# Patient Record
Sex: FEMALE | Race: WHITE | NOT HISPANIC OR LATINO | Employment: OTHER | URBAN - METROPOLITAN AREA
[De-identification: names, ages, dates, MRNs, and addresses within clinical notes are randomized per-mention and may not be internally consistent; named-entity substitution may affect disease eponyms.]

---

## 2017-01-06 ENCOUNTER — OFFICE VISIT (OUTPATIENT)
Dept: INTERNAL MEDICINE | Facility: CLINIC | Age: 62
End: 2017-01-06

## 2017-01-06 VITALS — DIASTOLIC BLOOD PRESSURE: 68 MMHG | SYSTOLIC BLOOD PRESSURE: 124 MMHG | HEIGHT: 66 IN | HEART RATE: 68 BPM

## 2017-01-06 DIAGNOSIS — M54.40 CHRONIC LOW BACK PAIN WITH SCIATICA, SCIATICA LATERALITY UNSPECIFIED, UNSPECIFIED BACK PAIN LATERALITY: ICD-10-CM

## 2017-01-06 DIAGNOSIS — E78.5 DYSLIPIDEMIA: ICD-10-CM

## 2017-01-06 DIAGNOSIS — R74.8 ABNORMAL LIVER ENZYMES: ICD-10-CM

## 2017-01-06 DIAGNOSIS — G47.33 OBSTRUCTIVE SLEEP APNEA SYNDROME: ICD-10-CM

## 2017-01-06 DIAGNOSIS — G25.0 ESSENTIAL TREMOR: ICD-10-CM

## 2017-01-06 DIAGNOSIS — G47.00 INSOMNIA, UNSPECIFIED TYPE: ICD-10-CM

## 2017-01-06 DIAGNOSIS — E08.10: ICD-10-CM

## 2017-01-06 DIAGNOSIS — M54.40 CHRONIC MIDLINE LOW BACK PAIN WITH SCIATICA, SCIATICA LATERALITY UNSPECIFIED: ICD-10-CM

## 2017-01-06 DIAGNOSIS — E55.9 VITAMIN D DEFICIENCY: ICD-10-CM

## 2017-01-06 DIAGNOSIS — I73.9 PERIPHERAL VASCULAR DISEASE (HCC): ICD-10-CM

## 2017-01-06 DIAGNOSIS — J41.8 MIXED SIMPLE AND MUCOPURULENT CHRONIC BRONCHITIS (HCC): ICD-10-CM

## 2017-01-06 DIAGNOSIS — G89.29 CHRONIC LOW BACK PAIN WITH SCIATICA, SCIATICA LATERALITY UNSPECIFIED, UNSPECIFIED BACK PAIN LATERALITY: ICD-10-CM

## 2017-01-06 DIAGNOSIS — E53.8 COBALAMIN DEFICIENCY: ICD-10-CM

## 2017-01-06 DIAGNOSIS — K21.9 GASTROESOPHAGEAL REFLUX DISEASE WITHOUT ESOPHAGITIS: ICD-10-CM

## 2017-01-06 DIAGNOSIS — L40.9 PSORIASIS: ICD-10-CM

## 2017-01-06 DIAGNOSIS — K76.0 FATTY LIVER DISEASE, NONALCOHOLIC: Chronic | ICD-10-CM

## 2017-01-06 DIAGNOSIS — M15.9 PRIMARY OSTEOARTHRITIS INVOLVING MULTIPLE JOINTS: ICD-10-CM

## 2017-01-06 DIAGNOSIS — I10 ESSENTIAL HYPERTENSION: ICD-10-CM

## 2017-01-06 DIAGNOSIS — E78.49 OTHER HYPERLIPIDEMIA: ICD-10-CM

## 2017-01-06 DIAGNOSIS — M81.0 OSTEOPOROSIS: ICD-10-CM

## 2017-01-06 DIAGNOSIS — G89.29 CHRONIC MIDLINE LOW BACK PAIN WITH SCIATICA, SCIATICA LATERALITY UNSPECIFIED: ICD-10-CM

## 2017-01-06 DIAGNOSIS — E03.8 OTHER SPECIFIED HYPOTHYROIDISM: ICD-10-CM

## 2017-01-06 DIAGNOSIS — D45 POLYCYTHEMIA VERA (HCC): ICD-10-CM

## 2017-01-06 DIAGNOSIS — J30.9 ATOPIC RHINITIS: ICD-10-CM

## 2017-01-06 DIAGNOSIS — L97.524 FOOT ULCER, LEFT, WITH NECROSIS OF BONE (HCC): ICD-10-CM

## 2017-01-06 DIAGNOSIS — H34.8392 BRANCH RETINAL VEIN OCCLUSION: Primary | ICD-10-CM

## 2017-01-06 DIAGNOSIS — J43.8 OTHER EMPHYSEMA (HCC): ICD-10-CM

## 2017-01-06 DIAGNOSIS — M79.7 FIBROMYALGIA: ICD-10-CM

## 2017-01-06 DIAGNOSIS — F41.8 MIXED ANXIETY DEPRESSIVE DISORDER: ICD-10-CM

## 2017-01-06 PROCEDURE — 99214 OFFICE O/P EST MOD 30 MIN: CPT | Performed by: INTERNAL MEDICINE

## 2017-01-06 RX ORDER — LISINOPRIL 20 MG/1
20 TABLET ORAL
Qty: 90 TABLET | Refills: 3 | Status: SHIPPED | OUTPATIENT
Start: 2017-01-06 | End: 2017-10-03 | Stop reason: SDUPTHER

## 2017-01-06 RX ORDER — EZETIMIBE 10 MG/1
10 TABLET ORAL DAILY
Qty: 90 TABLET | Refills: 3 | Status: SHIPPED | OUTPATIENT
Start: 2017-01-06 | End: 2018-06-08 | Stop reason: SDUPTHER

## 2017-01-06 RX ORDER — ATORVASTATIN CALCIUM 40 MG/1
40 TABLET, FILM COATED ORAL NIGHTLY
Qty: 90 TABLET | Refills: 3 | Status: SHIPPED | OUTPATIENT
Start: 2017-01-06 | End: 2018-02-27 | Stop reason: HOSPADM

## 2017-01-06 RX ORDER — CLOPIDOGREL BISULFATE 75 MG/1
75 TABLET ORAL DAILY
Qty: 90 TABLET | Refills: 3 | Status: SHIPPED | OUTPATIENT
Start: 2017-01-06 | End: 2018-05-17 | Stop reason: SDUPTHER

## 2017-01-06 RX ORDER — FENTANYL 100 UG/H
1 PATCH TRANSDERMAL EVERY OTHER DAY
Qty: 15 PATCH | Refills: 0 | Status: SHIPPED | OUTPATIENT
Start: 2017-01-06 | End: 2017-02-02 | Stop reason: SDUPTHER

## 2017-01-06 RX ORDER — CEPHALEXIN 500 MG/1
CAPSULE ORAL
COMMUNITY
Start: 2017-01-05 | End: 2017-02-16 | Stop reason: ALTCHOICE

## 2017-01-06 RX ORDER — OXYCODONE AND ACETAMINOPHEN 10; 325 MG/1; MG/1
1 TABLET ORAL 3 TIMES DAILY
Qty: 90 TABLET | Refills: 0 | Status: SHIPPED | OUTPATIENT
Start: 2017-01-06 | End: 2017-02-02 | Stop reason: SDUPTHER

## 2017-01-06 RX ORDER — AMLODIPINE BESYLATE 5 MG/1
5 TABLET ORAL DAILY
Qty: 90 TABLET | Refills: 3 | Status: SHIPPED | OUTPATIENT
Start: 2017-01-06 | End: 2018-02-13

## 2017-01-06 NOTE — PROGRESS NOTES
Patient is a 61 y.o. female who is here for a follow up of chronic conditions.  Chief Complaint   Patient presents with   • Hypertension         HPI:  Here for f/u.  Continues to be followed by Dr Feliciano.  Continues on antibiotics.  Bowels are becoming more solid.  BP is running ok.  Still smoking but less in amount.  Sleeping a lot.  No nausea or emesis.  Still in a lot of pain.     History:    Patient Active Problem List   Diagnosis   • Atopic rhinitis   • Restrictive ventilatory defect   • Pulmonary emphysema   • Osteoporosis   • Obstructive sleep apnea syndrome   • Abnormal liver enzymes   • Cholelithiasis   • Chronic back pain   • Mixed anxiety depressive disorder   • Uncontrolled diabetes mellitus   • Dyslipidemia   • Essential tremor   • Fibromyalgia   • Gastroesophageal reflux disease without esophagitis   • Hypertension   • Hypothyroidism   • Insomnia   • Osteoarthritis   • Polycythemia vera   • Psoriasis   • Branch retinal vein occlusion   • Cobalamin deficiency   • Chronic bronchitis   • Obesity   • Pneumonia   • Tobacco use   • Vitamin D deficiency   • Foot ulcer, left   • Leukocytosis   • Hypokalemia   • Lactic acidosis   • Fatty liver disease, nonalcoholic   • Diabetes education, encounter for   • Cellulitis of left foot   • Sinus bradycardia   • NSTEMI (non-ST elevated myocardial infarction)   • Tobacco abuse   • Hypoxia   • Peripheral vascular disease   • Gangrene       Past Medical History   Diagnosis Date   • Bronchitis    • Cervical cancer    • Cholelithiasis 5/11/2016   • Chronic bronchitis    • Degenerative arthritis    • Diabetes mellitus    • Dyslipidemia 5/11/2016   • Dyspnea    • Fatty liver disease, nonalcoholic 11/21/2016   • Fibromyalgia    • H/O echocardiogram    • History of pneumonia    • Hypertension 5/11/2016     16. H/O echocardiogram (V15.89) (Z92.89)  · A.  Echocardiogram of 02/03/2015 reports an ejection fraction of 60-65%, mild concentric     LVH, trace mitral regurgitation, mild  tricuspid and pulmonic regurgitation and calculated     RVSP of 35 mmHg, the main pulmonary artery is also mildly dilated.   • Hypothyroidism 5/11/2016     Description: A.  On replacement therapy.   • Nausea    • Obesity    • DINA (obstructive sleep apnea)      intolerant of CPAP therapy   • Osteoporosis    • Osteoporosis    • Polycythemia vera 5/11/2016   • Pulmonary emphysema    • Restrictive ventilatory defect    • Rhinitis    • Uncontrolled diabetes mellitus 5/11/2016   • Uterine cancer    • Vitamin D deficiency 8/1/2016       Past Surgical History   Procedure Laterality Date   • Lumbar spine surgery       arthrodesis by anterior approach addit interspace   • Back surgery       lumbar fusions x5--multiple times; 1995, 1997, 1998, 1999 and 2008   • Hand surgery Bilateral      x3   • Hysterectomy       status post uterine and cervical cancer   • Knee surgery Right    • Tonsillectomy     • Amputation digit Left 11/23/2016     Procedure: AMPUTATION TRANS METATARSAL - ray amutation of the left great toe;  Surgeon: Arsalan Feliciano MD;  Location:  [facility];  Service:    • Cardiac catheterization N/A 11/26/2016     Procedure: Left Heart Cath;  Surgeon: Ash Nuñez MD;  Location:  [facility];  Service:        Current Outpatient Prescriptions on File Prior to Visit   Medication Sig   • acetaminophen (TYLENOL) 325 MG tablet Take 2 tablets by mouth Every 4 (Four) Hours As Needed for mild pain (1-3) or fever (temperature greater than 101F).   • albuterol (PROVENTIL) (2.5 MG/3ML) 0.083% nebulizer solution 2.5 mg Every 6 (Six) Hours As Needed.   • aspirin  MG EC tablet Take 1 tablet by mouth Daily.   • baclofen (LIORESAL) 20 MG tablet Take 20 mg by mouth Daily.   • bisoprolol-hydrochlorothiazide (ZIAC) 5-6.25 MG per tablet TAKE ONE TABLET BY MOUTH DAILY   • busPIRone (BUSPAR) 10 MG tablet TAKE TWO TABLETS BY MOUTH TWICE A DAY   • cetirizine (ZyrTEC) 10 MG tablet Take 10 mg by mouth Every Night.   •  clonazePAM (KlonoPIN) 0.5 MG tablet TAKE ONE-HALF TABLET BY MOUTH TWICE A DAY   • cyanocobalamin 1000 MCG/ML injection 1,000 mcg. Cyanocobalamin 1000 MCG/ML Injection Solution; Patient Sig: Cyanocobalamin 1000 MCG/ML Injection Solution INJECT ONE MILLILITER INJECTION EVERY MONTH; 10; 4; 18-Jun-2015; Active   • diphenhydrAMINE (BENADRYL) 25 mg capsule Take 50 mg by mouth Daily.   • fluticasone (FLONASE) 50 MCG/ACT nasal spray 1 spray into each nostril 2 (Two) Times a Day.   • furosemide (LASIX) 40 MG tablet Take 1 tablet by mouth Daily.   • levothyroxine (SYNTHROID, LEVOTHROID) 112 MCG tablet TAKE ONE TABLET BY MOUTH DAILY   • loperamide (IMODIUM) 2 MG capsule Take 2 mg by mouth Daily As Needed for diarrhea (TAKES ABOUT  EVERY 3 DAYS.).   • melatonin 5 MG sublingual tablet sublingual tablet Place 1 tablet under the tongue At Night As Needed (insomnia).   • metoclopramide (REGLAN) 10 MG tablet TAKE ONE TABLET BY MOUTH BEFORE MEALS   • nystatin (MYCOSTATIN) 638490 UNIT/GM cream Apply  topically 2 (Two) Times a Day.   • omeprazole (PriLOSEC) 20 MG capsule TAKE ONE CAPSULE BY MOUTH EVERY DAY   • pregabalin (LYRICA) 100 MG capsule Take 100 mg by mouth 2 (Two) Times a Day.   • probiotic (CULTURELLE) capsule capsule Take 1 capsule by mouth Daily.   • promethazine (PHENERGAN) 25 MG tablet Take 25 mg by mouth Every 8 (Eight) Hours As Needed for nausea or vomiting.   • SITagliptin (JANUVIA) 100 MG tablet Take 1 tablet by mouth Daily.   • SPIRIVA RESPIMAT 2.5 MCG/ACT aerosol solution INHALE TWO PUFF(S) BY MOUTH DAILY   • triamcinolone (KENALOG) 0.1 % cream Apply 1 application topically 2 (Two) Times a Day.   • [DISCONTINUED] amLODIPine (NORVASC) 5 MG tablet Take 1 tablet by mouth Daily.   • [DISCONTINUED] atorvastatin (LIPITOR) 40 MG tablet Take 1 tablet by mouth Every Night.   • [DISCONTINUED] ezetimibe (ZETIA) 10 MG tablet Take 1 tablet by mouth daily.   • [DISCONTINUED] fentaNYL (DURAGESIC) 100 MCG/HR patch Place 1 patch on  the skin Every Other Day.   • [DISCONTINUED] lisinopril (PRINIVIL,ZESTRIL) 20 MG tablet Take 1 tablet by mouth Daily.   • [DISCONTINUED] oxyCODONE-acetaminophen (PERCOCET)  MG per tablet Take 1 tablet by mouth 3 (Three) Times a Day.   • [DISCONTINUED] fluconazole (DIFLUCAN) 100 MG tablet Take 1 tablet by mouth Daily.     No current facility-administered medications on file prior to visit.        Family History   Problem Relation Age of Onset   • Arthritis Mother    • Diabetes Mother    • Colon polyps Mother    • Diverticulitis Mother    • Arthritis Father    • Bleeding Disorder Father    • Diabetes Father    • Kidney disease Father    • COPD Sister      currently smokes   • Arthritis Brother    • Diabetes Brother    • Alcohol abuse Brother    • Heart murmur Daughter    • Arthritis Other    • Diabetes Other        Social History     Social History   • Marital status:      Spouse name: N/A   • Number of children: N/A   • Years of education: N/A     Occupational History   • Not on file.     Social History Main Topics   • Smoking status: Current Every Day Smoker     Packs/day: 0.50   • Smokeless tobacco: Never Used      Comment: currently trying to quit by using Chantix and has cut smoking habit in half, Pt now smoking half a pack a day    • Alcohol use No   • Drug use: No   • Sexual activity: Not on file     Other Topics Concern   • Not on file     Social History Narrative    Lives with her  in [place]         ROS:    Review of Systems   Constitutional: Positive for fatigue. Negative for chills, diaphoresis, fever and unexpected weight change.   HENT: Negative for congestion, ear pain, hearing loss, nosebleeds, postnasal drip, sinus pressure and sore throat.    Eyes: Negative for pain, discharge and itching.   Respiratory: Positive for cough and shortness of breath. Negative for chest tightness and wheezing.    Cardiovascular: Negative for chest pain, palpitations and leg swelling.  "  Gastrointestinal: Positive for diarrhea and nausea. Negative for abdominal distention, abdominal pain, blood in stool, constipation and vomiting.   Endocrine: Positive for heat intolerance. Negative for polydipsia and polyuria.   Genitourinary: Negative for difficulty urinating, dysuria, frequency and hematuria.   Musculoskeletal: Positive for arthralgias and back pain. Negative for gait problem, joint swelling and myalgias.   Skin: Positive for rash and wound.   Neurological: Positive for light-headedness. Negative for dizziness, syncope, weakness and headaches.   Psychiatric/Behavioral: Positive for dysphoric mood and sleep disturbance. The patient is nervous/anxious.        Visit Vitals   • /68 (BP Location: Left arm, Patient Position: Sitting)   • Pulse 68   • Ht 66\" (167.6 cm)   • LMP  (LMP Unknown)       Physical Exam:    Physical Exam   Constitutional: She appears well-developed and well-nourished.   HENT:   Head: Normocephalic and atraumatic.   Right Ear: External ear normal.   Left Ear: External ear normal.   Mouth/Throat: Oropharynx is clear and moist.   Eyes: Conjunctivae and EOM are normal.   Neck: Normal range of motion. Neck supple.   Cardiovascular: Normal rate and regular rhythm.    2/6 СЕРГЕЙ   Pulmonary/Chest: Effort normal.   Mildly diminished   Abdominal: Soft. Bowel sounds are normal.   Musculoskeletal: She exhibits edema (trace) and tenderness (pain on back flexion past 30).   Using cane   Lymphadenopathy:     She has no cervical adenopathy.   Neurological: She is alert.   Skin: Skin is warm and dry. Rash (eczema in hands/forearms and LE brown patches with scaling) noted.   Left foot wound not examined   Psychiatric: She has a normal mood and affect. Her behavior is normal. Thought content normal.       Procedure:      Discussion/Summary:  chronic back pain- refill patch and percocet as needed  htn-stable no triple tx  fibromylagia- cont current tx  hyperlipidemia-cont lipitor and " zetia  hypothroid-cont replacement  depression with anxiety-cont buspar and klonopine ,advised her of risk of addiction  polycythemia-cbc noted  retinal vein occlusion- cont rf modification  b12 def-admin today  nausea-phenergan prn, cont reglan, advised of EPS effects and she has agreed  DM-labs noted, counseled on low carb, change to januvia to see if it helps with diarrhea  diaphoresis-cont clonidine  Elevated lft/?autoimmune-f/u gastro recs , labs noted  Eczema-cont prn steroids  Left foot ulcer and osteo-f/u ID, cont current tx  high risk meds-labs noted      labs noted and dw patient      Current Outpatient Prescriptions:   •  acetaminophen (TYLENOL) 325 MG tablet, Take 2 tablets by mouth Every 4 (Four) Hours As Needed for mild pain (1-3) or fever (temperature greater than 101F)., Disp: 100 tablet, Rfl: 0  •  albuterol (PROVENTIL) (2.5 MG/3ML) 0.083% nebulizer solution, 2.5 mg Every 6 (Six) Hours As Needed., Disp: , Rfl:   •  amLODIPine (NORVASC) 5 MG tablet, Take 1 tablet by mouth Daily., Disp: 90 tablet, Rfl: 3  •  aspirin  MG EC tablet, Take 1 tablet by mouth Daily., Disp: 100 tablet, Rfl: 0  •  atorvastatin (LIPITOR) 40 MG tablet, Take 1 tablet by mouth Every Night., Disp: 90 tablet, Rfl: 3  •  baclofen (LIORESAL) 20 MG tablet, Take 20 mg by mouth Daily., Disp: , Rfl:   •  bisoprolol-hydrochlorothiazide (ZIAC) 5-6.25 MG per tablet, TAKE ONE TABLET BY MOUTH DAILY, Disp: 90 tablet, Rfl: 2  •  busPIRone (BUSPAR) 10 MG tablet, TAKE TWO TABLETS BY MOUTH TWICE A DAY, Disp: 360 tablet, Rfl: 2  •  cephalexin (KEFLEX) 500 MG capsule, , Disp: , Rfl:   •  cetirizine (ZyrTEC) 10 MG tablet, Take 10 mg by mouth Every Night., Disp: , Rfl:   •  clonazePAM (KlonoPIN) 0.5 MG tablet, TAKE ONE-HALF TABLET BY MOUTH TWICE A DAY, Disp: 90 tablet, Rfl: 0  •  cyanocobalamin 1000 MCG/ML injection, 1,000 mcg. Cyanocobalamin 1000 MCG/ML Injection Solution; Patient Sig: Cyanocobalamin 1000 MCG/ML Injection Solution INJECT ONE  MILLILITER INJECTION EVERY MONTH; 10; 4; 18-Jun-2015; Active, Disp: , Rfl:   •  diphenhydrAMINE (BENADRYL) 25 mg capsule, Take 50 mg by mouth Daily., Disp: , Rfl:   •  ezetimibe (ZETIA) 10 MG tablet, Take 1 tablet by mouth Daily., Disp: 90 tablet, Rfl: 3  •  fentaNYL (DURAGESIC) 100 MCG/HR patch, Place 1 patch on the skin Every Other Day., Disp: 15 patch, Rfl: 0  •  fluticasone (FLONASE) 50 MCG/ACT nasal spray, 1 spray into each nostril 2 (Two) Times a Day., Disp: , Rfl:   •  furosemide (LASIX) 40 MG tablet, Take 1 tablet by mouth Daily., Disp: 60 tablet, Rfl: 5  •  levothyroxine (SYNTHROID, LEVOTHROID) 112 MCG tablet, TAKE ONE TABLET BY MOUTH DAILY, Disp: 90 tablet, Rfl: 0  •  lisinopril (PRINIVIL,ZESTRIL) 20 MG tablet, Take 1 tablet by mouth Daily., Disp: 90 tablet, Rfl: 3  •  loperamide (IMODIUM) 2 MG capsule, Take 2 mg by mouth Daily As Needed for diarrhea (TAKES ABOUT  EVERY 3 DAYS.)., Disp: , Rfl:   •  melatonin 5 MG sublingual tablet sublingual tablet, Place 1 tablet under the tongue At Night As Needed (insomnia)., Disp: 30 tablet, Rfl: 0  •  metoclopramide (REGLAN) 10 MG tablet, TAKE ONE TABLET BY MOUTH BEFORE MEALS, Disp: 90 tablet, Rfl: 4  •  nystatin (MYCOSTATIN) 214060 UNIT/GM cream, Apply  topically 2 (Two) Times a Day., Disp: 60 g, Rfl: 1  •  omeprazole (PriLOSEC) 20 MG capsule, TAKE ONE CAPSULE BY MOUTH EVERY DAY, Disp: 90 capsule, Rfl: 2  •  oxyCODONE-acetaminophen (PERCOCET)  MG per tablet, Take 1 tablet by mouth 3 (Three) Times a Day., Disp: 90 tablet, Rfl: 0  •  pregabalin (LYRICA) 100 MG capsule, Take 100 mg by mouth 2 (Two) Times a Day., Disp: , Rfl:   •  probiotic (CULTURELLE) capsule capsule, Take 1 capsule by mouth Daily., Disp: 60 capsule, Rfl: 0  •  promethazine (PHENERGAN) 25 MG tablet, Take 25 mg by mouth Every 8 (Eight) Hours As Needed for nausea or vomiting., Disp: , Rfl:   •  SITagliptin (JANUVIA) 100 MG tablet, Take 1 tablet by mouth Daily., Disp: 30 tablet, Rfl: 0  •  SPIRIVA  RESPIMAT 2.5 MCG/ACT aerosol solution, INHALE TWO PUFF(S) BY MOUTH DAILY, Disp: 1 inhaler, Rfl: 4  •  triamcinolone (KENALOG) 0.1 % cream, Apply 1 application topically 2 (Two) Times a Day., Disp: , Rfl:   •  clopidogrel (PLAVIX) 75 MG tablet, Take 1 tablet by mouth Daily., Disp: 90 tablet, Rfl: 3        Tamara was seen today for hypertension.    Diagnoses and all orders for this visit:    Branch retinal vein occlusion    Essential hypertension  -     lisinopril (PRINIVIL,ZESTRIL) 20 MG tablet; Take 1 tablet by mouth Daily.  -     amLODIPine (NORVASC) 5 MG tablet; Take 1 tablet by mouth Daily.    Peripheral vascular disease  -     clopidogrel (PLAVIX) 75 MG tablet; Take 1 tablet by mouth Daily.    Atopic rhinitis    Mixed simple and mucopurulent chronic bronchitis    Obstructive sleep apnea syndrome    Other emphysema    Cobalamin deficiency    Fatty liver disease, nonalcoholic    Gastroesophageal reflux disease without esophagitis    Vitamin D deficiency    Other specified hypothyroidism    Chronic low back pain with sciatica, sciatica laterality unspecified, unspecified back pain laterality    Essential tremor    Fibromyalgia  -     oxyCODONE-acetaminophen (PERCOCET)  MG per tablet; Take 1 tablet by mouth 3 (Three) Times a Day.  -     fentaNYL (DURAGESIC) 100 MCG/HR patch; Place 1 patch on the skin Every Other Day.    Primary osteoarthritis involving multiple joints  -     oxyCODONE-acetaminophen (PERCOCET)  MG per tablet; Take 1 tablet by mouth 3 (Three) Times a Day.  -     fentaNYL (DURAGESIC) 100 MCG/HR patch; Place 1 patch on the skin Every Other Day.    Psoriasis    Osteoporosis    Polycythemia vera    Abnormal liver enzymes    Dyslipidemia  -     atorvastatin (LIPITOR) 40 MG tablet; Take 1 tablet by mouth Every Night.    Foot ulcer, left, with necrosis of bone    Insomnia, unspecified type    Mixed anxiety depressive disorder    Diabetes mellitus due to underlying condition, uncontrolled, with  ketoacidosis without coma, without long-term current use of insulin    Chronic midline low back pain with sciatica, sciatica laterality unspecified  -     oxyCODONE-acetaminophen (PERCOCET)  MG per tablet; Take 1 tablet by mouth 3 (Three) Times a Day.    Other hyperlipidemia  -     ezetimibe (ZETIA) 10 MG tablet; Take 1 tablet by mouth Daily.

## 2017-01-06 NOTE — MR AVS SNAPSHOT
Tamara Langston   1/6/2017 11:30 AM   Office Visit    Dept Phone:  [phone]   Encounter #:  [id number]    Provider:  Harinder Aranda MD   Department:  [facility] PRIMARY CARE                Your Full Care Plan              Today's Medication Changes          These changes are accurate as of: 1/6/17 12:31 PM.  If you have any questions, ask your nurse or doctor.               New Medication(s)Ordered:     clopidogrel 75 MG tablet   Commonly known as:  PLAVIX   Take 1 tablet by mouth Daily.   Started by:  Harinder Aranda MD         Stop taking medication(s)listed here:     fluconazole 100 MG tablet   Commonly known as:  DIFLUCAN   Stopped by:  Harinder Aranda MD                Where to Get Your Medications      These medications were sent to [address] - [address] [place] [address] [address] [place] - [phone]  - [phone] [place] [address]     Phone:  [phone]     amLODIPine 5 MG tablet    atorvastatin 40 MG tablet    clopidogrel 75 MG tablet    ezetimibe 10 MG tablet    lisinopril 20 MG tablet         You can get these medications from any pharmacy     Bring a paper prescription for each of these medications     fentaNYL 100 MCG/HR patch    oxyCODONE-acetaminophen  MG per tablet                  Your Updated Medication List          This list is accurate as of: 1/6/17 12:31 PM.  Always use your most recent med list.                acetaminophen 325 MG tablet   Commonly known as:  TYLENOL   Take 2 tablets by mouth Every 4 (Four) Hours As Needed for mild pain (1-3) or fever (temperature greater than 101F).       albuterol (2.5 MG/3ML) 0.083% nebulizer solution   Commonly known as:  PROVENTIL       amLODIPine 5 MG tablet   Commonly known as:  NORVASC   Take 1 tablet by mouth Daily.       aspirin 325 MG EC tablet   Take 1 tablet by mouth Daily.       atorvastatin 40 MG tablet   Commonly known as:   LIPITOR   Take 1 tablet by mouth Every Night.       baclofen 20 MG tablet   Commonly known as:  LIORESAL       bisoprolol-hydrochlorothiazide 5-6.25 MG per tablet   Commonly known as:  ZIAC   TAKE ONE TABLET BY MOUTH DAILY       busPIRone 10 MG tablet   Commonly known as:  BUSPAR   TAKE TWO TABLETS BY MOUTH TWICE A DAY       cephalexin 500 MG capsule   Commonly known as:  KEFLEX       cetirizine 10 MG tablet   Commonly known as:  zyrTEC       clonazePAM 0.5 MG tablet   Commonly known as:  KlonoPIN   TAKE ONE-HALF TABLET BY MOUTH TWICE A DAY       clopidogrel 75 MG tablet   Commonly known as:  PLAVIX   Take 1 tablet by mouth Daily.       cyanocobalamin 1000 MCG/ML injection       diphenhydrAMINE 25 mg capsule   Commonly known as:  BENADRYL       ezetimibe 10 MG tablet   Commonly known as:  ZETIA   Take 1 tablet by mouth Daily.       fentaNYL 100 MCG/HR patch   Commonly known as:  DURAGESIC   Place 1 patch on the skin Every Other Day.       fluticasone 50 MCG/ACT nasal spray   Commonly known as:  FLONASE       furosemide 40 MG tablet   Commonly known as:  LASIX   Take 1 tablet by mouth Daily.       levothyroxine 112 MCG tablet   Commonly known as:  SYNTHROID, LEVOTHROID   TAKE ONE TABLET BY MOUTH DAILY       lisinopril 20 MG tablet   Commonly known as:  PRINIVIL,ZESTRIL   Take 1 tablet by mouth Daily.       loperamide 2 MG capsule   Commonly known as:  IMODIUM       melatonin 5 MG sublingual tablet sublingual tablet   Place 1 tablet under the tongue At Night As Needed (insomnia).       metoclopramide 10 MG tablet   Commonly known as:  REGLAN   TAKE ONE TABLET BY MOUTH BEFORE MEALS       nystatin 751013 UNIT/GM cream   Commonly known as:  MYCOSTATIN   Apply  topically 2 (Two) Times a Day.       omeprazole 20 MG capsule   Commonly known as:  priLOSEC   TAKE ONE CAPSULE BY MOUTH EVERY DAY       oxyCODONE-acetaminophen  MG per tablet   Commonly known as:  PERCOCET   Take 1 tablet by mouth 3 (Three) Times a Day.        pregabalin 100 MG capsule   Commonly known as:  LYRICA       probiotic capsule capsule   Take 1 capsule by mouth Daily.       promethazine 25 MG tablet   Commonly known as:  PHENERGAN       SITagliptin 100 MG tablet   Commonly known as:  JANUVIA   Take 1 tablet by mouth Daily.       SPIRIVA RESPIMAT 2.5 MCG/ACT aerosol solution   Generic drug:  Tiotropium Bromide Monohydrate   INHALE TWO PUFF(S) BY MOUTH DAILY       triamcinolone 0.1 % cream   Commonly known as:  KENALOG               You Were Diagnosed With        Codes Comments    Branch retinal vein occlusion    -  Primary ICD-10-CM: H34.8392  ICD-9-CM: 362.36     Essential hypertension     ICD-10-CM: I10  ICD-9-CM: 401.9     Peripheral vascular disease     ICD-10-CM: I73.9  ICD-9-CM: 443.9     Atopic rhinitis     ICD-10-CM: J30.9  ICD-9-CM: 477.9     Mixed simple and mucopurulent chronic bronchitis     ICD-10-CM: J41.8  ICD-9-CM: 491.1     Obstructive sleep apnea syndrome     ICD-10-CM: G47.33  ICD-9-CM: 327.23     Other emphysema     ICD-10-CM: J43.8  ICD-9-CM: 492.8     Cobalamin deficiency     ICD-10-CM: E53.8  ICD-9-CM: 266.2     Fatty liver disease, nonalcoholic     ICD-10-CM: K76.0  ICD-9-CM: 571.8     Gastroesophageal reflux disease without esophagitis     ICD-10-CM: K21.9  ICD-9-CM: 530.81     Vitamin D deficiency     ICD-10-CM: E55.9  ICD-9-CM: 268.9     Other specified hypothyroidism     ICD-10-CM: E03.8  ICD-9-CM: 244.8     Chronic low back pain with sciatica, sciatica laterality unspecified, unspecified back pain laterality     ICD-10-CM: M54.40, G89.29  ICD-9-CM: 724.2, 724.3, 338.29     Essential tremor     ICD-10-CM: G25.0  ICD-9-CM: 333.1     Fibromyalgia     ICD-10-CM: M79.7  ICD-9-CM: 729.1     Primary osteoarthritis involving multiple joints     ICD-10-CM: M15.0  ICD-9-CM: 715.09     Psoriasis     ICD-10-CM: L40.9  ICD-9-CM: 696.1     Osteoporosis     ICD-10-CM: M81.0  ICD-9-CM: 733.00     Polycythemia vera     ICD-10-CM: D45  ICD-9-CM:  238.4     Abnormal liver enzymes     ICD-10-CM: R74.8  ICD-9-CM: 790.5     Dyslipidemia     ICD-10-CM: E78.5  ICD-9-CM: 272.4     Foot ulcer, left, with necrosis of bone     ICD-10-CM: L97.524  ICD-9-CM: 707.15     Insomnia, unspecified type     ICD-10-CM: G47.00  ICD-9-CM: 780.52     Mixed anxiety depressive disorder     ICD-10-CM: F41.8  ICD-9-CM: 300.4     Diabetes mellitus due to underlying condition, uncontrolled, with ketoacidosis without coma, without long-term current use of insulin     ICD-10-CM: E08.10  ICD-9-CM: 249.11     Chronic midline low back pain with sciatica, sciatica laterality unspecified     ICD-10-CM: M54.40, G89.29  ICD-9-CM: 724.2, 724.3, 338.29     Other hyperlipidemia     ICD-10-CM: E78.4  ICD-9-CM: 272.4       Instructions     None    Patient Instructions History      Upcoming Appointments     Visit Type Date Time Department    FOLLOW UP 1/6/2017 11:30 AM [facility]    OFFICE VISIT 1/12/2017 10:00 AM [facility] SURGERY [place]    OFFICE VISIT 2/16/2017  9:00 AM [facility] [place]    FOLLOW UP 3/6/2017  9:45 AM [facility] [place] [facility]      MyChart Signup     Our records indicate that you have declined [facility] MyChart signup. If you would like to sign up for MyChart, please email [email or web address] or call [phone] to obtain an activation code.             Other Info from Your Visit           Your Appointments     Jan 12, 2017 10:00 AM EST   Office Visit with Arsalan Feliciano MD   [facility] CARDIOTHORACIC SURGERY (--)    [address] [address]  [facility] [address] [phone]           Arrive 15 minutes prior to appointment.            Feb 16, 2017  9:00 AM EST   Office Visit with Prabhjot Luong MD   [facility] GASTROENTEROLOGY (--)    [address]  [facility] [address] [phone]           Arrive 15 minutes prior to appointment.            Mar 06, 2017  9:45 AM EST   Follow Up with Ash Nuñez,  "MD   [facility] CARDIOLOGY (--)    [address]  [facility] [address] [phone]           Arrive 15 minutes prior to appointment.              Allergies     Cortisone  Other (See Comments)    Hotness all over body and hyper    Oxycontin [Oxycodone]  Other (See Comments)    psoriasis    Tolmetin  Rash    Tolectin-rash and itching      Reason for Visit     Hypertension           Vital Signs     Blood Pressure Pulse Height Last Menstrual Period Smoking Status       124/68 (BP Location: Left arm, Patient Position: Sitting) 68 66\" (167.6 cm) (LMP Unknown) Current Every Day Smoker       Problems and Diagnoses Noted     Abnormal liver enzymes    Nasal inflammation due to allergen    Venous tributary (branch) occlusion of retina    Chronic back pain    Chronic bronchitis    Vitamin B12 deficiency    Dyslipidemia    Essential tremor    Fatty liver disease, nonalcoholic    Fibromyalgia    Ulcer of left foot    Acid reflux disease    High blood pressure    Underactive thyroid    Difficulty falling or staying asleep    Mixed anxiety depressive disorder    Sleep apnea    Osteoarthritis (arthritis due to wear and tear of joints)    Osteoporosis    Peripheral vascular disease    Polycythemia vera    Psoriasis    Emphysema    Diabetes mellitus out of control    Vitamin D deficiency    Other hyperlipidemia            "

## 2017-01-09 RX ORDER — BACLOFEN 20 MG/1
TABLET ORAL
Qty: 270 TABLET | Refills: 0 | OUTPATIENT
Start: 2017-01-09 | End: 2017-06-01 | Stop reason: SDUPTHER

## 2017-01-12 ENCOUNTER — OFFICE VISIT (OUTPATIENT)
Dept: CARDIAC SURGERY | Facility: CLINIC | Age: 62
End: 2017-01-12

## 2017-01-12 VITALS
OXYGEN SATURATION: 89 % | HEART RATE: 65 BPM | HEIGHT: 66 IN | BODY MASS INDEX: 35.36 KG/M2 | DIASTOLIC BLOOD PRESSURE: 64 MMHG | WEIGHT: 220 LBS | SYSTOLIC BLOOD PRESSURE: 140 MMHG | TEMPERATURE: 98.8 F

## 2017-01-12 DIAGNOSIS — L97.524 FOOT ULCER, LEFT, WITH NECROSIS OF BONE (HCC): Primary | ICD-10-CM

## 2017-01-12 DIAGNOSIS — I96 GANGRENE (HCC): ICD-10-CM

## 2017-01-12 PROCEDURE — 99024 POSTOP FOLLOW-UP VISIT: CPT | Performed by: THORACIC SURGERY (CARDIOTHORACIC VASCULAR SURGERY)

## 2017-01-12 NOTE — PROGRESS NOTES
"Patient Information  Tamara Langston                                                                                          [address] [facility] [address]      1955  [phone]      Chief Complaint   Patient presents with   • Peripheral Vascular Disease     1 month follow-up. S/P left great toe transmetatarsal amputation-for osteomyelitis 11/23/16.       History of Present Illness: Patient seen today for follow-up of a left great toe transmetatarsal amputation, done on 11/23/2016 for osteomyelitis.  The patient was last seen on 12/15/2016 which time she was doing well.  Wound vacs being changed 3 times a week.  Her [facility] health.  She is on oral antibiotics per infectious disease, Dr. Cruz. She has no complications at this time and no complaints    Vitals:    01/12/17 1000   BP: 140/64   BP Location: Left arm   Patient Position: Sitting   Pulse: 65   Temp: 98.8 °F (37.1 °C)   SpO2: (!) 89%   Weight: 220 lb (99.8 kg)   Height: 66\" (167.6 cm)        Physical Exam on examination, the wound VAC is intact over the left great toe amputation site.  The surrounding skin margins around the wound VAC are healing well.  I did not remove the wound VAC today    Lab/other results:    Assessment: #1.  Diabetes mellitus was also mobilized left great toe.  Post operative amputation, left great toe with a transmetatarsal type of amputation, done on 11/23/2016.  She now has wound VAC in place and the wound VAC being changed approximately 3 times a week per home health.  [facility]    Plan: We will plan to see Mrs. Langston back in one month for a follow-up visit.  Hopefully at that time the wound VAC will be ready to be discontinued.  She is also going to be continued to be followed by Dr. Cruz, of infectious disease    Arsalan Feliciano M.D.   "

## 2017-01-12 NOTE — MR AVS SNAPSHOT
Tamara Langston   1/12/2017 10:00 AM   Office Visit    Dept Phone:  [phone]   Encounter #:  [id number]    Provider:  Arsalan Feliciano MD   Department:  [facility] CARDIOTHORACIC SURGERY                Your Full Care Plan              Your Updated Medication List          This list is accurate as of: 1/12/17  2:14 PM.  Always use your most recent med list.                acetaminophen 325 MG tablet   Commonly known as:  TYLENOL   Take 2 tablets by mouth Every 4 (Four) Hours As Needed for mild pain (1-3) or fever (temperature greater than 101F).       albuterol (2.5 MG/3ML) 0.083% nebulizer solution   Commonly known as:  PROVENTIL       amLODIPine 5 MG tablet   Commonly known as:  NORVASC   Take 1 tablet by mouth Daily.       aspirin 325 MG EC tablet   Take 1 tablet by mouth Daily.       atorvastatin 40 MG tablet   Commonly known as:  LIPITOR   Take 1 tablet by mouth Every Night.       * baclofen 20 MG tablet   Commonly known as:  LIORESAL   TAKE ONE TABLET BY MOUTH THREE TIMES A DAY       * baclofen 20 MG tablet   Commonly known as:  LIORESAL       bisoprolol-hydrochlorothiazide 5-6.25 MG per tablet   Commonly known as:  ZIAC   TAKE ONE TABLET BY MOUTH DAILY       busPIRone 10 MG tablet   Commonly known as:  BUSPAR   TAKE TWO TABLETS BY MOUTH TWICE A DAY       cephalexin 500 MG capsule   Commonly known as:  KEFLEX       cetirizine 10 MG tablet   Commonly known as:  zyrTEC       clonazePAM 0.5 MG tablet   Commonly known as:  KlonoPIN   TAKE ONE-HALF TABLET BY MOUTH TWICE A DAY       clopidogrel 75 MG tablet   Commonly known as:  PLAVIX   Take 1 tablet by mouth Daily.       cyanocobalamin 1000 MCG/ML injection       diphenhydrAMINE 25 mg capsule   Commonly known as:  BENADRYL       ezetimibe 10 MG tablet   Commonly known as:  ZETIA   Take 1 tablet by mouth Daily.       fentaNYL 100 MCG/HR patch   Commonly known as:  DURAGESIC   Place 1 patch on the skin Every Other Day.          fluticasone 50 MCG/ACT nasal spray   Commonly known as:  FLONASE       furosemide 40 MG tablet   Commonly known as:  LASIX   Take 1 tablet by mouth Daily.       levothyroxine 112 MCG tablet   Commonly known as:  SYNTHROID, LEVOTHROID   TAKE ONE TABLET BY MOUTH DAILY       lisinopril 20 MG tablet   Commonly known as:  PRINIVIL,ZESTRIL   Take 1 tablet by mouth Daily.       loperamide 2 MG capsule   Commonly known as:  IMODIUM       LYRICA 100 MG capsule   Generic drug:  pregabalin   TAKE ONE CAPSULE BY MOUTH THREE TIMES A DAY       melatonin 5 MG sublingual tablet sublingual tablet   Place 1 tablet under the tongue At Night As Needed (insomnia).       metoclopramide 10 MG tablet   Commonly known as:  REGLAN   TAKE ONE TABLET BY MOUTH BEFORE MEALS       nystatin [id number] UNIT/GM cream   Commonly known as:  MYCOSTATIN   Apply  topically 2 (Two) Times a Day.       omeprazole 20 MG capsule   Commonly known as:  priLOSEC   TAKE ONE CAPSULE BY MOUTH EVERY DAY       oxyCODONE-acetaminophen  MG per tablet   Commonly known as:  PERCOCET   Take 1 tablet by mouth 3 (Three) Times a Day.       probiotic capsule capsule   Take 1 capsule by mouth Daily.       promethazine 25 MG tablet   Commonly known as:  PHENERGAN       SITagliptin 100 MG tablet   Commonly known as:  JANUVIA   Take 1 tablet by mouth Daily.       SPIRIVA RESPIMAT 2.5 MCG/ACT aerosol solution   Generic drug:  Tiotropium Bromide Monohydrate   INHALE TWO PUFF(S) BY MOUTH DAILY       triamcinolone 0.1 % cream   Commonly known as:  KENALOG       * Notice:  This list has 2 medication(s) that are the same as other medications prescribed for you. Read the directions carefully, and ask your doctor or other care provider to review them with you.            You Were Diagnosed With        Codes Comments    Foot ulcer, left, with necrosis of bone    -  Primary ICD-10-CM: L97.524  ICD-9-CM: 707.15     Gangrene     ICD-10-CM: I96  ICD-9-CM: 785.4       Instructions      None    Patient Instructions History      Upcoming Appointments     Visit Type Date Time Department    OFFICE VISIT 1/12/2017 10:00 AM [facility] CT SURGERY [place]    FOLLOW UP 2/2/2017 10:45 AM [facility] [place]    OFFICE VISIT 2/16/2017  9:00 AM [facility] [place]    FOLLOW UP 3/2/2017 10:45 AM [facility] [place]    FOLLOW UP 3/6/2017  9:45 AM [facility] [place] [facility]    FOLLOW UP 3/28/2017 11:30 AM [facility] [place]      MyChart Signup     Our records indicate that you have declined [facility] MyChart signup. If you would like to sign up for MyChart, please email [email or web address] or call [phone] to obtain an activation code.             Other Info from Your Visit           Your Appointments     Feb 02, 2017 10:45 AM EST   Follow Up with Harinder Aranda MD   [facility] PRIMARY CARE (--)    [address]  [place] [address]   [phone]           Arrive 15 minutes prior to appointment.            Feb 16, 2017  9:00 AM EST   Office Visit with Prabhjot Luong MD   [facility] GASTROENTEROLOGY (--)    [address]  [facility] [address]   [phone]           Arrive 15 minutes prior to appointment.            Mar 02, 2017 10:45 AM EST   Follow Up with Harinder Aranda MD   [facility] PRIMARY CARE (--)    [address]  [place] [address]   [phone]           Arrive 15 minutes prior to appointment.            Mar 06, 2017  9:45 AM EST   Follow Up with Ash Nuñez MD   [facility] CARDIOLOGY (--)    [address]  [facility] [address]   [phone]           Arrive 15 minutes prior to appointment.            Mar 28, 2017 11:30 AM EDT   Follow Up with Harinder Aranda MD   [facility] PRIMARY CARE (--)    [address]  [place] [address]   [phone]           Arrive 15 minutes prior to appointment.              Allergies   "   Cortisone  Other (See Comments)    Hotness all over body and hyper    Oxycontin [Oxycodone]  Other (See Comments)    psoriasis    Tolmetin  Rash    Tolectin-rash and itching      Reason for Visit     Peripheral Vascular Disease 1 month follow-up. S/P left great toe transmetatarsal amputation-for osteomyelitis 11/23/16.      Vital Signs     Blood Pressure Pulse Temperature Height Weight Last Menstrual Period    140/64 (BP Location: Left arm, Patient Position: Sitting) 65 98.8 °F (37.1 °C) 66\" (167.6 cm) 220 lb (99.8 kg) (LMP Unknown)    Oxygen Saturation Body Mass Index Smoking Status             89% 35.51 kg/m2 Current Every Day Smoker         Problems and Diagnoses Noted     Ulcer of left foot    Gangrene        "

## 2017-01-18 RX ORDER — METOCLOPRAMIDE 10 MG/1
10 TABLET ORAL 3 TIMES DAILY
Qty: 270 TABLET | Refills: 2 | Status: SHIPPED | OUTPATIENT
Start: 2017-01-18 | End: 2017-03-10 | Stop reason: HOSPADM

## 2017-01-19 RX ORDER — CLONAZEPAM 0.5 MG/1
TABLET ORAL
Qty: 90 TABLET | Refills: 0 | OUTPATIENT
Start: 2017-01-19 | End: 2017-03-10 | Stop reason: HOSPADM

## 2017-01-26 ENCOUNTER — OFFICE VISIT (OUTPATIENT)
Dept: CARDIAC SURGERY | Facility: CLINIC | Age: 62
End: 2017-01-26

## 2017-01-26 VITALS
OXYGEN SATURATION: 93 % | BODY MASS INDEX: 35.36 KG/M2 | HEIGHT: 66 IN | WEIGHT: 220 LBS | DIASTOLIC BLOOD PRESSURE: 69 MMHG | SYSTOLIC BLOOD PRESSURE: 130 MMHG | HEART RATE: 70 BPM | TEMPERATURE: 98.2 F

## 2017-01-26 DIAGNOSIS — I73.9 PERIPHERAL VASCULAR DISEASE (HCC): ICD-10-CM

## 2017-01-26 DIAGNOSIS — I96 GANGRENE OF FOOT (HCC): Primary | ICD-10-CM

## 2017-01-26 PROCEDURE — 99024 POSTOP FOLLOW-UP VISIT: CPT | Performed by: THORACIC SURGERY (CARDIOTHORACIC VASCULAR SURGERY)

## 2017-01-26 NOTE — MR AVS SNAPSHOT
Tamara Langston   1/26/2017 1:15 PM   Office Visit    Dept Phone:  [phone]   Encounter #:  [id number]    Provider:  Arsalan Feliciano MD   Department:  [facility] CARDIOTHORACIC SURGERY                Your Full Care Plan              Your Updated Medication List          This list is accurate as of: 1/26/17  1:58 PM.  Always use your most recent med list.                acetaminophen 325 MG tablet   Commonly known as:  TYLENOL   Take 2 tablets by mouth Every 4 (Four) Hours As Needed for mild pain (1-3) or fever (temperature greater than 101F).       albuterol (2.5 MG/3ML) 0.083% nebulizer solution   Commonly known as:  PROVENTIL       amLODIPine 5 MG tablet   Commonly known as:  NORVASC   Take 1 tablet by mouth Daily.       aspirin 325 MG EC tablet   Take 1 tablet by mouth Daily.       atorvastatin 40 MG tablet   Commonly known as:  LIPITOR   Take 1 tablet by mouth Every Night.       * baclofen 20 MG tablet   Commonly known as:  LIORESAL   TAKE ONE TABLET BY MOUTH THREE TIMES A DAY       * baclofen 20 MG tablet   Commonly known as:  LIORESAL       bisoprolol-hydrochlorothiazide 5-6.25 MG per tablet   Commonly known as:  ZIAC   TAKE ONE TABLET BY MOUTH DAILY       busPIRone 10 MG tablet   Commonly known as:  BUSPAR   TAKE TWO TABLETS BY MOUTH TWICE A DAY       cephalexin 500 MG capsule   Commonly known as:  KEFLEX       cetirizine 10 MG tablet   Commonly known as:  zyrTEC       clonazePAM 0.5 MG tablet   Commonly known as:  KlonoPIN   TAKE ONE-HALF TABLET BY MOUTH TWICE A DAY       clopidogrel 75 MG tablet   Commonly known as:  PLAVIX   Take 1 tablet by mouth Daily.       cyanocobalamin 1000 MCG/ML injection       diphenhydrAMINE 25 mg capsule   Commonly known as:  BENADRYL       ezetimibe 10 MG tablet   Commonly known as:  ZETIA   Take 1 tablet by mouth Daily.       fentaNYL 100 MCG/HR patch   Commonly known as:  DURAGESIC   Place 1 patch on the skin Every Other Day.       fluticasone 50 MCG/ACT nasal spray   Commonly known as:  FLONASE       furosemide 40 MG tablet   Commonly known as:  LASIX   Take 1 tablet by mouth Daily.       levothyroxine 112 MCG tablet   Commonly known as:  SYNTHROID, LEVOTHROID   TAKE ONE TABLET BY MOUTH DAILY       lisinopril 20 MG tablet   Commonly known as:  PRINIVIL,ZESTRIL   Take 1 tablet by mouth Daily.       loperamide 2 MG capsule   Commonly known as:  IMODIUM       LYRICA 100 MG capsule   Generic drug:  pregabalin   TAKE ONE CAPSULE BY MOUTH THREE TIMES A DAY       melatonin 5 MG sublingual tablet sublingual tablet   Place 1 tablet under the tongue At Night As Needed (insomnia).       metoclopramide 10 MG tablet   Commonly known as:  REGLAN   Take 1 tablet by mouth 3 (Three) Times a Day.       nystatin 851981 UNIT/GM cream   Commonly known as:  MYCOSTATIN   Apply  topically 2 (Two) Times a Day.       omeprazole 20 MG capsule   Commonly known as:  priLOSEC   TAKE ONE CAPSULE BY MOUTH EVERY DAY       oxyCODONE-acetaminophen  MG per tablet   Commonly known as:  PERCOCET   Take 1 tablet by mouth 3 (Three) Times a Day.       probiotic capsule capsule   Take 1 capsule by mouth Daily.       promethazine 25 MG tablet   Commonly known as:  PHENERGAN       SITagliptin 100 MG tablet   Commonly known as:  JANUVIA   Take 1 tablet by mouth Daily.       SPIRIVA RESPIMAT 2.5 MCG/ACT aerosol solution   Generic drug:  Tiotropium Bromide Monohydrate   INHALE TWO PUFF(S) BY MOUTH DAILY       triamcinolone 0.1 % cream   Commonly known as:  KENALOG       * Notice:  This list has 2 medication(s) that are the same as other medications prescribed for you. Read the directions carefully, and ask your doctor or other care provider to review them with you.            Instructions     None    Patient Instructions History      Upcoming Appointments     Visit Type Date Time Department    OFFICE VISIT 1/26/2017  1:15 PM [facility]    FOLLOW UP 2/2/2017 10:45 AM [facility]  [facility]    OFFICE VISIT 2/16/2017  9:00 AM [facility] [place]    FOLLOW UP 3/2/2017 10:45 AM [facility]    FOLLOW UP 3/6/2017  9:45 AM [facility]    FOLLOW UP 3/28/2017 11:30 AM [facility]      MyChart Signup     Our records indicate that your [facility] [employer] account has been deactivated. If you would like to reactivate your account, please email [email or web address] or call [phone] to talk to our [employer] staff.             Other Info from Your Visit           Your Appointments     Feb 02, 2017 10:45 AM EST   Follow Up with Harinder Aranda MD   [facility] PRIMARY CARE (--)    [address]  [facility] [address]   [phone]           Arrive 15 minutes prior to appointment.            Feb 16, 2017  9:00 AM EST   Office Visit with Prabhjot Luong MD   [facility] GASTROENTEROLOGY (--)    [address]  [facility] [address]   [phone]           Arrive 15 minutes prior to appointment.            Mar 02, 2017 10:45 AM EST   Follow Up with Harinder Aranda MD   [facility] PRIMARY [facility]ARE (--)    [address]  [facility] [address]   [phone]           Arrive 15 minutes prior to appointment.            Mar 06, 2017  9:45 AM EST   Follow Up with Ash Nuñez MD   [facility] CARDIOLOGY (--)    [address]  [facility] [address]   [phone]           Arrive 15 minutes prior to appointment.            Mar 28, 2017 11:30 AM EDT   Follow Up with Harinder Aranda MD   [facility] PRIMARY [facility]ARE (--)    [address]  [facility] [address]   [phone]           Arrive 15 minutes prior to appointment.              Allergies     Cortisone  Other (See Comments)    Hotness all over body and hyper    Oxycontin [Oxycodone]  Other (See Comments)    psoriasis    Tolmetin  Rash    Tolectin-rash and itching  "     Reason for Visit     Follow-up 1 month follow up s/p left great toe amputation 11/23/16.    Peripheral Vascular Disease           Vital Signs     Blood Pressure Pulse Temperature Height Weight Last Menstrual Period    130/69 (BP Location: Left arm) 70 98.2 °F (36.8 °C) 66\" (167.6 cm) 220 lb (99.8 kg) (LMP Unknown)    Oxygen Saturation Body Mass Index Smoking Status             93% 35.51 kg/m2 Current Every Day Smoker           "

## 2017-01-26 NOTE — PROGRESS NOTES
"Patient Information  Tamara Langston                                                                                          [address] [place] [address]      1955  [phone]      Chief Complaint   Patient presents with   • Follow-up     1 month follow up s/p left great toe amputation 11/23/16.   • Peripheral Vascular Disease       History of Present Illness: Seen in follow-up of a left great toe transmetatarsal amputation, done on 11/23/2016 for osteomyelitis.  I last saw the patient on 01/12/2017.  She is doing quite well at that time she was getting wound VAC changed 3 times a week with a stop the wound VAC since there was some irritation of the skin edges from the wound VAC is back today for follow-up    Vitals:    01/26/17 1251   BP: 130/69   BP Location: Left arm   Pulse: 70   Temp: 98.2 °F (36.8 °C)   SpO2: 93%   Weight: 220 lb (99.8 kg)   Height: 66\" (167.6 cm)        Physical Exam exam.  The granulation tissue looks healthy.  The skin margins are started to coalesce along this area of the open wound area and there is no drainage noted    Lab/other results:    Assessment: #1.  Diabetes mellitus.  2.  Transmetatarsal amputation of the left great toe, done on 11/23/2016, now granulating well with the wound starting to close by secondary intent    Plan: I think the wound VAC may be discontinued and she needs to continue wet to damp saline dressings twice a day to the area of granulation plan to see the patient back in one month for another follow-up visit with the patient a prescription for the home health nurse to be instructed to twice a day saline soaked dressing changes to this area.  I will also call the home health service regarding this    Arsalan Feliciano M.D.   "

## 2017-02-02 ENCOUNTER — OFFICE VISIT (OUTPATIENT)
Dept: INTERNAL MEDICINE | Facility: CLINIC | Age: 62
End: 2017-02-02

## 2017-02-02 VITALS
WEIGHT: 201 LBS | TEMPERATURE: 96.2 F | HEIGHT: 66 IN | BODY MASS INDEX: 32.3 KG/M2 | SYSTOLIC BLOOD PRESSURE: 110 MMHG | HEART RATE: 64 BPM | DIASTOLIC BLOOD PRESSURE: 50 MMHG

## 2017-02-02 DIAGNOSIS — I10 ESSENTIAL HYPERTENSION: ICD-10-CM

## 2017-02-02 DIAGNOSIS — M79.7 FIBROMYALGIA: ICD-10-CM

## 2017-02-02 DIAGNOSIS — E53.8 COBALAMIN DEFICIENCY: ICD-10-CM

## 2017-02-02 DIAGNOSIS — I73.9 PERIPHERAL VASCULAR DISEASE (HCC): ICD-10-CM

## 2017-02-02 DIAGNOSIS — E78.5 DYSLIPIDEMIA: ICD-10-CM

## 2017-02-02 DIAGNOSIS — E11.9 ENCOUNTER FOR DIABETIC FOOT EXAM (HCC): ICD-10-CM

## 2017-02-02 DIAGNOSIS — M81.0 OSTEOPOROSIS: ICD-10-CM

## 2017-02-02 DIAGNOSIS — M79.671 RIGHT FOOT PAIN: ICD-10-CM

## 2017-02-02 DIAGNOSIS — G25.0 ESSENTIAL TREMOR: ICD-10-CM

## 2017-02-02 DIAGNOSIS — L03.116 CELLULITIS OF LEFT FOOT: ICD-10-CM

## 2017-02-02 DIAGNOSIS — M15.9 PRIMARY OSTEOARTHRITIS INVOLVING MULTIPLE JOINTS: ICD-10-CM

## 2017-02-02 DIAGNOSIS — K21.9 GASTROESOPHAGEAL REFLUX DISEASE WITHOUT ESOPHAGITIS: ICD-10-CM

## 2017-02-02 DIAGNOSIS — G89.29 CHRONIC MIDLINE LOW BACK PAIN WITH SCIATICA, SCIATICA LATERALITY UNSPECIFIED: ICD-10-CM

## 2017-02-02 DIAGNOSIS — G89.29 CHRONIC LOW BACK PAIN WITH SCIATICA, SCIATICA LATERALITY UNSPECIFIED, UNSPECIFIED BACK PAIN LATERALITY: ICD-10-CM

## 2017-02-02 DIAGNOSIS — R74.8 ABNORMAL LIVER ENZYMES: ICD-10-CM

## 2017-02-02 DIAGNOSIS — J30.9 ATOPIC RHINITIS: ICD-10-CM

## 2017-02-02 DIAGNOSIS — F41.8 MIXED ANXIETY DEPRESSIVE DISORDER: ICD-10-CM

## 2017-02-02 DIAGNOSIS — E55.9 VITAMIN D DEFICIENCY: ICD-10-CM

## 2017-02-02 DIAGNOSIS — I21.4 NSTEMI (NON-ST ELEVATED MYOCARDIAL INFARCTION) (HCC): ICD-10-CM

## 2017-02-02 DIAGNOSIS — R09.02 HYPOXIA: ICD-10-CM

## 2017-02-02 DIAGNOSIS — G47.00 INSOMNIA, UNSPECIFIED TYPE: ICD-10-CM

## 2017-02-02 DIAGNOSIS — M54.40 CHRONIC LOW BACK PAIN WITH SCIATICA, SCIATICA LATERALITY UNSPECIFIED, UNSPECIFIED BACK PAIN LATERALITY: ICD-10-CM

## 2017-02-02 DIAGNOSIS — M54.40 CHRONIC MIDLINE LOW BACK PAIN WITH SCIATICA, SCIATICA LATERALITY UNSPECIFIED: ICD-10-CM

## 2017-02-02 DIAGNOSIS — K76.0 FATTY LIVER DISEASE, NONALCOHOLIC: Chronic | ICD-10-CM

## 2017-02-02 DIAGNOSIS — E08.10: ICD-10-CM

## 2017-02-02 DIAGNOSIS — H34.8392 BRANCH RETINAL VEIN OCCLUSION: Primary | ICD-10-CM

## 2017-02-02 DIAGNOSIS — G47.33 OBSTRUCTIVE SLEEP APNEA SYNDROME: ICD-10-CM

## 2017-02-02 DIAGNOSIS — D45 POLYCYTHEMIA VERA (HCC): ICD-10-CM

## 2017-02-02 DIAGNOSIS — E03.8 OTHER SPECIFIED HYPOTHYROIDISM: ICD-10-CM

## 2017-02-02 PROCEDURE — 99214 OFFICE O/P EST MOD 30 MIN: CPT | Performed by: INTERNAL MEDICINE

## 2017-02-02 RX ORDER — FENTANYL 100 UG/H
1 PATCH TRANSDERMAL EVERY OTHER DAY
Qty: 15 PATCH | Refills: 0 | Status: SHIPPED | OUTPATIENT
Start: 2017-02-02 | End: 2017-04-06 | Stop reason: SDUPTHER

## 2017-02-02 RX ORDER — OXYCODONE AND ACETAMINOPHEN 10; 325 MG/1; MG/1
1 TABLET ORAL 3 TIMES DAILY
Qty: 90 TABLET | Refills: 0 | Status: SHIPPED | OUTPATIENT
Start: 2017-02-02 | End: 2017-04-06 | Stop reason: SDUPTHER

## 2017-02-02 NOTE — PROGRESS NOTES
Patient is a 61 y.o. female who is here for a follow up of chronic conditions.  Chief Complaint   Patient presents with   • Hypertension   • Pain         HPI:  Here for f/u.  About 3 weeks ago dropped wallet on right foot.  Since then developed swelling and bruising.  Having am abdominal cramping.  No assoc diarrhea.  Continued antibiotic. Also using probiotics.  Back is still painful.  No dizziness or lightheadedness.      History:    Patient Active Problem List   Diagnosis   • Atopic rhinitis   • Restrictive ventilatory defect   • Pulmonary emphysema   • Osteoporosis   • Obstructive sleep apnea syndrome   • Abnormal liver enzymes   • Cholelithiasis   • Chronic back pain   • Mixed anxiety depressive disorder   • Uncontrolled diabetes mellitus   • Dyslipidemia   • Essential tremor   • Fibromyalgia   • Gastroesophageal reflux disease without esophagitis   • Hypertension   • Hypothyroidism   • Insomnia   • Osteoarthritis   • Polycythemia vera   • Psoriasis   • Branch retinal vein occlusion   • Cobalamin deficiency   • Chronic bronchitis   • Obesity   • Pneumonia   • Tobacco use   • Vitamin D deficiency   • Foot ulcer, left   • Leukocytosis   • Hypokalemia   • Lactic acidosis   • Fatty liver disease, nonalcoholic   • Diabetes education, encounter for   • Cellulitis of left foot   • Sinus bradycardia   • NSTEMI (non-ST elevated myocardial infarction)   • Tobacco abuse   • Hypoxia   • Peripheral vascular disease   • Gangrene       Past Medical History   Diagnosis Date   • Bronchitis    • Cervical cancer    • Cholelithiasis 5/11/2016   • Chronic bronchitis    • Degenerative arthritis    • Diabetes mellitus    • Dyslipidemia 5/11/2016   • Dyspnea    • Fatty liver disease, nonalcoholic 11/21/2016   • Fibromyalgia    • H/O echocardiogram    • History of pneumonia    • Hypertension 5/11/2016     16. H/O echocardiogram (V15.89) (Z92.89)  · A.  Echocardiogram of 02/03/2015 reports an ejection fraction of 60-65%, mild concentric      LVH, trace mitral regurgitation, mild tricuspid and pulmonic regurgitation and calculated     RVSP of 35 mmHg, the main pulmonary artery is also mildly dilated.   • Hypothyroidism 5/11/2016     Description: A.  On replacement therapy.   • Nausea    • Obesity    • DINA (obstructive sleep apnea)      intolerant of CPAP therapy   • Osteoporosis    • Osteoporosis    • Polycythemia vera 5/11/2016   • Pulmonary emphysema    • Restrictive ventilatory defect    • Rhinitis    • Uncontrolled diabetes mellitus 5/11/2016   • Uterine cancer    • Vitamin D deficiency 8/1/2016       Past Surgical History   Procedure Laterality Date   • Lumbar spine surgery       arthrodesis by anterior approach addit interspace   • Back surgery       lumbar fusions x5--multiple times; 1995, 1997, 1998, 1999 and 2008   • Hand surgery Bilateral      x3   • Hysterectomy       status post uterine and cervical cancer   • Knee surgery Right    • Tonsillectomy     • Amputation digit Left 11/23/2016     Procedure: AMPUTATION TRANS METATARSAL - ray amutation of the left great toe;  Surgeon: Arsalan Feliciano MD;  Location: [facility] OR;  Service:    • Cardiac catheterization N/A 11/26/2016     Procedure: Left Heart Cath;  Surgeon: Ash Nuñez MD;  Location:  [facility];  Service:        Current Outpatient Prescriptions on File Prior to Visit   Medication Sig   • acetaminophen (TYLENOL) 325 MG tablet Take 2 tablets by mouth Every 4 (Four) Hours As Needed for mild pain (1-3) or fever (temperature greater than 101F).   • albuterol (PROVENTIL) (2.5 MG/3ML) 0.083% nebulizer solution 2.5 mg Every 6 (Six) Hours As Needed.   • amLODIPine (NORVASC) 5 MG tablet Take 1 tablet by mouth Daily.   • aspirin  MG EC tablet Take 1 tablet by mouth Daily.   • atorvastatin (LIPITOR) 40 MG tablet Take 1 tablet by mouth Every Night.   • baclofen (LIORESAL) 20 MG tablet TAKE ONE TABLET BY MOUTH THREE TIMES A DAY   • bisoprolol-hydrochlorothiazide (ZIAC)  5-6.25 MG per tablet TAKE ONE TABLET BY MOUTH DAILY   • busPIRone (BUSPAR) 10 MG tablet TAKE TWO TABLETS BY MOUTH TWICE A DAY   • cephalexin (KEFLEX) 500 MG capsule    • cetirizine (ZyrTEC) 10 MG tablet Take 10 mg by mouth Every Night.   • clonazePAM (KlonoPIN) 0.5 MG tablet TAKE ONE-HALF TABLET BY MOUTH TWICE A DAY   • clopidogrel (PLAVIX) 75 MG tablet Take 1 tablet by mouth Daily.   • cyanocobalamin 1000 MCG/ML injection 1,000 mcg. Cyanocobalamin 1000 MCG/ML Injection Solution; Patient Sig: Cyanocobalamin 1000 MCG/ML Injection Solution INJECT ONE MILLILITER INJECTION EVERY MONTH; 10; 4; 18-Jun-2015; Active   • diphenhydrAMINE (BENADRYL) 25 mg capsule Take 50 mg by mouth Daily.   • ezetimibe (ZETIA) 10 MG tablet Take 1 tablet by mouth Daily.   • fluticasone (FLONASE) 50 MCG/ACT nasal spray 1 spray into each nostril 2 (Two) Times a Day.   • furosemide (LASIX) 40 MG tablet Take 1 tablet by mouth Daily.   • levothyroxine (SYNTHROID, LEVOTHROID) 112 MCG tablet TAKE ONE TABLET BY MOUTH DAILY   • lisinopril (PRINIVIL,ZESTRIL) 20 MG tablet Take 1 tablet by mouth Daily.   • loperamide (IMODIUM) 2 MG capsule Take 2 mg by mouth Daily As Needed for diarrhea (TAKES ABOUT  EVERY 3 DAYS.).   • LYRICA 100 MG capsule TAKE ONE CAPSULE BY MOUTH THREE TIMES A DAY   • melatonin 5 MG sublingual tablet sublingual tablet Place 1 tablet under the tongue At Night As Needed (insomnia).   • metoclopramide (REGLAN) 10 MG tablet Take 1 tablet by mouth 3 (Three) Times a Day.   • nystatin (MYCOSTATIN) 605319 UNIT/GM cream Apply  topically 2 (Two) Times a Day.   • omeprazole (PriLOSEC) 20 MG capsule TAKE ONE CAPSULE BY MOUTH EVERY DAY   • probiotic (CULTURELLE) capsule capsule Take 1 capsule by mouth Daily.   • promethazine (PHENERGAN) 25 MG tablet Take 25 mg by mouth Every 8 (Eight) Hours As Needed for nausea or vomiting.   • SITagliptin (JANUVIA) 100 MG tablet Take 1 tablet by mouth Daily.   • SPIRIVA RESPIMAT 2.5 MCG/ACT aerosol solution  INHALE TWO PUFF(S) BY MOUTH DAILY   • triamcinolone (KENALOG) 0.1 % cream Apply 1 application topically 2 (Two) Times a Day.   • [DISCONTINUED] baclofen (LIORESAL) 20 MG tablet Take 20 mg by mouth Daily.   • [DISCONTINUED] fentaNYL (DURAGESIC) 100 MCG/HR patch Place 1 patch on the skin Every Other Day.   • [DISCONTINUED] oxyCODONE-acetaminophen (PERCOCET)  MG per tablet Take 1 tablet by mouth 3 (Three) Times a Day.     No current facility-administered medications on file prior to visit.        Family History   Problem Relation Age of Onset   • Arthritis Mother    • Diabetes Mother    • Colon polyps Mother    • Diverticulitis Mother    • Arthritis Father    • Bleeding Disorder Father    • Diabetes Father    • Kidney disease Father    • COPD Sister      currently smokes   • Arthritis Brother    • Diabetes Brother    • Alcohol abuse Brother    • Heart murmur Daughter    • Arthritis Other    • Diabetes Other        Social History     Social History   • Marital status:      Spouse name: N/A   • Number of children: 1   • Years of education: N/A     Occupational History   •  Disabled     Social History Main Topics   • Smoking status: Current Every Day Smoker     Packs/day: 0.50     Years: 48.00     Types: Cigarettes   • Smokeless tobacco: Never Used      Comment: currently trying to quit by using Chantix and has cut smoking habit in half, Pt now smoking half a pack a day    • Alcohol use No   • Drug use: No   • Sexual activity: Not on file     Other Topics Concern   • Not on file     Social History Narrative    Lives with her  in [place]         ROS:    Review of Systems   Constitutional: Positive for fatigue. Negative for chills, diaphoresis, fever and unexpected weight change.   HENT: Negative for congestion, ear pain, hearing loss, nosebleeds, postnasal drip, sinus pressure and sore throat.    Eyes: Negative for pain, discharge and itching.   Respiratory: Positive for cough and shortness of breath.  "Negative for chest tightness and wheezing.    Cardiovascular: Negative for chest pain, palpitations and leg swelling.   Gastrointestinal: Positive for abdominal pain, diarrhea and nausea. Negative for abdominal distention, blood in stool, constipation and vomiting.   Endocrine: Positive for heat intolerance. Negative for polydipsia and polyuria.   Genitourinary: Negative for difficulty urinating, dysuria, frequency and hematuria.   Musculoskeletal: Positive for arthralgias and back pain. Negative for gait problem, joint swelling and myalgias.   Skin: Positive for rash and wound.   Neurological: Positive for light-headedness. Negative for dizziness, syncope, weakness and headaches.   Psychiatric/Behavioral: Positive for dysphoric mood and sleep disturbance. The patient is nervous/anxious.        Visit Vitals   • /50   • Pulse 64   • Temp 96.2 °F (35.7 °C) (Temporal Artery )   • Ht 66\" (167.6 cm)   • Wt 201 lb (91.2 kg)   • LMP  (LMP Unknown)   • BMI 32.44 kg/m2       Physical Exam:    Physical Exam   Constitutional: She appears well-developed and well-nourished.   HENT:   Head: Normocephalic and atraumatic.   Right Ear: External ear normal.   Left Ear: External ear normal.   Mouth/Throat: Oropharynx is clear and moist.   Eyes: Conjunctivae and EOM are normal.   Neck: Normal range of motion. Neck supple.   Cardiovascular: Normal rate and regular rhythm.    2/6 СЕРГЕЙ   Pulmonary/Chest: Effort normal.   Mildly diminished   Abdominal: Soft. Bowel sounds are normal.   Musculoskeletal: She exhibits edema (right foot edema). Tenderness: pain on back flexion past 30    Using cane   Lymphadenopathy:     She has no cervical adenopathy.   Neurological: She is alert.   Skin: Skin is warm and dry. Rash (eczema in hands/forearms and LE brown patches with scaling) noted.   Left foot wound not examined   Psychiatric: She has a normal mood and affect. Her behavior is normal. Thought content normal. "       Procedure:      Discussion/Summary:  chronic back pain- refill patch and percocet as needed  htn-stable no triple tx  fibromylagia- cont current tx  hyperlipidemia-cont lipitor and zetia  hypothroid-cont replacement  depression with anxiety-cont buspar and klonopine ,advised her of risk of addiction  polycythemia-cbc noted  retinal vein occlusion- cont rf modification  b12 def-admin on rtc  nausea-phenergan prn, cont reglan, advised of EPS effects and she has agreed  DM-labs noted, counseled on low carb, recheck labs on rtc, cont januvia  diaphoresis-cont clonidine  Elevated lft/?autoimmune-f/u gastro recs , labs noted  Eczema-cont prn steroids  Left foot ulcer and osteo-f/u ID, cont current tx  Foot pain-podiatry and xray  high risk meds-labs noted      labs noted and dw patient      Current Outpatient Prescriptions:   •  acetaminophen (TYLENOL) 325 MG tablet, Take 2 tablets by mouth Every 4 (Four) Hours As Needed for mild pain (1-3) or fever (temperature greater than 101F)., Disp: 100 tablet, Rfl: 0  •  albuterol (PROVENTIL) (2.5 MG/3ML) 0.083% nebulizer solution, 2.5 mg Every 6 (Six) Hours As Needed., Disp: , Rfl:   •  amLODIPine (NORVASC) 5 MG tablet, Take 1 tablet by mouth Daily., Disp: 90 tablet, Rfl: 3  •  aspirin  MG EC tablet, Take 1 tablet by mouth Daily., Disp: 100 tablet, Rfl: 0  •  atorvastatin (LIPITOR) 40 MG tablet, Take 1 tablet by mouth Every Night., Disp: 90 tablet, Rfl: 3  •  baclofen (LIORESAL) 20 MG tablet, TAKE ONE TABLET BY MOUTH THREE TIMES A DAY, Disp: 270 tablet, Rfl: 0  •  bisoprolol-hydrochlorothiazide (ZIAC) 5-6.25 MG per tablet, TAKE ONE TABLET BY MOUTH DAILY, Disp: 90 tablet, Rfl: 2  •  busPIRone (BUSPAR) 10 MG tablet, TAKE TWO TABLETS BY MOUTH TWICE A DAY, Disp: 360 tablet, Rfl: 2  •  cephalexin (KEFLEX) 500 MG capsule, , Disp: , Rfl:   •  cetirizine (ZyrTEC) 10 MG tablet, Take 10 mg by mouth Every Night., Disp: , Rfl:   •  clonazePAM (KlonoPIN) 0.5 MG tablet, TAKE  ONE-HALF TABLET BY MOUTH TWICE A DAY, Disp: 90 tablet, Rfl: 0  •  clopidogrel (PLAVIX) 75 MG tablet, Take 1 tablet by mouth Daily., Disp: 90 tablet, Rfl: 3  •  cyanocobalamin 1000 MCG/ML injection, 1,000 mcg. Cyanocobalamin 1000 MCG/ML Injection Solution; Patient Sig: Cyanocobalamin 1000 MCG/ML Injection Solution INJECT ONE MILLILITER INJECTION EVERY MONTH; 10; 4; 18-Jun-2015; Active, Disp: , Rfl:   •  diphenhydrAMINE (BENADRYL) 25 mg capsule, Take 50 mg by mouth Daily., Disp: , Rfl:   •  ezetimibe (ZETIA) 10 MG tablet, Take 1 tablet by mouth Daily., Disp: 90 tablet, Rfl: 3  •  fentaNYL (DURAGESIC) 100 MCG/HR patch, Place 1 patch on the skin Every Other Day., Disp: 15 patch, Rfl: 0  •  fluticasone (FLONASE) 50 MCG/ACT nasal spray, 1 spray into each nostril 2 (Two) Times a Day., Disp: , Rfl:   •  furosemide (LASIX) 40 MG tablet, Take 1 tablet by mouth Daily., Disp: 60 tablet, Rfl: 5  •  levothyroxine (SYNTHROID, LEVOTHROID) 112 MCG tablet, TAKE ONE TABLET BY MOUTH DAILY, Disp: 90 tablet, Rfl: 0  •  lisinopril (PRINIVIL,ZESTRIL) 20 MG tablet, Take 1 tablet by mouth Daily., Disp: 90 tablet, Rfl: 3  •  loperamide (IMODIUM) 2 MG capsule, Take 2 mg by mouth Daily As Needed for diarrhea (TAKES ABOUT  EVERY 3 DAYS.)., Disp: , Rfl:   •  LYRICA 100 MG capsule, TAKE ONE CAPSULE BY MOUTH THREE TIMES A DAY, Disp: 270 capsule, Rfl: 0  •  melatonin 5 MG sublingual tablet sublingual tablet, Place 1 tablet under the tongue At Night As Needed (insomnia)., Disp: 30 tablet, Rfl: 0  •  metoclopramide (REGLAN) 10 MG tablet, Take 1 tablet by mouth 3 (Three) Times a Day., Disp: 270 tablet, Rfl: 2  •  nystatin (MYCOSTATIN) 576407 UNIT/GM cream, Apply  topically 2 (Two) Times a Day., Disp: 60 g, Rfl: 1  •  omeprazole (PriLOSEC) 20 MG capsule, TAKE ONE CAPSULE BY MOUTH EVERY DAY, Disp: 90 capsule, Rfl: 2  •  probiotic (CULTURELLE) capsule capsule, Take 1 capsule by mouth Daily., Disp: 60 capsule, Rfl: 0  •  promethazine (PHENERGAN) 25 MG  tablet, Take 25 mg by mouth Every 8 (Eight) Hours As Needed for nausea or vomiting., Disp: , Rfl:   •  SITagliptin (JANUVIA) 100 MG tablet, Take 1 tablet by mouth Daily., Disp: 30 tablet, Rfl: 0  •  SPIRIVA RESPIMAT 2.5 MCG/ACT aerosol solution, INHALE TWO PUFF(S) BY MOUTH DAILY, Disp: 1 inhaler, Rfl: 4  •  triamcinolone (KENALOG) 0.1 % cream, Apply 1 application topically 2 (Two) Times a Day., Disp: , Rfl:   •  oxyCODONE-acetaminophen (PERCOCET)  MG per tablet, Take 1 tablet by mouth 3 (Three) Times a Day., Disp: 90 tablet, Rfl: 0        Tamara was seen today for hypertension and pain.    Diagnoses and all orders for this visit:    Branch retinal vein occlusion    Essential hypertension    NSTEMI (non-ST elevated myocardial infarction)    Peripheral vascular disease    Atopic rhinitis    Hypoxia    Obstructive sleep apnea syndrome    Cobalamin deficiency    Fatty liver disease, nonalcoholic    Gastroesophageal reflux disease without esophagitis    Vitamin D deficiency    Other specified hypothyroidism    Chronic low back pain with sciatica, sciatica laterality unspecified, unspecified back pain laterality    Essential tremor    Fibromyalgia  -     oxyCODONE-acetaminophen (PERCOCET)  MG per tablet; Take 1 tablet by mouth 3 (Three) Times a Day.  -     fentaNYL (DURAGESIC) 100 MCG/HR patch; Place 1 patch on the skin Every Other Day.    Primary osteoarthritis involving multiple joints  -     oxyCODONE-acetaminophen (PERCOCET)  MG per tablet; Take 1 tablet by mouth 3 (Three) Times a Day.  -     fentaNYL (DURAGESIC) 100 MCG/HR patch; Place 1 patch on the skin Every Other Day.    Osteoporosis    Polycythemia vera    Abnormal liver enzymes    Dyslipidemia    Cellulitis of left foot    Insomnia, unspecified type    Mixed anxiety depressive disorder    Diabetes mellitus due to underlying condition, uncontrolled, with ketoacidosis without coma, without long-term current use of insulin    Chronic midline low  back pain with sciatica, sciatica laterality unspecified  -     oxyCODONE-acetaminophen (PERCOCET)  MG per tablet; Take 1 tablet by mouth 3 (Three) Times a Day.    Right foot pain  -     XR Foot 3+ View Right    Encounter for diabetic foot exam  -     Ambulatory Referral to Podiatry

## 2017-02-10 ENCOUNTER — HOSPITAL ENCOUNTER (OUTPATIENT)
Dept: GENERAL RADIOLOGY | Facility: HOSPITAL | Age: 62
Discharge: HOME | End: 2017-02-10
Attending: INTERNAL MEDICINE | Admitting: INTERNAL MEDICINE

## 2017-02-10 PROCEDURE — 73630 X-RAY EXAM OF FOOT: CPT

## 2017-02-13 RX ORDER — LEVOTHYROXINE SODIUM 112 UG/1
TABLET ORAL
Qty: 90 TABLET | Refills: 0 | Status: SHIPPED | OUTPATIENT
Start: 2017-02-13 | End: 2017-06-20 | Stop reason: SDUPTHER

## 2017-02-16 ENCOUNTER — OFFICE VISIT (OUTPATIENT)
Dept: GASTROENTEROLOGY | Facility: CLINIC | Age: 62
End: 2017-02-16

## 2017-02-16 VITALS
TEMPERATURE: 98.5 F | DIASTOLIC BLOOD PRESSURE: 74 MMHG | OXYGEN SATURATION: 87 % | SYSTOLIC BLOOD PRESSURE: 130 MMHG | WEIGHT: 211.8 LBS | BODY MASS INDEX: 34.04 KG/M2 | HEIGHT: 66 IN | HEART RATE: 71 BPM

## 2017-02-16 DIAGNOSIS — K80.20 CALCULUS OF GALLBLADDER WITHOUT CHOLECYSTITIS WITHOUT OBSTRUCTION: ICD-10-CM

## 2017-02-16 DIAGNOSIS — K75.81 NASH (NONALCOHOLIC STEATOHEPATITIS): Primary | ICD-10-CM

## 2017-02-16 PROCEDURE — 99213 OFFICE O/P EST LOW 20 MIN: CPT | Performed by: INTERNAL MEDICINE

## 2017-02-16 NOTE — PROGRESS NOTES
Chief Complaint   Patient presents with   • Follow-up     NAFLD       History of Present Illness:   HPI  Mrs. Langston returns to the office for a follow-up visit.  The patient had an infection in her foot and required surgery on her left great toe.  The patient states that she is doing better from that standpoint.  Ms. Langston admits to some gas intermittently with meals.  She denies any vomiting but occasionally may have some nausea.  The patient denies any progressive postprandial abdominal pain.  She has not noticed any change in the color of her eyes or skin.  There is no history of gastrointestinal bleeding.  There is no history of unexplained weight loss.  She states that her tobacco use has decreased significantly.  Past Medical History   Diagnosis Date   • Bronchitis    • Cervical cancer    • Cholelithiasis 5/11/2016   • Chronic bronchitis    • Degenerative arthritis    • Diabetes mellitus    • Dyslipidemia 5/11/2016   • Dyspnea    • Fatty liver disease, nonalcoholic 11/21/2016   • Fibromyalgia    • H/O echocardiogram    • History of pneumonia    • Hypertension 5/11/2016     16. H/O echocardiogram (V15.89) (Z92.89)  · A.  Echocardiogram of 02/03/2015 reports an ejection fraction of 60-65%, mild concentric     LVH, trace mitral regurgitation, mild tricuspid and pulmonic regurgitation and calculated     RVSP of 35 mmHg, the main pulmonary artery is also mildly dilated.   • Hypothyroidism 5/11/2016     Description: A.  On replacement therapy.   • Nausea    • Obesity    • DINA (obstructive sleep apnea)      intolerant of CPAP therapy   • Osteoporosis    • Osteoporosis    • Polycythemia vera 5/11/2016   • Pulmonary emphysema    • Restrictive ventilatory defect    • Rhinitis    • Uncontrolled diabetes mellitus 5/11/2016   • Uterine cancer    • Vitamin D deficiency 8/1/2016       Past Surgical History   Procedure Laterality Date   • Lumbar spine surgery       arthrodesis by anterior approach addit interspace   •  Back surgery       lumbar fusions x5--multiple times; 1995, 1997, 1998, 1999 and 2008   • Hand surgery Bilateral      x3   • Hysterectomy       status post uterine and cervical cancer   • Knee surgery Right    • Tonsillectomy     • Amputation digit Left 11/23/2016     Procedure: AMPUTATION TRANS METATARSAL - ray amutation of the left great toe;  Surgeon: Arsalan Feliciano MD;  Location: [facility] OR;  Service:    • Cardiac catheterization N/A 11/26/2016     Procedure: Left Heart Cath;  Surgeon: Ash Nuñez MD;  Location:  [facility];  Service:          Current Outpatient Prescriptions:   •  acetaminophen (TYLENOL) 325 MG tablet, Take 2 tablets by mouth Every 4 (Four) Hours As Needed for mild pain (1-3) or fever (temperature greater than 101F)., Disp: 100 tablet, Rfl: 0  •  albuterol (PROVENTIL) (2.5 MG/3ML) 0.083% nebulizer solution, 2.5 mg Every 6 (Six) Hours As Needed., Disp: , Rfl:   •  amLODIPine (NORVASC) 5 MG tablet, Take 1 tablet by mouth Daily., Disp: 90 tablet, Rfl: 3  •  aspirin  MG EC tablet, Take 1 tablet by mouth Daily., Disp: 100 tablet, Rfl: 0  •  atorvastatin (LIPITOR) 40 MG tablet, Take 1 tablet by mouth Every Night., Disp: 90 tablet, Rfl: 3  •  baclofen (LIORESAL) 20 MG tablet, TAKE ONE TABLET BY MOUTH THREE TIMES A DAY, Disp: 270 tablet, Rfl: 0  •  bisoprolol-hydrochlorothiazide (ZIAC) 5-6.25 MG per tablet, TAKE ONE TABLET BY MOUTH DAILY, Disp: 90 tablet, Rfl: 2  •  busPIRone (BUSPAR) 10 MG tablet, TAKE TWO TABLETS BY MOUTH TWICE A DAY, Disp: 360 tablet, Rfl: 2  •  cetirizine (ZyrTEC) 10 MG tablet, Take 10 mg by mouth Every Night., Disp: , Rfl:   •  clonazePAM (KlonoPIN) 0.5 MG tablet, TAKE ONE-HALF TABLET BY MOUTH TWICE A DAY, Disp: 90 tablet, Rfl: 0  •  clopidogrel (PLAVIX) 75 MG tablet, Take 1 tablet by mouth Daily., Disp: 90 tablet, Rfl: 3  •  cyanocobalamin 1000 MCG/ML injection, 1,000 mcg. Cyanocobalamin 1000 MCG/ML Injection Solution; Patient Sig: Cyanocobalamin 1000  MCG/ML Injection Solution INJECT ONE MILLILITER INJECTION EVERY MONTH; 10; 4; 18-Jun-2015; Active, Disp: , Rfl:   •  ezetimibe (ZETIA) 10 MG tablet, Take 1 tablet by mouth Daily., Disp: 90 tablet, Rfl: 3  •  fentaNYL (DURAGESIC) 100 MCG/HR patch, Place 1 patch on the skin Every Other Day., Disp: 15 patch, Rfl: 0  •  fluticasone (FLONASE) 50 MCG/ACT nasal spray, 1 spray into each nostril 2 (Two) Times a Day., Disp: , Rfl:   •  furosemide (LASIX) 40 MG tablet, Take 1 tablet by mouth Daily., Disp: 60 tablet, Rfl: 5  •  levothyroxine (SYNTHROID, LEVOTHROID) 112 MCG tablet, TAKE ONE TABLET BY MOUTH DAILY, Disp: 90 tablet, Rfl: 0  •  lisinopril (PRINIVIL,ZESTRIL) 20 MG tablet, Take 1 tablet by mouth Daily., Disp: 90 tablet, Rfl: 3  •  LYRICA 100 MG capsule, TAKE ONE CAPSULE BY MOUTH THREE TIMES A DAY, Disp: 270 capsule, Rfl: 0  •  melatonin 5 MG sublingual tablet sublingual tablet, Place 1 tablet under the tongue At Night As Needed (insomnia)., Disp: 30 tablet, Rfl: 0  •  metoclopramide (REGLAN) 10 MG tablet, Take 1 tablet by mouth 3 (Three) Times a Day., Disp: 270 tablet, Rfl: 2  •  omeprazole (PriLOSEC) 20 MG capsule, TAKE ONE CAPSULE BY MOUTH EVERY DAY, Disp: 90 capsule, Rfl: 2  •  oxyCODONE-acetaminophen (PERCOCET)  MG per tablet, Take 1 tablet by mouth 3 (Three) Times a Day., Disp: 90 tablet, Rfl: 0  •  probiotic (CULTURELLE) capsule capsule, Take 1 capsule by mouth Daily., Disp: 60 capsule, Rfl: 0  •  promethazine (PHENERGAN) 25 MG tablet, Take 25 mg by mouth Every 8 (Eight) Hours As Needed for nausea or vomiting., Disp: , Rfl:   •  SITagliptin (JANUVIA) 100 MG tablet, Take 1 tablet by mouth Daily., Disp: 30 tablet, Rfl: 0  •  SPIRIVA RESPIMAT 2.5 MCG/ACT aerosol solution, INHALE TWO PUFF(S) BY MOUTH DAILY, Disp: 1 inhaler, Rfl: 4    Allergies   Allergen Reactions   • Cortisone Other (See Comments)     Hotness all over body and hyper   • Oxycontin [Oxycodone] Other (See Comments)     psoriasis   • Tolmetin  Rash     Tolectin-rash and itching       Family History   Problem Relation Age of Onset   • Arthritis Mother    • Diabetes Mother    • Colon polyps Mother    • Diverticulitis Mother    • Arthritis Father    • Bleeding Disorder Father    • Diabetes Father    • Kidney disease Father    • COPD Sister      currently smokes   • Arthritis Brother    • Diabetes Brother    • Alcohol abuse Brother    • Heart murmur Daughter    • Arthritis Other    • Diabetes Other        Social History     Social History   • Marital status:      Spouse name: N/A   • Number of children: 1   • Years of education: N/A     Occupational History   •  Disabled     Social History Main Topics   • Smoking status: Current Every Day Smoker     Packs/day: 0.50     Years: 48.00     Types: Cigarettes   • Smokeless tobacco: Never Used      Comment: currently trying to quit by using Chantix and has cut smoking habit in half, Pt now smoking half a pack a day    • Alcohol use No   • Drug use: No   • Sexual activity: Not on file     Other Topics Concern   • Not on file     Social History Narrative    Lives with her  in [place]       Review of Systems   Constitutional: Positive for appetite change and fatigue. Negative for activity change, fever and unexpected weight change.   HENT: Negative for dental problem, hearing loss, mouth sores, postnasal drip, sneezing, trouble swallowing and voice change.    Eyes: Negative for pain, redness, itching and visual disturbance.   Respiratory: Positive for cough, chest tightness and shortness of breath. Negative for choking and wheezing.    Cardiovascular: Negative for chest pain, palpitations and leg swelling.   Gastrointestinal: Positive for nausea. Negative for abdominal distention (Bloating), abdominal pain, anal bleeding, blood in stool, constipation, diarrhea, rectal pain and vomiting.        GAS   Endocrine: Negative for cold intolerance, heat intolerance, polydipsia, polyphagia and polyuria.    Genitourinary: Negative.  Negative for dysuria, enuresis, flank pain, hematuria and urgency.   Musculoskeletal: Negative for arthralgias, back pain, gait problem, joint swelling and myalgias.   Skin: Negative for color change, pallor and rash.   Allergic/Immunologic: Negative for environmental allergies, food allergies and immunocompromised state.   Neurological: Positive for weakness. Negative for dizziness, tremors, seizures, facial asymmetry, speech difficulty, numbness and headaches.   Hematological: Negative for adenopathy.   Psychiatric/Behavioral: Positive for sleep disturbance. Negative for behavioral problems, confusion, dysphoric mood, hallucinations and self-injury.       Vitals:    02/16/17 0852   BP: 130/74   Pulse: 71   Temp: 98.5 °F (36.9 °C)       Physical Exam   Constitutional: She is oriented to person, place, and time. She appears well-developed and well-nourished. No distress.   HENT:   Head: Normocephalic and atraumatic.   Mouth/Throat: Oropharynx is clear and moist. No oropharyngeal exudate.   Eyes: Conjunctivae and EOM are normal. Pupils are equal, round, and reactive to light. No scleral icterus.   Neck: Normal range of motion. Neck supple. No thyromegaly present.   Cardiovascular: Normal rate and regular rhythm.  Exam reveals no gallop.    Murmur heard.  Pulmonary/Chest: Effort normal. No respiratory distress. She has wheezes. She has no rales.   Abdominal: Soft. Bowel sounds are normal. She exhibits no distension. There is no tenderness. There is no rebound and no guarding.   hepatomegaly   Musculoskeletal: Normal range of motion. She exhibits no edema or tenderness.   Lymphadenopathy:     She has no cervical adenopathy.   Neurological: She is alert and oriented to person, place, and time. She exhibits normal muscle tone.   Skin: Skin is dry. No rash noted. No erythema.   Psychiatric: She has a normal mood and affect. Her behavior is normal. Thought content normal.   Vitals  reviewed.      Tamara was seen today for follow-up.    Diagnoses and all orders for this visit:    VELEZ (nonalcoholic steatohepatitis)  -     US Liver; Future    Calculus of gallbladder without cholecystitis without obstruction  -     US Liver; Future  The patient has no signs of hepatic decompensation.        Plan: Will check ultrasound of the liver for HCC screening.           Encourage tobacco cessation.           Follow up in 6 months.

## 2017-02-23 ENCOUNTER — OFFICE VISIT (OUTPATIENT)
Dept: CARDIAC SURGERY | Facility: CLINIC | Age: 62
End: 2017-02-23

## 2017-02-23 VITALS
BODY MASS INDEX: 32.14 KG/M2 | WEIGHT: 200 LBS | OXYGEN SATURATION: 93 % | SYSTOLIC BLOOD PRESSURE: 120 MMHG | HEART RATE: 67 BPM | DIASTOLIC BLOOD PRESSURE: 70 MMHG | TEMPERATURE: 98.2 F | HEIGHT: 66 IN

## 2017-02-23 DIAGNOSIS — M86.9 OSTEOMYELITIS OF ANKLE OR FOOT: Primary | ICD-10-CM

## 2017-02-23 DIAGNOSIS — I96 GANGRENE OF FOOT (HCC): Primary | ICD-10-CM

## 2017-02-23 PROCEDURE — 99024 POSTOP FOLLOW-UP VISIT: CPT | Performed by: THORACIC SURGERY (CARDIOTHORACIC VASCULAR SURGERY)

## 2017-02-24 ENCOUNTER — HOSPITAL ENCOUNTER (INPATIENT)
Facility: HOSPITAL | Age: 62
LOS: 14 days | Discharge: NURSING, REHAB OR LONG-TERM CARE | End: 2017-03-10
Attending: INTERNAL MEDICINE | Admitting: FAMILY MEDICINE

## 2017-02-24 ENCOUNTER — APPOINTMENT (OUTPATIENT)
Dept: MRI IMAGING | Facility: HOSPITAL | Age: 62
End: 2017-02-24

## 2017-02-24 ENCOUNTER — HOSPITAL ENCOUNTER (OUTPATIENT)
Facility: HOSPITAL | Age: 62
Setting detail: SURGERY ADMIT
End: 2017-02-24
Attending: THORACIC SURGERY (CARDIOTHORACIC VASCULAR SURGERY) | Admitting: THORACIC SURGERY (CARDIOTHORACIC VASCULAR SURGERY)

## 2017-02-24 DIAGNOSIS — Z74.09 IMPAIRED FUNCTIONAL MOBILITY, BALANCE, GAIT, AND ENDURANCE: ICD-10-CM

## 2017-02-24 DIAGNOSIS — L97.529 FOOT ULCER, LEFT, WITH UNSPECIFIED SEVERITY (HCC): ICD-10-CM

## 2017-02-24 DIAGNOSIS — Z78.9 IMPAIRED MOBILITY AND ADLS: Primary | ICD-10-CM

## 2017-02-24 DIAGNOSIS — Z74.09 IMPAIRED MOBILITY AND ADLS: Primary | ICD-10-CM

## 2017-02-24 DIAGNOSIS — E10.621 TYPE 1 DIABETES MELLITUS WITH LEFT DIABETIC FOOT ULCER (HCC): Primary | ICD-10-CM

## 2017-02-24 DIAGNOSIS — M86.9 TOE OSTEOMYELITIS, LEFT (HCC): ICD-10-CM

## 2017-02-24 DIAGNOSIS — L97.529 TYPE 1 DIABETES MELLITUS WITH LEFT DIABETIC FOOT ULCER (HCC): Primary | ICD-10-CM

## 2017-02-24 DIAGNOSIS — M86.9 OSTEOMYELITIS OF LEFT FOOT, UNSPECIFIED CHRONICITY: ICD-10-CM

## 2017-02-24 PROBLEM — E11.42 DIABETIC POLYNEUROPATHY ASSOCIATED WITH TYPE 2 DIABETES MELLITUS (HCC): Status: ACTIVE | Noted: 2017-02-24

## 2017-02-24 LAB
ALBUMIN SERPL-MCNC: 3.8 G/DL (ref 3.2–4.8)
ALBUMIN/GLOB SERPL: 1 G/DL (ref 1.5–2.5)
ALP SERPL-CCNC: 95 U/L (ref 25–100)
ALT SERPL W P-5'-P-CCNC: 18 U/L (ref 7–40)
ANION GAP SERPL CALCULATED.3IONS-SCNC: 1 MMOL/L (ref 3–11)
ANION GAP SERPL CALCULATED.3IONS-SCNC: 3 MMOL/L (ref 3–11)
APTT PPP: 32.1 SECONDS (ref 24–31)
AST SERPL-CCNC: 44 U/L (ref 0–33)
BASOPHILS # BLD AUTO: 0.06 10*3/MM3 (ref 0–0.2)
BASOPHILS NFR BLD AUTO: 0.6 % (ref 0–1)
BILIRUB SERPL-MCNC: 0.2 MG/DL (ref 0.3–1.2)
BUN BLD-MCNC: 16 MG/DL (ref 9–23)
BUN BLD-MCNC: 17 MG/DL (ref 9–23)
BUN/CREAT SERPL: 12.3 (ref 7–25)
BUN/CREAT SERPL: 13.1 (ref 7–25)
CALCIUM SPEC-SCNC: 9.7 MG/DL (ref 8.7–10.4)
CALCIUM SPEC-SCNC: 9.8 MG/DL (ref 8.7–10.4)
CHLORIDE SERPL-SCNC: 104 MMOL/L (ref 99–109)
CHLORIDE SERPL-SCNC: 104 MMOL/L (ref 99–109)
CO2 SERPL-SCNC: 34 MMOL/L (ref 20–31)
CO2 SERPL-SCNC: 35 MMOL/L (ref 20–31)
CREAT BLD-MCNC: 1.3 MG/DL (ref 0.6–1.3)
CREAT BLD-MCNC: 1.3 MG/DL (ref 0.6–1.3)
DEPRECATED RDW RBC AUTO: 57 FL (ref 37–54)
EOSINOPHIL # BLD AUTO: 0.46 10*3/MM3 (ref 0.1–0.3)
EOSINOPHIL NFR BLD AUTO: 4.5 % (ref 0–3)
ERYTHROCYTE [DISTWIDTH] IN BLOOD BY AUTOMATED COUNT: 17.7 % (ref 11.3–14.5)
GFR SERPL CREATININE-BSD FRML MDRD: 41 ML/MIN/1.73
GFR SERPL CREATININE-BSD FRML MDRD: 41 ML/MIN/1.73
GLOBULIN UR ELPH-MCNC: 3.7 GM/DL
GLUCOSE BLD-MCNC: 117 MG/DL (ref 70–100)
GLUCOSE BLD-MCNC: 118 MG/DL (ref 70–100)
GLUCOSE BLDC GLUCOMTR-MCNC: 140 MG/DL (ref 70–130)
GLUCOSE BLDC GLUCOMTR-MCNC: 148 MG/DL (ref 70–130)
HCT VFR BLD AUTO: 36.4 % (ref 34.5–44)
HGB BLD-MCNC: 11.5 G/DL (ref 11.5–15.5)
IMM GRANULOCYTES # BLD: 0.02 10*3/MM3 (ref 0–0.03)
IMM GRANULOCYTES NFR BLD: 0.2 % (ref 0–0.6)
LYMPHOCYTES # BLD AUTO: 2.39 10*3/MM3 (ref 0.6–4.8)
LYMPHOCYTES NFR BLD AUTO: 23.3 % (ref 24–44)
MCH RBC QN AUTO: 27.8 PG (ref 27–31)
MCHC RBC AUTO-ENTMCNC: 31.6 G/DL (ref 32–36)
MCV RBC AUTO: 87.9 FL (ref 80–99)
MONOCYTES # BLD AUTO: 0.72 10*3/MM3 (ref 0–1)
MONOCYTES NFR BLD AUTO: 7 % (ref 0–12)
NEUTROPHILS # BLD AUTO: 6.59 10*3/MM3 (ref 1.5–8.3)
NEUTROPHILS NFR BLD AUTO: 64.4 % (ref 41–71)
PLATELET # BLD AUTO: 266 10*3/MM3 (ref 150–450)
PMV BLD AUTO: 10.7 FL (ref 6–12)
POTASSIUM BLD-SCNC: 3.8 MMOL/L (ref 3.5–5.5)
POTASSIUM BLD-SCNC: 3.9 MMOL/L (ref 3.5–5.5)
PROT SERPL-MCNC: 7.5 G/DL (ref 5.7–8.2)
RBC # BLD AUTO: 4.14 10*6/MM3 (ref 3.89–5.14)
SODIUM BLD-SCNC: 140 MMOL/L (ref 132–146)
SODIUM BLD-SCNC: 141 MMOL/L (ref 132–146)
WBC NRBC COR # BLD: 10.24 10*3/MM3 (ref 3.5–10.8)

## 2017-02-24 PROCEDURE — 25010000003 CEFTRIAXONE PER 250 MG: Performed by: NURSE PRACTITIONER

## 2017-02-24 PROCEDURE — 73718 MRI LOWER EXTREMITY W/O DYE: CPT

## 2017-02-24 PROCEDURE — 94640 AIRWAY INHALATION TREATMENT: CPT

## 2017-02-24 PROCEDURE — 94660 CPAP INITIATION&MGMT: CPT

## 2017-02-24 PROCEDURE — 85025 COMPLETE CBC W/AUTO DIFF WBC: CPT | Performed by: THORACIC SURGERY (CARDIOTHORACIC VASCULAR SURGERY)

## 2017-02-24 PROCEDURE — 80053 COMPREHEN METABOLIC PANEL: CPT | Performed by: THORACIC SURGERY (CARDIOTHORACIC VASCULAR SURGERY)

## 2017-02-24 PROCEDURE — 85730 THROMBOPLASTIN TIME PARTIAL: CPT | Performed by: NURSE PRACTITIONER

## 2017-02-24 PROCEDURE — 99223 1ST HOSP IP/OBS HIGH 75: CPT | Performed by: FAMILY MEDICINE

## 2017-02-24 PROCEDURE — 87040 BLOOD CULTURE FOR BACTERIA: CPT | Performed by: NURSE PRACTITIONER

## 2017-02-24 PROCEDURE — 25010000002 VANCOMYCIN

## 2017-02-24 PROCEDURE — 94799 UNLISTED PULMONARY SVC/PX: CPT

## 2017-02-24 PROCEDURE — 11044 DBRDMT BONE 1ST 20 SQ CM/<: CPT | Performed by: THORACIC SURGERY (CARDIOTHORACIC VASCULAR SURGERY)

## 2017-02-24 PROCEDURE — 25010000002 HEPARIN (PORCINE) PER 1000 UNITS: Performed by: NURSE PRACTITIONER

## 2017-02-24 PROCEDURE — 94760 N-INVAS EAR/PLS OXIMETRY 1: CPT

## 2017-02-24 PROCEDURE — 82962 GLUCOSE BLOOD TEST: CPT

## 2017-02-24 PROCEDURE — S0260 H&P FOR SURGERY: HCPCS | Performed by: THORACIC SURGERY (CARDIOTHORACIC VASCULAR SURGERY)

## 2017-02-24 RX ORDER — ATORVASTATIN CALCIUM 40 MG/1
40 TABLET, FILM COATED ORAL NIGHTLY
Status: DISCONTINUED | OUTPATIENT
Start: 2017-02-24 | End: 2017-03-10 | Stop reason: HOSPADM

## 2017-02-24 RX ORDER — NICOTINE POLACRILEX 4 MG
15 LOZENGE BUCCAL
Status: DISCONTINUED | OUTPATIENT
Start: 2017-02-24 | End: 2017-03-10 | Stop reason: HOSPADM

## 2017-02-24 RX ORDER — FENTANYL 100 UG/H
1 PATCH TRANSDERMAL
Status: DISCONTINUED | OUTPATIENT
Start: 2017-02-24 | End: 2017-02-26

## 2017-02-24 RX ORDER — FLUTICASONE PROPIONATE 50 MCG
1 SPRAY, SUSPENSION (ML) NASAL 2 TIMES DAILY
Status: DISCONTINUED | OUTPATIENT
Start: 2017-02-24 | End: 2017-03-10 | Stop reason: HOSPADM

## 2017-02-24 RX ORDER — LISINOPRIL 20 MG/1
20 TABLET ORAL
Status: DISCONTINUED | OUTPATIENT
Start: 2017-02-24 | End: 2017-03-10 | Stop reason: HOSPADM

## 2017-02-24 RX ORDER — CEFTRIAXONE SODIUM 1 G/50ML
1 INJECTION, SOLUTION INTRAVENOUS EVERY 24 HOURS
Status: DISCONTINUED | OUTPATIENT
Start: 2017-02-24 | End: 2017-02-26

## 2017-02-24 RX ORDER — NICOTINE 21 MG/24HR
1 PATCH, TRANSDERMAL 24 HOURS TRANSDERMAL
Status: DISCONTINUED | OUTPATIENT
Start: 2017-02-24 | End: 2017-03-10 | Stop reason: HOSPADM

## 2017-02-24 RX ORDER — BACLOFEN 10 MG/1
20 TABLET ORAL EVERY 8 HOURS SCHEDULED
Status: DISCONTINUED | OUTPATIENT
Start: 2017-02-24 | End: 2017-03-10 | Stop reason: HOSPADM

## 2017-02-24 RX ORDER — CLONAZEPAM 0.5 MG/1
0.25 TABLET ORAL EVERY 12 HOURS SCHEDULED
Status: DISPENSED | OUTPATIENT
Start: 2017-02-24 | End: 2017-03-06

## 2017-02-24 RX ORDER — VANCOMYCIN/0.9 % SOD CHLORIDE 1.5G/250ML
1500 PLASTIC BAG, INJECTION (ML) INTRAVENOUS EVERY 24 HOURS
Status: DISCONTINUED | OUTPATIENT
Start: 2017-02-25 | End: 2017-02-25 | Stop reason: HOSPADM

## 2017-02-24 RX ORDER — ASPIRIN 325 MG
325 TABLET, DELAYED RELEASE (ENTERIC COATED) ORAL DAILY
Status: DISCONTINUED | OUTPATIENT
Start: 2017-02-24 | End: 2017-03-10 | Stop reason: HOSPADM

## 2017-02-24 RX ORDER — EZETIMIBE 10 MG/1
10 TABLET ORAL DAILY
Status: DISCONTINUED | OUTPATIENT
Start: 2017-02-24 | End: 2017-03-10 | Stop reason: HOSPADM

## 2017-02-24 RX ORDER — SODIUM CHLORIDE 0.9 % (FLUSH) 0.9 %
1-10 SYRINGE (ML) INJECTION AS NEEDED
Status: DISCONTINUED | OUTPATIENT
Start: 2017-02-24 | End: 2017-02-25 | Stop reason: HOSPADM

## 2017-02-24 RX ORDER — PROMETHAZINE HYDROCHLORIDE 25 MG/1
25 TABLET ORAL EVERY 8 HOURS PRN
Status: DISCONTINUED | OUTPATIENT
Start: 2017-02-24 | End: 2017-03-10 | Stop reason: HOSPADM

## 2017-02-24 RX ORDER — BUSPIRONE HYDROCHLORIDE 10 MG/1
20 TABLET ORAL EVERY 12 HOURS SCHEDULED
Status: DISCONTINUED | OUTPATIENT
Start: 2017-02-24 | End: 2017-03-10 | Stop reason: HOSPADM

## 2017-02-24 RX ORDER — OXYCODONE AND ACETAMINOPHEN 10; 325 MG/1; MG/1
1 TABLET ORAL EVERY 8 HOURS PRN
Status: DISCONTINUED | OUTPATIENT
Start: 2017-02-24 | End: 2017-02-26

## 2017-02-24 RX ORDER — HEPARIN SODIUM 5000 [USP'U]/ML
5000 INJECTION, SOLUTION INTRAVENOUS; SUBCUTANEOUS EVERY 12 HOURS SCHEDULED
Status: DISCONTINUED | OUTPATIENT
Start: 2017-02-24 | End: 2017-02-25

## 2017-02-24 RX ORDER — ACETAMINOPHEN 325 MG/1
650 TABLET ORAL EVERY 4 HOURS PRN
Status: DISCONTINUED | OUTPATIENT
Start: 2017-02-24 | End: 2017-03-10 | Stop reason: HOSPADM

## 2017-02-24 RX ORDER — DEXTROSE MONOHYDRATE 25 G/50ML
25 INJECTION, SOLUTION INTRAVENOUS
Status: DISCONTINUED | OUTPATIENT
Start: 2017-02-24 | End: 2017-02-25 | Stop reason: HOSPADM

## 2017-02-24 RX ORDER — CETIRIZINE HYDROCHLORIDE 10 MG/1
10 TABLET ORAL NIGHTLY
Status: DISCONTINUED | OUTPATIENT
Start: 2017-02-24 | End: 2017-03-10 | Stop reason: HOSPADM

## 2017-02-24 RX ORDER — METOCLOPRAMIDE 10 MG/1
10 TABLET ORAL 3 TIMES DAILY
Status: DISCONTINUED | OUTPATIENT
Start: 2017-02-24 | End: 2017-02-25 | Stop reason: HOSPADM

## 2017-02-24 RX ORDER — NICOTINE 21 MG/24HR
1 PATCH, TRANSDERMAL 24 HOURS TRANSDERMAL
Status: DISCONTINUED | OUTPATIENT
Start: 2017-02-24 | End: 2017-02-24

## 2017-02-24 RX ORDER — PREGABALIN 100 MG/1
100 CAPSULE ORAL EVERY 8 HOURS SCHEDULED
Status: DISCONTINUED | OUTPATIENT
Start: 2017-02-24 | End: 2017-03-10 | Stop reason: HOSPADM

## 2017-02-24 RX ORDER — AMLODIPINE BESYLATE 5 MG/1
5 TABLET ORAL DAILY
Status: DISCONTINUED | OUTPATIENT
Start: 2017-02-24 | End: 2017-03-10 | Stop reason: HOSPADM

## 2017-02-24 RX ORDER — PANTOPRAZOLE SODIUM 40 MG/1
40 TABLET, DELAYED RELEASE ORAL
Status: DISCONTINUED | OUTPATIENT
Start: 2017-02-25 | End: 2017-03-10 | Stop reason: HOSPADM

## 2017-02-24 RX ORDER — FUROSEMIDE 40 MG/1
40 TABLET ORAL DAILY
Status: DISCONTINUED | OUTPATIENT
Start: 2017-02-24 | End: 2017-03-10 | Stop reason: HOSPADM

## 2017-02-24 RX ORDER — LACTOBACILLUS RHAMNOSUS GG 10B CELL
1 CAPSULE ORAL DAILY
Status: DISCONTINUED | OUTPATIENT
Start: 2017-02-24 | End: 2017-03-10 | Stop reason: HOSPADM

## 2017-02-24 RX ORDER — LEVOTHYROXINE SODIUM 112 UG/1
112 TABLET ORAL
Status: DISCONTINUED | OUTPATIENT
Start: 2017-02-25 | End: 2017-03-10 | Stop reason: HOSPADM

## 2017-02-24 RX ORDER — SIMETHICONE 80 MG
80 TABLET,CHEWABLE ORAL 4 TIMES DAILY PRN
Status: DISCONTINUED | OUTPATIENT
Start: 2017-02-24 | End: 2017-03-10 | Stop reason: HOSPADM

## 2017-02-24 RX ORDER — BISOPROLOL FUMARATE AND HYDROCHLOROTHIAZIDE 5; 6.25 MG/1; MG/1
1 TABLET ORAL
Status: DISCONTINUED | OUTPATIENT
Start: 2017-02-24 | End: 2017-03-01

## 2017-02-24 RX ADMIN — NICOTINE 1 PATCH: 21 PATCH, EXTENDED RELEASE TRANSDERMAL at 23:33

## 2017-02-24 RX ADMIN — CLONAZEPAM 0.25 MG: 0.5 TABLET ORAL at 21:33

## 2017-02-24 RX ADMIN — METOCLOPRAMIDE 10 MG: 10 TABLET ORAL at 21:32

## 2017-02-24 RX ADMIN — FLUTICASONE PROPIONATE 1 SPRAY: 50 SPRAY, METERED NASAL at 17:23

## 2017-02-24 RX ADMIN — BUSPIRONE HYDROCHLORIDE 20 MG: 10 TABLET ORAL at 21:33

## 2017-02-24 RX ADMIN — CEFTRIAXONE SODIUM 1 G: 1 INJECTION, SOLUTION INTRAVENOUS at 17:23

## 2017-02-24 RX ADMIN — IPRATROPIUM BROMIDE 0.5 MG: 0.5 SOLUTION RESPIRATORY (INHALATION) at 19:19

## 2017-02-24 RX ADMIN — VANCOMYCIN HYDROCHLORIDE 2000 MG: 1 INJECTION, POWDER, LYOPHILIZED, FOR SOLUTION INTRAVENOUS at 17:32

## 2017-02-24 RX ADMIN — HEPARIN SODIUM 5000 UNITS: 5000 INJECTION, SOLUTION INTRAVENOUS; SUBCUTANEOUS at 21:41

## 2017-02-24 RX ADMIN — PREGABALIN 100 MG: 100 CAPSULE ORAL at 21:33

## 2017-02-24 RX ADMIN — CETIRIZINE HYDROCHLORIDE 10 MG: 10 TABLET, FILM COATED ORAL at 21:33

## 2017-02-24 RX ADMIN — FENTANYL 1 PATCH: 100 PATCH, EXTENDED RELEASE TRANSDERMAL at 23:32

## 2017-02-24 RX ADMIN — ATORVASTATIN CALCIUM 40 MG: 40 TABLET, FILM COATED ORAL at 21:33

## 2017-02-24 RX ADMIN — METOCLOPRAMIDE 10 MG: 10 TABLET ORAL at 17:23

## 2017-02-24 NOTE — PROGRESS NOTES
"Patient Information  Tamara Langston                                                                                          [address] [facility] [address]      1955  [phone]      Chief Complaint   Patient presents with   • Follow-up     There is greenish drainage coming from left foot wound and the wound has been bleeding.   • Peripheral Vascular Disease       History of Present Illness: Patient seen in follow-up for left great toe trans-metatarsal amputation.  That was done on 11/23/2016 for osteomyelitis .  The patient was last seen by me in the office on 01/26/2017.  At that time.  The wound was healing nicely with good granulation tissue and the patient was told to continue wet to damp dressings twice daily to the open wound with home health visits to also follow the patient.  Home health nurse called the office on 02/22/2017 and stated that the wound had some yellow drainage and some of the granulation tissue was brown in color and therefore, I told my office staff to have the patient to come today to the office to be seen.  She has not been experiencing any fever or chills    Vitals:    02/23/17 1318   BP: 120/70   BP Location: Left arm   Pulse: 67   Temp: 98.2 °F (36.8 °C)   SpO2: 93%   Weight: 200 lb (90.7 kg)   Height: 66\" (167.6 cm)        Physical Exam on examination of the wound.  The granulation tissue was somewhat hypertrophic and there was some areas of discoloration and overall the granulating area had a pale appearance.  There was no drainage that could be expressed from the surrounding area, but the patient brought her previous wound dressing changes and they had an obvious green to brown discoloration stain on the bandages.    Lab/other results:    Assessment:#1 Postop transmetatarsal amputation of the left great toe for osteomyelitis, done on  11/23/2016.  The granulation tissue of the previously open wound is unhealthy-appearing with some discoloration.    #2.  Diabetes " mellitus  Plan: I called Dr. Cruz, of infectious disease today on the telephone and she will plan to see the patient on 02/24/2016.  I did obtain a  tissue swab wound culture for culture and Gram stain.  I have also ordered an MRI to be done of the left foot and I plan to see the patient back in follow-up in one week. I told the patient and her sister, who was present with her, that I am concerned that there may be infection in the area of the previous transmetatarsal amputation.  They understand and agree to see Dr. Cruz and see me back in one week.    Arsalan Feliciano M.D.

## 2017-02-25 ENCOUNTER — ANESTHESIA EVENT (OUTPATIENT)
Dept: PERIOP | Facility: HOSPITAL | Age: 62
End: 2017-02-25

## 2017-02-25 ENCOUNTER — ANESTHESIA (OUTPATIENT)
Dept: PERIOP | Facility: HOSPITAL | Age: 62
End: 2017-02-25

## 2017-02-25 LAB
ABO GROUP BLD: NORMAL
ANION GAP SERPL CALCULATED.3IONS-SCNC: -1 MMOL/L (ref 3–11)
ARTICHOKE IGE QN: 63 MG/DL (ref 0–130)
BASOPHILS # BLD AUTO: 0.01 10*3/MM3 (ref 0–0.2)
BASOPHILS NFR BLD AUTO: 0.2 % (ref 0–1)
BLD GP AB SCN SERPL QL: NEGATIVE
BUN BLD-MCNC: 13 MG/DL (ref 9–23)
BUN/CREAT SERPL: 11.8 (ref 7–25)
CALCIUM SPEC-SCNC: 9.4 MG/DL (ref 8.7–10.4)
CHLORIDE SERPL-SCNC: 108 MMOL/L (ref 99–109)
CHOLEST SERPL-MCNC: 123 MG/DL (ref 0–200)
CO2 SERPL-SCNC: 31 MMOL/L (ref 20–31)
CREAT BLD-MCNC: 1.1 MG/DL (ref 0.6–1.3)
DEPRECATED RDW RBC AUTO: 56.1 FL (ref 37–54)
EOSINOPHIL # BLD AUTO: 0 10*3/MM3 (ref 0.1–0.3)
EOSINOPHIL NFR BLD AUTO: 0 % (ref 0–3)
ERYTHROCYTE [DISTWIDTH] IN BLOOD BY AUTOMATED COUNT: 17.1 % (ref 11.3–14.5)
GFR SERPL CREATININE-BSD FRML MDRD: 50 ML/MIN/1.73
GLUCOSE BLD-MCNC: 213 MG/DL (ref 70–100)
GLUCOSE BLDC GLUCOMTR-MCNC: 109 MG/DL (ref 70–130)
GLUCOSE BLDC GLUCOMTR-MCNC: 157 MG/DL (ref 70–130)
GLUCOSE BLDC GLUCOMTR-MCNC: 186 MG/DL (ref 70–130)
GLUCOSE BLDC GLUCOMTR-MCNC: 231 MG/DL (ref 70–130)
HBA1C MFR BLD: 6.1 % (ref 4.8–5.6)
HCT VFR BLD AUTO: 32.5 % (ref 34.5–44)
HDLC SERPL-MCNC: 23 MG/DL (ref 40–60)
HGB BLD-MCNC: 9.9 G/DL (ref 11.5–15.5)
IMM GRANULOCYTES # BLD: 0.01 10*3/MM3 (ref 0–0.03)
IMM GRANULOCYTES NFR BLD: 0.2 % (ref 0–0.6)
INR PPP: 0.97
LYMPHOCYTES # BLD AUTO: 0.73 10*3/MM3 (ref 0.6–4.8)
LYMPHOCYTES NFR BLD AUTO: 11 % (ref 24–44)
MCH RBC QN AUTO: 27.1 PG (ref 27–31)
MCHC RBC AUTO-ENTMCNC: 30.5 G/DL (ref 32–36)
MCV RBC AUTO: 89 FL (ref 80–99)
MONOCYTES # BLD AUTO: 0.03 10*3/MM3 (ref 0–1)
MONOCYTES NFR BLD AUTO: 0.5 % (ref 0–12)
NEUTROPHILS # BLD AUTO: 5.86 10*3/MM3 (ref 1.5–8.3)
NEUTROPHILS NFR BLD AUTO: 88.1 % (ref 41–71)
PA ADP PRP-ACNC: 304 PRU
PLATELET # BLD AUTO: 223 10*3/MM3 (ref 150–450)
PMV BLD AUTO: 10.5 FL (ref 6–12)
POTASSIUM BLD-SCNC: 4.2 MMOL/L (ref 3.5–5.5)
PROTHROMBIN TIME: 10.6 SECONDS (ref 9.6–11.5)
RBC # BLD AUTO: 3.65 10*6/MM3 (ref 3.89–5.14)
RH BLD: NEGATIVE
SODIUM BLD-SCNC: 138 MMOL/L (ref 132–146)
TRIGL SERPL-MCNC: 336 MG/DL (ref 0–150)
TSH SERPL DL<=0.05 MIU/L-ACNC: 1.26 MIU/ML (ref 0.35–5.35)
WBC NRBC COR # BLD: 6.64 10*3/MM3 (ref 3.5–10.8)

## 2017-02-25 PROCEDURE — 25010000003 CEFTRIAXONE PER 250 MG: Performed by: NURSE PRACTITIONER

## 2017-02-25 PROCEDURE — 25010000002 MIDAZOLAM PER 1 MG: Performed by: ANESTHESIOLOGY

## 2017-02-25 PROCEDURE — 25010000002 HEPARIN (PORCINE) PER 1000 UNITS: Performed by: NURSE PRACTITIONER

## 2017-02-25 PROCEDURE — 80048 BASIC METABOLIC PNL TOTAL CA: CPT | Performed by: THORACIC SURGERY (CARDIOTHORACIC VASCULAR SURGERY)

## 2017-02-25 PROCEDURE — 88311 DECALCIFY TISSUE: CPT | Performed by: THORACIC SURGERY (CARDIOTHORACIC VASCULAR SURGERY)

## 2017-02-25 PROCEDURE — 82962 GLUCOSE BLOOD TEST: CPT

## 2017-02-25 PROCEDURE — 84443 ASSAY THYROID STIM HORMONE: CPT | Performed by: NURSE PRACTITIONER

## 2017-02-25 PROCEDURE — 85576 BLOOD PLATELET AGGREGATION: CPT | Performed by: THORACIC SURGERY (CARDIOTHORACIC VASCULAR SURGERY)

## 2017-02-25 PROCEDURE — 87147 CULTURE TYPE IMMUNOLOGIC: CPT | Performed by: THORACIC SURGERY (CARDIOTHORACIC VASCULAR SURGERY)

## 2017-02-25 PROCEDURE — 94799 UNLISTED PULMONARY SVC/PX: CPT

## 2017-02-25 PROCEDURE — 94760 N-INVAS EAR/PLS OXIMETRY 1: CPT

## 2017-02-25 PROCEDURE — 87116 MYCOBACTERIA CULTURE: CPT | Performed by: THORACIC SURGERY (CARDIOTHORACIC VASCULAR SURGERY)

## 2017-02-25 PROCEDURE — 87176 TISSUE HOMOGENIZATION CULTR: CPT | Performed by: THORACIC SURGERY (CARDIOTHORACIC VASCULAR SURGERY)

## 2017-02-25 PROCEDURE — 25010000002 FENTANYL CITRATE (PF) 100 MCG/2ML SOLUTION: Performed by: ANESTHESIOLOGY

## 2017-02-25 PROCEDURE — 87185 SC STD ENZYME DETCJ PER NZM: CPT | Performed by: THORACIC SURGERY (CARDIOTHORACIC VASCULAR SURGERY)

## 2017-02-25 PROCEDURE — 25010000002 PROPOFOL 10 MG/ML EMULSION: Performed by: ANESTHESIOLOGY

## 2017-02-25 PROCEDURE — 87205 SMEAR GRAM STAIN: CPT | Performed by: THORACIC SURGERY (CARDIOTHORACIC VASCULAR SURGERY)

## 2017-02-25 PROCEDURE — 99233 SBSQ HOSP IP/OBS HIGH 50: CPT | Performed by: HOSPITALIST

## 2017-02-25 PROCEDURE — 63710000001 INSULIN LISPRO (HUMAN) PER 5 UNITS: Performed by: NURSE PRACTITIONER

## 2017-02-25 PROCEDURE — 94640 AIRWAY INHALATION TREATMENT: CPT

## 2017-02-25 PROCEDURE — 87077 CULTURE AEROBIC IDENTIFY: CPT | Performed by: THORACIC SURGERY (CARDIOTHORACIC VASCULAR SURGERY)

## 2017-02-25 PROCEDURE — 25010000002 ALBUMIN HUMAN 5% PER 50 ML: Performed by: ANESTHESIOLOGY

## 2017-02-25 PROCEDURE — 83036 HEMOGLOBIN GLYCOSYLATED A1C: CPT | Performed by: NURSE PRACTITIONER

## 2017-02-25 PROCEDURE — 25010000002 DEXAMETHASONE PER 1 MG: Performed by: ANESTHESIOLOGY

## 2017-02-25 PROCEDURE — 0Y6Q0Z0 DETACHMENT AT LEFT 1ST TOE, COMPLETE, OPEN APPROACH: ICD-10-PCS | Performed by: THORACIC SURGERY (CARDIOTHORACIC VASCULAR SURGERY)

## 2017-02-25 PROCEDURE — 80061 LIPID PANEL: CPT | Performed by: NURSE PRACTITIONER

## 2017-02-25 PROCEDURE — 25010000002 CEFUROXIME PER 750 MG: Performed by: ANESTHESIOLOGY

## 2017-02-25 PROCEDURE — 86920 COMPATIBILITY TEST SPIN: CPT

## 2017-02-25 PROCEDURE — 87206 SMEAR FLUORESCENT/ACID STAI: CPT | Performed by: THORACIC SURGERY (CARDIOTHORACIC VASCULAR SURGERY)

## 2017-02-25 PROCEDURE — P9041 ALBUMIN (HUMAN),5%, 50ML: HCPCS | Performed by: ANESTHESIOLOGY

## 2017-02-25 PROCEDURE — 87070 CULTURE OTHR SPECIMN AEROBIC: CPT | Performed by: THORACIC SURGERY (CARDIOTHORACIC VASCULAR SURGERY)

## 2017-02-25 PROCEDURE — 86901 BLOOD TYPING SEROLOGIC RH(D): CPT

## 2017-02-25 PROCEDURE — 87075 CULTR BACTERIA EXCEPT BLOOD: CPT | Performed by: THORACIC SURGERY (CARDIOTHORACIC VASCULAR SURGERY)

## 2017-02-25 PROCEDURE — 87102 FUNGUS ISOLATION CULTURE: CPT | Performed by: THORACIC SURGERY (CARDIOTHORACIC VASCULAR SURGERY)

## 2017-02-25 PROCEDURE — 85610 PROTHROMBIN TIME: CPT | Performed by: NURSE PRACTITIONER

## 2017-02-25 PROCEDURE — 87186 SC STD MICRODIL/AGAR DIL: CPT | Performed by: THORACIC SURGERY (CARDIOTHORACIC VASCULAR SURGERY)

## 2017-02-25 PROCEDURE — 25010000002 ONDANSETRON PER 1 MG: Performed by: ANESTHESIOLOGY

## 2017-02-25 PROCEDURE — 25010000002 ROPIVACAINE PER 1 MG: Performed by: ANESTHESIOLOGY

## 2017-02-25 PROCEDURE — 99024 POSTOP FOLLOW-UP VISIT: CPT | Performed by: THORACIC SURGERY (CARDIOTHORACIC VASCULAR SURGERY)

## 2017-02-25 PROCEDURE — 86850 RBC ANTIBODY SCREEN: CPT

## 2017-02-25 PROCEDURE — 86900 BLOOD TYPING SEROLOGIC ABO: CPT

## 2017-02-25 PROCEDURE — 85025 COMPLETE CBC W/AUTO DIFF WBC: CPT | Performed by: THORACIC SURGERY (CARDIOTHORACIC VASCULAR SURGERY)

## 2017-02-25 RX ORDER — LIDOCAINE HYDROCHLORIDE 10 MG/ML
INJECTION, SOLUTION INFILTRATION; PERINEURAL AS NEEDED
Status: DISCONTINUED | OUTPATIENT
Start: 2017-02-25 | End: 2017-02-25 | Stop reason: SURG

## 2017-02-25 RX ORDER — FAMOTIDINE 10 MG/ML
20 INJECTION, SOLUTION INTRAVENOUS
Status: DISCONTINUED | OUTPATIENT
Start: 2017-02-25 | End: 2017-02-25

## 2017-02-25 RX ORDER — ROPIVACAINE HYDROCHLORIDE 5 MG/ML
INJECTION, SOLUTION EPIDURAL; INFILTRATION; PERINEURAL AS NEEDED
Status: DISCONTINUED | OUTPATIENT
Start: 2017-02-25 | End: 2017-02-25 | Stop reason: SURG

## 2017-02-25 RX ORDER — MIDAZOLAM HYDROCHLORIDE 1 MG/ML
INJECTION INTRAMUSCULAR; INTRAVENOUS AS NEEDED
Status: DISCONTINUED | OUTPATIENT
Start: 2017-02-25 | End: 2017-02-25 | Stop reason: SURG

## 2017-02-25 RX ORDER — LIDOCAINE HYDROCHLORIDE 10 MG/ML
0.5 INJECTION, SOLUTION EPIDURAL; INFILTRATION; INTRACAUDAL; PERINEURAL ONCE AS NEEDED
Status: DISCONTINUED | OUTPATIENT
Start: 2017-02-25 | End: 2017-02-25 | Stop reason: HOSPADM

## 2017-02-25 RX ORDER — FAMOTIDINE 20 MG/1
20 TABLET, FILM COATED ORAL
Status: DISCONTINUED | OUTPATIENT
Start: 2017-02-25 | End: 2017-02-25

## 2017-02-25 RX ORDER — ALBUMIN, HUMAN INJ 5% 5 %
SOLUTION INTRAVENOUS CONTINUOUS PRN
Status: DISCONTINUED | OUTPATIENT
Start: 2017-02-25 | End: 2017-02-25 | Stop reason: SURG

## 2017-02-25 RX ORDER — FENTANYL CITRATE 50 UG/ML
50 INJECTION, SOLUTION INTRAMUSCULAR; INTRAVENOUS
Status: DISCONTINUED | OUTPATIENT
Start: 2017-02-25 | End: 2017-02-25 | Stop reason: HOSPADM

## 2017-02-25 RX ORDER — SODIUM CHLORIDE 0.9 % (FLUSH) 0.9 %
1-10 SYRINGE (ML) INJECTION AS NEEDED
Status: DISCONTINUED | OUTPATIENT
Start: 2017-02-25 | End: 2017-02-25 | Stop reason: HOSPADM

## 2017-02-25 RX ORDER — DEXAMETHASONE SODIUM PHOSPHATE 4 MG/ML
INJECTION, SOLUTION INTRA-ARTICULAR; INTRALESIONAL; INTRAMUSCULAR; INTRAVENOUS; SOFT TISSUE AS NEEDED
Status: DISCONTINUED | OUTPATIENT
Start: 2017-02-25 | End: 2017-02-25 | Stop reason: SURG

## 2017-02-25 RX ORDER — FENTANYL CITRATE 50 UG/ML
INJECTION, SOLUTION INTRAMUSCULAR; INTRAVENOUS AS NEEDED
Status: DISCONTINUED | OUTPATIENT
Start: 2017-02-25 | End: 2017-02-25 | Stop reason: SURG

## 2017-02-25 RX ORDER — SODIUM CHLORIDE, SODIUM LACTATE, POTASSIUM CHLORIDE, CALCIUM CHLORIDE 600; 310; 30; 20 MG/100ML; MG/100ML; MG/100ML; MG/100ML
50 INJECTION, SOLUTION INTRAVENOUS CONTINUOUS PRN
Status: DISCONTINUED | OUTPATIENT
Start: 2017-02-25 | End: 2017-02-26

## 2017-02-25 RX ORDER — PROPOFOL 10 MG/ML
VIAL (ML) INTRAVENOUS AS NEEDED
Status: DISCONTINUED | OUTPATIENT
Start: 2017-02-25 | End: 2017-02-25 | Stop reason: SURG

## 2017-02-25 RX ORDER — ONDANSETRON 2 MG/ML
INJECTION INTRAMUSCULAR; INTRAVENOUS AS NEEDED
Status: DISCONTINUED | OUTPATIENT
Start: 2017-02-25 | End: 2017-02-25 | Stop reason: SURG

## 2017-02-25 RX ADMIN — ROPIVACAINE HYDROCHLORIDE 30 ML: 5 INJECTION, SOLUTION EPIDURAL; INFILTRATION; PERINEURAL at 12:05

## 2017-02-25 RX ADMIN — OXYCODONE HYDROCHLORIDE AND ACETAMINOPHEN 1 TABLET: 10; 325 TABLET ORAL at 20:09

## 2017-02-25 RX ADMIN — FENTANYL CITRATE 50 MCG: 50 INJECTION, SOLUTION INTRAMUSCULAR; INTRAVENOUS at 12:28

## 2017-02-25 RX ADMIN — FLUTICASONE PROPIONATE 1 SPRAY: 50 SPRAY, METERED NASAL at 08:47

## 2017-02-25 RX ADMIN — PROPOFOL 120 MG: 10 INJECTION, EMULSION INTRAVENOUS at 12:28

## 2017-02-25 RX ADMIN — IPRATROPIUM BROMIDE 0.5 MG: 0.5 SOLUTION RESPIRATORY (INHALATION) at 19:53

## 2017-02-25 RX ADMIN — PREGABALIN 100 MG: 100 CAPSULE ORAL at 05:54

## 2017-02-25 RX ADMIN — ONDANSETRON 4 MG: 2 INJECTION INTRAMUSCULAR; INTRAVENOUS at 13:49

## 2017-02-25 RX ADMIN — INSULIN LISPRO 2 UNITS: 100 INJECTION, SOLUTION INTRAVENOUS; SUBCUTANEOUS at 16:53

## 2017-02-25 RX ADMIN — SIMETHICONE CHEW TAB 80 MG 80 MG: 80 TABLET ORAL at 20:10

## 2017-02-25 RX ADMIN — EZETIMIBE 10 MG: 10 TABLET ORAL at 08:48

## 2017-02-25 RX ADMIN — PREGABALIN 100 MG: 100 CAPSULE ORAL at 20:10

## 2017-02-25 RX ADMIN — FENTANYL CITRATE 50 MCG: 50 INJECTION, SOLUTION INTRAMUSCULAR; INTRAVENOUS at 13:58

## 2017-02-25 RX ADMIN — ATORVASTATIN CALCIUM 40 MG: 40 TABLET, FILM COATED ORAL at 20:09

## 2017-02-25 RX ADMIN — IPRATROPIUM BROMIDE 0.5 MG: 0.5 SOLUTION RESPIRATORY (INHALATION) at 07:14

## 2017-02-25 RX ADMIN — PANTOPRAZOLE SODIUM 40 MG: 40 TABLET, DELAYED RELEASE ORAL at 05:54

## 2017-02-25 RX ADMIN — FAMOTIDINE 20 MG: 10 INJECTION, SOLUTION INTRAVENOUS at 12:10

## 2017-02-25 RX ADMIN — Medication 1 CAPSULE: at 08:45

## 2017-02-25 RX ADMIN — CETIRIZINE HYDROCHLORIDE 10 MG: 10 TABLET, FILM COATED ORAL at 20:10

## 2017-02-25 RX ADMIN — LISINOPRIL 20 MG: 20 TABLET ORAL at 08:45

## 2017-02-25 RX ADMIN — INSULIN LISPRO 2 UNITS: 100 INJECTION, SOLUTION INTRAVENOUS; SUBCUTANEOUS at 08:46

## 2017-02-25 RX ADMIN — FLUTICASONE PROPIONATE 1 SPRAY: 50 SPRAY, METERED NASAL at 16:53

## 2017-02-25 RX ADMIN — CLONAZEPAM 0.25 MG: 0.5 TABLET ORAL at 08:45

## 2017-02-25 RX ADMIN — CEFUROXIME 1.5 G: 1.5 INJECTION, POWDER, FOR SOLUTION INTRAVENOUS at 12:40

## 2017-02-25 RX ADMIN — HEPARIN SODIUM 5000 UNITS: 5000 INJECTION, SOLUTION INTRAVENOUS; SUBCUTANEOUS at 08:46

## 2017-02-25 RX ADMIN — OXYCODONE HYDROCHLORIDE AND ACETAMINOPHEN 1 TABLET: 10; 325 TABLET ORAL at 05:58

## 2017-02-25 RX ADMIN — MIDAZOLAM HYDROCHLORIDE 2 MG: 1 INJECTION, SOLUTION INTRAMUSCULAR; INTRAVENOUS at 12:03

## 2017-02-25 RX ADMIN — METOCLOPRAMIDE 10 MG: 10 TABLET ORAL at 08:45

## 2017-02-25 RX ADMIN — LIDOCAINE HYDROCHLORIDE 30 MG: 10 INJECTION, SOLUTION INFILTRATION; PERINEURAL at 12:28

## 2017-02-25 RX ADMIN — AMLODIPINE BESYLATE 5 MG: 5 TABLET ORAL at 08:45

## 2017-02-25 RX ADMIN — CEFTRIAXONE SODIUM 1 G: 1 INJECTION, SOLUTION INTRAVENOUS at 16:53

## 2017-02-25 RX ADMIN — FUROSEMIDE 40 MG: 40 TABLET ORAL at 08:45

## 2017-02-25 RX ADMIN — SODIUM CHLORIDE, POTASSIUM CHLORIDE, SODIUM LACTATE AND CALCIUM CHLORIDE: 600; 310; 30; 20 INJECTION, SOLUTION INTRAVENOUS at 12:20

## 2017-02-25 RX ADMIN — BUSPIRONE HYDROCHLORIDE 20 MG: 10 TABLET ORAL at 20:10

## 2017-02-25 RX ADMIN — BISOPROLOL FUMARATE AND HYDROCHLOROTHIAZIDE 1 TABLET: 6.25; 5 TABLET ORAL at 08:45

## 2017-02-25 RX ADMIN — DEXAMETHASONE SODIUM PHOSPHATE 8 MG: 4 INJECTION, SOLUTION INTRAMUSCULAR; INTRAVENOUS at 12:48

## 2017-02-25 RX ADMIN — ALBUMIN HUMAN: 0.05 INJECTION, SOLUTION INTRAVENOUS at 13:40

## 2017-02-25 RX ADMIN — ASPIRIN 325 MG: 325 TABLET, DELAYED RELEASE ORAL at 08:45

## 2017-02-25 RX ADMIN — CLONAZEPAM 0.25 MG: 0.5 TABLET ORAL at 20:10

## 2017-02-25 RX ADMIN — PREGABALIN 100 MG: 100 CAPSULE ORAL at 16:52

## 2017-02-25 RX ADMIN — LEVOTHYROXINE SODIUM 112 MCG: 112 TABLET ORAL at 05:54

## 2017-02-25 RX ADMIN — INSULIN LISPRO 3 UNITS: 100 INJECTION, SOLUTION INTRAVENOUS; SUBCUTANEOUS at 21:37

## 2017-02-25 RX ADMIN — BUSPIRONE HYDROCHLORIDE 20 MG: 10 TABLET ORAL at 08:45

## 2017-02-25 RX ADMIN — SIMETHICONE CHEW TAB 80 MG 80 MG: 80 TABLET ORAL at 08:45

## 2017-02-25 NOTE — ANESTHESIA PREPROCEDURE EVALUATION
Anesthesia Evaluation     Patient summary reviewed and Nursing notes reviewed   no history of anesthetic complications:  NPO Status: > 6 hours   Airway   Mallampati: III  TM distance: >3 FB  Neck ROM: full  possible difficult intubation  Dental - normal exam     Pulmonary     breath sounds clear to auscultation  (+) pneumonia resolved , COPD moderate, shortness of breath, sleep apnea,   Cardiovascular     ECG reviewed  Rhythm: regular  Rate: normal    (+) hypertension well controlled, CAD, murmur, PVD,       Neuro/Psych  (+) psychiatric history Depression,    GI/Hepatic/Renal/Endo    (+) morbid obesity, GERD, liver disease, diabetes mellitus type 2 poorly controlled using insulin, hypothyroidism,   (-)  obesity    Musculoskeletal     (+) myalgias,   Abdominal   (+) obese,    Substance History      OB/GYN          Other   (+) blood dyscrasia, arthritis                             Anesthesia Plan    ASA 3 - emergent     general and regional     intravenous induction   Anesthetic plan and risks discussed with patient.

## 2017-02-25 NOTE — ANESTHESIA PROCEDURE NOTES
Peripheral Block    Patient location during procedure: post-op  Start time: 2/25/2017 12:02 PM  Stop time: 2/25/2017 12:12 PM  Reason for block: at surgeon's request and post-op pain management  Performed by  Anesthesiologist: EDU RODRIGUEZ  Preanesthetic Checklist  Completed: patient identified, site marked, surgical consent, pre-op evaluation, timeout performed, risks and benefits discussed and monitors and equipment checked  Peripheral Block Prep:  Sterile barriers:cap, gloves, mask and sterile barriers  Prep: ChloraPrep  Patient monitoring: blood pressure monitoring, continuous pulse oximetry and EKG  Peripheral Procedure  Guidance:ultrasound guided  Images:still images obtained    Laterality:left  Block Type:adductor canal block  Injection Technique:catheter  Needle Type:Tuohy  Needle Gauge:18 G  Catheter Size:20 G (20g)  Medications  Local Injected:ropivacaine 0.5% Local Amount Injected:10 (ml)mL  Post Assessment  Injection Assessment: negative aspiration for heme, incremental injection and no paresthesia on injection  Patient Tolerance:comfortable throughout block  Complications:no  Additional Notes  Procedure:                                         The pt was placed in the Supine position.  The Insertion site was  prepped and Draped in sterile fashion.  A BBraun 4 inch 18g echogenic needle was then  inserted approximately midline, mid-thigh and advanced In-plane with Ultrasound guidance.  Normal Saline PSF was utilized for hydrodissection of tissue.  The Vastus medialis and Sartorius muscle where visualized and the needle tip was placed in the adductor canal,  lateral to the femoral artery.  LA injection spread was visualized, injection was incremental 1-5ml, injection pressure was normal or little, no intraneural injection, no vascular injection.  LA dose was injected thru the needle(see dose above).     Thank you.

## 2017-02-25 NOTE — ANESTHESIA PROCEDURE NOTES
Airway  Urgency: elective    Date/Time: 2/25/2017 12:32 PM  End Time:2/25/2017 12:32 PM  Airway not difficult    General Information and Staff    Patient location during procedure: OR  Anesthesiologist: EDU RODRIGUEZ    Indications and Patient Condition  Indications for airway management: airway protection    Preoxygenated: yes  Mask difficulty assessment: 1 - vent by mask    Final Airway Details  Final airway type: supraglottic airway      Successful airway: Supreme  Size 4    Number of attempts at approach: 1    Additional Comments  LMA placed without difficulty, ventilation with assist, equal breath sounds and symmetric chest rise and fall

## 2017-02-25 NOTE — ANESTHESIA POSTPROCEDURE EVALUATION
Patient: Tamara Langston    Procedure Summary     Date Anesthesia Start Anesthesia Stop Room / Location    02/25/17 1223 1412  [facility]       Procedure Diagnosis Surgeon Provider    EXTENDED LEFT TOE AMPUTATION (Left Leg Lower) No diagnosis on file. MD Geovanny Faulkner MD          Anesthesia Type: general, regional  Last vitals  BP      Temp      Pulse     Resp      SpO2        Post Anesthesia Care and Evaluation    Patient location during evaluation: PACU  Patient participation: complete - patient participated  Level of consciousness: awake and alert  Pain score: 0  Pain management: adequate  Airway patency: patent  Anesthetic complications: No anesthetic complications  PONV Status: none  Cardiovascular status: hemodynamically stable and acceptable  Respiratory status: nonlabored ventilation, acceptable and nasal cannula  Hydration status: acceptable

## 2017-02-25 NOTE — ANESTHESIA PROCEDURE NOTES
Peripheral Block    Patient location during procedure: pre-op  Start time: 2/25/2017 12:02 PM  Stop time: 2/25/2017 12:12 PM  Reason for block: at surgeon's request and post-op pain management  Performed by  Anesthesiologist: EDU RODRIGUEZ  Preanesthetic Checklist  Completed: patient identified, site marked, surgical consent, pre-op evaluation, timeout performed, IV checked, risks and benefits discussed and monitors and equipment checked  Peripheral Block Prep:  Sterile barriers:cap, gloves, mask and sterile barriers  Prep: ChloraPrep  Patient monitoring: blood pressure monitoring, continuous pulse oximetry and EKG  Peripheral Procedure  Guidance:ultrasound guided  Images:still images obtained    Laterality:left  Block Type:popliteal  Injection Technique:catheter  Needle Type:echogenic  Needle Gauge:18 G  Catheter Size:20 G  Medications  Local Injected:ropivacaine 0.5% Local Amount Injected:20 (mls)mL  Post Assessment  Injection Assessment: negative aspiration for heme, no paresthesia on injection and incremental injection  Patient Tolerance:comfortable throughout block  Complications:no  Additional Notes  Procedure:                                                               Analgesia was achieved with sedation      The pt was placed in  lateral position.  The Insertion site was  prepped and Draped in sterile fashion.  The pt was anesthetized with  IV Sedation( see meds).  Skin and cutaneous tissue was infiltrated and anesthetized with 1% Lidocaine via a 25g needle.  A BBraun 4 inch 18g echogenic needle was then  inserted approximately 3 cm proximal to the popliteal stanley a at the lateral mid biceps femoris and advanced In-plane with Ultrasound guidance.  Normal Saline PSF was utilized for hydrodissection of tissue.  The popliteal artery was visualized and the common peroneal and tibial bifurcation was located.  LA injection spread was visualized, injection was incremental 1-5ml, injection pressure was  normal or little, no intraneural injection, no vascular injection.  .  Thank you

## 2017-02-26 LAB
ANION GAP SERPL CALCULATED.3IONS-SCNC: 4 MMOL/L (ref 3–11)
BASOPHILS # BLD AUTO: 0.01 10*3/MM3 (ref 0–0.2)
BASOPHILS NFR BLD AUTO: 0.1 % (ref 0–1)
BUN BLD-MCNC: 12 MG/DL (ref 9–23)
BUN/CREAT SERPL: 12 (ref 7–25)
CALCIUM SPEC-SCNC: 9.5 MG/DL (ref 8.7–10.4)
CHLORIDE SERPL-SCNC: 104 MMOL/L (ref 99–109)
CO2 SERPL-SCNC: 29 MMOL/L (ref 20–31)
CREAT BLD-MCNC: 1 MG/DL (ref 0.6–1.3)
DEPRECATED RDW RBC AUTO: 54.1 FL (ref 37–54)
EOSINOPHIL # BLD AUTO: 0 10*3/MM3 (ref 0.1–0.3)
EOSINOPHIL NFR BLD AUTO: 0 % (ref 0–3)
ERYTHROCYTE [DISTWIDTH] IN BLOOD BY AUTOMATED COUNT: 17 % (ref 11.3–14.5)
GFR SERPL CREATININE-BSD FRML MDRD: 56 ML/MIN/1.73
GLUCOSE BLD-MCNC: 151 MG/DL (ref 70–100)
GLUCOSE BLDC GLUCOMTR-MCNC: 154 MG/DL (ref 70–130)
GLUCOSE BLDC GLUCOMTR-MCNC: 158 MG/DL (ref 70–130)
GLUCOSE BLDC GLUCOMTR-MCNC: 182 MG/DL (ref 70–130)
GLUCOSE BLDC GLUCOMTR-MCNC: 224 MG/DL (ref 70–130)
HCT VFR BLD AUTO: 31.1 % (ref 34.5–44)
HGB BLD-MCNC: 9.9 G/DL (ref 11.5–15.5)
IMM GRANULOCYTES # BLD: 0.02 10*3/MM3 (ref 0–0.03)
IMM GRANULOCYTES NFR BLD: 0.2 % (ref 0–0.6)
LYMPHOCYTES # BLD AUTO: 1.08 10*3/MM3 (ref 0.6–4.8)
LYMPHOCYTES NFR BLD AUTO: 13.1 % (ref 24–44)
MCH RBC QN AUTO: 27.6 PG (ref 27–31)
MCHC RBC AUTO-ENTMCNC: 31.8 G/DL (ref 32–36)
MCV RBC AUTO: 86.6 FL (ref 80–99)
MONOCYTES # BLD AUTO: 0.5 10*3/MM3 (ref 0–1)
MONOCYTES NFR BLD AUTO: 6 % (ref 0–12)
NEUTROPHILS # BLD AUTO: 6.66 10*3/MM3 (ref 1.5–8.3)
NEUTROPHILS NFR BLD AUTO: 80.6 % (ref 41–71)
PLATELET # BLD AUTO: 229 10*3/MM3 (ref 150–450)
PMV BLD AUTO: 10.7 FL (ref 6–12)
POTASSIUM BLD-SCNC: 4.3 MMOL/L (ref 3.5–5.5)
RBC # BLD AUTO: 3.59 10*6/MM3 (ref 3.89–5.14)
SODIUM BLD-SCNC: 137 MMOL/L (ref 132–146)
WBC NRBC COR # BLD: 8.27 10*3/MM3 (ref 3.5–10.8)

## 2017-02-26 PROCEDURE — 63710000001 INSULIN LISPRO (HUMAN) PER 5 UNITS: Performed by: NURSE PRACTITIONER

## 2017-02-26 PROCEDURE — 94760 N-INVAS EAR/PLS OXIMETRY 1: CPT

## 2017-02-26 PROCEDURE — 94799 UNLISTED PULMONARY SVC/PX: CPT

## 2017-02-26 PROCEDURE — 25010000002 VANCOMYCIN HCL IN NACL 1.5-0.9 GM/500ML-% SOLUTION: Performed by: THORACIC SURGERY (CARDIOTHORACIC VASCULAR SURGERY)

## 2017-02-26 PROCEDURE — 63710000001 INSULIN DETEMIR PER 5 UNITS: Performed by: HOSPITALIST

## 2017-02-26 PROCEDURE — 82962 GLUCOSE BLOOD TEST: CPT

## 2017-02-26 PROCEDURE — 94640 AIRWAY INHALATION TREATMENT: CPT

## 2017-02-26 PROCEDURE — 25010000003 CEFTRIAXONE PER 250 MG: Performed by: INTERNAL MEDICINE

## 2017-02-26 PROCEDURE — 97162 PT EVAL MOD COMPLEX 30 MIN: CPT | Performed by: PHYSICAL THERAPIST

## 2017-02-26 PROCEDURE — 94660 CPAP INITIATION&MGMT: CPT

## 2017-02-26 PROCEDURE — 99232 SBSQ HOSP IP/OBS MODERATE 35: CPT | Performed by: HOSPITALIST

## 2017-02-26 PROCEDURE — 25010000002 HEPARIN (PORCINE) PER 1000 UNITS: Performed by: THORACIC SURGERY (CARDIOTHORACIC VASCULAR SURGERY)

## 2017-02-26 PROCEDURE — 85025 COMPLETE CBC W/AUTO DIFF WBC: CPT | Performed by: HOSPITALIST

## 2017-02-26 PROCEDURE — 99024 POSTOP FOLLOW-UP VISIT: CPT | Performed by: THORACIC SURGERY (CARDIOTHORACIC VASCULAR SURGERY)

## 2017-02-26 PROCEDURE — 80048 BASIC METABOLIC PNL TOTAL CA: CPT | Performed by: HOSPITALIST

## 2017-02-26 PROCEDURE — 97605 NEG PRS WND THER DME<=50SQCM: CPT | Performed by: PHYSICAL THERAPIST

## 2017-02-26 PROCEDURE — 97165 OT EVAL LOW COMPLEX 30 MIN: CPT

## 2017-02-26 RX ORDER — OXYCODONE AND ACETAMINOPHEN 10; 325 MG/1; MG/1
1 TABLET ORAL EVERY 4 HOURS PRN
Status: DISCONTINUED | OUTPATIENT
Start: 2017-02-26 | End: 2017-03-10 | Stop reason: HOSPADM

## 2017-02-26 RX ORDER — CEFTRIAXONE SODIUM 2 G/50ML
2 INJECTION, SOLUTION INTRAVENOUS EVERY 24 HOURS
Status: DISCONTINUED | OUTPATIENT
Start: 2017-02-26 | End: 2017-03-01

## 2017-02-26 RX ORDER — HYDROCODONE BITARTRATE AND ACETAMINOPHEN 5; 325 MG/1; MG/1
1 TABLET ORAL EVERY 4 HOURS PRN
Status: DISCONTINUED | OUTPATIENT
Start: 2017-02-26 | End: 2017-02-26

## 2017-02-26 RX ORDER — VANCOMYCIN/0.9 % SOD CHLORIDE 1.5G/250ML
1500 PLASTIC BAG, INJECTION (ML) INTRAVENOUS EVERY 24 HOURS
Status: DISCONTINUED | OUTPATIENT
Start: 2017-02-26 | End: 2017-03-03

## 2017-02-26 RX ORDER — FENTANYL 100 UG/H
1 PATCH TRANSDERMAL EVERY OTHER DAY
Status: DISCONTINUED | OUTPATIENT
Start: 2017-02-27 | End: 2017-02-26 | Stop reason: SDUPTHER

## 2017-02-26 RX ORDER — SENNA AND DOCUSATE SODIUM 50; 8.6 MG/1; MG/1
2 TABLET, FILM COATED ORAL NIGHTLY
Status: DISCONTINUED | OUTPATIENT
Start: 2017-02-26 | End: 2017-03-10 | Stop reason: HOSPADM

## 2017-02-26 RX ORDER — HEPARIN SODIUM 5000 [USP'U]/ML
5000 INJECTION, SOLUTION INTRAVENOUS; SUBCUTANEOUS EVERY 12 HOURS SCHEDULED
Status: DISCONTINUED | OUTPATIENT
Start: 2017-02-26 | End: 2017-03-02

## 2017-02-26 RX ORDER — FENTANYL 100 UG/H
1 PATCH TRANSDERMAL EVERY OTHER DAY
Status: COMPLETED | OUTPATIENT
Start: 2017-02-26 | End: 2017-03-08

## 2017-02-26 RX ORDER — OXYCODONE HYDROCHLORIDE 15 MG/1
15 TABLET, FILM COATED, EXTENDED RELEASE ORAL EVERY 12 HOURS SCHEDULED
Status: DISCONTINUED | OUTPATIENT
Start: 2017-02-26 | End: 2017-02-26

## 2017-02-26 RX ADMIN — CEFTRIAXONE SODIUM 2 G: 2 INJECTION, SOLUTION INTRAVENOUS at 17:20

## 2017-02-26 RX ADMIN — PANTOPRAZOLE SODIUM 40 MG: 40 TABLET, DELAYED RELEASE ORAL at 05:59

## 2017-02-26 RX ADMIN — OXYCODONE HYDROCHLORIDE AND ACETAMINOPHEN 1 TABLET: 10; 325 TABLET ORAL at 05:59

## 2017-02-26 RX ADMIN — LISINOPRIL 20 MG: 20 TABLET ORAL at 08:10

## 2017-02-26 RX ADMIN — OXYCODONE HYDROCHLORIDE AND ACETAMINOPHEN 1 TABLET: 10; 325 TABLET ORAL at 12:42

## 2017-02-26 RX ADMIN — PREGABALIN 100 MG: 100 CAPSULE ORAL at 21:24

## 2017-02-26 RX ADMIN — FLUTICASONE PROPIONATE 1 SPRAY: 50 SPRAY, METERED NASAL at 08:13

## 2017-02-26 RX ADMIN — HEPARIN SODIUM 5000 UNITS: 5000 INJECTION, SOLUTION INTRAVENOUS; SUBCUTANEOUS at 21:25

## 2017-02-26 RX ADMIN — INSULIN LISPRO 2 UNITS: 100 INJECTION, SOLUTION INTRAVENOUS; SUBCUTANEOUS at 12:42

## 2017-02-26 RX ADMIN — BUSPIRONE HYDROCHLORIDE 20 MG: 10 TABLET ORAL at 21:24

## 2017-02-26 RX ADMIN — INSULIN LISPRO 3 UNITS: 100 INJECTION, SOLUTION INTRAVENOUS; SUBCUTANEOUS at 21:25

## 2017-02-26 RX ADMIN — BACLOFEN 20 MG: 10 TABLET ORAL at 13:39

## 2017-02-26 RX ADMIN — PREGABALIN 100 MG: 100 CAPSULE ORAL at 13:39

## 2017-02-26 RX ADMIN — CLONAZEPAM 0.25 MG: 0.5 TABLET ORAL at 08:12

## 2017-02-26 RX ADMIN — INSULIN LISPRO 2 UNITS: 100 INJECTION, SOLUTION INTRAVENOUS; SUBCUTANEOUS at 17:21

## 2017-02-26 RX ADMIN — BACLOFEN 20 MG: 10 TABLET ORAL at 21:24

## 2017-02-26 RX ADMIN — INSULIN DETEMIR 10 UNITS: 100 INJECTION, SOLUTION SUBCUTANEOUS at 21:25

## 2017-02-26 RX ADMIN — ATORVASTATIN CALCIUM 40 MG: 40 TABLET, FILM COATED ORAL at 21:25

## 2017-02-26 RX ADMIN — IPRATROPIUM BROMIDE 0.5 MG: 0.5 SOLUTION RESPIRATORY (INHALATION) at 19:19

## 2017-02-26 RX ADMIN — PREGABALIN 100 MG: 100 CAPSULE ORAL at 05:59

## 2017-02-26 RX ADMIN — AMLODIPINE BESYLATE 5 MG: 5 TABLET ORAL at 08:12

## 2017-02-26 RX ADMIN — IPRATROPIUM BROMIDE 0.5 MG: 0.5 SOLUTION RESPIRATORY (INHALATION) at 00:09

## 2017-02-26 RX ADMIN — LEVOTHYROXINE SODIUM 112 MCG: 112 TABLET ORAL at 05:59

## 2017-02-26 RX ADMIN — CLONAZEPAM 0.25 MG: 0.5 TABLET ORAL at 21:24

## 2017-02-26 RX ADMIN — HEPARIN SODIUM 5000 UNITS: 5000 INJECTION, SOLUTION INTRAVENOUS; SUBCUTANEOUS at 13:43

## 2017-02-26 RX ADMIN — CETIRIZINE HYDROCHLORIDE 10 MG: 10 TABLET, FILM COATED ORAL at 21:24

## 2017-02-26 RX ADMIN — IPRATROPIUM BROMIDE 0.5 MG: 0.5 SOLUTION RESPIRATORY (INHALATION) at 07:43

## 2017-02-26 RX ADMIN — INSULIN LISPRO 2 UNITS: 100 INJECTION, SOLUTION INTRAVENOUS; SUBCUTANEOUS at 08:13

## 2017-02-26 RX ADMIN — HYDROCODONE BITARTRATE AND ACETAMINOPHEN 1 TABLET: 5; 325 TABLET ORAL at 03:17

## 2017-02-26 RX ADMIN — BUSPIRONE HYDROCHLORIDE 20 MG: 10 TABLET ORAL at 08:11

## 2017-02-26 RX ADMIN — FLUTICASONE PROPIONATE 1 SPRAY: 50 SPRAY, METERED NASAL at 17:21

## 2017-02-26 RX ADMIN — Medication 5 MG: at 21:24

## 2017-02-26 RX ADMIN — BISOPROLOL FUMARATE AND HYDROCHLOROTHIAZIDE 1 TABLET: 6.25; 5 TABLET ORAL at 08:11

## 2017-02-26 RX ADMIN — FUROSEMIDE 40 MG: 40 TABLET ORAL at 08:12

## 2017-02-26 RX ADMIN — Medication 2 TABLET: at 21:24

## 2017-02-26 RX ADMIN — VANCOMYCIN HYDROCHLORIDE 1500 MG: 1 INJECTION, POWDER, LYOPHILIZED, FOR SOLUTION INTRAVENOUS at 20:10

## 2017-02-26 RX ADMIN — FENTANYL 1 PATCH: 100 PATCH, EXTENDED RELEASE TRANSDERMAL at 18:39

## 2017-02-26 RX ADMIN — ASPIRIN 325 MG: 325 TABLET, DELAYED RELEASE ORAL at 08:12

## 2017-02-26 RX ADMIN — Medication 1 CAPSULE: at 08:11

## 2017-02-26 RX ADMIN — OXYCODONE HYDROCHLORIDE AND ACETAMINOPHEN 1 TABLET: 10; 325 TABLET ORAL at 17:55

## 2017-02-27 PROBLEM — F41.9 CHRONIC ANXIETY: Status: ACTIVE | Noted: 2017-02-27

## 2017-02-27 PROBLEM — Z79.899 CHRONICALLY ON BENZODIAZEPINE THERAPY: Status: ACTIVE | Noted: 2017-02-27

## 2017-02-27 PROBLEM — D50.0 ANEMIA, BLOOD LOSS: Status: ACTIVE | Noted: 2017-02-27

## 2017-02-27 PROBLEM — F11.90 CHRONIC, CONTINUOUS USE OF OPIOIDS: Status: ACTIVE | Noted: 2017-02-27

## 2017-02-27 PROBLEM — G89.29 CHRONIC PAIN: Status: ACTIVE | Noted: 2017-02-27

## 2017-02-27 LAB
ANION GAP SERPL CALCULATED.3IONS-SCNC: 8 MMOL/L (ref 3–11)
BUN BLD-MCNC: 14 MG/DL (ref 9–23)
BUN/CREAT SERPL: 15.6 (ref 7–25)
CALCIUM SPEC-SCNC: 10 MG/DL (ref 8.7–10.4)
CHLORIDE SERPL-SCNC: 101 MMOL/L (ref 99–109)
CO2 SERPL-SCNC: 32 MMOL/L (ref 20–31)
CREAT BLD-MCNC: 0.9 MG/DL (ref 0.6–1.3)
GFR SERPL CREATININE-BSD FRML MDRD: 63 ML/MIN/1.73
GLUCOSE BLD-MCNC: 100 MG/DL (ref 70–100)
GLUCOSE BLDC GLUCOMTR-MCNC: 123 MG/DL (ref 70–130)
GLUCOSE BLDC GLUCOMTR-MCNC: 135 MG/DL (ref 70–130)
GLUCOSE BLDC GLUCOMTR-MCNC: 180 MG/DL (ref 70–130)
GLUCOSE BLDC GLUCOMTR-MCNC: 184 MG/DL (ref 70–130)
POTASSIUM BLD-SCNC: 4 MMOL/L (ref 3.5–5.5)
SODIUM BLD-SCNC: 141 MMOL/L (ref 132–146)

## 2017-02-27 PROCEDURE — 97162 PT EVAL MOD COMPLEX 30 MIN: CPT

## 2017-02-27 PROCEDURE — 25010000002 VANCOMYCIN HCL IN NACL 1.5-0.9 GM/500ML-% SOLUTION: Performed by: THORACIC SURGERY (CARDIOTHORACIC VASCULAR SURGERY)

## 2017-02-27 PROCEDURE — 82962 GLUCOSE BLOOD TEST: CPT

## 2017-02-27 PROCEDURE — 94640 AIRWAY INHALATION TREATMENT: CPT

## 2017-02-27 PROCEDURE — 99232 SBSQ HOSP IP/OBS MODERATE 35: CPT | Performed by: NURSE PRACTITIONER

## 2017-02-27 PROCEDURE — 63710000001 INSULIN DETEMIR PER 5 UNITS: Performed by: HOSPITALIST

## 2017-02-27 PROCEDURE — 94799 UNLISTED PULMONARY SVC/PX: CPT

## 2017-02-27 PROCEDURE — 94660 CPAP INITIATION&MGMT: CPT

## 2017-02-27 PROCEDURE — 25010000003 CEFTRIAXONE PER 250 MG: Performed by: INTERNAL MEDICINE

## 2017-02-27 PROCEDURE — 94760 N-INVAS EAR/PLS OXIMETRY 1: CPT

## 2017-02-27 PROCEDURE — 97530 THERAPEUTIC ACTIVITIES: CPT

## 2017-02-27 PROCEDURE — 80048 BASIC METABOLIC PNL TOTAL CA: CPT | Performed by: HOSPITALIST

## 2017-02-27 PROCEDURE — 25010000002 HEPARIN (PORCINE) PER 1000 UNITS: Performed by: THORACIC SURGERY (CARDIOTHORACIC VASCULAR SURGERY)

## 2017-02-27 PROCEDURE — 97605 NEG PRS WND THER DME<=50SQCM: CPT

## 2017-02-27 PROCEDURE — 99024 POSTOP FOLLOW-UP VISIT: CPT | Performed by: THORACIC SURGERY (CARDIOTHORACIC VASCULAR SURGERY)

## 2017-02-27 RX ORDER — ECHINACEA PURPUREA EXTRACT 125 MG
2 TABLET ORAL EVERY 6 HOURS
Status: DISCONTINUED | OUTPATIENT
Start: 2017-02-27 | End: 2017-03-10 | Stop reason: HOSPADM

## 2017-02-27 RX ORDER — ECHINACEA PURPUREA EXTRACT 125 MG
2 TABLET ORAL AS NEEDED
Status: DISCONTINUED | OUTPATIENT
Start: 2017-02-27 | End: 2017-03-10 | Stop reason: HOSPADM

## 2017-02-27 RX ORDER — IPRATROPIUM BROMIDE AND ALBUTEROL SULFATE 2.5; .5 MG/3ML; MG/3ML
3 SOLUTION RESPIRATORY (INHALATION)
Status: DISCONTINUED | OUTPATIENT
Start: 2017-02-27 | End: 2017-03-09

## 2017-02-27 RX ADMIN — VANCOMYCIN HYDROCHLORIDE 1500 MG: 1 INJECTION, POWDER, LYOPHILIZED, FOR SOLUTION INTRAVENOUS at 22:05

## 2017-02-27 RX ADMIN — ASPIRIN 325 MG: 325 TABLET, DELAYED RELEASE ORAL at 08:42

## 2017-02-27 RX ADMIN — PREGABALIN 100 MG: 100 CAPSULE ORAL at 22:00

## 2017-02-27 RX ADMIN — Medication 2 TABLET: at 22:00

## 2017-02-27 RX ADMIN — BISOPROLOL FUMARATE AND HYDROCHLOROTHIAZIDE 1 TABLET: 6.25; 5 TABLET ORAL at 08:40

## 2017-02-27 RX ADMIN — IPRATROPIUM BROMIDE 0.5 MG: 0.5 SOLUTION RESPIRATORY (INHALATION) at 08:12

## 2017-02-27 RX ADMIN — INSULIN LISPRO 2 UNITS: 100 INJECTION, SOLUTION INTRAVENOUS; SUBCUTANEOUS at 18:06

## 2017-02-27 RX ADMIN — INSULIN DETEMIR 10 UNITS: 100 INJECTION, SOLUTION SUBCUTANEOUS at 08:40

## 2017-02-27 RX ADMIN — IPRATROPIUM BROMIDE AND ALBUTEROL SULFATE 3 ML: .5; 3 SOLUTION RESPIRATORY (INHALATION) at 19:07

## 2017-02-27 RX ADMIN — SALINE NASAL SPRAY 2 SPRAY: 1.5 SOLUTION NASAL at 23:08

## 2017-02-27 RX ADMIN — IPRATROPIUM BROMIDE 0.5 MG: 0.5 SOLUTION RESPIRATORY (INHALATION) at 01:31

## 2017-02-27 RX ADMIN — PANTOPRAZOLE SODIUM 40 MG: 40 TABLET, DELAYED RELEASE ORAL at 05:19

## 2017-02-27 RX ADMIN — NICOTINE 1 PATCH: 21 PATCH, EXTENDED RELEASE TRANSDERMAL at 08:41

## 2017-02-27 RX ADMIN — BUSPIRONE HYDROCHLORIDE 20 MG: 10 TABLET ORAL at 22:00

## 2017-02-27 RX ADMIN — CETIRIZINE HYDROCHLORIDE 10 MG: 10 TABLET, FILM COATED ORAL at 22:00

## 2017-02-27 RX ADMIN — OXYCODONE HYDROCHLORIDE AND ACETAMINOPHEN 1 TABLET: 10; 325 TABLET ORAL at 14:17

## 2017-02-27 RX ADMIN — LISINOPRIL 20 MG: 20 TABLET ORAL at 08:42

## 2017-02-27 RX ADMIN — Medication 5 MG: at 21:59

## 2017-02-27 RX ADMIN — PHENYLEPHRINE HYDROCHLORIDE 2 SPRAY: 0.5 SPRAY NASAL at 18:05

## 2017-02-27 RX ADMIN — OXYCODONE HYDROCHLORIDE AND ACETAMINOPHEN 1 TABLET: 10; 325 TABLET ORAL at 23:08

## 2017-02-27 RX ADMIN — FLUTICASONE PROPIONATE 1 SPRAY: 50 SPRAY, METERED NASAL at 08:43

## 2017-02-27 RX ADMIN — CLONAZEPAM 0.25 MG: 0.5 TABLET ORAL at 08:42

## 2017-02-27 RX ADMIN — HEPARIN SODIUM 5000 UNITS: 5000 INJECTION, SOLUTION INTRAVENOUS; SUBCUTANEOUS at 22:01

## 2017-02-27 RX ADMIN — BUSPIRONE HYDROCHLORIDE 20 MG: 10 TABLET ORAL at 08:42

## 2017-02-27 RX ADMIN — CLONAZEPAM 0.25 MG: 0.5 TABLET ORAL at 22:00

## 2017-02-27 RX ADMIN — BACLOFEN 20 MG: 10 TABLET ORAL at 14:17

## 2017-02-27 RX ADMIN — CEFTRIAXONE SODIUM 2 G: 2 INJECTION, SOLUTION INTRAVENOUS at 18:06

## 2017-02-27 RX ADMIN — OXYCODONE HYDROCHLORIDE AND ACETAMINOPHEN 1 TABLET: 10; 325 TABLET ORAL at 19:13

## 2017-02-27 RX ADMIN — ATORVASTATIN CALCIUM 40 MG: 40 TABLET, FILM COATED ORAL at 22:00

## 2017-02-27 RX ADMIN — BISACODYL 10 MG: 5 TABLET, COATED ORAL at 18:05

## 2017-02-27 RX ADMIN — EZETIMIBE 10 MG: 10 TABLET ORAL at 09:00

## 2017-02-27 RX ADMIN — SALINE NASAL SPRAY 2 SPRAY: 1.5 SOLUTION NASAL at 18:04

## 2017-02-27 RX ADMIN — OXYCODONE HYDROCHLORIDE AND ACETAMINOPHEN 1 TABLET: 10; 325 TABLET ORAL at 05:19

## 2017-02-27 RX ADMIN — PREGABALIN 100 MG: 100 CAPSULE ORAL at 05:19

## 2017-02-27 RX ADMIN — PREGABALIN 100 MG: 100 CAPSULE ORAL at 16:59

## 2017-02-27 RX ADMIN — HEPARIN SODIUM 5000 UNITS: 5000 INJECTION, SOLUTION INTRAVENOUS; SUBCUTANEOUS at 08:42

## 2017-02-27 RX ADMIN — LEVOTHYROXINE SODIUM 112 MCG: 112 TABLET ORAL at 05:19

## 2017-02-27 RX ADMIN — FLUTICASONE PROPIONATE 1 SPRAY: 50 SPRAY, METERED NASAL at 18:09

## 2017-02-27 RX ADMIN — IPRATROPIUM BROMIDE 0.5 MG: 0.5 SOLUTION RESPIRATORY (INHALATION) at 13:28

## 2017-02-27 RX ADMIN — INSULIN DETEMIR 10 UNITS: 100 INJECTION, SOLUTION SUBCUTANEOUS at 22:05

## 2017-02-27 RX ADMIN — PHENYLEPHRINE HYDROCHLORIDE 2 SPRAY: 0.5 SPRAY NASAL at 23:08

## 2017-02-27 RX ADMIN — Medication 1 CAPSULE: at 08:41

## 2017-02-27 RX ADMIN — AMLODIPINE BESYLATE 5 MG: 5 TABLET ORAL at 08:42

## 2017-02-27 RX ADMIN — OXYCODONE HYDROCHLORIDE AND ACETAMINOPHEN 1 TABLET: 10; 325 TABLET ORAL at 08:40

## 2017-02-27 RX ADMIN — INSULIN LISPRO 2 UNITS: 100 INJECTION, SOLUTION INTRAVENOUS; SUBCUTANEOUS at 21:58

## 2017-02-28 ENCOUNTER — APPOINTMENT (OUTPATIENT)
Dept: MRI IMAGING | Facility: HOSPITAL | Age: 62
End: 2017-02-28

## 2017-02-28 LAB
ALBUMIN SERPL-MCNC: 3.4 G/DL (ref 3.2–4.8)
ALBUMIN/GLOB SERPL: 1.1 G/DL (ref 1.5–2.5)
ALP SERPL-CCNC: 80 U/L (ref 25–100)
ALT SERPL W P-5'-P-CCNC: 22 U/L (ref 7–40)
ANION GAP SERPL CALCULATED.3IONS-SCNC: 0 MMOL/L (ref 3–11)
AST SERPL-CCNC: 53 U/L (ref 0–33)
BACTERIA SPEC AEROBE CULT: ABNORMAL
BASOPHILS # BLD AUTO: 0.05 10*3/MM3 (ref 0–0.2)
BASOPHILS NFR BLD AUTO: 0.5 % (ref 0–1)
BILIRUB SERPL-MCNC: 0.2 MG/DL (ref 0.3–1.2)
BUN BLD-MCNC: 17 MG/DL (ref 9–23)
BUN/CREAT SERPL: 21.3 (ref 7–25)
CALCIUM SPEC-SCNC: 9.3 MG/DL (ref 8.7–10.4)
CHLORIDE SERPL-SCNC: 107 MMOL/L (ref 99–109)
CO2 SERPL-SCNC: 35 MMOL/L (ref 20–31)
CREAT BLD-MCNC: 0.8 MG/DL (ref 0.6–1.3)
DEPRECATED RDW RBC AUTO: 56.7 FL (ref 37–54)
EOSINOPHIL # BLD AUTO: 0.35 10*3/MM3 (ref 0.1–0.3)
EOSINOPHIL NFR BLD AUTO: 3.5 % (ref 0–3)
ERYTHROCYTE [DISTWIDTH] IN BLOOD BY AUTOMATED COUNT: 17.2 % (ref 11.3–14.5)
FERRITIN SERPL-MCNC: 19 NG/ML (ref 10–291)
GFR SERPL CREATININE-BSD FRML MDRD: 72 ML/MIN/1.73
GLOBULIN UR ELPH-MCNC: 3 GM/DL
GLUCOSE BLD-MCNC: 107 MG/DL (ref 70–100)
GLUCOSE BLDC GLUCOMTR-MCNC: 114 MG/DL (ref 70–130)
GLUCOSE BLDC GLUCOMTR-MCNC: 135 MG/DL (ref 70–130)
GLUCOSE BLDC GLUCOMTR-MCNC: 135 MG/DL (ref 70–130)
GLUCOSE BLDC GLUCOMTR-MCNC: 179 MG/DL (ref 70–130)
GRAM STN SPEC: ABNORMAL
HCT VFR BLD AUTO: 31.7 % (ref 34.5–44)
HGB BLD-MCNC: 9.7 G/DL (ref 11.5–15.5)
IMM GRANULOCYTES # BLD: 0.02 10*3/MM3 (ref 0–0.03)
IMM GRANULOCYTES NFR BLD: 0.2 % (ref 0–0.6)
IRON 24H UR-MRATE: 18 MCG/DL (ref 50–175)
IRON SATN MFR SERPL: 6 % (ref 15–50)
LYMPHOCYTES # BLD AUTO: 1.94 10*3/MM3 (ref 0.6–4.8)
LYMPHOCYTES NFR BLD AUTO: 19.2 % (ref 24–44)
MCH RBC QN AUTO: 27.2 PG (ref 27–31)
MCHC RBC AUTO-ENTMCNC: 30.6 G/DL (ref 32–36)
MCV RBC AUTO: 89 FL (ref 80–99)
MONOCYTES # BLD AUTO: 0.88 10*3/MM3 (ref 0–1)
MONOCYTES NFR BLD AUTO: 8.7 % (ref 0–12)
NEUTROPHILS # BLD AUTO: 6.87 10*3/MM3 (ref 1.5–8.3)
NEUTROPHILS NFR BLD AUTO: 67.9 % (ref 41–71)
PLATELET # BLD AUTO: 236 10*3/MM3 (ref 150–450)
PMV BLD AUTO: 10.4 FL (ref 6–12)
POTASSIUM BLD-SCNC: 4 MMOL/L (ref 3.5–5.5)
PROT SERPL-MCNC: 6.4 G/DL (ref 5.7–8.2)
RBC # BLD AUTO: 3.56 10*6/MM3 (ref 3.89–5.14)
SODIUM BLD-SCNC: 142 MMOL/L (ref 132–146)
TIBC SERPL-MCNC: 317 MCG/DL (ref 250–450)
VANCOMYCIN TROUGH SERPL-MCNC: 13.4 MCG/ML (ref 10–20)
WBC NRBC COR # BLD: 10.11 10*3/MM3 (ref 3.5–10.8)

## 2017-02-28 PROCEDURE — 83550 IRON BINDING TEST: CPT | Performed by: NURSE PRACTITIONER

## 2017-02-28 PROCEDURE — 97605 NEG PRS WND THER DME<=50SQCM: CPT

## 2017-02-28 PROCEDURE — 94799 UNLISTED PULMONARY SVC/PX: CPT

## 2017-02-28 PROCEDURE — 80202 ASSAY OF VANCOMYCIN: CPT | Performed by: THORACIC SURGERY (CARDIOTHORACIC VASCULAR SURGERY)

## 2017-02-28 PROCEDURE — 82962 GLUCOSE BLOOD TEST: CPT

## 2017-02-28 PROCEDURE — 25010000003 CEFTRIAXONE PER 250 MG: Performed by: INTERNAL MEDICINE

## 2017-02-28 PROCEDURE — 99024 POSTOP FOLLOW-UP VISIT: CPT | Performed by: THORACIC SURGERY (CARDIOTHORACIC VASCULAR SURGERY)

## 2017-02-28 PROCEDURE — 99232 SBSQ HOSP IP/OBS MODERATE 35: CPT | Performed by: INTERNAL MEDICINE

## 2017-02-28 PROCEDURE — 80053 COMPREHEN METABOLIC PANEL: CPT | Performed by: NURSE PRACTITIONER

## 2017-02-28 PROCEDURE — 94640 AIRWAY INHALATION TREATMENT: CPT

## 2017-02-28 PROCEDURE — 97530 THERAPEUTIC ACTIVITIES: CPT

## 2017-02-28 PROCEDURE — 83540 ASSAY OF IRON: CPT | Performed by: NURSE PRACTITIONER

## 2017-02-28 PROCEDURE — 63710000001 INSULIN DETEMIR PER 5 UNITS: Performed by: HOSPITALIST

## 2017-02-28 PROCEDURE — 82728 ASSAY OF FERRITIN: CPT | Performed by: NURSE PRACTITIONER

## 2017-02-28 PROCEDURE — 25010000002 HEPARIN (PORCINE) PER 1000 UNITS: Performed by: THORACIC SURGERY (CARDIOTHORACIC VASCULAR SURGERY)

## 2017-02-28 PROCEDURE — 94660 CPAP INITIATION&MGMT: CPT

## 2017-02-28 PROCEDURE — 85025 COMPLETE CBC W/AUTO DIFF WBC: CPT | Performed by: NURSE PRACTITIONER

## 2017-02-28 PROCEDURE — 25010000002 VANCOMYCIN HCL IN NACL 1.5-0.9 GM/500ML-% SOLUTION: Performed by: THORACIC SURGERY (CARDIOTHORACIC VASCULAR SURGERY)

## 2017-02-28 RX ADMIN — CETIRIZINE HYDROCHLORIDE 10 MG: 10 TABLET, FILM COATED ORAL at 21:50

## 2017-02-28 RX ADMIN — HEPARIN SODIUM 5000 UNITS: 5000 INJECTION, SOLUTION INTRAVENOUS; SUBCUTANEOUS at 08:43

## 2017-02-28 RX ADMIN — AMLODIPINE BESYLATE 5 MG: 5 TABLET ORAL at 08:38

## 2017-02-28 RX ADMIN — NICOTINE 1 PATCH: 21 PATCH, EXTENDED RELEASE TRANSDERMAL at 08:39

## 2017-02-28 RX ADMIN — INSULIN DETEMIR 10 UNITS: 100 INJECTION, SOLUTION SUBCUTANEOUS at 21:51

## 2017-02-28 RX ADMIN — ATORVASTATIN CALCIUM 40 MG: 40 TABLET, FILM COATED ORAL at 21:49

## 2017-02-28 RX ADMIN — IPRATROPIUM BROMIDE AND ALBUTEROL SULFATE 3 ML: .5; 3 SOLUTION RESPIRATORY (INHALATION) at 16:18

## 2017-02-28 RX ADMIN — SALINE NASAL SPRAY 2 SPRAY: 1.5 SOLUTION NASAL at 11:11

## 2017-02-28 RX ADMIN — FLUTICASONE PROPIONATE 1 SPRAY: 50 SPRAY, METERED NASAL at 08:39

## 2017-02-28 RX ADMIN — Medication 5 MG: at 21:53

## 2017-02-28 RX ADMIN — PHENYLEPHRINE HYDROCHLORIDE 2 SPRAY: 0.5 SPRAY NASAL at 11:11

## 2017-02-28 RX ADMIN — INSULIN LISPRO 2 UNITS: 100 INJECTION, SOLUTION INTRAVENOUS; SUBCUTANEOUS at 21:50

## 2017-02-28 RX ADMIN — Medication 2 TABLET: at 21:49

## 2017-02-28 RX ADMIN — OXYCODONE HYDROCHLORIDE AND ACETAMINOPHEN 1 TABLET: 10; 325 TABLET ORAL at 15:05

## 2017-02-28 RX ADMIN — ASPIRIN 325 MG: 325 TABLET, DELAYED RELEASE ORAL at 08:38

## 2017-02-28 RX ADMIN — CLONAZEPAM 0.25 MG: 0.5 TABLET ORAL at 21:49

## 2017-02-28 RX ADMIN — VANCOMYCIN HYDROCHLORIDE 1500 MG: 1 INJECTION, POWDER, LYOPHILIZED, FOR SOLUTION INTRAVENOUS at 21:49

## 2017-02-28 RX ADMIN — PREGABALIN 100 MG: 100 CAPSULE ORAL at 08:38

## 2017-02-28 RX ADMIN — FLUTICASONE PROPIONATE 1 SPRAY: 50 SPRAY, METERED NASAL at 17:01

## 2017-02-28 RX ADMIN — BUSPIRONE HYDROCHLORIDE 20 MG: 10 TABLET ORAL at 08:38

## 2017-02-28 RX ADMIN — SALINE NASAL SPRAY 2 SPRAY: 1.5 SOLUTION NASAL at 17:01

## 2017-02-28 RX ADMIN — PREGABALIN 100 MG: 100 CAPSULE ORAL at 21:50

## 2017-02-28 RX ADMIN — CEFTRIAXONE SODIUM 2 G: 2 INJECTION, SOLUTION INTRAVENOUS at 17:01

## 2017-02-28 RX ADMIN — IPRATROPIUM BROMIDE AND ALBUTEROL SULFATE 3 ML: .5; 3 SOLUTION RESPIRATORY (INHALATION) at 20:18

## 2017-02-28 RX ADMIN — IPRATROPIUM BROMIDE AND ALBUTEROL SULFATE 3 ML: .5; 3 SOLUTION RESPIRATORY (INHALATION) at 13:13

## 2017-02-28 RX ADMIN — OXYCODONE HYDROCHLORIDE AND ACETAMINOPHEN 1 TABLET: 10; 325 TABLET ORAL at 19:30

## 2017-02-28 RX ADMIN — BUSPIRONE HYDROCHLORIDE 20 MG: 10 TABLET ORAL at 21:50

## 2017-02-28 RX ADMIN — PANTOPRAZOLE SODIUM 40 MG: 40 TABLET, DELAYED RELEASE ORAL at 05:10

## 2017-02-28 RX ADMIN — OXYCODONE HYDROCHLORIDE AND ACETAMINOPHEN 1 TABLET: 10; 325 TABLET ORAL at 05:10

## 2017-02-28 RX ADMIN — LEVOTHYROXINE SODIUM 112 MCG: 112 TABLET ORAL at 05:10

## 2017-02-28 RX ADMIN — OXYCODONE HYDROCHLORIDE AND ACETAMINOPHEN 1 TABLET: 10; 325 TABLET ORAL at 11:11

## 2017-02-28 RX ADMIN — IPRATROPIUM BROMIDE AND ALBUTEROL SULFATE 3 ML: .5; 3 SOLUTION RESPIRATORY (INHALATION) at 09:18

## 2017-02-28 RX ADMIN — HEPARIN SODIUM 5000 UNITS: 5000 INJECTION, SOLUTION INTRAVENOUS; SUBCUTANEOUS at 21:50

## 2017-02-28 RX ADMIN — FENTANYL 1 PATCH: 100 PATCH, EXTENDED RELEASE TRANSDERMAL at 08:43

## 2017-02-28 RX ADMIN — Medication 1 CAPSULE: at 08:38

## 2017-02-28 RX ADMIN — INSULIN DETEMIR 10 UNITS: 100 INJECTION, SOLUTION SUBCUTANEOUS at 08:43

## 2017-02-28 RX ADMIN — BISOPROLOL FUMARATE AND HYDROCHLOROTHIAZIDE 1 TABLET: 6.25; 5 TABLET ORAL at 08:39

## 2017-02-28 RX ADMIN — BACLOFEN 20 MG: 10 TABLET ORAL at 15:00

## 2017-02-28 RX ADMIN — EZETIMIBE 10 MG: 10 TABLET ORAL at 09:01

## 2017-02-28 RX ADMIN — FUROSEMIDE 40 MG: 40 TABLET ORAL at 08:38

## 2017-02-28 RX ADMIN — LISINOPRIL 20 MG: 20 TABLET ORAL at 08:38

## 2017-02-28 RX ADMIN — Medication: at 21:49

## 2017-02-28 RX ADMIN — PREGABALIN 100 MG: 100 CAPSULE ORAL at 15:05

## 2017-02-28 RX ADMIN — CLONAZEPAM 0.25 MG: 0.5 TABLET ORAL at 08:38

## 2017-03-01 ENCOUNTER — HOSPITAL ENCOUNTER (OUTPATIENT)
Facility: HOSPITAL | Age: 62
Setting detail: SURGERY ADMIT
End: 2017-03-01
Attending: THORACIC SURGERY (CARDIOTHORACIC VASCULAR SURGERY) | Admitting: THORACIC SURGERY (CARDIOTHORACIC VASCULAR SURGERY)

## 2017-03-01 DIAGNOSIS — M86.9 OSTEOMYELITIS OF LEFT FOOT, UNSPECIFIED CHRONICITY: Primary | ICD-10-CM

## 2017-03-01 LAB
ABO + RH BLD: NORMAL
ABO + RH BLD: NORMAL
ABO GROUP BLD: NORMAL
ALBUMIN SERPL-MCNC: 4 G/DL (ref 3.2–4.8)
ALBUMIN/GLOB SERPL: 1.1 G/DL (ref 1.5–2.5)
ALP SERPL-CCNC: 83 U/L (ref 25–100)
ALT SERPL W P-5'-P-CCNC: 24 U/L (ref 7–40)
ANION GAP SERPL CALCULATED.3IONS-SCNC: 1 MMOL/L (ref 3–11)
APTT PPP: 30.8 SECONDS (ref 24–31)
AST SERPL-CCNC: 48 U/L (ref 0–33)
BACTERIA SPEC AEROBE CULT: ABNORMAL
BACTERIA SPEC AEROBE CULT: NORMAL
BACTERIA SPEC AEROBE CULT: NORMAL
BH BB BLOOD EXPIRATION DATE: NORMAL
BH BB BLOOD EXPIRATION DATE: NORMAL
BH BB BLOOD TYPE BARCODE: 600
BH BB BLOOD TYPE BARCODE: 600
BH BB DISPENSE STATUS: NORMAL
BH BB DISPENSE STATUS: NORMAL
BH BB PRODUCT CODE: NORMAL
BH BB PRODUCT CODE: NORMAL
BH BB UNIT NUMBER: NORMAL
BH BB UNIT NUMBER: NORMAL
BILIRUB SERPL-MCNC: 0.3 MG/DL (ref 0.3–1.2)
BLD GP AB SCN SERPL QL: NEGATIVE
BUN BLD-MCNC: 15 MG/DL (ref 9–23)
BUN/CREAT SERPL: 16.7 (ref 7–25)
CALCIUM SPEC-SCNC: 10.4 MG/DL (ref 8.7–10.4)
CHLORIDE SERPL-SCNC: 102 MMOL/L (ref 99–109)
CO2 SERPL-SCNC: 36 MMOL/L (ref 20–31)
CREAT BLD-MCNC: 0.9 MG/DL (ref 0.6–1.3)
CROSSMATCH INTERPRETATION: NORMAL
CROSSMATCH INTERPRETATION: NORMAL
CYTO UR: NORMAL
DEPRECATED RDW RBC AUTO: 54.3 FL (ref 37–54)
ERYTHROCYTE [DISTWIDTH] IN BLOOD BY AUTOMATED COUNT: 16.8 % (ref 11.3–14.5)
GFR SERPL CREATININE-BSD FRML MDRD: 63 ML/MIN/1.73
GLOBULIN UR ELPH-MCNC: 3.7 GM/DL
GLUCOSE BLD-MCNC: 153 MG/DL (ref 70–100)
GLUCOSE BLDC GLUCOMTR-MCNC: 125 MG/DL (ref 70–130)
GLUCOSE BLDC GLUCOMTR-MCNC: 136 MG/DL (ref 70–130)
GLUCOSE BLDC GLUCOMTR-MCNC: 143 MG/DL (ref 70–130)
GLUCOSE BLDC GLUCOMTR-MCNC: 190 MG/DL (ref 70–130)
GRAM STN SPEC: ABNORMAL
GRAM STN SPEC: ABNORMAL
HCT VFR BLD AUTO: 33.7 % (ref 34.5–44)
HGB BLD-MCNC: 10.4 G/DL (ref 11.5–15.5)
INR PPP: 0.96
LAB AP CASE REPORT: NORMAL
LAB AP CLINICAL INFORMATION: NORMAL
LAB AP DIAGNOSIS COMMENT: NORMAL
Lab: NORMAL
MCH RBC QN AUTO: 27.2 PG (ref 27–31)
MCHC RBC AUTO-ENTMCNC: 30.9 G/DL (ref 32–36)
MCV RBC AUTO: 88 FL (ref 80–99)
PATH REPORT.FINAL DX SPEC: NORMAL
PATH REPORT.GROSS SPEC: NORMAL
PLATELET # BLD AUTO: 234 10*3/MM3 (ref 150–450)
PMV BLD AUTO: 10.6 FL (ref 6–12)
POTASSIUM BLD-SCNC: 4.1 MMOL/L (ref 3.5–5.5)
PROT SERPL-MCNC: 7.7 G/DL (ref 5.7–8.2)
PROTHROMBIN TIME: 10.5 SECONDS (ref 9.6–11.5)
RBC # BLD AUTO: 3.83 10*6/MM3 (ref 3.89–5.14)
RH BLD: NEGATIVE
SODIUM BLD-SCNC: 139 MMOL/L (ref 132–146)
STREP GROUPING: ABNORMAL
STREP GROUPING: ABNORMAL
UNIT  ABO: NORMAL
UNIT  ABO: NORMAL
UNIT  RH: NORMAL
UNIT  RH: NORMAL
WBC NRBC COR # BLD: 12.1 10*3/MM3 (ref 3.5–10.8)

## 2017-03-01 PROCEDURE — 80053 COMPREHEN METABOLIC PANEL: CPT | Performed by: PHYSICIAN ASSISTANT

## 2017-03-01 PROCEDURE — 86901 BLOOD TYPING SEROLOGIC RH(D): CPT

## 2017-03-01 PROCEDURE — 94799 UNLISTED PULMONARY SVC/PX: CPT

## 2017-03-01 PROCEDURE — 25010000002 HEPARIN (PORCINE) PER 1000 UNITS: Performed by: THORACIC SURGERY (CARDIOTHORACIC VASCULAR SURGERY)

## 2017-03-01 PROCEDURE — 25010000002 CEFEPIME: Performed by: INTERNAL MEDICINE

## 2017-03-01 PROCEDURE — 25010000002 VANCOMYCIN HCL IN NACL 1.5-0.9 GM/500ML-% SOLUTION: Performed by: THORACIC SURGERY (CARDIOTHORACIC VASCULAR SURGERY)

## 2017-03-01 PROCEDURE — 63710000001 INSULIN DETEMIR PER 5 UNITS: Performed by: HOSPITALIST

## 2017-03-01 PROCEDURE — 85730 THROMBOPLASTIN TIME PARTIAL: CPT | Performed by: PHYSICIAN ASSISTANT

## 2017-03-01 PROCEDURE — 94760 N-INVAS EAR/PLS OXIMETRY 1: CPT

## 2017-03-01 PROCEDURE — 85027 COMPLETE CBC AUTOMATED: CPT | Performed by: PHYSICIAN ASSISTANT

## 2017-03-01 PROCEDURE — 99232 SBSQ HOSP IP/OBS MODERATE 35: CPT | Performed by: INTERNAL MEDICINE

## 2017-03-01 PROCEDURE — 97605 NEG PRS WND THER DME<=50SQCM: CPT

## 2017-03-01 PROCEDURE — 94640 AIRWAY INHALATION TREATMENT: CPT

## 2017-03-01 PROCEDURE — 02HV33Z INSERTION OF INFUSION DEVICE INTO SUPERIOR VENA CAVA, PERCUTANEOUS APPROACH: ICD-10-PCS | Performed by: INTERNAL MEDICINE

## 2017-03-01 PROCEDURE — 93005 ELECTROCARDIOGRAM TRACING: CPT | Performed by: PHYSICIAN ASSISTANT

## 2017-03-01 PROCEDURE — 99024 POSTOP FOLLOW-UP VISIT: CPT | Performed by: THORACIC SURGERY (CARDIOTHORACIC VASCULAR SURGERY)

## 2017-03-01 PROCEDURE — 86850 RBC ANTIBODY SCREEN: CPT

## 2017-03-01 PROCEDURE — 97530 THERAPEUTIC ACTIVITIES: CPT

## 2017-03-01 PROCEDURE — 82962 GLUCOSE BLOOD TEST: CPT

## 2017-03-01 PROCEDURE — 86920 COMPATIBILITY TEST SPIN: CPT

## 2017-03-01 PROCEDURE — 85610 PROTHROMBIN TIME: CPT | Performed by: PHYSICIAN ASSISTANT

## 2017-03-01 PROCEDURE — 93010 ELECTROCARDIOGRAM REPORT: CPT | Performed by: INTERNAL MEDICINE

## 2017-03-01 PROCEDURE — 86900 BLOOD TYPING SEROLOGIC ABO: CPT

## 2017-03-01 PROCEDURE — 94660 CPAP INITIATION&MGMT: CPT

## 2017-03-01 RX ORDER — BISOPROLOL FUMARATE AND HYDROCHLOROTHIAZIDE 5; 6.25 MG/1; MG/1
1 TABLET ORAL
Status: DISCONTINUED | OUTPATIENT
Start: 2017-03-01 | End: 2017-03-10 | Stop reason: HOSPADM

## 2017-03-01 RX ORDER — SODIUM CHLORIDE 0.9 % (FLUSH) 0.9 %
1-10 SYRINGE (ML) INJECTION AS NEEDED
Status: DISCONTINUED | OUTPATIENT
Start: 2017-03-01 | End: 2017-03-10 | Stop reason: HOSPADM

## 2017-03-01 RX ORDER — CEFAZOLIN SODIUM 2 G/100ML
2 INJECTION, SOLUTION INTRAVENOUS ONCE
Status: CANCELLED | OUTPATIENT
Start: 2017-03-01 | End: 2017-03-01

## 2017-03-01 RX ADMIN — OXYCODONE HYDROCHLORIDE AND ACETAMINOPHEN 1 TABLET: 10; 325 TABLET ORAL at 22:26

## 2017-03-01 RX ADMIN — IPRATROPIUM BROMIDE AND ALBUTEROL SULFATE 3 ML: .5; 3 SOLUTION RESPIRATORY (INHALATION) at 12:22

## 2017-03-01 RX ADMIN — INSULIN DETEMIR 10 UNITS: 100 INJECTION, SOLUTION SUBCUTANEOUS at 09:14

## 2017-03-01 RX ADMIN — HEPARIN SODIUM 5000 UNITS: 5000 INJECTION, SOLUTION INTRAVENOUS; SUBCUTANEOUS at 09:07

## 2017-03-01 RX ADMIN — BACLOFEN 20 MG: 10 TABLET ORAL at 14:02

## 2017-03-01 RX ADMIN — INSULIN DETEMIR 10 UNITS: 100 INJECTION, SOLUTION SUBCUTANEOUS at 21:47

## 2017-03-01 RX ADMIN — BUSPIRONE HYDROCHLORIDE 20 MG: 10 TABLET ORAL at 21:51

## 2017-03-01 RX ADMIN — CETIRIZINE HYDROCHLORIDE 10 MG: 10 TABLET, FILM COATED ORAL at 21:51

## 2017-03-01 RX ADMIN — IPRATROPIUM BROMIDE AND ALBUTEROL SULFATE 3 ML: .5; 3 SOLUTION RESPIRATORY (INHALATION) at 07:02

## 2017-03-01 RX ADMIN — Medication 1 CAPSULE: at 09:09

## 2017-03-01 RX ADMIN — IPRATROPIUM BROMIDE AND ALBUTEROL SULFATE 3 ML: .5; 3 SOLUTION RESPIRATORY (INHALATION) at 16:18

## 2017-03-01 RX ADMIN — CEFEPIME 2 G: 2 INJECTION, POWDER, FOR SOLUTION INTRAVENOUS at 15:14

## 2017-03-01 RX ADMIN — OXYCODONE HYDROCHLORIDE AND ACETAMINOPHEN 1 TABLET: 10; 325 TABLET ORAL at 05:04

## 2017-03-01 RX ADMIN — CLONAZEPAM 0.25 MG: 0.5 TABLET ORAL at 09:08

## 2017-03-01 RX ADMIN — PREGABALIN 100 MG: 100 CAPSULE ORAL at 15:14

## 2017-03-01 RX ADMIN — PANTOPRAZOLE SODIUM 40 MG: 40 TABLET, DELAYED RELEASE ORAL at 05:04

## 2017-03-01 RX ADMIN — LEVOTHYROXINE SODIUM 112 MCG: 112 TABLET ORAL at 05:04

## 2017-03-01 RX ADMIN — SALINE NASAL SPRAY 2 SPRAY: 1.5 SOLUTION NASAL at 05:05

## 2017-03-01 RX ADMIN — BUSPIRONE HYDROCHLORIDE 20 MG: 10 TABLET ORAL at 09:09

## 2017-03-01 RX ADMIN — AMLODIPINE BESYLATE 5 MG: 5 TABLET ORAL at 09:08

## 2017-03-01 RX ADMIN — ATORVASTATIN CALCIUM 40 MG: 40 TABLET, FILM COATED ORAL at 21:51

## 2017-03-01 RX ADMIN — ASPIRIN 325 MG: 325 TABLET, DELAYED RELEASE ORAL at 09:09

## 2017-03-01 RX ADMIN — IPRATROPIUM BROMIDE AND ALBUTEROL SULFATE 3 ML: .5; 3 SOLUTION RESPIRATORY (INHALATION) at 21:12

## 2017-03-01 RX ADMIN — FUROSEMIDE 40 MG: 40 TABLET ORAL at 09:08

## 2017-03-01 RX ADMIN — INSULIN LISPRO 2 UNITS: 100 INJECTION, SOLUTION INTRAVENOUS; SUBCUTANEOUS at 12:01

## 2017-03-01 RX ADMIN — OXYCODONE HYDROCHLORIDE AND ACETAMINOPHEN 1 TABLET: 10; 325 TABLET ORAL at 18:19

## 2017-03-01 RX ADMIN — SALINE NASAL SPRAY 2 SPRAY: 1.5 SOLUTION NASAL at 12:01

## 2017-03-01 RX ADMIN — OXYCODONE HYDROCHLORIDE AND ACETAMINOPHEN 1 TABLET: 10; 325 TABLET ORAL at 10:02

## 2017-03-01 RX ADMIN — Medication 2 TABLET: at 21:51

## 2017-03-01 RX ADMIN — PREGABALIN 100 MG: 100 CAPSULE ORAL at 21:51

## 2017-03-01 RX ADMIN — LISINOPRIL 20 MG: 20 TABLET ORAL at 09:08

## 2017-03-01 RX ADMIN — OXYCODONE HYDROCHLORIDE AND ACETAMINOPHEN 1 TABLET: 10; 325 TABLET ORAL at 14:02

## 2017-03-01 RX ADMIN — NICOTINE 1 PATCH: 21 PATCH, EXTENDED RELEASE TRANSDERMAL at 09:13

## 2017-03-01 RX ADMIN — VANCOMYCIN HYDROCHLORIDE 1500 MG: 1 INJECTION, POWDER, LYOPHILIZED, FOR SOLUTION INTRAVENOUS at 21:56

## 2017-03-01 RX ADMIN — BISOPROLOL FUMARATE AND HYDROCHLOROTHIAZIDE 1 TABLET: 6.25; 5 TABLET ORAL at 21:50

## 2017-03-01 RX ADMIN — PREGABALIN 100 MG: 100 CAPSULE ORAL at 09:07

## 2017-03-01 RX ADMIN — CLONAZEPAM 0.25 MG: 0.5 TABLET ORAL at 21:51

## 2017-03-01 NOTE — PROGRESS NOTES
[facility] Medicine Services  INPATIENT PROGRESS NOTE    Date of Admission: 2/24/2017  Length of Stay: 5  Primary Care Physician: Harinder Aranda MD    Subjective     Chief Complaint: All over pain  HPI:  No issues overnight.  Pain is about the same.  She does not voice any complaint, anxious to proceed with surgery.    Review Of Systems:   Review of Systems   Constitutional: Negative for fever.   Respiratory: Positive for wheezing.    Gastrointestinal: Negative for abdominal pain.   Musculoskeletal: Positive for arthralgias and myalgias.   All other systems reviewed and are negative.      Objective      Temp:  [97.8 °F (36.6 °C)-98.4 °F (36.9 °C)] 98.2 °F (36.8 °C)  Heart Rate:  [62-86] 86  Resp:  [16-18] 16  BP: (120-154)/(56-72) 120/56  Physical Exam  Gen-no acute distress, resting in bed on speciality mattress  CV-RRR, S1 S2 normal, 2+ systolic murmur  Resp- slight bilateral wheeze   Abd-soft, NT, ND, +BS  Ext-no edema, LLE with surgical drsg/wound vac in place.  RLE warm, dry, + pulse  Neuro-A&Ox3, no focal deficits  Psych-appropriate mood    Results Review:    I have reviewed the labs, radiology results and diagnostic studies.      Results from last 7 days  Lab Units 02/28/17  0447   WBC 10*3/mm3 10.11   HEMOGLOBIN g/dL 9.7*   PLATELETS 10*3/mm3 236       Results from last 7 days  Lab Units 02/28/17  0447   SODIUM mmol/L 142   POTASSIUM mmol/L 4.0   TOTAL CO2 mmol/L 35.0*   CREATININE mg/dL 0.80   GLUCOSE mg/dL 107*       Culture Data:   BLOOD CULTURE   Date Value Ref Range Status   02/24/2017 No growth at 2 days  Preliminary   02/24/2017 No growth at 2 days  Preliminary     WOUND CULTURE   Date Value Ref Range Status   02/23/2017 Heavy growth (4+) Staphylococcus aureus (A)  Final       I have reviewed the medications.    Current Facility-Administered Medications:   •  [START ON 3/4/2017] !Vancomycin trough on 3/4 at 2000. Please hold scheduled dose due at 2100 until pharmacy has  reviewed., , Does not apply, Once, Joey Alvarado, [facility]  •  acetaminophen (TYLENOL) tablet 650 mg, 650 mg, Oral, Q4H PRN, JEOVANY Ramos  •  albuterol (PROVENTIL) nebulizer solution 0.5% 2.5 mg/0.5mL, 2.5 mg, Nebulization, Q6H PRN, Valarie Wahl APRN  •  amLODIPine (NORVASC) tablet 5 mg, 5 mg, Oral, Daily, Valarie Wahl APRN, 5 mg at 03/01/17 0908  •  aspirin EC tablet 325 mg, 325 mg, Oral, Daily, Valarie Wahl APRN, 325 mg at 03/01/17 0909  •  atorvastatin (LIPITOR) tablet 40 mg, 40 mg, Oral, Nightly, Valarie Wahl APRN, 40 mg at 02/28/17 2149  •  baclofen (LIORESAL) tablet 20 mg, 20 mg, Oral, Q8H, Valarie Wahl APRN, 20 mg at 02/28/17 1500  •  bisoprolol-hydrochlorothiazide (ZIAC) 5-6.25 MG per tablet 1 tablet, 1 tablet, Oral, Q24H, Valarie Wahl APRN, 1 tablet at 02/28/17 0839  •  busPIRone (BUSPAR) tablet 20 mg, 20 mg, Oral, Q12H, Valarie Wahl APRN, 20 mg at 03/01/17 0909  •  cefTRIAXone (ROCEPHIN) IVPB 2 g, 2 g, Intravenous, Q24H, Joey Delaney MD, 2 g at 02/28/17 1701  •  cetirizine (zyrTEC) tablet 10 mg, 10 mg, Oral, Nightly, Valarie Wahl APRN, 10 mg at 02/28/17 2150  •  clonazePAM (KlonoPIN) tablet 0.25 mg, 0.25 mg, Oral, Q12H, Cierra Ayala MD, 0.25 mg at 03/01/17 0908  •  dextrose (GLUTOSE) oral gel 15 g, 15 g, Oral, Q15 Min PRN, Valarie Wahl APRN  •  ezetimibe (ZETIA) tablet 10 mg, 10 mg, Oral, Daily, Valarie Wahl APRN, 10 mg at 02/28/17 0901  •  fentaNYL (DURAGESIC) 100 MCG/HR patch 1 patch, 1 patch, Transdermal, Every Other Day, Arsalan Feliciano MD, 1 patch at 02/28/17 0843  •  fluticasone (FLONASE) 50 MCG/ACT nasal spray 1 spray, 1 spray, Nasal, BID, JEOVANY Ramos, 1 spray at 02/28/17 1701  •  furosemide (LASIX) tablet 40 mg, 40 mg, Oral, Daily, Valarie Wahl APRN, 40 mg at 03/01/17 0908  •  glucagon (GLUCAGEN) injection 1 mg, 1 mg, Subcutaneous, Q15 Min PRN, Valarie Wahl, APRN  •  heparin (porcine) 5000 UNIT/ML  injection 5,000 Units, 5,000 Units, Subcutaneous, Q12H, Arsalan Feliciano MD, 5,000 Units at 03/01/17 0907  •  insulin detemir (LEVEMIR) injection 10 Units, 10 Units, Subcutaneous, Q12H, Christian Drew MD, 10 Units at 03/01/17 0914  •  insulin lispro (humaLOG) injection 2-7 Units, 2-7 Units, Subcutaneous, 4x Daily AC & at Bedtime, JEOVANY Ramos, 2 Units at 02/28/17 2150  •  ipratropium-albuterol (DUO-NEB) nebulizer solution 3 mL, 3 mL, Nebulization, 4x Daily - RT, JEOVANY Aquino, 3 mL at 03/01/17 0702  •  levothyroxine (SYNTHROID, LEVOTHROID) tablet 112 mcg, 112 mcg, Oral, Q AM, JEOVANY Ramos, 112 mcg at 03/01/17 0504  •  lisinopril (PRINIVIL,ZESTRIL) tablet 20 mg, 20 mg, Oral, Q24H, Valarie Wahl APRN, 20 mg at 03/01/17 0908  •  melatonin sublingual tablet 5 mg, 5 mg, Sublingual, Nightly PRN, JEOVANY Ramos, 5 mg at 02/28/17 2153  •  nicotine (NICODERM CQ) 21 MG/24HR patch 1 patch, 1 patch, Transdermal, Q24H, Arsalan Feliciano MD, 1 patch at 03/01/17 0913  •  oxyCODONE-acetaminophen (PERCOCET)  MG per tablet 1 tablet, 1 tablet, Oral, Q4H PRN, Christian Drew MD, 1 tablet at 03/01/17 0504  •  pantoprazole (PROTONIX) EC tablet 40 mg, 40 mg, Oral, Q AM, Valarie Wahl APRN, 40 mg at 03/01/17 0504  •  Pharmacy to dose vancomycin, , Does not apply, Continuous PRN, Arsalan Feliciano MD  •  phenylephrine (JOSE MARIA-SYNEPHRINE) 0.5 % nasal spray 2 spray, 2 spray, Each Nare, Q6H, JEOVANY Aquino, 2 spray at 02/28/17 1111  •  phenylephrine (JOSE MARIA-SYNEPHRINE) 0.5 % nasal spray 2 spray, 2 spray, Each Nare, Q6H PRN, Derek Jones MD  •  pregabalin (LYRICA) capsule 100 mg, 100 mg, Oral, Q8H, Cierra Ayala MD, 100 mg at 03/01/17 0907  •  probiotic (CULTURELLE) capsule 1 capsule, 1 capsule, Oral, Daily, JEOVANY Ramos, 1 capsule at 03/01/17 0909  •  promethazine (PHENERGAN) tablet 25 mg, 25 mg, Oral, Q8H PRN, JEOVNAY Ramos  •  sennosides-docusate sodium  (SENOKOT-S) 8.6-50 MG tablet 2 tablet, 2 tablet, Oral, Nightly, Christian Drew MD, 2 tablet at 02/28/17 2149  •  simethicone (MYLICON) chewable tablet 80 mg, 80 mg, Oral, 4x Daily PRN, Cierra Ayala MD, 80 mg at 02/25/17 2010  •  sodium chloride (OCEAN) nasal spray 2 spray, 2 spray, Each Nare, Q6H, Sonia Kaiserate, APRN, 2 spray at 03/01/17 0505  •  sodium chloride (OCEAN) nasal spray 2 spray, 2 spray, Each Nare, PRN, Sonia Teresa, APRN  •  vancomycin IVPB 1500 mg in 0.9% NaCl (Premix) 500 mL, 1,500 mg, Intravenous, Q24H, Arsalan Feliciano MD, 1,500 mg at 02/28/17 2149    Assessment/Plan     Assessment/Problem List  Principal Problem:    Osteomyelitis of left foot  Active Problems:    COPD (chronic obstructive pulmonary disease)    Obstructive sleep apnea syndrome    Chronic back pain    Type 2 diabetes mellitus    Dyslipidemia    Fibromyalgia    Gastroesophageal reflux disease without esophagitis    Hypertension    Hypothyroidism    Peripheral vascular disease    Diabetic polyneuropathy associated with type 2 diabetes mellitus    Anemia, blood loss    Chronic pain    Chronic, continuous use of opioids    Chronic anxiety    Chronically on benzodiazepine therapy       Assessment/Problem List     This is a 61-year-old  female with past medical history significant for tobacco abuse, hypertension, hyperlipidemia, and diabetes type 2, who is status post transmetatarsal amputation of the left great toe due to osteomyelitis in November 2016, now presents with poor wound healing with growth of staph aureus from tissue cultures done on 2/24.      MSSA Osteomyelitis (presumed) of left 1st metatarsal  -- Infectious disease and cardiothoracic surgeon on consult  -- On Vanco and ceftriaxone per Dr. Cruz  -- MRI showed evidence of osteomyelitis in the first metatarsal and the rest of the digits on the left foot, s/p removal of the rest of 1st metatarsal bone on 2/25 by Dr. Feliciano  -- reviewed Dr. Feliciano's note,  after he discussed MRI and reviewed with Dr. Granados - plan is to proceed with further surgery  Tomorrow or Friday    DM2, Controlled  -- Hgb A1c is 6.1   -- continue currenet insulin regimen, acceptable control     Anemia of chronic disease  -- hemoglobin stable, monitor    Chronic back pain/Fibromyalgia on chronic opioids  -- continue home pain medication  -- continue home muscle relaxers     Anxiety on chronic benzos  - buspar and klonopin continued (klonopin with 50% decrease in home dose)    COPD  -- stable    HTN  -- continue home medications  -- monitor, currently stable    Constipation  -- continue bowel regimen      Hypothyroidism  -- continue home dose of synthroid  -- TSH within normal limits      HLP  -- continue home dose of Lipitor and zetia    DINA  -- CPAP w/ 2L oxygen while sleeping    Tobacco abuse  -- Patient counseled at length and cessation recommended      DVT prophylaxis: shell hose and scd to RLE    For surgery in next 1-2 days    Derek Jonse MD   03/01/17   9:34 AM    Please note that portions of this note may have been completed with a voice recognition program. Efforts were made to edit the dictations, but occasionally words are mistranscribed.

## 2017-03-01 NOTE — PROGRESS NOTES
Tamara Langston  1955  [id number]  3/1/2017    CC: No chief complaint on file.  foot infection  Reason for Consultation: Left foot infection     History of present illness:      This is a 61 y.o. female with a history of DM, DINA, COPD, PVD, who was treated in 11/23/16 by Dr. Cruz for left great toe osteomyelitis growing MSSA and GBS and ultimately required left great toe transmetatarsal amputation by Dr. Feliciano. She was discharged home on Vanc/Rocephin IV and oral Flagyl. She had been doing well postoperatively until approximately 1 week ago she started having a low grade fever of 99.0 and SOA. HH nurse noticed a new brownish drainage from her surgical wound on 2/22/17. Dr. Feliciano was contacted and she was seen in the office on 2/23/17. He was concerned with an osteo infection and pt followed up with Dr. Cruz/Dr. Feliciano in the office yesterday. It was decided that she should be admitted and be started on IV antibiotics as well as MRI of the left foot. Left foot culture taken yesterday is growing MSSA. She has been afebrile and labs revealed a WBC of 10.24 and Cr of 1.30. She was started on Vancomycin and Rocephin IV therapy and has received a dose of Zinacef.     MRI of the left foot was concerning for osteomyelitis of the 2nd-3rd-4th metatarsal bones and appearance of an abscess formation around previous transmetatarsal amputation of great toe. Surgical plans for today were for transmetatarsal resection of 2nd,3rd, 4th and 5th toe as well as completion of great toe amputation back to cuneiform bone. Pt ultimately underwent removal of the remainder of the 1st metatarsal bone of the left foot and a portion of the cuneiform bone. We have been asked to see for further evaluation and antibiotic recommendations. Pt was seen in the immediate post operative recovery room and is very drowsy. Seen and agree     2/26 - co foot infection  Hx limited  Co pain  No rash  ROS discussed with staff    2/27  Very complicated  situation as noted by Dr Feliciano  She underwent excision of the remaining portion of the proximal 1st MT by Dr Feliciano  The MRI suggests the possibility of extensive OM of the foot but there is concern that her foot would be destabilized if all the infected bone were to be resected She c/o discomfort at the site of the amputation  2/28 no new c/o   3/1/17  Plans for transmetatarsal amputation noted  In my presence Dr Feliciano quoted a 50/50 chance of limb salvage      Past medical history:  Past Medical History   Diagnosis Date   • Bronchitis    • Cervical cancer    • Cholelithiasis 5/11/2016   • Chronic anxiety 2/27/2017   • Chronic bronchitis    • Chronically on benzodiazepine therapy 2/27/2017   • Degenerative arthritis    • Diabetes mellitus    • Dyslipidemia 5/11/2016   • Dyspnea    • Fatty liver disease, nonalcoholic 11/21/2016   • Fibromyalgia    • GERD (gastroesophageal reflux disease)    • H/O echocardiogram    • History of pneumonia    • Hypertension 5/11/2016     16. H/O echocardiogram (V15.89) (Z92.89)  · A.  Echocardiogram of 02/03/2015 reports an ejection fraction of 60-65%, mild concentric     LVH, trace mitral regurgitation, mild tricuspid and pulmonic regurgitation and calculated     RVSP of 35 mmHg, the main pulmonary artery is also mildly dilated.   • Hypothyroidism 5/11/2016     Description: A.  On replacement therapy.   • Nausea    • Obesity    • DINA (obstructive sleep apnea)      intolerant of CPAP therapy   • Osteoporosis    • Osteoporosis    • Polycythemia vera 5/11/2016   • Pulmonary emphysema    • Restrictive ventilatory defect    • Rhinitis    • Uncontrolled diabetes mellitus 5/11/2016   • Uterine cancer    • Vitamin D deficiency 8/1/2016       Medications:   Antibiotics:  IV Anti-Infectives     Ordered     Dose/Rate Route Frequency Start Stop    03/01/17 3439  cefepime (MAXIPIME) 2 g/100 mL 0.9% NS (mbp)     Ordering Provider:  Oksana Cruz MD    2 g Intravenous Every 12 Hours 03/01/17  "1430      02/26/17 1946  vancomycin IVPB 1500 mg in 0.9% NaCl (Premix) 500 mL     Ordering Provider:  Arsalan Feliciano MD    1,500 mg  over 90 Minutes Intravenous Every 24 Hours 02/26/17 2100      02/26/17 1946  Pharmacy to dose vancomycin     Ordering Provider:  Arsalan Feliciano MD     Does not apply Continuous PRN 02/26/17 1945 02/24/17 1718  vancomycin 2000 mg/500 mL 0.9% NS IVPB ([facility])     Ordering Provider:  Luis Manuel Rodriguez IV, RPH    2,000 mg  over 120 Minutes Intravenous Once 02/24/17 1800 02/24/17 1932          Allergies:  is allergic to cortisone; oxycontin [oxycodone]; and tolmetin.    Family History: family history includes Alcohol abuse in her brother; Arthritis in her brother, father, mother, and other; Bleeding Disorder in her father; COPD in her sister; Colon polyps in her mother; Diabetes in her brother, father, mother, and other; Diverticulitis in her mother; Heart murmur in her daughter; Kidney disease in her father.    Social History:  reports that she has been smoking Cigarettes.  She has a 24.00 pack-year smoking history. She has never used smokeless tobacco. She reports that she does not drink alcohol or use illicit drugs.    Review of Systems: All other reviewed and negative except as per HPI - pt groggy this am    Blood pressure 120/56, pulse 86, temperature 98.2 °F (36.8 °C), temperature source Oral, resp. rate 16, height 66\" (167.6 cm), weight 211 lb (95.7 kg), SpO2 92 %, not currently breastfeeding.  GENERAL: groggy early this am, in no acute distress.   HEENT: Oropharynx without thrush. . No cervical adenopathy. No neck masses  EYES: . No conjunctival injection. No icterus.   LYMPHATICS: No lymphadenopathy of the neck or axillary or inguinal regions.   HEART: No murmur, gallop, or pericardial friction rub.   LUNGS: Clear to auscultation anteriorly. No respiratory distress  ABDOMEN: Soft, nontender, nondistended. No appreciable HSM.    SKIN: Warm and dry without cutaneous eruptions. . "   PSYCHIATRIC: Mental status lucid. Cranial nerve function intact.   EXT: post-op bandage intact      DIAGNOSTICS:  Lab Results   Component Value Date    WBC 10.11 02/28/2017    HGB 9.7 (L) 02/28/2017    HCT 31.7 (L) 02/28/2017     02/28/2017     Lab Results   Component Value Date    CRP 57.50 (H) 11/27/2016     Lab Results   Component Value Date    SEDRATE 59 (H) 11/21/2016     Lab Results   Component Value Date    GLUCOSE 107 (H) 02/28/2017    BUN 17 02/28/2017    CREATININE 0.80 02/28/2017    EGFRIFNONA 72 02/28/2017    EGFRIFAFRI 77 12/13/2016    BCR 21.3 02/28/2017    CO2 35.0 (H) 02/28/2017    CALCIUM 9.3 02/28/2017    PROTENTOTREF 7.9 12/13/2016    ALBUMIN 3.40 02/28/2017    LABIL2 1.1 (L) 02/28/2017    AST 53 (H) 02/28/2017    ALT 22 02/28/2017       Microbiology  MSSA so far      RADIOLOGY:  Imaging Results (last 72 hours)     Procedure Component Value Units Date/Time    MRI Foot Left Without Contrast [[id number]] Collected:  02/25/17 0935     Updated:  02/25/17 1226    Narrative:       EXAMINATION: MRI LEFT FOOT WO CONTRAST - 02/24/2017     INDICATION: Left foot osteomyelitis, left great toe and transmetatarsal  amputation 11/23/2016 for osteomyelitis. Patient now has fever and  shortness of breath with green bloody drainage from amputation wound.  Evaluate for bone infection.     TECHNIQUE: MR datasets of the left foot were performed without  intravenous contrast.     COMPARISON: Comparison is made to previous MR datasets of the left foot  of 11/21/2016.     FINDINGS:   1. The patient has had a previous transmetatarsal and left great toe  amputation. There is substantial cellulitis, soft tissue prominence and  pathologic soft tissue signal surrounding the area of amputation.     Diffuse cellulitis extends from the site of amputation through the  forefoot structures to the midfoot/forefoot junction and extensively  into the plantar surface of the forefoot sparing only the fifth  metatarsal.     2. On  STIR datasets, there is abnormal marrow signal extensively and  severely in the middle third of the partially amputated first  metatarsal, throughout the entirety of the second and third metatarsals,  in the distal half of the fourth metatarsal and in the head of the fifth  metatarsal.  Correspondingly, on T1 datasets, there is abnormal T1 decreased signal  throughout the second, third, fourth and distal fifth metatarsals. This  combination of signal alteration indicates extensive and diffuse  osteomyelitis involving the second, third, fourth and distal fifth  metatarsals.      There is marrow edema in the partially amputated first metatarsal and  high suspicion of osteomyelitis involving only the distal portion of the  amputated site of the first metatarsal. The surrounding soft tissue  cellulitis and edema are inhomogeneous and clearly consistent with  extensive infection.     3. Lastly, the midfoot and hindfoot structures appear to be spared.     There is a suspicious early fluid collection along the medial aspect of  the forefoot extending from the site of amputation to the  forefoot/midfoot junction medially and the plantar. This area should be  carefully evaluated. Abscess in evolution is suspect.       Impression:       Marked soft tissue infection, cellulitis and edema are seen  in the left forefoot. There is marked cellulitis and soft tissue  prominence around the site of previous mid metatarsal amputation of the  first metatarsal and left great toe. There is the suggestion of a  possible abscess in evolution along the base of the residual first  metatarsal medially and plantar in orientation and there is marked edema  in the tarsal tunnel diffusely.     There is marked marrow edema and abnormal STIR increased signal in  the  partially resected first metatarsal and extensively throughout the  entire second, third and fourth metatarsals and in the distal fifth  metatarsal head. There is corresponding low  signal on the T1 datasets  throughout these areas involving the second, third, fourth and distal  fifth metatarsal and minimally along the resected site of the first  metatarsal. This combination of signal alteration is highly likely for  extensive and diffuse active osteomyelitis involving multiple osseous  segments of the forefoot and extending back to the forefoot/midfoot  junction. Currently, the midfoot and hindfoot are spared but these are  extensive progressive infectious findings throughout the left forefoot.     DICTATED:     02/25/2017  EDITED:         02/25/2017         This report was finalized on 2/25/2017 12:24 PM by Dr. Gilberto Gomez MD.             Assessment and Plan:        Impression:      -Osteomyelitis of left 1st MT s/p removal of the remainder of the 1st metatarsal bone of the left foot and a portion of the cuneiform bone 2/25/17. Culture with MSSA  ;   Psa from 1 of 3 cultures sent 2/25  - Marrow edema of 2nd to 5th MT's with ? of osteomyelitis    -Recent MSSA/GBS left great toe osteomyelitis s/p left great toe transmetatarsal amputation 11/23/17  -PVD  --Ongoing tobacco abuse  -DM2  -DINA  -COPD      PLAN  - change rocephin to cefepime  -Continue  vancomycin ; consider stopping the vanc after all cultures are final  Anticipate  minimum 6 weeks rx and probably longer    - Reviewed the guarded prognosis for limb salvage with the pt and her   Even under the best circumstances ie tobacco cessation and compliance with NWB it is not guaranteed that we will be able to avoid BKA    - picc    Discussed with Dr Braden Cruz MD  3/1/2017

## 2017-03-01 NOTE — PLAN OF CARE
Problem: Patient Care Overview (Adult)  Goal: Plan of Care Review  Outcome: Ongoing (interventions implemented as appropriate)    03/01/17 1130   Coping/Psychosocial Response Interventions   Plan Of Care Reviewed With patient   Outcome Evaluation   Outcome Summary/Follow up Plan wound vac intact with no issues noted. pt to have surgery tomorrow or friday. PT will hold dressing change until after surgery         Problem: Inpatient Physical Therapy  Goal: Wound Care Goal 1 LTG- PT  Outcome: Ongoing (interventions implemented as appropriate)    02/26/17 1332 02/27/17 0845   Wound Care PT LTG   Wound Care PT LTG 1, Date Established 02/26/17 --    Wound Care PT LTG 1, Time to Achieve 2 wks --    Wound Care PT LTG 1, Location LT open ray amputation site  --    Wound Care PT LTG 1, No S&S of Infection yes --    Wound Care PT LTG 1, Decrease Wound Size 25% --    Wound Care PT LTG 1, Decrease Exudate minimum --    Wound Care PT LTG 1, No New Skin Break Down yes --    Wound Care PT LTG 1, Education S&S of infection;dressing changes;wound care;weight bearing restriction;progression of POC --    Wound Care PT LTG 1, Education Understanding verbalize understanding --    Wound Care PT LTG 1, Outcome --  goal ongoing

## 2017-03-01 NOTE — PLAN OF CARE
Problem: Patient Care Overview (Adult)  Goal: Plan of Care Review  Outcome: Ongoing (interventions implemented as appropriate)    02/28/17 1030 03/01/17 0423   Patient Care Overview   Progress improving --    Outcome Evaluation   Outcome Summary/Follow up Plan --  No overnight events, VSS. Wound vac in tact. Possiblity for surgery during this admission. Non weight bearing status, working with PT. Pain controlled with prn meds.       Goal: Adult Individualization and Mutuality  Outcome: Ongoing (interventions implemented as appropriate)  Goal: Discharge Needs Assessment  Outcome: Ongoing (interventions implemented as appropriate)    Problem: Infection, Risk/Actual (Adult)  Goal: Infection Prevention/Resolution  Outcome: Ongoing (interventions implemented as appropriate)    Problem: Skin Integrity Impairment, Risk/Actual (Adult)  Goal: Skin Integrity/Wound Healing  Outcome: Ongoing (interventions implemented as appropriate)    Problem: Pressure Ulcer (Adult)  Goal: Signs and Symptoms of Listed Potential Problems Will be Absent or Manageable (Pressure Ulcer)  Outcome: Ongoing (interventions implemented as appropriate)    Problem: Fall Risk (Adult)  Goal: Identify Related Risk Factors and Signs and Symptoms  Outcome: Ongoing (interventions implemented as appropriate)  Goal: Absence of Falls  Outcome: Ongoing (interventions implemented as appropriate)

## 2017-03-01 NOTE — PROGRESS NOTES
Continued Stay Note  [facility]     Patient Name: Tamara Langston  MRN: [id number]  Today's Date: 3/1/2017    Admit Date: 2/24/2017          Discharge Plan     Ms. Langston consented to participate in the [facility]. Nita Murphy RN                Discharge Codes     None        Expected Discharge Date and Time     Expected Discharge Date Expected Discharge Time    Mar 7, 2017             Nita Murphy RN

## 2017-03-01 NOTE — PROGRESS NOTES
Cardiothoracic Surgery Progress Note      POD #:     LOS: 5 days      Subjective:        Objective:  Vital Signs  Temp:  [97.8 °F (36.6 °C)-98.2 °F (36.8 °C)] 98.2 °F (36.8 °C)  Heart Rate:  [70-86] 86  Resp:  [16-18] 16  BP: (120-154)/(56-72) 120/56    Physical Exam:   General Appearance:    Lungs:   Heart:   Skin:   Incision:     Results:    Results from last 7 days  Lab Units 02/28/17  0447   WBC 10*3/mm3 10.11   HEMOGLOBIN g/dL 9.7*   HEMATOCRIT % 31.7*   PLATELETS 10*3/mm3 236       Results from last 7 days  Lab Units 02/28/17  0447   SODIUM mmol/L 142   POTASSIUM mmol/L 4.0   CHLORIDE mmol/L 107   TOTAL CO2 mmol/L 35.0*   BUN mg/dL 17   CREATININE mg/dL 0.80   GLUCOSE mg/dL 107*   CALCIUM mg/dL 9.3         Assessment:      Plan: At the present time, we are planning on the left foot transmetatarsal amputation tomorrow afternoon.  The patient is aware of our plans to proceed ahead tomorrow and is agreeable and understands the risk of this surgery and the benefits.  She is been preop for the surgery tomorrow.  Dr. Cruz plans on having a PICC line placed on this patient.    Arsalan Feliciano MD - 03/01/17 - 1:53 PM

## 2017-03-01 NOTE — PLAN OF CARE
Problem: Patient Care Overview (Adult)  Goal: Plan of Care Review  Outcome: Ongoing (interventions implemented as appropriate)    03/01/17 9318   Coping/Psychosocial Response Interventions   Plan Of Care Reviewed With patient   Patient Care Overview   Progress no change   Outcome Evaluation   Outcome Summary/Follow up Plan Dressing changed on coccyx with therahoney and a mepilex. On 2L NC with humidification. New IV abx started. Non weight bearing on left foot. PT working with patient. Pain controlled with prn medication. Voiding adequately. Surgery tomorrow for amputation.       Goal: Adult Individualization and Mutuality  Outcome: Ongoing (interventions implemented as appropriate)  Goal: Discharge Needs Assessment  Outcome: Ongoing (interventions implemented as appropriate)    Problem: Infection, Risk/Actual (Adult)  Goal: Infection Prevention/Resolution  Outcome: Ongoing (interventions implemented as appropriate)    Problem: Skin Integrity Impairment, Risk/Actual (Adult)  Goal: Skin Integrity/Wound Healing  Outcome: Ongoing (interventions implemented as appropriate)    Problem: Pressure Ulcer (Adult)  Goal: Signs and Symptoms of Listed Potential Problems Will be Absent or Manageable (Pressure Ulcer)  Outcome: Ongoing (interventions implemented as appropriate)    Problem: Fall Risk (Adult)  Goal: Identify Related Risk Factors and Signs and Symptoms  Outcome: Ongoing (interventions implemented as appropriate)  Goal: Absence of Falls  Outcome: Ongoing (interventions implemented as appropriate)

## 2017-03-01 NOTE — PLAN OF CARE
Problem: Patient Care Overview (Adult)  Goal: Plan of Care Review  Outcome: Ongoing (interventions implemented as appropriate)    03/01/17 0910   Coping/Psychosocial Response Interventions   Plan Of Care Reviewed With patient   Patient Care Overview   Progress improving   Outcome Evaluation   Outcome Summary/Follow up Plan Pt. increasing strength UE's, able only stand 19 sec today vs 20 yesterday. Advanced to yellow t-band exer today.         Problem: Inpatient Occupational Therapy  Goal: Transfer Training Goal 1 LTG- OT  Outcome: Ongoing (interventions implemented as appropriate)    02/26/17 1351 03/01/17 0910   Transfer Training OT LTG   Transfer Training OT LTG, Date Established 02/26/17 --    Transfer Training OT LTG, Time to Achieve 1 wk --    Transfer Training OT LTG, Activity Type bed to chair /chair to bed --    Transfer Training OT LTG, Todd Level contact guard assist --    Transfer Training OT LTG, Assist Device walker, rolling --    Transfer Training OT LTG, Outcome --  goal ongoing       Goal: Strength Goal LTG- OT  Outcome: Outcome(s) achieved Date Met:  03/01/17 02/26/17 1351 03/01/17 0910   Strength OT LTG   Strength Goal OT LTG, Date Established 02/26/17 --    Strength Goal OT LTG, Time to Achieve 1 wk --    Strength Goal OT LTG, Functional Goal Pt to increase B UE strength by 1/2 muscle grade to support transfers with NWB status. --    Strength Goal OT LTG, Outcome --  goal met  (pt. progressed to yellow t-band)       Goal: LB Dressing Goal LTG- OT  Outcome: Outcome(s) achieved Date Met:  03/01/17 02/26/17 1351 03/01/17 0910   LB Dressing OT LTG   LB Dressing Goal OT LTG, Date Established 02/26/17 --    LB Dressing Goal OT LTG, Time to Achieve 1 wk --    LB Dressing Goal OT LTG, Activity Type pants and socks on R LE. --    LB Dressing Goal OT LTG, Todd Level minimum assist (75% patient effort) --    LB Dressing Goal OT LTG, Adaptive Equipment other (see comments)  (with  appropriate AD) --    LB Dressing Goal OT LTG, Outcome --  goal met  (met for R side. Unable to advance until wound vac off.)

## 2017-03-01 NOTE — CONSULTS
Consult received but pt was already educated on 2/27/17. Order cancelled but please re consult as needed with detail instructions.

## 2017-03-01 NOTE — PROGRESS NOTES
Acute Care - Occupational Therapy Treatment Note  [facility]     Patient Name: Tamara Langston  : 1955  MRN: [id number]  Today's Date: 3/1/2017  Onset of Illness/Injury or Date of Surgery Date: 17  Date of Referral to OT: 17  Referring Physician: MD Viki      Admit Date: 2017    Visit Dx:     ICD-10-CM ICD-9-CM   1. Impaired mobility and ADLs Z74.09 799.89   2. Toe osteomyelitis, left M86.9 730.27   3. Impaired functional mobility, balance, gait, and endurance Z74.09 V49.89     Patient Active Problem List   Diagnosis   • Atopic rhinitis   • Restrictive ventilatory defect   • COPD (chronic obstructive pulmonary disease)   • Osteoporosis   • Obstructive sleep apnea syndrome   • Abnormal liver enzymes   • Cholelithiasis   • Chronic back pain   • Mixed anxiety depressive disorder   • Type 2 diabetes mellitus   • Dyslipidemia   • Essential tremor   • Fibromyalgia   • Gastroesophageal reflux disease without esophagitis   • Hypertension   • Hypothyroidism   • Insomnia   • Osteoarthritis   • Polycythemia vera   • Psoriasis   • Branch retinal vein occlusion   • Cobalamin deficiency   • Chronic bronchitis   • Obesity   • Pneumonia   • Tobacco use   • Vitamin D deficiency   • Foot ulcer, left   • Leukocytosis   • Hypokalemia   • Lactic acidosis   • Fatty liver disease, nonalcoholic   • Diabetes education, encounter for   • Cellulitis of left foot   • Sinus bradycardia   • NSTEMI (non-ST elevated myocardial infarction)   • Tobacco abuse   • Hypoxia   • Peripheral vascular disease   • Gangrene   • Osteomyelitis of left foot   • Diabetic polyneuropathy associated with type 2 diabetes mellitus   • Anemia, blood loss   • Chronic pain   • Chronic, continuous use of opioids   • Chronic anxiety   • Chronically on benzodiazepine therapy             Adult Rehabilitation Note       17 0838 17 1130 17 0824    Rehab Assessment/Intervention    Discipline occupational therapist  -MANDO  physical therapist  -MF occupational therapist  -MANDO    Document Type therapy note (daily note)  -MANDO therapy note (daily note)  -MF therapy note (daily note)  -MANDO    Subjective Information agree to therapy;no complaints  -MANDO agree to therapy;complains of;pain  -MF agree to therapy;complains of;pain  -MANDO    Patient Effort, Rehab Treatment good  -MANDO  good  -MANDO    Symptoms Noted During/After Treatment --   cont. chronic pain  -MANDO      Precautions/Limitations fall precautions   wound vac LLE, NWB LLE  -MANDO  fall precautions;oxygen therapy device and L/min   wound vac LLE, NWB LLE  -MANDO    Recorded by [MANDO] Kacy Blevins, OT [MF] Lane Yates, PT [MANDO] Kacy Blevins, OT    Pain Assessment    Pain Assessment 0-10  -MANDO Blackmon-Cabrera FACES  - 0-10  -MANDO    Blackmon-Cabrera FACES Pain Rating  4  -MF     Pain Score 8   per pt. chronic pain never under 7/10  -MANDO  8  -MANDO    Post Pain Score 8  -MANDO  8  -MANDO    Pain Type Chronic pain  -MANDO Chronic pain  - Chronic pain   per pt. the lowest her pain ever goes is 7/10 even with meds  -MANDO    Pain Location Generalized  -MANDO Generalized  -MF Generalized  -MANDO    Pain Intervention(s) Medication (See MAR);Ambulation/increased activity;Repositioned  -MANDO Repositioned  -MF Ambulation/increased activity;Repositioned  -MANDO    Response to Interventions tolerated  -MANDO  tolerated  -MANDO    Recorded by [MANDO] Kacy Blevins, OT [MF] Lane Yates, PT [MANDO] Kacy Blevins, OT    Cognitive Assessment/Intervention    Current Cognitive/Communication Assessment functional  -MANDO  functional  -MANDO    Orientation Status oriented x 4  -MANDO      Follows Commands/Answers Questions 100% of the time;able to follow multi-step instructions;able to follow single-step instructions  -MANDO  100% of the time;able to follow single-step instructions  -MANDO    Personal Safety mild impairment   needs intermittent reminders to keep LLE off floor.  -MANDO  mild impairment  -MANDO    Personal Safety Interventions fall prevention program  maintained;gait belt;elopement precautions initiated;muscle strengthening facilitated;nonskid shoes/slippers when out of bed  -MANDO  fall prevention program maintained;gait belt;nonskid shoes/slippers when out of bed  -MANDO    Recorded by [MANDO] Kacy Blevins OT  [MANDO] Kacy Blevins OT    Bed Mobility, Assessment/Treatment    Bed Mobility, Assistive Device head of bed elevated  -MANDO      Bed Mobility, Scoot/Bridge, [place] conditional independence  -MANDO  independent  -MANDO    Bed Mob, Supine to Sit, [place] conditional independence  -MANDO  independent  -MANDO    Bed Mob, Sit to Supine, [place] conditional independence  -MANDO  independent  -MANDO    Recorded by [MANDO] Kacy Blevins OT  [MANDO] Kacy Blevins OT    Transfer Assessment/Treatment    Transfers, Bed-Chair [place]   --   pt. refused to chair  -MANDO    Transfers, Sit-Stand [place] contact guard assist  -MANDO  contact guard assist  -MANDO    Transfers, Stand-Sit [place] contact guard assist  -MANDO  contact guard assist  -MANDO    Transfers, Sit-Stand-Sit, Assist Device rolling walker  -MANDO  rolling walker  -MANDO    Transfer, Maintain Weight Bearing Status   cues to maintain weight bearing status;able to maintain weight bearing status   able keep several inches off floor today  -MANDO    Transfer, Impairments strength decreased;impaired balance  -MANDO  impaired balance;strength decreased   activity tolerance  -MANDO    Transfer, Comment Cues to keep LLE up off floor.    -MANDO  cues to push up and reach back, wanting to hold walker.  -MANDO    Recorded by [MANDO] Kacy Blevins OT  [MANDO] Kacy Blevins OT    Lower Body Dressing Assessment/Training    LB Dressing Assess/Train, Clothing Type doffing:;donning:;pants   on and off RLE only.  -MANDO      LB Dressing Assess/Train, Position sitting  -MANDO      LB Dressing Assess/Train, [place] verbal cues required;set up required  -AMNDO      LB Dressing Assess/Train, Comment pt. needed reminders to keep LLE off floor when performing.  Not  ready try stand and pull up yet.  -MANDO      Recorded by [MANDO] Kacy Blevins OT      Grooming Assessment/Training    Grooming Assess/Train, Position   sitting  -MANDO    Grooming Assess/Train, Indepen Level   set up required  -MANDO    Grooming Assess/Train, Comment   washed face and hands  -MANDO    Recorded by   [MANDO] aKcy Blevins OT    Balance Skills Training    Sitting-Level of Assistance Independent;Close supervision   for cues for LLE NWB  -MANDO  Independent  -MANDO    Standing-Level of Assistance Contact guard  -MANDO  Contact guard  -MANDO    Static Standing Balance Support assistive device  -MANDO  assistive device  -MANDO    Standing-Balance Activities --   working on balance and endurance  -MANDO  --   standing endurance  -MANDO    Standing Balance # of Minutes --   19 sec up only  -MANDO  --   20 sec up  -MANDO    Recorded by [MANDO] Kacy Blevins OT  [MANDO] Kacy Blevins OT    Therapy Exercises    Bilateral Lower Extremities AROM:;10 reps;sitting;ankle pumps/circles;glut sets;hip flexion;knee flexion  -MANDO  AROM:;10 reps;sitting;ankle pumps/circles;hip flexion;knee flexion;glut sets  -MANDO    Bilateral Upper Extremity   AROM:;10 reps;sitting;elbow flexion/extension;shoulder abduction/adduction;shoulder extension/flexion;shoulder horizontal abd/add;shoulder protraction/retraction;shoulder rolls/shrugs  -MANDO    BUE Resistance theraband   10 reps x 6 exer yellow band with cueing.  -MANDO  manual resistance- moderate   biceps and triceps  -MANDO    Recorded by [MANDO] Kacy Blevins OT  [MANDO] Kacy Blevins OT    Sensory Assessment/Intervention    Light Touch   --   per pt. no feeling L foot  -MANDO    Recorded by   [MANDO] Kacy Blevins OT    Positioning and Restraints    Pre-Treatment Position in bed  -MANDO in bed  - in bed  -MANDO    Post Treatment Position bed  -MANDO bed  - bed  -MANDO    In Bed supine;call light within reach;LLE elevated;encouraged to call for assist  -MANDO supine;call light within reach;with family/caregiver  - supine;call light within  reach;encouraged to call for assist;with nsg  -MANDO    Recorded by [MANDO] Kacy Blevins OT [MF] Lane Yates, PT [MANDO] Kacy Blevins OT      02/27/17 1418 02/27/17 0845       Rehab Assessment/Intervention    Discipline occupational therapist  -MANDO physical therapist  -     Document Type therapy note (daily note)  -MANDO therapy note (daily note)  -     Subjective Information agree to therapy;complains of;pain  -MANDO no complaints;agree to therapy  -     Patient Effort, Rehab Treatment good  -MANDO      Precautions/Limitations fall precautions;oxygen therapy device and L/min   wound vac, NWB LLE  -MANDO      Recorded by [MANDO] Kacy Blevins OT [MC] Chetna Chao, PT     Pain Assessment    Pain Assessment 0-10  -MANDO No/denies pain  -     Pain Score 9   WBFS more of a 5/10  -MANDO      Post Pain Score 9  -MANDO      Pain Type Chronic pain  -MANDO      Pain Location Generalized  -MANDO      Pain Intervention(s) Ambulation/increased activity;Repositioned  -MANDO      Response to Interventions tolerated  -MANDO      Recorded by [MANDO] Kacy Blevins OT [MC] Chetna Chao, PT     Vision Assessment/Intervention    Visual Impairment WFL with corrective lenses  -MANDO      Recorded by [MANDO] Kacy Blevins OT      Cognitive Assessment/Intervention    Current Cognitive/Communication Assessment functional  -MANDO functional  -     Orientation Status oriented x 4  -MANDO oriented x 4  -     Follows Commands/Answers Questions 100% of the time;able to follow single-step instructions  -MANDO      Personal Safety mild impairment  -MANDO      Personal Safety Interventions fall prevention program maintained;gait belt;nonskid shoes/slippers when out of bed;elopement precautions initiated  -MANDO      Recorded by [MANDO] Kacy Blevins OT [MC] Chetna Chao, PT     Bed Mobility, Assessment/Treatment    Bed Mobility, Scoot/Bridge, [place] independent  -MANDO      Bed Mob, Supine to Sit, [place] independent  -MANDO      Recorded by [MANDO] Kacy Blevins, OT       Transfer Assessment/Treatment    Transfers, Bed-Chair [place] contact guard assist;2 person assist required;verbal cues required   cues to pivot on RLE.  Needs to work on reaching back.  -MANDO      Transfers, Bed-Chair-Bed, Assist Device rolling walker  -MANDO      Transfers, Sit-Stand [place] minimum assist (75% patient effort);2 person assist required;verbal cues required  -MANDO      Transfers, Stand-Sit [place] minimum assist (75% patient effort);verbal cues required;2 person assist required   Pt. did intermittent TTWB, but able to keep NWB status   -MANDO      Transfers, Sit-Stand-Sit, Assist Device rolling walker  -MANDO      Transfer, Impairments strength decreased;impaired balance  -MANDO      Transfer, Comment Pt. needed cues for technique and reminders for no WB.  -MANDO      Recorded by [MANDO] Kacy Blevins OT      Functional Mobility    Functional Mobility- Comment Pt. unable to use knee wx due to knee pain per pt.  Pt. not strong enough to go long distance with wx only turn to chair.  -MANDO      Recorded by [MANDO] Kacy Blevins OT      Lower Body Dressing Assessment/Training    LB Dressing Assess/Train, Clothing Type doffing:;donning:;slipper socks  -MANDO      LB Dressing Assess/Train, Position sitting  -MANDO      LB Dressing Assess/Train, [place] independent  -MANDO      LB Dressing Assess/Train, Comment R sock only  -MANDO      Recorded by [MANDO] Kacy Blevins OT      Motor Skills/Interventions    Additional Documentation Balance Skills Training (Group)  -MANDO      Recorded by [MANDO] Kacy Blevins OT      Balance Skills Training    Sitting-Level of Assistance Distant supervision  -MANDO      Sitting-Balance Support Feet supported  -MANDO      Standing-Level of Assistance Contact guard;x2   static stand  -MANDO      Static Standing Balance Support assistive device  -MANDO      Recorded by [MANDO] Kacy Blevins OT      Therapy Exercises    Bilateral Upper Extremity AROM:;10 reps;sitting;elbow flexion/extension;shoulder  abduction/adduction;shoulder extension/flexion;shoulder horizontal abd/add;shoulder protraction/retraction;shoulder rolls/shrugs  -MANDO      BUE Resistance manual resistance- minimal   biceps and triceps  -MANDO      Recorded by [MANDO] Kacy Blevins, OT      Sensory Assessment/Intervention    Light Touch --   per pt. numbness up legs for some time  -MANDO      Recorded by [MANDO] Kacy Blevins, OT      Positioning and Restraints    Pre-Treatment Position in bed  -MANDO in bed  -     Post Treatment Position chair  -MANDO bed  -     In Bed  supine;call light within reach;encouraged to call for assist;with other staff   with RT  -MC     In Chair reclined;call light within reach;encouraged to call for assist;exit alarm on;legs elevated   pain pump intact  -MANDO      Recorded by [MANDO] Kacy Blevins, OT [MC] Chetna Chao, PT       User Key  (r) = Recorded By, (t) = Taken By, (c) = Cosigned By    Initials Name Effective Dates    MANDO Kacy Blevins, OT 06/22/15 -      Lane Yates, PT 06/19/15 -      Chetna Chao, PT 03/14/16 -                 OT Goals       03/01/17 0910 02/28/17 0909 02/27/17 1449    Transfer Training OT LTG    Transfer Training OT LTG, Outcome goal ongoing  -MANDO goal ongoing   pt. refused to chair today  -MANDO goal ongoing   pt. to min of 2 with NWB LLE today  -MANDO    Strength OT LTG    Strength Goal OT LTG, Outcome goal met   pt. progressed to yellow t-band  -MANDO goal ongoing   tolerated more resistance distally today  -MANDO goal ongoing   pt. began UE ROM exer today  -MANDO    LB Dressing OT LTG    LB Dressing Goal OT LTG, Outcome goal met   met for R side.  Unable to advance until wound vac off.  -MANDO goal ongoing   met socks only  -MANDO goal partially met   met R sock  -MANDO      02/26/17 1351          Transfer Training OT LTG    Transfer Training OT LTG, Date Established 02/26/17  -JR      Transfer Training OT LTG, Time to Achieve 1 wk  -JR      Transfer Training OT LTG, Activity Type bed to chair /chair to bed   -JR      Transfer Training OT LTG, [place] Level contact guard assist  -JR      Transfer Training OT LTG, Assist Device walker, rolling  -JR      Strength OT LTG    Strength Goal OT LTG, Date Established 02/26/17  -JR      Strength Goal OT LTG, Time to Achieve 1 wk  -JR      Strength Goal OT LTG, Functional Goal Pt to increase B UE strength by 1/2 muscle grade to support transfers with NWB status.  -JR      LB Dressing OT LTG    LB Dressing Goal OT LTG, Date Established 02/26/17  -JR      LB Dressing Goal OT LTG, Time to Achieve 1 wk  -JR      LB Dressing Goal OT LTG, Activity Type pants and socks on R LE.  -JR      LB Dressing Goal OT LTG, [place] Level minimum assist (75% patient effort)  -JR      LB Dressing Goal OT LTG, Adaptive Equipment other (see comments)   with appropriate AD  -JR        User Key  (r) = Recorded By, (t) = Taken By, (c) = Cosigned By    Initials Name Provider Type    MANDO Kacy Blevins, OT Occupational Therapist    JR Amy Garza, OT Occupational Therapist          Occupational Therapy Education     Title: PT OT SLP Therapies (Active)     Topic: Occupational Therapy (Active)     Point: ADL training (Done)    Description: Instruct learner(s) on proper safety adaptation and remediation techniques during self care or transfers.   Instruct in proper use of assistive devices.    Learning Progress Summary    Learner Readiness Method Response Comment Documented by Status   Patient Acceptance E,D VU,NR cues for NWB LLE with dressing, activity to  do on own during day, theraband exer  03/01/17 0909 Done    Acceptance E,D VU,NR cont. review UE ROM, benefits activity, standing endurance and safety for LBD and toileting.  02/28/17 0908 Done    Acceptance E VU,NR Reasons to keep LLE NWB when pt. said she stands on it anyway.  Safe transfer, UE ROM exer.  02/27/17 1448 Done    Acceptance E NR Pt educated on NWB status and proper transfer techniques  02/26/17 1350 Active                Point: Home exercise program (Done)    Description: Instruct learner(s) on appropriate technique for monitoring, assisting and/or progressing therapeutic exercises/activities.    Learning Progress Summary    Learner Readiness Method Response Comment Documented by Status   Patient Acceptance E,D VU,NR cues for NWB LLE with dressing, activity to  do on own during day, theraband exer  03/01/17 0909 Done    Acceptance E,D VU,NR cont. review UE ROM, benefits activity, standing endurance and safety for LBD and toileting.  02/28/17 0908 Done    Acceptance E VU,NR Reasons to keep LLE NWB when pt. said she stands on it anyway.  Safe transfer, UE ROM exer.  02/27/17 1448 Done               Point: Precautions (Done)    Description: Instruct learner(s) on prescribed precautions during self-care and functional transfers.    Learning Progress Summary    Learner Readiness Method Response Comment Documented by Status   Patient Acceptance E,D VU,NR cues for NWB LLE with dressing, activity to  do on own during day, theraband exer  03/01/17 0909 Done    Acceptance E,D VU,NR cont. review UE ROM, benefits activity, standing endurance and safety for LBD and toileting.  02/28/17 0908 Done    Acceptance E VU,NR Reasons to keep LLE NWB when pt. said she stands on it anyway.  Safe transfer, UE ROM exer.  02/27/17 1448 Done                      User Key     Initials Effective Dates Name Provider Type Discipline     06/22/15 -  Kacy Blevins, OT Occupational Therapist OT     06/22/15 -  Amy Garza, OT Occupational Therapist OT                  OT Recommendation and Plan  Anticipated Equipment Needs At Discharge:  (tba further)  Anticipated Discharge Disposition: inpatient rehabilitation facility  Planned Therapy Interventions: ADL retraining, bed mobility training, strengthening, transfer training  Therapy Frequency: daily  Plan of Care Review  Plan Of Care Reviewed With: patient  Progress: improving  Outcome  Summary/Follow up Plan: Pt. increasing strength UE's, able only stand 19 sec today vs 20 yesterday.  Advanced to yellow t-band exer today.        Outcome Measures       03/01/17 0838 02/28/17 0824 02/27/17 1423    How much help from another person do you currently need...    Turning from your back to your side while in flat bed without using bedrails?   3  -MANDO    Moving from lying on back to sitting on the side of a flat bed without bedrails?   4  -MANDO    Moving to and from a bed to a chair (including a wheelchair)?   3  -MANDO    Standing up from a chair using your arms (e.g., wheelchair, bedside chair)?   3  -MANDO    Climbing 3-5 steps with a railing?   1  -MANDO    To walk in hospital room?   1  -MANDO    AM-PAC 6 Clicks Score   15  -MANDO    How much help from another is currently needed...    Putting on and taking off regular lower body clothing? 3  -JBA 2  -JBA     Bathing (including washing, rinsing, and drying) 3  -JBA 2  -JBA     Toileting (which includes using toilet bed pan or urinal) 3  -JBA 3  -JBA     Putting on and taking off regular upper body clothing 3  -JBA 3  -JBA     Taking care of personal grooming (such as brushing teeth) 4  -JBA 4  -JBA     Eating meals 4  -JBA 4  -JBA     Score 20  -JBA 18  -JBA     Functional Assessment    Outcome Measure Options AM-PAC 6 Clicks Daily Activity (OT)  -JBA AM-PAC 6 Clicks Daily Activity (OT)  -JBA AM-PAC 6 Clicks Basic Mobility (PT)  -MANDO      02/27/17 1418 02/26/17 1314       How much help from another is currently needed...    Putting on and taking off regular lower body clothing? 2  -JBA 2  -JR     Bathing (including washing, rinsing, and drying) 2  -JBA 2  -JR     Toileting (which includes using toilet bed pan or urinal) 2  -JBA 2  -JR     Putting on and taking off regular upper body clothing 3  -JBA 3  -JR     Taking care of personal grooming (such as brushing teeth) 4  -JBA 4  -JR     Eating meals 4  -JBA 4  -JR     Score 17  -JBA 17  -JR     Functional Assessment     Outcome Measure Options AM-PAC 6 Clicks Daily Activity (OT)  -JBA AM-PAC 6 Clicks Daily Activity (OT)  -       User Key  (r) = Recorded By, (t) = Taken By, (c) = Cosigned By    Initials Name Provider Type    MANDO Esteban, PT Physical Therapist    TESSY Blevins, OT Occupational Therapist    JR Amy Garza, OT Occupational Therapist           Time Calculation:         Time Calculation- OT       03/01/17 0912          Time Calculation- OT    OT Start Time 0838  -MANDO      Total Timed Code Minutes- OT 19 minute(s)  -MANDO      OT Received On 03/01/17  -MANDO      OT Goal Re-Cert Due Date 03/08/17  -MANDO        User Key  (r) = Recorded By, (t) = Taken By, (c) = Cosigned By    Initials Name Provider Type    MANDO Belvins, OT Occupational Therapist           Therapy Charges for Today     Code Description Service Date Service Provider Modifiers Qty    20990293772 HC OT THERAPEUTIC ACT EA 15 MIN 2/28/2017 Kacy Blevins OT GO 1    85036337696 HC OT THERAPEUTIC ACT EA 15 MIN 3/1/2017 Kacy Blevins OT GO 1               Kacy Blevins OT  3/1/2017

## 2017-03-01 NOTE — PROGRESS NOTES
Acute Care - Wound/Debridement Treatment Note  [facility]     Patient Name: Tamara Langston  : 1955  MRN: [id number]  Today's Date: 3/1/2017  Onset of Illness/Injury or Date of Surgery Date: 17   Date of Referral to PT: 17   Referring Physician: MD Viki       Admit Date: 2017    Visit Dx:    ICD-10-CM ICD-9-CM   1. Impaired mobility and ADLs Z74.09 799.89   2. Toe osteomyelitis, left M86.9 730.27   3. Impaired functional mobility, balance, gait, and endurance Z74.09 V49.89       Patient Active Problem List   Diagnosis   • Atopic rhinitis   • Restrictive ventilatory defect   • COPD (chronic obstructive pulmonary disease)   • Osteoporosis   • Obstructive sleep apnea syndrome   • Abnormal liver enzymes   • Cholelithiasis   • Chronic back pain   • Mixed anxiety depressive disorder   • Type 2 diabetes mellitus   • Dyslipidemia   • Essential tremor   • Fibromyalgia   • Gastroesophageal reflux disease without esophagitis   • Hypertension   • Hypothyroidism   • Insomnia   • Osteoarthritis   • Polycythemia vera   • Psoriasis   • Branch retinal vein occlusion   • Cobalamin deficiency   • Chronic bronchitis   • Obesity   • Pneumonia   • Tobacco use   • Vitamin D deficiency   • Foot ulcer, left   • Leukocytosis   • Hypokalemia   • Lactic acidosis   • Fatty liver disease, nonalcoholic   • Diabetes education, encounter for   • Cellulitis of left foot   • Sinus bradycardia   • NSTEMI (non-ST elevated myocardial infarction)   • Tobacco abuse   • Hypoxia   • Peripheral vascular disease   • Gangrene   • Osteomyelitis of left foot   • Diabetic polyneuropathy associated with type 2 diabetes mellitus   • Anemia, blood loss   • Chronic pain   • Chronic, continuous use of opioids   • Chronic anxiety   • Chronically on benzodiazepine therapy               LDA Wound       17 1130          Incision 16 1226 Left foot    Incision - Properties Group Placement Date: 16  -KB Placement Time:  1226  -KB Side: Left  -KB Location: foot  -KB    Incision 02/25/17 1427 Left foot    Incision - Properties Group Placement Date: 02/25/17  -TB Placement Time: 1427  -TB Side: Left  -TB Location: foot  -TB Additional Comments: 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  -MW    Incision WDL ex  -MF      Dressing Appearance intact  -MF      Drainage Characteristics/Odor serosanguineous  -MF      Drainage Amount small  -MF      Therapy Setting (Negative Pressure Wound Therapy) continuous therapy  -MF      Pressure Setting (Negative Pressure Wound Therapy) 150 mmHg  -MF      Output (mL) 200  -MF      Pressure Ulcer 02/24/17 1432 Right coccyx Stage II    Pressure Ulcer - Properties Group Date first assessed: 02/24/17  -MS Time first assessed: 1432  -MS Present On Admission (Pressure Ulcer): yes  -MS Side: Right  -MS Location: coccyx  -MS Stage: Stage II  -MS    Pressure Ulcer 02/24/17 1432 other (see comments) Stage II    Pressure Ulcer - Properties Group Date first assessed: 02/24/17  -MS Time first assessed: 1432  -MS Present On Admission (Pressure Ulcer): yes  -MS Location: other (see comments)  -MS, nose  Stage: Stage II  -MS Additional Comments: from home c-pap  -MS      User Key  (r) = Recorded By, (t) = Taken By, (c) = Cosigned By    Initials Name Provider Type    MF Lane Yates, PT Physical Therapist    ROMIE Wheeler, PT Physical Therapist    TB Karen Atkins, RN Registered Nurse    ADALBERTO Clark RN Registered Nurse    MS David Raymond RN Registered Nurse            WOUND DEBRIDEMENT                     Adult Rehabilitation Note       03/01/17 1130 03/01/17 0838 02/28/17 1130    Rehab Assessment/Intervention    Discipline physical therapist  -MF occupational therapist  -MANDO physical therapist  -MF    Document Type therapy note (daily note)  -MF therapy note (daily note)  -MANDO therapy note (daily note)  -MF    Subjective Information agree to therapy;complains of;pain  -MF agree to  therapy;no complaints  -MANDO agree to therapy;complains of;pain  -MF    Patient Effort, Rehab Treatment  good  -MANDO     Symptoms Noted During/After Treatment  --   cont. chronic pain  -MANDO     Precautions/Limitations  fall precautions   wound vac LLE, NWB LLE  -MANDO     Recorded by [MF] Lane Yates, PT [MANDO] Kacy Blevins, OT [MF] Lane Yates, PT    Pain Assessment    Pain Assessment Blackmon-Cabrera FACES  -MF 0-10  -MANDO Blackmon-Cabrera FACES  -MF    Blackmon-Baker FACES Pain Rating 4  -MF  4  -MF    Pain Score  8   per pt. chronic pain never under 7/10  -MANDO     Post Pain Score  8  -MANDO     Pain Type Chronic pain  -MF Chronic pain  -MANDO Chronic pain  -MF    Pain Location Generalized  -MF Generalized  -MANDO Generalized  -MF    Pain Intervention(s) Repositioned  -MF Medication (See MAR);Ambulation/increased activity;Repositioned  -MANDO Repositioned  -MF    Response to Interventions  tolerated  -AMNDO     Recorded by [MF] Lane Yates, PT [MANDO] Kacy Blevins, OT [MF] Lane Yates, PT    Cognitive Assessment/Intervention    Current Cognitive/Communication Assessment  functional  -MANDO     Orientation Status  oriented x 4  -MANDO     Follows Commands/Answers Questions  100% of the time;able to follow multi-step instructions;able to follow single-step instructions  -MANDO     Personal Safety  mild impairment   needs intermittent reminders to keep LLE off floor.  -MANDO     Personal Safety Interventions  fall prevention program maintained;gait belt;elopement precautions initiated;muscle strengthening facilitated;nonskid shoes/slippers when out of bed  -MANDO     Recorded by  [MANDO] Kacy Blevins, OT     Bed Mobility, Assessment/Treatment    Bed Mobility, Assistive Device  head of bed elevated  -MANDO     Bed Mobility, Scoot/Bridge, [place]  conditional independence  -MANDO     Bed Mob, Supine to Sit, [place]  conditional independence  -MANDO     Bed Mob, Sit to Supine, [place]  conditional independence  -MANDO     Recorded by  [MANDO] Kacy Todd  CARRI Blevins     Transfer Assessment/Treatment    Transfers, Sit-Stand [place]  contact guard assist  -MANDO     Transfers, Stand-Sit [place]  contact guard assist  -MANDO     Transfers, Sit-Stand-Sit, Assist Device  rolling walker  -MANDO     Transfer, Impairments  strength decreased;impaired balance  -MANDO     Transfer, Comment  Cues to keep LLE up off floor.    -MANDO     Recorded by  [MANDO] Kacy Blevins OT     Lower Body Dressing Assessment/Training    LB Dressing Assess/Train, Clothing Type  doffing:;donning:;pants   on and off RLE only.  -MANDO     LB Dressing Assess/Train, Position  sitting  -MANDO     LB Dressing Assess/Train, [place]  verbal cues required;set up required  -MANDO     LB Dressing Assess/Train, Comment  pt. needed reminders to keep LLE off floor when performing.  Not ready try stand and pull up yet.  -MANDO     Recorded by  [MANDO] Kacy Blevins OT     Balance Skills Training    Sitting-Level of Assistance  Independent;Close supervision   for cues for LLE NWB  -MANDO     Standing-Level of Assistance  Contact guard  -MANDO     Static Standing Balance Support  assistive device  -MANDO     Standing-Balance Activities  --   working on balance and endurance  -MANDO     Standing Balance # of Minutes  --   19 sec up only  -MANDO     Recorded by  [MANDO] Kacy Blevins OT     Therapy Exercises    Bilateral Lower Extremities  AROM:;10 reps;sitting;ankle pumps/circles;glut sets;hip flexion;knee flexion  -MANDO     BUE Resistance  theraband   10 reps x 6 exer yellow band with cueing.  -MANDO     Recorded by  [MANDO] Kacy Blevins OT     Positioning and Restraints    Pre-Treatment Position in bed  - in bed  -MANDO in bed  -    Post Treatment Position bed  - bed  -MANDO bed  -    In Bed supine;call light within reach  - supine;call light within reach;LLE elevated;encouraged to call for assist  -MANDO supine;call light within reach;with family/caregiver  -    Recorded by [MF] Lane Yates, PT [MANDO] Kacy Blevins, OT [] Lane MATOS  [facility], PT      02/28/17 0824 02/27/17 1418 02/27/17 0845    Rehab Assessment/Intervention    Discipline occupational therapist  -MANDO occupational therapist  -MANDO physical therapist  -    Document Type therapy note (daily note)  -MANDO therapy note (daily note)  -MANDO therapy note (daily note)  -    Subjective Information agree to therapy;complains of;pain  -MANDO agree to therapy;complains of;pain  -MANDO no complaints;agree to therapy  -    Patient Effort, Rehab Treatment good  -MANDO good  -MANDO     Precautions/Limitations fall precautions;oxygen therapy device and L/min   wound vac LLE, NWB LLE  -MANDO fall precautions;oxygen therapy device and L/min   wound vac, NWB LLE  -MANDO     Recorded by [MANDO] Kacy Blevins OT [MANDO] Kacy Blevins OT [MC] Chetna Chao, PT    Pain Assessment    Pain Assessment 0-10  -MANDO 0-10  -MANDO No/denies pain  -    Pain Score 8  -MANDO 9   WBFS more of a 5/10  -MANDO     Post Pain Score 8  -MANDO 9  -MANDO     Pain Type Chronic pain   per pt. the lowest her pain ever goes is 7/10 even with meds  -MANDO Chronic pain  -MANDO     Pain Location Generalized  -MANDO Generalized  -MANDO     Pain Intervention(s) Ambulation/increased activity;Repositioned  -MANDO Ambulation/increased activity;Repositioned  -MANDO     Response to Interventions tolerated  -MANDO tolerated  -MANDO     Recorded by [MANDO] Kacy Blevins OT [MANDO] Kacy Blevins, OT [MC] Chetna Chao, PT    Vision Assessment/Intervention    Visual Impairment  WFL with corrective lenses  -MANDO     Recorded by  [MANDO] Kacy Blevins OT     Cognitive Assessment/Intervention    Current Cognitive/Communication Assessment functional  -MANDO functional  -MANDO functional  -    Orientation Status  oriented x 4  -MANDO oriented x 4  -MC    Follows Commands/Answers Questions 100% of the time;able to follow single-step instructions  -MANDO 100% of the time;able to follow single-step instructions  -MANDO     Personal Safety mild impairment  -MANDO mild impairment  -MANDO     Personal Safety Interventions fall  prevention program maintained;gait belt;nonskid shoes/slippers when out of bed  -MANDO fall prevention program maintained;gait belt;nonskid shoes/slippers when out of bed;elopement precautions initiated  -MANDO     Recorded by [MANDO] Kacy Blevins OT [MANDO] Kacy Blevins OT [MC] Chetna Chao, PT    Bed Mobility, Assessment/Treatment    Bed Mobility, Scoot/Bridge, [place] independent  -MANDO independent  -MANDO     Bed Mob, Supine to Sit, [place] independent  -MANDO independent  -MANDO     Bed Mob, Sit to Supine, [place] independent  -MANDO      Recorded by [MANDO] Kacy Blevins OT [MANDO] Kacy Blevins OT     Transfer Assessment/Treatment    Transfers, Bed-Chair [place] --   pt. refused to chair  -MANDO contact guard assist;2 person assist required;verbal cues required   cues to pivot on RLE.  Needs to work on reaching back.  -MANDO     Transfers, Bed-Chair-Bed, Assist Device  rolling walker  -MANDO     Transfers, Sit-Stand [place] contact guard assist  -MANDO minimum assist (75% patient effort);2 person assist required;verbal cues required  -MANDO     Transfers, Stand-Sit [place] contact guard assist  -MANDO minimum assist (75% patient effort);verbal cues required;2 person assist required   Pt. did intermittent TTWB, but able to keep NWB status   -MANDO     Transfers, Sit-Stand-Sit, Assist Device rolling walker  -MANDO rolling walker  -MANDO     Transfer, Maintain Weight Bearing Status cues to maintain weight bearing status;able to maintain weight bearing status   able keep several inches off floor today  -MANDO      Transfer, Impairments impaired balance;strength decreased   activity tolerance  -MANDO strength decreased;impaired balance  -MANDO     Transfer, Comment cues to push up and reach back, wanting to hold walker.  -MANDO Pt. needed cues for technique and reminders for no WB.  -MANDO     Recorded by [MANDO] Kacy Blevins OT [MANDO] Kacy Blevins OT     Functional Mobility    Functional Mobility- Comment  Pt. unable to use knee wx due to  knee pain per pt.  Pt. not strong enough to go long distance with wx only turn to chair.  -MANDO     Recorded by  [MANDO] Kacy Blevins OT     Lower Body Dressing Assessment/Training    LB Dressing Assess/Train, Clothing Type  doffing:;donning:;slipper socks  -MANDO     LB Dressing Assess/Train, Position  sitting  -MANDO     LB Dressing Assess/Train, [place]  independent  -MANDO     LB Dressing Assess/Train, Comment  R sock only  -MANDO     Recorded by  [MANDO] Kacy Blevins OT     Grooming Assessment/Training    Grooming Assess/Train, Position sitting  -MANDO      Grooming Assess/Train, Indepen Level set up required  -MANDO      Grooming Assess/Train, Comment washed face and hands  -MANDO      Recorded by [MANDO] Kacy Blevins OT      Motor Skills/Interventions    Additional Documentation  Balance Skills Training (Group)  -MANDO     Recorded by  [MANDO] Kacy Blevins OT     Balance Skills Training    Sitting-Level of Assistance Independent  -MANDO Distant supervision  -MANDO     Sitting-Balance Support  Feet supported  -MANDO     Standing-Level of Assistance Contact guard  -MANDO Contact guard;x2   static stand  -MANDO     Static Standing Balance Support assistive device  -MANDO assistive device  -MANDO     Standing-Balance Activities --   standing endurance  -MANDO      Standing Balance # of Minutes --   20 sec up  -MANDO      Recorded by [MANDO] Kacy Blevins OT [MANDO] Kacy Blevins, OT     Therapy Exercises    Bilateral Lower Extremities AROM:;10 reps;sitting;ankle pumps/circles;hip flexion;knee flexion;glut sets  -MANDO      Bilateral Upper Extremity AROM:;10 reps;sitting;elbow flexion/extension;shoulder abduction/adduction;shoulder extension/flexion;shoulder horizontal abd/add;shoulder protraction/retraction;shoulder rolls/shrugs  -MANDO AROM:;10 reps;sitting;elbow flexion/extension;shoulder abduction/adduction;shoulder extension/flexion;shoulder horizontal abd/add;shoulder protraction/retraction;shoulder rolls/shrugs  -MANDO     BUE Resistance manual resistance- moderate    biceps and triceps  -MANDO manual resistance- minimal   biceps and triceps  -MANDO     Recorded by [MANDO] Kacy Blevins, OT [MANDO] Kacy Blevins, OT     Sensory Assessment/Intervention    Light Touch --   per pt. no feeling L foot  -MANDO --   per pt. numbness up legs for some time  -MANDO     Recorded by [MANDO] Kacy Blevins, OT [MANDO] Kacy Blevins, OT     Positioning and Restraints    Pre-Treatment Position in bed  -MANDO in bed  -MANDO in bed  -MC    Post Treatment Position bed  -MANDO chair  -MANDO bed  -MC    In Bed supine;call light within reach;encouraged to call for assist;with nsg  -MANDO  supine;call light within reach;encouraged to call for assist;with other staff   with RT  -MC    In Chair  reclined;call light within reach;encouraged to call for assist;exit alarm on;legs elevated   pain pump intact  -MANDO     Recorded by [MANDO] Kacy Blevins, OT [MANDO] Kacy Blevins, OT [MC] Chetna Chao, PT      User Key  (r) = Recorded By, (t) = Taken By, (c) = Cosigned By    Initials Name Effective Dates    MANDO Kacy Blevins, OT 06/22/15 -      Lane Yates, PT 06/19/15 -     IVAN Chao, PT 03/14/16 -                 IP PT Goals       02/27/17 1423 02/27/17 0845 02/26/17 1332    Transfer Training PT LTG    Transfer Training PT LTG, Date Established 02/27/17  -MANDO      Transfer Training PT LTG, Time to Achieve 1 wk  -MANDO      Transfer Training PT LTG, Activity Type all transfers  -MANDO      Transfer Training PT LTG, [place] Level conditional independence  -MANDO      Transfer Training PT LTG, Assist Device walker, rolling  -MANDO      Gait Training PT LTG    Gait Training Goal PT LTG, Date Established 02/27/17  -MANDO      Gait Training Goal PT LTG, Time to Achieve 1 wk  -MANDO      Gait Training Goal PT LTG, [place] Level conditional independence  -MANDO      Gait Training Goal PT LTG, Assist Device walker, rolling  -MANDO      Gait Training Goal PT LTG, Distance to Achieve 25  -MANDO      Patient Education PT LTG    Patient Education PT LTG,  Date Established 02/27/17  -MANDO      Patient Education PT LTG, Time to Achieve 1 wk  -MANDO      Patient Education PT LTG, Education Type HEP;positioning;posture/body mechanics;gait;transfers;bed mobility;pain management;progression of POC;benefits of activity;home safety;equipment management;skin care/inspection   weightbearing status  -MANDO      Patient Education PT LTG, Education Understanding demonstrate adequately;verbalize understanding  -MANDO      Wound Care PT LTG    Wound Care PT LTG 1, Date Established   02/26/17  -MW    Wound Care PT LTG 1, Time to Achieve   2 wks  -MW    Wound Care PT LTG 1, Location   LT open ray amputation site   -MW    Wound Care PT LTG 1, No S&S of Infection   yes  -MW    Wound Care PT LTG 1, Decrease Wound Size   25%  -MW    Wound Care PT LTG 1, Decrease Exudate   minimum  -MW    Wound Care PT LTG 1, No New Skin Break Down   yes  -MW    Wound Care PT LTG 1, Education   S&S of infection;dressing changes;wound care;weight bearing restriction;progression of POC  -MW    Wound Care PT LTG 1, Education Understanding   verbalize understanding  -    Wound Care PT LTG 1, Outcome  goal ongoing  -       User Key  (r) = Recorded By, (t) = Taken By, (c) = Cosigned By    Initials Name Provider Type    MANDO Esteban, PT Physical Therapist     Christie Wheeler, PT Physical Therapist     Chetna Chao, PT Physical Therapist          Physical Therapy Education     Title: PT OT SLP Therapies (Active)     Topic: Physical Therapy (Done)     Point: Mobility training (Done)    Learning Progress Summary    Learner Readiness Method Response Comment Documented by Status   Patient Acceptance SERVANDO RENAE,NR  MANDO 02/27/17 3110 Done               Point: Home exercise program (Done)    Learning Progress Summary    Learner Readiness Method Response Comment Documented by Status   Patient Acceptance SERVANDO RENAE,NR  MANDO 02/27/17 9524 Done               Point: Body mechanics (Done)    Learning Progress Summary     Learner Readiness Method Response Comment Documented by Status   Patient Acceptance SERVANDO RENAE DOROTHY RAYO  MANDO 02/27/17 1558 Done               Point: Precautions (Done)    Learning Progress Summary    Learner Readiness Method Response Comment Documented by Status   Patient Acceptance SERVANDO RENAEDOROTHY  MANDO 02/27/17 1558 Done                      User Key     Initials Effective Dates Name Provider Type Discipline     06/19/15 -  Philomena Esteban, PT Physical Therapist PT                   PT ASSESSMENT (last 72 hours)      PT Evaluation       03/01/17 1130 03/01/17 0838    Rehab Evaluation    Document Type therapy note (daily note)  - therapy note (daily note)  -    Subjective Information agree to therapy;complains of;pain  - agree to therapy;no complaints  -    Patient Effort, Rehab Treatment  good  -MANDO    Symptoms Noted During/After Treatment  --   cont. chronic pain  -    General Information    Precautions/Limitations  fall precautions   wound vac LLE, NWB LLE  -MANDO    Pain Assessment    Pain Assessment Blackmon-Cabrera FACES  - 0-10  -MANDO    Blackmon-Cabrera FACES Pain Rating 4  -     Pain Score  8   per pt. chronic pain never under 7/10  -    Post Pain Score  8  -MANDO    Pain Type Chronic pain  - Chronic pain  -MANDO    Pain Location Generalized  - Generalized  -MANDO    Pain Intervention(s) Repositioned  - Medication (See MAR);Ambulation/increased activity;Repositioned  -MANDO    Response to Interventions  tolerated  -MANDO    Cognitive Assessment/Intervention    Current Cognitive/Communication Assessment  functional  -    Orientation Status  oriented x 4  -MANDO    Follows Commands/Answers Questions  100% of the time;able to follow multi-step instructions;able to follow single-step instructions  -MANDO    Personal Safety  mild impairment   needs intermittent reminders to keep LLE off floor.  -MANDO    Personal Safety Interventions  fall prevention program maintained;gait belt;elopement precautions initiated;muscle strengthening  facilitated;nonskid shoes/slippers when out of bed  -MANDO    Bed Mobility, Assessment/Treatment    Bed Mobility, Assistive Device  head of bed elevated  -MANDO    Bed Mobility, Scoot/Bridge, [place]  conditional independence  -MANDO    Bed Mob, Supine to Sit, [place]  conditional independence  -MANDO    Bed Mob, Sit to Supine, [place]  conditional independence  -MANDO    Transfer Assessment/Treatment    Transfers, Sit-Stand [place]  contact guard assist  -MANDO    Transfers, Stand-Sit [place]  contact guard assist  -MANDO    Transfers, Sit-Stand-Sit, Assist Device  rolling walker  -MANDO    Transfer, Impairments  strength decreased;impaired balance  -MANDO    Transfer, Comment  Cues to keep LLE up off floor.    -MANDO    Balance Skills Training    Sitting-Level of Assistance  Independent;Close supervision   for cues for LLE NWB  -MANDO    Standing-Level of Assistance  Contact guard  -MANDO    Static Standing Balance Support  assistive device  -MANDO    Standing-Balance Activities  --   working on balance and endurance  -    Standing Balance # of Minutes  --   19 sec up only  -MANDO    Therapy Exercises    Bilateral Lower Extremities  AROM:;10 reps;sitting;ankle pumps/circles;glut sets;hip flexion;knee flexion  -    BUE Resistance  theraband   10 reps x 6 exer yellow band with cueing.  -MANDO    Positioning and Restraints    Pre-Treatment Position in bed  - in bed  -MANDO    Post Treatment Position bed  - bed  -MANDO    In Bed supine;call light within reach  - supine;call light within reach;LLE elevated;encouraged to call for assist  -      02/28/17 1130 02/28/17 0829    Rehab Evaluation    Document Type therapy note (daily note)  -     Subjective Information agree to therapy;complains of;pain  -     General Information    Equipment Currently Used at Home  oxygen;respiratory supplies   CPAP and flowed in oxygen for CPAP through [facility]    Pain Assessment    Pain Assessment Bharati FACES  -     Bharati  FACES Pain Rating 4  -     Pain Type Chronic pain  -     Pain Location Generalized  -     Pain Intervention(s) Repositioned  -     Positioning and Restraints    Pre-Treatment Position in bed  -     Post Treatment Position bed  -MF     In Bed supine;call light within reach;with family/caregiver  -       02/28/17 0824 02/28/17 0800    Rehab Evaluation    Document Type therapy note (daily note)  -MANDO     Subjective Information agree to therapy;complains of;pain  -MANDO     Patient Effort, Rehab Treatment good  -MANDO     General Information    Precautions/Limitations fall precautions;oxygen therapy device and L/min   wound vac LLE, NWB LLE  -MANDO     Pain Assessment    Pain Assessment 0-10  -MANDO     Pain Score 8  -MANDO     Post Pain Score 8  -MANDO     Pain Type Chronic pain   per pt. the lowest her pain ever goes is 7/10 even with meds  -MANDO     Pain Location Generalized  -MANDO     Pain Intervention(s) Ambulation/increased activity;Repositioned  -MANDO     Response to Interventions tolerated  -MANDO     Cognitive Assessment/Intervention    Current Cognitive/Communication Assessment functional  -MANDO     Follows Commands/Answers Questions 100% of the time;able to follow single-step instructions  -MANDO     Personal Safety mild impairment  -MANDO     Personal Safety Interventions fall prevention program maintained;gait belt;nonskid shoes/slippers when out of bed  -MANDO     Bed Mobility, Assessment/Treatment    Bed Mobility, Scoot/Bridge, [place] independent  -MANDO     Bed Mob, Supine to Sit, [place] independent  -MANDO     Bed Mob, Sit to Supine, [place] independent  -MANDO     Transfer Assessment/Treatment    Transfers, Bed-Chair [place] --   pt. refused to chair  -MANDO     Transfers, Sit-Stand [place] contact guard assist  -MANDO     Transfers, Stand-Sit [place] contact guard assist  -MANDO     Transfers, Sit-Stand-Sit, Assist Device rolling walker  -MANDO     Transfer, Maintain Weight Bearing Status cues to maintain weight  bearing status;able to maintain weight bearing status   able keep several inches off floor today  -MANDO     Transfer, Impairments impaired balance;strength decreased   activity tolerance  -MANDO     Transfer, Comment cues to push up and reach back, wanting to hold walker.  -MANDO     Balance Skills Training    Sitting-Level of Assistance Independent  -MANDO     Standing-Level of Assistance Contact guard  -MANDO     Static Standing Balance Support assistive device  -MANDO     Standing-Balance Activities --   standing endurance  -MANDO     Standing Balance # of Minutes --   20 sec up  -MANDO     Therapy Exercises    Bilateral Lower Extremities AROM:;10 reps;sitting;ankle pumps/circles;hip flexion;knee flexion;glut sets  -MANDO     Bilateral Upper Extremity AROM:;10 reps;sitting;elbow flexion/extension;shoulder abduction/adduction;shoulder extension/flexion;shoulder horizontal abd/add;shoulder protraction/retraction;shoulder rolls/shrugs  -MANDO     BUE Resistance manual resistance- moderate   biceps and triceps  -MANDO     Sensory Assessment/Intervention    Light Touch --   per pt. no feeling L foot  -MANDO     LLE Light Touch  --   baseline numbness  -MS    RLE Light Touch  --   baseline numbness  -MS    Positioning and Restraints    Pre-Treatment Position in bed  -MANDO     Post Treatment Position bed  -MANDO     In Bed supine;call light within reach;encouraged to call for assist;with nsg  -MANDO       02/27/17 1423 02/27/17 1418    Rehab Evaluation    Document Type evaluation  -JBA therapy note (daily note)  -MANDO    Subjective Information agree to therapy;complains of;pain  -JBA agree to therapy;complains of;pain  -MANDO    Patient Effort, Rehab Treatment good  -JBA good  -MANDO    General Information    Patient Profile Review yes  -JBA     Onset of Illness/Injury or Date of Surgery Date 02/24/17  -JBA     Referring Physician MD Viki  -JBA     General Observations Pt supine in bed upon arrival with OT present. Wound vac intact, on supplemental O2. RN  consent for therapy.  -JBA     Pertinent History Of Current Problem Pt is s/p proximal ray amputation (mid cuboid) of left great toe due to osteomyelitis of proximal portion of first metatarsal bone of Left foot/ s/p prior transmetatarsal amputation of left great toe in November 2016. MRI findings suggest osteomyelitis of the left second metatarsal, left third metatarsal, left fourth metatarsal, and fifth metatarsal bone of the left foot.  -JBA     Precautions/Limitations fall precautions;oxygen therapy device and L/min;other (see comments);non-weight bearing status   wound vac, NWB LLE  -JBA fall precautions;oxygen therapy device and L/min   wound vac, NWB LLE  -MANDO    Prior Level of Function independent:;all household mobility;ADL's  -JBA     Equipment Currently Used at Home cane, straight;walker, rolling;shower chair;grab bar  -JBA     Plans/Goals Discussed With patient;agreed upon  -JBA     Risks Reviewed patient:;increased discomfort;lines disloged;increased drainage  -JBA     Benefits Reviewed patient:;increase strength;increase knowledge;improve skin integrity;decrease pain;increase balance;improve function;increase independence  -JBA     Barriers to Rehab medically complex  -JBA     Living Environment    Lives With spouse  -JBA     Living Arrangements house  -JBA     Home Accessibility stairs to enter home;bed and bath on same level  -JBA     Number of Stairs to Enter Home 3  -JBA     Stair Railings at Home none  -JBA     Clinical Impression    Date of Referral to PT 02/25/17  -JBA     PT Diagnosis weakness  -JBA     Patient/Family Goals Statement improve strength  -JBA     Criteria for Skilled Therapeutic Interventions Met yes;treatment indicated  -JBA     Rehab Potential good, to achieve stated therapy goals  -JBA     Pain Assessment    Pain Assessment 0-10  -JBA 0-10  -MANDO    Pain Score 9   (WBFS 5/10)  -JBA 9   WBFS more of a 5/10  -MANDO    Post Pain Score 9  -JBA 9  -MANDO    Pain Type Chronic pain  -JBA  Chronic pain  -MANDO    Pain Location Generalized  -JBA Generalized  -MANDO    Pain Intervention(s) Repositioned;Ambulation/increased activity  -JBA Ambulation/increased activity;Repositioned  -MANDO    Response to Interventions tolerated  -JBA tolerated  -MANDO    Vision Assessment/Intervention    Visual Impairment  WFL with corrective lenses  -MANDO    Cognitive Assessment/Intervention    Current Cognitive/Communication Assessment functional  -JBA functional  -MANDO    Orientation Status oriented x 4  -JBA oriented x 4  -MANDO    Follows Commands/Answers Questions 100% of the time;able to follow single-step instructions;needs cueing  -% of the time;able to follow single-step instructions  -MANDO    Personal Safety mild impairment  -JBA mild impairment  -MANDO    Personal Safety Interventions fall prevention program maintained;gait belt;muscle strengthening facilitated;elopement precautions initiated;nonskid shoes/slippers when out of bed  -JBA fall prevention program maintained;gait belt;nonskid shoes/slippers when out of bed;elopement precautions initiated  -MANDO    ROM (Range of Motion)    General ROM other (see comments)   No LE ROM deficits identified  -JBA     General ROM Detail Left ankle ROM WFL  -JBA     MMT (Manual Muscle Testing)    General MMT Assessment lower extremity strength deficits identified  -JBA     General MMT Assessment Detail R LE grossly 4/5, L LE grossly 3+/5  -JBA     Bed Mobility, Assessment/Treatment    Bed Mobility, Scoot/Bridge, [place] independent  -JBA independent  -MANDO    Bed Mob, Supine to Sit, [place] independent  -JBA independent  -MANDO    Transfer Assessment/Treatment    Transfers, Bed-Chair [place] contact guard assist;2 person assist required;verbal cues required  -JBA contact guard assist;2 person assist required;verbal cues required   cues to pivot on RLE.  Needs to work on reaching back.  -MANDO    Transfers, Bed-Chair-Bed, Assist Device rolling walker  -JBA rolling walker  -MANDO     Transfers, Sit-Stand [place]ane minimum assist (75% patient effort);verbal cues required;2 person assist required  -JBA minimum assist (75% patient effort);2 person assist required;verbal cues required  -MANDO    Transfers, Stand-Sit [place] verbal cues required;minimum assist (75% patient effort);2 person assist required;other (see comments)  -JBA minimum assist (75% patient effort);verbal cues required;2 person assist required   Pt. did intermittent TTWB, but able to keep NWB status   -MANDO    Transfers, Sit-Stand-Sit, Assist Device rolling walker  -JBA rolling walker  -MANDO    Transfer, Maintain Weight Bearing Status unable to maintain weight bearing status   pt only had brief moment of TTWB on LLE, then able to NWB  -JBA     Transfer, Safety Issues balance decreased during turns  -JBA     Transfer, Impairments strength decreased;impaired balance  -JBA strength decreased;impaired balance  -MANDO    Transfer, Comment Required cues for technique and reminders for NWB on LLE  -JBA Pt. needed cues for technique and reminders for no WB.  -MANDO    Gait Assessment/Treatment    Gait, Dane Level unable to perform;other (see comments)   only able to pivot to chair from EOB  -JBA     Motor Skills/Interventions    Additional Documentation  Balance Skills Training (Group)  -MANDO    Balance Skills Training    Sitting-Level of Assistance  Distant supervision  -MANDO    Sitting-Balance Support  Feet supported  -MANDO    Standing-Level of Assistance  Contact guard;x2   static stand  -MANDO    Static Standing Balance Support  assistive device  -MANDO    Therapy Exercises    Left Lower Extremity AROM:;10 reps;sitting;ankle pumps/circles;hip flexion;LAQ  -JBA     Bilateral Upper Extremity  AROM:;10 reps;sitting;elbow flexion/extension;shoulder abduction/adduction;shoulder extension/flexion;shoulder horizontal abd/add;shoulder protraction/retraction;shoulder rolls/shrugs  -MANDO    BUE Resistance  manual resistance- minimal   biceps and triceps   -MANDO    Sensory Assessment/Intervention    Light Touch LLE;RLE  -JBA --   per pt. numbness up legs for some time  -MANDO    LLE Light Touch mild impairment   lower legs at baseline  -JBA     RLE Light Touch mild impairment   lower legs at baseline  -JBA     Positioning and Restraints    Pre-Treatment Position in bed  -JBA in bed  -MANDO    Post Treatment Position chair  -JBA chair  -MANDO    In Chair notified nsg;reclined;call light within reach;encouraged to call for assist;exit alarm on;legs elevated   pain pump intact  -JBA reclined;call light within reach;encouraged to call for assist;exit alarm on;legs elevated   pain pump intact  -MANDO      02/27/17 1416 02/27/17 0845    Rehab Evaluation    Document Type  therapy note (daily note)  -    Subjective Information  no complaints;agree to therapy  -    General Information    Equipment Currently Used at Home cane, straight;walker, rolling;shower chair;grab bar  -SE     Living Environment    Lives With spouse   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  -SE     Living Arrangements house   One level home  -     Pain Assessment    Pain Assessment  No/denies pain  -    Cognitive Assessment/Intervention    Current Cognitive/Communication Assessment  functional  -    Orientation Status  oriented x 4  -MC    Positioning and Restraints    Pre-Treatment Position  in bed  -MC    Post Treatment Position  bed  -MC    In Bed  supine;call light within reach;encouraged to call for assist;with other staff   with RT  -MC      User Key  (r) = Recorded By, (t) = Taken By, (c) = Cosigned By    Initials Name Provider Type    JBA Philomena Esteban, PT Physical Therapist    MANDO Blevins, OT Occupational Therapist    ANN-MARIE Yates, PT Physical Therapist     Chetna Chao, PT Physical Therapist    SE Ese Whitlock, RN Case Manager    MS David Raymond, RN Registered Nurse            PT Recommendation and Plan  Anticipated Equipment Needs At Discharge: other (see  comments) (wound VAC )  Anticipated Discharge Disposition: home with assist, home with home health (if plan further level of amputation, will recommend rehab)  Planned Therapy Interventions: wound care, patient/family education  PT Frequency: daily, per priority policy    Plan Of Care Reviewed With: patient       Outcome Summary/Follow up Plan: wound vac intact with no issues noted. pt to have surgery tomorrow or friday. PT will hold dressing change until after surgery          Outcome Measures       03/01/17 0838 02/28/17 0824 02/27/17 1423    How much help from another person do you currently need...    Turning from your back to your side while in flat bed without using bedrails?   3  -MANDO    Moving from lying on back to sitting on the side of a flat bed without bedrails?   4  -MANDO    Moving to and from a bed to a chair (including a wheelchair)?   3  -MANDO    Standing up from a chair using your arms (e.g., wheelchair, bedside chair)?   3  -MANDO    Climbing 3-5 steps with a railing?   1  -MANDO    To walk in hospital room?   1  -MANDO    AM-PAC 6 Clicks Score   15  -MANDO    How much help from another is currently needed...    Putting on and taking off regular lower body clothing? 3  -JBA 2  -JBA     Bathing (including washing, rinsing, and drying) 3  -JBA 2  -JBA     Toileting (which includes using toilet bed pan or urinal) 3  -JBA 3  -JBA     Putting on and taking off regular upper body clothing 3  -JBA 3  -JBA     Taking care of personal grooming (such as brushing teeth) 4  -JBA 4  -JBA     Eating meals 4  -JBA 4  -JBA     Score 20  -JBA 18  -JBA     Functional Assessment    Outcome Measure Options AM-PAC 6 Clicks Daily Activity (OT)  -JBA AM-PAC 6 Clicks Daily Activity (OT)  -JBA AM-PAC 6 Clicks Basic Mobility (PT)  -MANDO      02/27/17 1418          How much help from another is currently needed...    Putting on and taking off regular lower body clothing? 2  -JBA      Bathing (including washing, rinsing, and drying) 2  -JBA       Toileting (which includes using toilet bed pan or urinal) 2  -JBA      Putting on and taking off regular upper body clothing 3  -JBA      Taking care of personal grooming (such as brushing teeth) 4  -JBA      Eating meals 4  -JBA      Score 17  -JBA      Functional Assessment    Outcome Measure Options AM-PAC 6 Clicks Daily Activity (OT)  -JBA        User Key  (r) = Recorded By, (t) = Taken By, (c) = Cosigned By    Initials Name Provider Type    MANDO Esteban, PT Physical Therapist    TESSY Blevins, OT Occupational Therapist              Time Calculation        PT Charges       03/01/17 1130          Time Calculation    Start Time 1130  -MF      PT Goal Re-Cert Due Date 03/08/17  -      Time Calculation- PT    Total Timed Code Minutes- PT 10 minute(s)  -        User Key  (r) = Recorded By, (t) = Taken By, (c) = Cosigned By    Initials Name Provider Type     Lane Yates, PT Physical Therapist             Therapy Charges for Today     Code Description Service Date Service Provider Modifiers Qty    [id number]  PT NEG PRESS WOUND TO 50SQCM DME1 2/28/2017 Lane Yates, PT  1    71384686365 HC PT THER SUPP EA 15 MIN 2/28/2017 Lane Yates, PT GP 1    [id number]  PT NEG PRESS WOUND TO 50SQCM DME1 3/1/2017 Lane Yates, PT  1    [id number] HC PT THER SUPP EA 15 MIN 3/1/2017 Lane Yates, PT GP 1            PT G-Codes  Outcome Measure Options: AM-PAC 6 Clicks Daily Activity (OT)        Lane Yates, PT  3/1/2017

## 2017-03-01 NOTE — PROGRESS NOTES
Cardiothoracic Surgery Progress Note      POD #: 4-complete removal of left first metatarsal bone and a portion cuneiform bone of the left foot wide excision of soft tissues of the previous transmetatarsal amputation area     LOS: 5 days      Subjective: Patient's awake, alert and voicing no major complaints        Objective:  Vital Signs signs below are noted  Temp:  [97.8 °F (36.6 °C)-98.4 °F (36.9 °C)] 98.2 °F (36.8 °C)  Heart Rate:  [56-86] 86  Resp:  [16-18] 16  BP: (125-154)/(61-72) 154/67    Physical Exam:   General Appearance: Oriented ×3   Lungs: Clear except for slight wheeze on the right   Heart: Systolic murmur noted as before   Skin:   Incision:   Wound VAC intact on the left ray toe transmetatarsal amputation site.    The left trang tis warm, well-perfused  Results: Laboratory results below are noted    Results from last 7 days  Lab Units 02/28/17  0447   WBC 10*3/mm3 10.11   HEMOGLOBIN g/dL 9.7*   HEMATOCRIT % 31.7*   PLATELETS 10*3/mm3 236       Results from last 7 days  Lab Units 02/28/17  0447   SODIUM mmol/L 142   POTASSIUM mmol/L 4.0   CHLORIDE mmol/L 107   TOTAL CO2 mmol/L 35.0*   BUN mg/dL 17   CREATININE mg/dL 0.80   GLUCOSE mg/dL 107*   CALCIUM mg/dL 9.3         Assessment: #1.  Postop day 4 for exploration.  Infected transmetatarsal amputation site of  The leg great toe with removal of the remainder the first metatarsal bone and ports the cuneiform bone and surrounding tissues  2.  Diabetes mellitus with severe peripheral neuropathy  #3.  Multiple comorbidities.  #4.  MRI findings left foot of possible osteomyelitis of the second, third and fourth metatarsal bones and a portion of the fifth metatarsal bone.  Dr. Elaina Akers, of orthopedics reviewed the MRI findings with the last evening and feels as though the MRI findings does represent osteomyelitis and that a transmetatarsal amputation of the remainder of the toes should be done  Plan: We will plan to proceed ahead with transmetatarsal  amputation of the remainder the toes left foot, either tomorrow or Friday.  Patient understands this and is agreeable to proceed ahead and understands the risks that have been explained for this procedure previously.. They include bleeding, infection, poor wound healing and possible amputation at a higher level, that is a below-knee amputation, heart attack, stroke, anesthesia risk and possible death.      Arsalan Feliciano MD - 03/01/17 - 7:33 AM

## 2017-03-02 ENCOUNTER — ANESTHESIA EVENT (OUTPATIENT)
Dept: PERIOP | Facility: HOSPITAL | Age: 62
End: 2017-03-02

## 2017-03-02 ENCOUNTER — ANESTHESIA (OUTPATIENT)
Dept: PERIOP | Facility: HOSPITAL | Age: 62
End: 2017-03-02

## 2017-03-02 LAB
GLUCOSE BLDC GLUCOMTR-MCNC: 107 MG/DL (ref 70–130)
GLUCOSE BLDC GLUCOMTR-MCNC: 126 MG/DL (ref 70–130)
GLUCOSE BLDC GLUCOMTR-MCNC: 152 MG/DL (ref 70–130)
GLUCOSE BLDC GLUCOMTR-MCNC: 159 MG/DL (ref 70–130)

## 2017-03-02 PROCEDURE — 0Y6W0Z0 DETACHMENT AT LEFT 4TH TOE, COMPLETE, OPEN APPROACH: ICD-10-PCS | Performed by: THORACIC SURGERY (CARDIOTHORACIC VASCULAR SURGERY)

## 2017-03-02 PROCEDURE — 0Y6S0Z0 DETACHMENT AT LEFT 2ND TOE, COMPLETE, OPEN APPROACH: ICD-10-PCS | Performed by: THORACIC SURGERY (CARDIOTHORACIC VASCULAR SURGERY)

## 2017-03-02 PROCEDURE — C1751 CATH, INF, PER/CENT/MIDLINE: HCPCS

## 2017-03-02 PROCEDURE — 82962 GLUCOSE BLOOD TEST: CPT

## 2017-03-02 PROCEDURE — 25010000002 ONDANSETRON PER 1 MG: Performed by: ANESTHESIOLOGY

## 2017-03-02 PROCEDURE — 94660 CPAP INITIATION&MGMT: CPT

## 2017-03-02 PROCEDURE — C1894 INTRO/SHEATH, NON-LASER: HCPCS

## 2017-03-02 PROCEDURE — 25010000002 NEOSTIGMINE PER 0.5 MG: Performed by: ANESTHESIOLOGY

## 2017-03-02 PROCEDURE — 94799 UNLISTED PULMONARY SVC/PX: CPT

## 2017-03-02 PROCEDURE — 97605 NEG PRS WND THER DME<=50SQCM: CPT

## 2017-03-02 PROCEDURE — 87116 MYCOBACTERIA CULTURE: CPT | Performed by: THORACIC SURGERY (CARDIOTHORACIC VASCULAR SURGERY)

## 2017-03-02 PROCEDURE — 87070 CULTURE OTHR SPECIMN AEROBIC: CPT | Performed by: THORACIC SURGERY (CARDIOTHORACIC VASCULAR SURGERY)

## 2017-03-02 PROCEDURE — 25010000002 PROPOFOL 10 MG/ML EMULSION: Performed by: ANESTHESIOLOGY

## 2017-03-02 PROCEDURE — 87176 TISSUE HOMOGENIZATION CULTR: CPT | Performed by: THORACIC SURGERY (CARDIOTHORACIC VASCULAR SURGERY)

## 2017-03-02 PROCEDURE — 88311 DECALCIFY TISSUE: CPT | Performed by: THORACIC SURGERY (CARDIOTHORACIC VASCULAR SURGERY)

## 2017-03-02 PROCEDURE — 94640 AIRWAY INHALATION TREATMENT: CPT

## 2017-03-02 PROCEDURE — 97110 THERAPEUTIC EXERCISES: CPT

## 2017-03-02 PROCEDURE — 87077 CULTURE AEROBIC IDENTIFY: CPT | Performed by: THORACIC SURGERY (CARDIOTHORACIC VASCULAR SURGERY)

## 2017-03-02 PROCEDURE — 25010000002 FENTANYL CITRATE (PF) 100 MCG/2ML SOLUTION: Performed by: ANESTHESIOLOGY

## 2017-03-02 PROCEDURE — 25010000002 MIDAZOLAM PER 1 MG: Performed by: ANESTHESIOLOGY

## 2017-03-02 PROCEDURE — 87075 CULTR BACTERIA EXCEPT BLOOD: CPT | Performed by: THORACIC SURGERY (CARDIOTHORACIC VASCULAR SURGERY)

## 2017-03-02 PROCEDURE — 88304 TISSUE EXAM BY PATHOLOGIST: CPT | Performed by: THORACIC SURGERY (CARDIOTHORACIC VASCULAR SURGERY)

## 2017-03-02 PROCEDURE — 87206 SMEAR FLUORESCENT/ACID STAI: CPT | Performed by: THORACIC SURGERY (CARDIOTHORACIC VASCULAR SURGERY)

## 2017-03-02 PROCEDURE — 87147 CULTURE TYPE IMMUNOLOGIC: CPT | Performed by: THORACIC SURGERY (CARDIOTHORACIC VASCULAR SURGERY)

## 2017-03-02 PROCEDURE — 99232 SBSQ HOSP IP/OBS MODERATE 35: CPT | Performed by: NURSE PRACTITIONER

## 2017-03-02 PROCEDURE — 63710000001 INSULIN DETEMIR PER 5 UNITS: Performed by: HOSPITALIST

## 2017-03-02 PROCEDURE — 99024 POSTOP FOLLOW-UP VISIT: CPT | Performed by: THORACIC SURGERY (CARDIOTHORACIC VASCULAR SURGERY)

## 2017-03-02 PROCEDURE — 88305 TISSUE EXAM BY PATHOLOGIST: CPT | Performed by: THORACIC SURGERY (CARDIOTHORACIC VASCULAR SURGERY)

## 2017-03-02 PROCEDURE — 0Y6Y0Z0 DETACHMENT AT LEFT 5TH TOE, COMPLETE, OPEN APPROACH: ICD-10-PCS | Performed by: THORACIC SURGERY (CARDIOTHORACIC VASCULAR SURGERY)

## 2017-03-02 PROCEDURE — 25010000002 CEFEPIME: Performed by: INTERNAL MEDICINE

## 2017-03-02 PROCEDURE — 87205 SMEAR GRAM STAIN: CPT | Performed by: THORACIC SURGERY (CARDIOTHORACIC VASCULAR SURGERY)

## 2017-03-02 PROCEDURE — 28810 AMPUTATION TOE & METATARSAL: CPT | Performed by: THORACIC SURGERY (CARDIOTHORACIC VASCULAR SURGERY)

## 2017-03-02 PROCEDURE — 87186 SC STD MICRODIL/AGAR DIL: CPT | Performed by: THORACIC SURGERY (CARDIOTHORACIC VASCULAR SURGERY)

## 2017-03-02 PROCEDURE — 0Y6U0Z0 DETACHMENT AT LEFT 3RD TOE, COMPLETE, OPEN APPROACH: ICD-10-PCS | Performed by: THORACIC SURGERY (CARDIOTHORACIC VASCULAR SURGERY)

## 2017-03-02 RX ORDER — SODIUM CHLORIDE 0.9 % (FLUSH) 0.9 %
1-10 SYRINGE (ML) INJECTION AS NEEDED
Status: DISCONTINUED | OUTPATIENT
Start: 2017-03-02 | End: 2017-03-03 | Stop reason: HOSPADM

## 2017-03-02 RX ORDER — GLYCOPYRROLATE 0.2 MG/ML
INJECTION INTRAMUSCULAR; INTRAVENOUS AS NEEDED
Status: DISCONTINUED | OUTPATIENT
Start: 2017-03-02 | End: 2017-03-02 | Stop reason: SURG

## 2017-03-02 RX ORDER — PROPOFOL 10 MG/ML
VIAL (ML) INTRAVENOUS AS NEEDED
Status: DISCONTINUED | OUTPATIENT
Start: 2017-03-02 | End: 2017-03-02 | Stop reason: SURG

## 2017-03-02 RX ORDER — MORPHINE SULFATE 10 MG/ML
2 INJECTION INTRAMUSCULAR; INTRAVENOUS; SUBCUTANEOUS
Status: DISCONTINUED | OUTPATIENT
Start: 2017-03-02 | End: 2017-03-10 | Stop reason: HOSPADM

## 2017-03-02 RX ORDER — MORPHINE SULFATE 10 MG/ML
4 INJECTION INTRAMUSCULAR; INTRAVENOUS; SUBCUTANEOUS
Status: DISCONTINUED | OUTPATIENT
Start: 2017-03-02 | End: 2017-03-02

## 2017-03-02 RX ORDER — LIDOCAINE HYDROCHLORIDE 10 MG/ML
1 INJECTION, SOLUTION EPIDURAL; INFILTRATION; INTRACAUDAL; PERINEURAL ONCE
Status: DISCONTINUED | OUTPATIENT
Start: 2017-03-02 | End: 2017-03-03 | Stop reason: HOSPADM

## 2017-03-02 RX ORDER — ATRACURIUM BESYLATE 10 MG/ML
INJECTION, SOLUTION INTRAVENOUS AS NEEDED
Status: DISCONTINUED | OUTPATIENT
Start: 2017-03-02 | End: 2017-03-02 | Stop reason: SURG

## 2017-03-02 RX ORDER — MAGNESIUM HYDROXIDE 1200 MG/15ML
LIQUID ORAL AS NEEDED
Status: DISCONTINUED | OUTPATIENT
Start: 2017-03-02 | End: 2017-03-02 | Stop reason: HOSPADM

## 2017-03-02 RX ORDER — SODIUM CHLORIDE, SODIUM LACTATE, POTASSIUM CHLORIDE, CALCIUM CHLORIDE 600; 310; 30; 20 MG/100ML; MG/100ML; MG/100ML; MG/100ML
9 INJECTION, SOLUTION INTRAVENOUS CONTINUOUS
Status: DISCONTINUED | OUTPATIENT
Start: 2017-03-02 | End: 2017-03-07

## 2017-03-02 RX ORDER — FENTANYL CITRATE 50 UG/ML
INJECTION, SOLUTION INTRAMUSCULAR; INTRAVENOUS AS NEEDED
Status: DISCONTINUED | OUTPATIENT
Start: 2017-03-02 | End: 2017-03-02 | Stop reason: SURG

## 2017-03-02 RX ORDER — ONDANSETRON 2 MG/ML
INJECTION INTRAMUSCULAR; INTRAVENOUS AS NEEDED
Status: DISCONTINUED | OUTPATIENT
Start: 2017-03-02 | End: 2017-03-02 | Stop reason: SURG

## 2017-03-02 RX ORDER — LIDOCAINE HYDROCHLORIDE 10 MG/ML
INJECTION, SOLUTION INFILTRATION; PERINEURAL AS NEEDED
Status: DISCONTINUED | OUTPATIENT
Start: 2017-03-02 | End: 2017-03-02 | Stop reason: SURG

## 2017-03-02 RX ORDER — SODIUM CHLORIDE 0.9 % (FLUSH) 0.9 %
10 SYRINGE (ML) INJECTION AS NEEDED
Status: DISCONTINUED | OUTPATIENT
Start: 2017-03-02 | End: 2017-03-10 | Stop reason: HOSPADM

## 2017-03-02 RX ORDER — SODIUM CHLORIDE 450 MG/100ML
100 INJECTION, SOLUTION INTRAVENOUS CONTINUOUS
Status: DISCONTINUED | OUTPATIENT
Start: 2017-03-02 | End: 2017-03-07

## 2017-03-02 RX ORDER — MIDAZOLAM HYDROCHLORIDE 1 MG/ML
INJECTION INTRAMUSCULAR; INTRAVENOUS AS NEEDED
Status: DISCONTINUED | OUTPATIENT
Start: 2017-03-02 | End: 2017-03-02 | Stop reason: SURG

## 2017-03-02 RX ORDER — IPRATROPIUM BROMIDE AND ALBUTEROL SULFATE 2.5; .5 MG/3ML; MG/3ML
3 SOLUTION RESPIRATORY (INHALATION)
Status: CANCELLED | OUTPATIENT
Start: 2017-03-02

## 2017-03-02 RX ORDER — MORPHINE SULFATE 10 MG/ML
4 INJECTION INTRAMUSCULAR; INTRAVENOUS; SUBCUTANEOUS
Status: DISCONTINUED | OUTPATIENT
Start: 2017-03-02 | End: 2017-03-10 | Stop reason: HOSPADM

## 2017-03-02 RX ADMIN — FENTANYL 1 PATCH: 100 PATCH, EXTENDED RELEASE TRANSDERMAL at 09:28

## 2017-03-02 RX ADMIN — FENTANYL CITRATE 100 MCG: 50 INJECTION, SOLUTION INTRAMUSCULAR; INTRAVENOUS at 20:42

## 2017-03-02 RX ADMIN — OXYCODONE HYDROCHLORIDE AND ACETAMINOPHEN 1 TABLET: 10; 325 TABLET ORAL at 09:29

## 2017-03-02 RX ADMIN — MIDAZOLAM HYDROCHLORIDE 2 MG: 1 INJECTION, SOLUTION INTRAMUSCULAR; INTRAVENOUS at 20:42

## 2017-03-02 RX ADMIN — SODIUM CHLORIDE, POTASSIUM CHLORIDE, SODIUM LACTATE AND CALCIUM CHLORIDE: 600; 310; 30; 20 INJECTION, SOLUTION INTRAVENOUS at 20:28

## 2017-03-02 RX ADMIN — CEFEPIME 2 G: 2 INJECTION, POWDER, FOR SOLUTION INTRAVENOUS at 03:21

## 2017-03-02 RX ADMIN — NICOTINE 1 PATCH: 21 PATCH, EXTENDED RELEASE TRANSDERMAL at 09:27

## 2017-03-02 RX ADMIN — INSULIN DETEMIR 10 UNITS: 100 INJECTION, SOLUTION SUBCUTANEOUS at 09:33

## 2017-03-02 RX ADMIN — CEFEPIME 2 G: 2 INJECTION, POWDER, FOR SOLUTION INTRAVENOUS at 15:06

## 2017-03-02 RX ADMIN — SALINE NASAL SPRAY 2 SPRAY: 1.5 SOLUTION NASAL at 05:04

## 2017-03-02 RX ADMIN — CLONAZEPAM 0.25 MG: 0.5 TABLET ORAL at 09:31

## 2017-03-02 RX ADMIN — LISINOPRIL 20 MG: 20 TABLET ORAL at 09:30

## 2017-03-02 RX ADMIN — IPRATROPIUM BROMIDE AND ALBUTEROL SULFATE 3 ML: .5; 3 SOLUTION RESPIRATORY (INHALATION) at 22:44

## 2017-03-02 RX ADMIN — BACLOFEN 20 MG: 10 TABLET ORAL at 13:00

## 2017-03-02 RX ADMIN — OXYCODONE HYDROCHLORIDE AND ACETAMINOPHEN 1 TABLET: 10; 325 TABLET ORAL at 13:50

## 2017-03-02 RX ADMIN — GLYCOPYRROLATE 0.2 MG: 0.2 INJECTION, SOLUTION INTRAMUSCULAR; INTRAVENOUS at 22:07

## 2017-03-02 RX ADMIN — PREGABALIN 100 MG: 100 CAPSULE ORAL at 09:30

## 2017-03-02 RX ADMIN — BUSPIRONE HYDROCHLORIDE 20 MG: 10 TABLET ORAL at 09:29

## 2017-03-02 RX ADMIN — PANTOPRAZOLE SODIUM 40 MG: 40 TABLET, DELAYED RELEASE ORAL at 05:03

## 2017-03-02 RX ADMIN — IPRATROPIUM BROMIDE AND ALBUTEROL SULFATE 3 ML: .5; 3 SOLUTION RESPIRATORY (INHALATION) at 13:45

## 2017-03-02 RX ADMIN — ONDANSETRON 4 MG: 2 INJECTION INTRAMUSCULAR; INTRAVENOUS at 21:59

## 2017-03-02 RX ADMIN — LEVOTHYROXINE SODIUM 112 MCG: 112 TABLET ORAL at 05:03

## 2017-03-02 RX ADMIN — IPRATROPIUM BROMIDE AND ALBUTEROL SULFATE 3 ML: .5; 3 SOLUTION RESPIRATORY (INHALATION) at 16:34

## 2017-03-02 RX ADMIN — PROPOFOL 150 MG: 10 INJECTION, EMULSION INTRAVENOUS at 20:42

## 2017-03-02 RX ADMIN — AMLODIPINE BESYLATE 5 MG: 5 TABLET ORAL at 09:31

## 2017-03-02 RX ADMIN — LIDOCAINE HYDROCHLORIDE 50 MG: 10 INJECTION, SOLUTION INFILTRATION; PERINEURAL at 20:42

## 2017-03-02 RX ADMIN — Medication 2 MG: at 22:07

## 2017-03-02 RX ADMIN — OXYCODONE HYDROCHLORIDE AND ACETAMINOPHEN 1 TABLET: 10; 325 TABLET ORAL at 05:13

## 2017-03-02 RX ADMIN — Medication 1 CAPSULE: at 09:30

## 2017-03-02 RX ADMIN — IPRATROPIUM BROMIDE AND ALBUTEROL SULFATE 3 ML: .5; 3 SOLUTION RESPIRATORY (INHALATION) at 09:12

## 2017-03-02 RX ADMIN — SODIUM CHLORIDE 50 ML/HR: 4.5 INJECTION, SOLUTION INTRAVENOUS at 06:52

## 2017-03-02 RX ADMIN — PREGABALIN 100 MG: 100 CAPSULE ORAL at 15:06

## 2017-03-02 RX ADMIN — ATRACURIUM BESYLATE 50 MG: 10 INJECTION, SOLUTION INTRAVENOUS at 20:42

## 2017-03-02 NOTE — PLAN OF CARE
Problem: Patient Care Overview (Adult)  Goal: Plan of Care Review  Outcome: Ongoing (interventions implemented as appropriate)    03/01/17 1649 03/02/17 0433   Coping/Psychosocial Response Interventions   Plan Of Care Reviewed With patient --    Outcome Evaluation   Outcome Summary/Follow up Plan --  pt having surgery for amputatuin left toes, pain cotrolled, vss, UOP--WNL.        Goal: Adult Individualization and Mutuality  Outcome: Ongoing (interventions implemented as appropriate)  Goal: Discharge Needs Assessment  Outcome: Ongoing (interventions implemented as appropriate)    Problem: Infection, Risk/Actual (Adult)  Goal: Infection Prevention/Resolution  Outcome: Ongoing (interventions implemented as appropriate)    Problem: Skin Integrity Impairment, Risk/Actual (Adult)  Goal: Skin Integrity/Wound Healing  Outcome: Ongoing (interventions implemented as appropriate)    Problem: Pressure Ulcer (Adult)  Goal: Signs and Symptoms of Listed Potential Problems Will be Absent or Manageable (Pressure Ulcer)  Outcome: Ongoing (interventions implemented as appropriate)    Problem: Fall Risk (Adult)  Goal: Identify Related Risk Factors and Signs and Symptoms  Outcome: Ongoing (interventions implemented as appropriate)  Goal: Absence of Falls  Outcome: Ongoing (interventions implemented as appropriate)

## 2017-03-02 NOTE — PROGRESS NOTES
[facility] Medicine Services  INPATIENT PROGRESS NOTE    Date of Admission: 2/24/2017  Length of Stay: 6  Primary Care Physician: Harinder Aranda MD  Subjective   Chief Complaint: All over pain    HPI:  No issues overnight, but states she did not sleep at all.  Chronic body pain is about the same, but states she doesn't have any feeling in her feet, so she is not experiencing any pain there.  She does not voice any complaint, anxious to proceed with surgery.    Review Of Systems:   Review of Systems   Constitutional: Negative for fever.   Respiratory: Positive for wheezing.    Gastrointestinal: Negative for abdominal pain.   Musculoskeletal: Positive for arthralgias and myalgias.   All other systems reviewed and are negative.    Objective    Temp:  [98.2 °F (36.8 °C)-98.7 °F (37.1 °C)] 98.2 °F (36.8 °C)  Heart Rate:  [62-86] 82  Resp:  [16-18] 18  BP: (122-142)/(57-68) 122/57  Physical Exam  Gen-no acute distress, resting in bed on speciality mattress  Eyes-EOMI, pupils pinpoint but reactive  Neck-trachea midline, no JVD or nuchal rigidity  CV-RRR, S1 S2 normal, 2/6 systolic murmur  Resp- slight bilateral wheeze   Abd-obese, soft, NT, ND, +BS  Ext-no edema, LLE with surgical drsg/wound vac in place without drainage.  RLE warm, dry, +2 DP pulse  Neuro-A&O to person, place, time and situation.  Speech is clear, follows all commands, no focal deficits  Psych- Pleasant and cooperative; normal affect    Results Review:    I have reviewed the labs, radiology results and diagnostic studies.      Results from last 7 days  Lab Units 03/01/17  1453   WBC 10*3/mm3 12.10*   HEMOGLOBIN g/dL 10.4*   PLATELETS 10*3/mm3 234       Results from last 7 days  Lab Units 03/01/17  1453   SODIUM mmol/L 139   POTASSIUM mmol/L 4.1   TOTAL CO2 mmol/L 36.0*   CREATININE mg/dL 0.90   GLUCOSE mg/dL 153*     Culture Data:   BLOOD CULTURE   Date Value Ref Range Status   02/24/2017 No growth at 2 days  Preliminary    02/24/2017 No growth at 2 days  Preliminary     WOUND CULTURE   Date Value Ref Range Status   02/23/2017 Heavy growth (4+) Staphylococcus aureus (A)  Final     I have reviewed the medications.    Current Facility-Administered Medications:   •  [START ON 3/4/2017] !Vancomycin trough on 3/4 at 2000. Please hold scheduled dose due at 2100 until pharmacy has reviewed., , Does not apply, Once, Joey Alvarado, [facility]  •  acetaminophen (TYLENOL) tablet 650 mg, 650 mg, Oral, Q4H PRN, Valarie Wahl APRN  •  albuterol (PROVENTIL) nebulizer solution 0.5% 2.5 mg/0.5mL, 2.5 mg, Nebulization, Q6H PRN, Valarie Wahl APRN  •  amLODIPine (NORVASC) tablet 5 mg, 5 mg, Oral, Daily, Valarie Wahl APRN, 5 mg at 03/02/17 0931  •  aspirin EC tablet 325 mg, 325 mg, Oral, Daily, Valarie Wahl APRN, 325 mg at 03/01/17 0909  •  atorvastatin (LIPITOR) tablet 40 mg, 40 mg, Oral, Nightly, Valarie Wahl, APRN, 40 mg at 03/01/17 2151  •  baclofen (LIORESAL) tablet 20 mg, 20 mg, Oral, Q8H, Valarie Wahl APRN, 20 mg at 03/02/17 1300  •  bisoprolol-hydrochlorothiazide (ZIAC) 5-6.25 MG per tablet 1 tablet, 1 tablet, Oral, Q24H, Derek Jones MD, 1 tablet at 03/01/17 2150  •  busPIRone (BUSPAR) tablet 20 mg, 20 mg, Oral, Q12H, Valarie Wahl, APRN, 20 mg at 03/02/17 0929  •  cefepime (MAXIPIME) 2 g/100 mL 0.9% NS (mbp), 2 g, Intravenous, Q12H, Oksana Cruz MD, 2 g at 03/02/17 1506  •  cetirizine (zyrTEC) tablet 10 mg, 10 mg, Oral, Nightly, Valarie Wahl APRN, 10 mg at 03/01/17 2151  •  clonazePAM (KlonoPIN) tablet 0.25 mg, 0.25 mg, Oral, Q12H, Cierra Ayala MD, 0.25 mg at 03/02/17 0931  •  dextrose (GLUTOSE) oral gel 15 g, 15 g, Oral, Q15 Min PRN, JEOVANY Ramos  •  ezetimibe (ZETIA) tablet 10 mg, 10 mg, Oral, Daily, JEOVANY Ramos, 10 mg at 02/28/17 0901  •  fentaNYL (DURAGESIC) 100 MCG/HR patch 1 patch, 1 patch, Transdermal, Every Other Day, Arsalan Feliciano MD, 1 patch at 03/02/17  0928  •  fluticasone (FLONASE) 50 MCG/ACT nasal spray 1 spray, 1 spray, Nasal, BID, JEOVANY Ramos, 1 spray at 02/28/17 1701  •  furosemide (LASIX) tablet 40 mg, 40 mg, Oral, Daily, Valarie Wahl APRN, 40 mg at 03/01/17 0908  •  glucagon (GLUCAGEN) injection 1 mg, 1 mg, Subcutaneous, Q15 Min PRN, Valarie Wahl APRN  •  insulin detemir (LEVEMIR) injection 10 Units, 10 Units, Subcutaneous, Q12H, Christian Drew MD, 10 Units at 03/02/17 0933  •  insulin lispro (humaLOG) injection 2-7 Units, 2-7 Units, Subcutaneous, 4x Daily AC & at Bedtime, JEOVANY Ramos, 2 Units at 03/01/17 1201  •  ipratropium-albuterol (DUO-NEB) nebulizer solution 3 mL, 3 mL, Nebulization, 4x Daily - RT, Snoia Moore, APRN, 3 mL at 03/02/17 1345  •  levothyroxine (SYNTHROID, LEVOTHROID) tablet 112 mcg, 112 mcg, Oral, Q AM, Valarie Wahl APRN, 112 mcg at 03/02/17 0503  •  lisinopril (PRINIVIL,ZESTRIL) tablet 20 mg, 20 mg, Oral, Q24H, Valarie Wahl APRN, 20 mg at 03/02/17 0930  •  melatonin sublingual tablet 5 mg, 5 mg, Sublingual, Nightly PRN, Valarie Wahl APRN, 5 mg at 02/28/17 2153  •  mupirocin (BACTROBAN) 2 % nasal ointment 1 application, 1 application, Each Nare, Q12H PRN, CINDI Rosado  •  nicotine (NICODERM CQ) 21 MG/24HR patch 1 patch, 1 patch, Transdermal, Q24H, Arsalan Feliciano MD, 1 patch at 03/02/17 0927  •  oxyCODONE-acetaminophen (PERCOCET)  MG per tablet 1 tablet, 1 tablet, Oral, Q4H PRN, Christian Drew MD, 1 tablet at 03/02/17 1350  •  pantoprazole (PROTONIX) EC tablet 40 mg, 40 mg, Oral, Q AM, Valarie Wahl, APRN, 40 mg at 03/02/17 0503  •  Pharmacy to dose vancomycin, , Does not apply, Continuous PRN, Arsalan Feliciano MD  •  phenylephrine (JOSE MARIA-SYNEPHRINE) 0.5 % nasal spray 2 spray, 2 spray, Each Nare, Q6H, Sonia Moore, APRN, 2 spray at 02/28/17 1111  •  pregabalin (LYRICA) capsule 100 mg, 100 mg, Oral, Q8H, Cierra Ayala MD, 100 mg at 03/02/17 1506  •   probiotic (CULTURELLE) capsule 1 capsule, 1 capsule, Oral, Daily, Valarie Wahl, APRN, 1 capsule at 03/02/17 0930  •  promethazine (PHENERGAN) tablet 25 mg, 25 mg, Oral, Q8H PRN, Valarie Wahl, JEOVANY  •  sennosides-docusate sodium (SENOKOT-S) 8.6-50 MG tablet 2 tablet, 2 tablet, Oral, Nightly, Christian Drew MD, 2 tablet at 03/01/17 2151  •  simethicone (MYLICON) chewable tablet 80 mg, 80 mg, Oral, 4x Daily PRN, Cierra Ayala MD, 80 mg at 02/25/17 2010  •  sodium chloride (OCEAN) nasal spray 2 spray, 2 spray, Each Nare, Q6H, JEOVANY Aquino, 2 spray at 03/02/17 0504  •  sodium chloride (OCEAN) nasal spray 2 spray, 2 spray, Each Nare, PRN, Sonia Moore APRN  •  sodium chloride 0.45 % infusion, 100 mL/hr, Intravenous, Continuous, Arsalan Feliciano MD, Last Rate: 100 mL/hr at 03/02/17 1308, 100 mL/hr at 03/02/17 1308  •  sodium chloride 0.9 % flush 1-10 mL, 1-10 mL, Intravenous, PRN, CINDI Rosado  •  sodium chloride 0.9 % flush 10 mL, 10 mL, Intracatheter, PRN, Oksana Cruz MD  •  vancomycin IVPB 1500 mg in 0.9% NaCl (Premix) 500 mL, 1,500 mg, Intravenous, Q24H, Arsalan Feliciano MD, 1,500 mg at 03/01/17 2156    Assessment/Plan   Assessment/Problem List  Principal Problem:    Osteomyelitis of left foot  Active Problems:    COPD (chronic obstructive pulmonary disease)    Obstructive sleep apnea syndrome    Chronic back pain    Type 2 diabetes mellitus    Dyslipidemia    Fibromyalgia    Gastroesophageal reflux disease without esophagitis    Hypertension    Hypothyroidism    Peripheral vascular disease    Diabetic polyneuropathy associated with type 2 diabetes mellitus    Anemia, blood loss    Chronic pain    Chronic, continuous use of opioids    Chronic anxiety    Chronically on benzodiazepine therapy     Assessment/Problem List     This is a 61-year-old  female with past medical history significant for tobacco abuse, hypertension, hyperlipidemia, and diabetes type 2, who is status  post transmetatarsal amputation of the left great toe due to osteomyelitis in November 2016, now presents with poor wound healing with growth of staph aureus from tissue cultures done on 2/24.      MSSA Osteomyelitis (presumed) of left 1st metatarsal  -- Infectious disease and cardiothoracic surgeon on consult  -- On Vanco and ceftriaxone per Dr. Cruz  -- MRI showed evidence of osteomyelitis in the first metatarsal and the rest of the digits on the left foot, s/p removal of the rest of 1st metatarsal bone on 2/25 by Dr. Feliciano  -- reviewed Dr. Feliciano's note, after he discussed MRI and reviewed with Dr. Granados - plan is to proceed with further surgery this evening    DM2, Controlled  -- Hgb A1c is 6.1   -- continue currenet insulin regimen, acceptable control     Anemia of chronic disease  -- hemoglobin stable, monitor    Chronic back pain/Fibromyalgia on chronic opioids  -- continue home pain medication  -- continue home muscle relaxers     Anxiety on chronic benzos  - buspar and klonopin continued (klonopin with 50% decrease in home dose)    COPD  -- stable    HTN  -- continue home medications  -- monitor, currently stable    Constipation  -- continue bowel regimen      Hypothyroidism  -- continue home dose of synthroid  -- TSH within normal limits      HLP  -- continue home dose of Lipitor and zetia    DINA  -- CPAP w/ 2L oxygen while sleeping    Tobacco abuse  -- Patient counseled at length and cessation recommended      DVT prophylaxis: shell hose and scd to RLE  Dispo:  Patient planning on going to rehabilitation when cleared by surgery    Dianne Hernadez, JEOVANY   03/02/17   4:03 PM    Please note that portions of this note may have been completed with a voice recognition program. Efforts were made to edit the dictations, but occasionally words are mistranscribed.

## 2017-03-02 NOTE — PROGRESS NOTES
Cardiothoracic Surgery Progress Note      POD #: 5-complete removal of left first metatarsal bone and the cuneiform bone of the left foot with wide excision of soft tissues of the previous transmetatarsal amputation     LOS: 6 days      Subjective: Awake and alert.  Awaiting the surgery planned for later today        Objective:  Vital Signs vital signs below are noted  Temp:  [98 °F (36.7 °C)-98.7 °F (37.1 °C)] 98.6 °F (37 °C)  Heart Rate:  [69-86] 72  Resp:  [16-18] 16  BP: (120-142)/(56-68) 142/64    Physical Exam:   General Appearance: Patient is oriented ×3   Lungs: Few scattered wheezes   Heart: Systolic murmur as noted before.  At.  Right intercostal space   Skin:   Incision:   Wound VAC is intact over the left first metatarsal excision wound  Results: Laboratory results below are noted    Results from last 7 days  Lab Units 03/01/17  1453   WBC 10*3/mm3 12.10*   HEMOGLOBIN g/dL 10.4*   HEMATOCRIT % 33.7*   PLATELETS 10*3/mm3 234       Results from last 7 days  Lab Units 03/01/17  1453   SODIUM mmol/L 139   POTASSIUM mmol/L 4.1   CHLORIDE mmol/L 102   TOTAL CO2 mmol/L 36.0*   BUN mg/dL 15   CREATININE mg/dL 0.90   GLUCOSE mg/dL 153*   CALCIUM mg/dL 10.4         Assessment: Postop day 5.  Exploration of infected transmetatarsal amputation site of the left great toe and removal of the remainder of the first metatarsal bone and the cuneiform bone and the surrounding tissues of the amputation site  2.  Diabetes mellitus.  #3.  Suspected  Osteomyelitis of the left second, third, fourth metatarsal bones  #4.  Multiple comorbidities  Plan: To do a transmetatarsal amputation of the left foot.  The patient understands the risk that we have explained before.  Plan is also for PICC line placement today      Arsalan Feliciano MD - 03/02/17 - 6:31 AM

## 2017-03-02 NOTE — PROGRESS NOTES
Acute Care - Wound/Debridement Treatment Note  [facility]     Patient Name: Tamara Langston  : 1955  MRN: [id number]  Today's Date: 3/2/2017  Onset of Illness/Injury or Date of Surgery Date: 17   Date of Referral to PT: 17   Referring Physician: MD Viki       Admit Date: 2017    Visit Dx:    ICD-10-CM ICD-9-CM   1. Impaired mobility and ADLs Z74.09 799.89   2. Toe osteomyelitis, left M86.9 730.27   3. Impaired functional mobility, balance, gait, and endurance Z74.09 V49.89       Patient Active Problem List   Diagnosis   • Atopic rhinitis   • Restrictive ventilatory defect   • COPD (chronic obstructive pulmonary disease)   • Osteoporosis   • Obstructive sleep apnea syndrome   • Abnormal liver enzymes   • Cholelithiasis   • Chronic back pain   • Mixed anxiety depressive disorder   • Type 2 diabetes mellitus   • Dyslipidemia   • Essential tremor   • Fibromyalgia   • Gastroesophageal reflux disease without esophagitis   • Hypertension   • Hypothyroidism   • Insomnia   • Osteoarthritis   • Polycythemia vera   • Psoriasis   • Branch retinal vein occlusion   • Cobalamin deficiency   • Chronic bronchitis   • Obesity   • Pneumonia   • Tobacco use   • Vitamin D deficiency   • Foot ulcer, left   • Leukocytosis   • Hypokalemia   • Lactic acidosis   • Fatty liver disease, nonalcoholic   • Diabetes education, encounter for   • Cellulitis of left foot   • Sinus bradycardia   • NSTEMI (non-ST elevated myocardial infarction)   • Tobacco abuse   • Hypoxia   • Peripheral vascular disease   • Gangrene   • Osteomyelitis of left foot   • Diabetic polyneuropathy associated with type 2 diabetes mellitus   • Anemia, blood loss   • Chronic pain   • Chronic, continuous use of opioids   • Chronic anxiety   • Chronically on benzodiazepine therapy               LDA Wound       17 0800 17 1130       Incision 16 1226 Left foot    Incision - Properties Group Placement Date: 16  -  Placement Time: 1226  -KB Side: Left  -KB Location: foot  -KB    Incision 02/25/17 1427 Left foot    Incision - Properties Group Placement Date: 02/25/17  -TB Placement Time: 1427  -TB Side: Left  -TB Location: foot  -TB Additional Comments: 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  -MW    Incision WDL ex  -MF ex  -MF     Dressing Appearance intact  -MF intact  -MF     Drainage Characteristics/Odor serosanguineous  -MF serosanguineous  -MF     Drainage Amount small  -MF small  -MF     Therapy Setting (Negative Pressure Wound Therapy) continuous therapy  -MF continuous therapy  -MF     Pressure Setting (Negative Pressure Wound Therapy) 150 mmHg  - mmHg  -MF     Output (mL) 200  -  -MF     Pressure Ulcer 02/24/17 1432 Right coccyx Stage II    Pressure Ulcer - Properties Group Date first assessed: 02/24/17  -MS Time first assessed: 1432  -MS Present On Admission (Pressure Ulcer): yes  -MS Side: Right  -MS Location: coccyx  -MS Stage: Stage II  -MS    Pressure Ulcer 02/24/17 1432 other (see comments) Stage II    Pressure Ulcer - Properties Group Date first assessed: 02/24/17  -MS Time first assessed: 1432  -MS Present On Admission (Pressure Ulcer): yes  -MS Location: other (see comments)  -MS, nose  Stage: Stage II  -MS Additional Comments: from home c-pap  -MS      User Key  (r) = Recorded By, (t) = Taken By, (c) = Cosigned By    Initials Name Provider Type    ANN-MARIE Yates, PT Physical Therapist    ROMIE Wheeler, PT Physical Therapist    TB Karen Atkins RN Registered Nurse    ADALBERTO Clark RN Registered Nurse    MS David Raymond RN Registered Nurse            WOUND DEBRIDEMENT                     Adult Rehabilitation Note       03/02/17 0800 03/01/17 1130 03/01/17 0838    Rehab Assessment/Intervention    Discipline physical therapist  -MF physical therapist  -MF occupational therapist  -MANDO    Document Type therapy note (daily note)  -MF therapy note (daily note)   - therapy note (daily note)  -MANDO    Subjective Information agree to therapy;complains of;weakness;fatigue;pain  - agree to therapy;complains of;pain  - agree to therapy;no complaints  -MANDO    Patient Effort, Rehab Treatment   good  -MANDO    Symptoms Noted During/After Treatment   --   cont. chronic pain  -MANDO    Precautions/Limitations   fall precautions   wound vac LLE, NWB LLE  -MANDO    Recorded by [] Lane Yates, PT [] Lane Yates, PT [MANDO] Kacy Blevins, OT    Pain Assessment    Pain Assessment Blackmon-Baker FACES  - Blackmon-Baker FACES  - 0-10  -MANDO    Blackmon-Cabrera FACES Pain Rating 4  - 4  -MF     Pain Score   8   per pt. chronic pain never under 7/10  -MANDO    Post Pain Score   8  -MANDO    Pain Type Chronic pain  - Chronic pain  - Chronic pain  -MANDO    Pain Location Generalized  - Generalized  - Generalized  -MANDO    Pain Intervention(s) Repositioned  - Repositioned  - Medication (See MAR);Ambulation/increased activity;Repositioned  -MANDO    Response to Interventions   tolerated  -MANDO    Recorded by [] Lane Yates, PT [] Lane Yates, PT [MANDO] Kacy Blevins, OT    Cognitive Assessment/Intervention    Current Cognitive/Communication Assessment   functional  -MANDO    Orientation Status   oriented x 4  -MANDO    Follows Commands/Answers Questions   100% of the time;able to follow multi-step instructions;able to follow single-step instructions  -MANDO    Personal Safety   mild impairment   needs intermittent reminders to keep LLE off floor.  -MANDO    Personal Safety Interventions   fall prevention program maintained;gait belt;elopement precautions initiated;muscle strengthening facilitated;nonskid shoes/slippers when out of bed  -MANDO    Recorded by   [MANDO] Kacy Blevins, OT    Bed Mobility, Assessment/Treatment    Bed Mobility, Assistive Device   head of bed elevated  -MANDO    Bed Mobility, Scoot/Bridge, [place]   conditional independence  -MANDO    Bed Mob, Supine to Sit, [place]    conditional independence  -MANDO    Bed Mob, Sit to Supine, [place]   conditional independence  -MANDO    Recorded by   [MANDO] Kacy Blevins OT    Transfer Assessment/Treatment    Transfers, Sit-Stand [place]   contact guard assist  -MANDO    Transfers, Stand-Sit [place]   contact guard assist  -MANDO    Transfers, Sit-Stand-Sit, Assist Device   rolling walker  -MANDO    Transfer, Impairments   strength decreased;impaired balance  -MANDO    Transfer, Comment   Cues to keep LLE up off floor.    -MANDO    Recorded by   [MANDO] Kacy Blevins OT    Lower Body Dressing Assessment/Training    LB Dressing Assess/Train, Clothing Type   doffing:;donning:;pants   on and off RLE only.  -MANDO    LB Dressing Assess/Train, Position   sitting  -MANDO    LB Dressing Assess/Train, [place]   verbal cues required;set up required  -MANDO    LB Dressing Assess/Train, Comment   pt. needed reminders to keep LLE off floor when performing.  Not ready try stand and pull up yet.  -MANDO    Recorded by   [MANDO] Kacy Blevins OT    Balance Skills Training    Sitting-Level of Assistance   Independent;Close supervision   for cues for LLE NWB  -MANDO    Standing-Level of Assistance   Contact guard  -MANDO    Static Standing Balance Support   assistive device  -MANDO    Standing-Balance Activities   --   working on balance and endurance  -MANDO    Standing Balance # of Minutes   --   19 sec up only  -MANDO    Recorded by   [MANDO] Kacy Blevins OT    Therapy Exercises    Bilateral Lower Extremities   AROM:;10 reps;sitting;ankle pumps/circles;glut sets;hip flexion;knee flexion  -MANDO    BUE Resistance   theraband   10 reps x 6 exer yellow band with cueing.  -MANDO    Recorded by   [MANDO] Kacy Blevins OT    Positioning and Restraints    Pre-Treatment Position in bed  - in bed  - in bed  -MANDO    Post Treatment Position bed  - bed  - bed  -MANDO    In Bed supine;call light within reach  - supine;call light within reach  -MF supine;call light within reach;LLE elevated;encouraged  to call for assist  -MANDO    Recorded by [MF] Lane Yates, PT [MF] Lane Yates, PT [MANDO] Kacy Blevins, OT      02/28/17 1130 02/28/17 0824 02/27/17 1418    Rehab Assessment/Intervention    Discipline physical therapist  -MF occupational therapist  -MANDO occupational therapist  -MANDO    Document Type therapy note (daily note)  -MF therapy note (daily note)  -MANDO therapy note (daily note)  -MANDO    Subjective Information agree to therapy;complains of;pain  -MF agree to therapy;complains of;pain  -MANDO agree to therapy;complains of;pain  -MANDO    Patient Effort, Rehab Treatment  good  -MANDO good  -MANDO    Precautions/Limitations  fall precautions;oxygen therapy device and L/min   wound vac LLE, NWB LLE  -MANDO fall precautions;oxygen therapy device and L/min   wound vac, NWB LLE  -MANDO    Recorded by [MF] Lane Yates, PT [MANDO] Kacy Blevins, OT [MANDO] Kacy Blevins OT    Pain Assessment    Pain Assessment Blackmon-Cabrera FACES  -MF 0-10  -MANDO 0-10  -MANDO    Blackmon-Cabrera FACES Pain Rating 4  -MF      Pain Score  8  -MANDO 9   WBFS more of a 5/10  -MANDO    Post Pain Score  8  -MANDO 9  -MANDO    Pain Type Chronic pain  -MF Chronic pain   per pt. the lowest her pain ever goes is 7/10 even with meds  -MANDO Chronic pain  -MANDO    Pain Location Generalized  -MF Generalized  -MANDO Generalized  -MANDO    Pain Intervention(s) Repositioned  -MF Ambulation/increased activity;Repositioned  -MANDO Ambulation/increased activity;Repositioned  -MANDO    Response to Interventions  tolerated  -MANDO tolerated  -MANDO    Recorded by [MF] Lane Yates, PT [MANDO] Kacy Blevins, OT [MANDO] Kacy Blevins, OT    Vision Assessment/Intervention    Visual Impairment   WFL with corrective lenses  -MANDO    Recorded by   [MANDO] Kacy Blevins OT    Cognitive Assessment/Intervention    Current Cognitive/Communication Assessment  functional  -MANDO functional  -MANDO    Orientation Status   oriented x 4  -MANDO    Follows Commands/Answers Questions  100% of the time;able to follow single-step  instructions  -MANDO 100% of the time;able to follow single-step instructions  -MANDO    Personal Safety  mild impairment  -MANDO mild impairment  -MANDO    Personal Safety Interventions  fall prevention program maintained;gait belt;nonskid shoes/slippers when out of bed  -MANDO fall prevention program maintained;gait belt;nonskid shoes/slippers when out of bed;elopement precautions initiated  -MANDO    Recorded by  [MANDO] Kacy Blevins, OT [MANDO] Kacy Blevins, OT    Bed Mobility, Assessment/Treatment    Bed Mobility, Scoot/Bridge, [place]  independent  -MANDO independent  -MANDO    Bed Mob, Supine to Sit, [place]  independent  -MANDO independent  -MANDO    Bed Mob, Sit to Supine, [place]  independent  -MANDO     Recorded by  [MANDO] Kacy Blevins OT [MANDO] Kacy Blevins, OT    Transfer Assessment/Treatment    Transfers, Bed-Chair [place]  --   pt. refused to chair  -MANDO contact guard assist;2 person assist required;verbal cues required   cues to pivot on RLE.  Needs to work on reaching back.  -MANDO    Transfers, Bed-Chair-Bed, Assist Device   rolling walker  -MANDO    Transfers, Sit-Stand [place]  contact guard assist  -MANDO minimum assist (75% patient effort);2 person assist required;verbal cues required  -MANDO    Transfers, Stand-Sit [place]  contact guard assist  -MANDO minimum assist (75% patient effort);verbal cues required;2 person assist required   Pt. did intermittent TTWB, but able to keep NWB status   -MANDO    Transfers, Sit-Stand-Sit, Assist Device  rolling walker  -MANDO rolling walker  -MANDO    Transfer, Maintain Weight Bearing Status  cues to maintain weight bearing status;able to maintain weight bearing status   able keep several inches off floor today  -MANDO     Transfer, Impairments  impaired balance;strength decreased   activity tolerance  -MANDO strength decreased;impaired balance  -MANDO    Transfer, Comment  cues to push up and reach back, wanting to hold walker.  -MANDO Pt. needed cues for technique and reminders for no WB.  -MANDO     Recorded by  [MANDO] Kacy Blevins OT [MANDO] Kacy Blevins OT    Functional Mobility    Functional Mobility- Comment   Pt. unable to use knee wx due to knee pain per pt.  Pt. not strong enough to go long distance with wx only turn to chair.  -MANDO    Recorded by   [MANDO] Kacy Blevins OT    Lower Body Dressing Assessment/Training    LB Dressing Assess/Train, Clothing Type   doffing:;donning:;slipper socks  -MANDO    LB Dressing Assess/Train, Position   sitting  -MANDO    LB Dressing Assess/Train, [place]   independent  -MANDO    LB Dressing Assess/Train, Comment   R sock only  -MANDO    Recorded by   [MANDO] Kacy Blevins OT    Grooming Assessment/Training    Grooming Assess/Train, Position  sitting  -MANDO     Grooming Assess/Train, Indepen Level  set up required  -MANDO     Grooming Assess/Train, Comment  washed face and hands  -MANDO     Recorded by  [MANDO] Kacy Blevins OT     Motor Skills/Interventions    Additional Documentation   Balance Skills Training (Group)  -MANDO    Recorded by   [MANDO] Kacy Blevins OT    Balance Skills Training    Sitting-Level of Assistance  Independent  -MANDO Distant supervision  -MANDO    Sitting-Balance Support   Feet supported  -MANDO    Standing-Level of Assistance  Contact guard  -MANDO Contact guard;x2   static stand  -MANDO    Static Standing Balance Support  assistive device  -MANDO assistive device  -MANDO    Standing-Balance Activities  --   standing endurance  -MANDO     Standing Balance # of Minutes  --   20 sec up  -MANDO     Recorded by  [MANDO] Kacy Blevins OT [MANDO] Kacy Blevins OT    Therapy Exercises    Bilateral Lower Extremities  AROM:;10 reps;sitting;ankle pumps/circles;hip flexion;knee flexion;glut sets  -MANDO     Bilateral Upper Extremity  AROM:;10 reps;sitting;elbow flexion/extension;shoulder abduction/adduction;shoulder extension/flexion;shoulder horizontal abd/add;shoulder protraction/retraction;shoulder rolls/shrugs  -MANDO AROM:;10 reps;sitting;elbow flexion/extension;shoulder  abduction/adduction;shoulder extension/flexion;shoulder horizontal abd/add;shoulder protraction/retraction;shoulder rolls/shrugs  -MANDO    BUE Resistance  manual resistance- moderate   biceps and triceps  -MADNO manual resistance- minimal   biceps and triceps  -MANDO    Recorded by  [MANDO] Kacy Blevins OT [MANDO] Kacy Blevins OT    Sensory Assessment/Intervention    Light Touch  --   per pt. no feeling L foot  -MANDO --   per pt. numbness up legs for some time  -MANDO    Recorded by  [MANDO] Kacy Blevins OT [MANDO] Kacy Blevins OT    Positioning and Restraints    Pre-Treatment Position in bed  - in bed  -MANDO in bed  -MANDO    Post Treatment Position bed  - bed  -MANDO chair  -MANDO    In Bed supine;call light within reach;with family/caregiver  - supine;call light within reach;encouraged to call for assist;with nsg  -MANDO     In Chair   reclined;call light within reach;encouraged to call for assist;exit alarm on;legs elevated   pain pump intact  -MANDO    Recorded by [MF] Lane Yates, PT [MANDO] Kacy Blevins OT [MANDO] Kacy Blevins OT      02/27/17 0845          Rehab Assessment/Intervention    Discipline physical therapist  -      Document Type therapy note (daily note)  -      Subjective Information no complaints;agree to therapy  -      Recorded by [MC] Chetna Chao PT      Pain Assessment    Pain Assessment No/denies pain  -MC      Recorded by [MC] Chetna Chao PT      Cognitive Assessment/Intervention    Current Cognitive/Communication Assessment functional  -      Orientation Status oriented x 4  -      Recorded by [MC] Chetna Chao PT      Positioning and Restraints    Pre-Treatment Position in bed  -      Post Treatment Position bed  -      In Bed supine;call light within reach;encouraged to call for assist;with other staff   with RT  -MC      Recorded by [MC] Chetna Chao PT        User Key  (r) = Recorded By, (t) = Taken By, (c) = Cosigned By    Initials Name Effective Dates    MANDO  Kacy Blevins, OT 06/22/15 -     MF Lane Yates, PT 06/19/15 -      Chetna Chao, PT 03/14/16 -                 IP PT Goals       02/27/17 1423 02/27/17 0845 02/26/17 1332    Transfer Training PT LTG    Transfer Training PT LTG, Date Established 02/27/17  -MANDO      Transfer Training PT LTG, Time to Achieve 1 wk  -MANDO      Transfer Training PT LTG, Activity Type all transfers  -MANDO      Transfer Training PT LTG, [place] conditional independence  -MANDO      Transfer Training PT LTG, Assist Device walker, rolling  -MANDO      Gait Training PT LTG    Gait Training Goal PT LTG, Date Established 02/27/17  -MANDO      Gait Training Goal PT LTG, Time to Achieve 1 wk  -MANDO      Gait Training Goal PT LTG, [place] conditional independence  -MANDO      Gait Training Goal PT LTG, Assist Device walker, rolling  -MANDO      Gait Training Goal PT LTG, Distance to Achieve 25  -MANDO      Patient Education PT LTG    Patient Education PT LTG, Date Established 02/27/17  -MANDO      Patient Education PT LTG, Time to Achieve 1 wk  -MANDO      Patient Education PT LTG, Education Type HEP;positioning;posture/body mechanics;gait;transfers;bed mobility;pain management;progression of POC;benefits of activity;home safety;equipment management;skin care/inspection   weightbearing status  -MANDO      Patient Education PT LTG, Education Understanding demonstrate adequately;verbalize understanding  -MANDO      Wound Care PT LTG    Wound Care PT LTG 1, Date Established   02/26/17  -MW    Wound Care PT LTG 1, Time to Achieve   2 wks  -MW    Wound Care PT LTG 1, Location   LT open ray amputation site   -MW    Wound Care PT LTG 1, No S&S of Infection   yes  -MW    Wound Care PT LTG 1, Decrease Wound Size   25%  -MW    Wound Care PT LTG 1, Decrease Exudate   minimum  -MW    Wound Care PT LTG 1, No New Skin Break Down   yes  -MW    Wound Care PT LTG 1, Education   S&S of infection;dressing changes;wound care;weight bearing restriction;progression of  POC  -MW    Wound Care PT LTG 1, Education Understanding   verbalize understanding  -MW    Wound Care PT LTG 1, Outcome  goal ongoing  -       User Key  (r) = Recorded By, (t) = Taken By, (c) = Cosigned By    Initials Name Provider Type    MANDO Philomena Esteban, PT Physical Therapist    MW Christie Wheeler, PT Physical Therapist    MC Chetna Chao, PT Physical Therapist          Physical Therapy Education     Title: PT OT SLP Therapies (Active)     Topic: Physical Therapy (Done)     Point: Mobility training (Done)    Learning Progress Summary    Learner Readiness Method Response Comment Documented by Status   Patient Acceptance E,SERVANDO RAYO,NR  MANDO 02/27/17 1558 Done               Point: Home exercise program (Done)    Learning Progress Summary    Learner Readiness Method Response Comment Documented by Status   Patient Acceptance SERVANDO RENAE,NR  MANDO 02/27/17 1558 Done               Point: Body mechanics (Done)    Learning Progress Summary    Learner Readiness Method Response Comment Documented by Status   Patient Acceptance SERVANDO RENAE,NR  MANDO 02/27/17 1558 Done               Point: Precautions (Done)    Learning Progress Summary    Learner Readiness Method Response Comment Documented by Status   Patient Acceptance SERVANDO RENAE,NR  MANDO 02/27/17 1558 Done                      User Key     Initials Effective Dates Name Provider Type Discipline     06/19/15 -  Philomena Esteban, PT Physical Therapist PT                   PT ASSESSMENT (last 72 hours)      PT Evaluation       03/02/17 0800 03/01/17 1130    Rehab Evaluation    Document Type therapy note (daily note)  - therapy note (daily note)  -    Subjective Information agree to therapy;complains of;weakness;fatigue;pain  - agree to therapy;complains of;pain  -MF    Pain Assessment    Pain Assessment Blackmon-Baker FACES  - Blackmon-Baker FACES  -    Blackmon-Baker FACES Pain Rating 4  -MF 4  -MF    Pain Type Chronic pain  -MF Chronic pain  -    Pain Location  Generalized  -MF Generalized  -MF    Pain Intervention(s) Repositioned  -MF Repositioned  -MF    Positioning and Restraints    Pre-Treatment Position in bed  - in bed  -    Post Treatment Position bed  - bed  -MF    In Bed supine;call light within reach  - supine;call light within reach  -MF      03/01/17 0838 02/28/17 1130    Rehab Evaluation    Document Type therapy note (daily note)  - therapy note (daily note)  -    Subjective Information agree to therapy;no complaints  -MANDO agree to therapy;complains of;pain  -    Patient Effort, Rehab Treatment good  -MANDO     Symptoms Noted During/After Treatment --   cont. chronic pain  -     General Information    Precautions/Limitations fall precautions   wound vac LLE, NWB LLE  -MANDO     Pain Assessment    Pain Assessment 0-10  -MANDO Blackmon-Cabrera FACES  -    Blackmon-Baker FACES Pain Rating  4  -    Pain Score 8   per pt. chronic pain never under 7/10  -     Post Pain Score 8  -MANDO     Pain Type Chronic pain  - Chronic pain  -    Pain Location Generalized  -MANDO Generalized  -MF    Pain Intervention(s) Medication (See MAR);Ambulation/increased activity;Repositioned  -MANDO Repositioned  -    Response to Interventions tolerated  -MANDO     Cognitive Assessment/Intervention    Current Cognitive/Communication Assessment functional  -     Orientation Status oriented x 4  -MANDO     Follows Commands/Answers Questions 100% of the time;able to follow multi-step instructions;able to follow single-step instructions  -MANDO     Personal Safety mild impairment   needs intermittent reminders to keep LLE off floor.  -AMNDO     Personal Safety Interventions fall prevention program maintained;gait belt;elopement precautions initiated;muscle strengthening facilitated;nonskid shoes/slippers when out of bed  -MANDO     Bed Mobility, Assessment/Treatment    Bed Mobility, Assistive Device head of bed elevated  -MANDO     Bed Mobility, Scoot/Bridge, [place] conditional independence  -     Bed  Mob, Supine to Sit, [place] conditional independence  -MANDO     Bed Mob, Sit to Supine, [place] conditional independence  -MANDO     Transfer Assessment/Treatment    Transfers, Sit-Stand [place] contact guard assist  -MANDO     Transfers, Stand-Sit [place] contact guard assist  -MANDO     Transfers, Sit-Stand-Sit, Assist Device rolling walker  -MANDO     Transfer, Impairments strength decreased;impaired balance  -MANDO     Transfer, Comment Cues to keep LLE up off floor.    -MANDO     Balance Skills Training    Sitting-Level of Assistance Independent;Close supervision   for cues for LLE NWB  -MANDO     Standing-Level of Assistance Contact guard  -MANDO     Static Standing Balance Support assistive device  -     Standing-Balance Activities --   working on balance and endurance  -     Standing Balance # of Minutes --   19 sec up only  -     Therapy Exercises    Bilateral Lower Extremities AROM:;10 reps;sitting;ankle pumps/circles;glut sets;hip flexion;knee flexion  -     BUE Resistance theraband   10 reps x 6 exer yellow band with cueing.  -     Positioning and Restraints    Pre-Treatment Position in bed  - in bed  -    Post Treatment Position bed  - bed  -    In Bed supine;call light within reach;LLE elevated;encouraged to call for assist  -MANDO supine;call light within reach;with family/caregiver  -      02/28/17 0829 02/28/17 0824    Rehab Evaluation    Document Type  therapy note (daily note)  -    Subjective Information  agree to therapy;complains of;pain  -    Patient Effort, Rehab Treatment  good  -    General Information    Precautions/Limitations  fall precautions;oxygen therapy device and L/min   wound vac LLE, NWB LLE  -MANDO    Equipment Currently Used at Home oxygen;respiratory supplies   CPAP and flowed in oxygen for CPAP through [facility]  -     Pain Assessment    Pain Assessment  0-10  -MANDO    Pain Score  8  -MANDO    Post Pain Score  8  -MANDO    Pain Type  Chronic pain   per pt. the  lowest her pain ever goes is 7/10 even with meds  -MANDO    Pain Location  Generalized  -MANDO    Pain Intervention(s)  Ambulation/increased activity;Repositioned  -MANDO    Response to Interventions  tolerated  -MANDO    Cognitive Assessment/Intervention    Current Cognitive/Communication Assessment  functional  -MANDO    Follows Commands/Answers Questions  100% of the time;able to follow single-step instructions  -MANDO    Personal Safety  mild impairment  -MANDO    Personal Safety Interventions  fall prevention program maintained;gait belt;nonskid shoes/slippers when out of bed  -MANDO    Bed Mobility, Assessment/Treatment    Bed Mobility, Scoot/Bridge, [place]  independent  -MANDO    Bed Mob, Supine to Sit, [place]  independent  -MANDO    Bed Mob, Sit to Supine, [place]  independent  -MANDO    Transfer Assessment/Treatment    Transfers, Bed-Chair [place]  --   pt. refused to chair  -MANDO    Transfers, Sit-Stand [place]  contact guard assist  -MANDO    Transfers, Stand-Sit [place]  contact guard assist  -MANDO    Transfers, Sit-Stand-Sit, Assist Device  rolling walker  -MANDO    Transfer, Maintain Weight Bearing Status  cues to maintain weight bearing status;able to maintain weight bearing status   able keep several inches off floor today  -MANDO    Transfer, Impairments  impaired balance;strength decreased   activity tolerance  -MANDO    Transfer, Comment  cues to push up and reach back, wanting to hold walker.  -MANDO    Balance Skills Training    Sitting-Level of Assistance  Independent  -MANDO    Standing-Level of Assistance  Contact guard  -MANDO    Static Standing Balance Support  assistive device  -MANDO    Standing-Balance Activities  --   standing endurance  -MANDO    Standing Balance # of Minutes  --   20 sec up  -MANDO    Therapy Exercises    Bilateral Lower Extremities  AROM:;10 reps;sitting;ankle pumps/circles;hip flexion;knee flexion;glut sets  -MANDO    Bilateral Upper Extremity  AROM:;10 reps;sitting;elbow flexion/extension;shoulder  abduction/adduction;shoulder extension/flexion;shoulder horizontal abd/add;shoulder protraction/retraction;shoulder rolls/shrugs  -MANDO    BUE Resistance  manual resistance- moderate   biceps and triceps  -MANDO    Sensory Assessment/Intervention    Light Touch  --   per pt. no feeling L foot  -MANDO    Positioning and Restraints    Pre-Treatment Position  in bed  -MANDO    Post Treatment Position  bed  -MANDO    In Bed  supine;call light within reach;encouraged to call for assist;with nsg  -MANDO      02/28/17 0800 02/27/17 1423    Rehab Evaluation    Document Type  evaluation  -JBA    Subjective Information  agree to therapy;complains of;pain  -JBA    Patient Effort, Rehab Treatment  good  -JBA    General Information    Patient Profile Review  yes  -JBA    Onset of Illness/Injury or Date of Surgery Date  02/24/17  -JBA    Referring Physician  MD Viki  -JBA    General Observations  Pt supine in bed upon arrival with OT present. Wound vac intact, on supplemental O2. RN consent for therapy.  -JBA    Pertinent History Of Current Problem  Pt is s/p proximal ray amputation (mid cuboid) of left great toe due to osteomyelitis of proximal portion of first metatarsal bone of Left foot/ s/p prior transmetatarsal amputation of left great toe in November 2016. MRI findings suggest osteomyelitis of the left second metatarsal, left third metatarsal, left fourth metatarsal, and fifth metatarsal bone of the left foot.  -JBA    Precautions/Limitations  fall precautions;oxygen therapy device and L/min;other (see comments);non-weight bearing status   wound vac, NWB LLE  -JBA    Prior Level of Function  independent:;all household mobility;ADL's  -JBA    Equipment Currently Used at Home  cane, straight;walker, rolling;shower chair;grab bar  -JBA    Plans/Goals Discussed With  patient;agreed upon  -JBA    Risks Reviewed  patient:;increased discomfort;lines disloged;increased drainage  -JBA    Benefits Reviewed  patient:;increase  strength;increase knowledge;improve skin integrity;decrease pain;increase balance;improve function;increase independence  -JBA    Barriers to Rehab  medically complex  -JBA    Living Environment    Lives With  spouse  -JBA    Living Arrangements  house  -JBA    Home Accessibility  stairs to enter home;bed and bath on same level  -JBA    Number of Stairs to Enter Home  3  -JBA    Stair Railings at Home  none  -JBA    Clinical Impression    Date of Referral to PT  02/25/17  -JBA    PT Diagnosis  weakness  -JBA    Patient/Family Goals Statement  improve strength  -JBA    Criteria for Skilled Therapeutic Interventions Met  yes;treatment indicated  -JBA    Rehab Potential  good, to achieve stated therapy goals  -JBA    Pain Assessment    Pain Assessment  0-10  -JBA    Pain Score  9   (WBFS 5/10)  -JBA    Post Pain Score  9  -JBA    Pain Type  Chronic pain  -JBA    Pain Location  Generalized  -JBA    Pain Intervention(s)  Repositioned;Ambulation/increased activity  -JBA    Response to Interventions  tolerated  -JBA    Cognitive Assessment/Intervention    Current Cognitive/Communication Assessment  functional  -JBA    Orientation Status  oriented x 4  -JBA    Follows Commands/Answers Questions  100% of the time;able to follow single-step instructions;needs cueing  -JBA    Personal Safety  mild impairment  -JBA    Personal Safety Interventions  fall prevention program maintained;gait belt;muscle strengthening facilitated;elopement precautions initiated;nonskid shoes/slippers when out of bed  -JBA    ROM (Range of Motion)    General ROM  other (see comments)   No LE ROM deficits identified  -JBA    General ROM Detail  Left ankle ROM WFL  -JBA    MMT (Manual Muscle Testing)    General MMT Assessment  lower extremity strength deficits identified  -JBA    General MMT Assessment Detail  R LE grossly 4/5, L LE grossly 3+/5  -JBA    Bed Mobility, Assessment/Treatment    Bed Mobility, Scoot/Bridge, [place]  independent  -JBA     Bed Mob, Supine to Sit, [place]  independent  -JBA    Transfer Assessment/Treatment    Transfers, Bed-Chair [place]  contact guard assist;2 person assist required;verbal cues required  -JBA    Transfers, Bed-Chair-Bed, Assist Device  rolling walker  -JBA    Transfers, Sit-Stand [place]  minimum assist (75% patient effort);verbal cues required;2 person assist required  -JBA    Transfers, Stand-Sit [place]  verbal cues required;minimum assist (75% patient effort);2 person assist required;other (see comments)  -JBA    Transfers, Sit-Stand-Sit, Assist Device  rolling walker  -JBA    Transfer, Maintain Weight Bearing Status  unable to maintain weight bearing status   pt only had brief moment of TTWB on LLE, then able to NWB  -JBA    Transfer, Safety Issues  balance decreased during turns  -JBA    Transfer, Impairments  strength decreased;impaired balance  -JBA    Transfer, Comment  Required cues for technique and reminders for NWB on LLE  -JBA    Gait Assessment/Treatment    Gait, [place] Level  unable to perform;other (see comments)   only able to pivot to chair from EOB  -JBA    Therapy Exercises    Left Lower Extremity  AROM:;10 reps;sitting;ankle pumps/circles;hip flexion;LAQ  -JBA    Sensory Assessment/Intervention    Light Touch  LLE;RLE  -JBA    LLE Light Touch --   baseline numbness  -MS mild impairment   lower legs at baseline  -JBA    RLE Light Touch --   baseline numbness  -MS mild impairment   lower legs at baseline  -JBA    Positioning and Restraints    Pre-Treatment Position  in bed  -JBA    Post Treatment Position  chair  -JBA    In Chair  notified nsg;reclined;call light within reach;encouraged to call for assist;exit alarm on;legs elevated   pain pump intact  -JBA      02/27/17 1418 02/27/17 1416    Rehab Evaluation    Document Type therapy note (daily note)  -MANDO     Subjective Information agree to therapy;complains of;pain  -MANDO     Patient Effort, Rehab Treatment good  -MANDO      General Information    Precautions/Limitations fall precautions;oxygen therapy device and L/min   wound vac, NWB LLE  -MANDO     Equipment Currently Used at Home  cane, straight;walker, rolling;shower chair;grab bar  -SE    Living Environment    Lives With  spouse   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  -SE    Living Arrangements  house   One level home  -SE    Pain Assessment    Pain Assessment 0-10  -MANDO     Pain Score 9   WBFS more of a 5/10  -MANDO     Post Pain Score 9  -MANDO     Pain Type Chronic pain  -MANDO     Pain Location Generalized  -MANDO     Pain Intervention(s) Ambulation/increased activity;Repositioned  -MANDO     Response to Interventions tolerated  -MANDO     Vision Assessment/Intervention    Visual Impairment WFL with corrective lenses  -MANDO     Cognitive Assessment/Intervention    Current Cognitive/Communication Assessment functional  -MANDO     Orientation Status oriented x 4  -MANDO     Follows Commands/Answers Questions 100% of the time;able to follow single-step instructions  -MANDO     Personal Safety mild impairment  -MANDO     Personal Safety Interventions fall prevention program maintained;gait belt;nonskid shoes/slippers when out of bed;elopement precautions initiated  -MANDO     Bed Mobility, Assessment/Treatment    Bed Mobility, Scoot/Bridge, [place] independent  -MANDO     Bed Mob, Supine to Sit, [place] independent  -MANDO     Transfer Assessment/Treatment    Transfers, Bed-Chair [place] contact guard assist;2 person assist required;verbal cues required   cues to pivot on RLE.  Needs to work on reaching back.  -MANDO     Transfers, Bed-Chair-Bed, Assist Device rolling walker  -MANDO     Transfers, Sit-Stand [place] minimum assist (75% patient effort);2 person assist required;verbal cues required  -MANDO     Transfers, Stand-Sit [place] minimum assist (75% patient effort);verbal cues required;2 person assist required   Pt. did intermittent TTWB, but able to keep NWB status   -MANDO     Transfers, Sit-Stand-Sit,  Assist Device rolling walker  -MANDO     Transfer, Impairments strength decreased;impaired balance  -MANDO     Transfer, Comment Pt. needed cues for technique and reminders for no WB.  -     Motor Skills/Interventions    Additional Documentation Balance Skills Training (Group)  -     Balance Skills Training    Sitting-Level of Assistance Distant supervision  -MANDO     Sitting-Balance Support Feet supported  -MANDO     Standing-Level of Assistance Contact guard;x2   static stand  -MANDO     Static Standing Balance Support assistive device  -     Therapy Exercises    Bilateral Upper Extremity AROM:;10 reps;sitting;elbow flexion/extension;shoulder abduction/adduction;shoulder extension/flexion;shoulder horizontal abd/add;shoulder protraction/retraction;shoulder rolls/shrugs  -     BUE Resistance manual resistance- minimal   biceps and triceps  -     Sensory Assessment/Intervention    Light Touch --   per pt. numbness up legs for some time  -     Positioning and Restraints    Pre-Treatment Position in bed  -MANDO     Post Treatment Position chair  -MANDO     In Chair reclined;call light within reach;encouraged to call for assist;exit alarm on;legs elevated   pain pump intact  -       02/27/17 0845       Rehab Evaluation    Document Type therapy note (daily note)  -     Subjective Information no complaints;agree to therapy  -     Pain Assessment    Pain Assessment No/denies pain  -     Cognitive Assessment/Intervention    Current Cognitive/Communication Assessment functional  -     Orientation Status oriented x 4  -     Positioning and Restraints    Pre-Treatment Position in bed  -     Post Treatment Position bed  -     In Bed supine;call light within reach;encouraged to call for assist;with other staff   with RT  -       User Key  (r) = Recorded By, (t) = Taken By, (c) = Cosigned By    Initials Name Provider Type    JBA Philomena Esteban, PT Physical Therapist    MANDO Blevins, OT Occupational  Therapist    ANN-MARIE Yates, PT Physical Therapist    IVAN Chao, PT Physical Therapist    SE Ese Whitlock, RN Case Manager    MS David Raymond, RN Registered Nurse            PT Recommendation and Plan  Anticipated Equipment Needs At Discharge: other (see comments) (wound VAC )  Anticipated Discharge Disposition: home with assist, home with home health (if plan further level of amputation, will recommend rehab)  Planned Therapy Interventions: wound care, patient/family education  PT Frequency: daily, per priority policy    Plan Of Care Reviewed With: patient       Outcome Summary/Follow up Plan: wound vac intact at this point, no issues noted. Pt to go for surgery today and PT to reassess pt tomorrow for further need for wound vac.          Outcome Measures       03/01/17 0838 02/28/17 0824 02/27/17 1423    How much help from another person do you currently need...    Turning from your back to your side while in flat bed without using bedrails?   3  -MANDO    Moving from lying on back to sitting on the side of a flat bed without bedrails?   4  -MANDO    Moving to and from a bed to a chair (including a wheelchair)?   3  -MANDO    Standing up from a chair using your arms (e.g., wheelchair, bedside chair)?   3  -MANDO    Climbing 3-5 steps with a railing?   1  -MANDO    To walk in hospital room?   1  -MANDO    AM-PAC 6 Clicks Score   15  -MANDO    How much help from another is currently needed...    Putting on and taking off regular lower body clothing? 3  -JBA 2  -JBA     Bathing (including washing, rinsing, and drying) 3  -JBA 2  -JBA     Toileting (which includes using toilet bed pan or urinal) 3  -JBA 3  -JBA     Putting on and taking off regular upper body clothing 3  -JBA 3  -JBA     Taking care of personal grooming (such as brushing teeth) 4  -JBA 4  -JBA     Eating meals 4  -JBA 4  -JBA     Score 20  -JBA 18  -JBA     Functional Assessment    Outcome Measure Options AM-PAC 6 Clicks Daily Activity (OT)  -JBA  AM-PAC 6 Clicks Daily Activity (OT)  -JBA AM-PAC 6 Clicks Basic Mobility (PT)  -MANDO      02/27/17 1418          How much help from another is currently needed...    Putting on and taking off regular lower body clothing? 2  -JBA      Bathing (including washing, rinsing, and drying) 2  -JBA      Toileting (which includes using toilet bed pan or urinal) 2  -JBA      Putting on and taking off regular upper body clothing 3  -JBA      Taking care of personal grooming (such as brushing teeth) 4  -JBA      Eating meals 4  -JBA      Score 17  -JBA      Functional Assessment    Outcome Measure Options AM-PAC 6 Clicks Daily Activity (OT)  -JBA        User Key  (r) = Recorded By, (t) = Taken By, (c) = Cosigned By    Initials Name Provider Type    MANDO Philomena Esteban, PT Physical Therapist    TESSY Blevins, OT Occupational Therapist              Time Calculation        PT Charges       03/02/17 0800          Time Calculation    Start Time 0800  -      PT Goal Re-Cert Due Date 03/08/17  -      Time Calculation- PT    Total Timed Code Minutes- PT 10 minute(s)  -        User Key  (r) = Recorded By, (t) = Taken By, (c) = Cosigned By    Initials Name Provider Type     Lane Yates, PT Physical Therapist             Therapy Charges for Today     Code Description Service Date Service Provider Modifiers Qty    53133090373 HC PT NEG PRESS WOUND TO 50SQCM DME1 3/1/2017 Lane Yaets, PT  1    24742576427 HC PT THER SUPP EA 15 MIN 3/1/2017 Lane Yates, PT GP 1    63764504598 HC PT NEG PRESS WOUND TO 50SQCM DME1 3/2/2017 Lane Yates, PT  1    94335770753 HC PT THER SUPP EA 15 MIN 3/2/2017 Lane Yates, PT GP 1            PT G-Codes  Outcome Measure Options: AM-PAC 6 Clicks Daily Activity (OT)        Lane Yates, PT  3/2/2017

## 2017-03-02 NOTE — PROGRESS NOTES
Continued Stay Note  [facility]     Patient Name: Tamara Langston  MRN: [id number]  Today's Date: 3/2/2017    Admit Date: 2/24/2017          Discharge Plan       03/02/17 1438    Case Management/Social Work Plan    Plan Post hospital care/rehab    Patient/Family In Agreement With Plan yes    Additional Comments Discussed with patient and her spouse, possibility under her managed medicare  of going to [facility] (rehab) for 2 weeks or less, if more time needed would then need to go to skilled rehab. She would like [facility] as another family member had been there. Discussed with Dr. Feliciano and will await outcome of surgery today.               Discharge Codes     None        Expected Discharge Date and Time     Expected Discharge Date Expected Discharge Time    Mar 7, 2017             Ese Whitlock RN

## 2017-03-02 NOTE — PLAN OF CARE
Problem: Patient Care Overview (Adult)  Goal: Plan of Care Review  Outcome: Ongoing (interventions implemented as appropriate)    03/02/17 0800   Coping/Psychosocial Response Interventions   Plan Of Care Reviewed With patient   Outcome Evaluation   Outcome Summary/Follow up Plan wound vac intact at this point, no issues noted. Pt to go for surgery today and PT to reassess pt tomorrow for further need for wound vac.         Problem: Inpatient Physical Therapy  Goal: Wound Care Goal 1 LTG- PT  Outcome: Ongoing (interventions implemented as appropriate)    02/26/17 1332 02/27/17 0845   Wound Care PT LTG   Wound Care PT LTG 1, Date Established 02/26/17 --    Wound Care PT LTG 1, Time to Achieve 2 wks --    Wound Care PT LTG 1, Location LT open ray amputation site  --    Wound Care PT LTG 1, No S&S of Infection yes --    Wound Care PT LTG 1, Decrease Wound Size 25% --    Wound Care PT LTG 1, Decrease Exudate minimum --    Wound Care PT LTG 1, No New Skin Break Down yes --    Wound Care PT LTG 1, Education S&S of infection;dressing changes;wound care;weight bearing restriction;progression of POC --    Wound Care PT LTG 1, Education Understanding verbalize understanding --    Wound Care PT LTG 1, Outcome --  goal ongoing

## 2017-03-02 NOTE — PROGRESS NOTES
Acute Care - Physical Therapy Treatment Note  [facility]     Patient Name: Tamara Langston  : 1955  MRN: [id number]  Today's Date: 3/2/2017  Onset of Illness/Injury or Date of Surgery Date: 17  Date of Referral to PT: 17  Referring Physician: MD Viki    Admit Date: 2017    Visit Dx:    ICD-10-CM ICD-9-CM   1. Impaired mobility and ADLs Z74.09 799.89   2. Toe osteomyelitis, left M86.9 730.27   3. Impaired functional mobility, balance, gait, and endurance Z74.09 V49.89     Patient Active Problem List   Diagnosis   • Atopic rhinitis   • Restrictive ventilatory defect   • COPD (chronic obstructive pulmonary disease)   • Osteoporosis   • Obstructive sleep apnea syndrome   • Abnormal liver enzymes   • Cholelithiasis   • Chronic back pain   • Mixed anxiety depressive disorder   • Type 2 diabetes mellitus   • Dyslipidemia   • Essential tremor   • Fibromyalgia   • Gastroesophageal reflux disease without esophagitis   • Hypertension   • Hypothyroidism   • Insomnia   • Osteoarthritis   • Polycythemia vera   • Psoriasis   • Branch retinal vein occlusion   • Cobalamin deficiency   • Chronic bronchitis   • Obesity   • Pneumonia   • Tobacco use   • Vitamin D deficiency   • Foot ulcer, left   • Leukocytosis   • Hypokalemia   • Lactic acidosis   • Fatty liver disease, nonalcoholic   • Diabetes education, encounter for   • Cellulitis of left foot   • Sinus bradycardia   • NSTEMI (non-ST elevated myocardial infarction)   • Tobacco abuse   • Hypoxia   • Peripheral vascular disease   • Gangrene   • Osteomyelitis of left foot   • Diabetic polyneuropathy associated with type 2 diabetes mellitus   • Anemia, blood loss   • Chronic pain   • Chronic, continuous use of opioids   • Chronic anxiety   • Chronically on benzodiazepine therapy               Adult Rehabilitation Note       17 0820 17 0800 17 1130    Rehab Assessment/Intervention    Discipline physical therapist  -SR physical  therapist  -MF physical therapist  -MF    Document Type therapy note (daily note)  -SR therapy note (daily note)  -MF therapy note (daily note)  -MF    Subjective Information agree to therapy;complains of;pain  -SR agree to therapy;complains of;weakness;fatigue;pain  -MF agree to therapy;complains of;pain  -MF    Patient Effort, Rehab Treatment good  -SR      Precautions/Limitations non-weight bearing status;fall precautions;other (see comments)   wound vac L foot  -SR      Recorded by [SR] Thu Herrera, PT [MF] Lane Yates, PT [MF] Lane Yates, PT    Vital Signs    Pre Systolic BP Rehab --   vitals are WNL on RA  -SR      Recorded by [SR] Thu Herrera, PT      Pain Assessment    Pain Assessment Blackmon-Cabrera FACES  -SR Blackmon-Cabrera FACES  -MF Blackmon-Baker FACES  -MF    Blackmon-Baker FACES Pain Rating 4  -SR 4  -MF 4  -MF    Pain Type Chronic pain  -SR Chronic pain  -MF Chronic pain  -MF    Pain Location Generalized  -SR Generalized  -MF Generalized  -MF    Pain Intervention(s) Repositioned  -SR Repositioned  -MF Repositioned  -MF    Recorded by [SR] Thu Herrera, PT [MF] Lane Yates, PT [MF] Lane Yates, PT    Cognitive Assessment/Intervention    Current Cognitive/Communication Assessment functional  -SR      Orientation Status oriented x 4  -SR      Follows Commands/Answers Questions 100% of the time  -SR      Personal Safety WNL/WFL  -SR      Personal Safety Interventions fall prevention program maintained;gait belt;nonskid shoes/slippers when out of bed  -SR      Recorded by [SR] Thu Herrera, PT      Bed Mobility, Assessment/Treatment    Bed Mobility, Assistive Device head of bed elevated;bed rails;draw sheet  -SR      Bed Mob, Supine to Sit, [place] contact guard assist;verbal cues required  -SR      Bed Mob, Sit to Supine, [place] contact guard assist;verbal cues required  -SR      Recorded by [SR] Thu Herrera, PT      Transfer Assessment/Treatment    Transfers, Bed-Chair  [place] --   pt declines, states that chair is too uncomfortable  -SR      Transfers, Sit-Stand [place] contact guard assist;2 person assist required;verbal cues required  -SR      Transfers, Stand-Sit [place] contact guard assist  -SR      Transfers, Sit-Stand-Sit, Assist Device rolling walker  -SR      Transfer, Comment pt does not require physical assist to maintain NWB LLE, pt was able to stand for 2 minutes with CGA and rw   -SR      Recorded by [SR] Thu Herrera PT      Gait Assessment/Treatment    Gait, [place] Level unable to perform  -SR      Recorded by [SR] Thu Herrera PT      Balance Skills Training    Standing-Level of Assistance Contact guard  -SR      Static Standing Balance Support assistive device  -SR      Standing-Balance Activities --   static standing for endurance, able to tolerate 2 minutes  -SR      Recorded by [SR] Thu Herrera PT      Therapy Exercises    Bilateral Lower Extremities AROM:;10 reps;supine;sitting;ankle pumps/circles;heel slides;LAQ;hip flexion;SLR  -SR      Recorded by [SR] Thu Herrera PT      Positioning and Restraints    Pre-Treatment Position in bed  -SR in bed  - in bed  -    Post Treatment Position bed  -SR bed  - bed  -    In Bed notified nsg;supine;call light within reach;encouraged to call for assist;LLE elevated  -SR supine;call light within reach  - supine;call light within reach  -    Recorded by [SR] Thu Herrera, PT [MF] Lane Yates, PT [MF] Lane Yates, PT      03/01/17 0838 02/28/17 1130 02/28/17 0824    Rehab Assessment/Intervention    Discipline occupational therapist  -MANDO physical therapist  -MF occupational therapist  -MANDO    Document Type therapy note (daily note)  -MANDO therapy note (daily note)  - therapy note (daily note)  -MANDO    Subjective Information agree to therapy;no complaints  -MANDO agree to therapy;complains of;pain  - agree to therapy;complains of;pain  -MANDO    Patient Effort, Rehab  Treatment good  -MANDO  good  -MANDO    Symptoms Noted During/After Treatment --   cont. chronic pain  -MANDO      Precautions/Limitations fall precautions   wound vac LLE, NWB LLE  -MANDO  fall precautions;oxygen therapy device and L/min   wound vac LLE, NWB LLE  -MANDO    Recorded by [MANDO] Kacy Blevins OT [MF] Lane Yaets, PT [MANDO] Kacy Blevins OT    Pain Assessment    Pain Assessment 0-10  -MANDO Blackmon-Cabrera FACES  -MF 0-10  -MANDO    Blackmon-Cabrera FACES Pain Rating  4  -MF     Pain Score 8   per pt. chronic pain never under 7/10  -MANDO  8  -MANDO    Post Pain Score 8  -MANDO  8  -MANDO    Pain Type Chronic pain  -MANDO Chronic pain  -MF Chronic pain   per pt. the lowest her pain ever goes is 7/10 even with meds  -MANDO    Pain Location Generalized  -MANDO Generalized  -MF Generalized  -MANDO    Pain Intervention(s) Medication (See MAR);Ambulation/increased activity;Repositioned  -MANDO Repositioned  -MF Ambulation/increased activity;Repositioned  -MANDO    Response to Interventions tolerated  -MANDO  tolerated  -MANDO    Recorded by [MANDO] Kacy Blevins, OT [MF] Lane Yates, PT [MANDO] Kacy Blevins, OT    Cognitive Assessment/Intervention    Current Cognitive/Communication Assessment functional  -MANDO  functional  -MANDO    Orientation Status oriented x 4  -MANDO      Follows Commands/Answers Questions 100% of the time;able to follow multi-step instructions;able to follow single-step instructions  -MANDO  100% of the time;able to follow single-step instructions  -MANDO    Personal Safety mild impairment   needs intermittent reminders to keep LLE off floor.  -MANDO  mild impairment  -MANDO    Personal Safety Interventions fall prevention program maintained;gait belt;elopement precautions initiated;muscle strengthening facilitated;nonskid shoes/slippers when out of bed  -MANDO  fall prevention program maintained;gait belt;nonskid shoes/slippers when out of bed  -MANDO    Recorded by [MANDO] Kacy Blevins OT  [MANDO] Kacy Blevins OT    Bed Mobility, Assessment/Treatment    Bed Mobility,  Assistive Device head of bed elevated  -MANDO      Bed Mobility, Scoot/Bridge, [place] conditional independence  -MANDO  independent  -MANDO    Bed Mob, Supine to Sit, [place] conditional independence  -MANDO  independent  -MANDO    Bed Mob, Sit to Supine, [place] conditional independence  -MANDO  independent  -MANDO    Recorded by [MANDO] Kacy Blevins OT  [MANDO] Kacy Blevins, OT    Transfer Assessment/Treatment    Transfers, Bed-Chair [place]   --   pt. refused to chair  -MANDO    Transfers, Sit-Stand [place] contact guard assist  -MANDO  contact guard assist  -MANDO    Transfers, Stand-Sit [place] contact guard assist  -MANDO  contact guard assist  -MANDO    Transfers, Sit-Stand-Sit, Assist Device rolling walker  -MANDO  rolling walker  -MANDO    Transfer, Maintain Weight Bearing Status   cues to maintain weight bearing status;able to maintain weight bearing status   able keep several inches off floor today  -MANDO    Transfer, Impairments strength decreased;impaired balance  -MANDO  impaired balance;strength decreased   activity tolerance  -MANDO    Transfer, Comment Cues to keep LLE up off floor.    -MANDO  cues to push up and reach back, wanting to hold walker.  -MANDO    Recorded by [MANDO] Kacy Blevins OT  [MANDO] Kacy Blevins OT    Lower Body Dressing Assessment/Training    LB Dressing Assess/Train, Clothing Type doffing:;donning:;pants   on and off RLE only.  -MANDO      LB Dressing Assess/Train, Position sitting  -MANDO      LB Dressing Assess/Train, [place] verbal cues required;set up required  -MANDO      LB Dressing Assess/Train, Comment pt. needed reminders to keep LLE off floor when performing.  Not ready try stand and pull up yet.  -MANDO      Recorded by [MANDO] Kacy Blevins OT      Grooming Assessment/Training    Grooming Assess/Train, Position   sitting  -MANDO    Grooming Assess/Train, Indepen Level   set up required  -MANDO    Grooming Assess/Train, Comment   washed face and hands  -MANDO    Recorded by   [MANDO] Kacy Blevins, OT     Balance Skills Training    Sitting-Level of Assistance Independent;Close supervision   for cues for LLE NWB  -MANDO  Independent  -MANDO    Standing-Level of Assistance Contact guard  -MANDO  Contact guard  -MANDO    Static Standing Balance Support assistive device  -MANDO  assistive device  -MANDO    Standing-Balance Activities --   working on balance and endurance  -MANDO  --   standing endurance  -MANDO    Standing Balance # of Minutes --   19 sec up only  -MANDO  --   20 sec up  -MANDO    Recorded by [MANDO] Kacy Blevins OT  [MANDO] Kacy Blevins OT    Therapy Exercises    Bilateral Lower Extremities AROM:;10 reps;sitting;ankle pumps/circles;glut sets;hip flexion;knee flexion  -MANDO  AROM:;10 reps;sitting;ankle pumps/circles;hip flexion;knee flexion;glut sets  -MANDO    Bilateral Upper Extremity   AROM:;10 reps;sitting;elbow flexion/extension;shoulder abduction/adduction;shoulder extension/flexion;shoulder horizontal abd/add;shoulder protraction/retraction;shoulder rolls/shrugs  -MANDO    BUE Resistance theraband   10 reps x 6 exer yellow band with cueing.  -MANDO  manual resistance- moderate   biceps and triceps  -MANDO    Recorded by [MANDO] Kacy Blevins OT  [MANDO] Kacy Blevins OT    Sensory Assessment/Intervention    Light Touch   --   per pt. no feeling L foot  -MANDO    Recorded by   [MANDO] Kacy Blevins OT    Positioning and Restraints    Pre-Treatment Position in bed  -MANDO in bed  - in bed  -MANDO    Post Treatment Position bed  -MANDO bed  - bed  -MANDO    In Bed supine;call light within reach;LLE elevated;encouraged to call for assist  -MANDO supine;call light within reach;with family/caregiver  - supine;call light within reach;encouraged to call for assist;with nsg  -MANDO    Recorded by [MANDO] Kacy Blevins OT [MF] Lane Yates, PT [MANDO] Kacy Blevins OT      02/27/17 1418          Rehab Assessment/Intervention    Discipline occupational therapist  -MANDO      Document Type therapy note (daily note)  -MANDO      Subjective Information agree to  therapy;complains of;pain  -MANDO      Patient Effort, Rehab Treatment good  -MANDO      Precautions/Limitations fall precautions;oxygen therapy device and L/min   wound vac, NWB LLE  -MANDO      Recorded by [MANDO] Kacy Blevins OT      Pain Assessment    Pain Assessment 0-10  -MANDO      Pain Score 9   WBFS more of a 5/10  -MANDO      Post Pain Score 9  -MANDO      Pain Type Chronic pain  -MANDO      Pain Location Generalized  -MANDO      Pain Intervention(s) Ambulation/increased activity;Repositioned  -MANDO      Response to Interventions tolerated  -MANDO      Recorded by [MANDO] Kacy Blevins OT      Vision Assessment/Intervention    Visual Impairment WFL with corrective lenses  -MANDO      Recorded by [MANDO] Kacy Blevins OT      Cognitive Assessment/Intervention    Current Cognitive/Communication Assessment functional  -MANDO      Orientation Status oriented x 4  -MANDO      Follows Commands/Answers Questions 100% of the time;able to follow single-step instructions  -MANDO      Personal Safety mild impairment  -MANDO      Personal Safety Interventions fall prevention program maintained;gait belt;nonskid shoes/slippers when out of bed;elopement precautions initiated  -MANDO      Recorded by [MANDO] Kacy Blevins OT      Bed Mobility, Assessment/Treatment    Bed Mobility, [place]/Bridge, [place] independent  -MANDO      Bed Mob, Supine to Sit, [place] independent  -MANDO      Recorded by [MANDO] Kacy Blevins OT      Transfer Assessment/Treatment    Transfers, Bed-Chair [place] contact guard assist;2 person assist required;verbal cues required   cues to pivot on RLE.  Needs to work on reaching back.  -MANDO      Transfers, Bed-Chair-Bed, Assist Device rolling walker  -MANDO      Transfers, Sit-Stand [place] minimum assist (75% patient effort);2 person assist required;verbal cues required  -MANDO      Transfers, Stand-Sit [place] minimum assist (75% patient effort);verbal cues required;2 person assist required   Pt. did intermittent TTWB, but able to keep  NWB status   -MANDO      Transfers, Sit-Stand-Sit, Assist Device rolling walker  -MANDO      Transfer, Impairments strength decreased;impaired balance  -MANDO      Transfer, Comment Pt. needed cues for technique and reminders for no WB.  -MANDO      Recorded by [MANDO] Kacy Blevins OT      Functional Mobility    Functional Mobility- Comment Pt. unable to use knee wx due to knee pain per pt.  Pt. not strong enough to go long distance with wx only turn to chair.  -MANDO      Recorded by [MANDO] Kacy Blevins OT      Lower Body Dressing Assessment/Training    LB Dressing Assess/Train, Clothing Type doffing:;donning:;slipper socks  -MANDO      LB Dressing Assess/Train, Position sitting  -MANDO      LB Dressing Assess/Train, [place] independent  -MANDO      LB Dressing Assess/Train, Comment R sock only  -MANDO      Recorded by [MANDO] Kacy Blevins OT      Motor Skills/Interventions    Additional Documentation Balance Skills Training (Group)  -MANDO      Recorded by [MANDO] Kacy Blevins OT      Balance Skills Training    Sitting-Level of Assistance Distant supervision  -MANDO      Sitting-Balance Support Feet supported  -MANDO      Standing-Level of Assistance Contact guard;x2   static stand  -MANDO      Static Standing Balance Support assistive device  -MANDO      Recorded by [MANDO] Kacy Blevisn OT      Therapy Exercises    Bilateral Upper Extremity AROM:;10 reps;sitting;elbow flexion/extension;shoulder abduction/adduction;shoulder extension/flexion;shoulder horizontal abd/add;shoulder protraction/retraction;shoulder rolls/shrugs  -MANDO      BUE Resistance manual resistance- minimal   biceps and triceps  -MANDO      Recorded by [MANDO] Kacy Blevins OT      Sensory Assessment/Intervention    Light Touch --   per pt. numbness up legs for some time  -MANDO      Recorded by [MANDO] Kacy Blevins OT      Positioning and Restraints    Pre-Treatment Position in bed  -MANDO      Post Treatment Position chair  -MANDO      In Chair reclined;call light within reach;encouraged to  call for assist;exit alarm on;legs elevated   pain pump intact  -MANDO      Recorded by [MANDO] Kacy Blevins, OT        User Key  (r) = Recorded By, (t) = Taken By, (c) = Cosigned By    Initials Name Effective Dates    MANDO Kacy Blevins, OT 06/22/15 -     MF Lane Yates, PT 06/19/15 -     SR Thu Herrera, PT 06/19/15 -                 IP PT Goals       03/02/17 1055 02/27/17 1423 02/27/17 0845    Transfer Training PT LTG    Transfer Training PT LTG, Date Established  02/27/17  -MANDO     Transfer Training PT LTG, Time to Achieve  1 wk  -MANDO     Transfer Training PT LTG, Activity Type  all transfers  -MANDO     Transfer Training PT LTG, [place] Level  conditional independence  -MANDO     Transfer Training PT LTG, Assist Device  walker, rolling  -MANDO     Transfer Training PT  LTG, Date Goal Reviewed 03/02/17  -SR      Gait Training PT LTG    Gait Training Goal PT LTG, Date Established  02/27/17  -MANDO     Gait Training Goal PT LTG, Time to Achieve  1 wk  -MANDO     Gait Training Goal PT LTG, [place] Level  conditional independence  -MANDO     Gait Training Goal PT LTG, Assist Device  walker, rolling  -MANDO     Gait Training Goal PT LTG, Distance to Achieve  25  -MANDO     Gait Training Goal PT LTG, Date Goal Reviewed 03/02/17  -SR      Patient Education PT LTG    Patient Education PT LTG, Date Established  02/27/17  -MANDO     Patient Education PT LTG, Time to Achieve  1 wk  -MANDO     Patient Education PT LTG, Education Type  HEP;positioning;posture/body mechanics;gait;transfers;bed mobility;pain management;progression of POC;benefits of activity;home safety;equipment management;skin care/inspection   weightbearing status  -MANDO     Patient Education PT LTG, Education Understanding  demonstrate adequately;verbalize understanding  -MANDO     Patient Education PT LTG, Date Goal Reviewed 03/02/17  -SR      Wound Care PT LTG    Wound Care PT LTG 1, Outcome   goal ongoing  -      02/26/17 1332          Wound Care PT LTG    Wound Care PT LTG  1, Date Established 02/26/17  -MW      Wound Care PT LTG 1, Time to Achieve 2 wks  -MW      Wound Care PT LTG 1, Location LT open ray amputation site   -MW      Wound Care PT LTG 1, No S&S of Infection yes  -MW      Wound Care PT LTG 1, Decrease Wound Size 25%  -MW      Wound Care PT LTG 1, Decrease Exudate minimum  -MW      Wound Care PT LTG 1, No New Skin Break Down yes  -MW      Wound Care PT LTG 1, Education S&S of infection;dressing changes;wound care;weight bearing restriction;progression of POC  -MW      Wound Care PT LTG 1, Education Understanding verbalize understanding  -MW        User Key  (r) = Recorded By, (t) = Taken By, (c) = Cosigned By    Initials Name Provider Type    MANDO Philomena Esteban, PT Physical Therapist    SR Thu Herrera, PT Physical Therapist    MW Christie Wheeler, PT Physical Therapist     Chetna Chao, PT Physical Therapist          Physical Therapy Education     Title: PT OT SLP Therapies (Active)     Topic: Physical Therapy (Active)     Point: Mobility training (Active)    Learning Progress Summary    Learner Readiness Method Response Comment Documented by Status   Patient Acceptance E,D NR  SR 03/02/17 1055 Active    Acceptance E,D VU,NR  MANDO 02/27/17 1558 Done               Point: Home exercise program (Active)    Learning Progress Summary    Learner Readiness Method Response Comment Documented by Status   Patient Acceptance E,D NR  SR 03/02/17 1055 Active    Acceptance E,D VU,NR  MANDO 02/27/17 1558 Done               Point: Body mechanics (Active)    Learning Progress Summary    Learner Readiness Method Response Comment Documented by Status   Patient Acceptance E,D NR  SR 03/02/17 1055 Active    Acceptance E,D VU,NR  MANDO 02/27/17 1558 Done               Point: Precautions (Active)    Learning Progress Summary    Learner Readiness Method Response Comment Documented by Status   Patient Acceptance E,D NR  SR 03/02/17 1055 Active    Acceptance E,D VU,NR  MANDO 02/27/17 1558  Done                      User Key     Initials Effective Dates Name Provider Type Discipline    MANDO 06/19/15 -  Philomena Esteban, PT Physical Therapist PT    SR 06/19/15 -  Thu Herrera, PT Physical Therapist PT                    PT Recommendation and Plan  Anticipated Equipment Needs At Discharge: other (see comments) (wound VAC )  Anticipated Discharge Disposition: home with assist, home with home health (if plan further level of amputation, will recommend rehab)  Planned Therapy Interventions: wound care, patient/family education  PT Frequency: daily, per priority policy  Plan of Care Review  Plan Of Care Reviewed With: patient  Progress: improving  Outcome Summary/Follow up Plan: Pt henry improved endurance with static standing and was able to sustain NWB LLE without physical assist. Pt also tolerates ther ex well. Cont with IPPT to improve strength and safety with mobility.          Outcome Measures       03/02/17 0820 03/01/17 0838 02/28/17 0824    How much help from another person do you currently need...    Turning from your back to your side while in flat bed without using bedrails? 4  -SR      Moving from lying on back to sitting on the side of a flat bed without bedrails? 4  -SR      Moving to and from a bed to a chair (including a wheelchair)? 3  -SR      Standing up from a chair using your arms (e.g., wheelchair, bedside chair)? 3  -SR      Climbing 3-5 steps with a railing? 1  -SR      To walk in hospital room? 1  -SR      AM-PAC 6 Clicks Score 16  -SR      How much help from another is currently needed...    Putting on and taking off regular lower body clothing?  3  -MANDO 2  -MANDO    Bathing (including washing, rinsing, and drying)  3  -MANDO 2  -MANDO    Toileting (which includes using toilet bed pan or urinal)  3  -MANDO 3  -MANDO    Putting on and taking off regular upper body clothing  3  -MANDO 3  -MANDO    Taking care of personal grooming (such as brushing teeth)  4  -MANDO 4  -MANDO    Eating meals  4  -MANDO 4   -MANDO    Score  20  -MANDO 18  -MANDO    Functional Assessment    Outcome Measure Options AM-PAC 6 Clicks Basic Mobility (PT)  -SR AM-PAC 6 Clicks Daily Activity (OT)  -MANDO AM-PAC 6 Clicks Daily Activity (OT)  -MANDO      02/27/17 1423 02/27/17 1418       How much help from another person do you currently need...    Turning from your back to your side while in flat bed without using bedrails? 3  -JBA      Moving from lying on back to sitting on the side of a flat bed without bedrails? 4  -JBA      Moving to and from a bed to a chair (including a wheelchair)? 3  -JBA      Standing up from a chair using your arms (e.g., wheelchair, bedside chair)? 3  -JBA      Climbing 3-5 steps with a railing? 1  -JBA      To walk in hospital room? 1  -JBA      AM-PAC 6 Clicks Score 15  -JBA      How much help from another is currently needed...    Putting on and taking off regular lower body clothing?  2  -MANDO     Bathing (including washing, rinsing, and drying)  2  -MANDO     Toileting (which includes using toilet bed pan or urinal)  2  -MANDO     Putting on and taking off regular upper body clothing  3  -MANDO     Taking care of personal grooming (such as brushing teeth)  4  -MANDO     Eating meals  4  -MANDO     Score  17  -MANDO     Functional Assessment    Outcome Measure Options AM-PAC 6 Clicks Basic Mobility (PT)  -JBA AM-PAC 6 Clicks Daily Activity (OT)  -MANDO       User Key  (r) = Recorded By, (t) = Taken By, (c) = Cosigned By    Initials Name Provider Type    JBA Philomena Esteban, PT Physical Therapist    MANDO Kacy Blevins, OT Occupational Therapist    SR Thu Herrera, PT Physical Therapist           Time Calculation:         PT Charges       03/02/17 1058 03/02/17 0800       Time Calculation    Start Time 0820  -SR 0800  -     PT Received On 03/02/17  -      PT Goal Re-Cert Due Date  03/08/17  -     Time Calculation- PT    Total Timed Code Minutes- PT 24 minute(s)  -SR 10 minute(s)  -       User Key  (r) = Recorded By, (t) = Taken By,  (c) = Cosigned By    Initials Name Provider Type     Lane Yates, PT Physical Therapist    SR Thu Herrera, PT Physical Therapist          Therapy Charges for Today     Code Description Service Date Service Provider Modifiers Qty    68850374620 HC PT THER PROC EA 15 MIN 3/2/2017 Thu Herrera, PT GP 2    08077183527 HC PT THER SUPP EA 15 MIN 3/2/2017 Thu Herrera, PT GP 2          PT G-Codes  Outcome Measure Options: AM-PAC 6 Clicks Basic Mobility (PT)    Thu Herrera, PT  3/2/2017

## 2017-03-02 NOTE — PLAN OF CARE
Problem: Patient Care Overview (Adult)  Goal: Plan of Care Review  Outcome: Ongoing (interventions implemented as appropriate)    03/02/17 2738   Coping/Psychosocial Response Interventions   Plan Of Care Reviewed With patient   Patient Care Overview   Progress no change   Outcome Evaluation   Outcome Summary/Follow up Plan Dressing on coccyx changed in AM. NPO for surgery. Pain controlled with prn medication. Non weight bearing on left foot.       Goal: Adult Individualization and Mutuality  Outcome: Ongoing (interventions implemented as appropriate)  Goal: Discharge Needs Assessment  Outcome: Ongoing (interventions implemented as appropriate)    Problem: Infection, Risk/Actual (Adult)  Goal: Infection Prevention/Resolution  Outcome: Ongoing (interventions implemented as appropriate)    Problem: Skin Integrity Impairment, Risk/Actual (Adult)  Goal: Skin Integrity/Wound Healing  Outcome: Ongoing (interventions implemented as appropriate)    Problem: Pressure Ulcer (Adult)  Goal: Signs and Symptoms of Listed Potential Problems Will be Absent or Manageable (Pressure Ulcer)  Outcome: Ongoing (interventions implemented as appropriate)    Problem: Fall Risk (Adult)  Goal: Identify Related Risk Factors and Signs and Symptoms  Outcome: Ongoing (interventions implemented as appropriate)  Goal: Absence of Falls  Outcome: Ongoing (interventions implemented as appropriate)

## 2017-03-02 NOTE — PLAN OF CARE
Problem: Patient Care Overview (Adult)  Goal: Plan of Care Review  Outcome: Ongoing (interventions implemented as appropriate)    03/02/17 1055   Coping/Psychosocial Response Interventions   Plan Of Care Reviewed With patient   Patient Care Overview   Progress improving   Outcome Evaluation   Outcome Summary/Follow up Plan Pt demo improved endurance with static standing and was able to sustain NWB LLE without physical assist. Pt also tolerates ther ex well. Cont with IPPT to improve strength and safety with mobility.         Problem: Inpatient Physical Therapy  Goal: Transfer Training Goal 1 LTG- PT  Outcome: Ongoing (interventions implemented as appropriate)    03/02/17 1055   Transfer Training PT LTG   Transfer Training PT LTG, Date Goal Reviewed 03/02/17       Goal: Gait Training Goal LTG- PT  Outcome: Ongoing (interventions implemented as appropriate)    03/02/17 1055   Gait Training PT LTG   Gait Training Goal PT LTG, Date Goal Reviewed 03/02/17       Goal: Patient Education Goal LTG- PT  Outcome: Ongoing (interventions implemented as appropriate)    03/02/17 1055   Patient Education PT LTG   Patient Education PT LTG, Date Goal Reviewed 03/02/17

## 2017-03-03 LAB
ANION GAP SERPL CALCULATED.3IONS-SCNC: 5 MMOL/L (ref 3–11)
APTT PPP: 31.3 SECONDS (ref 24–31)
BUN BLD-MCNC: 16 MG/DL (ref 9–23)
BUN/CREAT SERPL: 20 (ref 7–25)
CALCIUM SPEC-SCNC: 10.3 MG/DL (ref 8.7–10.4)
CHLORIDE SERPL-SCNC: 102 MMOL/L (ref 99–109)
CO2 SERPL-SCNC: 30 MMOL/L (ref 20–31)
CREAT BLD-MCNC: 0.8 MG/DL (ref 0.6–1.3)
DEPRECATED RDW RBC AUTO: 53.9 FL (ref 37–54)
ERYTHROCYTE [DISTWIDTH] IN BLOOD BY AUTOMATED COUNT: 16.6 % (ref 11.3–14.5)
GFR SERPL CREATININE-BSD FRML MDRD: 72 ML/MIN/1.73
GLUCOSE BLD-MCNC: 126 MG/DL (ref 70–100)
GLUCOSE BLDC GLUCOMTR-MCNC: 122 MG/DL (ref 70–130)
GLUCOSE BLDC GLUCOMTR-MCNC: 128 MG/DL (ref 70–130)
GLUCOSE BLDC GLUCOMTR-MCNC: 161 MG/DL (ref 70–130)
GLUCOSE BLDC GLUCOMTR-MCNC: 161 MG/DL (ref 70–130)
HCT VFR BLD AUTO: 31.8 % (ref 34.5–44)
HGB BLD-MCNC: 9.7 G/DL (ref 11.5–15.5)
INR PPP: 1
MCH RBC QN AUTO: 26.9 PG (ref 27–31)
MCHC RBC AUTO-ENTMCNC: 30.5 G/DL (ref 32–36)
MCV RBC AUTO: 88.1 FL (ref 80–99)
PLATELET # BLD AUTO: 247 10*3/MM3 (ref 150–450)
PMV BLD AUTO: 10.7 FL (ref 6–12)
POTASSIUM BLD-SCNC: 4 MMOL/L (ref 3.5–5.5)
POTASSIUM BLD-SCNC: 4 MMOL/L (ref 3.5–5.5)
PROTHROMBIN TIME: 10.9 SECONDS (ref 9.6–11.5)
RBC # BLD AUTO: 3.61 10*6/MM3 (ref 3.89–5.14)
SODIUM BLD-SCNC: 137 MMOL/L (ref 132–146)
WBC NRBC COR # BLD: 13.13 10*3/MM3 (ref 3.5–10.8)

## 2017-03-03 PROCEDURE — 63710000001 INSULIN DETEMIR PER 5 UNITS: Performed by: HOSPITALIST

## 2017-03-03 PROCEDURE — 97605 NEG PRS WND THER DME<=50SQCM: CPT

## 2017-03-03 PROCEDURE — 94660 CPAP INITIATION&MGMT: CPT

## 2017-03-03 PROCEDURE — 94760 N-INVAS EAR/PLS OXIMETRY 1: CPT

## 2017-03-03 PROCEDURE — 94799 UNLISTED PULMONARY SVC/PX: CPT

## 2017-03-03 PROCEDURE — 25010000002 VANCOMYCIN

## 2017-03-03 PROCEDURE — 25010000002 CEFEPIME: Performed by: INTERNAL MEDICINE

## 2017-03-03 PROCEDURE — 85610 PROTHROMBIN TIME: CPT | Performed by: ANESTHESIOLOGY

## 2017-03-03 PROCEDURE — 80048 BASIC METABOLIC PNL TOTAL CA: CPT | Performed by: INTERNAL MEDICINE

## 2017-03-03 PROCEDURE — 99024 POSTOP FOLLOW-UP VISIT: CPT | Performed by: THORACIC SURGERY (CARDIOTHORACIC VASCULAR SURGERY)

## 2017-03-03 PROCEDURE — 99232 SBSQ HOSP IP/OBS MODERATE 35: CPT | Performed by: INTERNAL MEDICINE

## 2017-03-03 PROCEDURE — 85730 THROMBOPLASTIN TIME PARTIAL: CPT | Performed by: ANESTHESIOLOGY

## 2017-03-03 PROCEDURE — 82962 GLUCOSE BLOOD TEST: CPT

## 2017-03-03 PROCEDURE — 85027 COMPLETE CBC AUTOMATED: CPT | Performed by: ANESTHESIOLOGY

## 2017-03-03 PROCEDURE — 94640 AIRWAY INHALATION TREATMENT: CPT

## 2017-03-03 PROCEDURE — 84132 ASSAY OF SERUM POTASSIUM: CPT | Performed by: ANESTHESIOLOGY

## 2017-03-03 RX ORDER — SODIUM CHLORIDE 450 MG/100ML
50 INJECTION, SOLUTION INTRAVENOUS CONTINUOUS
Status: DISCONTINUED | OUTPATIENT
Start: 2017-03-03 | End: 2017-03-07

## 2017-03-03 RX ORDER — HEPARIN SODIUM 5000 [USP'U]/ML
5000 INJECTION, SOLUTION INTRAVENOUS; SUBCUTANEOUS EVERY 12 HOURS SCHEDULED
Status: DISCONTINUED | OUTPATIENT
Start: 2017-03-04 | End: 2017-03-03 | Stop reason: SDUPTHER

## 2017-03-03 RX ORDER — FENTANYL CITRATE 50 UG/ML
50 INJECTION, SOLUTION INTRAMUSCULAR; INTRAVENOUS
Status: CANCELLED | OUTPATIENT
Start: 2017-03-02

## 2017-03-03 RX ORDER — HYDROMORPHONE HYDROCHLORIDE 1 MG/ML
0.5 INJECTION, SOLUTION INTRAMUSCULAR; INTRAVENOUS; SUBCUTANEOUS
Status: CANCELLED | OUTPATIENT
Start: 2017-03-02

## 2017-03-03 RX ORDER — HEPARIN SODIUM 5000 [USP'U]/ML
5000 INJECTION, SOLUTION INTRAVENOUS; SUBCUTANEOUS EVERY 12 HOURS SCHEDULED
Status: DISCONTINUED | OUTPATIENT
Start: 2017-03-04 | End: 2017-03-10 | Stop reason: HOSPADM

## 2017-03-03 RX ORDER — IPRATROPIUM BROMIDE AND ALBUTEROL SULFATE 2.5; .5 MG/3ML; MG/3ML
3 SOLUTION RESPIRATORY (INHALATION) ONCE
Status: COMPLETED | OUTPATIENT
Start: 2017-03-03 | End: 2017-03-02

## 2017-03-03 RX ADMIN — LEVOTHYROXINE SODIUM 112 MCG: 112 TABLET ORAL at 06:32

## 2017-03-03 RX ADMIN — CETIRIZINE HYDROCHLORIDE 10 MG: 10 TABLET, FILM COATED ORAL at 20:45

## 2017-03-03 RX ADMIN — PANTOPRAZOLE SODIUM 40 MG: 40 TABLET, DELAYED RELEASE ORAL at 06:32

## 2017-03-03 RX ADMIN — INSULIN DETEMIR 10 UNITS: 100 INJECTION, SOLUTION SUBCUTANEOUS at 09:59

## 2017-03-03 RX ADMIN — FLUTICASONE PROPIONATE 1 SPRAY: 50 SPRAY, METERED NASAL at 09:59

## 2017-03-03 RX ADMIN — OXYCODONE HYDROCHLORIDE AND ACETAMINOPHEN 1 TABLET: 10; 325 TABLET ORAL at 06:32

## 2017-03-03 RX ADMIN — ASPIRIN 325 MG: 325 TABLET, DELAYED RELEASE ORAL at 09:56

## 2017-03-03 RX ADMIN — OXYCODONE HYDROCHLORIDE AND ACETAMINOPHEN 1 TABLET: 10; 325 TABLET ORAL at 13:44

## 2017-03-03 RX ADMIN — BUSPIRONE HYDROCHLORIDE 20 MG: 10 TABLET ORAL at 20:45

## 2017-03-03 RX ADMIN — IPRATROPIUM BROMIDE AND ALBUTEROL SULFATE 3 ML: .5; 3 SOLUTION RESPIRATORY (INHALATION) at 08:32

## 2017-03-03 RX ADMIN — AMLODIPINE BESYLATE 5 MG: 5 TABLET ORAL at 09:57

## 2017-03-03 RX ADMIN — INSULIN DETEMIR 10 UNITS: 100 INJECTION, SOLUTION SUBCUTANEOUS at 21:30

## 2017-03-03 RX ADMIN — PREGABALIN 100 MG: 100 CAPSULE ORAL at 09:56

## 2017-03-03 RX ADMIN — CLONAZEPAM 0.25 MG: 0.5 TABLET ORAL at 09:57

## 2017-03-03 RX ADMIN — IPRATROPIUM BROMIDE AND ALBUTEROL SULFATE 3 ML: .5; 3 SOLUTION RESPIRATORY (INHALATION) at 20:52

## 2017-03-03 RX ADMIN — Medication 1 CAPSULE: at 09:56

## 2017-03-03 RX ADMIN — PREGABALIN 100 MG: 100 CAPSULE ORAL at 16:51

## 2017-03-03 RX ADMIN — CEFEPIME 2 G: 2 INJECTION, POWDER, FOR SOLUTION INTRAVENOUS at 04:13

## 2017-03-03 RX ADMIN — IPRATROPIUM BROMIDE AND ALBUTEROL SULFATE 3 ML: .5; 3 SOLUTION RESPIRATORY (INHALATION) at 17:05

## 2017-03-03 RX ADMIN — SODIUM CHLORIDE 100 ML/HR: 4.5 INJECTION, SOLUTION INTRAVENOUS at 00:37

## 2017-03-03 RX ADMIN — VANCOMYCIN HYDROCHLORIDE 1500 MG: 1 INJECTION, POWDER, LYOPHILIZED, FOR SOLUTION INTRAVENOUS at 09:55

## 2017-03-03 RX ADMIN — FLUTICASONE PROPIONATE 1 SPRAY: 50 SPRAY, METERED NASAL at 18:02

## 2017-03-03 RX ADMIN — PREGABALIN 100 MG: 100 CAPSULE ORAL at 21:53

## 2017-03-03 RX ADMIN — CLONAZEPAM 0.25 MG: 0.5 TABLET ORAL at 20:45

## 2017-03-03 RX ADMIN — INSULIN LISPRO 2 UNITS: 100 INJECTION, SOLUTION INTRAVENOUS; SUBCUTANEOUS at 21:54

## 2017-03-03 RX ADMIN — NICOTINE 1 PATCH: 21 PATCH, EXTENDED RELEASE TRANSDERMAL at 09:58

## 2017-03-03 RX ADMIN — Medication 2 TABLET: at 20:45

## 2017-03-03 RX ADMIN — BISOPROLOL FUMARATE AND HYDROCHLOROTHIAZIDE 1 TABLET: 6.25; 5 TABLET ORAL at 20:45

## 2017-03-03 RX ADMIN — IPRATROPIUM BROMIDE AND ALBUTEROL SULFATE 3 ML: .5; 3 SOLUTION RESPIRATORY (INHALATION) at 12:20

## 2017-03-03 RX ADMIN — LISINOPRIL 20 MG: 20 TABLET ORAL at 09:57

## 2017-03-03 RX ADMIN — BACLOFEN 20 MG: 10 TABLET ORAL at 13:46

## 2017-03-03 RX ADMIN — ATORVASTATIN CALCIUM 40 MG: 40 TABLET, FILM COATED ORAL at 20:45

## 2017-03-03 RX ADMIN — CEFEPIME 2 G: 2 INJECTION, POWDER, FOR SOLUTION INTRAVENOUS at 13:48

## 2017-03-03 RX ADMIN — BUSPIRONE HYDROCHLORIDE 20 MG: 10 TABLET ORAL at 09:57

## 2017-03-03 RX ADMIN — OXYCODONE HYDROCHLORIDE AND ACETAMINOPHEN 1 TABLET: 10; 325 TABLET ORAL at 21:53

## 2017-03-03 RX ADMIN — EZETIMIBE 10 MG: 10 TABLET ORAL at 09:59

## 2017-03-03 RX ADMIN — SALINE NASAL SPRAY 2 SPRAY: 1.5 SOLUTION NASAL at 18:02

## 2017-03-03 NOTE — ANESTHESIA PREPROCEDURE EVALUATION
Anesthesia Evaluation     Patient summary reviewed and Nursing notes reviewed      Airway   Mallampati: II  TM distance: >3 FB  Neck ROM: full  no difficulty expected  Dental      Pulmonary     breath sounds clear to auscultation  (+) COPD, shortness of breath, sleep apnea,   Cardiovascular     ECG reviewed  Rhythm: regular  Rate: normal    (+) hypertension, murmur,       Neuro/Psych  GI/Hepatic/Renal/Endo    (+) obesity,  GERD, diabetes mellitus type 1, hypothyroidism,     Musculoskeletal     Abdominal    Substance History      OB/GYN          Other        ROS/Med Hx Other: TTE EF 60% 2015 without sig valvular dz  caTH 2016: EF 65%, no MR                            Anesthesia Plan    ASA 3     general and regional     intravenous induction   Anesthetic plan and risks discussed with patient and spouse/significant other.    Plan discussed with CRNA.

## 2017-03-03 NOTE — BRIEF OP NOTE
Procedure Note    Patient Name:  Tamara Langston    Date of Surgery: 03/02/2017      Pre-op Diagnosis:  Osteomyelitis of metatarsal bones 2 through 5 of the left foot    Post-op Diagnosis: Same, final path and cultures  pending       Surgeon(s):  Arsalan Feliciano MD    Assistant(s): LIZZIE Woods PA-C   Anesthesia: General endotracheal per     Indications: Patient is a long-standing diabetic who developed a infected left great toe metatarsophalangeal joint space infection in November 2016, she underwent a standard transmetatarsal  amputation of the left great toe with wound VAC placed for closure of the open wound by secondary intent.  She developed infection of the granulation tissue of this open wound and required completion first metatarsal resection approximately a week ago.  MRI suggested osteomyelitis in the second, third and fourth metatarsal bones.  The patient is brought back to the operating room at this time for transmetatarsal resections of the second, third, fourth and fifth toes .    Procedure(s)  Trans  metatarsal resection of the second, third, fourth and fifth toes and primary closure of the incision    Estimated Blood Loss less then 25 mL    Findings: On gross inspection of the metatarsal bones there appeared to be no obvious soft tissue infection surrounding the metatarsal bones and on transection of the metatarsal bones the cortex and marrow appeared to be normal    Complications: No immediate complications occurred  Cultures were sent of the soft tissues of the plantar surface of the open incision and of the bone marrow of the metatarsal bones    Arsalan Feliciano MD     Date: 3/2/2017 Time: 10:41 PM

## 2017-03-03 NOTE — PROGRESS NOTES
"Adult Nutrition  Assessment/PES    Patient Name:  Tamara Langston  YOB: 1955  MRN: [id number]  Admit Date:  2/24/2017    Assessment Date:  3/3/2017        Reason for Assessment       03/03/17 1132    Reason for Assessment    Reason For Assessment/Visit length of stay    Time Spent (min) 5                Anthropometrics       03/03/17 1133    Anthropometrics    Height 167.6 cm (66\")    Weight 95.4 kg (210 lb 4 oz)    Ideal Body Weight (IBW)    Ideal Body Weight (IBW), Female 59.98    % Ideal Body Weight 159.34    Body Mass Index (BMI)    BMI (kg/m2) 34.01                  Nutrition Prescription Ordered       03/03/17 1133    Nutrition Prescription PO    Current PO Diet --   No diet order, RD observed            Evaluation of Received Nutrient/Fluid Intake       03/03/17 1133    PO Evaluation    Number of Meals 5    % PO Intake 100   Advised by pt. she received waffles this am and ordered sand. and soup for lunch.              Problem/Interventions:        Problem 1       03/03/17 1134    Nutrition Diagnoses Problem 1    Problem 1 Nutrition Appropriate for Condition at this Time    Etiology (related to) Factors Affecting Nutrition    Appetite Good    Signs/Symptoms (evidenced by) PO Intake    Percent (%) intake recorded 100 %    Over number of meals 5                    Intervention Goal       03/03/17 1134    Intervention Goal    General Nutrition support treatment            Nutrition Intervention       03/03/17 1135    Nutrition Intervention    RD/Tech Action Follow Tx progress   Asked nsg. for assistance in obtaining a diet order for pt. She stated she would check into it.              Education/Evaluation       03/03/17 1135    Education    Education --   MNT discussed with pt.    Monitor/Evaluation    Monitor Per protocol          Electronically signed by:  Shelli Brady RD  03/03/17 11:38 AM  "

## 2017-03-03 NOTE — PLAN OF CARE
Problem: Patient Care Overview (Adult)  Goal: Plan of Care Review  Outcome: Ongoing (interventions implemented as appropriate)    03/03/17 0800   Coping/Psychosocial Response Interventions   Plan Of Care Reviewed With patient   Outcome Evaluation   Outcome Summary/Follow up Plan wound vac intact from placement in surgery. some bleeding from lateral incision, but none noted from under wound vac dressing. PT to change dressing in 1-2 days.          Problem: Inpatient Physical Therapy  Goal: Wound Care Goal 1 LTG- PT  Outcome: Ongoing (interventions implemented as appropriate)    02/26/17 1332 02/27/17 0845   Wound Care PT LTG   Wound Care PT LTG 1, Date Established 02/26/17 --    Wound Care PT LTG 1, Time to Achieve 2 wks --    Wound Care PT LTG 1, Location LT open ray amputation site  --    Wound Care PT LTG 1, No S&S of Infection yes --    Wound Care PT LTG 1, Decrease Wound Size 25% --    Wound Care PT LTG 1, Decrease Exudate minimum --    Wound Care PT LTG 1, No New Skin Break Down yes --    Wound Care PT LTG 1, Education S&S of infection;dressing changes;wound care;weight bearing restriction;progression of POC --    Wound Care PT LTG 1, Education Understanding verbalize understanding --    Wound Care PT LTG 1, Outcome --  goal ongoing

## 2017-03-03 NOTE — ANESTHESIA PROCEDURE NOTES
Airway  Urgency: elective    Airway not difficult    General Information and Staff    Patient location during procedure: OR  Anesthesiologist: RADHA MEJÍA    Indications and Patient Condition  Indications for airway management: airway protection    Preoxygenated: yes  Mask difficulty assessment: 1 - vent by mask    Final Airway Details  Final airway type: endotracheal airway      Successful airway: ETT  Cuffed: yes   Successful intubation technique: direct laryngoscopy  Facilitating devices/methods: Bougie  Endotracheal tube insertion site: oral  Blade: Anish  Blade size: #3  ETT size: 7.0 mm  Cormack-Lehane Classification: grade IIb - view of arytenoids or posterior of glottis only  Placement verified by: chest auscultation and capnometry   Measured from: gums  Number of attempts at approach: 1    Additional Comments  Able to mask, DL, intubated over eschmann.  BBS=  Eyes taped pressure pts padded

## 2017-03-03 NOTE — PROGRESS NOTES
Tamara Langston  1955  [id number]  3/3/2017    CC: No chief complaint on file.  foot infection  Reason for Consultation: Left foot infection     History of present illness:      This is a 61 y.o. female with a history of DM, DINA, COPD, PVD, who was treated in 11/23/16 by Dr. Cruz for left great toe osteomyelitis growing MSSA and GBS and ultimately required left great toe transmetatarsal amputation by Dr. Feliciano. She was discharged home on Vanc/Rocephin IV and oral Flagyl. She had been doing well postoperatively until approximately 1 week ago she started having a low grade fever of 99.0 and SOA. HH nurse noticed a new brownish drainage from her surgical wound on 2/22/17. Dr. Feliciano was contacted and she was seen in the office on 2/23/17. He was concerned with an osteo infection and pt followed up with Dr. Cruz/Dr. Feliciano in the office yesterday. It was decided that she should be admitted and be started on IV antibiotics as well as MRI of the left foot. Left foot culture taken yesterday is growing MSSA. She has been afebrile and labs revealed a WBC of 10.24 and Cr of 1.30. She was started on Vancomycin and Rocephin IV therapy and has received a dose of Zinacef.     MRI of the left foot was concerning for osteomyelitis of the 2nd-3rd-4th metatarsal bones and appearance of an abscess formation around previous transmetatarsal amputation of great toe. Surgical plans for today were for transmetatarsal resection of 2nd,3rd, 4th and 5th toe as well as completion of great toe amputation back to cuneiform bone. Pt ultimately underwent removal of the remainder of the 1st metatarsal bone of the left foot and a portion of the cuneiform bone. We have been asked to see for further evaluation and antibiotic recommendations. Pt was seen in the immediate post operative recovery room and is very drowsy. Seen and agree     2/26 - co foot infection  Hx limited  Co pain  No rash  ROS discussed with staff    2/27  Very complicated  situation as noted by Dr Feliciano  She underwent excision of the remaining portion of the proximal 1st MT by Dr Feliciano  The MRI suggests the possibility of extensive OM of the foot but there is concern that her foot would be destabilized if all the infected bone were to be resected She c/o discomfort at the site of the amputation  2/28 no new c/o   3/1/17  Plans for transmetatarsal amputation noted  In my presence Dr Feliciano quoted a 50/50 chance of limb salvage  3/3/17  Alert; operative findings discussed with Dr Feliciano  Pt w/o complaints      Past medical history:  Past Medical History   Diagnosis Date   • Bronchitis    • Cervical cancer    • Cholelithiasis 5/11/2016   • Chronic anxiety 2/27/2017   • Chronic bronchitis    • Chronically on benzodiazepine therapy 2/27/2017   • Degenerative arthritis    • Diabetes mellitus    • Dyslipidemia 5/11/2016   • Dyspnea    • Fatty liver disease, nonalcoholic 11/21/2016   • Fibromyalgia    • GERD (gastroesophageal reflux disease)    • H/O echocardiogram    • History of pneumonia    • Hypertension 5/11/2016     16. H/O echocardiogram (V15.89) (Z92.89)  · A.  Echocardiogram of 02/03/2015 reports an ejection fraction of 60-65%, mild concentric     LVH, trace mitral regurgitation, mild tricuspid and pulmonic regurgitation and calculated     RVSP of 35 mmHg, the main pulmonary artery is also mildly dilated.   • Hypothyroidism 5/11/2016     Description: A.  On replacement therapy.   • Nausea    • Obesity    • DINA (obstructive sleep apnea)      intolerant of CPAP therapy   • Osteoporosis    • Osteoporosis    • Polycythemia vera 5/11/2016   • Pulmonary emphysema    • Restrictive ventilatory defect    • Rhinitis    • Uncontrolled diabetes mellitus 5/11/2016   • Uterine cancer    • Vitamin D deficiency 8/1/2016       Medications:   Antibiotics:  IV Anti-Infectives     Ordered     Dose/Rate Route Frequency Start Stop    03/03/17 0819  vancomycin 1500 mg/500 mL 0.9% NS IVPB ([facility])     Ordering  "Provider:  Joey Alvarado, RPH    1,500 mg  over 90 Minutes Intravenous Every 24 Hours 03/03/17 0900      03/01/17 1359  cefepime (MAXIPIME) 2 g/100 mL 0.9% NS ([facility])     Ordering Provider:  Oksana Cruz MD    2 g Intravenous Every 12 Hours 03/01/17 1500      02/26/17 1946  Pharmacy to dose vancomycin     Ordering Provider:  Arsalan Feliciano MD     Does not apply Continuous PRN 02/26/17 1945 02/24/17 1718  vancomycin 2000 mg/500 mL 0.9% NS IVPB ([facility])     Ordering Provider:  Luis Manuel Rodriguez IV, RPH    2,000 mg  over 120 Minutes Intravenous Once 02/24/17 1800 02/24/17 1932          Allergies:  is allergic to cortisone; oxycontin [oxycodone]; and tolmetin.    Family History: family history includes Alcohol abuse in her brother; Arthritis in her brother, father, mother, and other; Bleeding Disorder in her father; COPD in her sister; Colon polyps in her mother; Diabetes in her brother, father, mother, and other; Diverticulitis in her mother; Heart murmur in her daughter; Kidney disease in her father.    Social History:  reports that she has been smoking Cigarettes.  She has a 24.00 pack-year smoking history. She has never used smokeless tobacco. She reports that she does not drink alcohol or use illicit drugs.    Review of Systems: All other reviewed and negative except as per HPI - pt groggy this am    Blood pressure 132/61, pulse 82, temperature 98.3 °F (36.8 °C), temperature source Oral, resp. rate 16, height 66\" (167.6 cm), weight 210 lb 4 oz (95.4 kg), SpO2 100 %, not currently breastfeeding.  GENERAL: groggy early this am, in no acute distress.   HEENT: Oropharynx without thrush. . No cervical adenopathy. No neck masses  EYES: . No conjunctival injection. No icterus.   LYMPHATICS: No lymphadenopathy of the neck or axillary or inguinal regions.   HEART: No murmur, gallop, or pericardial friction rub.   LUNGS: Clear to auscultation anteriorly. No respiratory distress  ABDOMEN: Soft, nontender, nondistended. " No appreciable HSM.    SKIN: Warm and dry without cutaneous eruptions. .   PSYCHIATRIC: Mental status lucid. Cranial nerve function intact.   EXT: post-op bandage intact      DIAGNOSTICS:  Lab Results   Component Value Date    WBC 13.13 (H) 03/03/2017    HGB 9.7 (L) 03/03/2017    HCT 31.8 (L) 03/03/2017     03/03/2017     Lab Results   Component Value Date    CRP 57.50 (H) 11/27/2016     Lab Results   Component Value Date    SEDRATE 59 (H) 11/21/2016     Lab Results   Component Value Date    GLUCOSE 126 (H) 03/03/2017    BUN 16 03/03/2017    CREATININE 0.80 03/03/2017    EGFRIFNONA 72 03/03/2017    EGFRIFAFRI 77 12/13/2016    BCR 20.0 03/03/2017    CO2 30.0 03/03/2017    CALCIUM 10.3 03/03/2017    PROTENTOTREF 7.9 12/13/2016    ALBUMIN 4.00 03/01/2017    LABIL2 1.1 (L) 03/01/2017    AST 48 (H) 03/01/2017    ALT 24 03/01/2017       Microbiology  MSSA so far      RADIOLOGY:  Imaging Results (last 72 hours)     Procedure Component Value Units Date/Time    MRI Foot Left Without Contrast [[id number]] Collected:  02/25/17 0935     Updated:  02/25/17 1226    Narrative:       EXAMINATION: MRI LEFT FOOT WO CONTRAST - 02/24/2017     INDICATION: Left foot osteomyelitis, left great toe and transmetatarsal  amputation 11/23/2016 for osteomyelitis. Patient now has fever and  shortness of breath with green bloody drainage from amputation wound.  Evaluate for bone infection.     TECHNIQUE: MR datasets of the left foot were performed without  intravenous contrast.     COMPARISON: Comparison is made to previous MR datasets of the left foot  of 11/21/2016.     FINDINGS:   1. The patient has had a previous transmetatarsal and left great toe  amputation. There is substantial cellulitis, soft tissue prominence and  pathologic soft tissue signal surrounding the area of amputation.     Diffuse cellulitis extends from the site of amputation through the  forefoot structures to the midfoot/forefoot junction and extensively  into the  plantar surface of the forefoot sparing only the fifth  metatarsal.     2. On STIR datasets, there is abnormal marrow signal extensively and  severely in the middle third of the partially amputated first  metatarsal, throughout the entirety of the second and third metatarsals,  in the distal half of the fourth metatarsal and in the head of the fifth  metatarsal.  Correspondingly, on T1 datasets, there is abnormal T1 decreased signal  throughout the second, third, fourth and distal fifth metatarsals. This  combination of signal alteration indicates extensive and diffuse  osteomyelitis involving the second, third, fourth and distal fifth  metatarsals.      There is marrow edema in the partially amputated first metatarsal and  high suspicion of osteomyelitis involving only the distal portion of the  amputated site of the first metatarsal. The surrounding soft tissue  cellulitis and edema are inhomogeneous and clearly consistent with  extensive infection.     3. Lastly, the midfoot and hindfoot structures appear to be spared.     There is a suspicious early fluid collection along the medial aspect of  the forefoot extending from the site of amputation to the  forefoot/midfoot junction medially and the plantar. This area should be  carefully evaluated. Abscess in evolution is suspect.       Impression:       Marked soft tissue infection, cellulitis and edema are seen  in the left forefoot. There is marked cellulitis and soft tissue  prominence around the site of previous mid metatarsal amputation of the  first metatarsal and left great toe. There is the suggestion of a  possible abscess in evolution along the base of the residual first  metatarsal medially and plantar in orientation and there is marked edema  in the tarsal tunnel diffusely.     There is marked marrow edema and abnormal STIR increased signal in  the  partially resected first metatarsal and extensively throughout the  entire second, third and fourth  metatarsals and in the distal fifth  metatarsal head. There is corresponding low signal on the T1 datasets  throughout these areas involving the second, third, fourth and distal  fifth metatarsal and minimally along the resected site of the first  metatarsal. This combination of signal alteration is highly likely for  extensive and diffuse active osteomyelitis involving multiple osseous  segments of the forefoot and extending back to the forefoot/midfoot  junction. Currently, the midfoot and hindfoot are spared but these are  extensive progressive infectious findings throughout the left forefoot.     DICTATED:     02/25/2017  EDITED:         02/25/2017         This report was finalized on 2/25/2017 12:24 PM by Dr. Gilberto Gomez MD.             Assessment and Plan:        Impression:      -Osteomyelitis of left 1st MT s/p removal of the remainder of the 1st metatarsal bone of the left foot and a portion of the cuneiform bone 2/25/17. Culture with MSSA  ;   Psa from 1 of 3 cultures sent 2/25  - Marrow edema of 2nd to 5th MT's with ? of osteomyelitis    -Recent MSSA/GBS left great toe osteomyelitis s/p left great toe transmetatarsal amputation 11/23/17  -PVD  --Ongoing tobacco abuse  -DM2  -DINA  -COPD      PLAN  - change rocephin to cefepime  -Continue  vancomycin ; consider stopping the vanc after all cultures are final  Anticipate  minimum 6 weeks rx and probably longer    - Reviewed the guarded prognosis for limb salvage with the pt and her   Even under the best circumstances ie tobacco cessation and compliance with NWB it is not guaranteed that we will be able to avoid BKA    - picc    Discussed with Dr Braden Cruz MD  3/3/2017

## 2017-03-03 NOTE — PROGRESS NOTES
[facility] Medicine Services  INPATIENT PROGRESS NOTE    Date of Admission: 2/24/2017  Length of Stay: 7  Primary Care Physician: Harinder Aranda MD  Subjective   Chief Complaint: All over pain    HPI:  No new problems.  Pain is about the same and unchanged.      Review Of Systems:   Review of Systems   Constitutional: Negative for fever.   Respiratory: Positive for wheezing.    Gastrointestinal: Negative for abdominal pain.   Musculoskeletal: Positive for arthralgias and myalgias.   All other systems reviewed and are negative.    Objective    Temp:  [98 °F (36.7 °C)-99.8 °F (37.7 °C)] 98.4 °F (36.9 °C)  Heart Rate:  [] 70  Resp:  [16-20] 16  BP: (118-163)/() 122/60  Physical Exam  Gen-no acute distress, asleep but arouseable, resting in bed on speciality mattress  Eyes-EOMI, pupils pinpoint but reactive  Neck-trachea midline, no JVD or nuchal rigidity  CV-RRR, S1 S2 normal, 2/6 systolic murmur  Resp- slight bilateral wheeze   Abd-obese, soft, NT, ND, +BS  Ext-no edema, LLE with surgical drsg/wound vac in place without drainage.  RLE warm, dry,  Neuro-A&O to person, place, time and situation.  Speech is clear, follows all commands, no focal deficits  Psych- Pleasant and cooperative; normal affect    Results Review:    I have reviewed the labs, radiology results and diagnostic studies.      Results from last 7 days  Lab Units 03/03/17  0416   WBC 10*3/mm3 13.13*   HEMOGLOBIN g/dL 9.7*   PLATELETS 10*3/mm3 247       Results from last 7 days  Lab Units 03/03/17  0416   SODIUM mmol/L 137   POTASSIUM mmol/L 4.0  4.0   TOTAL CO2 mmol/L 30.0   CREATININE mg/dL 0.80   GLUCOSE mg/dL 126*     Culture Data:   BLOOD CULTURE   Date Value Ref Range Status   02/24/2017 No growth at 2 days  Preliminary   02/24/2017 No growth at 2 days  Preliminary     WOUND CULTURE   Date Value Ref Range Status   02/23/2017 Heavy growth (4+) Staphylococcus aureus (A)  Final     I have reviewed the  medications.    Assessment/Plan   Assessment/Problem List  Principal Problem:    Osteomyelitis of left foot  Active Problems:    COPD (chronic obstructive pulmonary disease)    Obstructive sleep apnea syndrome    Chronic back pain    Type 2 diabetes mellitus    Dyslipidemia    Fibromyalgia    Gastroesophageal reflux disease without esophagitis    Hypertension    Hypothyroidism    Peripheral vascular disease    Diabetic polyneuropathy associated with type 2 diabetes mellitus    Anemia, blood loss    Chronic pain    Chronic, continuous use of opioids    Chronic anxiety    Chronically on benzodiazepine therapy     Assessment/Problem List     This is a 61-year-old  female with past medical history significant for tobacco abuse, hypertension, hyperlipidemia, and diabetes type 2, who is status post transmetatarsal amputation of the left great toe due to osteomyelitis in November 2016, now presents with poor wound healing with growth of staph aureus from tissue cultures done on 2/24.      MSSA Osteomyelitis (presumed) of left 1st metatarsal  -- Infectious disease and cardiothoracic surgeon on consult  -- On Vanco, and ceftriaxone changed to cefepime per Dr. Cruz  -- MRI showed evidence of osteomyelitis in the first metatarsal and the rest of the digits on the left foot, s/p removal of the rest of 1st metatarsal bone on 2/25 by Dr. Feliciano  -- now s/p  2nd - 5th transmetatarsal amputation    DM2, Controlled  -- Hgb A1c is 6.1   -- continue currenet insulin regimen, acceptable control     Anemia of chronic disease  -- hemoglobin stable, monitor    Chronic back pain/Fibromyalgia on chronic opioids  -- continue home pain medication  -- continue home muscle relaxers     Anxiety on chronic benzos  - buspar and klonopin continued (klonopin with 50% decrease in home dose)    COPD  -- stable    HTN  -- continue home medications  -- monitor, currently stable    Constipation  -- continue bowel regimen      Hypothyroidism  --  continue home dose of synthroid  -- TSH within normal limits      HLP  -- continue home dose of Lipitor and zetia    DINA  -- CPAP w/ 2L oxygen while sleeping    Tobacco abuse  -- Patient counseled at length and cessation recommended      DVT prophylaxis: shell hose and scd to RLE  Dispo:  Reviewed consultant notes and d/w CM.  Will be here through the weekend.  Plan will be to LTACH next week.    Derek Jones MD   03/03/17   4:27 PM    Please note that portions of this note may have been completed with a voice recognition program. Efforts were made to edit the dictations, but occasionally words are mistranscribed.

## 2017-03-03 NOTE — PROGRESS NOTES
Acute Care - Wound/Debridement Treatment Note  [facility]     Patient Name: Tamara Langston  : 1955  MRN: [id number]  Today's Date: 3/3/2017  Onset of Illness/Injury or Date of Surgery Date: 17   Date of Referral to PT: 17   Referring Physician: MD Viki       Admit Date: 2017    Visit Dx:    ICD-10-CM ICD-9-CM   1. Impaired mobility and ADLs Z74.09 799.89   2. Toe osteomyelitis, left M86.9 730.27   3. Impaired functional mobility, balance, gait, and endurance Z74.09 V49.89   4. Osteomyelitis of left foot, unspecified chronicity M86.9 730.27       Patient Active Problem List   Diagnosis   • Atopic rhinitis   • Restrictive ventilatory defect   • COPD (chronic obstructive pulmonary disease)   • Osteoporosis   • Obstructive sleep apnea syndrome   • Abnormal liver enzymes   • Cholelithiasis   • Chronic back pain   • Mixed anxiety depressive disorder   • Type 2 diabetes mellitus   • Dyslipidemia   • Essential tremor   • Fibromyalgia   • Gastroesophageal reflux disease without esophagitis   • Hypertension   • Hypothyroidism   • Insomnia   • Osteoarthritis   • Polycythemia vera   • Psoriasis   • Branch retinal vein occlusion   • Cobalamin deficiency   • Chronic bronchitis   • Obesity   • Pneumonia   • Tobacco use   • Vitamin D deficiency   • Foot ulcer, left   • Leukocytosis   • Hypokalemia   • Lactic acidosis   • Fatty liver disease, nonalcoholic   • Diabetes education, encounter for   • Cellulitis of left foot   • Sinus bradycardia   • NSTEMI (non-ST elevated myocardial infarction)   • Tobacco abuse   • Hypoxia   • Peripheral vascular disease   • Gangrene   • Osteomyelitis of left foot   • Diabetic polyneuropathy associated with type 2 diabetes mellitus   • Anemia, blood loss   • Chronic pain   • Chronic, continuous use of opioids   • Chronic anxiety   • Chronically on benzodiazepine therapy               LDA Wound       17 0800          Incision 16 1226 Left foot     Incision - Properties Group Placement Date: 11/23/16  -KB Placement Time: 1226  -KB Side: Left  -KB Location: foot  -KB    Incision 02/25/17 1427 Left foot    Incision - Properties Group Placement Date: 02/25/17  -TB Placement Time: 1427  -TB Side: Left  -TB Location: foot  -TB Additional Comments: 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  -MW    Incision WDL ex  -MF      Dressing Appearance moist drainage;intact  -MF      Appearance other (see comments)   wound vac intact from surgery  -MF      Drainage Characteristics/Odor sanguineous  -MF      Drainage Amount moderate  -MF      Dressing gauze   adaptic and gauze with kerlix to cover lateral incision  -MF      Therapy Setting (Negative Pressure Wound Therapy) continuous therapy  -MF      Pressure Setting (Negative Pressure Wound Therapy) 125 mmHg  -MF      Sponges Inserted (Negative Pressure Wound Therapy) --   dressing per MD in OR  -MF      Output (mL) 50  -MF      Incision 03/02/17 2225 Left foot    Incision - Properties Group Placement Date: 03/02/17  -KJ Placement Time: 2225  -KJ Side: Left  -KJ Location: foot  -KJ Additional Comments: gauze and kerlix left foot, wound vac intact and patient  -JR    Pressure Ulcer 02/24/17 1432 Right coccyx Stage II    Pressure Ulcer - Properties Group Date first assessed: 02/24/17  -MS Time first assessed: 1432  -MS Present On Admission (Pressure Ulcer): yes  -MS Side: Right  -MS Location: coccyx  -MS Stage: Stage II  -MS    Pressure Ulcer 02/24/17 1432 other (see comments) Stage II    Pressure Ulcer - Properties Group Date first assessed: 02/24/17  -MS Time first assessed: 1432  -MS Present On Admission (Pressure Ulcer): yes  -MS Location: other (see comments)  -MS, nose  Stage: Stage II  -MS Additional Comments: from home c-pap  -MS      User Key  (r) = Recorded By, (t) = Taken By, (c) = Cosigned By    Initials Name Provider Type    ANN-MARIE Yates, PT Physical Therapist    MW Christie Wheeler, PT Physical  Therapist    TB Karen Atkins, RN Registered Nurse    ADALBERTO Clark, RN Registered Nurse    JR Ilana Bajwa, RN Registered Nurse    BEKAH Conner, RN Registered Nurse    MS David Raymond, RN Registered Nurse            WOUND DEBRIDEMENT                     Adult Rehabilitation Note       03/03/17 0800 03/02/17 0820 03/02/17 0800    Rehab Assessment/Intervention    Discipline physical therapist  -MF physical therapist  -SR physical therapist  -MF    Document Type therapy note (daily note)  -MF therapy note (daily note)  -SR therapy note (daily note)  -    Subjective Information agree to therapy;complains of;weakness;fatigue;pain  -MF agree to therapy;complains of;pain  -SR agree to therapy;complains of;weakness;fatigue;pain  -MF    Patient Effort, Rehab Treatment  good  -SR     Precautions/Limitations  non-weight bearing status;fall precautions;other (see comments)   wound vac L foot  -SR     Recorded by [MF] Lane Yates, PT [SR] Thu Herrera, PT [MF] Lane Yates, PT    Vital Signs    Pre Systolic BP Rehab  --   vitals are WNL on RA  -SR     Recorded by  [SR] Thu Herrera, PT     Pain Assessment    Pain Assessment Blackmon-Baker FACES  -MF Blackmon-Cabrera FACES  -SR Blackmon-Cabrera FACES  -MF    Blackmon-Baker FACES Pain Rating 6  -MF 4  -SR 4  -MF    Pain Type Chronic pain  -MF Chronic pain  -SR Chronic pain  -MF    Pain Location Generalized  -MF Generalized  -SR Generalized  -MF    Pain Intervention(s) Repositioned  -MF Repositioned  -SR Repositioned  -MF    Recorded by [MF] Lane Yates, PT [SR] Thu Herrera, PT [MF] Lane Yates, PT    Cognitive Assessment/Intervention    Current Cognitive/Communication Assessment  functional  -SR     Orientation Status  oriented x 4  -SR     Follows Commands/Answers Questions  100% of the time  -SR     Personal Safety  WNL/WFL  -SR     Personal Safety Interventions  fall prevention program maintained;gait belt;nonskid shoes/slippers when out of  bed  -SR     Recorded by  [SR] Thu Herrera PT     Bed Mobility, Assessment/Treatment    Bed Mobility, Assistive Device  head of bed elevated;bed rails;draw sheet  -SR     Bed Mob, Supine to Sit, [place]  contact guard assist;verbal cues required  -SR     Bed Mob, Sit to Supine, [place]  contact guard assist;verbal cues required  -SR     Recorded by  [SR] Thu Herrera PT     Transfer Assessment/Treatment    Transfers, Bed-Chair [place]  --   pt declines, states that chair is too uncomfortable  -SR     Transfers, Sit-Stand [place]  contact guard assist;2 person assist required;verbal cues required  -SR     Transfers, Stand-Sit [place]  contact guard assist  -SR     Transfers, Sit-Stand-Sit, Assist Device  rolling walker  -SR     Transfer, Comment  pt does not require physical assist to maintain NWB LLE, pt was able to stand for 2 minutes with CGA and rw   -SR     Recorded by  [SR] Thu Herrera PT     Gait Assessment/Treatment    Gait, [place] Level  unable to perform  -SR     Recorded by  [SR] Thu Herrera PT     Balance Skills Training    Standing-Level of Assistance  Contact guard  -SR     Static Standing Balance Support  assistive device  -SR     Standing-Balance Activities  --   static standing for endurance, able to tolerate 2 minutes  -SR     Recorded by  [SR] Thu Herrera PT     Therapy Exercises    Bilateral Lower Extremities  AROM:;10 reps;supine;sitting;ankle pumps/circles;heel slides;LAQ;hip flexion;SLR  -SR     Recorded by  [SR] Thu Herrera PT     Positioning and Restraints    Pre-Treatment Position in bed  -MF in bed  -SR in bed  -    Post Treatment Position bed  -MF bed  -SR bed  -MF    In Bed supine;call light within reach;heels elevated  - notified nsg;supine;call light within reach;encouraged to call for assist;LLE elevated  -SR supine;call light within reach  -MF    Recorded by [MF] Lane Yates, PT [SR] Thu Herrera PT [MF] Lane MATOS  [place], PT      03/01/17 1130 03/01/17 0838 02/28/17 1130    Rehab Assessment/Intervention    Discipline physical therapist  -MF occupational therapist  -MANDO physical therapist  -    Document Type therapy note (daily note)  - therapy note (daily note)  -MANDO therapy note (daily note)  -    Subjective Information agree to therapy;complains of;pain  - agree to therapy;no complaints  -MANDO agree to therapy;complains of;pain  -    Patient Effort, Rehab Treatment  good  -MANDO     Symptoms Noted During/After Treatment  --   cont. chronic pain  -MANDO     Precautions/Limitations  fall precautions   wound vac LLE, NWB LLE  -MANDO     Recorded by [] Lane Yates, PT [MANDO] Kacy Blevins, OT [] Lane Yates, PT    Pain Assessment    Pain Assessment Blackmon-Cabrera FACES  - 0-10  -MANDO Blackmon-Cabrera FACES  -    Blackmon-Baker FACES Pain Rating 4  -  4  -MF    Pain Score  8   per pt. chronic pain never under 7/10  -MANDO     Post Pain Score  8  -MANDO     Pain Type Chronic pain  -MF Chronic pain  -MANDO Chronic pain  -    Pain Location Generalized  -MF Generalized  -MANDO Generalized  -    Pain Intervention(s) Repositioned  - Medication (See MAR);Ambulation/increased activity;Repositioned  -MANDO Repositioned  -MF    Response to Interventions  tolerated  -MANDO     Recorded by [] Lane Yates, PT [MANDO] Kacy Blevins, OT [] Lane Yates, PT    Cognitive Assessment/Intervention    Current Cognitive/Communication Assessment  functional  -MANDO     Orientation Status  oriented x 4  -MANDO     Follows Commands/Answers Questions  100% of the time;able to follow multi-step instructions;able to follow single-step instructions  -MANDO     Personal Safety  mild impairment   needs intermittent reminders to keep LLE off floor.  -MANDO     Personal Safety Interventions  fall prevention program maintained;gait belt;elopement precautions initiated;muscle strengthening facilitated;nonskid shoes/slippers when out of bed  -MANDO     Recorded by  [MANDO]  Kacy Blevins OT     Bed Mobility, Assessment/Treatment    Bed Mobility, Assistive Device  head of bed elevated  -MANDO     Bed Mobility, Scoot/Bridge, [place]  conditional independence  -MANDO     Bed Mob, Supine to Sit, [place]  conditional independence  -MANDO     Bed Mob, Sit to Supine, [place]  conditional independence  -MANDO     Recorded by  [MANDO] Kacy Blevins OT     Transfer Assessment/Treatment    Transfers, Sit-Stand [place]  contact guard assist  -MANDO     Transfers, Stand-Sit [place]  contact guard assist  -MANDO     Transfers, Sit-Stand-Sit, Assist Device  rolling walker  -MANDO     Transfer, Impairments  strength decreased;impaired balance  -MANDO     Transfer, Comment  Cues to keep LLE up off floor.    -MANDO     Recorded by  [MANDO] Kacy Blevins OT     Lower Body Dressing Assessment/Training    LB Dressing Assess/Train, Clothing Type  doffing:;donning:;pants   on and off RLE only.  -MANDO     LB Dressing Assess/Train, Position  sitting  -MANDO     LB Dressing Assess/Train, [place]  verbal cues required;set up required  -MANDO     LB Dressing Assess/Train, Comment  pt. needed reminders to keep LLE off floor when performing.  Not ready try stand and pull up yet.  -MANDO     Recorded by  [MANDO] Kacy Blevins OT     Balance Skills Training    Sitting-Level of Assistance  Independent;Close supervision   for cues for LLE NWB  -MANDO     Standing-Level of Assistance  Contact guard  -MANDO     Static Standing Balance Support  assistive device  -MANDO     Standing-Balance Activities  --   working on balance and endurance  -MANDO     Standing Balance # of Minutes  --   19 sec up only  -MANDO     Recorded by  [MANDO] Kacy Blevins OT     Therapy Exercises    Bilateral Lower Extremities  AROM:;10 reps;sitting;ankle pumps/circles;glut sets;hip flexion;knee flexion  -MANDO     BUE Resistance  theraband   10 reps x 6 exer yellow band with cueing.  -MANDO     Recorded by  [MANDO] Kacy Blevins OT     Positioning and Restraints     Pre-Treatment Position in bed  -MF in bed  -MANDO in bed  -MF    Post Treatment Position bed  -MF bed  -MANDO bed  -MF    In Bed supine;call light within reach  -MF supine;call light within reach;LLE elevated;encouraged to call for assist  -MANDO supine;call light within reach;with family/caregiver  -    Recorded by [MF] Lane Yates, PT [MANDO] Kacy Blevins, OT [MF] Lane Yates, PT      User Key  (r) = Recorded By, (t) = Taken By, (c) = Cosigned By    Initials Name Effective Dates    MANDO Kacy Blevins, OT 06/22/15 -     MF Lane Yates, PT 06/19/15 -     SR Thu Herrera, PT 06/19/15 -                 IP PT Goals       03/02/17 1055 02/27/17 1423 02/27/17 0845    Transfer Training PT LTG    Transfer Training PT LTG, Date Established  02/27/17  -MANDO     Transfer Training PT LTG, Time to Achieve  1 wk  -MANDO     Transfer Training PT LTG, Activity Type  all transfers  -MANDO     Transfer Training PT LTG, [place] Level  conditional independence  -MANDO     Transfer Training PT LTG, Assist Device  walker, rolling  -MANDO     Transfer Training PT  LTG, Date Goal Reviewed 03/02/17  -SR      Gait Training PT LTG    Gait Training Goal PT LTG, Date Established  02/27/17  -MANDO     Gait Training Goal PT LTG, Time to Achieve  1 wk  -MANDO     Gait Training Goal PT LTG, [place] Level  conditional independence  -MANDO     Gait Training Goal PT LTG, Assist Device  walker, rolling  -MANDO     Gait Training Goal PT LTG, Distance to Achieve  25  -MANDO     Gait Training Goal PT LTG, Date Goal Reviewed 03/02/17  -SR      Patient Education PT LTG    Patient Education PT LTG, Date Established  02/27/17  -MANDO     Patient Education PT LTG, Time to Achieve  1 wk  -MANDO     Patient Education PT LTG, Education Type  HEP;positioning;posture/body mechanics;gait;transfers;bed mobility;pain management;progression of POC;benefits of activity;home safety;equipment management;skin care/inspection   weightbearing status  -MANDO     Patient Education PT LTG,  Education Understanding  demonstrate adequately;verbalize understanding  -MANDO     Patient Education PT LTG, Date Goal Reviewed 03/02/17  -SR      Wound Care PT LTG    Wound Care PT LTG 1, Outcome   goal ongoing  -      02/26/17 1332          Wound Care PT LTG    Wound Care PT LTG 1, Date Established 02/26/17  -MW      Wound Care PT LTG 1, Time to Achieve 2 wks  -MW      Wound Care PT LTG 1, Location LT open ray amputation site   -MW      Wound Care PT LTG 1, No S&S of Infection yes  -MW      Wound Care PT LTG 1, Decrease Wound Size 25%  -MW      Wound Care PT LTG 1, Decrease Exudate minimum  -MW      Wound Care PT LTG 1, No New Skin Break Down yes  -MW      Wound Care PT LTG 1, Education S&S of infection;dressing changes;wound care;weight bearing restriction;progression of POC  -MW      Wound Care PT LTG 1, Education Understanding verbalize understanding  -MW        User Key  (r) = Recorded By, (t) = Taken By, (c) = Cosigned By    Initials Name Provider Type    MANDO Philomena Esteban, PT Physical Therapist    SR Thu Herrera, PT Physical Therapist     Christie Wheeler, PT Physical Therapist     Chetna Chao, PT Physical Therapist          Physical Therapy Education     Title: PT OT SLP Therapies (Active)     Topic: Physical Therapy (Active)     Point: Mobility training (Active)    Learning Progress Summary    Learner Readiness Method Response Comment Documented by Status   Patient Acceptance E,D NR  SR 03/02/17 1055 Active    Acceptance E,D VU,NR  MANDO 02/27/17 1558 Done               Point: Home exercise program (Active)    Learning Progress Summary    Learner Readiness Method Response Comment Documented by Status   Patient Acceptance E,D NR  SR 03/02/17 1055 Active    Acceptance E,D VU,NR  MANDO 02/27/17 1558 Done               Point: Body mechanics (Active)    Learning Progress Summary    Learner Readiness Method Response Comment Documented by Status   Patient Acceptance E,D NR   03/02/17 1055  Active    Acceptance SERVANDO RENAENR  MANDO 02/27/17 1558 Done               Point: Precautions (Active)    Learning Progress Summary    Learner Readiness Method Response Comment Documented by Status   Patient Acceptance SERVANDO RENAE NR   03/02/17 1055 Active    Acceptance SERVANDO RENAENR  MANDO 02/27/17 1558 Done                      User Key     Initials Effective Dates Name Provider Type Discipline     06/19/15 -  Philomena Esteban, PT Physical Therapist PT    SR 06/19/15 -  Thu Herrera, PT Physical Therapist PT                   PT ASSESSMENT (last 72 hours)      PT Evaluation       03/03/17 0800 03/03/17 0030    Rehab Evaluation    Document Type therapy note (daily note)  -     Subjective Information agree to therapy;complains of;weakness;fatigue;pain  -MF     Pain Assessment    Pain Assessment Blackmon-Baker FACES  -MF     Blackmon-Baker FACES Pain Rating 6  -     Pain Type Chronic pain  -     Pain Location Generalized  -     Pain Intervention(s) Repositioned  -     Sensory Assessment/Intervention    LUE Light Touch  WNL  -MP    RUE Light Touch  WNL  -MP    LLE Light Touch  absent sensation   baseline numbness  -MP    RLE Light Touch  absent sensation   baseline numbness  -MP    Positioning and Restraints    Pre-Treatment Position in bed  -     Post Treatment Position bed  -     In Bed supine;call light within reach;heels elevated  -       03/02/17 0820 03/02/17 0800    Rehab Evaluation    Document Type therapy note (daily note)  -SR therapy note (daily note)  -    Subjective Information agree to therapy;complains of;pain  -SR agree to therapy;complains of;weakness;fatigue;pain  -MF    Patient Effort, Rehab Treatment good  -SR     General Information    Precautions/Limitations non-weight bearing status;fall precautions;other (see comments)   wound vac L foot  -SR     Vital Signs    Pre Systolic BP Rehab --   vitals are WNL on RA  -SR     Pain Assessment    Pain Assessment Blackmon-Cabrera FACES  -SR Blackmon-Cabrera FACES   -MF    Blackmon-Baker FACES Pain Rating 4  -SR 4  -MF    Pain Type Chronic pain  -SR Chronic pain  -MF    Pain Location Generalized  -SR Generalized  -MF    Pain Intervention(s) Repositioned  -SR Repositioned  -MF    Cognitive Assessment/Intervention    Current Cognitive/Communication Assessment functional  -SR     Orientation Status oriented x 4  -SR     Follows Commands/Answers Questions 100% of the time  -SR     Personal Safety WNL/WFL  -SR     Personal Safety Interventions fall prevention program maintained;gait belt;nonskid shoes/slippers when out of bed  -SR     Bed Mobility, Assessment/Treatment    Bed Mobility, Assistive Device head of bed elevated;bed rails;draw sheet  -SR     Bed Mob, Supine to Sit, [place] contact guard assist;verbal cues required  -SR     Bed Mob, Sit to Supine, [place] contact guard assist;verbal cues required  -SR     Transfer Assessment/Treatment    Transfers, Bed-Chair [place] --   pt declines, states that chair is too uncomfortable  -SR     Transfers, Sit-Stand [place] contact guard assist;2 person assist required;verbal cues required  -SR     Transfers, Stand-Sit [place] contact guard assist  -SR     Transfers, Sit-Stand-Sit, Assist Device rolling walker  -SR     Transfer, Comment pt does not require physical assist to maintain NWB LLE, pt was able to stand for 2 minutes with CGA and rw   -SR     Gait Assessment/Treatment    Gait, [place] Level unable to perform  -SR     Balance Skills Training    Standing-Level of Assistance Contact guard  -SR     Static Standing Balance Support assistive device  -SR     Standing-Balance Activities --   static standing for endurance, able to tolerate 2 minutes  -SR     Therapy Exercises    Bilateral Lower Extremities AROM:;10 reps;supine;sitting;ankle pumps/circles;heel slides;LAQ;hip flexion;SLR  -SR     Sensory Assessment/Intervention    LUE Light Touch  WNL  -CS    RUE Light Touch  WNL  -CS    LLE Light Touch  absent  sensation   baseline numbness  -CS    RLE Light Touch  absent sensation   baseline numbness  -CS    Positioning and Restraints    Pre-Treatment Position in bed  -SR in bed  -    Post Treatment Position bed  - bed  -    In Bed notified nsg;supine;call light within reach;encouraged to call for assist;LLE elevated  - supine;call light within reach  -      03/01/17 1130 03/01/17 0838    Rehab Evaluation    Document Type therapy note (daily note)  - therapy note (daily note)  -    Subjective Information agree to therapy;complains of;pain  - agree to therapy;no complaints  -    Patient Effort, Rehab Treatment  good  -MANDO    Symptoms Noted During/After Treatment  --   cont. chronic pain  -    General Information    Precautions/Limitations  fall precautions   wound vac LLE, NWB LLE  -MANDO    Pain Assessment    Pain Assessment Blackmon-Cabrera FACES  - 0-10  -MANDO    Blackmon-Cabrera FACES Pain Rating 4  -     Pain Score  8   per pt. chronic pain never under 7/10  -    Post Pain Score  8  -MANDO    Pain Type Chronic pain  - Chronic pain  -    Pain Location Generalized  - Generalized  -    Pain Intervention(s) Repositioned  - Medication (See MAR);Ambulation/increased activity;Repositioned  -MANDO    Response to Interventions  tolerated  -MANDO    Cognitive Assessment/Intervention    Current Cognitive/Communication Assessment  functional  -    Orientation Status  oriented x 4  -MANDO    Follows Commands/Answers Questions  100% of the time;able to follow multi-step instructions;able to follow single-step instructions  -MANDO    Personal Safety  mild impairment   needs intermittent reminders to keep LLE off floor.  -MANDO    Personal Safety Interventions  fall prevention program maintained;gait belt;elopement precautions initiated;muscle strengthening facilitated;nonskid shoes/slippers when out of bed  -MANDO    Bed Mobility, Assessment/Treatment    Bed Mobility, Assistive Device  head of bed elevated  -    Bed Mobility,  Scoot/Bridge, [place]  conditional independence  -MANDO    Bed Mob, Supine to Sit, [place]  conditional independence  -MANDO    Bed Mob, Sit to Supine, [place]  conditional independence  -MANDO    Transfer Assessment/Treatment    Transfers, Sit-Stand [place]  contact guard assist  -MANDO    Transfers, Stand-Sit [place]  contact guard assist  -MANDO    Transfers, Sit-Stand-Sit, Assist Device  rolling walker  -MANDO    Transfer, Impairments  strength decreased;impaired balance  -MANDO    Transfer, Comment  Cues to keep LLE up off floor.    -MANDO    Balance Skills Training    Sitting-Level of Assistance  Independent;Close supervision   for cues for LLE NWB  -MANDO    Standing-Level of Assistance  Contact guard  -MANDO    Static Standing Balance Support  assistive device  -MANDO    Standing-Balance Activities  --   working on balance and endurance  -    Standing Balance # of Minutes  --   19 sec up only  -MANDO    Therapy Exercises    Bilateral Lower Extremities  AROM:;10 reps;sitting;ankle pumps/circles;glut sets;hip flexion;knee flexion  -    BUE Resistance  theraband   10 reps x 6 exer yellow band with cueing.  -MANDO    Positioning and Restraints    Pre-Treatment Position in bed  - in bed  -MANDO    Post Treatment Position bed  - bed  -MANDO    In Bed supine;call light within reach  - supine;call light within reach;LLE elevated;encouraged to call for assist  -MANDO      02/28/17 1130       Rehab Evaluation    Document Type therapy note (daily note)  -     Subjective Information agree to therapy;complains of;pain  -     Pain Assessment    Pain Assessment Blackmon-Baker FACES  -     Blackmon-Baker FACES Pain Rating 4  -     Pain Type Chronic pain  -     Pain Location Generalized  -     Pain Intervention(s) Repositioned  -     Positioning and Restraints    Pre-Treatment Position in bed  -     Post Treatment Position bed  -     In Bed supine;call light within reach;with family/caregiver  -       User Key  (r) = Recorded  By, (t) = Taken By, (c) = Cosigned By    Initials Name Provider Type    MANDO Kacy Blevins, OT Occupational Therapist    MF Lane Yates, PT Physical Therapist    SR Thu Herrera, PT Physical Therapist    STANLEY Wade, RN Registered Nurse    TRELL Love, RN Registered Nurse            PT Recommendation and Plan  Anticipated Equipment Needs At Discharge: other (see comments) (wound VAC )  Anticipated Discharge Disposition: home with assist, home with home health (if plan further level of amputation, will recommend rehab)  Planned Therapy Interventions: wound care, patient/family education  PT Frequency: daily, per priority policy    Plan Of Care Reviewed With: patient       Outcome Summary/Follow up Plan: wound vac intact from placement in surgery. some bleeding from lateral incision, but none noted from under wound vac dressing. PT to change dressing in 1-2 days.           Outcome Measures       03/02/17 0820 03/01/17 0838       How much help from another person do you currently need...    Turning from your back to your side while in flat bed without using bedrails? 4  -SR      Moving from lying on back to sitting on the side of a flat bed without bedrails? 4  -SR      Moving to and from a bed to a chair (including a wheelchair)? 3  -SR      Standing up from a chair using your arms (e.g., wheelchair, bedside chair)? 3  -SR      Climbing 3-5 steps with a railing? 1  -SR      To walk in hospital room? 1  -SR      AM-PAC 6 Clicks Score 16  -SR      How much help from another is currently needed...    Putting on and taking off regular lower body clothing?  3  -MANDO     Bathing (including washing, rinsing, and drying)  3  -MANDO     Toileting (which includes using toilet bed pan or urinal)  3  -MANDO     Putting on and taking off regular upper body clothing  3  -MANDO     Taking care of personal grooming (such as brushing teeth)  4  -MANDO     Eating meals  4  -MANDO     Score  20  -MANDO     Functional Assessment     Outcome Measure Options AM-PAC 6 Clicks Basic Mobility (PT)  -SR AM-PAC 6 Clicks Daily Activity (OT)  -MANDO       User Key  (r) = Recorded By, (t) = Taken By, (c) = Cosigned By    Initials Name Provider Type    MANDO Kacy Blevins, OT Occupational Therapist    SR Thu Herrera, PT Physical Therapist              Time Calculation        PT Charges       03/03/17 0800          Time Calculation    Start Time 0800  -      PT Goal Re-Cert Due Date 03/08/17  -      Time Calculation- PT    Total Timed Code Minutes- PT 10 minute(s)  -        User Key  (r) = Recorded By, (t) = Taken By, (c) = Cosigned By    Initials Name Provider Type     Lane Yates, PT Physical Therapist             Therapy Charges for Today     Code Description Service Date Service Provider Modifiers Qty    72142488684 HC PT NEG PRESS WOUND TO 50SQCM DME1 3/2/2017 Lane Yates, PT  1    58013131391 HC PT THER SUPP EA 15 MIN 3/2/2017 Lane Yates, PT GP 1    87192510593 HC PT NEG PRESS WOUND TO 50SQCM DME1 3/3/2017 Lane Yates, PT  1    66025046975 HC PT THER SUPP EA 15 MIN 3/3/2017 Lane Yates, PT GP 1            PT G-Codes  Outcome Measure Options: AM-PAC 6 Clicks Basic Mobility (PT)        Lane Yates, PT  3/3/2017

## 2017-03-03 NOTE — PROGRESS NOTES
Cardiothoracic Surgery Progress Note      POD # 1 s/p Transmetatarsal resection of the left 2nd, 3rd, 4th and 5th toes with primary closure        LOS: 7 days      Subjective:  Patient resting comfortably in bed - denies any left foot pain secondary to peripheral neuropathy. No acute overnight events. States her pain is 7/10 due to osteoarthritis.        Objective:  Vital Signs  Temp:  [98 °F (36.7 °C)-99.8 °F (37.7 °C)] 98 °F (36.7 °C)  Heart Rate:  [] 77  Resp:  [16-20] 18  BP: (118-163)/() 126/58    Physical Exam:   General Appearance: alert, appears stated age and cooperative   Lungs: clear to auscultation, respirations regular and respirations even   Heart: regular rhythm & normal rate, normal S1, S2 and no murmur, no franklyn, no rub   Extremities: left foot wrapped in clean white kerlix bandage with 1cm area of bloody drainage near left 5th metatarsal head. Extremities are warm without edema. Wound vac intact.   I changed the dressing of the surgical incision of the transmetatarsal amputations from last night and the skin edges were viable with very small amount of bleeding through the incision.  The open great toe transmetatarsal amputation wound VAC was intact   Results: Laboratory results below are noted     Results from last 7 days  Lab Units 03/03/17  0416   WBC 10*3/mm3 13.13*   HEMOGLOBIN g/dL 9.7*   HEMATOCRIT % 31.8*   PLATELETS 10*3/mm3 247       Results from last 7 days  Lab Units 03/03/17  0416 03/01/17  1453   SODIUM mmol/L  --  139   POTASSIUM mmol/L 4.0 4.1   CHLORIDE mmol/L  --  102   TOTAL CO2 mmol/L  --  36.0*   BUN mg/dL  --  15   CREATININE mg/dL  --  0.90   GLUCOSE mg/dL  --  153*   CALCIUM mg/dL  --  10.4         Assessment:  61 year old  female with osteomyelitis of left foot metatarsal bones 2nd-5th s/p transmetatarsal amputation of left 2nd-5th toes    Plan:  Wound vac intact, continue   Dressing changes to the transmetatarsal amputation site incision daily  Monitor  pain management     CINDI Martins  03/03/17  8:58 AM

## 2017-03-03 NOTE — PLAN OF CARE
Problem: Patient Care Overview (Adult)  Goal: Plan of Care Review  Outcome: Ongoing (interventions implemented as appropriate)    03/03/17 0331   Coping/Psychosocial Response Interventions   Plan Of Care Reviewed With patient   Patient Care Overview   Progress progress toward functional goals as expected   Outcome Evaluation   Outcome Summary/Follow up Plan patient arrived to floor after surgery/recovery. pain well controlled. wound vac to foot. VSS.       Goal: Adult Individualization and Mutuality  Outcome: Ongoing (interventions implemented as appropriate)    Problem: Infection, Risk/Actual (Adult)  Goal: Infection Prevention/Resolution  Outcome: Ongoing (interventions implemented as appropriate)    Problem: Skin Integrity Impairment, Risk/Actual (Adult)  Goal: Skin Integrity/Wound Healing  Outcome: Ongoing (interventions implemented as appropriate)    Problem: Pressure Ulcer (Adult)  Goal: Signs and Symptoms of Listed Potential Problems Will be Absent or Manageable (Pressure Ulcer)  Outcome: Ongoing (interventions implemented as appropriate)    Problem: Fall Risk (Adult)  Goal: Identify Related Risk Factors and Signs and Symptoms  Outcome: Ongoing (interventions implemented as appropriate)

## 2017-03-03 NOTE — ANESTHESIA POSTPROCEDURE EVALUATION
Patient: Tamara Langston    Procedure Summary     Date Anesthesia Start Anesthesia Stop Room / Location    03/02/17 2036   [facility]       Procedure Diagnosis Surgeon Provider    LEFT FOOT TRANSMETATARSAL AMPUTATION TOES 2,3,4,5 (Left Toes) Osteomyelitis of left foot, unspecified chronicity  (Osteomyelitis of left foot, unspecified chronicity [M86.9]) MD Donis Williamson MD          Anesthesia Type: general, regional  Last vitals  BP      Temp      Pulse     Resp      SpO2        Post Anesthesia Care and Evaluation    Patient location during evaluation: PACU  Patient participation: complete - patient participated  Level of consciousness: awake and alert  Pain score: 1  Pain management: adequate  Anesthetic complications: No anesthetic complications  PONV Status: none  Cardiovascular status: acceptable  Respiratory status: acceptable  Hydration status: acceptable    Comments: Extubated awake in the OR, to RR, report to RN, stable

## 2017-03-03 NOTE — OP NOTE
DATE OF PROCEDURE:  03/02/2017    PREOPERATIVE DIAGNOSIS: Osteomyelitis of metatarsal bones 2 through 5 of the left foot.    POSTOPERATIVE DIAGNOSIS: Osteomyelitis of metatarsal bones 2 through 5 of the left foot. Final path cultures are pending.     SURGEON: Arsalan Feliciano MD    ASSISTANTS:   1. Dr. Guaman  2. Guzman Li PA-C     ANESTHESIA: General endotracheal per _____ .     INDICATIONS: The patient is a long-standing diabetic who developed an infected left great toe metatarsophalangeal joint space infection in 11/2016. She underwent a standard transmetatarsal amputation of the left great toe and wound VAC was placed on the open wound for closure by secondary intent. She initially had good wound healing and granulation tissue, but subsequent to removal of the wound VAC and continued closure of the wound, the granulation tissue developed drainage that appeared to be purulent and this cultured as positive for Staphylococcus aureus. The patient, who had been followed by Dr. Cruz of Infectious Disease, was admitted to the hospital and started on vancomycin and Rocephin. The following day after admission she was taken to the operating room and underwent completion removal of the retained portion of the 1st metatarsal bone from the initial transmetatarsal resection. After much discussion regarding the MRI findings suggestive of osteomyelitis of at least the 2nd, 3rd and 4th metatarsal bones, it was finally decided to perform a transmetatarsal resection of the 2nd through the 5th toes basically due to the MRI findings. This was discussed at great length with the patient and her , and we offered the option of IV antibiotic treatment for several weeks and then repeat the MRI scan, but the patient and her  elected to for us to proceed ahead with resection of the metatarsal bones at this time. The patient understood the risks of infection, bleeding, poor wound healing and the possibility of needing  an amputation at a higher level as well as the risks of heart attack, stroke, and death from the anesthesia or surgical complications.     DESCRIPTION OF PROCEDURE: The patient was brought to the operating room and placed on the operating table in the supine position and underwent successful general endotracheal anesthesia per   . A timeout was called to be sure the proper extremity and procedure that was planned and after this had been verified, the patient had the left lower extremity prepped from the toes to the upper calf area with Betadine and sterilely draped. It should be mentioned that the wound VAC that was covering the wound from the previous surgery of the foot a week ago was removed prior to prepping the entire foot and lower leg. The incision was made in the foot over the dorsum of the foot just proximal to the metatarsal head and it was carried around to the posterior plantar surface in a relatively fishmouth fashion and carried on down through the subcutaneous tissues. Bleeding was readily controlled with Bovie cautery in this area and the incision was carried on down to the metatarsal heads both on the dorsum of the foot and the plantar surface and the toes 2 through 5 were then disarticulated from their metatarsophalangeal joint and removed from the operative field. The metatarsal bones 2 through 5 then were dissected free of the soft tissue with care being taken to preserve the blood supply to the dorsal and plantar flap. After this had been done both on the dorsum of the foot and the plantar surface, the tendons on the dorsum of the foot were brought to lie on extension and then sharply cut with the knife blade and allowed to retract into the wound and the tendons on the plantar surface were then plantar flexed in order to bring the tendons on extension and they were cut likewise sharply with a knife blade and allowed to retract into the wound. The metatarsal bones were then transected  with the bone power saw in a stairstep fashion from the 2nd through the 5th metatarsal bones, and they were then removed from the operative field. A curette was then used to obtain a bone marrow sample from the metatarsal bones to send for Gram stain and culture. It should be noted that no obvious infection was seen either in the soft tissues of the incision or the metatarsal bones themselves and the bone marrow bled readily and appeared actually to be normal healthy bone marrow. Hemostasis to the wound was then obtained again with Bovie cautery. The wound was then irrigated with 3L of bacitracin antibiotic solution using the Pulsavac. A portion of the Pulsavac irrigation was also carried out to the previous surgical wound that had been left open 1 week ago. Further irrigation was carried out to this fresh incision with further bacitracin solution by Asepto. After assurance of hemostasis of the soft tissues, it was decided to close this wound primarily. The deep soft tissues were closed with interrupted 2-0 Vicryl interrupted sutures. The skin was closed with interrupted 3-0 nylon interrupted. The great toe open wound metatarsal resection of a week ago was then redressed with a new Pulsavac dressing and the primary closed incision tonight was then covered with a Telfa 4 x 4 fluffs, and the entire foot was wrapped with a Kerlix wrap and a Spandage tube net dressing was used to hold the Kerlix in place. Blood loss from this procedure was approximately 25 mL and sponge and needle counts were correct x2. There were no immediate complications from the procedure. Cultures were obtained from the bone marrow from the metatarsal bones and sent for Gram stain and cultures and also soft tissue from the plantar surface was sent for Gram stain and culture and sensitivity. The toes and metatarsal bones were sent for pathological evaluation. The patient was awakened, extubated and taken to the recovery area with stable vital  signs.           MD ALVARO Irving/david  DD: 03/02/2017 23:16:34  DT: 03/02/2017 23:35:02  Voice Rec. ID #[id number]  Voice Original ID #[id number]  Doc ID #[id number]  Rev. #0  cc:    Oksana Cruz MD

## 2017-03-03 NOTE — PROGRESS NOTES
Continued Stay Note  [facility]     Patient Name: Tamara Langston  MRN: [id number]  Today's Date: 3/3/2017    Admit Date: 2/24/2017          Discharge Plan       03/03/17 1425    Case Management/Social Work Plan    Plan Post hospital care/rehab/infusion/wound care    Patient/Family In Agreement With Plan yes    Additional [facility]. I will follow up on Monday and if close to medical readiness, have them begin precert process, if bed available.               Discharge Codes     None        Expected Discharge Date and Time     Expected Discharge Date Expected Discharge Time    Mar 7, 2017             Ese Whitlock RN

## 2017-03-04 LAB
BACTERIA SPEC ANAEROBE CULT: NORMAL
BACTERIA SPEC ANAEROBE CULT: NORMAL
GLUCOSE BLDC GLUCOMTR-MCNC: 131 MG/DL (ref 70–130)
GLUCOSE BLDC GLUCOMTR-MCNC: 141 MG/DL (ref 70–130)
GLUCOSE BLDC GLUCOMTR-MCNC: 146 MG/DL (ref 70–130)
GLUCOSE BLDC GLUCOMTR-MCNC: 164 MG/DL (ref 70–130)

## 2017-03-04 PROCEDURE — 99232 SBSQ HOSP IP/OBS MODERATE 35: CPT | Performed by: INTERNAL MEDICINE

## 2017-03-04 PROCEDURE — 94760 N-INVAS EAR/PLS OXIMETRY 1: CPT

## 2017-03-04 PROCEDURE — 82962 GLUCOSE BLOOD TEST: CPT

## 2017-03-04 PROCEDURE — 94640 AIRWAY INHALATION TREATMENT: CPT

## 2017-03-04 PROCEDURE — 25010000002 VANCOMYCIN

## 2017-03-04 PROCEDURE — 94799 UNLISTED PULMONARY SVC/PX: CPT

## 2017-03-04 PROCEDURE — 25010000002 CEFEPIME: Performed by: INTERNAL MEDICINE

## 2017-03-04 PROCEDURE — 99024 POSTOP FOLLOW-UP VISIT: CPT | Performed by: THORACIC SURGERY (CARDIOTHORACIC VASCULAR SURGERY)

## 2017-03-04 PROCEDURE — 97605 NEG PRS WND THER DME<=50SQCM: CPT

## 2017-03-04 PROCEDURE — 25010000002 HEPARIN (PORCINE) PER 1000 UNITS: Performed by: THORACIC SURGERY (CARDIOTHORACIC VASCULAR SURGERY)

## 2017-03-04 PROCEDURE — 94660 CPAP INITIATION&MGMT: CPT

## 2017-03-04 PROCEDURE — 63710000001 INSULIN DETEMIR PER 5 UNITS: Performed by: HOSPITALIST

## 2017-03-04 RX ADMIN — INSULIN LISPRO 2 UNITS: 100 INJECTION, SOLUTION INTRAVENOUS; SUBCUTANEOUS at 22:26

## 2017-03-04 RX ADMIN — OXYCODONE HYDROCHLORIDE AND ACETAMINOPHEN 1 TABLET: 10; 325 TABLET ORAL at 14:43

## 2017-03-04 RX ADMIN — PREGABALIN 100 MG: 100 CAPSULE ORAL at 10:05

## 2017-03-04 RX ADMIN — ATORVASTATIN CALCIUM 40 MG: 40 TABLET, FILM COATED ORAL at 22:25

## 2017-03-04 RX ADMIN — ASPIRIN 325 MG: 325 TABLET, DELAYED RELEASE ORAL at 10:06

## 2017-03-04 RX ADMIN — FUROSEMIDE 40 MG: 40 TABLET ORAL at 10:08

## 2017-03-04 RX ADMIN — IPRATROPIUM BROMIDE AND ALBUTEROL SULFATE 3 ML: .5; 3 SOLUTION RESPIRATORY (INHALATION) at 21:28

## 2017-03-04 RX ADMIN — AMLODIPINE BESYLATE 5 MG: 5 TABLET ORAL at 10:08

## 2017-03-04 RX ADMIN — LEVOTHYROXINE SODIUM 112 MCG: 112 TABLET ORAL at 05:40

## 2017-03-04 RX ADMIN — BUSPIRONE HYDROCHLORIDE 20 MG: 10 TABLET ORAL at 22:24

## 2017-03-04 RX ADMIN — HEPARIN SODIUM 5000 UNITS: 5000 INJECTION, SOLUTION INTRAVENOUS; SUBCUTANEOUS at 22:24

## 2017-03-04 RX ADMIN — INSULIN DETEMIR 10 UNITS: 100 INJECTION, SOLUTION SUBCUTANEOUS at 10:09

## 2017-03-04 RX ADMIN — OXYCODONE HYDROCHLORIDE AND ACETAMINOPHEN 1 TABLET: 10; 325 TABLET ORAL at 18:16

## 2017-03-04 RX ADMIN — PREGABALIN 100 MG: 100 CAPSULE ORAL at 22:25

## 2017-03-04 RX ADMIN — PREGABALIN 100 MG: 100 CAPSULE ORAL at 16:41

## 2017-03-04 RX ADMIN — BISOPROLOL FUMARATE AND HYDROCHLOROTHIAZIDE 1 TABLET: 6.25; 5 TABLET ORAL at 22:26

## 2017-03-04 RX ADMIN — SALINE NASAL SPRAY 2 SPRAY: 1.5 SOLUTION NASAL at 18:16

## 2017-03-04 RX ADMIN — IPRATROPIUM BROMIDE AND ALBUTEROL SULFATE 3 ML: .5; 3 SOLUTION RESPIRATORY (INHALATION) at 16:12

## 2017-03-04 RX ADMIN — OXYCODONE HYDROCHLORIDE AND ACETAMINOPHEN 1 TABLET: 10; 325 TABLET ORAL at 22:26

## 2017-03-04 RX ADMIN — HEPARIN SODIUM 5000 UNITS: 5000 INJECTION, SOLUTION INTRAVENOUS; SUBCUTANEOUS at 10:09

## 2017-03-04 RX ADMIN — CLONAZEPAM 0.25 MG: 0.5 TABLET ORAL at 10:34

## 2017-03-04 RX ADMIN — Medication 1 CAPSULE: at 10:08

## 2017-03-04 RX ADMIN — IPRATROPIUM BROMIDE AND ALBUTEROL SULFATE 3 ML: .5; 3 SOLUTION RESPIRATORY (INHALATION) at 06:45

## 2017-03-04 RX ADMIN — INSULIN DETEMIR 10 UNITS: 100 INJECTION, SOLUTION SUBCUTANEOUS at 22:23

## 2017-03-04 RX ADMIN — FLUTICASONE PROPIONATE 1 SPRAY: 50 SPRAY, METERED NASAL at 09:59

## 2017-03-04 RX ADMIN — CEFEPIME 2 G: 2 INJECTION, POWDER, FOR SOLUTION INTRAVENOUS at 16:41

## 2017-03-04 RX ADMIN — CLONAZEPAM 0.25 MG: 0.5 TABLET ORAL at 22:25

## 2017-03-04 RX ADMIN — IPRATROPIUM BROMIDE AND ALBUTEROL SULFATE 3 ML: .5; 3 SOLUTION RESPIRATORY (INHALATION) at 13:10

## 2017-03-04 RX ADMIN — FENTANYL 1 PATCH: 100 PATCH, EXTENDED RELEASE TRANSDERMAL at 10:33

## 2017-03-04 RX ADMIN — Medication 2 TABLET: at 22:26

## 2017-03-04 RX ADMIN — LISINOPRIL 20 MG: 20 TABLET ORAL at 10:07

## 2017-03-04 RX ADMIN — OXYCODONE HYDROCHLORIDE AND ACETAMINOPHEN 1 TABLET: 10; 325 TABLET ORAL at 05:40

## 2017-03-04 RX ADMIN — CEFEPIME 2 G: 2 INJECTION, POWDER, FOR SOLUTION INTRAVENOUS at 04:21

## 2017-03-04 RX ADMIN — BACLOFEN 20 MG: 10 TABLET ORAL at 14:43

## 2017-03-04 RX ADMIN — VANCOMYCIN HYDROCHLORIDE 1500 MG: 1 INJECTION, POWDER, LYOPHILIZED, FOR SOLUTION INTRAVENOUS at 09:59

## 2017-03-04 RX ADMIN — EZETIMIBE 10 MG: 10 TABLET ORAL at 10:35

## 2017-03-04 RX ADMIN — OXYCODONE HYDROCHLORIDE AND ACETAMINOPHEN 1 TABLET: 10; 325 TABLET ORAL at 10:32

## 2017-03-04 RX ADMIN — PANTOPRAZOLE SODIUM 40 MG: 40 TABLET, DELAYED RELEASE ORAL at 05:40

## 2017-03-04 RX ADMIN — CETIRIZINE HYDROCHLORIDE 10 MG: 10 TABLET, FILM COATED ORAL at 22:26

## 2017-03-04 RX ADMIN — FLUTICASONE PROPIONATE 1 SPRAY: 50 SPRAY, METERED NASAL at 18:15

## 2017-03-04 RX ADMIN — BUSPIRONE HYDROCHLORIDE 20 MG: 10 TABLET ORAL at 10:06

## 2017-03-04 NOTE — PLAN OF CARE
Problem: Patient Care Overview (Adult)  Goal: Plan of Care Review  Outcome: Ongoing (interventions implemented as appropriate)    03/04/17 0401   Coping/Psychosocial Response Interventions   Plan Of Care Reviewed With patient   Patient Care Overview   Progress progress toward functional goals as expected   Outcome Evaluation   Outcome Summary/Follow up Plan VSS. Patient slept well. Pain medication controlling pain. CPAP at night, 3L oxygen while awake. [place] [facility] [place] one- UO adequate. Dressing intact with no bleeding noted. Wound vac present. Neuro checks.        Goal: Adult Individualization and Mutuality  Outcome: Ongoing (interventions implemented as appropriate)    Problem: Infection, Risk/Actual (Adult)  Goal: Infection Prevention/Resolution  Outcome: Ongoing (interventions implemented as appropriate)    Problem: Skin Integrity Impairment, Risk/Actual (Adult)  Goal: Skin Integrity/Wound Healing  Outcome: Ongoing (interventions implemented as appropriate)    Problem: Pressure Ulcer (Adult)  Goal: Signs and Symptoms of Listed Potential Problems Will be Absent or Manageable (Pressure Ulcer)  Outcome: Ongoing (interventions implemented as appropriate)    Problem: Fall Risk (Adult)  Goal: Identify Related Risk Factors and Signs and Symptoms  Outcome: Ongoing (interventions implemented as appropriate)  Goal: Absence of Falls  Outcome: Ongoing (interventions implemented as appropriate)

## 2017-03-04 NOTE — PLAN OF CARE
Problem: Patient Care Overview (Adult)  Goal: Plan of Care Review  Outcome: Ongoing (interventions implemented as appropriate)    03/04/17 1045   Coping/Psychosocial Response Interventions   Plan Of Care Reviewed With patient   Patient Care Overview   Progress progress toward functional goals as expected   Outcome Evaluation   Outcome Summary/Follow up Plan Wound vac intact with no apparent issues or complaints from the patient. Anticipate change in 1-2 days to continue progress.         Problem: Inpatient Physical Therapy  Goal: Wound Care Goal 1 LTG- PT  Outcome: Ongoing (interventions implemented as appropriate)    03/04/17 1045   Wound Care PT LTG   Wound Care PT LTG 1, Outcome goal ongoing

## 2017-03-04 NOTE — PROGRESS NOTES
Cardiothoracic Surgery Progress Note      POD #: 2-left transmetatarsal amputation of second, third, fourth and fifth toe amputation     LOS: 8 days      Subjective: Patient eating breakfast and having no complaints        Objective:  Vital Signs  Temp:  [97.8 °F (36.6 °C)-98.8 °F (37.1 °C)] 97.8 °F (36.6 °C)  Heart Rate:  [56-82] 59  Resp:  [16-20] 18  BP: (108-175)/(55-73) 115/55    Physical Exam:   General Appearance:    Lungs:   Heart:   Skin:   Incision:   I changed the dressing this morning.  The transmetatarsal incision looks good.  Sutures are intact and skin margins are viable  Results: Cultures of the transmetatarsal tarsal amputation, soft tissue site.  No growth so far    Results from last 7 days  Lab Units 03/03/17  0416   WBC 10*3/mm3 13.13*   HEMOGLOBIN g/dL 9.7*   HEMATOCRIT % 31.8*   PLATELETS 10*3/mm3 247       Results from last 7 days  Lab Units 03/03/17  0416   SODIUM mmol/L 137   POTASSIUM mmol/L 4.0  4.0   CHLORIDE mmol/L 102   TOTAL CO2 mmol/L 30.0   BUN mg/dL 16   CREATININE mg/dL 0.80   GLUCOSE mg/dL 126*   CALCIUM mg/dL 10.3         Assessment: #1.  Postop day 2, left foot transmetatarsal amputation of the second, third, fourth and fifth toes  2.  Postop day 7 for remote left great toe transmetatarsal amputation completion of prior transmetatarsal amputation     Plan: Continue to follow wound cultures and daily dressing changes of the transmetatarsal amputation site of the left foot.  Wound VAC change to the open wound of the left great toe.  Prior transmetatarsal amputation for tomorrow   antibiotic therapy per infectious disease    Arsalan Feliciano MD - 03/04/17 - 8:47 AM

## 2017-03-04 NOTE — PROGRESS NOTES
[facility] Medicine Services  INPATIENT PROGRESS NOTE    Date of Admission: 2/24/2017  Length of Stay: 8  Primary Care Physician: Harinder Aranda MD  Subjective   Chief Complaint: All over pain    HPI:  No issues overnight.  Continues to complain off the same level of pain.     Review Of Systems:   Review of Systems   Constitutional: Negative for fever.   Respiratory: Negative for wheezing.    Gastrointestinal: Negative for abdominal pain.   Musculoskeletal: Positive for arthralgias and myalgias.   All other systems reviewed and are negative.    Objective    Temp:  [97.8 °F (36.6 °C)-99 °F (37.2 °C)] 99 °F (37.2 °C)  Heart Rate:  [56-80] 60  Resp:  [16-20] 18  BP: (108-175)/(55-73) 136/65  Physical Exam  Gen-no acute distress, asleep but easily arouseable, resting in bed on speciality mattress, chronically ill appearing  Neck-trachea midline, no JVD or  CV-RRR, S1 S2 normal, 2/6 systolic murmur  Resp- mostly clear  Abd-obese, soft, NT, ND, +BS  Ext-no edema, LLE with surgical drsg/wound vac in place without drainage.  RLE warm, dry,  Neuro-A&O to person, place, time and situation.  Speech is clear, follows all commands, no focal deficits  Psych- Pleasant and cooperative; normal affect    Results Review:    I have reviewed the labs, radiology results and diagnostic studies.      Results from last 7 days  Lab Units 03/03/17  0416   WBC 10*3/mm3 13.13*   HEMOGLOBIN g/dL 9.7*   PLATELETS 10*3/mm3 247       Results from last 7 days  Lab Units 03/03/17  0416   SODIUM mmol/L 137   POTASSIUM mmol/L 4.0  4.0   TOTAL CO2 mmol/L 30.0   CREATININE mg/dL 0.80   GLUCOSE mg/dL 126*     Culture Data:   BLOOD CULTURE   Date Value Ref Range Status   02/24/2017 No growth at 2 days  Preliminary   02/24/2017 No growth at 2 days  Preliminary     WOUND CULTURE   Date Value Ref Range Status   02/23/2017 Heavy growth (4+) Staphylococcus aureus (A)  Final     I have reviewed the medications.    Assessment/Plan    Assessment/Problem List  Principal Problem:    Osteomyelitis of left foot  Active Problems:    COPD (chronic obstructive pulmonary disease)    Obstructive sleep apnea syndrome    Chronic back pain    Type 2 diabetes mellitus    Dyslipidemia    Fibromyalgia    Gastroesophageal reflux disease without esophagitis    Hypertension    Hypothyroidism    Peripheral vascular disease    Diabetic polyneuropathy associated with type 2 diabetes mellitus    Anemia, blood loss    Chronic pain    Chronic, continuous use of opioids    Chronic anxiety    Chronically on benzodiazepine therapy     Assessment/Problem List     This is a 61-year-old  female with past medical history significant for tobacco abuse, hypertension, hyperlipidemia, and diabetes type 2, who is status post transmetatarsal amputation of the left great toe due to osteomyelitis in November 2016, now presents with poor wound healing with growth of staph aureus from tissue cultures done on 2/24.      MSSA Osteomyelitis (presumed) of left 1st metatarsal  -- Infectious disease and cardiothoracic surgeon on consult  -- On Vanc and cefepime per Dr. Curz  -- MRI showed evidence of osteomyelitis in the first metatarsal and the rest of the digits on the left foot, s/p removal of the rest of 1st metatarsal bone on 2/25 by Dr. Feliciano  -- now s/p  2nd - 5th transmetatarsal amputation on 3/2    DM2, Controlled  -- Hgb A1c is 6.1   -- continue currenet insulin regimen, acceptable control     Anemia of chronic disease  -- hemoglobin stable, monitor    Chronic back pain/Fibromyalgia on chronic opioids  -- continue home pain medication  -- continue home muscle relaxers     Anxiety on chronic benzos  - buspar and klonopin continued (klonopin with 50% decrease in home dose)    COPD  -- stable    HTN  -- monitor, currently stable on meds    Constipation  -- continue bowel regimen      Hypothyroidism  -- continue home dose of synthroid, TSH ok     HLP  -- continue home dose  of Lipitor and zetia    DINA  -- CPAP w/ 2L oxygen while sleeping    Tobacco abuse  -- Patient counseled at length and cessation recommended      DVT prophylaxis: shell hose and scd to RLE  Dispo:  Reviewed consultant notes and d/w CM yesterday.  Will be here through the weekend.  Plan will be to LTACH next week.    Derek Jones MD   03/04/17   3:53 PM    Please note that portions of this note may have been completed with a voice recognition program. Efforts were made to edit the dictations, but occasionally words are mistranscribed.

## 2017-03-04 NOTE — PROGRESS NOTES
Acute Care - Wound/Debridement Treatment Note  [facility]     Patient Name: Tamara Langston  : 1955  MRN: [id number]  Today's Date: 3/4/2017  Onset of Illness/Injury or Date of Surgery Date: 17   Date of Referral to PT: 17   Referring Physician: MD Viki       Admit Date: 2017    Visit Dx:    ICD-10-CM ICD-9-CM   1. Impaired mobility and ADLs Z74.09 799.89   2. Toe osteomyelitis, left M86.9 730.27   3. Impaired functional mobility, balance, gait, and endurance Z74.09 V49.89   4. Osteomyelitis of left foot, unspecified chronicity M86.9 730.27       Patient Active Problem List   Diagnosis   • Atopic rhinitis   • Restrictive ventilatory defect   • COPD (chronic obstructive pulmonary disease)   • Osteoporosis   • Obstructive sleep apnea syndrome   • Abnormal liver enzymes   • Cholelithiasis   • Chronic back pain   • Mixed anxiety depressive disorder   • Type 2 diabetes mellitus   • Dyslipidemia   • Essential tremor   • Fibromyalgia   • Gastroesophageal reflux disease without esophagitis   • Hypertension   • Hypothyroidism   • Insomnia   • Osteoarthritis   • Polycythemia vera   • Psoriasis   • Branch retinal vein occlusion   • Cobalamin deficiency   • Chronic bronchitis   • Obesity   • Pneumonia   • Tobacco use   • Vitamin D deficiency   • Foot ulcer, left   • Leukocytosis   • Hypokalemia   • Lactic acidosis   • Fatty liver disease, nonalcoholic   • Diabetes education, encounter for   • Cellulitis of left foot   • Sinus bradycardia   • NSTEMI (non-ST elevated myocardial infarction)   • Tobacco abuse   • Hypoxia   • Peripheral vascular disease   • Gangrene   • Osteomyelitis of left foot   • Diabetic polyneuropathy associated with type 2 diabetes mellitus   • Anemia, blood loss   • Chronic pain   • Chronic, continuous use of opioids   • Chronic anxiety   • Chronically on benzodiazepine therapy               LDA Wound       17 1045          [REMOVED] Incision 16 1226 Left foot     Incision - Properties Group Placement Date: 11/23/16  -KB Placement Time: 1226  -KB Side: Left  -KB Location: foot  -KB Removal Date: 03/04/17  -MC, Duplicate LDA.  Removal Time: 1045  -MC    Incision 02/25/17 1427 Left foot    Incision - Properties Group Placement Date: 02/25/17  -TB Placement Time: 1427  -TB Side: Left  -TB Location: foot  -TB Additional Comments: 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  -MW    Dressing Appearance no drainage;intact  -MC      Appearance --   unable to visualize, dressing in place  -MC      Therapy Setting (Negative Pressure Wound Therapy) continuous therapy  -      Pressure Setting (Negative Pressure Wound Therapy) 125 mmHg  -MC      Output (mL) 75  -MC      [REMOVED] Incision 03/02/17 2225 Left foot    Incision - Properties Group Placement Date: 03/02/17  -KJ Placement Time: 2225  -KJ Side: Left  -KJ Location: foot  -KJ Additional Comments: gauze and kerlix left foot, wound vac intact and patient  -JR Removal Date: 03/04/17  -MC, Duplicate LDA.  Removal Time: 1045  -MC    Pressure Ulcer 02/24/17 1432 Right coccyx Stage II    Pressure Ulcer - Properties Group Date first assessed: 02/24/17  -MS Time first assessed: 1432  -MS Present On Admission (Pressure Ulcer): yes  -MS Side: Right  -MS Location: coccyx  -MS Stage: Stage II  -MS    Pressure Ulcer 02/24/17 1432 other (see comments) Stage II    Pressure Ulcer - Properties Group Date first assessed: 02/24/17  -MS Time first assessed: 1432  -MS Present On Admission (Pressure Ulcer): yes  -MS Location: other (see comments)  -MS, nose  Stage: Stage II  -MS Additional Comments: from home c-pap  -MS      User Key  (r) = Recorded By, (t) = Taken By, (c) = Cosigned By    Initials Name Provider Type    ROMIE Wheeler, PT Physical Therapist    TB Karen Atkins, RN Registered Nurse    ADALBERTO Clark, RN Registered Nurse    JR Ilana Bajwa, RN Registered Nurse    IVAN Chao, PT Physical Therapist    KJ  Alexa Conner, RN Registered Nurse    MS David Raymond, RN Registered Nurse            WOUND DEBRIDEMENT                     Adult Rehabilitation Note       03/04/17 1045 03/03/17 0800 03/02/17 0820    Rehab Assessment/Intervention    Discipline physical therapist  - physical therapist  - physical therapist  -    Document Type therapy note (daily note)  - therapy note (daily note)  - therapy note (daily note)  -SR    Subjective Information no complaints;agree to therapy  - agree to therapy;complains of;weakness;fatigue;pain  - agree to therapy;complains of;pain  -SR    Patient Effort, Rehab Treatment   good  -SR    Precautions/Limitations   non-weight bearing status;fall precautions;other (see comments)   wound vac L foot  -SR    Recorded by [] Chetna Chao, PT [] Lane Yates, PT [SR] Thu Herrera, PT    Vital Signs    Pre Systolic BP Rehab   --   vitals are WNL on RA  -SR    Recorded by   [SR] Thu Herrera, PT    Pain Assessment    Pain Assessment Blackmon-Cabrera FACES  - Blackmon-Baker FACES  - Blackmon-Cabrera FACES  -SR    Blackmon-Cabrera FACES Pain Rating 2  - 6  -MF 4  -SR    Pain Type  Chronic pain  - Chronic pain  -SR    Pain Location  Generalized  - Generalized  -SR    Pain Intervention(s)  Repositioned  - Repositioned  -SR    Recorded by [] Chetna Chao, PT [] Lane Yates, PT [SR] Thu Herrera, PT    Cognitive Assessment/Intervention    Current Cognitive/Communication Assessment   functional  -SR    Orientation Status   oriented x 4  -SR    Follows Commands/Answers Questions   100% of the time  -SR    Personal Safety   WNL/WFL  -SR    Personal Safety Interventions   fall prevention program maintained;gait belt;nonskid shoes/slippers when out of bed  -SR    Recorded by   [SR] Thu Herrera, PT    Bed Mobility, Assessment/Treatment    Bed Mobility, Assistive Device   head of bed elevated;bed rails;draw sheet  -SR    Bed Mob, Supine to Sit, [place]    contact guard assist;verbal cues required  -SR    Bed Mob, Sit to Supine, [place]   contact guard assist;verbal cues required  -SR    Recorded by   [SR] Thu Herrera PT    Transfer Assessment/Treatment    Transfers, Bed-Chair [place]   --   pt declines, states that chair is too uncomfortable  -SR    Transfers, Sit-Stand [place]   contact guard assist;2 person assist required;verbal cues required  -SR    Transfers, Stand-Sit [place]   contact guard assist  -SR    Transfers, Sit-Stand-Sit, Assist Device   rolling walker  -SR    Transfer, Comment   pt does not require physical assist to maintain NWB LLE, pt was able to stand for 2 minutes with CGA and rw   -SR    Recorded by   [SR] Thu Herrera PT    Gait Assessment/Treatment    Gait, [place] Level   unable to perform  -SR    Recorded by   [SR] Thu Herrera PT    Balance Skills Training    Standing-Level of Assistance   Contact guard  -SR    Static Standing Balance Support   assistive device  -SR    Standing-Balance Activities   --   static standing for endurance, able to tolerate 2 minutes  -SR    Recorded by   [SR] Thu Herrera PT    Therapy Exercises    Bilateral Lower Extremities   AROM:;10 reps;supine;sitting;ankle pumps/circles;heel slides;LAQ;hip flexion;SLR  -SR    Recorded by   [SR] Thu Herrera PT    Positioning and Restraints    Pre-Treatment Position in bed  - in bed  - in bed  -    Post Treatment Position bed  - bed  - bed  -    In Bed supine;call light within reach;encouraged to call for assist;with nsg  - supine;call light within reach;heels elevated  - notified nsg;supine;call light within reach;encouraged to call for assist;LLE elevated  -SR    Recorded by [] Chetna Chao, PT [] Lane Yates, PT [SR] Thu Herrera, PT      03/02/17 0800          Rehab Assessment/Intervention    Discipline physical therapist  -      Document Type therapy note (daily note)  -      Subjective  Information agree to therapy;complains of;weakness;fatigue;pain  -MF      Recorded by [MF] Lane Yates, PT      Pain Assessment    Pain Assessment Blackmon-Baker FACES  -      Blackmon-Baker FACES Pain Rating 4  -      Pain Type Chronic pain  -      Pain Location Generalized  -      Pain Intervention(s) Repositioned  -MF      Recorded by [MF] Lane Yates, PT      Positioning and Restraints    Pre-Treatment Position in bed  -      Post Treatment Position bed  -MF      In Bed supine;call light within reach  -      Recorded by [MF] Lane Yates, PT        User Key  (r) = Recorded By, (t) = Taken By, (c) = Cosigned By    Initials Name Effective Dates     Lane Yates, PT 06/19/15 -     SR Thu Herrera, PT 06/19/15 -      Chetna Chao, PT 03/14/16 -                 IP PT Goals       03/04/17 1045 03/02/17 1055 02/27/17 1423    Transfer Training PT LTG    Transfer Training PT LTG, Date Established   02/27/17  -MANDO    Transfer Training PT LTG, Time to Achieve   1 wk  -MANDO    Transfer Training PT LTG, Activity Type   all transfers  -MANDO    Transfer Training PT LTG, [place] Level   conditional independence  -MANDO    Transfer Training PT LTG, Assist Device   walker, rolling  -MANDO    Transfer Training PT  LTG, Date Goal Reviewed  03/02/17  -SR     Gait Training PT LTG    Gait Training Goal PT LTG, Date Established   02/27/17  -MANDO    Gait Training Goal PT LTG, Time to Achieve   1 wk  -MANDO    Gait Training Goal PT LTG, [place] Level   conditional independence  -MANDO    Gait Training Goal PT LTG, Assist Device   walker, rolling  -MANDO    Gait Training Goal PT LTG, Distance to Achieve   25  -MANDO    Gait Training Goal PT LTG, Date Goal Reviewed  03/02/17  -SR     Patient Education PT LTG    Patient Education PT LTG, Date Established   02/27/17  -MANDO    Patient Education PT LTG, Time to Achieve   1 wk  -MANDO    Patient Education PT LTG, Education Type   HEP;positioning;posture/body  mechanics;gait;transfers;bed mobility;pain management;progression of POC;benefits of activity;home safety;equipment management;skin care/inspection   weightbearing status  -MANDO    Patient Education PT LTG, Education Understanding   demonstrate adequately;verbalize understanding  -MANDO    Patient Education PT LTG, Date Goal Reviewed  03/02/17  -SR     Wound Care PT LTG    Wound Care PT LTG 1, Outcome goal ongoing  -        02/27/17 0845 02/26/17 1332       Wound Care PT LTG    Wound Care PT LTG 1, Date Established  02/26/17  -MW     Wound Care PT LTG 1, Time to Achieve  2 wks  -MW     Wound Care PT LTG 1, Location  LT open ray amputation site   -MW     Wound Care PT LTG 1, No S&S of Infection  yes  -MW     Wound Care PT LTG 1, Decrease Wound Size  25%  -MW     Wound Care PT LTG 1, Decrease Exudate  minimum  -MW     Wound Care PT LTG 1, No New Skin Break Down  yes  -MW     Wound Care PT LTG 1, Education  S&S of infection;dressing changes;wound care;weight bearing restriction;progression of POC  -MW     Wound Care PT LTG 1, Education Understanding  verbalize understanding  -MW     Wound Care PT LTG 1, Outcome goal ongoing  -        User Key  (r) = Recorded By, (t) = Taken By, (c) = Cosigned By    Initials Name Provider Type    MANDO Esteban, PT Physical Therapist    SR Thu Herrera, PT Physical Therapist     Christie Wheeler, PT Physical Therapist     Chetna Chao, PT Physical Therapist          Physical Therapy Education     Title: PT OT SLP Therapies (Active)     Topic: Physical Therapy (Active)     Point: Mobility training (Active)    Learning Progress Summary    Learner Readiness Method Response Comment Documented by Status   Patient Acceptance E,D NR  SR 03/02/17 1055 Active    Acceptance E,D VU,NR  MANDO 02/27/17 1558 Done               Point: Home exercise program (Active)    Learning Progress Summary    Learner Readiness Method Response Comment Documented by Status   Patient Acceptance  E,D NR  SR 03/02/17 1055 Active    Acceptance E,D VU,NR   02/27/17 1558 Done               Point: Body mechanics (Active)    Learning Progress Summary    Learner Readiness Method Response Comment Documented by Status   Patient Acceptance E,D NR  SR 03/02/17 1055 Active    Acceptance E,D VU,NR  MANDO 02/27/17 1558 Done               Point: Precautions (Active)    Learning Progress Summary    Learner Readiness Method Response Comment Documented by Status   Patient Acceptance E,D NR   03/02/17 1055 Active    Acceptance E,D VU,NR   02/27/17 1558 Done                      User Key     Initials Effective Dates Name Provider Type Discipline     06/19/15 -  Philomena Esteban, PT Physical Therapist PT     06/19/15 -  Thu Herrera, PT Physical Therapist PT                   PT ASSESSMENT (last 72 hours)      PT Evaluation       03/04/17 1045 03/03/17 0800    Rehab Evaluation    Document Type therapy note (daily note)  - therapy note (daily note)  -    Subjective Information no complaints;agree to therapy  - agree to therapy;complains of;weakness;fatigue;pain  -    Pain Assessment    Pain Assessment Blackmon-Cabrera FACES  - Blackmon-Baker FACES  -    Blackmon-Baker FACES Pain Rating 2  - 6  -    Pain Type  Chronic pain  -    Pain Location  Generalized  -    Pain Intervention(s)  Repositioned  -    Sensory Assessment/Intervention    LUE Light Touch  WNL  -AT    RUE Light Touch  WNL  -AT    LLE Light Touch  absent sensation   baseline numbness  -AT    RLE Light Touch  absent sensation   baseline numbness  -AT    Positioning and Restraints    Pre-Treatment Position in bed  -MC in bed  -    Post Treatment Position bed  -MC bed  -    In Bed supine;call light within reach;encouraged to call for assist;with ns  - supine;call light within reach;heels elevated  -      03/03/17 0030 03/02/17 0820    Rehab Evaluation    Document Type  therapy note (daily note)  -    Subjective Information  agree to  therapy;complains of;pain  -SR    Patient Effort, Rehab Treatment  good  -SR    General Information    Precautions/Limitations  non-weight bearing status;fall precautions;other (see comments)   wound vac L foot  -SR    Vital Signs    Pre Systolic BP Rehab  --   vitals are WNL on RA  -SR    Pain Assessment    Pain Assessment  Blackmon-Cabrera FACES  -SR    Blackmon-Cabrera FACES Pain Rating  4  -SR    Pain Type  Chronic pain  -SR    Pain Location  Generalized  -SR    Pain Intervention(s)  Repositioned  -SR    Cognitive Assessment/Intervention    Current Cognitive/Communication Assessment  functional  -SR    Orientation Status  oriented x 4  -SR    Follows Commands/Answers Questions  100% of the time  -SR    Personal Safety  WNL/WFL  -SR    Personal Safety Interventions  fall prevention program maintained;gait belt;nonskid shoes/slippers when out of bed  -SR    Bed Mobility, Assessment/Treatment    Bed Mobility, Assistive Device  head of bed elevated;bed rails;draw sheet  -SR    Bed Mob, Supine to Sit, [place]  contact guard assist;verbal cues required  -SR    Bed Mob, Sit to Supine, [place]  contact guard assist;verbal cues required  -SR    Transfer Assessment/Treatment    Transfers, Bed-Chair [place]  --   pt declines, states that chair is too uncomfortable  -SR    Transfers, Sit-Stand [place]  contact guard assist;2 person assist required;verbal cues required  -SR    Transfers, Stand-Sit [place]  contact guard assist  -SR    Transfers, Sit-Stand-Sit, Assist Device  rolling walker  -SR    Transfer, Comment  pt does not require physical assist to maintain NWB LLE, pt was able to stand for 2 minutes with CGA and rw   -SR    Gait Assessment/Treatment    Gait, [place] Level  unable to perform  -SR    Balance Skills Training    Standing-Level of Assistance  Contact guard  -SR    Static Standing Balance Support  assistive device  -SR    Standing-Balance Activities  --   static standing for endurance,  able to tolerate 2 minutes  -SR    Therapy Exercises    Bilateral Lower Extremities  AROM:;10 reps;supine;sitting;ankle pumps/circles;heel slides;LAQ;hip flexion;SLR  -SR    Sensory Assessment/Intervention    LUE Light Touch WNL  -MP     RUE Light Touch WNL  -MP     LLE Light Touch absent sensation   baseline numbness  -MP     RLE Light Touch absent sensation   baseline numbness  -MP     Positioning and Restraints    Pre-Treatment Position  in bed  -SR    Post Treatment Position  bed  -SR    In Bed  notified nsg;supine;call light within reach;encouraged to call for assist;LLE elevated  -SR      03/02/17 0800       Rehab Evaluation    Document Type therapy note (daily note)  -     Subjective Information agree to therapy;complains of;weakness;fatigue;pain  -     Pain Assessment    Pain Assessment Blackmon-Baker FACES  -     Blackmon-Baker FACES Pain Rating 4  -     Pain Type Chronic pain  -     Pain Location Generalized  -     Pain Intervention(s) Repositioned  -     Sensory Assessment/Intervention    LUE Light Touch WNL  -CS     RUE Light Touch WNL  -CS     LLE Light Touch absent sensation   baseline numbness  -CS     RLE Light Touch absent sensation   baseline numbness  -CS     Positioning and Restraints    Pre-Treatment Position in bed  -     Post Treatment Position bed  -MF     In Bed supine;call light within reach  -       User Key  (r) = Recorded By, (t) = Taken By, (c) = Cosigned By    Initials Name Provider Type     Lane Yates, PT Physical Therapist    SR Thu Herrera, PT Physical Therapist    MP Bette Wade, RN Registered Nurse     Chetna Chao, PT Physical Therapist     Perfecto Love, RN Registered Nurse    [facility] CAMMY Hawk RN Registered Nurse            PT Recommendation and Plan  Anticipated Equipment Needs At Discharge: other (see comments) (wound VAC )  Anticipated Discharge Disposition: home with assist, home with home health (if plan further level of  amputation, will recommend rehab)  Planned Therapy Interventions: wound care, patient/family education  PT Frequency: daily, per priority policy    Plan Of Care Reviewed With: patient   Progress: progress toward functional goals as expected   Outcome Summary/Follow up Plan: Wound vac intact with no apparent issues or complaints from the patient. Anticipate change in 1-2 days to continue progress.          Outcome Measures       03/02/17 0820          How much help from another person do you currently need...    Turning from your back to your side while in flat bed without using bedrails? 4  -SR      Moving from lying on back to sitting on the side of a flat bed without bedrails? 4  -SR      Moving to and from a bed to a chair (including a wheelchair)? 3  -SR      Standing up from a chair using your arms (e.g., wheelchair, bedside chair)? 3  -SR      Climbing 3-5 steps with a railing? 1  -SR      To walk in hospital room? 1  -SR      AM-PAC 6 Clicks Score 16  -SR      Functional Assessment    Outcome Measure Options AM-PAC 6 Clicks Basic Mobility (PT)  -SR        User Key  (r) = Recorded By, (t) = Taken By, (c) = Cosigned By    Initials Name Provider Type     Thu Herrera, PT Physical Therapist              Time Calculation        PT Charges       03/04/17 1045          Time Calculation    Start Time 1045  -      PT Goal Re-Cert Due Date 03/08/17  -        User Key  (r) = Recorded By, (t) = Taken By, (c) = Cosigned By    Initials Name Provider Type     Chetna Chao, PT Physical Therapist             Therapy Charges for Today     Code Description Service Date Service Provider Modifiers Qty    [id number]  PT NEG PRESS WOUND TO 50SQCM DME1 3/4/2017 Chetna Chao, PT  1            PT G-Codes  Outcome Measure Options: AM-PAC 6 Clicks Basic Mobility (PT)        Chetna Chao, PT  3/4/2017

## 2017-03-05 LAB
ABO + RH BLD: NORMAL
ABO + RH BLD: NORMAL
ANION GAP SERPL CALCULATED.3IONS-SCNC: 4 MMOL/L (ref 3–11)
BH BB BLOOD EXPIRATION DATE: NORMAL
BH BB BLOOD EXPIRATION DATE: NORMAL
BH BB BLOOD TYPE BARCODE: 600
BH BB BLOOD TYPE BARCODE: 600
BH BB DISPENSE STATUS: NORMAL
BH BB DISPENSE STATUS: NORMAL
BH BB PRODUCT CODE: NORMAL
BH BB PRODUCT CODE: NORMAL
BH BB UNIT NUMBER: NORMAL
BH BB UNIT NUMBER: NORMAL
BUN BLD-MCNC: 17 MG/DL (ref 9–23)
BUN/CREAT SERPL: 18.9 (ref 7–25)
CALCIUM SPEC-SCNC: 10.2 MG/DL (ref 8.7–10.4)
CHLORIDE SERPL-SCNC: 107 MMOL/L (ref 99–109)
CO2 SERPL-SCNC: 28 MMOL/L (ref 20–31)
CREAT BLD-MCNC: 0.9 MG/DL (ref 0.6–1.3)
CROSSMATCH INTERPRETATION: NORMAL
CROSSMATCH INTERPRETATION: NORMAL
GFR SERPL CREATININE-BSD FRML MDRD: 63 ML/MIN/1.73
GLUCOSE BLD-MCNC: 98 MG/DL (ref 70–100)
GLUCOSE BLDC GLUCOMTR-MCNC: 115 MG/DL (ref 70–130)
GLUCOSE BLDC GLUCOMTR-MCNC: 138 MG/DL (ref 70–130)
GLUCOSE BLDC GLUCOMTR-MCNC: 163 MG/DL (ref 70–130)
GLUCOSE BLDC GLUCOMTR-MCNC: 178 MG/DL (ref 70–130)
POTASSIUM BLD-SCNC: 3.9 MMOL/L (ref 3.5–5.5)
SODIUM BLD-SCNC: 139 MMOL/L (ref 132–146)
UNIT  ABO: NORMAL
UNIT  ABO: NORMAL
UNIT  RH: NORMAL
UNIT  RH: NORMAL
VANCOMYCIN TROUGH SERPL-MCNC: 5.6 MCG/ML (ref 10–20)

## 2017-03-05 PROCEDURE — 94799 UNLISTED PULMONARY SVC/PX: CPT

## 2017-03-05 PROCEDURE — 82962 GLUCOSE BLOOD TEST: CPT

## 2017-03-05 PROCEDURE — 97605 NEG PRS WND THER DME<=50SQCM: CPT

## 2017-03-05 PROCEDURE — 63710000001 INSULIN DETEMIR PER 5 UNITS: Performed by: HOSPITALIST

## 2017-03-05 PROCEDURE — 25010000002 CEFEPIME: Performed by: INTERNAL MEDICINE

## 2017-03-05 PROCEDURE — 94640 AIRWAY INHALATION TREATMENT: CPT

## 2017-03-05 PROCEDURE — 99232 SBSQ HOSP IP/OBS MODERATE 35: CPT | Performed by: INTERNAL MEDICINE

## 2017-03-05 PROCEDURE — 94660 CPAP INITIATION&MGMT: CPT

## 2017-03-05 PROCEDURE — 80048 BASIC METABOLIC PNL TOTAL CA: CPT

## 2017-03-05 PROCEDURE — 80202 ASSAY OF VANCOMYCIN: CPT

## 2017-03-05 PROCEDURE — 25010000002 HEPARIN (PORCINE) PER 1000 UNITS: Performed by: THORACIC SURGERY (CARDIOTHORACIC VASCULAR SURGERY)

## 2017-03-05 PROCEDURE — 25010000002 VANCOMYCIN

## 2017-03-05 PROCEDURE — 99024 POSTOP FOLLOW-UP VISIT: CPT | Performed by: THORACIC SURGERY (CARDIOTHORACIC VASCULAR SURGERY)

## 2017-03-05 PROCEDURE — 94760 N-INVAS EAR/PLS OXIMETRY 1: CPT

## 2017-03-05 RX ORDER — VANCOMYCIN HYDROCHLORIDE 1 G/200ML
1000 INJECTION, SOLUTION INTRAVENOUS EVERY 12 HOURS
Status: DISCONTINUED | OUTPATIENT
Start: 2017-03-06 | End: 2017-03-07

## 2017-03-05 RX ORDER — METOCLOPRAMIDE 5 MG/1
5 TABLET ORAL
Status: DISCONTINUED | OUTPATIENT
Start: 2017-03-05 | End: 2017-03-10 | Stop reason: HOSPADM

## 2017-03-05 RX ADMIN — BUSPIRONE HYDROCHLORIDE 20 MG: 10 TABLET ORAL at 21:42

## 2017-03-05 RX ADMIN — OXYCODONE HYDROCHLORIDE AND ACETAMINOPHEN 1 TABLET: 10; 325 TABLET ORAL at 16:57

## 2017-03-05 RX ADMIN — PREGABALIN 100 MG: 100 CAPSULE ORAL at 08:28

## 2017-03-05 RX ADMIN — LEVOTHYROXINE SODIUM 112 MCG: 112 TABLET ORAL at 06:02

## 2017-03-05 RX ADMIN — BISOPROLOL FUMARATE AND HYDROCHLOROTHIAZIDE 1 TABLET: 6.25; 5 TABLET ORAL at 21:42

## 2017-03-05 RX ADMIN — OXYCODONE HYDROCHLORIDE AND ACETAMINOPHEN 1 TABLET: 10; 325 TABLET ORAL at 03:22

## 2017-03-05 RX ADMIN — LISINOPRIL 20 MG: 20 TABLET ORAL at 08:28

## 2017-03-05 RX ADMIN — Medication 1 CAPSULE: at 08:25

## 2017-03-05 RX ADMIN — BACLOFEN 20 MG: 10 TABLET ORAL at 15:19

## 2017-03-05 RX ADMIN — OXYCODONE HYDROCHLORIDE AND ACETAMINOPHEN 1 TABLET: 10; 325 TABLET ORAL at 21:41

## 2017-03-05 RX ADMIN — CEFEPIME 2 G: 2 INJECTION, POWDER, FOR SOLUTION INTRAVENOUS at 03:15

## 2017-03-05 RX ADMIN — BACLOFEN 20 MG: 10 TABLET ORAL at 06:02

## 2017-03-05 RX ADMIN — METOCLOPRAMIDE 5 MG: 5 TABLET ORAL at 12:52

## 2017-03-05 RX ADMIN — VANCOMYCIN HYDROCHLORIDE 2000 MG: 1 INJECTION, POWDER, LYOPHILIZED, FOR SOLUTION INTRAVENOUS at 10:42

## 2017-03-05 RX ADMIN — INSULIN DETEMIR 10 UNITS: 100 INJECTION, SOLUTION SUBCUTANEOUS at 08:30

## 2017-03-05 RX ADMIN — PREGABALIN 100 MG: 100 CAPSULE ORAL at 16:54

## 2017-03-05 RX ADMIN — CLONAZEPAM 0.25 MG: 0.5 TABLET ORAL at 08:27

## 2017-03-05 RX ADMIN — INSULIN LISPRO 2 UNITS: 100 INJECTION, SOLUTION INTRAVENOUS; SUBCUTANEOUS at 12:52

## 2017-03-05 RX ADMIN — NICOTINE 1 PATCH: 21 PATCH, EXTENDED RELEASE TRANSDERMAL at 08:26

## 2017-03-05 RX ADMIN — CEFEPIME 2 G: 2 INJECTION, POWDER, FOR SOLUTION INTRAVENOUS at 16:54

## 2017-03-05 RX ADMIN — SALINE NASAL SPRAY 2 SPRAY: 1.5 SOLUTION NASAL at 06:02

## 2017-03-05 RX ADMIN — IPRATROPIUM BROMIDE AND ALBUTEROL SULFATE 3 ML: .5; 3 SOLUTION RESPIRATORY (INHALATION) at 15:38

## 2017-03-05 RX ADMIN — EZETIMIBE 10 MG: 10 TABLET ORAL at 08:30

## 2017-03-05 RX ADMIN — INSULIN DETEMIR 10 UNITS: 100 INJECTION, SOLUTION SUBCUTANEOUS at 21:44

## 2017-03-05 RX ADMIN — IPRATROPIUM BROMIDE AND ALBUTEROL SULFATE 3 ML: .5; 3 SOLUTION RESPIRATORY (INHALATION) at 19:22

## 2017-03-05 RX ADMIN — BUSPIRONE HYDROCHLORIDE 20 MG: 10 TABLET ORAL at 08:29

## 2017-03-05 RX ADMIN — CETIRIZINE HYDROCHLORIDE 10 MG: 10 TABLET, FILM COATED ORAL at 21:41

## 2017-03-05 RX ADMIN — OXYCODONE HYDROCHLORIDE AND ACETAMINOPHEN 1 TABLET: 10; 325 TABLET ORAL at 10:41

## 2017-03-05 RX ADMIN — IPRATROPIUM BROMIDE AND ALBUTEROL SULFATE 3 ML: .5; 3 SOLUTION RESPIRATORY (INHALATION) at 09:12

## 2017-03-05 RX ADMIN — PREGABALIN 100 MG: 100 CAPSULE ORAL at 21:41

## 2017-03-05 RX ADMIN — AMLODIPINE BESYLATE 5 MG: 5 TABLET ORAL at 08:28

## 2017-03-05 RX ADMIN — Medication 2 TABLET: at 21:41

## 2017-03-05 RX ADMIN — CLONAZEPAM 0.25 MG: 0.5 TABLET ORAL at 21:41

## 2017-03-05 RX ADMIN — ASPIRIN 325 MG: 325 TABLET, DELAYED RELEASE ORAL at 08:27

## 2017-03-05 RX ADMIN — HEPARIN SODIUM 5000 UNITS: 5000 INJECTION, SOLUTION INTRAVENOUS; SUBCUTANEOUS at 21:40

## 2017-03-05 RX ADMIN — INSULIN LISPRO 2 UNITS: 100 INJECTION, SOLUTION INTRAVENOUS; SUBCUTANEOUS at 21:40

## 2017-03-05 RX ADMIN — METOCLOPRAMIDE 5 MG: 5 TABLET ORAL at 16:54

## 2017-03-05 RX ADMIN — ATORVASTATIN CALCIUM 40 MG: 40 TABLET, FILM COATED ORAL at 21:41

## 2017-03-05 RX ADMIN — HEPARIN SODIUM 5000 UNITS: 5000 INJECTION, SOLUTION INTRAVENOUS; SUBCUTANEOUS at 08:29

## 2017-03-05 RX ADMIN — PANTOPRAZOLE SODIUM 40 MG: 40 TABLET, DELAYED RELEASE ORAL at 06:02

## 2017-03-05 NOTE — PLAN OF CARE
Problem: Patient Care Overview (Adult)  Goal: Plan of Care Review  Outcome: Ongoing (interventions implemented as appropriate)    03/05/17 0950   Coping/Psychosocial Response Interventions   Plan Of Care Reviewed With patient;spouse   Patient Care Overview   Progress progress toward functional goals as expected   Outcome Evaluation   Outcome Summary/Follow up Plan Open area to L foot remains moist with pink/red tissue and exposed bone in base of the wound. The new amputation incision is well-approximated at this time. Pt will continue to benefit from NPWT to manage exudate, promote granulation, and promote wound closure.         Problem: Inpatient Physical Therapy  Goal: Wound Care Goal 1 LTG- PT  Outcome: Ongoing (interventions implemented as appropriate)    02/26/17 1332 03/05/17 0905   Wound Care PT LTG   Wound Care PT LTG 1, Date Established 02/26/17 --    Wound Care PT LTG 1, Time to Achieve 2 wks --    Wound Care PT LTG 1, Location LT open ray amputation site  --    Wound Care PT LTG 1, No S&S of Infection yes --    Wound Care PT LTG 1, Decrease Wound Size 25% --    Wound Care PT LTG 1, Decrease Exudate minimum --    Wound Care PT LTG 1, No New Skin Break Down yes --    Wound Care PT LTG 1, Education S&S of infection;dressing changes;wound care;weight bearing restriction;progression of POC --    Wound Care PT LTG 1, Education Understanding verbalize understanding --    Wound Care PT LTG 1, Outcome --  goal ongoing

## 2017-03-05 NOTE — PLAN OF CARE
Problem: Patient Care Overview (Adult)  Goal: Plan of Care Review  Outcome: Ongoing (interventions implemented as appropriate)    03/05/17 0511   Coping/Psychosocial Response Interventions   Plan Of Care Reviewed With patient   Patient Care Overview   Progress no change   Outcome Evaluation   Outcome Summary/Follow up Plan pain controlled, vss, wound vac intact to left foot.        Goal: Adult Individualization and Mutuality  Outcome: Ongoing (interventions implemented as appropriate)

## 2017-03-05 NOTE — PLAN OF CARE
Problem: Patient Care Overview (Adult)  Goal: Discharge Needs Assessment  Outcome: Ongoing (interventions implemented as appropriate)    Problem: Infection, Risk/Actual (Adult)  Goal: Infection Prevention/Resolution  Outcome: Ongoing (interventions implemented as appropriate)    Problem: Skin Integrity Impairment, Risk/Actual (Adult)  Goal: Skin Integrity/Wound Healing  Outcome: Ongoing (interventions implemented as appropriate)    Problem: Pressure Ulcer (Adult)  Goal: Signs and Symptoms of Listed Potential Problems Will be Absent or Manageable (Pressure Ulcer)  Outcome: Ongoing (interventions implemented as appropriate)    Problem: Fall Risk (Adult)  Goal: Identify Related Risk Factors and Signs and Symptoms  Outcome: Ongoing (interventions implemented as appropriate)  Goal: Absence of Falls  Outcome: Ongoing (interventions implemented as appropriate)

## 2017-03-05 NOTE — PROGRESS NOTES
Cardiothoracic Surgery Progress Note      POD #: 3-left transmetatarsal amputation of the second, third, fourth and fifth toes  #7-completion of amputation of first metatarsal bone and excision of surrounding infected granulation tissue and deep soft tissues   LOS: 9 days      Subjective: Voicing no complaints today        Objective:  Vital Signs  Temp:  [98.2 °F (36.8 °C)-99 °F (37.2 °C)] 98.2 °F (36.8 °C)  Heart Rate:  [60-66] 62  Resp:  [18-20] 20  BP: (113-148)/(55-67) 113/58    Physical Exam:   General Appearance: Oriented ×3   Lungs: Clear bilaterally   Heart : Systolic Murmur, can intercostal space as noted previously   Skin:   Incision: Left transmetatarsal incision skin margins margins are viable with slight edema in the surrounding tissues   Left wound VAC over the previously excised first transmetatarsal bone and surrounding tissues is intact  Results:    Results from last 7 days  Lab Units 03/03/17  0416   WBC 10*3/mm3 13.13*   HEMOGLOBIN g/dL 9.7*   HEMATOCRIT % 31.8*   PLATELETS 10*3/mm3 247       Results from last 7 days  Lab Units 03/03/17  0416   SODIUM mmol/L 137   POTASSIUM mmol/L 4.0  4.0   CHLORIDE mmol/L 102   TOTAL CO2 mmol/L 30.0   BUN mg/dL 16   CREATININE mg/dL 0.80   GLUCOSE mg/dL 126*   CALCIUM mg/dL 10.3         Assessment: Postop day 3.  Left foot transmetatarsal amputation of second, third, fourth and fifth toe.  Incision appears to be healing well at this time  #2.  Postop day 7 from remote left great toe  transmetatarsal amputation    Plan: Wound VAC to the left.  Left first transmetatarsal amputation open wound  Daily dressing changes to the completion transmetatarsal amputation incision    Arsalan Feliciano MD - 03/05/17 - 8:15 AM

## 2017-03-05 NOTE — PROGRESS NOTES
CTS Progress Note       LOS: 9 days   Patient Care Team:  Harinder Aranda MD as PCP - General  Harinder Aranda MD as PCP - Family Medicine  Arsalan Feliciano MD as Surgeon (Cardiothoracic Surgery)        Vital Signs  Temp:  [98.2 °F (36.8 °C)-99 °F (37.2 °C)] 98.2 °F (36.8 °C)  Heart Rate:  [60-66] 62  Resp:  [18-20] 20  BP: (113-148)/(55-67) 113/58    Physical Exam: Patient in good spirits.  In bed with foot elevated.       Results     Results from last 7 days  Lab Units 03/03/17  0416   WBC 10*3/mm3 13.13*   HEMOGLOBIN g/dL 9.7*   HEMATOCRIT % 31.8*   PLATELETS 10*3/mm3 247       Results from last 7 days  Lab Units 03/03/17  0416   SODIUM mmol/L 137   POTASSIUM mmol/L 4.0  4.0   CHLORIDE mmol/L 102   TOTAL CO2 mmol/L 30.0   BUN mg/dL 16   CREATININE mg/dL 0.80   GLUCOSE mg/dL 126*   CALCIUM mg/dL 10.3           Imaging Results (last 24 hours)     ** No results found for the last 24 hours. **          Assessment    Principal Problem:    Osteomyelitis of left foot  Active Problems:    COPD (chronic obstructive pulmonary disease)    Obstructive sleep apnea syndrome    Chronic back pain    Type 2 diabetes mellitus    Dyslipidemia    Fibromyalgia    Gastroesophageal reflux disease without esophagitis    Hypertension    Hypothyroidism    Peripheral vascular disease    Diabetic polyneuropathy associated with type 2 diabetes mellitus    Anemia, blood loss    Chronic pain    Chronic, continuous use of opioids    Chronic anxiety    Chronically on benzodiazepine therapy    Satisfactory progress thus far.  Continuing antibiotics  Per infectious disease    Plan   As stated above    Yogesh Saba MD  03/05/17  7:40 AM

## 2017-03-05 NOTE — PROGRESS NOTES
Acute Care - Wound/Debridement Treatment Note  [facility]     Patient Name: Tamara Langston  : 1955  MRN: [id number]  Today's Date: 3/5/2017  Onset of Illness/Injury or Date of Surgery Date: 17   Date of Referral to PT: 17   Referring Physician: MD Viki         Admit Date: 2017    Visit Dx:    ICD-10-CM ICD-9-CM   1. Impaired mobility and ADLs Z74.09 799.89   2. Toe osteomyelitis, left M86.9 730.27   3. Impaired functional mobility, balance, gait, and endurance Z74.09 V49.89   4. Osteomyelitis of left foot, unspecified chronicity M86.9 730.27       Patient Active Problem List   Diagnosis   • Atopic rhinitis   • Restrictive ventilatory defect   • COPD (chronic obstructive pulmonary disease)   • Osteoporosis   • Obstructive sleep apnea syndrome   • Abnormal liver enzymes   • Cholelithiasis   • Chronic back pain   • Mixed anxiety depressive disorder   • Type 2 diabetes mellitus   • Dyslipidemia   • Essential tremor   • Fibromyalgia   • Gastroesophageal reflux disease without esophagitis   • Hypertension   • Hypothyroidism   • Insomnia   • Osteoarthritis   • Polycythemia vera   • Psoriasis   • Branch retinal vein occlusion   • Cobalamin deficiency   • Chronic bronchitis   • Obesity   • Pneumonia   • Tobacco use   • Vitamin D deficiency   • Foot ulcer, left   • Leukocytosis   • Hypokalemia   • Lactic acidosis   • Fatty liver disease, nonalcoholic   • Diabetes education, encounter for   • Cellulitis of left foot   • Sinus bradycardia   • NSTEMI (non-ST elevated myocardial infarction)   • Tobacco abuse   • Hypoxia   • Peripheral vascular disease   • Gangrene   • Osteomyelitis of left foot   • Diabetic polyneuropathy associated with type 2 diabetes mellitus   • Anemia, blood loss   • Chronic pain   • Chronic, continuous use of opioids   • Chronic anxiety   • Chronically on benzodiazepine therapy               LDA Wound       17 0950          Incision 17 1427 Left foot     Incision - Properties Group Placement Date: 02/25/17  -TB Placement Time: 1427  -TB Side: Left  -TB Location: foot  -TB Additional Comments: 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  -MW    Incision WDL ex  -MC      Dressing Appearance dry;intact  -MC      Appearance open areas;pink;redness;staples intact   red/pink open area with exposed bone in base  -MC      Incision Length (cm) 9.8  -MC      Incision Width (cm) 3.2  -MC      Incision Depth (cm) 1.8   taken perpendicular to wound edge  -MC      Drainage Characteristics/Odor sanguineous;no odor  -MC      Drainage Amount small  -MC      Wound Cleaning cleansed with;other (see comments)   phase one  -MC      Wound Interventions other (see comments)   wound vac  -MC      Dressing other (see comments);gauze   wound vac, kerlix, spandage  -MC      Therapy Setting (Negative Pressure Wound Therapy) continuous therapy  -MC      Pressure Setting (Negative Pressure Wound Therapy) 125 mmHg  -MC      Dressing (Negative Pressure Wound Therapy) foam, black  -MC      Sponges Inserted (Negative Pressure Wound Therapy) 1   1 black  -MC      Sponges Removed (Negative Pressure Wound Therapy) 1   1 black, placed by surgeon in OR  -MC      Output (mL) 25  -MC      Incision 03/02/17 0800 Left distal foot horizontal    Incision - Properties Group Placement Date: 03/02/17  -MC Placement Time: 0800  -MC Side: Left  -MC Orientation: distal  -MC Location: foot  -MC Incision Type: horizontal  -MC Additional Comments: s/p transmetatarsal amputation toes 2-5  -MC    Incision WDL WDL  -MC      Dressing Appearance dry;intact  -MC      Appearance sutures intact;well approximated;scabbed  -MC      Incision Length (cm) 0   approximated  -MC      Incision Width (cm) 9.5  -MC      Drainage Characteristics/Odor serosanguineous;no odor  -MC      Drainage Amount scant  -MC      Wound Cleaning cleansed with;other (see comments)   phase one  -MC      Dressing gauze   covered with kerlix/spandage   -      Pressure Ulcer 02/24/17 1432 Right coccyx Stage II    Pressure Ulcer - Properties Group Date first assessed: 02/24/17  -MS Time first assessed: 1432  -MS Present On Admission (Pressure Ulcer): yes  -MS Side: Right  -MS Location: coccyx  -MS Stage: Stage II  -MS    Pressure Ulcer 02/24/17 1432 other (see comments) Stage II    Pressure Ulcer - Properties Group Date first assessed: 02/24/17  -MS Time first assessed: 1432  -MS Present On Admission (Pressure Ulcer): yes  -MS Location: other (see comments)  -MS, nose  Stage: Stage II  -MS Additional Comments: from home c-pap  -MS      User Key  (r) = Recorded By, (t) = Taken By, (c) = Cosigned By    Initials Name Provider Type     Christie Wheeler, PT Physical Therapist    TB Karen Atkins, RN Registered Nurse     Chetna Chao, PT Physical Therapist    MS David Raymond RN Registered Nurse         Finger sweep and visual inspection performed. Empty wound bed confirmed.  External label applied and verified.      WOUND DEBRIDEMENT                     Adult Rehabilitation Note       03/05/17 0950 03/04/17 1045 03/03/17 0800    Rehab Assessment/Intervention    Discipline physical therapist  - physical therapist  - physical therapist  -    Document Type therapy note (daily note)  - therapy note (daily note)  - therapy note (daily note)  -    Subjective Information no complaints;agree to therapy   reports vac has had blockage alarm all morning  - no complaints;agree to therapy  - agree to therapy;complains of;weakness;fatigue;pain  -MF    Recorded by [] Chetna Chao, PT [] Chetna Chao, PT [] Lane Yates, PT    Pain Assessment    Pain Assessment No/denies pain  - Blackmon-Baker FACES  - Blackmon-Baker FACES  -    Blackmon-Baker FACES Pain Rating  2  - 6  -MF    Pain Type   Chronic pain  -MF    Pain Location   Generalized  -    Pain Intervention(s)   Repositioned  -MF    Recorded by [] Chetna Chao, PT []  Chetna Chao, PT [] Lane Yates, PT    Cognitive Assessment/Intervention    Current Cognitive/Communication Assessment functional  -      Orientation Status oriented x 4  -      Follows Commands/Answers Questions 100% of the time;able to follow single-step instructions  -      Recorded by [MC] Chetna Chao PT      Positioning and Restraints    Pre-Treatment Position in bed  - in bed  - in bed  -    Post Treatment Position bed  - bed  - bed  -    In Bed supine;call light within reach;encouraged to call for assist;with family/caregiver;LLE elevated;SCD pump applied  - supine;call light within reach;encouraged to call for assist;with nsg  - supine;call light within reach;heels elevated  -    Recorded by [MC] Chetna Chao, PT [] Chetna Chao, PT [] Lane Yates, PT      User Key  (r) = Recorded By, (t) = Taken By, (c) = Cosigned By    Initials Name Effective Dates     Lane Yates, PT 06/19/15 -      Chetna Chao, PT 03/14/16 -                 IP PT Goals       03/05/17 0905 03/04/17 1045 03/02/17 1055    Transfer Training PT LTG    Transfer Training PT  LTG, Date Goal Reviewed   03/02/17  -SR    Gait Training PT LTG    Gait Training Goal PT LTG, Date Goal Reviewed   03/02/17  -SR    Patient Education PT LTG    Patient Education PT LTG, Date Goal Reviewed   03/02/17  -SR    Wound Care PT LTG    Wound Care PT LTG 1, Outcome goal ongoing  - goal ongoing  -       02/27/17 1423 02/27/17 0845 02/26/17 1332    Transfer Training PT LTG    Transfer Training PT LTG, Date Established 02/27/17  -MANDO      Transfer Training PT LTG, Time to Achieve 1 wk  -MANDO      Transfer Training PT LTG, Activity Type all transfers  -MANDO      Transfer Training PT LTG, [place] Level conditional independence  -MANDO      Transfer Training PT LTG, Assist Device walker, rolling  -MANDO      Gait Training PT LTG    Gait Training Goal PT LTG, Date Established 02/27/17  -MANDO       Gait Training Goal PT LTG, Time to Achieve 1 wk  -MANDO      Gait Training Goal PT LTG, [place] Level conditional independence  -MANDO      Gait Training Goal PT LTG, Assist Device walker, rolling  -MANDO      Gait Training Goal PT LTG, Distance to Achieve 25  -MANDO      Patient Education PT LTG    Patient Education PT LTG, Date Established 02/27/17  -MANDO      Patient Education PT LTG, Time to Achieve 1 wk  -MANDO      Patient Education PT LTG, Education Type HEP;positioning;posture/body mechanics;gait;transfers;bed mobility;pain management;progression of POC;benefits of activity;home safety;equipment management;skin care/inspection   weightbearing status  -MANDO      Patient Education PT LTG, Education Understanding demonstrate adequately;verbalize understanding  -MANDO      Wound Care PT LTG    Wound Care PT LTG 1, Date Established   02/26/17  -MW    Wound Care PT LTG 1, Time to Achieve   2 wks  -MW    Wound Care PT LTG 1, Location   LT open ray amputation site   -MW    Wound Care PT LTG 1, No S&S of Infection   yes  -MW    Wound Care PT LTG 1, Decrease Wound Size   25%  -MW    Wound Care PT LTG 1, Decrease Exudate   minimum  -MW    Wound Care PT LTG 1, No New Skin Break Down   yes  -MW    Wound Care PT LTG 1, Education   S&S of infection;dressing changes;wound care;weight bearing restriction;progression of POC  -MW    Wound Care PT LTG 1, Education Understanding   verbalize understanding  -    Wound Care PT LTG 1, Outcome  goal ongoing  -       User Key  (r) = Recorded By, (t) = Taken By, (c) = Cosigned By    Initials Name Provider Type    MANDO Esteban, PT Physical Therapist    SR Thu Herrera, PT Physical Therapist     Christie Wheeler, PT Physical Therapist    IVAN Chao, PT Physical Therapist          Physical Therapy Education     Title: PT OT SLP Therapies (Active)     Topic: Physical Therapy (Active)     Point: Mobility training (Active)    Learning Progress Summary    Learner Readiness  Method Response Comment Documented by Status   Patient Acceptance E,D NR  SR 03/02/17 1055 Active    Acceptance E,D VU,NR   02/27/17 1558 Done               Point: Home exercise program (Active)    Learning Progress Summary    Learner Readiness Method Response Comment Documented by Status   Patient Acceptance E,D NR  SR 03/02/17 1055 Active    Acceptance E,D VU,NR   02/27/17 1558 Done               Point: Body mechanics (Active)    Learning Progress Summary    Learner Readiness Method Response Comment Documented by Status   Patient Acceptance E,D NR   03/02/17 1055 Active    Acceptance E,D VU,NR   02/27/17 1558 Done               Point: Precautions (Active)    Learning Progress Summary    Learner Readiness Method Response Comment Documented by Status   Patient Acceptance E,D NR   03/02/17 1055 Active    Acceptance E,D VU,NR   02/27/17 1558 Done                      User Key     Initials Effective Dates Name Provider Type Discipline     06/19/15 -  Philomena Esteban, PT Physical Therapist PT     06/19/15 -  Thu Herrera, PT Physical Therapist PT                   PT ASSESSMENT (last 72 hours)      PT Evaluation       03/05/17 0950 03/05/17 0800    Rehab Evaluation    Document Type therapy note (daily note)  -     Subjective Information no complaints;agree to therapy   reports vac has had blockage alarm all morning  -     Pain Assessment    Pain Assessment No/denies pain  -     Cognitive Assessment/Intervention    Current Cognitive/Communication Assessment functional  -     Orientation Status oriented x 4  -     Follows Commands/Answers Questions 100% of the time;able to follow single-step instructions  -     Sensory Assessment/Intervention    LUE Light Touch  mild impairment  -AT    RUE Light Touch  WNL  -AT    LLE Light Touch  WNL  -AT    RLE Light Touch  absent sensation  -AT    Positioning and Restraints    Pre-Treatment Position in bed  -     Post Treatment Position bed   -     In Bed supine;call light within reach;encouraged to call for assist;with family/caregiver;LLE elevated;SCD pump applied  -       03/04/17 2000 03/04/17 1400    Sensory Assessment/Intervention    LUE Light Touch mild impairment  -CT     RUE Light Touch WNL  -CT WNL  -DH    LLE Light Touch WNL  -CT WNL  -DH    RLE Light Touch absent sensation  -CT absent sensation  -      03/04/17 1200 03/04/17 1045    Rehab Evaluation    Document Type  therapy note (daily note)  -    Subjective Information  no complaints;agree to therapy  -    Pain Assessment    Pain Assessment  Blackmon-Cabrera FACES  -    Blackmon-Cabrera FACES Pain Rating  2  -    Sensory Assessment/Intervention    RUE Light Touch WNL  -DH     LLE Light Touch WNL  -DH     RLE Light Touch absent sensation  -DH     Positioning and Restraints    Pre-Treatment Position  in bed  -    Post Treatment Position  bed  -    In Bed  supine;call light within reach;encouraged to call for assist;with nsg  -      03/04/17 1000 03/04/17 0800    Sensory Assessment/Intervention    LUE Light Touch  mild impairment  -DH    RUE Light Touch WNL  -DH WNL  -DH    LLE Light Touch WNL  -DH WNL  -DH    RLE Light Touch absent sensation  -DH absent sensation  -      03/03/17 0800 03/03/17 0030    Rehab Evaluation    Document Type therapy note (daily note)  -     Subjective Information agree to therapy;complains of;weakness;fatigue;pain  -     Pain Assessment    Pain Assessment Blackmon-Baker FACES  -     Blackmon-Baker FACES Pain Rating 6  -     Pain Type Chronic pain  -     Pain Location Generalized  -     Pain Intervention(s) Repositioned  -     Sensory Assessment/Intervention    LUE Light Touch WNL  -AT WNL  -MP    RUE Light Touch WNL  -AT WNL  -MP    LLE Light Touch absent sensation   baseline numbness  -AT absent sensation   baseline numbness  -MP    RLE Light Touch absent sensation   baseline numbness  -AT absent sensation   baseline numbness  -MP    Positioning and  Restraints    Pre-Treatment Position in bed  -MF     Post Treatment Position bed  -MF     In Bed supine;call light within reach;heels elevated  -       User Key  (r) = Recorded By, (t) = Taken By, (c) = Cosigned By    Initials Name Provider Type     Lane Yates, PT Physical Therapist    STANLEY Wade, RN Registered Nurse    CT Alaina Delaney RN Registered Nurse     Chetna Chao, PT Physical Therapist    NEEL Shanks RN Registered Nurse    [facility] CAMMY Hawk RN Registered Nurse            PT Recommendation and Plan  Anticipated Equipment Needs At Discharge: other (see comments) (wound VAC )  Anticipated Discharge Disposition: home with assist, home with home health (if plan further level of amputation, will recommend rehab)  Planned Therapy Interventions: wound care, patient/family education  PT Frequency: daily, per priority policy    Plan Of Care Reviewed With: patient, spouse   Progress: progress toward functional goals as expected   Outcome Summary/Follow up Plan: Open area to L foot remains moist with pink/red tissue and exposed bone in base of the wound. The new amputation incision is well-approximated at this time. Pt will continue to benefit from NPWT to manage exudate, promote granulation, and promote wound closure.            Time Calculation        PT Charges       03/05/17 0950          Time Calculation    Start Time 0950  -      PT Goal Re-Cert Due Date 03/08/17  -        User Key  (r) = Recorded By, (t) = Taken By, (c) = Cosigned By    Initials Name Provider Type     Chetna Chao, PT Physical Therapist             Therapy Charges for Today     Code Description Service Date Service Provider Modifiers Qty    89349680518  PT NEG PRESS WOUND TO 50SQCM DME1 3/4/2017 Chetna Chao, PT  1    [id number]  PT NEG PRESS WOUND TO 50SQCM DME1 3/5/2017 Chetna Chao, PT  1            PT G-Codes  Outcome Measure Options: AM-PAC 6 Clicks Basic Mobility  (PT)        Chetna Chao, PT  3/5/2017

## 2017-03-05 NOTE — PROGRESS NOTES
[facility]  INPATIENT PROGRESS NOTE    Date of Admission: 2/24/2017  Length of Stay: 9  Primary Care Physician: Harinder Aranda MD  Subjective   Chief Complaint: All over pain    HPI:  Wants her home reglan resumed.  Pain is about the same, no significant changes    Review Of Systems:   Review of Systems   Constitutional: Negative for fever.   Respiratory: Negative for wheezing.    Gastrointestinal: Negative for abdominal pain.   Musculoskeletal: Positive for arthralgias and myalgias.   All other systems reviewed and are negative.    Objective    Temp:  [98.2 °F (36.8 °C)-99 °F (37.2 °C)] 98.3 °F (36.8 °C)  Heart Rate:  [48-66] 48  Resp:  [18-20] 20  BP: (113-148)/(56-67) 124/56  Physical Exam  Gen-no acute distress, asleep but easily arouseable, resting in bed on speciality mattress, chronically ill appearing  Neck-trachea midline, no JVD or  CV-RRR, S1 S2 normal, 2/6 systolic murmur  Resp- mostly clear  Abd-obese, soft, NT, ND, +BS  Ext-no edema, LLE with surgical drsg/wound vac in place without drainage.  I reviewed pics from wound care in [facility]. RLE warm, dry,  Neuro-A&O to person, place, time and situation.  Speech is clear, follows all commands, no focal deficits  Psych- Pleasant and cooperative; normal affect  Exam is overall unchanged    Results Review:    I have reviewed the labs, radiology results and diagnostic studies.      Results from last 7 days  Lab Units 03/03/17  0416   WBC 10*3/mm3 13.13*   HEMOGLOBIN g/dL 9.7*   PLATELETS 10*3/mm3 247       Results from last 7 days  Lab Units 03/05/17  0803   SODIUM mmol/L 139   POTASSIUM mmol/L 3.9   TOTAL CO2 mmol/L 28.0   CREATININE mg/dL 0.90   GLUCOSE mg/dL 98     Culture Data:   BLOOD CULTURE   Date Value Ref Range Status   02/24/2017 No growth at 2 days  Preliminary   02/24/2017 No growth at 2 days  Preliminary     WOUND CULTURE   Date Value Ref Range Status   02/23/2017 Heavy growth (4+) Staphylococcus aureus  (A)  Final     I have reviewed the medications.    Assessment/Plan   Assessment/Problem List  Principal Problem:    Osteomyelitis of left foot  Active Problems:    COPD (chronic obstructive pulmonary disease)    Obstructive sleep apnea syndrome    Chronic back pain    Type 2 diabetes mellitus    Dyslipidemia    Fibromyalgia    Gastroesophageal reflux disease without esophagitis    Hypertension    Hypothyroidism    Peripheral vascular disease    Diabetic polyneuropathy associated with type 2 diabetes mellitus    Anemia, blood loss    Chronic pain    Chronic, continuous use of opioids    Chronic anxiety    Chronically on benzodiazepine therapy     Assessment/Problem List     This is a 61-year-old  female with past medical history significant for tobacco abuse, hypertension, hyperlipidemia, and diabetes type 2, who is status post transmetatarsal amputation of the left great toe due to osteomyelitis in November 2016, now presents with poor wound healing with growth of staph aureus from tissue cultures done on 2/24.      MSSA Osteomyelitis (presumed) of left 1st metatarsal  -- Infectious disease and cardiothoracic surgeon on consult  -- On Vanc and cefepime per Dr. Cruz  -- MRI showed evidence of osteomyelitis in the first metatarsal and the rest of the digits on the left foot, s/p removal of the rest of 1st metatarsal bone on 2/25 by Dr. Feliciano  -- now s/p  2nd - 5th transmetatarsal amputation on 3/2    DM2, Controlled  -- Hgb A1c is 6.1   -- continue currenet insulin regimen, acceptable control     Anemia of chronic disease  -- hemoglobin stable, monitor    Chronic back pain/Fibromyalgia on chronic opioids  -- continue home pain medication  -- continue home muscle relaxers     Anxiety on chronic benzos  - buspar and klonopin continued (klonopin with 50% decrease in home dose)    COPD  -- stable    HTN  -- monitor, currently stable on meds    Constipation  -- continue bowel regimen      Hypothyroidism  --  continue home dose of synthroid, TSH ok     HLP  -- continue home dose of Lipitor and zetia    DINA  -- CPAP w/ 2L oxygen while sleeping    Tobacco abuse  -- Patient counseled at length and cessation recommended      DVT prophylaxis: shell hose and scd to RLE  Dispo:  Plan will be to LTACH early/mid week hopefully.  Derek Jones MD   03/05/17   12:13 PM    Please note that portions of this note may have been completed with a voice recognition program. Efforts were made to edit the dictations, but occasionally words are mistranscribed.

## 2017-03-05 NOTE — CONSULTS
Pharmacokinetic Consult - Vancomycin Dosing  Tamara Langston is a 61 y.o. female on vancomycin/cefepime for osteomyelitis. Pharmacy was consulted to dose vancomycin    Relevant clinical data and objective history reviewed:  CREATININE   Date Value Ref Range Status   03/05/2017 0.90 0.60 - 1.30 mg/dL Final   03/03/2017 0.80 0.60 - 1.30 mg/dL Final     BUN   Date Value Ref Range Status   03/05/2017 17 9 - 23 mg/dL Final   03/03/2017 16 9 - 23 mg/dL Final     Estimated Creatinine Clearance: 74.4 mL/min (by C-G formula based on Cr of 0.9).  I/O last 3 completed shifts:  In: 680 [P.O.:480; IV Piggyback:200]  Out: 3775 [Urine:3700]    Temp Readings from Last 3 Encounters:   03/05/17 98.3 °F (36.8 °C) (Oral)   02/23/17 98.2 °F (36.8 °C)   02/16/17 98.5 °F (36.9 °C) (Temporal Artery )     Weight: 210 lb 4 oz (95.4 kg)         CULTURE   Date Value Ref Range Status   03/02/2017 No growth at 2 days  Preliminary     Lab Results   Component Value Date    VANCOTROUGH 5.60 (L) 03/05/2017        Assessment/Plan  WBC was elevated on 3/3.  Tissue cultures are NGTD. Renal function has been stable with a creatinine of 0.9. Estimated CrCl is 70-75 ml/min.  Urine output is adequate. With a goal trough trough of 15-20, vancomycin level is sub-therapeutic with a value of 5.60 ( collected at 0803). Pharmacy will load the patient again with vancomycin 20 mg/kg followed by vancomycin 1 gram IV Q12h as a maintenance regimen. Pharmacy will obtain a trough level on Tuesday to assess the need for dosage adjustments.  Pharmacy will continue to follow the patient’s culture results and clinical progress daily.    Thank you,  Katrin Simon, PharmD.  3/5/2017  10:56 AM

## 2017-03-06 ENCOUNTER — APPOINTMENT (OUTPATIENT)
Dept: CT IMAGING | Facility: HOSPITAL | Age: 62
End: 2017-03-06

## 2017-03-06 ENCOUNTER — APPOINTMENT (OUTPATIENT)
Dept: GENERAL RADIOLOGY | Facility: HOSPITAL | Age: 62
End: 2017-03-06

## 2017-03-06 LAB
ALBUMIN SERPL-MCNC: 3.5 G/DL (ref 3.2–4.8)
ALBUMIN/GLOB SERPL: 1 G/DL (ref 1.5–2.5)
ALP SERPL-CCNC: 106 U/L (ref 25–100)
ALT SERPL W P-5'-P-CCNC: 57 U/L (ref 7–40)
ANION GAP SERPL CALCULATED.3IONS-SCNC: -1 MMOL/L (ref 3–11)
AST SERPL-CCNC: 71 U/L (ref 0–33)
BASOPHILS # BLD AUTO: 0.05 10*3/MM3 (ref 0–0.2)
BASOPHILS NFR BLD AUTO: 0.6 % (ref 0–1)
BILIRUB SERPL-MCNC: 0.2 MG/DL (ref 0.3–1.2)
BNP SERPL-MCNC: 166 PG/ML (ref 0–100)
BUN BLD-MCNC: 15 MG/DL (ref 9–23)
BUN/CREAT SERPL: 16.7 (ref 7–25)
CALCIUM SPEC-SCNC: 9.8 MG/DL (ref 8.7–10.4)
CHLORIDE SERPL-SCNC: 112 MMOL/L (ref 99–109)
CO2 SERPL-SCNC: 30 MMOL/L (ref 20–31)
CREAT BLD-MCNC: 0.9 MG/DL (ref 0.6–1.3)
DEPRECATED RDW RBC AUTO: 53.5 FL (ref 37–54)
EOSINOPHIL # BLD AUTO: 0.35 10*3/MM3 (ref 0.1–0.3)
EOSINOPHIL NFR BLD AUTO: 4 % (ref 0–3)
ERYTHROCYTE [DISTWIDTH] IN BLOOD BY AUTOMATED COUNT: 16.4 % (ref 11.3–14.5)
GFR SERPL CREATININE-BSD FRML MDRD: 63 ML/MIN/1.73
GLOBULIN UR ELPH-MCNC: 3.5 GM/DL
GLUCOSE BLD-MCNC: 130 MG/DL (ref 70–100)
GLUCOSE BLDC GLUCOMTR-MCNC: 138 MG/DL (ref 70–130)
GLUCOSE BLDC GLUCOMTR-MCNC: 142 MG/DL (ref 70–130)
GLUCOSE BLDC GLUCOMTR-MCNC: 152 MG/DL (ref 70–130)
GLUCOSE BLDC GLUCOMTR-MCNC: 162 MG/DL (ref 70–130)
HCT VFR BLD AUTO: 30 % (ref 34.5–44)
HGB BLD-MCNC: 9.2 G/DL (ref 11.5–15.5)
IMM GRANULOCYTES # BLD: 0.02 10*3/MM3 (ref 0–0.03)
IMM GRANULOCYTES NFR BLD: 0.2 % (ref 0–0.6)
LYMPHOCYTES # BLD AUTO: 2.15 10*3/MM3 (ref 0.6–4.8)
LYMPHOCYTES NFR BLD AUTO: 24.7 % (ref 24–44)
MCH RBC QN AUTO: 27.5 PG (ref 27–31)
MCHC RBC AUTO-ENTMCNC: 30.7 G/DL (ref 32–36)
MCV RBC AUTO: 89.6 FL (ref 80–99)
MONOCYTES # BLD AUTO: 0.69 10*3/MM3 (ref 0–1)
MONOCYTES NFR BLD AUTO: 7.9 % (ref 0–12)
NEUTROPHILS # BLD AUTO: 5.45 10*3/MM3 (ref 1.5–8.3)
NEUTROPHILS NFR BLD AUTO: 62.6 % (ref 41–71)
PLATELET # BLD AUTO: 235 10*3/MM3 (ref 150–450)
PMV BLD AUTO: 10.9 FL (ref 6–12)
POTASSIUM BLD-SCNC: 3.9 MMOL/L (ref 3.5–5.5)
PROT SERPL-MCNC: 7 G/DL (ref 5.7–8.2)
RBC # BLD AUTO: 3.35 10*6/MM3 (ref 3.89–5.14)
SODIUM BLD-SCNC: 141 MMOL/L (ref 132–146)
TROPONIN I SERPL-MCNC: <0.006 NG/ML
WBC NRBC COR # BLD: 8.71 10*3/MM3 (ref 3.5–10.8)

## 2017-03-06 PROCEDURE — 82962 GLUCOSE BLOOD TEST: CPT

## 2017-03-06 PROCEDURE — 94760 N-INVAS EAR/PLS OXIMETRY 1: CPT

## 2017-03-06 PROCEDURE — 94799 UNLISTED PULMONARY SVC/PX: CPT

## 2017-03-06 PROCEDURE — 83880 ASSAY OF NATRIURETIC PEPTIDE: CPT | Performed by: INTERNAL MEDICINE

## 2017-03-06 PROCEDURE — 25010000002 CEFEPIME: Performed by: INTERNAL MEDICINE

## 2017-03-06 PROCEDURE — 25010000002 HEPARIN (PORCINE) PER 1000 UNITS: Performed by: THORACIC SURGERY (CARDIOTHORACIC VASCULAR SURGERY)

## 2017-03-06 PROCEDURE — 71010 HC CHEST PA OR AP: CPT

## 2017-03-06 PROCEDURE — 97168 OT RE-EVAL EST PLAN CARE: CPT

## 2017-03-06 PROCEDURE — 80053 COMPREHEN METABOLIC PANEL: CPT | Performed by: INTERNAL MEDICINE

## 2017-03-06 PROCEDURE — 25010000002 VANCOMYCIN PER 500 MG

## 2017-03-06 PROCEDURE — 84484 ASSAY OF TROPONIN QUANT: CPT | Performed by: INTERNAL MEDICINE

## 2017-03-06 PROCEDURE — 97530 THERAPEUTIC ACTIVITIES: CPT

## 2017-03-06 PROCEDURE — 97605 NEG PRS WND THER DME<=50SQCM: CPT

## 2017-03-06 PROCEDURE — 99233 SBSQ HOSP IP/OBS HIGH 50: CPT | Performed by: FAMILY MEDICINE

## 2017-03-06 PROCEDURE — 94640 AIRWAY INHALATION TREATMENT: CPT

## 2017-03-06 PROCEDURE — 99024 POSTOP FOLLOW-UP VISIT: CPT | Performed by: THORACIC SURGERY (CARDIOTHORACIC VASCULAR SURGERY)

## 2017-03-06 PROCEDURE — 85025 COMPLETE CBC W/AUTO DIFF WBC: CPT | Performed by: INTERNAL MEDICINE

## 2017-03-06 PROCEDURE — 71250 CT THORAX DX C-: CPT

## 2017-03-06 PROCEDURE — 63710000001 INSULIN DETEMIR PER 5 UNITS: Performed by: HOSPITALIST

## 2017-03-06 RX ADMIN — METRONIDAZOLE 500 MG: 500 INJECTION, SOLUTION INTRAVENOUS at 14:07

## 2017-03-06 RX ADMIN — FLUTICASONE PROPIONATE 1 SPRAY: 50 SPRAY, METERED NASAL at 16:50

## 2017-03-06 RX ADMIN — Medication 2 TABLET: at 21:46

## 2017-03-06 RX ADMIN — FUROSEMIDE 40 MG: 40 TABLET ORAL at 08:21

## 2017-03-06 RX ADMIN — BUSPIRONE HYDROCHLORIDE 20 MG: 10 TABLET ORAL at 08:21

## 2017-03-06 RX ADMIN — OXYCODONE HYDROCHLORIDE AND ACETAMINOPHEN 1 TABLET: 10; 325 TABLET ORAL at 10:12

## 2017-03-06 RX ADMIN — IPRATROPIUM BROMIDE AND ALBUTEROL SULFATE 3 ML: .5; 3 SOLUTION RESPIRATORY (INHALATION) at 16:16

## 2017-03-06 RX ADMIN — VANCOMYCIN HYDROCHLORIDE 1000 MG: 1 INJECTION, SOLUTION INTRAVENOUS at 00:18

## 2017-03-06 RX ADMIN — METOCLOPRAMIDE 5 MG: 5 TABLET ORAL at 08:21

## 2017-03-06 RX ADMIN — OXYCODONE HYDROCHLORIDE AND ACETAMINOPHEN 1 TABLET: 10; 325 TABLET ORAL at 21:47

## 2017-03-06 RX ADMIN — FLUTICASONE PROPIONATE 1 SPRAY: 50 SPRAY, METERED NASAL at 08:22

## 2017-03-06 RX ADMIN — INSULIN LISPRO 2 UNITS: 100 INJECTION, SOLUTION INTRAVENOUS; SUBCUTANEOUS at 08:19

## 2017-03-06 RX ADMIN — Medication 1 CAPSULE: at 08:21

## 2017-03-06 RX ADMIN — NICOTINE 1 PATCH: 21 PATCH, EXTENDED RELEASE TRANSDERMAL at 08:23

## 2017-03-06 RX ADMIN — LEVOTHYROXINE SODIUM 112 MCG: 112 TABLET ORAL at 05:57

## 2017-03-06 RX ADMIN — SALINE NASAL SPRAY 2 SPRAY: 1.5 SOLUTION NASAL at 21:54

## 2017-03-06 RX ADMIN — INSULIN DETEMIR 10 UNITS: 100 INJECTION, SOLUTION SUBCUTANEOUS at 08:23

## 2017-03-06 RX ADMIN — METOCLOPRAMIDE 5 MG: 5 TABLET ORAL at 11:53

## 2017-03-06 RX ADMIN — PREGABALIN 100 MG: 100 CAPSULE ORAL at 08:21

## 2017-03-06 RX ADMIN — INSULIN DETEMIR 10 UNITS: 100 INJECTION, SOLUTION SUBCUTANEOUS at 21:46

## 2017-03-06 RX ADMIN — ATORVASTATIN CALCIUM 40 MG: 40 TABLET, FILM COATED ORAL at 21:47

## 2017-03-06 RX ADMIN — HEPARIN SODIUM 5000 UNITS: 5000 INJECTION, SOLUTION INTRAVENOUS; SUBCUTANEOUS at 21:47

## 2017-03-06 RX ADMIN — OXYCODONE HYDROCHLORIDE AND ACETAMINOPHEN 1 TABLET: 10; 325 TABLET ORAL at 18:08

## 2017-03-06 RX ADMIN — OXYCODONE HYDROCHLORIDE AND ACETAMINOPHEN 1 TABLET: 10; 325 TABLET ORAL at 05:57

## 2017-03-06 RX ADMIN — METOCLOPRAMIDE 5 MG: 5 TABLET ORAL at 16:50

## 2017-03-06 RX ADMIN — PANTOPRAZOLE SODIUM 40 MG: 40 TABLET, DELAYED RELEASE ORAL at 05:57

## 2017-03-06 RX ADMIN — OXYCODONE HYDROCHLORIDE AND ACETAMINOPHEN 1 TABLET: 10; 325 TABLET ORAL at 14:11

## 2017-03-06 RX ADMIN — SALINE NASAL SPRAY 2 SPRAY: 1.5 SOLUTION NASAL at 11:54

## 2017-03-06 RX ADMIN — LISINOPRIL 20 MG: 20 TABLET ORAL at 08:20

## 2017-03-06 RX ADMIN — BISOPROLOL FUMARATE AND HYDROCHLOROTHIAZIDE 1 TABLET: 6.25; 5 TABLET ORAL at 21:47

## 2017-03-06 RX ADMIN — PREGABALIN 100 MG: 100 CAPSULE ORAL at 15:24

## 2017-03-06 RX ADMIN — CEFEPIME 2 G: 2 INJECTION, POWDER, FOR SOLUTION INTRAVENOUS at 15:24

## 2017-03-06 RX ADMIN — BACLOFEN 20 MG: 10 TABLET ORAL at 05:57

## 2017-03-06 RX ADMIN — HEPARIN SODIUM 5000 UNITS: 5000 INJECTION, SOLUTION INTRAVENOUS; SUBCUTANEOUS at 08:21

## 2017-03-06 RX ADMIN — FENTANYL 1 PATCH: 100 PATCH, EXTENDED RELEASE TRANSDERMAL at 08:22

## 2017-03-06 RX ADMIN — BUSPIRONE HYDROCHLORIDE 20 MG: 10 TABLET ORAL at 21:47

## 2017-03-06 RX ADMIN — IPRATROPIUM BROMIDE AND ALBUTEROL SULFATE 3 ML: .5; 3 SOLUTION RESPIRATORY (INHALATION) at 12:10

## 2017-03-06 RX ADMIN — CETIRIZINE HYDROCHLORIDE 10 MG: 10 TABLET, FILM COATED ORAL at 21:47

## 2017-03-06 RX ADMIN — BACLOFEN 20 MG: 10 TABLET ORAL at 14:07

## 2017-03-06 RX ADMIN — INSULIN LISPRO 2 UNITS: 100 INJECTION, SOLUTION INTRAVENOUS; SUBCUTANEOUS at 16:49

## 2017-03-06 RX ADMIN — IPRATROPIUM BROMIDE AND ALBUTEROL SULFATE 3 ML: .5; 3 SOLUTION RESPIRATORY (INHALATION) at 20:21

## 2017-03-06 RX ADMIN — ASPIRIN 325 MG: 325 TABLET, DELAYED RELEASE ORAL at 08:21

## 2017-03-06 RX ADMIN — METRONIDAZOLE 500 MG: 500 INJECTION, SOLUTION INTRAVENOUS at 21:46

## 2017-03-06 RX ADMIN — SALINE NASAL SPRAY 2 SPRAY: 1.5 SOLUTION NASAL at 00:18

## 2017-03-06 RX ADMIN — PREGABALIN 100 MG: 100 CAPSULE ORAL at 21:47

## 2017-03-06 RX ADMIN — CLONAZEPAM 0.25 MG: 0.5 TABLET ORAL at 08:21

## 2017-03-06 RX ADMIN — IPRATROPIUM BROMIDE AND ALBUTEROL SULFATE 3 ML: .5; 3 SOLUTION RESPIRATORY (INHALATION) at 07:52

## 2017-03-06 RX ADMIN — AMLODIPINE BESYLATE 5 MG: 5 TABLET ORAL at 08:21

## 2017-03-06 RX ADMIN — VANCOMYCIN HYDROCHLORIDE 1000 MG: 1 INJECTION, SOLUTION INTRAVENOUS at 11:53

## 2017-03-06 RX ADMIN — CEFEPIME 2 G: 2 INJECTION, POWDER, FOR SOLUTION INTRAVENOUS at 03:57

## 2017-03-06 RX ADMIN — SALINE NASAL SPRAY 2 SPRAY: 1.5 SOLUTION NASAL at 16:50

## 2017-03-06 NOTE — PLAN OF CARE
Problem: Patient Care Overview (Adult)  Goal: Plan of Care Review  Outcome: Ongoing (interventions implemented as appropriate)    03/06/17 0520   Coping/Psychosocial Response Interventions   Plan Of Care Reviewed With patient   Patient Care Overview   Progress improving   Outcome Evaluation   Outcome Summary/Follow up Plan pain controlled, vss,        Goal: Adult Individualization and Mutuality  Outcome: Ongoing (interventions implemented as appropriate)  Goal: Discharge Needs Assessment  Outcome: Ongoing (interventions implemented as appropriate)    Problem: Infection, Risk/Actual (Adult)  Goal: Infection Prevention/Resolution  Outcome: Ongoing (interventions implemented as appropriate)    Problem: Skin Integrity Impairment, Risk/Actual (Adult)  Goal: Skin Integrity/Wound Healing  Outcome: Ongoing (interventions implemented as appropriate)    Problem: Pressure Ulcer (Adult)  Goal: Signs and Symptoms of Listed Potential Problems Will be Absent or Manageable (Pressure Ulcer)  Outcome: Ongoing (interventions implemented as appropriate)    Problem: Fall Risk (Adult)  Goal: Identify Related Risk Factors and Signs and Symptoms  Outcome: Ongoing (interventions implemented as appropriate)  Goal: Absence of Falls  Outcome: Ongoing (interventions implemented as appropriate)

## 2017-03-06 NOTE — PROGRESS NOTES
Tamara Langston  1955  [id number]  3/6/2017    CC: No chief complaint on file.  foot infection  Reason for Consultation: Left foot infection     History of present illness:      This is a 61 y.o. female with a history of DM, DINA, COPD, PVD, who was treated in 11/23/16 by Dr. Cruz for left great toe osteomyelitis growing MSSA and GBS and ultimately required left great toe transmetatarsal amputation by Dr. Feliciano. She was discharged home on Vanc/Rocephin IV and oral Flagyl. She had been doing well postoperatively until approximately 1 week ago she started having a low grade fever of 99.0 and SOA. HH nurse noticed a new brownish drainage from her surgical wound on 2/22/17. Dr. Feliciano was contacted and she was seen in the office on 2/23/17. He was concerned with an osteo infection and pt followed up with Dr. Cruz/Dr. Feliciano in the office yesterday. It was decided that she should be admitted and be started on IV antibiotics as well as MRI of the left foot. Left foot culture taken yesterday is growing MSSA. She has been afebrile and labs revealed a WBC of 10.24 and Cr of 1.30. She was started on Vancomycin and Rocephin IV therapy and has received a dose of Zinacef.     MRI of the left foot was concerning for osteomyelitis of the 2nd-3rd-4th metatarsal bones and appearance of an abscess formation around previous transmetatarsal amputation of great toe. Surgical plans for today were for transmetatarsal resection of 2nd,3rd, 4th and 5th toe as well as completion of great toe amputation back to cuneiform bone. Pt ultimately underwent removal of the remainder of the 1st metatarsal bone of the left foot and a portion of the cuneiform bone. We have been asked to see for further evaluation and antibiotic recommendations. Pt was seen in the immediate post operative recovery room and is very drowsy. Seen and agree     2/26 - co foot infection  Hx limited  Co pain  No rash  ROS discussed with staff    2/27  Very complicated  situation as noted by Dr Feliciano  She underwent excision of the remaining portion of the proximal 1st MT by Dr Feliciano  The MRI suggests the possibility of extensive OM of the foot but there is concern that her foot would be destabilized if all the infected bone were to be resected She c/o discomfort at the site of the amputation  2/28 no new c/o   3/1/17  Plans for transmetatarsal amputation noted  In my presence Dr Feliciano quoted a 50/50 chance of limb salvage  3/3/17  Alert; operative findings discussed with Dr Feliciano  Pt w/o complaints  3/6/17  Alert, c/o mild sob despite breathing treatments  Not having a productive cough   I/O negative balance last 3 days       Past medical history:  Past Medical History   Diagnosis Date   • Bronchitis    • Cervical cancer    • Cholelithiasis 5/11/2016   • Chronic anxiety 2/27/2017   • Chronic bronchitis    • Chronically on benzodiazepine therapy 2/27/2017   • Degenerative arthritis    • Diabetes mellitus    • Dyslipidemia 5/11/2016   • Dyspnea    • Fatty liver disease, nonalcoholic 11/21/2016   • Fibromyalgia    • GERD (gastroesophageal reflux disease)    • H/O echocardiogram    • History of pneumonia    • Hypertension 5/11/2016     16. H/O echocardiogram (V15.89) (Z92.89)  · A.  Echocardiogram of 02/03/2015 reports an ejection fraction of 60-65%, mild concentric     LVH, trace mitral regurgitation, mild tricuspid and pulmonic regurgitation and calculated     RVSP of 35 mmHg, the main pulmonary artery is also mildly dilated.   • Hypothyroidism 5/11/2016     Description: A.  On replacement therapy.   • Nausea    • Obesity    • DINA (obstructive sleep apnea)      intolerant of CPAP therapy   • Osteoporosis    • Osteoporosis    • Polycythemia vera 5/11/2016   • Pulmonary emphysema    • Restrictive ventilatory defect    • Rhinitis    • Uncontrolled diabetes mellitus 5/11/2016   • Uterine cancer    • Vitamin D deficiency 8/1/2016       Medications:   Antibiotics:  IV Anti-Infectives      Ordered     Dose/Rate Route Frequency Start Stop    03/06/17 1155  metroNIDAZOLE (FLAGYL) IVPB 500 mg     Ordering Provider:  Oksana Cruz MD    500 mg  over 60 Minutes Intravenous Every 8 Hours 03/06/17 1230      03/05/17 1041  vancomycin (VANCOCIN) IVPB 1 g (premix) in Dextrose 5% 200 mL     Ordering Provider:  Ahmed Shammisaldeen, RPH    1,000 mg  over 60 Minutes Intravenous Every 12 Hours 03/06/17 0000      03/05/17 0923  vancomycin 2000 mg/500 mL 0.9% NS IVPB ([facility])     Ordering Provider:  Ahmed Shammisaldeen, RPH    20 mg/kg × 95.4 kg  over 120 Minutes Intravenous Once 03/05/17 1100 03/05/17 1242    03/01/17 1359  cefepime (MAXIPIME) 2 g/100 mL 0.9% NS ([facility])     Ordering Provider:  Oksana Cruz MD    2 g Intravenous Every 12 Hours 03/01/17 1500      02/26/17 1946  Pharmacy to dose vancomycin     Ordering Provider:  Arsalan Feliciano MD     Does not apply Continuous PRN 02/26/17 1945      02/24/17 1718  vancomycin 2000 mg/500 mL 0.9% NS IVPB ([facility])     Ordering Provider:  Luis Manuel Rodriguez IV, RPH    2,000 mg  over 120 Minutes Intravenous Once 02/24/17 1800 02/24/17 1932          Allergies:  is allergic to cortisone; oxycontin [oxycodone]; and tolmetin.    Family History: family history includes Alcohol abuse in her brother; Arthritis in her brother, father, mother, and other; Bleeding Disorder in her father; COPD in her sister; Colon polyps in her mother; Diabetes in her brother, father, mother, and other; Diverticulitis in her mother; Heart murmur in her daughter; Kidney disease in her father.    Social History:  reports that she has been smoking Cigarettes.  She has a 24.00 pack-year smoking history. She has never used smokeless tobacco. She reports that she does not drink alcohol or use illicit drugs.    Review of Systems: All other reviewed and negative except as per HPI - pt groggy this am    Blood pressure 177/70, pulse 64, temperature 98.2 °F (36.8 °C), temperature source Oral, resp. rate 18,  "height 66\" (167.6 cm), weight 210 lb 4 oz (95.4 kg), SpO2 92 %, not currently breastfeeding.  GENERAL: groggy early this am, in no acute distress.   HEENT: Oropharynx without thrush. . No cervical adenopathy. No neck masses  EYES: . No conjunctival injection. No icterus.   LYMPHATICS: No lymphadenopathy of the neck or axillary or inguinal regions.   HEART: No murmur, gallop, or pericardial friction rub.   LUNGS: wheezes on right,   crackles at bases  ABDOMEN: Soft, nontender, nondistended. No appreciable HSM.    SKIN: Warm and dry without cutaneous eruptions. .   PSYCHIATRIC: Mental status lucid. Cranial nerve function intact.   EXT: post-op bandage intact      DIAGNOSTICS:  Lab Results   Component Value Date    WBC 13.13 (H) 03/03/2017    HGB 9.7 (L) 03/03/2017    HCT 31.8 (L) 03/03/2017     03/03/2017     Lab Results   Component Value Date    CRP 57.50 (H) 11/27/2016     Lab Results   Component Value Date    SEDRATE 59 (H) 11/21/2016     Lab Results   Component Value Date    GLUCOSE 98 03/05/2017    BUN 17 03/05/2017    CREATININE 0.90 03/05/2017    EGFRIFNONA 63 03/05/2017    EGFRIFAFRI 77 12/13/2016    BCR 18.9 03/05/2017    CO2 28.0 03/05/2017    CALCIUM 10.2 03/05/2017    PROTENTOTREF 7.9 12/13/2016    ALBUMIN 4.00 03/01/2017    LABIL2 1.1 (L) 03/01/2017    AST 48 (H) 03/01/2017    ALT 24 03/01/2017       Microbiology  MSSA  And Pseudomonas  CXR personally reviewed- increased bibasilar markings    RADIOLOGY:  Imaging Results (last 72 hours)     Procedure Component Value Units Date/Time    MRI Foot Left Without Contrast [[id number]] Collected:  02/25/17 0935     Updated:  02/25/17 1226    Narrative:                         Assessment and Plan:        Impression:      -Osteomyelitis of left 1st MT s/p removal of the remainder of the 1st metatarsal bone of the left foot and a portion of the cuneiform bone 2/25/17. Culture with MSSA  ;   Psa from 1 of 3 cultures sent 2/25  TMA 3/2/17  --Dyspnea- doubt volume " overload after 3 days negative fluid balance  R/O pneumonia       -Recent MSSA/GBS left great toe osteomyelitis s/p left great toe transmetatarsal amputation 11/23/17  -PVD  --Ongoing tobacco abuse  -DM2  -DINA  -COPD      PLAN  - add flagyl for coverage 0f anaerobic pulmonary pathogens  -check cbc, cmp, bnp  -consider echo  - continue cefepime  -Continue  vancomycin ; consider stopping the vanc after all cultures are final    Anticipate  minimum 6 weeks rx and probably longer    - Reviewed the guarded prognosis for limb salvage with the pt and her   Even under the best circumstances ie tobacco cessation and compliance with NWB it is not guaranteed that we will be able to avoid BKA    - picc    Discussed with Dr Braden Cruz MD  3/6/2017

## 2017-03-06 NOTE — PROGRESS NOTES
Acute Care - Occupational Therapy Re-Evaluation  [facility]     Patient Name: Tamara Langston  : 1955  MRN: [id number]  Today's Date: 3/6/2017  Onset of Illness/Injury or Date of Surgery Date: 17  Date of Referral to OT: 17  Referring Physician: MD Karen    Admit Date: 2017       ICD-10-CM ICD-9-CM   1. Impaired mobility and ADLs Z74.09 799.89   2. Toe osteomyelitis, left M86.9 730.27   3. Impaired functional mobility, balance, gait, and endurance Z74.09 V49.89   4. Osteomyelitis of left foot, unspecified chronicity M86.9 730.27     Patient Active Problem List   Diagnosis   • Atopic rhinitis   • Restrictive ventilatory defect   • COPD (chronic obstructive pulmonary disease)   • Osteoporosis   • Obstructive sleep apnea syndrome   • Abnormal liver enzymes   • Cholelithiasis   • Chronic back pain   • Mixed anxiety depressive disorder   • Type 2 diabetes mellitus   • Dyslipidemia   • Essential tremor   • Fibromyalgia   • Gastroesophageal reflux disease without esophagitis   • Hypertension   • Hypothyroidism   • Insomnia   • Osteoarthritis   • Polycythemia vera   • Psoriasis   • Branch retinal vein occlusion   • Cobalamin deficiency   • Chronic bronchitis   • Obesity   • Pneumonia   • Tobacco use   • Vitamin D deficiency   • Foot ulcer, left   • Leukocytosis   • Hypokalemia   • Lactic acidosis   • Fatty liver disease, nonalcoholic   • Diabetes education, encounter for   • Cellulitis of left foot   • Sinus bradycardia   • NSTEMI (non-ST elevated myocardial infarction)   • Tobacco abuse   • Hypoxia   • Peripheral vascular disease   • Gangrene   • Osteomyelitis of left foot   • Diabetic polyneuropathy associated with type 2 diabetes mellitus   • Anemia, blood loss   • Chronic pain   • Chronic, continuous use of opioids   • Chronic anxiety   • Chronically on benzodiazepine therapy     Past Medical History   Diagnosis Date   • Bronchitis    • Cervical cancer    • Cholelithiasis 2016   •  Chronic anxiety 2/27/2017   • Chronic bronchitis    • Chronically on benzodiazepine therapy 2/27/2017   • Degenerative arthritis    • Diabetes mellitus    • Dyslipidemia 5/11/2016   • Dyspnea    • Fatty liver disease, nonalcoholic 11/21/2016   • Fibromyalgia    • GERD (gastroesophageal reflux disease)    • H/O echocardiogram    • History of pneumonia    • Hypertension 5/11/2016     16. H/O echocardiogram (V15.89) (Z92.89)  · A.  Echocardiogram of 02/03/2015 reports an ejection fraction of 60-65%, mild concentric     LVH, trace mitral regurgitation, mild tricuspid and pulmonic regurgitation and calculated     RVSP of 35 mmHg, the main pulmonary artery is also mildly dilated.   • Hypothyroidism 5/11/2016     Description: A.  On replacement therapy.   • Nausea    • Obesity    • DINA (obstructive sleep apnea)      intolerant of CPAP therapy   • Osteoporosis    • Osteoporosis    • Polycythemia vera 5/11/2016   • Pulmonary emphysema    • Restrictive ventilatory defect    • Rhinitis    • Uncontrolled diabetes mellitus 5/11/2016   • Uterine cancer    • Vitamin D deficiency 8/1/2016     Past Surgical History   Procedure Laterality Date   • Lumbar spine surgery       arthrodesis by anterior approach addit interspace   • Back surgery       lumbar fusions x5--multiple times; 1995, 1997, 1998, 1999 and 2008   • Hand surgery Bilateral      x3   • Hysterectomy       status post uterine and cervical cancer   • Tonsillectomy     • Amputation digit Left 11/23/2016     Procedure: AMPUTATION TRANS METATARSAL - ray amutation of the left great toe;  Surgeon: Arsalan Feliciano MD;  Location:  [facility] OR;  Service:    • Cardiac catheterization N/A 11/26/2016     Procedure: Left Heart Cath;  Surgeon: Ash Nuñez MD;  Location:  [facility] CATH INVASIVE LOCATION;  Service:    • Below knee amputation Left 2/25/2017     Procedure: EXTENDED LEFT TOE AMPUTATION;  Surgeon: Arsalan Feliciano MD;  Location:  [facility] OR;  Service:    • Amputation digit Left  3/2/2017     Procedure: LEFT FOOT TRANSMETATARSAL AMPUTATION TOES 2,3,4,5;  Surgeon: Joey Guaman MD;  Location: [facility];  Service:           OT ASSESSMENT FLOWSHEET (last 72 hours)      OT Evaluation       03/06/17 1322 03/06/17 0800 03/05/17 2000 03/05/17 0950 03/05/17 0800    Rehab Evaluation    Document Type re-evaluation  -CL therapy note (daily note)  -MF  therapy note (daily note)  -MC     Subjective Information agree to therapy;complains of;pain  -CL agree to therapy;complains of;weakness;pain  -MF  no complaints;agree to therapy   reports vac has had blockage alarm all morning  -MC     Patient Effort, Rehab Treatment good  -CL        Symptoms Noted During/After Treatment none  -CL        General Information    Patient Profile Review yes  -CL        Onset of Illness/Injury or Date of Surgery Date 02/24/17  -CL        Referring Physician MD Karen  -CL        Pertinent History Of Current Problem Please see IE for full history. Pt s/p L foot transmetatarsal amputation of toes 2, 3, 4, and 5 2/2 to osteomyelitis on 03/02/2017. Received resume OT orders on 03/05/2017.   -CL        Precautions/Limitations fall precautions;non-weight bearing status;oxygen therapy device and L/min   NWB LLE  -CL        Prior Level of Function --   Please see IE  -CL        Equipment Currently Used at Home --   Please see IE  -CL        Plans/Goals Discussed With patient;agreed upon  -CL        Risks Reviewed patient:;LOB;nausea/vomiting;dizziness;increased discomfort;lines disloged  -CL        Benefits Reviewed patient:;improve function;increase independence;increase strength;increase balance;decrease pain;increase knowledge  -CL        Barriers to Rehab medically complex  -CL        Living Environment    Lives With --   Please see IE  -CL        Clinical Impression    Date of Referral to OT 03/05/17  -CL        OT Diagnosis Decreased independence in ADLs.   -CL        Patient/Family Goals Statement Return home.   -CL         Criteria for Skilled Therapeutic Interventions Met yes;treatment indicated  -CL        Rehab Potential good, to achieve stated therapy goals  -CL        Therapy Frequency daily  -CL        Anticipated Equipment Needs At Discharge --   TBD  -CL        Anticipated Discharge Disposition inpatient rehabilitation facility  -CL        Vital Signs    Pre Systolic BP Rehab --   no tele, RN cleared for tx.   -CL        Pain Assessment    Pain Assessment 0-10  -CL Blackmon-Cabrera FACES  -  No/denies pain  -     Blackmon-Cabrera FACES Pain Rating  4  -MF       Pain Score 8  -CL        Post Pain Score 8  -CL        Pain Type Chronic pain  -CL        Pain Location Back  -CL        Pain Intervention(s) Repositioned;Ambulation/increased activity  -CL Repositioned  -       Response to Interventions Tolerated.   -CL        Vision Assessment/Intervention    Visual Impairment WFL  -CL        Cognitive Assessment/Intervention    Current Cognitive/Communication Assessment functional  -   functional  -     Orientation Status oriented x 4  -CL   oriented x 4  -     Follows Commands/Answers Questions 100% of the time;needs cueing;needs repetition  -   100% of the time;able to follow single-step instructions  -     Personal Safety mild impairment;decreased awareness, need for assist;decreased awareness, need for safety  -        Personal Safety Interventions fall prevention program maintained;gait belt;nonskid shoes/slippers when out of bed  -CL        ROM (Range of Motion)    General ROM Detail BUE grossly WFL. Pt s/p B CMC sx, limited thumb CMC extension/ABD at baseline.   -CL        MMT (Manual Muscle Testing)    General MMT Assessment Detail BUE grossly 4-/5.   -CL        Bed Mobility, Assessment/Treatment    Bed Mobility, Assistive Device bed rails;head of bed elevated  -CL        Bed Mob, Supine to Sit, [place] supervision required;verbal cues required  -CL        Bed Mob, Sit to Supine, [place] supervision  required;verbal cues required  -CL        Bed Mobility, Comment VCs for safety d/t lines.   -CL        Transfer Assessment/Treatment    Transfers, Bed-Chair [place] contact guard assist;2 person assist required;verbal cues required  -CL        Transfers, Bed-Chair-Bed, Assist Device rolling walker  -CL        Transfers, Sit-Stand [place] minimum assist (75% patient effort);verbal cues required;2 person assist required  -CL        Transfers, Stand-Sit [place] contact guard assist;2 person assist required;verbal cues required  -CL        Transfers, Sit-Stand-Sit, Assist Device rolling walker  -CL        Transfer, Comment Pt attempted to stand impulsively prior to cueing. VCs for HP/sequencing of steps. Pt performed stand-pivot t/f to chair from EOB.    VCs to maintain NWB status, pt performed appropriately.   -CL        Functional Mobility    Functional Mobility- Ind. Level unable to perform  -CL        ADL Assessment/Intervention    Additional Documentation Self-Feeding Assessment/Training (Group)  -CL        Self-Feeding Assessment/Training    Self-Feeding Assess/Train, Assist Device large handled utensils  -CL        Self-Feeding Assess/Train, Position supported sitting  -CL        Self-Feeding Assess/Train, [place] supervision required;verbal cues required  -CL        Self-Feeding Assess/Train, Comment Pt educated on use of AE to improve self-feeding.   -CL        Balance Skills Training    Sitting-Level of Assistance Close supervision  -CL        Sitting-Balance Support Right upper extremity supported;Left upper extremity supported;Feet supported  -CL        Sitting-Balance Activities Forward lean;Reaching for objects;Trunk control activities  -CL        Standing-Level of Assistance Contact guard;x2  -CL        Static Standing Balance Support assistive device  -CL        Standing-Balance Activities Weight Shift R-L  -CL        Therapy Exercises    Left Lower Extremity 10 reps;AROM:   straight  leg raise  -CL        Bilateral Upper Extremity 10 reps;AROM:;sitting;elbow flexion/extension;hand pumps;pronation/supination;shoulder extension/flexion;shoulder horizontal abd/add;shoulder protraction/retraction   yellow thera-band  -CL        Sensory Assessment/Intervention    Light Touch LUE;RUE  -CL        LUE Light Touch WNL  -CL  mild impairment  -CT  mild impairment  -AT    RUE Light Touch WNL  -CL  WNL  -CT  WNL  -AT    LLE Light Touch   WNL  -CT  WNL  -AT    RLE Light Touch  absent sensation  -CS absent sensation  -CT  absent sensation  -AT    General Therapy Interventions    Planned Therapy Interventions adaptive equipment training;ADL retraining;balance training;energy conservation;home exercise program;transfer training  -        Positioning and Restraints    Pre-Treatment Position in bed  -CL in bed  -  in bed  -     Post Treatment Position chair  -CL bed  -  bed  -     In Bed  supine;with nsg  -MF  supine;call light within reach;encouraged to call for assist;with family/caregiver;LLE elevated;SCD pump applied  -     In Chair notified nsg;reclined;call light within reach;encouraged to call for assist;waffle cushion;legs elevated;LLE elevated;R heel elevated  -CL          03/04/17 2000 03/04/17 1400 03/04/17 1200 03/04/17 1045 03/04/17 1000    Rehab Evaluation    Document Type    therapy note (daily note)  -     Subjective Information    no complaints;agree to therapy  -     Pain Assessment    Pain Assessment    BlackmonAdiliaCabrera FACES  -     Blackmon-Cabrera FACES Pain Rating    2  -     Sensory Assessment/Intervention    LUE Light Touch mild impairment  -CT        RUE Light Touch WNL  -CT WNL  -DH WNL  -DH  WNL  -DH    LLE Light Touch WNL  -CT WNL  -DH WNL  -DH  WNL  -DH    RLE Light Touch absent sensation  -CT absent sensation  -DH absent sensation  -DH  absent sensation  -DH    Positioning and Restraints    Pre-Treatment Position    in bed  -MC     Post Treatment Position    bed  -MC     In  Bed    supine;call light within reach;encouraged to call for assist;with nsg  -       03/04/17 0800                Sensory Assessment/Intervention    LUE Light Touch mild impairment  -DH        RUE Light Touch WNL  -DH        LLE Light Touch WNL  -DH        RLE Light Touch absent sensation  -DH          User Key  (r) = Recorded By, (t) = Taken By, (c) = Cosigned By    Initials Name Effective Dates    MF Lane Yates, PT 06/19/15 -     CT Alaina Delaney, RN 06/16/16 -     MC Chetna Chao, PT 03/14/16 -     DH Kya Shanks, KARLA 06/16/16 -     CS Perfecto Love, KARLA 06/16/16 -     AT Flor Hawk RN 06/16/16 -     CL Leticia Reed, OT 06/08/16 -            Occupational Therapy Education     Title: PT OT SLP Therapies (Active)     Topic: Occupational Therapy (Active)     Point: ADL training (Active)    Description: Instruct learner(s) on proper safety adaptation and remediation techniques during self care or transfers.   Instruct in proper use of assistive devices.    Learning Progress Summary    Learner Readiness Method Response Comment Documented by Status   Patient Acceptance E,D NR Pt educated on/reivewed appropriate safety precautions, BUE HEP, appropriate t/f techniques, NWB status, and benefits of therapy. CL 03/06/17 1421 Active    Acceptance E,D VU,NR cues for NWB LLE with dressing, activity to  do on own during day, theraband exer MANDO 03/01/17 0909 Done    Acceptance E,D VU,NR cont. review UE ROM, benefits activity, standing endurance and safety for LBD and toileting. MANDO 02/28/17 0908 Done    Acceptance E VU,NR Reasons to keep LLE NWB when pt. said she stands on it anyway.  Safe transfer, UE ROM exer. MANDO 02/27/17 1448 Done    Acceptance E NR Pt educated on NWB status and proper transfer techniques  02/26/17 1350 Active               Point: Home exercise program (Active)    Description: Instruct learner(s) on appropriate technique for monitoring, assisting and/or progressing therapeutic  exercises/activities.    Learning Progress Summary    Learner Readiness Method Response Comment Documented by Status   Patient Acceptance E,D NR Pt educated on/reivewed appropriate safety precautions, BUE HEP, appropriate t/f techniques, NWB status, and benefits of therapy.  03/06/17 1421 Active    Acceptance E,D VU,NR cues for NWB LLE with dressing, activity to  do on own during day, theraband exer MANDO 03/01/17 0909 Done    Acceptance E,D VU,NR cont. review UE ROM, benefits activity, standing endurance and safety for LBD and toileting. MANDO 02/28/17 0908 Done    Acceptance E VU,NR Reasons to keep LLE NWB when pt. said she stands on it anyway.  Safe transfer, UE ROM exer.  02/27/17 1448 Done               Point: Precautions (Active)    Description: Instruct learner(s) on prescribed precautions during self-care and functional transfers.    Learning Progress Summary    Learner Readiness Method Response Comment Documented by Status   Patient Acceptance E,D NR Pt educated on/reivewed appropriate safety precautions, BUE HEP, appropriate t/f techniques, NWB status, and benefits of therapy.  03/06/17 1421 Active    Acceptance E VU  CT 03/06/17 0520 Done    Acceptance E,D VU,NR cues for NWB LLE with dressing, activity to  do on own during day, theraband exer MANDO 03/01/17 0909 Done    Acceptance E,D VU,NR cont. review UE ROM, benefits activity, standing endurance and safety for LBD and toileting.  02/28/17 0908 Done    Acceptance E VU,NR Reasons to keep LLE NWB when pt. said she stands on it anyway.  Safe transfer, UE ROM exer.  02/27/17 1448 Done               Point: Body mechanics (Active)    Description: Instruct learner(s) on proper positioning and spine alignment during self-care, functional mobility activities and/or exercises.    Learning Progress Summary    Learner Readiness Method Response Comment Documented by Status   Patient Acceptance E,D NR Pt educated on/reivewed appropriate safety precautions, BUE HEP,  appropriate t/f techniques, NWB status, and benefits of therapy. CL 03/06/17 1421 Active                      User Key     Initials Effective Dates Name Provider Type Discipline    MANDO 06/22/15 -  Kacy Blevins, OT Occupational Therapist OT    JR 06/22/15 -  Amy Garza, OT Occupational Therapist OT    CT 06/16/16 -  Alaina Delaney, RN Registered Nurse Nurse    CL 06/08/16 -  Leticia Reed, OT Occupational Therapist OT                  OT Recommendation and Plan  Anticipated Equipment Needs At Discharge:  (TBD)  Anticipated Discharge Disposition: inpatient rehabilitation facility  Planned Therapy Interventions: adaptive equipment training, ADL retraining, balance training, energy conservation, home exercise program, transfer training  Therapy Frequency: daily  Plan of Care Review  Progress: progress toward functional goals as expected  Outcome Summary/Follow up Plan: OT Re-Eval complete. Pt presents w/ decreased functional independence from baseline. Pt performed STS t/f w/ Min Ax2 and stand pivot t/f to chair while maintaining NWB status. Recommend cont skilled IPOT intervention to increase safety/functional independence. Recommend pt DC to IP rehab.           OT Goals       03/06/17 1423 03/01/17 0910 02/28/17 0909    Transfer Training OT LTG    Transfer Training OT LTG, Date Established 03/06/17  -CL      Transfer Training OT LTG, Time to Achieve by discharge  -CL      Transfer Training OT LTG, Activity Type bed to chair /chair to bed;sit to stand/stand to sit;toilet  -CL      Transfer Training OT LTG, [place] Level contact guard assist  -CL      Transfer Training OT LTG, Additional Goal AAD  -CL      Transfer Training OT LTG, Outcome  goal ongoing  -MANDO goal ongoing   pt. refused to chair today  -MANDO    Strength OT LTG    Strength Goal OT LTG, Outcome  goal met   pt. progressed to yellow t-band  -MANDO goal ongoing   tolerated more resistance distally today  -MANDO    Dynamic Standing Balance OT LTG    Dynamic  Standing Balance OT LTG, Date Established 03/06/17  -CL      Dynamic Standing Balance OT LTG, Time to Achieve by discharge  -CL      Dynamic Standing Balance OT LTG, [place] Level contact guard assist  -CL      Dynamic Standing Balance OT LTG, Assist Device assistive Device  -CL      Dynamic Standing Balance OT LTG, Additional Goal Pt will tolerate standing 1 min for functional activity.   -CL      Patient Education OT LTG    Patient Education OT LTG, Date Established 03/06/17  -CL      Patient Education OT LTG, Time to Achieve by discharge  -CL      Patient Education OT LTG, Education Type written program;HEP;precautions per surgeon;positioning;posture/body mechanics;1 hand/daryn technique;home safety;adaptive equipment mgmt;energy conservation;adaptive breathing  -CL      Patient Education OT LTG, Education Understanding verbalizes understanding;demonstrates adequately  -CL      Patient Education OT LTG Outcome goal ongoing  -CL      LB Dressing OT LTG    LB Dressing Goal OT LTG, Date Established 03/06/17  -CL      LB Dressing Goal OT LTG, Time to Achieve by discharge  -CL      LB Dressing Goal OT LTG, Activity Type Don pants utilizing daryn-dressing technique  -CL      LB Dressing Goal OT LTG, [place] Level contact guard assist  -CL      LB Dressing Goal OT LTG, Additional Goal AAD  -CL      LB Dressing Goal OT LTG, Outcome  goal met   met for R side.  Unable to advance until wound vac off.  -MANDO goal ongoing   met socks only  -MANDO      02/27/17 1449 02/26/17 1351       Transfer Training OT LTG    Transfer Training OT LTG, Date Established  02/26/17  -JR     Transfer Training OT LTG, Time to Achieve  1 wk  -JR     Transfer Training OT LTG, Activity Type  bed to chair /chair to bed  -JR     Transfer Training OT LTG, [place] Level  contact guard assist  -JR     Transfer Training OT LTG, Assist Device  walker, rolling  -JR     Transfer Training OT LTG, Outcome goal ongoing   pt. to min of 2 with NWB LLE  today  -MANDO      Strength OT LTG    Strength Goal OT LTG, Date Established  02/26/17  -JR     Strength Goal OT LTG, Time to Achieve  1 wk  -JR     Strength Goal OT LTG, Functional Goal  Pt to increase B UE strength by 1/2 muscle grade to support transfers with NWB status.  -JR     Strength Goal OT LTG, Outcome goal ongoing   pt. began UE ROM exer today  -MANDO      LB Dressing OT LTG    LB Dressing Goal OT LTG, Date Established  02/26/17  -JR     LB Dressing Goal OT LTG, Time to Achieve  1 wk  -JR     LB Dressing Goal OT LTG, Activity Type  pants and socks on R LE.  -JR     LB Dressing Goal OT LTG, [place] Level  minimum assist (75% patient effort)  -JR     LB Dressing Goal OT LTG, Adaptive Equipment  other (see comments)   with appropriate AD  -JR     LB Dressing Goal OT LTG, Outcome goal partially met   met R sock  -MANDO        User Key  (r) = Recorded By, (t) = Taken By, (c) = Cosigned By    Initials Name Provider Type    MANDO Kacy Blevins, OT Occupational Therapist    JR Aym Garza, OT Occupational Therapist    CL Leticia Reed, OT Occupational Therapist                Outcome Measures       03/06/17 1322          How much help from another is currently needed...    Putting on and taking off regular lower body clothing? 2  -CL      Bathing (including washing, rinsing, and drying) 2  -CL      Toileting (which includes using toilet bed pan or urinal) 2  -CL      Putting on and taking off regular upper body clothing 3  -CL      Taking care of personal grooming (such as brushing teeth) 4  -CL      Eating meals 4  -CL      Score 17  -CL      Functional Assessment    Outcome Measure Options AM-PAC 6 Clicks Daily Activity (OT)  -CL        User Key  (r) = Recorded By, (t) = Taken By, (c) = Cosigned By    Initials Name Provider Type    JAMARCUS Reed, OT Occupational Therapist          Time Calculation:   OT Start Time: 1322    Therapy Charges for Today     Code Description Service Date Service Provider Modifiers Qty     79525963184 HC OT RE-EVAL 2 3/6/2017 Leticia Reed OT GO 1    56951361405 HC OT THER SUPP EA 15 MIN 3/6/2017 Leticia Reed OT GO 2    85856338870 HC OT THERAPEUTIC ACT EA 15 MIN 3/6/2017 Leticia Reed OT GO 3               Leticia Reed OT  3/6/2017

## 2017-03-06 NOTE — PLAN OF CARE
Problem: Patient Care Overview (Adult)  Goal: Plan of Care Review  Outcome: Ongoing (interventions implemented as appropriate)    03/06/17 0805   Coping/Psychosocial Response Interventions   Plan Of Care Reviewed With patient   Outcome Evaluation   Outcome Summary/Follow up Plan wound vac intact with no issues to this point.          Problem: Inpatient Physical Therapy  Goal: Wound Care Goal 1 LTG- PT  Outcome: Ongoing (interventions implemented as appropriate)    02/26/17 1332 03/05/17 0905   Wound Care PT LTG   Wound Care PT LTG 1, Date Established 02/26/17 --    Wound Care PT LTG 1, Time to Achieve 2 wks --    Wound Care PT LTG 1, Location LT open ray amputation site  --    Wound Care PT LTG 1, No S&S of Infection yes --    Wound Care PT LTG 1, Decrease Wound Size 25% --    Wound Care PT LTG 1, Decrease Exudate minimum --    Wound Care PT LTG 1, No New Skin Break Down yes --    Wound Care PT LTG 1, Education S&S of infection;dressing changes;wound care;weight bearing restriction;progression of POC --    Wound Care PT LTG 1, Education Understanding verbalize understanding --    Wound Care PT LTG 1, Outcome --  goal ongoing

## 2017-03-06 NOTE — PROGRESS NOTES
Hospitalist Daily Progress Note    Date of Admission: 2/24/2017   LOS: 10 days   PCP: Harinder Aranda MD    Chief Complaint:  F/u LLE osteomyelitis    Subjective   History of Present Illness  Earlier had SOA, wheezing, cough and nasal congestion, CXR done.  Pain under control.    Review of Systems  General: no fevers, chills  Cardiovascular: no chest pain, palpitations  Abdomen: No abdominal pain, nausea, vomiting, or diarrhea  Neurologic: No focal weakness, has gen. Weakness.  All other systems reviewed and negative.    Objective   Physical Exam:  I have reviewed the vital signs.  Temp:  [97.8 °F (36.6 °C)-98.6 °F (37 °C)] 98.2 °F (36.8 °C)  Heart Rate:  [58-64] 64  Resp:  [16-18] 18  BP: (131-177)/(58-71) 177/70    Objective  General Appearance:    Alert, cooperative, no distress  Head:    Normocephalic, atraumatic  Eyes:    PERRL EOMI, Sclerae anicteric  Neck:   Supple, no mass  Lungs: Clear to auscultation bilaterally, respirations unlabored  Heart: Regular rate and rhythm, S1 and S2 normal, 2/6 СЕРГЕЙ, rub or gallop  Abdomen:  Soft, non-tender, bowel sounds active, nondistended  Extremities: No clubbing, cyanosis, or edema to lower extremities  LLE with surgical drsg/wound vac in place without drainage  Pulses:  2+ and symmetric in distal lower extremities  Skin: No rashes, no jaundice  Neurologic: Oriented x3, CN2-12 intact, Normal strength to extremities    Results Review:    I have reviewed the labs, radiology results and diagnostic studies.      Results from last 7 days  Lab Units 03/03/17  0416   WBC 10*3/mm3 13.13*   HEMOGLOBIN g/dL 9.7*   PLATELETS 10*3/mm3 247       Results from last 7 days  Lab Units 03/05/17  0803   SODIUM mmol/L 139   POTASSIUM mmol/L 3.9   TOTAL CO2 mmol/L 28.0   CREATININE mg/dL 0.90   GLUCOSE mg/dL 98       I have reviewed the medications.    ---------------------------------------------------------------------------------------------  Assessment/Plan   Assessment &  Plan  Assessment/Problem List    Principal Problem:    Osteomyelitis of left foot  Active Problems:    COPD (chronic obstructive pulmonary disease)    Obstructive sleep apnea syndrome    Chronic back pain    Type 2 diabetes mellitus    Dyslipidemia    Fibromyalgia    Gastroesophageal reflux disease without esophagitis    Hypertension    Hypothyroidism    Peripheral vascular disease    Diabetic polyneuropathy associated with type 2 diabetes mellitus    Anemia, blood loss    Chronic pain    Chronic, continuous use of opioids    Chronic anxiety    Chronically on benzodiazepine therapy       Plan  This is a 61-year-old  female with PMHx significant for tobacco abuse, hypertension, hyperlipidemia, and diabetes type 2, who is status post transmetatarsal amputation of the left great toe due to osteomyelitis in November 2016, now presents with poor wound healing with growth of staph aureus from tissue cultures done on 2/24.     MSSA Osteomyelitis (presumed) of left 1st metatarsal  -- MRI showed evidence of osteomyelitis in the first metatarsal and the rest of the digits on the left foot, s/p removal of the rest of 1st metatarsal bone on 2/25 by Dr. Feliciano  -- now s/p 2nd - 5th transmetatarsal amputation on 3/2/17  --2/25 Tissue Cx grew Rare Staph aureus, resistant only to PCN.  --3/2 Tissue Cx grew rare Staph, coag.negative, Sensitivities pending.  -- Infectious disease and cardiothoracic surgeon on consult  -- On Vanc,cefepime and Flagyl IV.  Probiotic.  Will likely need 6 wks IV abx.    SOA  --CXR showed that compared to distant examinations of 11/28/2016, the central  chest is more congested. The bibasilar airspace opacities, especially consolidation at the left base are new and progressive.  --, Troponin negative.  --pt already on adequate coverage for PNA  --suspect atelectasis contributing, pulm toilet and incentive spirometry.  --on lasix daily already.    DM2, Controlled  -- Hgb A1c is 6.1   --  continue currenet insulin regimen, acceptable control     Anemia of chronic disease  -- hemoglobin stable, monitor     Chronic back pain/Fibromyalgia on chronic opioids  -- continue home pain medication  -- continue home muscle relaxers     Anxiety on chronic benzos  - buspar and klonopin continued (klonopin with 50% decrease in home dose)     COPD  -- stable     HTN  -- monitor, currently stable on meds     Constipation  -- continue bowel regimen     Hypothyroidism  -- continue home dose of synthroid, TSH ok     HLP  -- continue home dose of Lipitor and zetia     DINA  -- CPAP w/ 2L oxygen while sleeping     Tobacco abuse  -- Patient counseled at length and cessation recommended     DVT prophylaxis: shell hose and scd to RLE  Dispo: Plan will be to [facility] mid/end week hopefully.    Ankush Bain MD 03/06/17 11:14 AM

## 2017-03-06 NOTE — PROGRESS NOTES
Continued Stay Note  [facility]     Patient Name: Tamara Langston  MRN: [id number]  Today's Date: 3/6/2017    Admit Date: 2/24/2017          Discharge Plan       03/06/17 0957    Case Management/Social Work Plan    Plan Placement    Patient/Family In Agreement With Plan yes    Additional Comments [facility] cannot accept as they do not have any available beds. Have spoken with patient and gave [facility] the referral. Have given skilled facility list to patient.               Discharge Codes     None        Expected Discharge Date and Time     Expected Discharge Date Expected Discharge Time    Mar 8, 2017             Ese Whitlock RN

## 2017-03-06 NOTE — PLAN OF CARE
Problem: Patient Care Overview (Adult)  Goal: Plan of Care Review  Outcome: Ongoing (interventions implemented as appropriate)    03/06/17 4178   Coping/Psychosocial Response Interventions   Plan Of Care Reviewed With patient   Patient Care Overview   Progress no change   Outcome Evaluation   Outcome Summary/Follow up Plan OT worked with patient and she sat in chair. Had an xray done this morning. It showed consolidation on LLL. CT scan ordered per Dr. Feliciano. Left foot with wound vac. Pain controlled with prn medication. Coccyx ulcer healing up with therahoney and mepilex wound dressing changes.       Goal: Adult Individualization and Mutuality  Outcome: Ongoing (interventions implemented as appropriate)  Goal: Discharge Needs Assessment  Outcome: Ongoing (interventions implemented as appropriate)    Problem: Infection, Risk/Actual (Adult)  Goal: Infection Prevention/Resolution  Outcome: Ongoing (interventions implemented as appropriate)    Problem: Skin Integrity Impairment, Risk/Actual (Adult)  Goal: Skin Integrity/Wound Healing  Outcome: Ongoing (interventions implemented as appropriate)    Problem: Pressure Ulcer (Adult)  Goal: Signs and Symptoms of Listed Potential Problems Will be Absent or Manageable (Pressure Ulcer)  Outcome: Ongoing (interventions implemented as appropriate)    Problem: Fall Risk (Adult)  Goal: Identify Related Risk Factors and Signs and Symptoms  Outcome: Ongoing (interventions implemented as appropriate)  Goal: Absence of Falls  Outcome: Ongoing (interventions implemented as appropriate)

## 2017-03-06 NOTE — PROGRESS NOTES
Cardiothoracic Surgery Progress Note      POD #: 3-left transmetatarsal amputation of the second, third, fourth and fifth toes  Postop day 8-completion of amputation of the first metatarsal bone excision of surrounding infected granulation tissue and deep soft tissues   LOS: 10 days      Subjective: She is awake and alert today voicing no major complaints.  States she does have some chest tightness when breathing        Objective:  Vital Signs vital signs below are noted  Temp:  [97.8 °F (36.6 °C)-98.6 °F (37 °C)] 97.8 °F (36.6 °C)  Heart Rate:  [48-64] 59  Resp:  [16-20] 18  BP: (124-141)/(56-71) 141/71    Physical Exam:   General Appearance: Oriented ×3   Lungs: Clear bilaterally   Heart: The second right intercostal space  systolic murmur present   Skin:   Incision: Left great toe completion amputation and excision of wound soft tissues has wound VAC in place.  The surrounding edges are viable  The left foot transmetatarsal amputation incision has sutures intact and the skin margins are viable.  No erythema or cellulitis noted   Results:    Results from last 7 days  Lab Units 03/03/17  0416   WBC 10*3/mm3 13.13*   HEMOGLOBIN g/dL 9.7*   HEMATOCRIT % 31.8*   PLATELETS 10*3/mm3 247       Results from last 7 days  Lab Units 03/05/17  0803   SODIUM mmol/L 139   POTASSIUM mmol/L 3.9   CHLORIDE mmol/L 107   TOTAL CO2 mmol/L 28.0   BUN mg/dL 17   CREATININE mg/dL 0.90   GLUCOSE mg/dL 98   CALCIUM mg/dL 10.2         Assessment: #1.  Postop day 4.  Left foot transmetatarsal amputation of the second, third and fourth and fifth toes.  Incision is dry, intact and skin edges viable    #2.  Postop day 8.  Completion transmetatarsal amputation of left great toe and  excision of surrounding infected granulation tissue  Plan: We will continue wound VAC to the left great toe open wound.  We will continue dressing changes to the transmetatarsal foot incision  We will check portable chest x-ray today    Arsalan Feliciano MD - 03/06/17  - 6:34 AM

## 2017-03-06 NOTE — PLAN OF CARE
Problem: Patient Care Overview (Adult)  Goal: Plan of Care Review  Outcome: Ongoing (interventions implemented as appropriate)    03/06/17 1423   Patient Care Overview   Progress progress toward functional goals as expected   Outcome Evaluation   Outcome Summary/Follow up Plan OT Re-Eval complete. Pt presents w/ decreased functional independence. Pt performed STS t/f w/ Min Ax2 and stand pivot t/f while maintaining NWB status. Recommend cont skilled IPOT intervention to increase safety/functional independence. Recommend pt DC to IP rehab.          03/06/17 1423   Patient Care Overview   Progress progress toward functional goals as expected   Outcome Evaluation   Outcome Summary/Follow up Plan OT Re-Eval complete. Pt presents w/ decreased functional independence from baseline. Pt performed STS t/f w/ Min Ax2 and stand pivot t/f to chair while maintaining NWB status. Recommend cont skilled IPOT intervention to increase safety/functional independence. Recommend pt DC to IP rehab.          Problem: Inpatient Occupational Therapy  Goal: Transfer Training Goal 1 LTG- OT  Outcome: Ongoing (interventions implemented as appropriate)    03/06/17 1423   Transfer Training OT LTG   Transfer Training OT LTG, Date Established 03/06/17   Transfer Training OT LTG, Time to Achieve by discharge   Transfer Training OT LTG, Activity Type bed to chair /chair to bed;sit to stand/stand to sit;toilet   Transfer Training OT LTG, [place] Level contact guard assist   Transfer Training OT LTG, Additional Goal AAD       Goal: LB Dressing Goal LTG- OT  Outcome: Ongoing (interventions implemented as appropriate)    03/06/17 1423   LB Dressing OT LTG   LB Dressing Goal OT LTG, Date Established 03/06/17   LB Dressing Goal OT LTG, Time to Achieve by discharge   LB Dressing Goal OT LTG, Activity Type Don pants utilizing daryn-dressing technique   LB Dressing Goal OT LTG, [place] Level contact guard assist   LB Dressing Goal OT LTG, Additional  Goal AAD       Goal: Patient Education Goal LTG- OT  Outcome: Ongoing (interventions implemented as appropriate)    03/06/17 1423   Patient Education OT LTG   Patient Education OT LTG, Date Established 03/06/17   Patient Education OT LTG, Time to Achieve by discharge   Patient Education OT LTG, Education Type written program;HEP;precautions per surgeon;positioning;posture/body mechanics;1 hand/daryn technique;home safety;adaptive equipment mgmt;energy conservation;adaptive breathing   Patient Education OT LTG, Education Understanding verbalizes understanding;demonstrates adequately   Patient Education OT LTG Outcome goal ongoing       Goal: Dynamic Standing Balance Goal LTG-OT  Outcome: Ongoing (interventions implemented as appropriate)    03/06/17 1423   Dynamic Standing Balance OT LTG   Dynamic Standing Balance OT LTG, Date Established 03/06/17   Dynamic Standing Balance OT LTG, Time to Achieve by discharge   Dynamic Standing Balance OT LTG, [place] Level contact guard assist   Dynamic Standing Balance OT LTG, Assist Device assistive Device   Dynamic Standing Balance OT LTG, Additional Goal Pt will tolerate standing 1 min for functional activity.

## 2017-03-06 NOTE — PROGRESS NOTES
Acute Care - Wound/Debridement Treatment Note  [facility]     Patient Name: Tamara Langston  : 1955  MRN: [id number]  Today's Date: 3/6/2017  Onset of Illness/Injury or Date of Surgery Date: 17   Date of Referral to PT: 17   Referring Physician: MD Viki       Admit Date: 2017    Visit Dx:    ICD-10-CM ICD-9-CM   1. Impaired mobility and ADLs Z74.09 799.89   2. Toe osteomyelitis, left M86.9 730.27   3. Impaired functional mobility, balance, gait, and endurance Z74.09 V49.89   4. Osteomyelitis of left foot, unspecified chronicity M86.9 730.27       Patient Active Problem List   Diagnosis   • Atopic rhinitis   • Restrictive ventilatory defect   • COPD (chronic obstructive pulmonary disease)   • Osteoporosis   • Obstructive sleep apnea syndrome   • Abnormal liver enzymes   • Cholelithiasis   • Chronic back pain   • Mixed anxiety depressive disorder   • Type 2 diabetes mellitus   • Dyslipidemia   • Essential tremor   • Fibromyalgia   • Gastroesophageal reflux disease without esophagitis   • Hypertension   • Hypothyroidism   • Insomnia   • Osteoarthritis   • Polycythemia vera   • Psoriasis   • Branch retinal vein occlusion   • Cobalamin deficiency   • Chronic bronchitis   • Obesity   • Pneumonia   • Tobacco use   • Vitamin D deficiency   • Foot ulcer, left   • Leukocytosis   • Hypokalemia   • Lactic acidosis   • Fatty liver disease, nonalcoholic   • Diabetes education, encounter for   • Cellulitis of left foot   • Sinus bradycardia   • NSTEMI (non-ST elevated myocardial infarction)   • Tobacco abuse   • Hypoxia   • Peripheral vascular disease   • Gangrene   • Osteomyelitis of left foot   • Diabetic polyneuropathy associated with type 2 diabetes mellitus   • Anemia, blood loss   • Chronic pain   • Chronic, continuous use of opioids   • Chronic anxiety   • Chronically on benzodiazepine therapy               LDA Wound       17 0800 17 0950       Incision 17 1427 Left  foot    Incision - Properties Group Placement Date: 02/25/17  -TB Placement Time: 1427  -TB Side: Left  -TB Location: foot  -TB Additional Comments: 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  -MW    Incision WDL  ex  -MC     Dressing Appearance  dry;intact  -MC     Appearance  open areas;pink;redness;staples intact   red/pink open area with exposed bone in base  -MC     Incision Length (cm)  9.8  -MC     Incision Width (cm)  3.2  -MC     Incision Depth (cm)  1.8   taken perpendicular to wound edge  -MC     Drainage Characteristics/Odor sanguineous;serosanguineous  -MF sanguineous;no odor  -MC     Drainage Amount small  -MF small  -MC     Wound Cleaning  cleansed with;other (see comments)   phase one  -MC     Wound Interventions  other (see comments)   wound vac  -MC     Dressing  other (see comments);gauze   wound vac, kerlix, spandage  -MC     Therapy Setting (Negative Pressure Wound Therapy)  continuous therapy  -MC     Pressure Setting (Negative Pressure Wound Therapy) 125 mmHg  - mmHg  -MC     Dressing (Negative Pressure Wound Therapy)  foam, black  -MC     Sponges Inserted (Negative Pressure Wound Therapy)  1   1 black  -MC     Sponges Removed (Negative Pressure Wound Therapy)  1   1 black, placed by surgeon in OR  -MC     Output (mL) 75  -MF 25  -MC     Incision 03/02/17 0800 Left distal foot horizontal    Incision - Properties Group Placement Date: 03/02/17  -MC Placement Time: 0800  -MC Side: Left  -MC Orientation: distal  -MC Location: foot  -MC Incision Type: horizontal  -MC Additional Comments: s/p transmetatarsal amputation toes 2-5  -MC    Incision WDL  WDL  -MC     Dressing Appearance  dry;intact  -MC     Appearance  sutures intact;well approximated;scabbed  -MC     Incision Length (cm)  0   approximated  -MC     Incision Width (cm)  9.5  -MC     Drainage Characteristics/Odor  serosanguineous;no odor  -MC     Drainage Amount  scant  -MC     Wound Cleaning  cleansed with;other (see  comments)   phase one  -     Dressing  gauze   covered with kerlix/spandage  -     Pressure Ulcer 02/24/17 1432 Right coccyx Stage II    Pressure Ulcer - Properties Group Date first assessed: 02/24/17  -MS Time first assessed: 1432  -MS Present On Admission (Pressure Ulcer): yes  -MS Side: Right  -MS Location: coccyx  -MS Stage: Stage II  -MS    Pressure Ulcer 02/24/17 1432 other (see comments) Stage II    Pressure Ulcer - Properties Group Date first assessed: 02/24/17  -MS Time first assessed: 1432  -MS Present On Admission (Pressure Ulcer): yes  -MS Location: other (see comments)  -MS, nose  Stage: Stage II  -MS Additional Comments: from home c-pap  -MS      User Key  (r) = Recorded By, (t) = Taken By, (c) = Cosigned By    Initials Name Provider Type     Lane Yates, PT Physical Therapist    MW Christie Wheeler, PT Physical Therapist    TB Karen Atkins RN Registered Nurse     Chetna Chao, PT Physical Therapist    MS David Raymond RN Registered Nurse            WOUND DEBRIDEMENT                     Adult Rehabilitation Note       03/06/17 0800 03/05/17 0950 03/04/17 1045    Rehab Assessment/Intervention    Discipline physical therapist  - physical therapist  - physical therapist  -    Document Type therapy note (daily note)  - therapy note (daily note)  - therapy note (daily note)  -    Subjective Information agree to therapy;complains of;weakness;pain  - no complaints;agree to therapy   reports vac has had blockage alarm all morning  - no complaints;agree to therapy  -    Recorded by [] Lane Yates, PT [] Chetna Chao, PT [] Chetna Chao, PT    Pain Assessment    Pain Assessment Blackmon-Cabrera FACES  - No/denies pain  - Blackmon-Baker FACES  -    Blackmon-Cabrera FACES Pain Rating 4  -  2  -MC    Pain Intervention(s) Repositioned  -      Recorded by [] Lane Yates, PT [] Chetna Chao, PT [] Chetna Chao PT    Cognitive  Assessment/Intervention    Current Cognitive/Communication Assessment  functional  -     Orientation Status  oriented x 4  -     Follows Commands/Answers Questions  100% of the time;able to follow single-step instructions  -     Recorded by  [] Chetna Chao PT     Positioning and Restraints    Pre-Treatment Position in bed  - in bed  - in bed  -    Post Treatment Position bed  - bed  - bed  -    In Bed supine;with nsg  - supine;call light within reach;encouraged to call for assist;with family/caregiver;LLE elevated;SCD pump applied  - supine;call light within reach;encouraged to call for assist;with nsg  -    Recorded by [] Lane Yates, PT [] Chetna Chao, PT [] Chetna Chao, PT      User Key  (r) = Recorded By, (t) = Taken By, (c) = Cosigned By    Initials Name Effective Dates     Lane Yates, PT 06/19/15 -     MC Chetna Chao, PT 03/14/16 -                 IP PT Goals       03/05/17 0905 03/04/17 1045 03/02/17 1055    Transfer Training PT LTG    Transfer Training PT  LTG, Date Goal Reviewed   03/02/17  -SR    Gait Training PT LTG    Gait Training Goal PT LTG, Date Goal Reviewed   03/02/17  -SR    Patient Education PT LTG    Patient Education PT LTG, Date Goal Reviewed   03/02/17  -SR    Wound Care PT LTG    Wound Care PT LTG 1, Outcome goal ongoing  - goal ongoing  -       02/27/17 1423 02/27/17 0845 02/26/17 1332    Transfer Training PT LTG    Transfer Training PT LTG, Date Established 02/27/17  -MANDO      Transfer Training PT LTG, Time to Achieve 1 wk  -MANDO      Transfer Training PT LTG, Activity Type all transfers  -MANDO      Transfer Training PT LTG, [place] Level conditional independence  -MANDO      Transfer Training PT LTG, Assist Device walker, rolling  -MANDO      Gait Training PT LTG    Gait Training Goal PT LTG, Date Established 02/27/17  -MANDO      Gait Training Goal PT LTG, Time to Achieve 1 wk  -MANDO      Gait Training Goal PT LTG,  [place] Level conditional independence  -MANDO      Gait Training Goal PT LTG, Assist Device walker, rolling  -MANDO      Gait Training Goal PT LTG, Distance to Achieve 25  -MANDO      Patient Education PT LTG    Patient Education PT LTG, Date Established 02/27/17  -MANDO      Patient Education PT LTG, Time to Achieve 1 wk  -MANDO      Patient Education PT LTG, Education Type HEP;positioning;posture/body mechanics;gait;transfers;bed mobility;pain management;progression of POC;benefits of activity;home safety;equipment management;skin care/inspection   weightbearing status  -MANDO      Patient Education PT LTG, Education Understanding demonstrate adequately;verbalize understanding  -MANDO      Wound Care PT LTG    Wound Care PT LTG 1, Date Established   02/26/17  -MW    Wound Care PT LTG 1, Time to Achieve   2 wks  -MW    Wound Care PT LTG 1, Location   LT open ray amputation site   -MW    Wound Care PT LTG 1, No S&S of Infection   yes  -MW    Wound Care PT LTG 1, Decrease Wound Size   25%  -MW    Wound Care PT LTG 1, Decrease Exudate   minimum  -MW    Wound Care PT LTG 1, No New Skin Break Down   yes  -MW    Wound Care PT LTG 1, Education   S&S of infection;dressing changes;wound care;weight bearing restriction;progression of POC  -MW    Wound Care PT LTG 1, Education Understanding   verbalize understanding  -MW    Wound Care PT LTG 1, Outcome  goal ongoing  -       User Key  (r) = Recorded By, (t) = Taken By, (c) = Cosigned By    Initials Name Provider Type    MANDO Esteban, PT Physical Therapist    SR Thu Herrera, PT Physical Therapist     Christie Wheeler, PT Physical Therapist     Chetna Chao, PT Physical Therapist          Physical Therapy Education     Title: PT OT SLP Therapies (Active)     Topic: Physical Therapy (Active)     Point: Mobility training (Active)    Learning Progress Summary    Learner Readiness Method Response Comment Documented by Status   Patient Acceptance E,D NR  SR 03/02/17  1055 Active    Acceptance E,D VU,NR   02/27/17 1558 Done               Point: Home exercise program (Active)    Learning Progress Summary    Learner Readiness Method Response Comment Documented by Status   Patient Acceptance E,D NR  SR 03/02/17 1055 Active    Acceptance E,D VU,NR   02/27/17 1558 Done               Point: Body mechanics (Active)    Learning Progress Summary    Learner Readiness Method Response Comment Documented by Status   Patient Acceptance E,D NR  SR 03/02/17 1055 Active    Acceptance E,D VU,NR   02/27/17 1558 Done               Point: Precautions (Active)    Learning Progress Summary    Learner Readiness Method Response Comment Documented by Status   Patient Acceptance E,D NR  SR 03/02/17 1055 Active    Acceptance E,D VU,NR   02/27/17 1558 Done                      User Key     Initials Effective Dates Name Provider Type Discipline     06/19/15 -  Philomena Esteban, PT Physical Therapist PT     06/19/15 -  Thu Herrera, PT Physical Therapist PT                   PT ASSESSMENT (last 72 hours)      PT Evaluation       03/06/17 0800 03/05/17 2000    Rehab Evaluation    Document Type therapy note (daily note)  -     Subjective Information agree to therapy;complains of;weakness;pain  -     Pain Assessment    Pain Assessment Blackmon-Baker FACES  -     Blackmon-Baker FACES Pain Rating 4  -     Pain Intervention(s) Repositioned  -     Sensory Assessment/Intervention    LUE Light Touch  mild impairment  -CT    RUE Light Touch  WNL  -CT    LLE Light Touch  WNL  -CT    RLE Light Touch absent sensation  -CS absent sensation  -CT    Positioning and Restraints    Pre-Treatment Position in bed  -     Post Treatment Position bed  -     In Bed supine;with nsg  -MF       03/05/17 0950 03/05/17 0800    Rehab Evaluation    Document Type therapy note (daily note)  -     Subjective Information no complaints;agree to therapy   reports vac has had blockage alarm all morning  -     Pain  Assessment    Pain Assessment No/denies pain  -     Cognitive Assessment/Intervention    Current Cognitive/Communication Assessment functional  -     Orientation Status oriented x 4  -     Follows Commands/Answers Questions 100% of the time;able to follow single-step instructions  -     Sensory Assessment/Intervention    LUE Light Touch  mild impairment  -AT    RUE Light Touch  WNL  -AT    LLE Light Touch  WNL  -AT    RLE Light Touch  absent sensation  -AT    Positioning and Restraints    Pre-Treatment Position in bed  -     Post Treatment Position bed  -     In Bed supine;call light within reach;encouraged to call for assist;with family/caregiver;LLE elevated;SCD pump applied  -       03/04/17 2000 03/04/17 1400    Sensory Assessment/Intervention    LUE Light Touch mild impairment  -CT     RUE Light Touch WNL  -CT WNL  -DH    LLE Light Touch WNL  -CT WNL  -DH    RLE Light Touch absent sensation  -CT absent sensation  -DH      03/04/17 1200 03/04/17 1045    Rehab Evaluation    Document Type  therapy note (daily note)  -    Subjective Information  no complaints;agree to therapy  -    Pain Assessment    Pain Assessment  Blackmon-Cabrera FACES  -    Blackmon-Cabrera FACES Pain Rating  2  -    Sensory Assessment/Intervention    RUE Light Touch WNL  -DH     LLE Light Touch WNL  -DH     RLE Light Touch absent sensation  -DH     Positioning and Restraints    Pre-Treatment Position  in bed  -    Post Treatment Position  bed  -    In Bed  supine;call light within reach;encouraged to call for assist;with nsg  -      03/04/17 1000 03/04/17 0800    Sensory Assessment/Intervention    LUE Light Touch  mild impairment  -DH    RUE Light Touch WNL  -DH WNL  -DH    LLE Light Touch WNL  -DH WNL  -DH    RLE Light Touch absent sensation  -DH absent sensation  -DH      User Key  (r) = Recorded By, (t) = Taken By, (c) = Cosigned By    Initials Name Provider Type    ANN-MARIE Yates, PT Physical Therapist    JACINDA Foley  Rhett, RN Registered Nurse    IVAN Chao, PT Physical Therapist     Kya Shanks, RN Registered Nurse    TRELL Love, RN Registered Nurse    [facility] Kenn RN Registered Nurse            PT Recommendation and Plan  Anticipated Equipment Needs At Discharge: other (see comments) (wound VAC )  Anticipated Discharge Disposition: home with assist, home with home health (if plan further level of amputation, will recommend rehab)  Planned Therapy Interventions: wound care, patient/family education  PT Frequency: daily, per priority policy    Plan Of Care Reviewed With: patient       Outcome Summary/Follow up Plan: wound vac intact with no issues to this point.             Time Calculation        PT Charges       03/06/17 0800          Time Calculation    Start Time 0800  -      PT Goal Re-Cert Due Date 03/08/17  -      Time Calculation- PT    Total Timed Code Minutes- PT 10 minute(s)  -        User Key  (r) = Recorded By, (t) = Taken By, (c) = Cosigned By    Initials Name Provider Type     Lane Yates, PT Physical Therapist             Therapy Charges for Today     Code Description Service Date Service Provider Modifiers Qty    71173863121 HC PT NEG PRESS WOUND TO 50SQCM DME1 3/6/2017 Lane Yates, PT  1    78744264540 HC PT THER SUPP EA 15 MIN 3/6/2017 Lane Yates, PT GP 1            PT G-Codes  Outcome Measure Options: AM-PAC 6 Clicks Basic Mobility (PT)        Lane Yates PT  3/6/2017

## 2017-03-07 LAB
ANION GAP SERPL CALCULATED.3IONS-SCNC: 6 MMOL/L (ref 3–11)
BACTERIA SPEC AEROBE CULT: NORMAL
BUN BLD-MCNC: 19 MG/DL (ref 9–23)
BUN/CREAT SERPL: 19 (ref 7–25)
CALCIUM SPEC-SCNC: 10 MG/DL (ref 8.7–10.4)
CHLORIDE SERPL-SCNC: 107 MMOL/L (ref 99–109)
CO2 SERPL-SCNC: 28 MMOL/L (ref 20–31)
CREAT BLD-MCNC: 1 MG/DL (ref 0.6–1.3)
GFR SERPL CREATININE-BSD FRML MDRD: 56 ML/MIN/1.73
GLUCOSE BLD-MCNC: 143 MG/DL (ref 70–100)
GLUCOSE BLDC GLUCOMTR-MCNC: 127 MG/DL (ref 70–130)
GLUCOSE BLDC GLUCOMTR-MCNC: 135 MG/DL (ref 70–130)
GLUCOSE BLDC GLUCOMTR-MCNC: 169 MG/DL (ref 70–130)
GLUCOSE BLDC GLUCOMTR-MCNC: 189 MG/DL (ref 70–130)
GRAM STN SPEC: NORMAL
GRAM STN SPEC: NORMAL
POTASSIUM BLD-SCNC: 3.8 MMOL/L (ref 3.5–5.5)
SODIUM BLD-SCNC: 141 MMOL/L (ref 132–146)
VANCOMYCIN TROUGH SERPL-MCNC: 27 MCG/ML (ref 10–20)

## 2017-03-07 PROCEDURE — 97164 PT RE-EVAL EST PLAN CARE: CPT

## 2017-03-07 PROCEDURE — 63710000001 PROMETHAZINE PER 25 MG: Performed by: NURSE PRACTITIONER

## 2017-03-07 PROCEDURE — 94640 AIRWAY INHALATION TREATMENT: CPT

## 2017-03-07 PROCEDURE — 80202 ASSAY OF VANCOMYCIN: CPT

## 2017-03-07 PROCEDURE — 97116 GAIT TRAINING THERAPY: CPT

## 2017-03-07 PROCEDURE — 97530 THERAPEUTIC ACTIVITIES: CPT

## 2017-03-07 PROCEDURE — 97605 NEG PRS WND THER DME<=50SQCM: CPT

## 2017-03-07 PROCEDURE — 99233 SBSQ HOSP IP/OBS HIGH 50: CPT | Performed by: FAMILY MEDICINE

## 2017-03-07 PROCEDURE — 82962 GLUCOSE BLOOD TEST: CPT

## 2017-03-07 PROCEDURE — 94799 UNLISTED PULMONARY SVC/PX: CPT

## 2017-03-07 PROCEDURE — 63710000001 INSULIN DETEMIR PER 5 UNITS: Performed by: HOSPITALIST

## 2017-03-07 PROCEDURE — 97110 THERAPEUTIC EXERCISES: CPT

## 2017-03-07 PROCEDURE — 25010000002 AZITHROMYCIN: Performed by: INTERNAL MEDICINE

## 2017-03-07 PROCEDURE — 25010000002 CEFEPIME: Performed by: INTERNAL MEDICINE

## 2017-03-07 PROCEDURE — 99233 SBSQ HOSP IP/OBS HIGH 50: CPT | Performed by: THORACIC SURGERY (CARDIOTHORACIC VASCULAR SURGERY)

## 2017-03-07 PROCEDURE — 87449 NOS EACH ORGANISM AG IA: CPT | Performed by: INTERNAL MEDICINE

## 2017-03-07 PROCEDURE — 80048 BASIC METABOLIC PNL TOTAL CA: CPT | Performed by: FAMILY MEDICINE

## 2017-03-07 PROCEDURE — 25010000002 HEPARIN (PORCINE) PER 1000 UNITS: Performed by: THORACIC SURGERY (CARDIOTHORACIC VASCULAR SURGERY)

## 2017-03-07 PROCEDURE — 25010000002 VANCOMYCIN HCL IN NACL 1.5-0.9 GM/500ML-% SOLUTION: Performed by: THORACIC SURGERY (CARDIOTHORACIC VASCULAR SURGERY)

## 2017-03-07 PROCEDURE — 25010000002 VANCOMYCIN PER 500 MG

## 2017-03-07 RX ORDER — VANCOMYCIN/0.9 % SOD CHLORIDE 1.5G/250ML
1500 PLASTIC BAG, INJECTION (ML) INTRAVENOUS EVERY 24 HOURS
Status: DISCONTINUED | OUTPATIENT
Start: 2017-03-07 | End: 2017-03-09

## 2017-03-07 RX ADMIN — OXYCODONE HYDROCHLORIDE AND ACETAMINOPHEN 1 TABLET: 10; 325 TABLET ORAL at 06:03

## 2017-03-07 RX ADMIN — METOCLOPRAMIDE 5 MG: 5 TABLET ORAL at 11:25

## 2017-03-07 RX ADMIN — Medication 1 CAPSULE: at 08:43

## 2017-03-07 RX ADMIN — IPRATROPIUM BROMIDE AND ALBUTEROL SULFATE 3 ML: .5; 3 SOLUTION RESPIRATORY (INHALATION) at 21:29

## 2017-03-07 RX ADMIN — CEFEPIME 2 G: 2 INJECTION, POWDER, FOR SOLUTION INTRAVENOUS at 15:51

## 2017-03-07 RX ADMIN — FUROSEMIDE 40 MG: 40 TABLET ORAL at 08:42

## 2017-03-07 RX ADMIN — OXYCODONE HYDROCHLORIDE AND ACETAMINOPHEN 1 TABLET: 10; 325 TABLET ORAL at 15:53

## 2017-03-07 RX ADMIN — PANTOPRAZOLE SODIUM 40 MG: 40 TABLET, DELAYED RELEASE ORAL at 06:03

## 2017-03-07 RX ADMIN — SALINE NASAL SPRAY 2 SPRAY: 1.5 SOLUTION NASAL at 17:31

## 2017-03-07 RX ADMIN — BISOPROLOL FUMARATE AND HYDROCHLOROTHIAZIDE 1 TABLET: 6.25; 5 TABLET ORAL at 21:39

## 2017-03-07 RX ADMIN — IPRATROPIUM BROMIDE AND ALBUTEROL SULFATE 3 ML: .5; 3 SOLUTION RESPIRATORY (INHALATION) at 07:54

## 2017-03-07 RX ADMIN — Medication 5 MG: at 22:05

## 2017-03-07 RX ADMIN — LEVOTHYROXINE SODIUM 112 MCG: 112 TABLET ORAL at 06:03

## 2017-03-07 RX ADMIN — PREGABALIN 100 MG: 100 CAPSULE ORAL at 15:51

## 2017-03-07 RX ADMIN — BUSPIRONE HYDROCHLORIDE 20 MG: 10 TABLET ORAL at 08:42

## 2017-03-07 RX ADMIN — CETIRIZINE HYDROCHLORIDE 10 MG: 10 TABLET, FILM COATED ORAL at 21:39

## 2017-03-07 RX ADMIN — PREGABALIN 100 MG: 100 CAPSULE ORAL at 21:39

## 2017-03-07 RX ADMIN — LISINOPRIL 20 MG: 20 TABLET ORAL at 08:42

## 2017-03-07 RX ADMIN — HEPARIN SODIUM 5000 UNITS: 5000 INJECTION, SOLUTION INTRAVENOUS; SUBCUTANEOUS at 08:41

## 2017-03-07 RX ADMIN — INSULIN DETEMIR 10 UNITS: 100 INJECTION, SOLUTION SUBCUTANEOUS at 08:52

## 2017-03-07 RX ADMIN — VANCOMYCIN HYDROCHLORIDE 1000 MG: 1 INJECTION, SOLUTION INTRAVENOUS at 00:06

## 2017-03-07 RX ADMIN — METOCLOPRAMIDE 5 MG: 5 TABLET ORAL at 08:43

## 2017-03-07 RX ADMIN — IPRATROPIUM BROMIDE AND ALBUTEROL SULFATE 3 ML: .5; 3 SOLUTION RESPIRATORY (INHALATION) at 13:13

## 2017-03-07 RX ADMIN — IPRATROPIUM BROMIDE AND ALBUTEROL SULFATE 3 ML: .5; 3 SOLUTION RESPIRATORY (INHALATION) at 16:25

## 2017-03-07 RX ADMIN — BACLOFEN 20 MG: 10 TABLET ORAL at 13:21

## 2017-03-07 RX ADMIN — AMLODIPINE BESYLATE 5 MG: 5 TABLET ORAL at 08:43

## 2017-03-07 RX ADMIN — METRONIDAZOLE 500 MG: 500 INJECTION, SOLUTION INTRAVENOUS at 06:03

## 2017-03-07 RX ADMIN — ASPIRIN 325 MG: 325 TABLET, DELAYED RELEASE ORAL at 08:43

## 2017-03-07 RX ADMIN — METOCLOPRAMIDE 5 MG: 5 TABLET ORAL at 16:57

## 2017-03-07 RX ADMIN — OXYCODONE HYDROCHLORIDE AND ACETAMINOPHEN 1 TABLET: 10; 325 TABLET ORAL at 11:25

## 2017-03-07 RX ADMIN — PREGABALIN 100 MG: 100 CAPSULE ORAL at 08:43

## 2017-03-07 RX ADMIN — BACLOFEN 20 MG: 10 TABLET ORAL at 06:03

## 2017-03-07 RX ADMIN — BUSPIRONE HYDROCHLORIDE 20 MG: 10 TABLET ORAL at 21:39

## 2017-03-07 RX ADMIN — INSULIN LISPRO 2 UNITS: 100 INJECTION, SOLUTION INTRAVENOUS; SUBCUTANEOUS at 17:31

## 2017-03-07 RX ADMIN — Medication 2 TABLET: at 21:39

## 2017-03-07 RX ADMIN — OXYCODONE HYDROCHLORIDE AND ACETAMINOPHEN 1 TABLET: 10; 325 TABLET ORAL at 21:39

## 2017-03-07 RX ADMIN — SALINE NASAL SPRAY 2 SPRAY: 1.5 SOLUTION NASAL at 06:04

## 2017-03-07 RX ADMIN — INSULIN DETEMIR 10 UNITS: 100 INJECTION, SOLUTION SUBCUTANEOUS at 21:40

## 2017-03-07 RX ADMIN — METRONIDAZOLE 500 MG: 500 INJECTION, SOLUTION INTRAVENOUS at 13:21

## 2017-03-07 RX ADMIN — ATORVASTATIN CALCIUM 40 MG: 40 TABLET, FILM COATED ORAL at 21:39

## 2017-03-07 RX ADMIN — AZITHROMYCIN 500 MG: 500 INJECTION, POWDER, LYOPHILIZED, FOR SOLUTION INTRAVENOUS at 14:38

## 2017-03-07 RX ADMIN — METRONIDAZOLE 500 MG: 500 INJECTION, SOLUTION INTRAVENOUS at 21:39

## 2017-03-07 RX ADMIN — NICOTINE 1 PATCH: 21 PATCH, EXTENDED RELEASE TRANSDERMAL at 08:46

## 2017-03-07 RX ADMIN — HEPARIN SODIUM 5000 UNITS: 5000 INJECTION, SOLUTION INTRAVENOUS; SUBCUTANEOUS at 21:39

## 2017-03-07 RX ADMIN — CEFEPIME 2 G: 2 INJECTION, POWDER, FOR SOLUTION INTRAVENOUS at 02:59

## 2017-03-07 RX ADMIN — PROMETHAZINE HYDROCHLORIDE 25 MG: 25 TABLET ORAL at 16:57

## 2017-03-07 RX ADMIN — VANCOMYCIN HYDROCHLORIDE 1500 MG: 1 INJECTION, POWDER, LYOPHILIZED, FOR SOLUTION INTRAVENOUS at 17:45

## 2017-03-07 RX ADMIN — INSULIN LISPRO 2 UNITS: 100 INJECTION, SOLUTION INTRAVENOUS; SUBCUTANEOUS at 09:10

## 2017-03-07 NOTE — PLAN OF CARE
Problem: Patient Care Overview (Adult)  Goal: Plan of Care Review  Outcome: Ongoing (interventions implemented as appropriate)    03/07/17 0940   Coping/Psychosocial Response Interventions   Plan Of Care Reviewed With patient   Outcome Evaluation   Outcome Summary/Follow up Plan wound vac intact with no issues noted. PT to change dressing in 1-2 days         Problem: Inpatient Physical Therapy  Goal: Wound Care Goal 1 LTG- PT  Outcome: Ongoing (interventions implemented as appropriate)    02/26/17 1332 03/05/17 0905   Wound Care PT LTG   Wound Care PT LTG 1, Date Established 02/26/17 --    Wound Care PT LTG 1, Time to Achieve 2 wks --    Wound Care PT LTG 1, Location LT open ray amputation site  --    Wound Care PT LTG 1, No S&S of Infection yes --    Wound Care PT LTG 1, Decrease Wound Size 25% --    Wound Care PT LTG 1, Decrease Exudate minimum --    Wound Care PT LTG 1, No New Skin Break Down yes --    Wound Care PT LTG 1, Education S&S of infection;dressing changes;wound care;weight bearing restriction;progression of POC --    Wound Care PT LTG 1, Education Understanding verbalize understanding --    Wound Care PT LTG 1, Outcome --  goal ongoing

## 2017-03-07 NOTE — PLAN OF CARE
"Problem: Patient Care Overview (Adult)  Goal: Plan of Care Review  Outcome: Ongoing (interventions implemented as appropriate)    03/07/17 1430   Coping/Psychosocial Response Interventions   Plan Of Care Reviewed With patient   Patient Care Overview   Progress improving   Outcome Evaluation   Outcome Summary/Follow up Plan Patient follow up for medical device pressure ulcer on bridge of nose from home CPAP (present on admission). Area healed-patient reports protective gel on nose at bedtime \"greatly helped\". Will sign-off on this patient. Please consult for any new concerns. Thank you.         Problem: Pressure Ulcer (Adult)  Goal: Signs and Symptoms of Listed Potential Problems Will be Absent or Manageable (Pressure Ulcer)  Outcome: Ongoing (interventions implemented as appropriate)      "

## 2017-03-07 NOTE — PROGRESS NOTES
Acute Care - Occupational Therapy Treatment Note  Psychiatric     Patient Name: Tamara Langston  : 1955  MRN: [id number]  Today's Date: 3/7/2017  Onset of Illness/Injury or Date of Surgery Date: 17  Date of Referral to OT: 17  Referring Physician: MD Karen      Admit Date: 2017    Visit Dx:     ICD-10-CM ICD-9-CM   1. Impaired mobility and ADLs Z74.09 799.89   2. Toe osteomyelitis, left M86.9 730.27   3. Impaired functional mobility, balance, gait, and endurance Z74.09 V49.89   4. Osteomyelitis of left foot, unspecified chronicity M86.9 730.27     Patient Active Problem List   Diagnosis   • Atopic rhinitis   • Restrictive ventilatory defect   • COPD (chronic obstructive pulmonary disease)   • Osteoporosis   • Obstructive sleep apnea syndrome   • Abnormal liver enzymes   • Cholelithiasis   • Chronic back pain   • Mixed anxiety depressive disorder   • Type 2 diabetes mellitus   • Dyslipidemia   • Essential tremor   • Fibromyalgia   • Gastroesophageal reflux disease without esophagitis   • Hypertension   • Hypothyroidism   • Insomnia   • Osteoarthritis   • Polycythemia vera   • Psoriasis   • Branch retinal vein occlusion   • Cobalamin deficiency   • Chronic bronchitis   • Obesity   • Pneumonia   • Tobacco use   • Vitamin D deficiency   • Foot ulcer, left   • Leukocytosis   • Hypokalemia   • Lactic acidosis   • Fatty liver disease, nonalcoholic   • Diabetes education, encounter for   • Cellulitis of left foot   • Sinus bradycardia   • NSTEMI (non-ST elevated myocardial infarction)   • Tobacco abuse   • Hypoxia   • Peripheral vascular disease   • Gangrene   • Osteomyelitis of left foot   • Diabetic polyneuropathy associated with type 2 diabetes mellitus   • Anemia, blood loss   • Chronic pain   • Chronic, continuous use of opioids   • Chronic anxiety   • Chronically on benzodiazepine therapy             Adult Rehabilitation Note       17 1043 17 0940 17 0800    Rehab  Assessment/Intervention    Discipline occupational therapist  -CL physical therapist  -MF physical therapist  -MF    Document Type therapy note (daily note)  -CL therapy note (daily note)  -MF therapy note (daily note)  -MF    Subjective Information agree to therapy;complains of;pain  -CL agree to therapy;no complaints  -MF agree to therapy;complains of;weakness;pain  -MF    Patient Effort, Rehab Treatment good  -CL      Symptoms Noted During/After Treatment increased pain  -CL      Symptoms Noted Comment RN notified.   -CL      Precautions/Limitations fall precautions;oxygen therapy device and L/min;non-weight bearing status   NWB LLE  -CL      Recorded by [CL] Leticia Reed OT [MF] Lane Yates, PT [MF] Lane Yates, PT    Vital Signs    Pre Systolic BP Rehab --   No tele, RN cleared for tx.   -CL      Recorded by [CL] Leticia Reed OT      Pain Assessment    Pain Assessment 0-10  -CL Blackmon-Cabrera FACES  -MF Blackmon-Baker FACES  -MF    Blackmon-Baker FACES Pain Rating  0  -MF 4  -MF    Pain Score 7  -CL      Post Pain Score 8  -CL      Pain Type Chronic pain  -CL      Pain Location Generalized  -CL      Pain Intervention(s) Repositioned;Ambulation/increased activity   RN notified.   -CL  Repositioned  -MF    Response to Interventions Tolerated.   -CL      Recorded by [CL] Leticia Reed OT [MF] Lane Yates, PT [MF] Lane Yates, PT    Cognitive Assessment/Intervention    Current Cognitive/Communication Assessment functional  -CL      Orientation Status oriented x 4  -CL      Follows Commands/Answers Questions 100% of the time;needs cueing;needs repetition  -CL      Personal Safety mild impairment;decreased awareness, need for assist;decreased awareness, need for safety;impulsive  -CL      Personal Safety Interventions fall prevention program maintained;gait belt;nonskid shoes/slippers when out of bed  -CL      Recorded by [CL] Leticia Reed OT      Bed Mobility, Assessment/Treatment    Bed Mobility,  Assistive Device head of bed elevated;overhead trapeze  -CL      Bed Mob, Supine to Sit, [place] supervision required;verbal cues required  -CL      Bed Mobility, Comment VCs for safety d/t lines/impulsivity.   -CL      Recorded by [CL] Leticia Reed OT      Transfer Assessment/Treatment    Transfers, Bed-Chair [place] maximum assist (25% patient effort);2 person assist required;verbal cues required  -CL      Transfers, Bed-Chair-Bed, Assist Device --   BUE support, stand pivot  -CL      Transfers, Sit-Stand [place] minimum assist (75% patient effort);2 person assist required;verbal cues required  -CL      Transfers, Stand-Sit [place] minimum assist (75% patient effort);2 person assist required;supervision required  -CL      Transfers, Sit-Stand-Sit, Assist Device rolling walker  -CL      Transfer, Comment VCs for HP/sequencing of steps. Pt stood at EOB and took ~3 side steps while maintaining NWB status. Pt required a seated rest break of ~2 mins after side steps. Pt then performed stand pivot to chair w/ BUE requiring increased assist d/t to reports of fatigue after taking side steps.   -CL      Recorded by [CL] Leticia Reed OT      Balance Skills Training    Sitting-Level of Assistance Close supervision   at EOB, VCs to keep LLE off floor  -CL      Sitting-Balance Support Right upper extremity supported;Left upper extremity supported   R foot supported  -CL      Sitting-Balance Activities Reaching for objects  -CL      Standing-Level of Assistance Minimum assistance;x2  -CL      Static Standing Balance Support assistive device  -CL      Standing-Balance Activities Weight Shift R-L;Weight Shift A-P  -CL      Gait Balance-Level of Assistance Minimum assistance;x2  -CL      Gait Balance Support assistive device  -CL      Gait Balance Activities side-stepping  -CL      Recorded by [CL] Leticia Reed OT      Therapy Exercises    Bilateral Upper Extremity 10 reps;AROM:;elbow  flexion/extension;pronation/supination;hand pumps;shoulder extension/flexion;shoulder ER/IR;shoulder protraction/retraction;shoulder horizontal abd/add   yellow thera-band  -CL      Recorded by [CL] Leticia Reed OT      Positioning and Restraints    Pre-Treatment Position in bed  -CL in bed  - in bed  -    Post Treatment Position chair  -CL bed  - bed  -    In Bed  supine;call light within reach  - supine;with nsg  -    In Chair notified nsg;reclined;call light within reach;encouraged to call for assist;with PT  -CL      Recorded by [CL] Leticia Reed, OT [] Lane Yates, PT [] Lane Yates, PT      03/05/17 0950          Rehab Assessment/Intervention    Discipline physical therapist  -      Document Type therapy note (daily note)  -MC      Subjective Information no complaints;agree to therapy   reports vac has had blockage alarm all morning  -MC      Recorded by [MC] Chetna Chao PT      Pain Assessment    Pain Assessment No/denies pain  -MC      Recorded by [MC] Chetna Chao PT      Cognitive Assessment/Intervention    Current Cognitive/Communication Assessment functional  -      Orientation Status oriented x 4  -MC      Follows Commands/Answers Questions 100% of the time;able to follow single-step instructions  -MC      Recorded by [MC] Chetna Chao PT      Positioning and Restraints    Pre-Treatment Position in bed  -      Post Treatment Position bed  -      In Bed supine;call light within reach;encouraged to call for assist;with family/caregiver;LLE elevated;SCD pump applied  -MC      Recorded by [MC] Chetna Chao PT        User Key  (r) = Recorded By, (t) = Taken By, (c) = Cosigned By    Initials Name Effective Dates     Lane Yates, PT 06/19/15 -      Chetna Chao, PT 03/14/16 -     CL Leticia Reed OT 06/08/16 -                 OT Goals       03/07/17 1132 03/06/17 1423 03/01/17 0910    Transfer Training OT LTG    Transfer Training OT LTG,  Date Established  03/06/17  -CL     Transfer Training OT LTG, Time to Achieve  by discharge  -CL     Transfer Training OT LTG, Activity Type  bed to chair /chair to bed;sit to stand/stand to sit;toilet  -CL     Transfer Training OT LTG, [place] Level  contact guard assist  -CL     Transfer Training OT LTG, Additional Goal  AAD  -CL     Transfer Training OT LTG, Outcome goal ongoing  -CL  goal ongoing  -MANDO    Strength OT LTG    Strength Goal OT LTG, Outcome   goal met   pt. progressed to yellow t-band  -MANDO    Dynamic Standing Balance OT LTG    Dynamic Standing Balance OT LTG, Date Established  03/06/17  -CL     Dynamic Standing Balance OT LTG, Time to Achieve  by discharge  -CL     Dynamic Standing Balance OT LTG, [place] Level  contact guard assist  -CL     Dynamic Standing Balance OT LTG, Assist Device  assistive Device  -CL     Dynamic Standing Balance OT LTG, Additional Goal  Pt will tolerate standing 1 min for functional activity.   -CL     Dynamic Standing Balance OT LTG, Outcome goal ongoing  -CL      Patient Education OT LTG    Patient Education OT LTG, Date Established  03/06/17  -CL     Patient Education OT LTG, Time to Achieve  by discharge  -CL     Patient Education OT LTG, Education Type  written program;HEP;precautions per surgeon;positioning;posture/body mechanics;1 hand/daryn technique;home safety;adaptive equipment mgmt;energy conservation;adaptive breathing  -CL     Patient Education OT LTG, Education Understanding  verbalizes understanding;demonstrates adequately  -CL     Patient Education OT LTG Outcome goal ongoing  -CL goal ongoing  -CL     LB Dressing OT LTG    LB Dressing Goal OT LTG, Date Established  03/06/17  -CL     LB Dressing Goal OT LTG, Time to Achieve  by discharge  -CL     LB Dressing Goal OT LTG, Activity Type  Don pants utilizing dayrn-dressing technique  -CL     LB Dressing Goal OT LTG, [place] Level  contact guard assist  -CL     LB Dressing Goal OT LTG, Additional  Goal  AAD  -CL     LB Dressing Goal OT LTG, Outcome goal ongoing  -CL  goal met   met for R side.  Unable to advance until wound vac off.  -MANDO      02/28/17 0909 02/27/17 1449 02/26/17 1351    Transfer Training OT LTG    Transfer Training OT LTG, Date Established   02/26/17  -JR    Transfer Training OT LTG, Time to Achieve   1 wk  -JR    Transfer Training OT LTG, Activity Type   bed to chair /chair to bed  -JR    Transfer Training OT LTG, [place] Level   contact guard assist  -JR    Transfer Training OT LTG, Assist Device   walker, rolling  -JR    Transfer Training OT LTG, Outcome goal ongoing   pt. refused to chair today  -MANDO goal ongoing   pt. to min of 2 with NWB LLE today  -MANDO     Strength OT LTG    Strength Goal OT LTG, Date Established   02/26/17  -JR    Strength Goal OT LTG, Time to Achieve   1 wk  -JR    Strength Goal OT LTG, Functional Goal   Pt to increase B UE strength by 1/2 muscle grade to support transfers with NWB status.  -JR    Strength Goal OT LTG, Outcome goal ongoing   tolerated more resistance distally today  -MANDO goal ongoing   pt. began UE ROM exer today  -MANDO     LB Dressing OT LTG    LB Dressing Goal OT LTG, Date Established   02/26/17  -JR    LB Dressing Goal OT LTG, Time to Achieve   1 wk  -JR    LB Dressing Goal OT LTG, Activity Type   pants and socks on R LE.  -JR    LB Dressing Goal OT LTG, [place] Level   minimum assist (75% patient effort)  -JR    LB Dressing Goal OT LTG, Adaptive Equipment   other (see comments)   with appropriate AD  -JR    LB Dressing Goal OT LTG, Outcome goal ongoing   met socks only  -MANDO goal partially met   met R sock  -MANDO       User Key  (r) = Recorded By, (t) = Taken By, (c) = Cosigned By    Initials Name Provider Type    MANDO Blevins, OT Occupational Therapist    JR Amy Garza, OT Occupational Therapist    JAMARCUS Reed, OT Occupational Therapist          Occupational Therapy Education     Title: PT OT SLP Therapies (Active)     Topic:  Occupational Therapy (Active)     Point: ADL training (Active)    Description: Instruct learner(s) on proper safety adaptation and remediation techniques during self care or transfers.   Instruct in proper use of assistive devices.    Learning Progress Summary    Learner Readiness Method Response Comment Documented by Status   Patient Acceptance E,D NR Pt educated on/reviewed BUE HEP w/ thera-band, appropriate safety precautions, appropriate t/f techniques, maintaining NWB status, and benefits of therapy.  03/07/17 1131 Active    Acceptance E,D NR Pt educated on/reivewed appropriate safety precautions, BUE HEP, appropriate t/f techniques, NWB status, and benefits of therapy. CL 03/06/17 1421 Active    Acceptance E,D VU,NR cues for NWB LLE with dressing, activity to  do on own during day, theraband exer MANDO 03/01/17 0909 Done    Acceptance E,D VU,NR cont. review UE ROM, benefits activity, standing endurance and safety for LBD and toileting. MANDO 02/28/17 0908 Done    Acceptance E VU,NR Reasons to keep LLE NWB when pt. said she stands on it anyway.  Safe transfer, UE ROM exer. MANDO 02/27/17 1448 Done    Acceptance E NR Pt educated on NWB status and proper transfer techniques  02/26/17 1350 Active               Point: Home exercise program (Active)    Description: Instruct learner(s) on appropriate technique for monitoring, assisting and/or progressing therapeutic exercises/activities.    Learning Progress Summary    Learner Readiness Method Response Comment Documented by Status   Patient Acceptance E,D NR Pt educated on/reviewed BUE HEP w/ thera-band, appropriate safety precautions, appropriate t/f techniques, maintaining NWB status, and benefits of therapy. CL 03/07/17 1131 Active    Acceptance E,D NR Pt educated on/reivewed appropriate safety precautions, BUE HEP, appropriate t/f techniques, NWB status, and benefits of therapy.  03/06/17 1421 Active    Acceptance E,D VU,NR cues for NWB LLE with dressing, activity  to  do on own during day, theraband exer MANDO 03/01/17 0909 Done    Acceptance E,D VU,NR cont. review UE ROM, benefits activity, standing endurance and safety for LBD and toileting.  02/28/17 0908 Done    Acceptance E VU,NR Reasons to keep LLE NWB when pt. said she stands on it anyway.  Safe transfer, UE ROM exer.  02/27/17 1448 Done               Point: Precautions (Active)    Description: Instruct learner(s) on prescribed precautions during self-care and functional transfers.    Learning Progress Summary    Learner Readiness Method Response Comment Documented by Status   Patient Acceptance E,D NR Pt educated on/reviewed BUE HEP w/ thera-band, appropriate safety precautions, appropriate t/f techniques, maintaining NWB status, and benefits of therapy.  03/07/17 1131 Active    Acceptance E,D NR Pt educated on/reivewed appropriate safety precautions, BUE HEP, appropriate t/f techniques, NWB status, and benefits of therapy.  03/06/17 1421 Active    Acceptance E VU  CT 03/06/17 0520 Done    Acceptance E,D VU,NR cues for NWB LLE with dressing, activity to  do on own during day, theraband exer  03/01/17 0909 Done    Acceptance E,D VU,NR cont. review UE ROM, benefits activity, standing endurance and safety for LBD and toileting.  02/28/17 0908 Done    Acceptance E VU,NR Reasons to keep LLE NWB when pt. said she stands on it anyway.  Safe transfer, UE ROM exer.  02/27/17 1448 Done               Point: Body mechanics (Active)    Description: Instruct learner(s) on proper positioning and spine alignment during self-care, functional mobility activities and/or exercises.    Learning Progress Summary    Learner Readiness Method Response Comment Documented by Status   Patient Acceptance E,D NR Pt educated on/reviewed BUE HEP w/ thera-band, appropriate safety precautions, appropriate t/f techniques, maintaining NWB status, and benefits of therapy.  03/07/17 1131 Active    Acceptance E,D NR Pt educated on/reivewed  appropriate safety precautions, BUE HEP, appropriate t/f techniques, NWB status, and benefits of therapy. CL 03/06/17 1421 Active                      User Key     Initials Effective Dates Name Provider Type Discipline    MANDO 06/22/15 -  Kacy Blevins, OT Occupational Therapist OT    JR 06/22/15 -  Amy Garza, OT Occupational Therapist OT    CT 06/16/16 -  Alaina Delaney RN Registered Nurse Nurse    CL 06/08/16 -  Leticia Reed, OT Occupational Therapist OT                  OT Recommendation and Plan  Anticipated Equipment Needs At Discharge:  (TBD)  Anticipated Discharge Disposition: inpatient rehabilitation facility  Planned Therapy Interventions: adaptive equipment training, ADL retraining, balance training, energy conservation, home exercise program, transfer training  Therapy Frequency: daily  Plan of Care Review  Plan Of Care Reviewed With: patient  Progress: improving  Outcome Summary/Follow up Plan: Pt demonstrated improved activity tolerance this date by performing STS t/f and taking ~3 side steps while maintaining NWB status w/ Min Ax2 and a RW. Pt then Max Ax2 for stand pivot to chair 2/2 to fatigue. Pt encouraged to perform BUE HEP throughout the day. Recommend cont skilled IPOT POC.         Outcome Measures       03/07/17 1043 03/06/17 1322       How much help from another is currently needed...    Putting on and taking off regular lower body clothing? 2  -CL 2  -CL     Bathing (including washing, rinsing, and drying) 2  -CL 2  -CL     Toileting (which includes using toilet bed pan or urinal) 2  -CL 2  -CL     Putting on and taking off regular upper body clothing 3  -CL 3  -CL     Taking care of personal grooming (such as brushing teeth) 4  -CL 4  -CL     Eating meals 4  -CL 4  -CL     Score 17  -CL 17  -CL     Functional Assessment    Outcome Measure Options AM-PAC 6 Clicks Daily Activity (OT)  -CL AM-PAC 6 Clicks Daily Activity (OT)  -CL       User Key  (r) = Recorded By, (t) = Taken By, (c) =  Cosigned By    Initials Name Provider Type    CL Leticia Reed OT Occupational Therapist           Time Calculation:         Time Calculation- OT       03/07/17 1135          Time Calculation- OT    OT Start Time 1043  -CL      Total Timed Code Minutes- OT 23 minute(s)  -CL      OT Received On 03/07/17  -CL      OT Goal Re-Cert Due Date 03/16/17  -CL        User Key  (r) = Recorded By, (t) = Taken By, (c) = Cosigned By    Initials Name Provider Type    CL Leticia Reed OT Occupational Therapist           Therapy Charges for Today     Code Description Service Date Service Provider Modifiers Qty    32398205806 HC OT RE-EVAL 2 3/6/2017 Leticia Reed OT GO 1    82649713011 HC OT THER SUPP EA 15 MIN 3/6/2017 Leticia Reed OT GO 2    84765441231 HC OT THERAPEUTIC ACT EA 15 MIN 3/6/2017 Leticia Reed OT GO 3    22470724705 HC OT THERAPEUTIC ACT EA 15 MIN 3/7/2017 Leticia Reed OT GO 2               Leticia Reed OT  3/7/2017

## 2017-03-07 NOTE — PROGRESS NOTES
Acute Care - Wound/Debridement Treatment Note  [facility]     Patient Name: Tamara Langston  : 1955  MRN: [id number]  Today's Date: 3/7/2017  Onset of Illness/Injury or Date of Surgery Date: 17   Date of Referral to PT: 17   Referring Physician: MD Karen       Admit Date: 2017    Visit Dx:    ICD-10-CM ICD-9-CM   1. Impaired mobility and ADLs Z74.09 799.89   2. Toe osteomyelitis, left M86.9 730.27   3. Impaired functional mobility, balance, gait, and endurance Z74.09 V49.89   4. Osteomyelitis of left foot, unspecified chronicity M86.9 730.27       Patient Active Problem List   Diagnosis   • Atopic rhinitis   • Restrictive ventilatory defect   • COPD (chronic obstructive pulmonary disease)   • Osteoporosis   • Obstructive sleep apnea syndrome   • Abnormal liver enzymes   • Cholelithiasis   • Chronic back pain   • Mixed anxiety depressive disorder   • Type 2 diabetes mellitus   • Dyslipidemia   • Essential tremor   • Fibromyalgia   • Gastroesophageal reflux disease without esophagitis   • Hypertension   • Hypothyroidism   • Insomnia   • Osteoarthritis   • Polycythemia vera   • Psoriasis   • Branch retinal vein occlusion   • Cobalamin deficiency   • Chronic bronchitis   • Obesity   • Pneumonia   • Tobacco use   • Vitamin D deficiency   • Foot ulcer, left   • Leukocytosis   • Hypokalemia   • Lactic acidosis   • Fatty liver disease, nonalcoholic   • Diabetes education, encounter for   • Cellulitis of left foot   • Sinus bradycardia   • NSTEMI (non-ST elevated myocardial infarction)   • Tobacco abuse   • Hypoxia   • Peripheral vascular disease   • Gangrene   • Osteomyelitis of left foot   • Diabetic polyneuropathy associated with type 2 diabetes mellitus   • Anemia, blood loss   • Chronic pain   • Chronic, continuous use of opioids   • Chronic anxiety   • Chronically on benzodiazepine therapy               LDA Wound       17 0940          Incision 17 1427 Left foot    Incision -  Properties Group Placement Date: 02/25/17  -TB Placement Time: 1427  -TB Side: Left  -TB Location: foot  -TB Additional Comments: 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  -MW    Incision WDL WDL  -MF      Dressing Appearance no drainage;dry;intact  -MF      Therapy Setting (Negative Pressure Wound Therapy) continuous therapy  -MF      Pressure Setting (Negative Pressure Wound Therapy) 125 mmHg  -MF      Output (mL) 75  -MF      Incision 03/02/17 0800 Left distal foot horizontal    Incision - Properties Group Placement Date: 03/02/17  -MC Placement Time: 0800  -MC Side: Left  -MC Orientation: distal  -MC Location: foot  -MC Incision Type: horizontal  -MC Additional Comments: s/p transmetatarsal amputation toes 2-5  -MC    Pressure Ulcer 02/24/17 1432 Right coccyx Stage II    Pressure Ulcer - Properties Group Date first assessed: 02/24/17  -MS Time first assessed: 1432  -MS Present On Admission (Pressure Ulcer): yes  -MS Side: Right  -MS Location: coccyx  -MS Stage: Stage II  -MS    Pressure Ulcer 02/24/17 1432 other (see comments) Stage II    Pressure Ulcer - Properties Group Date first assessed: 02/24/17  -MS Time first assessed: 1432  -MS Present On Admission (Pressure Ulcer): yes  -MS Location: other (see comments)  -MS, nose  Stage: Stage II  -MS Additional Comments: from home c-pap  -MS      User Key  (r) = Recorded By, (t) = Taken By, (c) = Cosigned By    Initials Name Provider Type    MF Lane Yates, PT Physical Therapist    MW Christie Wheeler, PT Physical Therapist    TB Karen Atkins, RN Registered Nurse    IVAN Chao, PT Physical Therapist    MS David Raymond RN Registered Nurse            WOUND DEBRIDEMENT                     Adult Rehabilitation Note       03/07/17 0940 03/06/17 0800 03/05/17 0950    Rehab Assessment/Intervention    Discipline physical therapist  -MF physical therapist  -MF physical therapist  -    Document Type therapy note (daily note)  -MF therapy  note (daily note)  - therapy note (daily note)  -    Subjective Information agree to therapy;no complaints  - agree to therapy;complains of;weakness;pain  - no complaints;agree to therapy   reports vac has had blockage alarm all morning  -    Recorded by [] Lane Yates, PT [] Lane Yates, PT [] Chetna Chao, PT    Pain Assessment    Pain Assessment Blackmon-Baker FACES  - Blackmon-Baker FACES  - No/denies pain  -    Blackmon-Cabrera FACES Pain Rating 0  - 4  -     Pain Intervention(s)  Repositioned  -     Recorded by [] Lane Yates, PT [] Lane Yates, PT [] Chetna Chao PT    Cognitive Assessment/Intervention    Current Cognitive/Communication Assessment   functional  -    Orientation Status   oriented x 4  -    Follows Commands/Answers Questions   100% of the time;able to follow single-step instructions  -    Recorded by   [] Chetna Chao PT    Positioning and Restraints    Pre-Treatment Position in bed  - in bed  - in bed  -    Post Treatment Position bed  - bed  - bed  -    In Bed supine;call light within reach  - supine;with nsg  - supine;call light within reach;encouraged to call for assist;with family/caregiver;LLE elevated;SCD pump applied  -    Recorded by [] Lane Yates, PT [] Lane Yates, PT [] Chetna Chao, PT      User Key  (r) = Recorded By, (t) = Taken By, (c) = Cosigned By    Initials Name Effective Dates     Lane Yates, PT 06/19/15 -      Chetna Chao, PT 03/14/16 -                 IP PT Goals       03/05/17 0905 03/04/17 1045 03/02/17 1055    Transfer Training PT LTG    Transfer Training PT  LTG, Date Goal Reviewed   03/02/17  -SR    Gait Training PT LTG    Gait Training Goal PT LTG, Date Goal Reviewed   03/02/17  -SR    Patient Education PT LTG    Patient Education PT LTG, Date Goal Reviewed   03/02/17  -SR    Wound Care PT LTG    Wound Care PT LTG 1, Outcome goal ongoing   - goal ongoing  -       02/27/17 1423 02/27/17 0845 02/26/17 1332    Transfer Training PT LTG    Transfer Training PT LTG, Date Established 02/27/17  -MANDO      Transfer Training PT LTG, Time to Achieve 1 wk  -MANDO      Transfer Training PT LTG, Activity Type all transfers  -MANDO      Transfer Training PT LTG, Wilkinson Level conditional independence  -MANDO      Transfer Training PT LTG, Assist Device walker, rolling  -MANDO      Gait Training PT LTG    Gait Training Goal PT LTG, Date Established 02/27/17  -MANDO      Gait Training Goal PT LTG, Time to Achieve 1 wk  -MANDO      Gait Training Goal PT LTG, Wilkinson Level conditional independence  -MANDO      Gait Training Goal PT LTG, Assist Device walker, rolling  -MANDO      Gait Training Goal PT LTG, Distance to Achieve 25  -MANDO      Patient Education PT LTG    Patient Education PT LTG, Date Established 02/27/17  -MANDO      Patient Education PT LTG, Time to Achieve 1 wk  -MANDO      Patient Education PT LTG, Education Type HEP;positioning;posture/body mechanics;gait;transfers;bed mobility;pain management;progression of POC;benefits of activity;home safety;equipment management;skin care/inspection   weightbearing status  -MANDO      Patient Education PT LTG, Education Understanding demonstrate adequately;verbalize understanding  -MANDO      Wound Care PT LTG    Wound Care PT LTG 1, Date Established   02/26/17  -MW    Wound Care PT LTG 1, Time to Achieve   2 wks  -MW    Wound Care PT LTG 1, Location   LT open ray amputation site   -MW    Wound Care PT LTG 1, No S&S of Infection   yes  -MW    Wound Care PT LTG 1, Decrease Wound Size   25%  -MW    Wound Care PT LTG 1, Decrease Exudate   minimum  -MW    Wound Care PT LTG 1, No New Skin Break Down   yes  -MW    Wound Care PT LTG 1, Education   S&S of infection;dressing changes;wound care;weight bearing restriction;progression of POC  -MW    Wound Care PT LTG 1, Education Understanding   verbalize understanding  -MW    Wound Care PT LTG 1,  Outcome  goal ongoing  -       User Key  (r) = Recorded By, (t) = Taken By, (c) = Cosigned By    Initials Name Provider Type    MANDO Philomena Esteban, PT Physical Therapist    SR Thu Herrera, PT Physical Therapist     Christie Wheeler, PT Physical Therapist     Chetna Chao, PT Physical Therapist          Physical Therapy Education     Title: PT OT SLP Therapies (Active)     Topic: Physical Therapy (Active)     Point: Mobility training (Active)    Learning Progress Summary    Learner Readiness Method Response Comment Documented by Status   Patient Acceptance E,D NR   03/02/17 1055 Active    Acceptance E,D VU,NR   02/27/17 1558 Done               Point: Home exercise program (Active)    Learning Progress Summary    Learner Readiness Method Response Comment Documented by Status   Patient Acceptance E,D NR   03/02/17 1055 Active    Acceptance E,D VU,NR   02/27/17 1558 Done               Point: Body mechanics (Active)    Learning Progress Summary    Learner Readiness Method Response Comment Documented by Status   Patient Acceptance E,D NR   03/02/17 1055 Active    Acceptance E,D VU,NR   02/27/17 1558 Done               Point: Precautions (Active)    Learning Progress Summary    Learner Readiness Method Response Comment Documented by Status   Patient Acceptance E,D NR   03/02/17 1055 Active    Acceptance E,D VU,NR   02/27/17 1558 Done                      User Key     Initials Effective Dates Name Provider Type Discipline     06/19/15 -  Philomena Esteban, PT Physical Therapist PT     06/19/15 -  Thu Herrera, PT Physical Therapist PT                   PT ASSESSMENT (last 72 hours)      PT Evaluation       03/07/17 0940 03/07/17 0800    Rehab Evaluation    Document Type therapy note (daily note)  -MF     Subjective Information agree to therapy;no complaints  -MF     Pain Assessment    Pain Assessment Blackmon-Baker FACES  -     Blackmon-Baker FACES Pain Rating 0  -      Sensory Assessment/Intervention    Light Touch  LUE;RUE  -CS    LUE Light Touch  WNL  -CS    RUE Light Touch  WNL  -CS    LLE Light Touch  moderate impairment  -CS    RLE Light Touch  absent sensation  -CS    Positioning and Restraints    Pre-Treatment Position in bed  -MF     Post Treatment Position bed  -MF     In Bed supine;call light within reach  -MF       03/06/17 2200 03/06/17 2000    Sensory Assessment/Intervention    LLE Light Touch mild impairment  -TA mild impairment  -TA    RLE Light Touch absent sensation  -TA absent sensation  -TA      03/06/17 1322 03/06/17 0800    Rehab Evaluation    Document Type re-evaluation  -CL therapy note (daily note)  -MF    Subjective Information agree to therapy;complains of;pain  -CL agree to therapy;complains of;weakness;pain  -MF    Patient Effort, Rehab Treatment good  -CL     Symptoms Noted During/After Treatment none  -CL     General Information    Patient Profile Review yes  -CL     Onset of Illness/Injury or Date of Surgery Date 02/24/17  -CL     Referring Physician MD Karen  -CL     Pertinent History Of Current Problem Please see IE for full history. Pt s/p L foot transmetatarsal amputation of toes 2, 3, 4, and 5 2/2 to osteomyelitis on 03/02/2017. Received resume OT orders on 03/05/2017.   -CL     Precautions/Limitations fall precautions;non-weight bearing status;oxygen therapy device and L/min   NWB LLE  -CL     Prior Level of Function --   Please see IE  -CL     Equipment Currently Used at Home --   Please see IE  -CL     Plans/Goals Discussed With patient;agreed upon  -CL     Risks Reviewed patient:;LOB;nausea/vomiting;dizziness;increased discomfort;lines disloged  -CL     Benefits Reviewed patient:;improve function;increase independence;increase strength;increase balance;decrease pain;increase knowledge  -CL     Barriers to Rehab medically complex  -CL     Living Environment    Lives With --   Please see IE  -CL     Vital Signs    Pre Systolic BP Rehab --    no tele, RN cleared for tx.   -CL     Pain Assessment    Pain Assessment 0-10  -CL Blackmon-Cabrera FACES  -MF    Blackmon-Baker FACES Pain Rating  4  -MF    Pain Score 8  -CL     Post Pain Score 8  -CL     Pain Type Chronic pain  -CL     Pain Location Back  -CL     Pain Intervention(s) Repositioned;Ambulation/increased activity  -CL Repositioned  -MF    Response to Interventions Tolerated.   -CL     Vision Assessment/Intervention    Visual Impairment WFL  -CL     Cognitive Assessment/Intervention    Current Cognitive/Communication Assessment functional  -CL     Orientation Status oriented x 4  -CL     Follows Commands/Answers Questions 100% of the time;needs cueing;needs repetition  -CL     Personal Safety mild impairment;decreased awareness, need for assist;decreased awareness, need for safety  -CL     Personal Safety Interventions fall prevention program maintained;gait belt;nonskid shoes/slippers when out of bed  -CL     ROM (Range of Motion)    General ROM Detail BUE grossly WFL. Pt s/p B CMC sx, limited thumb CMC extension/ABD at baseline.   -CL     MMT (Manual Muscle Testing)    General MMT Assessment Detail BUE grossly 4-/5.   -CL     Bed Mobility, Assessment/Treatment    Bed Mobility, Assistive Device bed rails;head of bed elevated  -CL     Bed Mob, Supine to Sit, Henderson supervision required;verbal cues required  -CL     Bed Mob, Sit to Supine, Henderson supervision required;verbal cues required  -CL     Bed Mobility, Comment VCs for safety d/t lines.   -CL     Transfer Assessment/Treatment    Transfers, Bed-Chair Henderson contact guard assist;2 person assist required;verbal cues required  -CL     Transfers, Bed-Chair-Bed, Assist Device rolling walker  -CL     Transfers, Sit-Stand Henderson minimum assist (75% patient effort);verbal cues required;2 person assist required  -CL     Transfers, Stand-Sit Henderson contact guard assist;2 person assist required;verbal cues required  -CL     Transfers,  Sit-Stand-Sit, Assist Device rolling walker  -CL     Transfer, Comment Pt attempted to stand impulsively prior to cueing. VCs for HP/sequencing of steps. Pt performed stand-pivot t/f to chair from EOB.    VCs to maintain NWB status, pt performed appropriately.   -CL     Balance Skills Training    Sitting-Level of Assistance Close supervision  -CL     Sitting-Balance Support Right upper extremity supported;Left upper extremity supported;Feet supported  -CL     Sitting-Balance Activities Forward lean;Reaching for objects;Trunk control activities  -CL     Standing-Level of Assistance Contact guard;x2  -CL     Static Standing Balance Support assistive device  -CL     Standing-Balance Activities Weight Shift R-L  -CL     Therapy Exercises    Left Lower Extremity 10 reps;AROM:   straight leg raise  -CL     Bilateral Upper Extremity 10 reps;AROM:;sitting;elbow flexion/extension;hand pumps;pronation/supination;shoulder extension/flexion;shoulder horizontal abd/add;shoulder protraction/retraction   yellow thera-band  -     Sensory Assessment/Intervention    Light Touch LUE;RUE  -CL     LUE Light Touch WNL  -CL     RUE Light Touch WNL  -CL     RLE Light Touch  absent sensation  -CS    Positioning and Restraints    Pre-Treatment Position in bed  -CL in bed  -MF    Post Treatment Position chair  -CL bed  -MF    In Bed  supine;with nsg  -MF    In Chair notified nsg;reclined;call light within reach;encouraged to call for assist;waffle cushion;legs elevated;LLE elevated;R heel elevated  -CL       03/05/17 2000 03/05/17 0950    Rehab Evaluation    Document Type  therapy note (daily note)  -    Subjective Information  no complaints;agree to therapy   reports vac has had blockage alarm all morning  -    Pain Assessment    Pain Assessment  No/denies pain  -    Cognitive Assessment/Intervention    Current Cognitive/Communication Assessment  functional  -    Orientation Status  oriented x 4  -    Follows Commands/Answers  Questions  100% of the time;able to follow single-step instructions  -    Sensory Assessment/Intervention    LUE Light Touch mild impairment  -CT     RUE Light Touch WNL  -CT     LLE Light Touch WNL  -CT     RLE Light Touch absent sensation  -CT     Positioning and Restraints    Pre-Treatment Position  in bed  -    Post Treatment Position  bed  -    In Bed  supine;call light within reach;encouraged to call for assist;with family/caregiver;LLE elevated;SCD pump applied  -      03/05/17 0800 03/04/17 2000    Sensory Assessment/Intervention    LUE Light Touch mild impairment  -AT mild impairment  -CT    RUE Light Touch WNL  -AT WNL  -CT    LLE Light Touch WNL  -AT WNL  -CT    RLE Light Touch absent sensation  -AT absent sensation  -CT      03/04/17 1400 03/04/17 1200    Sensory Assessment/Intervention    RUE Light Touch WNL  -DH WNL  -DH    LLE Light Touch WNL  -DH WNL  -DH    RLE Light Touch absent sensation  -DH absent sensation  -DH      User Key  (r) = Recorded By, (t) = Taken By, (c) = Cosigned By    Initials Name Provider Type     Lane Yates, PT Physical Therapist    CT Alaina Delaney, RN Registered Nurse    FOX Mann, RN Registered Nurse     Chetna Chao, PT Physical Therapist     Kya Shanks, RN Registered Nurse     Perfecto Love, RN Registered Nurse    [facility] CAMMY Hawk RN Registered Nurse    JAMARCUS Reed, OT Occupational Therapist            PT Recommendation and Plan  Anticipated Equipment Needs At Discharge: other (see comments) (wound VAC )  Anticipated Discharge Disposition: home with assist, home with home health (if plan further level of amputation, will recommend rehab)  Planned Therapy Interventions: wound care, patient/family education  PT Frequency: daily, per priority policy    Plan Of Care Reviewed With: patient       Outcome Summary/Follow up Plan: wound vac intact with no issues noted. PT to change dressing in 1-2 days          Outcome Measures        03/06/17 1322          How much help from another is currently needed...    Putting on and taking off regular lower body clothing? 2  -CL      Bathing (including washing, rinsing, and drying) 2  -CL      Toileting (which includes using toilet bed pan or urinal) 2  -CL      Putting on and taking off regular upper body clothing 3  -CL      Taking care of personal grooming (such as brushing teeth) 4  -CL      Eating meals 4  -CL      Score 17  -CL      Functional Assessment    Outcome Measure Options AM-PAC 6 Clicks Daily Activity (OT)  -CL        User Key  (r) = Recorded By, (t) = Taken By, (c) = Cosigned By    Initials Name Provider Type    CL Leticia Reed OT Occupational Therapist              Time Calculation        PT Charges       03/07/17 0940          Time Calculation    Start Time 0940  -      PT Goal Re-Cert Due Date 03/08/17  -      Time Calculation- PT    Total Timed Code Minutes- PT 10 minute(s)  -        User Key  (r) = Recorded By, (t) = Taken By, (c) = Cosigned By    Initials Name Provider Type     Lane Yates, PT Physical Therapist             Therapy Charges for Today     Code Description Service Date Service Provider Modifiers Qty    88416604483  PT NEG PRESS WOUND TO 50SQCM DME1 3/6/2017 Lane Yates, PT  1    [id number] HC PT THER SUPP EA 15 MIN 3/6/2017 Lane Yates, PT GP 1    [id number]  PT NEG PRESS WOUND TO 50SQCM DME1 3/7/2017 Lane Yates, PT  1    [id number] HC PT THER SUPP EA 15 MIN 3/7/2017 Lane Yates, PT GP 1            PT G-Codes  Outcome Measure Options: AM-PAC 6 Clicks Daily Activity (OT)        Lane Yates, PT  3/7/2017

## 2017-03-07 NOTE — PROGRESS NOTES
Cardiothoracic Surgery Progress Note      POD #:     LOS: 11 days      Subjective: I have reviewed the CAT scan of the chest that was done yesterday evening with Dr. Navin Gomez of radiology and I discussed the findings with the patient .  The bilateral small groundglass opacities were discussed with the patient, but more specifically, the small solid mass with spiculation in the right lower lobe area .  With her smoking history of the past for approximately 50 years of probably close to a pack a day for most of those years this is concerning for possibility of a neoplasm and I told her we will have to be following this closely in the future with another CAT scan and possibly a PET scan.  I did review the CT angiogram done in February 2015 and at that time there was no mass in the right lower lobe.  She may be a candidate for a needle biopsy of this area transthoracically or perhaps a navigational bronchoscopy.        Objective:  Vital Signs  Temp:  [98.1 °F (36.7 °C)-98.9 °F (37.2 °C)] 98.9 °F (37.2 °C)  Heart Rate:  [56-73] 63  Resp:  [16-20] 18  BP: (121-156)/(56-73) 137/59    Physical Exam:   General Appearance:    Lungs:   Heart:   Skin:   Incision:     Results: As above in my discussion above with the patient regarding the CAT scan findings, as well as the official radiology report of Dr. Navin Gomez    Results from last 7 days  Lab Units 03/06/17  1215   WBC 10*3/mm3 8.71   HEMOGLOBIN g/dL 9.2*   HEMATOCRIT % 30.0*   PLATELETS 10*3/mm3 235       Results from last 7 days  Lab Units 03/07/17  0435   SODIUM mmol/L 141   POTASSIUM mmol/L 3.8   CHLORIDE mmol/L 107   TOTAL CO2 mmol/L 28.0   BUN mg/dL 19   CREATININE mg/dL 1.00   GLUCOSE mg/dL 143*   CALCIUM mg/dL 10.0         Assessment: As noted above.  The CAT scan findings of some patchy groundglass infiltrates bilaterally, but more specifically, now a more solid-type mass with spiculation in the right lower lobe area      Plan: As noted in the subjective.  As  she convalesces from this left foot surgery in the future.  She will need to have another CAT scan and possibly a PET scan and may have to be considered for transthoracic needle biopsy of this area and or a navigational bronchoscopy.  This patient will be considered for tumor Board presentation for recommendations  for follow-up.      Arsalan Feliciano MD - 03/07/17 - 11:20 AM

## 2017-03-07 NOTE — PLAN OF CARE
Problem: Patient Care Overview (Adult)  Goal: Plan of Care Review  Outcome: Ongoing (interventions implemented as appropriate)    03/07/17 1132   Coping/Psychosocial Response Interventions   Plan Of Care Reviewed With patient   Patient Care Overview   Progress improving   Outcome Evaluation   Outcome Summary/Follow up Plan Pt demonstrated improved activity tolerance this date by performing STS t/f and taking ~3 side steps while maintaining NWB status w/ Min Ax2 and a RW. Pt then Max Ax2 for stand pivot to chair 2/2 to fatigue. Pt encouraged to perform BUE HEP throughout the day. Recommend cont skilled IPOT POC.          Problem: Inpatient Occupational Therapy  Goal: Transfer Training Goal 1 LTG- OT  Outcome: Ongoing (interventions implemented as appropriate)    02/26/17 1351 03/06/17 1423 03/07/17 1132   Transfer Training OT LTG   Transfer Training OT LTG, Date Established --  03/06/17 --    Transfer Training OT LTG, Time to Achieve --  by discharge --    Transfer Training OT LTG, Activity Type --  bed to chair /chair to bed;sit to stand/stand to sit;toilet --    Transfer Training OT LTG, [place] Level --  contact guard assist --    Transfer Training OT LTG, Assist Device walker, rolling --  --    Transfer Training OT LTG, Additional Goal --  AAD --    Transfer Training OT LTG, Outcome --  --  goal ongoing       Goal: LB Dressing Goal LTG- OT  Outcome: Ongoing (interventions implemented as appropriate)    02/26/17 1351 03/06/17 1423 03/07/17 1132   LB Dressing OT LTG   LB Dressing Goal OT LTG, Date Established --  03/06/17 --    LB Dressing Goal OT LTG, Time to Achieve --  by discharge --    LB Dressing Goal OT LTG, Activity Type --  Don pants utilizing daryn-dressing technique --    LB Dressing Goal OT LTG, [place] Level --  contact guard assist --    LB Dressing Goal OT LTG, Adaptive Equipment other (see comments)  (with appropriate AD) --  --    LB Dressing Goal OT LTG, Additional Goal --  AAD --    LB  Dressing Goal OT LTG, Outcome --  --  goal ongoing       Goal: Patient Education Goal LTG- OT  Outcome: Ongoing (interventions implemented as appropriate)    03/06/17 1423 03/07/17 1132   Patient Education OT LTG   Patient Education OT LTG, Date Established 03/06/17 --    Patient Education OT LTG, Time to Achieve by discharge --    Patient Education OT LTG, Education Type written program;HEP;precautions per surgeon;positioning;posture/body mechanics;1 hand/daryn technique;home safety;adaptive equipment mgmt;energy conservation;adaptive breathing --    Patient Education OT LTG, Education Understanding verbalizes understanding;demonstrates adequately --    Patient Education OT LTG Outcome --  goal ongoing       Goal: Dynamic Standing Balance Goal LTG-OT  Outcome: Ongoing (interventions implemented as appropriate)    03/06/17 1423 03/07/17 1132   Dynamic Standing Balance OT LTG   Dynamic Standing Balance OT LTG, Date Established 03/06/17 --    Dynamic Standing Balance OT LTG, Time to Achieve by discharge --    Dynamic Standing Balance OT LTG, [place] Level contact guard assist --    Dynamic Standing Balance OT LTG, Assist Device assistive Device --    Dynamic Standing Balance OT LTG, Additional Goal Pt will tolerate standing 1 min for functional activity.  --    Dynamic Standing Balance OT LTG, Outcome --  goal ongoing

## 2017-03-07 NOTE — PROGRESS NOTES
Tamara Langston  1955  [id number]  3/7/2017    CC: No chief complaint on file.  foot infection  Reason for Consultation: Left foot infection     History of present illness:      This is a 61 y.o. female with a history of DM, DINA, COPD, PVD, who was treated in 11/23/16 by Dr. Cruz for left great toe osteomyelitis growing MSSA and GBS and ultimately required left great toe transmetatarsal amputation by Dr. Feliciano. She was discharged home on Vanc/Rocephin IV and oral Flagyl. She had been doing well postoperatively until approximately 1 week ago she started having a low grade fever of 99.0 and SOA. HH nurse noticed a new brownish drainage from her surgical wound on 2/22/17. Dr. Feliciano was contacted and she was seen in the office on 2/23/17. He was concerned with an osteo infection and pt followed up with Dr. Cruz/Dr. Feliciano in the office yesterday. It was decided that she should be admitted and be started on IV antibiotics as well as MRI of the left foot. Left foot culture taken yesterday is growing MSSA. She has been afebrile and labs revealed a WBC of 10.24 and Cr of 1.30. She was started on Vancomycin and Rocephin IV therapy and has received a dose of Zinacef.     MRI of the left foot was concerning for osteomyelitis of the 2nd-3rd-4th metatarsal bones and appearance of an abscess formation around previous transmetatarsal amputation of great toe. Surgical plans for today were for transmetatarsal resection of 2nd,3rd, 4th and 5th toe as well as completion of great toe amputation back to cuneiform bone. Pt ultimately underwent removal of the remainder of the 1st metatarsal bone of the left foot and a portion of the cuneiform bone. We have been asked to see for further evaluation and antibiotic recommendations. Pt was seen in the immediate post operative recovery room and is very drowsy. Seen and agree     2/26 - co foot infection  Hx limited  Co pain  No rash  ROS discussed with staff    2/27  Very complicated  situation as noted by Dr Feliciano  She underwent excision of the remaining portion of the proximal 1st MT by Dr Feliciano  The MRI suggests the possibility of extensive OM of the foot but there is concern that her foot would be destabilized if all the infected bone were to be resected She c/o discomfort at the site of the amputation  2/28 no new c/o   3/1/17  Plans for transmetatarsal amputation noted  In my presence Dr Feliciano quoted a 50/50 chance of limb salvage  3/3/17  Alert; operative findings discussed with Dr Feliciano  Pt w/o complaints  3/6/17  Alert, c/o mild sob despite breathing treatments  Not having a productive cough   I/O negative balance last 3 days   3/7/17 still with some dyspnea; she has developed a mild cough but is unable to produce sputum for culture;  CT reviewed and discussed with patient and Dr Feliciano    Past Medical History   Diagnosis Date   • Bronchitis    • Cervical cancer    • Cholelithiasis 5/11/2016   • Chronic anxiety 2/27/2017   • Chronic bronchitis    • Chronically on benzodiazepine therapy 2/27/2017   • Degenerative arthritis    • Diabetes mellitus    • Dyslipidemia 5/11/2016   • Dyspnea    • Fatty liver disease, nonalcoholic 11/21/2016   • Fibromyalgia    • GERD (gastroesophageal reflux disease)    • H/O echocardiogram    • History of pneumonia    • Hypertension 5/11/2016     16. H/O echocardiogram (V15.89) (Z92.89)  · A.  Echocardiogram of 02/03/2015 reports an ejection fraction of 60-65%, mild concentric     LVH, trace mitral regurgitation, mild tricuspid and pulmonic regurgitation and calculated     RVSP of 35 mmHg, the main pulmonary artery is also mildly dilated.   • Hypothyroidism 5/11/2016     Description: A.  On replacement therapy.   • Nausea    • Obesity    • DINA (obstructive sleep apnea)      intolerant of CPAP therapy   • Osteoporosis    • Osteoporosis    • Polycythemia vera 5/11/2016   • Pulmonary emphysema    • Restrictive ventilatory defect    • Rhinitis    • Uncontrolled  diabetes mellitus 5/11/2016   • Uterine cancer    • Vitamin D deficiency 8/1/2016       Medications:   Antibiotics:  IV Anti-Infectives     Ordered     Dose/Rate Route Frequency Start Stop    03/07/17 1305  AZITHROMYCIN 500 MG/250 ML 0.9% NS IVPB (MBP)     Ordering Provider:  Oksana Cruz MD    500 mg  over 60 Minutes Intravenous Every 24 Hours 03/07/17 1345      03/06/17 1155  metroNIDAZOLE (FLAGYL) IVPB 500 mg     Ordering Provider:  Oksana Cruz MD    500 mg  over 60 Minutes Intravenous Every 8 Hours Scheduled 03/06/17 1400      03/05/17 1041  vancomycin (VANCOCIN) IVPB 1 g (premix) in Dextrose 5% 200 mL     Ordering Provider:  Ahgen Shammisaldeen, RPH    1,000 mg  over 60 Minutes Intravenous Every 12 Hours 03/06/17 0000      03/05/17 0923  vancomycin 2000 mg/500 mL 0.9% NS IVPB ([facility])     Ordering Provider:  Ahmed Shammisaldeen, RPH    20 mg/kg × 95.4 kg  over 120 Minutes Intravenous Once 03/05/17 1100 03/05/17 1242    03/01/17 1359  cefepime (MAXIPIME) 2 g/100 mL 0.9% NS (mbp)     Ordering Provider:  Oksana Cruz MD    2 g Intravenous Every 12 Hours 03/01/17 1500      02/26/17 1946  Pharmacy to dose vancomycin     Ordering Provider:  Arsalan Feliciano MD     Does not apply Continuous PRN 02/26/17 1945      02/24/17 1718  vancomycin 2000 mg/500 mL 0.9% NS IVPB ([facility])     Ordering Provider:  Luis Manuel Rodriguez IV, RPH    2,000 mg  over 120 Minutes Intravenous Once 02/24/17 1800 02/24/17 1932          Allergies:  is allergic to cortisone; oxycontin [oxycodone]; and tolmetin.    Family History: family history includes Alcohol abuse in her brother; Arthritis in her brother, father, mother, and other; Bleeding Disorder in her father; COPD in her sister; Colon polyps in her mother; Diabetes in her brother, father, mother, and other; Diverticulitis in her mother; Heart murmur in her daughter; Kidney disease in her father.    Social History:  reports that she has been smoking Cigarettes.  She has a 24.00  "pack-year smoking history. She has never used smokeless tobacco. She reports that she does not drink alcohol or use illicit drugs.    Review of Systems: All other reviewed and negative except as per HPI - pt groggy this am    Blood pressure 137/59, pulse 63, temperature 98.9 °F (37.2 °C), temperature source Oral, resp. rate 18, height 66\" (167.6 cm), weight 210 lb 4 oz (95.4 kg), SpO2 96 %, not currently breastfeeding.  GENERAL: groggy early this am, in no acute distress.   HEENT: Oropharynx without thrush. . No cervical adenopathy. No neck masses  EYES: . No conjunctival injection. No icterus.   LYMPHATICS: No lymphadenopathy of the neck or axillary or inguinal regions.   HEART: No murmur, gallop, or pericardial friction rub.   LUNGS: wheezes on right,   crackles at bases  ABDOMEN: Soft, nontender, nondistended. No appreciable HSM.    SKIN: Warm and dry without cutaneous eruptions. .   PSYCHIATRIC: Mental status lucid. Cranial nerve function intact.   EXT: post-op bandage intact      DIAGNOSTICS:  Lab Results   Component Value Date    WBC 8.71 03/06/2017    HGB 9.2 (L) 03/06/2017    HCT 30.0 (L) 03/06/2017     03/06/2017     Lab Results   Component Value Date    CRP 57.50 (H) 11/27/2016     Lab Results   Component Value Date    SEDRATE 59 (H) 11/21/2016     Lab Results   Component Value Date    GLUCOSE 143 (H) 03/07/2017    BUN 19 03/07/2017    CREATININE 1.00 03/07/2017    EGFRIFNONA 56 (L) 03/07/2017    EGFRIFAFRI 77 12/13/2016    BCR 19.0 03/07/2017    CO2 28.0 03/07/2017    CALCIUM 10.0 03/07/2017    PROTENTOTREF 7.9 12/13/2016    ALBUMIN 3.50 03/06/2017    LABIL2 1.0 (L) 03/06/2017    AST 71 (H) 03/06/2017    ALT 57 (H) 03/06/2017       Microbiology  MSSA  And Pseudomonas  CXR personally reviewed- increased bibasilar markings    RADIOLOGY:         EXAMINATION: CT CHEST WITHOUT CONTRAST-03/06/2017:       INDICATION: Left lower lobe atelectasis on chest x-ray and increased  vascular congestion; " Z74.09-Other reduced mobility; M86.9-Osteomyelitis,  unspecified; Z74.09-Other reduced mobility; M86.9-Osteomyelitis,  unspecified.       COMPARISON: Compared to chest radiographs of 03/06/2017 and distant CT  data sets of the chest and mediastinum of 2015.      FINDINGS:   1. Small patchy groundglass opacities are identified bilaterally in the  lungs, more numerous and severe in the right lung.  2. In addition, there is a small mass with spiculation and tenting to  the pleura and diaphragm seen at the right lung base posteriorly with  pleural thickening and pleural reaction. This is suspicious and  worrisome.  3. In the contralateral medial left lung base, there is consolidative  airspace opacity with air bronchograms.  4. There are multiple nodules in the thyroid with enlargement of the  isthmus. Scattered lymph nodes are seen in the axillary areas and  mediastinum, some of the mediastinum and subcarinal lymph nodes approach  size criteria.  5. Enlargement of pulmonary arteries is noted consistent with the  patient's known diagnosis of pulmonary hypertension and there is four  chamber mild cardiomegaly without pericardial effusion.  6. A splenule is seen in the upper abdomen and there is a small isthmus  nodule left adrenal gland with a myelolipoma in the left adrenal gland  separate. The liver is unremarkable. The patient does have a worrisome  epicardial lymph node which is within normal limits for size but  unusual.             Assessment and Plan:        Impression:      -Osteomyelitis of left 1st MT s/p removal of the remainder of the 1st metatarsal bone of the left foot and a portion of the cuneiform bone 2/25/17. Culture with MSSA  ;   Psa from 1 of 3 cultures sent 2/25  TMA 3/2/17  --Patchy ground glass infiltrates developing on cefepime and vancomycin  R/O  Atypical pneumonia  --RLL mass  -Recent MSSA/GBS left great toe osteomyelitis s/p left great toe transmetatarsal amputation 11/23/17  -PVD  --Ongoing  tobacco abuse  -DM2  -DINA  -COPD  - Mild MR 2015 echo      PLAN  -add azithromycin for coverage of atypical pneumonia pathogens  - add flagyl for coverage of anaerobic pulmonary pathogens- can probably change to po soon  -check cbc, cmp, bnp  - echo to evaluate her systolic murmur  - continue cefepime  -Continue  vancomycin ; consider stopping the vanc after all cultures are final   --evaluation of RLL mass per Dr Feliciano   Anticipate  minimum 6 weeks rx and probably longer    - Reviewed the guarded prognosis for limb salvage with the pt and her   Even under the best circumstances ie tobacco cessation and compliance with NWB it is not guaranteed that we will be able to avoid BKA    - picc    Discussed with Dr Braden Cruz MD  3/7/2017

## 2017-03-07 NOTE — DISCHARGE PLACEMENT REQUEST
"John Langston (61 y.o. Female)     From Ese Whitlock RN Case Manager. Looking for skilled placement.   Patient is in room N549    Date of Birth Social Security Number Address Home Phone MRN    1955  [id number] [facility] [address] [phone] [id number]    Sikh Marital Status          Yarsanism        Admission Date Admission Type Admitting Provider Attending Provider Department, Room/Bed    2/24/17 Elective Ankush Bain MD Tovar, Jesus Victor, MD [facility] 5B GYN, N549/1    Discharge Date Discharge Disposition Discharge Destination                      Attending Provider: Ankush Bain MD     Allergies:  Cortisone, Oxycontin [Oxycodone], Tolmetin    Isolation:  None   Infection:  None   Code Status:  FULL    Ht:  66\" (167.6 cm)   Wt:  210 lb 4 oz (95.4 kg)    Admission Cmt:  None   Principal Problem:  Osteomyelitis of left foot [M86.9]                 Active Insurance as of 2/24/2017     Primary Coverage     Payor Plan Insurance Group Employer/Plan Group    [employer] MEDICARE REPL Your Style [employer] MEDICARE REPL [id number]     Payor Plan Address Payor Plan Phone Number Effective From Effective To    [address] [address] [phone] 3/1/2013     [place] [address]       Subscriber Name Subscriber Birth Date Member ID       JOHN LANGSTON 1955 [id number]                 Emergency Contacts      (Rel.) Home Phone Work Phone Mobile Phone    Sukhdev Langston (Spouse) [phone] -- --            Emergency Contact Information     Name Relation Home Work Mobile    Sukhdev Langston Spouse [phone]            Insurance Information                [employer] MEDICARE REPL/[employer] MEDICARE REPL Phone: [phone]    Subscriber: John Langston Subscriber#: [id number]    Group#: [id number] Precert#:              History & Physical      Cierra Ayala MD at 2/24/2017  4:28 PM              [facility] Medicine Services  HISTORY AND " PHYSICAL    Primary Care Physician: Harinder Aranda MD    Subjective     Chief Complaint: Increased drainage from left foot wound. Low grade fever    History of Present Illness: This is a 61 year old woman with a past medical history of DM, HTN, HLP, Hypothyroidism, PVD, chronic back pain, polycythemia vera, DINA, Fibromyalgia, GERD, Osteomyelitis of left foot s/p amputation of trans metatarsal of the left great toe in nov 2016. Culture from that admit grew MSSA and group B strep. Patient was a direct admit from Dr. Cruz office. Pt states she has had a low grade fever of 99 and soa over the last week. She says she has noticed green and bloody drainge from her wound over the last week. She had a wound vac on her foot that has been recently removed and was being followed by home health. Home health noticed granulating tissue and change in drainage and they contacted Dr. Feliciano who asked Dr. Cruz to see her today. Patient will be admitted to the hospital service and Dr. Feliciano, Dr. Cruz and [facility] will be consulted to see pt. Will culture wound and obtain MRI of foot. Will start pt on Iv abx and obtain blood cultures. Dr. Feliciano will follow pt for possible surgery on Monday.     Review of Systems   Constitutional: Positive for fever. Negative for activity change, appetite change, chills, diaphoresis and fatigue.        Pt states she has been running low grade fever at home of 99   Respiratory: Positive for shortness of breath. Negative for cough and wheezing.         Pt says over last week she has had SOA   Cardiovascular: Positive for leg swelling. Negative for chest pain and palpitations.        Pt has chronic leg swelling. Left foot has been swelling more    Gastrointestinal: Negative for abdominal pain, constipation, diarrhea, nausea and vomiting.   Genitourinary: Negative for dysuria and urgency.   Musculoskeletal: Positive for back pain.        Pt has chronic back pain.    Skin: Positive for wound.        Pt  has left foot wound post first digit amputation. Left buttock with stage 2 ulcer    Neurological: Positive for numbness. Negative for dizziness, weakness and headaches.        Pt states she has numbness and tingling in bilateral legs and numbness in bilateral feet   Psychiatric/Behavioral: Negative.       Otherwise complete ROS performed and negative except as mentioned in the HPI.    Past Medical History:   Past Medical History   Diagnosis Date   • Bronchitis    • Cervical cancer    • Cholelithiasis 5/11/2016   • Chronic bronchitis    • Degenerative arthritis    • Diabetes mellitus    • Dyslipidemia 5/11/2016   • Dyspnea    • Fatty liver disease, nonalcoholic 11/21/2016   • Fibromyalgia    • GERD (gastroesophageal reflux disease)    • H/O echocardiogram    • History of pneumonia    • Hypertension 5/11/2016     16. H/O echocardiogram (V15.89) (Z92.89)  · A.  Echocardiogram of 02/03/2015 reports an ejection fraction of 60-65%, mild concentric     LVH, trace mitral regurgitation, mild tricuspid and pulmonic regurgitation and calculated     RVSP of 35 mmHg, the main pulmonary artery is also mildly dilated.   • Hypothyroidism 5/11/2016     Description: A.  On replacement therapy.   • Nausea    • Obesity    • DINA (obstructive sleep apnea)      intolerant of CPAP therapy   • Osteoporosis    • Osteoporosis    • Polycythemia vera 5/11/2016   • Pulmonary emphysema    • Restrictive ventilatory defect    • Rhinitis    • Uncontrolled diabetes mellitus 5/11/2016   • Uterine cancer    • Vitamin D deficiency 8/1/2016       Past Surgical History:  Past Surgical History   Procedure Laterality Date   • Lumbar spine surgery       arthrodesis by anterior approach addit interspace   • Back surgery       lumbar fusions x5--multiple times; 1995, 1997, 1998, 1999 and 2008   • Hand surgery Bilateral      x3   • Hysterectomy       status post uterine and cervical cancer   • Tonsillectomy     • Amputation digit Left 11/23/2016     Procedure:  AMPUTATION TRANS METATARSAL - ray amutation of the left great toe;  Surgeon: Arsalan Feliciano MD;  Location: [facility] OR;  Service:    • Cardiac catheterization N/A 11/26/2016     Procedure: Left Heart Cath;  Surgeon: Ash Nuñez MD;  Location:  [facility] CATH INVASIVE LOCATION;  Service:        Family History: family history includes Alcohol abuse in her brother; Arthritis in her brother, father, mother, and other; Bleeding Disorder in her father; COPD in her sister; Colon polyps in her mother; Diabetes in her brother, father, mother, and other; Diverticulitis in her mother; Heart murmur in her daughter; Kidney disease in her father.    Social History:  reports that she has been smoking Cigarettes.  She has a 24.00 pack-year smoking history. She has never used smokeless tobacco. She reports that she does not drink alcohol or use illicit drugs.    Medications:  Prescriptions Prior to Admission   Medication Sig Dispense Refill Last Dose   • acetaminophen (TYLENOL) 325 MG tablet Take 2 tablets by mouth Every 4 (Four) Hours As Needed for mild pain (1-3) or fever (temperature greater than 101F). 100 tablet 0 Taking   • albuterol (PROVENTIL) (2.5 MG/3ML) 0.083% nebulizer solution 2.5 mg Every 6 (Six) Hours As Needed.   Taking   • amLODIPine (NORVASC) 5 MG tablet Take 1 tablet by mouth Daily. 90 tablet 3 Taking   • aspirin  MG EC tablet Take 1 tablet by mouth Daily. 100 tablet 0 Taking   • atorvastatin (LIPITOR) 40 MG tablet Take 1 tablet by mouth Every Night. 90 tablet 3 Taking   • baclofen (LIORESAL) 20 MG tablet TAKE ONE TABLET BY MOUTH THREE TIMES A  tablet 0 Taking   • bisoprolol-hydrochlorothiazide (ZIAC) 5-6.25 MG per tablet TAKE ONE TABLET BY MOUTH DAILY 90 tablet 2 Taking   • busPIRone (BUSPAR) 10 MG tablet TAKE TWO TABLETS BY MOUTH TWICE A  tablet 2 Taking   • cetirizine (ZyrTEC) 10 MG tablet Take 10 mg by mouth Every Night.   Taking   • clonazePAM (KlonoPIN) 0.5 MG tablet TAKE ONE-HALF TABLET BY  MOUTH TWICE A DAY 90 tablet 0 Taking   • clopidogrel (PLAVIX) 75 MG tablet Take 1 tablet by mouth Daily. 90 tablet 3 Taking   • cyanocobalamin 1000 MCG/ML injection 1,000 mcg. Cyanocobalamin 1000 MCG/ML Injection Solution; Patient Sig: Cyanocobalamin 1000 MCG/ML Injection Solution INJECT ONE MILLILITER INJECTION EVERY MONTH; 10; 4; 18-Jun-2015; Active   Taking   • ezetimibe (ZETIA) 10 MG tablet Take 1 tablet by mouth Daily. 90 tablet 3 Taking   • fentaNYL (DURAGESIC) 100 MCG/HR patch Place 1 patch on the skin Every Other Day. 15 patch 0 Taking   • fluticasone (FLONASE) 50 MCG/ACT nasal spray 1 spray into each nostril 2 (Two) Times a Day.   Taking   • furosemide (LASIX) 40 MG tablet Take 1 tablet by mouth Daily. 60 tablet 5 Taking   • levothyroxine (SYNTHROID, LEVOTHROID) 112 MCG tablet TAKE ONE TABLET BY MOUTH DAILY 90 tablet 0 Taking   • lisinopril (PRINIVIL,ZESTRIL) 20 MG tablet Take 1 tablet by mouth Daily. 90 tablet 3 Taking   • LYRICA 100 MG capsule TAKE ONE CAPSULE BY MOUTH THREE TIMES A  capsule 0 Taking   • melatonin 5 MG sublingual tablet sublingual tablet Place 1 tablet under the tongue At Night As Needed (insomnia). 30 tablet 0 Taking   • metoclopramide (REGLAN) 10 MG tablet Take 1 tablet by mouth 3 (Three) Times a Day. 270 tablet 2 Taking   • omeprazole (PriLOSEC) 20 MG capsule TAKE ONE CAPSULE BY MOUTH EVERY DAY 90 capsule 2 Taking   • oxyCODONE-acetaminophen (PERCOCET)  MG per tablet Take 1 tablet by mouth 3 (Three) Times a Day. 90 tablet 0 Taking   • probiotic (CULTURELLE) capsule capsule Take 1 capsule by mouth Daily. 60 capsule 0 Taking   • promethazine (PHENERGAN) 25 MG tablet Take 25 mg by mouth Every 8 (Eight) Hours As Needed for nausea or vomiting.   Taking   • SITagliptin (JANUVIA) 100 MG tablet Take 1 tablet by mouth Daily. 30 tablet 0 Taking   • SPIRIVA RESPIMAT 2.5 MCG/ACT aerosol solution INHALE TWO PUFF(S) BY MOUTH DAILY 1 inhaler 4 Taking       Allergies:  Allergies  "  Allergen Reactions   • Cortisone Other (See Comments)     Hotness all over body and hyper   • Oxycontin [Oxycodone] Other (See Comments)     psoriasis   • Tolmetin Rash     Tolectin-rash and itching         Objective     Physical Exam:  Vital Signs:   Visit Vitals   • /67   • Pulse 70   • Temp 98 °F (36.7 °C) (Oral)   • Resp 16   • Ht 66\" (167.6 cm)   • Wt 211 lb (95.7 kg)   • LMP  (LMP Unknown)   • SpO2 (!) 86%   • BMI 34.06 kg/m2     Physical Exam   Constitutional: She is oriented to person, place, and time. She appears well-developed and well-nourished. No distress.   Pt resting in bed in NAD, family at bedside    HENT:   Head: Normocephalic and atraumatic.   Eyes: EOM are normal. Pupils are equal, round, and reactive to light.   Neck: Normal range of motion. Neck supple. No tracheal deviation present.   Cardiovascular: Normal rate, regular rhythm and intact distal pulses.  Exam reveals no gallop and no friction rub.    Murmur heard.  Pulmonary/Chest: Effort normal. No stridor. No respiratory distress. She has wheezes. She has no rales.   Exp wheezes through out  all lobes    Abdominal: Soft. Bowel sounds are normal. She exhibits no distension. There is no tenderness. There is no guarding.   Musculoskeletal: She exhibits edema.   Pt has limited rom on left foot she is partial weight bearing only wears special boot    Neurological: She is alert and oriented to person, place, and time.   Pt has numbness and tingling in bilateral legs and numbness in both feet    Skin: Skin is warm and dry. No rash noted. She is not diaphoretic. There is pallor.   Pt has dressing to left foot wound from amputation of first digit. Dr Feliciano at bedside just changed dressing. Pt also has a stage 2 ulcer to left buttock   Psychiatric: She has a normal mood and affect. Her behavior is normal. Judgment and thought content normal.   Nursing note and vitals reviewed.            Results Reviewed:    Results from last 7 days  Lab Units " 02/24/17  1556   WBC 10*3/mm3 10.24   HEMOGLOBIN g/dL 11.5   PLATELETS 10*3/mm3 266       Results from last 7 days  Lab Units 02/24/17  1644   SODIUM mmol/L 141   POTASSIUM mmol/L 3.8   TOTAL CO2 mmol/L 34.0*   CREATININE mg/dL 1.30   GLUCOSE mg/dL 117*   CALCIUM mg/dL 9.7       I have personally reviewed and interpreted available lab data, radiology studies and ECG obtained at time of admission.     Assessment / Plan     Assessment/Problem List:   Principal Problem:    Osteomyelitis  Active Problems:    Chronic back pain    Uncontrolled diabetes mellitus    Dyslipidemia    Fibromyalgia    Gastroesophageal reflux disease without esophagitis    Hypertension    Hypothyroidism    Peripheral vascular disease    Diabetic polyneuropathy associated with type 2 diabetes mellitus    Osteomyelitis (presumed) of left 1st metatarsal  -- Bcx2  -- consult ID  -- consult Dr. Feliciano  -- start IV abx per Dr Cruz's recc's (Vanc/CTX)  -- MRI of foot pending    DM2  -- hold home dose of januvia  -- start on low dose s/s  -- check A1C in am     Chronic back pain/Fibromyalgia  -- continue home pain medication  -- continue home muscle relaxers    HTN  -- continue home medications  -- monitor     GERD--  -- continue home dose of reglan   -- start protonix    Hypothyroidism  -- continue home dose of synthroid  -- check TSH in am     HLP  -- continue home dose of Lipitor  -- continue home dose of zetia  -- check lipid panel in am             Plan:        DVT prophylaxis:  Heparin sq q12hrs, teds and scuds on right leg only   Code Status: full code           Brief Attending Note       I have seen and examined the patient, performing an independent face-to-face diagnostic evaluation with plan of care reviewed and developed with the advanced practice clinician (APC).      Brief Summary Statement/HPI:   Please see full history above by APC.  Briefly, 61-year-old female sent as a direct admit from [facility] clinic for presumed osteomyelitis of the  first metatarsal.  She has a history of prior diabetic left first toe wound progressing to osteomyelitis which grew group B strep and MSSA, underwent toe amputation by Dr. Arsalan Feliciano Nov 2016 and completed a full course of IV antibiotics through [facility].  Patient states that she has had green drainage and worsened erythema around the left first metatarsal for approximately one week, today she was seen by Dr. Cruz and [facility] clinic who had concerns due to the edematous and dusky nature of the surrounding tissue for underlying osteomyelitis and need for probable further amputation or possible operative I&D.  Patient denies pain in the foot likely secondary to severe neuropathy, she does state that she's had worsened edema in the left lower extremity from the mid tibia down for approximately one week which is exacerbated by walking, she denies fevers/chills.  Per Dr. Cruz, plan is to initiate vancomycin and Rocephin and Dr. Feliciano has seen the patient upon arrival to the floor and plans to review MRI of the foot once it's available and likely proceed with operative intervention on Monday.      Attending Physical Exam:  Temp:  [98 °F (36.7 °C)-98.3 °F (36.8 °C)] 98 °F (36.7 °C)  Heart Rate:  [68-72] 70  Resp:  [16] 16  BP: (121-147)/(57-67) 147/67  Constitutional: no acute distress, awake, alert  Eyes: PERRLA, sclerae anicteric, no conjunctival injection  Neck: supple, no thyromegaly, trachea midline  Respiratory: Clear to auscultation bilaterally, nonlabored respirations   Cardiovascular: RRR, 3/6 СЕРГЕЙ murmur, rubs, or gallops, palpable pedal pulses bilaterally but faint  Gastrointestinal: Positive bowel sounds, soft, nontender, nondistended  Musculoskeletal: 1+ Left foot/ankle edema, no clubbing or cyanosis to bilateral lower extremities  Psychiatric: oriented x 3, appropriate affect, cooperative  Neurologic: Strength symmetric in all extremities, Cranial Nerves grossly intact to confrontation   Derm:  Left first toe  amputation with foul-smelling scant dark yellow discharge from this desquamated area of amputation, confluent erythema around the granulating margins of the amputation wound but not widespread.  Venous stasis dermatitis of bilateral lower extremities.      Brief Assessment/Plan :    See above for further detailed assessment and plan developed with APC which I have reviewed and/or edited.    I believe this patient meets INPATIENT status due to the need for care which can only be reasonably provided in an hospital setting such as aggressive/expedited ancillary services and/or consultation services and the necessity for IV medications, close physician monitoring and/or the possible need for procedures.  In such, I feel patient’s risk for adverse outcomes and need for care warrant INPATIENT evaluation and predict the patient’s care encounter to likely last beyond 2 midnights.      Cierra Ayala MD  02/24/17  8:24 PM            Electronically signed by Cierra Ayala MD at 2/24/2017  8:25 PM      Arsalan Feliciano MD at 2/24/2017  4:43 PM          History & Physical           Chief complaint: Nonhealing left great toe amputation site with drainage    HPI: Patient is a 61-year-old diabetic who had a left great toe transmetatarsal amputation on 11/23/2016 for osteomyelitis.  The amputation was performed by me and the surgical wound was left open to close by secondary intent with management of the wound with wound VAC by the home health services.  The wound was healing nicely with good granulation tissue and I saw the patient in the office on several occasions  The wound VAC was discontinued approximately 3 weeks ago and the wound was then managed with wet to damp saline dressings changed twice daily.  The home health nursing service called our office on 02/22/2017 stated that the wound had a yellow drainage with brown discoloration of the granulation tissue. I saw the patient on 02/23/2017 and was concerned about the appearance of  the granulation tissue.  Although I saw no drainage from the wound.  The patient did show me some bandages from previous dressing changes recently and they did have a green and brown discoloration.  I  discussed this by telephone with Dr. Cruz, of infectious disease who  has been  treating her postoperatively and she agreed to see the patient today in the office and asked that I do  tissue cultures and Gram stain.  After Dr. Cruz saw the patient in the office earlier this a.m. and I saw the patient with her and we both agreed that we should place her in the hospital for antibiotics intravenously and to get an MRI of the foot to look for osteomyelitis.  Of note is the fact that the Gram stain of the tissue culture showed gram-positive cocci and it is been identified now as Staphylococcus aureus with sensitivities pending.  The patient has been admitted to the hospital service for overall, medical management and I will be following the patient for possible surgical exploration of the prior amputation site.      Past Medical History   Diagnosis Date   • Bronchitis    • Cervical cancer    • Cholelithiasis 5/11/2016   • Chronic bronchitis    • Degenerative arthritis    • Diabetes mellitus    • Dyslipidemia 5/11/2016   • Dyspnea    • Fatty liver disease, nonalcoholic 11/21/2016   • Fibromyalgia    • GERD (gastroesophageal reflux disease)    • H/O echocardiogram    • History of pneumonia    • Hypertension 5/11/2016     16. H/O echocardiogram (V15.89) (Z92.89)  · A.  Echocardiogram of 02/03/2015 reports an ejection fraction of 60-65%, mild concentric     LVH, trace mitral regurgitation, mild tricuspid and pulmonic regurgitation and calculated     RVSP of 35 mmHg, the main pulmonary artery is also mildly dilated.   • Hypothyroidism 5/11/2016     Description: A.  On replacement therapy.   • Nausea    • Obesity    • DINA (obstructive sleep apnea)      intolerant of CPAP therapy   • Osteoporosis    • Osteoporosis    •  Polycythemia vera 5/11/2016   • Pulmonary emphysema    • Restrictive ventilatory defect    • Rhinitis    • Uncontrolled diabetes mellitus 5/11/2016   • Uterine cancer    • Vitamin D deficiency 8/1/2016     Past Surgical History   Procedure Laterality Date   • Lumbar spine surgery       arthrodesis by anterior approach addit interspace   • Back surgery       lumbar fusions x5--multiple times; 1995, 1997, 1998, 1999 and 2008   • Hand surgery Bilateral      x3   • Hysterectomy       status post uterine and cervical cancer   • Tonsillectomy     • Amputation digit Left 11/23/2016     Procedure: AMPUTATION TRANS METATARSAL - ray amutation of the left great toe;  Surgeon: Arsalan Feliciano MD;  Location:  [facility] OR;  Service:    • Cardiac catheterization N/A 11/26/2016     Procedure: Left Heart Cath;  Surgeon: Ash Nuñez MD;  Location:  [facility] CATH INVASIVE LOCATION;  Service:      Family History   Problem Relation Age of Onset   • Arthritis Mother    • Diabetes Mother    • Colon polyps Mother    • Diverticulitis Mother    • Arthritis Father    • Bleeding Disorder Father    • Diabetes Father    • Kidney disease Father    • COPD Sister      currently smokes   • Arthritis Brother    • Diabetes Brother    • Alcohol abuse Brother    • Heart murmur Daughter    • Arthritis Other    • Diabetes Other      Social History   Substance Use Topics   • Smoking status: Current Every Day Smoker     Packs/day: 0.50     Years: 48.00     Types: Cigarettes   • Smokeless tobacco: Never Used      Comment: currently trying to quit by using Chantix and has cut smoking habit in half, Pt now smoking half a pack a day    • Alcohol use No      Occupation: Patient is a disabled   Lives tat home with her   Prescriptions Prior to Admission   Medication Sig Dispense Refill Last Dose   • acetaminophen (TYLENOL) 325 MG tablet Take 2 tablets by mouth Every 4 (Four) Hours As Needed for mild pain (1-3) or fever (temperature greater than 101F).  100 tablet 0 Taking   • albuterol (PROVENTIL) (2.5 MG/3ML) 0.083% nebulizer solution 2.5 mg Every 6 (Six) Hours As Needed.   Taking   • amLODIPine (NORVASC) 5 MG tablet Take 1 tablet by mouth Daily. 90 tablet 3 Taking   • aspirin  MG EC tablet Take 1 tablet by mouth Daily. 100 tablet 0 Taking   • atorvastatin (LIPITOR) 40 MG tablet Take 1 tablet by mouth Every Night. 90 tablet 3 Taking   • baclofen (LIORESAL) 20 MG tablet TAKE ONE TABLET BY MOUTH THREE TIMES A  tablet 0 Taking   • bisoprolol-hydrochlorothiazide (ZIAC) 5-6.25 MG per tablet TAKE ONE TABLET BY MOUTH DAILY 90 tablet 2 Taking   • busPIRone (BUSPAR) 10 MG tablet TAKE TWO TABLETS BY MOUTH TWICE A  tablet 2 Taking   • cetirizine (ZyrTEC) 10 MG tablet Take 10 mg by mouth Every Night.   Taking   • clonazePAM (KlonoPIN) 0.5 MG tablet TAKE ONE-HALF TABLET BY MOUTH TWICE A DAY 90 tablet 0 Taking   • clopidogrel (PLAVIX) 75 MG tablet Take 1 tablet by mouth Daily. 90 tablet 3 Taking   • cyanocobalamin 1000 MCG/ML injection 1,000 mcg. Cyanocobalamin 1000 MCG/ML Injection Solution; Patient Sig: Cyanocobalamin 1000 MCG/ML Injection Solution INJECT ONE MILLILITER INJECTION EVERY MONTH; 10; 4; 18-Jun-2015; Active   Taking   • ezetimibe (ZETIA) 10 MG tablet Take 1 tablet by mouth Daily. 90 tablet 3 Taking   • fentaNYL (DURAGESIC) 100 MCG/HR patch Place 1 patch on the skin Every Other Day. 15 patch 0 Taking   • fluticasone (FLONASE) 50 MCG/ACT nasal spray 1 spray into each nostril 2 (Two) Times a Day.   Taking   • furosemide (LASIX) 40 MG tablet Take 1 tablet by mouth Daily. 60 tablet 5 Taking   • levothyroxine (SYNTHROID, LEVOTHROID) 112 MCG tablet TAKE ONE TABLET BY MOUTH DAILY 90 tablet 0 Taking   • lisinopril (PRINIVIL,ZESTRIL) 20 MG tablet Take 1 tablet by mouth Daily. 90 tablet 3 Taking   • LYRICA 100 MG capsule TAKE ONE CAPSULE BY MOUTH THREE TIMES A  capsule 0 Taking   • melatonin 5 MG sublingual tablet sublingual tablet Place 1 tablet  under the tongue At Night As Needed (insomnia). 30 tablet 0 Taking   • metoclopramide (REGLAN) 10 MG tablet Take 1 tablet by mouth 3 (Three) Times a Day. 270 tablet 2 Taking   • omeprazole (PriLOSEC) 20 MG capsule TAKE ONE CAPSULE BY MOUTH EVERY DAY 90 capsule 2 Taking   • oxyCODONE-acetaminophen (PERCOCET)  MG per tablet Take 1 tablet by mouth 3 (Three) Times a Day. 90 tablet 0 Taking   • probiotic (CULTURELLE) capsule capsule Take 1 capsule by mouth Daily. 60 capsule 0 Taking   • promethazine (PHENERGAN) 25 MG tablet Take 25 mg by mouth Every 8 (Eight) Hours As Needed for nausea or vomiting.   Taking   • SITagliptin (JANUVIA) 100 MG tablet Take 1 tablet by mouth Daily. 30 tablet 0 Taking   • SPIRIVA RESPIMAT 2.5 MCG/ACT aerosol solution INHALE TWO PUFF(S) BY MOUTH DAILY 1 inhaler 4 Taking     Allergies:  Cortisone; Oxycontin [oxycodone]; and Tolmetin    Review of Systems:  Constitutional: The patient is worried about the drainage from the wound but no fever or chills.  HEENT: No headaches or blurred vision.  RESPIRATORY:chronic cough.  Cardiovascular: Recent cath showed nonobstructive coronary artery disease.  Endocrine: Long history of diabetes mellitus.  Musculoskeletal: Arthritis and prior operations on her back and thumbs.  Gastrointestinal: No history of melena.  Genitourinary: History of prior hysterectomy for uterine cancer.  Neurological: No history of stroke or seizures.      Physical Exam:  General appearance: Alert and oriented ×3  H EENT: Normocephalic with clear sclera  Respiratory: Clear lung fields bilaterally.  Cardiovascular: Harsh systolic murmur right sternal border.  She has strong radial pulses and femoral pulses and pedal pulses bilaterally.  Abdomen: Slightly obese with good bowel sounds and no tenderness.  Extremities: no cyanosis, clubbing, or edema.  Neurological: Grossly intact.  Exam of the left great toe amputation site: Hypertrophic granulation tissues which is pale in  coloration, but no drainage elicited from palpation and no tenderness noted.     Labs:    Results from last 7 days  Lab Units 02/24/17  1556   WBC 10*3/mm3 10.24   HEMOGLOBIN g/dL 11.5   HEMATOCRIT % 36.4   PLATELETS 10*3/mm3 266                     Imaging:MRI of the left foot is pending at this time      Assessment: #1.  Post op transmetatarsal amputation of the left great toe for osteomyelitis performed on 11/23/2016, now with the amputation site with hypertropic granulation tissue and poor wound healing with Staphylococcus aureus growing from a tissue culture done yesterday from the granulation tissue area.  #2.  Diabetes mellitus      Plan:After seeing the patient with Dr. Cruz today.  We both agree that the patient needs admission to the hospital for intravenous antibiotics and an MRI of the left foot to rule out osteomyelitis of the remaining portion of the metatarsal bone of the great toe.  The patient has been admitted to the hospitalist service for overall medical management.  The patient  most likely will need exploration of this prior amputation site.    Arsalan Feliciano MD  02/24/17  4:44 PM               Electronically signed by Arsalan Feliciano MD at 2/24/2017  5:50 PM        Hospital Medications (active)       Dose Frequency Start End    !Vancomycin Trough is scheduled on Tuesday 3/7 at 1130. Please hold the 1200 dose on Tuesday  3/7 until the level is back and has been evaluated by pharmacy  Once 3/7/2017     Sig - Route: 1 (One) Time. - Does not apply    acetaminophen (TYLENOL) tablet 650 mg 650 mg Every 4 Hours PRN 2/24/2017     Sig - Route: Take 2 tablets by mouth Every 4 (Four) Hours As Needed for mild pain (1-3) or fever (temperature greater than 101F). - Oral    albuterol (PROVENTIL) nebulizer solution 0.5% 2.5 mg/0.5mL 2.5 mg Every 6 Hours PRN 2/24/2017     Sig - Route: Take 0.5 mL by nebulization Every 6 (Six) Hours As Needed (soa or wheezing). - Nebulization    amLODIPine (NORVASC) tablet 5 mg  5 mg Daily 2/24/2017     Sig - Route: Take 1 tablet by mouth Daily. - Oral    aspirin EC tablet 325 mg 325 mg Daily 2/24/2017     Sig - Route: Take 1 tablet by mouth Daily. - Oral    atorvastatin (LIPITOR) tablet 40 mg 40 mg Nightly 2/24/2017     Sig - Route: Take 1 tablet by mouth Every Night. - Oral    baclofen (LIORESAL) tablet 20 mg 20 mg Every 8 Hours Scheduled 2/24/2017     Sig - Route: Take 2 tablets by mouth Every 8 (Eight) Hours. - Oral    bisoprolol-hydrochlorothiazide (ZIAC) 5-6.25 MG per tablet 1 tablet 1 tablet Every 24 Hours Scheduled 3/1/2017     Sig - Route: Take 1 tablet by mouth Daily. - Oral    busPIRone (BUSPAR) tablet 20 mg 20 mg Every 12 Hours Scheduled 2/24/2017     Sig - Route: Take 2 tablets by mouth Every 12 (Twelve) Hours. - Oral    cefepime (MAXIPIME) 2 g/100 mL 0.9% NS (mbp) 2 g Every 12 Hours 3/1/2017     Sig - Route: Infuse 100 mL into a venous catheter Every 12 (Twelve) Hours. - Intravenous    cetirizine (zyrTEC) tablet 10 mg 10 mg Nightly 2/24/2017     Sig - Route: Take 1 tablet by mouth Every Night. - Oral    clonazePAM (KlonoPIN) tablet 0.25 mg 0.25 mg Every 12 Hours Scheduled 2/24/2017 3/6/2017    Sig - Route: Take 0.5 tablets by mouth Every 12 (Twelve) Hours. - Oral    dextrose (GLUTOSE) oral gel 15 g 15 g Every 15 Minutes PRN 2/24/2017     Sig - Route: Take 15 g by mouth Every 15 (Fifteen) Minutes As Needed for low blood sugar (Blood Sugar Less Than 70, Patient Alert, Is Not NPO & Can Safely Swallow). - Oral    ezetimibe (ZETIA) tablet 10 mg 10 mg Daily 2/24/2017     Sig - Route: Take 1 tablet by mouth Daily. - Oral    Notes to Pharmacy: Is not carried by hospital if pt wants to bring in her own medication she can take it    fentaNYL (DURAGESIC) 100 MCG/HR patch 1 patch 1 patch Every Other Day 2/26/2017 3/8/2017    Sig - Route: Place 1 patch on the skin Every Other Day. - Transdermal    fluticasone (FLONASE) 50 MCG/ACT nasal spray 1 spray 1 spray 2 Times Daily 2/24/2017     Sig  - Route: 1 spray into each nostril 2 (Two) Times a Day. - Nasal    furosemide (LASIX) tablet 40 mg 40 mg Daily 2/24/2017     Sig - Route: Take 1 tablet by mouth Daily. - Oral    glucagon (GLUCAGEN) injection 1 mg 1 mg Every 15 Minutes PRN 2/24/2017     Sig - Route: Inject 1 mg under the skin Every 15 (Fifteen) Minutes As Needed (Blood Glucose Less Than 70 - Patient Without IV Access - Unresponsive, NPO or Unable To Safely Swallow). - Subcutaneous    heparin (porcine) 5000 UNIT/ML injection 5,000 Units 5,000 Units Every 12 Hours Scheduled 3/4/2017     Sig - Route: Inject 1 mL under the skin Every 12 (Twelve) Hours. - Subcutaneous    insulin detemir (LEVEMIR) injection 10 Units 10 Units Every 12 Hours Scheduled 2/26/2017     Sig - Route: Inject 10 Units under the skin Every 12 (Twelve) Hours. - Subcutaneous    insulin lispro (humaLOG) injection 2-7 Units 2-7 Units 4 Times Daily Before Meals & Nightly 2/24/2017     Sig - Route: Inject 2-7 Units under the skin 4 (Four) Times a Day Before Meals & at Bedtime. - Subcutaneous    ipratropium-albuterol (DUO-NEB) nebulizer solution 3 mL 3 mL 4 Times Daily - RT 2/27/2017     Sig - Route: Take 3 mL by nebulization 4 (Four) Times a Day. - Nebulization    lactated ringers infusion 9 mL/hr Continuous 3/2/2017     Sig - Route: Infuse 9 mL/hr into a venous catheter Continuous. - Intravenous    levothyroxine (SYNTHROID, LEVOTHROID) tablet 112 mcg 112 mcg Every Early Morning 2/25/2017     Sig - Route: Take 1 tablet by mouth Every Morning. - Oral    lisinopril (PRINIVIL,ZESTRIL) tablet 20 mg 20 mg Every 24 Hours Scheduled 2/24/2017     Sig - Route: Take 1 tablet by mouth Daily. - Oral    melatonin sublingual tablet 5 mg 5 mg Nightly PRN 2/24/2017     Sig - Route: Place 1 tablet under the tongue At Night As Needed (insomnia). - Sublingual    metoclopramide (REGLAN) tablet 5 mg 5 mg 3 Times Daily Before Meals 3/5/2017     Sig - Route: Take 1 tablet by mouth 3 (Three) Times a Day Before  Meals. - Oral    metroNIDAZOLE (FLAGYL) IVPB 500 mg 500 mg Every 8 Hours Scheduled 3/6/2017     Sig - Route: Infuse 100 mL into a venous catheter Every 8 (Eight) Hours. - Intravenous    Morphine injection 2 mg 2 mg Every 3 Hours PRN 3/2/2017 3/12/2017    Sig - Route: Infuse 0.2 mL into a venous catheter Every 3 (Three) Hours As Needed for moderate pain (4-6) or severe pain (7-10). - Intravenous    Morphine injection 4 mg 4 mg Every 3 Hours PRN 3/2/2017 3/12/2017    Sig - Route: Infuse 0.4 mL into a venous catheter Every 3 (Three) Hours As Needed for severe pain (7-10). - Intravenous    mupirocin (BACTROBAN) 2 % nasal ointment 1 application 1 application Every 12 Hours PRN 3/1/2017     Sig - Route: 1 application by Each Nare route Every 12 (Twelve) Hours As Needed (Administer 12 hrs prior to surgery and morning of surgery.). - Each Nare    nicotine (NICODERM CQ) 21 MG/24HR patch 1 patch 1 patch Every 24 Hours Scheduled 2/24/2017     Sig - Route: Place 1 patch on the skin Daily. - Transdermal    oxyCODONE-acetaminophen (PERCOCET)  MG per tablet 1 tablet 1 tablet Every 4 Hours PRN 2/26/2017     Sig - Route: Take 1 tablet by mouth Every 4 (Four) Hours As Needed for severe pain (7-10) (pain). - Oral    pantoprazole (PROTONIX) EC tablet 40 mg 40 mg Every Early Morning 2/25/2017     Sig - Route: Take 1 tablet by mouth Every Morning. - Oral    Pharmacy to dose vancomycin  Continuous PRN 2/26/2017     Sig - Route: Continuous As Needed for consult. - Does not apply    pregabalin (LYRICA) capsule 100 mg 100 mg Every 8 Hours Scheduled 2/24/2017     Sig - Route: Take 1 capsule by mouth Every 8 (Eight) Hours. - Oral    probiotic (CULTURELLE) capsule 1 capsule 1 capsule Daily 2/24/2017     Sig - Route: Take 1 capsule by mouth Daily. - Oral    promethazine (PHENERGAN) tablet 25 mg 25 mg Every 8 Hours PRN 2/24/2017     Sig - Route: Take 1 tablet by mouth Every 8 (Eight) Hours As Needed for nausea or vomiting. - Oral     sennosides-docusate sodium (SENOKOT-S) 8.6-50 MG tablet 2 tablet 2 tablet Nightly 2/26/2017     Sig - Route: Take 2 tablets by mouth Every Night. - Oral    simethicone (MYLICON) chewable tablet 80 mg 80 mg 4 Times Daily PRN 2/24/2017     Sig - Route: Chew 1 tablet 4 (Four) Times a Day As Needed for flatulence. - Oral    sodium chloride (OCEAN) nasal spray 2 spray 2 spray Every 6 Hours 2/27/2017     Sig - Route: 2 sprays by Each Nare route Every 6 (Six) Hours. - Each Nare    sodium chloride (OCEAN) nasal spray 2 spray 2 spray As Needed 2/27/2017     Sig - Route: 2 sprays by Each Nare route As Needed for congestion. - Each Nare    sodium chloride 0.45 % infusion 100 mL/hr Continuous 3/2/2017     Sig - Route: Infuse 100 mL/hr into a venous catheter Continuous. - Intravenous    sodium chloride 0.45 % infusion 50 mL/hr Continuous 3/3/2017     Sig - Route: Infuse 50 mL/hr into a venous catheter Continuous. - Intravenous    sodium chloride 0.9 % flush 1-10 mL 1-10 mL As Needed 3/1/2017     Sig - Route: Infuse 1-10 mL into a venous catheter As Needed for line care. - Intravenous    sodium chloride 0.9 % flush 10 mL 10 mL As Needed 3/2/2017     Sig - Route: 10 mL by Intracatheter route As Needed for line care. - Intracatheter    Cosign for Ordering: Required by Oksana Cruz MD    vancomycin (VANCOCIN) IVPB 1 g (premix) in Dextrose 5% 200 mL 1,000 mg Every 12 Hours 3/6/2017     Sig - Route: Infuse 200 mL into a venous catheter Every 12 (Twelve) Hours. - Intravenous             Physician Progress Notes (most recent note)      Arsalan Feliciano MD at 3/7/2017  6:34 AM  Version 1 of 1         Cardiothoracic Surgery Progress Note      POD #: 4-left transmetatarsal amputation, second, third, fourth and fifth toes  #9- completion of amputation of first metatarsal bone and surrounded with infected granulation tissue   LOS: 11 days      Subjective: Awake and alert.  No complaints        Objective:  Vital Signs vital signs below  are noted.  She is oriented ×3  Temp:  [98.1 °F (36.7 °C)-98.4 °F (36.9 °C)] 98.2 °F (36.8 °C)  Heart Rate:  [56-73] 60  Resp:  [16-20] 16  BP: (121-177)/(56-73) 156/73    Physical Exam:   General Appearance:    Lungs:   Heart:   Skin:   Incision:   The transmetatarsal left foot amputation dressing was changed.  The skin edges are viable.  There is slight swelling in the dorsal aspect of the foot above the amputation incision  Results: CT scan of chest from yesterday, results are still pending    Results from last 7 days  Lab Units 03/06/17  1215   WBC 10*3/mm3 8.71   HEMOGLOBIN g/dL 9.2*   HEMATOCRIT % 30.0*   PLATELETS 10*3/mm3 235       Results from last 7 days  Lab Units 03/07/17  0435   SODIUM mmol/L 141   POTASSIUM mmol/L 3.8   CHLORIDE mmol/L 107   TOTAL CO2 mmol/L 28.0   BUN mg/dL 19   CREATININE mg/dL 1.00   GLUCOSE mg/dL 143*   CALCIUM mg/dL 10.0         Assessment: #1.  Postop day 5.  Left foot transmetatarsal amputation.  The second, third, fourth and fifth toes.  Incision is healing well.  Skin margins are viable.  Slight swelling of the dorsal aspect of the foot above the amputation incision.  Wound VAC is intact of the great toe amputation site      Plan: Continue with wound VAC to the left great toe amputation site and dressing changes to the transmetatarsal amputation site left foot.  We will try to obtain today.  The CAT scan results of the chest from yesterday      Arsalan Feliciano MD - 03/07/17 - 6:34 AM           Electronically signed by Arsalan Feliciano MD at 3/7/2017  6:49 AM           Consult Notes (most recent note)      Katrin Simon RP at 3/5/2017 10:36 AM  Version 1 of 1         Pharmacokinetic Consult - Vancomycin Dosing  Tamara Langston is a 61 y.o. female on vancomycin/cefepime for osteomyelitis. Pharmacy was consulted to dose vancomycin    Relevant clinical data and objective history reviewed:  CREATININE   Date Value Ref Range Status   03/05/2017 0.90 0.60 - 1.30 mg/dL Final    03/03/2017 0.80 0.60 - 1.30 mg/dL Final     BUN   Date Value Ref Range Status   03/05/2017 17 9 - 23 mg/dL Final   03/03/2017 16 9 - 23 mg/dL Final     Estimated Creatinine Clearance: 74.4 mL/min (by C-G formula based on Cr of 0.9).  I/O last 3 completed shifts:  In: 680 [P.O.:480; IV Piggyback:200]  Out: 3775 [Urine:3700]    Temp Readings from Last 3 Encounters:   03/05/17 98.3 °F (36.8 °C) (Oral)   02/23/17 98.2 °F (36.8 °C)   02/16/17 98.5 °F (36.9 °C) (Temporal Artery )     Weight: 210 lb 4 oz (95.4 kg)         CULTURE   Date Value Ref Range Status   03/02/2017 No growth at 2 days  Preliminary     Lab Results   Component Value Date    VANCOTROUGH 5.60 (L) 03/05/2017        Assessment/Plan  WBC was elevated on 3/3.  Tissue cultures are NGTD. Renal function has been stable with a creatinine of 0.9. Estimated CrCl is 70-75 ml/min.  Urine output is adequate. With a goal trough trough of 15-20, vancomycin level is sub-therapeutic with a value of 5.60 ( collected at 0803). Pharmacy will load the patient again with vancomycin 20 mg/kg followed by vancomycin 1 gram IV Q12h as a maintenance regimen. Pharmacy will obtain a trough level on Tuesday to assess the need for dosage adjustments.  Pharmacy will continue to follow the patient’s culture results and clinical progress daily.    Thank you,  Katrin Simon PharmSERVANDO.  3/5/2017  10:56 AM       Electronically signed by Katrin Simon RPH at 3/5/2017 10:57 AM           Nutrition Notes (most recent note)      Shelli Brady RD at 3/3/2017 11:38 AM  Version 1 of 1         Adult Nutrition  Assessment/PES    Patient Name:  Tamara Langston  YOB: 1955  MRN: [id number]  Admit Date:  2/24/2017    Assessment Date:  3/3/2017        Reason for Assessment       03/03/17 1132    Reason for Assessment    Reason For Assessment/Visit length of stay    Time Spent (min) 5                Anthropometrics       03/03/17 1133    Anthropometrics    Height 167.6 cm  "(66\")    Weight 95.4 kg (210 lb 4 oz)    Ideal Body Weight (IBW)    Ideal Body Weight (IBW), Female 59.98    % Ideal Body Weight 159.34    Body Mass Index (BMI)    BMI (kg/m2) 34.01                  Nutrition Prescription Ordered       03/03/17 1133    Nutrition Prescription PO    Current PO Diet --   No diet order, RD observed            Evaluation of Received Nutrient/Fluid Intake       03/03/17 1133    PO Evaluation    Number of Meals 5    % PO Intake 100   Advised by pt. she received waffles this am and ordered sand. and soup for lunch.              Problem/Interventions:        Problem 1       03/03/17 1134    Nutrition Diagnoses Problem 1    Problem 1 Nutrition Appropriate for Condition at this Time    Etiology (related to) Factors Affecting Nutrition    Appetite Good    Signs/Symptoms (evidenced by) PO Intake    Percent (%) intake recorded 100 %    Over number of meals 5                    Intervention Goal       03/03/17 1134    Intervention Goal    General Nutrition support treatment            Nutrition Intervention       03/03/17 1135    Nutrition Intervention    RD/Tech Action Follow Tx progress   Asked nsg. for assistance in obtaining a diet order for pt. She stated she would check into it.              Education/Evaluation       03/03/17 1135    Education    Education --   MNT discussed with pt.    Monitor/Evaluation    Monitor Per protocol          Electronically signed by:  Shelli Brady RD  03/03/17 11:38 AM     Electronically signed by Shelli Brady RD at 3/3/2017 11:38 AM           Physical Therapy Notes (most recent note)      Lane Yates, PT at 3/6/2017  9:12 AM  Version 1 of 1         Problem: Patient Care Overview (Adult)  Goal: Plan of Care Review  Outcome: Ongoing (interventions implemented as appropriate)    03/06/17 0805   Coping/Psychosocial Response Interventions   Plan Of Care Reviewed With patient   Outcome Evaluation   Outcome Summary/Follow up Plan wound vac intact with " no issues to this point.          Problem: Inpatient Physical Therapy  Goal: Wound Care Goal 1 LTG- PT  Outcome: Ongoing (interventions implemented as appropriate)    17 1332 17 0905   Wound Care PT LTG   Wound Care PT LTG 1, Date Established 17 --    Wound Care PT LTG 1, Time to Achieve 2 wks --    Wound Care PT LTG 1, Location LT open ray amputation site  --    Wound Care PT LTG 1, No S&S of Infection yes --    Wound Care PT LTG 1, Decrease Wound Size 25% --    Wound Care PT LTG 1, Decrease Exudate minimum --    Wound Care PT LTG 1, No New Skin Break Down yes --    Wound Care PT LTG 1, Education S&S of infection;dressing changes;wound care;weight bearing restriction;progression of POC --    Wound Care PT LTG 1, Education Understanding verbalize understanding --    Wound Care PT LTG 1, Outcome --  goal ongoing              Electronically signed by Lane Yates, PT at 3/6/2017  9:12 AM           Occupational Therapy Notes (most recent note)      Leticia Reed, OT at 3/6/2017  2:31 PM  Version 1 of 1         Acute Care - Occupational Therapy Re-Evaluation  [facility]     Patient Name: Tamara Langston  : 1955  MRN: [id number]  Today's Date: 3/6/2017  Onset of Illness/Injury or Date of Surgery Date: 17  Date of Referral to OT: 17  Referring Physician: MD Karen    Admit Date: 2017       ICD-10-CM ICD-9-CM   1. Impaired mobility and ADLs Z74.09 799.89   2. Toe osteomyelitis, left M86.9 730.27   3. Impaired functional mobility, balance, gait, and endurance Z74.09 V49.89   4. Osteomyelitis of left foot, unspecified chronicity M86.9 730.27     Patient Active Problem List   Diagnosis   • Atopic rhinitis   • Restrictive ventilatory defect   • COPD (chronic obstructive pulmonary disease)   • Osteoporosis   • Obstructive sleep apnea syndrome   • Abnormal liver enzymes   • Cholelithiasis   • Chronic back pain   • Mixed anxiety depressive disorder   • Type 2 diabetes mellitus    • Dyslipidemia   • Essential tremor   • Fibromyalgia   • Gastroesophageal reflux disease without esophagitis   • Hypertension   • Hypothyroidism   • Insomnia   • Osteoarthritis   • Polycythemia vera   • Psoriasis   • Branch retinal vein occlusion   • Cobalamin deficiency   • Chronic bronchitis   • Obesity   • Pneumonia   • Tobacco use   • Vitamin D deficiency   • Foot ulcer, left   • Leukocytosis   • Hypokalemia   • Lactic acidosis   • Fatty liver disease, nonalcoholic   • Diabetes education, encounter for   • Cellulitis of left foot   • Sinus bradycardia   • NSTEMI (non-ST elevated myocardial infarction)   • Tobacco abuse   • Hypoxia   • Peripheral vascular disease   • Gangrene   • Osteomyelitis of left foot   • Diabetic polyneuropathy associated with type 2 diabetes mellitus   • Anemia, blood loss   • Chronic pain   • Chronic, continuous use of opioids   • Chronic anxiety   • Chronically on benzodiazepine therapy     Past Medical History   Diagnosis Date   • Bronchitis    • Cervical cancer    • Cholelithiasis 5/11/2016   • Chronic anxiety 2/27/2017   • Chronic bronchitis    • Chronically on benzodiazepine therapy 2/27/2017   • Degenerative arthritis    • Diabetes mellitus    • Dyslipidemia 5/11/2016   • Dyspnea    • Fatty liver disease, nonalcoholic 11/21/2016   • Fibromyalgia    • GERD (gastroesophageal reflux disease)    • H/O echocardiogram    • History of pneumonia    • Hypertension 5/11/2016     16. H/O echocardiogram (V15.89) (Z92.89)  · A.  Echocardiogram of 02/03/2015 reports an ejection fraction of 60-65%, mild concentric     LVH, trace mitral regurgitation, mild tricuspid and pulmonic regurgitation and calculated     RVSP of 35 mmHg, the main pulmonary artery is also mildly dilated.   • Hypothyroidism 5/11/2016     Description: A.  On replacement therapy.   • Nausea    • Obesity    • DINA (obstructive sleep apnea)      intolerant of CPAP therapy   • Osteoporosis    • Osteoporosis    • Polycythemia vera  5/11/2016   • Pulmonary emphysema    • Restrictive ventilatory defect    • Rhinitis    • Uncontrolled diabetes mellitus 5/11/2016   • Uterine cancer    • Vitamin D deficiency 8/1/2016     Past Surgical History   Procedure Laterality Date   • Lumbar spine surgery       arthrodesis by anterior approach addit interspace   • Back surgery       lumbar fusions x5--multiple times; 1995, 1997, 1998, 1999 and 2008   • Hand surgery Bilateral      x3   • Hysterectomy       status post uterine and cervical cancer   • Tonsillectomy     • Amputation digit Left 11/23/2016     Procedure: AMPUTATION TRANS METATARSAL - ray amutation of the left great toe;  Surgeon: Arsalan Feliciano MD;  Location:  [facility];  Service:    • Cardiac catheterization N/A 11/26/2016     Procedure: Left Heart Cath;  Surgeon: Ahs Nuñez MD;  Location:  [facility]ATH [facility]ASIVE LOCATION;  Service:    • Below knee amputation Left 2/25/2017     Procedure: EXTENDED LEFT TOE AMPUTATION;  Surgeon: Arsalan Feliciano MD;  Location:  [facility];  Service:    • Amputation digit Left 3/2/2017     Procedure: LEFT FOOT TRANSMETATARSAL AMPUTATION TOES 2,3,4,5;  Surgeon: Joey Guaman MD;  Location:  [facility] OR;  Service:           OT ASSESSMENT FLOWSHEET (last 72 hours)      OT Evaluation       03/06/17 1322 03/06/17 0800 03/05/17 2000 03/05/17 0950 03/05/17 0800    Rehab Evaluation    Document Type re-evaluation  -CL therapy note (daily note)  -MF  therapy note (daily note)  -     Subjective Information agree to therapy;complains of;pain  -CL agree to therapy;complains of;weakness;pain  -  no complaints;agree to therapy   reports vac has had blockage alarm all morning  -MC     Patient Effort, Rehab Treatment good  -CL        Symptoms Noted During/After Treatment none  -CL        General Information    Patient Profile Review yes  -CL        Onset of Illness/Injury or Date of Surgery Date 02/24/17  -CL        Referring Physician MD Karen  -CL        Pertinent History Of  Current Problem Please see IE for full history. Pt s/p L foot transmetatarsal amputation of toes 2, 3, 4, and 5 2/2 to osteomyelitis on 03/02/2017. Received resume OT orders on 03/05/2017.   -CL        Precautions/Limitations fall precautions;non-weight bearing status;oxygen therapy device and L/min   NWB LLE  -CL        Prior Level of Function --   Please see IE  -CL        Equipment Currently Used at Home --   Please see IE  -CL        Plans/Goals Discussed With patient;agreed upon  -CL        Risks Reviewed patient:;LOB;nausea/vomiting;dizziness;increased discomfort;lines disloged  -CL        Benefits Reviewed patient:;improve function;increase independence;increase strength;increase balance;decrease pain;increase knowledge  -CL        Barriers to Rehab medically complex  -CL        Living Environment    Lives With --   Please see IE  -CL        Clinical Impression    Date of Referral to OT 03/05/17  -CL        OT Diagnosis Decreased independence in ADLs.   -CL        Patient/Family Goals Statement Return home.   -CL        Criteria for Skilled Therapeutic Interventions Met yes;treatment indicated  -CL        Rehab Potential good, to achieve stated therapy goals  -CL        Therapy Frequency daily  -CL        Anticipated Equipment Needs At Discharge --   TBD  -CL        Anticipated Discharge Disposition inpatient rehabilitation facility  -CL        Vital Signs    Pre Systolic BP Rehab --   no tele, RN cleared for tx.   -CL        Pain Assessment    Pain Assessment 0-10  -CL Blackmon-Cabrera FACES  -MF  No/denies pain  -MC     Blackmon-Cabrera FACES Pain Rating  4  -MF       Pain Score 8  -CL        Post Pain Score 8  -CL        Pain Type Chronic pain  -CL        Pain Location Back  -CL        Pain Intervention(s) Repositioned;Ambulation/increased activity  -CL Repositioned  -MF       Response to Interventions Tolerated.   -CL        Vision Assessment/Intervention    Visual Impairment WFL  -CL        Cognitive  Assessment/Intervention    Current Cognitive/Communication Assessment functional  -CL   functional  -MC     Orientation Status oriented x 4  -CL   oriented x 4  -MC     Follows Commands/Answers Questions 100% of the time;needs cueing;needs repetition  -CL   100% of the time;able to follow single-step instructions  -MC     Personal Safety mild impairment;decreased awareness, need for assist;decreased awareness, need for safety  -CL        Personal Safety Interventions fall prevention program maintained;gait belt;nonskid shoes/slippers when out of bed  -CL        ROM (Range of Motion)    General ROM Detail BUE grossly WFL. Pt s/p B CMC sx, limited thumb CMC extension/ABD at baseline.   -CL        MMT (Manual Muscle Testing)    General MMT Assessment Detail BUE grossly 4-/5.   -CL        Bed Mobility, Assessment/Treatment    Bed Mobility, Assistive Device bed rails;head of bed elevated  -CL        Bed Mob, Supine to Sit, [place] supervision required;verbal cues required  -CL        Bed Mob, Sit to Supine, [place] supervision required;verbal cues required  -CL        Bed Mobility, Comment VCs for safety d/t lines.   -CL        Transfer Assessment/Treatment    Transfers, Bed-Chair [place] contact guard assist;2 person assist required;verbal cues required  -CL        Transfers, Bed-Chair-Bed, Assist Device rolling walker  -CL        Transfers, Sit-Stand [place] minimum assist (75% patient effort);verbal cues required;2 person assist required  -CL        Transfers, Stand-Sit [place] contact guard assist;2 person assist required;verbal cues required  -CL        Transfers, Sit-Stand-Sit, Assist Device rolling walker  -CL        Transfer, Comment Pt attempted to stand impulsively prior to cueing. VCs for HP/sequencing of steps. Pt performed stand-pivot t/f to chair from EOB.    VCs to maintain NWB status, pt performed appropriately.   -CL        Functional Mobility    Functional Mobility- Ind. Level  unable to perform  -CL        ADL Assessment/Intervention    Additional Documentation Self-Feeding Assessment/Training (Group)  -CL        Self-Feeding Assessment/Training    Self-Feeding Assess/Train, Assist Device large handled utensils  -CL        Self-Feeding Assess/Train, Position supported sitting  -CL        Self-Feeding Assess/Train, [place] supervision required;verbal cues required  -        Self-Feeding Assess/Train, Comment Pt educated on use of AE to improve self-feeding.   -CL        Balance Skills Training    Sitting-Level of Assistance Close supervision  -CL        Sitting-Balance Support Right upper extremity supported;Left upper extremity supported;Feet supported  -        Sitting-Balance Activities Forward lean;Reaching for objects;Trunk control activities  -CL        Standing-Level of Assistance Contact guard;x2  -CL        Static Standing Balance Support assistive device  -CL        Standing-Balance Activities Weight Shift R-L  -CL        Therapy Exercises    Left Lower Extremity 10 reps;AROM:   straight leg raise  -CL        Bilateral Upper Extremity 10 reps;AROM:;sitting;elbow flexion/extension;hand pumps;pronation/supination;shoulder extension/flexion;shoulder horizontal abd/add;shoulder protraction/retraction   yellow thera-band  -CL        Sensory Assessment/Intervention    Light Touch LUE;RUE  -CL        LUE Light Touch WNL  -CL  mild impairment  -CT  mild impairment  -AT    RUE Light Touch WNL  -CL  WNL  -CT  WNL  -AT    LLE Light Touch   WNL  -CT  WNL  -AT    RLE Light Touch  absent sensation  -CS absent sensation  -CT  absent sensation  -AT    General Therapy Interventions    Planned Therapy Interventions adaptive equipment training;ADL retraining;balance training;energy conservation;home exercise program;transfer training  -CL        Positioning and Restraints    Pre-Treatment Position in bed  -CL in bed  -  in bed  -     Post Treatment Position chair  -CL bed  -  bed   -     In Bed  supine;with nsg  -  supine;call light within reach;encouraged to call for assist;with family/caregiver;LLE elevated;SCD pump applied  -     In Chair notified nsg;reclined;call light within reach;encouraged to call for assist;waffle cushion;legs elevated;LLE elevated;R heel elevated  -CL          03/04/17 2000 03/04/17 1400 03/04/17 1200 03/04/17 1045 03/04/17 1000    Rehab Evaluation    Document Type    therapy note (daily note)  -     Subjective Information    no complaints;agree to therapy  -     Pain Assessment    Pain Assessment    Blackmon-Cabrera FACES  -     Blackmon-Cabrera FACES Pain Rating    2  -     Sensory Assessment/Intervention    LUE Light Touch mild impairment  -CT        RUE Light Touch WNL  -CT WNL  -DH WNL  -DH  WNL  -DH    LLE Light Touch WNL  -CT WNL  -DH WNL  -DH  WNL  -DH    RLE Light Touch absent sensation  -CT absent sensation  -DH absent sensation  -DH  absent sensation  -DH    Positioning and Restraints    Pre-Treatment Position    in bed  -     Post Treatment Position    bed  -     In Bed    supine;call light within reach;encouraged to call for assist;with nsg  -       03/04/17 0800                Sensory Assessment/Intervention    LUE Light Touch mild impairment  -DH        RUE Light Touch WNL  -DH        LLE Light Touch WNL  -DH        RLE Light Touch absent sensation  -DH          User Key  (r) = Recorded By, (t) = Taken By, (c) = Cosigned By    Initials Name Effective Dates     Lane Yates, PT 06/19/15 -     CT Alaina Delaney, KARLA 06/16/16 -     MC Chetna Chao, PT 03/14/16 -     DH Kya Shanks, KARLA 06/16/16 -     CS Perfecto Love, KARLA 06/16/16 -     AT Flor Hawk RN 06/16/16 -     CL Leticia Reed OT 06/08/16 -            Occupational Therapy Education     Title: PT OT SLP Therapies (Active)     Topic: Occupational Therapy (Active)     Point: ADL training (Active)    Description: Instruct learner(s) on proper safety adaptation and  remediation techniques during self care or transfers.   Instruct in proper use of assistive devices.    Learning Progress Summary    Learner Readiness Method Response Comment Documented by Status   Patient Acceptance E,D NR Pt educated on/reivewed appropriate safety precautions, BUE HEP, appropriate t/f techniques, NWB status, and benefits of therapy. CL 03/06/17 1421 Active    Acceptance E,D VU,NR cues for NWB LLE with dressing, activity to  do on own during day, theraband exer MANDO 03/01/17 0909 Done    Acceptance E,D VU,NR cont. review UE ROM, benefits activity, standing endurance and safety for LBD and toileting. MANDO 02/28/17 0908 Done    Acceptance E VU,NR Reasons to keep LLE NWB when pt. said she stands on it anyway.  Safe transfer, UE ROM exer. MANDO 02/27/17 1448 Done    Acceptance E NR Pt educated on NWB status and proper transfer techniques  02/26/17 1350 Active               Point: Home exercise program (Active)    Description: Instruct learner(s) on appropriate technique for monitoring, assisting and/or progressing therapeutic exercises/activities.    Learning Progress Summary    Learner Readiness Method Response Comment Documented by Status   Patient Acceptance E,D NR Pt educated on/reivewed appropriate safety precautions, BUE HEP, appropriate t/f techniques, NWB status, and benefits of therapy.  03/06/17 1421 Active    Acceptance E,D VU,NR cues for NWB LLE with dressing, activity to  do on own during day, theraband exer MANDO 03/01/17 0909 Done    Acceptance E,D VU,NR cont. review UE ROM, benefits activity, standing endurance and safety for LBD and toileting. MANDO 02/28/17 0908 Done    Acceptance E VU,NR Reasons to keep LLE NWB when pt. said she stands on it anyway.  Safe transfer, UE ROM exer. MANDO 02/27/17 1448 Done               Point: Precautions (Active)    Description: Instruct learner(s) on prescribed precautions during self-care and functional transfers.    Learning Progress Summary    Learner Readiness  Method Response Comment Documented by Status   Patient Acceptance E,D NR Pt educated on/reivewed appropriate safety precautions, BUE HEP, appropriate t/f techniques, NWB status, and benefits of therapy. CL 03/06/17 1421 Active    Acceptance E VU  CT 03/06/17 0520 Done    Acceptance E,D VU,NR cues for NWB LLE with dressing, activity to  do on own during day, theraband exer MANDO 03/01/17 0909 Done    Acceptance E,D VU,NR cont. review UE ROM, benefits activity, standing endurance and safety for LBD and toileting. MANDO 02/28/17 0908 Done    Acceptance E VU,NR Reasons to keep LLE NWB when pt. said she stands on it anyway.  Safe transfer, UE ROM exer. MANDO 02/27/17 1448 Done               Point: Body mechanics (Active)    Description: Instruct learner(s) on proper positioning and spine alignment during self-care, functional mobility activities and/or exercises.    Learning Progress Summary    Learner Readiness Method Response Comment Documented by Status   Patient Acceptance E,D NR Pt educated on/reivewed appropriate safety precautions, BUE HEP, appropriate t/f techniques, NWB status, and benefits of therapy. CL 03/06/17 1421 Active                      User Key     Initials Effective Dates Name Provider Type Discipline    MANDO 06/22/15 -  Kacy Blevins, OT Occupational Therapist OT     06/22/15 -  Amy Garza, OT Occupational Therapist OT    CT 06/16/16 -  Alaina Delaney RN Registered Nurse Nurse     06/08/16 -  Leticia Reed, OT Occupational Therapist OT                  OT Recommendation and Plan  Anticipated Equipment Needs At Discharge:  (TBD)  Anticipated Discharge Disposition: inpatient rehabilitation facility  Planned Therapy Interventions: adaptive equipment training, ADL retraining, balance training, energy conservation, home exercise program, transfer training  Therapy Frequency: daily  Plan of Care Review  Progress: progress toward functional goals as expected  Outcome Summary/Follow up Plan: OT Re-Eval  complete. Pt presents w/ decreased functional independence from baseline. Pt performed STS t/f w/ Min Ax2 and stand pivot t/f to chair while maintaining NWB status. Recommend cont skilled IPOT intervention to increase safety/functional independence. Recommend pt DC to IP rehab.           OT Goals       03/06/17 1423 03/01/17 0910 02/28/17 0909    Transfer Training OT LTG    Transfer Training OT LTG, Date Established 03/06/17  -CL      Transfer Training OT LTG, Time to Achieve by discharge  -CL      Transfer Training OT LTG, Activity Type bed to chair /chair to bed;sit to stand/stand to sit;toilet  -CL      Transfer Training OT LTG, [place] Level contact guard assist  -CL      Transfer Training OT LTG, Additional Goal AAD  -CL      Transfer Training OT LTG, Outcome  goal ongoing  -MANDO goal ongoing   pt. refused to chair today  -MANOD    Strength OT LTG    Strength Goal OT LTG, Outcome  goal met   pt. progressed to yellow t-band  -MANDO goal ongoing   tolerated more resistance distally today  -MANDO    Dynamic Standing Balance OT LTG    Dynamic Standing Balance OT LTG, Date Established 03/06/17  -CL      Dynamic Standing Balance OT LTG, Time to Achieve by discharge  -CL      Dynamic Standing Balance OT LTG, [place] Level contact guard assist  -CL      Dynamic Standing Balance OT LTG, Assist Device assistive Device  -CL      Dynamic Standing Balance OT LTG, Additional Goal Pt will tolerate standing 1 min for functional activity.   -CL      Patient Education OT LTG    Patient Education OT LTG, Date Established 03/06/17  -CL      Patient Education OT LTG, Time to Achieve by discharge  -CL      Patient Education OT LTG, Education Type written program;HEP;precautions per surgeon;positioning;posture/body mechanics;1 hand/daryn technique;home safety;adaptive equipment mgmt;energy conservation;adaptive breathing  -CL      Patient Education OT LTG, Education Understanding verbalizes understanding;demonstrates adequately  -CL       Patient Education OT LTG Outcome goal ongoing  -CL      LB Dressing OT LTG    LB Dressing Goal OT LTG, Date Established 03/06/17  -CL      LB Dressing Goal OT LTG, Time to Achieve by discharge  -CL      LB Dressing Goal OT LTG, Activity Type Don pants utilizing daryn-dressing technique  -CL      LB Dressing Goal OT LTG, [place] Level contact guard assist  -CL      LB Dressing Goal OT LTG, Additional Goal AAD  -CL      LB Dressing Goal OT LTG, Outcome  goal met   met for R side.  Unable to advance until wound vac off.  -MANDO goal ongoing   met socks only  -MANDO      02/27/17 1449 02/26/17 1351       Transfer Training OT LTG    Transfer Training OT LTG, Date Established  02/26/17  -JR     Transfer Training OT LTG, Time to Achieve  1 wk  -JR     Transfer Training OT LTG, Activity Type  bed to chair /chair to bed  -JR     Transfer Training OT LTG, [place] Level  contact guard assist  -JR     Transfer Training OT LTG, Assist Device  walker, rolling  -JR     Transfer Training OT LTG, Outcome goal ongoing   pt. to min of 2 with NWB LLE today  -MANDO      Strength OT LTG    Strength Goal OT LTG, Date Established  02/26/17  -JR     Strength Goal OT LTG, Time to Achieve  1 wk  -JR     Strength Goal OT LTG, Functional Goal  Pt to increase B UE strength by 1/2 muscle grade to support transfers with NWB status.  -JR     Strength Goal OT LTG, Outcome goal ongoing   pt. began UE ROM exer today  -MANDO      LB Dressing OT LTG    LB Dressing Goal OT LTG, Date Established  02/26/17  -JR     LB Dressing Goal OT LTG, Time to Achieve  1 wk  -JR     LB Dressing Goal OT LTG, Activity Type  pants and socks on R LE.  -JR     LB Dressing Goal OT LTG, [place] Level  minimum assist (75% patient effort)  -JR     LB Dressing Goal OT LTG, Adaptive Equipment  other (see comments)   with appropriate AD  -JR     LB Dressing Goal OT LTG, Outcome goal partially met   met R sock  -MANDO        User Key  (r) = Recorded By, (t) = Taken By, (c) =  Cosigned By    Initials Name Provider Type    MANDO Blevins, OT Occupational Therapist    JR Amy Garza OT Occupational Therapist    CL Leticia Reed OT Occupational Therapist                Outcome Measures       03/06/17 1322          How much help from another is currently needed...    Putting on and taking off regular lower body clothing? 2  -CL      Bathing (including washing, rinsing, and drying) 2  -CL      Toileting (which includes using toilet bed pan or urinal) 2  -CL      Putting on and taking off regular upper body clothing 3  -CL      Taking care of personal grooming (such as brushing teeth) 4  -CL      Eating meals 4  -CL      Score 17  -CL      Functional Assessment    Outcome Measure Options AM-PAC 6 Clicks Daily Activity (OT)  -CL        User Key  (r) = Recorded By, (t) = Taken By, (c) = Cosigned By    Initials Name Provider Type    CL Leticia Reed OT Occupational Therapist          Time Calculation:   OT Start Time: 1322    Therapy Charges for Today     Code Description Service Date Service Provider Modifiers Qty    25912231811 HC OT RE-EVAL 2 3/6/2017 Leticia Reed OT GO 1    50848690486 HC OT THER SUPP EA 15 MIN 3/6/2017 Leticia Reed OT GO 2    47676891502 HC OT THERAPEUTIC ACT EA 15 MIN 3/6/2017 Leticia Reed OT GO 3               Leticia Reed OT  3/6/2017     Electronically signed by Leticia Reed OT at 3/6/2017  2:31 PM

## 2017-03-07 NOTE — PROGRESS NOTES
Hospitalist Daily Progress Note    Date of Admission: 2/24/2017   LOS: 11 days   PCP: Harinder Aranda MD    Chief Complaint:  F/u LLE osteomyelitis    Subjective   History of Present Illness  LLE pain under control.    Review of Systems  General: no fevers, chills  Cardiovascular: no chest pain, palpitations  Abdomen: No abdominal pain, nausea, vomiting, or diarrhea  Neurologic: No focal weakness, has gen. Weakness.  All other systems reviewed and negative.    Objective   Physical Exam:  I have reviewed the vital signs.  Temp:  [98.1 °F (36.7 °C)-98.9 °F (37.2 °C)] 98.2 °F (36.8 °C)  Heart Rate:  [56-73] 65  Resp:  [16-20] 18  BP: (129-156)/(59-73) 129/62    Objective  General Appearance:   Alert, cooperative, no distress  Head:   Normocephalic, atraumatic  Eyes:   PERRL EOMI, Sclerae anicteric  Neck:  Supple, no mass  Lungs: Clear to auscultation bilaterally, respirations unlabored  Heart: Regular rate and rhythm, S1 and S2 normal, 2/6 СЕРГЕЙ, rub or gallop  Abdomen: Soft, non-tender, bowel sounds active, nondistended  Extremities: No clubbing, cyanosis, or edema to lower extremities  LLE with surgical drsg/wound vac in place without drainage  Pulses: 2+ and symmetric in distal lower extremities  Skin: No rashes, no jaundice  Neurologic: Oriented x3, CN2-12 intact, Normal strength to extremities    Results Review:    I have reviewed the labs, radiology results and diagnostic studies.      Results from last 7 days  Lab Units 03/06/17  1215   WBC 10*3/mm3 8.71   HEMOGLOBIN g/dL 9.2*   PLATELETS 10*3/mm3 235       Results from last 7 days  Lab Units 03/07/17  0435   SODIUM mmol/L 141   POTASSIUM mmol/L 3.8   TOTAL CO2 mmol/L 28.0   CREATININE mg/dL 1.00   GLUCOSE mg/dL 143*       I have reviewed the medications.    ---------------------------------------------------------------------------------------------  Assessment/Plan   Assessment & Plan  Assessment/Problem List    Principal Problem:    Osteomyelitis of left  foot  Active Problems:    COPD (chronic obstructive pulmonary disease)    Obstructive sleep apnea syndrome    Chronic back pain    Type 2 diabetes mellitus    Dyslipidemia    Fibromyalgia    Gastroesophageal reflux disease without esophagitis    Hypertension    Hypothyroidism    Peripheral vascular disease    Diabetic polyneuropathy associated with type 2 diabetes mellitus    Anemia, blood loss    Chronic pain    Chronic, continuous use of opioids    Chronic anxiety    Chronically on benzodiazepine therapy       Plan  This is a 61-year-old  female with PMHx significant for tobacco abuse, hypertension, hyperlipidemia, and diabetes type 2, who is status post transmetatarsal amputation of the left great toe due to osteomyelitis in November 2016, now presents with poor wound healing with growth of staph aureus from tissue cultures done on 2/24.      MSSA Osteomyelitis (presumed) of left 1st metatarsal  -- 2/24 MRI showed evidence of osteomyelitis in the first metatarsal and the rest of the digits on the left foot, s/p removal of the rest of 1st metatarsal bone on 2/25 by Dr. Feliciano  -- now s/p 2nd - 5th transmetatarsal amputation on 3/2/17  --2/25 Tissue Cx grew Rare Staph aureus, resistant only to PCN.  --3/2 Tissue Cx grew rare Staph, coag.negative, Sensitivities pending.  -- Infectious disease and cardiothoracic surgeon on consult  -- On Vanc,cefepime and Flagyl IV., I.D. Added azithromycin.  Probiotic. Will likely need 6 wks IV abx.  -- ECHO to eval murmur in this setting.     PNA  --CXR showed that compared to distant examinations of 11/28/2016, the central  chest is more congested. The bibasilar airspace opacities, especially consolidation at the left base are new and progressive.  --CT Chest confirms groundglass opacities bilaterally more severe in right lung.  --, Troponin negative.  --legionella urine Ag pending, Mycoplasma IgG/IgM pending.  --pt already on adequate Abx coverage for PNA per  above.  --pulm toilet and incentive spirometry.  --on lasix daily already.    RLL spiculated mass 1.9 x 5.4 cm  -3/6/17 CT Chest showed bilateral small groundglass opacities and small solid mass with spiculation in the right lower lobe area  -CTA Chest done in Feb 2015 showed no mass in RLL.  -Dr Feliciano with [facility] consulted.  -This mass is concerning for neoplasm.  -recommended close f/u with another CT scan and/or PET scan  -may be a candidate for needle bx transthoracicaly or perhaps navigational bronchoscopy.     DM2, Controlled  -- Hgb A1c is 6.1   -- continue current insulin regimen, acceptable control      Anemia of chronic disease  -- hemoglobin stable, monitor      Chronic back pain/Fibromyalgia on chronic opioids  -- continue home pain medication  -- continue home muscle relaxers      Anxiety on chronic benzos  - buspar and klonopin continued (klonopin with 50% decrease in home dose)      COPD  -- stable      HTN  -- monitor, currently stable on meds      Constipation  -- continue bowel regimen      Hypothyroidism  -- continue home dose of synthroid, TSH ok  -- CT Chest showed enlarged thyroid with nodules.      HLP  -- continue home dose of Lipitor and zetia      DINA  -- CPAP w/ 2L oxygen while sleeping      Tobacco abuse  -- Patient counseled at length and cessation recommended      DVT prophylaxis: shell hose and scd to RLE  Dispo: Plan will be to [facility] end week hopefully, referrals made by NANCY Bain MD 03/07/17 3:12 PM

## 2017-03-07 NOTE — PROGRESS NOTES
Acute Care - Physical Therapy Re-Evaluation  [facility]     Patient Name: Tmaara Langston  : 1955  MRN: [id number]  Today's Date: 3/7/2017   Onset of Illness/Injury or Date of Surgery Date: 17  Date of Referral to PT: 17  Referring Physician: MD Marquita      Admit Date: 2017     Visit Dx:    ICD-10-CM ICD-9-CM   1. Impaired mobility and ADLs Z74.09 799.89   2. Toe osteomyelitis, left M86.9 730.27   3. Impaired functional mobility, balance, gait, and endurance Z74.09 V49.89   4. Osteomyelitis of left foot, unspecified chronicity M86.9 730.27     Patient Active Problem List   Diagnosis   • Atopic rhinitis   • Restrictive ventilatory defect   • COPD (chronic obstructive pulmonary disease)   • Osteoporosis   • Obstructive sleep apnea syndrome   • Abnormal liver enzymes   • Cholelithiasis   • Chronic back pain   • Mixed anxiety depressive disorder   • Type 2 diabetes mellitus   • Dyslipidemia   • Essential tremor   • Fibromyalgia   • Gastroesophageal reflux disease without esophagitis   • Hypertension   • Hypothyroidism   • Insomnia   • Osteoarthritis   • Polycythemia vera   • Psoriasis   • Branch retinal vein occlusion   • Cobalamin deficiency   • Chronic bronchitis   • Obesity   • Pneumonia   • Tobacco use   • Vitamin D deficiency   • Foot ulcer, left   • Leukocytosis   • Hypokalemia   • Lactic acidosis   • Fatty liver disease, nonalcoholic   • Diabetes education, encounter for   • Cellulitis of left foot   • Sinus bradycardia   • NSTEMI (non-ST elevated myocardial infarction)   • Tobacco abuse   • Hypoxia   • Peripheral vascular disease   • Gangrene   • Osteomyelitis of left foot   • Diabetic polyneuropathy associated with type 2 diabetes mellitus   • Anemia, blood loss   • Chronic pain   • Chronic, continuous use of opioids   • Chronic anxiety   • Chronically on benzodiazepine therapy     Past Medical History   Diagnosis Date   • Bronchitis    • Cervical cancer    • Cholelithiasis  5/11/2016   • Chronic anxiety 2/27/2017   • Chronic bronchitis    • Chronically on benzodiazepine therapy 2/27/2017   • Degenerative arthritis    • Diabetes mellitus    • Dyslipidemia 5/11/2016   • Dyspnea    • Fatty liver disease, nonalcoholic 11/21/2016   • Fibromyalgia    • GERD (gastroesophageal reflux disease)    • H/O echocardiogram    • History of pneumonia    • Hypertension 5/11/2016     16. H/O echocardiogram (V15.89) (Z92.89)  · A.  Echocardiogram of 02/03/2015 reports an ejection fraction of 60-65%, mild concentric     LVH, trace mitral regurgitation, mild tricuspid and pulmonic regurgitation and calculated     RVSP of 35 mmHg, the main pulmonary artery is also mildly dilated.   • Hypothyroidism 5/11/2016     Description: A.  On replacement therapy.   • Nausea    • Obesity    • DINA (obstructive sleep apnea)      intolerant of CPAP therapy   • Osteoporosis    • Osteoporosis    • Polycythemia vera 5/11/2016   • Pulmonary emphysema    • Restrictive ventilatory defect    • Rhinitis    • Uncontrolled diabetes mellitus 5/11/2016   • Uterine cancer    • Vitamin D deficiency 8/1/2016     Past Surgical History   Procedure Laterality Date   • Lumbar spine surgery       arthrodesis by anterior approach addit interspace   • Back surgery       lumbar fusions x5--multiple times; 1995, 1997, 1998, 1999 and 2008   • Hand surgery Bilateral      x3   • Hysterectomy       status post uterine and cervical cancer   • Tonsillectomy     • Amputation digit Left 11/23/2016     Procedure: AMPUTATION TRANS METATARSAL - ray amutation of the left great toe;  Surgeon: Arsalan Feliciano MD;  Location:  [facility];  Service:    • Cardiac catheterization N/A 11/26/2016     Procedure: Left Heart Cath;  Surgeon: Ash Nuñez MD;  Location:  [facility];  Service:    • Below knee amputation Left 2/25/2017     Procedure: EXTENDED LEFT TOE AMPUTATION;  Surgeon: Arsalan Feliciano MD;  Location:  [facility];  Service:    • Amputation  "digit Left 3/2/2017     Procedure: LEFT FOOT TRANSMETATARSAL AMPUTATION TOES 2,3,4,5;  Surgeon: Joey Guaman MD;  Location: [facility] OR;  Service:           PT ASSESSMENT (last 72 hours)      PT Evaluation       03/07/17 1045 03/07/17 1043    Rehab Evaluation    Document Type re-evaluation  -DM therapy note (daily note)  -CL    Subjective Information agree to therapy;complains of;weakness;pain;swelling   Rfoot swollen;req.R shoe(done);DrEarle assessed;\"go easy\"  -DM agree to therapy;complains of;pain  -CL    Patient Effort, Rehab Treatment good  -DM good  -CL    Symptoms Noted During/After Treatment increased pain;fatigue  -DM increased pain  -CL    Symptoms Noted Comment nsg informed pt req.pain med;pt decl loaner Rwx(\"prefer to pivot on RLE back to bed,not use wx\"  -DM RN notified.   -CL    General Information    Patient Profile Review yes  -DM     Onset of Illness/Injury or Date of Surgery Date 02/24/17  -DM     Referring Physician MD Marquita  -DM     General Observations sup in bed (no tele.) w/ LLE elev on 2 pillows & eggcrate per MD;WD VAC L FOOT(TM amp site w/ Kerlix/spandage);RA;IV;SCUDS;pl  -DM     Pertinent History Of Current Problem see IE;pt now w/L foot TM amp of toes 2--5 on 3-2-17 (Dx: OM); wound P.T. has eval'd/placed wd.vac; resume P.T. orders (for mobil.,NWB L LE) received 3-6  -DM     Precautions/Limitations fall precautions;oxygen therapy device and L/min   NWB L LE;WD.VAC Lfoot;fibromyal.;can't sherry knee wx(bad knees  -DM fall precautions;oxygen therapy device and L/min;non-weight bearing status   NWB LLE  -CL    Plans/Goals Discussed With patient;agreed upon  -DM     Risks Reviewed patient:;LOB;dizziness;increased discomfort;change in vital signs;increased drainage;lines disloged  -DM     Benefits Reviewed patient:;improve function;increase independence;increase strength;increase balance;decrease pain;increase knowledge  -DM     Barriers to Rehab medically complex;previous functional deficit  " -DM     Clinical Impression    Date of Referral to PT 03/06/17  -DM     PT Diagnosis impaired funct mobil  -DM     Criteria for Skilled Therapeutic Interventions Met yes;treatment indicated  -DM     Pathology/Pathophysiology Noted (Describe Specifically for Each System) musculoskeletal  -DM     Rehab Potential good, to achieve stated therapy goals  -DM     Vital Signs    Pre Systolic BP Rehab  --   No tele, RN cleared for tx.   -CL    O2 Delivery Pre Treatment room air  -DM     O2 Delivery Intra Treatment room air  -DM     O2 Delivery Post Treatment room air  -DM     Pre Patient Position Supine  -DM     Intra Patient Position Standing  -DM     Post Patient Position Sitting  -DM     Pain Assessment    Pain Assessment 0-10  -DM 0-10  -CL    Pain Score 7  -DM 7  -CL    Post Pain Score 8  -DM 8  -CL    Pain Type Chronic pain  -DM Chronic pain  -CL    Pain Location Generalized  -DM Generalized  -CL    Pain Orientation --   FIBROMYALGIA  -DM     Pain Descriptors Aching  -DM     Pain Frequency Constant/continuous  -DM     Patient's Stated Pain Goal No pain  -DM     Pain Intervention(s) Medication (See MAR);Repositioned;Ambulation/increased activity   PT CALLED FOR PAIN MED  -DM Repositioned;Ambulation/increased activity   RN notified.   -CL    Response to Interventions TOLERATED  -DM Tolerated.   -CL    Vision Assessment/Intervention    Visual Impairment WFL  -DM     Cognitive Assessment/Intervention    Current Cognitive/Communication Assessment functional  -DM functional  -CL    Orientation Status oriented x 4  -DM oriented x 4  -CL    Follows Commands/Answers Questions 100% of the time;able to follow single-step instructions;needs cueing;needs increased time;needs repetition  -% of the time;needs cueing;needs repetition  -CL    Personal Safety mild impairment;decreased awareness, need for assist;decreased awareness, need for safety;impulsive  -DM mild impairment;decreased awareness, need for assist;decreased  awareness, need for safety;impulsive  -CL    Personal Safety Interventions fall prevention program maintained;gait belt;nonskid shoes/slippers when out of bed  -DM fall prevention program maintained;gait belt;nonskid shoes/slippers when out of bed  -CL    ROM (Range of Motion)    General ROM lower extremity range of motion deficits identified   L ankle DF limited 25 % d/t pain/sx.site  -DM     MMT (Manual Muscle Testing)    General MMT Assessment lower extremity strength deficits identified   B LE's grossly 3+ to 4-/5; L ankle NA  -DM     Mobility Assessment/Training    Extremity Weight-Bearing Status left lower extremity  -DM     Left Lower Extremity Weight-Bearing non weight-bearing  -DM     Bed Mobility, Assessment/Treatment    Bed Mobility, Assistive Device head of bed elevated;bed rails  -DM head of bed elevated;overhead trapeze  -CL    Bed Mobility, Roll Left, [place] supervision required;verbal cues required  -DM     Bed Mob, Supine to Sit, [place] supervision required;verbal cues required  -DM supervision required;verbal cues required  -CL    Bed Mobility, Safety Issues decreased use of legs for bridging/pushing  -DM     Bed Mobility, Impairments strength decreased;pain  -DM     Bed Mobility, Comment cues for lines(derrell wd.vac) & keeping L foot elev during transit mvmt  -DM VCs for safety d/t lines/impulsivity.   -CL    Transfer Assessment/Treatment    Transfers, Bed-Chair [place] maximum assist (25% patient effort);2 person assist required;verbal cues required   SPT on R LE (P.T. in front;blocked R knee;o.t.behind pt)  -DM maximum assist (25% patient effort);2 person assist required;verbal cues required  -CL    Transfers, Bed-Chair-Bed, Assist Device --   gt belt;pt.decl. use of R wx  -DM --   BUE support, stand pivot  -CL    Transfers, Sit-Stand [place] minimum assist (75% patient effort);2 person assist required;verbal cues required  -DM minimum assist (75% patient effort);2 person  "assist required;verbal cues required  -CL    Transfers, Stand-Sit [place] minimum assist (75% patient effort);2 person assist required;verbal cues required  -DM minimum assist (75% patient effort);2 person assist required;supervision required  -CL    Transfers, Sit-Stand-Sit, Assist Device rolling walker   stood EOB w/R wx,NWB LLE;2ND TRIAL:R wx removed for SPT  -DM rolling walker  -CL    Transfer, Maintain Weight Bearing Status able to maintain weight bearing status  -DM     Transfer, Safety Issues weight-shifting ability decreased;loses balance backward;knees buckling  -DM     Transfer, Impairments strength decreased;impaired balance;pain  -DM     Transfer, Comment 1ST TRIAL:INIT.side steps to R;5 min.rest EOB;\"ARMS & R knee worn out\";2nd trial:chair at FOB;SPT w/o AD to recliner  -DM VCs for HP/sequencing of steps. Pt stood at EOB and took ~3 side steps while maintaining NWB status. Pt required a seated rest break of ~2 mins after side steps. Pt then performed stand pivot to chair w/ BUE requiring increased assist d/t to reports of fatigue after taking side steps.   -CL    Gait Assessment/Treatment    Gait, [place] Level moderate assist (50% patient effort);2 person assist required;verbal cues required  -DM     Gait, Assistive Device rolling walker  -DM     Gait, Distance (Feet) 2  -DM     Gait, Gait Pattern Analysis swing-to gait  -DM     Gait, Gait Deviations antalgic;right:;decreased heel strike;double stance time increased;forward flexed posture;knee buckling;narrow base;step length decreased   init shuffling R foot,then able to take full side step    -DM     Gait, Maintain Weight Bearing Status able to maintain weight bearing status  -DM     Gait, Safety Issues step length decreased;weight-shifting ability decreased;loses balance backward  -DM     Gait, Impairments strength decreased;impaired balance;postural control impaired;pain  -DM     Gait, Comment cued to incr. WB through UE'S (\"Push-ups\") x " 3,to facil. offloading R LE for side step  -DM     Motor Skills/Interventions    Additional Documentation Balance Skills Training (Group)  -DM     Balance Skills Training    Sitting-Level of Assistance Close supervision  -DM Close supervision   at EOB, VCs to keep LLE off floor  -CL    Sitting-Balance Support Right upper extremity supported;Left upper extremity supported   R foot supp.on floor;L foot NWB  -DM Right upper extremity supported;Left upper extremity supported   R foot supported  -CL    Sitting-Balance Activities Reaching for objects;Trunk control activities   SCOOTING; TRUNK ext.  -DM Reaching for objects  -CL    Sitting # of Minutes 5  -DM     Standing-Level of Assistance Minimum assistance;x2  -DM Minimum assistance;x2  -CL    Static Standing Balance Support assistive device  -DM assistive device  -CL    Standing-Balance Activities Weight Shift A-P;Weight Shift R-L  -DM Weight Shift R-L;Weight Shift A-P  -CL    Standing Balance # of Minutes 2  -DM     Gait Balance-Level of Assistance Minimum assistance;x2   init min asst of 2,then mod asst 2 as pt fatigued  -DM Minimum assistance;x2  -CL    Gait Balance Support assistive device  -DM assistive device  -CL    Gait Balance Activities side-stepping  -DM side-stepping  -CL    Therapy Exercises    Bilateral Lower Extremities AROM:;10 reps;15 reps;sitting;ankle pumps/circles;glut sets;heel slides;hip abduction/adduction;hip ER;hip flexion;LAQ;quad sets;SAQ   HS sets; min asst for L heel slides,hip abd to supp. L foot  -DM     Bilateral Upper Extremity  10 reps;AROM:;elbow flexion/extension;pronation/supination;hand pumps;shoulder extension/flexion;shoulder ER/IR;shoulder protraction/retraction;shoulder horizontal abd/add   yellow thera-band  -CL    Positioning and Restraints    Pre-Treatment Position in bed  -DM in bed  -CL    Post Treatment Position chair  -DM chair  -CL    In Chair notified nsg;reclined;call light within reach;with nsg;legs elevated   L LE  also elev on 2 pillows & egg crate  -DM notified nsg;reclined;call light within reach;encouraged to call for assist;with PT  -CL      03/07/17 0940 03/07/17 0800    Rehab Evaluation    Document Type therapy note (daily note)  -     Subjective Information agree to therapy;no complaints  -MF     Pain Assessment    Pain Assessment Blackmon-Baker FACES  -MF     Blackmon-Baker FACES Pain Rating 0  -MF     Sensory Assessment/Intervention    Light Touch  LUE;RUE  -CS    LUE Light Touch  WNL  -CS    RUE Light Touch  WNL  -CS    LLE Light Touch  moderate impairment  -CS    RLE Light Touch  absent sensation  -CS    Positioning and Restraints    Pre-Treatment Position in bed  -     Post Treatment Position bed  -     In Bed supine;call light within reach  -       03/06/17 2200 03/06/17 2000    Sensory Assessment/Intervention    LLE Light Touch mild impairment  -TA mild impairment  -TA    RLE Light Touch absent sensation  -TA absent sensation  -TA      03/06/17 1322 03/06/17 0800    Rehab Evaluation    Document Type re-evaluation  -CL therapy note (daily note)  -    Subjective Information agree to therapy;complains of;pain  -CL agree to therapy;complains of;weakness;pain  -MF    Patient Effort, Rehab Treatment good  -CL     Symptoms Noted During/After Treatment none  -CL     General Information    Patient Profile Review yes  -CL     Onset of Illness/Injury or Date of Surgery Date 02/24/17  -CL     Referring Physician MD Karen  -CL     Pertinent History Of Current Problem Please see IE for full history. Pt s/p L foot transmetatarsal amputation of toes 2, 3, 4, and 5 2/2 to osteomyelitis on 03/02/2017. Received resume OT orders on 03/05/2017.   -CL     Precautions/Limitations fall precautions;non-weight bearing status;oxygen therapy device and L/min   NWB LLE  -CL     Prior Level of Function --   Please see IE  -CL     Equipment Currently Used at Home --   Please see IE  -CL     Plans/Goals Discussed With patient;agreed upon   -CL     Risks Reviewed patient:;LOB;nausea/vomiting;dizziness;increased discomfort;lines disloged  -CL     Benefits Reviewed patient:;improve function;increase independence;increase strength;increase balance;decrease pain;increase knowledge  -CL     Barriers to Rehab medically complex  -CL     Living Environment    Lives With --   Please see IE  -CL     Vital Signs    Pre Systolic BP Rehab --   no mallory, RN cleared for tx.   -CL     Pain Assessment    Pain Assessment 0-10  -CL Blackmon-Cabrera FACES  -MF    Blackmon-Baker FACES Pain Rating  4  -MF    Pain Score 8  -CL     Post Pain Score 8  -CL     Pain Type Chronic pain  -CL     Pain Location Back  -CL     Pain Intervention(s) Repositioned;Ambulation/increased activity  -CL Repositioned  -MF    Response to Interventions Tolerated.   -CL     Vision Assessment/Intervention    Visual Impairment WFL  -CL     Cognitive Assessment/Intervention    Current Cognitive/Communication Assessment functional  -CL     Orientation Status oriented x 4  -CL     Follows Commands/Answers Questions 100% of the time;needs cueing;needs repetition  -CL     Personal Safety mild impairment;decreased awareness, need for assist;decreased awareness, need for safety  -CL     Personal Safety Interventions fall prevention program maintained;gait belt;nonskid shoes/slippers when out of bed  -CL     ROM (Range of Motion)    General ROM Detail BUE grossly WFL. Pt s/p B CMC sx, limited thumb CMC extension/ABD at baseline.   -CL     MMT (Manual Muscle Testing)    General MMT Assessment Detail BUE grossly 4-/5.   -CL     Bed Mobility, Assessment/Treatment    Bed Mobility, Assistive Device bed rails;head of bed elevated  -CL     Bed Mob, Supine to Sit, [place] supervision required;verbal cues required  -CL     Bed Mob, Sit to Supine, [place] supervision required;verbal cues required  -CL     Bed Mobility, Comment VCs for safety d/t lines.   -CL     Transfer Assessment/Treatment    Transfers, Bed-Chair  [place] contact guard assist;2 person assist required;verbal cues required  -CL     Transfers, Bed-Chair-Bed, Assist Device rolling walker  -CL     Transfers, Sit-Stand [place] minimum assist (75% patient effort);verbal cues required;2 person assist required  -CL     Transfers, Stand-Sit [place] contact guard assist;2 person assist required;verbal cues required  -CL     Transfers, Sit-Stand-Sit, Assist Device rolling walker  -CL     Transfer, Comment Pt attempted to stand impulsively prior to cueing. VCs for HP/sequencing of steps. Pt performed stand-pivot t/f to chair from EOB.    VCs to maintain NWB status, pt performed appropriately.   -CL     Balance Skills Training    Sitting-Level of Assistance Close supervision  -CL     Sitting-Balance Support Right upper extremity supported;Left upper extremity supported;Feet supported  -CL     Sitting-Balance Activities Forward lean;Reaching for objects;Trunk control activities  -CL     Standing-Level of Assistance Contact guard;x2  -CL     Static Standing Balance Support assistive device  -CL     Standing-Balance Activities Weight Shift R-L  -CL     Therapy Exercises    Left Lower Extremity 10 reps;AROM:   straight leg raise  -CL     Bilateral Upper Extremity 10 reps;AROM:;sitting;elbow flexion/extension;hand pumps;pronation/supination;shoulder extension/flexion;shoulder horizontal abd/add;shoulder protraction/retraction   yellow thera-band  -CL     Sensory Assessment/Intervention    Light Touch LUE;RUE  -CL     LUE Light Touch WNL  -CL     RUE Light Touch WNL  -CL     RLE Light Touch  absent sensation  -CS    Positioning and Restraints    Pre-Treatment Position in bed  -CL in bed  -MF    Post Treatment Position chair  -CL bed  -MF    In Bed  supine;with nsg  -MF    In Chair notified nsg;reclined;call light within reach;encouraged to call for assist;waffle cushion;legs elevated;LLE elevated;R heel elevated  -CL       03/05/17 2000 03/05/17 0957    Rehab  Evaluation    Document Type  therapy note (daily note)  -    Subjective Information  no complaints;agree to therapy   reports vac has had blockage alarm all morning  -    Pain Assessment    Pain Assessment  No/denies pain  -    Cognitive Assessment/Intervention    Current Cognitive/Communication Assessment  functional  -    Orientation Status  oriented x 4  -    Follows Commands/Answers Questions  100% of the time;able to follow single-step instructions  -    Sensory Assessment/Intervention    LUE Light Touch mild impairment  -CT     RUE Light Touch WNL  -CT     LLE Light Touch WNL  -CT     RLE Light Touch absent sensation  -CT     Positioning and Restraints    Pre-Treatment Position  in bed  -    Post Treatment Position  bed  -    In Bed  supine;call light within reach;encouraged to call for assist;with family/caregiver;LLE elevated;SCD pump applied  -      03/05/17 0800 03/04/17 2000    Sensory Assessment/Intervention    LUE Light Touch mild impairment  -AT mild impairment  -CT    RUE Light Touch WNL  -AT WNL  -CT    LLE Light Touch WNL  -AT WNL  -CT    RLE Light Touch absent sensation  -AT absent sensation  -CT      03/04/17 1400       Sensory Assessment/Intervention    RUE Light Touch WNL  -DH     LLE Light Touch WNL  -DH     RLE Light Touch absent sensation  -DH       User Key  (r) = Recorded By, (t) = Taken By, (c) = Cosigned By    Initials Name Provider Type     Lane Yates, PT Physical Therapist    DM Shelli Mora, PT Physical Therapist    CT Alaina Delaney, RN Registered Nurse    FOX Mann, RN Registered Nurse     Chetna Chao, PT Physical Therapist     Kya Shanks, RN Registered Nurse    TRELL Love, RN Registered Nurse    [facility] CAMMY Hawk RN Registered Nurse    JAMARCUS Reed OT Occupational Therapist          Physical Therapy Education     Title: PT OT SLP Therapies (Active)     Topic: Physical Therapy (Active)     Point: Mobility training  (Active)    Learning Progress Summary    Learner Readiness Method Response Comment Documented by Status   Patient Eager E,D,H NR  DM 03/07/17 1146 Active    Acceptance E,D NR  SR 03/02/17 1055 Active    Acceptance E,D VU,NR   02/27/17 1558 Done               Point: Home exercise program (Active)    Learning Progress Summary    Learner Readiness Method Response Comment Documented by Status   Patient Eager E,D,H NR   03/07/17 1146 Active    Acceptance E,D NR  SR 03/02/17 1055 Active    Acceptance E,D VU,NR   02/27/17 1558 Done               Point: Body mechanics (Active)    Learning Progress Summary    Learner Readiness Method Response Comment Documented by Status   Patient Eager E,D,H NR   03/07/17 1146 Active    Acceptance E,D NR  SR 03/02/17 1055 Active    Acceptance E,D VU,NR   02/27/17 1558 Done               Point: Precautions (Active)    Learning Progress Summary    Learner Readiness Method Response Comment Documented by Status   Patient Eager E,D,H NR   03/07/17 1146 Active    Acceptance E,D NR  SR 03/02/17 1055 Active    Acceptance E,D VU,NR   02/27/17 1558 Done                      User Key     Initials Effective Dates Name Provider Type Discipline     06/19/15 -  Philomena Esteban, PT Physical Therapist PT     06/19/15 -  Shelli Mora, PT Physical Therapist PT     06/19/15 -  Thu Herrera, PT Physical Therapist PT                PT Recommendation and Plan  Anticipated Equipment Needs At Discharge: other (see comments) (wound VAC )  Anticipated Discharge Disposition: inpatient rehabilitation facility, other (see comments) (possibly LTAC)  Planned Therapy Interventions: wound care, patient/family education  PT Frequency: daily  Plan of Care Review  Plan Of Care Reviewed With: patient  Progress: progress towards functional goals is fair  Outcome Summary/Follow up Plan: P.T. resumed s/p TRANSMET AMP L toes 2--5 on 3-2 (now POD 5); DIFFIC maintaining NWB status L LE during steps  "w/ R wx & SPT to chair on R LE; Declined rolling knee wx d/t 'Bad knees; 3 kinds of arthritis, fibromyalgia & had knee scraped x 1\"; able to side step 2 ft.w/ max 2 asst but stated  B arms & R leg worn out, & fearful of falling; will benefit from IPPT for strenthening,  prog mobil. trng, & instruct in fall prevention ; recommend  S.T. rehab to achieve all goals (however, Dr Feliciano informed pt she has RLL lung mass, which may req. intervention)          IP PT Goals       03/07/17 1146 03/05/17 0905 03/04/17 1045    Bed Mobility PT LTG    Bed Mobility PT LTG, Date Established 03/07/17  -DM      Bed Mobility PT LTG, Time to Achieve 1 wk  -DM      Bed Mobility PT LTG, Activity Type all bed mobility  -DM      Bed Mobility PT LTG, [place] Level independent  -DM      Transfer Training PT LTG    Transfer Training PT LTG, Date Established 02/27/17  -DM      Transfer Training PT LTG, Time to Achieve 1 wk  -DM      Transfer Training PT LTG, Activity Type bed to chair /chair to bed;sit to stand/stand to sit;toilet  -DM      Transfer Training PT LTG, [place] Level supervision required  -DM      Transfer Training PT LTG, Assist Device walker, rolling  -DM      Transfer Training PT  LTG, Date Goal Reviewed 03/07/17  -DM      Transfer Training PT LTG, Outcome goal revised  -DM      Gait Training PT LTG    Gait Training Goal PT LTG, Date Established 02/27/17  -DM      Gait Training Goal PT LTG, Time to Achieve 1 wk  -DM      Gait Training Goal PT LTG, [place] Level contact guard assist  -DM      Gait Training Goal PT LTG, Assist Device walker, rolling  -DM      Gait Training Goal PT LTG, Distance to Achieve 15  -DM      Gait Training Goal PT LTG, Date Goal Reviewed 03/07/17  -DM      Gait Training Goal PT LTG, Outcome goal revised  -DM      Patient Education PT LTG    Patient Education PT LTG, Date Established 02/27/17  -DM      Patient Education PT LTG, Time to Achieve 1 wk  -DM      Patient Education PT LTG, " Education Type HEP;precaution per surgeon;positioning;posture/body mechanics;gait;transfers;bed mobility;pain management;progression of POC;benefits of activity;home safety;equipment management  -DM      Patient Education PT LTG, Education Understanding demonstrate adequately;verbalize understanding  -DM      Patient Education PT LTG, Date Goal Reviewed 03/07/17  -DM      Patient Education PT LTG Outcome goal revised  -DM      Wound Care PT LTG    Wound Care PT LTG 1, Outcome  goal ongoing  -MC goal ongoing  -MC      03/02/17 1055 02/27/17 1423 02/27/17 0845    Transfer Training PT LTG    Transfer Training PT LTG, Date Established  02/27/17  -MANDO     Transfer Training PT LTG, Time to Achieve  1 wk  -MANDO     Transfer Training PT LTG, Activity Type  all transfers  -MANDO     Transfer Training PT LTG, [place] Level  conditional independence  -MANDO     Transfer Training PT LTG, Assist Device  walker, rolling  -MANDO     Transfer Training PT  LTG, Date Goal Reviewed 03/02/17  -SR      Gait Training PT LTG    Gait Training Goal PT LTG, Date Established  02/27/17  -MANDO     Gait Training Goal PT LTG, Time to Achieve  1 wk  -MANDO     Gait Training Goal PT LTG, [place] Level  conditional independence  -MANDO     Gait Training Goal PT LTG, Assist Device  walker, rolling  -MANDO     Gait Training Goal PT LTG, Distance to Achieve  25  -MANDO     Gait Training Goal PT LTG, Date Goal Reviewed 03/02/17  -SR      Patient Education PT LTG    Patient Education PT LTG, Date Established  02/27/17  -MANDO     Patient Education PT LTG, Time to Achieve  1 wk  -MANDO     Patient Education PT LTG, Education Type  HEP;positioning;posture/body mechanics;gait;transfers;bed mobility;pain management;progression of POC;benefits of activity;home safety;equipment management;skin care/inspection   weightbearing status  -MANDO     Patient Education PT LTG, Education Understanding  demonstrate adequately;verbalize understanding  -MANDO     Patient Education PT LTG, Date Goal  Reviewed 03/02/17  -SR      Wound Care PT LTG    Wound Care PT LTG 1, Outcome   goal ongoing  -      02/26/17 1332          Wound Care PT LTG    Wound Care PT LTG 1, Date Established 02/26/17  -MW      Wound Care PT LTG 1, Time to Achieve 2 wks  -MW      Wound Care PT LTG 1, Location LT open ray amputation site   -MW      Wound Care PT LTG 1, No S&S of Infection yes  -MW      Wound Care PT LTG 1, Decrease Wound Size 25%  -MW      Wound Care PT LTG 1, Decrease Exudate minimum  -MW      Wound Care PT LTG 1, No New Skin Break Down yes  -MW      Wound Care PT LTG 1, Education S&S of infection;dressing changes;wound care;weight bearing restriction;progression of POC  -MW      Wound Care PT LTG 1, Education Understanding verbalize understanding  -MW        User Key  (r) = Recorded By, (t) = Taken By, (c) = Cosigned By    Initials Name Provider Type    MANDO Esteban, PT Physical Therapist    DM Shelli Mora, PT Physical Therapist     Thu Herrera, PT Physical Therapist     Christie Wheeler, PT Physical Therapist     Chetna Chao, PT Physical Therapist                Outcome Measures       03/07/17 1045 03/07/17 1043 03/06/17 1322    How much help from another person do you currently need...    Turning from your back to your side while in flat bed without using bedrails? 4  -DM      Moving from lying on back to sitting on the side of a flat bed without bedrails? 4  -DM      Moving to and from a bed to a chair (including a wheelchair)? 2  -DM      Standing up from a chair using your arms (e.g., wheelchair, bedside chair)? 2  -DM      Climbing 3-5 steps with a railing? 1  -DM      To walk in hospital room? 2  -DM      AM-PAC 6 Clicks Score 15  -DM      How much help from another is currently needed...    Putting on and taking off regular lower body clothing?  2  -CL 2  -CL    Bathing (including washing, rinsing, and drying)  2  -CL 2  -CL    Toileting (which includes using toilet bed pan or  urinal)  2  -CL 2  -CL    Putting on and taking off regular upper body clothing  3  -CL 3  -CL    Taking care of personal grooming (such as brushing teeth)  4  -CL 4  -CL    Eating meals  4  -CL 4  -CL    Score  17  -CL 17  -CL    Functional Assessment    Outcome Measure Options AM-PAC 6 Clicks Basic Mobility (PT)  -DM AM-PAC 6 Clicks Daily Activity (OT)  -CL AM-PAC 6 Clicks Daily Activity (OT)  -CL      User Key  (r) = Recorded By, (t) = Taken By, (c) = Cosigned By    Initials Name Provider Type    DM Shelli Mora, PT Physical Therapist    CL Leticia Reed, OT Occupational Therapist           Time Calculation:         PT Charges       03/07/17 1146 03/07/17 0940       Time Calculation    Start Time 1045  - 0940  -     PT Received On 03/07/17  -DM      PT Goal Re-Cert Due Date 03/17/17  -DM 03/08/17  -     Time Calculation- PT    Total Timed Code Minutes- PT 31 minute(s)  -DM 10 minute(s)  -       User Key  (r) = Recorded By, (t) = Taken By, (c) = Cosigned By    Initials Name Provider Type    MF Lane Yates, PT Physical Therapist    DM Shelli Mora, PT Physical Therapist          Therapy Charges for Today     Code Description Service Date Service Provider Modifiers Qty    87024648210 HC PT RE-EVAL ESTABLISHED PLAN 2 3/7/2017 Shelli Mora, PT GP 1    34704693772 HC PT THER PROC EA 15 MIN 3/7/2017 Shelli Mora, PT GP 1    [id number] HC GAIT TRAINING EA 15 MIN 3/7/2017 Shelli Mora, PT GP 1          PT G-Codes  Outcome Measure Options: AM-PAC 6 Clicks Basic Mobility (PT)      Shelli Mora, PT  3/7/2017

## 2017-03-07 NOTE — PLAN OF CARE
"Problem: Patient Care Overview (Adult)  Goal: Plan of Care Review  Outcome: Ongoing (interventions implemented as appropriate)    03/07/17 1146   Coping/Psychosocial Response Interventions   Plan Of Care Reviewed With patient   Patient Care Overview   Progress progress towards functional goals is fair   Outcome Evaluation   Outcome Summary/Follow up Plan P.T. resumed s/p TRANSMET AMP L toes 2--5 on 3-2 (now POD 5); DIFFIC maintaining NWB status L LE during steps w/ R wx & SPT to chair on R LE; Declined rolling knee wx d/t 'Bad knees; 3 kinds of arthritis, fibromyalgia & had knee scraped x 1\"; able to side step 2 ft.w/ max 2 asst but stated B arms & R leg worn out, & fearful of falling; will benefit from IPPT for strenthening, prog mobil. trng, & instruct in fall prevention ; recommend S.T. rehab to achieve all goals (however, Dr Feliciano informed pt she has RLL lung mass, which may req. intervention)         Problem: Inpatient Physical Therapy  Goal: Transfer Training Goal 1 LTG- PT  Outcome: Revised    03/07/17 1146   Transfer Training PT LTG   Transfer Training PT LTG, Date Established 02/27/17   Transfer Training PT LTG, Time to Achieve 1 wk   Transfer Training PT LTG, Activity Type bed to chair /chair to bed;sit to stand/stand to sit;toilet   Transfer Training PT LTG, [place] Level supervision required   Transfer Training PT LTG, Assist Device walker, rolling   Transfer Training PT LTG, Date Goal Reviewed 03/07/17   Transfer Training PT LTG, Outcome goal revised       Goal: Gait Training Goal LTG- PT  Outcome: Revised    03/07/17 1146   Gait Training PT LTG   Gait Training Goal PT LTG, Date Established 02/27/17   Gait Training Goal PT LTG, Time to Achieve 1 wk   Gait Training Goal PT LTG, [place] Level contact guard assist   Gait Training Goal PT LTG, Assist Device walker, rolling   Gait Training Goal PT LTG, Distance to Achieve 15   Gait Training Goal PT LTG, Date Goal Reviewed 03/07/17   Gait Training " Goal PT LTG, Outcome goal revised       Goal: Patient Education Goal LTG- PT  Outcome: Revised    03/07/17 1146   Patient Education PT LTG   Patient Education PT LTG, Date Established 02/27/17   Patient Education PT LTG, Time to Achieve 1 wk   Patient Education PT LTG, Education Type HEP;precaution per surgeon;positioning;posture/body mechanics;gait;transfers;bed mobility;pain management;progression of POC;benefits of activity;home safety;equipment management   Patient Education PT LTG, Education Understanding demonstrate adequately;verbalize understanding   Patient Education PT LTG, Date Goal Reviewed 03/07/17   Patient Education PT LTG Outcome goal revised       Goal: Bed Mobility Goal LTG- PT  Outcome: Ongoing (interventions implemented as appropriate)    03/07/17 1146   Bed Mobility PT LTG   Bed Mobility PT LTG, Date Established 03/07/17   Bed Mobility PT LTG, Time to Achieve 1 wk   Bed Mobility PT LTG, Activity Type all bed mobility   Bed Mobility PT LTG, [place] Level independent

## 2017-03-07 NOTE — PROGRESS NOTES
Pharmacokinetic Consult - Vancomycin Dosing  Tamara Langston is a 61 y.o. female on vancomycin/cefepime for osteomyelitis. Pharmacy was consulted to dose vancomycin     Relevant clinical data and objective history reviewed:  Lab Results   Component Value Date    CREATININE 1.00 03/07/2017    BUN 19 03/07/2017     03/07/2017    K 3.8 03/07/2017     03/07/2017    CO2 28.0 03/07/2017             Estimated Creatinine Clearance: 67 mL/min (by C-G formula based on Cr of 1).  I/O last 3 completed shifts:  In: 2140 [P.O.:1540; IV Piggyback:600]  Out: 4475 [Urine:4400]          Temp Readings from Last 3 Encounters:   03/07/17 98.9 °F (37.2 °C) (Oral)   02/23/17 98.2 °F (36.8 °C)   02/16/17 98.5 °F (36.9 °C) (Temporal Artery )       Weight: 210 lb 4 oz (95.4 kg)         CULTURE   Date Value Ref Range Status   03/02/2017 No growth at 4 days  Final       Lab Results   Component Value Date    VANCOTROUGH 27.00 (H) 03/07/2017       Component Value Date     VANCOTROUGH 5.60 (L) 03/05/2017      Assessment/Plan  WBC is WNL. Tissue cultures are NGTD. Renal function has been stable. Estimated CrCl is 60-70 ml/min. Urine output is adequate. With a goal trough trough of 15-20, vancomycin level is supratherapeutic today.  Pharmacy recommends to decrease dose to 1500 mg q24h to target trough. Pharmacy will follow and monitor patient's clinical progress daily.     Thank you,  Lane Hampton RP  3/7/2017 2:14 PM

## 2017-03-07 NOTE — PROGRESS NOTES
Continued Stay Note  [facility]     Patient Name: Tamara Langston  MRN: [id number]  Today's Date: 3/7/2017    Admit Date: 2/24/2017          Discharge Plan       03/07/17 0743    Case Management/Social Work Plan    Plan Rehab    Patient/Family In Agreement With Plan yes    Additional Comments Multiple facility referrals have been made in [place] and [place]. One facility has declined because of the cost of care and poor reimbursement.               Discharge Codes     None        Expected Discharge Date and Time     Expected Discharge Date Expected Discharge Time    Mar 8, 2017             Ese Whitlock RN

## 2017-03-07 NOTE — PLAN OF CARE
Problem: Fall Risk (Adult)  Goal: Absence of Falls  Outcome: Ongoing (interventions implemented as appropriate)    03/06/17 9359   Fall Risk (Adult)   Absence of Falls making progress toward outcome

## 2017-03-08 ENCOUNTER — APPOINTMENT (OUTPATIENT)
Dept: CARDIOLOGY | Facility: HOSPITAL | Age: 62
End: 2017-03-08
Attending: FAMILY MEDICINE

## 2017-03-08 LAB
ANION GAP SERPL CALCULATED.3IONS-SCNC: 3 MMOL/L (ref 3–11)
BACTERIA SPEC AEROBE CULT: ABNORMAL
BACTERIA SPEC AEROBE CULT: ABNORMAL
BH CV ECHO MEAS - AO MAX PG (FULL): 37.2 MMHG
BH CV ECHO MEAS - AO MAX PG: 48.7 MMHG
BH CV ECHO MEAS - AO MEAN PG (FULL): 16 MMHG
BH CV ECHO MEAS - AO MEAN PG: 22 MMHG
BH CV ECHO MEAS - AO ROOT AREA (BSA CORRECTED): 1.2
BH CV ECHO MEAS - AO ROOT AREA: 4.9 CM^2
BH CV ECHO MEAS - AO ROOT DIAM: 2.5 CM
BH CV ECHO MEAS - AO V2 MAX: 349 CM/SEC
BH CV ECHO MEAS - AO V2 MEAN: 211 CM/SEC
BH CV ECHO MEAS - AO V2 VTI: 78.1 CM
BH CV ECHO MEAS - AVA(I,A): 1.6 CM^2
BH CV ECHO MEAS - AVA(I,D): 1.6 CM^2
BH CV ECHO MEAS - AVA(V,A): 1.5 CM^2
BH CV ECHO MEAS - AVA(V,D): 1.5 CM^2
BH CV ECHO MEAS - BSA(HAYCOCK): 2.1 M^2
BH CV ECHO MEAS - BSA: 2 M^2
BH CV ECHO MEAS - BZI_BMI: 33.9 KILOGRAMS/M^2
BH CV ECHO MEAS - BZI_METRIC_HEIGHT: 167.6 CM
BH CV ECHO MEAS - BZI_METRIC_WEIGHT: 95.3 KG
BH CV ECHO MEAS - CONTRAST EF (2CH): 74.1 ML/M^2
BH CV ECHO MEAS - CONTRAST EF 4CH: 69.9 ML/M^2
BH CV ECHO MEAS - EDV(CUBED): 123.5 ML
BH CV ECHO MEAS - EDV(MOD-SP2): 81 ML
BH CV ECHO MEAS - EDV(MOD-SP4): 123 ML
BH CV ECHO MEAS - EDV(TEICH): 117.1 ML
BH CV ECHO MEAS - EF(CUBED): 72.5 %
BH CV ECHO MEAS - EF(MOD-SP2): 74.1 %
BH CV ECHO MEAS - EF(MOD-SP4): 69.9 %
BH CV ECHO MEAS - EF(TEICH): 64 %
BH CV ECHO MEAS - ESV(CUBED): 34 ML
BH CV ECHO MEAS - ESV(MOD-SP2): 21 ML
BH CV ECHO MEAS - ESV(MOD-SP4): 37 ML
BH CV ECHO MEAS - ESV(TEICH): 42.2 ML
BH CV ECHO MEAS - FS: 34.9 %
BH CV ECHO MEAS - IVS/LVPW: 1.1
BH CV ECHO MEAS - IVSD: 1.4 CM
BH CV ECHO MEAS - LA DIMENSION: 3.2 CM
BH CV ECHO MEAS - LA/AO: 1.3
BH CV ECHO MEAS - LAT PEAK E' VEL: 8.2 CM/SEC
BH CV ECHO MEAS - LV DIASTOLIC VOL/BSA (35-75): 60.2 ML/M^2
BH CV ECHO MEAS - LV MASS(C)D: 282.1 GRAMS
BH CV ECHO MEAS - LV MASS(C)DI: 138.2 GRAMS/M^2
BH CV ECHO MEAS - LV MAX PG: 11.6 MMHG
BH CV ECHO MEAS - LV MEAN PG: 6 MMHG
BH CV ECHO MEAS - LV SYSTOLIC VOL/BSA (12-30): 18.1 ML/M^2
BH CV ECHO MEAS - LV V1 MAX: 170 CM/SEC
BH CV ECHO MEAS - LV V1 MEAN: 109 CM/SEC
BH CV ECHO MEAS - LV V1 VTI: 40.7 CM
BH CV ECHO MEAS - LVIDD: 5 CM
BH CV ECHO MEAS - LVIDS: 3.2 CM
BH CV ECHO MEAS - LVLD AP2: 7.4 CM
BH CV ECHO MEAS - LVLD AP4: 7.8 CM
BH CV ECHO MEAS - LVLS AP2: 5.4 CM
BH CV ECHO MEAS - LVLS AP4: 6.2 CM
BH CV ECHO MEAS - LVOT AREA (M): 3.1 CM^2
BH CV ECHO MEAS - LVOT AREA: 3.1 CM^2
BH CV ECHO MEAS - LVOT DIAM: 2 CM
BH CV ECHO MEAS - LVPWD: 1.3 CM
BH CV ECHO MEAS - MED PEAK E' VEL: 8.6 CM/SEC
BH CV ECHO MEAS - MV A MAX VEL: 113 CM/SEC
BH CV ECHO MEAS - MV DEC TIME: 0.23 SEC
BH CV ECHO MEAS - MV E MAX VEL: 130 CM/SEC
BH CV ECHO MEAS - MV E/A: 1.2
BH CV ECHO MEAS - PA ACC SLOPE: 1150 CM/SEC^2
BH CV ECHO MEAS - PA ACC TIME: 0.11 SEC
BH CV ECHO MEAS - PA MAX PG: 12.1 MMHG
BH CV ECHO MEAS - PA PR(ACCEL): 28.2 MMHG
BH CV ECHO MEAS - PA V2 MAX: 174 CM/SEC
BH CV ECHO MEAS - RVDD: 2 CM
BH CV ECHO MEAS - SI(AO): 187.7 ML/M^2
BH CV ECHO MEAS - SI(CUBED): 43.8 ML/M^2
BH CV ECHO MEAS - SI(LVOT): 62.6 ML/M^2
BH CV ECHO MEAS - SI(MOD-SP2): 29.4 ML/M^2
BH CV ECHO MEAS - SI(MOD-SP4): 42.1 ML/M^2
BH CV ECHO MEAS - SI(TEICH): 36.7 ML/M^2
BH CV ECHO MEAS - SV(AO): 383.4 ML
BH CV ECHO MEAS - SV(CUBED): 89.5 ML
BH CV ECHO MEAS - SV(LVOT): 127.9 ML
BH CV ECHO MEAS - SV(MOD-SP2): 60 ML
BH CV ECHO MEAS - SV(MOD-SP4): 86 ML
BH CV ECHO MEAS - SV(TEICH): 74.9 ML
BH CV ECHO MEAS - TAPSE (>1.6): 3.2 CM2
BH CV ECHO MEAS - TR MAX VEL: 266 CM/SEC
BH CV XLRA - RV BASE: 2.8 CM
BH CV XLRA - RV LENGTH: 7.6 CM
BH CV XLRA - RV MID: 1.7 CM
BH CV XLRA - TDI S': 17.9 CM/SEC
BUN BLD-MCNC: 19 MG/DL (ref 9–23)
BUN/CREAT SERPL: 19 (ref 7–25)
CALCIUM SPEC-SCNC: 10.2 MG/DL (ref 8.7–10.4)
CHLORIDE SERPL-SCNC: 108 MMOL/L (ref 99–109)
CO2 SERPL-SCNC: 29 MMOL/L (ref 20–31)
CREAT BLD-MCNC: 1 MG/DL (ref 0.6–1.3)
CYTO UR: NORMAL
E/E' RATIO: 15
GFR SERPL CREATININE-BSD FRML MDRD: 56 ML/MIN/1.73
GLUCOSE BLD-MCNC: 132 MG/DL (ref 70–100)
GLUCOSE BLDC GLUCOMTR-MCNC: 133 MG/DL (ref 70–130)
GLUCOSE BLDC GLUCOMTR-MCNC: 148 MG/DL (ref 70–130)
GLUCOSE BLDC GLUCOMTR-MCNC: 159 MG/DL (ref 70–130)
GLUCOSE BLDC GLUCOMTR-MCNC: 161 MG/DL (ref 70–130)
GRAM STN SPEC: ABNORMAL
GRAM STN SPEC: ABNORMAL
LAB AP CASE REPORT: NORMAL
LAB AP CLINICAL INFORMATION: NORMAL
LEFT ATRIUM VOLUME INDEX: 22.5 ML/M2
LV EF 2D ECHO EST: 75 %
Lab: NORMAL
PATH REPORT.FINAL DX SPEC: NORMAL
PATH REPORT.GROSS SPEC: NORMAL
POTASSIUM BLD-SCNC: 4 MMOL/L (ref 3.5–5.5)
SODIUM BLD-SCNC: 140 MMOL/L (ref 132–146)
VANCOMYCIN TROUGH SERPL-MCNC: 18.2 MCG/ML (ref 10–20)

## 2017-03-08 PROCEDURE — 25010000002 AZITHROMYCIN: Performed by: INTERNAL MEDICINE

## 2017-03-08 PROCEDURE — 94640 AIRWAY INHALATION TREATMENT: CPT

## 2017-03-08 PROCEDURE — 82962 GLUCOSE BLOOD TEST: CPT

## 2017-03-08 PROCEDURE — 99233 SBSQ HOSP IP/OBS HIGH 50: CPT | Performed by: FAMILY MEDICINE

## 2017-03-08 PROCEDURE — 25010000002 HEPARIN (PORCINE) PER 1000 UNITS: Performed by: THORACIC SURGERY (CARDIOTHORACIC VASCULAR SURGERY)

## 2017-03-08 PROCEDURE — 63710000001 INSULIN DETEMIR PER 5 UNITS: Performed by: HOSPITALIST

## 2017-03-08 PROCEDURE — 25010000002 CEFEPIME: Performed by: INTERNAL MEDICINE

## 2017-03-08 PROCEDURE — 25010000002 VANCOMYCIN HCL IN NACL 1.5-0.9 GM/500ML-% SOLUTION: Performed by: THORACIC SURGERY (CARDIOTHORACIC VASCULAR SURGERY)

## 2017-03-08 PROCEDURE — 94660 CPAP INITIATION&MGMT: CPT

## 2017-03-08 PROCEDURE — 93306 TTE W/DOPPLER COMPLETE: CPT

## 2017-03-08 PROCEDURE — 80202 ASSAY OF VANCOMYCIN: CPT | Performed by: INTERNAL MEDICINE

## 2017-03-08 PROCEDURE — 63710000001 PROMETHAZINE PER 25 MG: Performed by: NURSE PRACTITIONER

## 2017-03-08 PROCEDURE — 94799 UNLISTED PULMONARY SVC/PX: CPT

## 2017-03-08 PROCEDURE — 93306 TTE W/DOPPLER COMPLETE: CPT | Performed by: INTERNAL MEDICINE

## 2017-03-08 PROCEDURE — 97605 NEG PRS WND THER DME<=50SQCM: CPT

## 2017-03-08 PROCEDURE — 80048 BASIC METABOLIC PNL TOTAL CA: CPT | Performed by: FAMILY MEDICINE

## 2017-03-08 PROCEDURE — 99024 POSTOP FOLLOW-UP VISIT: CPT | Performed by: THORACIC SURGERY (CARDIOTHORACIC VASCULAR SURGERY)

## 2017-03-08 RX ORDER — FENTANYL 100 UG/H
1 PATCH TRANSDERMAL EVERY OTHER DAY
Status: DISCONTINUED | OUTPATIENT
Start: 2017-03-08 | End: 2017-03-08 | Stop reason: SDUPTHER

## 2017-03-08 RX ORDER — FENTANYL 100 UG/H
1 PATCH TRANSDERMAL EVERY OTHER DAY
Status: DISCONTINUED | OUTPATIENT
Start: 2017-03-10 | End: 2017-03-10 | Stop reason: HOSPADM

## 2017-03-08 RX ORDER — DIAPER,BRIEF,INFANT-TODD,DISP
EACH MISCELLANEOUS EVERY 12 HOURS SCHEDULED
Status: DISCONTINUED | OUTPATIENT
Start: 2017-03-08 | End: 2017-03-10 | Stop reason: HOSPADM

## 2017-03-08 RX ADMIN — CETIRIZINE HYDROCHLORIDE 10 MG: 10 TABLET, FILM COATED ORAL at 20:14

## 2017-03-08 RX ADMIN — ASPIRIN 325 MG: 325 TABLET, DELAYED RELEASE ORAL at 08:50

## 2017-03-08 RX ADMIN — NICOTINE 1 PATCH: 21 PATCH, EXTENDED RELEASE TRANSDERMAL at 09:00

## 2017-03-08 RX ADMIN — FUROSEMIDE 40 MG: 40 TABLET ORAL at 08:50

## 2017-03-08 RX ADMIN — HEPARIN SODIUM 5000 UNITS: 5000 INJECTION, SOLUTION INTRAVENOUS; SUBCUTANEOUS at 20:14

## 2017-03-08 RX ADMIN — IPRATROPIUM BROMIDE AND ALBUTEROL SULFATE 3 ML: .5; 3 SOLUTION RESPIRATORY (INHALATION) at 20:58

## 2017-03-08 RX ADMIN — IPRATROPIUM BROMIDE AND ALBUTEROL SULFATE 3 ML: .5; 3 SOLUTION RESPIRATORY (INHALATION) at 12:29

## 2017-03-08 RX ADMIN — INSULIN LISPRO 2 UNITS: 100 INJECTION, SOLUTION INTRAVENOUS; SUBCUTANEOUS at 20:15

## 2017-03-08 RX ADMIN — METRONIDAZOLE 500 MG: 500 INJECTION, SOLUTION INTRAVENOUS at 05:07

## 2017-03-08 RX ADMIN — CEFEPIME 2 G: 2 INJECTION, POWDER, FOR SOLUTION INTRAVENOUS at 02:34

## 2017-03-08 RX ADMIN — AZITHROMYCIN 500 MG: 500 INJECTION, POWDER, LYOPHILIZED, FOR SOLUTION INTRAVENOUS at 08:56

## 2017-03-08 RX ADMIN — Medication 5 MG: at 21:03

## 2017-03-08 RX ADMIN — OXYCODONE HYDROCHLORIDE AND ACETAMINOPHEN 1 TABLET: 10; 325 TABLET ORAL at 16:05

## 2017-03-08 RX ADMIN — AMLODIPINE BESYLATE 5 MG: 5 TABLET ORAL at 08:50

## 2017-03-08 RX ADMIN — FLUTICASONE PROPIONATE 1 SPRAY: 50 SPRAY, METERED NASAL at 08:44

## 2017-03-08 RX ADMIN — Medication 1 CAPSULE: at 08:49

## 2017-03-08 RX ADMIN — INSULIN DETEMIR 10 UNITS: 100 INJECTION, SOLUTION SUBCUTANEOUS at 08:51

## 2017-03-08 RX ADMIN — PANTOPRAZOLE SODIUM 40 MG: 40 TABLET, DELAYED RELEASE ORAL at 05:07

## 2017-03-08 RX ADMIN — INSULIN LISPRO 2 UNITS: 100 INJECTION, SOLUTION INTRAVENOUS; SUBCUTANEOUS at 11:51

## 2017-03-08 RX ADMIN — PREGABALIN 100 MG: 100 CAPSULE ORAL at 08:49

## 2017-03-08 RX ADMIN — BISOPROLOL FUMARATE AND HYDROCHLOROTHIAZIDE 1 TABLET: 6.25; 5 TABLET ORAL at 20:14

## 2017-03-08 RX ADMIN — BACLOFEN 20 MG: 10 TABLET ORAL at 14:13

## 2017-03-08 RX ADMIN — FENTANYL 1 PATCH: 100 PATCH, EXTENDED RELEASE TRANSDERMAL at 10:03

## 2017-03-08 RX ADMIN — METOCLOPRAMIDE 5 MG: 5 TABLET ORAL at 16:06

## 2017-03-08 RX ADMIN — HYDROCORTISONE: 10 CREAM TOPICAL at 20:14

## 2017-03-08 RX ADMIN — METRONIDAZOLE 500 MG: 500 INJECTION, SOLUTION INTRAVENOUS at 14:13

## 2017-03-08 RX ADMIN — SALINE NASAL SPRAY 2 SPRAY: 1.5 SOLUTION NASAL at 05:07

## 2017-03-08 RX ADMIN — BUSPIRONE HYDROCHLORIDE 20 MG: 10 TABLET ORAL at 08:49

## 2017-03-08 RX ADMIN — VANCOMYCIN HYDROCHLORIDE 1500 MG: 1 INJECTION, POWDER, LYOPHILIZED, FOR SOLUTION INTRAVENOUS at 17:58

## 2017-03-08 RX ADMIN — OXYCODONE HYDROCHLORIDE AND ACETAMINOPHEN 1 TABLET: 10; 325 TABLET ORAL at 20:13

## 2017-03-08 RX ADMIN — ATORVASTATIN CALCIUM 40 MG: 40 TABLET, FILM COATED ORAL at 20:14

## 2017-03-08 RX ADMIN — IPRATROPIUM BROMIDE AND ALBUTEROL SULFATE 3 ML: .5; 3 SOLUTION RESPIRATORY (INHALATION) at 07:21

## 2017-03-08 RX ADMIN — INSULIN DETEMIR 10 UNITS: 100 INJECTION, SOLUTION SUBCUTANEOUS at 20:15

## 2017-03-08 RX ADMIN — METOCLOPRAMIDE 5 MG: 5 TABLET ORAL at 08:29

## 2017-03-08 RX ADMIN — IPRATROPIUM BROMIDE AND ALBUTEROL SULFATE 3 ML: .5; 3 SOLUTION RESPIRATORY (INHALATION) at 15:49

## 2017-03-08 RX ADMIN — LISINOPRIL 20 MG: 20 TABLET ORAL at 09:00

## 2017-03-08 RX ADMIN — EZETIMIBE 10 MG: 10 TABLET ORAL at 08:57

## 2017-03-08 RX ADMIN — PREGABALIN 100 MG: 100 CAPSULE ORAL at 21:03

## 2017-03-08 RX ADMIN — LEVOTHYROXINE SODIUM 112 MCG: 112 TABLET ORAL at 05:07

## 2017-03-08 RX ADMIN — HEPARIN SODIUM 5000 UNITS: 5000 INJECTION, SOLUTION INTRAVENOUS; SUBCUTANEOUS at 08:57

## 2017-03-08 RX ADMIN — METOCLOPRAMIDE 5 MG: 5 TABLET ORAL at 11:51

## 2017-03-08 RX ADMIN — OXYCODONE HYDROCHLORIDE AND ACETAMINOPHEN 1 TABLET: 10; 325 TABLET ORAL at 05:42

## 2017-03-08 RX ADMIN — CEFEPIME 2 G: 2 INJECTION, POWDER, FOR SOLUTION INTRAVENOUS at 15:00

## 2017-03-08 RX ADMIN — BUSPIRONE HYDROCHLORIDE 20 MG: 10 TABLET ORAL at 20:14

## 2017-03-08 RX ADMIN — BACLOFEN 20 MG: 10 TABLET ORAL at 05:07

## 2017-03-08 RX ADMIN — OXYCODONE HYDROCHLORIDE AND ACETAMINOPHEN 1 TABLET: 10; 325 TABLET ORAL at 11:51

## 2017-03-08 RX ADMIN — PREGABALIN 100 MG: 100 CAPSULE ORAL at 16:29

## 2017-03-08 RX ADMIN — PROMETHAZINE HYDROCHLORIDE 25 MG: 25 TABLET ORAL at 10:00

## 2017-03-08 NOTE — PROGRESS NOTES
Hospitalist Daily Progress Note    Date of Admission: 2/24/2017   LOS: 12 days   PCP: Harinder Aranda MD    Chief Complaint:  F/u LLE osteomyelitis    Subjective   History of Present Illness  LLE pain under control.  Had a BM this morning.   C/o mild erythematous rash along her proximal elbow area.    Review of Systems  General: no fevers, chills  Cardiovascular: no chest pain, palpitations  Abdomen: No abdominal pain, nausea, vomiting, or diarrhea  Neurologic: No focal weakness, has gen. Weakness.  All other systems reviewed and negative.    Objective   Physical Exam:  I have reviewed the vital signs.  Temp:  [98.1 °F (36.7 °C)-98.3 °F (36.8 °C)] 98.1 °F (36.7 °C)  Heart Rate:  [51-72] 58  Resp:  [18-20] 20  BP: (119-136)/(56-66) 119/56    Objective  General Appearance: Alert, cooperative, no distress  Head: Normocephalic, atraumatic  Eyes: PERRL EOMI, Sclerae anicteric  Neck: Supple, no mass  Lungs: Clear to auscultation bilaterally, respirations unlabored  Heart: Regular rate and rhythm, S1 and S2 normal, 2/6 СЕРГЕЙ, rub or gallop  Abdomen: Soft, non-tender, bowel sounds active, nondistended  Extremities: No clubbing, cyanosis, or edema to lower extremities  LLE with surgical drsg/wound vac in place without drainage  Pulses: 2+ and symmetric in distal lower extremities  Skin: mild erythematous rash, non-blanching along proximal elbow region, no blistering, no jaundice  Neurologic: Oriented x3, CN2-12 intact, Normal strength to extremities    Results Review:    I have reviewed the labs, radiology results and diagnostic studies.      Results from last 7 days  Lab Units 03/06/17  1215   WBC 10*3/mm3 8.71   HEMOGLOBIN g/dL 9.2*   PLATELETS 10*3/mm3 235       Results from last 7 days  Lab Units 03/08/17  0526   SODIUM mmol/L 140   POTASSIUM mmol/L 4.0   TOTAL CO2 mmol/L 29.0   CREATININE mg/dL 1.00   GLUCOSE mg/dL 132*       I have reviewed the  medications.    ---------------------------------------------------------------------------------------------  Assessment/Plan   Assessment & Plan  Assessment/Problem List    Principal Problem:    Osteomyelitis of left foot  Active Problems:    COPD (chronic obstructive pulmonary disease)    Obstructive sleep apnea syndrome    Chronic back pain    Type 2 diabetes mellitus    Dyslipidemia    Fibromyalgia    Gastroesophageal reflux disease without esophagitis    Hypertension    Hypothyroidism    Peripheral vascular disease    Diabetic polyneuropathy associated with type 2 diabetes mellitus    Anemia, blood loss    Chronic pain    Chronic, continuous use of opioids    Chronic anxiety    Chronically on benzodiazepine therapy       Plan  This is a 61-year-old  female with PMHx significant for tobacco abuse, hypertension, hyperlipidemia, and diabetes type 2, who is status post transmetatarsal amputation of the left great toe due to osteomyelitis in November 2016, now presents with poor wound healing with growth of staph aureus from tissue cultures done on 2/24.      MSSA Osteomyelitis (presumed) of left 1st metatarsal  -- 2/24 MRI showed evidence of osteomyelitis in the first metatarsal and the rest of the digits on the left foot, s/p removal of the rest of 1st metatarsal bone on 2/25 by Dr. Feliciano  -- now s/p 2nd - 5th transmetatarsal amputation on 3/2/17  --2/25 Tissue Cx grew Rare Staph aureus, resistant only to PCN.  --3/2 Tissue Cx grew rare Staph, coag.negative, Sensitivities pending.  -- Infectious disease and cardiothoracic surgeon on consult  -- On Vanc,cefepime, Flagyl, and azithromycin IV. Probiotic. Will likely need 6 wks IV abx.  -- ECHO to eval murmur pending.     PNA  --CXR showed that compared to distant examinations of 11/28/2016, the central  chest is more congested. The bibasilar airspace opacities, especially consolidation at the left base are new and progressive.  --CT Chest confirms  groundglass opacities bilaterally more severe in right lung.  --, Troponin negative.  --legionella urine Ag pending, Mycoplasma IgG/IgM pending.  --pt already on adequate Abx coverage for PNA per above.  --pulm toilet and incentive spirometry.  --on lasix daily already.     RLL spiculated mass 1.9 x 5.4 cm  -3/6/17 CT Chest showed bilateral small groundglass opacities and small solid mass with spiculation in the right lower lobe area  -CTA Chest done in Feb 2015 showed no mass in RLL.  -Dr Feliciano with [facility] consulted.  -This mass is concerning for neoplasm.  -recommended close f/u with another CT scan and/or PET scan  -may be a candidate for needle bx transthoracicaly or perhaps navigational bronchoscopy once osteomyelitis is addressed above.  -f/u as outpatient.      Rash along both elbows  -may be contact dermatitis, doubt this is abx related as it is not spread to other areas of body  -hydrocortisone cream 1% BID.    DM2, Controlled  -- Hgb A1c is 6.1   -- continue current insulin regimen, acceptable control      Anemia of chronic disease  -- hemoglobin stable, monitor      Chronic back pain/Fibromyalgia on chronic opioids  -- continue home pain medication  -- continue home muscle relaxers      Anxiety on chronic benzos  - buspar and klonopin continued (klonopin with 50% decrease in home dose)      COPD  -- stable      HTN  -- monitor, currently stable on meds      Constipation  -- continue bowel regimen      Hypothyroidism  -- continue home dose of synthroid, TSH ok  -- CT Chest showed enlarged thyroid with nodules.  F/u as outpatient.      HLP  -- continue home dose of Lipitor and zetia      DINA  -- CPAP w/ 2L oxygen while sleeping      Tobacco abuse  -- Patient counseled at length and cessation recommended      DVT prophylaxis: shell hose and scd to RLE  Dispo: Plan will be to [facility] end week hopefully, referrals made by NANCY Bain MD 03/08/17 9:27 AM

## 2017-03-08 NOTE — PLAN OF CARE
Problem: Patient Care Overview (Adult)  Goal: Plan of Care Review  Outcome: Ongoing (interventions implemented as appropriate)    03/07/17 1430 03/07/17 2000 03/08/17 0333   Coping/Psychosocial Response Interventions   Plan Of Care Reviewed With --  patient --    Patient Care Overview   Progress improving --  --    Outcome Evaluation   Outcome Summary/Follow up Plan --  --  Wound vac to left foot. IV atbs continue. PICC in place. ECHO for this am. ACHS FSBS. CPAP at hs, O2 at 3lpm. Wound care to pressure sore on buttocks daily.       Goal: Discharge Needs Assessment  Outcome: Ongoing (interventions implemented as appropriate)    Problem: Infection, Risk/Actual (Adult)  Goal: Infection Prevention/Resolution  Outcome: Ongoing (interventions implemented as appropriate)    Problem: Pressure Ulcer (Adult)  Goal: Signs and Symptoms of Listed Potential Problems Will be Absent or Manageable (Pressure Ulcer)  Outcome: Ongoing (interventions implemented as appropriate)    Problem: Fall Risk (Adult)  Goal: Identify Related Risk Factors and Signs and Symptoms  Outcome: Ongoing (interventions implemented as appropriate)

## 2017-03-08 NOTE — PROGRESS NOTES
Tamara Langston  1955  [id number]  3/8/2017    CC: No chief complaint on file.  foot infection  Reason for Consultation: Left foot infection     History of present illness:      This is a 61 y.o. female with a history of DM, DINA, COPD, PVD, who was treated in 11/23/16 by Dr. Cruz for left great toe osteomyelitis growing MSSA and GBS and ultimately required left great toe transmetatarsal amputation by Dr. Feliciano. She was discharged home on Vanc/Rocephin IV and oral Flagyl. She had been doing well postoperatively until approximately 1 week ago she started having a low grade fever of 99.0 and SOA. HH nurse noticed a new brownish drainage from her surgical wound on 2/22/17. Dr. Feliciano was contacted and she was seen in the office on 2/23/17. He was concerned with an osteo infection and pt followed up with Dr. Cruz/Dr. Feliciano in the office yesterday. It was decided that she should be admitted and be started on IV antibiotics as well as MRI of the left foot. Left foot culture taken yesterday is growing MSSA. She has been afebrile and labs revealed a WBC of 10.24 and Cr of 1.30. She was started on Vancomycin and Rocephin IV therapy and has received a dose of Zinacef.     MRI of the left foot was concerning for osteomyelitis of the 2nd-3rd-4th metatarsal bones and appearance of an abscess formation around previous transmetatarsal amputation of great toe. Surgical plans for today were for transmetatarsal resection of 2nd,3rd, 4th and 5th toe as well as completion of great toe amputation back to cuneiform bone. Pt ultimately underwent removal of the remainder of the 1st metatarsal bone of the left foot and a portion of the cuneiform bone. We have been asked to see for further evaluation and antibiotic recommendations. Pt was seen in the immediate post operative recovery room and is very drowsy. Seen and agree     2/26 - co foot infection  Hx limited  Co pain  No rash  ROS discussed with staff    2/27  Very complicated  situation as noted by Dr Feliciano  She underwent excision of the remaining portion of the proximal 1st MT by Dr Feliciano  The MRI suggests the possibility of extensive OM of the foot but there is concern that her foot would be destabilized if all the infected bone were to be resected She c/o discomfort at the site of the amputation  2/28 no new c/o   3/1/17  Plans for transmetatarsal amputation noted  In my presence Dr Feliciano quoted a 50/50 chance of limb salvage  3/3/17  Alert; operative findings discussed with Dr Feliciano  Pt w/o complaints  3/6/17  Alert, c/o mild sob despite breathing treatments  Not having a productive cough   I/O negative balance last 3 days   3/7/17 still with some dyspnea; she has developed a mild cough but is unable to produce sputum for culture;  CT reviewed and discussed with patient and Dr Feliciano  3/8/17 cough and dyspnea improved; c/o nausea during nausea infusion;echo pending; possible transfer to SNF on Friday; comfortable on 2L O2  CN staph from 3/2 op culture    Past Medical History   Diagnosis Date   • Bronchitis    • Cervical cancer    • Cholelithiasis 5/11/2016   • Chronic anxiety 2/27/2017   • Chronic bronchitis    • Chronically on benzodiazepine therapy 2/27/2017   • Degenerative arthritis    • Diabetes mellitus    • Dyslipidemia 5/11/2016   • Dyspnea    • Fatty liver disease, nonalcoholic 11/21/2016   • Fibromyalgia    • GERD (gastroesophageal reflux disease)    • H/O echocardiogram    • History of pneumonia    • Hypertension 5/11/2016     16. H/O echocardiogram (V15.89) (Z92.89)  · A.  Echocardiogram of 02/03/2015 reports an ejection fraction of 60-65%, mild concentric     LVH, trace mitral regurgitation, mild tricuspid and pulmonic regurgitation and calculated     RVSP of 35 mmHg, the main pulmonary artery is also mildly dilated.   • Hypothyroidism 5/11/2016     Description: A.  On replacement therapy.   • Nausea    • Obesity    • DINA (obstructive sleep apnea)      intolerant of CPAP  therapy   • Osteoporosis    • Osteoporosis    • Polycythemia vera 5/11/2016   • Pulmonary emphysema    • Restrictive ventilatory defect    • Rhinitis    • Uncontrolled diabetes mellitus 5/11/2016   • Uterine cancer    • Vitamin D deficiency 8/1/2016       Medications:   Antibiotics:  IV Anti-Infectives     Ordered     Dose/Rate Route Frequency Start Stop    03/07/17 1421  vancomycin IVPB 1500 mg in 0.9% NaCl (Premix) 500 mL     Ordering Provider:  Arsalan Feliciano MD    1,500 mg  over 90 Minutes Intravenous Every 24 Hours 03/07/17 1800      03/07/17 1305  AZITHROMYCIN 500 MG/250 ML 0.9% NS IVPB (MBP)     Ordering Provider:  Oksana Cruz MD    500 mg  over 60 Minutes Intravenous Every 24 Hours Scheduled 03/07/17 1400      03/05/17 0923  vancomycin 2000 mg/500 mL 0.9% NS IVPB ([facility])     Ordering Provider:  Katrin Williamsonsaldeen, RPH    20 mg/kg × 95.4 kg  over 120 Minutes Intravenous Once 03/05/17 1100 03/05/17 1242    03/01/17 1359  cefepime (MAXIPIME) 2 g/100 mL 0.9% NS ([facility]p)     Ordering Provider:  Oksana Cruz MD    2 g Intravenous Every 12 Hours 03/01/17 1500      02/26/17 1946  Pharmacy to dose vancomycin     Ordering Provider:  Arsalan Feliciano MD     Does not apply Continuous PRN 02/26/17 1945      02/24/17 1718  vancomycin 2000 mg/500 mL 0.9% NS IVPB ([facility])     Ordering Provider:  Luis Manuel Rodriguez IV, RPH    2,000 mg  over 120 Minutes Intravenous Once 02/24/17 1800 02/24/17 1932          Allergies:  is allergic to cortisone; oxycontin [oxycodone]; and tolmetin.    Family History: family history includes Alcohol abuse in her brother; Arthritis in her brother, father, mother, and other; Bleeding Disorder in her father; COPD in her sister; Colon polyps in her mother; Diabetes in her brother, father, mother, and other; Diverticulitis in her mother; Heart murmur in her daughter; Kidney disease in her father.    Social History:  reports that she has been smoking Cigarettes.  She has a 24.00 pack-year smoking  "history. She has never used smokeless tobacco. She reports that she does not drink alcohol or use illicit drugs.    Review of Systems: All other reviewed and negative except as per HPI - pt drewggy this am    Blood pressure 119/56, pulse 65, temperature 99.2 °F (37.3 °C), temperature source Oral, resp. rate 18, height 66\" (167.6 cm), weight 210 lb (95.3 kg), SpO2 96 %, not currently breastfeeding.  GENERAL: alert, in no acute distress.   HEENT: Oropharynx without thrush. . No cervical adenopathy. No neck masses  EYES: . No conjunctival injection. No icterus.   LYMPHATICS: No lymphadenopathy of the neck or axillary or inguinal regions.   HEART: No murmur, gallop, or pericardial friction rub.   LUNGS: wheezes on right,   crackles at bases  ABDOMEN: Soft, nontender, nondistended. No appreciable HSM.    SKIN: Warm and dry without cutaneous eruptions. .   PSYCHIATRIC: Mental status lucid. Cranial nerve function intact.   EXT: post-op bandage intact      DIAGNOSTICS:  Lab Results   Component Value Date    WBC 8.71 03/06/2017    HGB 9.2 (L) 03/06/2017    HCT 30.0 (L) 03/06/2017     03/06/2017     Lab Results   Component Value Date    CRP 57.50 (H) 11/27/2016     Lab Results   Component Value Date    SEDRATE 59 (H) 11/21/2016     Lab Results   Component Value Date    GLUCOSE 132 (H) 03/08/2017    BUN 19 03/08/2017    CREATININE 1.00 03/08/2017    EGFRIFNONA 56 (L) 03/08/2017    EGFRIFAFRI 77 12/13/2016    BCR 19.0 03/08/2017    CO2 29.0 03/08/2017    CALCIUM 10.2 03/08/2017    PROTENTOTREF 7.9 12/13/2016    ALBUMIN 3.50 03/06/2017    LABIL2 1.0 (L) 03/06/2017    AST 71 (H) 03/06/2017    ALT 57 (H) 03/06/2017       Microbiology  MSSA  And Pseudomonas  CXR personally reviewed- increased bibasilar markings    RADIOLOGY:         EXAMINATION: CT CHEST WITHOUT CONTRAST-03/06/2017:       INDICATION: Left lower lobe atelectasis on chest x-ray and increased  vascular congestion; Z74.09-Other reduced mobility; " M86.9-Osteomyelitis,  unspecified; Z74.09-Other reduced mobility; M86.9-Osteomyelitis,  unspecified.       COMPARISON: Compared to chest radiographs of 03/06/2017 and distant CT  data sets of the chest and mediastinum of 2015.      FINDINGS:   1. Small patchy groundglass opacities are identified bilaterally in the  lungs, more numerous and severe in the right lung.  2. In addition, there is a small mass with spiculation and tenting to  the pleura and diaphragm seen at the right lung base posteriorly with  pleural thickening and pleural reaction. This is suspicious and  worrisome.  3. In the contralateral medial left lung base, there is consolidative  airspace opacity with air bronchograms.  4. There are multiple nodules in the thyroid with enlargement of the  isthmus. Scattered lymph nodes are seen in the axillary areas and  mediastinum, some of the mediastinum and subcarinal lymph nodes approach  size criteria.  5. Enlargement of pulmonary arteries is noted consistent with the  patient's known diagnosis of pulmonary hypertension and there is four  chamber mild cardiomegaly without pericardial effusion.  6. A splenule is seen in the upper abdomen and there is a small isthmus  nodule left adrenal gland with a myelolipoma in the left adrenal gland  separate. The liver is unremarkable. The patient does have a worrisome  epicardial lymph node which is within normal limits for size but  unusual.             Assessment and Plan:        Impression:      -Osteomyelitis of left 1st MT s/p removal of the remainder of the 1st metatarsal bone of the left foot and a portion of the cuneiform bone 2/25/17. Culture with MSSA  ;   Psa from 1 of 3 cultures sent 2/25  TMA 3/2/17  CN staph from 3/2 op culture  --Patchy ground glass infiltrates developing on cefepime and vancomycin  R/O  Atypical pneumonia  --RLL mass  -Recent MSSA/GBS left great toe osteomyelitis s/p left great toe transmetatarsal amputation 11/23/17  -PVD  --Ongoing  tobacco abuse  -DM2  -DINA  -COPD  - Mild MR 2015 echo  - Nausea possibly from flagyl      PLAN  -add azithromycin for coverage of atypical pneumonia pathogens- change to po soon  - stop flagyl  -check cbc, cmp, bnp  - echo to evaluate her systolic murmur  - continue cefepime  -Continue  vancomycin based on most recent op cultures- recheck trough tonight in anticipation of ordering long term abx tomorrow  --evaluation of RLL mass per Dr Feliciano   Anticipate  minimum 6 weeks rx and probably longer    - Reviewed the guarded prognosis for limb salvage with the pt and her   Even under the best circumstances ie tobacco cessation and compliance with NWB it is not guaranteed that we will be able to avoid BKA    - picc    Discussed with CM yesterday    Oksana Cruz MD  3/8/2017

## 2017-03-08 NOTE — PROGRESS NOTES
Cardiothoracic Surgery Progress Note      POD #: 5-left transmetatarsal amputation of the second, third, fourth and fifth toes.  #9-completion of amputation of first metatarsal bone and surrounding infected granulation tissue     LOS: 12 days      Subjective patient is awake and alert and voicing no complaints        Objective:  Vital Signs vital signs below are noted  Temp:  [98.1 °F (36.7 °C)-98.9 °F (37.2 °C)] 98.1 °F (36.7 °C)  Heart Rate:  [51-72] 51  Resp:  [18] 18  BP: (119-137)/(56-66) 119/56    Physical Exam:   General Appearance she is oriented ×3    Lungs: Clear bilaterally today.  I did not hear any wheezes   Heart: Systolic murmur again heard on the right second intercostal space area, unchanged from previous examination   Skin:   Incision:   The left great toe wound and wound VAC is intact.  I did change the dressing for the transmetatarsal amputation.  The sutures are intact.  There is still some edema mainly in the dorsum of the second metatarsal amputation area.  The skin edges of the amputation site are viable.  There is very mild erythema over the dorsum of the left transmetatarsal amputation site  Results: Results below are noted    Results from last 7 days  Lab Units 03/06/17  1215   WBC 10*3/mm3 8.71   HEMOGLOBIN g/dL 9.2*   HEMATOCRIT % 30.0*   PLATELETS 10*3/mm3 235       Results from last 7 days  Lab Units 03/08/17  0526   SODIUM mmol/L 140   POTASSIUM mmol/L 4.0   CHLORIDE mmol/L 108   TOTAL CO2 mmol/L 29.0   BUN mg/dL 19   CREATININE mg/dL 1.00   GLUCOSE mg/dL 132*   CALCIUM mg/dL 10.2         Assessment: #1.  Postop day 5, left foot transmetatarsal amputation.  The second third, fourth and fifth toes.  The incision.  Good skin margins and skin sutures are intact.  There is some dorsal edema and some very mild erythema over the dorsum of the foot.  Overall, though this incision is healing well  2#.  Post op day.  Number  9.  Completion transmetatarsal amputation, left great toe and  excision of surrounding infected granulation tissue  #3.  Right  Lower lobe mass type lesion, unclear etiology at this time.  Follow closely in the near future for resolution or possible need for biopsy.  I will present this case at our pulmonary tumor conference next week  Plan: As outlined in above a #3.  Patient will be probably transferred to a rehabilitation facility in the near future.  She is scheduled for wound VAC change today and I wanted examine the wound.  When the wound VAC is changed if at all possible      Arsalan Feliciano MD - 03/08/17 - 6:55 AM

## 2017-03-08 NOTE — PLAN OF CARE
Problem: Patient Care Overview (Adult)  Goal: Plan of Care Review  Outcome: Ongoing (interventions implemented as appropriate)    03/08/17 0800   Coping/Psychosocial Response Interventions   Plan Of Care Reviewed With patient   Outcome Evaluation   Outcome Summary/Follow up Plan wound bed granulating well to this point. no s/s of infection noted. Pt will benefit from cont use of wound vac to help further improve granulation and manage exudate.          Problem: Inpatient Physical Therapy  Goal: Wound Care Goal 1 LTG- PT  Outcome: Ongoing (interventions implemented as appropriate)    02/26/17 1332 03/08/17 0800   Wound Care PT LTG   Wound Care PT LTG 1, Date Established 02/26/17 --    Wound Care PT LTG 1, Time to Achieve 2 wks --    Wound Care PT LTG 1, Location LT open ray amputation site  --    Wound Care PT LTG 1, No S&S of Infection yes --    Wound Care PT LTG 1, Decrease Wound Size 25% --    Wound Care PT LTG 1, Decrease Exudate minimum --    Wound Care PT LTG 1, No New Skin Break Down yes --    Wound Care PT LTG 1, Education S&S of infection;dressing changes;wound care;weight bearing restriction;progression of POC --    Wound Care PT LTG 1, Education Understanding verbalize understanding --    Wound Care PT LTG 1, Outcome --  goal partially met

## 2017-03-08 NOTE — PROGRESS NOTES
Continued Stay Note  [facility]     Patient Name: Tamara Langston  MRN: [id number]  Today's Date: 3/8/2017    Admit Date: 2/24/2017          Discharge Plan       03/08/17 1256    Case Management/Social Work Plan    Plan Rehab    Patient/Family In Agreement With Plan yes    Additional Comments [facility] will have a skilled bed on Friday. Patient is agreeable for this. She had asked about [facility] and not appropriate for this as her needs are not rehab but wound/iv antibiotics.  I asked Kelsey to start the insurance precert on Thursday. Please let me know if I need to delay this.               Discharge Codes     None        Expected Discharge Date and Time     Expected Discharge Date Expected Discharge Time    Mar 10, 2017             Ese Whitlock RN

## 2017-03-08 NOTE — PROGRESS NOTES
Acute Care - Wound/Debridement Treatment Note  [facility]     Patient Name: Tamara Langston  : 1955  MRN: [id number]  Today's Date: 3/8/2017  Onset of Illness/Injury or Date of Surgery Date: 17   Date of Referral to PT: 17   Referring Physician: MD Marquita       Admit Date: 2017    Visit Dx:    ICD-10-CM ICD-9-CM   1. Impaired mobility and ADLs Z74.09 799.89   2. Toe osteomyelitis, left M86.9 730.27   3. Impaired functional mobility, balance, gait, and endurance Z74.09 V49.89   4. Osteomyelitis of left foot, unspecified chronicity M86.9 730.27       Patient Active Problem List   Diagnosis   • Atopic rhinitis   • Restrictive ventilatory defect   • COPD (chronic obstructive pulmonary disease)   • Osteoporosis   • Obstructive sleep apnea syndrome   • Abnormal liver enzymes   • Cholelithiasis   • Chronic back pain   • Mixed anxiety depressive disorder   • Type 2 diabetes mellitus   • Dyslipidemia   • Essential tremor   • Fibromyalgia   • Gastroesophageal reflux disease without esophagitis   • Hypertension   • Hypothyroidism   • Insomnia   • Osteoarthritis   • Polycythemia vera   • Psoriasis   • Branch retinal vein occlusion   • Cobalamin deficiency   • Chronic bronchitis   • Obesity   • Pneumonia   • Tobacco use   • Vitamin D deficiency   • Foot ulcer, left   • Leukocytosis   • Hypokalemia   • Lactic acidosis   • Fatty liver disease, nonalcoholic   • Diabetes education, encounter for   • Cellulitis of left foot   • Sinus bradycardia   • NSTEMI (non-ST elevated myocardial infarction)   • Tobacco abuse   • Hypoxia   • Peripheral vascular disease   • Gangrene   • Osteomyelitis of left foot   • Diabetic polyneuropathy associated with type 2 diabetes mellitus   • Anemia, blood loss   • Chronic pain   • Chronic, continuous use of opioids   • Chronic anxiety   • Chronically on benzodiazepine therapy               LDA Wound       17 0805          Incision 17 1427 Left foot    Incision -  Properties Group Placement Date: 02/25/17  -TB Placement Time: 1427  -TB Side: Left  -TB Location: foot  -TB Additional Comments: 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  -MW    Incision WDL WDL  -MF      Dressing Appearance no drainage;dry;intact  -MF      Appearance moist;pink   graunlating well  -MF      Drainage Characteristics/Odor sanguineous;serosanguineous  -MF      Drainage Amount small  -MF      Wound Cleaning irrigated with;sterile normal saline  -MF      Therapy Setting (Negative Pressure Wound Therapy) continuous therapy  -MF      Pressure Setting (Negative Pressure Wound Therapy) 125 mmHg  -MF      Dressing (Negative Pressure Wound Therapy) foam, black  -MF      Sponges Inserted (Negative Pressure Wound Therapy) 1  -MF      Sponges Removed (Negative Pressure Wound Therapy) 1  -MF      Output (mL) 75  -MF      Incision 03/02/17 0800 Left distal foot horizontal    Incision - Properties Group Placement Date: 03/02/17  -MC Placement Time: 0800  -MC Side: Left  -MC Orientation: distal  -MC Location: foot  -MC Incision Type: horizontal  - Additional Comments: s/p transmetatarsal amputation toes 2-5  -MC    Pressure Ulcer 02/24/17 1432 Right coccyx Stage II    Pressure Ulcer - Properties Group Date first assessed: 02/24/17  -MS Time first assessed: 1432  -MS Present On Admission (Pressure Ulcer): yes  -MS Side: Right  -MS Location: coccyx  -MS Stage: Stage II  -MS    Pressure Ulcer 02/24/17 1432 other (see comments) Stage II    Pressure Ulcer - Properties Group Date first assessed: 02/24/17  -MS Time first assessed: 1432  -MS Present On Admission (Pressure Ulcer): yes  -MS Location: other (see comments)  -MS, nose  Stage: Stage II  -MS Additional Comments: from home c-pap  -MS      User Key  (r) = Recorded By, (t) = Taken By, (c) = Cosigned By    Initials Name Provider Type    ANN-MARIE Yates, PT Physical Therapist    MW Christie Wheeler, PT Physical Therapist    TB Karen Atkins, RN  Registered Nurse    IVAN Chao, PT Physical Therapist    MS Claireesperanzapari Raymond, RN Registered Nurse            Finger sweep and visual inspection performed. Empty wound bed confirmed.  External label applied and verified.            Adult Rehabilitation Note       03/08/17 0800 03/07/17 1043 03/07/17 0940    Rehab Assessment/Intervention    Discipline physical therapist  -MF occupational therapist  -CL physical therapist  -MF    Document Type therapy note (daily note)  -MF therapy note (daily note)  -CL therapy note (daily note)  -MF    Subjective Information agree to therapy;complains of;weakness;fatigue;pain  -MF agree to therapy;complains of;pain  -CL agree to therapy;no complaints  -MF    Patient Effort, Rehab Treatment  good  -CL     Symptoms Noted During/After Treatment  increased pain  -CL     Symptoms Noted Comment  RN notified.   -CL     Precautions/Limitations  fall precautions;oxygen therapy device and L/min;non-weight bearing status   NWB LLE  -CL     Recorded by [MF] Lane Yates, PT [CL] Leticia Reed OT [MF] Lane Yates PT    Vital Signs    Pre Systolic BP Rehab  --   No tele, RN cleared for tx.   -CL     Recorded by  [CL] Leticia Reed OT     Pain Assessment    Pain Assessment Blackmon-Cabrera FACES  -MF 0-10  -CL Blackmon-Baker FACES  -MF    Blackmon-Baker FACES Pain Rating 4  -MF  0  -MF    Pain Score  7  -CL     Post Pain Score  8  -CL     Pain Type  Chronic pain  -CL     Pain Location Generalized  -MF Generalized  -CL     Pain Intervention(s) Repositioned  -MF Repositioned;Ambulation/increased activity   RN notified.   -CL     Response to Interventions  Tolerated.   -CL     Recorded by [MF] Lane Yates, PT [CL] Leticia Reed OT [MF] Lane Yatse, PT    Cognitive Assessment/Intervention    Current Cognitive/Communication Assessment  functional  -CL     Orientation Status  oriented x 4  -CL     Follows Commands/Answers Questions  100% of the time;needs cueing;needs repetition  -CL      Personal Safety  mild impairment;decreased awareness, need for assist;decreased awareness, need for safety;impulsive  -CL     Personal Safety Interventions  fall prevention program maintained;gait belt;nonskid shoes/slippers when out of bed  -CL     Recorded by  [CL] Leticia Reed OT     Bed Mobility, Assessment/Treatment    Bed Mobility, Assistive Device  head of bed elevated;overhead trapeze  -CL     Bed Mob, Supine to Sit, [place]  supervision required;verbal cues required  -CL     Bed Mobility, Comment  VCs for safety d/t lines/impulsivity.   -CL     Recorded by  [CL] Leticia Reed OT     Transfer Assessment/Treatment    Transfers, Bed-Chair [place]  maximum assist (25% patient effort);2 person assist required;verbal cues required  -CL     Transfers, Bed-Chair-Bed, Assist Device  --   BUE support, stand pivot  -CL     Transfers, Sit-Stand [place]  minimum assist (75% patient effort);2 person assist required;verbal cues required  -CL     Transfers, Stand-Sit [place]  minimum assist (75% patient effort);2 person assist required;supervision required  -CL     Transfers, Sit-Stand-Sit, Assist Device  rolling walker  -CL     Transfer, Comment  VCs for HP/sequencing of steps. Pt stood at EOB and took ~3 side steps while maintaining NWB status. Pt required a seated rest break of ~2 mins after side steps. Pt then performed stand pivot to chair w/ BUE requiring increased assist d/t to reports of fatigue after taking side steps.   -CL     Recorded by  [CL] Leticia Reed OT     Balance Skills Training    Sitting-Level of Assistance  Close supervision   at EOB, VCs to keep LLE off floor  -CL     Sitting-Balance Support  Right upper extremity supported;Left upper extremity supported   R foot supported  -CL     Sitting-Balance Activities  Reaching for objects  -CL     Standing-Level of Assistance  Minimum assistance;x2  -CL     Static Standing Balance Support  assistive device  -CL     Standing-Balance  Activities  Weight Shift R-L;Weight Shift A-P  -CL     Gait Balance-Level of Assistance  Minimum assistance;x2  -CL     Gait Balance Support  assistive device  -CL     Gait Balance Activities  side-stepping  -CL     Recorded by  [CL] Leticia Reed OT     Therapy Exercises    Bilateral Upper Extremity  10 reps;AROM:;elbow flexion/extension;pronation/supination;hand pumps;shoulder extension/flexion;shoulder ER/IR;shoulder protraction/retraction;shoulder horizontal abd/add   yellow thera-band  -CL     Recorded by  [CL] Leticia Reed OT     Positioning and Restraints    Pre-Treatment Position in bed  -MF in bed  -CL in bed  -MF    Post Treatment Position bed  -MF chair  -CL bed  -MF    In Bed supine;call light within reach;with family/caregiver  -MF  supine;call light within reach  -MF    In Chair  notified nsg;reclined;call light within reach;encouraged to call for assist;with PT  -CL     Recorded by [MF] Lane Yates, PT [CL] Leticia Reed OT [MF] Lane Yates, PT      03/06/17 0800          Rehab Assessment/Intervention    Discipline physical therapist  -      Document Type therapy note (daily note)  -      Subjective Information agree to therapy;complains of;weakness;pain  -MF      Recorded by [MF] Lane Yates PT      Pain Assessment    Pain Assessment Blackmon-Baker FACES  -      Blackmon-Baker FACES Pain Rating 4  -      Pain Intervention(s) Repositioned  -MF      Recorded by [MF] Lane Yates PT      Positioning and Restraints    Pre-Treatment Position in bed  -MF      Post Treatment Position bed  -MF      In Bed supine;with nsg  -MF      Recorded by [MF] Lane Yates PT        User Key  (r) = Recorded By, (t) = Taken By, (c) = Cosigned By    Initials Name Effective Dates     Lane Yates, PT 06/19/15 -     CL Leticia Reed OT 06/08/16 -                 IP PT Goals       03/08/17 0800 03/07/17 1146 03/05/17 0905    Bed Mobility PT LTG    Bed Mobility PT LTG, Date Established   03/07/17  -DM     Bed Mobility PT LTG, Time to Achieve  1 wk  -DM     Bed Mobility PT LTG, Activity Type  all bed mobility  -DM     Bed Mobility PT LTG, [place] Level  independent  -DM     Transfer Training PT LTG    Transfer Training PT LTG, Date Established  02/27/17  -DM     Transfer Training PT LTG, Time to Achieve  1 wk  -DM     Transfer Training PT LTG, Activity Type  bed to chair /chair to bed;sit to stand/stand to sit;toilet  -DM     Transfer Training PT LTG, [place] Level  supervision required  -DM     Transfer Training PT LTG, Assist Device  walker, rolling  -DM     Transfer Training PT  LTG, Date Goal Reviewed  03/07/17  -DM     Transfer Training PT LTG, Outcome  goal revised  -DM     Gait Training PT LTG    Gait Training Goal PT LTG, Date Established  02/27/17  -DM     Gait Training Goal PT LTG, Time to Achieve  1 wk  -DM     Gait Training Goal PT LTG, [place] Level  contact guard assist  -DM     Gait Training Goal PT LTG, Assist Device  walker, rolling  -DM     Gait Training Goal PT LTG, Distance to Achieve  15  -DM     Gait Training Goal PT LTG, Date Goal Reviewed  03/07/17  -DM     Gait Training Goal PT LTG, Outcome  goal revised  -DM     Patient Education PT LTG    Patient Education PT LTG, Date Established  02/27/17  -DM     Patient Education PT LTG, Time to Achieve  1 wk  -DM     Patient Education PT LTG, Education Type  HEP;precaution per surgeon;positioning;posture/body mechanics;gait;transfers;bed mobility;pain management;progression of POC;benefits of activity;home safety;equipment management  -DM     Patient Education PT LTG, Education Understanding  demonstrate adequately;verbalize understanding  -DM     Patient Education PT LTG, Date Goal Reviewed  03/07/17  -DM     Patient Education PT LTG Outcome  goal revised  -DM     Wound Care PT LTG    Wound Care PT LTG 1, Outcome goal partially met  -MF  goal ongoing  -MC      03/04/17 1045 03/02/17 1055 02/27/17 1423    Transfer  Training PT LTG    Transfer Training PT LTG, Date Established   02/27/17  -MANDO    Transfer Training PT LTG, Time to Achieve   1 wk  -MANDO    Transfer Training PT LTG, Activity Type   all transfers  -MANDO    Transfer Training PT LTG, [place] Level   conditional independence  -MANDO    Transfer Training PT LTG, Assist Device   walker, rolling  -MANDO    Transfer Training PT  LTG, Date Goal Reviewed  03/02/17  -SR     Gait Training PT LTG    Gait Training Goal PT LTG, Date Established   02/27/17  -MANDO    Gait Training Goal PT LTG, Time to Achieve   1 wk  -MANDO    Gait Training Goal PT LTG, [place] Level   conditional independence  -MANDO    Gait Training Goal PT LTG, Assist Device   walker, rolling  -MANDO    Gait Training Goal PT LTG, Distance to Achieve   25  -MANDO    Gait Training Goal PT LTG, Date Goal Reviewed  03/02/17  -SR     Patient Education PT LTG    Patient Education PT LTG, Date Established   02/27/17  -MANDO    Patient Education PT LTG, Time to Achieve   1 wk  -MANDO    Patient Education PT LTG, Education Type   HEP;positioning;posture/body mechanics;gait;transfers;bed mobility;pain management;progression of POC;benefits of activity;home safety;equipment management;skin care/inspection   weightbearing status  -MANDO    Patient Education PT LTG, Education Understanding   demonstrate adequately;verbalize understanding  -MANDO    Patient Education PT LTG, Date Goal Reviewed  03/02/17  -SR     Wound Care PT LTG    Wound Care PT LTG 1, Outcome goal ongoing  -        02/27/17 0845 02/26/17 1332       Wound Care PT LTG    Wound Care PT LTG 1, Date Established  02/26/17  -MW     Wound Care PT LTG 1, Time to Achieve  2 wks  -MW     Wound Care PT LTG 1, Location  LT open ray amputation site   -MW     Wound Care PT LTG 1, No S&S of Infection  yes  -MW     Wound Care PT LTG 1, Decrease Wound Size  25%  -MW     Wound Care PT LTG 1, Decrease Exudate  minimum  -MW     Wound Care PT LTG 1, No New Skin Break Down  yes  -MW     Wound Care PT  LTG 1, Education  S&S of infection;dressing changes;wound care;weight bearing restriction;progression of POC  -MW     Wound Care PT LTG 1, Education Understanding  verbalize understanding  -MW     Wound Care PT LTG 1, Outcome goal ongoing  -        User Key  (r) = Recorded By, (t) = Taken By, (c) = Cosigned By    Initials Name Provider Type    MANDO Esteban, PT Physical Therapist    ANN-MARIE Yates, PT Physical Therapist    DM Shelli Mora, PT Physical Therapist    SR Thu Herrera, PT Physical Therapist    MW Christie Wheeler, PT Physical Therapist     Chetna Chao, PT Physical Therapist          Physical Therapy Education     Title: PT OT SLP Therapies (Active)     Topic: Physical Therapy (Active)     Point: Mobility training (Active)    Learning Progress Summary    Learner Readiness Method Response Comment Documented by Status   Patient Eager E,D,H NR  DM 03/07/17 1146 Active    Acceptance E,D NR  SR 03/02/17 1055 Active    Acceptance E,D VU,NR  MANDO 02/27/17 1558 Done               Point: Home exercise program (Active)    Learning Progress Summary    Learner Readiness Method Response Comment Documented by Status   Patient Eager E,D,H NR  DM 03/07/17 1146 Active    Acceptance E,D NR  SR 03/02/17 1055 Active    Acceptance E,D VU,NR  MANDO 02/27/17 1558 Done               Point: Body mechanics (Active)    Learning Progress Summary    Learner Readiness Method Response Comment Documented by Status   Patient Eager E,D,H NR  DM 03/07/17 1146 Active    Acceptance E,D NR  SR 03/02/17 1055 Active    Acceptance E,D VU,NR  MANDO 02/27/17 1558 Done               Point: Precautions (Active)    Learning Progress Summary    Learner Readiness Method Response Comment Documented by Status   Patient Eager E,D,H NR  DM 03/07/17 1146 Active    Acceptance E,D NR  SR 03/02/17 1055 Active    Acceptance E,D VU,NR  MANDO 02/27/17 1558 Done                      User Key     Initials Effective Dates Name Provider Type  "Discipline    MANDO 06/19/15 -  Philomena Esteban, PT Physical Therapist PT    DM 06/19/15 -  Shelli Mora, PT Physical Therapist PT    SR 06/19/15 -  Thu Herrera, PT Physical Therapist PT                   PT ASSESSMENT (last 72 hours)      PT Evaluation       03/08/17 0800 03/07/17 2000    Rehab Evaluation    Document Type therapy note (daily note)  -MF     Subjective Information agree to therapy;complains of;weakness;fatigue;pain  -MF     Pain Assessment    Pain Assessment Blackmon-Baker FACES  -     Blackmon-Baker FACES Pain Rating 4  -MF     Pain Location Generalized  -MF     Pain Intervention(s) Repositioned  -     Sensory Assessment/Intervention    Light Touch LUE;RUE  -TK LUE;RUE  -KR    LUE Light Touch WNL  -TK WNL  -KR    RUE Light Touch WNL  -TK WNL  -KR    LLE Light Touch moderate impairment  -TK moderate impairment  -KR    RLE Light Touch absent sensation  -TK absent sensation  -KR    Positioning and Restraints    Pre-Treatment Position in bed  -     Post Treatment Position bed  -     In Bed supine;call light within reach;with family/caregiver  -       03/07/17 1045 03/07/17 1043    Rehab Evaluation    Document Type re-evaluation  -DM therapy note (daily note)  -CL    Subjective Information agree to therapy;complains of;weakness;pain;swelling   Rfoot swollen;req.R shoe(done);DrEarle assessed;\"go easy\"  -DM agree to therapy;complains of;pain  -CL    Patient Effort, Rehab Treatment good  -DM good  -CL    Symptoms Noted During/After Treatment increased pain;fatigue  -DM increased pain  -CL    Symptoms Noted Comment nsg informed pt req.pain med;pt decl loaner Rwx(\"prefer to pivot on RLE back to bed,not use wx\"  -DM RN notified.   -CL    General Information    Patient Profile Review yes  -DM     Onset of Illness/Injury or Date of Surgery Date 02/24/17  -DM     Referring Physician MD Marquita  -DM     General Observations sup in bed (no tele.) w/ LLE elev on 2 pillows & eggcrate per MD;WD VAC L " FOOT(TM amp site w/ Kerlix/spandage);RA;IV;SCUDS;pl  -DM     Pertinent History Of Current Problem see IE;pt now w/L foot TM amp of toes 2--5 on 3-2-17 (Dx: OM); wound P.T. has eval'd/placed wd.vac; resume P.T. orders (for mobil.,NWB L LE) received 3-6  -DM     Precautions/Limitations fall precautions;oxygen therapy device and L/min   NWB L LE;WD.VAC Lfoot;fibromyal.;can't sherry knee wx(bad knees  -DM fall precautions;oxygen therapy device and L/min;non-weight bearing status   NWB LLE  -CL    Plans/Goals Discussed With patient;agreed upon  -DM     Risks Reviewed patient:;LOB;dizziness;increased discomfort;change in vital signs;increased drainage;lines disloged  -DM     Benefits Reviewed patient:;improve function;increase independence;increase strength;increase balance;decrease pain;increase knowledge  -DM     Barriers to Rehab medically complex;previous functional deficit  -DM     Clinical Impression    Date of Referral to PT 03/06/17  -DM     PT Diagnosis impaired funct mobil  -DM     Criteria for Skilled Therapeutic Interventions Met yes;treatment indicated  -DM     Pathology/Pathophysiology Noted (Describe Specifically for Each System) musculoskeletal  -DM     Rehab Potential good, to achieve stated therapy goals  -DM     Vital Signs    Pre Systolic BP Rehab  --   No tele, RN cleared for tx.   -CL    O2 Delivery Pre Treatment room air  -DM     O2 Delivery Intra Treatment room air  -DM     O2 Delivery Post Treatment room air  -DM     Pre Patient Position Supine  -DM     Intra Patient Position Standing  -DM     Post Patient Position Sitting  -DM     Pain Assessment    Pain Assessment 0-10  -DM 0-10  -CL    Pain Score 7  -DM 7  -CL    Post Pain Score 8  -DM 8  -CL    Pain Type Chronic pain  -DM Chronic pain  -CL    Pain Location Generalized  -DM Generalized  -CL    Pain Orientation --   FIBROMYALGIA  -DM     Pain Descriptors Aching  -DM     Pain Frequency Constant/continuous  -DM     Patient's Stated Pain Goal No pain   -DM     Pain Intervention(s) Medication (See MAR);Repositioned;Ambulation/increased activity   PT CALLED FOR PAIN MED  -DM Repositioned;Ambulation/increased activity   RN notified.   -CL    Response to Interventions TOLERATED  -DM Tolerated.   -CL    Vision Assessment/Intervention    Visual Impairment WFL  -DM     Cognitive Assessment/Intervention    Current Cognitive/Communication Assessment functional  -DM functional  -CL    Orientation Status oriented x 4  -DM oriented x 4  -CL    Follows Commands/Answers Questions 100% of the time;able to follow single-step instructions;needs cueing;needs increased time;needs repetition  -% of the time;needs cueing;needs repetition  -CL    Personal Safety mild impairment;decreased awareness, need for assist;decreased awareness, need for safety;impulsive  -DM mild impairment;decreased awareness, need for assist;decreased awareness, need for safety;impulsive  -CL    Personal Safety Interventions fall prevention program maintained;gait belt;nonskid shoes/slippers when out of bed  -DM fall prevention program maintained;gait belt;nonskid shoes/slippers when out of bed  -CL    ROM (Range of Motion)    General ROM lower extremity range of motion deficits identified   L ankle DF limited 25 % d/t pain/sx.site  -DM     MMT (Manual Muscle Testing)    General MMT Assessment lower extremity strength deficits identified   B LE's grossly 3+ to 4-/5; L ankle NA  -DM     Mobility Assessment/Training    Extremity Weight-Bearing Status left lower extremity  -DM     Left Lower Extremity Weight-Bearing non weight-bearing  -DM     Bed Mobility, Assessment/Treatment    Bed Mobility, Assistive Device head of bed elevated;bed rails  -DM head of bed elevated;overhead trapeze  -CL    Bed Mobility, Roll Left, [place] supervision required;verbal cues required  -DM     Bed Mob, Supine to Sit, [place] supervision required;verbal cues required  -DM supervision required;verbal cues required  -CL  "   Bed Mobility, Safety Issues decreased use of legs for bridging/pushing  -DM     Bed Mobility, Impairments strength decreased;pain  -DM     Bed Mobility, Comment cues for lines(derrell wd.vac) & keeping L foot elev during transit mvmt  -DM VCs for safety d/t lines/impulsivity.   -CL    Transfer Assessment/Treatment    Transfers, Bed-Chair [place] maximum assist (25% patient effort);2 person assist required;verbal cues required   SPT on R LE (P.T. in front;blocked R knee;o.t.behind pt)  -DM maximum assist (25% patient effort);2 person assist required;verbal cues required  -CL    Transfers, Bed-Chair-Bed, Assist Device --   gt belt;pt.decl. use of R wx  -DM --   BUE support, stand pivot  -CL    Transfers, Sit-Stand [place] minimum assist (75% patient effort);2 person assist required;verbal cues required  -DM minimum assist (75% patient effort);2 person assist required;verbal cues required  -CL    Transfers, Stand-Sit [place] minimum assist (75% patient effort);2 person assist required;verbal cues required  -DM minimum assist (75% patient effort);2 person assist required;supervision required  -CL    Transfers, Sit-Stand-Sit, Assist Device rolling walker   stood EOB w/R wx,NWB LLE;2ND TRIAL:R wx removed for SPT  -DM rolling walker  -CL    Transfer, Maintain Weight Bearing Status able to maintain weight bearing status  -DM     Transfer, Safety Issues weight-shifting ability decreased;loses balance backward;knees buckling  -DM     Transfer, Impairments strength decreased;impaired balance;pain  -DM     Transfer, Comment 1ST TRIAL:INIT.side steps to R;5 min.rest EOB;\"ARMS & R knee worn out\";2nd trial:chair at FOB;SPT w/o AD to recliner  -DM VCs for HP/sequencing of steps. Pt stood at EOB and took ~3 side steps while maintaining NWB status. Pt required a seated rest break of ~2 mins after side steps. Pt then performed stand pivot to chair w/ BUE requiring increased assist d/t to reports of fatigue after taking " "side steps.   -CL    Gait Assessment/Treatment    Gait, [place] Level moderate assist (50% patient effort);2 person assist required;verbal cues required  -DM     Gait, Assistive Device rolling walker  -DM     Gait, Distance (Feet) 2  -DM     Gait, Gait Pattern Analysis swing-to gait  -DM     Gait, Gait Deviations antalgic;right:;decreased heel strike;double stance time increased;forward flexed posture;knee buckling;narrow base;step length decreased   init shuffling R foot,then able to take full side step    -DM     Gait, Maintain Weight Bearing Status able to maintain weight bearing status  -DM     Gait, Safety Issues step length decreased;weight-shifting ability decreased;loses balance backward  -DM     Gait, Impairments strength decreased;impaired balance;postural control impaired;pain  -DM     Gait, Comment cued to incr. WB through UE'S (\"Push-ups\") x 3,to facil. offloading R LE for side step  -DM     Motor Skills/Interventions    Additional Documentation Balance Skills Training (Group)  -DM     Balance Skills Training    Sitting-Level of Assistance Close supervision  -DM Close supervision   at EOB, VCs to keep LLE off floor  -CL    Sitting-Balance Support Right upper extremity supported;Left upper extremity supported   R foot supp.on floor;L foot NWB  -DM Right upper extremity supported;Left upper extremity supported   R foot supported  -CL    Sitting-Balance Activities Reaching for objects;Trunk control activities   SCOOTING; TRUNK ext.  -DM Reaching for objects  -CL    Sitting # of Minutes 5  -DM     Standing-Level of Assistance Minimum assistance;x2  -DM Minimum assistance;x2  -CL    Static Standing Balance Support assistive device  -DM assistive device  -CL    Standing-Balance Activities Weight Shift A-P;Weight Shift R-L  -DM Weight Shift R-L;Weight Shift A-P  -CL    Standing Balance # of Minutes 2  -DM     Gait Balance-Level of Assistance Minimum assistance;x2   init min asst of 2,then mod asst 2 as pt " fatigued  -DM Minimum assistance;x2  -CL    Gait Balance Support assistive device  -DM assistive device  -CL    Gait Balance Activities side-stepping  -DM side-stepping  -CL    Therapy Exercises    Bilateral Lower Extremities AROM:;10 reps;15 reps;sitting;ankle pumps/circles;glut sets;heel slides;hip abduction/adduction;hip ER;hip flexion;LAQ;quad sets;SAQ   HS sets; min asst for L heel slides,hip abd to supp. L foot  -DM     Bilateral Upper Extremity  10 reps;AROM:;elbow flexion/extension;pronation/supination;hand pumps;shoulder extension/flexion;shoulder ER/IR;shoulder protraction/retraction;shoulder horizontal abd/add   yellow thera-band  -CL    Positioning and Restraints    Pre-Treatment Position in bed  -DM in bed  -CL    Post Treatment Position chair  -DM chair  -CL    In Chair notified nsg;reclined;call light within reach;with nsg;legs elevated   L LE also elev on 2 pillows & egg crate  -DM notified nsg;reclined;call light within reach;encouraged to call for assist;with PT  -CL      03/07/17 0940 03/07/17 0800    Rehab Evaluation    Document Type therapy note (daily note)  -     Subjective Information agree to therapy;no complaints  -     Pain Assessment    Pain Assessment Blackmon-Baker FACES  -     Blackmon-Baker FACES Pain Rating 0  -     Sensory Assessment/Intervention    Light Touch  LUE;RUE  -CS    LUE Light Touch  WNL  -CS    RUE Light Touch  WNL  -CS    LLE Light Touch  moderate impairment  -CS    RLE Light Touch  absent sensation  -CS    Positioning and Restraints    Pre-Treatment Position in bed  -     Post Treatment Position bed  -     In Bed supine;call light within reach  -       03/06/17 2200 03/06/17 2000    Sensory Assessment/Intervention    LLE Light Touch mild impairment  -TA mild impairment  -TA    RLE Light Touch absent sensation  -TA absent sensation  -TA      03/06/17 1322 03/06/17 0800    Rehab Evaluation    Document Type re-evaluation  - therapy note (daily note)  -     Subjective Information agree to therapy;complains of;pain  -CL agree to therapy;complains of;weakness;pain  -MF    Patient Effort, Rehab Treatment good  -CL     Symptoms Noted During/After Treatment none  -CL     General Information    Patient Profile Review yes  -CL     Onset of Illness/Injury or Date of Surgery Date 02/24/17  -CL     Referring Physician MD Karen  -CL     Pertinent History Of Current Problem Please see IE for full history. Pt s/p L foot transmetatarsal amputation of toes 2, 3, 4, and 5 2/2 to osteomyelitis on 03/02/2017. Received resume OT orders on 03/05/2017.   -CL     Precautions/Limitations fall precautions;non-weight bearing status;oxygen therapy device and L/min   NWB LLE  -CL     Prior Level of Function --   Please see IE  -CL     Equipment Currently Used at Home --   Please see IE  -CL     Plans/Goals Discussed With patient;agreed upon  -CL     Risks Reviewed patient:;LOB;nausea/vomiting;dizziness;increased discomfort;lines disloged  -CL     Benefits Reviewed patient:;improve function;increase independence;increase strength;increase balance;decrease pain;increase knowledge  -CL     Barriers to Rehab medically complex  -CL     Living Environment    Lives With --   Please see IE  -CL     Vital Signs    Pre Systolic BP Rehab --   no tele, RN cleared for tx.   -CL     Pain Assessment    Pain Assessment 0-10  -CL Blackmon-Cabrera FACES  -MF    Blackmon-Baker FACES Pain Rating  4  -MF    Pain Score 8  -CL     Post Pain Score 8  -CL     Pain Type Chronic pain  -CL     Pain Location Back  -CL     Pain Intervention(s) Repositioned;Ambulation/increased activity  -CL Repositioned  -MF    Response to Interventions Tolerated.   -CL     Vision Assessment/Intervention    Visual Impairment WFL  -CL     Cognitive Assessment/Intervention    Current Cognitive/Communication Assessment functional  -CL     Orientation Status oriented x 4  -CL     Follows Commands/Answers Questions 100% of the time;needs cueing;needs  repetition  -CL     Personal Safety mild impairment;decreased awareness, need for assist;decreased awareness, need for safety  -CL     Personal Safety Interventions fall prevention program maintained;gait belt;nonskid shoes/slippers when out of bed  -CL     ROM (Range of Motion)    General ROM Detail BUE grossly WFL. Pt s/p B CMC sx, limited thumb CMC extension/ABD at baseline.   -CL     MMT (Manual Muscle Testing)    General MMT Assessment Detail BUE grossly 4-/5.   -CL     Bed Mobility, Assessment/Treatment    Bed Mobility, Assistive Device bed rails;head of bed elevated  -CL     Bed Mob, Supine to Sit, [place] supervision required;verbal cues required  -CL     Bed Mob, Sit to Supine, [place] supervision required;verbal cues required  -CL     Bed Mobility, Comment VCs for safety d/t lines.   -CL     Transfer Assessment/Treatment    Transfers, Bed-Chair [place] contact guard assist;2 person assist required;verbal cues required  -CL     Transfers, Bed-Chair-Bed, Assist Device rolling walker  -CL     Transfers, Sit-Stand [place] minimum assist (75% patient effort);verbal cues required;2 person assist required  -CL     Transfers, Stand-Sit [place] contact guard assist;2 person assist required;verbal cues required  -CL     Transfers, Sit-Stand-Sit, Assist Device rolling walker  -CL     Transfer, Comment Pt attempted to stand impulsively prior to cueing. VCs for HP/sequencing of steps. Pt performed stand-pivot t/f to chair from EOB.    VCs to maintain NWB status, pt performed appropriately.   -CL     Balance Skills Training    Sitting-Level of Assistance Close supervision  -CL     Sitting-Balance Support Right upper extremity supported;Left upper extremity supported;Feet supported  -CL     Sitting-Balance Activities Forward lean;Reaching for objects;Trunk control activities  -CL     Standing-Level of Assistance Contact guard;x2  -CL     Static Standing Balance Support assistive device  -CL      Standing-Balance Activities Weight Shift R-L  -CL     Therapy Exercises    Left Lower Extremity 10 reps;AROM:   straight leg raise  -CL     Bilateral Upper Extremity 10 reps;AROM:;sitting;elbow flexion/extension;hand pumps;pronation/supination;shoulder extension/flexion;shoulder horizontal abd/add;shoulder protraction/retraction   yellow thera-band  -CL     Sensory Assessment/Intervention    Light Touch LUE;RUE  -CL     LUE Light Touch WNL  -CL     RUE Light Touch WNL  -CL     RLE Light Touch  absent sensation  -CS    Positioning and Restraints    Pre-Treatment Position in bed  -CL in bed  -MF    Post Treatment Position chair  -CL bed  -MF    In Bed  supine;with nsg  -MF    In Chair notified nsg;reclined;call light within reach;encouraged to call for assist;waffle cushion;legs elevated;LLE elevated;R heel elevated  -CL       03/05/17 2000       Sensory Assessment/Intervention    LUE Light Touch mild impairment  -CT     RUE Light Touch WNL  -CT     LLE Light Touch WNL  -CT     RLE Light Touch absent sensation  -CT       User Key  (r) = Recorded By, (t) = Taken By, (c) = Cosigned By    Initials Name Provider Type     Lane Yates, PT Physical Therapist    DM Shelli Mora, PT Physical Therapist    CT Alaina Delaney, RN Registered Nurse    FOX Mann, RN Registered Nurse    TK Maxime Glynn, RN Registered Nurse    BUD Menjivar, RN Registered Nurse    CS Perfecto Love, RN Registered Nurse    CL Leticia Reed, OT Occupational Therapist            PT Recommendation and Plan  Anticipated Equipment Needs At Discharge: other (see comments) (wound VAC )  Anticipated Discharge Disposition: inpatient rehabilitation facility, other (see comments) (possibly LTAC)  Planned Therapy Interventions: wound care, patient/family education  PT Frequency: daily    Plan Of Care Reviewed With: patient       Outcome Summary/Follow up Plan: wound bed granulating well to this point.  no s/s of infection noted.  Pt will  benefit from cont use of wound vac to help further improve granulation and manage exudate.            Outcome Measures       03/07/17 1045 03/07/17 1043 03/06/17 1322    How much help from another person do you currently need...    Turning from your back to your side while in flat bed without using bedrails? 4  -DM      Moving from lying on back to sitting on the side of a flat bed without bedrails? 4  -DM      Moving to and from a bed to a chair (including a wheelchair)? 2  -DM      Standing up from a chair using your arms (e.g., wheelchair, bedside chair)? 2  -DM      Climbing 3-5 steps with a railing? 1  -DM      To walk in hospital room? 2  -DM      AM-PAC 6 Clicks Score 15  -DM      How much help from another is currently needed...    Putting on and taking off regular lower body clothing?  2  -CL 2  -CL    Bathing (including washing, rinsing, and drying)  2  -CL 2  -CL    Toileting (which includes using toilet bed pan or urinal)  2  -CL 2  -CL    Putting on and taking off regular upper body clothing  3  -CL 3  -CL    Taking care of personal grooming (such as brushing teeth)  4  -CL 4  -CL    Eating meals  4  -CL 4  -CL    Score  17  -CL 17  -CL    Functional Assessment    Outcome Measure Options AM-PAC 6 Clicks Basic Mobility (PT)  -DM AM-PAC 6 Clicks Daily Activity (OT)  -CL AM-PAC 6 Clicks Daily Activity (OT)  -CL      User Key  (r) = Recorded By, (t) = Taken By, (c) = Cosigned By    Initials Name Provider Type    DM Shelli Mora, PT Physical Therapist    CL Leticia Reed, OT Occupational Therapist              Time Calculation        PT Charges       03/08/17 0800          Time Calculation    Start Time 0800  -      PT Goal Re-Cert Due Date 03/17/17  -      Time Calculation- PT    Total Timed Code Minutes- PT 35 minute(s)  -        User Key  (r) = Recorded By, (t) = Taken By, (c) = Cosigned By    Initials Name Provider Type     Lane Yates, PT Physical Therapist             Therapy Charges for  Today     Code Description Service Date Service Provider Modifiers Qty    81041774013 HC PT NEG PRESS WOUND TO 50SQCM DME1 3/7/2017 Lane Yates, PT  1    49137802320 HC PT THER SUPP EA 15 MIN 3/7/2017 Lane Yates, PT GP 1    96727637419 HC PT THER SUPP EA 15 MIN 3/8/2017 Lane Yates, PT GP 1    56497945596 HC PT NEG PRESS WOUND TO 50SQCM DME1 3/8/2017 Lane Yates, PT  1            PT G-Codes  Outcome Measure Options: AM-PAC 6 Clicks Basic Mobility (PT)        Lane Yates, PT  3/8/2017

## 2017-03-09 LAB
GLUCOSE BLDC GLUCOMTR-MCNC: 137 MG/DL (ref 70–130)
GLUCOSE BLDC GLUCOMTR-MCNC: 151 MG/DL (ref 70–130)
GLUCOSE BLDC GLUCOMTR-MCNC: 168 MG/DL (ref 70–130)
GLUCOSE BLDC GLUCOMTR-MCNC: 242 MG/DL (ref 70–130)
M PNEUMONIAE IGG ABS: 1440 U/ML (ref 0–99)
M PNEUMONIAE IGM ABS: <770 U/ML (ref 0–769)

## 2017-03-09 PROCEDURE — 97530 THERAPEUTIC ACTIVITIES: CPT

## 2017-03-09 PROCEDURE — 82962 GLUCOSE BLOOD TEST: CPT

## 2017-03-09 PROCEDURE — 99024 POSTOP FOLLOW-UP VISIT: CPT | Performed by: THORACIC SURGERY (CARDIOTHORACIC VASCULAR SURGERY)

## 2017-03-09 PROCEDURE — 94799 UNLISTED PULMONARY SVC/PX: CPT

## 2017-03-09 PROCEDURE — 97110 THERAPEUTIC EXERCISES: CPT

## 2017-03-09 PROCEDURE — 25010000002 CEFEPIME: Performed by: INTERNAL MEDICINE

## 2017-03-09 PROCEDURE — 25010000002 VANCOMYCIN HCL IN NACL 1.25-0.9 GM/250ML-% SOLUTION: Performed by: INTERNAL MEDICINE

## 2017-03-09 PROCEDURE — 94640 AIRWAY INHALATION TREATMENT: CPT

## 2017-03-09 PROCEDURE — 25010000002 AZITHROMYCIN: Performed by: INTERNAL MEDICINE

## 2017-03-09 PROCEDURE — 97605 NEG PRS WND THER DME<=50SQCM: CPT | Performed by: PHYSICAL THERAPIST

## 2017-03-09 PROCEDURE — 94660 CPAP INITIATION&MGMT: CPT

## 2017-03-09 PROCEDURE — 99233 SBSQ HOSP IP/OBS HIGH 50: CPT | Performed by: FAMILY MEDICINE

## 2017-03-09 PROCEDURE — 63710000001 INSULIN DETEMIR PER 5 UNITS: Performed by: HOSPITALIST

## 2017-03-09 PROCEDURE — 25010000002 HEPARIN (PORCINE) PER 1000 UNITS: Performed by: THORACIC SURGERY (CARDIOTHORACIC VASCULAR SURGERY)

## 2017-03-09 RX ORDER — VANCOMYCIN HYDROCHLORIDE
1250 EVERY 24 HOURS
Status: DISCONTINUED | OUTPATIENT
Start: 2017-03-09 | End: 2017-03-10 | Stop reason: HOSPADM

## 2017-03-09 RX ADMIN — INSULIN LISPRO 2 UNITS: 100 INJECTION, SOLUTION INTRAVENOUS; SUBCUTANEOUS at 16:42

## 2017-03-09 RX ADMIN — AMLODIPINE BESYLATE 5 MG: 5 TABLET ORAL at 09:42

## 2017-03-09 RX ADMIN — FUROSEMIDE 40 MG: 40 TABLET ORAL at 09:42

## 2017-03-09 RX ADMIN — PREGABALIN 100 MG: 100 CAPSULE ORAL at 15:28

## 2017-03-09 RX ADMIN — PANTOPRAZOLE SODIUM 40 MG: 40 TABLET, DELAYED RELEASE ORAL at 05:20

## 2017-03-09 RX ADMIN — INSULIN DETEMIR 10 UNITS: 100 INJECTION, SOLUTION SUBCUTANEOUS at 21:00

## 2017-03-09 RX ADMIN — LISINOPRIL 20 MG: 20 TABLET ORAL at 09:42

## 2017-03-09 RX ADMIN — SALINE NASAL SPRAY 2 SPRAY: 1.5 SOLUTION NASAL at 05:21

## 2017-03-09 RX ADMIN — LEVOTHYROXINE SODIUM 112 MCG: 112 TABLET ORAL at 05:20

## 2017-03-09 RX ADMIN — HEPARIN SODIUM 5000 UNITS: 5000 INJECTION, SOLUTION INTRAVENOUS; SUBCUTANEOUS at 09:41

## 2017-03-09 RX ADMIN — BACLOFEN 20 MG: 10 TABLET ORAL at 05:20

## 2017-03-09 RX ADMIN — PREGABALIN 100 MG: 100 CAPSULE ORAL at 21:46

## 2017-03-09 RX ADMIN — EZETIMIBE 10 MG: 10 TABLET ORAL at 09:45

## 2017-03-09 RX ADMIN — BUSPIRONE HYDROCHLORIDE 20 MG: 10 TABLET ORAL at 21:45

## 2017-03-09 RX ADMIN — BUSPIRONE HYDROCHLORIDE 20 MG: 10 TABLET ORAL at 09:42

## 2017-03-09 RX ADMIN — IPRATROPIUM BROMIDE AND ALBUTEROL SULFATE 3 ML: .5; 3 SOLUTION RESPIRATORY (INHALATION) at 12:03

## 2017-03-09 RX ADMIN — PREGABALIN 100 MG: 100 CAPSULE ORAL at 09:42

## 2017-03-09 RX ADMIN — OXYCODONE HYDROCHLORIDE AND ACETAMINOPHEN 1 TABLET: 10; 325 TABLET ORAL at 05:20

## 2017-03-09 RX ADMIN — BACLOFEN 20 MG: 10 TABLET ORAL at 13:17

## 2017-03-09 RX ADMIN — HYDROCORTISONE: 10 CREAM TOPICAL at 21:50

## 2017-03-09 RX ADMIN — BISOPROLOL FUMARATE AND HYDROCHLOROTHIAZIDE 1 TABLET: 6.25; 5 TABLET ORAL at 21:45

## 2017-03-09 RX ADMIN — ATORVASTATIN CALCIUM 40 MG: 40 TABLET, FILM COATED ORAL at 21:46

## 2017-03-09 RX ADMIN — ASPIRIN 325 MG: 325 TABLET, DELAYED RELEASE ORAL at 09:42

## 2017-03-09 RX ADMIN — CEFEPIME 2 G: 2 INJECTION, POWDER, FOR SOLUTION INTRAVENOUS at 13:18

## 2017-03-09 RX ADMIN — OXYCODONE HYDROCHLORIDE AND ACETAMINOPHEN 1 TABLET: 10; 325 TABLET ORAL at 19:02

## 2017-03-09 RX ADMIN — METOCLOPRAMIDE 5 MG: 5 TABLET ORAL at 12:11

## 2017-03-09 RX ADMIN — HYDROCORTISONE: 10 CREAM TOPICAL at 09:45

## 2017-03-09 RX ADMIN — OXYCODONE HYDROCHLORIDE AND ACETAMINOPHEN 1 TABLET: 10; 325 TABLET ORAL at 14:32

## 2017-03-09 RX ADMIN — INSULIN LISPRO 3 UNITS: 100 INJECTION, SOLUTION INTRAVENOUS; SUBCUTANEOUS at 12:10

## 2017-03-09 RX ADMIN — INSULIN LISPRO 2 UNITS: 100 INJECTION, SOLUTION INTRAVENOUS; SUBCUTANEOUS at 21:48

## 2017-03-09 RX ADMIN — Medication 2 TABLET: at 21:45

## 2017-03-09 RX ADMIN — CEFEPIME 2 G: 2 INJECTION, POWDER, FOR SOLUTION INTRAVENOUS at 15:45

## 2017-03-09 RX ADMIN — INSULIN DETEMIR 10 UNITS: 100 INJECTION, SOLUTION SUBCUTANEOUS at 09:41

## 2017-03-09 RX ADMIN — METOCLOPRAMIDE 5 MG: 5 TABLET ORAL at 07:27

## 2017-03-09 RX ADMIN — METOCLOPRAMIDE 5 MG: 5 TABLET ORAL at 16:42

## 2017-03-09 RX ADMIN — VANCOMYCIN HYDROCHLORIDE 1250 MG: 1 INJECTION, POWDER, LYOPHILIZED, FOR SOLUTION INTRAVENOUS at 18:03

## 2017-03-09 RX ADMIN — SALINE NASAL SPRAY 2 SPRAY: 1.5 SOLUTION NASAL at 00:26

## 2017-03-09 RX ADMIN — OXYCODONE HYDROCHLORIDE AND ACETAMINOPHEN 1 TABLET: 10; 325 TABLET ORAL at 10:35

## 2017-03-09 RX ADMIN — AZITHROMYCIN 500 MG: 500 INJECTION, POWDER, LYOPHILIZED, FOR SOLUTION INTRAVENOUS at 09:42

## 2017-03-09 RX ADMIN — NICOTINE 1 PATCH: 21 PATCH, EXTENDED RELEASE TRANSDERMAL at 09:48

## 2017-03-09 RX ADMIN — HEPARIN SODIUM 5000 UNITS: 5000 INJECTION, SOLUTION INTRAVENOUS; SUBCUTANEOUS at 21:50

## 2017-03-09 RX ADMIN — IPRATROPIUM BROMIDE AND ALBUTEROL SULFATE 3 ML: .5; 3 SOLUTION RESPIRATORY (INHALATION) at 08:45

## 2017-03-09 RX ADMIN — Medication 1 CAPSULE: at 09:42

## 2017-03-09 RX ADMIN — CEFEPIME 2 G: 2 INJECTION, POWDER, FOR SOLUTION INTRAVENOUS at 02:12

## 2017-03-09 RX ADMIN — CETIRIZINE HYDROCHLORIDE 10 MG: 10 TABLET, FILM COATED ORAL at 21:45

## 2017-03-09 NOTE — PROGRESS NOTES
Cardiothoracic Surgery Progress Note      POD #: 6-left transmetatarsal amputation of the second, third, fourth and fifth toe  #10-completion of amputation of first metatarsal bone and surrounding infected granulation tissue   LOS: 13 days      Subjective: Patient's awake, alert, concerned about possibly have to go to a nursing home for continued IV antibiotics as well as wound VAC changes and dressing changes to the left foot        Objective:  Vital Signs vital signs below are noted  Temp:  [97.9 °F (36.6 °C)-99.2 °F (37.3 °C)] 97.9 °F (36.6 °C)  Heart Rate:  [50-70] 50  Resp:  [18-20] 18  BP: (119-157)/(56-70) 142/65    Physical Exam:   General Appearance:  awake, alert and oriented ×3   Lungs: Lungs are clear bilaterally.  This morning   Heart: Start murmur.  Still noted along the right sternal border   Skin:   Incision:   Wound VAC is intact to the left great toe amputation site wound.  The transmetatarsal amputation site has some slight edema in both the dorsum and plantar surface is and there is a very slight erythema noted.  The skin incision is viable and the sutures are intact  Results: Echocardiogram yesterday showed ejection fraction of 70%, mild to moderate aortic valve stenosis with aortic valve gradient of 22 mmHg.  Also noted is some mild concentric left ventricular hypertrophy    Results from last 7 days  Lab Units 03/06/17  1215   WBC 10*3/mm3 8.71   HEMOGLOBIN g/dL 9.2*   HEMATOCRIT % 30.0*   PLATELETS 10*3/mm3 235       Results from last 7 days  Lab Units 03/08/17  0526   SODIUM mmol/L 140   POTASSIUM mmol/L 4.0   CHLORIDE mmol/L 108   TOTAL CO2 mmol/L 29.0   BUN mg/dL 19   CREATININE mg/dL 1.00   GLUCOSE mg/dL 132*   CALCIUM mg/dL 10.2         Assessment: 1.  Postop day 6 of left foot transmetatarsal amputation, second, third, fourth and fifth toes.  #2.  Postop day 10 for completion transmetatarsal amputation of left great toe and excision of surrounding infected granulation tissue, now with  wound VAC in place  #3.  Right lower lobe mass type lesion of unclear etiology, measuring 1.9 x 5.5 cm  some spiculation present  #4.  By echocardiogram, mild, moderate aortic stenosis with gradient of 20 mmHg and some mild concentric left ventricular hypertrophy  Plan: Arrangements are being made for long-term IV antibiotic therapy and wound VAC changes and wound care and possibly nursing home versus rehabilitation facilitation facility  I am going to present the patient's pulmonary right lower lobe mass to the pulmonary conference next week.  She is definitely going to need a follow-up CAT scan for this area       Arsalan Feliciano MD - 03/09/17 - 7:02 AM

## 2017-03-09 NOTE — PLAN OF CARE
Problem: Patient Care Overview (Adult)  Goal: Plan of Care Review  Outcome: Ongoing (interventions implemented as appropriate)    03/09/17 0417   Coping/Psychosocial Response Interventions   Plan Of Care Reviewed With patient   Patient Care Overview   Progress improving   Outcome Evaluation   Outcome Summary/Follow up Plan wound healing        Goal: Adult Individualization and Mutuality  Outcome: Ongoing (interventions implemented as appropriate)  Goal: Discharge Needs Assessment  Outcome: Ongoing (interventions implemented as appropriate)    Problem: Infection, Risk/Actual (Adult)  Goal: Infection Prevention/Resolution  Outcome: Ongoing (interventions implemented as appropriate)    Problem: Skin Integrity Impairment, Risk/Actual (Adult)  Goal: Skin Integrity/Wound Healing  Outcome: Ongoing (interventions implemented as appropriate)    Problem: Pressure Ulcer (Adult)  Goal: Signs and Symptoms of Listed Potential Problems Will be Absent or Manageable (Pressure Ulcer)  Outcome: Ongoing (interventions implemented as appropriate)    Problem: Fall Risk (Adult)  Goal: Identify Related Risk Factors and Signs and Symptoms  Outcome: Ongoing (interventions implemented as appropriate)  Goal: Absence of Falls  Outcome: Ongoing (interventions implemented as appropriate)

## 2017-03-09 NOTE — PLAN OF CARE
Problem: Patient Care Overview (Adult)  Goal: Plan of Care Review  Outcome: Ongoing (interventions implemented as appropriate)    03/09/17 1333   Coping/Psychosocial Response Interventions   Plan Of Care Reviewed With patient   Patient Care Overview   Progress progress toward functional goals as expected   Outcome Evaluation   Outcome Summary/Follow up Plan Pt improving with B UE theraband HEP and bed mobility. Recommend continued skilled OT services to assist in returning pt to her PLOF.         Problem: Inpatient Occupational Therapy  Goal: Transfer Training Goal 1 LTG- OT  Outcome: Ongoing (interventions implemented as appropriate)    02/26/17 1351 03/06/17 1423 03/09/17 1333   Transfer Training OT LTG   Transfer Training OT LTG, Date Established --  03/06/17 --    Transfer Training OT LTG, Time to Achieve --  by discharge --    Transfer Training OT LTG, Activity Type --  bed to chair /chair to bed;sit to stand/stand to sit;toilet --    Transfer Training OT LTG, [place] Level --  contact guard assist --    Transfer Training OT LTG, Assist Device walker, rolling --  --    Transfer Training OT LTG, Additional Goal --  AAD --    Transfer Training OT LTG, Outcome --  --  goal ongoing       Goal: LB Dressing Goal LTG- OT  Outcome: Ongoing (interventions implemented as appropriate)    02/26/17 1351 03/06/17 1423 03/09/17 1333   LB Dressing OT LTG   LB Dressing Goal OT LTG, Date Established --  03/06/17 --    LB Dressing Goal OT LTG, Time to Achieve --  by discharge --    LB Dressing Goal OT LTG, Activity Type --  Don pants utilizing daryn-dressing technique --    LB Dressing Goal OT LTG, [place] Level --  contact guard assist --    LB Dressing Goal OT LTG, Adaptive Equipment other (see comments)  (with appropriate AD) --  --    LB Dressing Goal OT LTG, Additional Goal --  AAD --    LB Dressing Goal OT LTG, Outcome --  --  goal ongoing       Goal: Patient Education Goal LTG- OT  Outcome: Ongoing (interventions  implemented as appropriate)    03/06/17 1423 03/09/17 1333   Patient Education OT LTG   Patient Education OT LTG, Date Established 03/06/17 --    Patient Education OT LTG, Time to Achieve by discharge --    Patient Education OT LTG, Education Type written program;HEP;precautions per surgeon;positioning;posture/body mechanics;1 hand/daryn technique;home safety;adaptive equipment mgmt;energy conservation;adaptive breathing --    Patient Education OT LTG, Education Understanding verbalizes understanding;demonstrates adequately --    Patient Education OT LTG Outcome --  goal ongoing       Goal: Dynamic Standing Balance Goal LTG-OT  Outcome: Ongoing (interventions implemented as appropriate)    03/06/17 1423 03/09/17 1333   Dynamic Standing Balance OT LTG   Dynamic Standing Balance OT LTG, Date Established 03/06/17 --    Dynamic Standing Balance OT LTG, Time to Achieve by discharge --    Dynamic Standing Balance OT LTG, [place] Level contact guard assist --    Dynamic Standing Balance OT LTG, Assist Device assistive Device --    Dynamic Standing Balance OT LTG, Additional Goal Pt will tolerate standing 1 min for functional activity.  --    Dynamic Standing Balance OT LTG, Outcome --  goal ongoing

## 2017-03-09 NOTE — PROGRESS NOTES
Tamara Langston  1955  [id number]  3/9/2017    CC: No chief complaint on file.  foot infection  Reason for Consultation: Left foot infection     History of present illness:      This is a 61 y.o. female with a history of DM, DINA, COPD, PVD, who was treated in 11/23/16 by Dr. Cruz for left great toe osteomyelitis growing MSSA and GBS and ultimately required left great toe transmetatarsal amputation by Dr. Feliciano. She was discharged home on Vanc/Rocephin IV and oral Flagyl. She had been doing well postoperatively until approximately 1 week ago she started having a low grade fever of 99.0 and SOA.  nurse noticed a new brownish drainage from her surgical wound on 2/22/17. Dr. Feliciano was contacted and she was seen in the office on 2/23/17. He was concerned with an osteo infection and pt followed up with Dr. Cruz/Dr. Feliciano in the office yesterday. It was decided that she should be admitted and be started on IV antibiotics as well as MRI of the left foot. Left foot culture taken yesterday is growing MSSA. She has been afebrile and labs revealed a WBC of 10.24 and Cr of 1.30. She was started on Vancomycin and Rocephin IV therapy and has received a dose of Zinacef.       3/1/17  Plans for transmetatarsal amputation noted  In my presence Dr Feliciano quoted a 50/50 chance of limb salvage    3/6/17  Alert, c/o mild sob despite breathing treatments  Not having a productive cough   I/O negative balance last 3 days   3/7/17 still with some dyspnea; she has developed a mild cough but is unable to produce sputum for culture;  CT reviewed and discussed with patient and Dr Feliciano  3/8/17 cough and dyspnea improved; c/o nausea during nausea infusion;echo pending; possible transfer to SNF on Friday; comfortable on 2L O2  CN staph from 3/2 op culture  3/9/17  Feeling better; working with OT    Past Medical History   Diagnosis Date   • Bronchitis    • Cervical cancer    • Cholelithiasis 5/11/2016   • Chronic anxiety 2/27/2017   •  Chronic bronchitis    • Chronically on benzodiazepine therapy 2/27/2017   • Degenerative arthritis    • Diabetes mellitus    • Dyslipidemia 5/11/2016   • Dyspnea    • Fatty liver disease, nonalcoholic 11/21/2016   • Fibromyalgia    • GERD (gastroesophageal reflux disease)    • H/O echocardiogram    • History of pneumonia    • Hypertension 5/11/2016     16. H/O echocardiogram (V15.89) (Z92.89)  · A.  Echocardiogram of 02/03/2015 reports an ejection fraction of 60-65%, mild concentric     LVH, trace mitral regurgitation, mild tricuspid and pulmonic regurgitation and calculated     RVSP of 35 mmHg, the main pulmonary artery is also mildly dilated.   • Hypothyroidism 5/11/2016     Description: A.  On replacement therapy.   • Nausea    • Obesity    • DINA (obstructive sleep apnea)      intolerant of CPAP therapy   • Osteoporosis    • Osteoporosis    • Polycythemia vera 5/11/2016   • Pulmonary emphysema    • Restrictive ventilatory defect    • Rhinitis    • Uncontrolled diabetes mellitus 5/11/2016   • Uterine cancer    • Vitamin D deficiency 8/1/2016       Medications:   Antibiotics:  IV Anti-Infectives     Ordered     Dose/Rate Route Frequency Start Stop    03/09/17 1258  vancomycin IVPB 1250 mg in 250 mL NS (premix)     Ordering Provider:  Oksana Cruz MD    1,250 mg  over 90 Minutes Intravenous Every 24 Hours 03/09/17 1800      03/07/17 1305  AZITHROMYCIN 500 MG/250 ML 0.9% NS IVPB ([facility]BP)     Ordering Provider:  Oksana Cruz MD    500 mg  over 60 Minutes Intravenous Every 24 Hours Scheduled 03/07/17 1400      03/05/17 0923  vancomycin 2000 mg/500 mL 0.9% NS IVPB ([facility])     Ordering Provider:  Katrin Simon RPH    20 mg/kg × 95.4 kg  over 120 Minutes Intravenous Once 03/05/17 1100 03/05/17 1242    03/01/17 1359  cefepime (MAXIPIME) 2 g/100 mL 0.9% NS (mbp)     Ordering Provider:  Oksana Cruz MD    2 g Intravenous Every 12 Hours 03/01/17 1500      02/26/17 1946  Pharmacy to dose vancomycin    "  Ordering Provider:  Arsalan Feliciano MD     Does not apply Continuous PRN 02/26/17 1945 02/24/17 1718  vancomycin 2000 mg/500 mL 0.9% NS IVPB ([facility])     Ordering Provider:  Luis Manuel Rodriguez IV, RPH    2,000 mg  over 120 Minutes Intravenous Once 02/24/17 1800 02/24/17 1932          Allergies:  is allergic to cortisone; oxycontin [oxycodone]; and tolmetin.    Family History: family history includes Alcohol abuse in her brother; Arthritis in her brother, father, mother, and other; Bleeding Disorder in her father; COPD in her sister; Colon polyps in her mother; Diabetes in her brother, father, mother, and other; Diverticulitis in her mother; Heart murmur in her daughter; Kidney disease in her father.    Social History:  reports that she has been smoking Cigarettes.  She has a 24.00 pack-year smoking history. She has never used smokeless tobacco. She reports that she does not drink alcohol or use illicit drugs.    Review of Systems: All other reviewed and negative except as per HPI - pt groggy this am    Blood pressure 119/56, pulse 65, temperature 98.3 °F (36.8 °C), temperature source Oral, resp. rate 18, height 66\" (167.6 cm), weight 210 lb (95.3 kg), SpO2 96 %, not currently breastfeeding.  GENERAL: alert, in no acute distress.   HEENT: Oropharynx without thrush. . No cervical adenopathy. No neck masses  EYES: . No conjunctival injection. No icterus.   LYMPHATICS: No lymphadenopathy of the neck or axillary or inguinal regions.   HEART: No murmur, gallop, or pericardial friction rub.   LUNGS: wheezes on right,   crackles at bases  ABDOMEN: Soft, nontender, nondistended. No appreciable HSM.    SKIN: Warm and dry without cutaneous eruptions. .   PSYCHIATRIC: Mental status lucid. Cranial nerve function intact.   EXT: post-op bandage intact      DIAGNOSTICS:  Lab Results   Component Value Date    WBC 8.71 03/06/2017    HGB 9.2 (L) 03/06/2017    HCT 30.0 (L) 03/06/2017     03/06/2017     Lab Results   Component " Value Date    CRP 57.50 (H) 11/27/2016     Lab Results   Component Value Date    SEDRATE 59 (H) 11/21/2016     Lab Results   Component Value Date    GLUCOSE 132 (H) 03/08/2017    BUN 19 03/08/2017    CREATININE 1.00 03/08/2017    EGFRIFNONA 56 (L) 03/08/2017    EGFRIFAFRI 77 12/13/2016    BCR 19.0 03/08/2017    CO2 29.0 03/08/2017    CALCIUM 10.2 03/08/2017    PROTENTOTREF 7.9 12/13/2016    ALBUMIN 3.50 03/06/2017    LABIL2 1.0 (L) 03/06/2017    AST 71 (H) 03/06/2017    ALT 57 (H) 03/06/2017       Microbiology  MSSA  And Pseudomonas  CXR personally reviewed- increased bibasilar markings    RADIOLOGY:         EXAMINATION: CT CHEST WITHOUT CONTRAST-03/06/2017:       INDICATION: Left lower lobe atelectasis on chest x-ray and increased  vascular congestion; Z74.09-Other reduced mobility; M86.9-Osteomyelitis,  unspecified; Z74.09-Other reduced mobility; M86.9-Osteomyelitis,  unspecified.       COMPARISON: Compared to chest radiographs of 03/06/2017 and distant CT  data sets of the chest and mediastinum of 2015.      FINDINGS:   1. Small patchy groundglass opacities are identified bilaterally in the  lungs, more numerous and severe in the right lung.  2. In addition, there is a small mass with spiculation and tenting to  the pleura and diaphragm seen at the right lung base posteriorly with  pleural thickening and pleural reaction. This is suspicious and  worrisome.  3. In the contralateral medial left lung base, there is consolidative  airspace opacity with air bronchograms.  4. There are multiple nodules in the thyroid with enlargement of the  isthmus. Scattered lymph nodes are seen in the axillary areas and  mediastinum, some of the mediastinum and subcarinal lymph nodes approach  size criteria.  5. Enlargement of pulmonary arteries is noted consistent with the  patient's known diagnosis of pulmonary hypertension and there is four  chamber mild cardiomegaly without pericardial effusion.  6. A splenule is seen in the  upper abdomen and there is a small isthmus  nodule left adrenal gland with a myelolipoma in the left adrenal gland  separate. The liver is unremarkable. The patient does have a worrisome  epicardial lymph node which is within normal limits for size but  unusual.             Assessment and Plan:        Impression:      -Osteomyelitis of left 1st MT s/p removal of the remainder of the 1st metatarsal bone of the left foot and a portion of the cuneiform bone 2/25/17. Culture with MSSA  ;   Psa from 1 of 3 cultures sent 2/25  TMA 3/2/17  CN staph from 3/2 op culture  --Patchy ground glass infiltrates developing on cefepime and vancomycin  R/O  Atypical pneumonia  --RLL mass  -Recent MSSA/GBS left great toe osteomyelitis s/p left great toe transmetatarsal amputation 11/23/17  -PVD  --Ongoing tobacco abuse  -DM2  -DINA  -COPD  - Mild MR 2015 echo  - Nausea possibly from flagyl      PLAN  -add azithromycin for coverage of atypical pneumonia- continue to 3/10 - change to po soon    - continue cefepime    -Continue  vancomycin based on most recent op cultures- reduce dose slightly since at the SNF it will be very difficult to monitor levels; inorder to reduce confusion at discharge I will also reduce the dose here   Anticipate rx to 4/12    --evaluation of RLL mass per Dr Feliciano  -- f/u with Dr Feliciano and myself  3/23 Thursday    - Reviewed the guarded prognosis for limb salvage with the pt and her   Even under the best circumstances ie tobacco cessation and compliance with NWB it is not guaranteed that we will be able to avoid BKA        Discussed with CM yesterday  Discussed with Dr Feliciano today    Oksana Cruz MD  3/9/2017

## 2017-03-09 NOTE — PROGRESS NOTES
Acute Care - Occupational Therapy Treatment Note  [facility]     Patient Name: Tamara Langston  : 1955  MRN: [id number]  Today's Date: 3/9/2017  Onset of Illness/Injury or Date of Surgery Date: 17  Date of Referral to OT: 17  Referring Physician: MD Marquita      Admit Date: 2017    Visit Dx:     ICD-10-CM ICD-9-CM   1. Impaired mobility and ADLs Z74.09 799.89   2. Toe osteomyelitis, left M86.9 730.27   3. Impaired functional mobility, balance, gait, and endurance Z74.09 V49.89   4. Osteomyelitis of left foot, unspecified chronicity M86.9 730.27     Patient Active Problem List   Diagnosis   • Atopic rhinitis   • Restrictive ventilatory defect   • COPD (chronic obstructive pulmonary disease)   • Osteoporosis   • Obstructive sleep apnea syndrome   • Abnormal liver enzymes   • Cholelithiasis   • Chronic back pain   • Mixed anxiety depressive disorder   • Type 2 diabetes mellitus   • Dyslipidemia   • Essential tremor   • Fibromyalgia   • Gastroesophageal reflux disease without esophagitis   • Hypertension   • Hypothyroidism   • Insomnia   • Osteoarthritis   • Polycythemia vera   • Psoriasis   • Branch retinal vein occlusion   • Cobalamin deficiency   • Chronic bronchitis   • Obesity   • Pneumonia   • Tobacco use   • Vitamin D deficiency   • Foot ulcer, left   • Leukocytosis   • Hypokalemia   • Lactic acidosis   • Fatty liver disease, nonalcoholic   • Diabetes education, encounter for   • Cellulitis of left foot   • Sinus bradycardia   • NSTEMI (non-ST elevated myocardial infarction)   • Tobacco abuse   • Hypoxia   • Peripheral vascular disease   • Gangrene   • Osteomyelitis of left foot   • Diabetic polyneuropathy associated with type 2 diabetes mellitus   • Anemia, blood loss   • Chronic pain   • Chronic, continuous use of opioids   • Chronic anxiety   • Chronically on benzodiazepine therapy             Adult Rehabilitation Note       17 1315 17 1025 17 0840    Rehab  Assessment/Intervention    Discipline occupational therapist  -JR physical therapist  -MW physical therapy assistant  -AS    Document Type therapy note (daily note)  -JR therapy note (daily note)   wound VAC ck  -MW therapy note (daily note)  -AS    Subjective Information agree to therapy;complains of;pain  -JR agree to therapy   RN in to give pain meds; denies pain in foot  -MW agree to therapy;complains of;numbness  -AS    Patient Effort, Rehab Treatment good  -JR  good  -AS    Symptoms Noted During/After Treatment none  -JR      Precautions/Limitations fall precautions;non-weight bearing status   NWB L LE  -JR  fall precautions;non-weight bearing status;other (see comments)  -AS    Recorded by [JR] Amy Garza, OT [MW] Christie Wheeler, PT [AS] Lauren Palacio, NANDO    Pain Assessment    Pain Assessment 0-10  -JR No/denies pain   in foot  -MW No/denies pain   only reports numbness in L LE  -AS    Pain Score 8  -JR  0  -AS    Post Pain Score 8  -JR  0  -AS    Pain Type Chronic pain  -JR      Pain Location Generalized  -JR      Pain Intervention(s) Ambulation/increased activity  -JR      Response to Interventions tolerated  -JR      Recorded by [JR] Amy Garza, OT [MW] Christie Wheeler, PT [AS] Lauren Palacio, NANDO    Cognitive Assessment/Intervention    Current Cognitive/Communication Assessment functional  -JR  functional  -AS    Orientation Status oriented x 4  -JR  oriented x 4  -AS    Follows Commands/Answers Questions 100% of the time;able to follow single-step instructions  -JR  100% of the time  -AS    Personal Safety fully aware of deficits;good awareness, safety precautions  -JR  fully aware of deficits;good awareness, safety precautions  -AS    Personal Safety Interventions   fall prevention program maintained;gait belt;nonskid shoes/slippers when out of bed  -AS    Recorded by [JR] Amy Garza, OT  [AS] Lauren Palacio PTA    Bed Mobility, Assessment/Treatment    Bed Mobility,  Assistive Device head of bed elevated  -JR  head of bed elevated;bed rails  -AS    Bed Mob, Supine to Sit, [place] conditional independence  -JR  independent  -AS    Bed Mob, Sit to Supine, [place] conditional independence  -JR  not tested  -AS    Bed Mobility, Comment   No assist needed  -AS    Recorded by [JR] Amy Garza, OT  [AS] Lauren Palacio PTA    Transfer Assessment/Treatment    Transfers, Bed-Chair [place]   verbal cues required;contact guard assist  -AS    Transfers, Sit-Stand [place]   contact guard assist  -AS    Transfers, Stand-Sit [place]   contact guard assist  -AS    Transfer, Impairments   strength decreased  -AS    Transfer, Comment   patient able to maintain NWB status on left throughout transfer  -AS    Recorded by   [AS] Lauren Palacio PTA    Gait Assessment/Treatment    Gait, Comment   geneva refused steps reporting it was too difficult due to weakness in her UE.  -AS    Recorded by   [AS] Lauren Palacio PTA    Balance Skills Training    Sitting-Level of Assistance Independent  -JR      Sitting-Balance Support Feet unsupported  -JR      Recorded by [JR] Amy Garza, OT      Therapy Exercises    Right Lower Extremity   AROM:;10 reps;sitting;ankle pumps/circles;hip flexion;LAQ  -AS    Left Lower Extremity   AROM:;sitting;hip flexion;LAQ;SLR;hip abduction/adduction  -AS    Bilateral Upper Extremity 10 reps;elbow flexion/extension;shoulder extension/flexion;shoulder horizontal abd/add  -JR      BUE Resistance theraband   yellow theraband HEP  -JR      Recorded by [JR] Amy Garza, OT  [AS] Lauren Palacio, PTA    Positioning and Restraints    Pre-Treatment Position in bed  -JR in bed  -MW in bed  -AS    Post Treatment Position bed  -JR bed  -MW chair  -AS    In Bed notified nsg;supine;call light within reach;encouraged to call for assist;RLE elevated;LLE elevated;SCD pump applied  -JR call light within reach;encouraged to call for  assist;with nsg;LLE elevated  -MW     In Chair   reclined;call light within reach;encouraged to call for assist;with family/caregiver;LLE elevated  -AS    Recorded by [JR] Amy Garza OT [MW] Christie Wheeler, PT [AS] Lauren Palacio, PTA      03/08/17 0800 03/07/17 1043 03/07/17 0940    Rehab Assessment/Intervention    Discipline physical therapist  -MF occupational therapist  -CL physical therapist  -MF    Document Type therapy note (daily note)  -MF therapy note (daily note)  -CL therapy note (daily note)  -MF    Subjective Information agree to therapy;complains of;weakness;fatigue;pain  -MF agree to therapy;complains of;pain  -CL agree to therapy;no complaints  -MF    Patient Effort, Rehab Treatment  good  -CL     Symptoms Noted During/After Treatment  increased pain  -CL     Symptoms Noted Comment  RN notified.   -CL     Precautions/Limitations  fall precautions;oxygen therapy device and L/min;non-weight bearing status   NWB LLE  -CL     Recorded by [MF] Lane Yates, PT [CL] Leticia Reed OT [MF] Lane Yates, PT    Vital Signs    Pre Systolic BP Rehab  --   No tele, RN cleared for tx.   -CL     Recorded by  [CL] Leticia Reed OT     Pain Assessment    Pain Assessment Blackmon-Cabrera FACES  -MF 0-10  -CL Blackmon-Baker FACES  -    Blackmon-Baker FACES Pain Rating 4  -MF  0  -MF    Pain Score  7  -CL     Post Pain Score  8  -CL     Pain Type  Chronic pain  -CL     Pain Location Generalized  -MF Generalized  -CL     Pain Intervention(s) Repositioned  -MF Repositioned;Ambulation/increased activity   RN notified.   -CL     Response to Interventions  Tolerated.   -CL     Recorded by [MF] Lane Yates, PT [CL] Leticia Reed OT [MF] Lane Yates, PT    Cognitive Assessment/Intervention    Current Cognitive/Communication Assessment  functional  -CL     Orientation Status  oriented x 4  -CL     Follows Commands/Answers Questions  100% of the time;needs cueing;needs repetition  -CL     Personal Safety   mild impairment;decreased awareness, need for assist;decreased awareness, need for safety;impulsive  -CL     Personal Safety Interventions  fall prevention program maintained;gait belt;nonskid shoes/slippers when out of bed  -CL     Recorded by  [CL] Leticia Reed OT     Bed Mobility, Assessment/Treatment    Bed Mobility, Assistive Device  head of bed elevated;overhead trapeze  -CL     Bed Mob, Supine to Sit, [place]  supervision required;verbal cues required  -CL     Bed Mobility, Comment  VCs for safety d/t lines/impulsivity.   -CL     Recorded by  [CL] Leticia Reed OT     Transfer Assessment/Treatment    Transfers, Bed-Chair [place]  maximum assist (25% patient effort);2 person assist required;verbal cues required  -CL     Transfers, Bed-Chair-Bed, Assist Device  --   BUE support, stand pivot  -CL     Transfers, Sit-Stand [place]  minimum assist (75% patient effort);2 person assist required;verbal cues required  -CL     Transfers, Stand-Sit [place]  minimum assist (75% patient effort);2 person assist required;supervision required  -CL     Transfers, Sit-Stand-Sit, Assist Device  rolling walker  -CL     Transfer, Comment  VCs for HP/sequencing of steps. Pt stood at EOB and took ~3 side steps while maintaining NWB status. Pt required a seated rest break of ~2 mins after side steps. Pt then performed stand pivot to chair w/ BUE requiring increased assist d/t to reports of fatigue after taking side steps.   -CL     Recorded by  [CL] Leticia Reed OT     Balance Skills Training    Sitting-Level of Assistance  Close supervision   at EOB, VCs to keep LLE off floor  -CL     Sitting-Balance Support  Right upper extremity supported;Left upper extremity supported   R foot supported  -CL     Sitting-Balance Activities  Reaching for objects  -CL     Standing-Level of Assistance  Minimum assistance;x2  -CL     Static Standing Balance Support  assistive device  -CL     Standing-Balance Activities  Weight Shift  R-L;Weight Shift A-P  -CL     Gait Balance-Level of Assistance  Minimum assistance;x2  -CL     Gait Balance Support  assistive device  -CL     Gait Balance Activities  side-stepping  -CL     Recorded by  [CL] Leticia Reed OT     Therapy Exercises    Bilateral Upper Extremity  10 reps;AROM:;elbow flexion/extension;pronation/supination;hand pumps;shoulder extension/flexion;shoulder ER/IR;shoulder protraction/retraction;shoulder horizontal abd/add   yellow thera-band  -CL     Recorded by  [CL] Leticia Reed OT     Positioning and Restraints    Pre-Treatment Position in bed  -MF in bed  -CL in bed  -MF    Post Treatment Position bed  -MF chair  -CL bed  -MF    In Bed supine;call light within reach;with family/caregiver  -MF  supine;call light within reach  -MF    In Chair  notified nsg;reclined;call light within reach;encouraged to call for assist;with PT  -CL     Recorded by [MF] Lane Yates, PT [CL] Leticia Reed, OT [MF] Lane Yates, PT      User Key  (r) = Recorded By, (t) = Taken By, (c) = Cosigned By    Initials Name Effective Dates     Lane Yates, PT 06/19/15 -     JR Amy Garza, OT 06/22/15 -     AS Lauren Palacio, PTA 06/22/15 -     MW Christie Wheeler, PT 05/18/15 -     CL Leticia Reed, OT 06/08/16 -                 OT Goals       03/09/17 1333 03/07/17 1132 03/06/17 1423    Transfer Training OT LTG    Transfer Training OT LTG, Date Established   03/06/17  -CL    Transfer Training OT LTG, Time to Achieve   by discharge  -CL    Transfer Training OT LTG, Activity Type   bed to chair /chair to bed;sit to stand/stand to sit;toilet  -CL    Transfer Training OT LTG, [place] Level   contact guard assist  -CL    Transfer Training OT LTG, Additional Goal   AAD  -CL    Transfer Training OT LTG, Outcome goal ongoing  -JR goal ongoing  -CL     Dynamic Standing Balance OT LTG    Dynamic Standing Balance OT LTG, Date Established   03/06/17  -CL    Dynamic Standing Balance OT LTG, Time to  Achieve   by discharge  -CL    Dynamic Standing Balance OT LTG, [place] Level   contact guard assist  -CL    Dynamic Standing Balance OT LTG, Assist Device   assistive Device  -CL    Dynamic Standing Balance OT LTG, Additional Goal   Pt will tolerate standing 1 min for functional activity.   -CL    Dynamic Standing Balance OT LTG, Outcome goal ongoing  -JR goal ongoing  -CL     Patient Education OT LTG    Patient Education OT LTG, Date Established   03/06/17  -CL    Patient Education OT LTG, Time to Achieve   by discharge  -CL    Patient Education OT LTG, Education Type   written program;HEP;precautions per surgeon;positioning;posture/body mechanics;1 hand/daryn technique;home safety;adaptive equipment mgmt;energy conservation;adaptive breathing  -CL    Patient Education OT LTG, Education Understanding   verbalizes understanding;demonstrates adequately  -CL    Patient Education OT LTG Outcome goal ongoing  -JR goal ongoing  -CL goal ongoing  -CL    LB Dressing OT LTG    LB Dressing Goal OT LTG, Date Established   03/06/17  -CL    LB Dressing Goal OT LTG, Time to Achieve   by discharge  -CL    LB Dressing Goal OT LTG, Activity Type   Don pants utilizing daryn-dressing technique  -CL    LB Dressing Goal OT LTG, [place] Level   contact guard assist  -CL    LB Dressing Goal OT LTG, Additional Goal   AAD  -CL    LB Dressing Goal OT LTG, Outcome goal ongoing  -JR goal ongoing  -CL       03/01/17 0910 02/28/17 0909 02/27/17 1449    Transfer Training OT LTG    Transfer Training OT LTG, Outcome goal ongoing  -MANDO goal ongoing   pt. refused to chair today  -MANDO goal ongoing   pt. to min of 2 with NWB LLE today  -MANDO    Strength OT LTG    Strength Goal OT LTG, Outcome goal met   pt. progressed to yellow t-band  -MANDO goal ongoing   tolerated more resistance distally today  -MANDO goal ongoing   pt. began UE ROM exer today  -MANDO    LB Dressing OT LTG    LB Dressing Goal OT LTG, Outcome goal met   met for R side.  Unable to  advance until wound vac off.  -MANDO goal ongoing   met socks only  -MANDO goal partially met   met R sock  -MANDO      02/26/17 1351          Transfer Training OT LTG    Transfer Training OT LTG, Date Established 02/26/17  -JR      Transfer Training OT LTG, Time to Achieve 1 wk  -JR      Transfer Training OT LTG, Activity Type bed to chair /chair to bed  -JR      Transfer Training OT LTG, [place] contact guard assist  -JR      Transfer Training OT LTG, Assist Device walker, rolling  -JR      Strength OT LTG    Strength Goal OT LTG, Date Established 02/26/17  -JR      Strength Goal OT LTG, Time to Achieve 1 wk  -JR      Strength Goal OT LTG, Functional Goal Pt to increase B UE strength by 1/2 muscle grade to support transfers with NWB status.  -JR      LB Dressing OT LTG    LB Dressing Goal OT LTG, Date Established 02/26/17  -JR      LB Dressing Goal OT LTG, Time to Achieve 1 wk  -JR      LB Dressing Goal OT LTG, Activity Type pants and socks on R LE.  -JR      LB Dressing Goal OT LTG, [place] minimum assist (75% patient effort)  -JR      LB Dressing Goal OT LTG, Adaptive Equipment other (see comments)   with appropriate AD  -JR        User Key  (r) = Recorded By, (t) = Taken By, (c) = Cosigned By    Initials Name Provider Type    MANDO Blevins, OT Occupational Therapist    JR Amy Garza, OT Occupational Therapist    JAMARCUS Reed, OT Occupational Therapist          Occupational Therapy Education     Title: PT OT SLP Therapies (Active)     Topic: Occupational Therapy (Active)     Point: ADL training (Active)    Description: Instruct learner(s) on proper safety adaptation and remediation techniques during self care or transfers.   Instruct in proper use of assistive devices.    Learning Progress Summary    Learner Readiness Method Response Comment Documented by Status   Patient Acceptance E,D NR Pt educated on/reviewed BUE HEP w/ thera-band, appropriate safety precautions, appropriate t/f  techniques, maintaining NWB status, and benefits of therapy.  03/07/17 1131 Active    Acceptance E,D NR Pt educated on/reivewed appropriate safety precautions, BUE HEP, appropriate t/f techniques, NWB status, and benefits of therapy.  03/06/17 1421 Active    Acceptance E,D VU,NR cues for NWB LLE with dressing, activity to  do on own during day, theraband exer  03/01/17 0909 Done    Acceptance E,D VU,NR cont. review UE ROM, benefits activity, standing endurance and safety for LBD and toileting.  02/28/17 0908 Done    Acceptance E VU,NR Reasons to keep LLE NWB when pt. said she stands on it anyway.  Safe transfer, UE ROM exer.  02/27/17 1448 Done    Acceptance E NR Pt educated on NWB status and proper transfer techniques  02/26/17 1350 Active               Point: Home exercise program (Active)    Description: Instruct learner(s) on appropriate technique for monitoring, assisting and/or progressing therapeutic exercises/activities.    Learning Progress Summary    Learner Readiness Method Response Comment Documented by Status   Patient Acceptance E NR yellow theraband HEP  03/09/17 1332 Active    Acceptance E,D NR Pt educated on/reviewed BUE HEP w/ thera-band, appropriate safety precautions, appropriate t/f techniques, maintaining NWB status, and benefits of therapy.  03/07/17 1131 Active    Acceptance E,D NR Pt educated on/reivewed appropriate safety precautions, BUE HEP, appropriate t/f techniques, NWB status, and benefits of therapy.  03/06/17 1421 Active    Acceptance E,D VU,NR cues for NWB LLE with dressing, activity to  do on own during day, theraband exer  03/01/17 0909 Done    Acceptance E,D VU,NR cont. review UE ROM, benefits activity, standing endurance and safety for LBD and toileting.  02/28/17 0908 Done    Acceptance E VU,NR Reasons to keep LLE NWB when pt. said she stands on it anyway.  Safe transfer, UE ROM exer.  02/27/17 1448 Done               Point: Precautions (Active)     Description: Instruct learner(s) on prescribed precautions during self-care and functional transfers.    Learning Progress Summary    Learner Readiness Method Response Comment Documented by Status   Patient Acceptance E,D NR Pt educated on/reviewed BUE HEP w/ thera-band, appropriate safety precautions, appropriate t/f techniques, maintaining NWB status, and benefits of therapy. CL 03/07/17 1131 Active    Acceptance E,D NR Pt educated on/reivewed appropriate safety precautions, BUE HEP, appropriate t/f techniques, NWB status, and benefits of therapy. CL 03/06/17 1421 Active    Acceptance E VU  CT 03/06/17 0520 Done    Acceptance E,D VU,NR cues for NWB LLE with dressing, activity to  do on own during day, theraband exer MANDO 03/01/17 0909 Done    Acceptance E,D VU,NR cont. review UE ROM, benefits activity, standing endurance and safety for LBD and toileting. MANDO 02/28/17 0908 Done    Acceptance E VU,NR Reasons to keep LLE NWB when pt. said she stands on it anyway.  Safe transfer, UE ROM exer. MANDO 02/27/17 1448 Done               Point: Body mechanics (Active)    Description: Instruct learner(s) on proper positioning and spine alignment during self-care, functional mobility activities and/or exercises.    Learning Progress Summary    Learner Readiness Method Response Comment Documented by Status   Patient Acceptance E,D NR Pt educated on/reviewed BUE HEP w/ thera-band, appropriate safety precautions, appropriate t/f techniques, maintaining NWB status, and benefits of therapy.  03/07/17 1131 Active    Acceptance E,D NR Pt educated on/reivewed appropriate safety precautions, BUE HEP, appropriate t/f techniques, NWB status, and benefits of therapy.  03/06/17 1421 Active                      User Key     Initials Effective Dates Name Provider Type Discipline    MANDO 06/22/15 -  Kacy Blevins, OT Occupational Therapist OT    JR 06/22/15 -  Amy Garza, OT Occupational Therapist OT    CT 06/16/16 -  Alaina Delaney RN  Registered Nurse Nurse    CL 06/08/16 -  Leticia Reed OT Occupational Therapist OT                  OT Recommendation and Plan  Anticipated Equipment Needs At Discharge:  (TBD)  Anticipated Discharge Disposition: inpatient rehabilitation facility  Planned Therapy Interventions: adaptive equipment training, ADL retraining, balance training, energy conservation, home exercise program, transfer training  Therapy Frequency: daily  Plan of Care Review  Plan Of Care Reviewed With: patient  Progress: progress toward functional goals as expected  Outcome Summary/Follow up Plan: Pt improving with B UE theraband HEP and bed mobility. Recommend continued skilled OT services to assist in returning pt to her PLOF.        Outcome Measures       03/09/17 1315 03/09/17 0840 03/07/17 1045    How much help from another person do you currently need...    Turning from your back to your side while in flat bed without using bedrails?  4  -AS 4  -DM    Moving from lying on back to sitting on the side of a flat bed without bedrails?  4  -AS 4  -DM    Moving to and from a bed to a chair (including a wheelchair)?  3  -AS 2  -DM    Standing up from a chair using your arms (e.g., wheelchair, bedside chair)?  3  -AS 2  -DM    Climbing 3-5 steps with a railing?  1  -AS 1  -DM    To walk in hospital room?  1  -AS 2  -DM    AM-PAC 6 Clicks Score  16  -AS 15  -DM    How much help from another is currently needed...    Putting on and taking off regular lower body clothing? 2  -JR      Bathing (including washing, rinsing, and drying) 2  -JR      Toileting (which includes using toilet bed pan or urinal) 2  -JR      Putting on and taking off regular upper body clothing 3  -JR      Taking care of personal grooming (such as brushing teeth) 4  -JR      Eating meals 4  -JR      Score 17  -JR      Functional Assessment    Outcome Measure Options AM-PAC 6 Clicks Daily Activity (OT)  -JR AM-PAC 6 Clicks Basic Mobility (PT)  -AS AM-PAC 6 Clicks Basic Mobility  (PT)  -DM      03/07/17 1043          How much help from another is currently needed...    Putting on and taking off regular lower body clothing? 2  -CL      Bathing (including washing, rinsing, and drying) 2  -CL      Toileting (which includes using toilet bed pan or urinal) 2  -CL      Putting on and taking off regular upper body clothing 3  -CL      Taking care of personal grooming (such as brushing teeth) 4  -CL      Eating meals 4  -CL      Score 17  -CL      Functional Assessment    Outcome Measure Options AM-PAC 6 Clicks Daily Activity (OT)  -CL        User Key  (r) = Recorded By, (t) = Taken By, (c) = Cosigned By    Initials Name Provider Type    DM Shelli Mora, PT Physical Therapist    JR Amy Garza, OT Occupational Therapist    AS Lauren Palacio, PTA Physical Therapy Assistant    JAMARCUS Reed, OT Occupational Therapist           Time Calculation:         Time Calculation- OT       03/09/17 1335          Time Calculation- OT    OT Start Time 1315  -JR      Total Timed Code Minutes- OT 10 minute(s)  -JR      OT Received On 03/09/17  -        User Key  (r) = Recorded By, (t) = Taken By, (c) = Cosigned By    Initials Name Provider Type    JR Amy Garza, OT Occupational Therapist           Therapy Charges for Today     Code Description Service Date Service Provider Modifiers Qty    [id number]  OT THERAPEUTIC ACT EA 15 MIN 3/9/2017 Amy Garza OT GO 1               Amy Garza OT  3/9/2017

## 2017-03-09 NOTE — PROGRESS NOTES
Acute Care - Physical Therapy Treatment Note  [facility]     Patient Name: Tamara Langston  : 1955  MRN: [id number]  Today's Date: 3/9/2017  Onset of Illness/Injury or Date of Surgery Date: 17  Date of Referral to PT: 17  Referring Physician: MD Marquita    Admit Date: 2017    Visit Dx:    ICD-10-CM ICD-9-CM   1. Impaired mobility and ADLs Z74.09 799.89   2. Toe osteomyelitis, left M86.9 730.27   3. Impaired functional mobility, balance, gait, and endurance Z74.09 V49.89   4. Osteomyelitis of left foot, unspecified chronicity M86.9 730.27     Patient Active Problem List   Diagnosis   • Atopic rhinitis   • Restrictive ventilatory defect   • COPD (chronic obstructive pulmonary disease)   • Osteoporosis   • Obstructive sleep apnea syndrome   • Abnormal liver enzymes   • Cholelithiasis   • Chronic back pain   • Mixed anxiety depressive disorder   • Type 2 diabetes mellitus   • Dyslipidemia   • Essential tremor   • Fibromyalgia   • Gastroesophageal reflux disease without esophagitis   • Hypertension   • Hypothyroidism   • Insomnia   • Osteoarthritis   • Polycythemia vera   • Psoriasis   • Branch retinal vein occlusion   • Cobalamin deficiency   • Chronic bronchitis   • Obesity   • Pneumonia   • Tobacco use   • Vitamin D deficiency   • Foot ulcer, left   • Leukocytosis   • Hypokalemia   • Lactic acidosis   • Fatty liver disease, nonalcoholic   • Diabetes education, encounter for   • Cellulitis of left foot   • Sinus bradycardia   • NSTEMI (non-ST elevated myocardial infarction)   • Tobacco abuse   • Hypoxia   • Peripheral vascular disease   • Gangrene   • Osteomyelitis of left foot   • Diabetic polyneuropathy associated with type 2 diabetes mellitus   • Anemia, blood loss   • Chronic pain   • Chronic, continuous use of opioids   • Chronic anxiety   • Chronically on benzodiazepine therapy               Adult Rehabilitation Note       17 0840 17 0800 17 1043    Rehab  Assessment/Intervention    Discipline physical therapy assistant  -AS physical therapist  -MF occupational therapist  -CL    Document Type therapy note (daily note)  -AS therapy note (daily note)  -MF therapy note (daily note)  -CL    Subjective Information agree to therapy;complains of;numbness  -AS agree to therapy;complains of;weakness;fatigue;pain  -MF agree to therapy;complains of;pain  -CL    Patient Effort, Rehab Treatment good  -AS  good  -CL    Symptoms Noted During/After Treatment   increased pain  -CL    Symptoms Noted Comment   RN notified.   -CL    Precautions/Limitations fall precautions;non-weight bearing status;other (see comments)  -AS  fall precautions;oxygen therapy device and L/min;non-weight bearing status   NWB LLE  -CL    Recorded by [AS] Lauren Palacio PTA [MF] Lane Yates, PT [CL] Leticia Reed OT    Vital Signs    Pre Systolic BP Rehab   --   No tele, RN cleared for tx.   -CL    Recorded by   [CL] Leticia Reed OT    Pain Assessment    Pain Assessment No/denies pain   only reports numbness in L LE  -AS Blackmon-Cabrera FACES  -MF 0-10  -CL    Blackmon-Cabrera FACES Pain Rating  4  -MF     Pain Score 0  -AS  7  -CL    Post Pain Score 0  -AS  8  -CL    Pain Type   Chronic pain  -CL    Pain Location  Generalized  -MF Generalized  -CL    Pain Intervention(s)  Repositioned  -MF Repositioned;Ambulation/increased activity   RN notified.   -CL    Response to Interventions   Tolerated.   -CL    Recorded by [AS] Lauren Palacio PTA [MF] Lane Yates, PT [CL] Leticia Reed OT    Cognitive Assessment/Intervention    Current Cognitive/Communication Assessment functional  -AS  functional  -CL    Orientation Status oriented x 4  -AS  oriented x 4  -CL    Follows Commands/Answers Questions 100% of the time  -AS  100% of the time;needs cueing;needs repetition  -CL    Personal Safety fully aware of deficits;good awareness, safety precautions  -AS  mild impairment;decreased awareness, need for  assist;decreased awareness, need for safety;impulsive  -CL    Personal Safety Interventions fall prevention program maintained;gait belt;nonskid shoes/slippers when out of bed  -AS  fall prevention program maintained;gait belt;nonskid shoes/slippers when out of bed  -CL    Recorded by [AS] Lauren Palacio PTA  [CL] Leticia Reed OT    Bed Mobility, Assessment/Treatment    Bed Mobility, Assistive Device head of bed elevated;bed rails  -AS  head of bed elevated;overhead trapeze  -CL    Bed Mob, Supine to Sit, [place] independent  -AS  supervision required;verbal cues required  -CL    Bed Mob, Sit to Supine, [place] not tested  -AS      Bed Mobility, Comment No assist needed  -AS  VCs for safety d/t lines/impulsivity.   -CL    Recorded by [AS] Lauren Palacio PTA  [CL] Leticia Reed OT    Transfer Assessment/Treatment    Transfers, Bed-Chair [place] verbal cues required;contact guard assist  -AS  maximum assist (25% patient effort);2 person assist required;verbal cues required  -CL    Transfers, Bed-Chair-Bed, Assist Device   --   BUE support, stand pivot  -CL    Transfers, Sit-Stand [place] contact guard assist  -AS  minimum assist (75% patient effort);2 person assist required;verbal cues required  -CL    Transfers, Stand-Sit [place] contact guard assist  -AS  minimum assist (75% patient effort);2 person assist required;supervision required  -CL    Transfers, Sit-Stand-Sit, Assist Device   rolling walker  -CL    Transfer, Impairments strength decreased  -AS      Transfer, Comment patient able to maintain NWB status on left throughout transfer  -AS  VCs for HP/sequencing of steps. Pt stood at EOB and took ~3 side steps while maintaining NWB status. Pt required a seated rest break of ~2 mins after side steps. Pt then performed stand pivot to chair w/ BUE requiring increased assist d/t to reports of fatigue after taking side steps.   -CL    Recorded by [AS] Lauren Palacio PTA  [CL]  Leticia Reed OT    Gait Assessment/Treatment    Gait, Comment geneva refused steps reporting it was too difficult due to weakness in her UE.  -AS      Recorded by [AS] Lauren Palacio PTA      Balance Skills Training    Sitting-Level of Assistance   Close supervision   at EOB, VCs to keep LLE off floor  -CL    Sitting-Balance Support   Right upper extremity supported;Left upper extremity supported   R foot supported  -CL    Sitting-Balance Activities   Reaching for objects  -CL    Standing-Level of Assistance   Minimum assistance;x2  -CL    Static Standing Balance Support   assistive device  -CL    Standing-Balance Activities   Weight Shift R-L;Weight Shift A-P  -CL    Gait Balance-Level of Assistance   Minimum assistance;x2  -CL    Gait Balance Support   assistive device  -CL    Gait Balance Activities   side-stepping  -CL    Recorded by   [CL] Leticia Reed OT    Therapy Exercises    Right Lower Extremity AROM:;10 reps;sitting;ankle pumps/circles;hip flexion;LAQ  -AS      Left Lower Extremity AROM:;sitting;hip flexion;LAQ;SLR;hip abduction/adduction  -AS      Bilateral Upper Extremity   10 reps;AROM:;elbow flexion/extension;pronation/supination;hand pumps;shoulder extension/flexion;shoulder ER/IR;shoulder protraction/retraction;shoulder horizontal abd/add   yellow thera-band  -CL    Recorded by [AS] Lauren Palacio PTA  [CL] Leticia Reed OT    Positioning and Restraints    Pre-Treatment Position in bed  -AS in bed  -MF in bed  -CL    Post Treatment Position chair  -AS bed  -MF chair  -CL    In Bed  supine;call light within reach;with family/caregiver  -MF     In Chair reclined;call light within reach;encouraged to call for assist;with family/caregiver;LLE elevated  -AS  notified nsg;reclined;call light within reach;encouraged to call for assist;with PT  -CL    Recorded by [AS] Lauren Palacio PTA [MF] Lane Yates, PT [CL] Leticia eRed OT      03/07/17 0907          Rehab Assessment/Intervention     Discipline physical therapist  -MF      Document Type therapy note (daily note)  -MF      Subjective Information agree to therapy;no complaints  -MF      Recorded by [MF] Lane Yates, PT      Pain Assessment    Pain Assessment Blackmon-Baker FACES  -      Blackmon-Baker FACES Pain Rating 0  -MF      Recorded by [MF] Lane Yates, PT      Positioning and Restraints    Pre-Treatment Position in bed  -MF      Post Treatment Position bed  -MF      In Bed supine;call light within reach  -MF      Recorded by [MF] Lane Yates, PT        User Key  (r) = Recorded By, (t) = Taken By, (c) = Cosigned By    Initials Name Effective Dates     Lane Yates, PT 06/19/15 -     AS Lauren Palacio, PTA 06/22/15 -     CL Leticia Reed, OT 06/08/16 -                 IP PT Goals       03/09/17 0925 03/08/17 0800 03/07/17 1146    Bed Mobility PT LTG    Bed Mobility PT LTG, Date Established   03/07/17  -DM    Bed Mobility PT LTG, Time to Achieve   1 wk  -DM    Bed Mobility PT LTG, Activity Type   all bed mobility  -DM    Bed Mobility PT LTG, [place] Level   independent  -DM    Bed Mobility PT LTG, Date Goal Reviewed 03/09/17  -AS      Bed Mobility PT LTG, Outcome goal met  -AS      Transfer Training PT LTG    Transfer Training PT LTG, Date Established   02/27/17  -DM    Transfer Training PT LTG, Time to Achieve   1 wk  -DM    Transfer Training PT LTG, Activity Type   bed to chair /chair to bed;sit to stand/stand to sit;toilet  -DM    Transfer Training PT LTG, [place] Level   supervision required  -DM    Transfer Training PT LTG, Assist Device   walker, rolling  -DM    Transfer Training PT  LTG, Date Goal Reviewed 03/09/17  -AS  03/07/17  -DM    Transfer Training PT LTG, Outcome goal ongoing  -AS  goal revised  -DM    Gait Training PT LTG    Gait Training Goal PT LTG, Date Established   02/27/17  -DM    Gait Training Goal PT LTG, Time to Achieve   1 wk  -DM    Gait Training Goal PT LTG, [place] Level    contact guard assist  -DM    Gait Training Goal PT LTG, Assist Device   walker, rolling  -DM    Gait Training Goal PT LTG, Distance to Achieve   15  -DM    Gait Training Goal PT LTG, Date Goal Reviewed 03/09/17  -AS  03/07/17  -DM    Gait Training Goal PT LTG, Outcome goal ongoing  -AS  goal revised  -DM    Patient Education PT LTG    Patient Education PT LTG, Date Established   02/27/17  -DM    Patient Education PT LTG, Time to Achieve   1 wk  -DM    Patient Education PT LTG, Education Type   HEP;precaution per surgeon;positioning;posture/body mechanics;gait;transfers;bed mobility;pain management;progression of POC;benefits of activity;home safety;equipment management  -DM    Patient Education PT LTG, Education Understanding   demonstrate adequately;verbalize understanding  -DM    Patient Education PT LTG, Date Goal Reviewed 03/09/17  -AS  03/07/17  -DM    Patient Education PT LTG Outcome goal ongoing  -AS  goal revised  -DM    Wound Care PT LTG    Wound Care PT LTG 1, Outcome  goal partially met  -MF       03/05/17 0905 03/04/17 1045 03/02/17 1055    Transfer Training PT LTG    Transfer Training PT  LTG, Date Goal Reviewed   03/02/17  -SR    Gait Training PT LTG    Gait Training Goal PT LTG, Date Goal Reviewed   03/02/17  -SR    Patient Education PT LTG    Patient Education PT LTG, Date Goal Reviewed   03/02/17  -SR    Wound Care PT LTG    Wound Care PT LTG 1, Outcome goal ongoing  -MC goal ongoing  -MC       02/27/17 1423 02/27/17 0845 02/26/17 1332    Transfer Training PT LTG    Transfer Training PT LTG, Date Established 02/27/17  -MANDO      Transfer Training PT LTG, Time to Achieve 1 wk  -MANDO      Transfer Training PT LTG, Activity Type all transfers  -MANDO      Transfer Training PT LTG, [place] Level conditional independence  -MANDO      Transfer Training PT LTG, Assist Device walker, rolling  -MANDO      Gait Training PT LTG    Gait Training Goal PT LTG, Date Established 02/27/17  -MANDO      Gait Training Goal PT  LTG, Time to Achieve 1 wk  -MANDO      Gait Training Goal PT LTG, [place] Level conditional independence  -MANDO      Gait Training Goal PT LTG, Assist Device walker, rolling  -MANDO      Gait Training Goal PT LTG, Distance to Achieve 25  -MANDO      Patient Education PT LTG    Patient Education PT LTG, Date Established 02/27/17  -MANDO      Patient Education PT LTG, Time to Achieve 1 wk  -MANDO      Patient Education PT LTG, Education Type HEP;positioning;posture/body mechanics;gait;transfers;bed mobility;pain management;progression of POC;benefits of activity;home safety;equipment management;skin care/inspection   weightbearing status  -MANDO      Patient Education PT LTG, Education Understanding demonstrate adequately;verbalize understanding  -MANDO      Wound Care PT LTG    Wound Care PT LTG 1, Date Established   02/26/17  -MW    Wound Care PT LTG 1, Time to Achieve   2 wks  -MW    Wound Care PT LTG 1, Location   LT open ray amputation site   -MW    Wound Care PT LTG 1, No S&S of Infection   yes  -MW    Wound Care PT LTG 1, Decrease Wound Size   25%  -MW    Wound Care PT LTG 1, Decrease Exudate   minimum  -MW    Wound Care PT LTG 1, No New Skin Break Down   yes  -MW    Wound Care PT LTG 1, Education   S&S of infection;dressing changes;wound care;weight bearing restriction;progression of POC  -MW    Wound Care PT LTG 1, Education Understanding   verbalize understanding  -MW    Wound Care PT LTG 1, Outcome  goal ongoing  -       User Key  (r) = Recorded By, (t) = Taken By, (c) = Cosigned By    Initials Name Provider Type    MANDO Esteban, PT Physical Therapist    ANN-MARIE Yates, PT Physical Therapist    TIMA Mora, PT Physical Therapist    SR Thu Herrera, PT Physical Therapist    AS Lauren Palacio, PTA Physical Therapy Assistant    MW Christie Wheeler, PT Physical Therapist    MC Chetna Chao, PT Physical Therapist          Physical Therapy Education     Title: PT OT SLP Therapies (Active)      Topic: Physical Therapy (Active)     Point: Mobility training (Active)    Learning Progress Summary    Learner Readiness Method Response Comment Documented by Status   Patient Acceptance E NR  AS 03/09/17 0925 Active    Eager E,D,H NR  DM 03/07/17 1146 Active    Acceptance E,D NR  SR 03/02/17 1055 Active    Acceptance E,D VU,NR  MANDO 02/27/17 1558 Done   Family Acceptance E NR  AS 03/09/17 0925 Active               Point: Home exercise program (Active)    Learning Progress Summary    Learner Readiness Method Response Comment Documented by Status   Patient Acceptance E NR  AS 03/09/17 0925 Active    Eager E,D,H NR  DM 03/07/17 1146 Active    Acceptance E,D NR  SR 03/02/17 1055 Active    Acceptance E,D VU,NR  MANDO 02/27/17 1558 Done   Family Acceptance E NR  AS 03/09/17 0925 Active               Point: Body mechanics (Active)    Learning Progress Summary    Learner Readiness Method Response Comment Documented by Status   Patient Acceptance E NR  AS 03/09/17 0925 Active    Eager E,D,H NR  DM 03/07/17 1146 Active    Acceptance E,D NR  SR 03/02/17 1055 Active    Acceptance E,D VU,NR  MANDO 02/27/17 1558 Done   Family Acceptance E NR  AS 03/09/17 0925 Active               Point: Precautions (Active)    Learning Progress Summary    Learner Readiness Method Response Comment Documented by Status   Patient Acceptance E NR  AS 03/09/17 0925 Active    Eager E,D,H NR   03/07/17 1146 Active    Acceptance E,D NR  SR 03/02/17 1055 Active    Acceptance E,D VU,NR  MANDO 02/27/17 1558 Done   Family Acceptance E NR  AS 03/09/17 0925 Active                      User Key     Initials Effective Dates Name Provider Type Discipline    MANDO 06/19/15 -  Philomena Esteban, PT Physical Therapist PT    DM 06/19/15 -  Shelli Mora, PT Physical Therapist PT    SR 06/19/15 -  Thu Herrera, PT Physical Therapist PT    AS 06/22/15 -  Lauren Palacio, PTA Physical Therapy Assistant PT                    PT Recommendation and Plan  Anticipated  Equipment Needs At Discharge: other (see comments) (wound VAC )  Anticipated Discharge Disposition: inpatient rehabilitation facility, other (see comments) (possibly LTAC)  Planned Therapy Interventions: wound care, patient/family education  PT Frequency: daily  Plan of Care Review  Plan Of Care Reviewed With: patient  Progress: improving  Outcome Summary/Follow up Plan: decreased assist needed for bed mobility and transfers, patient refused taking steps reporting it was too dfificult and she didnt feel safe to attempt.          Outcome Measures       03/09/17 0840 03/07/17 1045 03/07/17 1043    How much help from another person do you currently need...    Turning from your back to your side while in flat bed without using bedrails? 4  -AS 4  -DM     Moving from lying on back to sitting on the side of a flat bed without bedrails? 4  -AS 4  -DM     Moving to and from a bed to a chair (including a wheelchair)? 3  -AS 2  -DM     Standing up from a chair using your arms (e.g., wheelchair, bedside chair)? 3  -AS 2  -DM     Climbing 3-5 steps with a railing? 1  -AS 1  -DM     To walk in hospital room? 1  -AS 2  -DM     AM-PAC 6 Clicks Score 16  -AS 15  -DM     How much help from another is currently needed...    Putting on and taking off regular lower body clothing?   2  -CL    Bathing (including washing, rinsing, and drying)   2  -CL    Toileting (which includes using toilet bed pan or urinal)   2  -CL    Putting on and taking off regular upper body clothing   3  -CL    Taking care of personal grooming (such as brushing teeth)   4  -CL    Eating meals   4  -CL    Score   17  -CL    Functional Assessment    Outcome Measure Options AM-PAC 6 Clicks Basic Mobility (PT)  -AS AM-PAC 6 Clicks Basic Mobility (PT)  -DM AM-PAC 6 Clicks Daily Activity (OT)  -CL      03/06/17 1322          How much help from another is currently needed...    Putting on and taking off regular lower body clothing? 2  -CL      Bathing (including  washing, rinsing, and drying) 2  -CL      Toileting (which includes using toilet bed pan or urinal) 2  -CL      Putting on and taking off regular upper body clothing 3  -CL      Taking care of personal grooming (such as brushing teeth) 4  -CL      Eating meals 4  -CL      Score 17  -CL      Functional Assessment    Outcome Measure Options AM-PAC 6 Clicks Daily Activity (OT)  -CL        User Key  (r) = Recorded By, (t) = Taken By, (c) = Cosigned By    Initials Name Provider Type    DM Shelli Mora, PT Physical Therapist    AS Lauren Palacio PTA Physical Therapy Assistant    CL Leticia Reed, OT Occupational Therapist           Time Calculation:         PT Charges       03/09/17 0928          Time Calculation    Start Time 0840  -AS      PT Received On 03/09/17  -AS      PT Goal Re-Cert Due Date 03/17/17  -AS      Time Calculation- PT    Total Timed Code Minutes- PT 23 minute(s)  -AS        User Key  (r) = Recorded By, (t) = Taken By, (c) = Cosigned By    Initials Name Provider Type    AS Lauren Palacio PTA Physical Therapy Assistant          Therapy Charges for Today     Code Description Service Date Service Provider Modifiers Qty    93096487026 HC PT THER PROC EA 15 MIN 3/9/2017 Lauren Palacio PTA GP 2    73203650383 HC PT THER SUPP EA 15 MIN 3/9/2017 Lauren Palacio PTA GP 1          PT G-Codes  Outcome Measure Options: AM-PAC 6 Clicks Basic Mobility (PT)    Lauren Palacio PTA  3/9/2017

## 2017-03-09 NOTE — PROGRESS NOTES
Continued Stay Note  [facility]     Patient Name: Tamara Langston  MRN: [id number]  Today's Date: 3/9/2017    Admit Date: 2/24/2017          Discharge Plan       03/09/17 1130    Case Management/Social Work Plan    Plan skilled placement    Patient/Family In Agreement With Plan yes    Additional Comments Was informed that [facility] will not have a bed available tomorrow, but [facility] will have available bed. Patient is fine with this. Facility is beginning the precertication process with her insurance and hope to have obtained this by tomorrow. Have discussed the process with Mrs. Langston.               Discharge Codes     None        Expected Discharge Date and Time     Expected Discharge Date Expected Discharge Time    Mar 10, 2017             Ese Whitlock RN

## 2017-03-09 NOTE — PROGRESS NOTES
Acute Care - Wound/Debridement Treatment Note  [facility]     Patient Name: Tamara Langston  : 1955  MRN: [id number]  Today's Date: 3/9/2017  Onset of Illness/Injury or Date of Surgery Date: 17   Date of Referral to PT: 17   Referring Physician: MD Marquita       Admit Date: 2017    Visit Dx:    ICD-10-CM ICD-9-CM   1. Impaired mobility and ADLs Z74.09 799.89   2. Toe osteomyelitis, left M86.9 730.27   3. Impaired functional mobility, balance, gait, and endurance Z74.09 V49.89   4. Osteomyelitis of left foot, unspecified chronicity M86.9 730.27       Patient Active Problem List   Diagnosis   • Atopic rhinitis   • Restrictive ventilatory defect   • COPD (chronic obstructive pulmonary disease)   • Osteoporosis   • Obstructive sleep apnea syndrome   • Abnormal liver enzymes   • Cholelithiasis   • Chronic back pain   • Mixed anxiety depressive disorder   • Type 2 diabetes mellitus   • Dyslipidemia   • Essential tremor   • Fibromyalgia   • Gastroesophageal reflux disease without esophagitis   • Hypertension   • Hypothyroidism   • Insomnia   • Osteoarthritis   • Polycythemia vera   • Psoriasis   • Branch retinal vein occlusion   • Cobalamin deficiency   • Chronic bronchitis   • Obesity   • Pneumonia   • Tobacco use   • Vitamin D deficiency   • Foot ulcer, left   • Leukocytosis   • Hypokalemia   • Lactic acidosis   • Fatty liver disease, nonalcoholic   • Diabetes education, encounter for   • Cellulitis of left foot   • Sinus bradycardia   • NSTEMI (non-ST elevated myocardial infarction)   • Tobacco abuse   • Hypoxia   • Peripheral vascular disease   • Gangrene   • Osteomyelitis of left foot   • Diabetic polyneuropathy associated with type 2 diabetes mellitus   • Anemia, blood loss   • Chronic pain   • Chronic, continuous use of opioids   • Chronic anxiety   • Chronically on benzodiazepine therapy               LDA Wound       17 1025          [REMOVED] Incision 17 1427 Left foot     Incision - Properties Group Placement Date: 02/25/17  -TB Placement Time: 1427  -TB Side: Left  -TB Location: foot  -TB Additional Comments: 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  -MW Removal Date: 03/09/17  -TK Removal Time: 0800  -TK    Incision WDL ex  -MW      Dressing Appearance intact;moist drainage  -MW      Appearance other (see comments)   VAC foam in place   -MW      Drainage Characteristics/Odor serosanguineous  -MW      Drainage Amount small  -MW      Therapy Setting (Negative Pressure Wound Therapy) continuous therapy  -MW      Pressure Setting (Negative Pressure Wound Therapy) 150 mmHg  -MW      Output (mL) 100  -MW      Incision 03/02/17 0800 Left distal foot horizontal    Incision - Properties Group Placement Date: 03/02/17  -MC Placement Time: 0800  -MC Side: Left  -MC Orientation: distal  -MC Location: foot  -MC Incision Type: horizontal  -MC Additional Comments: s/p transmetatarsal amputation toes 2-5  -MC    Incision WDL ex  -MW      Dressing Appearance intact;moist drainage  -MW      Appearance sutures intact;well approximated  -MW      Drainage Characteristics/Odor serosanguineous  -MW      Drainage Amount small  -MW      Dressing gauze   replaced gauze recently placed by Dr. Feliciano   -      Pressure Ulcer 02/24/17 1432 Right coccyx Stage II    Pressure Ulcer - Properties Group Date first assessed: 02/24/17  -MS Time first assessed: 1432  -MS Present On Admission (Pressure Ulcer): yes  -MS Side: Right  -MS Location: coccyx  -MS Stage: Stage II  -MS    Pressure Ulcer 02/24/17 1432 other (see comments) Stage II    Pressure Ulcer - Properties Group Date first assessed: 02/24/17  -MS Time first assessed: 1432  -MS Present On Admission (Pressure Ulcer): yes  -MS Location: other (see comments)  -MS, nose  Stage: Stage II  -MS Additional Comments: from home c-pap  -MS      User Key  (r) = Recorded By, (t) = Taken By, (c) = Cosigned By    Initials Name Provider Type    ROMIE Soriano  Liam, PT Physical Therapist    TB Karen Atkins, RN Registered Nurse    IVAN Chao, PT Physical Therapist    TK Maxime Glynn, RN Registered Nurse    MS Clairevictoria AI Raymond, RN Registered Nurse                           Adult Rehabilitation Note       03/09/17 1025 03/09/17 0840 03/08/17 0800    Rehab Assessment/Intervention    Discipline physical therapist  -MW physical therapy assistant  -AS physical therapist  -MF    Document Type therapy note (daily note)   wound VAC ck  -MW therapy note (daily note)  -AS therapy note (daily note)  -    Subjective Information agree to therapy   RN in to give pain meds; denies pain in foot  -MW agree to therapy;complains of;numbness  -AS agree to therapy;complains of;weakness;fatigue;pain  -MF    Patient Effort, Rehab Treatment  good  -AS     Precautions/Limitations  fall precautions;non-weight bearing status;other (see comments)  -AS     Recorded by [MW] Christie Wheeler, PT [AS] Lauren Palacio, PTA [MF] Lane Yates, PT    Pain Assessment    Pain Assessment No/denies pain   in foot  -MW No/denies pain   only reports numbness in L LE  -AS Blackmon-Cabrera FACES  -    Blackmon-Baker FACES Pain Rating   4  -MF    Pain Score  0  -AS     Post Pain Score  0  -AS     Pain Location   Generalized  -    Pain Intervention(s)   Repositioned  -MF    Recorded by [MW] Christie Wheeler, PT [AS] Lauren Palacio, PTA [MF] Lane Yates, PT    Cognitive Assessment/Intervention    Current Cognitive/Communication Assessment  functional  -AS     Orientation Status  oriented x 4  -AS     Follows Commands/Answers Questions  100% of the time  -AS     Personal Safety  fully aware of deficits;good awareness, safety precautions  -AS     Personal Safety Interventions  fall prevention program maintained;gait belt;nonskid shoes/slippers when out of bed  -AS     Recorded by  [AS] Lauren Palacio PTA     Bed Mobility, Assessment/Treatment    Bed Mobility, Assistive Device  head  of bed elevated;bed rails  -AS     Bed Mob, Supine to Sit, [place]  independent  -AS     Bed Mob, Sit to Supine, [place]  not tested  -AS     Bed Mobility, Comment  No assist needed  -AS     Recorded by  [AS] Lauren Palacio PTA     Transfer Assessment/Treatment    Transfers, Bed-Chair [place]  verbal cues required;contact guard assist  -AS     Transfers, Sit-Stand [place]  contact guard assist  -AS     Transfers, Stand-Sit [place]  contact guard assist  -AS     Transfer, Impairments  strength decreased  -AS     Transfer, Comment  patient able to maintain NWB status on left throughout transfer  -AS     Recorded by  [AS] Lauren Palacio PTA     Gait Assessment/Treatment    Gait, Comment  geneva refused steps reporting it was too difficult due to weakness in her UE.  -AS     Recorded by  [AS] Lauren Palacio PTA     Therapy Exercises    Right Lower Extremity  AROM:;10 reps;sitting;ankle pumps/circles;hip flexion;LAQ  -AS     Left Lower Extremity  AROM:;sitting;hip flexion;LAQ;SLR;hip abduction/adduction  -AS     Recorded by  [AS] Lauren Palacio PTA     Positioning and Restraints    Pre-Treatment Position in bed  -MW in bed  -AS in bed  -    Post Treatment Position bed  -MW chair  -AS bed  -MF    In Bed call light within reach;encouraged to call for assist;with nsg;LLE elevated  -  supine;call light within reach;with family/caregiver  -    In Chair  reclined;call light within reach;encouraged to call for assist;with family/caregiver;LLE elevated  -AS     Recorded by [MW] Christie Wheeler, PT [AS] Lauren Palacio PTA [MF] Lane Yates, PT      03/07/17 1043 03/07/17 0940       Rehab Assessment/Intervention    Discipline occupational therapist  -CL physical therapist  -     Document Type therapy note (daily note)  -CL therapy note (daily note)  -MF     Subjective Information agree to therapy;complains of;pain  -CL agree to therapy;no complaints  -MF     Patient  Effort, Rehab Treatment good  -CL      Symptoms Noted During/After Treatment increased pain  -CL      Symptoms Noted Comment RN notified.   -CL      Precautions/Limitations fall precautions;oxygen therapy device and L/min;non-weight bearing status   NWB LLE  -CL      Recorded by [CL] Leticia Reed OT [MF] Lane Yates, PT     Vital Signs    Pre Systolic BP Rehab --   No tele, RN cleared for tx.   -CL      Recorded by [CL] Leticia Reed OT      Pain Assessment    Pain Assessment 0-10  -CL Blackmon-Cabrera FACES  -MF     Blackmon-Baker FACES Pain Rating  0  -MF     Pain Score 7  -CL      Post Pain Score 8  -CL      Pain Type Chronic pain  -CL      Pain Location Generalized  -CL      Pain Intervention(s) Repositioned;Ambulation/increased activity   RN notified.   -CL      Response to Interventions Tolerated.   -CL      Recorded by [CL] Leticia Reed OT [MF] Lane Yates, PT     Cognitive Assessment/Intervention    Current Cognitive/Communication Assessment functional  -CL      Orientation Status oriented x 4  -CL      Follows Commands/Answers Questions 100% of the time;needs cueing;needs repetition  -CL      Personal Safety mild impairment;decreased awareness, need for assist;decreased awareness, need for safety;impulsive  -CL      Personal Safety Interventions fall prevention program maintained;gait belt;nonskid shoes/slippers when out of bed  -CL      Recorded by [CL] Leticia Reed OT      Bed Mobility, Assessment/Treatment    Bed Mobility, Assistive Device head of bed elevated;overhead trapeze  -CL      Bed Mob, Supine to Sit, [place] supervision required;verbal cues required  -CL      Bed Mobility, Comment VCs for safety d/t lines/impulsivity.   -CL      Recorded by [CL] Leticia Reed OT      Transfer Assessment/Treatment    Transfers, Bed-Chair [place] maximum assist (25% patient effort);2 person assist required;verbal cues required  -CL      Transfers, Bed-Chair-Bed, Assist Device --   BUE support, stand  pivot  -CL      Transfers, Sit-Stand [place] minimum assist (75% patient effort);2 person assist required;verbal cues required  -CL      Transfers, Stand-Sit [place] minimum assist (75% patient effort);2 person assist required;supervision required  -CL      Transfers, Sit-Stand-Sit, Assist Device rolling walker  -CL      Transfer, Comment VCs for HP/sequencing of steps. Pt stood at EOB and took ~3 side steps while maintaining NWB status. Pt required a seated rest break of ~2 mins after side steps. Pt then performed stand pivot to chair w/ BUE requiring increased assist d/t to reports of fatigue after taking side steps.   -CL      Recorded by [CL] Leticia Reed OT      Balance Skills Training    Sitting-Level of Assistance Close supervision   at EOB, VCs to keep LLE off floor  -CL      Sitting-Balance Support Right upper extremity supported;Left upper extremity supported   R foot supported  -CL      Sitting-Balance Activities Reaching for objects  -CL      Standing-Level of Assistance Minimum assistance;x2  -CL      Static Standing Balance Support assistive device  -CL      Standing-Balance Activities Weight Shift R-L;Weight Shift A-P  -CL      Gait Balance-Level of Assistance Minimum assistance;x2  -CL      Gait Balance Support assistive device  -CL      Gait Balance Activities side-stepping  -CL      Recorded by [CL] Leticia Reed OT      Therapy Exercises    Bilateral Upper Extremity 10 reps;AROM:;elbow flexion/extension;pronation/supination;hand pumps;shoulder extension/flexion;shoulder ER/IR;shoulder protraction/retraction;shoulder horizontal abd/add   yellow thera-band  -CL      Recorded by [CL] Leticia Reed OT      Positioning and Restraints    Pre-Treatment Position in bed  -CL in bed  -MF     Post Treatment Position chair  -CL bed  -MF     In Bed  supine;call light within reach  -MF     In Chair notified nsg;reclined;call light within reach;encouraged to call for assist;with PT  -CL      Recorded by  [CL] Leticia Reed, OT [MF] Lane Yates, PT       User Key  (r) = Recorded By, (t) = Taken By, (c) = Cosigned By    Initials Name Effective Dates     Lane Yates, PT 06/19/15 -     AS Lauren Palacio, PTA 06/22/15 -     MW Christie Wheeler, PT 05/18/15 -     CL Leticia Reed, OT 06/08/16 -                 IP PT Goals       03/09/17 1102 03/09/17 0925 03/08/17 0800    Bed Mobility PT LTG    Bed Mobility PT LTG, Date Goal Reviewed  03/09/17  -AS     Bed Mobility PT LTG, Outcome  goal met  -AS     Transfer Training PT LTG    Transfer Training PT  LTG, Date Goal Reviewed  03/09/17  -AS     Transfer Training PT LTG, Outcome  goal ongoing  -AS     Gait Training PT LTG    Gait Training Goal PT LTG, Date Goal Reviewed  03/09/17  -AS     Gait Training Goal PT LTG, Outcome  goal ongoing  -AS     Patient Education PT LTG    Patient Education PT LTG, Date Goal Reviewed  03/09/17  -AS     Patient Education PT LTG Outcome  goal ongoing  -AS     Wound Care PT LTG    Wound Care PT LTG 1, Outcome goal partially met  -MW  goal partially met  -      03/07/17 1146 03/05/17 0905 03/04/17 1045    Bed Mobility PT LTG    Bed Mobility PT LTG, Date Established 03/07/17  -DM      Bed Mobility PT LTG, Time to Achieve 1 wk  -DM      Bed Mobility PT LTG, Activity Type all bed mobility  -DM      Bed Mobility PT LTG, [place] Level independent  -DM      Transfer Training PT LTG    Transfer Training PT LTG, Date Established 02/27/17  -DM      Transfer Training PT LTG, Time to Achieve 1 wk  -DM      Transfer Training PT LTG, Activity Type bed to chair /chair to bed;sit to stand/stand to sit;toilet  -DM      Transfer Training PT LTG, [place] Level supervision required  -DM      Transfer Training PT LTG, Assist Device walker, rolling  -DM      Transfer Training PT  LTG, Date Goal Reviewed 03/07/17  -DM      Transfer Training PT LTG, Outcome goal revised  -DM      Gait Training PT LTG    Gait Training Goal PT LTG, Date  Established 02/27/17  -DM      Gait Training Goal PT LTG, Time to Achieve 1 wk  -DM      Gait Training Goal PT LTG, [place] Level contact guard assist  -DM      Gait Training Goal PT LTG, Assist Device walker, rolling  -DM      Gait Training Goal PT LTG, Distance to Achieve 15  -DM      Gait Training Goal PT LTG, Date Goal Reviewed 03/07/17  -DM      Gait Training Goal PT LTG, Outcome goal revised  -DM      Patient Education PT LTG    Patient Education PT LTG, Date Established 02/27/17  -DM      Patient Education PT LTG, Time to Achieve 1 wk  -DM      Patient Education PT LTG, Education Type HEP;precaution per surgeon;positioning;posture/body mechanics;gait;transfers;bed mobility;pain management;progression of POC;benefits of activity;home safety;equipment management  -DM      Patient Education PT LTG, Education Understanding demonstrate adequately;verbalize understanding  -DM      Patient Education PT LTG, Date Goal Reviewed 03/07/17  -DM      Patient Education PT LTG Outcome goal revised  -DM      Wound Care PT LTG    Wound Care PT LTG 1, Outcome  goal ongoing  -MC goal ongoing  -MC      03/02/17 1055 02/27/17 1423 02/27/17 0845    Transfer Training PT LTG    Transfer Training PT LTG, Date Established  02/27/17  -MANDO     Transfer Training PT LTG, Time to Achieve  1 wk  -MANDO     Transfer Training PT LTG, Activity Type  all transfers  -MANDO     Transfer Training PT LTG, [place] Level  conditional independence  -MANDO     Transfer Training PT LTG, Assist Device  walker, rolling  -MANDO     Transfer Training PT  LTG, Date Goal Reviewed 03/02/17  -SR      Gait Training PT LTG    Gait Training Goal PT LTG, Date Established  02/27/17  -MANDO     Gait Training Goal PT LTG, Time to Achieve  1 wk  -MANDO     Gait Training Goal PT LTG, [place] Level  conditional independence  -MANDO     Gait Training Goal PT LTG, Assist Device  walker, rolling  -MANDO     Gait Training Goal PT LTG, Distance to Achieve  25  -MANDO     Gait Training  Goal PT LTG, Date Goal Reviewed 03/02/17  -SR      Patient Education PT LTG    Patient Education PT LTG, Date Established  02/27/17  -MANDO     Patient Education PT LTG, Time to Achieve  1 wk  -MANDO     Patient Education PT LTG, Education Type  HEP;positioning;posture/body mechanics;gait;transfers;bed mobility;pain management;progression of POC;benefits of activity;home safety;equipment management;skin care/inspection   weightbearing status  -MANDO     Patient Education PT LTG, Education Understanding  demonstrate adequately;verbalize understanding  -MANDO     Patient Education PT LTG, Date Goal Reviewed 03/02/17  -SR      Wound Care PT LTG    Wound Care PT LTG 1, Outcome   goal ongoing  -      02/26/17 1332          Wound Care PT LTG    Wound Care PT LTG 1, Date Established 02/26/17  -MW      Wound Care PT LTG 1, Time to Achieve 2 wks  -MW      Wound Care PT LTG 1, Location LT open ray amputation site   -MW      Wound Care PT LTG 1, No S&S of Infection yes  -MW      Wound Care PT LTG 1, Decrease Wound Size 25%  -MW      Wound Care PT LTG 1, Decrease Exudate minimum  -MW      Wound Care PT LTG 1, No New Skin Break Down yes  -MW      Wound Care PT LTG 1, Education S&S of infection;dressing changes;wound care;weight bearing restriction;progression of POC  -MW      Wound Care PT LTG 1, Education Understanding verbalize understanding  -        User Key  (r) = Recorded By, (t) = Taken By, (c) = Cosigned By    Initials Name Provider Type    MANDO Esteban, PT Physical Therapist    ANN-MARIE Yates, PT Physical Therapist    TIMA Mora, PT Physical Therapist    SR Thu Herrera, PT Physical Therapist    AS Lauren Palacio, PTA Physical Therapy Assistant    MW Christie Wheeler, PT Physical Therapist     Chetna Chao, PT Physical Therapist          Physical Therapy Education     Title: PT OT SLP Therapies (Active)     Topic: Physical Therapy (Active)     Point: Mobility training (Active)     Learning Progress Summary    Learner Readiness Method Response Comment Documented by Status   Patient Acceptance E NR  AS 03/09/17 0925 Active    Eager E,D,H NR  DM 03/07/17 1146 Active    Acceptance E,D NR  SR 03/02/17 1055 Active    Acceptance E,D VU,NR  MANDO 02/27/17 1558 Done   Family Acceptance E NR  AS 03/09/17 0925 Active               Point: Home exercise program (Active)    Learning Progress Summary    Learner Readiness Method Response Comment Documented by Status   Patient Acceptance E NR  AS 03/09/17 0925 Active    Eager E,D,H NR  DM 03/07/17 1146 Active    Acceptance E,D NR  SR 03/02/17 1055 Active    Acceptance E,D VU,NR  MANDO 02/27/17 1558 Done   Family Acceptance E NR  AS 03/09/17 0925 Active               Point: Body mechanics (Active)    Learning Progress Summary    Learner Readiness Method Response Comment Documented by Status   Patient Acceptance E NR  AS 03/09/17 0925 Active    Eager E,D,H NR  DM 03/07/17 1146 Active    Acceptance E,D NR  SR 03/02/17 1055 Active    Acceptance E,D VU,NR  MANDO 02/27/17 1558 Done   Family Acceptance E NR  AS 03/09/17 0925 Active               Point: Precautions (Active)    Learning Progress Summary    Learner Readiness Method Response Comment Documented by Status   Patient Acceptance E NR  AS 03/09/17 0925 Active    Eager E,D,H NR  DM 03/07/17 1146 Active    Acceptance E,D NR  SR 03/02/17 1055 Active    Acceptance E,D VU,NR  MANDO 02/27/17 1558 Done   Family Acceptance E NR  AS 03/09/17 0925 Active                      User Key     Initials Effective Dates Name Provider Type Discipline    MANDO 06/19/15 -  Philomena Esteban, PT Physical Therapist PT    DM 06/19/15 -  Shelli Mora, PT Physical Therapist PT    SR 06/19/15 -  Thu Herrera, PT Physical Therapist PT    AS 06/22/15 -  Lauren Palacio, PTA Physical Therapy Assistant PT                   PT ASSESSMENT (last 72 hours)      PT Evaluation       03/09/17 1025 03/09/17 0840    Rehab Evaluation    Document  Type therapy note (daily note)   wound VAC ck  -MW therapy note (daily note)  -AS    Subjective Information agree to therapy   RN in to give pain meds; denies pain in foot  -MW agree to therapy;complains of;numbness  -AS    Patient Effort, Rehab Treatment  good  -AS    General Information    Precautions/Limitations  fall precautions;non-weight bearing status;other (see comments)  -AS    Pain Assessment    Pain Assessment No/denies pain   in foot  -MW No/denies pain   only reports numbness in L LE  -AS    Pain Score  0  -AS    Post Pain Score  0  -AS    Cognitive Assessment/Intervention    Current Cognitive/Communication Assessment  functional  -AS    Orientation Status  oriented x 4  -AS    Follows Commands/Answers Questions  100% of the time  -AS    Personal Safety  fully aware of deficits;good awareness, safety precautions  -AS    Personal Safety Interventions  fall prevention program maintained;gait belt;nonskid shoes/slippers when out of bed  -AS    Bed Mobility, Assessment/Treatment    Bed Mobility, Assistive Device  head of bed elevated;bed rails  -AS    Bed Mob, Supine to Sit, [place]  independent  -AS    Bed Mob, Sit to Supine, [place]  not tested  -AS    Bed Mobility, Comment  No assist needed  -AS    Transfer Assessment/Treatment    Transfers, Bed-Chair [place]  verbal cues required;contact guard assist  -AS    Transfers, Sit-Stand [place]  contact guard assist  -AS    Transfers, Stand-Sit [place]  contact guard assist  -AS    Transfer, Impairments  strength decreased  -AS    Transfer, Comment  patient able to maintain NWB status on left throughout transfer  -AS    Gait Assessment/Treatment    Gait, Comment  geneva refused steps reporting it was too difficult due to weakness in her UE.  -AS    Therapy Exercises    Right Lower Extremity  AROM:;10 reps;sitting;ankle pumps/circles;hip flexion;LAQ  -AS    Left Lower Extremity  AROM:;sitting;hip flexion;LAQ;SLR;hip abduction/adduction   "-AS    Positioning and Restraints    Pre-Treatment Position in bed  -MW in bed  -AS    Post Treatment Position bed  -MW chair  -AS    In Bed call light within reach;encouraged to call for assist;with nsg;LLE elevated  -MW     In Chair  reclined;call light within reach;encouraged to call for assist;with family/caregiver;LLE elevated  -AS      03/09/17 0800 03/08/17 2000    Sensory Assessment/Intervention    Light Touch LUE;RUE  -TK     LUE Light Touch WNL  -TK     RUE Light Touch WNL  -TK     LLE Light Touch moderate impairment  -TK moderate impairment  -SC    RLE Light Touch absent sensation  -TK absent sensation  -SC      03/08/17 0800 03/07/17 2000    Rehab Evaluation    Document Type therapy note (daily note)  -MF     Subjective Information agree to therapy;complains of;weakness;fatigue;pain  -MF     Pain Assessment    Pain Assessment Blackmon-Baker FACES  -     Blackmon-Baker FACES Pain Rating 4  -     Pain Location Generalized  -     Pain Intervention(s) Repositioned  -     Sensory Assessment/Intervention    Light Touch LUE;RUE  -TK LUE;RUE  -KR    LUE Light Touch WNL  -TK WNL  -KR    RUE Light Touch WNL  -TK WNL  -KR    LLE Light Touch moderate impairment  -TK moderate impairment  -KR    RLE Light Touch absent sensation  -TK absent sensation  -KR    Positioning and Restraints    Pre-Treatment Position in bed  -MF     Post Treatment Position bed  -MF     In Bed supine;call light within reach;with family/caregiver  -       03/07/17 1045 03/07/17 1043    Rehab Evaluation    Document Type re-evaluation  -DM therapy note (daily note)  -CL    Subjective Information agree to therapy;complains of;weakness;pain;swelling   Rfoot swollen;req.R shoe(done);DrEarle assessed;\"go easy\"  -DM agree to therapy;complains of;pain  -CL    Patient Effort, Rehab Treatment good  -DM good  -CL    Symptoms Noted During/After Treatment increased pain;fatigue  -DM increased pain  -CL    Symptoms Noted Comment nsg informed pt req.pain " "med;pt decl loaner Rwx(\"prefer to pivot on RLE back to bed,not use wx\"  -DM RN notified.   -CL    General Information    Patient Profile Review yes  -DM     Onset of Illness/Injury or Date of Surgery Date 02/24/17  -DM     Referring Physician MD Marquita  -DM     General Observations sup in bed (no tele.) w/ LLE elev on 2 pillows & eggcrate per MD;WD VAC L FOOT(TM amp site w/ Kerlix/spandage);RA;IV;SCUDS;pl  -DM     Pertinent History Of Current Problem see IE;pt now w/L foot TM amp of toes 2--5 on 3-2-17 (Dx: OM); wound P.T. has eval'd/placed wd.vac; resume P.T. orders (for mobil.,NWB L LE) received 3-6  -DM     Precautions/Limitations fall precautions;oxygen therapy device and L/min   NWB L LE;WD.VAC Lfoot;fibromyal.;can't sherry knee wx(bad knees  -DM fall precautions;oxygen therapy device and L/min;non-weight bearing status   NWB LLE  -CL    Plans/Goals Discussed With patient;agreed upon  -DM     Risks Reviewed patient:;LOB;dizziness;increased discomfort;change in vital signs;increased drainage;lines disloged  -DM     Benefits Reviewed patient:;improve function;increase independence;increase strength;increase balance;decrease pain;increase knowledge  -DM     Barriers to Rehab medically complex;previous functional deficit  -DM     Clinical Impression    Date of Referral to PT 03/06/17  -DM     PT Diagnosis impaired funct mobil  -DM     Criteria for Skilled Therapeutic Interventions Met yes;treatment indicated  -DM     Pathology/Pathophysiology Noted (Describe Specifically for Each System) musculoskeletal  -DM     Rehab Potential good, to achieve stated therapy goals  -DM     Vital Signs    Pre Systolic BP Rehab  --   No tele, RN cleared for tx.   -CL    O2 Delivery Pre Treatment room air  -DM     O2 Delivery Intra Treatment room air  -DM     O2 Delivery Post Treatment room air  -DM     Pre Patient Position Supine  -DM     Intra Patient Position Standing  -DM     Post Patient Position Sitting  -DM     Pain Assessment "    Pain Assessment 0-10  -DM 0-10  -CL    Pain Score 7  -DM 7  -CL    Post Pain Score 8  -DM 8  -CL    Pain Type Chronic pain  -DM Chronic pain  -CL    Pain Location Generalized  -DM Generalized  -CL    Pain Orientation --   FIBROMYALGIA  -DM     Pain Descriptors Aching  -DM     Pain Frequency Constant/continuous  -DM     Patient's Stated Pain Goal No pain  -DM     Pain Intervention(s) Medication (See MAR);Repositioned;Ambulation/increased activity   PT CALLED FOR PAIN MED  -DM Repositioned;Ambulation/increased activity   RN notified.   -CL    Response to Interventions TOLERATED  -DM Tolerated.   -CL    Vision Assessment/Intervention    Visual Impairment WFL  -DM     Cognitive Assessment/Intervention    Current Cognitive/Communication Assessment functional  -DM functional  -CL    Orientation Status oriented x 4  -DM oriented x 4  -CL    Follows Commands/Answers Questions 100% of the time;able to follow single-step instructions;needs cueing;needs increased time;needs repetition  -% of the time;needs cueing;needs repetition  -CL    Personal Safety mild impairment;decreased awareness, need for assist;decreased awareness, need for safety;impulsive  -DM mild impairment;decreased awareness, need for assist;decreased awareness, need for safety;impulsive  -CL    Personal Safety Interventions fall prevention program maintained;gait belt;nonskid shoes/slippers when out of bed  -DM fall prevention program maintained;gait belt;nonskid shoes/slippers when out of bed  -CL    ROM (Range of Motion)    General ROM lower extremity range of motion deficits identified   L ankle DF limited 25 % d/t pain/sx.site  -DM     MMT (Manual Muscle Testing)    General MMT Assessment lower extremity strength deficits identified   B LE's grossly 3+ to 4-/5; L ankle NA  -DM     Mobility Assessment/Training    Extremity Weight-Bearing Status left lower extremity  -DM     Left Lower Extremity Weight-Bearing non weight-bearing  -DM     Bed  Mobility, Assessment/Treatment    Bed Mobility, Assistive Device head of bed elevated;bed rails  -DM head of bed elevated;overhead trapeze  -CL    Bed Mobility, Roll Left, [place] supervision required;verbal cues required  -DM     Bed Mob, Supine to Sit, [place] supervision required;verbal cues required  -DM supervision required;verbal cues required  -CL    Bed Mobility, Safety Issues decreased use of legs for bridging/pushing  -DM     Bed Mobility, Impairments strength decreased;pain  -DM     Bed Mobility, Comment cues for lines(derrell wd.vac) & keeping L foot elev during transit mvmt  -DM VCs for safety d/t lines/impulsivity.   -CL    Transfer Assessment/Treatment    Transfers, Bed-Chair Fifty [place] maximum assist (25% patient effort);2 person assist required;verbal cues required   SPT on R LE (P.T. in front;blocked R knee;o.t.behind pt)  -DM maximum assist (25% patient effort);2 person assist required;verbal cues required  -CL    Transfers, Bed-Chair-Bed, Assist Device --   gt belt;pt.decl. use of R wx  -DM --   BUE support, stand pivot  -CL    Transfers, Sit-Stand [place] minimum assist (75% patient effort);2 person assist required;verbal cues required  -DM minimum assist (75% patient effort);2 person assist required;verbal cues required  -CL    Transfers, Stand-Sit [place] minimum assist (75% patient effort);2 person assist required;verbal cues required  -DM minimum assist (75% patient effort);2 person assist required;supervision required  -CL    Transfers, Sit-Stand-Sit, Assist Device rolling walker   stood EOB w/R wx,NWB LLE;2ND TRIAL:R wx removed for SPT  -DM rolling walker  -CL    Transfer, Maintain Weight Bearing Status able to maintain weight bearing status  -DM     Transfer, Safety Issues weight-shifting ability decreased;loses balance backward;knees buckling  -DM     Transfer, Impairments strength decreased;impaired balance;pain  -DM     Transfer, Comment 1ST TRIAL:INIT.side steps to  "R;5 min.rest EOB;\"ARMS & R knee worn out\";2nd trial:chair at FOB;SPT w/o AD to recliner  -DM VCs for HP/sequencing of steps. Pt stood at EOB and took ~3 side steps while maintaining NWB status. Pt required a seated rest break of ~2 mins after side steps. Pt then performed stand pivot to chair w/ BUE requiring increased assist d/t to reports of fatigue after taking side steps.   -CL    Gait Assessment/Treatment    Gait, [place] Level moderate assist (50% patient effort);2 person assist required;verbal cues required  -DM     Gait, Assistive Device rolling walker  -DM     Gait, Distance (Feet) 2  -DM     Gait, Gait Pattern Analysis swing-to gait  -DM     Gait, Gait Deviations antalgic;right:;decreased heel strike;double stance time increased;forward flexed posture;knee buckling;narrow base;step length decreased   init shuffling R foot,then able to take full side step    -DM     Gait, Maintain Weight Bearing Status able to maintain weight bearing status  -DM     Gait, Safety Issues step length decreased;weight-shifting ability decreased;loses balance backward  -DM     Gait, Impairments strength decreased;impaired balance;postural control impaired;pain  -DM     Gait, Comment cued to incr. WB through UE'S (\"Push-ups\") x 3,to facil. offloading R LE for side step  -DM     Motor Skills/Interventions    Additional Documentation Balance Skills Training (Group)  -DM     Balance Skills Training    Sitting-Level of Assistance Close supervision  -DM Close supervision   at EOB, VCs to keep LLE off floor  -CL    Sitting-Balance Support Right upper extremity supported;Left upper extremity supported   R foot supp.on floor;L foot NWB  -DM Right upper extremity supported;Left upper extremity supported   R foot supported  -CL    Sitting-Balance Activities Reaching for objects;Trunk control activities   SCOOTING; TRUNK ext.  -DM Reaching for objects  -CL    Sitting # of Minutes 5  -DM     Standing-Level of Assistance Minimum " assistance;x2  -DM Minimum assistance;x2  -CL    Static Standing Balance Support assistive device  -DM assistive device  -CL    Standing-Balance Activities Weight Shift A-P;Weight Shift R-L  -DM Weight Shift R-L;Weight Shift A-P  -CL    Standing Balance # of Minutes 2  -DM     Gait Balance-Level of Assistance Minimum assistance;x2   init min asst of 2,then mod asst 2 as pt fatigued  -DM Minimum assistance;x2  -CL    Gait Balance Support assistive device  -DM assistive device  -CL    Gait Balance Activities side-stepping  -DM side-stepping  -CL    Therapy Exercises    Bilateral Lower Extremities AROM:;10 reps;15 reps;sitting;ankle pumps/circles;glut sets;heel slides;hip abduction/adduction;hip ER;hip flexion;LAQ;quad sets;SAQ   HS sets; min asst for L heel slides,hip abd to supp. L foot  -DM     Bilateral Upper Extremity  10 reps;AROM:;elbow flexion/extension;pronation/supination;hand pumps;shoulder extension/flexion;shoulder ER/IR;shoulder protraction/retraction;shoulder horizontal abd/add   yellow thera-band  -CL    Positioning and Restraints    Pre-Treatment Position in bed  -DM in bed  -CL    Post Treatment Position chair  -DM chair  -CL    In Chair notified nsg;reclined;call light within reach;with nsg;legs elevated   L LE also elev on 2 pillows & egg crate  -DM notified nsg;reclined;call light within reach;encouraged to call for assist;with PT  -CL      03/07/17 0940 03/07/17 0800    Rehab Evaluation    Document Type therapy note (daily note)  -     Subjective Information agree to therapy;no complaints  -     Pain Assessment    Pain Assessment Blackmon-Baker FACES  -     Blackmon-Baker FACES Pain Rating 0  -     Sensory Assessment/Intervention    Light Touch  LUE;RUE  -CS    LUE Light Touch  WNL  -CS    RUE Light Touch  WNL  -CS    LLE Light Touch  moderate impairment  -CS    RLE Light Touch  absent sensation  -CS    Positioning and Restraints    Pre-Treatment Position in bed  -MF     Post Treatment Position  bed  -     In Bed supine;call light within reach  -MF       03/06/17 2200 03/06/17 2000    Sensory Assessment/Intervention    LLE Light Touch mild impairment  -TA mild impairment  -TA    RLE Light Touch absent sensation  -TA absent sensation  -TA      03/06/17 1322       Rehab Evaluation    Document Type re-evaluation  -CL     Subjective Information agree to therapy;complains of;pain  -CL     Patient Effort, Rehab Treatment good  -CL     Symptoms Noted During/After Treatment none  -CL     General Information    Patient Profile Review yes  -CL     Onset of Illness/Injury or Date of Surgery Date 02/24/17  -CL     Referring Physician MD Karen  -CL     Pertinent History Of Current Problem Please see IE for full history. Pt s/p L foot transmetatarsal amputation of toes 2, 3, 4, and 5 2/2 to osteomyelitis on 03/02/2017. Received resume OT orders on 03/05/2017.   -CL     Precautions/Limitations fall precautions;non-weight bearing status;oxygen therapy device and L/min   NWB LLE  -CL     Prior Level of Function --   Please see IE  -CL     Equipment Currently Used at Home --   Please see IE  -CL     Plans/Goals Discussed With patient;agreed upon  -CL     Risks Reviewed patient:;LOB;nausea/vomiting;dizziness;increased discomfort;lines disloged  -CL     Benefits Reviewed patient:;improve function;increase independence;increase strength;increase balance;decrease pain;increase knowledge  -CL     Barriers to Rehab medically complex  -CL     Living Environment    Lives With --   Please see IE  -CL     Vital Signs    Pre Systolic BP Rehab --   no tele, RN cleared for tx.   -CL     Pain Assessment    Pain Assessment 0-10  -CL     Pain Score 8  -CL     Post Pain Score 8  -CL     Pain Type Chronic pain  -CL     Pain Location Back  -CL     Pain Intervention(s) Repositioned;Ambulation/increased activity  -CL     Response to Interventions Tolerated.   -CL     Vision Assessment/Intervention    Visual Impairment WFL  -CL     Cognitive  Assessment/Intervention    Current Cognitive/Communication Assessment functional  -CL     Orientation Status oriented x 4  -CL     Follows Commands/Answers Questions 100% of the time;needs cueing;needs repetition  -CL     Personal Safety mild impairment;decreased awareness, need for assist;decreased awareness, need for safety  -CL     Personal Safety Interventions fall prevention program maintained;gait belt;nonskid shoes/slippers when out of bed  -CL     ROM (Range of Motion)    General ROM Detail BUE grossly WFL. Pt s/p B CMC sx, limited thumb CMC extension/ABD at baseline.   -CL     MMT (Manual Muscle Testing)    General MMT Assessment Detail BUE grossly 4-/5.   -CL     Bed Mobility, Assessment/Treatment    Bed Mobility, Assistive Device bed rails;head of bed elevated  -CL     Bed Mob, Supine to Sit, [place] supervision required;verbal cues required  -CL     Bed Mob, Sit to Supine, [place] supervision required;verbal cues required  -CL     Bed Mobility, Comment VCs for safety d/t lines.   -CL     Transfer Assessment/Treatment    Transfers, Bed-Chair [place] contact guard assist;2 person assist required;verbal cues required  -CL     Transfers, Bed-Chair-Bed, Assist Device rolling walker  -CL     Transfers, Sit-Stand [place] minimum assist (75% patient effort);verbal cues required;2 person assist required  -CL     Transfers, Stand-Sit [place] contact guard assist;2 person assist required;verbal cues required  -CL     Transfers, Sit-Stand-Sit, Assist Device rolling walker  -CL     Transfer, Comment Pt attempted to stand impulsively prior to cueing. VCs for HP/sequencing of steps. Pt performed stand-pivot t/f to chair from EOB.    VCs to maintain NWB status, pt performed appropriately.   -CL     Balance Skills Training    Sitting-Level of Assistance Close supervision  -CL     Sitting-Balance Support Right upper extremity supported;Left upper extremity supported;Feet supported  -CL      Sitting-Balance Activities Forward lean;Reaching for objects;Trunk control activities  -CL     Standing-Level of Assistance Contact guard;x2  -CL     Static Standing Balance Support assistive device  -CL     Standing-Balance Activities Weight Shift R-L  -CL     Therapy Exercises    Left Lower Extremity 10 reps;AROM:   straight leg raise  -CL     Bilateral Upper Extremity 10 reps;AROM:;sitting;elbow flexion/extension;hand pumps;pronation/supination;shoulder extension/flexion;shoulder horizontal abd/add;shoulder protraction/retraction   yellow thera-band  -CL     Sensory Assessment/Intervention    Light Touch LUE;RUE  -CL     LUE Light Touch WNL  -CL     RUE Light Touch WNL  -CL     Positioning and Restraints    Pre-Treatment Position in bed  -CL     Post Treatment Position chair  -CL     In Chair notified nsg;reclined;call light within reach;encouraged to call for assist;waffle cushion;legs elevated;LLE elevated;R heel elevated  -CL       User Key  (r) = Recorded By, (t) = Taken By, (c) = Cosigned By    Initials Name Provider Type    MF Lane Yates, PT Physical Therapist    TIMA Mora, PT Physical Therapist    AS Lauren Palacio, NANDO Physical Therapy Assistant    MW Christie Wheeler, PT Physical Therapist    SC Paulina Aviles, KARLA Registered Nurse    FOX Mann, RN Registered Nurse    GAURI Glynn, RN Registered Nurse    BUD Menjivar, RN Registered Nurse    TRELL Love, RN Registered Nurse    CL Leticia Reed, OT Occupational Therapist            PT Recommendation and Plan  Anticipated Equipment Needs At Discharge: other (see comments) (wound VAC )  Anticipated Discharge Disposition: inpatient rehabilitation facility, other (see comments) (possibly LTAC)  Planned Therapy Interventions: wound care, patient/family education  PT Frequency: daily    Plan Of Care Reviewed With: patient   Progress: improving   Outcome Summary/Follow up Plan: Wound VAC to Lt foot open amputation site  remains intact with good seal; expect to change VAC Fri/ Sat. Pt cont to benefit from skilled PT for advanced wound care including VAC to promote granulation tissue, decrease bioburden/ manage drainage of this complex wound.           Outcome Measures       03/09/17 0840 03/07/17 1045 03/07/17 1043    How much help from another person do you currently need...    Turning from your back to your side while in flat bed without using bedrails? 4  -AS 4  -DM     Moving from lying on back to sitting on the side of a flat bed without bedrails? 4  -AS 4  -DM     Moving to and from a bed to a chair (including a wheelchair)? 3  -AS 2  -DM     Standing up from a chair using your arms (e.g., wheelchair, bedside chair)? 3  -AS 2  -DM     Climbing 3-5 steps with a railing? 1  -AS 1  -DM     To walk in hospital room? 1  -AS 2  -DM     AM-PAC 6 Clicks Score 16  -AS 15  -DM     How much help from another is currently needed...    Putting on and taking off regular lower body clothing?   2  -CL    Bathing (including washing, rinsing, and drying)   2  -CL    Toileting (which includes using toilet bed pan or urinal)   2  -CL    Putting on and taking off regular upper body clothing   3  -CL    Taking care of personal grooming (such as brushing teeth)   4  -CL    Eating meals   4  -CL    Score   17  -CL    Functional Assessment    Outcome Measure Options AM-PAC 6 Clicks Basic Mobility (PT)  -AS AM-PAC 6 Clicks Basic Mobility (PT)  -DM AM-PAC 6 Clicks Daily Activity (OT)  -CL      03/06/17 1322          How much help from another is currently needed...    Putting on and taking off regular lower body clothing? 2  -CL      Bathing (including washing, rinsing, and drying) 2  -CL      Toileting (which includes using toilet bed pan or urinal) 2  -CL      Putting on and taking off regular upper body clothing 3  -CL      Taking care of personal grooming (such as brushing teeth) 4  -CL      Eating meals 4  -CL      Score 17  -CL      Functional  Assessment    Outcome Measure Options AM-PAC 6 Clicks Daily Activity (OT)  -CL        User Key  (r) = Recorded By, (t) = Taken By, (c) = Cosigned By    Initials Name Provider Type    DM Shelli Mora, PT Physical Therapist    AS Lauren Palacio, PTA Physical Therapy Assistant    CL Leticia Reed, OT Occupational Therapist              Time Calculation        PT Charges       03/09/17 1104 03/09/17 0928       Time Calculation    Start Time 1025  -MW 0840  -AS     PT Received On  03/09/17  -AS     PT Goal Re-Cert Due Date 03/17/17  -MW 03/17/17  -AS     Time Calculation- PT    Total Timed Code Minutes- PT  23 minute(s)  -AS       User Key  (r) = Recorded By, (t) = Taken By, (c) = Cosigned By    Initials Name Provider Type    AS Lauren Palacio, NANDO Physical Therapy Assistant     Christie Wheeler, PT Physical Therapist             Therapy Charges for Today     Code Description Service Date Service Provider Modifiers Qty    63896247675 HC PT NEG PRESS WOUND TO 50SQCM DME1 3/9/2017 Christie Wheeler, PT  1            PT G-Codes  Outcome Measure Options: AM-PAC 6 Clicks Basic Mobility (PT)        Christie Wheeler, PT  3/9/2017

## 2017-03-09 NOTE — PLAN OF CARE
Problem: Patient Care Overview (Adult)  Goal: Plan of Care Review  Outcome: Ongoing (interventions implemented as appropriate)    03/09/17 0925   Coping/Psychosocial Response Interventions   Plan Of Care Reviewed With patient   Patient Care Overview   Progress improving   Outcome Evaluation   Outcome Summary/Follow up Plan decreased assist needed for bed mobility and transfers, patient refused taking steps reporting it was too dfificult and she didnt feel safe to attempt.         Problem: Inpatient Physical Therapy  Goal: Transfer Training Goal 1 LTG- PT  Outcome: Ongoing (interventions implemented as appropriate)    03/07/17 1146 03/09/17 0925   Transfer Training PT LTG   Transfer Training PT LTG, Date Established 02/27/17 --    Transfer Training PT LTG, Time to Achieve 1 wk --    Transfer Training PT LTG, Activity Type bed to chair /chair to bed;sit to stand/stand to sit;toilet --    Transfer Training PT LTG, [place] Level supervision required --    Transfer Training PT LTG, Assist Device walker, rolling --    Transfer Training PT LTG, Date Goal Reviewed --  03/09/17   Transfer Training PT LTG, Outcome --  goal ongoing       Goal: Gait Training Goal LTG- PT  Outcome: Ongoing (interventions implemented as appropriate)    03/07/17 1146 03/09/17 0925   Gait Training PT LTG   Gait Training Goal PT LTG, Date Established 02/27/17 --    Gait Training Goal PT LTG, Time to Achieve 1 wk --    Gait Training Goal PT LTG, [place] Level contact guard assist --    Gait Training Goal PT LTG, Assist Device walker, rolling --    Gait Training Goal PT LTG, Distance to Achieve 15 --    Gait Training Goal PT LTG, Date Goal Reviewed --  03/09/17   Gait Training Goal PT LTG, Outcome --  goal ongoing       Goal: Patient Education Goal LTG- PT  Outcome: Ongoing (interventions implemented as appropriate)    03/07/17 1146 03/09/17 0925   Patient Education PT LTG   Patient Education PT LTG, Date Established 02/27/17 --    Patient  Education PT LTG, Time to Achieve 1 wk --    Patient Education PT LTG, Education Type HEP;precaution per surgeon;positioning;posture/body mechanics;gait;transfers;bed mobility;pain management;progression of POC;benefits of activity;home safety;equipment management --    Patient Education PT LTG, Education Understanding demonstrate adequately;verbalize understanding --    Patient Education PT LTG, Date Goal Reviewed --  03/09/17   Patient Education PT LTG Outcome --  goal ongoing       Goal: Bed Mobility Goal LTG- PT  Outcome: Outcome(s) achieved Date Met:  03/09/17 03/09/17 0925   Bed Mobility PT LTG   Bed Mobility PT LTG, Date Goal Reviewed 03/09/17   Bed Mobility PT LTG, Outcome goal met

## 2017-03-09 NOTE — PROGRESS NOTES
Hospitalist Daily Progress Note    Date of Admission: 2/24/2017   LOS: 13 days   PCP: Harinder Aranda MD    Chief Complaint:  F/u LLE osteomyelitis    Subjective   History of Present Illness  LLE pain under control.     Review of Systems  General: no fevers, chills  Cardiovascular: no chest pain, palpitations  Abdomen: No abdominal pain, nausea, vomiting, or diarrhea  Neurologic: No focal weakness, has gen. Weakness.  All other systems reviewed and negative.    Objective   Physical Exam:  I have reviewed the vital signs.  Temp:  [97.9 °F (36.6 °C)-98.1 °F (36.7 °C)] 97.9 °F (36.6 °C)  Heart Rate:  [50-70] 60  Resp:  [18] 18  BP: (119-157)/(56-70) 142/65    Objective  General Appearance: Alert, cooperative, no distress  Head: Normocephalic, atraumatic  Eyes: PERRL EOMI, Sclerae anicteric  Neck: Supple, no mass  Lungs: Clear to auscultation bilaterally, respirations unlabored  Heart: Regular rate and rhythm, S1 and S2 normal, 2/6 СЕРГЕЙ, rub or gallop  Abdomen: Soft, non-tender, bowel sounds active, nondistended  Extremities: No clubbing, cyanosis, or edema to lower extremities  LLE with surgical drsg/wound vac in place without drainage  Pulses: 2+ and symmetric in distal lower extremities  Skin: mild erythematous rash, non-blanching along proximal elbow region, no blistering is improving, no jaundice  Neurologic: Oriented x3, CN2-12 intact, Normal strength to extremities    Results Review:    I have reviewed the labs, radiology results and diagnostic studies.      Results from last 7 days  Lab Units 03/06/17  1215   WBC 10*3/mm3 8.71   HEMOGLOBIN g/dL 9.2*   PLATELETS 10*3/mm3 235       Results from last 7 days  Lab Units 03/08/17  0526   SODIUM mmol/L 140   POTASSIUM mmol/L 4.0   TOTAL CO2 mmol/L 29.0   CREATININE mg/dL 1.00   GLUCOSE mg/dL 132*       I have reviewed the medications.    ---------------------------------------------------------------------------------------------  Assessment/Plan   Assessment &  Plan  Assessment/Problem List    Principal Problem:    Osteomyelitis of left foot  Active Problems:    COPD (chronic obstructive pulmonary disease)    Obstructive sleep apnea syndrome    Chronic back pain    Type 2 diabetes mellitus    Dyslipidemia    Fibromyalgia    Gastroesophageal reflux disease without esophagitis    Hypertension    Hypothyroidism    Peripheral vascular disease    Diabetic polyneuropathy associated with type 2 diabetes mellitus    Anemia, blood loss    Chronic pain    Chronic, continuous use of opioids    Chronic anxiety    Chronically on benzodiazepine therapy       Plan  This is a 61-year-old  female with PMHx significant for tobacco abuse, hypertension, hyperlipidemia, and diabetes type 2, who is status post transmetatarsal amputation of the left great toe due to osteomyelitis in November 2016, now presents with poor wound healing with growth of staph aureus from tissue cultures done on 2/24.      MSSA Osteomyelitis (presumed) of left 1st metatarsal  -- 2/24 MRI showed evidence of osteomyelitis in the first metatarsal and the rest of the digits on the left foot, s/p removal of the rest of 1st metatarsal bone on 2/25 by Dr. Feliciano  -- now s/p 2nd - 5th transmetatarsal amputation on 3/2/17  --2/25 Tissue Cx grew Rare Staph aureus, resistant only to PCN.  --3/2 Tissue Cx grew rare Staph, coag.negative, Sensitivities pending.  -- Infectious disease and cardiothoracic surgeon on consult  -- ID stopped Flagyl, continue Vanc,cefepime, and azithromycin IV. Probiotic. Will likely need 6 wks IV abx.    СЕРГЕЙ due to AV stenosis  -- ECHO showed:  · Left ventricular wall thickness is consistent with mild concentric hypertrophy.  · Left ventricular function is hyperdynamic (EF > 70).  LV diastolic function is wnl.  · Mild to moderate aortic valve stenosis is present.  · mean aortic valve gradient 22 mmhg  -- f/u as outpatient.      PNA  --CXR showed that compared to distant examinations of  11/28/2016, the central  chest is more congested. The bibasilar airspace opacities, especially consolidation at the left base are new and progressive.  --CT Chest confirms groundglass opacities bilaterally more severe in right lung.  --, Troponin negative.  --legionella urine Ag pending, Mycoplasma IgG/IgM pending.  --pt already on adequate Abx coverage for PNA per above.  --pulm toilet and incentive spirometry.  --on lasix daily already.      RLL spiculated mass 1.9 x 5.4 cm  -3/6/17 CT Chest showed bilateral small groundglass opacities and small solid mass with spiculation in the right lower lobe area  -CTA Chest done in Feb 2015 showed no mass in RLL.  -Dr Feliciano with [facility] consulted.  -This mass is concerning for neoplasm.  -recommended close f/u with another CT scan and/or PET scan  -may be a candidate for needle bx transthoracicaly or perhaps navigational bronchoscopy once osteomyelitis is addressed above.  -f/u as outpatient.      Rash along both elbows  -may be contact dermatitis, doubt this is abx related as it is not spread to other areas of body  -hydrocortisone cream 1% BID.     DM2, Controlled  -- Hgb A1c is 6.1   -- continue current insulin regimen, acceptable control      Anemia of chronic disease  -- hemoglobin stable, monitor      Chronic back pain/Fibromyalgia on chronic opioids  -- continue home pain medication  -- continue home muscle relaxers      Anxiety on chronic benzos  - buspar and klonopin continued (klonopin with 50% decrease in home dose)      COPD  -- stable      HTN  -- monitor, currently stable on meds      Constipation  -- continue bowel regimen      Hypothyroidism  -- continue home dose of synthroid, TSH ok  -- CT Chest showed enlarged thyroid with nodules. F/u as outpatient.      HLP  -- continue home dose of Lipitor and zetia      DINA  -- CPAP w/ 2L oxygen while sleeping      Tobacco abuse  -- Patient counseled at length and cessation recommended      DVT prophylaxis: [address]  and scd to RLE  Dispo: Plan will be to [facility] end week hopefully, referrals made by NANCY Bain MD 03/09/17 11:10 AM

## 2017-03-09 NOTE — PLAN OF CARE
Problem: Patient Care Overview (Adult)  Goal: Plan of Care Review  Outcome: Ongoing (interventions implemented as appropriate)    03/09/17 1102   Coping/Psychosocial Response Interventions   Plan Of Care Reviewed With patient   Patient Care Overview   Progress improving   Outcome Evaluation   Outcome Summary/Follow up Plan Wound VAC to Lt foot open amputation site remains intact with good seal; expect to change VAC Fri/ Sat. Pt cont to benefit from skilled PT for advanced wound care including VAC to promote granulation tissue, decrease bioburden/ manage drainage of this complex wound.          Problem: Inpatient Physical Therapy  Goal: Wound Care Goal 1 LTG- PT  Outcome: Ongoing (interventions implemented as appropriate)    02/26/17 1332 03/09/17 1102   Wound Care PT LTG   Wound Care PT LTG 1, Date Established 02/26/17 --    Wound Care PT LTG 1, Time to Achieve 2 wks --    Wound Care PT LTG 1, Location LT open ray amputation site  --    Wound Care PT LTG 1, No S&S of Infection yes --    Wound Care PT LTG 1, Decrease Wound Size 25% --    Wound Care PT LTG 1, Decrease Exudate minimum --    Wound Care PT LTG 1, No New Skin Break Down yes --    Wound Care PT LTG 1, Education S&S of infection;dressing changes;wound care;weight bearing restriction;progression of POC --    Wound Care PT LTG 1, Education Understanding verbalize understanding --    Wound Care PT LTG 1, Outcome --  goal partially met

## 2017-03-10 VITALS
WEIGHT: 210 LBS | OXYGEN SATURATION: 95 % | BODY MASS INDEX: 33.75 KG/M2 | TEMPERATURE: 98 F | HEIGHT: 66 IN | DIASTOLIC BLOOD PRESSURE: 64 MMHG | RESPIRATION RATE: 16 BRPM | SYSTOLIC BLOOD PRESSURE: 137 MMHG | HEART RATE: 56 BPM

## 2017-03-10 LAB
BACTERIA SPEC ANAEROBE CULT: NORMAL
GLUCOSE BLDC GLUCOMTR-MCNC: 142 MG/DL (ref 70–130)
GLUCOSE BLDC GLUCOMTR-MCNC: 149 MG/DL (ref 70–130)
L PNEUMO1 AG UR QL IA: NEGATIVE

## 2017-03-10 PROCEDURE — 99239 HOSP IP/OBS DSCHRG MGMT >30: CPT | Performed by: NURSE PRACTITIONER

## 2017-03-10 PROCEDURE — 94799 UNLISTED PULMONARY SVC/PX: CPT

## 2017-03-10 PROCEDURE — 63710000001 INSULIN DETEMIR PER 5 UNITS: Performed by: HOSPITALIST

## 2017-03-10 PROCEDURE — 99024 POSTOP FOLLOW-UP VISIT: CPT | Performed by: THORACIC SURGERY (CARDIOTHORACIC VASCULAR SURGERY)

## 2017-03-10 PROCEDURE — 97605 NEG PRS WND THER DME<=50SQCM: CPT

## 2017-03-10 PROCEDURE — 25010000002 CEFEPIME: Performed by: INTERNAL MEDICINE

## 2017-03-10 PROCEDURE — 25010000002 HEPARIN (PORCINE) PER 1000 UNITS: Performed by: THORACIC SURGERY (CARDIOTHORACIC VASCULAR SURGERY)

## 2017-03-10 PROCEDURE — 97110 THERAPEUTIC EXERCISES: CPT

## 2017-03-10 PROCEDURE — 25010000002 AZITHROMYCIN: Performed by: INTERNAL MEDICINE

## 2017-03-10 PROCEDURE — 82962 GLUCOSE BLOOD TEST: CPT

## 2017-03-10 PROCEDURE — 94660 CPAP INITIATION&MGMT: CPT

## 2017-03-10 RX ORDER — METOCLOPRAMIDE 5 MG/1
5 TABLET ORAL
Start: 2017-03-10 | End: 2017-09-05 | Stop reason: DRUGHIGH

## 2017-03-10 RX ORDER — DIAPER,BRIEF,INFANT-TODD,DISP
EACH MISCELLANEOUS EVERY 12 HOURS SCHEDULED
Refills: 0
Start: 2017-03-10 | End: 2018-02-27 | Stop reason: HOSPADM

## 2017-03-10 RX ORDER — NICOTINE 21 MG/24HR
1 PATCH, TRANSDERMAL 24 HOURS TRANSDERMAL
Start: 2017-03-10 | End: 2017-08-24 | Stop reason: HOSPADM

## 2017-03-10 RX ORDER — VANCOMYCIN HYDROCHLORIDE
1250 EVERY 24 HOURS
Qty: 4000 ML
Start: 2017-03-10 | End: 2017-06-01

## 2017-03-10 RX ADMIN — PREGABALIN 100 MG: 100 CAPSULE ORAL at 09:20

## 2017-03-10 RX ADMIN — AMLODIPINE BESYLATE 5 MG: 5 TABLET ORAL at 09:20

## 2017-03-10 RX ADMIN — OXYCODONE HYDROCHLORIDE AND ACETAMINOPHEN 1 TABLET: 10; 325 TABLET ORAL at 04:01

## 2017-03-10 RX ADMIN — BUSPIRONE HYDROCHLORIDE 20 MG: 10 TABLET ORAL at 13:08

## 2017-03-10 RX ADMIN — ASPIRIN 325 MG: 325 TABLET, DELAYED RELEASE ORAL at 09:20

## 2017-03-10 RX ADMIN — PANTOPRAZOLE SODIUM 40 MG: 40 TABLET, DELAYED RELEASE ORAL at 06:00

## 2017-03-10 RX ADMIN — AZITHROMYCIN 500 MG: 500 INJECTION, POWDER, LYOPHILIZED, FOR SOLUTION INTRAVENOUS at 09:26

## 2017-03-10 RX ADMIN — BACLOFEN 20 MG: 10 TABLET ORAL at 13:08

## 2017-03-10 RX ADMIN — OXYCODONE HYDROCHLORIDE AND ACETAMINOPHEN 1 TABLET: 10; 325 TABLET ORAL at 09:38

## 2017-03-10 RX ADMIN — HYDROCORTISONE: 10 CREAM TOPICAL at 09:19

## 2017-03-10 RX ADMIN — Medication 1 CAPSULE: at 09:19

## 2017-03-10 RX ADMIN — CEFEPIME 2 G: 2 INJECTION, POWDER, FOR SOLUTION INTRAVENOUS at 03:59

## 2017-03-10 RX ADMIN — NICOTINE 1 PATCH: 21 PATCH, EXTENDED RELEASE TRANSDERMAL at 09:20

## 2017-03-10 RX ADMIN — LISINOPRIL 20 MG: 20 TABLET ORAL at 09:20

## 2017-03-10 RX ADMIN — BACLOFEN 20 MG: 10 TABLET ORAL at 06:00

## 2017-03-10 RX ADMIN — METOCLOPRAMIDE 5 MG: 5 TABLET ORAL at 09:20

## 2017-03-10 RX ADMIN — HEPARIN SODIUM 5000 UNITS: 5000 INJECTION, SOLUTION INTRAVENOUS; SUBCUTANEOUS at 09:19

## 2017-03-10 RX ADMIN — METOCLOPRAMIDE 5 MG: 5 TABLET ORAL at 13:08

## 2017-03-10 RX ADMIN — INSULIN DETEMIR 10 UNITS: 100 INJECTION, SOLUTION SUBCUTANEOUS at 09:19

## 2017-03-10 RX ADMIN — FENTANYL 1 PATCH: 100 PATCH, EXTENDED RELEASE TRANSDERMAL at 09:21

## 2017-03-10 RX ADMIN — LEVOTHYROXINE SODIUM 112 MCG: 112 TABLET ORAL at 06:00

## 2017-03-10 RX ADMIN — OXYCODONE HYDROCHLORIDE AND ACETAMINOPHEN 1 TABLET: 10; 325 TABLET ORAL at 13:07

## 2017-03-10 NOTE — PLAN OF CARE
Problem: Skin Integrity Impairment, Risk/Actual (Adult)  Goal: Skin Integrity/Wound Healing  Outcome: Ongoing (interventions implemented as appropriate)  Wound vac in place and will be transferred with patient to Rehab. Dressing changed today

## 2017-03-10 NOTE — THERAPY DISCHARGE NOTE
Acute Care - Wound/Debridement Treatment Note/Discharge  [facility]     Patient Name: Tamara Langston  : 1955  MRN: [id number]  Today's Date: 3/10/2017  Onset of Illness/Injury or Date of Surgery Date: 17   Date of Referral to PT: 17   Referring Physician: MD Marquita       Admit Date: 2017    Visit Dx:    ICD-10-CM ICD-9-CM   1. Impaired mobility and ADLs Z74.09 799.89   2. Toe osteomyelitis, left M86.9 730.27   3. Impaired functional mobility, balance, gait, and endurance Z74.09 V49.89   4. Osteomyelitis of left foot, unspecified chronicity M86.9 730.27       Patient Active Problem List   Diagnosis   • Atopic rhinitis   • Restrictive ventilatory defect   • COPD (chronic obstructive pulmonary disease)   • Osteoporosis   • Obstructive sleep apnea syndrome   • Abnormal liver enzymes   • Cholelithiasis   • Chronic back pain   • Mixed anxiety depressive disorder   • Type 2 diabetes mellitus   • Dyslipidemia   • Essential tremor   • Fibromyalgia   • Gastroesophageal reflux disease without esophagitis   • Hypertension   • Hypothyroidism   • Insomnia   • Osteoarthritis   • Polycythemia vera   • Psoriasis   • Branch retinal vein occlusion   • Cobalamin deficiency   • Chronic bronchitis   • Obesity   • Pneumonia   • Tobacco use   • Vitamin D deficiency   • Foot ulcer, left   • Leukocytosis   • Hypokalemia   • Lactic acidosis   • Fatty liver disease, nonalcoholic   • Diabetes education, encounter for   • Cellulitis of left foot   • Sinus bradycardia   • NSTEMI (non-ST elevated myocardial infarction)   • Tobacco abuse   • Hypoxia   • Peripheral vascular disease   • Gangrene   • Osteomyelitis of left foot   • Diabetic polyneuropathy associated with type 2 diabetes mellitus   • Anemia, blood loss   • Chronic pain   • Chronic, continuous use of opioids   • Chronic anxiety   • Chronically on benzodiazepine therapy               LDA Wound       03/10/17 1145          Incision 17 0800 Left distal  foot horizontal    Incision - Properties Group Placement Date: 03/02/17  -MC Placement Time: 0800  -MC Side: Left  -MC Orientation: distal  -MC Location: foot  -MC Incision Type: horizontal  -MC Additional Comments: s/p transmetatarsal amputation toes 2-5  -MC    Incision WDL WDL  -MC      Dressing Appearance dry;intact  -MC      Appearance no redness;no swelling;sutures intact  -MC      Drainage Characteristics/Odor no odor;serosanguineous  -MC      Drainage Amount scant  -MC      Wound Cleaning cleansed with;other (see comments)   phase one  -MC      Dressing silver impregnated dressing;foam;gauze   mepilex Ag, kerlix, ace wrap  -MC      Pressure Ulcer 02/24/17 1432 Right coccyx Stage II    Pressure Ulcer - Properties Group Date first assessed: 02/24/17  -MS Time first assessed: 1432  -MS Present On Admission (Pressure Ulcer): yes  -MS Side: Right  -MS Location: coccyx  -MS Stage: Stage II  -MS    Pressure Ulcer 02/24/17 1432 other (see comments) Stage II    Pressure Ulcer - Properties Group Date first assessed: 02/24/17  -MS Time first assessed: 1432  -MS Present On Admission (Pressure Ulcer): yes  -MS Location: other (see comments)  -MS, nose  Stage: Stage II  -MS Additional Comments: from home c-pap  -MS    Wound 02/24/17 0800 Left distal foot amputation stump    Wound - Properties Group Date first assessed: 02/24/17  - Time first assessed: 0800  -MC Side: Left  -MC Orientation: distal  -MC Location: foot  -MC Type: amputation stump  -MC, s/p amputation of 1st MT  Additional Comments: Previous LDA for this area removed in ERROR. This wound is separate from the new incision.  -MC    Wound WDL WDL  -MC      Dressing Appearance dry;intact  -MC      Base clean;moist;pink;granulating;bone  -MC      Periwound Area intact;pink;dry  -MC      Edges open  -MC      Drainage Characteristics/Odor serosanguineous  -MC      Drainage Amount small  -MC      Wound Cleaning cleansed with;other (see comments)   phase one wound  cleanser  -      Dressing Dressing applied;silver impregnated dressing;low-adherent;gauze   mepilex Ag foam, kerlix, tape, ace wrap  -      Packing other (see comments)   saline-moistened hydrofera blue  -      Periwound Care cleansed with pH balanced cleanser;dry periwound area maintained  -      Therapy Setting (Negative Pressure Wound Therapy) --   D/C wound vac pending transfer today  -      Sponges Removed (Negative Pressure Wound Therapy) 1   1 black  -      Output (mL) 150  -        User Key  (r) = Recorded By, (t) = Taken By, (c) = Cosigned By    Initials Name Provider Type     Chetna Chao, PT Physical Therapist    MS David Raymond, RN Registered Nurse         Finger sweep and visual inspection performed. Empty wound bed confirmed.        WOUND DEBRIDEMENT                     Adult Rehabilitation Note       03/10/17 1145 03/09/17 1315 03/09/17 1025    Rehab Assessment/Intervention    Discipline physical therapist  -MC occupational therapist  -JR physical therapist  -MW    Document Type therapy note (daily note);discharge summary  - therapy note (daily note)  - therapy note (daily note)   wound VAC ck  -MW    Subjective Information no complaints;agree to therapy  - agree to therapy;complains of;pain  -JR agree to therapy   RN in to give pain meds; denies pain in foot  -MW    Patient Effort, Rehab Treatment  good  -JR     Symptoms Noted During/After Treatment  none  -JR     Precautions/Limitations  fall precautions;non-weight bearing status   NWB L LE  -JR     Recorded by [] Chetna Chao, PT [JR] Amy Garza OT [MW] Christie Wheeler PT    Pain Assessment    Pain Assessment 0-10  - 0-10  -JR No/denies pain   in foot  -MW    Blackmon-Cabrera FACES Pain Rating 4  -      Pain Score  8  -JR     Post Pain Score  8  -JR     Pain Type  Chronic pain  -JR     Pain Location  Generalized  -JR     Pain Intervention(s)  Ambulation/increased activity  -JR     Response to  Interventions  tolerated  -JR     Recorded by [MC] Chetna Chao, PT [JR] Amy Garza, OT [MW] Christie Wheeler, PT    Cognitive Assessment/Intervention    Current Cognitive/Communication Assessment functional  - functional  -     Orientation Status oriented x 4  - oriented x 4  -JR     Follows Commands/Answers Questions 100% of the time;able to follow single-step instructions  - 100% of the time;able to follow single-step instructions  -     Personal Safety  fully aware of deficits;good awareness, safety precautions  -JR     Recorded by [MC] Chetna Chao, PT [JR] Amy Garza, OT     Bed Mobility, Assessment/Treatment    Bed Mobility, Assistive Device  head of bed elevated  -JR     Bed Mob, Supine to Sit, [place]  conditional independence  -JR     Bed Mob, Sit to Supine, [place]  conditional independence  -JR     Recorded by  [JR] Amy Garza, OT     Balance Skills Training    Sitting-Level of Assistance  Independent  -     Sitting-Balance Support  Feet unsupported  -JR     Recorded by  [JR] Amy Garza, OT     Therapy Exercises    Bilateral Upper Extremity  10 reps;elbow flexion/extension;shoulder extension/flexion;shoulder horizontal abd/add  -JR     BUE Resistance  theraband   yellow theraband HEP  -JR     Recorded by  [JR] Amy Garza, OT     Positioning and Restraints    Pre-Treatment Position in bed  - in bed  - in bed  -    Post Treatment Position bed  - bed  - bed  -    In Bed supine;call light within reach;encouraged to call for assist;with family/caregiver;LLE elevated  - notified nsg;supine;call light within reach;encouraged to call for assist;RLE elevated;LLE elevated;SCD pump applied  - call light within reach;encouraged to call for assist;with nsg;LLE elevated  -    Recorded by [] Chetna Chao, PT [JR] Amy Garza, OT [MW] Christie Wheeler, PT      03/09/17 0840 03/08/17 0800       Rehab Assessment/Intervention     Discipline physical therapy assistant  -AS physical therapist  -     Document Type therapy note (daily note)  -AS therapy note (daily note)  -     Subjective Information agree to therapy;complains of;numbness  -AS agree to therapy;complains of;weakness;fatigue;pain  -     Patient Effort, Rehab Treatment good  -AS      Precautions/Limitations fall precautions;non-weight bearing status;other (see comments)  -AS      Recorded by [AS] Lauren Palacio PTA [MF] Lane Yates, PT     Pain Assessment    Pain Assessment No/denies pain   only reports numbness in L LE  -AS Blackmon-Cabrera FACES  -     Blackmon-Baker FACES Pain Rating  4  -MF     Pain Score 0  -AS      Post Pain Score 0  -AS      Pain Location  Generalized  -     Pain Intervention(s)  Repositioned  -MF     Recorded by [AS] Lauren Palacio PTA [MF] Lane Yates, PT     Cognitive Assessment/Intervention    Current Cognitive/Communication Assessment functional  -AS      Orientation Status oriented x 4  -AS      Follows Commands/Answers Questions 100% of the time  -AS      Personal Safety fully aware of deficits;good awareness, safety precautions  -AS      Personal Safety Interventions fall prevention program maintained;gait belt;nonskid shoes/slippers when out of bed  -AS      Recorded by [AS] Lauren Palacio PTA      Bed Mobility, Assessment/Treatment    Bed Mobility, Assistive Device head of bed elevated;bed rails  -AS      Bed Mob, Supine to Sit, [place] independent  -AS      Bed Mob, Sit to Supine, [place] not tested  -AS      Bed Mobility, Comment No assist needed  -AS      Recorded by [AS] Lauren Palacio PTA      Transfer Assessment/Treatment    Transfers, Bed-Chair [place] verbal cues required;contact guard assist  -AS      Transfers, Sit-Stand [place] contact guard assist  -AS      Transfers, Stand-Sit [place] contact guard assist  -AS      Transfer, Impairments strength decreased  -AS      Transfer,  Comment patient able to maintain NWB status on left throughout transfer  -AS      Recorded by [AS] Lauren Palacio PTA      Gait Assessment/Treatment    Gait, Comment geneva refused steps reporting it was too difficult due to weakness in her UE.  -AS      Recorded by [AS] Lauren Palacio PTA      Therapy Exercises    Right Lower Extremity AROM:;10 reps;sitting;ankle pumps/circles;hip flexion;LAQ  -AS      Left Lower Extremity AROM:;sitting;hip flexion;LAQ;SLR;hip abduction/adduction  -AS      Recorded by [AS] Lauren Palacio PTA      Positioning and Restraints    Pre-Treatment Position in bed  -AS in bed  -MF     Post Treatment Position chair  -AS bed  -MF     In Bed  supine;call light within reach;with family/caregiver  -MF     In Chair reclined;call light within reach;encouraged to call for assist;with family/caregiver;LLE elevated  -AS      Recorded by [AS] Lauren Palacio PTA [MF] Lane Yates, PT       User Key  (r) = Recorded By, (t) = Taken By, (c) = Cosigned By    Initials Name Effective Dates     Lane Yates, PT 06/19/15 -     JR Amy Garza, OT 06/22/15 -     AS Lauren Palacio, PTA 06/22/15 -     ROMIE Wheeler, PT 05/18/15 -      Chetna Chao, PT 03/14/16 -                 IP PT Goals       03/10/17 1145 03/09/17 1102 03/09/17 0925    Bed Mobility PT LTG    Bed Mobility PT LTG, Date Goal Reviewed   03/09/17  -AS    Bed Mobility PT LTG, Outcome   goal met  -AS    Transfer Training PT LTG    Transfer Training PT  LTG, Date Goal Reviewed   03/09/17  -AS    Transfer Training PT LTG, Outcome   goal ongoing  -AS    Gait Training PT LTG    Gait Training Goal PT LTG, Date Goal Reviewed   03/09/17  -AS    Gait Training Goal PT LTG, Outcome   goal ongoing  -AS    Patient Education PT LTG    Patient Education PT LTG, Date Goal Reviewed   03/09/17  -AS    Patient Education PT LTG Outcome   goal ongoing  -AS    Wound Care PT LTG    Wound Care PT LTG 1, Outcome goal  partially met  - goal partially met  -     Wound Care PT LTG 1, Reason Goal Not Met discharged from facility  -        03/08/17 0800 03/07/17 1146 03/05/17 0905    Bed Mobility PT LTG    Bed Mobility PT LTG, Date Established  03/07/17  -DM     Bed Mobility PT LTG, Time to Achieve  1 wk  -DM     Bed Mobility PT LTG, Activity Type  all bed mobility  -DM     Bed Mobility PT LTG, [place] Level  independent  -DM     Transfer Training PT LTG    Transfer Training PT LTG, Date Established  02/27/17  -DM     Transfer Training PT LTG, Time to Achieve  1 wk  -DM     Transfer Training PT LTG, Activity Type  bed to chair /chair to bed;sit to stand/stand to sit;toilet  -DM     Transfer Training PT LTG, [place] Level  supervision required  -DM     Transfer Training PT LTG, Assist Device  walker, rolling  -DM     Transfer Training PT  LTG, Date Goal Reviewed  03/07/17  -DM     Transfer Training PT LTG, Outcome  goal revised  -DM     Gait Training PT LTG    Gait Training Goal PT LTG, Date Established  02/27/17  -DM     Gait Training Goal PT LTG, Time to Achieve  1 wk  -DM     Gait Training Goal PT LTG, [place] Level  contact guard assist  -DM     Gait Training Goal PT LTG, Assist Device  walker, rolling  -DM     Gait Training Goal PT LTG, Distance to Achieve  15  -DM     Gait Training Goal PT LTG, Date Goal Reviewed  03/07/17  -DM     Gait Training Goal PT LTG, Outcome  goal revised  -DM     Patient Education PT LTG    Patient Education PT LTG, Date Established  02/27/17  -DM     Patient Education PT LTG, Time to Achieve  1 wk  -DM     Patient Education PT LTG, Education Type  HEP;precaution per surgeon;positioning;posture/body mechanics;gait;transfers;bed mobility;pain management;progression of POC;benefits of activity;home safety;equipment management  -DM     Patient Education PT LTG, Education Understanding  demonstrate adequately;verbalize understanding  -DM     Patient Education PT LTG, Date Goal Reviewed   03/07/17  -DM     Patient Education PT LTG Outcome  goal revised  -DM     Wound Care PT LTG    Wound Care PT LTG 1, Outcome goal partially met  -MF  goal ongoing  -      03/04/17 1045 03/02/17 1055 02/27/17 1423    Transfer Training PT LTG    Transfer Training PT LTG, Date Established   02/27/17  -MANDO    Transfer Training PT LTG, Time to Achieve   1 wk  -MANDO    Transfer Training PT LTG, Activity Type   all transfers  -MANDO    Transfer Training PT LTG, [place] Level   conditional independence  -MANDO    Transfer Training PT LTG, Assist Device   walker, rolling  -MANOD    Transfer Training PT  LTG, Date Goal Reviewed  03/02/17  -SR     Gait Training PT LTG    Gait Training Goal PT LTG, Date Established   02/27/17  -MANDO    Gait Training Goal PT LTG, Time to Achieve   1 wk  -MANDO    Gait Training Goal PT LTG, [place] Level   conditional independence  -MANDO    Gait Training Goal PT LTG, Assist Device   walker, rolling  -MANDO    Gait Training Goal PT LTG, Distance to Achieve   25  -MANDO    Gait Training Goal PT LTG, Date Goal Reviewed  03/02/17  -SR     Patient Education PT LTG    Patient Education PT LTG, Date Established   02/27/17  -MANDO    Patient Education PT LTG, Time to Achieve   1 wk  -MANDO    Patient Education PT LTG, Education Type   HEP;positioning;posture/body mechanics;gait;transfers;bed mobility;pain management;progression of POC;benefits of activity;home safety;equipment management;skin care/inspection   weightbearing status  -MANDO    Patient Education PT LTG, Education Understanding   demonstrate adequately;verbalize understanding  -MANDO    Patient Education PT LTG, Date Goal Reviewed  03/02/17  -SR     Wound Care PT LTG    Wound Care PT LTG 1, Outcome goal ongoing  -        02/27/17 0845 02/26/17 1332       Wound Care PT LTG    Wound Care PT LTG 1, Date Established  02/26/17  -MW     Wound Care PT LTG 1, Time to Achieve  2 wks  -MW     Wound Care PT LTG 1, Location  LT open ray amputation site   -MW     Wound Care PT  LTG 1, No S&S of Infection  yes  -MW     Wound Care PT LTG 1, Decrease Wound Size  25%  -MW     Wound Care PT LTG 1, Decrease Exudate  minimum  -MW     Wound Care PT LTG 1, No New Skin Break Down  yes  -MW     Wound Care PT LTG 1, Education  S&S of infection;dressing changes;wound care;weight bearing restriction;progression of POC  -MW     Wound Care PT LTG 1, Education Understanding  verbalize understanding  -MW     Wound Care PT LTG 1, Outcome goal ongoing  -        User Key  (r) = Recorded By, (t) = Taken By, (c) = Cosigned By    Initials Name Provider Type    MANDO Philomena Esteban, PT Physical Therapist    MF Lane Yates, PT Physical Therapist    DM Shelli Mora, PT Physical Therapist    SR Thu Herrera, PT Physical Therapist    AS Lauren Palacio, PTA Physical Therapy Assistant    MW Christie Wheeler, PT Physical Therapist    MC Chetna Chao, PT Physical Therapist          Physical Therapy Education     Title: PT OT SLP Therapies (Active)     Topic: Physical Therapy (Active)     Point: Mobility training (Active)    Learning Progress Summary    Learner Readiness Method Response Comment Documented by Status   Patient Acceptance E NR  AS 03/09/17 0925 Active    Eager E,D,H NR   03/07/17 1146 Active    Acceptance E,D NR   03/02/17 1055 Active    Acceptance E,D VU,NR   02/27/17 1558 Done   Family Acceptance E NR  AS 03/09/17 0925 Active               Point: Home exercise program (Active)    Learning Progress Summary    Learner Readiness Method Response Comment Documented by Status   Patient Acceptance E NR  AS 03/09/17 0925 Active    Eager E,D,H NR   03/07/17 1146 Active    Acceptance E,D NR   03/02/17 1055 Active    Acceptance E,D VU,NR  MANDO 02/27/17 1558 Done   Family Acceptance E NR  AS 03/09/17 0925 Active               Point: Body mechanics (Active)    Learning Progress Summary    Learner Readiness Method Response Comment Documented by Status   Patient Acceptance E NR  AS  03/09/17 0925 Active    Eager E,D,H NR   03/07/17 1146 Active    Acceptance E,D NR  SR 03/02/17 1055 Active    Acceptance E,D VU,NR   02/27/17 1558 Done   Family Acceptance E NR  AS 03/09/17 0925 Active               Point: Precautions (Active)    Learning Progress Summary    Learner Readiness Method Response Comment Documented by Status   Patient Acceptance E NR  AS 03/09/17 0925 Active    Eager E,D,H NR   03/07/17 1146 Active    Acceptance E,D NR  SR 03/02/17 1055 Active    Acceptance E,D VU,NR   02/27/17 1558 Done   Family Acceptance E NR  AS 03/09/17 0925 Active                      User Key     Initials Effective Dates Name Provider Type Discipline     06/19/15 -  Philomena Esteban, PT Physical Therapist PT    DM 06/19/15 -  Shelli Mora, PT Physical Therapist PT    SR 06/19/15 -  Thu Herrera, PT Physical Therapist PT    AS 06/22/15 -  Lauren Palacio, PTA Physical Therapy Assistant PT                   PT ASSESSMENT (last 72 hours)      PT Evaluation       03/10/17 1145 03/09/17 2000    Rehab Evaluation    Document Type therapy note (daily note);discharge summary  -     Subjective Information no complaints;agree to therapy  -     Pain Assessment    Pain Assessment 0-10  -     Blackmon-Cabrera FACES Pain Rating 4  -     Cognitive Assessment/Intervention    Current Cognitive/Communication Assessment functional  -     Orientation Status oriented x 4  -MC     Follows Commands/Answers Questions 100% of the time;able to follow single-step instructions  -     Sensory Assessment/Intervention    Light Touch  LUE;RUE  -MP    LUE Light Touch  WNL  -MP    RUE Light Touch  WNL  -MP    LLE Light Touch  moderate impairment   baseline per patient   -MP    RLE Light Touch  absent sensation   baseline per patient   -MP    Positioning and Restraints    Pre-Treatment Position in bed  -MC     Post Treatment Position bed  -     In Bed supine;call light within reach;encouraged to call for  assist;with family/caregiver;LLE elevated  -       03/09/17 1315 03/09/17 1025    Rehab Evaluation    Document Type therapy note (daily note)  -JR therapy note (daily note)   wound VAC ck  -MW    Subjective Information agree to therapy;complains of;pain  -JR agree to therapy   RN in to give pain meds; denies pain in foot  -MW    Patient Effort, Rehab Treatment good  -JR     Symptoms Noted During/After Treatment none  -JR     General Information    Precautions/Limitations fall precautions;non-weight bearing status   NWB L LE  -JR     Pain Assessment    Pain Assessment 0-10  -JR No/denies pain   in foot  -MW    Pain Score 8  -JR     Post Pain Score 8  -JR     Pain Type Chronic pain  -JR     Pain Location Generalized  -JR     Pain Intervention(s) Ambulation/increased activity  -JR     Response to Interventions tolerated  -JR     Cognitive Assessment/Intervention    Current Cognitive/Communication Assessment functional  -JR     Orientation Status oriented x 4  -JR     Follows Commands/Answers Questions 100% of the time;able to follow single-step instructions  -JR     Personal Safety fully aware of deficits;good awareness, safety precautions  -JR     Bed Mobility, Assessment/Treatment    Bed Mobility, Assistive Device head of bed elevated  -     Bed Mob, Supine to Sit, [place] conditional independence  -JR     Bed Mob, Sit to Supine, [place] conditional independence  -JR     Balance Skills Training    Sitting-Level of Assistance Independent  -JR     Sitting-Balance Support Feet unsupported  -JR     Therapy Exercises    Bilateral Upper Extremity 10 reps;elbow flexion/extension;shoulder extension/flexion;shoulder horizontal abd/add  -JR     BUE Resistance theraband   yellow theraband HEP  -JR     Positioning and Restraints    Pre-Treatment Position in bed  -JR in bed  -MW    Post Treatment Position bed  -JR bed  -    In Bed notified nsg;supine;call light within reach;encouraged to call for assist;RLE  elevated;LLE elevated;SCD pump applied  -JR call light within reach;encouraged to call for assist;with nsg;LLE elevated  -MW      03/09/17 0840 03/09/17 0800    Rehab Evaluation    Document Type therapy note (daily note)  -AS     Subjective Information agree to therapy;complains of;numbness  -AS     Patient Effort, Rehab Treatment good  -AS     General Information    Precautions/Limitations fall precautions;non-weight bearing status;other (see comments)  -AS     Pain Assessment    Pain Assessment No/denies pain   only reports numbness in L LE  -AS     Pain Score 0  -AS     Post Pain Score 0  -AS     Cognitive Assessment/Intervention    Current Cognitive/Communication Assessment functional  -AS     Orientation Status oriented x 4  -AS     Follows Commands/Answers Questions 100% of the time  -AS     Personal Safety fully aware of deficits;good awareness, safety precautions  -AS     Personal Safety Interventions fall prevention program maintained;gait belt;nonskid shoes/slippers when out of bed  -AS     Bed Mobility, Assessment/Treatment    Bed Mobility, Assistive Device head of bed elevated;bed rails  -AS     Bed Mob, Supine to Sit, [place] independent  -AS     Bed Mob, Sit to Supine, [place] not tested  -AS     Bed Mobility, Comment No assist needed  -AS     Transfer Assessment/Treatment    Transfers, Bed-Chair [place] verbal cues required;contact guard assist  -AS     Transfers, Sit-Stand [place] contact guard assist  -AS     Transfers, Stand-Sit [place] contact guard assist  -AS     Transfer, Impairments strength decreased  -AS     Transfer, Comment patient able to maintain NWB status on left throughout transfer  -AS     Gait Assessment/Treatment    Gait, Comment [place] refused steps reporting it was too difficult due to weakness in her UE.  -AS     Therapy Exercises    Right Lower Extremity AROM:;10 reps;sitting;ankle pumps/circles;hip flexion;LAQ  -AS     Left Lower Extremity  AROM:;sitting;hip flexion;LAQ;SLR;hip abduction/adduction  -AS     Sensory Assessment/Intervention    Light Touch  LUE;RUE  -TK    LUE Light Touch  WNL  -TK    RUE Light Touch  WNL  -TK    LLE Light Touch  moderate impairment  -TK    RLE Light Touch  absent sensation  -TK    Positioning and Restraints    Pre-Treatment Position in bed  -AS     Post Treatment Position chair  -AS     In Chair reclined;call light within reach;encouraged to call for assist;with family/caregiver;LLE elevated  -AS       03/08/17 2000 03/08/17 0800    Rehab Evaluation    Document Type  therapy note (daily note)  -    Subjective Information  agree to therapy;complains of;weakness;fatigue;pain  -    Pain Assessment    Pain Assessment  Blackmon-Cabrera FACES  -    Blackmon-Baker FACES Pain Rating  4  -    Pain Location  Generalized  -    Pain Intervention(s)  Repositioned  -    Sensory Assessment/Intervention    Light Touch  LUE;RUE  -TK    LUE Light Touch  WNL  -TK    RUE Light Touch  WNL  -TK    LLE Light Touch moderate impairment  -SC moderate impairment  -TK    RLE Light Touch absent sensation  -SC absent sensation  -TK    Positioning and Restraints    Pre-Treatment Position  in bed  -    Post Treatment Position  bed  -    In Bed  supine;call light within reach;with family/caregiver  -      03/07/17 2000       Sensory Assessment/Intervention    Light Touch LUE;RUE  -KR     LUE Light Touch WNL  -KR     RUE Light Touch WNL  -KR     LLE Light Touch moderate impairment  -KR     RLE Light Touch absent sensation  -KR       User Key  (r) = Recorded By, (t) = Taken By, (c) = Cosigned By    Initials Name Provider Type     Lane Yates, PT Physical Therapist    JR Amy Garza, OT Occupational Therapist    AS Lauren Palacio, PTA Physical Therapy Assistant    MW Christie Wheeler, PT Physical Therapist    SC Paulina Aviles, RN Registered Nurse    STANLEY Wade, RN Registered Nurse    IVAN Chao, PT Physical  Therapist    TK Maxime Glynn, RN Registered Nurse    BUD Menjivar RN Registered Nurse            PT Recommendation and Plan  Anticipated Equipment Needs At Discharge: other (see comments) (wound VAC )  Anticipated Discharge Disposition: skilled nursing facility  Planned Therapy Interventions: wound care, patient/family education  PT Frequency: daily    Plan Of Care Reviewed With: patient   Progress: progress toward functional goals as expected   Outcome Summary/Follow up Plan: New incision still intact. Medial foot wound with red, beefy granulation tissue in the base. Pt will continue to benefit from NPWT upon d/c to continue progress.            Outcome Measures       03/09/17 1315 03/09/17 0840       How much help from another person do you currently need...    Turning from your back to your side while in flat bed without using bedrails?  4  -AS     Moving from lying on back to sitting on the side of a flat bed without bedrails?  4  -AS     Moving to and from a bed to a chair (including a wheelchair)?  3  -AS     Standing up from a chair using your arms (e.g., wheelchair, bedside chair)?  3  -AS     Climbing 3-5 steps with a railing?  1  -AS     To walk in hospital room?  1  -AS     AM-PAC 6 Clicks Score  16  -AS     How much help from another is currently needed...    Putting on and taking off regular lower body clothing? 2  -JR      Bathing (including washing, rinsing, and drying) 2  -JR      Toileting (which includes using toilet bed pan or urinal) 2  -JR      Putting on and taking off regular upper body clothing 3  -JR      Taking care of personal grooming (such as brushing teeth) 4  -JR      Eating meals 4  -JR      Score 17  -      Functional Assessment    Outcome Measure Options AM-PAC 6 Clicks Daily Activity (OT)  -JR AM-PAC 6 Clicks Basic Mobility (PT)  -AS       User Key  (r) = Recorded By, (t) = Taken By, (c) = Cosigned By    Initials Name Provider Type    JR Amy Garza, OT Occupational  Therapist    AS Lauren Palacio, NANDO Physical Therapy Assistant            Time Calculation        PT Charges       03/10/17 1145          Time Calculation    Start Time 1145  -        User Key  (r) = Recorded By, (t) = Taken By, (c) = Cosigned By    Initials Name Provider Type    IVAN Chao, PT Physical Therapist            Therapy Charges for Today     Code Description Service Date Service Provider Modifiers Qty    68627427284 HC PT THER PROC EA 15 MIN 3/10/2017 Chetna Chao, PT GP 1    59630722920 HC PT NEG PRESS WOUND TO 50SQCM DME1 3/10/2017 Chetna Chao, PT  1            PT G-Codes  Outcome Measure Options: AM-PAC 6 Clicks Daily Activity (OT)      PT Discharge Summary  Anticipated Discharge Disposition: skilled nursing facility  Reason for Discharge: Discharge from facility  Outcomes Achieved: Patient able to partially acheive established goals  Discharge Destination: SNF        Chetna Chao, PT  3/10/2017

## 2017-03-10 NOTE — PROGRESS NOTES
Continued Stay Note  [facility]     Patient Name: Tamara Langston  MRN: [id number]  Today's Date: 3/10/2017    Admit Date: 2/24/2017          Discharge Plan       03/10/17 0839    Case Management/Social Work Plan    Plan Skilled rehab today    Patient/Family In Agreement With Plan yes    Additional Comments Insurance has approved and [facility] will receive her under skilled care this afternoon. Ambulance is set for 1400. Tele for [facility] [phone], fax [phone]              Discharge Codes     None        Expected Discharge Date and Time     Expected Discharge Date Expected Discharge Time    Mar 10, 2017             Ese Whitlock RN

## 2017-03-10 NOTE — PLAN OF CARE
Problem: Patient Care Overview (Adult)  Goal: Plan of Care Review  Outcome: Ongoing (interventions implemented as appropriate)    03/10/17 0642   Coping/Psychosocial Response Interventions   Plan Of Care Reviewed With patient   Outcome Evaluation   Outcome Summary/Follow up Plan Pain well controlled. Patient slept with CPAP. VSS. Wound vac intact       Goal: Adult Individualization and Mutuality  Outcome: Ongoing (interventions implemented as appropriate)  Goal: Discharge Needs Assessment  Outcome: Ongoing (interventions implemented as appropriate)    Problem: Infection, Risk/Actual (Adult)  Goal: Infection Prevention/Resolution  Outcome: Ongoing (interventions implemented as appropriate)    Problem: Skin Integrity Impairment, Risk/Actual (Adult)  Goal: Skin Integrity/Wound Healing  Outcome: Ongoing (interventions implemented as appropriate)    Problem: Pressure Ulcer (Adult)  Goal: Signs and Symptoms of Listed Potential Problems Will be Absent or Manageable (Pressure Ulcer)  Outcome: Ongoing (interventions implemented as appropriate)    Problem: Fall Risk (Adult)  Goal: Identify Related Risk Factors and Signs and Symptoms  Outcome: Ongoing (interventions implemented as appropriate)  Goal: Absence of Falls  Outcome: Ongoing (interventions implemented as appropriate)

## 2017-03-10 NOTE — PROGRESS NOTES
Cardiothoracic Surgery Progress Note      POD #: 7-left transmetatarsal amputation of the second, third, fourth and fifth toes.  #11-completion of amputation of first metatarsal bone and surrounding infected granulation tissue     LOS: 14 days      Subjective: Patient's awake without any complaints today.  She states she may be going to a nursing home facility or rehabilitation facility today        Objective: Vital signs below are noted  Vital Signs  Temp:  [98.3 °F (36.8 °C)-99 °F (37.2 °C)] 98.6 °F (37 °C)  Heart Rate:  [52-65] 52  Resp:  [18-20] 18  BP: (119-156)/(56-72) 150/72    Physical Exam:   General Appearance: She is oriented ×3   Lungs:   Heart:   Skin:   Incision:   Wound VAC over her left transmetatarsal first toe amputation site with open wound is intact.  The transmetatarsal amputation of second, third, fourth and fifth toes.  Incision is intact.  Sutures are intact.  There is edema over the dorsum and plantar surface of the transmetatarsal area with very slight erythema  Results:    Results from last 7 days  Lab Units 03/06/17  1215   WBC 10*3/mm3 8.71   HEMOGLOBIN g/dL 9.2*   HEMATOCRIT % 30.0*   PLATELETS 10*3/mm3 235       Results from last 7 days  Lab Units 03/08/17  0526   SODIUM mmol/L 140   POTASSIUM mmol/L 4.0   CHLORIDE mmol/L 108   TOTAL CO2 mmol/L 29.0   BUN mg/dL 19   CREATININE mg/dL 1.00   GLUCOSE mg/dL 132*   CALCIUM mg/dL 10.2         Assessment: Postop day 7.  Left foot transmetatarsal amputation, second, third, fourth and fifth toes.  #2.  Postop day 11 for completion transmetatarsal amputation, left great toe and excision of surrounding infected granulation tissue with wound VAC in place.  #3.  Right lower lobe mass type lesion of unclear etiology.  Will be followed very closely as an outpatient.  We will probably do a PET scan as an outpatient.  She is going to be discussed at the pulmonary conference next week  #4.  Moderate aortic stenosis, gradient of approximately 23 mmHg and  mild concentric left ventricular part    Plan: As outlined above.  Patient will probably going to nursing home or rehabilitation facility for continued IV antibiotics.  Most likely,at least 6 weeks per Dr. Cruz's recommendations.  We will be arranging for the outpatient follow-up of this right lower lobe lung mass area and be discussing her case at the pulmonary conference.  Patient understands the follow-up and the plans      Arsalan Feliciano MD - 03/10/17 - 7:57 AM

## 2017-03-10 NOTE — PROGRESS NOTES
Adult Nutrition  Assessment/PES    Patient Name:  Tamara Langston  YOB: 1955  MRN: [id number]  Admit Date:  2/24/2017    Assessment Date:  3/10/2017        Reason for Assessment       03/10/17 1132    Reason for Assessment    Reason For Assessment/Visit follow up protocol    Time Spent (min) 5                        Nutrition Prescription Ordered       03/10/17 1132    Nutrition Prescription PO    Current PO Diet Regular    Common Modifiers Consistent Carbohydrate            Evaluation of Received Nutrient/Fluid Intake       03/10/17 1133    PO Evaluation    Number of Meals 7    % PO Intake 100              Problem/Interventions:        Problem 1       03/10/17 1133    Nutrition Diagnoses Problem 1    Problem 1 Nutrition Appropriate for Condition at this Time    Etiology (related to) Factors Affecting Nutrition    Appetite Good    Percent (%) intake recorded 100 %    Over number of meals 7                    Intervention Goal       03/10/17 1134    Intervention Goal    General Nutrition support treatment    PO Maintain intake            Nutrition Intervention       03/10/17 1134    Nutrition Intervention    RD/Tech Action Follow Tx progress              Education/Evaluation       03/10/17 1134    Monitor/Evaluation    Monitor Per protocol            Electronically signed by:  Shelli Brady RD  03/10/17 11:34 AM

## 2017-03-10 NOTE — DISCHARGE SUMMARY
[facility] Medicine Services  DISCHARGE SUMMARY       Date of Admission: 2/24/2017  Date of Discharge:  3/10/2017  Primary Care Physician: Harinder Aranda MD    Discharge Diagnoses:  Active Hospital Problems (** Indicates Principal Problem)    Diagnosis Date Noted   • **Osteomyelitis of left foot [M86.9] 02/24/2017   • Anemia, blood loss [D50.0] 02/27/2017   • Chronic pain [G89.29] 02/27/2017   • Chronic, continuous use of opioids [F11.90] 02/27/2017   • Chronic anxiety [F41.9] 02/27/2017   • Chronically on benzodiazepine therapy [Z79.899] 02/27/2017   • Diabetic polyneuropathy associated with type 2 diabetes mellitus [E11.42] 02/24/2017   • Peripheral vascular disease [I73.9] 12/15/2016   • Type 2 diabetes mellitus [E11.9] 05/11/2016   • Hypertension [I10] 05/11/2016   • Hypothyroidism [E03.9] 05/11/2016   • Dyslipidemia [E78.5] 05/11/2016   • Chronic back pain [M54.9, G89.29] 05/11/2016   • Gastroesophageal reflux disease without esophagitis [K21.9] 05/11/2016   • Fibromyalgia [M79.7] 05/11/2016   • Obstructive sleep apnea syndrome [G47.33]    • COPD (chronic obstructive pulmonary disease) [J44.9]       Resolved Hospital Problems    Diagnosis Date Noted Date Resolved   No resolved problems to display.       Presenting Problem/History of Present Illness  Osteomyelitis [M86.9]     Chief Complaint on Day of Discharge:  F/u LLE osteomyelitis    History of Present Illness on Day of Discharge:   Patient is sitting up in bed without any new complaints or issues.  States she is ready to go to rehabilitation but wants to make sure her medicines are correct.  Left lower extremity pain is under control with current regimen.  Denies chest pain, shortness of breath, or abdominal pain.  Hospital Course  Patient is a 61 y.o. female with a past medical history of diabetes, hypertension, hyperlipidemia, hypothyroidism, PVD, chronic back pain, polycythemia vera, OSH, fibromyalgia, GERD, and  osteomyelitis of left foot status post amputation of transmetatarsal of the left great toe in November 2016.  Culture from that event grew MSSA and group B strep.  Patient was a direct admit to [facility] from Dr. Cruz's office.  Patient states she has had a low-grade fever of 99 and shortness of breath.  She also noticed green and bloody drainage from her wound.  She has a wound VAC on her left foot that has been recently removed and was being followed by [facility].  Home health noticed granulating tissue and change in drainage and they contacted Dr. Rockwell who asked Dr. Cruz to see on day of admission.  Patient was admitted to the hospital medicine service and Dr. Feliciano and Dr. Cruz were consulted and patient was started on IV antibiotics and blood cultures were obtained.  MRI of the left foot was obtained and showed patient has osteomyelitis of the second, third and fourth metatarsal bones to the proximal portion.  In addition, there appears to be abscess formation around the previous transmetatarsal resection of the great toe.  Patient went to the OR on 2/25/17 for left transmetatarsal agitation at the second, third, fourth and fifth toes.  She required another surgery on 3/2/17 or completion of amputation of first metatarsal bone and surrounding infected granulation tissue.  Dr. Feliciano has followed patient during hospitalization.  Patient has a wound VAC in place and will need to keep left lower extremity elevated on 2 pillows under her ankle area at all times.  Nonweightbearing on left lower extremity at all times.  Wound care to left foot includes wrap with 4 x 4 over toe and excision, wrap with Kerlix and Ace bandage and change daily.  Do not remove sutures.  Infectious disease was consulted and followed patient during hospitalization for IV antibiotics and which patient will likely need for 6 weeks.  Patient completed treatment of azithromycin for coverage of atypical pneumonia.  She will  continue cefepime 2 g every 12 hours and vancomycin 1250 mg every 24 hours.  Patient is to follow-up with Dr. Feliciano and Dr. Cruz on Thursday 3/23/17.  Patient had incidental finding of a right lower lobe lung mass area and this will be discussed at pulmonary conference by Dr. Feliciano and will follow up as outpatient.  Patient reports taking Januvia at home but has been taking Levemir 10 units every 12 hours with sliding scale insulin during hospitalization and glucose has been well controlled.  Will need to continue fingerstick checks before meals and at bedtime.  Will continue this regimen at skilled nursing facility and need to reevaluate restarting Januvia at time of discharge to home.  Spoke with Dr. Feliciano and Dr. Cruz on phone to confirm charge plan.  Patient was noted to have a rash along both elbows during hospitalization that was felt to be contact dermatitis.  She was given hydrocortisone cream 1% twice a day and this is improving.  Discharge plans and instructions were reviewed with patient and she verbalized understanding.  Patient is ready for discharge to [facility] today via ambulance.    Procedures Performed  Procedure(s):  LEFT FOOT TRANSMETATARSAL AMPUTATION TOES 2,3,4,5       Consults:   Consults     Date and Time Order Name Status Description    2/24/2017 1613 Inpatient Consult to Infectious Diseases Completed         Pertinent Test Results:    Results from last 7 days  Lab Units 03/06/17  1215   WBC 10*3/mm3 8.71   HEMOGLOBIN g/dL 9.2*   HEMATOCRIT % 30.0*   PLATELETS 10*3/mm3 235       Results from last 7 days  Lab Units 03/08/17  0526 03/07/17  0435 03/06/17  1215   SODIUM mmol/L 140 141 141   POTASSIUM mmol/L 4.0 3.8 3.9   CHLORIDE mmol/L 108 107 112*   TOTAL CO2 mmol/L 29.0 28.0 30.0   BUN mg/dL 19 19 15   CREATININE mg/dL 1.00 1.00 0.90   GLUCOSE mg/dL 132* 143* 130*   CALCIUM mg/dL 10.2 10.0 9.8     Imaging Results (last 72 hours)     Procedure Component Value Units Date/Time    CT  Chest Without Contrast [[id number]] Collected:  03/07/17 0942     Updated:  03/07/17 1203    Narrative:       EXAMINATION: CT CHEST WITHOUT CONTRAST-03/06/2017:      INDICATION: Left lower lobe atelectasis on chest x-ray and increased  vascular congestion; Z74.09-Other reduced mobility; M86.9-Osteomyelitis,  unspecified; Z74.09-Other reduced mobility; M86.9-Osteomyelitis,  unspecified.         TECHNIQUE:  CT data set of the chest and mediastinum was performed  without intravenous contrast.     The radiation dose reduction device was turned on for each scan per the  ALARA (As Low as Reasonably Achievable) protocol.     COMPARISON: Compared to chest radiographs of 03/06/2017 and distant CT  data sets of the chest and mediastinum of 2015.     FINDINGS:   1.  Small patchy groundglass opacities are identified bilaterally in the  lungs, more numerous and severe in the right lung.  2.  In addition, there is a small mass with spiculation and tenting to  the pleura and diaphragm seen at the right lung base posteriorly with  pleural thickening and pleural reaction. This is suspicious and  worrisome.  3.  In the contralateral medial left lung base, there is consolidative  airspace opacity with air bronchograms.  4.  There are multiple nodules in the thyroid with enlargement of the  isthmus. Scattered lymph nodes are seen in the axillary areas and  mediastinum, some of the mediastinum and subcarinal lymph nodes approach  size criteria.  5.  Enlargement of pulmonary arteries is noted consistent with the  patient's known diagnosis of pulmonary hypertension and there is four  chamber mild cardiomegaly without pericardial effusion.  6.  A splenule is seen in the upper abdomen and there is a small isthmus  nodule left adrenal gland with a myelolipoma in the left adrenal gland  separate. The liver is unremarkable. The patient does have a worrisome  epicardial lymph node which is within normal limits for size but  unusual.        "Impression:       1.  Patchy areas of rounded groundglass focal densities and opacities  are noted throughout the lungs, more numerous and severe on the right.  2.  Spiculated mass right lung base with tenting to the pleural surface  and diaphragmatic surface. This spiculated mass measures 1.9 x 5.4 cm.  3.  Consolidative opacity left base with air bronchograms.  4.  Pleural reaction and pleural thickening right base.  5.  Scattered borderline sized lymph nodes in the central mediastinum.  Epicardial and axillary nodes noted, numerous but within normal limits  for size.  6.  Thyroidal enlargement with nodules.  7.  These findings appear new from previous examinations of 2015 and  confirm suspicious abnormalities and densities on the radiographs of the  chest of earlier today. Also, based on the overall configuration, an  inflammatory process is favored, however, the spiculated mass at the  right base is highly suspicious morphologically for neoplasm and must be  followed to resolution.  8.  These are substantial bilateral diffuse abnormalities in the chest  as described above, more severe and numerous on the right but noted  bilaterally.     D:  03/07/2017  E:  03/07/2017           This report was finalized on 3/7/2017 12:01 PM by Dr. Gilberto Gomez MD.           Condition on Discharge:  stable    Physical Exam on Discharge:  Visit Vitals   • /64   • Pulse 56   • Temp 98 °F (36.7 °C) (Oral)   • Resp 16   • Ht 66\" (167.6 cm)   • Wt 210 lb (95.3 kg)   • LMP  (LMP Unknown)   • SpO2 95%   • BMI 33.89 kg/m2     Physical Exam   Constitutional: She is oriented to person, place, and time. She appears well-developed and well-nourished.   HENT:   Head: Normocephalic and atraumatic.   Eyes: Conjunctivae are normal.   Neck: Normal range of motion. Neck supple.   Cardiovascular: Normal rate, regular rhythm and normal heart sounds.    No murmur heard.  Pulmonary/Chest: Effort normal and breath sounds normal. No respiratory " distress. She has no wheezes.   Decreased RLL   Abdominal: Soft. Bowel sounds are normal. She exhibits no distension. There is no tenderness.   Musculoskeletal: Normal range of motion. She exhibits no edema.   Neurological: She is alert and oriented to person, place, and time.   Skin: Skin is warm and dry.   Left foot dressing c/d/i to wound vac   Psychiatric: She has a normal mood and affect. Her behavior is normal. Judgment and thought content normal.     Discharge Disposition  Skilled Nursing Facility (DC - External)    Discharge Medications   Tamara Langston   Home Medication Instructions ADRIANA:[id number]    Printed on:03/10/17 6072   Medication Information                      acetaminophen (TYLENOL) 325 MG tablet  Take 2 tablets by mouth Every 4 (Four) Hours As Needed for mild pain (1-3) or fever (temperature greater than 101F).             albuterol (PROVENTIL) (2.5 MG/3ML) 0.083% nebulizer solution  2.5 mg Every 6 (Six) Hours As Needed.             amLODIPine (NORVASC) 5 MG tablet  Take 1 tablet by mouth Daily.             aspirin  MG EC tablet  Take 1 tablet by mouth Daily.             atorvastatin (LIPITOR) 40 MG tablet  Take 1 tablet by mouth Every Night.             baclofen (LIORESAL) 20 MG tablet  TAKE ONE TABLET BY MOUTH THREE TIMES A DAY             bisoprolol-hydrochlorothiazide (ZIAC) 5-6.25 MG per tablet  TAKE ONE TABLET BY MOUTH DAILY             busPIRone (BUSPAR) 10 MG tablet  TAKE TWO TABLETS BY MOUTH TWICE A DAY             cefepime (MAXIPIME) 2 g/100 mL 0.9% NS (mbp)  Infuse 100 mL into a venous catheter Every 12 (Twelve) Hours. Indications: Bone and Joint Infection             cetirizine (ZyrTEC) 10 MG tablet  Take 10 mg by mouth Every Night.             clopidogrel (PLAVIX) 75 MG tablet  Take 1 tablet by mouth Daily.             ezetimibe (ZETIA) 10 MG tablet  Take 1 tablet by mouth Daily.             fentaNYL (DURAGESIC) 100 MCG/HR patch  Place 1 patch on the skin Every Other  Day.             fluticasone (FLONASE) 50 MCG/ACT nasal spray  1 spray into each nostril 2 (Two) Times a Day.             furosemide (LASIX) 40 MG tablet  Take 1 tablet by mouth Daily.             hydrocortisone 1 % cream  Apply  topically Every 12 (Twelve) Hours.             insulin detemir (LEVEMIR) 100 UNIT/ML injection  Inject 10 Units under the skin Every 12 (Twelve) Hours.             insulin lispro (humaLOG) 100 UNIT/ML injection  Inject 2-7 Units under the skin 4 (Four) Times a Day Before Meals & at Bedtime.             levothyroxine (SYNTHROID, LEVOTHROID) 112 MCG tablet  TAKE ONE TABLET BY MOUTH DAILY             lisinopril (PRINIVIL,ZESTRIL) 20 MG tablet  Take 1 tablet by mouth Daily.             LYRICA 100 MG capsule  TAKE ONE CAPSULE BY MOUTH THREE TIMES A DAY             melatonin 5 MG sublingual tablet sublingual tablet  Place 1 tablet under the tongue At Night As Needed (insomnia).             metoclopramide (REGLAN) 5 MG tablet  Take 1 tablet by mouth 3 (Three) Times a Day Before Meals.             nicotine (NICODERM CQ) 21 MG/24HR patch  Place 1 patch on the skin Daily.             omeprazole (PriLOSEC) 20 MG capsule  TAKE ONE CAPSULE BY MOUTH EVERY DAY             oxyCODONE-acetaminophen (PERCOCET)  MG per tablet  Take 1 tablet by mouth 3 (Three) Times a Day.             probiotic (CULTURELLE) capsule capsule  Take 1 capsule by mouth Daily.             promethazine (PHENERGAN) 25 MG tablet  Take 25 mg by mouth Every 8 (Eight) Hours As Needed for nausea or vomiting.             SPIRIVA RESPIMAT 2.5 MCG/ACT aerosol solution  INHALE TWO PUFF(S) BY MOUTH DAILY             Vancomycin HCl in NaCl 1.25-0.9 GM/250ML-% IVPB  Infuse 250 mL into a venous catheter Daily. Indications: Bone and Joint Infection                 Discharge Diet:   Diet Instructions     Diet: Regular, Consistent Carbohydrate, Cardiac; Thin Liquids, No Restrictions       Discharge Diet:   Regular  Consistent  Carbohydrate  Cardiac      Fluid Consistency:  Thin Liquids, No Restrictions                 Discharge Care Plan / Instructions:    Activity at Discharge:   Activity Instructions     Other Instructions (Specify)       NWB LLE AAT's  Keep left leg elevated on 2 pillows at ankle area AATs                 Follow-up Appointments  Future Appointments  Date Time Provider Department Center   3/28/2017 11:30 AM MD GREGORY Llanos [facility] None   8/24/2017 9:00 AM MD GREGORY Magdaleno [facility] None     Additional Instructions for the Follow-ups that You Need to Schedule     Discharge Follow-up with PCP    As directed    Follow Up Details:  within 1 week of discharge from SNF       Discharge Follow-up with Specified Provider    As directed    To:  ari monte   Follow Up:  2 Weeks   Follow Up Details:  Thursday 3/23  1 pm- coordinating with Dr Feliciano appzakiya       Discharge Follow-up with Specified Provider    As directed    To:  luna Feliciano   Follow Up:  2 Weeks   Follow Up Details:  Thursday 3/23- ask office to coordinate with 1 pm appt Dr Monte that day  Thanks!                 Test Results Pending at Discharge   Order Current Status    Legionella Antigen, Urine In process    AFB Culture Preliminary result    AFB Culture Preliminary result    AFB Culture Preliminary result    AFB Culture Preliminary result    Anaerobic Culture Preliminary result    Fungus Culture Preliminary result    Fungus Culture Preliminary result    Fungus Culture Preliminary result           Cierra Fleming, JEOVANY 03/10/17 1:37 PM    Time: Discharge 60 min    Please note that portions of this note may have been completed with a voice recognition program. Efforts were made to edit the dictations, but occasionally words are mistranscribed.

## 2017-03-10 NOTE — PLAN OF CARE
Problem: Patient Care Overview (Adult)  Goal: Plan of Care Review  Outcome: Ongoing (interventions implemented as appropriate)    03/10/17 1145   Coping/Psychosocial Response Interventions   Plan Of Care Reviewed With patient   Patient Care Overview   Progress progress toward functional goals as expected   Outcome Evaluation   Outcome Summary/Follow up Plan New incision still intact. Medial foot wound with red, beefy granulation tissue in the base. Pt will continue to benefit from NPWT upon d/c to continue progress.         Problem: Inpatient Physical Therapy  Goal: Wound Care Goal 1 LTG- PT  Outcome: Unable to achieve outcome(s) by discharge Date Met:  03/10/17    03/10/17 1145   Wound Care PT LTG   Wound Care PT LTG 1, Outcome goal partially met   Wound Care PT LTG 1, Reason Goal Not Met discharged from facility

## 2017-03-10 NOTE — DISCHARGE INSTR - ACTIVITY
***KEEP LEFT FOOT AND ANKLE ON PILLOW AT ALL TIMES  ***WOUND VAC MANAGEMENT. APPLY WOUND VAC WHEN PATIENT ARRIVES  ***WOUND CARE ORDERS: WRAP WITH 4X4 OVER TOE EXCISION, WRAP WITH KERLEX, THEN WRAP WITH ACE WRAP. CHANGE DAILY.  ***DO NOT REMOVE SUTURES.

## 2017-03-10 NOTE — PLAN OF CARE
Problem: Pressure Ulcer (Adult)  Goal: Signs and Symptoms of Listed Potential Problems Will be Absent or Manageable (Pressure Ulcer)  Outcome: Ongoing (interventions implemented as appropriate)  therahoney and mepilex applied and changed daily to coccyx

## 2017-03-10 NOTE — DISCHARGE PLACEMENT REQUEST
"John Langston (61 y.o. Female)           Ese Whitlock RN,     [phone]     Please see Dr. Feliciano's special orders on the wound care attached at end of report.             Date of Birth Social Security Number Address Home Phone MRN    1955  [id number] [id number] [phone] [id number]    [employer] Marital Status          [place]        Admission Date Admission Type Admitting Provider Attending Provider Department, Room/Bed    2/24/17 Elective Ankush Bain MD Tovar, Jesus Victor, MD [facility] 5B GYN, N549/1    Discharge Date Discharge Disposition Discharge Destination         Skilled Nursing Facility (DC - External)             Attending Provider: Ankush Bain MD     Allergies:  Cortisone, Oxycontin [Oxycodone], Tolmetin    Isolation:  None   Infection:  None   Code Status:  FULL    Ht:  66\" (167.6 cm)   Wt:  210 lb (95.3 kg)    Admission Cmt:  None   Principal Problem:  Osteomyelitis of left foot [M86.9]                 Active Insurance as of 2/24/2017     Primary Coverage     Payor Plan Insurance Group Employer/Plan Group    HUMANA MEDICARE REPL HUMANA MEDICARE REPL [id number]     Payor Plan Address Payor Plan Phone Number Effective From Effective To    [address] [address] [phone] 3/1/2013     [place] [address]       Subscriber Name Subscriber Birth Date Member ID       JOHN LANGSTON 1955 [id number]                 Emergency Contacts      (Rel.) Home Phone Work Phone Mobile Phone    Sukhdev Langston (Spouse) [phone] -- --            Insurance Information                HUMANA MEDICARE REPL/HUMANA MEDICARE REPL Phone: [phone]    Subscriber: John Langston Subscriber#: [id number]    Group#: [id number] Precert#:              History & Physical      Cierra Ayala MD at 2/24/2017  4:28 PM              [facility]  HISTORY AND PHYSICAL    Primary Care Physician: " Harinder Aranda MD    Subjective     Chief Complaint: Increased drainage from left foot wound. Low grade fever    History of Present Illness: This is a 61 year old woman with a past medical history of DM, HTN, HLP, Hypothyroidism, PVD, chronic back pain, polycythemia vera, DINA, Fibromyalgia, GERD, Osteomyelitis of left foot s/p amputation of trans metatarsal of the left great toe in nov 2016. Culture from that admit grew MSSA and group B strep. Patient was a direct admit from Dr. Cruz office. Pt states she has had a low grade fever of 99 and soa over the last week. She says she has noticed green and bloody drainge from her wound over the last week. She had a wound vac on her foot that has been recently removed and was being followed by home health. Home health noticed granulating tissue and change in drainage and they contacted Dr. Feliciano who asked Dr. Cruz to see her today. Patient will be admitted to the hospital service and Dr. Feliciano, Dr. Cruz and [facility] will be consulted to see pt. Will culture wound and obtain MRI of foot. Will start pt on Iv abx and obtain blood cultures. Dr. Feliciano will follow pt for possible surgery on Monday.     Review of Systems   Constitutional: Positive for fever. Negative for activity change, appetite change, chills, diaphoresis and fatigue.        Pt states she has been running low grade fever at home of 99   Respiratory: Positive for shortness of breath. Negative for cough and wheezing.         Pt says over last week she has had SOA   Cardiovascular: Positive for leg swelling. Negative for chest pain and palpitations.        Pt has chronic leg swelling. Left foot has been swelling more    Gastrointestinal: Negative for abdominal pain, constipation, diarrhea, nausea and vomiting.   Genitourinary: Negative for dysuria and urgency.   Musculoskeletal: Positive for back pain.        Pt has chronic back pain.    Skin: Positive for wound.        Pt has left foot wound post first digit  amputation. Left buttock with stage 2 ulcer    Neurological: Positive for numbness. Negative for dizziness, weakness and headaches.        Pt states she has numbness and tingling in bilateral legs and numbness in bilateral feet   Psychiatric/Behavioral: Negative.       Otherwise complete ROS performed and negative except as mentioned in the HPI.    Past Medical History:   Past Medical History   Diagnosis Date   • Bronchitis    • Cervical cancer    • Cholelithiasis 5/11/2016   • Chronic bronchitis    • Degenerative arthritis    • Diabetes mellitus    • Dyslipidemia 5/11/2016   • Dyspnea    • Fatty liver disease, nonalcoholic 11/21/2016   • Fibromyalgia    • GERD (gastroesophageal reflux disease)    • H/O echocardiogram    • History of pneumonia    • Hypertension 5/11/2016     16. H/O echocardiogram (V15.89) (Z92.89)  · A.  Echocardiogram of 02/03/2015 reports an ejection fraction of 60-65%, mild concentric     LVH, trace mitral regurgitation, mild tricuspid and pulmonic regurgitation and calculated     RVSP of 35 mmHg, the main pulmonary artery is also mildly dilated.   • Hypothyroidism 5/11/2016     Description: A.  On replacement therapy.   • Nausea    • Obesity    • DINA (obstructive sleep apnea)      intolerant of CPAP therapy   • Osteoporosis    • Osteoporosis    • Polycythemia vera 5/11/2016   • Pulmonary emphysema    • Restrictive ventilatory defect    • Rhinitis    • Uncontrolled diabetes mellitus 5/11/2016   • Uterine cancer    • Vitamin D deficiency 8/1/2016       Past Surgical History:  Past Surgical History   Procedure Laterality Date   • Lumbar spine surgery       arthrodesis by anterior approach addit interspace   • Back surgery       lumbar fusions x5--multiple times; 1995, 1997, 1998, 1999 and 2008   • Hand surgery Bilateral      x3   • Hysterectomy       status post uterine and cervical cancer   • Tonsillectomy     • Amputation digit Left 11/23/2016     Procedure: AMPUTATION TRANS METATARSAL - ray  amutation of the left great toe;  Surgeon: Arsalan Feliciano MD;  Location:  [facility];  Service:    • Cardiac catheterization N/A 11/26/2016     Procedure: Left Heart Cath;  Surgeon: Ash Nuñez MD;  Location:  [facility];  Service:        Family History: family history includes Alcohol abuse in her brother; Arthritis in her brother, father, mother, and other; Bleeding Disorder in her father; COPD in her sister; Colon polyps in her mother; Diabetes in her brother, father, mother, and other; Diverticulitis in her mother; Heart murmur in her daughter; Kidney disease in her father.    Social History:  reports that she has been smoking Cigarettes.  She has a 24.00 pack-year smoking history. She has never used smokeless tobacco. She reports that she does not drink alcohol or use illicit drugs.    Medications:  Prescriptions Prior to Admission   Medication Sig Dispense Refill Last Dose   • acetaminophen (TYLENOL) 325 MG tablet Take 2 tablets by mouth Every 4 (Four) Hours As Needed for mild pain (1-3) or fever (temperature greater than 101F). 100 tablet 0 Taking   • albuterol (PROVENTIL) (2.5 MG/3ML) 0.083% nebulizer solution 2.5 mg Every 6 (Six) Hours As Needed.   Taking   • amLODIPine (NORVASC) 5 MG tablet Take 1 tablet by mouth Daily. 90 tablet 3 Taking   • aspirin  MG EC tablet Take 1 tablet by mouth Daily. 100 tablet 0 Taking   • atorvastatin (LIPITOR) 40 MG tablet Take 1 tablet by mouth Every Night. 90 tablet 3 Taking   • baclofen (LIORESAL) 20 MG tablet TAKE ONE TABLET BY MOUTH THREE TIMES A  tablet 0 Taking   • bisoprolol-hydrochlorothiazide (ZIAC) 5-6.25 MG per tablet TAKE ONE TABLET BY MOUTH DAILY 90 tablet 2 Taking   • busPIRone (BUSPAR) 10 MG tablet TAKE TWO TABLETS BY MOUTH TWICE A  tablet 2 Taking   • cetirizine (ZyrTEC) 10 MG tablet Take 10 mg by mouth Every Night.   Taking   • clonazePAM (KlonoPIN) 0.5 MG tablet TAKE ONE-HALF TABLET BY MOUTH TWICE A DAY 90 tablet 0  Taking   • clopidogrel (PLAVIX) 75 MG tablet Take 1 tablet by mouth Daily. 90 tablet 3 Taking   • cyanocobalamin 1000 MCG/ML injection 1,000 mcg. Cyanocobalamin 1000 MCG/ML Injection Solution; Patient Sig: Cyanocobalamin 1000 MCG/ML Injection Solution INJECT ONE MILLILITER INJECTION EVERY MONTH; 10; 4; 18-Jun-2015; Active   Taking   • ezetimibe (ZETIA) 10 MG tablet Take 1 tablet by mouth Daily. 90 tablet 3 Taking   • fentaNYL (DURAGESIC) 100 MCG/HR patch Place 1 patch on the skin Every Other Day. 15 patch 0 Taking   • fluticasone (FLONASE) 50 MCG/ACT nasal spray 1 spray into each nostril 2 (Two) Times a Day.   Taking   • furosemide (LASIX) 40 MG tablet Take 1 tablet by mouth Daily. 60 tablet 5 Taking   • levothyroxine (SYNTHROID, LEVOTHROID) 112 MCG tablet TAKE ONE TABLET BY MOUTH DAILY 90 tablet 0 Taking   • lisinopril (PRINIVIL,ZESTRIL) 20 MG tablet Take 1 tablet by mouth Daily. 90 tablet 3 Taking   • LYRICA 100 MG capsule TAKE ONE CAPSULE BY MOUTH THREE TIMES A  capsule 0 Taking   • melatonin 5 MG sublingual tablet sublingual tablet Place 1 tablet under the tongue At Night As Needed (insomnia). 30 tablet 0 Taking   • metoclopramide (REGLAN) 10 MG tablet Take 1 tablet by mouth 3 (Three) Times a Day. 270 tablet 2 Taking   • omeprazole (PriLOSEC) 20 MG capsule TAKE ONE CAPSULE BY MOUTH EVERY DAY 90 capsule 2 Taking   • oxyCODONE-acetaminophen (PERCOCET)  MG per tablet Take 1 tablet by mouth 3 (Three) Times a Day. 90 tablet 0 Taking   • probiotic (CULTURELLE) capsule capsule Take 1 capsule by mouth Daily. 60 capsule 0 Taking   • promethazine (PHENERGAN) 25 MG tablet Take 25 mg by mouth Every 8 (Eight) Hours As Needed for nausea or vomiting.   Taking   • SITagliptin (JANUVIA) 100 MG tablet Take 1 tablet by mouth Daily. 30 tablet 0 Taking   • SPIRIVA RESPIMAT 2.5 MCG/ACT aerosol solution INHALE TWO PUFF(S) BY MOUTH DAILY 1 inhaler 4 Taking       Allergies:  Allergies   Allergen Reactions   • Cortisone  "Other (See Comments)     Hotness all over body and hyper   • Oxycontin [Oxycodone] Other (See Comments)     psoriasis   • Tolmetin Rash     Tolectin-rash and itching         Objective     Physical Exam:  Vital Signs:   Visit Vitals   • /67   • Pulse 70   • Temp 98 °F (36.7 °C) (Oral)   • Resp 16   • Ht 66\" (167.6 cm)   • Wt 211 lb (95.7 kg)   • LMP  (LMP Unknown)   • SpO2 (!) 86%   • BMI 34.06 kg/m2     Physical Exam   Constitutional: She is oriented to person, place, and time. She appears well-developed and well-nourished. No distress.   Pt resting in bed in NAD, family at bedside    HENT:   Head: Normocephalic and atraumatic.   Eyes: EOM are normal. Pupils are equal, round, and reactive to light.   Neck: Normal range of motion. Neck supple. No tracheal deviation present.   Cardiovascular: Normal rate, regular rhythm and intact distal pulses.  Exam reveals no gallop and no friction rub.    Murmur heard.  Pulmonary/Chest: Effort normal. No stridor. No respiratory distress. She has wheezes. She has no rales.   Exp wheezes through out  all lobes    Abdominal: Soft. Bowel sounds are normal. She exhibits no distension. There is no tenderness. There is no guarding.   Musculoskeletal: She exhibits edema.   Pt has limited rom on left foot she is partial weight bearing only wears special boot    Neurological: She is alert and oriented to person, place, and time.   Pt has numbness and tingling in bilateral legs and numbness in both feet    Skin: Skin is warm and dry. No rash noted. She is not diaphoretic. There is pallor.   Pt has dressing to left foot wound from amputation of first digit. Dr Feliciano at bedside just changed dressing. Pt also has a stage 2 ulcer to left buttock   Psychiatric: She has a normal mood and affect. Her behavior is normal. Judgment and thought content normal.   Nursing note and vitals reviewed.            Results Reviewed:    Results from last 7 days  Lab Units 02/24/17  1556   WBC 10*3/mm3 " 10.24   HEMOGLOBIN g/dL 11.5   PLATELETS 10*3/mm3 266       Results from last 7 days  Lab Units 02/24/17  1644   SODIUM mmol/L 141   POTASSIUM mmol/L 3.8   TOTAL CO2 mmol/L 34.0*   CREATININE mg/dL 1.30   GLUCOSE mg/dL 117*   CALCIUM mg/dL 9.7       I have personally reviewed and interpreted available lab data, radiology studies and ECG obtained at time of admission.     Assessment / Plan     Assessment/Problem List:   Principal Problem:    Osteomyelitis  Active Problems:    Chronic back pain    Uncontrolled diabetes mellitus    Dyslipidemia    Fibromyalgia    Gastroesophageal reflux disease without esophagitis    Hypertension    Hypothyroidism    Peripheral vascular disease    Diabetic polyneuropathy associated with type 2 diabetes mellitus    Osteomyelitis (presumed) of left 1st metatarsal  -- Bcx2  -- consult ID  -- consult Dr. Feliciano  -- start IV abx per Dr Cruz's recc's (Vanc/CTX)  -- MRI of foot pending    DM2  -- hold home dose of januvia  -- start on low dose s/s  -- check A1C in am     Chronic back pain/Fibromyalgia  -- continue home pain medication  -- continue home muscle relaxers    HTN  -- continue home medications  -- monitor     GERD--  -- continue home dose of reglan   -- start protonix    Hypothyroidism  -- continue home dose of synthroid  -- check TSH in am     HLP  -- continue home dose of Lipitor  -- continue home dose of zetia  -- check lipid panel in am             Plan:        DVT prophylaxis:  Heparin sq q12hrs, teds and scuds on right leg only   Code Status: full code           Brief Attending Note       I have seen and examined the patient, performing an independent face-to-face diagnostic evaluation with plan of care reviewed and developed with the advanced practice clinician (APC).      Brief Summary Statement/HPI:   Please see full history above by APC.  Briefly, 61-year-old female sent as a direct admit from [facility] clinic for presumed osteomyelitis of the first metatarsal.  She has a  history of prior diabetic left first toe wound progressing to osteomyelitis which grew group B strep and MSSA, underwent toe amputation by Dr. Arsalan Feliciano Nov 2016 and completed a full course of IV antibiotics through [facility].  Patient states that she has had green drainage and worsened erythema around the left first metatarsal for approximately one week, today she was seen by Dr. Cruz and [facility] clinic who had concerns due to the edematous and dusky nature of the surrounding tissue for underlying osteomyelitis and need for probable further amputation or possible operative I&D.  Patient denies pain in the foot likely secondary to severe neuropathy, she does state that she's had worsened edema in the left lower extremity from the mid tibia down for approximately one week which is exacerbated by walking, she denies fevers/chills.  Per Dr. Cruz, plan is to initiate vancomycin and Rocephin and Dr. Feliciano has seen the patient upon arrival to the floor and plans to review MRI of the foot once it's available and likely proceed with operative intervention on Monday.      Attending Physical Exam:  Temp:  [98 °F (36.7 °C)-98.3 °F (36.8 °C)] 98 °F (36.7 °C)  Heart Rate:  [68-72] 70  Resp:  [16] 16  BP: (121-147)/(57-67) 147/67  Constitutional: no acute distress, awake, alert  Eyes: PERRLA, sclerae anicteric, no conjunctival injection  Neck: supple, no thyromegaly, trachea midline  Respiratory: Clear to auscultation bilaterally, nonlabored respirations   Cardiovascular: RRR, 3/6 СЕРГЕЙ murmur, rubs, or gallops, palpable pedal pulses bilaterally but faint  Gastrointestinal: Positive bowel sounds, soft, nontender, nondistended  Musculoskeletal: 1+ Left foot/ankle edema, no clubbing or cyanosis to bilateral lower extremities  Psychiatric: oriented x 3, appropriate affect, cooperative  Neurologic: Strength symmetric in all extremities, Cranial Nerves grossly intact to confrontation   Derm:  Left first toe amputation with foul-smelling  scant dark yellow discharge from this desquamated area of amputation, confluent erythema around the granulating margins of the amputation wound but not widespread.  Venous stasis dermatitis of bilateral lower extremities.      Brief Assessment/Plan :    See above for further detailed assessment and plan developed with APC which I have reviewed and/or edited.    I believe this patient meets INPATIENT status due to the need for care which can only be reasonably provided in an hospital setting such as aggressive/expedited ancillary services and/or consultation services and the necessity for IV medications, close physician monitoring and/or the possible need for procedures.  In such, I feel patient’s risk for adverse outcomes and need for care warrant INPATIENT evaluation and predict the patient’s care encounter to likely last beyond 2 midnights.      Cierra Ayala MD  02/24/17  8:24 PM            Electronically signed by Cierra Ayala MD at 2/24/2017  8:25 PM      Arsalan Feliciano MD at 2/24/2017  4:43 PM          History & Physical           Chief complaint: Nonhealing left great toe amputation site with drainage    HPI: Patient is a 61-year-old diabetic who had a left great toe transmetatarsal amputation on 11/23/2016 for osteomyelitis.  The amputation was performed by me and the surgical wound was left open to close by secondary intent with management of the wound with wound VAC by the home health services.  The wound was healing nicely with good granulation tissue and I saw the patient in the office on several occasions  The wound VAC was discontinued approximately 3 weeks ago and the wound was then managed with wet to damp saline dressings changed twice daily.  The home health nursing service called our office on 02/22/2017 stated that the wound had a yellow drainage with brown discoloration of the granulation tissue. I saw the patient on 02/23/2017 and was concerned about the appearance of the granulation tissue.   Although I saw no drainage from the wound.  The patient did show me some bandages from previous dressing changes recently and they did have a green and brown discoloration.  I  discussed this by telephone with Dr. Cruz, of infectious disease who  has been  treating her postoperatively and she agreed to see the patient today in the office and asked that I do  tissue cultures and Gram stain.  After Dr. Cruz saw the patient in the office earlier this a.m. and I saw the patient with her and we both agreed that we should place her in the hospital for antibiotics intravenously and to get an MRI of the foot to look for osteomyelitis.  Of note is the fact that the Gram stain of the tissue culture showed gram-positive cocci and it is been identified now as Staphylococcus aureus with sensitivities pending.  The patient has been admitted to the hospital service for overall, medical management and I will be following the patient for possible surgical exploration of the prior amputation site.      Past Medical History   Diagnosis Date   • Bronchitis    • Cervical cancer    • Cholelithiasis 5/11/2016   • Chronic bronchitis    • Degenerative arthritis    • Diabetes mellitus    • Dyslipidemia 5/11/2016   • Dyspnea    • Fatty liver disease, nonalcoholic 11/21/2016   • Fibromyalgia    • GERD (gastroesophageal reflux disease)    • H/O echocardiogram    • History of pneumonia    • Hypertension 5/11/2016     16. H/O echocardiogram (V15.89) (Z92.89)  · A.  Echocardiogram of 02/03/2015 reports an ejection fraction of 60-65%, mild concentric     LVH, trace mitral regurgitation, mild tricuspid and pulmonic regurgitation and calculated     RVSP of 35 mmHg, the main pulmonary artery is also mildly dilated.   • Hypothyroidism 5/11/2016     Description: A.  On replacement therapy.   • Nausea    • Obesity    • DINA (obstructive sleep apnea)      intolerant of CPAP therapy   • Osteoporosis    • Osteoporosis    • Polycythemia vera 5/11/2016   •  Pulmonary emphysema    • Restrictive ventilatory defect    • Rhinitis    • Uncontrolled diabetes mellitus 5/11/2016   • Uterine cancer    • Vitamin D deficiency 8/1/2016     Past Surgical History   Procedure Laterality Date   • Lumbar spine surgery       arthrodesis by anterior approach addit interspace   • Back surgery       lumbar fusions x5--multiple times; 1995, 1997, 1998, 1999 and 2008   • Hand surgery Bilateral      x3   • Hysterectomy       status post uterine and cervical cancer   • Tonsillectomy     • Amputation digit Left 11/23/2016     Procedure: AMPUTATION TRANS METATARSAL - ray amutation of the left great toe;  Surgeon: Arsalan Feliciano MD;  Location:  [facility];  Service:    • Cardiac catheterization N/A 11/26/2016     Procedure: Left Heart Cath;  Surgeon: Ash Nuñez MD;  Location:  [facility];  Service:      Family History   Problem Relation Age of Onset   • Arthritis Mother    • Diabetes Mother    • Colon polyps Mother    • Diverticulitis Mother    • Arthritis Father    • Bleeding Disorder Father    • Diabetes Father    • Kidney disease Father    • COPD Sister      currently smokes   • Arthritis Brother    • Diabetes Brother    • Alcohol abuse Brother    • Heart murmur Daughter    • Arthritis Other    • Diabetes Other      Social History   Substance Use Topics   • Smoking status: Current Every Day Smoker     Packs/day: 0.50     Years: 48.00     Types: Cigarettes   • Smokeless tobacco: Never Used      Comment: currently trying to quit by using Chantix and has cut smoking habit in half, Pt now smoking half a pack a day    • Alcohol use No      Occupation: Patient is a disabled   Lives tat home with her   Prescriptions Prior to Admission   Medication Sig Dispense Refill Last Dose   • acetaminophen (TYLENOL) 325 MG tablet Take 2 tablets by mouth Every 4 (Four) Hours As Needed for mild pain (1-3) or fever (temperature greater than 101F). 100 tablet 0 Taking   •  albuterol (PROVENTIL) (2.5 MG/3ML) 0.083% nebulizer solution 2.5 mg Every 6 (Six) Hours As Needed.   Taking   • amLODIPine (NORVASC) 5 MG tablet Take 1 tablet by mouth Daily. 90 tablet 3 Taking   • aspirin  MG EC tablet Take 1 tablet by mouth Daily. 100 tablet 0 Taking   • atorvastatin (LIPITOR) 40 MG tablet Take 1 tablet by mouth Every Night. 90 tablet 3 Taking   • baclofen (LIORESAL) 20 MG tablet TAKE ONE TABLET BY MOUTH THREE TIMES A  tablet 0 Taking   • bisoprolol-hydrochlorothiazide (ZIAC) 5-6.25 MG per tablet TAKE ONE TABLET BY MOUTH DAILY 90 tablet 2 Taking   • busPIRone (BUSPAR) 10 MG tablet TAKE TWO TABLETS BY MOUTH TWICE A  tablet 2 Taking   • cetirizine (ZyrTEC) 10 MG tablet Take 10 mg by mouth Every Night.   Taking   • clonazePAM (KlonoPIN) 0.5 MG tablet TAKE ONE-HALF TABLET BY MOUTH TWICE A DAY 90 tablet 0 Taking   • clopidogrel (PLAVIX) 75 MG tablet Take 1 tablet by mouth Daily. 90 tablet 3 Taking   • cyanocobalamin 1000 MCG/ML injection 1,000 mcg. Cyanocobalamin 1000 MCG/ML Injection Solution; Patient Sig: Cyanocobalamin 1000 MCG/ML Injection Solution INJECT ONE MILLILITER INJECTION EVERY MONTH; 10; 4; 18-Jun-2015; Active   Taking   • ezetimibe (ZETIA) 10 MG tablet Take 1 tablet by mouth Daily. 90 tablet 3 Taking   • fentaNYL (DURAGESIC) 100 MCG/HR patch Place 1 patch on the skin Every Other Day. 15 patch 0 Taking   • fluticasone (FLONASE) 50 MCG/ACT nasal spray 1 spray into each nostril 2 (Two) Times a Day.   Taking   • furosemide (LASIX) 40 MG tablet Take 1 tablet by mouth Daily. 60 tablet 5 Taking   • levothyroxine (SYNTHROID, LEVOTHROID) 112 MCG tablet TAKE ONE TABLET BY MOUTH DAILY 90 tablet 0 Taking   • lisinopril (PRINIVIL,ZESTRIL) 20 MG tablet Take 1 tablet by mouth Daily. 90 tablet 3 Taking   • LYRICA 100 MG capsule TAKE ONE CAPSULE BY MOUTH THREE TIMES A  capsule 0 Taking   • melatonin 5 MG sublingual tablet sublingual tablet Place 1 tablet under the tongue At  Night As Needed (insomnia). 30 tablet 0 Taking   • metoclopramide (REGLAN) 10 MG tablet Take 1 tablet by mouth 3 (Three) Times a Day. 270 tablet 2 Taking   • omeprazole (PriLOSEC) 20 MG capsule TAKE ONE CAPSULE BY MOUTH EVERY DAY 90 capsule 2 Taking   • oxyCODONE-acetaminophen (PERCOCET)  MG per tablet Take 1 tablet by mouth 3 (Three) Times a Day. 90 tablet 0 Taking   • probiotic (CULTURELLE) capsule capsule Take 1 capsule by mouth Daily. 60 capsule 0 Taking   • promethazine (PHENERGAN) 25 MG tablet Take 25 mg by mouth Every 8 (Eight) Hours As Needed for nausea or vomiting.   Taking   • SITagliptin (JANUVIA) 100 MG tablet Take 1 tablet by mouth Daily. 30 tablet 0 Taking   • SPIRIVA RESPIMAT 2.5 MCG/ACT aerosol solution INHALE TWO PUFF(S) BY MOUTH DAILY 1 inhaler 4 Taking     Allergies:  Cortisone; Oxycontin [oxycodone]; and Tolmetin    Review of Systems:  Constitutional: The patient is worried about the drainage from the wound but no fever or chills.  HEENT: No headaches or blurred vision.  RESPIRATORY:chronic cough.  Cardiovascular: Recent cath showed nonobstructive coronary artery disease.  Endocrine: Long history of diabetes mellitus.  Musculoskeletal: Arthritis and prior operations on her back and thumbs.  Gastrointestinal: No history of melena.  Genitourinary: History of prior hysterectomy for uterine cancer.  Neurological: No history of stroke or seizures.      Physical Exam:  General appearance: Alert and oriented ×3  H EENT: Normocephalic with clear sclera  Respiratory: Clear lung fields bilaterally.  Cardiovascular: Harsh systolic murmur right sternal border.  She has strong radial pulses and femoral pulses and pedal pulses bilaterally.  Abdomen: Slightly obese with good bowel sounds and no tenderness.  Extremities: no cyanosis, clubbing, or edema.  Neurological: Grossly intact.  Exam of the left great toe amputation site: Hypertrophic granulation tissues which is pale in coloration, but no drainage  elicited from palpation and no tenderness noted.     Labs:    Results from last 7 days  Lab Units 02/24/17  1556   WBC 10*3/mm3 10.24   HEMOGLOBIN g/dL 11.5   HEMATOCRIT % 36.4   PLATELETS 10*3/mm3 266                     Imaging:MRI of the left foot is pending at this time      Assessment: #1.  Post op transmetatarsal amputation of the left great toe for osteomyelitis performed on 11/23/2016, now with the amputation site with hypertropic granulation tissue and poor wound healing with Staphylococcus aureus growing from a tissue culture done yesterday from the granulation tissue area.  #2.  Diabetes mellitus      Plan:After seeing the patient with Dr. Cruz today.  We both agree that the patient needs admission to the hospital for intravenous antibiotics and an MRI of the left foot to rule out osteomyelitis of the remaining portion of the metatarsal bone of the great toe.  The patient has been admitted to the hospitalist service for overall medical management.  The patient  most likely will need exploration of this prior amputation site.    Arsalan Feliciano MD  02/24/17  4:44 PM               Electronically signed by Arsalan Feliciano MD at 2/24/2017  5:50 PM           Physician Progress Notes (most recent note)      Arsalan Feliciano MD at 3/10/2017  7:57 AM  Version 1 of 1         Cardiothoracic Surgery Progress Note      POD #: 7-left transmetatarsal amputation of the second, third, fourth and fifth toes.  #11-completion of amputation of first metatarsal bone and surrounding infected granulation tissue     LOS: 14 days      Subjective: Patient's awake without any complaints today.  She states she may be going to a nursing home facility or rehabilitation facility today        Objective: Vital signs below are noted  Vital Signs  Temp:  [98.3 °F (36.8 °C)-99 °F (37.2 °C)] 98.6 °F (37 °C)  Heart Rate:  [52-65] 52  Resp:  [18-20] 18  BP: (119-156)/(56-72) 150/72    Physical Exam:   General Appearance: She is oriented  ×3   Lungs:   Heart:   Skin:   Incision:   Wound VAC over her left transmetatarsal first toe amputation site with open wound is intact.  The transmetatarsal amputation of second, third, fourth and fifth toes.  Incision is intact.  Sutures are intact.  There is edema over the dorsum and plantar surface of the transmetatarsal area with very slight erythema  Results:    Results from last 7 days  Lab Units 03/06/17  1215   WBC 10*3/mm3 8.71   HEMOGLOBIN g/dL 9.2*   HEMATOCRIT % 30.0*   PLATELETS 10*3/mm3 235       Results from last 7 days  Lab Units 03/08/17  0526   SODIUM mmol/L 140   POTASSIUM mmol/L 4.0   CHLORIDE mmol/L 108   TOTAL CO2 mmol/L 29.0   BUN mg/dL 19   CREATININE mg/dL 1.00   GLUCOSE mg/dL 132*   CALCIUM mg/dL 10.2         Assessment: Postop day 7.  Left foot transmetatarsal amputation, second, third, fourth and fifth toes.  #2.  Postop day 11 for completion transmetatarsal amputation, left great toe and excision of surrounding infected granulation tissue with wound VAC in place.  #3.  Right lower lobe mass type lesion of unclear etiology.  Will be followed very closely as an outpatient.  We will probably do a PET scan as an outpatient.  She is going to be discussed at the pulmonary conference next week  #4.  Moderate aortic stenosis, gradient of approximately 23 mmHg and mild concentric left ventricular part    Plan: As outlined above.  Patient will probably going to nursing home or rehabilitation facility for continued IV antibiotics.  Most likely,at least 6 weeks per Dr. Cruz's recommendations.  We will be arranging for the outpatient follow-up of this right lower lobe lung mass area and be discussing her case at the pulmonary conference.  Patient understands the follow-up and the plans      Arsalan Feliciano MD - 03/10/17 - 7:57 AM           Electronically signed by Arsalan Feliciano MD at 3/10/2017  8:03 AM           Consult Notes (most recent note)      Katrin Simon RPH at 3/5/2017 10:36 AM   Version 1 of 1         Pharmacokinetic Consult - Vancomycin Dosing  Tamara Langston is a 61 y.o. female on vancomycin/cefepime for osteomyelitis. Pharmacy was consulted to dose vancomycin    Relevant clinical data and objective history reviewed:  CREATININE   Date Value Ref Range Status   03/05/2017 0.90 0.60 - 1.30 mg/dL Final   03/03/2017 0.80 0.60 - 1.30 mg/dL Final     BUN   Date Value Ref Range Status   03/05/2017 17 9 - 23 mg/dL Final   03/03/2017 16 9 - 23 mg/dL Final     Estimated Creatinine Clearance: 74.4 mL/min (by C-G formula based on Cr of 0.9).  I/O last 3 completed shifts:  In: 680 [P.O.:480; IV Piggyback:200]  Out: 3775 [Urine:3700]    Temp Readings from Last 3 Encounters:   03/05/17 98.3 °F (36.8 °C) (Oral)   02/23/17 98.2 °F (36.8 °C)   02/16/17 98.5 °F (36.9 °C) (Temporal Artery )     Weight: 210 lb 4 oz (95.4 kg)         CULTURE   Date Value Ref Range Status   03/02/2017 No growth at 2 days  Preliminary     Lab Results   Component Value Date    VANCOTROUGH 5.60 (L) 03/05/2017        Assessment/Plan  WBC was elevated on 3/3.  Tissue cultures are NGTD. Renal function has been stable with a creatinine of 0.9. Estimated CrCl is 70-75 ml/min.  Urine output is adequate. With a goal trough trough of 15-20, vancomycin level is sub-therapeutic with a value of 5.60 ( collected at 0803). Pharmacy will load the patient again with vancomycin 20 mg/kg followed by vancomycin 1 gram IV Q12h as a maintenance regimen. Pharmacy will obtain a trough level on Tuesday to assess the need for dosage adjustments.  Pharmacy will continue to follow the patient’s culture results and clinical progress daily.    Thank you,  Katrin Simon PharmD.  3/5/2017  10:56 AM       Electronically signed by Katrin Simon RPH at 3/5/2017 10:57 AM           Nutrition Notes (most recent note)      Shelli Brady RD at 3/10/2017 11:34 AM  Version 1 of 1         Adult Nutrition  Assessment/PES    Patient Name:  Tamara JACOBSON  Kian  YOB: 1955  MRN: [id number]  Admit Date:  2/24/2017    Assessment Date:  3/10/2017        Reason for Assessment       03/10/17 1132    Reason for Assessment    Reason For Assessment/Visit follow up protocol    Time Spent (min) 5                        Nutrition Prescription Ordered       03/10/17 1132    Nutrition Prescription PO    Current PO Diet Regular    Common Modifiers Consistent Carbohydrate            Evaluation of Received Nutrient/Fluid Intake       03/10/17 1133    PO Evaluation    Number of Meals 7    % PO Intake 100              Problem/Interventions:        Problem 1       03/10/17 1133    Nutrition Diagnoses Problem 1    Problem 1 Nutrition Appropriate for Condition at this Time    Etiology (related to) Factors Affecting Nutrition    Appetite Good    Percent (%) intake recorded 100 %    Over number of meals 7                    Intervention Goal       03/10/17 1134    Intervention Goal    General Nutrition support treatment    PO Maintain intake            Nutrition Intervention       03/10/17 1134    Nutrition Intervention    RD/Tech Action Follow Tx progress              Education/Evaluation       03/10/17 1134    Monitor/Evaluation    Monitor Per protocol            Electronically signed by:  Shelli Brady RD  03/10/17 11:34 AM     Electronically signed by Shelli Brady RD at 3/10/2017 11:34 AM           Physical Therapy Notes (most recent note)      Chetna Chao PT at 3/10/2017  1:18 PM  Version 1 of 1         Problem: Patient Care Overview (Adult)  Goal: Plan of Care Review  Outcome: Ongoing (interventions implemented as appropriate)    03/10/17 1145   Coping/Psychosocial Response Interventions   Plan Of Care Reviewed With patient   Patient Care Overview   Progress progress toward functional goals as expected   Outcome Evaluation   Outcome Summary/Follow up Plan New incision still intact. Medial foot wound with red, beefy granulation tissue in the base. Pt  will continue to benefit from NPWT upon d/c to continue progress.         Problem: Inpatient Physical Therapy  Goal: Wound Care Goal 1 LTG- PT  Outcome: Unable to achieve outcome(s) by discharge Date Met:  03/10/17    03/10/17 1145   Wound Care PT LTG   Wound Care PT LTG 1, Outcome goal partially met   Wound Care PT LTG 1, Reason Goal Not Met discharged from facility              Electronically signed by Chetna Chao PT at 3/10/2017  1:18 PM           Occupational Therapy Notes (most recent note)      Amy Garza, OT at 3/9/2017  1:35 PM  Version 1 of 1         Acute Care - Occupational Therapy Treatment Note   [place]     Patient Name: Tamara Langston  : 1955  MRN: [id number]  Today's Date: 3/9/2017  Onset of Illness/Injury or Date of Surgery Date: 17  Date of Referral to OT: 17  Referring Physician: MD Marquita      Admit Date: 2017    Visit Dx:     ICD-10-CM ICD-9-CM   1. Impaired mobility and ADLs Z74.09 799.89   2. Toe osteomyelitis, left M86.9 730.27   3. Impaired functional mobility, balance, gait, and endurance Z74.09 V49.89   4. Osteomyelitis of left foot, unspecified chronicity M86.9 730.27     Patient Active Problem List   Diagnosis   • Atopic rhinitis   • Restrictive ventilatory defect   • COPD (chronic obstructive pulmonary disease)   • Osteoporosis   • Obstructive sleep apnea syndrome   • Abnormal liver enzymes   • Cholelithiasis   • Chronic back pain   • Mixed anxiety depressive disorder   • Type 2 diabetes mellitus   • Dyslipidemia   • Essential tremor   • Fibromyalgia   • Gastroesophageal reflux disease without esophagitis   • Hypertension   • Hypothyroidism   • Insomnia   • Osteoarthritis   • Polycythemia vera   • Psoriasis   • Branch retinal vein occlusion   • Cobalamin deficiency   • Chronic bronchitis   • Obesity   • Pneumonia   • Tobacco use   • Vitamin D deficiency   • Foot ulcer, left   • Leukocytosis   • Hypokalemia   • Lactic acidosis   • Fatty liver  disease, nonalcoholic   • Diabetes education, encounter for   • Cellulitis of left foot   • Sinus bradycardia   • NSTEMI (non-ST elevated myocardial infarction)   • Tobacco abuse   • Hypoxia   • Peripheral vascular disease   • Gangrene   • Osteomyelitis of left foot   • Diabetic polyneuropathy associated with type 2 diabetes mellitus   • Anemia, blood loss   • Chronic pain   • Chronic, continuous use of opioids   • Chronic anxiety   • Chronically on benzodiazepine therapy             Adult Rehabilitation Note       03/09/17 1315 03/09/17 1025 03/09/17 0840    Rehab Assessment/Intervention    Discipline occupational therapist  -JR physical therapist  -MW physical therapy assistant  -AS    Document Type therapy note (daily note)  -JR therapy note (daily note)   wound VAC ck  -MW therapy note (daily note)  -AS    Subjective Information agree to therapy;complains of;pain  -JR agree to therapy   RN in to give pain meds; denies pain in foot  -MW agree to therapy;complains of;numbness  -AS    Patient Effort, Rehab Treatment good  -JR  good  -AS    Symptoms Noted During/After Treatment none  -JR      Precautions/Limitations fall precautions;non-weight bearing status   NWB L LE  -JR  fall precautions;non-weight bearing status;other (see comments)  -AS    Recorded by [JR] Amy Garza, OT [MW] Christie Wheeler, PT [AS] Lauren Palacio, NANDO    Pain Assessment    Pain Assessment 0-10  -JR No/denies pain   in foot  -MW No/denies pain   only reports numbness in L LE  -AS    Pain Score 8  -JR  0  -AS    Post Pain Score 8  -JR  0  -AS    Pain Type Chronic pain  -JR      Pain Location Generalized  -JR      Pain Intervention(s) Ambulation/increased activity  -JR      Response to Interventions tolerated  -JR      Recorded by [JR] Amy Garza, OT [MW] Christie Wheeelr, PT [AS] Lauren Palacio, NANDO    Cognitive Assessment/Intervention    Current Cognitive/Communication Assessment functional  -JR  functional  -AS     Orientation Status oriented x 4  -JR  oriented x 4  -AS    Follows Commands/Answers Questions 100% of the time;able to follow single-step instructions  -JR  100% of the time  -AS    Personal Safety fully aware of deficits;good awareness, safety precautions  -JR  fully aware of deficits;good awareness, safety precautions  -AS    Personal Safety Interventions   fall prevention program maintained;gait belt;nonskid shoes/slippers when out of bed  -AS    Recorded by [JR] Amy Garza, OT  [AS] Lauren Palacio, PTA    Bed Mobility, Assessment/Treatment    Bed Mobility, Assistive Device head of bed elevated  -JR  head of bed elevated;bed rails  -AS    Bed Mob, Supine to Sit, [place] conditional independence  -JR  independent  -AS    Bed Mob, Sit to Supine, [place] conditional independence  -JR  not tested  -AS    Bed Mobility, Comment   No assist needed  -AS    Recorded by [JR] Amy Garza, OT  [AS] Lauren Palacio, NANDO    Transfer Assessment/Treatment    Transfers, Bed-Chair [place]   verbal cues required;contact guard assist  -AS    Transfers, Sit-Stand [place]   contact guard assist  -AS    Transfers, Stand-Sit [place]   contact guard assist  -AS    Transfer, Impairments   strength decreased  -AS    Transfer, Comment   patient able to maintain NWB status on left throughout transfer  -AS    Recorded by   [AS] Lauren Palacio PTA    Gait Assessment/Treatment    Gait, Comment   geneva refused steps reporting it was too difficult due to weakness in her UE.  -AS    Recorded by   [AS] Lauren Palacio PTA    Balance Skills Training    Sitting-Level of Assistance Independent  -JR      Sitting-Balance Support Feet unsupported  -JR      Recorded by [JR] Amy Garza, OT      Therapy Exercises    Right Lower Extremity   AROM:;10 reps;sitting;ankle pumps/circles;hip flexion;LAQ  -AS    Left Lower Extremity   AROM:;sitting;hip flexion;LAQ;SLR;hip abduction/adduction  -AS     Bilateral Upper Extremity 10 reps;elbow flexion/extension;shoulder extension/flexion;shoulder horizontal abd/add  -JR      BUE Resistance theraband   yellow theraband HEP  -JR      Recorded by [JR] Amy Garza, OT  [AS] Lauren Palacio PTA    Positioning and Restraints    Pre-Treatment Position in bed  -JR in bed  -MW in bed  -AS    Post Treatment Position bed  -JR bed  -MW chair  -AS    In Bed notified nsg;supine;call light within reach;encouraged to call for assist;RLE elevated;LLE elevated;SCD pump applied  -JR call light within reach;encouraged to call for assist;with nsg;LLE elevated  -MW     In Chair   reclined;call light within reach;encouraged to call for assist;with family/caregiver;LLE elevated  -AS    Recorded by [JR] Amy Garza, OT [MW] Christie Wheeler, PT [AS] Lauren Palacio, NANDO      03/08/17 0800 03/07/17 1043 03/07/17 0940    Rehab Assessment/Intervention    Discipline physical therapist  -MF occupational therapist  -CL physical therapist  -MF    Document Type therapy note (daily note)  -MF therapy note (daily note)  -CL therapy note (daily note)  -MF    Subjective Information agree to therapy;complains of;weakness;fatigue;pain  -MF agree to therapy;complains of;pain  -CL agree to therapy;no complaints  -MF    Patient Effort, Rehab Treatment  good  -CL     Symptoms Noted During/After Treatment  increased pain  -CL     Symptoms Noted Comment  RN notified.   -CL     Precautions/Limitations  fall precautions;oxygen therapy device and L/min;non-weight bearing status   NWB LLE  -CL     Recorded by [MF] Lane Yates, PT [CL] Leticia Reed OT [MF] Lane Yates, PT    Vital Signs    Pre Systolic BP Rehab  --   No tele, RN cleared for tx.   -CL     Recorded by  [CL] Leticia Reed OT     Pain Assessment    Pain Assessment Blackmon-Cabrera FACES  -MF 0-10  -CL Blackmon-Baker FACES  -MF    Blackmon-Baker FACES Pain Rating 4  -MF  0  -MF    Pain Score  7  -CL     Post Pain Score  8  -CL     Pain  Type  Chronic pain  -CL     Pain Location Generalized  -MF Generalized  -CL     Pain Intervention(s) Repositioned  -MF Repositioned;Ambulation/increased activity   RN notified.   -CL     Response to Interventions  Tolerated.   -CL     Recorded by [MF] Lane Yates, PT [CL] Leticia Reed OT [MF] Lane Yates, PT    Cognitive Assessment/Intervention    Current Cognitive/Communication Assessment  functional  -CL     Orientation Status  oriented x 4  -CL     Follows Commands/Answers Questions  100% of the time;needs cueing;needs repetition  -CL     Personal Safety  mild impairment;decreased awareness, need for assist;decreased awareness, need for safety;impulsive  -CL     Personal Safety Interventions  fall prevention program maintained;gait belt;nonskid shoes/slippers when out of bed  -CL     Recorded by  [CL] Leticia Reed OT     Bed Mobility, Assessment/Treatment    Bed Mobility, Assistive Device  head of bed elevated;overhead trapeze  -CL     Bed Mob, Supine to Sit, [place]  supervision required;verbal cues required  -CL     Bed Mobility, Comment  VCs for safety d/t lines/impulsivity.   -CL     Recorded by  [CL] Leticia Reed OT     Transfer Assessment/Treatment    Transfers, Bed-Chair [place]  maximum assist (25% patient effort);2 person assist required;verbal cues required  -CL     Transfers, Bed-Chair-Bed, Assist Device  --   BUE support, stand pivot  -CL     Transfers, Sit-Stand [place]  minimum assist (75% patient effort);2 person assist required;verbal cues required  -CL     Transfers, Stand-Sit [place]  minimum assist (75% patient effort);2 person assist required;supervision required  -CL     Transfers, Sit-Stand-Sit, Assist Device  rolling walker  -CL     Transfer, Comment  VCs for HP/sequencing of steps. Pt stood at EOB and took ~3 side steps while maintaining NWB status. Pt required a seated rest break of ~2 mins after side steps. Pt then performed stand pivot to chair w/ BUE  requiring increased assist d/t to reports of fatigue after taking side steps.   -CL     Recorded by  [CL] Leticia Reed OT     Balance Skills Training    Sitting-Level of Assistance  Close supervision   at EOB, VCs to keep LLE off floor  -CL     Sitting-Balance Support  Right upper extremity supported;Left upper extremity supported   R foot supported  -CL     Sitting-Balance Activities  Reaching for objects  -CL     Standing-Level of Assistance  Minimum assistance;x2  -CL     Static Standing Balance Support  assistive device  -CL     Standing-Balance Activities  Weight Shift R-L;Weight Shift A-P  -CL     Gait Balance-Level of Assistance  Minimum assistance;x2  -CL     Gait Balance Support  assistive device  -CL     Gait Balance Activities  side-stepping  -CL     Recorded by  [CL] Leticia Reed OT     Therapy Exercises    Bilateral Upper Extremity  10 reps;AROM:;elbow flexion/extension;pronation/supination;hand pumps;shoulder extension/flexion;shoulder ER/IR;shoulder protraction/retraction;shoulder horizontal abd/add   yellow thera-band  -CL     Recorded by  [CL] Leticia Reed OT     Positioning and Restraints    Pre-Treatment Position in bed  - in bed  -CL in bed  -    Post Treatment Position bed  - chair  -CL bed  -MF    In Bed supine;call light within reach;with family/caregiver  -  supine;call light within reach  -MF    In Chair  notified nsg;reclined;call light within reach;encouraged to call for assist;with PT  -CL     Recorded by [MF] Lane Yates, PT [CL] Leticia Reed OT [MF] Lane Yates, PT      User Key  (r) = Recorded By, (t) = Taken By, (c) = Cosigned By    Initials Name Effective Dates     Lane Yates, PT 06/19/15 -     JR Amy Garza, OT 06/22/15 -     AS Lauren Palacio, PTA 06/22/15 -     MW Christie Wheeler, PT 05/18/15 -     CL Leticia Reed, OT 06/08/16 -                 OT Goals       03/09/17 1333 03/07/17 1132 03/06/17 1423    Transfer Training OT LTG    Transfer  Training OT LTG, Date Established   03/06/17  -CL    Transfer Training OT LTG, Time to Achieve   by discharge  -CL    Transfer Training OT LTG, Activity Type   bed to chair /chair to bed;sit to stand/stand to sit;toilet  -CL    Transfer Training OT LTG, [place]   contact guard assist  -CL    Transfer Training OT LTG, Additional Goal   AAD  -CL    Transfer Training OT LTG, Outcome goal ongoing  -JR goal ongoing  -CL     Dynamic Standing Balance OT LTG    Dynamic Standing Balance OT LTG, Date Established   03/06/17  -CL    Dynamic Standing Balance OT LTG, Time to Achieve   by discharge  -CL    Dynamic Standing Balance OT LTG, [place]   contact guard assist  -CL    Dynamic Standing Balance OT LTG, Assist Device   assistive Device  -CL    Dynamic Standing Balance OT LTG, Additional Goal   Pt will tolerate standing 1 min for functional activity.   -CL    Dynamic Standing Balance OT LTG, Outcome goal ongoing  -JR goal ongoing  -CL     Patient Education OT LTG    Patient Education OT LTG, Date Established   03/06/17  -CL    Patient Education OT LTG, Time to Achieve   by discharge  -CL    Patient Education OT LTG, Education Type   written program;HEP;precautions per surgeon;positioning;posture/body mechanics;1 hand/daryn technique;home safety;adaptive equipment mgmt;energy conservation;adaptive breathing  -CL    Patient Education OT LTG, Education Understanding   verbalizes understanding;demonstrates adequately  -CL    Patient Education OT LTG Outcome goal ongoing  -JR goal ongoing  -CL goal ongoing  -CL    LB Dressing OT LTG    LB Dressing Goal OT LTG, Date Established   03/06/17  -CL    LB Dressing Goal OT LTG, Time to Achieve   by discharge  -CL    LB Dressing Goal OT LTG, Activity Type   Don pants utilizing daryn-dressing technique  -CL    LB Dressing Goal OT LTG, [place]   contact guard assist  -CL    LB Dressing Goal OT LTG, Additional Goal   AAD  -CL    LB Dressing Goal OT LTG, Outcome  goal ongoing  -JR goal ongoing  -CL       03/01/17 0910 02/28/17 0909 02/27/17 1449    Transfer Training OT LTG    Transfer Training OT LTG, Outcome goal ongoing  -MANDO goal ongoing   pt. refused to chair today  -MANDO goal ongoing   pt. to min of 2 with NWB LLE today  -MADNO    Strength OT LTG    Strength Goal OT LTG, Outcome goal met   pt. progressed to yellow t-band  -MANDO goal ongoing   tolerated more resistance distally today  -MANDO goal ongoing   pt. began UE ROM exer today  -MANDO    LB Dressing OT LTG    LB Dressing Goal OT LTG, Outcome goal met   met for R side.  Unable to advance until wound vac off.  -MANDO goal ongoing   met socks only  -MANDO goal partially met   met R sock  -MANDO      02/26/17 1351          Transfer Training OT LTG    Transfer Training OT LTG, Date Established 02/26/17  -JR      Transfer Training OT LTG, Time to Achieve 1 wk  -JR      Transfer Training OT LTG, Activity Type bed to chair /chair to bed  -JR      Transfer Training OT LTG, [place] Level contact guard assist  -JR      Transfer Training OT LTG, Assist Device walker, rolling  -JR      Strength OT LTG    Strength Goal OT LTG, Date Established 02/26/17  -JR      Strength Goal OT LTG, Time to Achieve 1 wk  -JR      Strength Goal OT LTG, Functional Goal Pt to increase B UE strength by 1/2 muscle grade to support transfers with NWB status.  -JR      LB Dressing OT LTG    LB Dressing Goal OT LTG, Date Established 02/26/17  -JR      LB Dressing Goal OT LTG, Time to Achieve 1 wk  -JR      LB Dressing Goal OT LTG, Activity Type pants and socks on R LE.  -JR      LB Dressing Goal OT LTG, [place] Level minimum assist (75% patient effort)  -JR      LB Dressing Goal OT LTG, Adaptive Equipment other (see comments)   with appropriate AD  -JR        User Key  (r) = Recorded By, (t) = Taken By, (c) = Cosigned By    Initials Name Provider Type    MANDO Blevins, OT Occupational Therapist    JR Amy Garza, OT Occupational Therapist    JAMARCUS Kelly  CARRI Reed Occupational Therapist          Occupational Therapy Education     Title: PT OT SLP Therapies (Active)     Topic: Occupational Therapy (Active)     Point: ADL training (Active)    Description: Instruct learner(s) on proper safety adaptation and remediation techniques during self care or transfers.   Instruct in proper use of assistive devices.    Learning Progress Summary    Learner Readiness Method Response Comment Documented by Status   Patient Acceptance E,D NR Pt educated on/reviewed BUE HEP w/ thera-band, appropriate safety precautions, appropriate t/f techniques, maintaining NWB status, and benefits of therapy.  03/07/17 1131 Active    Acceptance E,D NR Pt educated on/reivewed appropriate safety precautions, BUE HEP, appropriate t/f techniques, NWB status, and benefits of therapy.  03/06/17 1421 Active    Acceptance E,D VU,NR cues for NWB LLE with dressing, activity to  do on own during day, theraband exer MANDO 03/01/17 0909 Done    Acceptance E,D VU,NR cont. review UE ROM, benefits activity, standing endurance and safety for LBD and toileting. MANDO 02/28/17 0908 Done    Acceptance E VU,NR Reasons to keep LLE NWB when pt. said she stands on it anyway.  Safe transfer, UE ROM exer. MANDO 02/27/17 1448 Done    Acceptance E NR Pt educated on NWB status and proper transfer techniques  02/26/17 1350 Active               Point: Home exercise program (Active)    Description: Instruct learner(s) on appropriate technique for monitoring, assisting and/or progressing therapeutic exercises/activities.    Learning Progress Summary    Learner Readiness Method Response Comment Documented by Status   Patient Acceptance E NR yellow theraband HEP  03/09/17 1332 Active    Acceptance E,D NR Pt educated on/reviewed BUE HEP w/ thera-band, appropriate safety precautions, appropriate t/f techniques, maintaining NWB status, and benefits of therapy.  03/07/17 1131 Active    Acceptance E,D NR Pt educated on/reivewed  appropriate safety precautions, BUE HEP, appropriate t/f techniques, NWB status, and benefits of therapy.  03/06/17 1421 Active    Acceptance E,D VU,NR cues for NWB LLE with dressing, activity to  do on own during day, theraband exer MANDO 03/01/17 0909 Done    Acceptance E,D VU,NR cont. review UE ROM, benefits activity, standing endurance and safety for LBD and toileting. MANDO 02/28/17 0908 Done    Acceptance E VU,NR Reasons to keep LLE NWB when pt. said she stands on it anyway.  Safe transfer, UE ROM exer.  02/27/17 1448 Done               Point: Precautions (Active)    Description: Instruct learner(s) on prescribed precautions during self-care and functional transfers.    Learning Progress Summary    Learner Readiness Method Response Comment Documented by Status   Patient Acceptance E,D NR Pt educated on/reviewed BUE HEP w/ thera-band, appropriate safety precautions, appropriate t/f techniques, maintaining NWB status, and benefits of therapy.  03/07/17 1131 Active    Acceptance E,D NR Pt educated on/reivewed appropriate safety precautions, BUE HEP, appropriate t/f techniques, NWB status, and benefits of therapy.  03/06/17 1421 Active    Acceptance E VU  CT 03/06/17 0520 Done    Acceptance E,D VU,NR cues for NWB LLE with dressing, activity to  do on own during day, theraband exer MANDO 03/01/17 0909 Done    Acceptance E,D VU,NR cont. review UE ROM, benefits activity, standing endurance and safety for LBD and toileting. MANDO 02/28/17 0908 Done    Acceptance E VU,NR Reasons to keep LLE NWB when pt. said she stands on it anyway.  Safe transfer, UE ROM exer.  02/27/17 1448 Done               Point: Body mechanics (Active)    Description: Instruct learner(s) on proper positioning and spine alignment during self-care, functional mobility activities and/or exercises.    Learning Progress Summary    Learner Readiness Method Response Comment Documented by Status   Patient Acceptance E,D NR Pt educated on/reviewed BUE HEP  w/ thera-band, appropriate safety precautions, appropriate t/f techniques, maintaining NWB status, and benefits of therapy. CL 03/07/17 1131 Active    Acceptance E,D NR Pt educated on/reivewed appropriate safety precautions, BUE HEP, appropriate t/f techniques, NWB status, and benefits of therapy. CL 03/06/17 1421 Active                      User Key     Initials Effective Dates Name Provider Type Discipline    MANDO 06/22/15 -  Kacy Blevins, OT Occupational Therapist OT    JR 06/22/15 -  Amy Garza, OT Occupational Therapist OT    CT 06/16/16 -  Alaina Delaney RN Registered Nurse Nurse    CL 06/08/16 -  Leticia Reed, OT Occupational Therapist OT                  OT Recommendation and Plan  Anticipated Equipment Needs At Discharge:  (TBD)  Anticipated Discharge Disposition: inpatient rehabilitation facility  Planned Therapy Interventions: adaptive equipment training, ADL retraining, balance training, energy conservation, home exercise program, transfer training  Therapy Frequency: daily  Plan of Care Review  Plan Of Care Reviewed With: patient  Progress: progress toward functional goals as expected  Outcome Summary/Follow up Plan: Pt improving with B UE theraband HEP and bed mobility. Recommend continued skilled OT services to assist in returning pt to her PLOF.        Outcome Measures       03/09/17 1315 03/09/17 0840 03/07/17 1045    How much help from another person do you currently need...    Turning from your back to your side while in flat bed without using bedrails?  4  -AS 4  -DM    Moving from lying on back to sitting on the side of a flat bed without bedrails?  4  -AS 4  -DM    Moving to and from a bed to a chair (including a wheelchair)?  3  -AS 2  -DM    Standing up from a chair using your arms (e.g., wheelchair, bedside chair)?  3  -AS 2  -DM    Climbing 3-5 steps with a railing?  1  -AS 1  -DM    To walk in hospital room?  1  -AS 2  -DM    AM-PAC 6 Clicks Score  16  -AS 15  -DM    How much help  from another is currently needed...    Putting on and taking off regular lower body clothing? 2  -JR      Bathing (including washing, rinsing, and drying) 2  -JR      Toileting (which includes using toilet bed pan or urinal) 2  -JR      Putting on and taking off regular upper body clothing 3  -JR      Taking care of personal grooming (such as brushing teeth) 4  -JR      Eating meals 4  -JR      Score 17  -JR      Functional Assessment    Outcome Measure Options AM-PAC 6 Clicks Daily Activity (OT)  -JR AM-PAC 6 Clicks Basic Mobility (PT)  -AS AM-PAC 6 Clicks Basic Mobility (PT)  -DM      03/07/17 1043          How much help from another is currently needed...    Putting on and taking off regular lower body clothing? 2  -CL      Bathing (including washing, rinsing, and drying) 2  -CL      Toileting (which includes using toilet bed pan or urinal) 2  -CL      Putting on and taking off regular upper body clothing 3  -CL      Taking care of personal grooming (such as brushing teeth) 4  -CL      Eating meals 4  -CL      Score 17  -      Functional Assessment    Outcome Measure Options AM-PAC 6 Clicks Daily Activity (OT)  -CL        User Key  (r) = Recorded By, (t) = Taken By, (c) = Cosigned By    Initials Name Provider Type    DM Shelli Mora, PT Physical Therapist    JR Amy Garza, OT Occupational Therapist    AS Lauren Palacio, PTA Physical Therapy Assistant    CL Leticia Reed, OT Occupational Therapist           Time Calculation:         Time Calculation- OT       03/09/17 1335          Time Calculation- OT    OT Start Time 1315  -JR      Total Timed Code Minutes- OT 10 minute(s)  -JR      OT Received On 03/09/17  -JR        User Key  (r) = Recorded By, (t) = Taken By, (c) = Cosigned By    Initials Name Provider Type    JR Amy Garza, OT Occupational Therapist           Therapy Charges for Today     Code Description Service Date Service Provider Modifiers Qty    [id number]  OT THERAPEUTIC ACT EA  15 MIN 3/9/2017 Amy Garza, OT GO 1               Amy Garza, OT  3/9/2017     Electronically signed by Amy Garza OT at 3/9/2017  1:35 PM             Discharge Summary      JEOVANY Duran at 3/10/2017 11:39 AM              [facility] Medicine Services  DISCHARGE SUMMARY       Date of Admission: 2/24/2017  Date of Discharge:  3/10/2017  Primary Care Physician: Harinder Aranda MD    Discharge Diagnoses:  Active Hospital Problems (** Indicates Principal Problem)    Diagnosis Date Noted   • **Osteomyelitis of left foot [M86.9] 02/24/2017   • Anemia, blood loss [D50.0] 02/27/2017   • Chronic pain [G89.29] 02/27/2017   • Chronic, continuous use of opioids [F11.90] 02/27/2017   • Chronic anxiety [F41.9] 02/27/2017   • Chronically on benzodiazepine therapy [Z79.899] 02/27/2017   • Diabetic polyneuropathy associated with type 2 diabetes mellitus [E11.42] 02/24/2017   • Peripheral vascular disease [I73.9] 12/15/2016   • Type 2 diabetes mellitus [E11.9] 05/11/2016   • Hypertension [I10] 05/11/2016   • Hypothyroidism [E03.9] 05/11/2016   • Dyslipidemia [E78.5] 05/11/2016   • Chronic back pain [M54.9, G89.29] 05/11/2016   • Gastroesophageal reflux disease without esophagitis [K21.9] 05/11/2016   • Fibromyalgia [M79.7] 05/11/2016   • Obstructive sleep apnea syndrome [G47.33]    • COPD (chronic obstructive pulmonary disease) [J44.9]       Resolved Hospital Problems    Diagnosis Date Noted Date Resolved   No resolved problems to display.       Presenting Problem/History of Present Illness  Osteomyelitis [M86.9]     Chief Complaint on Day of Discharge:  F/u LLE osteomyelitis    History of Present Illness on Day of Discharge:   Patient is sitting up in bed without any new complaints or issues.  States she is ready to go to rehabilitation but wants to make sure her medicines are correct.  Left lower extremity pain is under control with current regimen.  Denies chest pain, shortness of  breath, or abdominal pain.  Hospital Course  Patient is a 61 y.o. female with a past medical history of diabetes, hypertension, hyperlipidemia, hypothyroidism, PVD, chronic back pain, polycythemia vera, OSH, fibromyalgia, GERD, and osteomyelitis of left foot status post amputation of transmetatarsal of the left great toe in November 2016.  Culture from that event grew MSSA and group B strep.  Patient was a direct admit to [facility] from Dr. Cruz's office.  Patient states she has had a low-grade fever of 99 and shortness of breath.  She also noticed green and bloody drainage from her wound.  She has a wound VAC on her left foot that has been recently removed and was being followed by home health.  Home health noticed granulating tissue and change in drainage and they contacted Dr. Rockwell who asked Dr. Cruz to see on day of admission.  Patient was admitted to the hospital medicine service and Dr. Feliciano and Dr. Cruz were consulted and patient was started on IV antibiotics and blood cultures were obtained.  MRI of the left foot was obtained and showed patient has osteomyelitis of the second, third and fourth metatarsal bones to the proximal portion.  In addition, there appears to be abscess formation around the previous transmetatarsal resection of the great toe.  Patient went to the OR on 2/25/17 for left transmetatarsal agitation at the second, third, fourth and fifth toes.  She required another surgery on 3/2/17 or completion of amputation of first metatarsal bone and surrounding infected granulation tissue.  Dr. Feliciano has followed patient during hospitalization.  Patient has a wound VAC in place and will need to keep left lower extremity elevated on 2 pillows under her ankle area at all times.  Nonweightbearing on left lower extremity at all times.  Wound care to left foot includes wrap with 4 x 4 over toe and excision, wrap with Kerlix and Ace bandage and change daily.  Do not remove sutures.   Infectious disease was consulted and followed patient during hospitalization for IV antibiotics and which patient will likely need for 6 weeks.  Patient completed treatment of azithromycin for coverage of atypical pneumonia.  She will continue cefepime 2 g every 12 hours and vancomycin 1250 mg every 24 hours.  Patient is to follow-up with Dr. Feliciano and Dr. Cruz on Thursday 3/23/17.  Patient had incidental finding of a right lower lobe lung mass area and this will be discussed at pulmonary conference by Dr. Feliciano and will follow up as outpatient.  Patient reports taking Januvia at home but has been taking Levemir 10 units every 12 hours with sliding scale insulin during hospitalization and glucose has been well controlled.  Will need to continue fingerstick checks before meals and at bedtime.  Will continue this regimen at skilled nursing facility and need to reevaluate restarting Januvia at time of discharge to home.  Spoke with Dr. Feliciano and Dr. Cruz on phone to confirm charge plan.  Patient was noted to have a rash along both elbows during hospitalization that was felt to be contact dermatitis.  She was given hydrocortisone cream 1% twice a day and this is improving.  Discharge plans and instructions were reviewed with patient and she verbalized understanding.  Patient is ready for discharge to [facility] via ambulance.    Procedures Performed  Procedure(s):  LEFT FOOT TRANSMETATARSAL AMPUTATION TOES 2,3,4,5       Consults:   Consults     Date and Time Order Name Status Description    2/24/2017 1613 Inpatient Consult to Infectious Diseases Completed         Pertinent Test Results:    Results from last 7 days  Lab Units 03/06/17  1215   WBC 10*3/mm3 8.71   HEMOGLOBIN g/dL 9.2*   HEMATOCRIT % 30.0*   PLATELETS 10*3/mm3 235       Results from last 7 days  Lab Units 03/08/17  0526 03/07/17  0435 03/06/17  1215   SODIUM mmol/L 140 141 141   POTASSIUM mmol/L 4.0 3.8 3.9   CHLORIDE mmol/L 108 107 112*    TOTAL CO2 mmol/L 29.0 28.0 30.0   BUN mg/dL 19 19 15   CREATININE mg/dL 1.00 1.00 0.90   GLUCOSE mg/dL 132* 143* 130*   CALCIUM mg/dL 10.2 10.0 9.8     Imaging Results (last 72 hours)     Procedure Component Value Units Date/Time    CT Chest Without Contrast [[id number]] Collected:  03/07/17 0942     Updated:  03/07/17 1203    Narrative:       EXAMINATION: CT CHEST WITHOUT CONTRAST-03/06/2017:      INDICATION: Left lower lobe atelectasis on chest x-ray and increased  vascular congestion; Z74.09-Other reduced mobility; M86.9-Osteomyelitis,  unspecified; Z74.09-Other reduced mobility; M86.9-Osteomyelitis,  unspecified.         TECHNIQUE:  CT data set of the chest and mediastinum was performed  without intravenous contrast.     The radiation dose reduction device was turned on for each scan per the  ALARA (As Low as Reasonably Achievable) protocol.     COMPARISON: Compared to chest radiographs of 03/06/2017 and distant CT  data sets of the chest and mediastinum of 2015.     FINDINGS:   1.  Small patchy groundglass opacities are identified bilaterally in the  lungs, more numerous and severe in the right lung.  2.  In addition, there is a small mass with spiculation and tenting to  the pleura and diaphragm seen at the right lung base posteriorly with  pleural thickening and pleural reaction. This is suspicious and  worrisome.  3.  In the contralateral medial left lung base, there is consolidative  airspace opacity with air bronchograms.  4.  There are multiple nodules in the thyroid with enlargement of the  isthmus. Scattered lymph nodes are seen in the axillary areas and  mediastinum, some of the mediastinum and subcarinal lymph nodes approach  size criteria.  5.  Enlargement of pulmonary arteries is noted consistent with the  patient's known diagnosis of pulmonary hypertension and there is four  chamber mild cardiomegaly without pericardial effusion.  6.  A splenule is seen in the upper abdomen and there is a small  "isthmus  nodule left adrenal gland with a myelolipoma in the left adrenal gland  separate. The liver is unremarkable. The patient does have a worrisome  epicardial lymph node which is within normal limits for size but  unusual.       Impression:       1.  Patchy areas of rounded groundglass focal densities and opacities  are noted throughout the lungs, more numerous and severe on the right.  2.  Spiculated mass right lung base with tenting to the pleural surface  and diaphragmatic surface. This spiculated mass measures 1.9 x 5.4 cm.  3.  Consolidative opacity left base with air bronchograms.  4.  Pleural reaction and pleural thickening right base.  5.  Scattered borderline sized lymph nodes in the central mediastinum.  Epicardial and axillary nodes noted, numerous but within normal limits  for size.  6.  Thyroidal enlargement with nodules.  7.  These findings appear new from previous examinations of 2015 and  confirm suspicious abnormalities and densities on the radiographs of the  chest of earlier today. Also, based on the overall configuration, an  inflammatory process is favored, however, the spiculated mass at the  right base is highly suspicious morphologically for neoplasm and must be  followed to resolution.  8.  These are substantial bilateral diffuse abnormalities in the chest  as described above, more severe and numerous on the right but noted  bilaterally.     D:  03/07/2017  E:  03/07/2017           This report was finalized on 3/7/2017 12:01 PM by Dr. Gilberto Gomez MD.           Condition on Discharge:  stable    Physical Exam on Discharge:  Visit Vitals   • /64   • Pulse 56   • Temp 98 °F (36.7 °C) (Oral)   • Resp 16   • Ht 66\" (167.6 cm)   • Wt 210 lb (95.3 kg)   • LMP  (LMP Unknown)   • SpO2 95%   • BMI 33.89 kg/m2     Physical Exam   Constitutional: She is oriented to person, place, and time. She appears well-developed and well-nourished.   HENT:   Head: Normocephalic and atraumatic.   Eyes: " Conjunctivae are normal.   Neck: Normal range of motion. Neck supple.   Cardiovascular: Normal rate, regular rhythm and normal heart sounds.    No murmur heard.  Pulmonary/Chest: Effort normal and breath sounds normal. No respiratory distress. She has no wheezes.   Decreased RLL   Abdominal: Soft. Bowel sounds are normal. She exhibits no distension. There is no tenderness.   Musculoskeletal: Normal range of motion. She exhibits no edema.   Neurological: She is alert and oriented to person, place, and time.   Skin: Skin is warm and dry.   Left foot dressing c/d/i to wound vac   Psychiatric: She has a normal mood and affect. Her behavior is normal. Judgment and thought content normal.     Discharge Disposition  Skilled Nursing Facility (DC - External)    Discharge Medications   KianTamara   Home Medication Instructions ADRIANA:[id number]    Printed on:03/10/17 6533   Medication Information                      acetaminophen (TYLENOL) 325 MG tablet  Take 2 tablets by mouth Every 4 (Four) Hours As Needed for mild pain (1-3) or fever (temperature greater than 101F).             albuterol (PROVENTIL) (2.5 MG/3ML) 0.083% nebulizer solution  2.5 mg Every 6 (Six) Hours As Needed.             amLODIPine (NORVASC) 5 MG tablet  Take 1 tablet by mouth Daily.             aspirin  MG EC tablet  Take 1 tablet by mouth Daily.             atorvastatin (LIPITOR) 40 MG tablet  Take 1 tablet by mouth Every Night.             baclofen (LIORESAL) 20 MG tablet  TAKE ONE TABLET BY MOUTH THREE TIMES A DAY             bisoprolol-hydrochlorothiazide (ZIAC) 5-6.25 MG per tablet  TAKE ONE TABLET BY MOUTH DAILY             busPIRone (BUSPAR) 10 MG tablet  TAKE TWO TABLETS BY MOUTH TWICE A DAY             cefepime (MAXIPIME) 2 g/100 mL 0.9% NS (mbp)  Infuse 100 mL into a venous catheter Every 12 (Twelve) Hours. Indications: Bone and Joint Infection             cetirizine (ZyrTEC) 10 MG tablet  Take 10 mg by mouth Every Night.              clopidogrel (PLAVIX) 75 MG tablet  Take 1 tablet by mouth Daily.             ezetimibe (ZETIA) 10 MG tablet  Take 1 tablet by mouth Daily.             fentaNYL (DURAGESIC) 100 MCG/HR patch  Place 1 patch on the skin Every Other Day.             fluticasone (FLONASE) 50 MCG/ACT nasal spray  1 spray into each nostril 2 (Two) Times a Day.             furosemide (LASIX) 40 MG tablet  Take 1 tablet by mouth Daily.             hydrocortisone 1 % cream  Apply  topically Every 12 (Twelve) Hours.             insulin detemir (LEVEMIR) 100 UNIT/ML injection  Inject 10 Units under the skin Every 12 (Twelve) Hours.             insulin lispro (humaLOG) 100 UNIT/ML injection  Inject 2-7 Units under the skin 4 (Four) Times a Day Before Meals & at Bedtime.             levothyroxine (SYNTHROID, LEVOTHROID) 112 MCG tablet  TAKE ONE TABLET BY MOUTH DAILY             lisinopril (PRINIVIL,ZESTRIL) 20 MG tablet  Take 1 tablet by mouth Daily.             LYRICA 100 MG capsule  TAKE ONE CAPSULE BY MOUTH THREE TIMES A DAY             melatonin 5 MG sublingual tablet sublingual tablet  Place 1 tablet under the tongue At Night As Needed (insomnia).             metoclopramide (REGLAN) 5 MG tablet  Take 1 tablet by mouth 3 (Three) Times a Day Before Meals.             nicotine (NICODERM CQ) 21 MG/24HR patch  Place 1 patch on the skin Daily.             omeprazole (PriLOSEC) 20 MG capsule  TAKE ONE CAPSULE BY MOUTH EVERY DAY             oxyCODONE-acetaminophen (PERCOCET)  MG per tablet  Take 1 tablet by mouth 3 (Three) Times a Day.             probiotic (CULTURELLE) capsule capsule  Take 1 capsule by mouth Daily.             promethazine (PHENERGAN) 25 MG tablet  Take 25 mg by mouth Every 8 (Eight) Hours As Needed for nausea or vomiting.             SPIRIVA RESPIMAT 2.5 MCG/ACT aerosol solution  INHALE TWO PUFF(S) BY MOUTH DAILY             Vancomycin HCl in NaCl 1.25-0.9 GM/250ML-% IVPB  Infuse 250 mL into a venous catheter Daily.  Indications: Bone and Joint Infection                 Discharge Diet:   Diet Instructions     Diet: Regular, Consistent Carbohydrate, Cardiac; Thin Liquids, No Restrictions       Discharge Diet:   Regular  Consistent Carbohydrate  Cardiac      Fluid Consistency:  Thin Liquids, No Restrictions                 Discharge Care Plan / Instructions:    Activity at Discharge:   Activity Instructions     Other Instructions (Specify)       NWB LLE AAT's  Keep left leg elevated on 2 pillows at ankle area AATs                 Follow-up Appointments  Future Appointments  Date Time Provider Department Center   3/28/2017 11:30 AM Harinder Aranda MD [facility] None   8/24/2017 9:00 AM Prabhjot Luong MD [facility] None     Additional Instructions for the Follow-ups that You Need to Schedule     Discharge Follow-up with PCP    As directed    Follow Up Details:  within 1 week of discharge from SNF       Discharge Follow-up with Specified Provider    As directed    To:  ari monte   Follow Up:  2 Weeks   Follow Up Details:  Thursday 3/23  1 pm- coordinating with Dr Braden burk       Discharge Follow-up with Specified Provider    As directed    To:  luna Feliciano   Follow Up:  2 Weeks   Follow Up Details:  Thursday 3/23- ask office to coordinate with 1 pm appt Dr Monte that day  Thanks!                 Test Results Pending at Discharge   Order Current Status    Legionella Antigen, Urine In process    AFB Culture Preliminary result    AFB Culture Preliminary result    AFB Culture Preliminary result    AFB Culture Preliminary result    Anaerobic Culture Preliminary result    Fungus Culture Preliminary result    Fungus Culture Preliminary result    Fungus Culture Preliminary result           JEOVANY Duran 03/10/17 1:37 PM    Time: Discharge 60 min    Please note that portions of this note may have been completed with a voice recognition program. Efforts were made to edit the dictations, but occasionally words are  mistranscribed.         Electronically signed by JEOVANY Duran at 3/10/2017  1:52 PM          Apply Nursing home orders: keep L foot/ankle elevated on pillows at all times. Wound vac management. Wound care orders also wrap with 4x4 over toe excision, wrap with kerlex, then ace wrap daily. DO NOT remove sutures. Until Discontinued [[id number]] (Order [id number])   Nursing    Date: 3/10/2017   Department: [address] GYN   Ordering/Authorizing: Arsalan Feliciano MD           Standing Order Information        Remaining Occurrences Interval Last Released                0/1 Until Discontinued 3/10/2017              Order History  Inpatient       Date/Time Action Taken User Additional Information       03/10/17 1102 Sign Ese Whitlock RN Ordering Mode: Telephone with readback       03/10/17 1102 Release Instance Ese Whitlock RN (auto-released) Released Order: [id number]       03/10/17 1340 Acknowledge Sharla Saleh RN New Order           Order Details        Frequency Duration Priority Order Class       Until Discontinued Until Specified Routine Hospital Performed           Start Date/Time        Start Date Start Time       03/10/17 1100           Order Questions        Question Answer Comment       Supply to be applied: Nursing home orders: keep L foot/ankle elevated on pillows at all times. Wound vac management. Wound care orders also wrap with 4x4 over toe excision, wrap with kerlex, then ace wrap daily. DO NOT remove sutures.            Verbal Order Info        Action Created on Order Mode Entered by Responsible Provider Signed by Signed on       Ordering 03/10/17 1102 Telephone with readback KARLA Landry MD             Source Order Set / Preference List        Preference List       [facility] GENERAL NURSING [[id number]]           Acknowledgement Info        For At Acknowledged By Acknowledged On       Placing Order 03/10/17 1102 Sharla Saleh RN 03/10/17 1340           Reprint Order  Requisition        Apply Nursing home orders: keep L foot/ankle elevated on pillows at all times. Wound vac management. Wound care orders also wrap with 4x4 over toe excision, wrap with kerlex, then ace wrap daily. DO NOT remove sutures. Until Discontinued (Order #[id number]) on 3/10/17         Encounter-Level Cardiology Documents:        There are no encounter-level cardiology documents.         Encounter        View Encounter         Order Provider Info            Office phone Pager E-mail       Ordering User Ese Whitlock RN -- -- --       Authorizing Provider Arsalan Feliciano [phone] -- --       Attending Provider Ankush Bain [phone] -- --       Entered By Ese Whitlock RN -- -- --       Ordering Provider Arsalan Feliciano -[phone] -- --           Linked Charges        No charges linked to this procedure         Order Transmittal Tracking        Apply Nursing home orders: keep L foot/ankle elevated on pillows at all times. Wound vac management. Wound care orders also wrap with 4x4 over toe excision, wrap with kerlex, then ace wrap daily. DO NOT remove sutures. Until Discontinued (Order #[id number]) on 3/10/17         Authorized by: Arsalan Feliciano MD (NPI: [id number])

## 2017-03-12 NOTE — THERAPY DISCHARGE NOTE
Acute Care - Occupational Therapy Discharge Summary  [facility]     Patient Name: Tamara Langston  : 1955  MRN: [id number]    Today's Date: 3/12/2017  Onset of Illness/Injury or Date of Surgery Date: 17    Date of Referral to OT: 17  Referring Physician: MD Marquita      Admit Date: 2017        OT Recommendation and Plan    Visit Dx:    ICD-10-CM ICD-9-CM   1. Impaired mobility and ADLs Z74.09 799.89   2. Toe osteomyelitis, left M86.9 730.27   3. Impaired functional mobility, balance, gait, and endurance Z74.09 V49.89   4. Osteomyelitis of left foot, unspecified chronicity M86.9 730.27                     OT Goals       17 1333 17 1132 17 1423    Transfer Training OT LTG    Transfer Training OT LTG, Date Established   17  -CL    Transfer Training OT LTG, Time to Achieve   by discharge  -CL    Transfer Training OT LTG, Activity Type   bed to chair /chair to bed;sit to stand/stand to sit;toilet  -CL    Transfer Training OT LTG, [place] Level   contact guard assist  -CL    Transfer Training OT LTG, Additional Goal   AAD  -CL    Transfer Training OT LTG, Outcome goal ongoing  -JR goal ongoing  -CL     Dynamic Standing Balance OT LTG    Dynamic Standing Balance OT LTG, Date Established   17  -CL    Dynamic Standing Balance OT LTG, Time to Achieve   by discharge  -CL    Dynamic Standing Balance OT LTG, [place] Level   contact guard assist  -CL    Dynamic Standing Balance OT LTG, Assist Device   assistive Device  -CL    Dynamic Standing Balance OT LTG, Additional Goal   Pt will tolerate standing 1 min for functional activity.   -CL    Dynamic Standing Balance OT LTG, Outcome goal ongoing  -JR goal ongoing  -CL     Patient Education OT LTG    Patient Education OT LTG, Date Established   17  -CL    Patient Education OT LTG, Time to Achieve   by discharge  -CL    Patient Education OT LTG, Education Type   written program;HEP;precautions per  surgeon;positioning;posture/body mechanics;1 hand/daryn technique;home safety;adaptive equipment mgmt;energy conservation;adaptive breathing  -CL    Patient Education OT LTG, Education Understanding   verbalizes understanding;demonstrates adequately  -CL    Patient Education OT LTG Outcome goal ongoing  -JR goal ongoing  -CL goal ongoing  -CL    LB Dressing OT LTG    LB Dressing Goal OT LTG, Date Established   03/06/17  -CL    LB Dressing Goal OT LTG, Time to Achieve   by discharge  -CL    LB Dressing Goal OT LTG, Activity Type   Don pants utilizing daryn-dressing technique  -CL    LB Dressing Goal OT LTG, [place] Level   contact guard assist  -CL    LB Dressing Goal OT LTG, Additional Goal   AAD  -CL    LB Dressing Goal OT LTG, Outcome goal ongoing  -JR goal ongoing  -CL       03/01/17 0910 02/28/17 0909 02/27/17 1449    Transfer Training OT LTG    Transfer Training OT LTG, Outcome goal ongoing  -MANDO goal ongoing   pt. refused to chair today  -MANDO goal ongoing   pt. to min of 2 with NWB LLE today  -MANDO    Strength OT LTG    Strength Goal OT LTG, Outcome goal met   pt. progressed to yellow t-band  -MANDO goal ongoing   tolerated more resistance distally today  -MANDO goal ongoing   pt. began UE ROM exer today  -MANDO    LB Dressing OT LTG    LB Dressing Goal OT LTG, Outcome goal met   met for R side.  Unable to advance until wound vac off.  -MANDO goal ongoing   met socks only  -MANDO goal partially met   met R sock  -MANDO      User Key  (r) = Recorded By, (t) = Taken By, (c) = Cosigned By    Initials Name Provider Type    MANDO Blevins, OT Occupational Therapist    JR Amy Garza, OT Occupational Therapist    JAMARCUS Reed OT Occupational Therapist                Outcome Measures       03/09/17 1315 03/09/17 0840       How much help from another person do you currently need...    Turning from your back to your side while in flat bed without using bedrails?  4  -AS     Moving from lying on back to sitting on the side of a  flat bed without bedrails?  4  -AS     Moving to and from a bed to a chair (including a wheelchair)?  3  -AS     Standing up from a chair using your arms (e.g., wheelchair, bedside chair)?  3  -AS     Climbing 3-5 steps with a railing?  1  -AS     To walk in hospital room?  1  -AS     AM-PAC 6 Clicks Score  16  -AS     How much help from another is currently needed...    Putting on and taking off regular lower body clothing? 2  -JR      Bathing (including washing, rinsing, and drying) 2  -JR      Toileting (which includes using toilet bed pan or urinal) 2  -JR      Putting on and taking off regular upper body clothing 3  -JR      Taking care of personal grooming (such as brushing teeth) 4  -JR      Eating meals 4  -JR      Score 17  -JR      Functional Assessment    Outcome Measure Options AM-PAC 6 Clicks Daily Activity (OT)  -JR AM-PAC 6 Clicks Basic Mobility (PT)  -AS       User Key  (r) = Recorded By, (t) = Taken By, (c) = Cosigned By    Initials Name Provider Type    JR Amy Garza OT Occupational Therapist    AS Lauren Palacio PTA Physical Therapy Assistant              OT Discharge Summary  Anticipated Discharge Disposition: inpatient rehabilitation facility  Reason for Discharge: Discharge from facility  Outcomes Achieved: Refer to plan of care for updates on goals achieved  Discharge Destination: SNF      Amy Garza OT  3/12/2017

## 2017-03-15 DIAGNOSIS — R91.8 LUNG MASS: Primary | ICD-10-CM

## 2017-03-17 ENCOUNTER — TELEPHONE (OUTPATIENT)
Dept: CARDIAC SURGERY | Facility: CLINIC | Age: 62
End: 2017-03-17

## 2017-03-17 NOTE — TELEPHONE ENCOUNTER
TORY MRS. KOREY'S NURSE CALLED WANTING TO KNOW WHEN THEY NEEDED TO STOP HER HEPARIN. ALSO WANTED TO KNOW ABOUT HER FENTANYL PATCH AND PERCOCET?       SPOKE WITH DR. RIOS AND HE SAID TO CONTINUE THE HEPARIN WHILE SHE WAS STILL BED RIDDEN AND NON WEIGHT BARING. AS FOR THE FENTANYL PATCH AND PERCOCET HE DOESN'T HAVE THE PRIVILEGE TO WRITE FOR NARCOTICS WHILE SHE IS IN THE FACILITY.      CALLED TORY BACK AND SHE SAID THAT DR. RIOS HAD CALLED AND TALKED TO HER ABOUT MRS. NAIR.

## 2017-03-23 ENCOUNTER — OFFICE VISIT (OUTPATIENT)
Dept: CARDIAC SURGERY | Facility: CLINIC | Age: 62
End: 2017-03-23

## 2017-03-23 VITALS
BODY MASS INDEX: 33.91 KG/M2 | HEIGHT: 66 IN | OXYGEN SATURATION: 92 % | DIASTOLIC BLOOD PRESSURE: 76 MMHG | TEMPERATURE: 98.6 F | HEART RATE: 74 BPM | WEIGHT: 211 LBS | SYSTOLIC BLOOD PRESSURE: 142 MMHG

## 2017-03-23 DIAGNOSIS — I96 GANGRENE OF FOOT (HCC): ICD-10-CM

## 2017-03-23 DIAGNOSIS — I96 GANGRENE (HCC): ICD-10-CM

## 2017-03-23 DIAGNOSIS — I73.9 PERIPHERAL VASCULAR DISEASE (HCC): Primary | ICD-10-CM

## 2017-03-23 DIAGNOSIS — L97.524 FOOT ULCER, LEFT, WITH NECROSIS OF BONE (HCC): ICD-10-CM

## 2017-03-23 DIAGNOSIS — R91.1 SOLITARY PULMONARY NODULE: ICD-10-CM

## 2017-03-23 DIAGNOSIS — M86.9 OSTEOMYELITIS OF LEFT FOOT, UNSPECIFIED CHRONICITY: ICD-10-CM

## 2017-03-23 DIAGNOSIS — L97.529 TYPE 1 DIABETES MELLITUS WITH LEFT DIABETIC FOOT ULCER (HCC): ICD-10-CM

## 2017-03-23 DIAGNOSIS — E10.621 TYPE 1 DIABETES MELLITUS WITH LEFT DIABETIC FOOT ULCER (HCC): ICD-10-CM

## 2017-03-23 PROCEDURE — 99024 POSTOP FOLLOW-UP VISIT: CPT | Performed by: THORACIC SURGERY (CARDIOTHORACIC VASCULAR SURGERY)

## 2017-03-23 NOTE — PROGRESS NOTES
"Patient Information  Tamara Langston                                                                                          [address] [facility] [address]      1955  [phone]      Chief Complaint   Patient presents with   • Post-op     Hospital follow up s/p trans metatarsal resection of the second, third, fouth and fith toes 3/2/17.   • Osteomyelitis        History of Present Illness: Patient seen in the office today for follow-up of a left great toe.  Complete metatarsal amputation for recurrent osteomyelitis of the retained portion of the metatarsal bone from a prior transmetatarsal amputation of the left great toe, done in November 2016.  This procedure was done on 02/25/2017.  The amputation site of the left great toe completion removal of the entire metatarsal bone was left open with wound VAC therapy. Approximately a week following this, the patient had a transmetatarsal amputation of the second, third, fourth and fifth toes of the left foot for suspected osteomyelitis of those metatarsal bones by MRI scanning after recovery in the hospital approximately 2 weeks.  She was then referred to [facility] for IV antibiotic therapy as well as wound VAC therapy to this left great toe  amputation site.  This is her first postop visit following the above surgical procedures area to be noted that during her hospitalization here at [facility] following the amputations of the toes in February of this year.  A CT scan was done of her chest during this admission in February due to some shortness of breath and this did reveal a solitary pulmonary nodule of the right lower lobe that had not been present on a CT angiogram of the chest in February 2015.    Vitals:    03/23/17 1433   BP: 142/76   BP Location: Left arm   Pulse: 74   Temp: 98.6 °F (37 °C)   SpO2: 92%   Weight: 211 lb (95.7 kg)   Height: 66\" (167.6 cm)        Physical Exam patient is very awake and alert and voicing " no real complaints.  The wound VAC of the right left great toe amputation site was intact and I did not remove the wound VAC  The skin margins were viable.  In addition, the transmetatarsal incision of toes 2 through 5 is intact with the skin edges viable and the skin sutures are intact. She does have a palpable dorsalis pedis and posterior tibial pulse in the left foot.  It    Lab/other results: As noted above.  A CT of the chest done in March of this year while she was hospitalized for the trans-metatarsal amputation of the toes and completion amputation of the left great toe metatarsal bone revealed a right lower lobe mass measuring 1.9 x 5.4 cm .  The case was discussed at the pulmonary tumor Board conference and it was felt that this most likely represented an inflammatory process and that a repeat CAT scan of the the chest  in approximately 6 weeks to 2 months would be warranted    Assessment: 1.  Postop transmetatarsal amputation of the left great toe for osteomyelitis, done on 11/23/2016 with reinfection of the transmetatarsal amputation stump.  Necessitating a complete removal of the left great toe metatarsal bone and transmetatarsal amputation of the left second through the fifth toe   The transmetatarsal amputation incision for the second through the fifth toes of the left foot appears to be healing well and the skin sutures of this incision are intact  Currently, the patient has a wound VAC in place over the left great toe amputation site    #2.  Diabetes mellitus with severe peripheral neuropathy .  #3.  Right lower lobe pulmonary mass, inflammatory versus possibility of a neoplasm  Plan: Dr. Cruz, of infectious disease will see the patient in approximately 1 week in her office.  The nursing home facility is to continue to change the wound VAC over the left great toe amputation open wound site every 3 days and is to wash the incision of the transmetatarsal amputation of the left foot second through  the fifth toes lightly with saline daily and the entire left foot should be covered including the wound VAC with a Kerlix wrap and lightly applied Ace wrap  I will formally plan to see the patient back in one month for a follow-up visit and will probably remove the sutures of the transmetatarsal amputation incision of toes 2 through 5 of the left foot at that time.  The patient is to avoid all weightbearing to this left foot for at least another month.  The patient will need to be considered for repeat CT scan of the chest after that return visit to see me in one month in order follow-up on this right lower lobe mass.  Arsalan Feliciano M.D.

## 2017-03-31 ENCOUNTER — TELEPHONE (OUTPATIENT)
Dept: INTERNAL MEDICINE | Facility: CLINIC | Age: 62
End: 2017-03-31

## 2017-03-31 ENCOUNTER — TRANSITIONAL CARE MANAGEMENT TELEPHONE ENCOUNTER (OUTPATIENT)
Dept: INTERNAL MEDICINE | Facility: CLINIC | Age: 62
End: 2017-03-31

## 2017-03-31 NOTE — TELEPHONE ENCOUNTER
----- Message from Zonia Figueroa sent at 3/31/2017 11:04 AM EDT -----  She has been discharged from [facility] and is now seeing [facility]. Will be there 3 x a week until it's all complete     Gwen [phone]

## 2017-03-31 NOTE — OUTREACH NOTE
STANTON call completed.  Please refer to TCM call flowsheet for call documentation.  Patient will call pcp office to make follow up appointment

## 2017-04-06 DIAGNOSIS — M79.7 FIBROMYALGIA: ICD-10-CM

## 2017-04-06 DIAGNOSIS — M15.9 PRIMARY OSTEOARTHRITIS INVOLVING MULTIPLE JOINTS: ICD-10-CM

## 2017-04-06 DIAGNOSIS — M54.40 CHRONIC MIDLINE LOW BACK PAIN WITH SCIATICA, SCIATICA LATERALITY UNSPECIFIED: ICD-10-CM

## 2017-04-06 DIAGNOSIS — G89.29 CHRONIC MIDLINE LOW BACK PAIN WITH SCIATICA, SCIATICA LATERALITY UNSPECIFIED: ICD-10-CM

## 2017-04-06 RX ORDER — FENTANYL 100 UG/H
1 PATCH TRANSDERMAL EVERY OTHER DAY
Qty: 15 PATCH | Refills: 0
Start: 2017-04-06 | End: 2017-05-03 | Stop reason: SDUPTHER

## 2017-04-06 RX ORDER — OXYCODONE AND ACETAMINOPHEN 10; 325 MG/1; MG/1
1 TABLET ORAL 3 TIMES DAILY
Qty: 90 TABLET | Refills: 0
Start: 2017-04-06 | End: 2017-05-03 | Stop reason: SDUPTHER

## 2017-04-07 RX ORDER — OMEPRAZOLE 20 MG/1
CAPSULE, DELAYED RELEASE ORAL
Qty: 90 CAPSULE | Refills: 1 | Status: SHIPPED | OUTPATIENT
Start: 2017-04-07 | End: 2017-11-19 | Stop reason: SDUPTHER

## 2017-04-08 LAB
CHYMOTRYP AB SER-ACNC: NORMAL
FUNGUS WND CULT: NORMAL
NIGHT BLUE STAIN TISS: NORMAL

## 2017-04-14 LAB
MYCOBACTERIUM SPEC CULT: NORMAL
NIGHT BLUE STAIN TISS: NORMAL

## 2017-04-27 ENCOUNTER — OFFICE VISIT (OUTPATIENT)
Dept: CARDIAC SURGERY | Facility: CLINIC | Age: 62
End: 2017-04-27

## 2017-04-27 VITALS
HEIGHT: 66 IN | BODY MASS INDEX: 33.91 KG/M2 | WEIGHT: 211 LBS | DIASTOLIC BLOOD PRESSURE: 66 MMHG | SYSTOLIC BLOOD PRESSURE: 119 MMHG | TEMPERATURE: 98.3 F | OXYGEN SATURATION: 90 % | HEART RATE: 56 BPM

## 2017-04-27 DIAGNOSIS — M86.9 OSTEOMYELITIS OF LEFT FOOT, UNSPECIFIED CHRONICITY: ICD-10-CM

## 2017-04-27 DIAGNOSIS — I96 GANGRENE OF FOOT (HCC): ICD-10-CM

## 2017-04-27 DIAGNOSIS — L97.524 FOOT ULCER, LEFT, WITH NECROSIS OF BONE (HCC): Primary | ICD-10-CM

## 2017-04-27 DIAGNOSIS — E10.621 TYPE 1 DIABETES MELLITUS WITH LEFT DIABETIC FOOT ULCER (HCC): ICD-10-CM

## 2017-04-27 DIAGNOSIS — L97.529 TYPE 1 DIABETES MELLITUS WITH LEFT DIABETIC FOOT ULCER (HCC): ICD-10-CM

## 2017-04-27 PROCEDURE — 99024 POSTOP FOLLOW-UP VISIT: CPT | Performed by: THORACIC SURGERY (CARDIOTHORACIC VASCULAR SURGERY)

## 2017-04-27 RX ORDER — VARENICLINE TARTRATE 0.5 MG/1
0.5 TABLET, FILM COATED ORAL 2 TIMES DAILY
COMMUNITY
End: 2017-06-30

## 2017-04-27 NOTE — PROGRESS NOTES
"Patient Information  Tamara Langston                                                                                          [address] [facility] [address]      1955  [phone]      Chief Complaint   Patient presents with   • Follow-up     1 month follow up s/p trans metatarsal resection of second, third, fourth and fith toes 3/2/17.   • Peripheral Vascular Disease       History of Present Illness:Patient seen today in the office for follow-up of a transmetatarsal amputation of the left foot of toes 2 through 5 and completion transmetatarsal amputation of the left great toe that was done approximate 6 weeks ago.  The patient has continued to be on antibiotics per Dr. Cruz, of infectious disease and has been nonweightbearing on the left foot since the surgery in early March 2017.    Vitals:    04/27/17 1316   BP: 119/66   BP Location: Left arm   Pulse: 56   Temp: 98.3 °F (36.8 °C)   SpO2: 90%   Weight: 211 lb (95.7 kg)   Height: 66\" (167.6 cm)        Physical Exam examination of the left foot reveals the completion of the amputation of the left great toe that was left open for closure by secondary intent to almost be completelyepithelialized with only a very small granulating area remaining.  The transmetatarsal amputation of toes 2 through 5 is healing nicely and I did remove all the skin sutures.  This is been almost 7 weeks since the transmetatarsal amputation of toes 2 through 5 on the left.  I cleaned the area of the transmetatarsal amputation with saline to remove all the crusted blood that was underneath the sutures that were removed and it does appear that the skin edges have healed nicely.  I redressed entire foot using wet saline 4 x 4's over the open granulating base of the great toe amputation, as well as over the area of the transmetatarsal amputations of toes 2 through 5, 4 x 4 fluffs were utilized as well as Kerlix wrap and a #8 Spandage tube net dressing    Lab/other " results:    Assessment: Postop transmetatarsal amputation of toes 2 through 5 of the left foot, done on 03/02/2017 with good wound healing of this amputation site.  #2.  Postop transmetatarsal amputation completion of the left great toe that initially was done on 11/23/2016 with complete removal of the metatarsal bone of the reinfected stump of the amputation site, done February 25 2017.  #3.  Right lower lobe pulmonary mass discovered on a recent CAT scan of the chest, felt to possibly be inflammatory.      Plan: I am going to plan to see the patient back in one month for follow-up visit to see if the transmetatarsal amputation is completely healed.  I also need to be sure that I schedule her for a CT scan of the chest, noncontrast follow-up on this pulmonary mass of the right lower lobe that was found [facility], for the left foot transmetatarsal amputation of toes 2 through 5 and the reinfection of her previous left great toe transmetatarsal amputation.  The patient is to continue nonweightbearing of this left foot at all times    Arsalan Feliciano M.D.

## 2017-05-03 DIAGNOSIS — M54.40 CHRONIC MIDLINE LOW BACK PAIN WITH SCIATICA, SCIATICA LATERALITY UNSPECIFIED: ICD-10-CM

## 2017-05-03 DIAGNOSIS — G89.29 CHRONIC MIDLINE LOW BACK PAIN WITH SCIATICA, SCIATICA LATERALITY UNSPECIFIED: ICD-10-CM

## 2017-05-03 DIAGNOSIS — M15.9 PRIMARY OSTEOARTHRITIS INVOLVING MULTIPLE JOINTS: ICD-10-CM

## 2017-05-03 DIAGNOSIS — M79.7 FIBROMYALGIA: ICD-10-CM

## 2017-05-03 RX ORDER — FENTANYL 100 UG/H
1 PATCH TRANSDERMAL EVERY OTHER DAY
Qty: 15 PATCH | Refills: 0 | Status: SHIPPED | OUTPATIENT
Start: 2017-05-03 | End: 2017-06-01 | Stop reason: SDUPTHER

## 2017-05-03 RX ORDER — OXYCODONE AND ACETAMINOPHEN 10; 325 MG/1; MG/1
1 TABLET ORAL 3 TIMES DAILY
Qty: 90 TABLET | Refills: 0 | Status: SHIPPED | OUTPATIENT
Start: 2017-05-03 | End: 2017-06-01 | Stop reason: SDUPTHER

## 2017-05-09 ENCOUNTER — HOSPITAL ENCOUNTER (OUTPATIENT)
Dept: CT IMAGING | Facility: HOSPITAL | Age: 62
Discharge: HOME | End: 2017-05-09
Admitting: PHYSICIAN ASSISTANT

## 2017-05-09 DIAGNOSIS — R91.8 LUNG MASS: ICD-10-CM

## 2017-05-09 LAB — CREAT BLDA-MCNC: 1.6 MG/DL (ref 0.6–1.3)

## 2017-05-09 PROCEDURE — 71250 CT THORAX DX C-: CPT

## 2017-05-09 PROCEDURE — 82565 ASSAY OF CREATININE: CPT

## 2017-05-10 RX ORDER — CLONAZEPAM 0.5 MG/1
TABLET ORAL
Qty: 90 TABLET | Refills: 0 | OUTPATIENT
Start: 2017-05-10 | End: 2017-11-27 | Stop reason: SDUPTHER

## 2017-05-25 ENCOUNTER — OFFICE VISIT (OUTPATIENT)
Dept: CARDIAC SURGERY | Facility: CLINIC | Age: 62
End: 2017-05-25

## 2017-05-25 VITALS
DIASTOLIC BLOOD PRESSURE: 40 MMHG | OXYGEN SATURATION: 86 % | HEIGHT: 66 IN | SYSTOLIC BLOOD PRESSURE: 81 MMHG | HEART RATE: 66 BPM | BODY MASS INDEX: 33.91 KG/M2 | WEIGHT: 211 LBS

## 2017-05-25 DIAGNOSIS — I96 GANGRENE (HCC): ICD-10-CM

## 2017-05-25 DIAGNOSIS — L97.524 FOOT ULCER, LEFT, WITH NECROSIS OF BONE (HCC): Primary | ICD-10-CM

## 2017-05-25 PROCEDURE — 99024 POSTOP FOLLOW-UP VISIT: CPT | Performed by: THORACIC SURGERY (CARDIOTHORACIC VASCULAR SURGERY)

## 2017-05-26 RX ORDER — BUSPIRONE HYDROCHLORIDE 10 MG/1
TABLET ORAL
Qty: 360 TABLET | Refills: 1 | Status: SHIPPED | OUTPATIENT
Start: 2017-05-26 | End: 2017-11-27 | Stop reason: SDUPTHER

## 2017-06-01 ENCOUNTER — OFFICE VISIT (OUTPATIENT)
Dept: INTERNAL MEDICINE | Facility: CLINIC | Age: 62
End: 2017-06-01

## 2017-06-01 VITALS — SYSTOLIC BLOOD PRESSURE: 90 MMHG | HEIGHT: 66 IN | HEART RATE: 72 BPM | DIASTOLIC BLOOD PRESSURE: 40 MMHG

## 2017-06-01 DIAGNOSIS — R00.1 SINUS BRADYCARDIA: ICD-10-CM

## 2017-06-01 DIAGNOSIS — E53.8 COBALAMIN DEFICIENCY: ICD-10-CM

## 2017-06-01 DIAGNOSIS — I73.9 PERIPHERAL VASCULAR DISEASE (HCC): ICD-10-CM

## 2017-06-01 DIAGNOSIS — D45 POLYCYTHEMIA VERA (HCC): ICD-10-CM

## 2017-06-01 DIAGNOSIS — F11.90 CHRONIC, CONTINUOUS USE OF OPIOIDS: ICD-10-CM

## 2017-06-01 DIAGNOSIS — E55.9 VITAMIN D DEFICIENCY: ICD-10-CM

## 2017-06-01 DIAGNOSIS — I10 ESSENTIAL HYPERTENSION: ICD-10-CM

## 2017-06-01 DIAGNOSIS — F41.8 MIXED ANXIETY DEPRESSIVE DISORDER: ICD-10-CM

## 2017-06-01 DIAGNOSIS — G89.29 CHRONIC MIDLINE LOW BACK PAIN WITH SCIATICA, SCIATICA LATERALITY UNSPECIFIED: ICD-10-CM

## 2017-06-01 DIAGNOSIS — H34.8392 BRANCH RETINAL VEIN OCCLUSION: Primary | ICD-10-CM

## 2017-06-01 DIAGNOSIS — K76.0 FATTY LIVER DISEASE, NONALCOHOLIC: Chronic | ICD-10-CM

## 2017-06-01 DIAGNOSIS — M15.9 PRIMARY OSTEOARTHRITIS INVOLVING MULTIPLE JOINTS: ICD-10-CM

## 2017-06-01 DIAGNOSIS — M54.40 CHRONIC LOW BACK PAIN WITH SCIATICA, SCIATICA LATERALITY UNSPECIFIED, UNSPECIFIED BACK PAIN LATERALITY: ICD-10-CM

## 2017-06-01 DIAGNOSIS — I21.4 NSTEMI (NON-ST ELEVATED MYOCARDIAL INFARCTION) (HCC): ICD-10-CM

## 2017-06-01 DIAGNOSIS — Z72.0 TOBACCO ABUSE: ICD-10-CM

## 2017-06-01 DIAGNOSIS — D50.0 ANEMIA, BLOOD LOSS: ICD-10-CM

## 2017-06-01 DIAGNOSIS — M79.7 FIBROMYALGIA: ICD-10-CM

## 2017-06-01 DIAGNOSIS — J30.9 ATOPIC RHINITIS: ICD-10-CM

## 2017-06-01 DIAGNOSIS — E78.5 DYSLIPIDEMIA: ICD-10-CM

## 2017-06-01 DIAGNOSIS — G47.33 OBSTRUCTIVE SLEEP APNEA SYNDROME: ICD-10-CM

## 2017-06-01 DIAGNOSIS — E11.42 DIABETIC POLYNEUROPATHY ASSOCIATED WITH TYPE 2 DIABETES MELLITUS (HCC): ICD-10-CM

## 2017-06-01 DIAGNOSIS — M54.40 CHRONIC MIDLINE LOW BACK PAIN WITH SCIATICA, SCIATICA LATERALITY UNSPECIFIED: ICD-10-CM

## 2017-06-01 DIAGNOSIS — G89.29 OTHER CHRONIC PAIN: ICD-10-CM

## 2017-06-01 DIAGNOSIS — R74.8 ABNORMAL LIVER ENZYMES: ICD-10-CM

## 2017-06-01 DIAGNOSIS — G89.29 CHRONIC LOW BACK PAIN WITH SCIATICA, SCIATICA LATERALITY UNSPECIFIED, UNSPECIFIED BACK PAIN LATERALITY: ICD-10-CM

## 2017-06-01 DIAGNOSIS — E03.8 OTHER SPECIFIED HYPOTHYROIDISM: ICD-10-CM

## 2017-06-01 DIAGNOSIS — Z79.899 CHRONICALLY ON BENZODIAZEPINE THERAPY: ICD-10-CM

## 2017-06-01 DIAGNOSIS — J43.9 PULMONARY EMPHYSEMA, UNSPECIFIED EMPHYSEMA TYPE (HCC): ICD-10-CM

## 2017-06-01 DIAGNOSIS — F41.9 CHRONIC ANXIETY: ICD-10-CM

## 2017-06-01 DIAGNOSIS — K21.9 GASTROESOPHAGEAL REFLUX DISEASE WITHOUT ESOPHAGITIS: ICD-10-CM

## 2017-06-01 DIAGNOSIS — E11.10 TYPE 2 DIABETES MELLITUS WITH KETOACIDOSIS WITHOUT COMA, WITHOUT LONG-TERM CURRENT USE OF INSULIN (HCC): ICD-10-CM

## 2017-06-01 DIAGNOSIS — G47.00 INSOMNIA, UNSPECIFIED TYPE: ICD-10-CM

## 2017-06-01 DIAGNOSIS — G25.0 ESSENTIAL TREMOR: ICD-10-CM

## 2017-06-01 DIAGNOSIS — M81.0 OSTEOPOROSIS: ICD-10-CM

## 2017-06-01 PROCEDURE — 99214 OFFICE O/P EST MOD 30 MIN: CPT | Performed by: INTERNAL MEDICINE

## 2017-06-01 RX ORDER — FENTANYL 100 UG/H
1 PATCH TRANSDERMAL EVERY OTHER DAY
Qty: 15 PATCH | Refills: 0 | Status: SHIPPED | OUTPATIENT
Start: 2017-06-01 | End: 2017-06-30 | Stop reason: SDUPTHER

## 2017-06-01 RX ORDER — OXYCODONE AND ACETAMINOPHEN 10; 325 MG/1; MG/1
1 TABLET ORAL 3 TIMES DAILY
Qty: 90 TABLET | Refills: 0 | Status: SHIPPED | OUTPATIENT
Start: 2017-06-01 | End: 2017-06-30 | Stop reason: SDUPTHER

## 2017-06-01 RX ORDER — METOCLOPRAMIDE 10 MG/1
TABLET ORAL
COMMUNITY
Start: 2017-05-28 | End: 2017-06-01

## 2017-06-01 RX ORDER — BACLOFEN 10 MG/1
10 TABLET ORAL 2 TIMES DAILY PRN
Qty: 180 TABLET | Refills: 3 | OUTPATIENT
Start: 2017-06-01 | End: 2018-03-20 | Stop reason: HOSPADM

## 2017-06-01 NOTE — PROGRESS NOTES
Patient is a 61 y.o. female who is here for a follow up of chronic conditions.  Chief Complaint   Patient presents with   • Hypertension         HPI:  Here for f/u.  Has had lengthy rehab sec to left toes osteo requiring transmetarsal amputation.  Is now 3 weeks off tobacco.  Off all antibiotics.  Fatigued.  Sleep is good.  Appetite is good.  Some cold intolerance.  Still nonweightbearing    History:    Patient Active Problem List   Diagnosis   • Atopic rhinitis   • Restrictive ventilatory defect   • COPD (chronic obstructive pulmonary disease)   • Osteoporosis   • Obstructive sleep apnea syndrome   • Abnormal liver enzymes   • Cholelithiasis   • Chronic back pain   • Mixed anxiety depressive disorder   • Type 2 diabetes mellitus   • Dyslipidemia   • Essential tremor   • Fibromyalgia   • Gastroesophageal reflux disease without esophagitis   • Hypertension   • Hypothyroidism   • Insomnia   • Osteoarthritis   • Polycythemia vera   • Psoriasis   • Branch retinal vein occlusion   • Cobalamin deficiency   • Obesity   • Vitamin D deficiency   • Hypokalemia   • Lactic acidosis   • Fatty liver disease, nonalcoholic   • Sinus bradycardia   • NSTEMI (non-ST elevated myocardial infarction)   • Tobacco abuse   • Hypoxia   • Peripheral vascular disease   • Osteomyelitis of left foot   • Diabetic polyneuropathy associated with type 2 diabetes mellitus   • Anemia, blood loss   • Chronic pain   • Chronic, continuous use of opioids   • Chronic anxiety   • Chronically on benzodiazepine therapy       Past Medical History:   Diagnosis Date   • Bronchitis    • Cervical cancer    • Cholelithiasis 5/11/2016   • Chronic anxiety 2/27/2017   • Chronic bronchitis    • Chronically on benzodiazepine therapy 2/27/2017   • Degenerative arthritis    • Diabetes mellitus    • Dyslipidemia 5/11/2016   • Dyspnea    • Fatty liver disease, nonalcoholic 11/21/2016   • Fibromyalgia    • GERD (gastroesophageal reflux disease)    • H/O echocardiogram    •  History of pneumonia    • Hypertension 5/11/2016    16. H/O echocardiogram (V15.89) (Z92.89)  · A.  Echocardiogram of 02/03/2015 reports an ejection fraction of 60-65%, mild concentric     LVH, trace mitral regurgitation, mild tricuspid and pulmonic regurgitation and calculated     RVSP of 35 mmHg, the main pulmonary artery is also mildly dilated.   • Hypothyroidism 5/11/2016    Description: A.  On replacement therapy.   • Nausea    • Obesity    • DINA (obstructive sleep apnea)     intolerant of CPAP therapy   • Osteoporosis    • Osteoporosis    • Polycythemia vera 5/11/2016   • Pulmonary emphysema    • Restrictive ventilatory defect    • Rhinitis    • Uncontrolled diabetes mellitus 5/11/2016   • Uterine cancer    • Vitamin D deficiency 8/1/2016       Past Surgical History:   Procedure Laterality Date   • AMPUTATION DIGIT Left 11/23/2016    Procedure: AMPUTATION TRANS METATARSAL - ray amutation of the left great toe;  Surgeon: Arsalan Feliciano MD;  Location:  [facility] OR;  Service:    • AMPUTATION DIGIT Left 3/2/2017    Procedure: LEFT FOOT TRANSMETATARSAL AMPUTATION TOES 2,3,4,5;  Surgeon: Joey Guaman MD;  Location:  [facility] OR;  Service:    • BACK SURGERY      lumbar fusions x5--multiple times; 1995, 1997, 1998, 1999 and 2008   • BELOW KNEE AMPUTATION Left 2/25/2017    Procedure: EXTENDED LEFT TOE AMPUTATION;  Surgeon: Arsalan Feliciano MD;  Location:  [facility] OR;  Service:    • CARDIAC CATHETERIZATION N/A 11/26/2016    Procedure: Left Heart Cath;  Surgeon: Ash Nuñez MD;  Location:  [facility] CATH INVASIVE LOCATION;  Service:    • HAND SURGERY Bilateral     x3   • HYSTERECTOMY      status post uterine and cervical cancer   • LUMBAR SPINE SURGERY      arthrodesis by anterior approach addit interspace   • TONSILLECTOMY         Current Outpatient Prescriptions on File Prior to Visit   Medication Sig   • acetaminophen (TYLENOL) 325 MG tablet Take 2 tablets by mouth Every 4 (Four) Hours As Needed for mild pain (1-3) or fever  (temperature greater than 101F).   • albuterol (PROVENTIL) (2.5 MG/3ML) 0.083% nebulizer solution 2.5 mg Every 6 (Six) Hours As Needed.   • amLODIPine (NORVASC) 5 MG tablet Take 1 tablet by mouth Daily.   • aspirin  MG EC tablet Take 1 tablet by mouth Daily.   • atorvastatin (LIPITOR) 40 MG tablet Take 1 tablet by mouth Every Night.   • busPIRone (BUSPAR) 10 MG tablet TAKE TWO TABLETS BY MOUTH TWICE A DAY   • cetirizine (ZyrTEC) 10 MG tablet Take 10 mg by mouth Every Night.   • clonazePAM (KlonoPIN) 0.5 MG tablet TAKE ONE-HALF TABLET BY MOUTH TWICE A DAY AS NEEDED   • clopidogrel (PLAVIX) 75 MG tablet Take 1 tablet by mouth Daily.   • ezetimibe (ZETIA) 10 MG tablet Take 1 tablet by mouth Daily.   • fluticasone (FLONASE) 50 MCG/ACT nasal spray 1 spray into each nostril 2 (Two) Times a Day.   • furosemide (LASIX) 40 MG tablet Take 1 tablet by mouth Daily.   • hydrocortisone 1 % cream Apply  topically Every 12 (Twelve) Hours.   • levothyroxine (SYNTHROID, LEVOTHROID) 112 MCG tablet TAKE ONE TABLET BY MOUTH DAILY   • lisinopril (PRINIVIL,ZESTRIL) 20 MG tablet Take 1 tablet by mouth Daily.   • LYRICA 100 MG capsule TAKE ONE CAPSULE BY MOUTH THREE TIMES A DAY   • melatonin 5 MG sublingual tablet sublingual tablet Place 1 tablet under the tongue At Night As Needed (insomnia).   • metoclopramide (REGLAN) 5 MG tablet Take 1 tablet by mouth 3 (Three) Times a Day Before Meals.   • nicotine (NICODERM CQ) 21 MG/24HR patch Place 1 patch on the skin Daily.   • omeprazole (priLOSEC) 20 MG capsule TAKE ONE CAPSULE BY MOUTH DAILY   • probiotic (CULTURELLE) capsule capsule Take 1 capsule by mouth Daily.   • promethazine (PHENERGAN) 25 MG tablet Take 25 mg by mouth Every 8 (Eight) Hours As Needed for nausea or vomiting.   • SPIRIVA RESPIMAT 2.5 MCG/ACT aerosol solution INHALE TWO PUFF(S) BY MOUTH DAILY   • insulin detemir (LEVEMIR) 100 UNIT/ML injection Inject 10 Units under the skin Every 12 (Twelve) Hours.   • insulin lispro  (humaLOG) 100 UNIT/ML injection Inject 2-7 Units under the skin 4 (Four) Times a Day Before Meals & at Bedtime.   • varenicline (CHANTIX) 0.5 MG tablet Take 0.5 mg by mouth 2 (Two) Times a Day.     No current facility-administered medications on file prior to visit.        Family History   Problem Relation Age of Onset   • Arthritis Mother    • Diabetes Mother    • Colon polyps Mother    • Diverticulitis Mother    • Arthritis Father    • Bleeding Disorder Father    • Diabetes Father    • Kidney disease Father    • COPD Sister      currently smokes   • Arthritis Brother    • Diabetes Brother    • Alcohol abuse Brother    • Heart murmur Daughter    • Arthritis Other    • Diabetes Other        Social History     Social History   • Marital status:      Spouse name: N/A   • Number of children: 1   • Years of education: N/A     Occupational History   •  Disabled     Social History Main Topics   • Smoking status: Current Every Day Smoker     Packs/day: 0.50     Years: 48.00     Types: Cigarettes   • Smokeless tobacco: Never Used      Comment: currently trying to quit by using Chantix and has cut smoking habit in half, Pt now smoking half a pack a day    • Alcohol use No   • Drug use: No   • Sexual activity: Not on file     Other Topics Concern   • Not on file     Social History Narrative    Lives with her  in [place]         ROS:    Review of Systems   Constitutional: Positive for fatigue. Negative for chills, diaphoresis, fever and unexpected weight change.   HENT: Negative for congestion, ear pain, hearing loss, nosebleeds, postnasal drip, sinus pressure and sore throat.    Eyes: Negative for pain, discharge and itching.   Respiratory: Positive for cough and shortness of breath. Negative for chest tightness and wheezing.    Cardiovascular: Negative for chest pain, palpitations and leg swelling.   Gastrointestinal: Positive for abdominal pain, diarrhea and nausea. Negative for abdominal distention, blood  "in stool, constipation and vomiting.   Endocrine: Negative for heat intolerance, polydipsia and polyuria.   Genitourinary: Negative for difficulty urinating, dysuria, frequency and hematuria.   Musculoskeletal: Positive for arthralgias and back pain. Negative for gait problem, joint swelling and myalgias.   Skin: Positive for rash and wound.   Neurological: Positive for weakness and light-headedness. Negative for dizziness, syncope and headaches.   Psychiatric/Behavioral: Positive for dysphoric mood and sleep disturbance. The patient is nervous/anxious.        BP 90/40  Pulse 72  Ht 66\" (167.6 cm)  LMP  (LMP Unknown)    Physical Exam:    Physical Exam   Constitutional: She is oriented to person, place, and time. She appears well-developed and well-nourished.   HENT:   Head: Normocephalic and atraumatic.   Right Ear: External ear normal.   Left Ear: External ear normal.   Mouth/Throat: Oropharynx is clear and moist.   Eyes: Conjunctivae and EOM are normal.   Neck: Normal range of motion. Neck supple.   Cardiovascular: Normal rate and regular rhythm.    2/6 СЕРГЕЙ   Pulmonary/Chest: Effort normal.   Mildly diminished   Abdominal: Soft. Bowel sounds are normal.   Musculoskeletal: She exhibits edema and tenderness (pain on back flexion past 30 ).   Using wheelchair   Lymphadenopathy:     She has no cervical adenopathy.   Neurological: She is alert and oriented to person, place, and time.   Skin: Skin is warm and dry. No rash noted.   Left foot wound not examined   Psychiatric: She has a normal mood and affect. Her behavior is normal. Thought content normal.       Procedure:      Discussion/Summary:  chronic back pain- refill patch and percocet as needed  htn-stop the ziac  fibromylagia- cont current tx  hyperlipidemia-cont lipitor and zetia, labs today  hypothroid-cont replacement  depression with anxiety-cont buspar and klonopine ,advised her of risk of addiction  polycythemia-cbc today  retinal vein occlusion- cont rf " modification  b12 def-admin today and check level  nausea-phenergan prn, cont reglan, advised of EPS effects and she has agreed  DM-labs noted, counseled on low carb, recheck labs today, cont januvia  diaphoresis-better  Elevated lft/?autoimmune-f/u gastro recs , labs today  Eczema-cont prn steroids  Left foot ulcer and osteo-resolved  FE def anemia-start FE  high risk meds-labs today      labs noted and dw patient      Current Outpatient Prescriptions:   •  acetaminophen (TYLENOL) 325 MG tablet, Take 2 tablets by mouth Every 4 (Four) Hours As Needed for mild pain (1-3) or fever (temperature greater than 101F)., Disp: 100 tablet, Rfl: 0  •  albuterol (PROVENTIL) (2.5 MG/3ML) 0.083% nebulizer solution, 2.5 mg Every 6 (Six) Hours As Needed., Disp: , Rfl:   •  amLODIPine (NORVASC) 5 MG tablet, Take 1 tablet by mouth Daily., Disp: 90 tablet, Rfl: 3  •  aspirin  MG EC tablet, Take 1 tablet by mouth Daily., Disp: 100 tablet, Rfl: 0  •  atorvastatin (LIPITOR) 40 MG tablet, Take 1 tablet by mouth Every Night., Disp: 90 tablet, Rfl: 3  •  busPIRone (BUSPAR) 10 MG tablet, TAKE TWO TABLETS BY MOUTH TWICE A DAY, Disp: 360 tablet, Rfl: 1  •  cetirizine (ZyrTEC) 10 MG tablet, Take 10 mg by mouth Every Night., Disp: , Rfl:   •  clonazePAM (KlonoPIN) 0.5 MG tablet, TAKE ONE-HALF TABLET BY MOUTH TWICE A DAY AS NEEDED, Disp: 90 tablet, Rfl: 0  •  clopidogrel (PLAVIX) 75 MG tablet, Take 1 tablet by mouth Daily., Disp: 90 tablet, Rfl: 3  •  ezetimibe (ZETIA) 10 MG tablet, Take 1 tablet by mouth Daily., Disp: 90 tablet, Rfl: 3  •  fentaNYL (DURAGESIC) 100 MCG/HR patch, Place 1 patch on the skin Every Other Day., Disp: 15 patch, Rfl: 0  •  fluticasone (FLONASE) 50 MCG/ACT nasal spray, 1 spray into each nostril 2 (Two) Times a Day., Disp: , Rfl:   •  furosemide (LASIX) 40 MG tablet, Take 1 tablet by mouth Daily., Disp: 60 tablet, Rfl: 5  •  hydrocortisone 1 % cream, Apply  topically Every 12 (Twelve) Hours., Disp: , Rfl: 0  •   levothyroxine (SYNTHROID, LEVOTHROID) 112 MCG tablet, TAKE ONE TABLET BY MOUTH DAILY, Disp: 90 tablet, Rfl: 0  •  lisinopril (PRINIVIL,ZESTRIL) 20 MG tablet, Take 1 tablet by mouth Daily., Disp: 90 tablet, Rfl: 3  •  LYRICA 100 MG capsule, TAKE ONE CAPSULE BY MOUTH THREE TIMES A DAY, Disp: 270 capsule, Rfl: 0  •  melatonin 5 MG sublingual tablet sublingual tablet, Place 1 tablet under the tongue At Night As Needed (insomnia)., Disp: 30 tablet, Rfl: 0  •  metoclopramide (REGLAN) 5 MG tablet, Take 1 tablet by mouth 3 (Three) Times a Day Before Meals., Disp: , Rfl:   •  nicotine (NICODERM CQ) 21 MG/24HR patch, Place 1 patch on the skin Daily., Disp: , Rfl:   •  omeprazole (priLOSEC) 20 MG capsule, TAKE ONE CAPSULE BY MOUTH DAILY, Disp: 90 capsule, Rfl: 1  •  oxyCODONE-acetaminophen (PERCOCET)  MG per tablet, Take 1 tablet by mouth 3 (Three) Times a Day., Disp: 90 tablet, Rfl: 0  •  probiotic (CULTURELLE) capsule capsule, Take 1 capsule by mouth Daily., Disp: 60 capsule, Rfl: 0  •  promethazine (PHENERGAN) 25 MG tablet, Take 25 mg by mouth Every 8 (Eight) Hours As Needed for nausea or vomiting., Disp: , Rfl:   •  SPIRIVA RESPIMAT 2.5 MCG/ACT aerosol solution, INHALE TWO PUFF(S) BY MOUTH DAILY, Disp: 1 inhaler, Rfl: 4  •  baclofen (LIORESAL) 10 MG tablet, Take 1 tablet by mouth 2 (Two) Times a Day As Needed for Muscle Spasms., Disp: 180 tablet, Rfl: 3  •  ferrous sulfate 325 (65 FE) MG EC tablet, Take 1 tablet by mouth 3 (Three) Times a Day With Meals., Disp: 90 tablet, Rfl: 5  •  insulin detemir (LEVEMIR) 100 UNIT/ML injection, Inject 10 Units under the skin Every 12 (Twelve) Hours., Disp: , Rfl: 12  •  insulin lispro (humaLOG) 100 UNIT/ML injection, Inject 2-7 Units under the skin 4 (Four) Times a Day Before Meals & at Bedtime., Disp: , Rfl: 12  •  varenicline (CHANTIX) 0.5 MG tablet, Take 0.5 mg by mouth 2 (Two) Times a Day., Disp: , Rfl:         Tamara was seen today for hypertension.    Diagnoses and all orders  for this visit:    Branch retinal vein occlusion    Essential hypertension    NSTEMI (non-ST elevated myocardial infarction)    Peripheral vascular disease    Sinus bradycardia    Atopic rhinitis    Pulmonary emphysema, unspecified emphysema type    Obstructive sleep apnea syndrome    Cobalamin deficiency    Fatty liver disease, nonalcoholic  -     Comprehensive Metabolic Panel    Gastroesophageal reflux disease without esophagitis    Vitamin D deficiency    Diabetic polyneuropathy associated with type 2 diabetes mellitus    Other specified hypothyroidism  -     TSH    Chronic low back pain with sciatica, sciatica laterality unspecified, unspecified back pain laterality    Type 2 diabetes mellitus with ketoacidosis without coma, without long-term current use of insulin  -     Hemoglobin A1c  -     Lipid Panel    Essential tremor    Fibromyalgia  -     fentaNYL (DURAGESIC) 100 MCG/HR patch; Place 1 patch on the skin Every Other Day.  -     oxyCODONE-acetaminophen (PERCOCET)  MG per tablet; Take 1 tablet by mouth 3 (Three) Times a Day.    Primary osteoarthritis involving multiple joints  -     fentaNYL (DURAGESIC) 100 MCG/HR patch; Place 1 patch on the skin Every Other Day.  -     oxyCODONE-acetaminophen (PERCOCET)  MG per tablet; Take 1 tablet by mouth 3 (Three) Times a Day.    Osteoporosis    Anemia, blood loss  -     CBC (No Diff)  -     Iron  -     Vitamin B12  -     ferrous sulfate 325 (65 FE) MG EC tablet; Take 1 tablet by mouth 3 (Three) Times a Day With Meals.    Polycythemia vera    Abnormal liver enzymes    Chronic anxiety    Other chronic pain    Chronic, continuous use of opioids    Chronically on benzodiazepine therapy    Dyslipidemia  -     Comprehensive Metabolic Panel    Insomnia, unspecified type    Mixed anxiety depressive disorder    Tobacco abuse    Chronic midline low back pain with sciatica, sciatica laterality unspecified  -     baclofen (LIORESAL) 10 MG tablet; Take 1 tablet by  mouth 2 (Two) Times a Day As Needed for Muscle Spasms.  -     oxyCODONE-acetaminophen (PERCOCET)  MG per tablet; Take 1 tablet by mouth 3 (Three) Times a Day.

## 2017-06-05 LAB
ALBUMIN SERPL-MCNC: 3.6 G/DL (ref 3.2–4.8)
ALBUMIN/GLOB SERPL: 1.3 G/DL (ref 1.5–2.5)
ALP SERPL-CCNC: 62 U/L (ref 25–100)
ALT SERPL-CCNC: 20 U/L (ref 7–40)
AST SERPL-CCNC: 36 U/L (ref 0–33)
BILIRUB SERPL-MCNC: 0.1 MG/DL (ref 0.3–1.2)
BUN SERPL-MCNC: 29 MG/DL (ref 9–23)
BUN/CREAT SERPL: 18.1 (ref 7–25)
CALCIUM SERPL-MCNC: 9.6 MG/DL (ref 8.7–10.4)
CHLORIDE SERPL-SCNC: 103 MMOL/L (ref 99–109)
CHOLEST SERPL-MCNC: 143 MG/DL (ref 0–200)
CO2 SERPL-SCNC: 34 MMOL/L (ref 20–31)
CREAT SERPL-MCNC: 1.6 MG/DL (ref 0.6–1.3)
ERYTHROCYTE [DISTWIDTH] IN BLOOD BY AUTOMATED COUNT: 17.2 % (ref 11.3–14.5)
GLOBULIN SER CALC-MCNC: 2.8 GM/DL
GLUCOSE SERPL-MCNC: 117 MG/DL (ref 70–100)
HCT VFR BLD AUTO: 22.7 % (ref 34.5–44)
HDLC SERPL-MCNC: 34 MG/DL (ref 40–60)
HGB BLD-MCNC: 6.6 G/DL (ref 11.5–15.5)
IRON SERPL-MCNC: 11 MCG/DL (ref 50–175)
LDLC SERPL CALC-MCNC: 48 MG/DL (ref 0–100)
MCH RBC QN AUTO: 26.9 PG (ref 27–31)
MCHC RBC AUTO-ENTMCNC: 29.1 G/DL (ref 32–36)
MCV RBC AUTO: 92.7 FL (ref 80–99)
PLATELET # BLD AUTO: 294 10*3/MM3 (ref 150–450)
POTASSIUM SERPL-SCNC: 4.8 MMOL/L (ref 3.5–5.5)
PROT SERPL-MCNC: 6.4 G/DL (ref 5.7–8.2)
RBC # BLD AUTO: 2.45 10*6/MM3 (ref 3.89–5.14)
SODIUM SERPL-SCNC: 140 MMOL/L (ref 132–146)
TRIGL SERPL-MCNC: 303 MG/DL (ref 0–150)
TSH SERPL DL<=0.005 MIU/L-ACNC: 2.02 MIU/ML (ref 0.35–5.35)
VIT B12 SERPL-MCNC: >2000 PG/ML (ref 211–911)
VLDLC SERPL CALC-MCNC: 60.6 MG/DL
WBC # BLD AUTO: 12.16 10*3/MM3 (ref 3.5–10.8)

## 2017-06-05 RX ORDER — LANOLIN ALCOHOL/MO/W.PET/CERES
325 CREAM (GRAM) TOPICAL
Qty: 90 TABLET | Refills: 5 | Status: SHIPPED | OUTPATIENT
Start: 2017-06-05 | End: 2018-02-13

## 2017-06-06 LAB — HBA1C MFR BLD: 6 % (ref 4.8–5.6)

## 2017-06-08 RX ORDER — FUROSEMIDE 40 MG/1
TABLET ORAL
Qty: 180 TABLET | Refills: 2 | Status: SHIPPED | OUTPATIENT
Start: 2017-06-08 | End: 2018-02-27 | Stop reason: HOSPADM

## 2017-06-12 ENCOUNTER — TELEPHONE (OUTPATIENT)
Dept: INTERNAL MEDICINE | Facility: CLINIC | Age: 62
End: 2017-06-12

## 2017-06-12 NOTE — TELEPHONE ENCOUNTER
PLEASE CALL JOHN ABOUT HER BLOOD PRESSURE. EVEN THOUGH WE TOOK HER OFF ONE OF HER B/P MEDS. HERB/P IS STILL LOW. CALL HER [phone]

## 2017-06-12 NOTE — TELEPHONE ENCOUNTER
Patient sts last time she was here her blood pressure was low and you stoped one of her meds. She said she is feeling dizzy and she had her  to check her bp and its 98/55. Asking if you want to make any adjustments or wait until she comes in.         Tell her to cut back the norvasc to 1/2        Patient advised and verbalized she understood. nh

## 2017-06-20 RX ORDER — LEVOTHYROXINE SODIUM 112 UG/1
TABLET ORAL
Qty: 90 TABLET | Refills: 2 | Status: SHIPPED | OUTPATIENT
Start: 2017-06-20 | End: 2018-05-30 | Stop reason: SDUPTHER

## 2017-06-30 ENCOUNTER — OFFICE VISIT (OUTPATIENT)
Dept: INTERNAL MEDICINE | Facility: CLINIC | Age: 62
End: 2017-06-30

## 2017-06-30 VITALS — SYSTOLIC BLOOD PRESSURE: 96 MMHG | HEART RATE: 88 BPM | HEIGHT: 66 IN | DIASTOLIC BLOOD PRESSURE: 60 MMHG

## 2017-06-30 DIAGNOSIS — R00.1 SINUS BRADYCARDIA: ICD-10-CM

## 2017-06-30 DIAGNOSIS — G25.0 ESSENTIAL TREMOR: ICD-10-CM

## 2017-06-30 DIAGNOSIS — Z72.0 TOBACCO ABUSE: ICD-10-CM

## 2017-06-30 DIAGNOSIS — G47.00 INSOMNIA, UNSPECIFIED TYPE: ICD-10-CM

## 2017-06-30 DIAGNOSIS — I10 ESSENTIAL HYPERTENSION: ICD-10-CM

## 2017-06-30 DIAGNOSIS — I73.9 PERIPHERAL VASCULAR DISEASE (HCC): ICD-10-CM

## 2017-06-30 DIAGNOSIS — K21.9 GASTROESOPHAGEAL REFLUX DISEASE WITHOUT ESOPHAGITIS: ICD-10-CM

## 2017-06-30 DIAGNOSIS — M15.9 PRIMARY OSTEOARTHRITIS INVOLVING MULTIPLE JOINTS: ICD-10-CM

## 2017-06-30 DIAGNOSIS — J43.8 OTHER EMPHYSEMA (HCC): ICD-10-CM

## 2017-06-30 DIAGNOSIS — D50.0 ANEMIA, BLOOD LOSS: ICD-10-CM

## 2017-06-30 DIAGNOSIS — J30.9 ATOPIC RHINITIS: ICD-10-CM

## 2017-06-30 DIAGNOSIS — K76.0 FATTY LIVER DISEASE, NONALCOHOLIC: Chronic | ICD-10-CM

## 2017-06-30 DIAGNOSIS — M54.40 CHRONIC MIDLINE LOW BACK PAIN WITH SCIATICA, SCIATICA LATERALITY UNSPECIFIED: ICD-10-CM

## 2017-06-30 DIAGNOSIS — E53.8 COBALAMIN DEFICIENCY: ICD-10-CM

## 2017-06-30 DIAGNOSIS — F41.8 MIXED ANXIETY DEPRESSIVE DISORDER: ICD-10-CM

## 2017-06-30 DIAGNOSIS — G89.29 OTHER CHRONIC PAIN: ICD-10-CM

## 2017-06-30 DIAGNOSIS — Z79.899 CHRONICALLY ON BENZODIAZEPINE THERAPY: ICD-10-CM

## 2017-06-30 DIAGNOSIS — M79.7 FIBROMYALGIA: ICD-10-CM

## 2017-06-30 DIAGNOSIS — R94.2 RESTRICTIVE VENTILATORY DEFECT: ICD-10-CM

## 2017-06-30 DIAGNOSIS — M54.40 CHRONIC LOW BACK PAIN WITH SCIATICA, SCIATICA LATERALITY UNSPECIFIED, UNSPECIFIED BACK PAIN LATERALITY: ICD-10-CM

## 2017-06-30 DIAGNOSIS — G89.29 CHRONIC MIDLINE LOW BACK PAIN WITH SCIATICA, SCIATICA LATERALITY UNSPECIFIED: ICD-10-CM

## 2017-06-30 DIAGNOSIS — G89.29 CHRONIC LOW BACK PAIN WITH SCIATICA, SCIATICA LATERALITY UNSPECIFIED, UNSPECIFIED BACK PAIN LATERALITY: ICD-10-CM

## 2017-06-30 DIAGNOSIS — E55.9 VITAMIN D DEFICIENCY: ICD-10-CM

## 2017-06-30 DIAGNOSIS — F41.9 CHRONIC ANXIETY: ICD-10-CM

## 2017-06-30 DIAGNOSIS — F11.90 CHRONIC, CONTINUOUS USE OF OPIOIDS: ICD-10-CM

## 2017-06-30 DIAGNOSIS — E11.10 TYPE 2 DIABETES MELLITUS WITH KETOACIDOSIS WITHOUT COMA, WITHOUT LONG-TERM CURRENT USE OF INSULIN (HCC): ICD-10-CM

## 2017-06-30 DIAGNOSIS — H34.8392 BRANCH RETINAL VEIN OCCLUSION: Primary | ICD-10-CM

## 2017-06-30 DIAGNOSIS — G47.33 OBSTRUCTIVE SLEEP APNEA SYNDROME: ICD-10-CM

## 2017-06-30 DIAGNOSIS — E03.8 OTHER SPECIFIED HYPOTHYROIDISM: ICD-10-CM

## 2017-06-30 DIAGNOSIS — E11.42 DIABETIC POLYNEUROPATHY ASSOCIATED WITH TYPE 2 DIABETES MELLITUS (HCC): ICD-10-CM

## 2017-06-30 DIAGNOSIS — R74.8 ABNORMAL LIVER ENZYMES: ICD-10-CM

## 2017-06-30 DIAGNOSIS — E78.5 DYSLIPIDEMIA: ICD-10-CM

## 2017-06-30 DIAGNOSIS — M81.0 OSTEOPOROSIS: ICD-10-CM

## 2017-06-30 LAB
BUN SERPL-MCNC: 26 MG/DL (ref 9–23)
BUN/CREAT SERPL: 18.6 (ref 7–25)
CALCIUM SERPL-MCNC: 9.4 MG/DL (ref 8.7–10.4)
CHLORIDE SERPL-SCNC: 103 MMOL/L (ref 99–109)
CO2 SERPL-SCNC: 28 MMOL/L (ref 20–31)
CREAT SERPL-MCNC: 1.4 MG/DL (ref 0.6–1.3)
ERYTHROCYTE [DISTWIDTH] IN BLOOD BY AUTOMATED COUNT: 21 % (ref 11.3–14.5)
GLUCOSE SERPL-MCNC: 133 MG/DL (ref 70–100)
HCT VFR BLD AUTO: 27.8 % (ref 34.5–44)
HGB BLD-MCNC: 7.9 G/DL (ref 11.5–15.5)
IRON SERPL-MCNC: 82 MCG/DL (ref 50–175)
MCH RBC QN AUTO: 28.3 PG (ref 27–31)
MCHC RBC AUTO-ENTMCNC: 28.4 G/DL (ref 32–36)
MCV RBC AUTO: 99.6 FL (ref 80–99)
PLATELET # BLD AUTO: 276 10*3/MM3 (ref 150–450)
POTASSIUM SERPL-SCNC: 4.2 MMOL/L (ref 3.5–5.5)
RBC # BLD AUTO: 2.79 10*6/MM3 (ref 3.89–5.14)
SODIUM SERPL-SCNC: 142 MMOL/L (ref 132–146)
WBC # BLD AUTO: 10.24 10*3/MM3 (ref 3.5–10.8)

## 2017-06-30 PROCEDURE — 99214 OFFICE O/P EST MOD 30 MIN: CPT | Performed by: INTERNAL MEDICINE

## 2017-06-30 RX ORDER — FENTANYL 100 UG/H
1 PATCH TRANSDERMAL EVERY OTHER DAY
Qty: 15 PATCH | Refills: 0 | Status: SHIPPED | OUTPATIENT
Start: 2017-06-30 | End: 2017-07-31 | Stop reason: SDUPTHER

## 2017-06-30 RX ORDER — OXYCODONE AND ACETAMINOPHEN 10; 325 MG/1; MG/1
1 TABLET ORAL 3 TIMES DAILY
Qty: 90 TABLET | Refills: 0 | Status: SHIPPED | OUTPATIENT
Start: 2017-06-30 | End: 2017-07-31 | Stop reason: SDUPTHER

## 2017-06-30 NOTE — PROGRESS NOTES
Patient is a 61 y.o. female who is here for a follow up of chronic conditions.  Chief Complaint   Patient presents with   • Hypertension   • Hypothyroidism   • Diabetes         HPI:  Here for f/u. Compliant with FE.  Some dizziness.  Energy level is low.  Pain control is adequate.  No HA's.  No abdominal pains.  Sleeping a lot.  Appetite is good.      History:    Patient Active Problem List   Diagnosis   • Atopic rhinitis   • Restrictive ventilatory defect   • COPD (chronic obstructive pulmonary disease)   • Osteoporosis   • Obstructive sleep apnea syndrome   • Abnormal liver enzymes   • Cholelithiasis   • Chronic back pain   • Mixed anxiety depressive disorder   • Type 2 diabetes mellitus   • Dyslipidemia   • Essential tremor   • Fibromyalgia   • Gastroesophageal reflux disease without esophagitis   • Hypertension   • Hypothyroidism   • Insomnia   • Osteoarthritis   • Psoriasis   • Branch retinal vein occlusion   • Cobalamin deficiency   • Obesity   • Vitamin D deficiency   • Hypokalemia   • Lactic acidosis   • Fatty liver disease, nonalcoholic   • Sinus bradycardia   • NSTEMI (non-ST elevated myocardial infarction)   • Tobacco abuse   • Hypoxia   • Peripheral vascular disease   • Osteomyelitis of left foot   • Diabetic polyneuropathy associated with type 2 diabetes mellitus   • Anemia, blood loss   • Chronic pain   • Chronic, continuous use of opioids   • Chronic anxiety   • Chronically on benzodiazepine therapy       Past Medical History:   Diagnosis Date   • Bronchitis    • Cervical cancer    • Cholelithiasis 5/11/2016   • Chronic anxiety 2/27/2017   • Chronic bronchitis    • Chronically on benzodiazepine therapy 2/27/2017   • Degenerative arthritis    • Diabetes mellitus    • Dyslipidemia 5/11/2016   • Dyspnea    • Fatty liver disease, nonalcoholic 11/21/2016   • Fibromyalgia    • GERD (gastroesophageal reflux disease)    • H/O echocardiogram    • History of pneumonia    • Hypertension 5/11/2016    16. H/O  echocardiogram (V15.89) (Z92.89)  · A.  Echocardiogram of 02/03/2015 reports an ejection fraction of 60-65%, mild concentric     LVH, trace mitral regurgitation, mild tricuspid and pulmonic regurgitation and calculated     RVSP of 35 mmHg, the main pulmonary artery is also mildly dilated.   • Hypothyroidism 5/11/2016    Description: A.  On replacement therapy.   • Nausea    • Obesity    • DINA (obstructive sleep apnea)     intolerant of CPAP therapy   • Osteoporosis    • Osteoporosis    • Polycythemia vera 5/11/2016   • Pulmonary emphysema    • Restrictive ventilatory defect    • Rhinitis    • Uncontrolled diabetes mellitus 5/11/2016   • Uterine cancer    • Vitamin D deficiency 8/1/2016       Past Surgical History:   Procedure Laterality Date   • AMPUTATION DIGIT Left 11/23/2016    Procedure: AMPUTATION TRANS METATARSAL - ray amutation of the left great toe;  Surgeon: Arsalan Feliciano MD;  Location:  [facility] OR;  Service:    • AMPUTATION DIGIT Left 3/2/2017    Procedure: LEFT FOOT TRANSMETATARSAL AMPUTATION TOES 2,3,4,5;  Surgeon: Joey Guaman MD;  Location: [facility] OR;  Service:    • BACK SURGERY      lumbar fusions x5--multiple times; 1995, 1997, 1998, 1999 and 2008   • BELOW KNEE AMPUTATION Left 2/25/2017    Procedure: EXTENDED LEFT TOE AMPUTATION;  Surgeon: Arsalan Feliciano MD;  Location: [facility] OR;  Service:    • CARDIAC CATHETERIZATION N/A 11/26/2016    Procedure: Left Heart Cath;  Surgeon: Ash Nuñez MD;  Location:  [facility] CATH INVASIVE LOCATION;  Service:    • HAND SURGERY Bilateral     x3   • HYSTERECTOMY      status post uterine and cervical cancer   • LUMBAR SPINE SURGERY      arthrodesis by anterior approach addit interspace   • TONSILLECTOMY         Current Outpatient Prescriptions on File Prior to Visit   Medication Sig   • acetaminophen (TYLENOL) 325 MG tablet Take 2 tablets by mouth Every 4 (Four) Hours As Needed for mild pain (1-3) or fever (temperature greater than 101F).   • albuterol (PROVENTIL) (2.5  MG/3ML) 0.083% nebulizer solution 2.5 mg Every 6 (Six) Hours As Needed.   • amLODIPine (NORVASC) 5 MG tablet Take 1 tablet by mouth Daily.   • aspirin  MG EC tablet Take 1 tablet by mouth Daily.   • atorvastatin (LIPITOR) 40 MG tablet Take 1 tablet by mouth Every Night.   • baclofen (LIORESAL) 10 MG tablet Take 1 tablet by mouth 2 (Two) Times a Day As Needed for Muscle Spasms.   • busPIRone (BUSPAR) 10 MG tablet TAKE TWO TABLETS BY MOUTH TWICE A DAY   • cetirizine (ZyrTEC) 10 MG tablet Take 10 mg by mouth Every Night.   • clonazePAM (KlonoPIN) 0.5 MG tablet TAKE ONE-HALF TABLET BY MOUTH TWICE A DAY AS NEEDED   • clopidogrel (PLAVIX) 75 MG tablet Take 1 tablet by mouth Daily.   • ezetimibe (ZETIA) 10 MG tablet Take 1 tablet by mouth Daily.   • ferrous sulfate 325 (65 FE) MG EC tablet Take 1 tablet by mouth 3 (Three) Times a Day With Meals.   • fluticasone (FLONASE) 50 MCG/ACT nasal spray 1 spray into each nostril 2 (Two) Times a Day.   • furosemide (LASIX) 40 MG tablet TAKE ONE TABLET BY MOUTH TWICE A DAY   • hydrocortisone 1 % cream Apply  topically Every 12 (Twelve) Hours.   • levothyroxine (SYNTHROID, LEVOTHROID) 112 MCG tablet TAKE ONE TABLET BY MOUTH DAILY   • lisinopril (PRINIVIL,ZESTRIL) 20 MG tablet Take 1 tablet by mouth Daily.   • LYRICA 100 MG capsule TAKE ONE CAPSULE BY MOUTH THREE TIMES A DAY   • melatonin 5 MG sublingual tablet sublingual tablet Place 1 tablet under the tongue At Night As Needed (insomnia).   • metoclopramide (REGLAN) 5 MG tablet Take 1 tablet by mouth 3 (Three) Times a Day Before Meals.   • nicotine (NICODERM CQ) 21 MG/24HR patch Place 1 patch on the skin Daily.   • omeprazole (priLOSEC) 20 MG capsule TAKE ONE CAPSULE BY MOUTH DAILY   • probiotic (CULTURELLE) capsule capsule Take 1 capsule by mouth Daily.   • promethazine (PHENERGAN) 25 MG tablet Take 25 mg by mouth Every 8 (Eight) Hours As Needed for nausea or vomiting.   • SPIRIVA RESPIMAT 2.5 MCG/ACT aerosol solution INHALE  TWO PUFF(S) BY MOUTH DAILY   • [DISCONTINUED] fentaNYL (DURAGESIC) 100 MCG/HR patch Place 1 patch on the skin Every Other Day.   • [DISCONTINUED] oxyCODONE-acetaminophen (PERCOCET)  MG per tablet Take 1 tablet by mouth 3 (Three) Times a Day.   • [DISCONTINUED] insulin detemir (LEVEMIR) 100 UNIT/ML injection Inject 10 Units under the skin Every 12 (Twelve) Hours.   • [DISCONTINUED] insulin lispro (humaLOG) 100 UNIT/ML injection Inject 2-7 Units under the skin 4 (Four) Times a Day Before Meals & at Bedtime.   • [DISCONTINUED] varenicline (CHANTIX) 0.5 MG tablet Take 0.5 mg by mouth 2 (Two) Times a Day.     No current facility-administered medications on file prior to visit.        Family History   Problem Relation Age of Onset   • Arthritis Mother    • Diabetes Mother    • Colon polyps Mother    • Diverticulitis Mother    • Arthritis Father    • Bleeding Disorder Father    • Diabetes Father    • Kidney disease Father    • COPD Sister      currently smokes   • Arthritis Brother    • Diabetes Brother    • Alcohol abuse Brother    • Heart murmur Daughter    • Arthritis Other    • Diabetes Other        Social History     Social History   • Marital status:      Spouse name: N/A   • Number of children: 1   • Years of education: N/A     Occupational History   •  Disabled     Social History Main Topics   • Smoking status: Current Every Day Smoker     Packs/day: 0.50     Years: 48.00     Types: Cigarettes   • Smokeless tobacco: Never Used      Comment: currently trying to quit by using Chantix and has cut smoking habit in half, Pt now smoking half a pack a day    • Alcohol use No   • Drug use: No   • Sexual activity: Not on file     Other Topics Concern   • Not on file     Social History Narrative    Lives with her  in [place]         ROS:    Review of Systems   Constitutional: Positive for fatigue. Negative for chills, diaphoresis, fever and unexpected weight change.   HENT: Negative for congestion, ear  "pain, hearing loss, nosebleeds, postnasal drip, sinus pressure and sore throat.    Eyes: Negative for pain, discharge and itching.   Respiratory: Positive for cough and shortness of breath. Negative for chest tightness and wheezing.    Cardiovascular: Negative for chest pain, palpitations and leg swelling.   Gastrointestinal: Positive for abdominal pain and nausea. Negative for abdominal distention, blood in stool, constipation, diarrhea and vomiting.   Endocrine: Negative for heat intolerance, polydipsia and polyuria.   Genitourinary: Negative for difficulty urinating, dysuria, frequency and hematuria.   Musculoskeletal: Positive for arthralgias and back pain. Negative for gait problem, joint swelling and myalgias.   Skin: Positive for rash and wound.   Neurological: Positive for weakness and light-headedness. Negative for dizziness, syncope and headaches.   Psychiatric/Behavioral: Positive for dysphoric mood and sleep disturbance. The patient is nervous/anxious.        BP 96/60  Pulse 88  Ht 66\" (167.6 cm)  LMP  (LMP Unknown)    Physical Exam:    Physical Exam   Constitutional: She is oriented to person, place, and time. She appears well-developed and well-nourished.   HENT:   Head: Normocephalic and atraumatic.   Right Ear: External ear normal.   Left Ear: External ear normal.   Mouth/Throat: Oropharynx is clear and moist.   Eyes: Conjunctivae and EOM are normal.   Neck: Normal range of motion. Neck supple.   Cardiovascular: Normal rate and regular rhythm.    2/6 СЕРГЕЙ   Pulmonary/Chest: Effort normal.   Mildly diminished   Abdominal: Soft. Bowel sounds are normal.   Musculoskeletal: She exhibits edema and tenderness (pain on back flexion past 30 ).   Using wheelchair   Lymphadenopathy:     She has no cervical adenopathy.   Neurological: She is alert and oriented to person, place, and time.   Skin: Skin is warm and dry. No rash noted.   Left foot wound not examined   Psychiatric: She has a normal mood and " affect. Her behavior is normal. Thought content normal.       Procedure:      Discussion/Summary:  chronic back pain- refill patch and percocet as needed  htn-stop the ziac  fibromylagia- cont current tx  hyperlipidemia-cont lipitor and zetia, labs today  hypothroid-cont replacement  depression with anxiety-cont buspar and klonopine ,advised her of risk of addiction  polycythemia-cbc today  retinal vein occlusion- cont rf modification  b12 def-admin today and check level  nausea-phenergan prn, cont reglan, advised of EPS effects and she has agreed  DM-labs noted, counseled on low carb, recheck labs today, cont januvia  diaphoresis-better  Elevated lft/?autoimmune-f/u gastro recs , labs today  Eczema-cont prn steroids  Left foot ulcer and osteo-resolved  FE def anemia-start FE  high risk meds-labs today      labs noted and dw patient, advised to continue FE and increase po fluids      Current Outpatient Prescriptions:   •  acetaminophen (TYLENOL) 325 MG tablet, Take 2 tablets by mouth Every 4 (Four) Hours As Needed for mild pain (1-3) or fever (temperature greater than 101F)., Disp: 100 tablet, Rfl: 0  •  albuterol (PROVENTIL) (2.5 MG/3ML) 0.083% nebulizer solution, 2.5 mg Every 6 (Six) Hours As Needed., Disp: , Rfl:   •  amLODIPine (NORVASC) 5 MG tablet, Take 1 tablet by mouth Daily., Disp: 90 tablet, Rfl: 3  •  aspirin  MG EC tablet, Take 1 tablet by mouth Daily., Disp: 100 tablet, Rfl: 0  •  atorvastatin (LIPITOR) 40 MG tablet, Take 1 tablet by mouth Every Night., Disp: 90 tablet, Rfl: 3  •  baclofen (LIORESAL) 10 MG tablet, Take 1 tablet by mouth 2 (Two) Times a Day As Needed for Muscle Spasms., Disp: 180 tablet, Rfl: 3  •  busPIRone (BUSPAR) 10 MG tablet, TAKE TWO TABLETS BY MOUTH TWICE A DAY, Disp: 360 tablet, Rfl: 1  •  cetirizine (ZyrTEC) 10 MG tablet, Take 10 mg by mouth Every Night., Disp: , Rfl:   •  clonazePAM (KlonoPIN) 0.5 MG tablet, TAKE ONE-HALF TABLET BY MOUTH TWICE A DAY AS NEEDED, Disp: 90  tablet, Rfl: 0  •  clopidogrel (PLAVIX) 75 MG tablet, Take 1 tablet by mouth Daily., Disp: 90 tablet, Rfl: 3  •  ezetimibe (ZETIA) 10 MG tablet, Take 1 tablet by mouth Daily., Disp: 90 tablet, Rfl: 3  •  fentaNYL (DURAGESIC) 100 MCG/HR patch, Place 1 patch on the skin Every Other Day., Disp: 15 patch, Rfl: 0  •  ferrous sulfate 325 (65 FE) MG EC tablet, Take 1 tablet by mouth 3 (Three) Times a Day With Meals., Disp: 90 tablet, Rfl: 5  •  fluticasone (FLONASE) 50 MCG/ACT nasal spray, 1 spray into each nostril 2 (Two) Times a Day., Disp: , Rfl:   •  furosemide (LASIX) 40 MG tablet, TAKE ONE TABLET BY MOUTH TWICE A DAY, Disp: 180 tablet, Rfl: 2  •  hydrocortisone 1 % cream, Apply  topically Every 12 (Twelve) Hours., Disp: , Rfl: 0  •  levothyroxine (SYNTHROID, LEVOTHROID) 112 MCG tablet, TAKE ONE TABLET BY MOUTH DAILY, Disp: 90 tablet, Rfl: 2  •  lisinopril (PRINIVIL,ZESTRIL) 20 MG tablet, Take 1 tablet by mouth Daily., Disp: 90 tablet, Rfl: 3  •  LYRICA 100 MG capsule, TAKE ONE CAPSULE BY MOUTH THREE TIMES A DAY, Disp: 270 capsule, Rfl: 0  •  melatonin 5 MG sublingual tablet sublingual tablet, Place 1 tablet under the tongue At Night As Needed (insomnia)., Disp: 30 tablet, Rfl: 0  •  metoclopramide (REGLAN) 5 MG tablet, Take 1 tablet by mouth 3 (Three) Times a Day Before Meals., Disp: , Rfl:   •  nicotine (NICODERM CQ) 21 MG/24HR patch, Place 1 patch on the skin Daily., Disp: , Rfl:   •  omeprazole (priLOSEC) 20 MG capsule, TAKE ONE CAPSULE BY MOUTH DAILY, Disp: 90 capsule, Rfl: 1  •  oxyCODONE-acetaminophen (PERCOCET)  MG per tablet, Take 1 tablet by mouth 3 (Three) Times a Day., Disp: 90 tablet, Rfl: 0  •  probiotic (CULTURELLE) capsule capsule, Take 1 capsule by mouth Daily., Disp: 60 capsule, Rfl: 0  •  promethazine (PHENERGAN) 25 MG tablet, Take 25 mg by mouth Every 8 (Eight) Hours As Needed for nausea or vomiting., Disp: , Rfl:   •  SPIRIVA RESPIMAT 2.5 MCG/ACT aerosol solution, INHALE TWO PUFF(S) BY MOUTH  DAILY, Disp: 1 inhaler, Rfl: 4        Tamara was seen today for hypertension, hypothyroidism and diabetes.    Diagnoses and all orders for this visit:    Branch retinal vein occlusion    Essential hypertension    Peripheral vascular disease    Sinus bradycardia    Atopic rhinitis    Other emphysema    Obstructive sleep apnea syndrome    Restrictive ventilatory defect    Cobalamin deficiency    Fatty liver disease, nonalcoholic    Gastroesophageal reflux disease without esophagitis    Vitamin D deficiency    Diabetic polyneuropathy associated with type 2 diabetes mellitus    Other specified hypothyroidism    Type 2 diabetes mellitus with ketoacidosis without coma, without long-term current use of insulin  -     Basic Metabolic Panel    Chronic low back pain with sciatica, sciatica laterality unspecified, unspecified back pain laterality    Essential tremor    Fibromyalgia  -     oxyCODONE-acetaminophen (PERCOCET)  MG per tablet; Take 1 tablet by mouth 3 (Three) Times a Day.  -     fentaNYL (DURAGESIC) 100 MCG/HR patch; Place 1 patch on the skin Every Other Day.    Primary osteoarthritis involving multiple joints  -     oxyCODONE-acetaminophen (PERCOCET)  MG per tablet; Take 1 tablet by mouth 3 (Three) Times a Day.  -     fentaNYL (DURAGESIC) 100 MCG/HR patch; Place 1 patch on the skin Every Other Day.    Osteoporosis    Anemia, blood loss  -     CBC (No Diff)  -     Iron    Abnormal liver enzymes    Chronic anxiety    Other chronic pain    Chronic, continuous use of opioids    Chronically on benzodiazepine therapy    Dyslipidemia    Insomnia, unspecified type    Mixed anxiety depressive disorder    Tobacco abuse    Chronic midline low back pain with sciatica, sciatica laterality unspecified  -     oxyCODONE-acetaminophen (PERCOCET)  MG per tablet; Take 1 tablet by mouth 3 (Three) Times a Day.

## 2017-07-13 ENCOUNTER — TELEPHONE (OUTPATIENT)
Dept: INTERNAL MEDICINE | Facility: CLINIC | Age: 62
End: 2017-07-13

## 2017-07-13 NOTE — TELEPHONE ENCOUNTER
Spoke to pt  and he stated that she is nausea also. Per dr saeed I advised them that he could see her but  felt it was too difficult to bring her here. I advised to give tyelnol if she can tolerate it for the fever. He was going to try to get her to eat something and will let me know

## 2017-07-18 ENCOUNTER — TRANSITIONAL CARE MANAGEMENT TELEPHONE ENCOUNTER (OUTPATIENT)
Dept: INTERNAL MEDICINE | Facility: CLINIC | Age: 62
End: 2017-07-18

## 2017-07-27 ENCOUNTER — OFFICE VISIT (OUTPATIENT)
Dept: CARDIAC SURGERY | Facility: CLINIC | Age: 62
End: 2017-07-27

## 2017-07-27 VITALS
BODY MASS INDEX: 33.91 KG/M2 | HEART RATE: 105 BPM | HEIGHT: 66 IN | DIASTOLIC BLOOD PRESSURE: 66 MMHG | WEIGHT: 211 LBS | SYSTOLIC BLOOD PRESSURE: 133 MMHG | OXYGEN SATURATION: 93 % | TEMPERATURE: 98.3 F

## 2017-07-27 DIAGNOSIS — I96 GANGRENE (HCC): ICD-10-CM

## 2017-07-27 DIAGNOSIS — L97.524 FOOT ULCER, LEFT, WITH NECROSIS OF BONE (HCC): Primary | ICD-10-CM

## 2017-07-27 DIAGNOSIS — M86.9 OSTEOMYELITIS OF LEFT FOOT, UNSPECIFIED CHRONICITY: ICD-10-CM

## 2017-07-27 DIAGNOSIS — E10.621 TYPE 1 DIABETES MELLITUS WITH LEFT DIABETIC FOOT ULCER (HCC): ICD-10-CM

## 2017-07-27 DIAGNOSIS — L97.529 TYPE 1 DIABETES MELLITUS WITH LEFT DIABETIC FOOT ULCER (HCC): ICD-10-CM

## 2017-07-27 PROCEDURE — 99212 OFFICE O/P EST SF 10 MIN: CPT | Performed by: THORACIC SURGERY (CARDIOTHORACIC VASCULAR SURGERY)

## 2017-07-28 NOTE — PROGRESS NOTES
"Patient Information  Tamara Langston                                                                                          [address] [facility] [address]      1955  [phone]      Chief Complaint   Patient presents with   • Foot Ulcer     3 month follow-up s/p transmetatarsal amputation of the left foot of 2nd,3rd,4th, and 5th toes 3/2/17       History of Present Illness:Patient seen today for follow-up of her transmetatarsal amputation of the left foot.  This was done on 03/02/2017 for osteomyelitis of the remainder of the toes of the left foot after she had a prior left great toe transmetatarsal amputation in November 2016 for osteomyelitis .  She has been been ambulating with toe unloading shoe.  She  has been fitted for a custom fitted shoe by [facility], but it is not arrived yet.      Vitals:    07/27/17 1248   BP: 133/66   BP Location: Right arm   Patient Position: Sitting   Pulse: 105   Temp: 98.3 °F (36.8 °C)   SpO2: 93%   Weight: 211 lb (95.7 kg)   Height: 66\" (167.6 cm)        Physical Exam the patient has no major complaint.  The left foot transmetatarsal amputation site is completely healed as well as  the separate left great toe transmetatarsal amputation incision and the remainder of the the toes transmetatarsal amputation.  There is slight edema of the left ankle as well as the right ankle is up strong palpable posterior tibial pulse of the left foot  Lab/other results:    Assessment: #1.  Diabetes mellitus.  #2.  Total transmetatarsal amputation of the toes of the left foot with complete healing.  #3.  Remote history of smoking, has stopped.  #4.  Plan: Patient is awaiting the custom fitted shoe for the left foot being done by the [facility].  I will plan to see the patient in 3 months to see how she is ambulating with this custom fitted shoe.      Arsalan Feliciano M.D.   "

## 2017-07-31 ENCOUNTER — OFFICE VISIT (OUTPATIENT)
Dept: INTERNAL MEDICINE | Facility: CLINIC | Age: 62
End: 2017-07-31

## 2017-07-31 VITALS — HEART RATE: 68 BPM | DIASTOLIC BLOOD PRESSURE: 68 MMHG | HEIGHT: 66 IN | SYSTOLIC BLOOD PRESSURE: 124 MMHG

## 2017-07-31 DIAGNOSIS — I21.4 NSTEMI (NON-ST ELEVATED MYOCARDIAL INFARCTION) (HCC): ICD-10-CM

## 2017-07-31 DIAGNOSIS — K21.9 GASTROESOPHAGEAL REFLUX DISEASE WITHOUT ESOPHAGITIS: ICD-10-CM

## 2017-07-31 DIAGNOSIS — Z72.0 TOBACCO ABUSE: ICD-10-CM

## 2017-07-31 DIAGNOSIS — J30.9 ATOPIC RHINITIS: ICD-10-CM

## 2017-07-31 DIAGNOSIS — M79.7 FIBROMYALGIA: ICD-10-CM

## 2017-07-31 DIAGNOSIS — R74.8 ABNORMAL LIVER ENZYMES: ICD-10-CM

## 2017-07-31 DIAGNOSIS — F11.90 CHRONIC, CONTINUOUS USE OF OPIOIDS: ICD-10-CM

## 2017-07-31 DIAGNOSIS — G89.29 CHRONIC MIDLINE LOW BACK PAIN WITH SCIATICA, SCIATICA LATERALITY UNSPECIFIED: ICD-10-CM

## 2017-07-31 DIAGNOSIS — M54.40 CHRONIC LOW BACK PAIN WITH SCIATICA, SCIATICA LATERALITY UNSPECIFIED, UNSPECIFIED BACK PAIN LATERALITY: ICD-10-CM

## 2017-07-31 DIAGNOSIS — E11.42 DIABETIC POLYNEUROPATHY ASSOCIATED WITH TYPE 2 DIABETES MELLITUS (HCC): ICD-10-CM

## 2017-07-31 DIAGNOSIS — I10 ESSENTIAL HYPERTENSION: ICD-10-CM

## 2017-07-31 DIAGNOSIS — E55.9 VITAMIN D DEFICIENCY: ICD-10-CM

## 2017-07-31 DIAGNOSIS — K76.0 FATTY LIVER DISEASE, NONALCOHOLIC: Chronic | ICD-10-CM

## 2017-07-31 DIAGNOSIS — H34.8392 BRANCH RETINAL VEIN OCCLUSION: Primary | ICD-10-CM

## 2017-07-31 DIAGNOSIS — G25.0 ESSENTIAL TREMOR: ICD-10-CM

## 2017-07-31 DIAGNOSIS — M15.9 PRIMARY OSTEOARTHRITIS INVOLVING MULTIPLE JOINTS: ICD-10-CM

## 2017-07-31 DIAGNOSIS — R09.02 HYPOXIA: ICD-10-CM

## 2017-07-31 DIAGNOSIS — D50.0 ANEMIA, BLOOD LOSS: ICD-10-CM

## 2017-07-31 DIAGNOSIS — M81.0 OSTEOPOROSIS: ICD-10-CM

## 2017-07-31 DIAGNOSIS — E03.8 OTHER SPECIFIED HYPOTHYROIDISM: ICD-10-CM

## 2017-07-31 DIAGNOSIS — E53.8 COBALAMIN DEFICIENCY: ICD-10-CM

## 2017-07-31 DIAGNOSIS — E78.5 DYSLIPIDEMIA: ICD-10-CM

## 2017-07-31 DIAGNOSIS — F41.8 MIXED ANXIETY DEPRESSIVE DISORDER: ICD-10-CM

## 2017-07-31 DIAGNOSIS — G89.29 CHRONIC LOW BACK PAIN WITH SCIATICA, SCIATICA LATERALITY UNSPECIFIED, UNSPECIFIED BACK PAIN LATERALITY: ICD-10-CM

## 2017-07-31 DIAGNOSIS — I73.9 PERIPHERAL VASCULAR DISEASE (HCC): ICD-10-CM

## 2017-07-31 DIAGNOSIS — M54.40 CHRONIC MIDLINE LOW BACK PAIN WITH SCIATICA, SCIATICA LATERALITY UNSPECIFIED: ICD-10-CM

## 2017-07-31 DIAGNOSIS — G89.29 OTHER CHRONIC PAIN: ICD-10-CM

## 2017-07-31 DIAGNOSIS — F41.9 CHRONIC ANXIETY: ICD-10-CM

## 2017-07-31 DIAGNOSIS — J43.8 OTHER EMPHYSEMA (HCC): ICD-10-CM

## 2017-07-31 DIAGNOSIS — Z79.899 CHRONICALLY ON BENZODIAZEPINE THERAPY: ICD-10-CM

## 2017-07-31 DIAGNOSIS — G47.00 INSOMNIA, UNSPECIFIED TYPE: ICD-10-CM

## 2017-07-31 DIAGNOSIS — R94.2 RESTRICTIVE VENTILATORY DEFECT: ICD-10-CM

## 2017-07-31 DIAGNOSIS — E11.10 TYPE 2 DIABETES MELLITUS WITH KETOACIDOSIS WITHOUT COMA, WITHOUT LONG-TERM CURRENT USE OF INSULIN (HCC): ICD-10-CM

## 2017-07-31 DIAGNOSIS — G47.33 OBSTRUCTIVE SLEEP APNEA SYNDROME: ICD-10-CM

## 2017-07-31 DIAGNOSIS — R00.1 SINUS BRADYCARDIA: ICD-10-CM

## 2017-07-31 PROCEDURE — 99495 TRANSJ CARE MGMT MOD F2F 14D: CPT | Performed by: INTERNAL MEDICINE

## 2017-07-31 RX ORDER — OXYCODONE AND ACETAMINOPHEN 10; 325 MG/1; MG/1
1 TABLET ORAL 3 TIMES DAILY
Qty: 90 TABLET | Refills: 0 | Status: SHIPPED | OUTPATIENT
Start: 2017-07-31 | End: 2017-08-31 | Stop reason: SDUPTHER

## 2017-07-31 RX ORDER — FENTANYL 100 UG/H
1 PATCH TRANSDERMAL EVERY OTHER DAY
Qty: 15 PATCH | Refills: 0 | Status: SHIPPED | OUTPATIENT
Start: 2017-07-31 | End: 2017-08-31 | Stop reason: SDUPTHER

## 2017-07-31 NOTE — PROGRESS NOTES
Patient is a 61 y.o. female who is here for a follow up of recent hospital stay for pneumonia.  Chief Complaint   Patient presents with   • Pneumonia     ST[place]ON          HPI:  Here for f/u after recent hospitalization for PNA, was DC on levaquin.  No diarrhea from antibiotics.  Some RAZO.  Appetite is good.  Energy level is low.  No fever or chills.  Dizziness is better.      History:    Patient Active Problem List   Diagnosis   • Atopic rhinitis   • Restrictive ventilatory defect   • COPD (chronic obstructive pulmonary disease)   • Osteoporosis   • Obstructive sleep apnea syndrome   • Abnormal liver enzymes   • Cholelithiasis   • Chronic back pain   • Mixed anxiety depressive disorder   • Type 2 diabetes mellitus   • Dyslipidemia   • Essential tremor   • Fibromyalgia   • Gastroesophageal reflux disease without esophagitis   • Hypertension   • Hypothyroidism   • Insomnia   • Osteoarthritis   • Psoriasis   • Branch retinal vein occlusion   • Cobalamin deficiency   • Obesity   • Vitamin D deficiency   • Hypokalemia   • Lactic acidosis   • Fatty liver disease, nonalcoholic   • Sinus bradycardia   • NSTEMI (non-ST elevated myocardial infarction)   • Tobacco abuse   • Hypoxia   • Peripheral vascular disease   • Osteomyelitis of left foot   • Diabetic polyneuropathy associated with type 2 diabetes mellitus   • Anemia, blood loss   • Chronic pain   • Chronic, continuous use of opioids   • Chronic anxiety   • Chronically on benzodiazepine therapy       Past Medical History:   Diagnosis Date   • Bronchitis    • Cervical cancer    • Cholelithiasis 5/11/2016   • Chronic anxiety 2/27/2017   • Chronic bronchitis    • Chronically on benzodiazepine therapy 2/27/2017   • Degenerative arthritis    • Diabetes mellitus    • Dyslipidemia 5/11/2016   • Dyspnea    • Fatty liver disease, nonalcoholic 11/21/2016   • Fibromyalgia    • GERD (gastroesophageal reflux disease)    • H/O echocardiogram    • History of pneumonia    • Hypertension  5/11/2016    16. H/O echocardiogram (V15.89) (Z92.89)  · A.  Echocardiogram of 02/03/2015 reports an ejection fraction of 60-65%, mild concentric     LVH, trace mitral regurgitation, mild tricuspid and pulmonic regurgitation and calculated     RVSP of 35 mmHg, the main pulmonary artery is also mildly dilated.   • Hypothyroidism 5/11/2016    Description: A.  On replacement therapy.   • Nausea    • Obesity    • DINA (obstructive sleep apnea)     intolerant of CPAP therapy   • Osteoporosis    • Osteoporosis    • Polycythemia vera 5/11/2016   • Pulmonary emphysema    • Restrictive ventilatory defect    • Rhinitis    • Uncontrolled diabetes mellitus 5/11/2016   • Uterine cancer    • Vitamin D deficiency 8/1/2016       Past Surgical History:   Procedure Laterality Date   • AMPUTATION DIGIT Left 11/23/2016    Procedure: AMPUTATION TRANS METATARSAL - ray amutation of the left great toe;  Surgeon: Arsalan Feliciano MD;  Location:  [facility];  Service:    • AMPUTATION DIGIT Left 3/2/2017    Procedure: LEFT FOOT TRANSMETATARSAL AMPUTATION TOES 2,3,4,5;  Surgeon: Joey Guaman MD;  Location:  [facility];  Service:    • BACK SURGERY      lumbar fusions x5--multiple times; 1995, 1997, 1998, 1999 and 2008   • BELOW KNEE AMPUTATION Left 2/25/2017    Procedure: EXTENDED LEFT TOE AMPUTATION;  Surgeon: Arsalan Feliciano MD;  Location: [facility] OR;  Service:    • CARDIAC CATHETERIZATION N/A 11/26/2016    Procedure: Left Heart Cath;  Surgeon: Ash Nuñez MD;  Location:  [facility]ATH INVASIVE LOCATION;  Service:    • HAND SURGERY Bilateral     x3   • HYSTERECTOMY      status post uterine and cervical cancer   • LUMBAR SPINE SURGERY      arthrodesis by anterior approach addit interspace   • TONSILLECTOMY         Current Outpatient Prescriptions on File Prior to Visit   Medication Sig   • acetaminophen (TYLENOL) 325 MG tablet Take 2 tablets by mouth Every 4 (Four) Hours As Needed for mild pain (1-3) or fever (temperature greater than 101F).   •  albuterol (PROVENTIL) (2.5 MG/3ML) 0.083% nebulizer solution 2.5 mg Every 6 (Six) Hours As Needed.   • amLODIPine (NORVASC) 5 MG tablet Take 1 tablet by mouth Daily.   • aspirin  MG EC tablet Take 1 tablet by mouth Daily.   • atorvastatin (LIPITOR) 40 MG tablet Take 1 tablet by mouth Every Night.   • baclofen (LIORESAL) 10 MG tablet Take 1 tablet by mouth 2 (Two) Times a Day As Needed for Muscle Spasms.   • busPIRone (BUSPAR) 10 MG tablet TAKE TWO TABLETS BY MOUTH TWICE A DAY   • cetirizine (ZyrTEC) 10 MG tablet Take 10 mg by mouth Every Night.   • clonazePAM (KlonoPIN) 0.5 MG tablet TAKE ONE-HALF TABLET BY MOUTH TWICE A DAY AS NEEDED   • clopidogrel (PLAVIX) 75 MG tablet Take 1 tablet by mouth Daily.   • ezetimibe (ZETIA) 10 MG tablet Take 1 tablet by mouth Daily.   • ferrous sulfate 325 (65 FE) MG EC tablet Take 1 tablet by mouth 3 (Three) Times a Day With Meals.   • fluticasone (FLONASE) 50 MCG/ACT nasal spray 1 spray into each nostril 2 (Two) Times a Day.   • furosemide (LASIX) 40 MG tablet TAKE ONE TABLET BY MOUTH TWICE A DAY   • hydrocortisone 1 % cream Apply  topically Every 12 (Twelve) Hours.   • levothyroxine (SYNTHROID, LEVOTHROID) 112 MCG tablet TAKE ONE TABLET BY MOUTH DAILY   • lisinopril (PRINIVIL,ZESTRIL) 20 MG tablet Take 1 tablet by mouth Daily.   • LYRICA 100 MG capsule TAKE ONE CAPSULE BY MOUTH THREE TIMES A DAY   • melatonin 5 MG sublingual tablet sublingual tablet Place 1 tablet under the tongue At Night As Needed (insomnia).   • metoclopramide (REGLAN) 5 MG tablet Take 1 tablet by mouth 3 (Three) Times a Day Before Meals.   • nicotine (NICODERM CQ) 21 MG/24HR patch Place 1 patch on the skin Daily.   • omeprazole (priLOSEC) 20 MG capsule TAKE ONE CAPSULE BY MOUTH DAILY   • probiotic (CULTURELLE) capsule capsule Take 1 capsule by mouth Daily.   • promethazine (PHENERGAN) 25 MG tablet Take 25 mg by mouth Every 8 (Eight) Hours As Needed for nausea or vomiting.   • SPIRIVA RESPIMAT 2.5 MCG/ACT  aerosol solution INHALE TWO PUFF(S) BY MOUTH DAILY   • [DISCONTINUED] fentaNYL (DURAGESIC) 100 MCG/HR patch Place 1 patch on the skin Every Other Day.   • [DISCONTINUED] oxyCODONE-acetaminophen (PERCOCET)  MG per tablet Take 1 tablet by mouth 3 (Three) Times a Day.     No current facility-administered medications on file prior to visit.        Family History   Problem Relation Age of Onset   • Arthritis Mother    • Diabetes Mother    • Colon polyps Mother    • Diverticulitis Mother    • Arthritis Father    • Bleeding Disorder Father    • Diabetes Father    • Kidney disease Father    • COPD Sister      currently smokes   • Arthritis Brother    • Diabetes Brother    • Alcohol abuse Brother    • Heart murmur Daughter    • Arthritis Other    • Diabetes Other        Social History     Social History   • Marital status:      Spouse name: N/A   • Number of children: 1   • Years of education: N/A     Occupational History   •  Disabled     Social History Main Topics   • Smoking status: Former Smoker     Packs/day: 1.50     Years: 48.00     Types: Cigarettes     Quit date: 2/27/2017   • Smokeless tobacco: Never Used   • Alcohol use No   • Drug use: No   • Sexual activity: Not on file     Other Topics Concern   • Not on file     Social History Narrative    Lives with her  in [place]         ROS:    Review of Systems   Constitutional: Positive for fatigue. Negative for chills, diaphoresis, fever and unexpected weight change.   HENT: Negative for congestion, ear pain, hearing loss, nosebleeds, postnasal drip, sinus pressure and sore throat.    Eyes: Negative for pain, discharge and itching.   Respiratory: Positive for cough and shortness of breath. Negative for chest tightness and wheezing.    Cardiovascular: Negative for chest pain, palpitations and leg swelling.   Gastrointestinal: Positive for abdominal pain. Negative for abdominal distention, blood in stool, constipation, diarrhea, nausea and vomiting.  "  Endocrine: Negative for heat intolerance, polydipsia and polyuria.   Genitourinary: Negative for difficulty urinating, dysuria, frequency and hematuria.   Musculoskeletal: Positive for arthralgias and back pain. Negative for gait problem, joint swelling and myalgias.   Skin: Positive for rash and wound.   Neurological: Positive for weakness and light-headedness. Negative for dizziness, syncope and headaches.   Psychiatric/Behavioral: Positive for dysphoric mood and sleep disturbance. The patient is nervous/anxious.        /68 (BP Location: Left arm, Patient Position: Sitting)  Pulse 68  Ht 66\" (167.6 cm)  LMP  (LMP Unknown)    Physical Exam:    Physical Exam   Constitutional: She is oriented to person, place, and time. She appears well-developed and well-nourished.   HENT:   Head: Normocephalic and atraumatic.   Right Ear: External ear normal.   Left Ear: External ear normal.   Mouth/Throat: Oropharynx is clear and moist.   Eyes: Conjunctivae and EOM are normal.   Neck: Normal range of motion. Neck supple.   Cardiovascular: Normal rate and regular rhythm.    2/6 СЕРГЕЙ   Pulmonary/Chest: Effort normal.   Mildly diminished   Abdominal: Soft. Bowel sounds are normal.   Musculoskeletal: She exhibits edema ( ) and tenderness (pain on back flexion past 30  ).   Using wheelchair   Lymphadenopathy:     She has no cervical adenopathy.   Neurological: She is alert and oriented to person, place, and time.   Skin: Skin is warm and dry. No rash noted.   Left foot wound not examined   Psychiatric: She has a normal mood and affect. Her behavior is normal. Thought content normal.       Procedure:  This patient care was coordinated using transitional care management strategy. I have reviewed The discharge records are available and reviewed    Discussion/Summary:    chronic back pain- refill patch and percocet as needed  htn-stable  fibromylagia- cont current tx  hyperlipidemia-cont lipitor and zetia  hypothroid-cont " replacement  depression with anxiety-cont buspar and klonopine ,advised her of risk of addiction  polycythemia-cbc on rtc  retinal vein occlusion- cont rf modification  b12 def-admin today and check level  nausea-phenergan prn, cont reglan, advised of EPS effects and she has agreed  DM-labs noted, counseled on low carb, recheck labs noted, cont januvia  diaphoresis-better  Elevated lft/?autoimmune-f/u gastro recs , labs noted  Eczema-cont prn steroids  Left foot ulcer and osteo-resolved  FE def anemia-cont FE  PNA-counseled on continued Tob abstinence  high risk meds-labs today      labs noted and dw patient, advised to continue FE and increase po fluids      Current Outpatient Prescriptions:   •  acetaminophen (TYLENOL) 325 MG tablet, Take 2 tablets by mouth Every 4 (Four) Hours As Needed for mild pain (1-3) or fever (temperature greater than 101F)., Disp: 100 tablet, Rfl: 0  •  albuterol (PROVENTIL) (2.5 MG/3ML) 0.083% nebulizer solution, 2.5 mg Every 6 (Six) Hours As Needed., Disp: , Rfl:   •  amLODIPine (NORVASC) 5 MG tablet, Take 1 tablet by mouth Daily., Disp: 90 tablet, Rfl: 3  •  aspirin  MG EC tablet, Take 1 tablet by mouth Daily., Disp: 100 tablet, Rfl: 0  •  atorvastatin (LIPITOR) 40 MG tablet, Take 1 tablet by mouth Every Night., Disp: 90 tablet, Rfl: 3  •  baclofen (LIORESAL) 10 MG tablet, Take 1 tablet by mouth 2 (Two) Times a Day As Needed for Muscle Spasms., Disp: 180 tablet, Rfl: 3  •  busPIRone (BUSPAR) 10 MG tablet, TAKE TWO TABLETS BY MOUTH TWICE A DAY, Disp: 360 tablet, Rfl: 1  •  cetirizine (ZyrTEC) 10 MG tablet, Take 10 mg by mouth Every Night., Disp: , Rfl:   •  clonazePAM (KlonoPIN) 0.5 MG tablet, TAKE ONE-HALF TABLET BY MOUTH TWICE A DAY AS NEEDED, Disp: 90 tablet, Rfl: 0  •  clopidogrel (PLAVIX) 75 MG tablet, Take 1 tablet by mouth Daily., Disp: 90 tablet, Rfl: 3  •  ezetimibe (ZETIA) 10 MG tablet, Take 1 tablet by mouth Daily., Disp: 90 tablet, Rfl: 3  •  fentaNYL (DURAGESIC) 100  MCG/HR patch, Place 1 patch on the skin Every Other Day., Disp: 15 patch, Rfl: 0  •  ferrous sulfate 325 (65 FE) MG EC tablet, Take 1 tablet by mouth 3 (Three) Times a Day With Meals., Disp: 90 tablet, Rfl: 5  •  fluticasone (FLONASE) 50 MCG/ACT nasal spray, 1 spray into each nostril 2 (Two) Times a Day., Disp: , Rfl:   •  furosemide (LASIX) 40 MG tablet, TAKE ONE TABLET BY MOUTH TWICE A DAY, Disp: 180 tablet, Rfl: 2  •  hydrocortisone 1 % cream, Apply  topically Every 12 (Twelve) Hours., Disp: , Rfl: 0  •  levothyroxine (SYNTHROID, LEVOTHROID) 112 MCG tablet, TAKE ONE TABLET BY MOUTH DAILY, Disp: 90 tablet, Rfl: 2  •  lisinopril (PRINIVIL,ZESTRIL) 20 MG tablet, Take 1 tablet by mouth Daily., Disp: 90 tablet, Rfl: 3  •  LYRICA 100 MG capsule, TAKE ONE CAPSULE BY MOUTH THREE TIMES A DAY, Disp: 270 capsule, Rfl: 0  •  melatonin 5 MG sublingual tablet sublingual tablet, Place 1 tablet under the tongue At Night As Needed (insomnia)., Disp: 30 tablet, Rfl: 0  •  metoclopramide (REGLAN) 5 MG tablet, Take 1 tablet by mouth 3 (Three) Times a Day Before Meals., Disp: , Rfl:   •  nicotine (NICODERM CQ) 21 MG/24HR patch, Place 1 patch on the skin Daily., Disp: , Rfl:   •  omeprazole (priLOSEC) 20 MG capsule, TAKE ONE CAPSULE BY MOUTH DAILY, Disp: 90 capsule, Rfl: 1  •  oxyCODONE-acetaminophen (PERCOCET)  MG per tablet, Take 1 tablet by mouth 3 (Three) Times a Day., Disp: 90 tablet, Rfl: 0  •  probiotic (CULTURELLE) capsule capsule, Take 1 capsule by mouth Daily., Disp: 60 capsule, Rfl: 0  •  promethazine (PHENERGAN) 25 MG tablet, Take 25 mg by mouth Every 8 (Eight) Hours As Needed for nausea or vomiting., Disp: , Rfl:   •  SPIRIVA RESPIMAT 2.5 MCG/ACT aerosol solution, INHALE TWO PUFF(S) BY MOUTH DAILY, Disp: 1 inhaler, Rfl: 4        Tamara was seen today for pneumonia.    Diagnoses and all orders for this visit:    Branch retinal vein occlusion    Essential hypertension    NSTEMI (non-ST elevated myocardial  infarction)    Peripheral vascular disease    Sinus bradycardia    Atopic rhinitis    Other emphysema    Hypoxia    Obstructive sleep apnea syndrome    Restrictive ventilatory defect    Cobalamin deficiency    Fatty liver disease, nonalcoholic    Gastroesophageal reflux disease without esophagitis    Vitamin D deficiency    Diabetic polyneuropathy associated with type 2 diabetes mellitus    Other specified hypothyroidism    Type 2 diabetes mellitus with ketoacidosis without coma, without long-term current use of insulin    Chronic low back pain with sciatica, sciatica laterality unspecified, unspecified back pain laterality    Essential tremor    Fibromyalgia  -     fentaNYL (DURAGESIC) 100 MCG/HR patch; Place 1 patch on the skin Every Other Day.  -     oxyCODONE-acetaminophen (PERCOCET)  MG per tablet; Take 1 tablet by mouth 3 (Three) Times a Day.    Primary osteoarthritis involving multiple joints  -     fentaNYL (DURAGESIC) 100 MCG/HR patch; Place 1 patch on the skin Every Other Day.  -     oxyCODONE-acetaminophen (PERCOCET)  MG per tablet; Take 1 tablet by mouth 3 (Three) Times a Day.    Osteoporosis    Anemia, blood loss    Abnormal liver enzymes    Chronic anxiety    Other chronic pain    Chronic, continuous use of opioids    Chronically on benzodiazepine therapy    Dyslipidemia    Insomnia, unspecified type    Mixed anxiety depressive disorder    Tobacco abuse    Chronic midline low back pain with sciatica, sciatica laterality unspecified  -     oxyCODONE-acetaminophen (PERCOCET)  MG per tablet; Take 1 tablet by mouth 3 (Three) Times a Day.

## 2017-08-24 ENCOUNTER — OFFICE VISIT (OUTPATIENT)
Dept: GASTROENTEROLOGY | Facility: CLINIC | Age: 62
End: 2017-08-24

## 2017-08-24 VITALS
OXYGEN SATURATION: 95 % | DIASTOLIC BLOOD PRESSURE: 80 MMHG | HEART RATE: 92 BPM | BODY MASS INDEX: 33.91 KG/M2 | WEIGHT: 211 LBS | TEMPERATURE: 98.1 F | HEIGHT: 66 IN | SYSTOLIC BLOOD PRESSURE: 126 MMHG

## 2017-08-24 DIAGNOSIS — K74.69 OTHER CIRRHOSIS OF LIVER (HCC): ICD-10-CM

## 2017-08-24 DIAGNOSIS — K75.81 NASH (NONALCOHOLIC STEATOHEPATITIS): Primary | ICD-10-CM

## 2017-08-24 DIAGNOSIS — R79.89 ABNORMAL LIVER FUNCTION TESTS: ICD-10-CM

## 2017-08-24 PROCEDURE — 99214 OFFICE O/P EST MOD 30 MIN: CPT | Performed by: INTERNAL MEDICINE

## 2017-08-24 NOTE — PROGRESS NOTES
PCP: Harinder Aranda MD    Chief Complaint   Patient presents with   • Follow-up       History of Present Illness:   HPI  Mrs. Langston returns to the office for a follow-up visit.  She was able to quit smoking about 4 or 5 months ago.  The patient recently did have pneumonia but has recovered.  The patient states she has been fitted for a new shoe to go on the left foot.  Mrs. Langston denies any abdominal pain.  There is no history of nausea or vomiting.  There is no history of GI  bleeding.  She does use oxygen during the day and CPAP with oxygen at night.  Past Medical History:   Diagnosis Date   • Bronchitis    • Cervical cancer    • Cholelithiasis 5/11/2016   • Chronic anxiety 2/27/2017   • Chronic bronchitis    • Chronically on benzodiazepine therapy 2/27/2017   • Degenerative arthritis    • Diabetes mellitus    • Dyslipidemia 5/11/2016   • Dyspnea    • Fatty liver disease, nonalcoholic 11/21/2016   • Fibromyalgia    • GERD (gastroesophageal reflux disease)    • H/O echocardiogram    • History of pneumonia    • Hypertension 5/11/2016    16. H/O echocardiogram (V15.89) (Z92.89)  · A.  Echocardiogram of 02/03/2015 reports an ejection fraction of 60-65%, mild concentric     LVH, trace mitral regurgitation, mild tricuspid and pulmonic regurgitation and calculated     RVSP of 35 mmHg, the main pulmonary artery is also mildly dilated.   • Hypothyroidism 5/11/2016    Description: A.  On replacement therapy.   • Nausea    • Obesity    • DINA (obstructive sleep apnea)     intolerant of CPAP therapy   • Osteoporosis    • Osteoporosis    • Polycythemia vera 5/11/2016   • Pulmonary emphysema    • Restrictive ventilatory defect    • Rhinitis    • Uncontrolled diabetes mellitus 5/11/2016   • Uterine cancer    • Vitamin D deficiency 8/1/2016       Past Surgical History:   Procedure Laterality Date   • AMPUTATION DIGIT Left 11/23/2016    Procedure: AMPUTATION TRANS METATARSAL - ray amutation of the left great toe;  Surgeon:  Arsalan Feliciano MD;  Location:  [facility];  Service:    • AMPUTATION DIGIT Left 3/2/2017    Procedure: LEFT FOOT TRANSMETATARSAL AMPUTATION TOES 2,3,4,5;  Surgeon: Joey Guaman MD;  Location:  [facility];  Service:    • BACK SURGERY      lumbar fusions x5--multiple times; 1995, 1997, 1998, 1999 and 2008   • BELOW KNEE AMPUTATION Left 2/25/2017    Procedure: EXTENDED LEFT TOE AMPUTATION;  Surgeon: Arsalan Feliciano MD;  Location:  [facility];  Service:    • CARDIAC CATHETERIZATION N/A 11/26/2016    Procedure: Left Heart Cath;  Surgeon: Ash Nuñez MD;  Location:  [facility];  Service:    • HAND SURGERY Bilateral     x3   • HYSTERECTOMY      status post uterine and cervical cancer   • LUMBAR SPINE SURGERY      arthrodesis by anterior approach addit interspace   • TONSILLECTOMY           Current Outpatient Prescriptions:   •  acetaminophen (TYLENOL) 325 MG tablet, Take 2 tablets by mouth Every 4 (Four) Hours As Needed for mild pain (1-3) or fever (temperature greater than 101F)., Disp: 100 tablet, Rfl: 0  •  albuterol (PROVENTIL) (2.5 MG/3ML) 0.083% nebulizer solution, 2.5 mg Every 6 (Six) Hours As Needed., Disp: , Rfl:   •  amLODIPine (NORVASC) 5 MG tablet, Take 1 tablet by mouth Daily., Disp: 90 tablet, Rfl: 3  •  aspirin  MG EC tablet, Take 1 tablet by mouth Daily., Disp: 100 tablet, Rfl: 0  •  atorvastatin (LIPITOR) 40 MG tablet, Take 1 tablet by mouth Every Night., Disp: 90 tablet, Rfl: 3  •  baclofen (LIORESAL) 10 MG tablet, Take 1 tablet by mouth 2 (Two) Times a Day As Needed for Muscle Spasms., Disp: 180 tablet, Rfl: 3  •  busPIRone (BUSPAR) 10 MG tablet, TAKE TWO TABLETS BY MOUTH TWICE A DAY, Disp: 360 tablet, Rfl: 1  •  cetirizine (ZyrTEC) 10 MG tablet, Take 10 mg by mouth Every Night., Disp: , Rfl:   •  clonazePAM (KlonoPIN) 0.5 MG tablet, TAKE ONE-HALF TABLET BY MOUTH TWICE A DAY AS NEEDED, Disp: 90 tablet, Rfl: 0  •  clopidogrel (PLAVIX) 75 MG tablet, Take 1 tablet by mouth Daily., Disp:  90 tablet, Rfl: 3  •  ezetimibe (ZETIA) 10 MG tablet, Take 1 tablet by mouth Daily., Disp: 90 tablet, Rfl: 3  •  fentaNYL (DURAGESIC) 100 MCG/HR patch, Place 1 patch on the skin Every Other Day., Disp: 15 patch, Rfl: 0  •  ferrous sulfate 325 (65 FE) MG EC tablet, Take 1 tablet by mouth 3 (Three) Times a Day With Meals., Disp: 90 tablet, Rfl: 5  •  fluticasone (FLONASE) 50 MCG/ACT nasal spray, 1 spray into each nostril 2 (Two) Times a Day., Disp: , Rfl:   •  furosemide (LASIX) 40 MG tablet, TAKE ONE TABLET BY MOUTH TWICE A DAY, Disp: 180 tablet, Rfl: 2  •  hydrocortisone 1 % cream, Apply  topically Every 12 (Twelve) Hours., Disp: , Rfl: 0  •  levothyroxine (SYNTHROID, LEVOTHROID) 112 MCG tablet, TAKE ONE TABLET BY MOUTH DAILY, Disp: 90 tablet, Rfl: 2  •  lisinopril (PRINIVIL,ZESTRIL) 20 MG tablet, Take 1 tablet by mouth Daily., Disp: 90 tablet, Rfl: 3  •  LYRICA 100 MG capsule, TAKE ONE CAPSULE BY MOUTH THREE TIMES A DAY, Disp: 270 capsule, Rfl: 0  •  melatonin 5 MG sublingual tablet sublingual tablet, Place 1 tablet under the tongue At Night As Needed (insomnia)., Disp: 30 tablet, Rfl: 0  •  metoclopramide (REGLAN) 5 MG tablet, Take 1 tablet by mouth 3 (Three) Times a Day Before Meals., Disp: , Rfl:   •  omeprazole (priLOSEC) 20 MG capsule, TAKE ONE CAPSULE BY MOUTH DAILY, Disp: 90 capsule, Rfl: 1  •  oxyCODONE-acetaminophen (PERCOCET)  MG per tablet, Take 1 tablet by mouth 3 (Three) Times a Day., Disp: 90 tablet, Rfl: 0  •  probiotic (CULTURELLE) capsule capsule, Take 1 capsule by mouth Daily., Disp: 60 capsule, Rfl: 0  •  promethazine (PHENERGAN) 25 MG tablet, Take 25 mg by mouth Every 8 (Eight) Hours As Needed for nausea or vomiting., Disp: , Rfl:   •  SPIRIVA RESPIMAT 2.5 MCG/ACT aerosol solution, INHALE TWO PUFF(S) BY MOUTH DAILY, Disp: 1 inhaler, Rfl: 4    Allergies   Allergen Reactions   • Cortisone Other (See Comments)     Hotness all over body and hyper   • Oxycontin [Oxycodone] Other (See Comments)      psoriasis   • Tolmetin Rash     Tolectin-rash and itching       Family History   Problem Relation Age of Onset   • Arthritis Mother    • Diabetes Mother    • Colon polyps Mother    • Diverticulitis Mother    • Arthritis Father    • Bleeding Disorder Father    • Diabetes Father    • Kidney disease Father    • COPD Sister      currently smokes   • Arthritis Brother    • Diabetes Brother    • Alcohol abuse Brother    • Heart murmur Daughter    • Arthritis Other    • Diabetes Other        Social History     Social History   • Marital status:      Spouse name: N/A   • Number of children: 1   • Years of education: N/A     Occupational History   •  Disabled     Social History Main Topics   • Smoking status: Former Smoker     Packs/day: 1.50     Years: 48.00     Types: Cigarettes     Quit date: 2/27/2017   • Smokeless tobacco: Never Used   • Alcohol use No   • Drug use: No   • Sexual activity: Not on file     Other Topics Concern   • Not on file     Social History Narrative    Lives with her  in [place]       Review of Systems   Constitutional: Negative for activity change, appetite change, fatigue, fever and unexpected weight change.   HENT: Negative for dental problem, hearing loss, mouth sores, postnasal drip, sneezing, trouble swallowing and voice change.    Eyes: Negative for pain, redness, itching and visual disturbance.   Respiratory: Negative for cough, choking, chest tightness, shortness of breath and wheezing.    Cardiovascular: Negative for chest pain, palpitations and leg swelling.   Gastrointestinal: Negative for abdominal distention, abdominal pain, anal bleeding, blood in stool, constipation, diarrhea, nausea, rectal pain and vomiting.   Endocrine: Negative for cold intolerance, heat intolerance, polydipsia, polyphagia and polyuria.   Genitourinary: Negative.  Negative for dysuria, enuresis, flank pain, hematuria and urgency.   Musculoskeletal: Negative for arthralgias, back pain, gait  problem, joint swelling and myalgias.   Skin: Negative for color change, pallor and rash.   Allergic/Immunologic: Negative for environmental allergies, food allergies and immunocompromised state.   Neurological: Negative for dizziness, tremors, seizures, facial asymmetry, speech difficulty, numbness and headaches.   Hematological: Negative for adenopathy.   Psychiatric/Behavioral: Negative for behavioral problems, confusion, dysphoric mood, hallucinations and self-injury.       Vitals:    08/24/17 0842   BP: 126/80   Pulse: 92   Temp: 98.1 °F (36.7 °C)       Physical Exam   Constitutional: She is oriented to person, place, and time. She appears well-nourished. No distress.   HENT:   Head: Normocephalic and atraumatic.   Mouth/Throat: Oropharynx is clear and moist. No oropharyngeal exudate.   Eyes: EOM are normal. Pupils are equal, round, and reactive to light. No scleral icterus.   Neck: Normal range of motion. Neck supple. No thyromegaly present.   Cardiovascular: Normal rate and regular rhythm.  Exam reveals no gallop.    Murmur heard.  Pulmonary/Chest: Effort normal and breath sounds normal. She has no wheezes. She has no rales.   Abdominal: Soft. Bowel sounds are normal. There is no tenderness. There is no guarding.   hepatomegaly   Musculoskeletal: She exhibits edema (mild pretibial). She exhibits no tenderness.   Lymphadenopathy:     She has no cervical adenopathy.   Neurological: She is alert and oriented to person, place, and time. She exhibits normal muscle tone.   Non ambulatory, patient in wheelchair   Skin: Skin is dry. No erythema.   Psychiatric: She has a normal mood and affect. Her behavior is normal. Thought content normal.   Vitals reviewed.      Tamara was seen today for follow-up.    Diagnoses and all orders for this visit:    VELEZ (nonalcoholic steatohepatitis)  -     US Liver; Future  -     AFP Tumor Marker  -     Comprehensive Metabolic Panel    Abnormal liver function tests  -     US Liver;  Future  -     AFP Tumor Marker  -     Comprehensive Metabolic Panel    Other cirrhosis of liver  -     US Liver; Future  -     AFP Tumor Marker  -     Comprehensive Metabolic Panel   The patient has biopsy-proven Craig.  There are no signs of decompensation at this time.      Plan: Will schedule an ultrasound of the liver.           Will obtain a CMP and alpha-fetoprotein.           Encouraged continued tobacco abstinence.           Follow-up in 6 months.      I spent over 50% of the office visit counseling and answering questions from the patient.

## 2017-08-25 LAB
AFP-TM SERPL-MCNC: 3.3 NG/ML (ref 0–8.3)
ALBUMIN SERPL-MCNC: 3.7 G/DL (ref 3.2–4.8)
ALBUMIN/GLOB SERPL: 1.3 G/DL (ref 1.5–2.5)
ALP SERPL-CCNC: 81 U/L (ref 25–100)
ALT SERPL-CCNC: 26 U/L (ref 7–40)
AST SERPL-CCNC: 41 U/L (ref 0–33)
BILIRUB SERPL-MCNC: 0.2 MG/DL (ref 0.3–1.2)
BUN SERPL-MCNC: 20 MG/DL (ref 9–23)
BUN/CREAT SERPL: 18.2 (ref 7–25)
CALCIUM SERPL-MCNC: 8.9 MG/DL (ref 8.7–10.4)
CHLORIDE SERPL-SCNC: 102 MMOL/L (ref 99–109)
CO2 SERPL-SCNC: 29 MMOL/L (ref 20–31)
CREAT SERPL-MCNC: 1.1 MG/DL (ref 0.6–1.3)
GLOBULIN SER CALC-MCNC: 2.8 GM/DL
GLUCOSE SERPL-MCNC: 188 MG/DL (ref 70–100)
POTASSIUM SERPL-SCNC: 3.9 MMOL/L (ref 3.5–5.5)
PROT SERPL-MCNC: 6.5 G/DL (ref 5.7–8.2)
SODIUM SERPL-SCNC: 141 MMOL/L (ref 132–146)

## 2017-08-31 ENCOUNTER — TELEPHONE (OUTPATIENT)
Dept: INTERNAL MEDICINE | Facility: CLINIC | Age: 62
End: 2017-08-31

## 2017-08-31 DIAGNOSIS — M79.7 FIBROMYALGIA: ICD-10-CM

## 2017-08-31 DIAGNOSIS — M54.40 CHRONIC MIDLINE LOW BACK PAIN WITH SCIATICA, SCIATICA LATERALITY UNSPECIFIED: ICD-10-CM

## 2017-08-31 DIAGNOSIS — M15.9 PRIMARY OSTEOARTHRITIS INVOLVING MULTIPLE JOINTS: ICD-10-CM

## 2017-08-31 DIAGNOSIS — G89.29 CHRONIC MIDLINE LOW BACK PAIN WITH SCIATICA, SCIATICA LATERALITY UNSPECIFIED: ICD-10-CM

## 2017-08-31 RX ORDER — FENTANYL 100 UG/H
1 PATCH TRANSDERMAL EVERY OTHER DAY
Qty: 15 PATCH | Refills: 0 | Status: SHIPPED | OUTPATIENT
Start: 2017-08-31 | End: 2017-10-02 | Stop reason: SDUPTHER

## 2017-08-31 RX ORDER — OXYCODONE AND ACETAMINOPHEN 10; 325 MG/1; MG/1
1 TABLET ORAL 3 TIMES DAILY
Qty: 90 TABLET | Refills: 0 | Status: SHIPPED | OUTPATIENT
Start: 2017-08-31 | End: 2017-10-02 | Stop reason: SDUPTHER

## 2017-09-05 ENCOUNTER — OFFICE VISIT (OUTPATIENT)
Dept: INTERNAL MEDICINE | Facility: CLINIC | Age: 62
End: 2017-09-05

## 2017-09-05 VITALS — HEART RATE: 68 BPM | SYSTOLIC BLOOD PRESSURE: 140 MMHG | HEIGHT: 66 IN | DIASTOLIC BLOOD PRESSURE: 74 MMHG

## 2017-09-05 DIAGNOSIS — R74.8 ABNORMAL LIVER ENZYMES: ICD-10-CM

## 2017-09-05 DIAGNOSIS — G47.33 OBSTRUCTIVE SLEEP APNEA SYNDROME: ICD-10-CM

## 2017-09-05 DIAGNOSIS — R09.02 HYPOXIA: ICD-10-CM

## 2017-09-05 DIAGNOSIS — J30.9 ATOPIC RHINITIS: ICD-10-CM

## 2017-09-05 DIAGNOSIS — E03.8 OTHER SPECIFIED HYPOTHYROIDISM: ICD-10-CM

## 2017-09-05 DIAGNOSIS — E53.8 COBALAMIN DEFICIENCY: ICD-10-CM

## 2017-09-05 DIAGNOSIS — H34.8392 BRANCH RETINAL VEIN OCCLUSION: Primary | ICD-10-CM

## 2017-09-05 DIAGNOSIS — E78.5 DYSLIPIDEMIA: ICD-10-CM

## 2017-09-05 DIAGNOSIS — I10 ESSENTIAL HYPERTENSION: ICD-10-CM

## 2017-09-05 DIAGNOSIS — E11.42 DIABETIC POLYNEUROPATHY ASSOCIATED WITH TYPE 2 DIABETES MELLITUS (HCC): ICD-10-CM

## 2017-09-05 DIAGNOSIS — K76.0 FATTY LIVER DISEASE, NONALCOHOLIC: Chronic | ICD-10-CM

## 2017-09-05 DIAGNOSIS — M15.9 PRIMARY OSTEOARTHRITIS INVOLVING MULTIPLE JOINTS: ICD-10-CM

## 2017-09-05 DIAGNOSIS — F41.9 CHRONIC ANXIETY: ICD-10-CM

## 2017-09-05 DIAGNOSIS — L40.9 PSORIASIS: ICD-10-CM

## 2017-09-05 DIAGNOSIS — Z72.0 TOBACCO ABUSE: ICD-10-CM

## 2017-09-05 DIAGNOSIS — E11.10 TYPE 2 DIABETES MELLITUS WITH KETOACIDOSIS WITHOUT COMA, WITHOUT LONG-TERM CURRENT USE OF INSULIN (HCC): ICD-10-CM

## 2017-09-05 DIAGNOSIS — R00.1 SINUS BRADYCARDIA: ICD-10-CM

## 2017-09-05 DIAGNOSIS — G47.00 INSOMNIA, UNSPECIFIED TYPE: ICD-10-CM

## 2017-09-05 DIAGNOSIS — G25.0 ESSENTIAL TREMOR: ICD-10-CM

## 2017-09-05 DIAGNOSIS — M54.40 CHRONIC LOW BACK PAIN WITH SCIATICA, SCIATICA LATERALITY UNSPECIFIED, UNSPECIFIED BACK PAIN LATERALITY: ICD-10-CM

## 2017-09-05 DIAGNOSIS — M79.7 FIBROMYALGIA: ICD-10-CM

## 2017-09-05 DIAGNOSIS — D50.0 ANEMIA, BLOOD LOSS: ICD-10-CM

## 2017-09-05 DIAGNOSIS — G89.29 OTHER CHRONIC PAIN: ICD-10-CM

## 2017-09-05 DIAGNOSIS — J43.8 OTHER EMPHYSEMA (HCC): ICD-10-CM

## 2017-09-05 DIAGNOSIS — M81.0 OSTEOPOROSIS: ICD-10-CM

## 2017-09-05 DIAGNOSIS — I73.9 PERIPHERAL VASCULAR DISEASE (HCC): ICD-10-CM

## 2017-09-05 DIAGNOSIS — E55.9 VITAMIN D DEFICIENCY: ICD-10-CM

## 2017-09-05 DIAGNOSIS — F11.90 CHRONIC, CONTINUOUS USE OF OPIOIDS: ICD-10-CM

## 2017-09-05 DIAGNOSIS — F41.8 MIXED ANXIETY DEPRESSIVE DISORDER: ICD-10-CM

## 2017-09-05 DIAGNOSIS — G89.29 CHRONIC LOW BACK PAIN WITH SCIATICA, SCIATICA LATERALITY UNSPECIFIED, UNSPECIFIED BACK PAIN LATERALITY: ICD-10-CM

## 2017-09-05 DIAGNOSIS — K21.9 GASTROESOPHAGEAL REFLUX DISEASE WITHOUT ESOPHAGITIS: ICD-10-CM

## 2017-09-05 LAB
ALBUMIN SERPL-MCNC: 3.7 G/DL (ref 3.2–4.8)
ALBUMIN/GLOB SERPL: 1.2 G/DL (ref 1.5–2.5)
ALP SERPL-CCNC: 78 U/L (ref 25–100)
ALT SERPL-CCNC: 26 U/L (ref 7–40)
AST SERPL-CCNC: 56 U/L (ref 0–33)
BILIRUB SERPL-MCNC: 0.2 MG/DL (ref 0.3–1.2)
BUN SERPL-MCNC: 20 MG/DL (ref 9–23)
BUN/CREAT SERPL: 16.7 (ref 7–25)
CALCIUM SERPL-MCNC: 9.4 MG/DL (ref 8.7–10.4)
CHLORIDE SERPL-SCNC: 107 MMOL/L (ref 99–109)
CO2 SERPL-SCNC: 32 MMOL/L (ref 20–31)
CREAT SERPL-MCNC: 1.2 MG/DL (ref 0.6–1.3)
ERYTHROCYTE [DISTWIDTH] IN BLOOD BY AUTOMATED COUNT: 17.4 % (ref 11.3–14.5)
GLOBULIN SER CALC-MCNC: 3.1 GM/DL
GLUCOSE SERPL-MCNC: 176 MG/DL (ref 70–100)
HBA1C MFR BLD: 6.5 % (ref 4.8–5.6)
HCT VFR BLD AUTO: 36.4 % (ref 34.5–44)
HGB BLD-MCNC: 10.6 G/DL (ref 11.5–15.5)
IRON SERPL-MCNC: 36 MCG/DL (ref 50–175)
MCH RBC QN AUTO: 27.7 PG (ref 27–31)
MCHC RBC AUTO-ENTMCNC: 29.1 G/DL (ref 32–36)
MCV RBC AUTO: 95.3 FL (ref 80–99)
PLATELET # BLD AUTO: 228 10*3/MM3 (ref 150–450)
POTASSIUM SERPL-SCNC: 4.1 MMOL/L (ref 3.5–5.5)
PROT SERPL-MCNC: 6.8 G/DL (ref 5.7–8.2)
RBC # BLD AUTO: 3.82 10*6/MM3 (ref 3.89–5.14)
SODIUM SERPL-SCNC: 143 MMOL/L (ref 132–146)
TSH SERPL DL<=0.005 MIU/L-ACNC: 1.41 MIU/ML (ref 0.35–5.35)
VIT B12 SERPL-MCNC: >2000 PG/ML (ref 211–911)
WBC # BLD AUTO: 12.08 10*3/MM3 (ref 3.5–10.8)

## 2017-09-05 PROCEDURE — 99214 OFFICE O/P EST MOD 30 MIN: CPT | Performed by: INTERNAL MEDICINE

## 2017-09-05 RX ORDER — METOCLOPRAMIDE 10 MG/1
TABLET ORAL
COMMUNITY
Start: 2017-08-25 | End: 2017-11-29 | Stop reason: SDUPTHER

## 2017-09-05 NOTE — PROGRESS NOTES
Patient is a 61 y.o. female who is here for a follow up of chronic conditions.  Chief Complaint   Patient presents with   • Hypertension   • Diabetes   • Hypothyroidism         HPI:  Here for f/u.  Has weakness in LE.  Able to ambulate only 1/2 the room/20 steps and gets very SOB.  Now 5 mos that she is smoke free.  Continues to be followed by Dr Feliciano.  No cough.  Appetite is good.      History:    Patient Active Problem List   Diagnosis   • Atopic rhinitis   • Restrictive ventilatory defect   • COPD (chronic obstructive pulmonary disease)   • Osteoporosis   • Obstructive sleep apnea syndrome   • Abnormal liver enzymes   • Cholelithiasis   • Chronic back pain   • Mixed anxiety depressive disorder   • Type 2 diabetes mellitus   • Dyslipidemia   • Essential tremor   • Fibromyalgia   • Gastroesophageal reflux disease without esophagitis   • Hypertension   • Hypothyroidism   • Insomnia   • Osteoarthritis   • Psoriasis   • Branch retinal vein occlusion   • Cobalamin deficiency   • Obesity   • Vitamin D deficiency   • Hypokalemia   • Lactic acidosis   • Fatty liver disease, nonalcoholic   • Sinus bradycardia   • NSTEMI (non-ST elevated myocardial infarction)   • Tobacco abuse   • Hypoxia   • Peripheral vascular disease   • Osteomyelitis of left foot   • Diabetic polyneuropathy associated with type 2 diabetes mellitus   • Anemia, blood loss   • Chronic pain   • Chronic, continuous use of opioids   • Chronic anxiety   • Chronically on benzodiazepine therapy       Past Medical History:   Diagnosis Date   • Bronchitis    • Cervical cancer    • Cholelithiasis 5/11/2016   • Chronic anxiety 2/27/2017   • Chronic bronchitis    • Chronically on benzodiazepine therapy 2/27/2017   • Degenerative arthritis    • Diabetes mellitus    • Dyslipidemia 5/11/2016   • Dyspnea    • Fatty liver disease, nonalcoholic 11/21/2016   • Fibromyalgia    • GERD (gastroesophageal reflux disease)    • H/O echocardiogram    • History of pneumonia    •  Hypertension 5/11/2016    16. H/O echocardiogram (V15.89) (Z92.89)  · A.  Echocardiogram of 02/03/2015 reports an ejection fraction of 60-65%, mild concentric     LVH, trace mitral regurgitation, mild tricuspid and pulmonic regurgitation and calculated     RVSP of 35 mmHg, the main pulmonary artery is also mildly dilated.   • Hypothyroidism 5/11/2016    Description: A.  On replacement therapy.   • Nausea    • Obesity    • DINA (obstructive sleep apnea)     intolerant of CPAP therapy   • Osteoporosis    • Osteoporosis    • Polycythemia vera 5/11/2016   • Pulmonary emphysema    • Restrictive ventilatory defect    • Rhinitis    • Uncontrolled diabetes mellitus 5/11/2016   • Uterine cancer    • Vitamin D deficiency 8/1/2016       Past Surgical History:   Procedure Laterality Date   • AMPUTATION DIGIT Left 11/23/2016    Procedure: AMPUTATION TRANS METATARSAL - ray amutation of the left great toe;  Surgeon: Arsalan Feliciano MD;  Location:  [facility];  Service:    • AMPUTATION DIGIT Left 3/2/2017    Procedure: LEFT FOOT TRANSMETATARSAL AMPUTATION TOES 2,3,4,5;  Surgeon: Joey Guaman MD;  Location:  [facility];  Service:    • BACK SURGERY      lumbar fusions x5--multiple times; 1995, 1997, 1998, 1999 and 2008   • BELOW KNEE AMPUTATION Left 2/25/2017    Procedure: EXTENDED LEFT TOE AMPUTATION;  Surgeon: Arsalan Feliciano MD;  Location: [facility];  Service:    • CARDIAC CATHETERIZATION N/A 11/26/2016    Procedure: Left Heart Cath;  Surgeon: Ash Nuñez MD;  Location:  [facility] INVASIVE LOCATION;  Service:    • HAND SURGERY Bilateral     x3   • HYSTERECTOMY      status post uterine and cervical cancer   • LUMBAR SPINE SURGERY      arthrodesis by anterior approach addit interspace   • TONSILLECTOMY         Current Outpatient Prescriptions on File Prior to Visit   Medication Sig   • acetaminophen (TYLENOL) 325 MG tablet Take 2 tablets by mouth Every 4 (Four) Hours As Needed for mild pain (1-3) or fever (temperature greater than  101F).   • albuterol (PROVENTIL) (2.5 MG/3ML) 0.083% nebulizer solution 2.5 mg Every 6 (Six) Hours As Needed.   • amLODIPine (NORVASC) 5 MG tablet Take 1 tablet by mouth Daily.   • aspirin  MG EC tablet Take 1 tablet by mouth Daily.   • atorvastatin (LIPITOR) 40 MG tablet Take 1 tablet by mouth Every Night.   • baclofen (LIORESAL) 10 MG tablet Take 1 tablet by mouth 2 (Two) Times a Day As Needed for Muscle Spasms.   • busPIRone (BUSPAR) 10 MG tablet TAKE TWO TABLETS BY MOUTH TWICE A DAY   • cetirizine (ZyrTEC) 10 MG tablet Take 10 mg by mouth Every Night.   • clonazePAM (KlonoPIN) 0.5 MG tablet TAKE ONE-HALF TABLET BY MOUTH TWICE A DAY AS NEEDED   • clopidogrel (PLAVIX) 75 MG tablet Take 1 tablet by mouth Daily.   • ezetimibe (ZETIA) 10 MG tablet Take 1 tablet by mouth Daily.   • fentaNYL (DURAGESIC) 100 MCG/HR patch Place 1 patch on the skin Every Other Day.   • ferrous sulfate 325 (65 FE) MG EC tablet Take 1 tablet by mouth 3 (Three) Times a Day With Meals.   • fluticasone (FLONASE) 50 MCG/ACT nasal spray 1 spray into each nostril 2 (Two) Times a Day.   • furosemide (LASIX) 40 MG tablet TAKE ONE TABLET BY MOUTH TWICE A DAY   • hydrocortisone 1 % cream Apply  topically Every 12 (Twelve) Hours.   • levothyroxine (SYNTHROID, LEVOTHROID) 112 MCG tablet TAKE ONE TABLET BY MOUTH DAILY   • lisinopril (PRINIVIL,ZESTRIL) 20 MG tablet Take 1 tablet by mouth Daily.   • LYRICA 100 MG capsule TAKE ONE CAPSULE BY MOUTH THREE TIMES A DAY   • melatonin 5 MG sublingual tablet sublingual tablet Place 1 tablet under the tongue At Night As Needed (insomnia).   • omeprazole (priLOSEC) 20 MG capsule TAKE ONE CAPSULE BY MOUTH DAILY   • oxyCODONE-acetaminophen (PERCOCET)  MG per tablet Take 1 tablet by mouth 3 (Three) Times a Day.   • probiotic (CULTURELLE) capsule capsule Take 1 capsule by mouth Daily.   • promethazine (PHENERGAN) 25 MG tablet Take 25 mg by mouth Every 8 (Eight) Hours As Needed for nausea or vomiting.   •  SPIRIVA RESPIMAT 2.5 MCG/ACT aerosol solution INHALE TWO PUFF(S) BY MOUTH DAILY   • [DISCONTINUED] metoclopramide (REGLAN) 5 MG tablet Take 1 tablet by mouth 3 (Three) Times a Day Before Meals.     No current facility-administered medications on file prior to visit.        Family History   Problem Relation Age of Onset   • Arthritis Mother    • Diabetes Mother    • Colon polyps Mother    • Diverticulitis Mother    • Arthritis Father    • Bleeding Disorder Father    • Diabetes Father    • Kidney disease Father    • COPD Sister      currently smokes   • Arthritis Brother    • Diabetes Brother    • Alcohol abuse Brother    • Heart murmur Daughter    • Arthritis Other    • Diabetes Other        Social History     Social History   • Marital status:      Spouse name: N/A   • Number of children: 1   • Years of education: N/A     Occupational History   •  Disabled     Social History Main Topics   • Smoking status: Former Smoker     Packs/day: 1.50     Years: 48.00     Types: Cigarettes     Quit date: 2/27/2017   • Smokeless tobacco: Never Used   • Alcohol use No   • Drug use: No   • Sexual activity: Not on file     Other Topics Concern   • Not on file     Social History Narrative    Lives with her  in [place]         ROS:    Review of Systems   Constitutional: Positive for fatigue. Negative for chills, diaphoresis, fever and unexpected weight change.   HENT: Negative for congestion, ear pain, hearing loss, nosebleeds, postnasal drip, sinus pressure and sore throat.    Eyes: Negative for pain, discharge and itching.   Respiratory: Positive for cough and shortness of breath. Negative for chest tightness and wheezing.    Cardiovascular: Negative for chest pain, palpitations and leg swelling.   Gastrointestinal: Positive for abdominal pain. Negative for abdominal distention, blood in stool, constipation, diarrhea, nausea and vomiting.   Endocrine: Negative for heat intolerance, polydipsia and polyuria.  "  Genitourinary: Negative for difficulty urinating, dysuria, frequency and hematuria.   Musculoskeletal: Positive for arthralgias and back pain. Negative for gait problem, joint swelling and myalgias.   Skin: Positive for rash and wound.   Neurological: Positive for weakness and light-headedness. Negative for dizziness, syncope and headaches.   Psychiatric/Behavioral: Positive for dysphoric mood and sleep disturbance. The patient is nervous/anxious.        /74  Pulse 68  Ht 66\" (167.6 cm)  LMP  (LMP Unknown)    Physical Exam:    Physical Exam   Constitutional: She is oriented to person, place, and time. She appears well-developed and well-nourished.   HENT:   Head: Normocephalic and atraumatic.   Right Ear: External ear normal.   Left Ear: External ear normal.   Mouth/Throat: Oropharynx is clear and moist.   Eyes: Conjunctivae and EOM are normal.   Neck: Normal range of motion. Neck supple.   Cardiovascular: Normal rate and regular rhythm.    2/6 СЕРГЕЙ   Pulmonary/Chest: Effort normal.   Mildly diminished   Abdominal: Soft. Bowel sounds are normal.   Musculoskeletal: She exhibits edema ( ) and tenderness (pain on back flexion past 30  ).   Using wheelchair   Lymphadenopathy:     She has no cervical adenopathy.   Neurological: She is alert and oriented to person, place, and time.   Skin: Skin is warm and dry. No rash noted.   Left foot wound CDI   Psychiatric: She has a normal mood and affect. Her behavior is normal. Thought content normal.       Procedure:      Discussion/Summary:  chronic back pain- refill patch and percocet as needed  htn-stable  fibromylagia- cont current tx  hyperlipidemia-cont lipitor and zetia  hypothroid-cont replacement, recheck today  depression with anxiety-cont buspar and klonopine ,advised her of risk of addiction  polycythemia-cbc today  retinal vein occlusion- cont rf modification  b12 def-admin today and check level  nausea-phenergan prn, cont reglan, advised of EPS effects and " she has agreed  DM-labs today, counseled on low carb, recheck labs noted, cont januvia  diaphoresis-better  Elevated lft/?autoimmune-f/u gastro recs , labs to be rechecked  Eczema-cont prn steroids  Left foot ulcer and osteo-resolved  FE def anemia-cont FE, recheck today  PNA-counseled on continued Tob abstinence  high risk meds-labs today      labs noted and dw patient, advised to add vit C to FE      Current Outpatient Prescriptions:   •  acetaminophen (TYLENOL) 325 MG tablet, Take 2 tablets by mouth Every 4 (Four) Hours As Needed for mild pain (1-3) or fever (temperature greater than 101F)., Disp: 100 tablet, Rfl: 0  •  albuterol (PROVENTIL) (2.5 MG/3ML) 0.083% nebulizer solution, 2.5 mg Every 6 (Six) Hours As Needed., Disp: , Rfl:   •  amLODIPine (NORVASC) 5 MG tablet, Take 1 tablet by mouth Daily., Disp: 90 tablet, Rfl: 3  •  aspirin  MG EC tablet, Take 1 tablet by mouth Daily., Disp: 100 tablet, Rfl: 0  •  atorvastatin (LIPITOR) 40 MG tablet, Take 1 tablet by mouth Every Night., Disp: 90 tablet, Rfl: 3  •  baclofen (LIORESAL) 10 MG tablet, Take 1 tablet by mouth 2 (Two) Times a Day As Needed for Muscle Spasms., Disp: 180 tablet, Rfl: 3  •  busPIRone (BUSPAR) 10 MG tablet, TAKE TWO TABLETS BY MOUTH TWICE A DAY, Disp: 360 tablet, Rfl: 1  •  cetirizine (ZyrTEC) 10 MG tablet, Take 10 mg by mouth Every Night., Disp: , Rfl:   •  clonazePAM (KlonoPIN) 0.5 MG tablet, TAKE ONE-HALF TABLET BY MOUTH TWICE A DAY AS NEEDED, Disp: 90 tablet, Rfl: 0  •  clopidogrel (PLAVIX) 75 MG tablet, Take 1 tablet by mouth Daily., Disp: 90 tablet, Rfl: 3  •  ezetimibe (ZETIA) 10 MG tablet, Take 1 tablet by mouth Daily., Disp: 90 tablet, Rfl: 3  •  fentaNYL (DURAGESIC) 100 MCG/HR patch, Place 1 patch on the skin Every Other Day., Disp: 15 patch, Rfl: 0  •  ferrous sulfate 325 (65 FE) MG EC tablet, Take 1 tablet by mouth 3 (Three) Times a Day With Meals., Disp: 90 tablet, Rfl: 5  •  fluticasone (FLONASE) 50 MCG/ACT nasal spray, 1  spray into each nostril 2 (Two) Times a Day., Disp: , Rfl:   •  furosemide (LASIX) 40 MG tablet, TAKE ONE TABLET BY MOUTH TWICE A DAY, Disp: 180 tablet, Rfl: 2  •  hydrocortisone 1 % cream, Apply  topically Every 12 (Twelve) Hours., Disp: , Rfl: 0  •  levothyroxine (SYNTHROID, LEVOTHROID) 112 MCG tablet, TAKE ONE TABLET BY MOUTH DAILY, Disp: 90 tablet, Rfl: 2  •  lisinopril (PRINIVIL,ZESTRIL) 20 MG tablet, Take 1 tablet by mouth Daily., Disp: 90 tablet, Rfl: 3  •  LYRICA 100 MG capsule, TAKE ONE CAPSULE BY MOUTH THREE TIMES A DAY, Disp: 270 capsule, Rfl: 0  •  melatonin 5 MG sublingual tablet sublingual tablet, Place 1 tablet under the tongue At Night As Needed (insomnia)., Disp: 30 tablet, Rfl: 0  •  metoclopramide (REGLAN) 10 MG tablet, , Disp: , Rfl:   •  omeprazole (priLOSEC) 20 MG capsule, TAKE ONE CAPSULE BY MOUTH DAILY, Disp: 90 capsule, Rfl: 1  •  oxyCODONE-acetaminophen (PERCOCET)  MG per tablet, Take 1 tablet by mouth 3 (Three) Times a Day., Disp: 90 tablet, Rfl: 0  •  probiotic (CULTURELLE) capsule capsule, Take 1 capsule by mouth Daily., Disp: 60 capsule, Rfl: 0  •  promethazine (PHENERGAN) 25 MG tablet, Take 25 mg by mouth Every 8 (Eight) Hours As Needed for nausea or vomiting., Disp: , Rfl:   •  SPIRIVA RESPIMAT 2.5 MCG/ACT aerosol solution, INHALE TWO PUFF(S) BY MOUTH DAILY, Disp: 1 inhaler, Rfl: 4        Tamara was seen today for hypertension, diabetes and hypothyroidism.    Diagnoses and all orders for this visit:    Branch retinal vein occlusion    Cobalamin deficiency  -     CBC (No Diff)  -     Vitamin B12    Essential hypertension    Peripheral vascular disease    Sinus bradycardia    Atopic rhinitis    Other emphysema    Hypoxia    Obstructive sleep apnea syndrome    Fatty liver disease, nonalcoholic    Gastroesophageal reflux disease without esophagitis    Vitamin D deficiency    Diabetic polyneuropathy associated with type 2 diabetes mellitus    Other specified hypothyroidism    Type 2  diabetes mellitus with ketoacidosis without coma, without long-term current use of insulin  -     Hemoglobin A1c  -     Comprehensive Metabolic Panel  -     TSH    Chronic low back pain with sciatica, sciatica laterality unspecified, unspecified back pain laterality    Essential tremor    Fibromyalgia    Primary osteoarthritis involving multiple joints    Osteoporosis    Psoriasis    Anemia, blood loss  -     Iron    Abnormal liver enzymes    Chronic anxiety    Other chronic pain    Chronic, continuous use of opioids    Dyslipidemia    Insomnia, unspecified type    Mixed anxiety depressive disorder    Tobacco abuse

## 2017-09-06 ENCOUNTER — TELEPHONE (OUTPATIENT)
Dept: INTERNAL MEDICINE | Facility: CLINIC | Age: 62
End: 2017-09-06

## 2017-09-06 NOTE — TELEPHONE ENCOUNTER
Pt said that she needs a referral for a podiatry. [phone] is the number to the one that she wants to use.

## 2017-09-15 ENCOUNTER — HOSPITAL ENCOUNTER (OUTPATIENT)
Dept: ULTRASOUND IMAGING | Facility: HOSPITAL | Age: 62
Discharge: HOME | End: 2017-09-15
Attending: INTERNAL MEDICINE | Admitting: INTERNAL MEDICINE

## 2017-09-15 DIAGNOSIS — K75.81 NASH (NONALCOHOLIC STEATOHEPATITIS): ICD-10-CM

## 2017-09-15 DIAGNOSIS — R79.89 ABNORMAL LIVER FUNCTION TESTS: ICD-10-CM

## 2017-09-15 DIAGNOSIS — K74.69 OTHER CIRRHOSIS OF LIVER (HCC): ICD-10-CM

## 2017-09-15 PROCEDURE — 76705 ECHO EXAM OF ABDOMEN: CPT

## 2017-09-18 ENCOUNTER — TELEPHONE (OUTPATIENT)
Dept: GASTROENTEROLOGY | Facility: CLINIC | Age: 62
End: 2017-09-18

## 2017-09-18 NOTE — TELEPHONE ENCOUNTER
----- Message from Prabhjot Luong MD sent at 9/15/2017  5:51 PM EDT -----  Let Ms. Langston know there is no mass in the liver. No fluid around the liver and this is good.  Thanks,  GMW

## 2017-09-27 ENCOUNTER — TELEPHONE (OUTPATIENT)
Dept: INTERNAL MEDICINE | Facility: CLINIC | Age: 62
End: 2017-09-27

## 2017-09-27 NOTE — TELEPHONE ENCOUNTER
PATIENT CALLED AND WAS WANTING TO KNOW IF SHE COULD PICK HER PRESCRIPTIONS UP ON Monday. PLEASE CALL PATIENT BACK

## 2017-10-02 DIAGNOSIS — M54.40 CHRONIC MIDLINE LOW BACK PAIN WITH SCIATICA, SCIATICA LATERALITY UNSPECIFIED: ICD-10-CM

## 2017-10-02 DIAGNOSIS — G89.29 CHRONIC MIDLINE LOW BACK PAIN WITH SCIATICA, SCIATICA LATERALITY UNSPECIFIED: ICD-10-CM

## 2017-10-02 DIAGNOSIS — M79.7 FIBROMYALGIA: ICD-10-CM

## 2017-10-02 DIAGNOSIS — M15.9 PRIMARY OSTEOARTHRITIS INVOLVING MULTIPLE JOINTS: ICD-10-CM

## 2017-10-02 RX ORDER — FENTANYL 100 UG/H
1 PATCH TRANSDERMAL EVERY OTHER DAY
Qty: 15 PATCH | Refills: 0 | Status: SHIPPED | OUTPATIENT
Start: 2017-10-02 | End: 2017-11-01 | Stop reason: SDUPTHER

## 2017-10-02 RX ORDER — OXYCODONE AND ACETAMINOPHEN 10; 325 MG/1; MG/1
1 TABLET ORAL 3 TIMES DAILY
Qty: 90 TABLET | Refills: 0 | Status: SHIPPED | OUTPATIENT
Start: 2017-10-02 | End: 2017-11-01 | Stop reason: SDUPTHER

## 2017-10-02 RX ORDER — FENTANYL 100 UG/H
1 PATCH TRANSDERMAL EVERY OTHER DAY
Qty: 15 PATCH | Refills: 0 | Status: CANCELLED | OUTPATIENT
Start: 2017-10-02

## 2017-10-02 RX ORDER — OXYCODONE AND ACETAMINOPHEN 10; 325 MG/1; MG/1
1 TABLET ORAL 3 TIMES DAILY
Qty: 90 TABLET | Refills: 0 | Status: CANCELLED | OUTPATIENT
Start: 2017-10-02

## 2017-10-03 ENCOUNTER — OFFICE VISIT (OUTPATIENT)
Dept: INTERNAL MEDICINE | Facility: CLINIC | Age: 62
End: 2017-10-03

## 2017-10-03 VITALS — SYSTOLIC BLOOD PRESSURE: 140 MMHG | HEIGHT: 66 IN | HEART RATE: 64 BPM | DIASTOLIC BLOOD PRESSURE: 80 MMHG

## 2017-10-03 DIAGNOSIS — I73.9 PERIPHERAL VASCULAR DISEASE (HCC): ICD-10-CM

## 2017-10-03 DIAGNOSIS — M79.7 FIBROMYALGIA: ICD-10-CM

## 2017-10-03 DIAGNOSIS — F11.90 CHRONIC, CONTINUOUS USE OF OPIOIDS: ICD-10-CM

## 2017-10-03 DIAGNOSIS — F41.8 MIXED ANXIETY DEPRESSIVE DISORDER: ICD-10-CM

## 2017-10-03 DIAGNOSIS — Z23 NEED FOR IMMUNIZATION AGAINST INFLUENZA: ICD-10-CM

## 2017-10-03 DIAGNOSIS — E11.42 DIABETIC POLYNEUROPATHY ASSOCIATED WITH TYPE 2 DIABETES MELLITUS (HCC): ICD-10-CM

## 2017-10-03 DIAGNOSIS — F41.9 CHRONIC ANXIETY: ICD-10-CM

## 2017-10-03 DIAGNOSIS — E03.8 OTHER SPECIFIED HYPOTHYROIDISM: ICD-10-CM

## 2017-10-03 DIAGNOSIS — E87.6 HYPOKALEMIA: ICD-10-CM

## 2017-10-03 DIAGNOSIS — E55.9 VITAMIN D DEFICIENCY: ICD-10-CM

## 2017-10-03 DIAGNOSIS — J30.2 CHRONIC SEASONAL ALLERGIC RHINITIS DUE TO OTHER ALLERGEN: ICD-10-CM

## 2017-10-03 DIAGNOSIS — I10 ESSENTIAL HYPERTENSION: ICD-10-CM

## 2017-10-03 DIAGNOSIS — M15.9 PRIMARY OSTEOARTHRITIS INVOLVING MULTIPLE JOINTS: ICD-10-CM

## 2017-10-03 DIAGNOSIS — G89.4 CHRONIC PAIN SYNDROME: ICD-10-CM

## 2017-10-03 DIAGNOSIS — I21.4 NSTEMI (NON-ST ELEVATED MYOCARDIAL INFARCTION) (HCC): ICD-10-CM

## 2017-10-03 DIAGNOSIS — G47.00 INSOMNIA, UNSPECIFIED TYPE: ICD-10-CM

## 2017-10-03 DIAGNOSIS — G47.33 OBSTRUCTIVE SLEEP APNEA SYNDROME: ICD-10-CM

## 2017-10-03 DIAGNOSIS — L40.9 PSORIASIS: ICD-10-CM

## 2017-10-03 DIAGNOSIS — E11.10 TYPE 2 DIABETES MELLITUS WITH KETOACIDOSIS WITHOUT COMA, WITHOUT LONG-TERM CURRENT USE OF INSULIN (HCC): ICD-10-CM

## 2017-10-03 DIAGNOSIS — R25.1 TREMOR: ICD-10-CM

## 2017-10-03 DIAGNOSIS — E78.5 DYSLIPIDEMIA: ICD-10-CM

## 2017-10-03 DIAGNOSIS — M54.40 CHRONIC LOW BACK PAIN WITH SCIATICA, SCIATICA LATERALITY UNSPECIFIED, UNSPECIFIED BACK PAIN LATERALITY: ICD-10-CM

## 2017-10-03 DIAGNOSIS — R74.8 ABNORMAL LIVER ENZYMES: ICD-10-CM

## 2017-10-03 DIAGNOSIS — K76.0 FATTY LIVER DISEASE, NONALCOHOLIC: Chronic | ICD-10-CM

## 2017-10-03 DIAGNOSIS — G25.0 ESSENTIAL TREMOR: ICD-10-CM

## 2017-10-03 DIAGNOSIS — E53.8 COBALAMIN DEFICIENCY: ICD-10-CM

## 2017-10-03 DIAGNOSIS — G89.29 CHRONIC LOW BACK PAIN WITH SCIATICA, SCIATICA LATERALITY UNSPECIFIED, UNSPECIFIED BACK PAIN LATERALITY: ICD-10-CM

## 2017-10-03 DIAGNOSIS — H34.8392 BRANCH RETINAL VEIN OCCLUSION: Primary | ICD-10-CM

## 2017-10-03 DIAGNOSIS — K21.9 GASTROESOPHAGEAL REFLUX DISEASE WITHOUT ESOPHAGITIS: ICD-10-CM

## 2017-10-03 DIAGNOSIS — J43.8 OTHER EMPHYSEMA (HCC): ICD-10-CM

## 2017-10-03 DIAGNOSIS — M81.6 LOCALIZED OSTEOPOROSIS WITHOUT CURRENT PATHOLOGICAL FRACTURE: ICD-10-CM

## 2017-10-03 LAB
ALBUMIN SERPL-MCNC: 4 G/DL (ref 3.2–4.8)
ALBUMIN/GLOB SERPL: 1.4 G/DL (ref 1.5–2.5)
ALP SERPL-CCNC: 82 U/L (ref 25–100)
ALT SERPL-CCNC: 39 U/L (ref 7–40)
AST SERPL-CCNC: 63 U/L (ref 0–33)
BILIRUB SERPL-MCNC: 0.3 MG/DL (ref 0.3–1.2)
BUN SERPL-MCNC: 20 MG/DL (ref 9–23)
BUN/CREAT SERPL: 16.7 (ref 7–25)
CALCIUM SERPL-MCNC: 9.9 MG/DL (ref 8.7–10.4)
CHLORIDE SERPL-SCNC: 95 MMOL/L (ref 99–109)
CHOLEST SERPL-MCNC: 161 MG/DL (ref 0–200)
CO2 SERPL-SCNC: 30 MMOL/L (ref 20–31)
CREAT SERPL-MCNC: 1.2 MG/DL (ref 0.6–1.3)
ERYTHROCYTE [DISTWIDTH] IN BLOOD BY AUTOMATED COUNT: 16.2 % (ref 11.3–14.5)
GLOBULIN SER CALC-MCNC: 2.9 GM/DL
GLUCOSE SERPL-MCNC: 160 MG/DL (ref 70–100)
HCT VFR BLD AUTO: 37.1 % (ref 34.5–44)
HDLC SERPL-MCNC: 37 MG/DL (ref 40–60)
HGB BLD-MCNC: 11.5 G/DL (ref 11.5–15.5)
LDLC SERPL CALC-MCNC: 54 MG/DL (ref 0–100)
MCH RBC QN AUTO: 29.1 PG (ref 27–31)
MCHC RBC AUTO-ENTMCNC: 31 G/DL (ref 32–36)
MCV RBC AUTO: 93.9 FL (ref 80–99)
PLATELET # BLD AUTO: 229 10*3/MM3 (ref 150–450)
POTASSIUM SERPL-SCNC: 4.4 MMOL/L (ref 3.5–5.5)
PROT SERPL-MCNC: 6.9 G/DL (ref 5.7–8.2)
RBC # BLD AUTO: 3.95 10*6/MM3 (ref 3.89–5.14)
SODIUM SERPL-SCNC: 143 MMOL/L (ref 132–146)
TRIGL SERPL-MCNC: 349 MG/DL (ref 0–150)
VLDLC SERPL CALC-MCNC: 69.8 MG/DL
WBC # BLD AUTO: 12.13 10*3/MM3 (ref 3.5–10.8)

## 2017-10-03 PROCEDURE — 90662 IIV NO PRSV INCREASED AG IM: CPT | Performed by: INTERNAL MEDICINE

## 2017-10-03 PROCEDURE — 99214 OFFICE O/P EST MOD 30 MIN: CPT | Performed by: INTERNAL MEDICINE

## 2017-10-03 PROCEDURE — 90471 IMMUNIZATION ADMIN: CPT | Performed by: INTERNAL MEDICINE

## 2017-10-03 RX ORDER — TOPIRAMATE 25 MG/1
25 TABLET ORAL 2 TIMES DAILY
Qty: 60 TABLET | Refills: 5 | Status: SHIPPED | OUTPATIENT
Start: 2017-10-03 | End: 2018-02-13

## 2017-10-03 RX ORDER — LISINOPRIL 10 MG/1
10 TABLET ORAL NIGHTLY
Qty: 90 TABLET | Refills: 3 | Status: SHIPPED | OUTPATIENT
Start: 2017-10-03 | End: 2018-02-27 | Stop reason: HOSPADM

## 2017-10-06 ENCOUNTER — TELEPHONE (OUTPATIENT)
Dept: CARDIAC SURGERY | Facility: CLINIC | Age: 62
End: 2017-10-06

## 2017-10-11 ENCOUNTER — TELEPHONE (OUTPATIENT)
Dept: INTERNAL MEDICINE | Facility: CLINIC | Age: 62
End: 2017-10-11

## 2017-10-11 DIAGNOSIS — R26.81 GAIT INSTABILITY: Primary | ICD-10-CM

## 2017-10-11 DIAGNOSIS — R53.1 GENERAL WEAKNESS: ICD-10-CM

## 2017-10-11 NOTE — TELEPHONE ENCOUNTER
PLEASE PUT ORDER IN FOR P.T. SHE TOLD DR. REILLY IN SEPT.I DONT KNOW WHAT HAPPENED. [phone]    plz notify that I will place order

## 2017-10-16 ENCOUNTER — TELEPHONE (OUTPATIENT)
Dept: INTERNAL MEDICINE | Facility: CLINIC | Age: 62
End: 2017-10-16

## 2017-10-17 ENCOUNTER — TELEPHONE (OUTPATIENT)
Dept: INTERNAL MEDICINE | Facility: CLINIC | Age: 62
End: 2017-10-17

## 2017-10-17 NOTE — TELEPHONE ENCOUNTER
[facility] IS NEEDING A VERBAL ORDER TO SEE CLIENT TWICE A WEEK FOR 3 WEEKS. SHE HAS A DUPLICATE WARNING ON ACETAMINOPHEN, AND OXYCODONE. PLEASE GIVE KAELYN A CALL

## 2017-10-24 ENCOUNTER — TELEPHONE (OUTPATIENT)
Dept: INTERNAL MEDICINE | Facility: CLINIC | Age: 62
End: 2017-10-24

## 2017-10-31 ENCOUNTER — TELEPHONE (OUTPATIENT)
Dept: INTERNAL MEDICINE | Facility: CLINIC | Age: 62
End: 2017-10-31

## 2017-11-01 ENCOUNTER — OFFICE VISIT (OUTPATIENT)
Dept: INTERNAL MEDICINE | Facility: CLINIC | Age: 62
End: 2017-11-01

## 2017-11-01 ENCOUNTER — TELEPHONE (OUTPATIENT)
Dept: INTERNAL MEDICINE | Facility: CLINIC | Age: 62
End: 2017-11-01

## 2017-11-01 ENCOUNTER — OUTSIDE FACILITY SERVICE (OUTPATIENT)
Dept: INTERNAL MEDICINE | Facility: CLINIC | Age: 62
End: 2017-11-01

## 2017-11-01 VITALS — DIASTOLIC BLOOD PRESSURE: 78 MMHG | HEART RATE: 80 BPM | HEIGHT: 66 IN | SYSTOLIC BLOOD PRESSURE: 144 MMHG

## 2017-11-01 DIAGNOSIS — M54.40 CHRONIC LOW BACK PAIN WITH SCIATICA, SCIATICA LATERALITY UNSPECIFIED, UNSPECIFIED BACK PAIN LATERALITY: ICD-10-CM

## 2017-11-01 DIAGNOSIS — I10 ESSENTIAL HYPERTENSION: ICD-10-CM

## 2017-11-01 DIAGNOSIS — F41.9 CHRONIC ANXIETY: ICD-10-CM

## 2017-11-01 DIAGNOSIS — G89.4 CHRONIC PAIN SYNDROME: ICD-10-CM

## 2017-11-01 DIAGNOSIS — E55.9 VITAMIN D DEFICIENCY: ICD-10-CM

## 2017-11-01 DIAGNOSIS — K76.0 FATTY LIVER DISEASE, NONALCOHOLIC: Chronic | ICD-10-CM

## 2017-11-01 DIAGNOSIS — M81.6 LOCALIZED OSTEOPOROSIS WITHOUT CURRENT PATHOLOGICAL FRACTURE: ICD-10-CM

## 2017-11-01 DIAGNOSIS — G25.0 ESSENTIAL TREMOR: ICD-10-CM

## 2017-11-01 DIAGNOSIS — F41.8 MIXED ANXIETY DEPRESSIVE DISORDER: ICD-10-CM

## 2017-11-01 DIAGNOSIS — R09.02 HYPOXIA: ICD-10-CM

## 2017-11-01 DIAGNOSIS — I21.4 NSTEMI (NON-ST ELEVATED MYOCARDIAL INFARCTION) (HCC): ICD-10-CM

## 2017-11-01 DIAGNOSIS — J30.2 CHRONIC SEASONAL ALLERGIC RHINITIS DUE TO OTHER ALLERGEN: ICD-10-CM

## 2017-11-01 DIAGNOSIS — M79.7 FIBROMYALGIA: ICD-10-CM

## 2017-11-01 DIAGNOSIS — E53.8 COBALAMIN DEFICIENCY: ICD-10-CM

## 2017-11-01 DIAGNOSIS — M15.9 PRIMARY OSTEOARTHRITIS INVOLVING MULTIPLE JOINTS: ICD-10-CM

## 2017-11-01 DIAGNOSIS — E78.5 DYSLIPIDEMIA: ICD-10-CM

## 2017-11-01 DIAGNOSIS — M54.40 CHRONIC MIDLINE LOW BACK PAIN WITH SCIATICA, SCIATICA LATERALITY UNSPECIFIED: ICD-10-CM

## 2017-11-01 DIAGNOSIS — E11.10 TYPE 2 DIABETES MELLITUS WITH KETOACIDOSIS WITHOUT COMA, WITHOUT LONG-TERM CURRENT USE OF INSULIN (HCC): ICD-10-CM

## 2017-11-01 DIAGNOSIS — H34.8392 BRANCH RETINAL VEIN OCCLUSION: Primary | ICD-10-CM

## 2017-11-01 DIAGNOSIS — J43.8 OTHER EMPHYSEMA (HCC): ICD-10-CM

## 2017-11-01 DIAGNOSIS — E03.8 OTHER SPECIFIED HYPOTHYROIDISM: ICD-10-CM

## 2017-11-01 DIAGNOSIS — G89.29 CHRONIC LOW BACK PAIN WITH SCIATICA, SCIATICA LATERALITY UNSPECIFIED, UNSPECIFIED BACK PAIN LATERALITY: ICD-10-CM

## 2017-11-01 DIAGNOSIS — Z23 NEED FOR PROPHYLACTIC VACCINATION AGAINST STREPTOCOCCUS PNEUMONIAE (PNEUMOCOCCUS): ICD-10-CM

## 2017-11-01 DIAGNOSIS — G47.00 INSOMNIA, UNSPECIFIED TYPE: ICD-10-CM

## 2017-11-01 DIAGNOSIS — I73.9 PERIPHERAL VASCULAR DISEASE (HCC): ICD-10-CM

## 2017-11-01 DIAGNOSIS — E11.42 DIABETIC POLYNEUROPATHY ASSOCIATED WITH TYPE 2 DIABETES MELLITUS (HCC): ICD-10-CM

## 2017-11-01 DIAGNOSIS — K21.9 GASTROESOPHAGEAL REFLUX DISEASE WITHOUT ESOPHAGITIS: ICD-10-CM

## 2017-11-01 DIAGNOSIS — G47.33 OBSTRUCTIVE SLEEP APNEA SYNDROME: ICD-10-CM

## 2017-11-01 DIAGNOSIS — G89.29 CHRONIC MIDLINE LOW BACK PAIN WITH SCIATICA, SCIATICA LATERALITY UNSPECIFIED: ICD-10-CM

## 2017-11-01 PROCEDURE — 99214 OFFICE O/P EST MOD 30 MIN: CPT | Performed by: INTERNAL MEDICINE

## 2017-11-01 PROCEDURE — 90670 PCV13 VACCINE IM: CPT | Performed by: INTERNAL MEDICINE

## 2017-11-01 PROCEDURE — G0009 ADMIN PNEUMOCOCCAL VACCINE: HCPCS | Performed by: INTERNAL MEDICINE

## 2017-11-01 PROCEDURE — G0180 MD CERTIFICATION HHA PATIENT: HCPCS | Performed by: INTERNAL MEDICINE

## 2017-11-01 RX ORDER — METOPROLOL SUCCINATE 25 MG/1
25 TABLET, EXTENDED RELEASE ORAL EVERY MORNING
Qty: 30 TABLET | Refills: 5 | Status: SHIPPED | OUTPATIENT
Start: 2017-11-01 | End: 2017-11-29 | Stop reason: SDUPTHER

## 2017-11-01 RX ORDER — OXYCODONE AND ACETAMINOPHEN 10; 325 MG/1; MG/1
1 TABLET ORAL 3 TIMES DAILY
Qty: 90 TABLET | Refills: 0 | Status: SHIPPED | OUTPATIENT
Start: 2017-11-01 | End: 2017-11-29 | Stop reason: SDUPTHER

## 2017-11-01 RX ORDER — FENTANYL 100 UG/H
1 PATCH TRANSDERMAL EVERY OTHER DAY
Qty: 15 PATCH | Refills: 0 | Status: SHIPPED | OUTPATIENT
Start: 2017-11-01 | End: 2017-11-29 | Stop reason: SDUPTHER

## 2017-11-01 NOTE — PROGRESS NOTES
Patient is a 62 y.o. female who is here for a follow up of chronic conditions.  Chief Complaint   Patient presents with   • Hypertension   • Diabetes   • Hypothyroidism   • Pain         HPI:  Here for f/u.  Has had problems with palpitations with change in position.  Has a sacral sore that  is addressing.  No dizziness or lightheadedness.  Pain control is adequate.  Sleeps too much.  Tremor is improved with topamax.      History:    Patient Active Problem List   Diagnosis   • Atopic rhinitis   • Restrictive ventilatory defect   • COPD (chronic obstructive pulmonary disease)   • Osteoporosis   • Obstructive sleep apnea syndrome   • Abnormal liver enzymes   • Cholelithiasis   • Chronic back pain   • Mixed anxiety depressive disorder   • Type 2 diabetes mellitus   • Dyslipidemia   • Essential tremor   • Fibromyalgia   • Gastroesophageal reflux disease without esophagitis   • Hypertension   • Hypothyroidism   • Insomnia   • Osteoarthritis   • Psoriasis   • Branch retinal vein occlusion   • Cobalamin deficiency   • Obesity   • Vitamin D deficiency   • Hypokalemia   • Lactic acidosis   • Fatty liver disease, nonalcoholic   • Sinus bradycardia   • NSTEMI (non-ST elevated myocardial infarction)   • Tobacco abuse   • Hypoxia   • Peripheral vascular disease   • Osteomyelitis of left foot   • Diabetic polyneuropathy associated with type 2 diabetes mellitus   • Anemia, blood loss   • Chronic pain   • Chronic, continuous use of opioids   • Chronic anxiety   • Chronically on benzodiazepine therapy       Past Medical History:   Diagnosis Date   • Bronchitis    • Cervical cancer    • Cholelithiasis 5/11/2016   • Chronic anxiety 2/27/2017   • Chronic bronchitis    • Chronically on benzodiazepine therapy 2/27/2017   • Degenerative arthritis    • Diabetes mellitus    • Dyslipidemia 5/11/2016   • Dyspnea    • Fatty liver disease, nonalcoholic 11/21/2016   • Fibromyalgia    • GERD (gastroesophageal reflux disease)    • H/O  echocardiogram    • History of pneumonia    • Hypertension 5/11/2016    16. H/O echocardiogram (V15.89) (Z92.89)  · A.  Echocardiogram of 02/03/2015 reports an ejection fraction of 60-65%, mild concentric     LVH, trace mitral regurgitation, mild tricuspid and pulmonic regurgitation and calculated     RVSP of 35 mmHg, the main pulmonary artery is also mildly dilated.   • Hypothyroidism 5/11/2016    Description: A.  On replacement therapy.   • Nausea    • Obesity    • DINA (obstructive sleep apnea)     intolerant of CPAP therapy   • Osteoporosis    • Osteoporosis    • Polycythemia vera 5/11/2016   • Pulmonary emphysema    • Restrictive ventilatory defect    • Rhinitis    • Uncontrolled diabetes mellitus 5/11/2016   • Uterine cancer    • Vitamin D deficiency 8/1/2016       Past Surgical History:   Procedure Laterality Date   • AMPUTATION DIGIT Left 11/23/2016    Procedure: AMPUTATION TRANS METATARSAL - ray amutation of the left great toe;  Surgeon: Arsalan Feliciano MD;  Location:  [facility];  Service:    • AMPUTATION DIGIT Left 3/2/2017    Procedure: LEFT FOOT TRANSMETATARSAL AMPUTATION TOES 2,3,4,5;  Surgeon: Joey Guaman MD;  Location:  [facility];  Service:    • BACK SURGERY      lumbar fusions x5--multiple times; 1995, 1997, 1998, 1999 and 2008   • BELOW KNEE AMPUTATION Left 2/25/2017    Procedure: EXTENDED LEFT TOE AMPUTATION;  Surgeon: Arsalan Feliciano MD;  Location:  [facility];  Service:    • CARDIAC CATHETERIZATION N/A 11/26/2016    Procedure: Left Heart Cath;  Surgeon: Ash Nuñez MD;  Location:  [facility];  Service:    • HAND SURGERY Bilateral     x3   • HYSTERECTOMY      status post uterine and cervical cancer   • LUMBAR SPINE SURGERY      arthrodesis by anterior approach addit interspace   • TONSILLECTOMY         Current Outpatient Prescriptions on File Prior to Visit   Medication Sig   • acetaminophen (TYLENOL) 325 MG tablet Take 2 tablets by mouth Every 4 (Four) Hours As Needed for mild  pain (1-3) or fever (temperature greater than 101F).   • albuterol (PROVENTIL) (2.5 MG/3ML) 0.083% nebulizer solution 2.5 mg Every 6 (Six) Hours As Needed.   • amLODIPine (NORVASC) 5 MG tablet Take 1 tablet by mouth Daily.   • aspirin  MG EC tablet Take 1 tablet by mouth Daily.   • atorvastatin (LIPITOR) 40 MG tablet Take 1 tablet by mouth Every Night.   • baclofen (LIORESAL) 10 MG tablet Take 1 tablet by mouth 2 (Two) Times a Day As Needed for Muscle Spasms.   • busPIRone (BUSPAR) 10 MG tablet TAKE TWO TABLETS BY MOUTH TWICE A DAY   • cetirizine (ZyrTEC) 10 MG tablet Take 10 mg by mouth Every Night.   • clonazePAM (KlonoPIN) 0.5 MG tablet TAKE ONE-HALF TABLET BY MOUTH TWICE A DAY AS NEEDED   • clopidogrel (PLAVIX) 75 MG tablet Take 1 tablet by mouth Daily.   • ezetimibe (ZETIA) 10 MG tablet Take 1 tablet by mouth Daily.   • ferrous sulfate 325 (65 FE) MG EC tablet Take 1 tablet by mouth 3 (Three) Times a Day With Meals.   • fluticasone (FLONASE) 50 MCG/ACT nasal spray 1 spray into each nostril 2 (Two) Times a Day.   • furosemide (LASIX) 40 MG tablet TAKE ONE TABLET BY MOUTH TWICE A DAY   • hydrocortisone 1 % cream Apply  topically Every 12 (Twelve) Hours.   • levothyroxine (SYNTHROID, LEVOTHROID) 112 MCG tablet TAKE ONE TABLET BY MOUTH DAILY   • lisinopril (PRINIVIL,ZESTRIL) 10 MG tablet Take 1 tablet by mouth Every Night.   • LYRICA 100 MG capsule TAKE ONE CAPSULE BY MOUTH THREE TIMES A DAY   • melatonin 5 MG sublingual tablet sublingual tablet Place 1 tablet under the tongue At Night As Needed (insomnia).   • metoclopramide (REGLAN) 10 MG tablet    • omeprazole (priLOSEC) 20 MG capsule TAKE ONE CAPSULE BY MOUTH DAILY   • probiotic (CULTURELLE) capsule capsule Take 1 capsule by mouth Daily.   • promethazine (PHENERGAN) 25 MG tablet Take 25 mg by mouth Every 8 (Eight) Hours As Needed for nausea or vomiting.   • SPIRIVA RESPIMAT 2.5 MCG/ACT aerosol solution INHALE TWO PUFF(S) BY MOUTH DAILY   • topiramate  (TOPAMAX) 25 MG tablet Take 1 tablet by mouth 2 (Two) Times a Day.   • [DISCONTINUED] fentaNYL (DURAGESIC) 100 MCG/HR patch Place 1 patch on the skin Every Other Day.   • [DISCONTINUED] oxyCODONE-acetaminophen (PERCOCET)  MG per tablet Take 1 tablet by mouth 3 (Three) Times a Day.     No current facility-administered medications on file prior to visit.        Family History   Problem Relation Age of Onset   • Arthritis Mother    • Diabetes Mother    • Colon polyps Mother    • Diverticulitis Mother    • Arthritis Father    • Bleeding Disorder Father    • Diabetes Father    • Kidney disease Father    • COPD Sister      currently smokes   • Arthritis Brother    • Diabetes Brother    • Alcohol abuse Brother    • Heart murmur Daughter    • Arthritis Other    • Diabetes Other        Social History     Social History   • Marital status:      Spouse name: N/A   • Number of children: 1   • Years of education: N/A     Occupational History   •  Disabled     Social History Main Topics   • Smoking status: Former Smoker     Packs/day: 1.50     Years: 48.00     Types: Cigarettes     Quit date: 2/27/2017   • Smokeless tobacco: Never Used   • Alcohol use No   • Drug use: No   • Sexual activity: Not on file     Other Topics Concern   • Not on file     Social History Narrative    Lives with her  in [place]         ROS:    Review of Systems   Constitutional: Positive for fatigue. Negative for chills, diaphoresis, fever and unexpected weight change.   HENT: Negative for congestion, ear pain, hearing loss, nosebleeds, postnasal drip, sinus pressure and sore throat.    Eyes: Negative for pain, discharge and itching.   Respiratory: Positive for cough and shortness of breath. Negative for chest tightness and wheezing.    Cardiovascular: Positive for palpitations. Negative for chest pain and leg swelling.   Gastrointestinal: Positive for abdominal pain. Negative for abdominal distention, blood in stool, constipation,  "diarrhea, nausea and vomiting.   Endocrine: Negative for heat intolerance, polydipsia and polyuria.   Genitourinary: Negative for difficulty urinating, dysuria, frequency and hematuria.   Musculoskeletal: Positive for arthralgias and back pain. Negative for gait problem, joint swelling and myalgias.   Skin: Positive for rash and wound.   Neurological: Positive for tremors, weakness and light-headedness. Negative for dizziness, syncope and headaches.   Psychiatric/Behavioral: Positive for dysphoric mood and sleep disturbance. The patient is nervous/anxious.        /78  Pulse 80  Ht 66\" (167.6 cm)  LMP  (LMP Unknown)    Physical Exam:    Physical Exam   Constitutional: She is oriented to person, place, and time. She appears well-developed and well-nourished.   HENT:   Head: Normocephalic and atraumatic.   Right Ear: External ear normal.   Left Ear: External ear normal.   Mouth/Throat: Oropharynx is clear and moist.   Eyes: Conjunctivae and EOM are normal.   Neck: Normal range of motion. Neck supple.   Cardiovascular: Normal rate and regular rhythm.    2/6 СЕРГЕЙ   Pulmonary/Chest: Effort normal.   Mildly diminished   Abdominal: Soft. Bowel sounds are normal.   Musculoskeletal: She exhibits edema ( ) and tenderness (pain on back flexion past 30  ).   Using wheelchair   Lymphadenopathy:     She has no cervical adenopathy.   Neurological: She is alert and oriented to person, place, and time.   Skin: Skin is warm and dry. No rash noted.   Left foot wound CDI  Refusing sacral wound check   Psychiatric: She has a normal mood and affect. Her behavior is normal. Thought content normal.       Procedure:      Discussion/Summary:  chronic back pain- refill patch and percocet as needed  htn-stable  fibromylagia- cont current tx  hyperlipidemia-cont lipitor and zetia  hypothroid-cont replacement, recheck noted  depression with anxiety-cont buspar and klonopine ,advised her of risk of addiction  polycythemia-cbc " noted  retinal vein occlusion- cont rf modification  b12 def-admin on rtc    nausea-phenergan prn, cont reglan, advised of EPS effects and she has agreed  DM-labs noted, counseled on low carb, recheck labs noted, cont januvia  diaphoresis-better   Elevated lft/?autoimmune-f/u gastro recs , labs noted  Eczema-cont prn steroids  Left foot ulcer and osteo-resolved  FE def anemia-cont FE, recheck noted  Tremor-cont topamax and add BB  high risk meds-labs noted      labs noted and dw patient, advised to add vit C to FE  prevnar 13 today      Current Outpatient Prescriptions:   •  acetaminophen (TYLENOL) 325 MG tablet, Take 2 tablets by mouth Every 4 (Four) Hours As Needed for mild pain (1-3) or fever (temperature greater than 101F)., Disp: 100 tablet, Rfl: 0  •  albuterol (PROVENTIL) (2.5 MG/3ML) 0.083% nebulizer solution, 2.5 mg Every 6 (Six) Hours As Needed., Disp: , Rfl:   •  amLODIPine (NORVASC) 5 MG tablet, Take 1 tablet by mouth Daily., Disp: 90 tablet, Rfl: 3  •  aspirin  MG EC tablet, Take 1 tablet by mouth Daily., Disp: 100 tablet, Rfl: 0  •  atorvastatin (LIPITOR) 40 MG tablet, Take 1 tablet by mouth Every Night., Disp: 90 tablet, Rfl: 3  •  baclofen (LIORESAL) 10 MG tablet, Take 1 tablet by mouth 2 (Two) Times a Day As Needed for Muscle Spasms., Disp: 180 tablet, Rfl: 3  •  busPIRone (BUSPAR) 10 MG tablet, TAKE TWO TABLETS BY MOUTH TWICE A DAY, Disp: 360 tablet, Rfl: 1  •  cetirizine (ZyrTEC) 10 MG tablet, Take 10 mg by mouth Every Night., Disp: , Rfl:   •  clonazePAM (KlonoPIN) 0.5 MG tablet, TAKE ONE-HALF TABLET BY MOUTH TWICE A DAY AS NEEDED, Disp: 90 tablet, Rfl: 0  •  clopidogrel (PLAVIX) 75 MG tablet, Take 1 tablet by mouth Daily., Disp: 90 tablet, Rfl: 3  •  ezetimibe (ZETIA) 10 MG tablet, Take 1 tablet by mouth Daily., Disp: 90 tablet, Rfl: 3  •  fentaNYL (DURAGESIC) 100 MCG/HR patch, Place 1 patch on the skin Every Other Day., Disp: 15 patch, Rfl: 0  •  ferrous sulfate 325 (65 FE) MG EC tablet,  Take 1 tablet by mouth 3 (Three) Times a Day With Meals., Disp: 90 tablet, Rfl: 5  •  fluticasone (FLONASE) 50 MCG/ACT nasal spray, 1 spray into each nostril 2 (Two) Times a Day., Disp: , Rfl:   •  furosemide (LASIX) 40 MG tablet, TAKE ONE TABLET BY MOUTH TWICE A DAY, Disp: 180 tablet, Rfl: 2  •  hydrocortisone 1 % cream, Apply  topically Every 12 (Twelve) Hours., Disp: , Rfl: 0  •  levothyroxine (SYNTHROID, LEVOTHROID) 112 MCG tablet, TAKE ONE TABLET BY MOUTH DAILY, Disp: 90 tablet, Rfl: 2  •  lisinopril (PRINIVIL,ZESTRIL) 10 MG tablet, Take 1 tablet by mouth Every Night., Disp: 90 tablet, Rfl: 3  •  LYRICA 100 MG capsule, TAKE ONE CAPSULE BY MOUTH THREE TIMES A DAY, Disp: 270 capsule, Rfl: 0  •  melatonin 5 MG sublingual tablet sublingual tablet, Place 1 tablet under the tongue At Night As Needed (insomnia)., Disp: 30 tablet, Rfl: 0  •  metoclopramide (REGLAN) 10 MG tablet, , Disp: , Rfl:   •  omeprazole (priLOSEC) 20 MG capsule, TAKE ONE CAPSULE BY MOUTH DAILY, Disp: 90 capsule, Rfl: 1  •  oxyCODONE-acetaminophen (PERCOCET)  MG per tablet, Take 1 tablet by mouth 3 (Three) Times a Day., Disp: 90 tablet, Rfl: 0  •  probiotic (CULTURELLE) capsule capsule, Take 1 capsule by mouth Daily., Disp: 60 capsule, Rfl: 0  •  promethazine (PHENERGAN) 25 MG tablet, Take 25 mg by mouth Every 8 (Eight) Hours As Needed for nausea or vomiting., Disp: , Rfl:   •  SPIRIVA RESPIMAT 2.5 MCG/ACT aerosol solution, INHALE TWO PUFF(S) BY MOUTH DAILY, Disp: 1 inhaler, Rfl: 4  •  topiramate (TOPAMAX) 25 MG tablet, Take 1 tablet by mouth 2 (Two) Times a Day., Disp: 60 tablet, Rfl: 5  •  metoprolol succinate XL (TOPROL-XL) 25 MG 24 hr tablet, Take 1 tablet by mouth Every Morning., Disp: 30 tablet, Rfl: 5        Tamara was seen today for hypertension, diabetes, hypothyroidism and pain.    Diagnoses and all orders for this visit:    Branch retinal vein occlusion    Essential hypertension  -     metoprolol succinate XL (TOPROL-XL) 25 MG 24  hr tablet; Take 1 tablet by mouth Every Morning.    NSTEMI (non-ST elevated myocardial infarction)    Peripheral vascular disease    Chronic seasonal allergic rhinitis due to other allergen    Other emphysema    Hypoxia    Obstructive sleep apnea syndrome    Cobalamin deficiency    Fatty liver disease, nonalcoholic    Gastroesophageal reflux disease without esophagitis    Vitamin D deficiency    Diabetic polyneuropathy associated with type 2 diabetes mellitus    Other specified hypothyroidism    Type 2 diabetes mellitus with ketoacidosis without coma, without long-term current use of insulin    Chronic low back pain with sciatica, sciatica laterality unspecified, unspecified back pain laterality    Essential tremor  -     metoprolol succinate XL (TOPROL-XL) 25 MG 24 hr tablet; Take 1 tablet by mouth Every Morning.    Primary osteoarthritis involving multiple joints  -     fentaNYL (DURAGESIC) 100 MCG/HR patch; Place 1 patch on the skin Every Other Day.  -     oxyCODONE-acetaminophen (PERCOCET)  MG per tablet; Take 1 tablet by mouth 3 (Three) Times a Day.    Localized osteoporosis without current pathological fracture    Chronic anxiety    Chronic pain syndrome    Dyslipidemia    Fibromyalgia  -     fentaNYL (DURAGESIC) 100 MCG/HR patch; Place 1 patch on the skin Every Other Day.  -     oxyCODONE-acetaminophen (PERCOCET)  MG per tablet; Take 1 tablet by mouth 3 (Three) Times a Day.    Insomnia, unspecified type    Mixed anxiety depressive disorder    Chronic midline low back pain with sciatica, sciatica laterality unspecified  -     oxyCODONE-acetaminophen (PERCOCET)  MG per tablet; Take 1 tablet by mouth 3 (Three) Times a Day.    Need for prophylactic vaccination against Streptococcus pneumoniae (pneumococcus)  -     Pneumococcal Conjugate Vaccine 13-Valent All

## 2017-11-01 NOTE — TELEPHONE ENCOUNTER
PATIENT CALLED SAID DR DE PAZ CHANGED HER PRESCRIPTION FOR TOPAMAX. PATIENT SAID HE NEEDS TO REFILL PRESCRIPTION FOR 60 PILLS INSTEAD OF 30 PILLS.

## 2017-11-09 ENCOUNTER — OFFICE VISIT (OUTPATIENT)
Dept: CARDIAC SURGERY | Facility: CLINIC | Age: 62
End: 2017-11-09

## 2017-11-09 VITALS
HEART RATE: 76 BPM | DIASTOLIC BLOOD PRESSURE: 79 MMHG | WEIGHT: 225 LBS | BODY MASS INDEX: 36.16 KG/M2 | SYSTOLIC BLOOD PRESSURE: 154 MMHG | HEIGHT: 66 IN | OXYGEN SATURATION: 88 % | TEMPERATURE: 99.4 F

## 2017-11-09 DIAGNOSIS — L97.529 FOOT ULCER, LEFT, WITH UNSPECIFIED SEVERITY (HCC): ICD-10-CM

## 2017-11-09 DIAGNOSIS — L97.529 TYPE 1 DIABETES MELLITUS WITH LEFT DIABETIC FOOT ULCER (HCC): ICD-10-CM

## 2017-11-09 DIAGNOSIS — E10.621 TYPE 1 DIABETES MELLITUS WITH LEFT DIABETIC FOOT ULCER (HCC): ICD-10-CM

## 2017-11-09 DIAGNOSIS — I96 GANGRENE OF FOOT (HCC): Primary | ICD-10-CM

## 2017-11-09 PROCEDURE — 99212 OFFICE O/P EST SF 10 MIN: CPT | Performed by: THORACIC SURGERY (CARDIOTHORACIC VASCULAR SURGERY)

## 2017-11-12 NOTE — PROGRESS NOTES
"Patient Information  Tamara Langston                                                                                          [address] [facility] [address]      1955  [phone]      Chief Complaint   Patient presents with   • Follow-up     1 year follow up        History of Present Illness:Patient seen today in the [place] office for 3 month follow-up following a transmetatarsal amputation of the toes of the left foot for osteomyelitis secondary to diabetes, peripheral neuropathy.  She is still awaiting the custom fitted shoe.  There is been ordered by the [facility] prosthetic company.. She is currently walking with Diabetic shoe with a shoe filler.  She is doing quite well with ambulation with this shoe filler    Vitals:    11/09/17 1334   BP: 154/79   BP Location: Left arm   Patient Position: Sitting   Pulse: 76   Temp: 99.4 °F (37.4 °C)   TempSrc: Temporal Artery    SpO2: (!) 88%   Weight: 225 lb (102 kg)   Height: 66\" (167.6 cm)        Physical Exam the left foot transmetatarsal amputation site is completely healed.  She has a palpable dorsalis pedis pulse present    Lab/other results:    Assessment: 1.  Trans- Metatarsal amputation of all of the toes of left foot completely healed.  She is now walking with a diabetic shoe and a shoe filler  #2.  Diabetes mellitus.  #3.  Remote history of smoking  Plan: Patient is still awaiting the left transmetatarsal amputation custom fitted shoe that has been ordered by the [facility]  I will not plan to see the patient back formally unless some need arises.  Arsalan Feliciano M.D.   "

## 2017-11-20 RX ORDER — OMEPRAZOLE 20 MG/1
CAPSULE, DELAYED RELEASE ORAL
Qty: 90 CAPSULE | Refills: 0 | Status: SHIPPED | OUTPATIENT
Start: 2017-11-20 | End: 2018-03-20 | Stop reason: HOSPADM

## 2017-11-27 RX ORDER — CLONAZEPAM 0.5 MG/1
0.25 TABLET ORAL 2 TIMES DAILY
Qty: 90 TABLET | Refills: 0 | OUTPATIENT
Start: 2017-11-27 | End: 2018-02-27 | Stop reason: HOSPADM

## 2017-11-27 RX ORDER — BUSPIRONE HYDROCHLORIDE 10 MG/1
TABLET ORAL
Qty: 360 TABLET | Refills: 0 | Status: SHIPPED | OUTPATIENT
Start: 2017-11-27 | End: 2018-02-27 | Stop reason: HOSPADM

## 2017-11-29 ENCOUNTER — TELEPHONE (OUTPATIENT)
Dept: INTERNAL MEDICINE | Facility: CLINIC | Age: 62
End: 2017-11-29

## 2017-11-29 ENCOUNTER — OFFICE VISIT (OUTPATIENT)
Dept: INTERNAL MEDICINE | Facility: CLINIC | Age: 62
End: 2017-11-29

## 2017-11-29 VITALS — HEIGHT: 66 IN | DIASTOLIC BLOOD PRESSURE: 70 MMHG | SYSTOLIC BLOOD PRESSURE: 120 MMHG | HEART RATE: 68 BPM

## 2017-11-29 DIAGNOSIS — G89.29 CHRONIC LOW BACK PAIN WITH SCIATICA, SCIATICA LATERALITY UNSPECIFIED, UNSPECIFIED BACK PAIN LATERALITY: ICD-10-CM

## 2017-11-29 DIAGNOSIS — Z72.0 TOBACCO ABUSE: ICD-10-CM

## 2017-11-29 DIAGNOSIS — H34.8392 BRANCH RETINAL VEIN OCCLUSION: Primary | ICD-10-CM

## 2017-11-29 DIAGNOSIS — K76.0 FATTY LIVER DISEASE, NONALCOHOLIC: Chronic | ICD-10-CM

## 2017-11-29 DIAGNOSIS — M79.7 FIBROMYALGIA: ICD-10-CM

## 2017-11-29 DIAGNOSIS — I10 ESSENTIAL HYPERTENSION: ICD-10-CM

## 2017-11-29 DIAGNOSIS — F41.9 CHRONIC ANXIETY: ICD-10-CM

## 2017-11-29 DIAGNOSIS — E78.5 DYSLIPIDEMIA: ICD-10-CM

## 2017-11-29 DIAGNOSIS — M81.6 LOCALIZED OSTEOPOROSIS WITHOUT CURRENT PATHOLOGICAL FRACTURE: ICD-10-CM

## 2017-11-29 DIAGNOSIS — F41.8 MIXED ANXIETY DEPRESSIVE DISORDER: ICD-10-CM

## 2017-11-29 DIAGNOSIS — G25.0 ESSENTIAL TREMOR: ICD-10-CM

## 2017-11-29 DIAGNOSIS — M15.9 PRIMARY OSTEOARTHRITIS INVOLVING MULTIPLE JOINTS: ICD-10-CM

## 2017-11-29 DIAGNOSIS — G89.29 CHRONIC MIDLINE LOW BACK PAIN WITH SCIATICA, SCIATICA LATERALITY UNSPECIFIED: ICD-10-CM

## 2017-11-29 DIAGNOSIS — E53.8 COBALAMIN DEFICIENCY: ICD-10-CM

## 2017-11-29 DIAGNOSIS — J43.8 OTHER EMPHYSEMA (HCC): ICD-10-CM

## 2017-11-29 DIAGNOSIS — D50.0 ANEMIA, BLOOD LOSS: ICD-10-CM

## 2017-11-29 DIAGNOSIS — J30.2 CHRONIC SEASONAL ALLERGIC RHINITIS DUE TO OTHER ALLERGEN: ICD-10-CM

## 2017-11-29 DIAGNOSIS — E11.10 TYPE 2 DIABETES MELLITUS WITH KETOACIDOSIS WITHOUT COMA, WITHOUT LONG-TERM CURRENT USE OF INSULIN (HCC): ICD-10-CM

## 2017-11-29 DIAGNOSIS — R00.1 SINUS BRADYCARDIA: ICD-10-CM

## 2017-11-29 DIAGNOSIS — G47.33 OBSTRUCTIVE SLEEP APNEA SYNDROME: ICD-10-CM

## 2017-11-29 DIAGNOSIS — K21.9 GASTROESOPHAGEAL REFLUX DISEASE WITHOUT ESOPHAGITIS: ICD-10-CM

## 2017-11-29 DIAGNOSIS — R09.02 HYPOXIA: ICD-10-CM

## 2017-11-29 DIAGNOSIS — M54.40 CHRONIC LOW BACK PAIN WITH SCIATICA, SCIATICA LATERALITY UNSPECIFIED, UNSPECIFIED BACK PAIN LATERALITY: ICD-10-CM

## 2017-11-29 DIAGNOSIS — F11.90 CHRONIC, CONTINUOUS USE OF OPIOIDS: ICD-10-CM

## 2017-11-29 DIAGNOSIS — E03.8 OTHER SPECIFIED HYPOTHYROIDISM: ICD-10-CM

## 2017-11-29 DIAGNOSIS — G47.00 INSOMNIA, UNSPECIFIED TYPE: ICD-10-CM

## 2017-11-29 DIAGNOSIS — I73.9 PERIPHERAL VASCULAR DISEASE (HCC): ICD-10-CM

## 2017-11-29 DIAGNOSIS — M54.40 CHRONIC MIDLINE LOW BACK PAIN WITH SCIATICA, SCIATICA LATERALITY UNSPECIFIED: ICD-10-CM

## 2017-11-29 DIAGNOSIS — E11.42 DIABETIC POLYNEUROPATHY ASSOCIATED WITH TYPE 2 DIABETES MELLITUS (HCC): ICD-10-CM

## 2017-11-29 PROCEDURE — 96372 THER/PROPH/DIAG INJ SC/IM: CPT | Performed by: INTERNAL MEDICINE

## 2017-11-29 PROCEDURE — 99214 OFFICE O/P EST MOD 30 MIN: CPT | Performed by: INTERNAL MEDICINE

## 2017-11-29 RX ORDER — METOCLOPRAMIDE 10 MG/1
10 TABLET ORAL 3 TIMES DAILY
Qty: 270 TABLET | Refills: 2 | Status: SHIPPED | OUTPATIENT
Start: 2017-11-29 | End: 2018-02-13

## 2017-11-29 RX ORDER — FENTANYL 100 UG/H
1 PATCH TRANSDERMAL EVERY OTHER DAY
Qty: 15 PATCH | Refills: 0 | Status: SHIPPED | OUTPATIENT
Start: 2017-11-29 | End: 2017-12-22 | Stop reason: SDUPTHER

## 2017-11-29 RX ORDER — CYANOCOBALAMIN 1000 UG/ML
1000 INJECTION, SOLUTION INTRAMUSCULAR; SUBCUTANEOUS
Status: DISCONTINUED | OUTPATIENT
Start: 2017-11-29 | End: 2018-02-16

## 2017-11-29 RX ORDER — METOPROLOL SUCCINATE 25 MG/1
25 TABLET, EXTENDED RELEASE ORAL EVERY MORNING
Qty: 90 TABLET | Refills: 3 | Status: SHIPPED | OUTPATIENT
Start: 2017-11-29 | End: 2018-01-23

## 2017-11-29 RX ORDER — OXYCODONE AND ACETAMINOPHEN 10; 325 MG/1; MG/1
1 TABLET ORAL 3 TIMES DAILY
Qty: 90 TABLET | Refills: 0 | Status: SHIPPED | OUTPATIENT
Start: 2017-11-29 | End: 2017-12-22 | Stop reason: SDUPTHER

## 2017-11-29 RX ADMIN — CYANOCOBALAMIN 1000 MCG: 1000 INJECTION, SOLUTION INTRAMUSCULAR; SUBCUTANEOUS at 13:44

## 2017-11-29 NOTE — PROGRESS NOTES
Patient is a 62 y.o. female who is here for a follow up of chronic conditions.  Chief Complaint   Patient presents with   • Hypertension   • Pain   • Diabetes         HPI:  Here for f/u.  Doing good.  Recently seen by CTS about 20 days ago.  Breathing is ok.  Appetite is good.  No palpitations.  Anxiety is under control.  Tremor is controlled.  No falls.  Sleeping well and actually too much.  No fever or chills.      History:    Patient Active Problem List   Diagnosis   • Atopic rhinitis   • Restrictive ventilatory defect   • COPD (chronic obstructive pulmonary disease)   • Osteoporosis   • Obstructive sleep apnea syndrome   • Abnormal liver enzymes   • Cholelithiasis   • Chronic back pain   • Mixed anxiety depressive disorder   • Type 2 diabetes mellitus   • Dyslipidemia   • Essential tremor   • Fibromyalgia   • Gastroesophageal reflux disease without esophagitis   • Hypertension   • Hypothyroidism   • Insomnia   • Osteoarthritis   • Psoriasis   • Branch retinal vein occlusion   • Cobalamin deficiency   • Obesity   • Vitamin D deficiency   • Hypokalemia   • Lactic acidosis   • Fatty liver disease, nonalcoholic   • Sinus bradycardia   • NSTEMI (non-ST elevated myocardial infarction)   • Tobacco abuse   • Hypoxia   • Peripheral vascular disease   • Osteomyelitis of left foot   • Diabetic polyneuropathy associated with type 2 diabetes mellitus   • Anemia, blood loss   • Chronic pain   • Chronic, continuous use of opioids   • Chronic anxiety   • Chronically on benzodiazepine therapy       Past Medical History:   Diagnosis Date   • Bronchitis    • Cervical cancer    • Cholelithiasis 5/11/2016   • Chronic anxiety 2/27/2017   • Chronic bronchitis    • Chronically on benzodiazepine therapy 2/27/2017   • Degenerative arthritis    • Diabetes mellitus    • Dyslipidemia 5/11/2016   • Dyspnea    • Fatty liver disease, nonalcoholic 11/21/2016   • Fibromyalgia    • GERD (gastroesophageal reflux disease)    • H/O echocardiogram     • History of pneumonia    • Hypertension 5/11/2016    16. H/O echocardiogram (V15.89) (Z92.89)  · A.  Echocardiogram of 02/03/2015 reports an ejection fraction of 60-65%, mild concentric     LVH, trace mitral regurgitation, mild tricuspid and pulmonic regurgitation and calculated     RVSP of 35 mmHg, the main pulmonary artery is also mildly dilated.   • Hypothyroidism 5/11/2016    Description: A.  On replacement therapy.   • Nausea    • Obesity    • DINA (obstructive sleep apnea)     intolerant of CPAP therapy   • Osteoporosis    • Osteoporosis    • Polycythemia vera 5/11/2016   • Pulmonary emphysema    • Restrictive ventilatory defect    • Rhinitis    • Uncontrolled diabetes mellitus 5/11/2016   • Uterine cancer    • Vitamin D deficiency 8/1/2016       Past Surgical History:   Procedure Laterality Date   • AMPUTATION DIGIT Left 11/23/2016    Procedure: AMPUTATION TRANS METATARSAL - ray amutation of the left great toe;  Surgeon: Arsalan Feliciano MD;  Location:  [facility] OR;  Service:    • AMPUTATION DIGIT Left 3/2/2017    Procedure: LEFT FOOT TRANSMETATARSAL AMPUTATION TOES 2,3,4,5;  Surgeon: Joey Guaman MD;  Location:  [facility] OR;  Service:    • BACK SURGERY      lumbar fusions x5--multiple times; 1995, 1997, 1998, 1999 and 2008   • BELOW KNEE AMPUTATION Left 2/25/2017    Procedure: EXTENDED LEFT TOE AMPUTATION;  Surgeon: Arsalan Feliciano MD;  Location:  [facility] OR;  Service:    • CARDIAC CATHETERIZATION N/A 11/26/2016    Procedure: Left Heart Cath;  Surgeon: Ash Nuñez MD;  Location:  [facility] CATH INVASIVE LOCATION;  Service:    • HAND SURGERY Bilateral     x3   • HYSTERECTOMY      status post uterine and cervical cancer   • LUMBAR SPINE SURGERY      arthrodesis by anterior approach addit interspace   • TONSILLECTOMY         Current Outpatient Prescriptions on File Prior to Visit   Medication Sig   • acetaminophen (TYLENOL) 325 MG tablet Take 2 tablets by mouth Every 4 (Four) Hours As Needed for mild pain (1-3) or fever  (temperature greater than 101F).   • albuterol (PROVENTIL) (2.5 MG/3ML) 0.083% nebulizer solution 2.5 mg Every 6 (Six) Hours As Needed.   • amLODIPine (NORVASC) 5 MG tablet Take 1 tablet by mouth Daily.   • aspirin  MG EC tablet Take 1 tablet by mouth Daily.   • atorvastatin (LIPITOR) 40 MG tablet Take 1 tablet by mouth Every Night.   • baclofen (LIORESAL) 10 MG tablet Take 1 tablet by mouth 2 (Two) Times a Day As Needed for Muscle Spasms.   • busPIRone (BUSPAR) 10 MG tablet TAKE TWO TABLETS BY MOUTH TWICE A DAY   • cetirizine (ZyrTEC) 10 MG tablet Take 10 mg by mouth Every Night.   • clonazePAM (KlonoPIN) 0.5 MG tablet Take 0.5 tablets by mouth 2 (Two) Times a Day.   • clopidogrel (PLAVIX) 75 MG tablet Take 1 tablet by mouth Daily.   • ezetimibe (ZETIA) 10 MG tablet Take 1 tablet by mouth Daily.   • ferrous sulfate 325 (65 FE) MG EC tablet Take 1 tablet by mouth 3 (Three) Times a Day With Meals.   • fluticasone (FLONASE) 50 MCG/ACT nasal spray 1 spray into each nostril 2 (Two) Times a Day.   • furosemide (LASIX) 40 MG tablet TAKE ONE TABLET BY MOUTH TWICE A DAY   • hydrocortisone 1 % cream Apply  topically Every 12 (Twelve) Hours.   • levothyroxine (SYNTHROID, LEVOTHROID) 112 MCG tablet TAKE ONE TABLET BY MOUTH DAILY   • lisinopril (PRINIVIL,ZESTRIL) 10 MG tablet Take 1 tablet by mouth Every Night.   • LYRICA 100 MG capsule TAKE ONE CAPSULE BY MOUTH THREE TIMES A DAY   • melatonin 5 MG sublingual tablet sublingual tablet Place 1 tablet under the tongue At Night As Needed (insomnia).   • metoclopramide (REGLAN) 10 MG tablet    • omeprazole (priLOSEC) 20 MG capsule TAKE ONE CAPSULE BY MOUTH DAILY   • probiotic (CULTURELLE) capsule capsule Take 1 capsule by mouth Daily.   • promethazine (PHENERGAN) 25 MG tablet Take 25 mg by mouth Every 8 (Eight) Hours As Needed for nausea or vomiting.   • SPIRIVA RESPIMAT 2.5 MCG/ACT aerosol solution INHALE TWO PUFF(S) BY MOUTH DAILY   • topiramate (TOPAMAX) 25 MG tablet Take 1  tablet by mouth 2 (Two) Times a Day.   • [DISCONTINUED] fentaNYL (DURAGESIC) 100 MCG/HR patch Place 1 patch on the skin Every Other Day.   • [DISCONTINUED] metoprolol succinate XL (TOPROL-XL) 25 MG 24 hr tablet Take 1 tablet by mouth Every Morning.   • [DISCONTINUED] oxyCODONE-acetaminophen (PERCOCET)  MG per tablet Take 1 tablet by mouth 3 (Three) Times a Day.     No current facility-administered medications on file prior to visit.        Family History   Problem Relation Age of Onset   • Arthritis Mother    • Diabetes Mother    • Colon polyps Mother    • Diverticulitis Mother    • Arthritis Father    • Bleeding Disorder Father    • Diabetes Father    • Kidney disease Father    • COPD Sister      currently smokes   • Arthritis Brother    • Diabetes Brother    • Alcohol abuse Brother    • Heart murmur Daughter    • Arthritis Other    • Diabetes Other        Social History     Social History   • Marital status:      Spouse name: N/A   • Number of children: 1   • Years of education: N/A     Occupational History   •  Disabled     Social History Main Topics   • Smoking status: Former Smoker     Packs/day: 1.50     Years: 48.00     Types: Cigarettes     Quit date: 2/27/2017   • Smokeless tobacco: Never Used   • Alcohol use No   • Drug use: No   • Sexual activity: Not on file     Other Topics Concern   • Not on file     Social History Narrative    Lives with her  in [place]         ROS:    Review of Systems   Constitutional: Positive for fatigue. Negative for chills, diaphoresis, fever and unexpected weight change.   HENT: Negative for congestion, ear pain, hearing loss, nosebleeds, postnasal drip, sinus pressure and sore throat.    Eyes: Negative for pain, discharge and itching.   Respiratory: Positive for cough and shortness of breath. Negative for chest tightness and wheezing.    Cardiovascular: Negative for chest pain, palpitations and leg swelling.   Gastrointestinal: Positive for abdominal  "pain. Negative for abdominal distention, blood in stool, constipation, diarrhea, nausea and vomiting.   Endocrine: Negative for heat intolerance, polydipsia and polyuria.   Genitourinary: Negative for difficulty urinating, dysuria, frequency and hematuria.   Musculoskeletal: Positive for arthralgias and back pain. Negative for gait problem, joint swelling and myalgias.   Skin: Positive for rash. Negative for wound.   Neurological: Positive for tremors, weakness and light-headedness. Negative for dizziness, syncope and headaches.   Psychiatric/Behavioral: Positive for dysphoric mood and sleep disturbance. The patient is nervous/anxious.        /70  Pulse 68  Ht 66\" (167.6 cm)  LMP  (LMP Unknown)    Physical Exam:    Physical Exam   Constitutional: She is oriented to person, place, and time. She appears well-developed and well-nourished.   HENT:   Head: Normocephalic and atraumatic.   Right Ear: External ear normal.   Left Ear: External ear normal.   Mouth/Throat: Oropharynx is clear and moist.   Eyes: Conjunctivae and EOM are normal.   Neck: Normal range of motion. Neck supple.   Cardiovascular: Normal rate and regular rhythm.    2/6 СЕРГЕЙ   Pulmonary/Chest: Effort normal.   Mildly diminished   Abdominal: Soft. Bowel sounds are normal.   Musculoskeletal: She exhibits edema ( ) and tenderness (pain on back flexion past 30  ).   Using wheelchair   Lymphadenopathy:     She has no cervical adenopathy.   Neurological: She is alert and oriented to person, place, and time.   Skin: Skin is warm and dry. No rash noted.   Refused sacral wound check   Psychiatric: She has a normal mood and affect. Her behavior is normal. Thought content normal.       Procedure:      Discussion/Summary:    chronic back pain- refill patch and percocet as needed  htn-stable  fibromylagia- cont current tx  hyperlipidemia-cont lipitor and zetia  hypothroid-cont replacement, recheck noted  depression with anxiety-cont buspar and klonopine " ,advised her of risk of addiction  polycythemia-cbc noted  retinal vein occlusion- cont rf modification  b12 def-admin today and repeat in 2/18  nausea-phenergan prn, cont reglan, advised of EPS effects and she has agreed  DM-labs noted, counseled on low carb, recheck labs noted, cont januvia  diaphoresis-better   Elevated lft/?autoimmune-f/u gastro recs , labs noted  Eczema-cont prn steroids  Left foot ulcer and osteo-resolved  FE def anemia-cont FE, recheck noted  Tremor-cont topamax and add BB  high risk meds-labs noted      labs noted and dw patient        Current Outpatient Prescriptions:   •  acetaminophen (TYLENOL) 325 MG tablet, Take 2 tablets by mouth Every 4 (Four) Hours As Needed for mild pain (1-3) or fever (temperature greater than 101F)., Disp: 100 tablet, Rfl: 0  •  albuterol (PROVENTIL) (2.5 MG/3ML) 0.083% nebulizer solution, 2.5 mg Every 6 (Six) Hours As Needed., Disp: , Rfl:   •  amLODIPine (NORVASC) 5 MG tablet, Take 1 tablet by mouth Daily., Disp: 90 tablet, Rfl: 3  •  aspirin  MG EC tablet, Take 1 tablet by mouth Daily., Disp: 100 tablet, Rfl: 0  •  atorvastatin (LIPITOR) 40 MG tablet, Take 1 tablet by mouth Every Night., Disp: 90 tablet, Rfl: 3  •  baclofen (LIORESAL) 10 MG tablet, Take 1 tablet by mouth 2 (Two) Times a Day As Needed for Muscle Spasms., Disp: 180 tablet, Rfl: 3  •  busPIRone (BUSPAR) 10 MG tablet, TAKE TWO TABLETS BY MOUTH TWICE A DAY, Disp: 360 tablet, Rfl: 0  •  cetirizine (ZyrTEC) 10 MG tablet, Take 10 mg by mouth Every Night., Disp: , Rfl:   •  clonazePAM (KlonoPIN) 0.5 MG tablet, Take 0.5 tablets by mouth 2 (Two) Times a Day., Disp: 90 tablet, Rfl: 0  •  clopidogrel (PLAVIX) 75 MG tablet, Take 1 tablet by mouth Daily., Disp: 90 tablet, Rfl: 3  •  ezetimibe (ZETIA) 10 MG tablet, Take 1 tablet by mouth Daily., Disp: 90 tablet, Rfl: 3  •  fentaNYL (DURAGESIC) 100 MCG/HR patch, Place 1 patch on the skin Every Other Day., Disp: 15 patch, Rfl: 0  •  ferrous sulfate 325 (65  FE) MG EC tablet, Take 1 tablet by mouth 3 (Three) Times a Day With Meals., Disp: 90 tablet, Rfl: 5  •  fluticasone (FLONASE) 50 MCG/ACT nasal spray, 1 spray into each nostril 2 (Two) Times a Day., Disp: , Rfl:   •  furosemide (LASIX) 40 MG tablet, TAKE ONE TABLET BY MOUTH TWICE A DAY, Disp: 180 tablet, Rfl: 2  •  hydrocortisone 1 % cream, Apply  topically Every 12 (Twelve) Hours., Disp: , Rfl: 0  •  levothyroxine (SYNTHROID, LEVOTHROID) 112 MCG tablet, TAKE ONE TABLET BY MOUTH DAILY, Disp: 90 tablet, Rfl: 2  •  lisinopril (PRINIVIL,ZESTRIL) 10 MG tablet, Take 1 tablet by mouth Every Night., Disp: 90 tablet, Rfl: 3  •  LYRICA 100 MG capsule, TAKE ONE CAPSULE BY MOUTH THREE TIMES A DAY, Disp: 270 capsule, Rfl: 0  •  melatonin 5 MG sublingual tablet sublingual tablet, Place 1 tablet under the tongue At Night As Needed (insomnia)., Disp: 30 tablet, Rfl: 0  •  metoclopramide (REGLAN) 10 MG tablet, , Disp: , Rfl:   •  metoprolol succinate XL (TOPROL-XL) 25 MG 24 hr tablet, Take 1 tablet by mouth Every Morning., Disp: 90 tablet, Rfl: 3  •  omeprazole (priLOSEC) 20 MG capsule, TAKE ONE CAPSULE BY MOUTH DAILY, Disp: 90 capsule, Rfl: 0  •  oxyCODONE-acetaminophen (PERCOCET)  MG per tablet, Take 1 tablet by mouth 3 (Three) Times a Day., Disp: 90 tablet, Rfl: 0  •  probiotic (CULTURELLE) capsule capsule, Take 1 capsule by mouth Daily., Disp: 60 capsule, Rfl: 0  •  promethazine (PHENERGAN) 25 MG tablet, Take 25 mg by mouth Every 8 (Eight) Hours As Needed for nausea or vomiting., Disp: , Rfl:   •  SPIRIVA RESPIMAT 2.5 MCG/ACT aerosol solution, INHALE TWO PUFF(S) BY MOUTH DAILY, Disp: 1 inhaler, Rfl: 4  •  topiramate (TOPAMAX) 25 MG tablet, Take 1 tablet by mouth 2 (Two) Times a Day., Disp: 60 tablet, Rfl: 5        Tamara was seen today for hypertension, pain and diabetes.    Diagnoses and all orders for this visit:    Branch retinal vein occlusion    Essential hypertension  -     metoprolol succinate XL (TOPROL-XL) 25 MG 24  hr tablet; Take 1 tablet by mouth Every Morning.    Peripheral vascular disease    Sinus bradycardia    Chronic seasonal allergic rhinitis due to other allergen    Other emphysema    Hypoxia    Obstructive sleep apnea syndrome    Cobalamin deficiency    Fatty liver disease, nonalcoholic    Gastroesophageal reflux disease without esophagitis    Diabetic polyneuropathy associated with type 2 diabetes mellitus    Other specified hypothyroidism    Type 2 diabetes mellitus with ketoacidosis without coma, without long-term current use of insulin    Chronic low back pain with sciatica, sciatica laterality unspecified, unspecified back pain laterality    Essential tremor  -     metoprolol succinate XL (TOPROL-XL) 25 MG 24 hr tablet; Take 1 tablet by mouth Every Morning.    Primary osteoarthritis involving multiple joints  -     oxyCODONE-acetaminophen (PERCOCET)  MG per tablet; Take 1 tablet by mouth 3 (Three) Times a Day.  -     fentaNYL (DURAGESIC) 100 MCG/HR patch; Place 1 patch on the skin Every Other Day.    Localized osteoporosis without current pathological fracture    Anemia, blood loss    Chronic anxiety    Chronic, continuous use of opioids    Dyslipidemia    Fibromyalgia  -     oxyCODONE-acetaminophen (PERCOCET)  MG per tablet; Take 1 tablet by mouth 3 (Three) Times a Day.  -     fentaNYL (DURAGESIC) 100 MCG/HR patch; Place 1 patch on the skin Every Other Day.    Insomnia, unspecified type    Mixed anxiety depressive disorder    Tobacco abuse    Chronic midline low back pain with sciatica, sciatica laterality unspecified  -     oxyCODONE-acetaminophen (PERCOCET)  MG per tablet; Take 1 tablet by mouth 3 (Three) Times a Day.

## 2017-11-29 NOTE — TELEPHONE ENCOUNTER
Pt called and said that her reglan was messed up. Pt said it is suppose to be 3x a day and not 1x a day

## 2017-12-22 ENCOUNTER — OFFICE VISIT (OUTPATIENT)
Dept: INTERNAL MEDICINE | Facility: CLINIC | Age: 62
End: 2017-12-22

## 2017-12-22 VITALS — HEART RATE: 68 BPM | HEIGHT: 66 IN | DIASTOLIC BLOOD PRESSURE: 70 MMHG | SYSTOLIC BLOOD PRESSURE: 124 MMHG

## 2017-12-22 DIAGNOSIS — K76.0 FATTY LIVER DISEASE, NONALCOHOLIC: Chronic | ICD-10-CM

## 2017-12-22 DIAGNOSIS — I10 ESSENTIAL HYPERTENSION: ICD-10-CM

## 2017-12-22 DIAGNOSIS — J43.8 OTHER EMPHYSEMA (HCC): ICD-10-CM

## 2017-12-22 DIAGNOSIS — K21.9 GASTROESOPHAGEAL REFLUX DISEASE WITHOUT ESOPHAGITIS: ICD-10-CM

## 2017-12-22 DIAGNOSIS — G89.29 CHRONIC LOW BACK PAIN WITH SCIATICA, SCIATICA LATERALITY UNSPECIFIED, UNSPECIFIED BACK PAIN LATERALITY: ICD-10-CM

## 2017-12-22 DIAGNOSIS — D50.0 ANEMIA, BLOOD LOSS: ICD-10-CM

## 2017-12-22 DIAGNOSIS — H34.8392 BRANCH RETINAL VEIN OCCLUSION: Primary | ICD-10-CM

## 2017-12-22 DIAGNOSIS — I73.9 PERIPHERAL VASCULAR DISEASE (HCC): ICD-10-CM

## 2017-12-22 DIAGNOSIS — G89.29 CHRONIC MIDLINE LOW BACK PAIN WITH SCIATICA, SCIATICA LATERALITY UNSPECIFIED: ICD-10-CM

## 2017-12-22 DIAGNOSIS — E78.5 DYSLIPIDEMIA: ICD-10-CM

## 2017-12-22 DIAGNOSIS — G89.4 CHRONIC PAIN SYNDROME: ICD-10-CM

## 2017-12-22 DIAGNOSIS — G47.33 OBSTRUCTIVE SLEEP APNEA SYNDROME: ICD-10-CM

## 2017-12-22 DIAGNOSIS — E53.8 COBALAMIN DEFICIENCY: ICD-10-CM

## 2017-12-22 DIAGNOSIS — M15.9 PRIMARY OSTEOARTHRITIS INVOLVING MULTIPLE JOINTS: ICD-10-CM

## 2017-12-22 DIAGNOSIS — J30.2 CHRONIC SEASONAL ALLERGIC RHINITIS DUE TO OTHER ALLERGEN: ICD-10-CM

## 2017-12-22 DIAGNOSIS — E55.9 VITAMIN D DEFICIENCY: ICD-10-CM

## 2017-12-22 DIAGNOSIS — M79.7 FIBROMYALGIA: ICD-10-CM

## 2017-12-22 DIAGNOSIS — M81.6 LOCALIZED OSTEOPOROSIS WITHOUT CURRENT PATHOLOGICAL FRACTURE: ICD-10-CM

## 2017-12-22 DIAGNOSIS — F41.8 MIXED ANXIETY DEPRESSIVE DISORDER: ICD-10-CM

## 2017-12-22 DIAGNOSIS — E03.8 OTHER SPECIFIED HYPOTHYROIDISM: ICD-10-CM

## 2017-12-22 DIAGNOSIS — M54.40 CHRONIC MIDLINE LOW BACK PAIN WITH SCIATICA, SCIATICA LATERALITY UNSPECIFIED: ICD-10-CM

## 2017-12-22 DIAGNOSIS — E11.10 TYPE 2 DIABETES MELLITUS WITH KETOACIDOSIS WITHOUT COMA, WITHOUT LONG-TERM CURRENT USE OF INSULIN (HCC): ICD-10-CM

## 2017-12-22 DIAGNOSIS — G25.0 ESSENTIAL TREMOR: ICD-10-CM

## 2017-12-22 DIAGNOSIS — F41.9 CHRONIC ANXIETY: ICD-10-CM

## 2017-12-22 DIAGNOSIS — R09.02 HYPOXIA: ICD-10-CM

## 2017-12-22 DIAGNOSIS — G47.00 INSOMNIA, UNSPECIFIED TYPE: ICD-10-CM

## 2017-12-22 DIAGNOSIS — E87.6 HYPOKALEMIA: ICD-10-CM

## 2017-12-22 DIAGNOSIS — M54.40 CHRONIC LOW BACK PAIN WITH SCIATICA, SCIATICA LATERALITY UNSPECIFIED, UNSPECIFIED BACK PAIN LATERALITY: ICD-10-CM

## 2017-12-22 DIAGNOSIS — E11.42 DIABETIC POLYNEUROPATHY ASSOCIATED WITH TYPE 2 DIABETES MELLITUS (HCC): ICD-10-CM

## 2017-12-22 PROCEDURE — 99214 OFFICE O/P EST MOD 30 MIN: CPT | Performed by: INTERNAL MEDICINE

## 2017-12-22 RX ORDER — FENTANYL 100 UG/H
1 PATCH TRANSDERMAL EVERY OTHER DAY
Qty: 15 PATCH | Refills: 0 | Status: SHIPPED | OUTPATIENT
Start: 2017-12-22 | End: 2018-01-23 | Stop reason: SDUPTHER

## 2017-12-22 RX ORDER — OXYCODONE AND ACETAMINOPHEN 10; 325 MG/1; MG/1
1 TABLET ORAL 3 TIMES DAILY
Qty: 90 TABLET | Refills: 0 | Status: SHIPPED | OUTPATIENT
Start: 2017-12-22 | End: 2018-01-23 | Stop reason: SDUPTHER

## 2017-12-22 RX ORDER — CLOTRIMAZOLE AND BETAMETHASONE DIPROPIONATE 10; .64 MG/G; MG/G
CREAM TOPICAL 2 TIMES DAILY PRN
Qty: 45 G | Refills: 2 | Status: SHIPPED | OUTPATIENT
Start: 2017-12-22 | End: 2018-02-13

## 2017-12-22 NOTE — PROGRESS NOTES
Patient is a 62 y.o. female who is here for a follow up of chronic conditions.  Chief Complaint   Patient presents with   • Hypertension   • Hypothyroidism   • Diabetes   • Pain         HPI:  Here for f/u.  Has some place in intergluteal area that is bleeding.  Applying neosporin and medihoney.  No dizziness or lightheadedness.  No energy.  Sleeping a lot.  No tob for 7 mos now.  No sores on her feet.  Still dealing with pain issue.     History:    Patient Active Problem List   Diagnosis   • Atopic rhinitis   • Restrictive ventilatory defect   • COPD (chronic obstructive pulmonary disease)   • Osteoporosis   • Obstructive sleep apnea syndrome   • Abnormal liver enzymes   • Cholelithiasis   • Chronic back pain   • Mixed anxiety depressive disorder   • Type 2 diabetes mellitus   • Dyslipidemia   • Essential tremor   • Fibromyalgia   • Gastroesophageal reflux disease without esophagitis   • Hypertension   • Hypothyroidism   • Insomnia   • Osteoarthritis   • Psoriasis   • Branch retinal vein occlusion   • Cobalamin deficiency   • Obesity   • Vitamin D deficiency   • Hypokalemia   • Lactic acidosis   • Fatty liver disease, nonalcoholic   • Sinus bradycardia   • NSTEMI (non-ST elevated myocardial infarction)   • Tobacco abuse   • Hypoxia   • Peripheral vascular disease   • Osteomyelitis of left foot   • Diabetic polyneuropathy associated with type 2 diabetes mellitus   • Anemia, blood loss   • Chronic pain   • Chronic, continuous use of opioids   • Chronic anxiety   • Chronically on benzodiazepine therapy       Past Medical History:   Diagnosis Date   • Bronchitis    • Cervical cancer    • Cholelithiasis 5/11/2016   • Chronic anxiety 2/27/2017   • Chronic bronchitis    • Chronically on benzodiazepine therapy 2/27/2017   • Degenerative arthritis    • Diabetes mellitus    • Dyslipidemia 5/11/2016   • Dyspnea    • Fatty liver disease, nonalcoholic 11/21/2016   • Fibromyalgia    • GERD (gastroesophageal reflux disease)    •  H/O echocardiogram    • History of pneumonia    • Hypertension 5/11/2016    16. H/O echocardiogram (V15.89) (Z92.89)  · A.  Echocardiogram of 02/03/2015 reports an ejection fraction of 60-65%, mild concentric     LVH, trace mitral regurgitation, mild tricuspid and pulmonic regurgitation and calculated     RVSP of 35 mmHg, the main pulmonary artery is also mildly dilated.   • Hypothyroidism 5/11/2016    Description: A.  On replacement therapy.   • Nausea    • Obesity    • DINA (obstructive sleep apnea)     intolerant of CPAP therapy   • Osteoporosis    • Osteoporosis    • Polycythemia vera 5/11/2016   • Pulmonary emphysema    • Restrictive ventilatory defect    • Rhinitis    • Uncontrolled diabetes mellitus 5/11/2016   • Uterine cancer    • Vitamin D deficiency 8/1/2016       Past Surgical History:   Procedure Laterality Date   • AMPUTATION DIGIT Left 11/23/2016    Procedure: AMPUTATION TRANS METATARSAL - ray amutation of the left great toe;  Surgeon: Arsalan Feliciano MD;  Location:  [facility] OR;  Service:    • AMPUTATION DIGIT Left 3/2/2017    Procedure: LEFT FOOT TRANSMETATARSAL AMPUTATION TOES 2,3,4,5;  Surgeon: Joey Guaman MD;  Location:  [facility] OR;  Service:    • BACK SURGERY      lumbar fusions x5--multiple times; 1995, 1997, 1998, 1999 and 2008   • BELOW KNEE AMPUTATION Left 2/25/2017    Procedure: EXTENDED LEFT TOE AMPUTATION;  Surgeon: Arsalan Feliciano MD;  Location:  [facility] OR;  Service:    • CARDIAC CATHETERIZATION N/A 11/26/2016    Procedure: Left Heart Cath;  Surgeon: Ash Nuñez MD;  Location:  [facility] CATH INVASIVE LOCATION;  Service:    • HAND SURGERY Bilateral     x3   • HYSTERECTOMY      status post uterine and cervical cancer   • LUMBAR SPINE SURGERY      arthrodesis by anterior approach addit interspace   • TONSILLECTOMY         Current Outpatient Prescriptions on File Prior to Visit   Medication Sig   • acetaminophen (TYLENOL) 325 MG tablet Take 2 tablets by mouth Every 4 (Four) Hours As Needed for mild  pain (1-3) or fever (temperature greater than 101F).   • albuterol (PROVENTIL) (2.5 MG/3ML) 0.083% nebulizer solution 2.5 mg Every 6 (Six) Hours As Needed.   • amLODIPine (NORVASC) 5 MG tablet Take 1 tablet by mouth Daily.   • aspirin  MG EC tablet Take 1 tablet by mouth Daily.   • atorvastatin (LIPITOR) 40 MG tablet Take 1 tablet by mouth Every Night.   • baclofen (LIORESAL) 10 MG tablet Take 1 tablet by mouth 2 (Two) Times a Day As Needed for Muscle Spasms.   • busPIRone (BUSPAR) 10 MG tablet TAKE TWO TABLETS BY MOUTH TWICE A DAY   • cetirizine (ZyrTEC) 10 MG tablet Take 10 mg by mouth Every Night.   • clonazePAM (KlonoPIN) 0.5 MG tablet Take 0.5 tablets by mouth 2 (Two) Times a Day.   • clopidogrel (PLAVIX) 75 MG tablet Take 1 tablet by mouth Daily.   • Cyanocobalamin 1000 MCG/ML kit Inject 1,000 mcg as directed Every 30 (Thirty) Days.   • ezetimibe (ZETIA) 10 MG tablet Take 1 tablet by mouth Daily.   • ferrous sulfate 325 (65 FE) MG EC tablet Take 1 tablet by mouth 3 (Three) Times a Day With Meals.   • fluticasone (FLONASE) 50 MCG/ACT nasal spray 1 spray into each nostril 2 (Two) Times a Day.   • furosemide (LASIX) 40 MG tablet TAKE ONE TABLET BY MOUTH TWICE A DAY   • hydrocortisone 1 % cream Apply  topically Every 12 (Twelve) Hours.   • levothyroxine (SYNTHROID, LEVOTHROID) 112 MCG tablet TAKE ONE TABLET BY MOUTH DAILY   • lisinopril (PRINIVIL,ZESTRIL) 10 MG tablet Take 1 tablet by mouth Every Night.   • LYRICA 100 MG capsule TAKE ONE CAPSULE BY MOUTH THREE TIMES A DAY   • melatonin 5 MG sublingual tablet sublingual tablet Place 1 tablet under the tongue At Night As Needed (insomnia).   • metoclopramide (REGLAN) 10 MG tablet Take 1 tablet by mouth 3 (Three) Times a Day.   • metoprolol succinate XL (TOPROL-XL) 25 MG 24 hr tablet Take 1 tablet by mouth Every Morning.   • omeprazole (priLOSEC) 20 MG capsule TAKE ONE CAPSULE BY MOUTH DAILY   • probiotic (CULTURELLE) capsule capsule Take 1 capsule by mouth  Daily.   • promethazine (PHENERGAN) 25 MG tablet Take 25 mg by mouth Every 8 (Eight) Hours As Needed for nausea or vomiting.   • SPIRIVA RESPIMAT 2.5 MCG/ACT aerosol solution INHALE TWO PUFF(S) BY MOUTH DAILY   • topiramate (TOPAMAX) 25 MG tablet Take 1 tablet by mouth 2 (Two) Times a Day.   • [DISCONTINUED] fentaNYL (DURAGESIC) 100 MCG/HR patch Place 1 patch on the skin Every Other Day.   • [DISCONTINUED] oxyCODONE-acetaminophen (PERCOCET)  MG per tablet Take 1 tablet by mouth 3 (Three) Times a Day.     Current Facility-Administered Medications on File Prior to Visit   Medication   • cyanocobalamin injection 1,000 mcg       Family History   Problem Relation Age of Onset   • Arthritis Mother    • Diabetes Mother    • Colon polyps Mother    • Diverticulitis Mother    • Arthritis Father    • Bleeding Disorder Father    • Diabetes Father    • Kidney disease Father    • COPD Sister      currently smokes   • Arthritis Brother    • Diabetes Brother    • Alcohol abuse Brother    • Heart murmur Daughter    • Arthritis Other    • Diabetes Other        Social History     Social History   • Marital status:      Spouse name: N/A   • Number of children: 1   • Years of education: N/A     Occupational History   •  Disabled     Social History Main Topics   • Smoking status: Former Smoker     Packs/day: 1.50     Years: 48.00     Types: Cigarettes     Quit date: 2/27/2017   • Smokeless tobacco: Never Used   • Alcohol use No   • Drug use: No   • Sexual activity: Not on file     Other Topics Concern   • Not on file     Social History Narrative    Lives with her  in [place]         ROS:    Review of Systems   Constitutional: Positive for fatigue. Negative for chills, diaphoresis, fever and unexpected weight change.   HENT: Negative for congestion, ear pain, hearing loss, nosebleeds, postnasal drip, sinus pressure and sore throat.    Eyes: Negative for pain, discharge and itching.   Respiratory: Positive for cough  "and shortness of breath. Negative for chest tightness and wheezing.    Cardiovascular: Negative for chest pain, palpitations and leg swelling.   Gastrointestinal: Positive for abdominal pain. Negative for abdominal distention, blood in stool, constipation, diarrhea, nausea and vomiting.   Endocrine: Negative for heat intolerance, polydipsia and polyuria.   Genitourinary: Negative for difficulty urinating, dysuria, frequency and hematuria.   Musculoskeletal: Positive for arthralgias and back pain. Negative for gait problem, joint swelling and myalgias.   Skin: Positive for rash and wound.   Neurological: Positive for tremors, weakness and light-headedness. Negative for dizziness, syncope and headaches.   Psychiatric/Behavioral: Positive for dysphoric mood and sleep disturbance. The patient is nervous/anxious.        /70  Pulse 68  Ht 167.6 cm (65.98\")  LMP  (LMP Unknown)    Physical Exam:    Physical Exam   Constitutional: She is oriented to person, place, and time. She appears well-developed and well-nourished.   HENT:   Head: Normocephalic and atraumatic.   Right Ear: External ear normal.   Left Ear: External ear normal.   Mouth/Throat: Oropharynx is clear and moist.   Eyes: Conjunctivae and EOM are normal.   Neck: Normal range of motion. Neck supple.   Cardiovascular: Normal rate and regular rhythm.    2/6 СЕРГЕЙ   Pulmonary/Chest: Effort normal.   Mildly diminished   Abdominal: Soft. Bowel sounds are normal.   Musculoskeletal: She exhibits edema ( ) and tenderness (pain on back flexion past 30  ).   Using wheelchair   Lymphadenopathy:     She has no cervical adenopathy.   Neurological: She is alert and oriented to person, place, and time.   Skin: Skin is warm and dry. No rash noted.   Scaling erythematous patch   Psychiatric: She has a normal mood and affect. Her behavior is normal. Thought content normal.       Procedure:      Discussion/Summary:    chronic back pain- refill patch and percocet as " needed  htn-stable  fibromylagia- cont current tx  hyperlipidemia-cont lipitor and zetia  hypothroid-cont replacement, recheck noted  depression with anxiety-cont buspar and klonopine ,advised her of risk of addiction  polycythemia-cbc noted   retinal vein occlusion- cont rf modification  b12 def-admin today and repeat in 2/18  nausea-phenergan prn, cont reglan, advised of EPS effects and she has agreed  DM-labs noted, counseled on low carb, recheck labs noted, cont januvia  diaphoresis-better   Elevated lft/?autoimmune-f/u gastro recs , labs noted  Eczema-cont prn steroids  Left foot ulcer and osteo-resolved  FE def anemia-cont FE, recheck noted  Tremor-cont topamax and BB  Buttocks dermatitis-rx lotrisone  high risk meds-labs noted      labs noted and dw patient      Current Outpatient Prescriptions:   •  acetaminophen (TYLENOL) 325 MG tablet, Take 2 tablets by mouth Every 4 (Four) Hours As Needed for mild pain (1-3) or fever (temperature greater than 101F)., Disp: 100 tablet, Rfl: 0  •  albuterol (PROVENTIL) (2.5 MG/3ML) 0.083% nebulizer solution, 2.5 mg Every 6 (Six) Hours As Needed., Disp: , Rfl:   •  amLODIPine (NORVASC) 5 MG tablet, Take 1 tablet by mouth Daily., Disp: 90 tablet, Rfl: 3  •  aspirin  MG EC tablet, Take 1 tablet by mouth Daily., Disp: 100 tablet, Rfl: 0  •  atorvastatin (LIPITOR) 40 MG tablet, Take 1 tablet by mouth Every Night., Disp: 90 tablet, Rfl: 3  •  baclofen (LIORESAL) 10 MG tablet, Take 1 tablet by mouth 2 (Two) Times a Day As Needed for Muscle Spasms., Disp: 180 tablet, Rfl: 3  •  busPIRone (BUSPAR) 10 MG tablet, TAKE TWO TABLETS BY MOUTH TWICE A DAY, Disp: 360 tablet, Rfl: 0  •  cetirizine (ZyrTEC) 10 MG tablet, Take 10 mg by mouth Every Night., Disp: , Rfl:   •  clonazePAM (KlonoPIN) 0.5 MG tablet, Take 0.5 tablets by mouth 2 (Two) Times a Day., Disp: 90 tablet, Rfl: 0  •  clopidogrel (PLAVIX) 75 MG tablet, Take 1 tablet by mouth Daily., Disp: 90 tablet, Rfl: 3  •   Cyanocobalamin 1000 MCG/ML kit, Inject 1,000 mcg as directed Every 30 (Thirty) Days., Disp: 1 kit, Rfl: 3  •  ezetimibe (ZETIA) 10 MG tablet, Take 1 tablet by mouth Daily., Disp: 90 tablet, Rfl: 3  •  fentaNYL (DURAGESIC) 100 MCG/HR patch, Place 1 patch on the skin Every Other Day., Disp: 15 patch, Rfl: 0  •  ferrous sulfate 325 (65 FE) MG EC tablet, Take 1 tablet by mouth 3 (Three) Times a Day With Meals., Disp: 90 tablet, Rfl: 5  •  fluticasone (FLONASE) 50 MCG/ACT nasal spray, 1 spray into each nostril 2 (Two) Times a Day., Disp: , Rfl:   •  furosemide (LASIX) 40 MG tablet, TAKE ONE TABLET BY MOUTH TWICE A DAY, Disp: 180 tablet, Rfl: 2  •  hydrocortisone 1 % cream, Apply  topically Every 12 (Twelve) Hours., Disp: , Rfl: 0  •  levothyroxine (SYNTHROID, LEVOTHROID) 112 MCG tablet, TAKE ONE TABLET BY MOUTH DAILY, Disp: 90 tablet, Rfl: 2  •  lisinopril (PRINIVIL,ZESTRIL) 10 MG tablet, Take 1 tablet by mouth Every Night., Disp: 90 tablet, Rfl: 3  •  LYRICA 100 MG capsule, TAKE ONE CAPSULE BY MOUTH THREE TIMES A DAY, Disp: 270 capsule, Rfl: 0  •  melatonin 5 MG sublingual tablet sublingual tablet, Place 1 tablet under the tongue At Night As Needed (insomnia)., Disp: 30 tablet, Rfl: 0  •  metoclopramide (REGLAN) 10 MG tablet, Take 1 tablet by mouth 3 (Three) Times a Day., Disp: 270 tablet, Rfl: 2  •  metoprolol succinate XL (TOPROL-XL) 25 MG 24 hr tablet, Take 1 tablet by mouth Every Morning., Disp: 90 tablet, Rfl: 3  •  omeprazole (priLOSEC) 20 MG capsule, TAKE ONE CAPSULE BY MOUTH DAILY, Disp: 90 capsule, Rfl: 0  •  oxyCODONE-acetaminophen (PERCOCET)  MG per tablet, Take 1 tablet by mouth 3 (Three) Times a Day., Disp: 90 tablet, Rfl: 0  •  probiotic (CULTURELLE) capsule capsule, Take 1 capsule by mouth Daily., Disp: 60 capsule, Rfl: 0  •  promethazine (PHENERGAN) 25 MG tablet, Take 25 mg by mouth Every 8 (Eight) Hours As Needed for nausea or vomiting., Disp: , Rfl:   •  SPIRIVA RESPIMAT 2.5 MCG/ACT aerosol  solution, INHALE TWO PUFF(S) BY MOUTH DAILY, Disp: 1 inhaler, Rfl: 4  •  topiramate (TOPAMAX) 25 MG tablet, Take 1 tablet by mouth 2 (Two) Times a Day., Disp: 60 tablet, Rfl: 5  •  clotrimazole-betamethasone (LOTRISONE) 1-0.05 % cream, Apply  topically 2 (Two) Times a Day As Needed (rash)., Disp: 45 g, Rfl: 2    Current Facility-Administered Medications:   •  cyanocobalamin injection 1,000 mcg, 1,000 mcg, Intramuscular, Q28 Days, Harinder Aranda MD, 1,000 mcg at 11/29/17 1344        Tamara was seen today for hypertension, hypothyroidism, diabetes and pain.    Diagnoses and all orders for this visit:    Branch retinal vein occlusion    Essential hypertension    Peripheral vascular disease    Chronic seasonal allergic rhinitis due to other allergen    Other emphysema    Obstructive sleep apnea syndrome    Hypoxia    Cobalamin deficiency    Fatty liver disease, nonalcoholic    Gastroesophageal reflux disease without esophagitis    Vitamin D deficiency    Diabetic polyneuropathy associated with type 2 diabetes mellitus    Other specified hypothyroidism    Type 2 diabetes mellitus with ketoacidosis without coma, without long-term current use of insulin    Chronic low back pain with sciatica, sciatica laterality unspecified, unspecified back pain laterality    Essential tremor    Primary osteoarthritis involving multiple joints  -     oxyCODONE-acetaminophen (PERCOCET)  MG per tablet; Take 1 tablet by mouth 3 (Three) Times a Day.  -     fentaNYL (DURAGESIC) 100 MCG/HR patch; Place 1 patch on the skin Every Other Day.    Localized osteoporosis without current pathological fracture    Anemia, blood loss    Chronic anxiety    Chronic pain syndrome    Dyslipidemia    Fibromyalgia  -     oxyCODONE-acetaminophen (PERCOCET)  MG per tablet; Take 1 tablet by mouth 3 (Three) Times a Day.  -     fentaNYL (DURAGESIC) 100 MCG/HR patch; Place 1 patch on the skin Every Other Day.    Hypokalemia    Insomnia, unspecified  type    Mixed anxiety depressive disorder    Chronic midline low back pain with sciatica, sciatica laterality unspecified  -     oxyCODONE-acetaminophen (PERCOCET)  MG per tablet; Take 1 tablet by mouth 3 (Three) Times a Day.    Other orders  -     clotrimazole-betamethasone (LOTRISONE) 1-0.05 % cream; Apply  topically 2 (Two) Times a Day As Needed (rash).

## 2018-01-02 DIAGNOSIS — R19.5 POSITIVE FIT (FECAL IMMUNOCHEMICAL TEST): Primary | ICD-10-CM

## 2018-01-11 ENCOUNTER — TELEPHONE (OUTPATIENT)
Dept: INTERNAL MEDICINE | Facility: CLINIC | Age: 63
End: 2018-01-11

## 2018-01-23 ENCOUNTER — OFFICE VISIT (OUTPATIENT)
Dept: INTERNAL MEDICINE | Facility: CLINIC | Age: 63
End: 2018-01-23

## 2018-01-23 ENCOUNTER — TELEPHONE (OUTPATIENT)
Dept: INTERNAL MEDICINE | Facility: CLINIC | Age: 63
End: 2018-01-23

## 2018-01-23 VITALS
HEART RATE: 72 BPM | SYSTOLIC BLOOD PRESSURE: 130 MMHG | BODY MASS INDEX: 38.73 KG/M2 | WEIGHT: 241 LBS | DIASTOLIC BLOOD PRESSURE: 64 MMHG | HEIGHT: 66 IN

## 2018-01-23 DIAGNOSIS — I21.4 NSTEMI (NON-ST ELEVATED MYOCARDIAL INFARCTION) (HCC): ICD-10-CM

## 2018-01-23 DIAGNOSIS — M81.6 LOCALIZED OSTEOPOROSIS WITHOUT CURRENT PATHOLOGICAL FRACTURE: ICD-10-CM

## 2018-01-23 DIAGNOSIS — F11.90 CHRONIC, CONTINUOUS USE OF OPIOIDS: ICD-10-CM

## 2018-01-23 DIAGNOSIS — E11.42 DIABETIC POLYNEUROPATHY ASSOCIATED WITH TYPE 2 DIABETES MELLITUS (HCC): ICD-10-CM

## 2018-01-23 DIAGNOSIS — G89.4 CHRONIC PAIN SYNDROME: ICD-10-CM

## 2018-01-23 DIAGNOSIS — G25.0 ESSENTIAL TREMOR: ICD-10-CM

## 2018-01-23 DIAGNOSIS — D50.0 ANEMIA, BLOOD LOSS: ICD-10-CM

## 2018-01-23 DIAGNOSIS — R74.8 ABNORMAL LIVER ENZYMES: ICD-10-CM

## 2018-01-23 DIAGNOSIS — G47.33 OBSTRUCTIVE SLEEP APNEA SYNDROME: ICD-10-CM

## 2018-01-23 DIAGNOSIS — E55.9 VITAMIN D DEFICIENCY: ICD-10-CM

## 2018-01-23 DIAGNOSIS — K21.9 GASTROESOPHAGEAL REFLUX DISEASE WITHOUT ESOPHAGITIS: ICD-10-CM

## 2018-01-23 DIAGNOSIS — I73.9 PERIPHERAL VASCULAR DISEASE (HCC): ICD-10-CM

## 2018-01-23 DIAGNOSIS — J30.1 CHRONIC SEASONAL ALLERGIC RHINITIS DUE TO POLLEN: ICD-10-CM

## 2018-01-23 DIAGNOSIS — M54.40 CHRONIC LOW BACK PAIN WITH SCIATICA, SCIATICA LATERALITY UNSPECIFIED, UNSPECIFIED BACK PAIN LATERALITY: ICD-10-CM

## 2018-01-23 DIAGNOSIS — E11.10 TYPE 2 DIABETES MELLITUS WITH KETOACIDOSIS WITHOUT COMA, WITHOUT LONG-TERM CURRENT USE OF INSULIN (HCC): ICD-10-CM

## 2018-01-23 DIAGNOSIS — G47.00 INSOMNIA, UNSPECIFIED TYPE: ICD-10-CM

## 2018-01-23 DIAGNOSIS — J43.8 OTHER EMPHYSEMA (HCC): ICD-10-CM

## 2018-01-23 DIAGNOSIS — F41.8 MIXED ANXIETY DEPRESSIVE DISORDER: ICD-10-CM

## 2018-01-23 DIAGNOSIS — R09.02 HYPOXIA: ICD-10-CM

## 2018-01-23 DIAGNOSIS — F41.9 CHRONIC ANXIETY: ICD-10-CM

## 2018-01-23 DIAGNOSIS — M15.9 PRIMARY OSTEOARTHRITIS INVOLVING MULTIPLE JOINTS: ICD-10-CM

## 2018-01-23 DIAGNOSIS — G89.29 CHRONIC MIDLINE LOW BACK PAIN WITH SCIATICA, SCIATICA LATERALITY UNSPECIFIED: ICD-10-CM

## 2018-01-23 DIAGNOSIS — E03.8 OTHER SPECIFIED HYPOTHYROIDISM: ICD-10-CM

## 2018-01-23 DIAGNOSIS — M79.7 FIBROMYALGIA: ICD-10-CM

## 2018-01-23 DIAGNOSIS — I10 ESSENTIAL HYPERTENSION: Primary | ICD-10-CM

## 2018-01-23 DIAGNOSIS — K76.0 FATTY LIVER DISEASE, NONALCOHOLIC: Chronic | ICD-10-CM

## 2018-01-23 DIAGNOSIS — E53.8 COBALAMIN DEFICIENCY: ICD-10-CM

## 2018-01-23 DIAGNOSIS — E78.5 DYSLIPIDEMIA: ICD-10-CM

## 2018-01-23 DIAGNOSIS — M54.40 CHRONIC MIDLINE LOW BACK PAIN WITH SCIATICA, SCIATICA LATERALITY UNSPECIFIED: ICD-10-CM

## 2018-01-23 DIAGNOSIS — G89.29 CHRONIC LOW BACK PAIN WITH SCIATICA, SCIATICA LATERALITY UNSPECIFIED, UNSPECIFIED BACK PAIN LATERALITY: ICD-10-CM

## 2018-01-23 LAB
ALBUMIN SERPL-MCNC: 4.1 G/DL (ref 3.2–4.8)
ALBUMIN/GLOB SERPL: 1.6 G/DL (ref 1.5–2.5)
ALP SERPL-CCNC: 63 U/L (ref 25–100)
ALT SERPL-CCNC: 50 U/L (ref 7–40)
AST SERPL-CCNC: 107 U/L (ref 0–33)
BILIRUB SERPL-MCNC: 0.3 MG/DL (ref 0.3–1.2)
BUN SERPL-MCNC: 24 MG/DL (ref 9–23)
BUN/CREAT SERPL: 18.5 (ref 7–25)
CALCIUM SERPL-MCNC: 9.3 MG/DL (ref 8.7–10.4)
CHLORIDE SERPL-SCNC: 101 MMOL/L (ref 99–109)
CO2 SERPL-SCNC: 29 MMOL/L (ref 20–31)
CREAT SERPL-MCNC: 1.3 MG/DL (ref 0.6–1.3)
ERYTHROCYTE [DISTWIDTH] IN BLOOD BY AUTOMATED COUNT: 14.5 % (ref 11.3–14.5)
GLOBULIN SER CALC-MCNC: 2.6 GM/DL
GLUCOSE SERPL-MCNC: 169 MG/DL (ref 70–100)
HBA1C MFR BLD: 7.3 % (ref 4.8–5.6)
HCT VFR BLD AUTO: 38.9 % (ref 34.5–44)
HGB BLD-MCNC: 12 G/DL (ref 11.5–15.5)
MCH RBC QN AUTO: 30.9 PG (ref 27–31)
MCHC RBC AUTO-ENTMCNC: 30.8 G/DL (ref 32–36)
MCV RBC AUTO: 100.3 FL (ref 80–99)
PLATELET # BLD AUTO: 204 10*3/MM3 (ref 150–450)
POTASSIUM SERPL-SCNC: 3.6 MMOL/L (ref 3.5–5.5)
PROT SERPL-MCNC: 6.7 G/DL (ref 5.7–8.2)
RBC # BLD AUTO: 3.88 10*6/MM3 (ref 3.89–5.14)
SODIUM SERPL-SCNC: 142 MMOL/L (ref 132–146)
VIT B12 SERPL-MCNC: 330 PG/ML (ref 211–911)
WBC # BLD AUTO: 10.02 10*3/MM3 (ref 3.5–10.8)

## 2018-01-23 PROCEDURE — 99214 OFFICE O/P EST MOD 30 MIN: CPT | Performed by: INTERNAL MEDICINE

## 2018-01-23 RX ORDER — OXYCODONE AND ACETAMINOPHEN 10; 325 MG/1; MG/1
1 TABLET ORAL 3 TIMES DAILY
Qty: 90 TABLET | Refills: 0 | Status: SHIPPED | OUTPATIENT
Start: 2018-01-23 | End: 2018-02-27 | Stop reason: HOSPADM

## 2018-01-23 RX ORDER — FENTANYL 100 UG/H
1 PATCH TRANSDERMAL EVERY OTHER DAY
Qty: 15 PATCH | Refills: 0 | Status: SHIPPED | OUTPATIENT
Start: 2018-01-23 | End: 2018-02-27 | Stop reason: HOSPADM

## 2018-01-23 NOTE — PROGRESS NOTES
"Patient is a 62 y.o. female who is here for a follow up of chronic conditions.  Chief Complaint   Patient presents with   • Hypertension   • Hypothyroidism   • Diabetes   • Pain         HPI:  Here for f/u.  Occasional palpitations.  Some hard \"thumps.  HR in the upper 60's.  Occasional jerking of her hands.  GERD is controlled.  Has gained 30 pounds since she quit smoking.  Breathing is ok.  Using O2 on prn basis.  Occasional temperature intolerance.     History:    Patient Active Problem List   Diagnosis   • Atopic rhinitis   • Restrictive ventilatory defect   • COPD (chronic obstructive pulmonary disease)   • Osteoporosis   • Obstructive sleep apnea syndrome   • Abnormal liver enzymes   • Cholelithiasis   • Chronic back pain   • Mixed anxiety depressive disorder   • Type 2 diabetes mellitus   • Dyslipidemia   • Essential tremor   • Fibromyalgia   • Gastroesophageal reflux disease without esophagitis   • Hypertension   • Hypothyroidism   • Insomnia   • Osteoarthritis   • Psoriasis   • Branch retinal vein occlusion   • Cobalamin deficiency   • Obesity   • Vitamin D deficiency   • Hypokalemia   • Lactic acidosis   • Fatty liver disease, nonalcoholic   • Sinus bradycardia   • NSTEMI (non-ST elevated myocardial infarction)   • Tobacco abuse   • Hypoxia   • Peripheral vascular disease   • Osteomyelitis of left foot   • Diabetic polyneuropathy associated with type 2 diabetes mellitus   • Anemia, blood loss   • Chronic pain   • Chronic, continuous use of opioids   • Chronic anxiety   • Chronically on benzodiazepine therapy       Past Medical History:   Diagnosis Date   • Bronchitis    • Cervical cancer    • Cholelithiasis 5/11/2016   • Chronic anxiety 2/27/2017   • Chronic bronchitis    • Chronically on benzodiazepine therapy 2/27/2017   • Degenerative arthritis    • Diabetes mellitus    • Dyslipidemia 5/11/2016   • Dyspnea    • Fatty liver disease, nonalcoholic 11/21/2016   • Fibromyalgia    • GERD (gastroesophageal " reflux disease)    • H/O echocardiogram    • History of pneumonia    • Hypertension 5/11/2016    16. H/O echocardiogram (V15.89) (Z92.89)  · A.  Echocardiogram of 02/03/2015 reports an ejection fraction of 60-65%, mild concentric     LVH, trace mitral regurgitation, mild tricuspid and pulmonic regurgitation and calculated     RVSP of 35 mmHg, the main pulmonary artery is also mildly dilated.   • Hypothyroidism 5/11/2016    Description: A.  On replacement therapy.   • Nausea    • Obesity    • DINA (obstructive sleep apnea)     intolerant of CPAP therapy   • Osteoporosis    • Osteoporosis    • Polycythemia vera 5/11/2016   • Pulmonary emphysema    • Restrictive ventilatory defect    • Rhinitis    • Uncontrolled diabetes mellitus 5/11/2016   • Uterine cancer    • Vitamin D deficiency 8/1/2016       Past Surgical History:   Procedure Laterality Date   • AMPUTATION DIGIT Left 11/23/2016    Procedure: AMPUTATION TRANS METATARSAL - ray amutation of the left great toe;  Surgeon: Arsalan Feliciano MD;  Location:  [facility] OR;  Service:    • AMPUTATION DIGIT Left 3/2/2017    Procedure: LEFT FOOT TRANSMETATARSAL AMPUTATION TOES 2,3,4,5;  Surgeon: Joey Guaman MD;  Location:  [facility] OR;  Service:    • BACK SURGERY      lumbar fusions x5--multiple times; 1995, 1997, 1998, 1999 and 2008   • BELOW KNEE AMPUTATION Left 2/25/2017    Procedure: EXTENDED LEFT TOE AMPUTATION;  Surgeon: Arsalan Feliciano MD;  Location:  [facility] OR;  Service:    • CARDIAC CATHETERIZATION N/A 11/26/2016    Procedure: Left Heart Cath;  Surgeon: Ash Nuñez MD;  Location:  [facility] CATH INVASIVE LOCATION;  Service:    • HAND SURGERY Bilateral     x3   • HYSTERECTOMY      status post uterine and cervical cancer   • LUMBAR SPINE SURGERY      arthrodesis by anterior approach addit interspace   • TONSILLECTOMY         Current Outpatient Prescriptions on File Prior to Visit   Medication Sig   • acetaminophen (TYLENOL) 325 MG tablet Take 2 tablets by mouth Every 4 (Four)  Hours As Needed for mild pain (1-3) or fever (temperature greater than 101F).   • albuterol (PROVENTIL) (2.5 MG/3ML) 0.083% nebulizer solution 2.5 mg Every 6 (Six) Hours As Needed.   • amLODIPine (NORVASC) 5 MG tablet Take 1 tablet by mouth Daily.   • aspirin  MG EC tablet Take 1 tablet by mouth Daily.   • atorvastatin (LIPITOR) 40 MG tablet Take 1 tablet by mouth Every Night.   • baclofen (LIORESAL) 10 MG tablet Take 1 tablet by mouth 2 (Two) Times a Day As Needed for Muscle Spasms.   • busPIRone (BUSPAR) 10 MG tablet TAKE TWO TABLETS BY MOUTH TWICE A DAY   • cetirizine (ZyrTEC) 10 MG tablet Take 10 mg by mouth Every Night.   • clonazePAM (KlonoPIN) 0.5 MG tablet Take 0.5 tablets by mouth 2 (Two) Times a Day.   • clopidogrel (PLAVIX) 75 MG tablet Take 1 tablet by mouth Daily.   • clotrimazole-betamethasone (LOTRISONE) 1-0.05 % cream Apply  topically 2 (Two) Times a Day As Needed (rash).   • Cyanocobalamin 1000 MCG/ML kit Inject 1,000 mcg as directed Every 30 (Thirty) Days.   • ezetimibe (ZETIA) 10 MG tablet Take 1 tablet by mouth Daily.   • ferrous sulfate 325 (65 FE) MG EC tablet Take 1 tablet by mouth 3 (Three) Times a Day With Meals.   • fluticasone (FLONASE) 50 MCG/ACT nasal spray 1 spray into each nostril 2 (Two) Times a Day.   • furosemide (LASIX) 40 MG tablet TAKE ONE TABLET BY MOUTH TWICE A DAY   • hydrocortisone 1 % cream Apply  topically Every 12 (Twelve) Hours.   • levothyroxine (SYNTHROID, LEVOTHROID) 112 MCG tablet TAKE ONE TABLET BY MOUTH DAILY   • lisinopril (PRINIVIL,ZESTRIL) 10 MG tablet Take 1 tablet by mouth Every Night.   • LYRICA 100 MG capsule TAKE ONE CAPSULE BY MOUTH THREE TIMES A DAY   • melatonin 5 MG sublingual tablet sublingual tablet Place 1 tablet under the tongue At Night As Needed (insomnia).   • metoclopramide (REGLAN) 10 MG tablet Take 1 tablet by mouth 3 (Three) Times a Day.   • omeprazole (priLOSEC) 20 MG capsule TAKE ONE CAPSULE BY MOUTH DAILY   • probiotic (CULTURELLE)  capsule capsule Take 1 capsule by mouth Daily.   • promethazine (PHENERGAN) 25 MG tablet Take 25 mg by mouth Every 8 (Eight) Hours As Needed for nausea or vomiting.   • SPIRIVA RESPIMAT 2.5 MCG/ACT aerosol solution INHALE TWO PUFF(S) BY MOUTH DAILY   • topiramate (TOPAMAX) 25 MG tablet Take 1 tablet by mouth 2 (Two) Times a Day.   • [DISCONTINUED] fentaNYL (DURAGESIC) 100 MCG/HR patch Place 1 patch on the skin Every Other Day.   • [DISCONTINUED] metoprolol succinate XL (TOPROL-XL) 25 MG 24 hr tablet Take 1 tablet by mouth Every Morning.   • [DISCONTINUED] oxyCODONE-acetaminophen (PERCOCET)  MG per tablet Take 1 tablet by mouth 3 (Three) Times a Day.     Current Facility-Administered Medications on File Prior to Visit   Medication   • cyanocobalamin injection 1,000 mcg       Family History   Problem Relation Age of Onset   • Arthritis Mother    • Diabetes Mother    • Colon polyps Mother    • Diverticulitis Mother    • Arthritis Father    • Bleeding Disorder Father    • Diabetes Father    • Kidney disease Father    • COPD Sister      currently smokes   • Arthritis Brother    • Diabetes Brother    • Alcohol abuse Brother    • Heart murmur Daughter    • Arthritis Other    • Diabetes Other        Social History     Social History   • Marital status:      Spouse name: N/A   • Number of children: 1   • Years of education: N/A     Occupational History   •  Disabled     Social History Main Topics   • Smoking status: Former Smoker     Packs/day: 1.50     Years: 48.00     Types: Cigarettes     Quit date: 2/27/2017   • Smokeless tobacco: Never Used   • Alcohol use No   • Drug use: No   • Sexual activity: Not on file     Other Topics Concern   • Not on file     Social History Narrative    Lives with her  in [place]         ROS:    Review of Systems   Constitutional: Positive for fatigue. Negative for chills, diaphoresis, fever and unexpected weight change.   HENT: Negative for congestion, ear pain, hearing  "loss, nosebleeds, postnasal drip, sinus pressure and sore throat.    Eyes: Negative for pain, discharge and itching.   Respiratory: Positive for shortness of breath. Negative for cough, chest tightness and wheezing.    Cardiovascular: Positive for palpitations. Negative for chest pain and leg swelling.   Gastrointestinal: Positive for abdominal pain. Negative for abdominal distention, blood in stool, constipation, diarrhea, nausea and vomiting.   Endocrine: Negative for heat intolerance, polydipsia and polyuria.   Genitourinary: Negative for difficulty urinating, dysuria, frequency and hematuria.   Musculoskeletal: Positive for arthralgias and back pain. Negative for gait problem, joint swelling and myalgias.   Skin: Positive for rash. Negative for wound.   Neurological: Positive for tremors, weakness and light-headedness. Negative for dizziness, syncope and headaches.   Psychiatric/Behavioral: Positive for dysphoric mood and sleep disturbance. The patient is nervous/anxious.        /64  Pulse 72  Ht 167.6 cm (65.98\")  Wt 109 kg (241 lb)  LMP  (LMP Unknown)  BMI 38.92 kg/m2    Physical Exam:    Physical Exam   Constitutional: She is oriented to person, place, and time. She appears well-developed and well-nourished.   HENT:   Head: Normocephalic and atraumatic.   Right Ear: External ear normal.   Left Ear: External ear normal.   Mouth/Throat: Oropharynx is clear and moist.   Eyes: Conjunctivae and EOM are normal.   Neck: Normal range of motion. Neck supple.   Cardiovascular: Normal rate and regular rhythm.    2/6 СЕРГЕЙ   Pulmonary/Chest: Effort normal.   Mildly diminished   Abdominal: Soft. Bowel sounds are normal.   Musculoskeletal: She exhibits tenderness (pain on back flexion past 30  ).   Using wheelchair   Lymphadenopathy:     She has no cervical adenopathy.   Neurological: She is alert and oriented to person, place, and time.   Skin: Skin is warm and dry. No rash noted.   Scaling erythematous patch "   Psychiatric: She has a normal mood and affect. Her behavior is normal. Thought content normal.       Procedure:      Discussion/Summary:    chronic back pain- refill patch and percocet as needed  htn-change to metoprolol  fibromylagia- cont current tx  hyperlipidemia-cont lipitor and zetia  hypothroid-cont replacement, recheck today  depression with anxiety-cont buspar and klonopine ,advised her of risk of addiction  polycythemia-cbc today  retinal vein occlusion- cont rf modification  b12 def-admin on rtc and repeat in 2/18  nausea-phenergan prn, cont reglan, advised of EPS effects and she has agreed  DM-labs noted, counseled on low carb, recheck today, cont januvia  diaphoresis-better   Elevated lft/?autoimmune-f/u gastro recs , labs today  Eczema-cont prn steroids  Left foot ulcer and osteo-resolved  FE def anemia-cont FE, recheck today  Tremor-cont topamax and BB  Buttocks dermatitis-rx lotrisone  high risk meds-labs today      labs noted and dw patient, counseled on adequate hydration and diet/wt loss  Will give B12 soon    Current Outpatient Prescriptions:   •  acetaminophen (TYLENOL) 325 MG tablet, Take 2 tablets by mouth Every 4 (Four) Hours As Needed for mild pain (1-3) or fever (temperature greater than 101F)., Disp: 100 tablet, Rfl: 0  •  albuterol (PROVENTIL) (2.5 MG/3ML) 0.083% nebulizer solution, 2.5 mg Every 6 (Six) Hours As Needed., Disp: , Rfl:   •  amLODIPine (NORVASC) 5 MG tablet, Take 1 tablet by mouth Daily., Disp: 90 tablet, Rfl: 3  •  aspirin  MG EC tablet, Take 1 tablet by mouth Daily., Disp: 100 tablet, Rfl: 0  •  atorvastatin (LIPITOR) 40 MG tablet, Take 1 tablet by mouth Every Night., Disp: 90 tablet, Rfl: 3  •  baclofen (LIORESAL) 10 MG tablet, Take 1 tablet by mouth 2 (Two) Times a Day As Needed for Muscle Spasms., Disp: 180 tablet, Rfl: 3  •  busPIRone (BUSPAR) 10 MG tablet, TAKE TWO TABLETS BY MOUTH TWICE A DAY, Disp: 360 tablet, Rfl: 0  •  cetirizine (ZyrTEC) 10 MG tablet,  Take 10 mg by mouth Every Night., Disp: , Rfl:   •  clonazePAM (KlonoPIN) 0.5 MG tablet, Take 0.5 tablets by mouth 2 (Two) Times a Day., Disp: 90 tablet, Rfl: 0  •  clopidogrel (PLAVIX) 75 MG tablet, Take 1 tablet by mouth Daily., Disp: 90 tablet, Rfl: 3  •  clotrimazole-betamethasone (LOTRISONE) 1-0.05 % cream, Apply  topically 2 (Two) Times a Day As Needed (rash)., Disp: 45 g, Rfl: 2  •  Cyanocobalamin 1000 MCG/ML kit, Inject 1,000 mcg as directed Every 30 (Thirty) Days., Disp: 1 kit, Rfl: 3  •  ezetimibe (ZETIA) 10 MG tablet, Take 1 tablet by mouth Daily., Disp: 90 tablet, Rfl: 3  •  fentaNYL (DURAGESIC) 100 MCG/HR patch, Place 1 patch on the skin Every Other Day., Disp: 15 patch, Rfl: 0  •  ferrous sulfate 325 (65 FE) MG EC tablet, Take 1 tablet by mouth 3 (Three) Times a Day With Meals., Disp: 90 tablet, Rfl: 5  •  fluticasone (FLONASE) 50 MCG/ACT nasal spray, 1 spray into each nostril 2 (Two) Times a Day., Disp: , Rfl:   •  furosemide (LASIX) 40 MG tablet, TAKE ONE TABLET BY MOUTH TWICE A DAY, Disp: 180 tablet, Rfl: 2  •  hydrocortisone 1 % cream, Apply  topically Every 12 (Twelve) Hours., Disp: , Rfl: 0  •  levothyroxine (SYNTHROID, LEVOTHROID) 112 MCG tablet, TAKE ONE TABLET BY MOUTH DAILY, Disp: 90 tablet, Rfl: 2  •  lisinopril (PRINIVIL,ZESTRIL) 10 MG tablet, Take 1 tablet by mouth Every Night., Disp: 90 tablet, Rfl: 3  •  LYRICA 100 MG capsule, TAKE ONE CAPSULE BY MOUTH THREE TIMES A DAY, Disp: 270 capsule, Rfl: 0  •  melatonin 5 MG sublingual tablet sublingual tablet, Place 1 tablet under the tongue At Night As Needed (insomnia)., Disp: 30 tablet, Rfl: 0  •  metoclopramide (REGLAN) 10 MG tablet, Take 1 tablet by mouth 3 (Three) Times a Day., Disp: 270 tablet, Rfl: 2  •  omeprazole (priLOSEC) 20 MG capsule, TAKE ONE CAPSULE BY MOUTH DAILY, Disp: 90 capsule, Rfl: 0  •  oxyCODONE-acetaminophen (PERCOCET)  MG per tablet, Take 1 tablet by mouth 3 (Three) Times a Day., Disp: 90 tablet, Rfl: 0  •   probiotic (CULTURELLE) capsule capsule, Take 1 capsule by mouth Daily., Disp: 60 capsule, Rfl: 0  •  promethazine (PHENERGAN) 25 MG tablet, Take 25 mg by mouth Every 8 (Eight) Hours As Needed for nausea or vomiting., Disp: , Rfl:   •  SPIRIVA RESPIMAT 2.5 MCG/ACT aerosol solution, INHALE TWO PUFF(S) BY MOUTH DAILY, Disp: 1 inhaler, Rfl: 4  •  topiramate (TOPAMAX) 25 MG tablet, Take 1 tablet by mouth 2 (Two) Times a Day., Disp: 60 tablet, Rfl: 5  •  metoprolol tartrate (LOPRESSOR) 25 MG tablet, Take 1 tablet by mouth 2 (Two) Times a Day., Disp: 180 tablet, Rfl: 3    Current Facility-Administered Medications:   •  cyanocobalamin injection 1,000 mcg, 1,000 mcg, Intramuscular, Q28 Days, Harinder Aranda MD, 1,000 mcg at 11/29/17 1344        Tamara was seen today for hypertension, hypothyroidism, diabetes and pain.    Diagnoses and all orders for this visit:    Essential hypertension  -     metoprolol tartrate (LOPRESSOR) 25 MG tablet; Take 1 tablet by mouth 2 (Two) Times a Day.    NSTEMI (non-ST elevated myocardial infarction)  -     metoprolol tartrate (LOPRESSOR) 25 MG tablet; Take 1 tablet by mouth 2 (Two) Times a Day.    Peripheral vascular disease    Chronic seasonal allergic rhinitis due to pollen    Other emphysema    Hypoxia    Obstructive sleep apnea syndrome    Cobalamin deficiency  -     CBC (No Diff)  -     Vitamin B12    Fatty liver disease, nonalcoholic    Gastroesophageal reflux disease without esophagitis    Vitamin D deficiency    Diabetic polyneuropathy associated with type 2 diabetes mellitus    Other specified hypothyroidism    Type 2 diabetes mellitus with ketoacidosis without coma, without long-term current use of insulin  -     Comprehensive Metabolic Panel  -     Hemoglobin A1c    Chronic low back pain with sciatica, sciatica laterality unspecified, unspecified back pain laterality    Essential tremor    Primary osteoarthritis involving multiple joints  -     oxyCODONE-acetaminophen (PERCOCET)   MG per tablet; Take 1 tablet by mouth 3 (Three) Times a Day.  -     fentaNYL (DURAGESIC) 100 MCG/HR patch; Place 1 patch on the skin Every Other Day.    Localized osteoporosis without current pathological fracture    Anemia, blood loss    Abnormal liver enzymes    Chronic anxiety    Chronic pain syndrome    Chronic, continuous use of opioids    Dyslipidemia    Fibromyalgia  -     oxyCODONE-acetaminophen (PERCOCET)  MG per tablet; Take 1 tablet by mouth 3 (Three) Times a Day.  -     fentaNYL (DURAGESIC) 100 MCG/HR patch; Place 1 patch on the skin Every Other Day.    Insomnia, unspecified type    Mixed anxiety depressive disorder    Chronic midline low back pain with sciatica, sciatica laterality unspecified  -     oxyCODONE-acetaminophen (PERCOCET)  MG per tablet; Take 1 tablet by mouth 3 (Three) Times a Day.

## 2018-01-23 NOTE — TELEPHONE ENCOUNTER
PATIENT CALLED WANTING TO KNOW IF YOU CALLED IN HER LYRICA I TOLD PATIENT YOU SENT IT INTO THE PHARMACY ON January 18, 2018. PLEASE GIVE PATIENT A CALL

## 2018-01-24 ENCOUNTER — TELEPHONE (OUTPATIENT)
Dept: INTERNAL MEDICINE | Facility: CLINIC | Age: 63
End: 2018-01-24

## 2018-01-24 NOTE — TELEPHONE ENCOUNTER
PATIENT WOULD LIKE TO KNOW IF SHE IS STILL ANEMIC AND IF SHE SHOULD START TAKING IRON AGAIN. PLEASE GIVE HER A CALL. [phone]

## 2018-01-26 ENCOUNTER — TELEPHONE (OUTPATIENT)
Dept: INTERNAL MEDICINE | Facility: CLINIC | Age: 63
End: 2018-01-26

## 2018-01-26 NOTE — TELEPHONE ENCOUNTER
Pt wants you to no that she has been on fentanyl pads over 10 years. Pt said that you are fighting with the insurance about it. Pt said that you do not have to call them back.

## 2018-01-29 DIAGNOSIS — E78.5 HYPERLIPIDEMIA: ICD-10-CM

## 2018-01-29 RX ORDER — EZETIMIBE 10 MG/1
TABLET ORAL
Qty: 30 TABLET | Refills: 4 | Status: ON HOLD | OUTPATIENT
Start: 2018-01-29 | End: 2018-02-16

## 2018-01-30 ENCOUNTER — LAB REQUISITION (OUTPATIENT)
Dept: LAB | Facility: HOSPITAL | Age: 63
End: 2018-01-30

## 2018-01-30 ENCOUNTER — OUTSIDE FACILITY SERVICE (OUTPATIENT)
Dept: GASTROENTEROLOGY | Facility: CLINIC | Age: 63
End: 2018-01-30

## 2018-01-30 DIAGNOSIS — Z12.11 ENCOUNTER FOR SCREENING FOR MALIGNANT NEOPLASM OF COLON: ICD-10-CM

## 2018-01-30 PROCEDURE — 88305 TISSUE EXAM BY PATHOLOGIST: CPT | Performed by: INTERNAL MEDICINE

## 2018-01-30 PROCEDURE — 45385 COLONOSCOPY W/LESION REMOVAL: CPT | Performed by: INTERNAL MEDICINE

## 2018-01-31 PROBLEM — D36.9 TUBULAR ADENOMA: Status: ACTIVE | Noted: 2018-01-31

## 2018-01-31 LAB
CYTO UR: NORMAL
LAB AP CASE REPORT: NORMAL
LAB AP CLINICAL INFORMATION: NORMAL
Lab: NORMAL
PATH REPORT.FINAL DX SPEC: NORMAL
PATH REPORT.GROSS SPEC: NORMAL

## 2018-02-05 ENCOUNTER — TELEPHONE (OUTPATIENT)
Dept: GASTROENTEROLOGY | Facility: CLINIC | Age: 63
End: 2018-02-05

## 2018-02-05 NOTE — TELEPHONE ENCOUNTER
----- Message from Prabhjot Luong MD sent at 2/2/2018  4:46 PM EST -----  Let Mrs. Langston know there were 2 adenoma type polyps.  She should have an exam again in 5 years pending her overall health.  Thanks,  GMLIVIA

## 2018-02-05 NOTE — TELEPHONE ENCOUNTER
Dr Luong,  Update: Patient called and left message. She stated she has been having jerking all over body, trouble standing since after her Colonoscopy. She has an appointment with Dr. Aranda tomorrow.

## 2018-02-06 ENCOUNTER — OFFICE VISIT (OUTPATIENT)
Dept: INTERNAL MEDICINE | Facility: CLINIC | Age: 63
End: 2018-02-06

## 2018-02-06 VITALS
DIASTOLIC BLOOD PRESSURE: 64 MMHG | SYSTOLIC BLOOD PRESSURE: 126 MMHG | HEART RATE: 68 BPM | HEIGHT: 66 IN | WEIGHT: 244 LBS | BODY MASS INDEX: 39.21 KG/M2

## 2018-02-06 DIAGNOSIS — E78.5 DYSLIPIDEMIA: ICD-10-CM

## 2018-02-06 DIAGNOSIS — D50.0 ANEMIA, BLOOD LOSS: ICD-10-CM

## 2018-02-06 DIAGNOSIS — J43.8 OTHER EMPHYSEMA (HCC): ICD-10-CM

## 2018-02-06 DIAGNOSIS — G89.4 CHRONIC PAIN SYNDROME: ICD-10-CM

## 2018-02-06 DIAGNOSIS — G25.0 ESSENTIAL TREMOR: ICD-10-CM

## 2018-02-06 DIAGNOSIS — R74.8 ABNORMAL LIVER ENZYMES: ICD-10-CM

## 2018-02-06 DIAGNOSIS — K21.9 GASTROESOPHAGEAL REFLUX DISEASE WITHOUT ESOPHAGITIS: ICD-10-CM

## 2018-02-06 DIAGNOSIS — R00.1 SINUS BRADYCARDIA: ICD-10-CM

## 2018-02-06 DIAGNOSIS — E53.8 COBALAMIN DEFICIENCY: ICD-10-CM

## 2018-02-06 DIAGNOSIS — J30.1 CHRONIC SEASONAL ALLERGIC RHINITIS DUE TO POLLEN: ICD-10-CM

## 2018-02-06 DIAGNOSIS — M81.6 LOCALIZED OSTEOPOROSIS WITHOUT CURRENT PATHOLOGICAL FRACTURE: ICD-10-CM

## 2018-02-06 DIAGNOSIS — M54.40 CHRONIC LOW BACK PAIN WITH SCIATICA, SCIATICA LATERALITY UNSPECIFIED, UNSPECIFIED BACK PAIN LATERALITY: ICD-10-CM

## 2018-02-06 DIAGNOSIS — F11.90 CHRONIC, CONTINUOUS USE OF OPIOIDS: ICD-10-CM

## 2018-02-06 DIAGNOSIS — G47.33 OBSTRUCTIVE SLEEP APNEA SYNDROME: ICD-10-CM

## 2018-02-06 DIAGNOSIS — E11.42 DIABETIC POLYNEUROPATHY ASSOCIATED WITH TYPE 2 DIABETES MELLITUS (HCC): ICD-10-CM

## 2018-02-06 DIAGNOSIS — I21.4 NSTEMI (NON-ST ELEVATED MYOCARDIAL INFARCTION) (HCC): ICD-10-CM

## 2018-02-06 DIAGNOSIS — G89.29 CHRONIC LOW BACK PAIN WITH SCIATICA, SCIATICA LATERALITY UNSPECIFIED, UNSPECIFIED BACK PAIN LATERALITY: ICD-10-CM

## 2018-02-06 DIAGNOSIS — L40.9 PSORIASIS: ICD-10-CM

## 2018-02-06 DIAGNOSIS — K76.0 FATTY LIVER DISEASE, NONALCOHOLIC: Chronic | ICD-10-CM

## 2018-02-06 DIAGNOSIS — I10 ESSENTIAL HYPERTENSION: Primary | ICD-10-CM

## 2018-02-06 DIAGNOSIS — M15.9 PRIMARY OSTEOARTHRITIS INVOLVING MULTIPLE JOINTS: ICD-10-CM

## 2018-02-06 DIAGNOSIS — F41.8 MIXED ANXIETY DEPRESSIVE DISORDER: ICD-10-CM

## 2018-02-06 DIAGNOSIS — E11.10 TYPE 2 DIABETES MELLITUS WITH KETOACIDOSIS WITHOUT COMA, WITHOUT LONG-TERM CURRENT USE OF INSULIN (HCC): ICD-10-CM

## 2018-02-06 DIAGNOSIS — G47.00 INSOMNIA, UNSPECIFIED TYPE: ICD-10-CM

## 2018-02-06 DIAGNOSIS — F41.9 CHRONIC ANXIETY: ICD-10-CM

## 2018-02-06 DIAGNOSIS — E03.8 OTHER SPECIFIED HYPOTHYROIDISM: ICD-10-CM

## 2018-02-06 DIAGNOSIS — Z79.899 CHRONICALLY ON BENZODIAZEPINE THERAPY: ICD-10-CM

## 2018-02-06 DIAGNOSIS — I73.9 PERIPHERAL VASCULAR DISEASE (HCC): ICD-10-CM

## 2018-02-06 DIAGNOSIS — R94.2 RESTRICTIVE VENTILATORY DEFECT: ICD-10-CM

## 2018-02-06 DIAGNOSIS — E55.9 VITAMIN D DEFICIENCY: ICD-10-CM

## 2018-02-06 PROCEDURE — 99214 OFFICE O/P EST MOD 30 MIN: CPT | Performed by: INTERNAL MEDICINE

## 2018-02-06 NOTE — PROGRESS NOTES
Patient is a 62 y.o. female who is here for body jerking, can not stand on own, memory loss.  Chief Complaint   Patient presents with   • Tremors         HPI:  Here for f/u.  Patient having jerking sensation in the past 4 days.  Her memory is not the best.   More sleep than unusual.  Has not taken reglan in the past 4-5 days.  Using CPAP.  Some cold intolerance.  Had a colonoscopy last week.      History:    Patient Active Problem List   Diagnosis   • Atopic rhinitis   • Restrictive ventilatory defect   • COPD (chronic obstructive pulmonary disease)   • Osteoporosis   • Obstructive sleep apnea syndrome   • Abnormal liver enzymes   • Cholelithiasis   • Chronic back pain   • Mixed anxiety depressive disorder   • Type 2 diabetes mellitus   • Dyslipidemia   • Essential tremor   • Fibromyalgia   • Gastroesophageal reflux disease without esophagitis   • Hypertension   • Hypothyroidism   • Insomnia   • Osteoarthritis   • Psoriasis   • Branch retinal vein occlusion   • Cobalamin deficiency   • Obesity   • Vitamin D deficiency   • Hypokalemia   • Lactic acidosis   • Fatty liver disease, nonalcoholic   • Sinus bradycardia   • NSTEMI (non-ST elevated myocardial infarction)   • Tobacco abuse   • Hypoxia   • Peripheral vascular disease   • Osteomyelitis of left foot   • Diabetic polyneuropathy associated with type 2 diabetes mellitus   • Anemia, blood loss   • Chronic pain   • Chronic, continuous use of opioids   • Chronic anxiety   • Chronically on benzodiazepine therapy   • Tubular adenoma       Past Medical History:   Diagnosis Date   • Bronchitis    • Cervical cancer    • Cholelithiasis 5/11/2016   • Chronic anxiety 2/27/2017   • Chronic bronchitis    • Chronically on benzodiazepine therapy 2/27/2017   • Degenerative arthritis    • Diabetes mellitus    • Dyslipidemia 5/11/2016   • Dyspnea    • Fatty liver disease, nonalcoholic 11/21/2016   • Fibromyalgia    • GERD (gastroesophageal reflux disease)    • H/O echocardiogram     • History of pneumonia    • Hypertension 5/11/2016    16. H/O echocardiogram (V15.89) (Z92.89)  · A.  Echocardiogram of 02/03/2015 reports an ejection fraction of 60-65%, mild concentric     LVH, trace mitral regurgitation, mild tricuspid and pulmonic regurgitation and calculated     RVSP of 35 mmHg, the main pulmonary artery is also mildly dilated.   • Hypothyroidism 5/11/2016    Description: A.  On replacement therapy.   • Nausea    • Obesity    • DINA (obstructive sleep apnea)     intolerant of CPAP therapy   • Osteoporosis    • Osteoporosis    • Polycythemia vera 5/11/2016   • Pulmonary emphysema    • Restrictive ventilatory defect    • Rhinitis    • Uncontrolled diabetes mellitus 5/11/2016   • Uterine cancer    • Vitamin D deficiency 8/1/2016       Past Surgical History:   Procedure Laterality Date   • AMPUTATION DIGIT Left 11/23/2016    Procedure: AMPUTATION TRANS METATARSAL - ray amutation of the left great toe;  Surgeon: Arsalan Feliciano MD;  Location:  [facility];  Service:    • AMPUTATION DIGIT Left 3/2/2017    Procedure: LEFT FOOT TRANSMETATARSAL AMPUTATION TOES 2,3,4,5;  Surgeon: Joey Guaman MD;  Location:  [facility];  Service:    • BACK SURGERY      lumbar fusions x5--multiple times; 1995, 1997, 1998, 1999 and 2008   • BELOW KNEE AMPUTATION Left 2/25/2017    Procedure: EXTENDED LEFT TOE AMPUTATION;  Surgeon: Arsalan Feliciano MD;  Location:  [facility];  Service:    • CARDIAC CATHETERIZATION N/A 11/26/2016    Procedure: Left Heart Cath;  Surgeon: Ash Nuñez MD;  Location:  [facility];  Service:    • HAND SURGERY Bilateral     x3   • HYSTERECTOMY      status post uterine and cervical cancer   • LUMBAR SPINE SURGERY      arthrodesis by anterior approach addit interspace   • TONSILLECTOMY         Current Outpatient Prescriptions on File Prior to Visit   Medication Sig   • acetaminophen (TYLENOL) 325 MG tablet Take 2 tablets by mouth Every 4 (Four) Hours As Needed for mild pain (1-3) or fever  (temperature greater than 101F).   • albuterol (PROVENTIL) (2.5 MG/3ML) 0.083% nebulizer solution 2.5 mg Every 6 (Six) Hours As Needed.   • amLODIPine (NORVASC) 5 MG tablet Take 1 tablet by mouth Daily.   • aspirin  MG EC tablet Take 1 tablet by mouth Daily.   • atorvastatin (LIPITOR) 40 MG tablet Take 1 tablet by mouth Every Night.   • baclofen (LIORESAL) 10 MG tablet Take 1 tablet by mouth 2 (Two) Times a Day As Needed for Muscle Spasms.   • busPIRone (BUSPAR) 10 MG tablet TAKE TWO TABLETS BY MOUTH TWICE A DAY   • cetirizine (ZyrTEC) 10 MG tablet Take 10 mg by mouth Every Night.   • clonazePAM (KlonoPIN) 0.5 MG tablet Take 0.5 tablets by mouth 2 (Two) Times a Day.   • clopidogrel (PLAVIX) 75 MG tablet Take 1 tablet by mouth Daily.   • clotrimazole-betamethasone (LOTRISONE) 1-0.05 % cream Apply  topically 2 (Two) Times a Day As Needed (rash).   • Cyanocobalamin 1000 MCG/ML kit Inject 1,000 mcg as directed Every 30 (Thirty) Days.   • ezetimibe (ZETIA) 10 MG tablet Take 1 tablet by mouth Daily.   • fentaNYL (DURAGESIC) 100 MCG/HR patch Place 1 patch on the skin Every Other Day.   • ferrous sulfate 325 (65 FE) MG EC tablet Take 1 tablet by mouth 3 (Three) Times a Day With Meals.   • fluticasone (FLONASE) 50 MCG/ACT nasal spray 1 spray into each nostril 2 (Two) Times a Day.   • furosemide (LASIX) 40 MG tablet TAKE ONE TABLET BY MOUTH TWICE A DAY   • hydrocortisone 1 % cream Apply  topically Every 12 (Twelve) Hours.   • levothyroxine (SYNTHROID, LEVOTHROID) 112 MCG tablet TAKE ONE TABLET BY MOUTH DAILY   • lisinopril (PRINIVIL,ZESTRIL) 10 MG tablet Take 1 tablet by mouth Every Night.   • LYRICA 100 MG capsule TAKE ONE CAPSULE BY MOUTH THREE TIMES A DAY   • melatonin 5 MG sublingual tablet sublingual tablet Place 1 tablet under the tongue At Night As Needed (insomnia).   • metoprolol tartrate (LOPRESSOR) 25 MG tablet Take 1 tablet by mouth 2 (Two) Times a Day.   • omeprazole (priLOSEC) 20 MG capsule TAKE ONE  CAPSULE BY MOUTH DAILY   • oxyCODONE-acetaminophen (PERCOCET)  MG per tablet Take 1 tablet by mouth 3 (Three) Times a Day.   • probiotic (CULTURELLE) capsule capsule Take 1 capsule by mouth Daily.   • promethazine (PHENERGAN) 25 MG tablet Take 25 mg by mouth Every 8 (Eight) Hours As Needed for nausea or vomiting.   • SPIRIVA RESPIMAT 2.5 MCG/ACT aerosol solution INHALE TWO PUFF(S) BY MOUTH DAILY   • topiramate (TOPAMAX) 25 MG tablet Take 1 tablet by mouth 2 (Two) Times a Day.   • ZETIA 10 MG tablet TAKE ONE TABLET BY MOUTH DAILY   • metoclopramide (REGLAN) 10 MG tablet Take 1 tablet by mouth 3 (Three) Times a Day.     Current Facility-Administered Medications on File Prior to Visit   Medication   • cyanocobalamin injection 1,000 mcg       Family History   Problem Relation Age of Onset   • Arthritis Mother    • Diabetes Mother    • Colon polyps Mother    • Diverticulitis Mother    • Arthritis Father    • Bleeding Disorder Father    • Diabetes Father    • Kidney disease Father    • COPD Sister      currently smokes   • Arthritis Brother    • Diabetes Brother    • Alcohol abuse Brother    • Heart murmur Daughter    • Arthritis Other    • Diabetes Other        Social History     Social History   • Marital status:      Spouse name: N/A   • Number of children: 1   • Years of education: N/A     Occupational History   •  Disabled     Social History Main Topics   • Smoking status: Former Smoker     Packs/day: 1.50     Years: 48.00     Types: Cigarettes     Quit date: 2/27/2017   • Smokeless tobacco: Never Used   • Alcohol use No   • Drug use: No   • Sexual activity: Not on file     Other Topics Concern   • Not on file     Social History Narrative    Lives with her  in [place]         ROS:    Review of Systems   Constitutional: Positive for fatigue. Negative for chills, diaphoresis, fever and unexpected weight change.   HENT: Negative for congestion, ear pain, hearing loss, nosebleeds, postnasal drip,  "sinus pressure and sore throat.    Eyes: Negative for pain, discharge and itching.   Respiratory: Positive for shortness of breath. Negative for cough, chest tightness and wheezing.    Cardiovascular: Positive for palpitations. Negative for chest pain and leg swelling.   Gastrointestinal: Positive for abdominal pain. Negative for abdominal distention, blood in stool, constipation, diarrhea, nausea and vomiting.   Endocrine: Negative for heat intolerance, polydipsia and polyuria.   Genitourinary: Negative for difficulty urinating, dysuria, frequency and hematuria.   Musculoskeletal: Positive for arthralgias and back pain. Negative for gait problem, joint swelling and myalgias.   Skin: Positive for rash. Negative for wound.   Neurological: Positive for tremors, weakness and light-headedness. Negative for dizziness, syncope and headaches.   Psychiatric/Behavioral: Positive for decreased concentration, dysphoric mood and sleep disturbance. The patient is nervous/anxious.        /64  Pulse 68  Ht 167.6 cm (65.98\")  Wt 111 kg (244 lb)  LMP  (LMP Unknown)  BMI 39.4 kg/m2    Physical Exam:    Physical Exam   Constitutional: She is oriented to person, place, and time. She appears well-developed and well-nourished.   HENT:   Head: Normocephalic and atraumatic.   Right Ear: External ear normal.   Left Ear: External ear normal.   Mouth/Throat: Oropharynx is clear and moist.   Eyes: Conjunctivae and EOM are normal.   Neck: Normal range of motion. Neck supple.   Cardiovascular: Normal rate and regular rhythm.    2/6 СЕРГЕЙ   Pulmonary/Chest: Effort normal.   Mildly diminished   Abdominal: Soft. Bowel sounds are normal.   Musculoskeletal: She exhibits tenderness (pain on back flexion past 30  ).   Using wheelchair   Lymphadenopathy:     She has no cervical adenopathy.   Neurological: She is alert and oriented to person, place, and time.   Slowed mentation  L>R UE jerking   Skin: Skin is warm and dry. No rash noted. "   Scaling erythematous patch   Psychiatric: She has a normal mood and affect. Her behavior is normal. Thought content normal.       Procedure:      Discussion/Summary:    chronic back pain- refill patch and percocet as needed  htn-change to metoprolol  fibromylagia- cont current tx  hyperlipidemia-cont lipitor and zetia  hypothroid-cont replacement, recheck today  depression with anxiety-cont buspar and klonopine ,advised her of risk of addiction  polycythemia-cbc today  retinal vein occlusion- cont rf modification  b12 def-admin on rtc and repeat in 2/18  nausea-phenergan prn, will hold reglan  DM-labs noted, counseled on low carb, recheck today, cont januvia  diaphoresis-better   Elevated lft/?autoimmune-f/u gastro recs , labs today  Eczema-cont prn steroids  Left foot ulcer and osteo-resolved  FE def anemia-cont FE, recheck today  Tremor-cont topamax and BB  Buttocks dermatitis-rx lotrisone  high risk meds/jerking-labs today      labs noted and dw patient, counseled on adequate hydration and diet/wt loss, will change lasix to qod  Will give B12 soon    Current Outpatient Prescriptions:   •  acetaminophen (TYLENOL) 325 MG tablet, Take 2 tablets by mouth Every 4 (Four) Hours As Needed for mild pain (1-3) or fever (temperature greater than 101F)., Disp: 100 tablet, Rfl: 0  •  albuterol (PROVENTIL) (2.5 MG/3ML) 0.083% nebulizer solution, 2.5 mg Every 6 (Six) Hours As Needed., Disp: , Rfl:   •  amLODIPine (NORVASC) 5 MG tablet, Take 1 tablet by mouth Daily., Disp: 90 tablet, Rfl: 3  •  aspirin  MG EC tablet, Take 1 tablet by mouth Daily., Disp: 100 tablet, Rfl: 0  •  atorvastatin (LIPITOR) 40 MG tablet, Take 1 tablet by mouth Every Night., Disp: 90 tablet, Rfl: 3  •  baclofen (LIORESAL) 10 MG tablet, Take 1 tablet by mouth 2 (Two) Times a Day As Needed for Muscle Spasms., Disp: 180 tablet, Rfl: 3  •  busPIRone (BUSPAR) 10 MG tablet, TAKE TWO TABLETS BY MOUTH TWICE A DAY, Disp: 360 tablet, Rfl: 0  •  cetirizine  (ZyrTEC) 10 MG tablet, Take 10 mg by mouth Every Night., Disp: , Rfl:   •  clonazePAM (KlonoPIN) 0.5 MG tablet, Take 0.5 tablets by mouth 2 (Two) Times a Day., Disp: 90 tablet, Rfl: 0  •  clopidogrel (PLAVIX) 75 MG tablet, Take 1 tablet by mouth Daily., Disp: 90 tablet, Rfl: 3  •  clotrimazole-betamethasone (LOTRISONE) 1-0.05 % cream, Apply  topically 2 (Two) Times a Day As Needed (rash)., Disp: 45 g, Rfl: 2  •  Cyanocobalamin 1000 MCG/ML kit, Inject 1,000 mcg as directed Every 30 (Thirty) Days., Disp: 1 kit, Rfl: 3  •  ezetimibe (ZETIA) 10 MG tablet, Take 1 tablet by mouth Daily., Disp: 90 tablet, Rfl: 3  •  fentaNYL (DURAGESIC) 100 MCG/HR patch, Place 1 patch on the skin Every Other Day., Disp: 15 patch, Rfl: 0  •  ferrous sulfate 325 (65 FE) MG EC tablet, Take 1 tablet by mouth 3 (Three) Times a Day With Meals., Disp: 90 tablet, Rfl: 5  •  fluticasone (FLONASE) 50 MCG/ACT nasal spray, 1 spray into each nostril 2 (Two) Times a Day., Disp: , Rfl:   •  furosemide (LASIX) 40 MG tablet, TAKE ONE TABLET BY MOUTH TWICE A DAY, Disp: 180 tablet, Rfl: 2  •  hydrocortisone 1 % cream, Apply  topically Every 12 (Twelve) Hours., Disp: , Rfl: 0  •  levothyroxine (SYNTHROID, LEVOTHROID) 112 MCG tablet, TAKE ONE TABLET BY MOUTH DAILY, Disp: 90 tablet, Rfl: 2  •  lisinopril (PRINIVIL,ZESTRIL) 10 MG tablet, Take 1 tablet by mouth Every Night., Disp: 90 tablet, Rfl: 3  •  LYRICA 100 MG capsule, TAKE ONE CAPSULE BY MOUTH THREE TIMES A DAY, Disp: 270 capsule, Rfl: 0  •  melatonin 5 MG sublingual tablet sublingual tablet, Place 1 tablet under the tongue At Night As Needed (insomnia)., Disp: 30 tablet, Rfl: 0  •  metoprolol tartrate (LOPRESSOR) 25 MG tablet, Take 1 tablet by mouth 2 (Two) Times a Day., Disp: 180 tablet, Rfl: 3  •  omeprazole (priLOSEC) 20 MG capsule, TAKE ONE CAPSULE BY MOUTH DAILY, Disp: 90 capsule, Rfl: 0  •  oxyCODONE-acetaminophen (PERCOCET)  MG per tablet, Take 1 tablet by mouth 3 (Three) Times a Day., Disp:  90 tablet, Rfl: 0  •  probiotic (CULTURELLE) capsule capsule, Take 1 capsule by mouth Daily., Disp: 60 capsule, Rfl: 0  •  promethazine (PHENERGAN) 25 MG tablet, Take 25 mg by mouth Every 8 (Eight) Hours As Needed for nausea or vomiting., Disp: , Rfl:   •  SPIRIVA RESPIMAT 2.5 MCG/ACT aerosol solution, INHALE TWO PUFF(S) BY MOUTH DAILY, Disp: 1 inhaler, Rfl: 4  •  topiramate (TOPAMAX) 25 MG tablet, Take 1 tablet by mouth 2 (Two) Times a Day., Disp: 60 tablet, Rfl: 5  •  ZETIA 10 MG tablet, TAKE ONE TABLET BY MOUTH DAILY, Disp: 30 tablet, Rfl: 4  •  metoclopramide (REGLAN) 10 MG tablet, Take 1 tablet by mouth 3 (Three) Times a Day., Disp: 270 tablet, Rfl: 2    Current Facility-Administered Medications:   •  cyanocobalamin injection 1,000 mcg, 1,000 mcg, Intramuscular, Q28 Days, Harinder Aranda MD, 1,000 mcg at 11/29/17 1344        Tamara was seen today for tremors.    Diagnoses and all orders for this visit:    Essential hypertension  -     Basic Metabolic Panel  -     Magnesium    NSTEMI (non-ST elevated myocardial infarction)    Peripheral vascular disease    Sinus bradycardia    Chronic seasonal allergic rhinitis due to pollen    Other emphysema    Obstructive sleep apnea syndrome    Restrictive ventilatory defect    Cobalamin deficiency    Fatty liver disease, nonalcoholic    Gastroesophageal reflux disease without esophagitis    Vitamin D deficiency    Diabetic polyneuropathy associated with type 2 diabetes mellitus    Other specified hypothyroidism    Type 2 diabetes mellitus with ketoacidosis without coma, without long-term current use of insulin    Chronic low back pain with sciatica, sciatica laterality unspecified, unspecified back pain laterality    Essential tremor    Primary osteoarthritis involving multiple joints    Localized osteoporosis without current pathological fracture    Psoriasis    Anemia, blood loss  -     Vitamin B12    Abnormal liver enzymes    Chronic anxiety    Chronic, continuous use  of opioids    Chronic pain syndrome    Chronically on benzodiazepine therapy    Dyslipidemia  -     TSH    Insomnia, unspecified type    Mixed anxiety depressive disorder

## 2018-02-07 LAB
BUN SERPL-MCNC: 41 MG/DL (ref 9–23)
BUN/CREAT SERPL: 25.6 (ref 7–25)
CALCIUM SERPL-MCNC: 9.4 MG/DL (ref 8.7–10.4)
CHLORIDE SERPL-SCNC: 105 MMOL/L (ref 99–109)
CO2 SERPL-SCNC: 29 MMOL/L (ref 20–31)
CREAT SERPL-MCNC: 1.6 MG/DL (ref 0.6–1.3)
GFR SERPLBLD CREATININE-BSD FMLA CKD-EPI: 32 ML/MIN/1.73
GFR SERPLBLD CREATININE-BSD FMLA CKD-EPI: 39 ML/MIN/1.73
GLUCOSE SERPL-MCNC: 135 MG/DL (ref 70–100)
MAGNESIUM SERPL-MCNC: 2.2 MG/DL (ref 1.3–2.7)
POTASSIUM SERPL-SCNC: 5.1 MMOL/L (ref 3.5–5.5)
SODIUM SERPL-SCNC: 142 MMOL/L (ref 132–146)
TSH SERPL DL<=0.005 MIU/L-ACNC: 1.18 MIU/ML (ref 0.35–5.35)
VIT B12 SERPL-MCNC: 713 PG/ML (ref 211–911)

## 2018-02-13 ENCOUNTER — HOSPITAL ENCOUNTER (INPATIENT)
Facility: HOSPITAL | Age: 63
LOS: 14 days | Discharge: NURSING, REHAB OR LONG-TERM CARE | End: 2018-02-27
Attending: EMERGENCY MEDICINE | Admitting: INTERNAL MEDICINE

## 2018-02-13 ENCOUNTER — APPOINTMENT (OUTPATIENT)
Dept: GENERAL RADIOLOGY | Facility: HOSPITAL | Age: 63
End: 2018-02-13

## 2018-02-13 DIAGNOSIS — Z78.9 IMPAIRED MOBILITY AND ADLS: ICD-10-CM

## 2018-02-13 DIAGNOSIS — Z74.09 IMPAIRED FUNCTIONAL MOBILITY, BALANCE, GAIT, AND ENDURANCE: ICD-10-CM

## 2018-02-13 DIAGNOSIS — Z74.09 IMPAIRED MOBILITY AND ADLS: ICD-10-CM

## 2018-02-13 DIAGNOSIS — L89.152 SACRAL DECUBITUS ULCER, STAGE II (HCC): ICD-10-CM

## 2018-02-13 DIAGNOSIS — N17.9 ACUTE RENAL FAILURE, UNSPECIFIED ACUTE RENAL FAILURE TYPE (HCC): ICD-10-CM

## 2018-02-13 DIAGNOSIS — R77.8 ELEVATED TROPONIN LEVEL: ICD-10-CM

## 2018-02-13 DIAGNOSIS — L03.319 CELLULITIS OF SACRAL REGION: Primary | ICD-10-CM

## 2018-02-13 DIAGNOSIS — J96.21 ACUTE ON CHRONIC RESPIRATORY FAILURE WITH HYPOXIA (HCC): ICD-10-CM

## 2018-02-13 DIAGNOSIS — R77.8 ELEVATED TROPONIN: ICD-10-CM

## 2018-02-13 DIAGNOSIS — A41.9 SEPSIS, DUE TO UNSPECIFIED ORGANISM: ICD-10-CM

## 2018-02-13 PROBLEM — N30.00 ACUTE CYSTITIS WITHOUT HEMATURIA: Status: ACTIVE | Noted: 2018-02-13

## 2018-02-13 PROBLEM — R82.90 URINE ABNORMALITY: Status: ACTIVE | Noted: 2018-02-13

## 2018-02-13 PROBLEM — R79.89 ELEVATED TROPONIN LEVEL: Status: ACTIVE | Noted: 2018-02-13

## 2018-02-13 PROBLEM — R41.82 ALTERED MENTAL STATUS: Status: ACTIVE | Noted: 2018-02-13

## 2018-02-13 PROBLEM — F11.90 CHRONIC, CONTINUOUS USE OF OPIOIDS: Chronic | Status: ACTIVE | Noted: 2017-02-27

## 2018-02-13 PROBLEM — R50.9 FEVER: Status: ACTIVE | Noted: 2018-02-13

## 2018-02-13 LAB
ALBUMIN SERPL-MCNC: 4.1 G/DL (ref 3.2–4.8)
ALBUMIN/GLOB SERPL: 1.2 G/DL (ref 1.5–2.5)
ALP SERPL-CCNC: 83 U/L (ref 25–100)
ALT SERPL W P-5'-P-CCNC: 30 U/L (ref 7–40)
AMORPH URATE CRY URNS QL MICRO: ABNORMAL /HPF
ANION GAP SERPL CALCULATED.3IONS-SCNC: 10 MMOL/L (ref 3–11)
AST SERPL-CCNC: 44 U/L (ref 0–33)
BACTERIA UR QL AUTO: ABNORMAL /HPF
BASOPHILS # BLD AUTO: 0.04 10*3/MM3 (ref 0–0.2)
BASOPHILS NFR BLD AUTO: 0.3 % (ref 0–1)
BILIRUB SERPL-MCNC: 0.6 MG/DL (ref 0.3–1.2)
BILIRUB UR QL STRIP: NEGATIVE
BUN BLD-MCNC: 38 MG/DL (ref 9–23)
BUN/CREAT SERPL: 21.1 (ref 7–25)
CALCIUM SPEC-SCNC: 10 MG/DL (ref 8.7–10.4)
CHLORIDE SERPL-SCNC: 104 MMOL/L (ref 99–109)
CLARITY UR: ABNORMAL
CO2 SERPL-SCNC: 24 MMOL/L (ref 20–31)
COLOR UR: YELLOW
CREAT BLD-MCNC: 1.8 MG/DL (ref 0.6–1.3)
D-LACTATE SERPL-SCNC: 1.1 MMOL/L (ref 0.5–2)
DEPRECATED RDW RBC AUTO: 49.1 FL (ref 37–54)
EOSINOPHIL # BLD AUTO: 0.05 10*3/MM3 (ref 0–0.3)
EOSINOPHIL NFR BLD AUTO: 0.3 % (ref 0–3)
ERYTHROCYTE [DISTWIDTH] IN BLOOD BY AUTOMATED COUNT: 13.7 % (ref 11.3–14.5)
FLUAV AG NPH QL: NEGATIVE
FLUBV AG NPH QL IA: NEGATIVE
GFR SERPL CREATININE-BSD FRML MDRD: 28 ML/MIN/1.73
GLOBULIN UR ELPH-MCNC: 3.4 GM/DL
GLUCOSE BLD-MCNC: 232 MG/DL (ref 70–100)
GLUCOSE UR STRIP-MCNC: NEGATIVE MG/DL
HCT VFR BLD AUTO: 36.4 % (ref 34.5–44)
HGB BLD-MCNC: 11.6 G/DL (ref 11.5–15.5)
HGB UR QL STRIP.AUTO: ABNORMAL
HYALINE CASTS UR QL AUTO: ABNORMAL /LPF
IMM GRANULOCYTES # BLD: 0.04 10*3/MM3 (ref 0–0.03)
IMM GRANULOCYTES NFR BLD: 0.3 % (ref 0–0.6)
KETONES UR QL STRIP: ABNORMAL
LEUKOCYTE ESTERASE UR QL STRIP.AUTO: ABNORMAL
LYMPHOCYTES # BLD AUTO: 1.58 10*3/MM3 (ref 0.6–4.8)
LYMPHOCYTES NFR BLD AUTO: 10.3 % (ref 24–44)
MCH RBC QN AUTO: 31.1 PG (ref 27–31)
MCHC RBC AUTO-ENTMCNC: 31.9 G/DL (ref 32–36)
MCV RBC AUTO: 97.6 FL (ref 80–99)
MONOCYTES # BLD AUTO: 1.14 10*3/MM3 (ref 0–1)
MONOCYTES NFR BLD AUTO: 7.4 % (ref 0–12)
NEUTROPHILS # BLD AUTO: 12.47 10*3/MM3 (ref 1.5–8.3)
NEUTROPHILS NFR BLD AUTO: 81.4 % (ref 41–71)
NITRITE UR QL STRIP: NEGATIVE
PH UR STRIP.AUTO: 5.5 [PH] (ref 5–8)
PLATELET # BLD AUTO: 255 10*3/MM3 (ref 150–450)
PMV BLD AUTO: 11.1 FL (ref 6–12)
POTASSIUM BLD-SCNC: 4.4 MMOL/L (ref 3.5–5.5)
PROCALCITONIN SERPL-MCNC: 0.34 NG/ML
PROT SERPL-MCNC: 7.5 G/DL (ref 5.7–8.2)
PROT UR QL STRIP: ABNORMAL
RBC # BLD AUTO: 3.73 10*6/MM3 (ref 3.89–5.14)
RBC # UR: ABNORMAL /HPF
REF LAB TEST METHOD: ABNORMAL
SODIUM BLD-SCNC: 138 MMOL/L (ref 132–146)
SP GR UR STRIP: 1.02 (ref 1–1.03)
SQUAMOUS #/AREA URNS HPF: ABNORMAL /HPF
TROPONIN I SERPL-MCNC: 2.13 NG/ML (ref 0–0.07)
TROPONIN I SERPL-MCNC: 3.02 NG/ML (ref 0–0.07)
UROBILINOGEN UR QL STRIP: ABNORMAL
WBC NRBC COR # BLD: 15.32 10*3/MM3 (ref 3.5–10.8)
WBC UR QL AUTO: ABNORMAL /HPF

## 2018-02-13 PROCEDURE — 93005 ELECTROCARDIOGRAM TRACING: CPT | Performed by: FAMILY MEDICINE

## 2018-02-13 PROCEDURE — 93005 ELECTROCARDIOGRAM TRACING: CPT | Performed by: EMERGENCY MEDICINE

## 2018-02-13 PROCEDURE — 71045 X-RAY EXAM CHEST 1 VIEW: CPT

## 2018-02-13 PROCEDURE — 80053 COMPREHEN METABOLIC PANEL: CPT | Performed by: EMERGENCY MEDICINE

## 2018-02-13 PROCEDURE — 25010000002 PIPERACILLIN SOD-TAZOBACTAM PER 1 G: Performed by: EMERGENCY MEDICINE

## 2018-02-13 PROCEDURE — 93010 ELECTROCARDIOGRAM REPORT: CPT | Performed by: INTERNAL MEDICINE

## 2018-02-13 PROCEDURE — 87086 URINE CULTURE/COLONY COUNT: CPT | Performed by: EMERGENCY MEDICINE

## 2018-02-13 PROCEDURE — 36415 COLL VENOUS BLD VENIPUNCTURE: CPT

## 2018-02-13 PROCEDURE — 99223 1ST HOSP IP/OBS HIGH 75: CPT | Performed by: FAMILY MEDICINE

## 2018-02-13 PROCEDURE — 25010000002 VANCOMYCIN: Performed by: EMERGENCY MEDICINE

## 2018-02-13 PROCEDURE — 87040 BLOOD CULTURE FOR BACTERIA: CPT | Performed by: EMERGENCY MEDICINE

## 2018-02-13 PROCEDURE — 85025 COMPLETE CBC W/AUTO DIFF WBC: CPT | Performed by: EMERGENCY MEDICINE

## 2018-02-13 PROCEDURE — 81001 URINALYSIS AUTO W/SCOPE: CPT | Performed by: EMERGENCY MEDICINE

## 2018-02-13 PROCEDURE — 87804 INFLUENZA ASSAY W/OPTIC: CPT | Performed by: EMERGENCY MEDICINE

## 2018-02-13 PROCEDURE — 83605 ASSAY OF LACTIC ACID: CPT | Performed by: EMERGENCY MEDICINE

## 2018-02-13 PROCEDURE — 84484 ASSAY OF TROPONIN QUANT: CPT

## 2018-02-13 PROCEDURE — 99291 CRITICAL CARE FIRST HOUR: CPT

## 2018-02-13 PROCEDURE — 85652 RBC SED RATE AUTOMATED: CPT | Performed by: NURSE PRACTITIONER

## 2018-02-13 PROCEDURE — 84145 PROCALCITONIN (PCT): CPT | Performed by: EMERGENCY MEDICINE

## 2018-02-13 RX ORDER — BISACODYL 10 MG
10 SUPPOSITORY, RECTAL RECTAL DAILY PRN
Status: DISCONTINUED | OUTPATIENT
Start: 2018-02-13 | End: 2018-02-16

## 2018-02-13 RX ORDER — ASPIRIN 81 MG/1
324 TABLET, CHEWABLE ORAL ONCE
Status: COMPLETED | OUTPATIENT
Start: 2018-02-13 | End: 2018-02-14

## 2018-02-13 RX ORDER — ATORVASTATIN CALCIUM 40 MG/1
80 TABLET, FILM COATED ORAL NIGHTLY
Status: DISCONTINUED | OUTPATIENT
Start: 2018-02-14 | End: 2018-02-27 | Stop reason: HOSPADM

## 2018-02-13 RX ORDER — CETIRIZINE HYDROCHLORIDE 10 MG/1
10 TABLET ORAL NIGHTLY
Status: DISCONTINUED | OUTPATIENT
Start: 2018-02-14 | End: 2018-02-27 | Stop reason: HOSPADM

## 2018-02-13 RX ORDER — DIPHENHYDRAMINE HCL 25 MG
25 CAPSULE ORAL EVERY 6 HOURS PRN
COMMUNITY
End: 2018-02-27 | Stop reason: HOSPADM

## 2018-02-13 RX ORDER — CLOPIDOGREL BISULFATE 75 MG/1
75 TABLET ORAL DAILY
Status: DISCONTINUED | OUTPATIENT
Start: 2018-02-14 | End: 2018-02-27 | Stop reason: HOSPADM

## 2018-02-13 RX ORDER — ASPIRIN 325 MG
325 TABLET, DELAYED RELEASE (ENTERIC COATED) ORAL DAILY
Status: DISCONTINUED | OUTPATIENT
Start: 2018-02-14 | End: 2018-02-21

## 2018-02-13 RX ORDER — ONDANSETRON 2 MG/ML
4 INJECTION INTRAMUSCULAR; INTRAVENOUS EVERY 6 HOURS PRN
Status: DISCONTINUED | OUTPATIENT
Start: 2018-02-13 | End: 2018-02-27 | Stop reason: HOSPADM

## 2018-02-13 RX ORDER — ONDANSETRON 4 MG/1
4 TABLET, FILM COATED ORAL EVERY 6 HOURS PRN
Status: DISCONTINUED | OUTPATIENT
Start: 2018-02-13 | End: 2018-02-16

## 2018-02-13 RX ORDER — L.ACID,PARA/B.BIFIDUM/S.THERM 8B CELL
1 CAPSULE ORAL DAILY
Status: DISCONTINUED | OUTPATIENT
Start: 2018-02-14 | End: 2018-02-16

## 2018-02-13 RX ORDER — SODIUM CHLORIDE 0.9 % (FLUSH) 0.9 %
10 SYRINGE (ML) INJECTION AS NEEDED
Status: DISCONTINUED | OUTPATIENT
Start: 2018-02-13 | End: 2018-02-27 | Stop reason: HOSPADM

## 2018-02-13 RX ORDER — CLONAZEPAM 0.5 MG/1
0.25 TABLET ORAL 2 TIMES DAILY PRN
Status: DISCONTINUED | OUTPATIENT
Start: 2018-02-13 | End: 2018-02-27 | Stop reason: HOSPADM

## 2018-02-13 RX ORDER — IPRATROPIUM BROMIDE AND ALBUTEROL SULFATE 2.5; .5 MG/3ML; MG/3ML
3 SOLUTION RESPIRATORY (INHALATION)
Status: DISCONTINUED | OUTPATIENT
Start: 2018-02-14 | End: 2018-02-15

## 2018-02-13 RX ORDER — LIDOCAINE 50 MG/G
3 PATCH TOPICAL DAILY PRN
Status: DISCONTINUED | OUTPATIENT
Start: 2018-02-13 | End: 2018-02-27 | Stop reason: HOSPADM

## 2018-02-13 RX ORDER — LACTULOSE 10 G/15ML
20 SOLUTION ORAL DAILY PRN
Status: DISCONTINUED | OUTPATIENT
Start: 2018-02-13 | End: 2018-02-27 | Stop reason: HOSPADM

## 2018-02-13 RX ORDER — SODIUM CHLORIDE 9 MG/ML
75 INJECTION, SOLUTION INTRAVENOUS CONTINUOUS
Status: DISCONTINUED | OUTPATIENT
Start: 2018-02-13 | End: 2018-02-15

## 2018-02-13 RX ORDER — OXYCODONE AND ACETAMINOPHEN 10; 325 MG/1; MG/1
1 TABLET ORAL 3 TIMES DAILY PRN
Status: DISCONTINUED | OUTPATIENT
Start: 2018-02-13 | End: 2018-02-13

## 2018-02-13 RX ORDER — FLUTICASONE PROPIONATE 50 MCG
1 SPRAY, SUSPENSION (ML) NASAL 2 TIMES DAILY
Status: DISCONTINUED | OUTPATIENT
Start: 2018-02-14 | End: 2018-02-27 | Stop reason: HOSPADM

## 2018-02-13 RX ORDER — PREGABALIN 100 MG/1
100 CAPSULE ORAL EVERY 8 HOURS SCHEDULED
Status: DISCONTINUED | OUTPATIENT
Start: 2018-02-14 | End: 2018-02-27 | Stop reason: HOSPADM

## 2018-02-13 RX ORDER — HEPARIN SODIUM 5000 [USP'U]/ML
5000 INJECTION, SOLUTION INTRAVENOUS; SUBCUTANEOUS EVERY 12 HOURS SCHEDULED
Status: DISCONTINUED | OUTPATIENT
Start: 2018-02-14 | End: 2018-02-14

## 2018-02-13 RX ORDER — SENNA AND DOCUSATE SODIUM 50; 8.6 MG/1; MG/1
2 TABLET, FILM COATED ORAL NIGHTLY
Status: DISCONTINUED | OUTPATIENT
Start: 2018-02-14 | End: 2018-02-27 | Stop reason: HOSPADM

## 2018-02-13 RX ORDER — BACLOFEN 10 MG/1
10 TABLET ORAL 2 TIMES DAILY PRN
Status: DISCONTINUED | OUTPATIENT
Start: 2018-02-13 | End: 2018-02-16

## 2018-02-13 RX ORDER — DOCUSATE SODIUM 100 MG/1
100 CAPSULE, LIQUID FILLED ORAL DAILY
Status: DISCONTINUED | OUTPATIENT
Start: 2018-02-14 | End: 2018-02-16

## 2018-02-13 RX ORDER — CALCIUM CARBONATE 200(500)MG
1 TABLET,CHEWABLE ORAL DAILY
Status: ON HOLD | COMMUNITY
End: 2018-02-16

## 2018-02-13 RX ORDER — NYSTATIN 100000 [USP'U]/G
POWDER TOPICAL EVERY 12 HOURS SCHEDULED
Status: DISCONTINUED | OUTPATIENT
Start: 2018-02-13 | End: 2018-02-27 | Stop reason: HOSPADM

## 2018-02-13 RX ORDER — BUSPIRONE HYDROCHLORIDE 15 MG/1
7.5 TABLET ORAL EVERY 12 HOURS SCHEDULED
Status: DISCONTINUED | OUTPATIENT
Start: 2018-02-14 | End: 2018-02-27 | Stop reason: HOSPADM

## 2018-02-13 RX ORDER — PANTOPRAZOLE SODIUM 40 MG/1
40 TABLET, DELAYED RELEASE ORAL
Status: DISCONTINUED | OUTPATIENT
Start: 2018-02-14 | End: 2018-02-21

## 2018-02-13 RX ORDER — FENTANYL 75 UG/H
1 PATCH TRANSDERMAL
Status: DISCONTINUED | OUTPATIENT
Start: 2018-02-14 | End: 2018-02-27 | Stop reason: HOSPADM

## 2018-02-13 RX ORDER — SODIUM CHLORIDE 0.9 % (FLUSH) 0.9 %
1-10 SYRINGE (ML) INJECTION AS NEEDED
Status: DISCONTINUED | OUTPATIENT
Start: 2018-02-13 | End: 2018-02-27 | Stop reason: HOSPADM

## 2018-02-13 RX ORDER — ACETAMINOPHEN 325 MG/1
650 TABLET ORAL EVERY 4 HOURS PRN
Status: DISCONTINUED | OUTPATIENT
Start: 2018-02-13 | End: 2018-02-27 | Stop reason: HOSPADM

## 2018-02-13 RX ORDER — LEVOTHYROXINE SODIUM 112 UG/1
112 TABLET ORAL
Status: DISCONTINUED | OUTPATIENT
Start: 2018-02-14 | End: 2018-02-27 | Stop reason: HOSPADM

## 2018-02-13 RX ORDER — FAMOTIDINE 20 MG/1
20 TABLET, FILM COATED ORAL 2 TIMES DAILY PRN
Status: DISCONTINUED | OUTPATIENT
Start: 2018-02-13 | End: 2018-02-16

## 2018-02-13 RX ADMIN — TAZOBACTAM SODIUM AND PIPERACILLIN SODIUM 4.5 G: 500; 4 INJECTION, SOLUTION INTRAVENOUS at 17:59

## 2018-02-13 RX ADMIN — VANCOMYCIN HYDROCHLORIDE 2000 MG: 10 INJECTION, POWDER, LYOPHILIZED, FOR SOLUTION INTRAVENOUS at 18:59

## 2018-02-13 RX ADMIN — SODIUM CHLORIDE 1000 ML: 9 INJECTION, SOLUTION INTRAVENOUS at 17:59

## 2018-02-13 RX ADMIN — SODIUM CHLORIDE 1000 ML: 9 INJECTION, SOLUTION INTRAVENOUS at 18:59

## 2018-02-14 ENCOUNTER — APPOINTMENT (OUTPATIENT)
Dept: MRI IMAGING | Facility: HOSPITAL | Age: 63
End: 2018-02-14

## 2018-02-14 ENCOUNTER — APPOINTMENT (OUTPATIENT)
Dept: CARDIOLOGY | Facility: HOSPITAL | Age: 63
End: 2018-02-14

## 2018-02-14 LAB
ANION GAP SERPL CALCULATED.3IONS-SCNC: 7 MMOL/L (ref 3–11)
APTT PPP: 36 SECONDS (ref 55–70)
ARTICHOKE IGE QN: 60 MG/DL (ref 0–130)
BH CV ECHO MEAS - AO MAX PG (FULL): 26 MMHG
BH CV ECHO MEAS - AO MAX PG: 33 MMHG
BH CV ECHO MEAS - AO MEAN PG (FULL): 11.7 MMHG
BH CV ECHO MEAS - AO MEAN PG: 19 MMHG
BH CV ECHO MEAS - AO ROOT AREA (BSA CORRECTED): 1.5
BH CV ECHO MEAS - AO ROOT AREA: 8.2 CM^2
BH CV ECHO MEAS - AO ROOT DIAM: 3.2 CM
BH CV ECHO MEAS - AO V2 MAX: 3.2 CM/SEC
BH CV ECHO MEAS - AO V2 MEAN: 167.3 CM/SEC
BH CV ECHO MEAS - AO V2 VTI: 54.6 CM
BH CV ECHO MEAS - AVA(I,A): 2.5 CM^2
BH CV ECHO MEAS - AVA(I,D): 2.5 CM^2
BH CV ECHO MEAS - AVA(V,A): 2.3 CM^2
BH CV ECHO MEAS - AVA(V,D): 2.3 CM^2
BH CV ECHO MEAS - BSA(HAYCOCK): 2.3 M^2
BH CV ECHO MEAS - BSA: 2.1 M^2
BH CV ECHO MEAS - BZI_BMI: 38.3 KILOGRAMS/M^2
BH CV ECHO MEAS - BZI_METRIC_HEIGHT: 167.6 CM
BH CV ECHO MEAS - BZI_METRIC_WEIGHT: 107.5 KG
BH CV ECHO MEAS - CONTRAST EF (2CH): 30 ML/M^2
BH CV ECHO MEAS - CONTRAST EF 4CH: 32.7 ML/M^2
BH CV ECHO MEAS - EDV(CUBED): 149.1 ML
BH CV ECHO MEAS - EDV(MOD-SP2): 120 ML
BH CV ECHO MEAS - EDV(MOD-SP4): 110 ML
BH CV ECHO MEAS - EDV(TEICH): 135.5 ML
BH CV ECHO MEAS - EF(CUBED): 75.7 %
BH CV ECHO MEAS - EF(MOD-SP2): 30 %
BH CV ECHO MEAS - EF(MOD-SP4): 34 %
BH CV ECHO MEAS - EF(TEICH): 67.2 %
BH CV ECHO MEAS - ESV(CUBED): 36.2 ML
BH CV ECHO MEAS - ESV(MOD-SP2): 84 ML
BH CV ECHO MEAS - ESV(MOD-SP4): 74 ML
BH CV ECHO MEAS - ESV(TEICH): 44.4 ML
BH CV ECHO MEAS - FS: 37.6 %
BH CV ECHO MEAS - IVS/LVPW: 1
BH CV ECHO MEAS - IVSD: 1.4 CM
BH CV ECHO MEAS - LA DIMENSION: 4 CM
BH CV ECHO MEAS - LA/AO: 1.2
BH CV ECHO MEAS - LAT PEAK E' VEL: 6.3 CM/SEC
BH CV ECHO MEAS - LV DIASTOLIC VOL/BSA (35-75): 51.2 ML/M^2
BH CV ECHO MEAS - LV MASS(C)D: 317.8 GRAMS
BH CV ECHO MEAS - LV MASS(C)DI: 147.8 GRAMS/M^2
BH CV ECHO MEAS - LV MAX PG: 7 MMHG
BH CV ECHO MEAS - LV MEAN PG: 3 MMHG
BH CV ECHO MEAS - LV SYSTOLIC VOL/BSA (12-30): 34.4 ML/M^2
BH CV ECHO MEAS - LV V1 MAX: 132.3 CM/SEC
BH CV ECHO MEAS - LV V1 MEAN: 72.1 CM/SEC
BH CV ECHO MEAS - LV V1 VTI: 27.2 CM
BH CV ECHO MEAS - LVIDD: 5.3 CM
BH CV ECHO MEAS - LVIDS: 3.3 CM
BH CV ECHO MEAS - LVLD AP2: 10.4 CM
BH CV ECHO MEAS - LVLD AP4: 8.6 CM
BH CV ECHO MEAS - LVLS AP2: 9.7 CM
BH CV ECHO MEAS - LVLS AP4: 8.5 CM
BH CV ECHO MEAS - LVOT AREA (M): 4.9 CM^2
BH CV ECHO MEAS - LVOT AREA: 5 CM^2
BH CV ECHO MEAS - LVOT DIAM: 2.5 CM
BH CV ECHO MEAS - LVPWD: 1.4 CM
BH CV ECHO MEAS - MED PEAK E' VEL: 5.37 CM/SEC
BH CV ECHO MEAS - MV A MAX VEL: 105.6 CM/SEC
BH CV ECHO MEAS - MV E MAX VEL: 85.9 CM/SEC
BH CV ECHO MEAS - MV E/A: 0.81
BH CV ECHO MEAS - PA ACC SLOPE: 571.2 CM/SEC^2
BH CV ECHO MEAS - PA ACC TIME: 0.14 SEC
BH CV ECHO MEAS - PA PR(ACCEL): 15.6 MMHG
BH CV ECHO MEAS - RVDD: 1.9 CM
BH CV ECHO MEAS - SI(AO): 207.9 ML/M^2
BH CV ECHO MEAS - SI(CUBED): 52.5 ML/M^2
BH CV ECHO MEAS - SI(LVOT): 62.9 ML/M^2
BH CV ECHO MEAS - SI(MOD-SP2): 16.7 ML/M^2
BH CV ECHO MEAS - SI(MOD-SP4): 16.7 ML/M^2
BH CV ECHO MEAS - SI(TEICH): 42.4 ML/M^2
BH CV ECHO MEAS - SV(AO): 447 ML
BH CV ECHO MEAS - SV(CUBED): 112.9 ML
BH CV ECHO MEAS - SV(LVOT): 135.3 ML
BH CV ECHO MEAS - SV(MOD-SP2): 36 ML
BH CV ECHO MEAS - SV(MOD-SP4): 36 ML
BH CV ECHO MEAS - SV(TEICH): 91.1 ML
BH CV ECHO MEAS - TAPSE (>1.6): 2.4 CM2
BH CV VAS BP RIGHT ARM: NORMAL MMHG
BH CV XLRA - RV BASE: 3.1 CM
BH CV XLRA - RV LENGTH: 7.3 CM
BH CV XLRA - RV MID: 2.7 CM
BH CV XLRA - TDI S': 14.7 CM/SEC
BNP SERPL-MCNC: 628 PG/ML (ref 0–100)
BUN BLD-MCNC: 32 MG/DL (ref 9–23)
BUN/CREAT SERPL: 24.6 (ref 7–25)
CALCIUM SPEC-SCNC: 9.1 MG/DL (ref 8.7–10.4)
CHLORIDE SERPL-SCNC: 110 MMOL/L (ref 99–109)
CHOLEST SERPL-MCNC: 112 MG/DL (ref 0–200)
CK SERPL-CCNC: 608 U/L (ref 26–174)
CO2 SERPL-SCNC: 24 MMOL/L (ref 20–31)
CREAT BLD-MCNC: 1.3 MG/DL (ref 0.6–1.3)
CRP SERPL-MCNC: 16.89 MG/DL (ref 0–1)
D DIMER PPP FEU-MCNC: 4.43 MG/L (FEU) (ref 0–0.5)
D-LACTATE SERPL-SCNC: 1.1 MMOL/L (ref 0.5–2)
DEPRECATED RDW RBC AUTO: 49.5 FL (ref 37–54)
E/E' RATIO: 13.7
ERYTHROCYTE [DISTWIDTH] IN BLOOD BY AUTOMATED COUNT: 13.9 % (ref 11.3–14.5)
ERYTHROCYTE [SEDIMENTATION RATE] IN BLOOD: 117 MM/HR (ref 0–30)
FLUAV SUBTYP SPEC NAA+PROBE: NOT DETECTED
FLUBV RNA ISLT QL NAA+PROBE: NOT DETECTED
GFR SERPL CREATININE-BSD FRML MDRD: 41 ML/MIN/1.73
GLUCOSE BLD-MCNC: 162 MG/DL (ref 70–100)
GLUCOSE BLDC GLUCOMTR-MCNC: 222 MG/DL (ref 70–130)
HBA1C MFR BLD: 7.1 % (ref 4.8–5.6)
HCT VFR BLD AUTO: 33.2 % (ref 34.5–44)
HDLC SERPL-MCNC: 28 MG/DL (ref 40–60)
HGB BLD-MCNC: 10.3 G/DL (ref 11.5–15.5)
INR PPP: 0.97 (ref 0.91–1.09)
LEFT ATRIUM VOLUME INDEX: 25.6 ML/M2
LIPASE SERPL-CCNC: 30 U/L (ref 6–51)
LV EF 2D ECHO EST: 35 %
MAGNESIUM SERPL-MCNC: 1.9 MG/DL (ref 1.3–2.7)
MAXIMAL PREDICTED HEART RATE: 156 BPM
MCH RBC QN AUTO: 30.1 PG (ref 27–31)
MCHC RBC AUTO-ENTMCNC: 31 G/DL (ref 32–36)
MCV RBC AUTO: 97.1 FL (ref 80–99)
PLATELET # BLD AUTO: 229 10*3/MM3 (ref 150–450)
PMV BLD AUTO: 11.2 FL (ref 6–12)
POTASSIUM BLD-SCNC: 4.3 MMOL/L (ref 3.5–5.5)
PROTHROMBIN TIME: 10.2 SECONDS (ref 9.6–11.5)
RBC # BLD AUTO: 3.42 10*6/MM3 (ref 3.89–5.14)
SODIUM BLD-SCNC: 141 MMOL/L (ref 132–146)
STRESS TARGET HR: 133 BPM
TRIGL SERPL-MCNC: 141 MG/DL (ref 0–150)
TROPONIN I SERPL-MCNC: 11.61 NG/ML
TROPONIN I SERPL-MCNC: 3.8 NG/ML
TROPONIN I SERPL-MCNC: 4.75 NG/ML
TSH SERPL DL<=0.05 MIU/L-ACNC: 0.36 MIU/ML (ref 0.35–5.35)
WBC NRBC COR # BLD: 12.75 10*3/MM3 (ref 3.5–10.8)

## 2018-02-14 PROCEDURE — 82550 ASSAY OF CK (CPK): CPT | Performed by: NURSE PRACTITIONER

## 2018-02-14 PROCEDURE — 94799 UNLISTED PULMONARY SVC/PX: CPT

## 2018-02-14 PROCEDURE — 87077 CULTURE AEROBIC IDENTIFY: CPT | Performed by: NURSE PRACTITIONER

## 2018-02-14 PROCEDURE — 84484 ASSAY OF TROPONIN QUANT: CPT | Performed by: NURSE PRACTITIONER

## 2018-02-14 PROCEDURE — 94660 CPAP INITIATION&MGMT: CPT

## 2018-02-14 PROCEDURE — 85379 FIBRIN DEGRADATION QUANT: CPT | Performed by: INTERNAL MEDICINE

## 2018-02-14 PROCEDURE — 80048 BASIC METABOLIC PNL TOTAL CA: CPT | Performed by: NURSE PRACTITIONER

## 2018-02-14 PROCEDURE — 25010000002 HEPARIN (PORCINE) PER 1000 UNITS: Performed by: NURSE PRACTITIONER

## 2018-02-14 PROCEDURE — 86140 C-REACTIVE PROTEIN: CPT | Performed by: NURSE PRACTITIONER

## 2018-02-14 PROCEDURE — 25010000002 ENOXAPARIN PER 10 MG: Performed by: INTERNAL MEDICINE

## 2018-02-14 PROCEDURE — 87186 SC STD MICRODIL/AGAR DIL: CPT | Performed by: NURSE PRACTITIONER

## 2018-02-14 PROCEDURE — 83036 HEMOGLOBIN GLYCOSYLATED A1C: CPT | Performed by: NURSE PRACTITIONER

## 2018-02-14 PROCEDURE — 83690 ASSAY OF LIPASE: CPT | Performed by: NURSE PRACTITIONER

## 2018-02-14 PROCEDURE — 87502 INFLUENZA DNA AMP PROBE: CPT | Performed by: NURSE PRACTITIONER

## 2018-02-14 PROCEDURE — 87070 CULTURE OTHR SPECIMN AEROBIC: CPT | Performed by: NURSE PRACTITIONER

## 2018-02-14 PROCEDURE — 72195 MRI PELVIS W/O DYE: CPT

## 2018-02-14 PROCEDURE — 83880 ASSAY OF NATRIURETIC PEPTIDE: CPT | Performed by: NURSE PRACTITIONER

## 2018-02-14 PROCEDURE — 99222 1ST HOSP IP/OBS MODERATE 55: CPT | Performed by: INTERNAL MEDICINE

## 2018-02-14 PROCEDURE — 25010000002 SULFUR HEXAFLUORIDE MICROSPH 60.7-25 MG RECONSTITUTED SUSPENSION: Performed by: INTERNAL MEDICINE

## 2018-02-14 PROCEDURE — 83735 ASSAY OF MAGNESIUM: CPT | Performed by: NURSE PRACTITIONER

## 2018-02-14 PROCEDURE — 87147 CULTURE TYPE IMMUNOLOGIC: CPT | Performed by: NURSE PRACTITIONER

## 2018-02-14 PROCEDURE — 85610 PROTHROMBIN TIME: CPT | Performed by: NURSE PRACTITIONER

## 2018-02-14 PROCEDURE — 87205 SMEAR GRAM STAIN: CPT | Performed by: NURSE PRACTITIONER

## 2018-02-14 PROCEDURE — 93010 ELECTROCARDIOGRAM REPORT: CPT | Performed by: INTERNAL MEDICINE

## 2018-02-14 PROCEDURE — 85027 COMPLETE CBC AUTOMATED: CPT | Performed by: NURSE PRACTITIONER

## 2018-02-14 PROCEDURE — 82962 GLUCOSE BLOOD TEST: CPT

## 2018-02-14 PROCEDURE — 83605 ASSAY OF LACTIC ACID: CPT | Performed by: NURSE PRACTITIONER

## 2018-02-14 PROCEDURE — 80061 LIPID PANEL: CPT | Performed by: NURSE PRACTITIONER

## 2018-02-14 PROCEDURE — 94640 AIRWAY INHALATION TREATMENT: CPT

## 2018-02-14 PROCEDURE — 93306 TTE W/DOPPLER COMPLETE: CPT

## 2018-02-14 PROCEDURE — 93306 TTE W/DOPPLER COMPLETE: CPT | Performed by: INTERNAL MEDICINE

## 2018-02-14 PROCEDURE — 85730 THROMBOPLASTIN TIME PARTIAL: CPT | Performed by: NURSE PRACTITIONER

## 2018-02-14 PROCEDURE — 99233 SBSQ HOSP IP/OBS HIGH 50: CPT | Performed by: INTERNAL MEDICINE

## 2018-02-14 PROCEDURE — 93005 ELECTROCARDIOGRAM TRACING: CPT | Performed by: NURSE PRACTITIONER

## 2018-02-14 PROCEDURE — 84443 ASSAY THYROID STIM HORMONE: CPT | Performed by: NURSE PRACTITIONER

## 2018-02-14 RX ORDER — MAGNESIUM SULFATE HEPTAHYDRATE 40 MG/ML
4 INJECTION, SOLUTION INTRAVENOUS AS NEEDED
Status: DISCONTINUED | OUTPATIENT
Start: 2018-02-14 | End: 2018-02-16

## 2018-02-14 RX ORDER — MAGNESIUM SULFATE HEPTAHYDRATE 40 MG/ML
2 INJECTION, SOLUTION INTRAVENOUS AS NEEDED
Status: DISCONTINUED | OUTPATIENT
Start: 2018-02-14 | End: 2018-02-16

## 2018-02-14 RX ORDER — CASTOR OIL AND BALSAM, PERU 788; 87 MG/G; MG/G
OINTMENT TOPICAL EVERY 12 HOURS SCHEDULED
Status: DISCONTINUED | OUTPATIENT
Start: 2018-02-14 | End: 2018-02-27 | Stop reason: HOSPADM

## 2018-02-14 RX ORDER — OXYCODONE AND ACETAMINOPHEN 10; 325 MG/1; MG/1
1 TABLET ORAL
Status: DISCONTINUED | OUTPATIENT
Start: 2018-02-14 | End: 2018-02-21

## 2018-02-14 RX ADMIN — CASTOR OIL AND BALSAM, PERU: 788; 87 OINTMENT TOPICAL at 21:02

## 2018-02-14 RX ADMIN — OXYCODONE HYDROCHLORIDE AND ACETAMINOPHEN 1 TABLET: 10; 325 TABLET ORAL at 17:25

## 2018-02-14 RX ADMIN — ATORVASTATIN CALCIUM 80 MG: 40 TABLET, FILM COATED ORAL at 20:55

## 2018-02-14 RX ADMIN — ASPIRIN 81 MG 324 MG: 81 TABLET ORAL at 00:17

## 2018-02-14 RX ADMIN — NYSTATIN: 100000 POWDER TOPICAL at 02:32

## 2018-02-14 RX ADMIN — PREGABALIN 100 MG: 100 CAPSULE ORAL at 20:58

## 2018-02-14 RX ADMIN — SODIUM CHLORIDE 75 ML/HR: 9 INJECTION, SOLUTION INTRAVENOUS at 13:52

## 2018-02-14 RX ADMIN — BUSPIRONE HYDROCHLORIDE 7.5 MG: 15 TABLET ORAL at 09:14

## 2018-02-14 RX ADMIN — Medication 2 TABLET: at 00:17

## 2018-02-14 RX ADMIN — SODIUM CHLORIDE 75 ML/HR: 9 INJECTION, SOLUTION INTRAVENOUS at 01:12

## 2018-02-14 RX ADMIN — BUSPIRONE HYDROCHLORIDE 15 MG: 15 TABLET ORAL at 00:18

## 2018-02-14 RX ADMIN — ASPIRIN 325 MG: 325 TABLET, DELAYED RELEASE ORAL at 09:15

## 2018-02-14 RX ADMIN — BISACODYL 10 MG: 10 SUPPOSITORY RECTAL at 09:16

## 2018-02-14 RX ADMIN — MAGNESIUM SULFATE IN WATER 4 G: 40 INJECTION, SOLUTION INTRAVENOUS at 13:51

## 2018-02-14 RX ADMIN — IPRATROPIUM BROMIDE AND ALBUTEROL SULFATE 3 ML: .5; 3 SOLUTION RESPIRATORY (INHALATION) at 12:29

## 2018-02-14 RX ADMIN — BUSPIRONE HYDROCHLORIDE 7.5 MG: 15 TABLET ORAL at 20:58

## 2018-02-14 RX ADMIN — CETIRIZINE HYDROCHLORIDE 10 MG: 10 TABLET, FILM COATED ORAL at 00:17

## 2018-02-14 RX ADMIN — METOPROLOL TARTRATE 12.5 MG: 25 TABLET, FILM COATED ORAL at 16:28

## 2018-02-14 RX ADMIN — BACLOFEN 10 MG: 10 TABLET ORAL at 16:35

## 2018-02-14 RX ADMIN — IPRATROPIUM BROMIDE AND ALBUTEROL SULFATE 3 ML: .5; 3 SOLUTION RESPIRATORY (INHALATION) at 19:57

## 2018-02-14 RX ADMIN — Medication 1 CAPSULE: at 09:15

## 2018-02-14 RX ADMIN — CASTOR OIL AND BALSAM, PERU: 788; 87 OINTMENT TOPICAL at 16:20

## 2018-02-14 RX ADMIN — PREGABALIN 100 MG: 100 CAPSULE ORAL at 16:28

## 2018-02-14 RX ADMIN — METOPROLOL TARTRATE 25 MG: 25 TABLET ORAL at 20:56

## 2018-02-14 RX ADMIN — DOCUSATE SODIUM 100 MG: 100 CAPSULE, LIQUID FILLED ORAL at 09:16

## 2018-02-14 RX ADMIN — NYSTATIN: 100000 POWDER TOPICAL at 09:33

## 2018-02-14 RX ADMIN — IPRATROPIUM BROMIDE AND ALBUTEROL SULFATE 3 ML: .5; 3 SOLUTION RESPIRATORY (INHALATION) at 15:50

## 2018-02-14 RX ADMIN — FLUTICASONE PROPIONATE 1 SPRAY: 50 SPRAY, METERED NASAL at 20:55

## 2018-02-14 RX ADMIN — ATORVASTATIN CALCIUM 80 MG: 40 TABLET, FILM COATED ORAL at 00:17

## 2018-02-14 RX ADMIN — NYSTATIN: 100000 POWDER TOPICAL at 21:03

## 2018-02-14 RX ADMIN — LEVOTHYROXINE SODIUM 112 MCG: 112 TABLET ORAL at 06:35

## 2018-02-14 RX ADMIN — PREGABALIN 100 MG: 100 CAPSULE ORAL at 00:19

## 2018-02-14 RX ADMIN — HEPARIN SODIUM 5000 UNITS: 5000 INJECTION, SOLUTION INTRAVENOUS; SUBCUTANEOUS at 13:50

## 2018-02-14 RX ADMIN — FLUTICASONE PROPIONATE 1 SPRAY: 50 SPRAY, METERED NASAL at 13:50

## 2018-02-14 RX ADMIN — CETIRIZINE HYDROCHLORIDE 10 MG: 10 TABLET, FILM COATED ORAL at 20:56

## 2018-02-14 RX ADMIN — ACETAMINOPHEN 650 MG: 325 TABLET, FILM COATED ORAL at 16:35

## 2018-02-14 RX ADMIN — SULFUR HEXAFLUORIDE 3 ML: KIT at 13:25

## 2018-02-14 RX ADMIN — CLONAZEPAM 0.25 MG: 0.5 TABLET ORAL at 20:59

## 2018-02-14 RX ADMIN — PREGABALIN 100 MG: 100 CAPSULE ORAL at 06:35

## 2018-02-14 RX ADMIN — ENOXAPARIN SODIUM 110 MG: 60 INJECTION SUBCUTANEOUS at 16:28

## 2018-02-14 RX ADMIN — CLOPIDOGREL BISULFATE 75 MG: 75 TABLET ORAL at 09:15

## 2018-02-14 RX ADMIN — FENTANYL 1 PATCH: 75 PATCH TRANSDERMAL at 02:26

## 2018-02-14 RX ADMIN — IPRATROPIUM BROMIDE AND ALBUTEROL SULFATE 3 ML: .5; 3 SOLUTION RESPIRATORY (INHALATION) at 00:03

## 2018-02-14 RX ADMIN — Medication 2 TABLET: at 20:57

## 2018-02-14 RX ADMIN — IPRATROPIUM BROMIDE AND ALBUTEROL SULFATE 3 ML: .5; 3 SOLUTION RESPIRATORY (INHALATION) at 08:15

## 2018-02-14 RX ADMIN — PANTOPRAZOLE SODIUM 40 MG: 40 TABLET, DELAYED RELEASE ORAL at 06:35

## 2018-02-15 ENCOUNTER — APPOINTMENT (OUTPATIENT)
Dept: CT IMAGING | Facility: HOSPITAL | Age: 63
End: 2018-02-15

## 2018-02-15 PROBLEM — I51.81 TAKOTSUBO CARDIOMYOPATHY: Status: ACTIVE | Noted: 2018-02-15

## 2018-02-15 PROBLEM — I25.10 CORONARY ARTERY DISEASE INVOLVING NATIVE HEART: Status: ACTIVE | Noted: 2018-02-15

## 2018-02-15 LAB
ANION GAP SERPL CALCULATED.3IONS-SCNC: 8 MMOL/L (ref 3–11)
ARTERIAL PATENCY WRIST A: ABNORMAL
ATMOSPHERIC PRESS: ABNORMAL MMHG
BACTERIA SPEC AEROBE CULT: NORMAL
BASE EXCESS BLDA CALC-SCNC: -3.8 MMOL/L (ref 0–2)
BASOPHILS # BLD AUTO: 0.04 10*3/MM3 (ref 0–0.2)
BASOPHILS NFR BLD AUTO: 0.2 % (ref 0–1)
BDY SITE: ABNORMAL
BUN BLD-MCNC: 31 MG/DL (ref 9–23)
BUN/CREAT SERPL: 25.8 (ref 7–25)
CALCIUM SPEC-SCNC: 9.3 MG/DL (ref 8.7–10.4)
CHLORIDE SERPL-SCNC: 113 MMOL/L (ref 99–109)
CO2 BLDA-SCNC: 23.9 MMOL/L (ref 22–33)
CO2 SERPL-SCNC: 22 MMOL/L (ref 20–31)
COHGB MFR BLD: 1.2 % (ref 0–2)
CREAT BLD-MCNC: 1.2 MG/DL (ref 0.6–1.3)
DEPRECATED RDW RBC AUTO: 50.5 FL (ref 37–54)
EOSINOPHIL # BLD AUTO: 0.02 10*3/MM3 (ref 0–0.3)
EOSINOPHIL NFR BLD AUTO: 0.1 % (ref 0–3)
ERYTHROCYTE [DISTWIDTH] IN BLOOD BY AUTOMATED COUNT: 14.1 % (ref 11.3–14.5)
GFR SERPL CREATININE-BSD FRML MDRD: 45 ML/MIN/1.73
GLUCOSE BLD-MCNC: 173 MG/DL (ref 70–100)
GLUCOSE BLDC GLUCOMTR-MCNC: 153 MG/DL (ref 70–130)
GLUCOSE BLDC GLUCOMTR-MCNC: 214 MG/DL (ref 70–130)
GLUCOSE BLDC GLUCOMTR-MCNC: 242 MG/DL (ref 70–130)
GLUCOSE BLDC GLUCOMTR-MCNC: 299 MG/DL (ref 70–130)
HCO3 BLDA-SCNC: 22.5 MMOL/L (ref 20–26)
HCT VFR BLD AUTO: 35.1 % (ref 34.5–44)
HCT VFR BLD CALC: 35.9 %
HGB BLD-MCNC: 11 G/DL (ref 11.5–15.5)
HGB BLDA-MCNC: 11.7 G/DL (ref 14–18)
HOROWITZ INDEX BLD+IHG-RTO: 40 %
IMM GRANULOCYTES # BLD: 0.05 10*3/MM3 (ref 0–0.03)
IMM GRANULOCYTES NFR BLD: 0.3 % (ref 0–0.6)
LYMPHOCYTES # BLD AUTO: 1.42 10*3/MM3 (ref 0.6–4.8)
LYMPHOCYTES NFR BLD AUTO: 8.5 % (ref 24–44)
MCH RBC QN AUTO: 30.7 PG (ref 27–31)
MCHC RBC AUTO-ENTMCNC: 31.3 G/DL (ref 32–36)
MCV RBC AUTO: 98 FL (ref 80–99)
METHGB BLD QL: 1 % (ref 0–1.5)
MODALITY: ABNORMAL
MONOCYTES # BLD AUTO: 0.79 10*3/MM3 (ref 0–1)
MONOCYTES NFR BLD AUTO: 4.7 % (ref 0–12)
NEUTROPHILS # BLD AUTO: 14.44 10*3/MM3 (ref 1.5–8.3)
NEUTROPHILS NFR BLD AUTO: 86.5 % (ref 41–71)
OXYHGB MFR BLDV: 93.3 % (ref 94–99)
PCO2 BLDA: 45.3 MM HG (ref 35–45)
PH BLDA: 7.31 PH UNITS (ref 7.35–7.45)
PLATELET # BLD AUTO: 280 10*3/MM3 (ref 150–450)
PMV BLD AUTO: 11 FL (ref 6–12)
PO2 BLDA: 81.4 MM HG (ref 83–108)
POTASSIUM BLD-SCNC: 4.5 MMOL/L (ref 3.5–5.5)
RBC # BLD AUTO: 3.58 10*6/MM3 (ref 3.89–5.14)
SODIUM BLD-SCNC: 143 MMOL/L (ref 132–146)
TROPONIN I SERPL-MCNC: 10.99 NG/ML
WBC NRBC COR # BLD: 16.71 10*3/MM3 (ref 3.5–10.8)

## 2018-02-15 PROCEDURE — 80048 BASIC METABOLIC PNL TOTAL CA: CPT | Performed by: PHYSICIAN ASSISTANT

## 2018-02-15 PROCEDURE — 25010000002 VANCOMYCIN: Performed by: INTERNAL MEDICINE

## 2018-02-15 PROCEDURE — 25010000002 LINEZOLID 600 MG/300ML SOLUTION: Performed by: PHYSICIAN ASSISTANT

## 2018-02-15 PROCEDURE — 25010000002 FUROSEMIDE PER 20 MG: Performed by: HOSPITALIST

## 2018-02-15 PROCEDURE — 97163 PT EVAL HIGH COMPLEX 45 MIN: CPT

## 2018-02-15 PROCEDURE — 63710000001 INSULIN DETEMIR PER 5 UNITS: Performed by: INTERNAL MEDICINE

## 2018-02-15 PROCEDURE — 97597 DBRDMT OPN WND 1ST 20 CM/<: CPT

## 2018-02-15 PROCEDURE — 25010000002 METHYLPREDNISOLONE PER 125 MG: Performed by: INTERNAL MEDICINE

## 2018-02-15 PROCEDURE — 85730 THROMBOPLASTIN TIME PARTIAL: CPT | Performed by: INTERNAL MEDICINE

## 2018-02-15 PROCEDURE — 25010000002 ENOXAPARIN PER 10 MG: Performed by: INTERNAL MEDICINE

## 2018-02-15 PROCEDURE — 25010000002 PIPERACILLIN SOD-TAZOBACTAM PER 1 G: Performed by: INTERNAL MEDICINE

## 2018-02-15 PROCEDURE — 99291 CRITICAL CARE FIRST HOUR: CPT | Performed by: HOSPITALIST

## 2018-02-15 PROCEDURE — 99291 CRITICAL CARE FIRST HOUR: CPT | Performed by: INTERNAL MEDICINE

## 2018-02-15 PROCEDURE — 84484 ASSAY OF TROPONIN QUANT: CPT | Performed by: INTERNAL MEDICINE

## 2018-02-15 PROCEDURE — 0 IOPAMIDOL PER 1 ML: Performed by: HOSPITALIST

## 2018-02-15 PROCEDURE — 63710000001 INSULIN LISPRO (HUMAN) PER 5 UNITS: Performed by: NURSE PRACTITIONER

## 2018-02-15 PROCEDURE — 85025 COMPLETE CBC W/AUTO DIFF WBC: CPT | Performed by: PHYSICIAN ASSISTANT

## 2018-02-15 PROCEDURE — 71275 CT ANGIOGRAPHY CHEST: CPT

## 2018-02-15 PROCEDURE — 25010000002 FUROSEMIDE PER 20 MG: Performed by: INTERNAL MEDICINE

## 2018-02-15 PROCEDURE — 94799 UNLISTED PULMONARY SVC/PX: CPT

## 2018-02-15 PROCEDURE — 99232 SBSQ HOSP IP/OBS MODERATE 35: CPT | Performed by: INTERNAL MEDICINE

## 2018-02-15 PROCEDURE — 97610 LOW FREQUENCY NON-THERMAL US: CPT

## 2018-02-15 PROCEDURE — 82805 BLOOD GASES W/O2 SATURATION: CPT | Performed by: NURSE PRACTITIONER

## 2018-02-15 PROCEDURE — 82962 GLUCOSE BLOOD TEST: CPT

## 2018-02-15 PROCEDURE — 94660 CPAP INITIATION&MGMT: CPT

## 2018-02-15 PROCEDURE — 94640 AIRWAY INHALATION TREATMENT: CPT

## 2018-02-15 PROCEDURE — 25010000002 HEPARIN (PORCINE) PER 1000 UNITS: Performed by: INTERNAL MEDICINE

## 2018-02-15 PROCEDURE — 36600 WITHDRAWAL OF ARTERIAL BLOOD: CPT | Performed by: NURSE PRACTITIONER

## 2018-02-15 PROCEDURE — 97598 DBRDMT OPN WND ADDL 20CM/<: CPT

## 2018-02-15 RX ORDER — ARFORMOTEROL TARTRATE 15 UG/2ML
15 SOLUTION RESPIRATORY (INHALATION)
Status: DISCONTINUED | OUTPATIENT
Start: 2018-02-15 | End: 2018-02-27 | Stop reason: HOSPADM

## 2018-02-15 RX ORDER — NICOTINE POLACRILEX 4 MG
15 LOZENGE BUCCAL
Status: DISCONTINUED | OUTPATIENT
Start: 2018-02-14 | End: 2018-02-16

## 2018-02-15 RX ORDER — DEXTROSE MONOHYDRATE 25 G/50ML
25 INJECTION, SOLUTION INTRAVENOUS
Status: DISCONTINUED | OUTPATIENT
Start: 2018-02-14 | End: 2018-02-16

## 2018-02-15 RX ORDER — BUDESONIDE 0.5 MG/2ML
0.5 INHALANT ORAL
Status: DISCONTINUED | OUTPATIENT
Start: 2018-02-15 | End: 2018-02-16

## 2018-02-15 RX ORDER — LINEZOLID 2 MG/ML
600 INJECTION, SOLUTION INTRAVENOUS EVERY 12 HOURS
Status: DISCONTINUED | OUTPATIENT
Start: 2018-02-15 | End: 2018-02-16

## 2018-02-15 RX ORDER — FUROSEMIDE 10 MG/ML
40 INJECTION INTRAMUSCULAR; INTRAVENOUS ONCE
Status: COMPLETED | OUTPATIENT
Start: 2018-02-15 | End: 2018-02-15

## 2018-02-15 RX ORDER — METOPROLOL TARTRATE 50 MG/1
50 TABLET, FILM COATED ORAL EVERY 12 HOURS SCHEDULED
Status: DISCONTINUED | OUTPATIENT
Start: 2018-02-15 | End: 2018-02-19

## 2018-02-15 RX ORDER — IPRATROPIUM BROMIDE AND ALBUTEROL SULFATE 2.5; .5 MG/3ML; MG/3ML
3 SOLUTION RESPIRATORY (INHALATION)
Status: DISCONTINUED | OUTPATIENT
Start: 2018-02-15 | End: 2018-02-27 | Stop reason: HOSPADM

## 2018-02-15 RX ORDER — NITROGLYCERIN 20 MG/100ML
5-200 INJECTION INTRAVENOUS
Status: DISCONTINUED | OUTPATIENT
Start: 2018-02-15 | End: 2018-02-18

## 2018-02-15 RX ORDER — METHYLPREDNISOLONE SODIUM SUCCINATE 125 MG/2ML
125 INJECTION, POWDER, LYOPHILIZED, FOR SOLUTION INTRAMUSCULAR; INTRAVENOUS ONCE
Status: COMPLETED | OUTPATIENT
Start: 2018-02-15 | End: 2018-02-15

## 2018-02-15 RX ADMIN — BUSPIRONE HYDROCHLORIDE 7.5 MG: 15 TABLET ORAL at 08:48

## 2018-02-15 RX ADMIN — HEPARIN SODIUM 18 UNITS/KG/HR: 10000 INJECTION, SOLUTION INTRAVENOUS at 17:48

## 2018-02-15 RX ADMIN — PREGABALIN 100 MG: 100 CAPSULE ORAL at 05:31

## 2018-02-15 RX ADMIN — NITROGLYCERIN 5 MCG/MIN: 20 INJECTION INTRAVENOUS at 14:17

## 2018-02-15 RX ADMIN — ARFORMOTEROL TARTRATE 15 MCG: 15 SOLUTION RESPIRATORY (INHALATION) at 18:43

## 2018-02-15 RX ADMIN — NYSTATIN: 100000 POWDER TOPICAL at 20:02

## 2018-02-15 RX ADMIN — ASPIRIN 325 MG: 325 TABLET, DELAYED RELEASE ORAL at 08:48

## 2018-02-15 RX ADMIN — BUSPIRONE HYDROCHLORIDE 7.5 MG: 15 TABLET ORAL at 20:00

## 2018-02-15 RX ADMIN — Medication 1 CAPSULE: at 08:48

## 2018-02-15 RX ADMIN — PREGABALIN 100 MG: 100 CAPSULE ORAL at 14:43

## 2018-02-15 RX ADMIN — IPRATROPIUM BROMIDE AND ALBUTEROL SULFATE 3 ML: .5; 3 SOLUTION RESPIRATORY (INHALATION) at 08:06

## 2018-02-15 RX ADMIN — VANCOMYCIN HYDROCHLORIDE 1250 MG: 10 INJECTION, POWDER, LYOPHILIZED, FOR SOLUTION INTRAVENOUS at 14:17

## 2018-02-15 RX ADMIN — NYSTATIN: 100000 POWDER TOPICAL at 08:48

## 2018-02-15 RX ADMIN — METOPROLOL TARTRATE 50 MG: 50 TABLET ORAL at 20:08

## 2018-02-15 RX ADMIN — METOPROLOL TARTRATE 25 MG: 25 TABLET ORAL at 08:48

## 2018-02-15 RX ADMIN — CASTOR OIL AND BALSAM, PERU: 788; 87 OINTMENT TOPICAL at 20:02

## 2018-02-15 RX ADMIN — IPRATROPIUM BROMIDE AND ALBUTEROL SULFATE 3 ML: .5; 3 SOLUTION RESPIRATORY (INHALATION) at 18:43

## 2018-02-15 RX ADMIN — FLUTICASONE PROPIONATE 1 SPRAY: 50 SPRAY, METERED NASAL at 20:02

## 2018-02-15 RX ADMIN — CETIRIZINE HYDROCHLORIDE 10 MG: 10 TABLET, FILM COATED ORAL at 20:00

## 2018-02-15 RX ADMIN — IOPAMIDOL 95 ML: 755 INJECTION, SOLUTION INTRAVENOUS at 13:35

## 2018-02-15 RX ADMIN — FENTANYL 1 PATCH: 75 PATCH TRANSDERMAL at 23:00

## 2018-02-15 RX ADMIN — METHYLPREDNISOLONE SODIUM SUCCINATE 125 MG: 125 INJECTION, POWDER, FOR SOLUTION INTRAMUSCULAR; INTRAVENOUS at 14:16

## 2018-02-15 RX ADMIN — PREGABALIN 100 MG: 100 CAPSULE ORAL at 22:20

## 2018-02-15 RX ADMIN — CASTOR OIL AND BALSAM, PERU: 788; 87 OINTMENT TOPICAL at 08:48

## 2018-02-15 RX ADMIN — TAZOBACTAM SODIUM AND PIPERACILLIN SODIUM 4.5 G: 500; 4 INJECTION, SOLUTION INTRAVENOUS at 19:56

## 2018-02-15 RX ADMIN — IPRATROPIUM BROMIDE AND ALBUTEROL SULFATE 3 ML: .5; 3 SOLUTION RESPIRATORY (INHALATION) at 15:43

## 2018-02-15 RX ADMIN — CLOPIDOGREL BISULFATE 75 MG: 75 TABLET ORAL at 08:48

## 2018-02-15 RX ADMIN — SODIUM CHLORIDE 75 ML/HR: 9 INJECTION, SOLUTION INTRAVENOUS at 05:45

## 2018-02-15 RX ADMIN — PANTOPRAZOLE SODIUM 40 MG: 40 TABLET, DELAYED RELEASE ORAL at 05:31

## 2018-02-15 RX ADMIN — TAZOBACTAM SODIUM AND PIPERACILLIN SODIUM 4.5 G: 500; 4 INJECTION, SOLUTION INTRAVENOUS at 14:16

## 2018-02-15 RX ADMIN — INSULIN DETEMIR 10 UNITS: 100 INJECTION, SOLUTION SUBCUTANEOUS at 16:15

## 2018-02-15 RX ADMIN — FLUTICASONE PROPIONATE 1 SPRAY: 50 SPRAY, METERED NASAL at 08:49

## 2018-02-15 RX ADMIN — BUDESONIDE 0.5 MG: 0.5 INHALANT RESPIRATORY (INHALATION) at 18:43

## 2018-02-15 RX ADMIN — ENOXAPARIN SODIUM 110 MG: 60 INJECTION SUBCUTANEOUS at 05:31

## 2018-02-15 RX ADMIN — OXYCODONE HYDROCHLORIDE AND ACETAMINOPHEN 1 TABLET: 10; 325 TABLET ORAL at 05:32

## 2018-02-15 RX ADMIN — INSULIN LISPRO 4 UNITS: 100 INJECTION, SOLUTION INTRAVENOUS; SUBCUTANEOUS at 14:21

## 2018-02-15 RX ADMIN — FUROSEMIDE 40 MG: 10 INJECTION, SOLUTION INTRAMUSCULAR; INTRAVENOUS at 12:39

## 2018-02-15 RX ADMIN — Medication 2 TABLET: at 20:00

## 2018-02-15 RX ADMIN — FUROSEMIDE 40 MG: 10 INJECTION, SOLUTION INTRAMUSCULAR; INTRAVENOUS at 20:00

## 2018-02-15 RX ADMIN — INSULIN LISPRO 6 UNITS: 100 INJECTION, SOLUTION INTRAVENOUS; SUBCUTANEOUS at 22:24

## 2018-02-15 RX ADMIN — LINEZOLID 600 MG: 600 INJECTION, SOLUTION INTRAVENOUS at 17:51

## 2018-02-15 RX ADMIN — LEVOTHYROXINE SODIUM 112 MCG: 112 TABLET ORAL at 05:32

## 2018-02-15 RX ADMIN — ATORVASTATIN CALCIUM 80 MG: 40 TABLET, FILM COATED ORAL at 22:20

## 2018-02-15 RX ADMIN — INSULIN LISPRO 2 UNITS: 100 INJECTION, SOLUTION INTRAVENOUS; SUBCUTANEOUS at 08:47

## 2018-02-16 ENCOUNTER — APPOINTMENT (OUTPATIENT)
Dept: GENERAL RADIOLOGY | Facility: HOSPITAL | Age: 63
End: 2018-02-16

## 2018-02-16 LAB
ALBUMIN SERPL-MCNC: 3.4 G/DL (ref 3.2–4.8)
ALBUMIN/GLOB SERPL: 1.2 G/DL (ref 1.5–2.5)
ALP SERPL-CCNC: 84 U/L (ref 25–100)
ALT SERPL W P-5'-P-CCNC: 31 U/L (ref 7–40)
ANION GAP SERPL CALCULATED.3IONS-SCNC: 9 MMOL/L (ref 3–11)
APTT PPP: 56.9 SECONDS (ref 55–70)
APTT PPP: 67.4 SECONDS (ref 55–70)
AST SERPL-CCNC: 51 U/L (ref 0–33)
BASOPHILS # BLD AUTO: 0.01 10*3/MM3 (ref 0–0.2)
BASOPHILS NFR BLD AUTO: 0.1 % (ref 0–1)
BILIRUB SERPL-MCNC: 0.3 MG/DL (ref 0.3–1.2)
BNP SERPL-MCNC: 1074 PG/ML (ref 0–100)
BUN BLD-MCNC: 34 MG/DL (ref 9–23)
BUN/CREAT SERPL: 26.2 (ref 7–25)
CALCIUM SPEC-SCNC: 9 MG/DL (ref 8.7–10.4)
CHLORIDE SERPL-SCNC: 110 MMOL/L (ref 99–109)
CO2 SERPL-SCNC: 23 MMOL/L (ref 20–31)
CREAT BLD-MCNC: 1.3 MG/DL (ref 0.6–1.3)
DEPRECATED RDW RBC AUTO: 51.1 FL (ref 37–54)
EOSINOPHIL # BLD AUTO: 0 10*3/MM3 (ref 0–0.3)
EOSINOPHIL NFR BLD AUTO: 0 % (ref 0–3)
ERYTHROCYTE [DISTWIDTH] IN BLOOD BY AUTOMATED COUNT: 14.3 % (ref 11.3–14.5)
GFR SERPL CREATININE-BSD FRML MDRD: 41 ML/MIN/1.73
GLOBULIN UR ELPH-MCNC: 2.9 GM/DL
GLUCOSE BLD-MCNC: 234 MG/DL (ref 70–100)
GLUCOSE BLDC GLUCOMTR-MCNC: 194 MG/DL (ref 70–130)
GLUCOSE BLDC GLUCOMTR-MCNC: 196 MG/DL (ref 70–130)
GLUCOSE BLDC GLUCOMTR-MCNC: 214 MG/DL (ref 70–130)
GLUCOSE BLDC GLUCOMTR-MCNC: 224 MG/DL (ref 70–130)
HCT VFR BLD AUTO: 33.2 % (ref 34.5–44)
HGB BLD-MCNC: 10.2 G/DL (ref 11.5–15.5)
IMM GRANULOCYTES # BLD: 0.06 10*3/MM3 (ref 0–0.03)
IMM GRANULOCYTES NFR BLD: 0.4 % (ref 0–0.6)
LYMPHOCYTES # BLD AUTO: 1.5 10*3/MM3 (ref 0.6–4.8)
LYMPHOCYTES NFR BLD AUTO: 9.7 % (ref 24–44)
MAGNESIUM SERPL-MCNC: 2.3 MG/DL (ref 1.3–2.7)
MCH RBC QN AUTO: 29.9 PG (ref 27–31)
MCHC RBC AUTO-ENTMCNC: 30.7 G/DL (ref 32–36)
MCV RBC AUTO: 97.4 FL (ref 80–99)
MONOCYTES # BLD AUTO: 0.67 10*3/MM3 (ref 0–1)
MONOCYTES NFR BLD AUTO: 4.3 % (ref 0–12)
NEUTROPHILS # BLD AUTO: 13.28 10*3/MM3 (ref 1.5–8.3)
NEUTROPHILS NFR BLD AUTO: 85.5 % (ref 41–71)
PHOSPHATE SERPL-MCNC: 3.2 MG/DL (ref 2.4–5.1)
PLATELET # BLD AUTO: 302 10*3/MM3 (ref 150–450)
PMV BLD AUTO: 11.1 FL (ref 6–12)
POTASSIUM BLD-SCNC: 4.3 MMOL/L (ref 3.5–5.5)
PROT SERPL-MCNC: 6.3 G/DL (ref 5.7–8.2)
RBC # BLD AUTO: 3.41 10*6/MM3 (ref 3.89–5.14)
SODIUM BLD-SCNC: 142 MMOL/L (ref 132–146)
WBC NRBC COR # BLD: 15.52 10*3/MM3 (ref 3.5–10.8)

## 2018-02-16 PROCEDURE — 94640 AIRWAY INHALATION TREATMENT: CPT

## 2018-02-16 PROCEDURE — 99232 SBSQ HOSP IP/OBS MODERATE 35: CPT | Performed by: INTERNAL MEDICINE

## 2018-02-16 PROCEDURE — 25010000002 LINEZOLID 600 MG/300ML SOLUTION: Performed by: PHYSICIAN ASSISTANT

## 2018-02-16 PROCEDURE — 83880 ASSAY OF NATRIURETIC PEPTIDE: CPT | Performed by: INTERNAL MEDICINE

## 2018-02-16 PROCEDURE — 97110 THERAPEUTIC EXERCISES: CPT

## 2018-02-16 PROCEDURE — 80053 COMPREHEN METABOLIC PANEL: CPT | Performed by: INTERNAL MEDICINE

## 2018-02-16 PROCEDURE — 25010000002 FUROSEMIDE PER 20 MG: Performed by: INTERNAL MEDICINE

## 2018-02-16 PROCEDURE — 63710000001 INSULIN DETEMIR PER 5 UNITS: Performed by: INTERNAL MEDICINE

## 2018-02-16 PROCEDURE — 83735 ASSAY OF MAGNESIUM: CPT | Performed by: INTERNAL MEDICINE

## 2018-02-16 PROCEDURE — 99233 SBSQ HOSP IP/OBS HIGH 50: CPT | Performed by: INTERNAL MEDICINE

## 2018-02-16 PROCEDURE — 97610 LOW FREQUENCY NON-THERMAL US: CPT

## 2018-02-16 PROCEDURE — 97164 PT RE-EVAL EST PLAN CARE: CPT

## 2018-02-16 PROCEDURE — 25010000002 PIPERACILLIN SOD-TAZOBACTAM PER 1 G: Performed by: INTERNAL MEDICINE

## 2018-02-16 PROCEDURE — 25010000002 HEPARIN (PORCINE) PER 1000 UNITS: Performed by: INTERNAL MEDICINE

## 2018-02-16 PROCEDURE — 94799 UNLISTED PULMONARY SVC/PX: CPT

## 2018-02-16 PROCEDURE — 82962 GLUCOSE BLOOD TEST: CPT

## 2018-02-16 PROCEDURE — 97116 GAIT TRAINING THERAPY: CPT

## 2018-02-16 PROCEDURE — 84100 ASSAY OF PHOSPHORUS: CPT | Performed by: INTERNAL MEDICINE

## 2018-02-16 PROCEDURE — 85025 COMPLETE CBC W/AUTO DIFF WBC: CPT | Performed by: INTERNAL MEDICINE

## 2018-02-16 PROCEDURE — 97597 DBRDMT OPN WND 1ST 20 CM/<: CPT

## 2018-02-16 PROCEDURE — 25010000002 LINEZOLID 600 MG/300ML SOLUTION: Performed by: INTERNAL MEDICINE

## 2018-02-16 PROCEDURE — 94760 N-INVAS EAR/PLS OXIMETRY 1: CPT

## 2018-02-16 PROCEDURE — 85730 THROMBOPLASTIN TIME PARTIAL: CPT

## 2018-02-16 PROCEDURE — 71045 X-RAY EXAM CHEST 1 VIEW: CPT

## 2018-02-16 RX ORDER — LINEZOLID 2 MG/ML
600 INJECTION, SOLUTION INTRAVENOUS EVERY 12 HOURS
Status: DISCONTINUED | OUTPATIENT
Start: 2018-02-16 | End: 2018-02-17

## 2018-02-16 RX ORDER — FUROSEMIDE 10 MG/ML
40 INJECTION INTRAMUSCULAR; INTRAVENOUS ONCE
Status: COMPLETED | OUTPATIENT
Start: 2018-02-16 | End: 2018-02-16

## 2018-02-16 RX ADMIN — OXYCODONE HYDROCHLORIDE AND ACETAMINOPHEN 1 TABLET: 10; 325 TABLET ORAL at 20:10

## 2018-02-16 RX ADMIN — CASTOR OIL AND BALSAM, PERU: 788; 87 OINTMENT TOPICAL at 08:06

## 2018-02-16 RX ADMIN — ASPIRIN 325 MG: 325 TABLET, DELAYED RELEASE ORAL at 08:05

## 2018-02-16 RX ADMIN — TAZOBACTAM SODIUM AND PIPERACILLIN SODIUM 4.5 G: 500; 4 INJECTION, SOLUTION INTRAVENOUS at 12:20

## 2018-02-16 RX ADMIN — BUDESONIDE 0.5 MG: 0.5 INHALANT RESPIRATORY (INHALATION) at 08:19

## 2018-02-16 RX ADMIN — CETIRIZINE HYDROCHLORIDE 10 MG: 10 TABLET, FILM COATED ORAL at 20:10

## 2018-02-16 RX ADMIN — ATORVASTATIN CALCIUM 80 MG: 40 TABLET, FILM COATED ORAL at 20:10

## 2018-02-16 RX ADMIN — INSULIN DETEMIR 15 UNITS: 100 INJECTION, SOLUTION SUBCUTANEOUS at 12:22

## 2018-02-16 RX ADMIN — BUSPIRONE HYDROCHLORIDE 7.5 MG: 15 TABLET ORAL at 20:10

## 2018-02-16 RX ADMIN — NYSTATIN: 100000 POWDER TOPICAL at 08:06

## 2018-02-16 RX ADMIN — IPRATROPIUM BROMIDE AND ALBUTEROL SULFATE 3 ML: .5; 3 SOLUTION RESPIRATORY (INHALATION) at 08:19

## 2018-02-16 RX ADMIN — PREGABALIN 100 MG: 100 CAPSULE ORAL at 13:30

## 2018-02-16 RX ADMIN — ACETAMINOPHEN 650 MG: 325 TABLET, FILM COATED ORAL at 17:07

## 2018-02-16 RX ADMIN — FLUTICASONE PROPIONATE 1 SPRAY: 50 SPRAY, METERED NASAL at 08:06

## 2018-02-16 RX ADMIN — METOPROLOL TARTRATE 50 MG: 50 TABLET ORAL at 08:06

## 2018-02-16 RX ADMIN — TAZOBACTAM SODIUM AND PIPERACILLIN SODIUM 4.5 G: 500; 4 INJECTION, SOLUTION INTRAVENOUS at 20:11

## 2018-02-16 RX ADMIN — METOPROLOL TARTRATE 50 MG: 50 TABLET ORAL at 20:10

## 2018-02-16 RX ADMIN — LINEZOLID 600 MG: 600 INJECTION, SOLUTION INTRAVENOUS at 20:11

## 2018-02-16 RX ADMIN — CLOPIDOGREL BISULFATE 75 MG: 75 TABLET ORAL at 08:05

## 2018-02-16 RX ADMIN — CASTOR OIL AND BALSAM, PERU: 788; 87 OINTMENT TOPICAL at 20:11

## 2018-02-16 RX ADMIN — INSULIN LISPRO 2 UNITS: 100 INJECTION, SOLUTION INTRAVENOUS; SUBCUTANEOUS at 17:05

## 2018-02-16 RX ADMIN — INSULIN LISPRO 2 UNITS: 100 INJECTION, SOLUTION INTRAVENOUS; SUBCUTANEOUS at 12:20

## 2018-02-16 RX ADMIN — BACLOFEN 10 MG: 10 TABLET ORAL at 08:06

## 2018-02-16 RX ADMIN — INSULIN LISPRO 4 UNITS: 100 INJECTION, SOLUTION INTRAVENOUS; SUBCUTANEOUS at 20:21

## 2018-02-16 RX ADMIN — PREGABALIN 100 MG: 100 CAPSULE ORAL at 22:05

## 2018-02-16 RX ADMIN — PANTOPRAZOLE SODIUM 40 MG: 40 TABLET, DELAYED RELEASE ORAL at 05:59

## 2018-02-16 RX ADMIN — BUSPIRONE HYDROCHLORIDE 7.5 MG: 15 TABLET ORAL at 08:05

## 2018-02-16 RX ADMIN — Medication 1 CAPSULE: at 08:05

## 2018-02-16 RX ADMIN — IPRATROPIUM BROMIDE AND ALBUTEROL SULFATE 3 ML: .5; 3 SOLUTION RESPIRATORY (INHALATION) at 20:44

## 2018-02-16 RX ADMIN — IPRATROPIUM BROMIDE AND ALBUTEROL SULFATE 3 ML: .5; 3 SOLUTION RESPIRATORY (INHALATION) at 15:12

## 2018-02-16 RX ADMIN — HEPARIN SODIUM 18 UNITS/KG/HR: 10000 INJECTION, SOLUTION INTRAVENOUS at 16:04

## 2018-02-16 RX ADMIN — ARFORMOTEROL TARTRATE 15 MCG: 15 SOLUTION RESPIRATORY (INHALATION) at 08:19

## 2018-02-16 RX ADMIN — PREGABALIN 100 MG: 100 CAPSULE ORAL at 05:59

## 2018-02-16 RX ADMIN — NYSTATIN: 100000 POWDER TOPICAL at 20:11

## 2018-02-16 RX ADMIN — TAZOBACTAM SODIUM AND PIPERACILLIN SODIUM 4.5 G: 500; 4 INJECTION, SOLUTION INTRAVENOUS at 04:44

## 2018-02-16 RX ADMIN — FUROSEMIDE 40 MG: 10 INJECTION, SOLUTION INTRAMUSCULAR; INTRAVENOUS at 16:04

## 2018-02-16 RX ADMIN — OXYCODONE HYDROCHLORIDE AND ACETAMINOPHEN 1 TABLET: 10; 325 TABLET ORAL at 12:44

## 2018-02-16 RX ADMIN — IPRATROPIUM BROMIDE AND ALBUTEROL SULFATE 3 ML: .5; 3 SOLUTION RESPIRATORY (INHALATION) at 12:20

## 2018-02-16 RX ADMIN — LEVOTHYROXINE SODIUM 112 MCG: 112 TABLET ORAL at 05:59

## 2018-02-16 RX ADMIN — IPRATROPIUM BROMIDE AND ALBUTEROL SULFATE 3 ML: .5; 3 SOLUTION RESPIRATORY (INHALATION) at 23:49

## 2018-02-16 RX ADMIN — INSULIN LISPRO 4 UNITS: 100 INJECTION, SOLUTION INTRAVENOUS; SUBCUTANEOUS at 08:06

## 2018-02-16 RX ADMIN — HEPARIN SODIUM 18 UNITS/KG/HR: 10000 INJECTION, SOLUTION INTRAVENOUS at 04:45

## 2018-02-16 RX ADMIN — ARFORMOTEROL TARTRATE 15 MCG: 15 SOLUTION RESPIRATORY (INHALATION) at 20:45

## 2018-02-16 RX ADMIN — LINEZOLID 600 MG: 600 INJECTION, SOLUTION INTRAVENOUS at 08:07

## 2018-02-17 ENCOUNTER — APPOINTMENT (OUTPATIENT)
Dept: GENERAL RADIOLOGY | Facility: HOSPITAL | Age: 63
End: 2018-02-17

## 2018-02-17 LAB
ANION GAP SERPL CALCULATED.3IONS-SCNC: 10 MMOL/L (ref 3–11)
APTT PPP: 63.2 SECONDS (ref 55–70)
BACTERIA SPEC AEROBE CULT: ABNORMAL
BASOPHILS # BLD AUTO: 0.05 10*3/MM3 (ref 0–0.2)
BASOPHILS NFR BLD AUTO: 0.4 % (ref 0–1)
BNP SERPL-MCNC: 1493 PG/ML (ref 0–100)
BUN BLD-MCNC: 29 MG/DL (ref 9–23)
BUN/CREAT SERPL: 22.3 (ref 7–25)
CALCIUM SPEC-SCNC: 9.6 MG/DL (ref 8.7–10.4)
CHLORIDE SERPL-SCNC: 106 MMOL/L (ref 99–109)
CO2 SERPL-SCNC: 25 MMOL/L (ref 20–31)
CREAT BLD-MCNC: 1.3 MG/DL (ref 0.6–1.3)
DEPRECATED RDW RBC AUTO: 50.3 FL (ref 37–54)
EOSINOPHIL # BLD AUTO: 0.24 10*3/MM3 (ref 0–0.3)
EOSINOPHIL NFR BLD AUTO: 1.8 % (ref 0–3)
ERYTHROCYTE [DISTWIDTH] IN BLOOD BY AUTOMATED COUNT: 14.1 % (ref 11.3–14.5)
GFR SERPL CREATININE-BSD FRML MDRD: 41 ML/MIN/1.73
GLUCOSE BLD-MCNC: 202 MG/DL (ref 70–100)
GLUCOSE BLDC GLUCOMTR-MCNC: 160 MG/DL (ref 70–130)
GLUCOSE BLDC GLUCOMTR-MCNC: 168 MG/DL (ref 70–130)
GLUCOSE BLDC GLUCOMTR-MCNC: 196 MG/DL (ref 70–130)
GLUCOSE BLDC GLUCOMTR-MCNC: 237 MG/DL (ref 70–130)
GRAM STN SPEC: ABNORMAL
HCT VFR BLD AUTO: 33.6 % (ref 34.5–44)
HGB BLD-MCNC: 10.4 G/DL (ref 11.5–15.5)
IMM GRANULOCYTES # BLD: 0.16 10*3/MM3 (ref 0–0.03)
IMM GRANULOCYTES NFR BLD: 1.2 % (ref 0–0.6)
LYMPHOCYTES # BLD AUTO: 2.35 10*3/MM3 (ref 0.6–4.8)
LYMPHOCYTES NFR BLD AUTO: 17.4 % (ref 24–44)
MCH RBC QN AUTO: 30.1 PG (ref 27–31)
MCHC RBC AUTO-ENTMCNC: 31 G/DL (ref 32–36)
MCV RBC AUTO: 97.1 FL (ref 80–99)
MONOCYTES # BLD AUTO: 0.98 10*3/MM3 (ref 0–1)
MONOCYTES NFR BLD AUTO: 7.3 % (ref 0–12)
NEUTROPHILS # BLD AUTO: 9.69 10*3/MM3 (ref 1.5–8.3)
NEUTROPHILS NFR BLD AUTO: 71.9 % (ref 41–71)
PLATELET # BLD AUTO: 297 10*3/MM3 (ref 150–450)
PMV BLD AUTO: 10.6 FL (ref 6–12)
POTASSIUM BLD-SCNC: 3.6 MMOL/L (ref 3.5–5.5)
POTASSIUM BLD-SCNC: 3.8 MMOL/L (ref 3.5–5.5)
RBC # BLD AUTO: 3.46 10*6/MM3 (ref 3.89–5.14)
SODIUM BLD-SCNC: 141 MMOL/L (ref 132–146)
WBC NRBC COR # BLD: 13.47 10*3/MM3 (ref 3.5–10.8)

## 2018-02-17 PROCEDURE — 25010000002 PIPERACILLIN SOD-TAZOBACTAM PER 1 G: Performed by: INTERNAL MEDICINE

## 2018-02-17 PROCEDURE — 97610 LOW FREQUENCY NON-THERMAL US: CPT | Performed by: PHYSICAL THERAPIST

## 2018-02-17 PROCEDURE — 63710000001 INSULIN DETEMIR PER 5 UNITS: Performed by: INTERNAL MEDICINE

## 2018-02-17 PROCEDURE — 94760 N-INVAS EAR/PLS OXIMETRY 1: CPT

## 2018-02-17 PROCEDURE — 85730 THROMBOPLASTIN TIME PARTIAL: CPT

## 2018-02-17 PROCEDURE — 99233 SBSQ HOSP IP/OBS HIGH 50: CPT | Performed by: INTERNAL MEDICINE

## 2018-02-17 PROCEDURE — 99232 SBSQ HOSP IP/OBS MODERATE 35: CPT | Performed by: INTERNAL MEDICINE

## 2018-02-17 PROCEDURE — 25010000002 HYDRALAZINE PER 20 MG: Performed by: NURSE PRACTITIONER

## 2018-02-17 PROCEDURE — 94799 UNLISTED PULMONARY SVC/PX: CPT

## 2018-02-17 PROCEDURE — 82962 GLUCOSE BLOOD TEST: CPT

## 2018-02-17 PROCEDURE — 85025 COMPLETE CBC W/AUTO DIFF WBC: CPT | Performed by: INTERNAL MEDICINE

## 2018-02-17 PROCEDURE — 80048 BASIC METABOLIC PNL TOTAL CA: CPT | Performed by: INTERNAL MEDICINE

## 2018-02-17 PROCEDURE — 94640 AIRWAY INHALATION TREATMENT: CPT

## 2018-02-17 PROCEDURE — 71045 X-RAY EXAM CHEST 1 VIEW: CPT

## 2018-02-17 PROCEDURE — 94660 CPAP INITIATION&MGMT: CPT

## 2018-02-17 PROCEDURE — 83880 ASSAY OF NATRIURETIC PEPTIDE: CPT | Performed by: INTERNAL MEDICINE

## 2018-02-17 PROCEDURE — 25010000002 LINEZOLID 600 MG/300ML SOLUTION: Performed by: INTERNAL MEDICINE

## 2018-02-17 PROCEDURE — 84132 ASSAY OF SERUM POTASSIUM: CPT | Performed by: INTERNAL MEDICINE

## 2018-02-17 PROCEDURE — 25010000002 FUROSEMIDE PER 20 MG: Performed by: INTERNAL MEDICINE

## 2018-02-17 PROCEDURE — 25010000002 HEPARIN (PORCINE) PER 1000 UNITS: Performed by: INTERNAL MEDICINE

## 2018-02-17 PROCEDURE — 97597 DBRDMT OPN WND 1ST 20 CM/<: CPT | Performed by: PHYSICAL THERAPIST

## 2018-02-17 RX ORDER — FUROSEMIDE 10 MG/ML
20 INJECTION INTRAMUSCULAR; INTRAVENOUS EVERY 12 HOURS SCHEDULED
Status: DISCONTINUED | OUTPATIENT
Start: 2018-02-17 | End: 2018-02-19

## 2018-02-17 RX ORDER — FUROSEMIDE 10 MG/ML
20 INJECTION INTRAMUSCULAR; INTRAVENOUS EVERY 12 HOURS
Status: DISCONTINUED | OUTPATIENT
Start: 2018-02-17 | End: 2018-02-17

## 2018-02-17 RX ORDER — ALPRAZOLAM 1 MG/1
1 TABLET ORAL 3 TIMES DAILY PRN
Status: DISCONTINUED | OUTPATIENT
Start: 2018-02-17 | End: 2018-02-27 | Stop reason: HOSPADM

## 2018-02-17 RX ORDER — HYDRALAZINE HYDROCHLORIDE 20 MG/ML
20 INJECTION INTRAMUSCULAR; INTRAVENOUS EVERY 6 HOURS PRN
Status: DISCONTINUED | OUTPATIENT
Start: 2018-02-17 | End: 2018-02-27 | Stop reason: HOSPADM

## 2018-02-17 RX ADMIN — TAZOBACTAM SODIUM AND PIPERACILLIN SODIUM 4.5 G: 500; 4 INJECTION, SOLUTION INTRAVENOUS at 04:26

## 2018-02-17 RX ADMIN — IPRATROPIUM BROMIDE AND ALBUTEROL SULFATE 3 ML: .5; 3 SOLUTION RESPIRATORY (INHALATION) at 08:11

## 2018-02-17 RX ADMIN — ARFORMOTEROL TARTRATE 15 MCG: 15 SOLUTION RESPIRATORY (INHALATION) at 08:11

## 2018-02-17 RX ADMIN — HEPARIN SODIUM 18 UNITS/KG/HR: 10000 INJECTION, SOLUTION INTRAVENOUS at 04:26

## 2018-02-17 RX ADMIN — INSULIN DETEMIR 15 UNITS: 100 INJECTION, SOLUTION SUBCUTANEOUS at 08:16

## 2018-02-17 RX ADMIN — ASPIRIN 325 MG: 325 TABLET, DELAYED RELEASE ORAL at 08:17

## 2018-02-17 RX ADMIN — NYSTATIN: 100000 POWDER TOPICAL at 20:36

## 2018-02-17 RX ADMIN — IPRATROPIUM BROMIDE AND ALBUTEROL SULFATE 3 ML: .5; 3 SOLUTION RESPIRATORY (INHALATION) at 12:26

## 2018-02-17 RX ADMIN — IPRATROPIUM BROMIDE AND ALBUTEROL SULFATE 3 ML: .5; 3 SOLUTION RESPIRATORY (INHALATION) at 03:52

## 2018-02-17 RX ADMIN — OXYCODONE HYDROCHLORIDE AND ACETAMINOPHEN 1 TABLET: 10; 325 TABLET ORAL at 09:31

## 2018-02-17 RX ADMIN — ATORVASTATIN CALCIUM 80 MG: 40 TABLET, FILM COATED ORAL at 20:49

## 2018-02-17 RX ADMIN — PREGABALIN 100 MG: 100 CAPSULE ORAL at 05:58

## 2018-02-17 RX ADMIN — TAZOBACTAM SODIUM AND PIPERACILLIN SODIUM 4.5 G: 500; 4 INJECTION, SOLUTION INTRAVENOUS at 12:24

## 2018-02-17 RX ADMIN — OXYCODONE HYDROCHLORIDE AND ACETAMINOPHEN 1 TABLET: 10; 325 TABLET ORAL at 06:04

## 2018-02-17 RX ADMIN — LEVOTHYROXINE SODIUM 112 MCG: 112 TABLET ORAL at 05:58

## 2018-02-17 RX ADMIN — IPRATROPIUM BROMIDE AND ALBUTEROL SULFATE 3 ML: .5; 3 SOLUTION RESPIRATORY (INHALATION) at 16:41

## 2018-02-17 RX ADMIN — BUSPIRONE HYDROCHLORIDE 7.5 MG: 15 TABLET ORAL at 08:20

## 2018-02-17 RX ADMIN — OXYCODONE HYDROCHLORIDE AND ACETAMINOPHEN 1 TABLET: 10; 325 TABLET ORAL at 13:48

## 2018-02-17 RX ADMIN — HEPARIN SODIUM 18 UNITS/KG/HR: 10000 INJECTION, SOLUTION INTRAVENOUS at 17:17

## 2018-02-17 RX ADMIN — Medication 5 MG: at 20:26

## 2018-02-17 RX ADMIN — CLOPIDOGREL BISULFATE 75 MG: 75 TABLET ORAL at 08:19

## 2018-02-17 RX ADMIN — INSULIN LISPRO 2 UNITS: 100 INJECTION, SOLUTION INTRAVENOUS; SUBCUTANEOUS at 08:19

## 2018-02-17 RX ADMIN — INSULIN LISPRO 4 UNITS: 100 INJECTION, SOLUTION INTRAVENOUS; SUBCUTANEOUS at 20:36

## 2018-02-17 RX ADMIN — BUSPIRONE HYDROCHLORIDE 7.5 MG: 15 TABLET ORAL at 20:27

## 2018-02-17 RX ADMIN — CLONAZEPAM 0.25 MG: 0.5 TABLET ORAL at 18:10

## 2018-02-17 RX ADMIN — INSULIN LISPRO 2 UNITS: 100 INJECTION, SOLUTION INTRAVENOUS; SUBCUTANEOUS at 17:24

## 2018-02-17 RX ADMIN — CLONAZEPAM 0.25 MG: 0.5 TABLET ORAL at 14:15

## 2018-02-17 RX ADMIN — ALPRAZOLAM 1 MG: 1 TABLET ORAL at 20:27

## 2018-02-17 RX ADMIN — FLUTICASONE PROPIONATE 1 SPRAY: 50 SPRAY, METERED NASAL at 20:35

## 2018-02-17 RX ADMIN — ARFORMOTEROL TARTRATE 15 MCG: 15 SOLUTION RESPIRATORY (INHALATION) at 19:47

## 2018-02-17 RX ADMIN — CETIRIZINE HYDROCHLORIDE 10 MG: 10 TABLET, FILM COATED ORAL at 20:27

## 2018-02-17 RX ADMIN — METOPROLOL TARTRATE 50 MG: 50 TABLET ORAL at 08:18

## 2018-02-17 RX ADMIN — TAZOBACTAM SODIUM AND PIPERACILLIN SODIUM 4.5 G: 500; 4 INJECTION, SOLUTION INTRAVENOUS at 20:28

## 2018-02-17 RX ADMIN — OXYCODONE HYDROCHLORIDE AND ACETAMINOPHEN 1 TABLET: 10; 325 TABLET ORAL at 20:26

## 2018-02-17 RX ADMIN — HYDRALAZINE HYDROCHLORIDE 20 MG: 20 INJECTION INTRAMUSCULAR; INTRAVENOUS at 17:17

## 2018-02-17 RX ADMIN — PREGABALIN 100 MG: 100 CAPSULE ORAL at 23:00

## 2018-02-17 RX ADMIN — FENTANYL 1 PATCH: 75 PATCH TRANSDERMAL at 23:21

## 2018-02-17 RX ADMIN — METOPROLOL TARTRATE 50 MG: 50 TABLET ORAL at 20:28

## 2018-02-17 RX ADMIN — LINEZOLID 600 MG: 600 INJECTION, SOLUTION INTRAVENOUS at 08:20

## 2018-02-17 RX ADMIN — PREGABALIN 100 MG: 100 CAPSULE ORAL at 13:49

## 2018-02-17 RX ADMIN — CASTOR OIL AND BALSAM, PERU: 788; 87 OINTMENT TOPICAL at 20:45

## 2018-02-17 RX ADMIN — CASTOR OIL AND BALSAM, PERU: 788; 87 OINTMENT TOPICAL at 08:22

## 2018-02-17 RX ADMIN — IPRATROPIUM BROMIDE AND ALBUTEROL SULFATE 3 ML: .5; 3 SOLUTION RESPIRATORY (INHALATION) at 23:07

## 2018-02-17 RX ADMIN — NYSTATIN: 100000 POWDER TOPICAL at 08:19

## 2018-02-17 RX ADMIN — FUROSEMIDE 20 MG: 10 INJECTION, SOLUTION INTRAMUSCULAR; INTRAVENOUS at 12:36

## 2018-02-17 RX ADMIN — FUROSEMIDE 20 MG: 10 INJECTION, SOLUTION INTRAMUSCULAR; INTRAVENOUS at 23:21

## 2018-02-17 RX ADMIN — IPRATROPIUM BROMIDE AND ALBUTEROL SULFATE 3 ML: .5; 3 SOLUTION RESPIRATORY (INHALATION) at 19:47

## 2018-02-17 RX ADMIN — PANTOPRAZOLE SODIUM 40 MG: 40 TABLET, DELAYED RELEASE ORAL at 05:58

## 2018-02-18 LAB
ANION GAP SERPL CALCULATED.3IONS-SCNC: 10 MMOL/L (ref 3–11)
APTT PPP: 49.4 SECONDS (ref 55–70)
APTT PPP: 72 SECONDS (ref 55–70)
APTT PPP: 73.8 SECONDS (ref 55–70)
BACTERIA SPEC AEROBE CULT: NORMAL
BACTERIA SPEC AEROBE CULT: NORMAL
BASOPHILS # BLD AUTO: 0.07 10*3/MM3 (ref 0–0.2)
BASOPHILS NFR BLD AUTO: 0.6 % (ref 0–1)
BUN BLD-MCNC: 26 MG/DL (ref 9–23)
BUN/CREAT SERPL: 21.7 (ref 7–25)
CALCIUM SPEC-SCNC: 9 MG/DL (ref 8.7–10.4)
CHLORIDE SERPL-SCNC: 104 MMOL/L (ref 99–109)
CO2 SERPL-SCNC: 28 MMOL/L (ref 20–31)
CREAT BLD-MCNC: 1.2 MG/DL (ref 0.6–1.3)
DEPRECATED RDW RBC AUTO: 50.6 FL (ref 37–54)
EOSINOPHIL # BLD AUTO: 0.4 10*3/MM3 (ref 0–0.3)
EOSINOPHIL NFR BLD AUTO: 3.3 % (ref 0–3)
ERYTHROCYTE [DISTWIDTH] IN BLOOD BY AUTOMATED COUNT: 14.3 % (ref 11.3–14.5)
GFR SERPL CREATININE-BSD FRML MDRD: 45 ML/MIN/1.73
GLUCOSE BLD-MCNC: 156 MG/DL (ref 70–100)
GLUCOSE BLDC GLUCOMTR-MCNC: 111 MG/DL (ref 70–130)
GLUCOSE BLDC GLUCOMTR-MCNC: 162 MG/DL (ref 70–130)
GLUCOSE BLDC GLUCOMTR-MCNC: 165 MG/DL (ref 70–130)
GLUCOSE BLDC GLUCOMTR-MCNC: 178 MG/DL (ref 70–130)
HCT VFR BLD AUTO: 33.2 % (ref 34.5–44)
HGB BLD-MCNC: 10.1 G/DL (ref 11.5–15.5)
IMM GRANULOCYTES # BLD: 0.23 10*3/MM3 (ref 0–0.03)
IMM GRANULOCYTES NFR BLD: 1.9 % (ref 0–0.6)
LYMPHOCYTES # BLD AUTO: 2.75 10*3/MM3 (ref 0.6–4.8)
LYMPHOCYTES NFR BLD AUTO: 22.4 % (ref 24–44)
MAGNESIUM SERPL-MCNC: 2 MG/DL (ref 1.3–2.7)
MCH RBC QN AUTO: 29.3 PG (ref 27–31)
MCHC RBC AUTO-ENTMCNC: 30.4 G/DL (ref 32–36)
MCV RBC AUTO: 96.2 FL (ref 80–99)
MONOCYTES # BLD AUTO: 1.05 10*3/MM3 (ref 0–1)
MONOCYTES NFR BLD AUTO: 8.6 % (ref 0–12)
NEUTROPHILS # BLD AUTO: 7.77 10*3/MM3 (ref 1.5–8.3)
NEUTROPHILS NFR BLD AUTO: 63.2 % (ref 41–71)
PLATELET # BLD AUTO: 313 10*3/MM3 (ref 150–450)
PMV BLD AUTO: 10.7 FL (ref 6–12)
POTASSIUM BLD-SCNC: 3.9 MMOL/L (ref 3.5–5.5)
RBC # BLD AUTO: 3.45 10*6/MM3 (ref 3.89–5.14)
SODIUM BLD-SCNC: 142 MMOL/L (ref 132–146)
WBC NRBC COR # BLD: 12.27 10*3/MM3 (ref 3.5–10.8)

## 2018-02-18 PROCEDURE — 97610 LOW FREQUENCY NON-THERMAL US: CPT | Performed by: PHYSICAL THERAPIST

## 2018-02-18 PROCEDURE — 85025 COMPLETE CBC W/AUTO DIFF WBC: CPT | Performed by: INTERNAL MEDICINE

## 2018-02-18 PROCEDURE — 82962 GLUCOSE BLOOD TEST: CPT

## 2018-02-18 PROCEDURE — 97110 THERAPEUTIC EXERCISES: CPT

## 2018-02-18 PROCEDURE — 94760 N-INVAS EAR/PLS OXIMETRY 1: CPT

## 2018-02-18 PROCEDURE — 94799 UNLISTED PULMONARY SVC/PX: CPT

## 2018-02-18 PROCEDURE — 99232 SBSQ HOSP IP/OBS MODERATE 35: CPT | Performed by: INTERNAL MEDICINE

## 2018-02-18 PROCEDURE — 94640 AIRWAY INHALATION TREATMENT: CPT

## 2018-02-18 PROCEDURE — 85730 THROMBOPLASTIN TIME PARTIAL: CPT

## 2018-02-18 PROCEDURE — 94660 CPAP INITIATION&MGMT: CPT

## 2018-02-18 PROCEDURE — 25010000002 HEPARIN (PORCINE) PER 1000 UNITS

## 2018-02-18 PROCEDURE — 83735 ASSAY OF MAGNESIUM: CPT | Performed by: INTERNAL MEDICINE

## 2018-02-18 PROCEDURE — 63710000001 INSULIN DETEMIR PER 5 UNITS: Performed by: INTERNAL MEDICINE

## 2018-02-18 PROCEDURE — 80048 BASIC METABOLIC PNL TOTAL CA: CPT | Performed by: INTERNAL MEDICINE

## 2018-02-18 PROCEDURE — 25010000002 PIPERACILLIN SOD-TAZOBACTAM PER 1 G: Performed by: INTERNAL MEDICINE

## 2018-02-18 PROCEDURE — 25010000002 FUROSEMIDE PER 20 MG: Performed by: INTERNAL MEDICINE

## 2018-02-18 RX ADMIN — METOPROLOL TARTRATE 50 MG: 50 TABLET ORAL at 08:06

## 2018-02-18 RX ADMIN — ASPIRIN 325 MG: 325 TABLET, DELAYED RELEASE ORAL at 08:05

## 2018-02-18 RX ADMIN — OXYCODONE HYDROCHLORIDE AND ACETAMINOPHEN 1 TABLET: 10; 325 TABLET ORAL at 08:05

## 2018-02-18 RX ADMIN — INSULIN LISPRO 2 UNITS: 100 INJECTION, SOLUTION INTRAVENOUS; SUBCUTANEOUS at 08:07

## 2018-02-18 RX ADMIN — Medication 2 TABLET: at 20:54

## 2018-02-18 RX ADMIN — IPRATROPIUM BROMIDE AND ALBUTEROL SULFATE 3 ML: .5; 3 SOLUTION RESPIRATORY (INHALATION) at 03:05

## 2018-02-18 RX ADMIN — INSULIN LISPRO 2 UNITS: 100 INJECTION, SOLUTION INTRAVENOUS; SUBCUTANEOUS at 20:56

## 2018-02-18 RX ADMIN — OXYCODONE HYDROCHLORIDE AND ACETAMINOPHEN 1 TABLET: 10; 325 TABLET ORAL at 20:55

## 2018-02-18 RX ADMIN — NYSTATIN: 100000 POWDER TOPICAL at 20:54

## 2018-02-18 RX ADMIN — IPRATROPIUM BROMIDE AND ALBUTEROL SULFATE 3 ML: .5; 3 SOLUTION RESPIRATORY (INHALATION) at 19:05

## 2018-02-18 RX ADMIN — CLOPIDOGREL BISULFATE 75 MG: 75 TABLET ORAL at 08:05

## 2018-02-18 RX ADMIN — IPRATROPIUM BROMIDE AND ALBUTEROL SULFATE 3 ML: .5; 3 SOLUTION RESPIRATORY (INHALATION) at 23:16

## 2018-02-18 RX ADMIN — FUROSEMIDE 20 MG: 10 INJECTION, SOLUTION INTRAMUSCULAR; INTRAVENOUS at 11:50

## 2018-02-18 RX ADMIN — BUSPIRONE HYDROCHLORIDE 7.5 MG: 15 TABLET ORAL at 20:54

## 2018-02-18 RX ADMIN — FLUTICASONE PROPIONATE 1 SPRAY: 50 SPRAY, METERED NASAL at 08:06

## 2018-02-18 RX ADMIN — IPRATROPIUM BROMIDE AND ALBUTEROL SULFATE 3 ML: .5; 3 SOLUTION RESPIRATORY (INHALATION) at 16:11

## 2018-02-18 RX ADMIN — OXYCODONE HYDROCHLORIDE AND ACETAMINOPHEN 1 TABLET: 10; 325 TABLET ORAL at 04:23

## 2018-02-18 RX ADMIN — NYSTATIN: 100000 POWDER TOPICAL at 08:06

## 2018-02-18 RX ADMIN — Medication 5 MG: at 20:54

## 2018-02-18 RX ADMIN — ATORVASTATIN CALCIUM 80 MG: 40 TABLET, FILM COATED ORAL at 20:54

## 2018-02-18 RX ADMIN — IPRATROPIUM BROMIDE AND ALBUTEROL SULFATE 3 ML: .5; 3 SOLUTION RESPIRATORY (INHALATION) at 07:50

## 2018-02-18 RX ADMIN — CASTOR OIL AND BALSAM, PERU: 788; 87 OINTMENT TOPICAL at 20:54

## 2018-02-18 RX ADMIN — PREGABALIN 100 MG: 100 CAPSULE ORAL at 06:17

## 2018-02-18 RX ADMIN — ALPRAZOLAM 1 MG: 1 TABLET ORAL at 20:54

## 2018-02-18 RX ADMIN — PREGABALIN 100 MG: 100 CAPSULE ORAL at 20:55

## 2018-02-18 RX ADMIN — TAZOBACTAM SODIUM AND PIPERACILLIN SODIUM 4.5 G: 500; 4 INJECTION, SOLUTION INTRAVENOUS at 04:24

## 2018-02-18 RX ADMIN — CLONAZEPAM 0.25 MG: 0.5 TABLET ORAL at 20:55

## 2018-02-18 RX ADMIN — INSULIN DETEMIR 15 UNITS: 100 INJECTION, SOLUTION SUBCUTANEOUS at 08:06

## 2018-02-18 RX ADMIN — TAZOBACTAM SODIUM AND PIPERACILLIN SODIUM 4.5 G: 500; 4 INJECTION, SOLUTION INTRAVENOUS at 20:54

## 2018-02-18 RX ADMIN — PREGABALIN 100 MG: 100 CAPSULE ORAL at 13:53

## 2018-02-18 RX ADMIN — ARFORMOTEROL TARTRATE 15 MCG: 15 SOLUTION RESPIRATORY (INHALATION) at 19:05

## 2018-02-18 RX ADMIN — HEPARIN SODIUM 16 UNITS/KG/HR: 10000 INJECTION, SOLUTION INTRAVENOUS at 20:52

## 2018-02-18 RX ADMIN — TAZOBACTAM SODIUM AND PIPERACILLIN SODIUM 4.5 G: 500; 4 INJECTION, SOLUTION INTRAVENOUS at 11:50

## 2018-02-18 RX ADMIN — ALPRAZOLAM 1 MG: 1 TABLET ORAL at 08:06

## 2018-02-18 RX ADMIN — INSULIN LISPRO 2 UNITS: 100 INJECTION, SOLUTION INTRAVENOUS; SUBCUTANEOUS at 18:24

## 2018-02-18 RX ADMIN — BUSPIRONE HYDROCHLORIDE 7.5 MG: 15 TABLET ORAL at 08:06

## 2018-02-18 RX ADMIN — CASTOR OIL AND BALSAM, PERU: 788; 87 OINTMENT TOPICAL at 08:05

## 2018-02-18 RX ADMIN — CETIRIZINE HYDROCHLORIDE 10 MG: 10 TABLET, FILM COATED ORAL at 20:54

## 2018-02-18 RX ADMIN — PANTOPRAZOLE SODIUM 40 MG: 40 TABLET, DELAYED RELEASE ORAL at 06:17

## 2018-02-18 RX ADMIN — LEVOTHYROXINE SODIUM 112 MCG: 112 TABLET ORAL at 06:17

## 2018-02-18 RX ADMIN — ARFORMOTEROL TARTRATE 15 MCG: 15 SOLUTION RESPIRATORY (INHALATION) at 07:53

## 2018-02-18 RX ADMIN — METOPROLOL TARTRATE 50 MG: 50 TABLET ORAL at 20:54

## 2018-02-19 ENCOUNTER — APPOINTMENT (OUTPATIENT)
Dept: CARDIOLOGY | Facility: HOSPITAL | Age: 63
End: 2018-02-19
Attending: INTERNAL MEDICINE

## 2018-02-19 ENCOUNTER — APPOINTMENT (OUTPATIENT)
Dept: GENERAL RADIOLOGY | Facility: HOSPITAL | Age: 63
End: 2018-02-19

## 2018-02-19 PROBLEM — R79.89 ELEVATED TROPONIN: Status: ACTIVE | Noted: 2018-02-13

## 2018-02-19 PROBLEM — R77.8 ELEVATED TROPONIN: Status: ACTIVE | Noted: 2018-02-13

## 2018-02-19 LAB
ALBUMIN SERPL-MCNC: 3.5 G/DL (ref 3.2–4.8)
ALBUMIN/GLOB SERPL: 1.3 G/DL (ref 1.5–2.5)
ALP SERPL-CCNC: 63 U/L (ref 25–100)
ALT SERPL W P-5'-P-CCNC: 41 U/L (ref 7–40)
ANION GAP SERPL CALCULATED.3IONS-SCNC: 8 MMOL/L (ref 3–11)
APTT PPP: 62.8 SECONDS (ref 55–70)
AST SERPL-CCNC: 75 U/L (ref 0–33)
BASOPHILS # BLD AUTO: 0.07 10*3/MM3 (ref 0–0.2)
BASOPHILS NFR BLD AUTO: 0.6 % (ref 0–1)
BH CV ECHO MEAS - CONTRAST EF (2CH): 64.7 ML/M^2
BH CV ECHO MEAS - CONTRAST EF 4CH: 40.4 ML/M^2
BH CV ECHO MEAS - EDV(MOD-SP2): 136 ML
BH CV ECHO MEAS - EDV(MOD-SP4): 166 ML
BH CV ECHO MEAS - EF(MOD-SP2): 64.7 %
BH CV ECHO MEAS - EF(MOD-SP4): 54 %
BH CV ECHO MEAS - ESV(MOD-SP2): 48 ML
BH CV ECHO MEAS - ESV(MOD-SP4): 99 ML
BH CV ECHO MEAS - LVLD AP2: 7.8 CM
BH CV ECHO MEAS - LVLD AP4: 7.9 CM
BH CV ECHO MEAS - LVLS AP2: 7.5 CM
BH CV ECHO MEAS - LVLS AP4: 7.7 CM
BH CV ECHO MEAS - SV(MOD-SP2): 88 ML
BH CV ECHO MEAS - SV(MOD-SP4): 67 ML
BH CV VAS BP RIGHT ARM: NORMAL MMHG
BILIRUB SERPL-MCNC: 0.2 MG/DL (ref 0.3–1.2)
BNP SERPL-MCNC: 717 PG/ML (ref 0–100)
BUN BLD-MCNC: 24 MG/DL (ref 9–23)
BUN/CREAT SERPL: 24 (ref 7–25)
CALCIUM SPEC-SCNC: 9.2 MG/DL (ref 8.7–10.4)
CHLORIDE SERPL-SCNC: 105 MMOL/L (ref 99–109)
CO2 SERPL-SCNC: 31 MMOL/L (ref 20–31)
CREAT BLD-MCNC: 1 MG/DL (ref 0.6–1.3)
CRP SERPL-MCNC: 4.86 MG/DL (ref 0–1)
DEPRECATED RDW RBC AUTO: 52 FL (ref 37–54)
EOSINOPHIL # BLD AUTO: 0.63 10*3/MM3 (ref 0–0.3)
EOSINOPHIL NFR BLD AUTO: 5.7 % (ref 0–3)
ERYTHROCYTE [DISTWIDTH] IN BLOOD BY AUTOMATED COUNT: 14.4 % (ref 11.3–14.5)
GFR SERPL CREATININE-BSD FRML MDRD: 56 ML/MIN/1.73
GLOBULIN UR ELPH-MCNC: 2.8 GM/DL
GLUCOSE BLD-MCNC: 142 MG/DL (ref 70–100)
GLUCOSE BLDC GLUCOMTR-MCNC: 143 MG/DL (ref 70–130)
GLUCOSE BLDC GLUCOMTR-MCNC: 149 MG/DL (ref 70–130)
GLUCOSE BLDC GLUCOMTR-MCNC: 170 MG/DL (ref 70–130)
GLUCOSE BLDC GLUCOMTR-MCNC: 172 MG/DL (ref 70–130)
HCT VFR BLD AUTO: 33 % (ref 34.5–44)
HGB BLD-MCNC: 10.2 G/DL (ref 11.5–15.5)
IMM GRANULOCYTES # BLD: 0.26 10*3/MM3 (ref 0–0.03)
IMM GRANULOCYTES NFR BLD: 2.4 % (ref 0–0.6)
LYMPHOCYTES # BLD AUTO: 2.5 10*3/MM3 (ref 0.6–4.8)
LYMPHOCYTES NFR BLD AUTO: 22.6 % (ref 24–44)
MAGNESIUM SERPL-MCNC: 2.1 MG/DL (ref 1.3–2.7)
MAXIMAL PREDICTED HEART RATE: 156 BPM
MCH RBC QN AUTO: 30.4 PG (ref 27–31)
MCHC RBC AUTO-ENTMCNC: 30.9 G/DL (ref 32–36)
MCV RBC AUTO: 98.5 FL (ref 80–99)
MONOCYTES # BLD AUTO: 0.75 10*3/MM3 (ref 0–1)
MONOCYTES NFR BLD AUTO: 6.8 % (ref 0–12)
NEUTROPHILS # BLD AUTO: 6.83 10*3/MM3 (ref 1.5–8.3)
NEUTROPHILS NFR BLD AUTO: 61.9 % (ref 41–71)
PHOSPHATE SERPL-MCNC: 4.6 MG/DL (ref 2.4–5.1)
PLATELET # BLD AUTO: 279 10*3/MM3 (ref 150–450)
PMV BLD AUTO: 10.2 FL (ref 6–12)
POTASSIUM BLD-SCNC: 3.7 MMOL/L (ref 3.5–5.5)
PREALB SERPL-MCNC: 19.7 MG/DL (ref 10–40)
PROT SERPL-MCNC: 6.3 G/DL (ref 5.7–8.2)
RBC # BLD AUTO: 3.35 10*6/MM3 (ref 3.89–5.14)
SODIUM BLD-SCNC: 144 MMOL/L (ref 132–146)
STRESS TARGET HR: 133 BPM
WBC NRBC COR # BLD: 11.04 10*3/MM3 (ref 3.5–10.8)

## 2018-02-19 PROCEDURE — 83880 ASSAY OF NATRIURETIC PEPTIDE: CPT | Performed by: INTERNAL MEDICINE

## 2018-02-19 PROCEDURE — 25010000002 SULFUR HEXAFLUORIDE MICROSPH 60.7-25 MG RECONSTITUTED SUSPENSION: Performed by: INTERNAL MEDICINE

## 2018-02-19 PROCEDURE — 99233 SBSQ HOSP IP/OBS HIGH 50: CPT | Performed by: INTERNAL MEDICINE

## 2018-02-19 PROCEDURE — 25010000002 FUROSEMIDE PER 20 MG: Performed by: INTERNAL MEDICINE

## 2018-02-19 PROCEDURE — 86140 C-REACTIVE PROTEIN: CPT | Performed by: INTERNAL MEDICINE

## 2018-02-19 PROCEDURE — 94799 UNLISTED PULMONARY SVC/PX: CPT

## 2018-02-19 PROCEDURE — 63710000001 INSULIN DETEMIR PER 5 UNITS: Performed by: INTERNAL MEDICINE

## 2018-02-19 PROCEDURE — 80053 COMPREHEN METABOLIC PANEL: CPT | Performed by: INTERNAL MEDICINE

## 2018-02-19 PROCEDURE — 83735 ASSAY OF MAGNESIUM: CPT | Performed by: INTERNAL MEDICINE

## 2018-02-19 PROCEDURE — 97116 GAIT TRAINING THERAPY: CPT

## 2018-02-19 PROCEDURE — 71045 X-RAY EXAM CHEST 1 VIEW: CPT

## 2018-02-19 PROCEDURE — 94640 AIRWAY INHALATION TREATMENT: CPT

## 2018-02-19 PROCEDURE — 93308 TTE F-UP OR LMTD: CPT | Performed by: INTERNAL MEDICINE

## 2018-02-19 PROCEDURE — 97110 THERAPEUTIC EXERCISES: CPT

## 2018-02-19 PROCEDURE — 85025 COMPLETE CBC W/AUTO DIFF WBC: CPT | Performed by: INTERNAL MEDICINE

## 2018-02-19 PROCEDURE — 85730 THROMBOPLASTIN TIME PARTIAL: CPT

## 2018-02-19 PROCEDURE — 25010000002 PIPERACILLIN SOD-TAZOBACTAM PER 1 G: Performed by: INTERNAL MEDICINE

## 2018-02-19 PROCEDURE — 84100 ASSAY OF PHOSPHORUS: CPT | Performed by: INTERNAL MEDICINE

## 2018-02-19 PROCEDURE — 93308 TTE F-UP OR LMTD: CPT

## 2018-02-19 PROCEDURE — 84134 ASSAY OF PREALBUMIN: CPT | Performed by: INTERNAL MEDICINE

## 2018-02-19 PROCEDURE — 82962 GLUCOSE BLOOD TEST: CPT

## 2018-02-19 PROCEDURE — 94760 N-INVAS EAR/PLS OXIMETRY 1: CPT

## 2018-02-19 PROCEDURE — 25010000002 ENOXAPARIN PER 10 MG: Performed by: INTERNAL MEDICINE

## 2018-02-19 PROCEDURE — 99232 SBSQ HOSP IP/OBS MODERATE 35: CPT | Performed by: INTERNAL MEDICINE

## 2018-02-19 RX ORDER — FUROSEMIDE 10 MG/ML
20 INJECTION INTRAMUSCULAR; INTRAVENOUS EVERY 12 HOURS SCHEDULED
Status: COMPLETED | OUTPATIENT
Start: 2018-02-19 | End: 2018-02-20

## 2018-02-19 RX ADMIN — LEVOTHYROXINE SODIUM 112 MCG: 112 TABLET ORAL at 06:35

## 2018-02-19 RX ADMIN — FLUTICASONE PROPIONATE 1 SPRAY: 50 SPRAY, METERED NASAL at 21:36

## 2018-02-19 RX ADMIN — ATORVASTATIN CALCIUM 80 MG: 40 TABLET, FILM COATED ORAL at 21:38

## 2018-02-19 RX ADMIN — CASTOR OIL AND BALSAM, PERU: 788; 87 OINTMENT TOPICAL at 08:21

## 2018-02-19 RX ADMIN — PANTOPRAZOLE SODIUM 40 MG: 40 TABLET, DELAYED RELEASE ORAL at 06:35

## 2018-02-19 RX ADMIN — INSULIN DETEMIR 15 UNITS: 100 INJECTION, SOLUTION SUBCUTANEOUS at 08:19

## 2018-02-19 RX ADMIN — FUROSEMIDE 20 MG: 10 INJECTION, SOLUTION INTRAMUSCULAR; INTRAVENOUS at 00:37

## 2018-02-19 RX ADMIN — IPRATROPIUM BROMIDE AND ALBUTEROL SULFATE 3 ML: .5; 3 SOLUTION RESPIRATORY (INHALATION) at 19:08

## 2018-02-19 RX ADMIN — CASTOR OIL AND BALSAM, PERU: 788; 87 OINTMENT TOPICAL at 21:37

## 2018-02-19 RX ADMIN — IPRATROPIUM BROMIDE AND ALBUTEROL SULFATE 3 ML: .5; 3 SOLUTION RESPIRATORY (INHALATION) at 07:00

## 2018-02-19 RX ADMIN — METOPROLOL TARTRATE 25 MG: 25 TABLET ORAL at 21:37

## 2018-02-19 RX ADMIN — OXYCODONE HYDROCHLORIDE AND ACETAMINOPHEN 1 TABLET: 10; 325 TABLET ORAL at 13:52

## 2018-02-19 RX ADMIN — NYSTATIN: 100000 POWDER TOPICAL at 21:36

## 2018-02-19 RX ADMIN — TAZOBACTAM SODIUM AND PIPERACILLIN SODIUM 4.5 G: 500; 4 INJECTION, SOLUTION INTRAVENOUS at 03:27

## 2018-02-19 RX ADMIN — INSULIN LISPRO 2 UNITS: 100 INJECTION, SOLUTION INTRAVENOUS; SUBCUTANEOUS at 11:56

## 2018-02-19 RX ADMIN — SULFUR HEXAFLUORIDE: KIT at 13:30

## 2018-02-19 RX ADMIN — IPRATROPIUM BROMIDE AND ALBUTEROL SULFATE 3 ML: .5; 3 SOLUTION RESPIRATORY (INHALATION) at 10:57

## 2018-02-19 RX ADMIN — BUSPIRONE HYDROCHLORIDE 7.5 MG: 15 TABLET ORAL at 08:20

## 2018-02-19 RX ADMIN — ARFORMOTEROL TARTRATE 15 MCG: 15 SOLUTION RESPIRATORY (INHALATION) at 19:08

## 2018-02-19 RX ADMIN — TAZOBACTAM SODIUM AND PIPERACILLIN SODIUM 4.5 G: 500; 4 INJECTION, SOLUTION INTRAVENOUS at 11:55

## 2018-02-19 RX ADMIN — CLONAZEPAM 0.25 MG: 0.5 TABLET ORAL at 21:38

## 2018-02-19 RX ADMIN — METOPROLOL TARTRATE 25 MG: 25 TABLET ORAL at 08:19

## 2018-02-19 RX ADMIN — IPRATROPIUM BROMIDE AND ALBUTEROL SULFATE 3 ML: .5; 3 SOLUTION RESPIRATORY (INHALATION) at 03:25

## 2018-02-19 RX ADMIN — ASPIRIN 325 MG: 325 TABLET, DELAYED RELEASE ORAL at 08:21

## 2018-02-19 RX ADMIN — FUROSEMIDE 20 MG: 10 INJECTION, SOLUTION INTRAMUSCULAR; INTRAVENOUS at 11:55

## 2018-02-19 RX ADMIN — CLOPIDOGREL BISULFATE 75 MG: 75 TABLET ORAL at 08:20

## 2018-02-19 RX ADMIN — BUSPIRONE HYDROCHLORIDE 7.5 MG: 15 TABLET ORAL at 21:38

## 2018-02-19 RX ADMIN — CETIRIZINE HYDROCHLORIDE 10 MG: 10 TABLET, FILM COATED ORAL at 21:38

## 2018-02-19 RX ADMIN — ARFORMOTEROL TARTRATE 15 MCG: 15 SOLUTION RESPIRATORY (INHALATION) at 07:00

## 2018-02-19 RX ADMIN — OXYCODONE HYDROCHLORIDE AND ACETAMINOPHEN 1 TABLET: 10; 325 TABLET ORAL at 21:37

## 2018-02-19 RX ADMIN — PREGABALIN 100 MG: 100 CAPSULE ORAL at 13:53

## 2018-02-19 RX ADMIN — IPRATROPIUM BROMIDE AND ALBUTEROL SULFATE 3 ML: .5; 3 SOLUTION RESPIRATORY (INHALATION) at 15:23

## 2018-02-19 RX ADMIN — FLUTICASONE PROPIONATE 1 SPRAY: 50 SPRAY, METERED NASAL at 08:26

## 2018-02-19 RX ADMIN — TAZOBACTAM SODIUM AND PIPERACILLIN SODIUM 4.5 G: 500; 4 INJECTION, SOLUTION INTRAVENOUS at 22:07

## 2018-02-19 RX ADMIN — INSULIN LISPRO 2 UNITS: 100 INJECTION, SOLUTION INTRAVENOUS; SUBCUTANEOUS at 18:27

## 2018-02-19 RX ADMIN — ENOXAPARIN SODIUM 40 MG: 40 INJECTION SUBCUTANEOUS at 10:51

## 2018-02-19 RX ADMIN — NYSTATIN: 100000 POWDER TOPICAL at 08:21

## 2018-02-19 RX ADMIN — PREGABALIN 100 MG: 100 CAPSULE ORAL at 21:38

## 2018-02-20 LAB
ACT BLD: 279 SECONDS (ref 82–152)
ACT BLD: 356 SECONDS (ref 82–152)
ACT BLD: 731 SECONDS (ref 82–152)
ANION GAP SERPL CALCULATED.3IONS-SCNC: 6 MMOL/L (ref 3–11)
ARTERIAL PATENCY WRIST A: ABNORMAL
ATMOSPHERIC PRESS: ABNORMAL MMHG
ATMOSPHERIC PRESS: ABNORMAL MMHG
BASE EXCESS BLDA CALC-SCNC: 5.5 MMOL/L (ref 0–2)
BASE EXCESS BLDV CALC-SCNC: 7.4 MMOL/L (ref -2–2)
BASOPHILS # BLD AUTO: 0.11 10*3/MM3 (ref 0–0.2)
BASOPHILS NFR BLD AUTO: 0.9 % (ref 0–1)
BDY SITE: ABNORMAL
BDY SITE: ABNORMAL
BNP SERPL-MCNC: 996 PG/ML (ref 0–100)
BUN BLD-MCNC: 18 MG/DL (ref 9–23)
BUN/CREAT SERPL: 18 (ref 7–25)
CALCIUM SPEC-SCNC: 9.8 MG/DL (ref 8.7–10.4)
CHLORIDE SERPL-SCNC: 101 MMOL/L (ref 99–109)
CO2 BLDA-SCNC: 33.1 MMOL/L (ref 22–33)
CO2 BLDA-SCNC: 35.6 MMOL/L (ref 22–33)
CO2 SERPL-SCNC: 34 MMOL/L (ref 20–31)
COHGB MFR BLD: 1.2 % (ref 0–2)
COHGB MFR BLD: 1.4 % (ref 0–2)
CREAT BLD-MCNC: 1 MG/DL (ref 0.6–1.3)
DEPRECATED RDW RBC AUTO: 50.3 FL (ref 37–54)
EOSINOPHIL # BLD AUTO: 0.66 10*3/MM3 (ref 0–0.3)
EOSINOPHIL NFR BLD AUTO: 5.6 % (ref 0–3)
ERYTHROCYTE [DISTWIDTH] IN BLOOD BY AUTOMATED COUNT: 14.2 % (ref 11.3–14.5)
GFR SERPL CREATININE-BSD FRML MDRD: 56 ML/MIN/1.73
GLUCOSE BLD-MCNC: 165 MG/DL (ref 70–100)
GLUCOSE BLDC GLUCOMTR-MCNC: 160 MG/DL (ref 70–130)
GLUCOSE BLDC GLUCOMTR-MCNC: 161 MG/DL (ref 70–130)
GLUCOSE BLDC GLUCOMTR-MCNC: 163 MG/DL (ref 70–130)
HCO3 BLDA-SCNC: 31.5 MMOL/L (ref 20–26)
HCO3 BLDV-SCNC: 33.8 MMOL/L (ref 22–28)
HCT VFR BLD AUTO: 35.6 % (ref 34.5–44)
HCT VFR BLD CALC: 33.7 %
HGB BLD-MCNC: 11 G/DL (ref 11.5–15.5)
HGB BLDA-MCNC: 11 G/DL (ref 14–18)
HGB BLDA-MCNC: 11.1 G/DL (ref 14–18)
HOROWITZ INDEX BLD+IHG-RTO: 32 %
HOROWITZ INDEX BLD+IHG-RTO: 32 %
IMM GRANULOCYTES # BLD: 0.23 10*3/MM3 (ref 0–0.03)
IMM GRANULOCYTES NFR BLD: 2 % (ref 0–0.6)
LYMPHOCYTES # BLD AUTO: 1.85 10*3/MM3 (ref 0.6–4.8)
LYMPHOCYTES NFR BLD AUTO: 15.7 % (ref 24–44)
MAGNESIUM SERPL-MCNC: 2 MG/DL (ref 1.3–2.7)
MCH RBC QN AUTO: 30.1 PG (ref 27–31)
MCHC RBC AUTO-ENTMCNC: 30.9 G/DL (ref 32–36)
MCV RBC AUTO: 97.5 FL (ref 80–99)
METHGB BLD QL: 0.8 % (ref 0–1.5)
METHGB BLD QL: 0.9 % (ref 0–1.5)
MODALITY: ABNORMAL
MODALITY: ABNORMAL
MONOCYTES # BLD AUTO: 0.86 10*3/MM3 (ref 0–1)
MONOCYTES NFR BLD AUTO: 7.3 % (ref 0–12)
NEUTROPHILS # BLD AUTO: 8.07 10*3/MM3 (ref 1.5–8.3)
NEUTROPHILS NFR BLD AUTO: 68.5 % (ref 41–71)
OXYHGB MFR BLDV: 74 % (ref 94–99)
OXYHGB MFR BLDV: 95.7 % (ref 94–99)
PCO2 BLDA: 51.6 MM HG (ref 35–45)
PCO2 BLDV: 56.4 MM HG (ref 41–51)
PH BLDA: 7.39 PH UNITS (ref 7.35–7.45)
PH BLDV: 7.39 PH UNITS (ref 7.25–7.5)
PLATELET # BLD AUTO: 283 10*3/MM3 (ref 150–450)
PMV BLD AUTO: 10.5 FL (ref 6–12)
PO2 BLDA: 94.8 MM HG (ref 83–108)
PO2 BLDV: 41.5 MM HG (ref 27–53)
POTASSIUM BLD-SCNC: 3.1 MMOL/L (ref 3.5–5.5)
RBC # BLD AUTO: 3.65 10*6/MM3 (ref 3.89–5.14)
SODIUM BLD-SCNC: 141 MMOL/L (ref 132–146)
WBC NRBC COR # BLD: 11.78 10*3/MM3 (ref 3.5–10.8)

## 2018-02-20 PROCEDURE — 25010000002 FUROSEMIDE PER 20 MG: Performed by: INTERNAL MEDICINE

## 2018-02-20 PROCEDURE — 94799 UNLISTED PULMONARY SVC/PX: CPT

## 2018-02-20 PROCEDURE — C1894 INTRO/SHEATH, NON-LASER: HCPCS | Performed by: INTERNAL MEDICINE

## 2018-02-20 PROCEDURE — 97116 GAIT TRAINING THERAPY: CPT

## 2018-02-20 PROCEDURE — 83735 ASSAY OF MAGNESIUM: CPT | Performed by: INTERNAL MEDICINE

## 2018-02-20 PROCEDURE — 92921 PR PRQ TRLUML CORONARY ANGIOPLASTY ADDL BRANCH: CPT | Performed by: INTERNAL MEDICINE

## 2018-02-20 PROCEDURE — 97110 THERAPEUTIC EXERCISES: CPT

## 2018-02-20 PROCEDURE — 92928 PRQ TCAT PLMT NTRAC ST 1 LES: CPT | Performed by: INTERNAL MEDICINE

## 2018-02-20 PROCEDURE — 99232 SBSQ HOSP IP/OBS MODERATE 35: CPT | Performed by: INTERNAL MEDICINE

## 2018-02-20 PROCEDURE — 92978 ENDOLUMINL IVUS OCT C 1ST: CPT | Performed by: INTERNAL MEDICINE

## 2018-02-20 PROCEDURE — C1887 CATHETER, GUIDING: HCPCS | Performed by: INTERNAL MEDICINE

## 2018-02-20 PROCEDURE — C1725 CATH, TRANSLUMIN NON-LASER: HCPCS | Performed by: INTERNAL MEDICINE

## 2018-02-20 PROCEDURE — 94760 N-INVAS EAR/PLS OXIMETRY 1: CPT

## 2018-02-20 PROCEDURE — 93571 IV DOP VEL&/PRESS C FLO 1ST: CPT | Performed by: INTERNAL MEDICINE

## 2018-02-20 PROCEDURE — 85025 COMPLETE CBC W/AUTO DIFF WBC: CPT | Performed by: INTERNAL MEDICINE

## 2018-02-20 PROCEDURE — 94660 CPAP INITIATION&MGMT: CPT

## 2018-02-20 PROCEDURE — C1874 STENT, COATED/COV W/DEL SYS: HCPCS | Performed by: INTERNAL MEDICINE

## 2018-02-20 PROCEDURE — C1769 GUIDE WIRE: HCPCS | Performed by: INTERNAL MEDICINE

## 2018-02-20 PROCEDURE — B241ZZ3 ULTRASONOGRAPHY OF MULTIPLE CORONARY ARTERIES, INTRAVASCULAR: ICD-10-PCS | Performed by: INTERNAL MEDICINE

## 2018-02-20 PROCEDURE — B2151ZZ FLUOROSCOPY OF LEFT HEART USING LOW OSMOLAR CONTRAST: ICD-10-PCS | Performed by: INTERNAL MEDICINE

## 2018-02-20 PROCEDURE — 93572 IV DOP VEL&/PRESS C FLO EA: CPT | Performed by: INTERNAL MEDICINE

## 2018-02-20 PROCEDURE — 25010000002 HEPARIN (PORCINE) PER 1000 UNITS: Performed by: INTERNAL MEDICINE

## 2018-02-20 PROCEDURE — 93460 R&L HRT ART/VENTRICLE ANGIO: CPT | Performed by: INTERNAL MEDICINE

## 2018-02-20 PROCEDURE — 97167 OT EVAL HIGH COMPLEX 60 MIN: CPT

## 2018-02-20 PROCEDURE — 63710000001 INSULIN DETEMIR PER 5 UNITS: Performed by: INTERNAL MEDICINE

## 2018-02-20 PROCEDURE — 25010000002 HYDRALAZINE PER 20 MG: Performed by: INTERNAL MEDICINE

## 2018-02-20 PROCEDURE — 82805 BLOOD GASES W/O2 SATURATION: CPT | Performed by: INTERNAL MEDICINE

## 2018-02-20 PROCEDURE — C1753 CATH, INTRAVAS ULTRASOUND: HCPCS | Performed by: INTERNAL MEDICINE

## 2018-02-20 PROCEDURE — B2111ZZ FLUOROSCOPY OF MULTIPLE CORONARY ARTERIES USING LOW OSMOLAR CONTRAST: ICD-10-PCS | Performed by: INTERNAL MEDICINE

## 2018-02-20 PROCEDURE — 36600 WITHDRAWAL OF ARTERIAL BLOOD: CPT | Performed by: INTERNAL MEDICINE

## 2018-02-20 PROCEDURE — 4A023N8 MEASUREMENT OF CARDIAC SAMPLING AND PRESSURE, BILATERAL, PERCUTANEOUS APPROACH: ICD-10-PCS | Performed by: INTERNAL MEDICINE

## 2018-02-20 PROCEDURE — 25010000002 FENTANYL CITRATE (PF) 100 MCG/2ML SOLUTION: Performed by: INTERNAL MEDICINE

## 2018-02-20 PROCEDURE — 94640 AIRWAY INHALATION TREATMENT: CPT

## 2018-02-20 PROCEDURE — C9600 PERC DRUG-EL COR STENT SING: HCPCS | Performed by: INTERNAL MEDICINE

## 2018-02-20 PROCEDURE — 83880 ASSAY OF NATRIURETIC PEPTIDE: CPT | Performed by: INTERNAL MEDICINE

## 2018-02-20 PROCEDURE — 97530 THERAPEUTIC ACTIVITIES: CPT

## 2018-02-20 PROCEDURE — 85347 COAGULATION TIME ACTIVATED: CPT

## 2018-02-20 PROCEDURE — 80048 BASIC METABOLIC PNL TOTAL CA: CPT | Performed by: INTERNAL MEDICINE

## 2018-02-20 PROCEDURE — C9601 PERC DRUG-EL COR STENT BRAN: HCPCS | Performed by: INTERNAL MEDICINE

## 2018-02-20 PROCEDURE — 25010000002 MIDAZOLAM PER 1 MG: Performed by: INTERNAL MEDICINE

## 2018-02-20 PROCEDURE — 0270346 DILATION OF CORONARY ARTERY, ONE ARTERY, BIFURCATION, WITH DRUG-ELUTING INTRALUMINAL DEVICE, PERCUTANEOUS APPROACH: ICD-10-PCS | Performed by: INTERNAL MEDICINE

## 2018-02-20 PROCEDURE — 82962 GLUCOSE BLOOD TEST: CPT

## 2018-02-20 PROCEDURE — 4A033BC MEASUREMENT OF ARTERIAL PRESSURE, CORONARY, PERCUTANEOUS APPROACH: ICD-10-PCS | Performed by: INTERNAL MEDICINE

## 2018-02-20 PROCEDURE — 25010000002 PIPERACILLIN SOD-TAZOBACTAM PER 1 G: Performed by: INTERNAL MEDICINE

## 2018-02-20 DEVICE — IMPLANTABLE DEVICE: Type: IMPLANTABLE DEVICE | Status: FUNCTIONAL

## 2018-02-20 DEVICE — EVEROLIMUS-ELUTING PLATINUM CHROMIUM CORONARY STENT SYSTEM
Type: IMPLANTABLE DEVICE | Status: FUNCTIONAL
Brand: SYNERGY™

## 2018-02-20 RX ORDER — POTASSIUM CHLORIDE 1.5 G/1.77G
40 POWDER, FOR SOLUTION ORAL AS NEEDED
Status: DISCONTINUED | OUTPATIENT
Start: 2018-02-20 | End: 2018-02-27 | Stop reason: HOSPADM

## 2018-02-20 RX ORDER — LIDOCAINE HYDROCHLORIDE 10 MG/ML
INJECTION, SOLUTION EPIDURAL; INFILTRATION; INTRACAUDAL; PERINEURAL AS NEEDED
Status: DISCONTINUED | OUTPATIENT
Start: 2018-02-20 | End: 2018-02-20 | Stop reason: HOSPADM

## 2018-02-20 RX ORDER — POTASSIUM CHLORIDE 750 MG/1
40 CAPSULE, EXTENDED RELEASE ORAL AS NEEDED
Status: DISCONTINUED | OUTPATIENT
Start: 2018-02-20 | End: 2018-02-27 | Stop reason: HOSPADM

## 2018-02-20 RX ORDER — CARVEDILOL 6.25 MG/1
6.25 TABLET ORAL EVERY 12 HOURS SCHEDULED
Status: DISCONTINUED | OUTPATIENT
Start: 2018-02-20 | End: 2018-02-27 | Stop reason: HOSPADM

## 2018-02-20 RX ORDER — FENTANYL CITRATE 50 UG/ML
INJECTION, SOLUTION INTRAMUSCULAR; INTRAVENOUS AS NEEDED
Status: DISCONTINUED | OUTPATIENT
Start: 2018-02-20 | End: 2018-02-20 | Stop reason: HOSPADM

## 2018-02-20 RX ORDER — HYDRALAZINE HYDROCHLORIDE 20 MG/ML
INJECTION INTRAMUSCULAR; INTRAVENOUS AS NEEDED
Status: DISCONTINUED | OUTPATIENT
Start: 2018-02-20 | End: 2018-02-20 | Stop reason: HOSPADM

## 2018-02-20 RX ORDER — HEPARIN SODIUM 1000 [USP'U]/ML
INJECTION, SOLUTION INTRAVENOUS; SUBCUTANEOUS AS NEEDED
Status: DISCONTINUED | OUTPATIENT
Start: 2018-02-20 | End: 2018-02-20 | Stop reason: HOSPADM

## 2018-02-20 RX ORDER — MIDAZOLAM HYDROCHLORIDE 1 MG/ML
INJECTION INTRAMUSCULAR; INTRAVENOUS AS NEEDED
Status: DISCONTINUED | OUTPATIENT
Start: 2018-02-20 | End: 2018-02-20 | Stop reason: HOSPADM

## 2018-02-20 RX ADMIN — PREGABALIN 100 MG: 100 CAPSULE ORAL at 21:24

## 2018-02-20 RX ADMIN — INSULIN LISPRO 2 UNITS: 100 INJECTION, SOLUTION INTRAVENOUS; SUBCUTANEOUS at 09:24

## 2018-02-20 RX ADMIN — NYSTATIN: 100000 POWDER TOPICAL at 20:00

## 2018-02-20 RX ADMIN — PREGABALIN 100 MG: 100 CAPSULE ORAL at 13:07

## 2018-02-20 RX ADMIN — CETIRIZINE HYDROCHLORIDE 10 MG: 10 TABLET, FILM COATED ORAL at 20:01

## 2018-02-20 RX ADMIN — NYSTATIN: 100000 POWDER TOPICAL at 09:17

## 2018-02-20 RX ADMIN — OXYCODONE HYDROCHLORIDE AND ACETAMINOPHEN 1 TABLET: 10; 325 TABLET ORAL at 14:24

## 2018-02-20 RX ADMIN — CASTOR OIL AND BALSAM, PERU: 788; 87 OINTMENT TOPICAL at 20:45

## 2018-02-20 RX ADMIN — INSULIN DETEMIR 15 UNITS: 100 INJECTION, SOLUTION SUBCUTANEOUS at 09:08

## 2018-02-20 RX ADMIN — FLUTICASONE PROPIONATE 1 SPRAY: 50 SPRAY, METERED NASAL at 20:00

## 2018-02-20 RX ADMIN — FUROSEMIDE 20 MG: 10 INJECTION, SOLUTION INTRAMUSCULAR; INTRAVENOUS at 00:14

## 2018-02-20 RX ADMIN — FLUTICASONE PROPIONATE 1 SPRAY: 50 SPRAY, METERED NASAL at 09:18

## 2018-02-20 RX ADMIN — ARFORMOTEROL TARTRATE 15 MCG: 15 SOLUTION RESPIRATORY (INHALATION) at 07:38

## 2018-02-20 RX ADMIN — CLONAZEPAM 0.25 MG: 0.5 TABLET ORAL at 20:01

## 2018-02-20 RX ADMIN — PANTOPRAZOLE SODIUM 40 MG: 40 TABLET, DELAYED RELEASE ORAL at 05:57

## 2018-02-20 RX ADMIN — INSULIN LISPRO 2 UNITS: 100 INJECTION, SOLUTION INTRAVENOUS; SUBCUTANEOUS at 20:45

## 2018-02-20 RX ADMIN — IPRATROPIUM BROMIDE AND ALBUTEROL SULFATE 3 ML: .5; 3 SOLUTION RESPIRATORY (INHALATION) at 20:53

## 2018-02-20 RX ADMIN — TAZOBACTAM SODIUM AND PIPERACILLIN SODIUM 4.5 G: 500; 4 INJECTION, SOLUTION INTRAVENOUS at 13:02

## 2018-02-20 RX ADMIN — LEVOTHYROXINE SODIUM 112 MCG: 112 TABLET ORAL at 05:57

## 2018-02-20 RX ADMIN — ATORVASTATIN CALCIUM 80 MG: 40 TABLET, FILM COATED ORAL at 20:00

## 2018-02-20 RX ADMIN — OXYCODONE HYDROCHLORIDE AND ACETAMINOPHEN 1 TABLET: 10; 325 TABLET ORAL at 05:57

## 2018-02-20 RX ADMIN — CASTOR OIL AND BALSAM, PERU: 788; 87 OINTMENT TOPICAL at 09:17

## 2018-02-20 RX ADMIN — CLOPIDOGREL BISULFATE 75 MG: 75 TABLET ORAL at 09:07

## 2018-02-20 RX ADMIN — ARFORMOTEROL TARTRATE 15 MCG: 15 SOLUTION RESPIRATORY (INHALATION) at 20:53

## 2018-02-20 RX ADMIN — TAZOBACTAM SODIUM AND PIPERACILLIN SODIUM 4.5 G: 500; 4 INJECTION, SOLUTION INTRAVENOUS at 06:03

## 2018-02-20 RX ADMIN — CARVEDILOL 6.25 MG: 6.25 TABLET, FILM COATED ORAL at 20:00

## 2018-02-20 RX ADMIN — TAZOBACTAM SODIUM AND PIPERACILLIN SODIUM 4.5 G: 500; 4 INJECTION, SOLUTION INTRAVENOUS at 20:08

## 2018-02-20 RX ADMIN — ASPIRIN 325 MG: 325 TABLET, DELAYED RELEASE ORAL at 09:06

## 2018-02-20 RX ADMIN — PREGABALIN 100 MG: 100 CAPSULE ORAL at 05:57

## 2018-02-20 RX ADMIN — BUSPIRONE HYDROCHLORIDE 7.5 MG: 15 TABLET ORAL at 09:07

## 2018-02-20 RX ADMIN — IPRATROPIUM BROMIDE AND ALBUTEROL SULFATE 3 ML: .5; 3 SOLUTION RESPIRATORY (INHALATION) at 07:38

## 2018-02-20 RX ADMIN — IPRATROPIUM BROMIDE AND ALBUTEROL SULFATE 3 ML: .5; 3 SOLUTION RESPIRATORY (INHALATION) at 00:04

## 2018-02-20 RX ADMIN — ALPRAZOLAM 1 MG: 1 TABLET ORAL at 09:17

## 2018-02-20 RX ADMIN — BUSPIRONE HYDROCHLORIDE 7.5 MG: 15 TABLET ORAL at 20:01

## 2018-02-20 RX ADMIN — IPRATROPIUM BROMIDE AND ALBUTEROL SULFATE 3 ML: .5; 3 SOLUTION RESPIRATORY (INHALATION) at 12:16

## 2018-02-20 RX ADMIN — INSULIN LISPRO 2 UNITS: 100 INJECTION, SOLUTION INTRAVENOUS; SUBCUTANEOUS at 13:02

## 2018-02-20 RX ADMIN — FENTANYL 1 PATCH: 75 PATCH TRANSDERMAL at 00:15

## 2018-02-20 RX ADMIN — CARVEDILOL 6.25 MG: 6.25 TABLET, FILM COATED ORAL at 09:25

## 2018-02-20 RX ADMIN — IPRATROPIUM BROMIDE AND ALBUTEROL SULFATE 3 ML: .5; 3 SOLUTION RESPIRATORY (INHALATION) at 03:15

## 2018-02-20 RX ADMIN — OXYCODONE HYDROCHLORIDE AND ACETAMINOPHEN 1 TABLET: 10; 325 TABLET ORAL at 20:00

## 2018-02-21 LAB
ANION GAP SERPL CALCULATED.3IONS-SCNC: 9 MMOL/L (ref 3–11)
BUN BLD-MCNC: 13 MG/DL (ref 9–23)
BUN/CREAT SERPL: 13 (ref 7–25)
CALCIUM SPEC-SCNC: 10.1 MG/DL (ref 8.7–10.4)
CHLORIDE SERPL-SCNC: 103 MMOL/L (ref 99–109)
CO2 SERPL-SCNC: 28 MMOL/L (ref 20–31)
CREAT BLD-MCNC: 1 MG/DL (ref 0.6–1.3)
DEPRECATED RDW RBC AUTO: 52.8 FL (ref 37–54)
ERYTHROCYTE [DISTWIDTH] IN BLOOD BY AUTOMATED COUNT: 14.7 % (ref 11.3–14.5)
GFR SERPL CREATININE-BSD FRML MDRD: 56 ML/MIN/1.73
GLUCOSE BLD-MCNC: 149 MG/DL (ref 70–100)
GLUCOSE BLDC GLUCOMTR-MCNC: 146 MG/DL (ref 70–130)
GLUCOSE BLDC GLUCOMTR-MCNC: 164 MG/DL (ref 70–130)
GLUCOSE BLDC GLUCOMTR-MCNC: 186 MG/DL (ref 70–130)
GLUCOSE BLDC GLUCOMTR-MCNC: 265 MG/DL (ref 70–130)
HCT VFR BLD AUTO: 34.9 % (ref 34.5–44)
HGB BLD-MCNC: 10.7 G/DL (ref 11.5–15.5)
MCH RBC QN AUTO: 30.8 PG (ref 27–31)
MCHC RBC AUTO-ENTMCNC: 30.7 G/DL (ref 32–36)
MCV RBC AUTO: 100.6 FL (ref 80–99)
PLATELET # BLD AUTO: 255 10*3/MM3 (ref 150–450)
PMV BLD AUTO: 10.4 FL (ref 6–12)
POTASSIUM BLD-SCNC: 3.8 MMOL/L (ref 3.5–5.5)
RBC # BLD AUTO: 3.47 10*6/MM3 (ref 3.89–5.14)
SODIUM BLD-SCNC: 140 MMOL/L (ref 132–146)
WBC NRBC COR # BLD: 11.86 10*3/MM3 (ref 3.5–10.8)

## 2018-02-21 PROCEDURE — 97110 THERAPEUTIC EXERCISES: CPT

## 2018-02-21 PROCEDURE — 94799 UNLISTED PULMONARY SVC/PX: CPT

## 2018-02-21 PROCEDURE — 94660 CPAP INITIATION&MGMT: CPT

## 2018-02-21 PROCEDURE — 82962 GLUCOSE BLOOD TEST: CPT

## 2018-02-21 PROCEDURE — 97116 GAIT TRAINING THERAPY: CPT

## 2018-02-21 PROCEDURE — 25010000002 PIPERACILLIN SOD-TAZOBACTAM PER 1 G: Performed by: PHYSICIAN ASSISTANT

## 2018-02-21 PROCEDURE — 94760 N-INVAS EAR/PLS OXIMETRY 1: CPT

## 2018-02-21 PROCEDURE — 80048 BASIC METABOLIC PNL TOTAL CA: CPT | Performed by: INTERNAL MEDICINE

## 2018-02-21 PROCEDURE — 63710000001 INSULIN LISPRO (HUMAN) PER 5 UNITS: Performed by: NURSE PRACTITIONER

## 2018-02-21 PROCEDURE — 94640 AIRWAY INHALATION TREATMENT: CPT

## 2018-02-21 PROCEDURE — 25010000002 PIPERACILLIN SOD-TAZOBACTAM PER 1 G: Performed by: INTERNAL MEDICINE

## 2018-02-21 PROCEDURE — 85027 COMPLETE CBC AUTOMATED: CPT | Performed by: INTERNAL MEDICINE

## 2018-02-21 PROCEDURE — 63710000001 INSULIN DETEMIR PER 5 UNITS: Performed by: INTERNAL MEDICINE

## 2018-02-21 PROCEDURE — P9612 CATHETERIZE FOR URINE SPEC: HCPCS

## 2018-02-21 PROCEDURE — 99233 SBSQ HOSP IP/OBS HIGH 50: CPT | Performed by: FAMILY MEDICINE

## 2018-02-21 PROCEDURE — 25010000002 ENOXAPARIN PER 10 MG: Performed by: FAMILY MEDICINE

## 2018-02-21 PROCEDURE — 99232 SBSQ HOSP IP/OBS MODERATE 35: CPT | Performed by: INTERNAL MEDICINE

## 2018-02-21 RX ORDER — OXYCODONE AND ACETAMINOPHEN 10; 325 MG/1; MG/1
1 TABLET ORAL EVERY 6 HOURS PRN
Status: DISCONTINUED | OUTPATIENT
Start: 2018-02-21 | End: 2018-02-27 | Stop reason: HOSPADM

## 2018-02-21 RX ORDER — PANTOPRAZOLE SODIUM 40 MG/1
40 TABLET, DELAYED RELEASE ORAL EVERY MORNING
Status: DISCONTINUED | OUTPATIENT
Start: 2018-02-22 | End: 2018-02-27 | Stop reason: HOSPADM

## 2018-02-21 RX ORDER — ASPIRIN 81 MG/1
81 TABLET ORAL DAILY
Status: DISCONTINUED | OUTPATIENT
Start: 2018-02-21 | End: 2018-02-27 | Stop reason: HOSPADM

## 2018-02-21 RX ORDER — FUROSEMIDE 40 MG/1
40 TABLET ORAL
Status: DISCONTINUED | OUTPATIENT
Start: 2018-02-21 | End: 2018-02-25

## 2018-02-21 RX ADMIN — FENTANYL 1 PATCH: 75 PATCH TRANSDERMAL at 23:28

## 2018-02-21 RX ADMIN — IPRATROPIUM BROMIDE AND ALBUTEROL SULFATE 3 ML: .5; 3 SOLUTION RESPIRATORY (INHALATION) at 23:30

## 2018-02-21 RX ADMIN — OXYCODONE HYDROCHLORIDE AND ACETAMINOPHEN 1 TABLET: 10; 325 TABLET ORAL at 15:43

## 2018-02-21 RX ADMIN — INSULIN DETEMIR 15 UNITS: 100 INJECTION, SOLUTION SUBCUTANEOUS at 08:20

## 2018-02-21 RX ADMIN — SACUBITRIL AND VALSARTAN 1 TABLET: 24; 26 TABLET, FILM COATED ORAL at 21:02

## 2018-02-21 RX ADMIN — TAZOBACTAM SODIUM AND PIPERACILLIN SODIUM 4.5 G: 500; 4 INJECTION, SOLUTION INTRAVENOUS at 11:18

## 2018-02-21 RX ADMIN — TAZOBACTAM SODIUM AND PIPERACILLIN SODIUM 4.5 G: 500; 4 INJECTION, SOLUTION INTRAVENOUS at 04:54

## 2018-02-21 RX ADMIN — ATORVASTATIN CALCIUM 80 MG: 40 TABLET, FILM COATED ORAL at 21:01

## 2018-02-21 RX ADMIN — FUROSEMIDE 40 MG: 40 TABLET ORAL at 08:20

## 2018-02-21 RX ADMIN — CASTOR OIL AND BALSAM, PERU: 788; 87 OINTMENT TOPICAL at 21:03

## 2018-02-21 RX ADMIN — IPRATROPIUM BROMIDE AND ALBUTEROL SULFATE 3 ML: .5; 3 SOLUTION RESPIRATORY (INHALATION) at 19:03

## 2018-02-21 RX ADMIN — PANTOPRAZOLE SODIUM 40 MG: 40 TABLET, DELAYED RELEASE ORAL at 05:00

## 2018-02-21 RX ADMIN — IPRATROPIUM BROMIDE AND ALBUTEROL SULFATE 3 ML: .5; 3 SOLUTION RESPIRATORY (INHALATION) at 00:32

## 2018-02-21 RX ADMIN — CASTOR OIL AND BALSAM, PERU: 788; 87 OINTMENT TOPICAL at 08:21

## 2018-02-21 RX ADMIN — BUSPIRONE HYDROCHLORIDE 7.5 MG: 15 TABLET ORAL at 21:02

## 2018-02-21 RX ADMIN — INSULIN LISPRO 6 UNITS: 100 INJECTION, SOLUTION INTRAVENOUS; SUBCUTANEOUS at 11:18

## 2018-02-21 RX ADMIN — NYSTATIN: 100000 POWDER TOPICAL at 08:21

## 2018-02-21 RX ADMIN — CLOPIDOGREL BISULFATE 75 MG: 75 TABLET ORAL at 08:20

## 2018-02-21 RX ADMIN — CARVEDILOL 6.25 MG: 6.25 TABLET, FILM COATED ORAL at 08:20

## 2018-02-21 RX ADMIN — SACUBITRIL AND VALSARTAN 1 TABLET: 24; 26 TABLET, FILM COATED ORAL at 08:20

## 2018-02-21 RX ADMIN — PREGABALIN 100 MG: 100 CAPSULE ORAL at 23:24

## 2018-02-21 RX ADMIN — FLUTICASONE PROPIONATE 1 SPRAY: 50 SPRAY, METERED NASAL at 21:04

## 2018-02-21 RX ADMIN — NYSTATIN: 100000 POWDER TOPICAL at 21:04

## 2018-02-21 RX ADMIN — CLONAZEPAM 0.25 MG: 0.5 TABLET ORAL at 21:06

## 2018-02-21 RX ADMIN — CARVEDILOL 6.25 MG: 6.25 TABLET, FILM COATED ORAL at 21:02

## 2018-02-21 RX ADMIN — BUSPIRONE HYDROCHLORIDE 7.5 MG: 15 TABLET ORAL at 08:20

## 2018-02-21 RX ADMIN — INSULIN LISPRO 2 UNITS: 100 INJECTION, SOLUTION INTRAVENOUS; SUBCUTANEOUS at 17:03

## 2018-02-21 RX ADMIN — ENOXAPARIN SODIUM 40 MG: 40 INJECTION SUBCUTANEOUS at 14:03

## 2018-02-21 RX ADMIN — ARFORMOTEROL TARTRATE 15 MCG: 15 SOLUTION RESPIRATORY (INHALATION) at 07:58

## 2018-02-21 RX ADMIN — INSULIN LISPRO 2 UNITS: 100 INJECTION, SOLUTION INTRAVENOUS; SUBCUTANEOUS at 21:14

## 2018-02-21 RX ADMIN — IPRATROPIUM BROMIDE AND ALBUTEROL SULFATE 3 ML: .5; 3 SOLUTION RESPIRATORY (INHALATION) at 04:34

## 2018-02-21 RX ADMIN — OXYCODONE HYDROCHLORIDE AND ACETAMINOPHEN 1 TABLET: 10; 325 TABLET ORAL at 21:07

## 2018-02-21 RX ADMIN — IPRATROPIUM BROMIDE AND ALBUTEROL SULFATE 3 ML: .5; 3 SOLUTION RESPIRATORY (INHALATION) at 07:40

## 2018-02-21 RX ADMIN — PREGABALIN 100 MG: 100 CAPSULE ORAL at 05:00

## 2018-02-21 RX ADMIN — IPRATROPIUM BROMIDE AND ALBUTEROL SULFATE 3 ML: .5; 3 SOLUTION RESPIRATORY (INHALATION) at 11:53

## 2018-02-21 RX ADMIN — FUROSEMIDE 40 MG: 40 TABLET ORAL at 17:03

## 2018-02-21 RX ADMIN — IPRATROPIUM BROMIDE AND ALBUTEROL SULFATE 3 ML: .5; 3 SOLUTION RESPIRATORY (INHALATION) at 15:46

## 2018-02-21 RX ADMIN — OXYCODONE HYDROCHLORIDE AND ACETAMINOPHEN 1 TABLET: 10; 325 TABLET ORAL at 11:18

## 2018-02-21 RX ADMIN — LEVOTHYROXINE SODIUM 112 MCG: 112 TABLET ORAL at 05:00

## 2018-02-21 RX ADMIN — PREGABALIN 100 MG: 100 CAPSULE ORAL at 14:03

## 2018-02-21 RX ADMIN — ARFORMOTEROL TARTRATE 15 MCG: 15 SOLUTION RESPIRATORY (INHALATION) at 19:03

## 2018-02-21 RX ADMIN — ASPIRIN 81 MG: 81 TABLET, COATED ORAL at 08:20

## 2018-02-21 RX ADMIN — CETIRIZINE HYDROCHLORIDE 10 MG: 10 TABLET, FILM COATED ORAL at 21:02

## 2018-02-22 LAB
ANION GAP SERPL CALCULATED.3IONS-SCNC: 8 MMOL/L (ref 3–11)
BUN BLD-MCNC: 16 MG/DL (ref 9–23)
BUN/CREAT SERPL: 16 (ref 7–25)
CALCIUM SPEC-SCNC: 9.1 MG/DL (ref 8.7–10.4)
CHLORIDE SERPL-SCNC: 99 MMOL/L (ref 99–109)
CO2 SERPL-SCNC: 34 MMOL/L (ref 20–31)
CREAT BLD-MCNC: 1 MG/DL (ref 0.6–1.3)
DEPRECATED RDW RBC AUTO: 51.5 FL (ref 37–54)
ERYTHROCYTE [DISTWIDTH] IN BLOOD BY AUTOMATED COUNT: 14.5 % (ref 11.3–14.5)
GFR SERPL CREATININE-BSD FRML MDRD: 56 ML/MIN/1.73
GLUCOSE BLD-MCNC: 158 MG/DL (ref 70–100)
GLUCOSE BLDC GLUCOMTR-MCNC: 156 MG/DL (ref 70–130)
GLUCOSE BLDC GLUCOMTR-MCNC: 170 MG/DL (ref 70–130)
GLUCOSE BLDC GLUCOMTR-MCNC: 201 MG/DL (ref 70–130)
GLUCOSE BLDC GLUCOMTR-MCNC: 210 MG/DL (ref 70–130)
HCT VFR BLD AUTO: 35.2 % (ref 34.5–44)
HGB BLD-MCNC: 10.7 G/DL (ref 11.5–15.5)
MCH RBC QN AUTO: 29.6 PG (ref 27–31)
MCHC RBC AUTO-ENTMCNC: 30.4 G/DL (ref 32–36)
MCV RBC AUTO: 97.2 FL (ref 80–99)
PLATELET # BLD AUTO: 256 10*3/MM3 (ref 150–450)
PMV BLD AUTO: 10.6 FL (ref 6–12)
POTASSIUM BLD-SCNC: 3.4 MMOL/L (ref 3.5–5.5)
POTASSIUM BLD-SCNC: 3.8 MMOL/L (ref 3.5–5.5)
RBC # BLD AUTO: 3.62 10*6/MM3 (ref 3.89–5.14)
SODIUM BLD-SCNC: 141 MMOL/L (ref 132–146)
WBC NRBC COR # BLD: 12.24 10*3/MM3 (ref 3.5–10.8)

## 2018-02-22 PROCEDURE — 94660 CPAP INITIATION&MGMT: CPT

## 2018-02-22 PROCEDURE — 99231 SBSQ HOSP IP/OBS SF/LOW 25: CPT | Performed by: FAMILY MEDICINE

## 2018-02-22 PROCEDURE — 97110 THERAPEUTIC EXERCISES: CPT

## 2018-02-22 PROCEDURE — 94799 UNLISTED PULMONARY SVC/PX: CPT

## 2018-02-22 PROCEDURE — 84132 ASSAY OF SERUM POTASSIUM: CPT | Performed by: FAMILY MEDICINE

## 2018-02-22 PROCEDURE — 25010000002 ENOXAPARIN PER 10 MG: Performed by: FAMILY MEDICINE

## 2018-02-22 PROCEDURE — 94640 AIRWAY INHALATION TREATMENT: CPT

## 2018-02-22 PROCEDURE — 63710000001 INSULIN DETEMIR PER 5 UNITS: Performed by: INTERNAL MEDICINE

## 2018-02-22 PROCEDURE — 97116 GAIT TRAINING THERAPY: CPT

## 2018-02-22 PROCEDURE — 25010000002 PIPERACILLIN SOD-TAZOBACTAM PER 1 G: Performed by: PHYSICIAN ASSISTANT

## 2018-02-22 PROCEDURE — 82962 GLUCOSE BLOOD TEST: CPT

## 2018-02-22 PROCEDURE — 99232 SBSQ HOSP IP/OBS MODERATE 35: CPT | Performed by: INTERNAL MEDICINE

## 2018-02-22 PROCEDURE — 85027 COMPLETE CBC AUTOMATED: CPT | Performed by: FAMILY MEDICINE

## 2018-02-22 PROCEDURE — 94760 N-INVAS EAR/PLS OXIMETRY 1: CPT

## 2018-02-22 PROCEDURE — 80048 BASIC METABOLIC PNL TOTAL CA: CPT | Performed by: INTERNAL MEDICINE

## 2018-02-22 RX ADMIN — PANTOPRAZOLE SODIUM 40 MG: 40 TABLET, DELAYED RELEASE ORAL at 08:03

## 2018-02-22 RX ADMIN — SACUBITRIL AND VALSARTAN 1 TABLET: 24; 26 TABLET, FILM COATED ORAL at 20:35

## 2018-02-22 RX ADMIN — ARFORMOTEROL TARTRATE 15 MCG: 15 SOLUTION RESPIRATORY (INHALATION) at 07:21

## 2018-02-22 RX ADMIN — POTASSIUM CHLORIDE 40 MEQ: 750 CAPSULE, EXTENDED RELEASE ORAL at 12:06

## 2018-02-22 RX ADMIN — CLONAZEPAM 0.25 MG: 0.5 TABLET ORAL at 20:37

## 2018-02-22 RX ADMIN — TAZOBACTAM SODIUM AND PIPERACILLIN SODIUM 4.5 G: 500; 4 INJECTION, SOLUTION INTRAVENOUS at 19:41

## 2018-02-22 RX ADMIN — IPRATROPIUM BROMIDE AND ALBUTEROL SULFATE 3 ML: .5; 3 SOLUTION RESPIRATORY (INHALATION) at 23:21

## 2018-02-22 RX ADMIN — CARVEDILOL 6.25 MG: 6.25 TABLET, FILM COATED ORAL at 20:36

## 2018-02-22 RX ADMIN — FUROSEMIDE 40 MG: 40 TABLET ORAL at 17:18

## 2018-02-22 RX ADMIN — TAZOBACTAM SODIUM AND PIPERACILLIN SODIUM 4.5 G: 500; 4 INJECTION, SOLUTION INTRAVENOUS at 12:06

## 2018-02-22 RX ADMIN — NYSTATIN: 100000 POWDER TOPICAL at 20:47

## 2018-02-22 RX ADMIN — NYSTATIN: 100000 POWDER TOPICAL at 08:02

## 2018-02-22 RX ADMIN — IPRATROPIUM BROMIDE AND ALBUTEROL SULFATE 3 ML: .5; 3 SOLUTION RESPIRATORY (INHALATION) at 19:12

## 2018-02-22 RX ADMIN — TAZOBACTAM SODIUM AND PIPERACILLIN SODIUM 4.5 G: 500; 4 INJECTION, SOLUTION INTRAVENOUS at 04:00

## 2018-02-22 RX ADMIN — INSULIN LISPRO 2 UNITS: 100 INJECTION, SOLUTION INTRAVENOUS; SUBCUTANEOUS at 08:03

## 2018-02-22 RX ADMIN — CLONAZEPAM 0.25 MG: 0.5 TABLET ORAL at 14:05

## 2018-02-22 RX ADMIN — IPRATROPIUM BROMIDE AND ALBUTEROL SULFATE 3 ML: .5; 3 SOLUTION RESPIRATORY (INHALATION) at 10:32

## 2018-02-22 RX ADMIN — OXYCODONE HYDROCHLORIDE AND ACETAMINOPHEN 1 TABLET: 10; 325 TABLET ORAL at 09:34

## 2018-02-22 RX ADMIN — PREGABALIN 100 MG: 100 CAPSULE ORAL at 14:02

## 2018-02-22 RX ADMIN — LEVOTHYROXINE SODIUM 112 MCG: 112 TABLET ORAL at 05:54

## 2018-02-22 RX ADMIN — BUSPIRONE HYDROCHLORIDE 7.5 MG: 15 TABLET ORAL at 20:34

## 2018-02-22 RX ADMIN — OXYCODONE HYDROCHLORIDE AND ACETAMINOPHEN 1 TABLET: 10; 325 TABLET ORAL at 16:10

## 2018-02-22 RX ADMIN — ENOXAPARIN SODIUM 40 MG: 40 INJECTION SUBCUTANEOUS at 14:01

## 2018-02-22 RX ADMIN — IPRATROPIUM BROMIDE AND ALBUTEROL SULFATE 3 ML: .5; 3 SOLUTION RESPIRATORY (INHALATION) at 07:16

## 2018-02-22 RX ADMIN — FUROSEMIDE 40 MG: 40 TABLET ORAL at 08:03

## 2018-02-22 RX ADMIN — SACUBITRIL AND VALSARTAN 1 TABLET: 24; 26 TABLET, FILM COATED ORAL at 08:03

## 2018-02-22 RX ADMIN — IPRATROPIUM BROMIDE AND ALBUTEROL SULFATE 3 ML: .5; 3 SOLUTION RESPIRATORY (INHALATION) at 03:03

## 2018-02-22 RX ADMIN — BUSPIRONE HYDROCHLORIDE 7.5 MG: 15 TABLET ORAL at 08:03

## 2018-02-22 RX ADMIN — PREGABALIN 100 MG: 100 CAPSULE ORAL at 05:54

## 2018-02-22 RX ADMIN — IPRATROPIUM BROMIDE AND ALBUTEROL SULFATE 3 ML: .5; 3 SOLUTION RESPIRATORY (INHALATION) at 15:31

## 2018-02-22 RX ADMIN — INSULIN LISPRO 2 UNITS: 100 INJECTION, SOLUTION INTRAVENOUS; SUBCUTANEOUS at 20:48

## 2018-02-22 RX ADMIN — CETIRIZINE HYDROCHLORIDE 10 MG: 10 TABLET, FILM COATED ORAL at 20:35

## 2018-02-22 RX ADMIN — ATORVASTATIN CALCIUM 80 MG: 40 TABLET, FILM COATED ORAL at 20:34

## 2018-02-22 RX ADMIN — ARFORMOTEROL TARTRATE 15 MCG: 15 SOLUTION RESPIRATORY (INHALATION) at 19:12

## 2018-02-22 RX ADMIN — PREGABALIN 100 MG: 100 CAPSULE ORAL at 20:35

## 2018-02-22 RX ADMIN — INSULIN DETEMIR 15 UNITS: 100 INJECTION, SOLUTION SUBCUTANEOUS at 08:02

## 2018-02-22 RX ADMIN — POTASSIUM CHLORIDE 40 MEQ: 750 CAPSULE, EXTENDED RELEASE ORAL at 08:03

## 2018-02-22 RX ADMIN — CARVEDILOL 6.25 MG: 6.25 TABLET, FILM COATED ORAL at 08:03

## 2018-02-22 RX ADMIN — OXYCODONE HYDROCHLORIDE AND ACETAMINOPHEN 1 TABLET: 10; 325 TABLET ORAL at 20:37

## 2018-02-22 RX ADMIN — ASPIRIN 81 MG: 81 TABLET, COATED ORAL at 08:03

## 2018-02-22 RX ADMIN — INSULIN LISPRO 4 UNITS: 100 INJECTION, SOLUTION INTRAVENOUS; SUBCUTANEOUS at 12:06

## 2018-02-22 RX ADMIN — CASTOR OIL AND BALSAM, PERU: 788; 87 OINTMENT TOPICAL at 08:02

## 2018-02-22 RX ADMIN — CASTOR OIL AND BALSAM, PERU: 788; 87 OINTMENT TOPICAL at 20:48

## 2018-02-22 RX ADMIN — CLOPIDOGREL BISULFATE 75 MG: 75 TABLET ORAL at 08:03

## 2018-02-22 RX ADMIN — INSULIN LISPRO 4 UNITS: 100 INJECTION, SOLUTION INTRAVENOUS; SUBCUTANEOUS at 17:18

## 2018-02-23 LAB
ANION GAP SERPL CALCULATED.3IONS-SCNC: 7 MMOL/L (ref 3–11)
BUN BLD-MCNC: 15 MG/DL (ref 9–23)
BUN/CREAT SERPL: 15 (ref 7–25)
CALCIUM SPEC-SCNC: 9.4 MG/DL (ref 8.7–10.4)
CHLORIDE SERPL-SCNC: 102 MMOL/L (ref 99–109)
CO2 SERPL-SCNC: 33 MMOL/L (ref 20–31)
CREAT BLD-MCNC: 1 MG/DL (ref 0.6–1.3)
DEPRECATED RDW RBC AUTO: 50.2 FL (ref 37–54)
ERYTHROCYTE [DISTWIDTH] IN BLOOD BY AUTOMATED COUNT: 14.3 % (ref 11.3–14.5)
GFR SERPL CREATININE-BSD FRML MDRD: 56 ML/MIN/1.73
GLUCOSE BLD-MCNC: 165 MG/DL (ref 70–100)
GLUCOSE BLDC GLUCOMTR-MCNC: 150 MG/DL (ref 70–130)
GLUCOSE BLDC GLUCOMTR-MCNC: 165 MG/DL (ref 70–130)
GLUCOSE BLDC GLUCOMTR-MCNC: 179 MG/DL (ref 70–130)
GLUCOSE BLDC GLUCOMTR-MCNC: 190 MG/DL (ref 70–130)
HCT VFR BLD AUTO: 34.6 % (ref 34.5–44)
HGB BLD-MCNC: 10.9 G/DL (ref 11.5–15.5)
MCH RBC QN AUTO: 30.5 PG (ref 27–31)
MCHC RBC AUTO-ENTMCNC: 31.5 G/DL (ref 32–36)
MCV RBC AUTO: 96.9 FL (ref 80–99)
PLATELET # BLD AUTO: 260 10*3/MM3 (ref 150–450)
PMV BLD AUTO: 10.8 FL (ref 6–12)
POTASSIUM BLD-SCNC: 3.5 MMOL/L (ref 3.5–5.5)
POTASSIUM BLD-SCNC: 4.3 MMOL/L (ref 3.5–5.5)
RBC # BLD AUTO: 3.57 10*6/MM3 (ref 3.89–5.14)
SODIUM BLD-SCNC: 142 MMOL/L (ref 132–146)
WBC NRBC COR # BLD: 11.6 10*3/MM3 (ref 3.5–10.8)

## 2018-02-23 PROCEDURE — 97530 THERAPEUTIC ACTIVITIES: CPT

## 2018-02-23 PROCEDURE — 80048 BASIC METABOLIC PNL TOTAL CA: CPT | Performed by: INTERNAL MEDICINE

## 2018-02-23 PROCEDURE — 94760 N-INVAS EAR/PLS OXIMETRY 1: CPT

## 2018-02-23 PROCEDURE — 82962 GLUCOSE BLOOD TEST: CPT

## 2018-02-23 PROCEDURE — 84132 ASSAY OF SERUM POTASSIUM: CPT | Performed by: FAMILY MEDICINE

## 2018-02-23 PROCEDURE — 25010000002 ENOXAPARIN PER 10 MG: Performed by: FAMILY MEDICINE

## 2018-02-23 PROCEDURE — 99232 SBSQ HOSP IP/OBS MODERATE 35: CPT | Performed by: INTERNAL MEDICINE

## 2018-02-23 PROCEDURE — 94799 UNLISTED PULMONARY SVC/PX: CPT

## 2018-02-23 PROCEDURE — 25010000002 PIPERACILLIN SOD-TAZOBACTAM PER 1 G: Performed by: PHYSICIAN ASSISTANT

## 2018-02-23 PROCEDURE — 99231 SBSQ HOSP IP/OBS SF/LOW 25: CPT | Performed by: FAMILY MEDICINE

## 2018-02-23 PROCEDURE — 94660 CPAP INITIATION&MGMT: CPT

## 2018-02-23 PROCEDURE — 63710000001 INSULIN DETEMIR PER 5 UNITS: Performed by: INTERNAL MEDICINE

## 2018-02-23 PROCEDURE — 85027 COMPLETE CBC AUTOMATED: CPT | Performed by: FAMILY MEDICINE

## 2018-02-23 PROCEDURE — 94640 AIRWAY INHALATION TREATMENT: CPT

## 2018-02-23 RX ADMIN — POTASSIUM CHLORIDE 40 MEQ: 750 CAPSULE, EXTENDED RELEASE ORAL at 08:32

## 2018-02-23 RX ADMIN — SACUBITRIL AND VALSARTAN 1 TABLET: 24; 26 TABLET, FILM COATED ORAL at 21:04

## 2018-02-23 RX ADMIN — INSULIN LISPRO 2 UNITS: 100 INJECTION, SOLUTION INTRAVENOUS; SUBCUTANEOUS at 12:42

## 2018-02-23 RX ADMIN — PREGABALIN 100 MG: 100 CAPSULE ORAL at 05:27

## 2018-02-23 RX ADMIN — BUSPIRONE HYDROCHLORIDE 7.5 MG: 15 TABLET ORAL at 08:32

## 2018-02-23 RX ADMIN — ATORVASTATIN CALCIUM 80 MG: 40 TABLET, FILM COATED ORAL at 21:03

## 2018-02-23 RX ADMIN — PREGABALIN 100 MG: 100 CAPSULE ORAL at 21:04

## 2018-02-23 RX ADMIN — FENTANYL 1 PATCH: 75 PATCH TRANSDERMAL at 23:58

## 2018-02-23 RX ADMIN — CETIRIZINE HYDROCHLORIDE 10 MG: 10 TABLET, FILM COATED ORAL at 21:03

## 2018-02-23 RX ADMIN — PANTOPRAZOLE SODIUM 40 MG: 40 TABLET, DELAYED RELEASE ORAL at 08:33

## 2018-02-23 RX ADMIN — INSULIN LISPRO 2 UNITS: 100 INJECTION, SOLUTION INTRAVENOUS; SUBCUTANEOUS at 21:07

## 2018-02-23 RX ADMIN — CASTOR OIL AND BALSAM, PERU: 788; 87 OINTMENT TOPICAL at 21:04

## 2018-02-23 RX ADMIN — Medication 2 TABLET: at 21:02

## 2018-02-23 RX ADMIN — TAZOBACTAM SODIUM AND PIPERACILLIN SODIUM 4.5 G: 500; 4 INJECTION, SOLUTION INTRAVENOUS at 21:02

## 2018-02-23 RX ADMIN — NYSTATIN: 100000 POWDER TOPICAL at 21:04

## 2018-02-23 RX ADMIN — TAZOBACTAM SODIUM AND PIPERACILLIN SODIUM 4.5 G: 500; 4 INJECTION, SOLUTION INTRAVENOUS at 12:43

## 2018-02-23 RX ADMIN — NYSTATIN: 100000 POWDER TOPICAL at 08:34

## 2018-02-23 RX ADMIN — IPRATROPIUM BROMIDE AND ALBUTEROL SULFATE 3 ML: .5; 3 SOLUTION RESPIRATORY (INHALATION) at 07:27

## 2018-02-23 RX ADMIN — IPRATROPIUM BROMIDE AND ALBUTEROL SULFATE 3 ML: .5; 3 SOLUTION RESPIRATORY (INHALATION) at 12:05

## 2018-02-23 RX ADMIN — ARFORMOTEROL TARTRATE 15 MCG: 15 SOLUTION RESPIRATORY (INHALATION) at 19:32

## 2018-02-23 RX ADMIN — ASPIRIN 81 MG: 81 TABLET, COATED ORAL at 08:33

## 2018-02-23 RX ADMIN — IPRATROPIUM BROMIDE AND ALBUTEROL SULFATE 3 ML: .5; 3 SOLUTION RESPIRATORY (INHALATION) at 03:19

## 2018-02-23 RX ADMIN — IPRATROPIUM BROMIDE AND ALBUTEROL SULFATE 3 ML: .5; 3 SOLUTION RESPIRATORY (INHALATION) at 19:32

## 2018-02-23 RX ADMIN — OXYCODONE HYDROCHLORIDE AND ACETAMINOPHEN 1 TABLET: 10; 325 TABLET ORAL at 21:04

## 2018-02-23 RX ADMIN — LEVOTHYROXINE SODIUM 112 MCG: 112 TABLET ORAL at 05:27

## 2018-02-23 RX ADMIN — CLONAZEPAM 0.25 MG: 0.5 TABLET ORAL at 21:04

## 2018-02-23 RX ADMIN — TAZOBACTAM SODIUM AND PIPERACILLIN SODIUM 4.5 G: 500; 4 INJECTION, SOLUTION INTRAVENOUS at 04:15

## 2018-02-23 RX ADMIN — CARVEDILOL 6.25 MG: 6.25 TABLET, FILM COATED ORAL at 21:03

## 2018-02-23 RX ADMIN — OXYCODONE HYDROCHLORIDE AND ACETAMINOPHEN 1 TABLET: 10; 325 TABLET ORAL at 08:42

## 2018-02-23 RX ADMIN — CLOPIDOGREL BISULFATE 75 MG: 75 TABLET ORAL at 08:32

## 2018-02-23 RX ADMIN — PREGABALIN 100 MG: 100 CAPSULE ORAL at 14:06

## 2018-02-23 RX ADMIN — FUROSEMIDE 40 MG: 40 TABLET ORAL at 17:17

## 2018-02-23 RX ADMIN — INSULIN DETEMIR 15 UNITS: 100 INJECTION, SOLUTION SUBCUTANEOUS at 08:33

## 2018-02-23 RX ADMIN — FUROSEMIDE 40 MG: 40 TABLET ORAL at 08:32

## 2018-02-23 RX ADMIN — CASTOR OIL AND BALSAM, PERU: 788; 87 OINTMENT TOPICAL at 08:34

## 2018-02-23 RX ADMIN — INSULIN LISPRO 2 UNITS: 100 INJECTION, SOLUTION INTRAVENOUS; SUBCUTANEOUS at 08:32

## 2018-02-23 RX ADMIN — SACUBITRIL AND VALSARTAN 1 TABLET: 24; 26 TABLET, FILM COATED ORAL at 08:33

## 2018-02-23 RX ADMIN — POTASSIUM CHLORIDE 40 MEQ: 750 CAPSULE, EXTENDED RELEASE ORAL at 12:42

## 2018-02-23 RX ADMIN — CARVEDILOL 6.25 MG: 6.25 TABLET, FILM COATED ORAL at 08:32

## 2018-02-23 RX ADMIN — ARFORMOTEROL TARTRATE 15 MCG: 15 SOLUTION RESPIRATORY (INHALATION) at 07:27

## 2018-02-23 RX ADMIN — BUSPIRONE HYDROCHLORIDE 7.5 MG: 15 TABLET ORAL at 21:03

## 2018-02-23 RX ADMIN — IPRATROPIUM BROMIDE AND ALBUTEROL SULFATE 3 ML: .5; 3 SOLUTION RESPIRATORY (INHALATION) at 15:57

## 2018-02-23 RX ADMIN — ENOXAPARIN SODIUM 40 MG: 40 INJECTION SUBCUTANEOUS at 14:05

## 2018-02-23 RX ADMIN — OXYCODONE HYDROCHLORIDE AND ACETAMINOPHEN 1 TABLET: 10; 325 TABLET ORAL at 14:26

## 2018-02-23 RX ADMIN — INSULIN LISPRO 2 UNITS: 100 INJECTION, SOLUTION INTRAVENOUS; SUBCUTANEOUS at 17:17

## 2018-02-24 LAB
ANION GAP SERPL CALCULATED.3IONS-SCNC: 10 MMOL/L (ref 3–11)
BUN BLD-MCNC: 21 MG/DL (ref 9–23)
BUN/CREAT SERPL: 17.5 (ref 7–25)
CALCIUM SPEC-SCNC: 9.8 MG/DL (ref 8.7–10.4)
CHLORIDE SERPL-SCNC: 98 MMOL/L (ref 99–109)
CO2 SERPL-SCNC: 32 MMOL/L (ref 20–31)
CREAT BLD-MCNC: 1.2 MG/DL (ref 0.6–1.3)
GFR SERPL CREATININE-BSD FRML MDRD: 45 ML/MIN/1.73
GLUCOSE BLD-MCNC: 157 MG/DL (ref 70–100)
GLUCOSE BLDC GLUCOMTR-MCNC: 140 MG/DL (ref 70–130)
GLUCOSE BLDC GLUCOMTR-MCNC: 172 MG/DL (ref 70–130)
GLUCOSE BLDC GLUCOMTR-MCNC: 218 MG/DL (ref 70–130)
GLUCOSE BLDC GLUCOMTR-MCNC: 222 MG/DL (ref 70–130)
POTASSIUM BLD-SCNC: 4 MMOL/L (ref 3.5–5.5)
SODIUM BLD-SCNC: 140 MMOL/L (ref 132–146)

## 2018-02-24 PROCEDURE — 80048 BASIC METABOLIC PNL TOTAL CA: CPT | Performed by: INTERNAL MEDICINE

## 2018-02-24 PROCEDURE — 63710000001 INSULIN DETEMIR PER 5 UNITS: Performed by: INTERNAL MEDICINE

## 2018-02-24 PROCEDURE — 99232 SBSQ HOSP IP/OBS MODERATE 35: CPT | Performed by: FAMILY MEDICINE

## 2018-02-24 PROCEDURE — 82962 GLUCOSE BLOOD TEST: CPT

## 2018-02-24 PROCEDURE — 94640 AIRWAY INHALATION TREATMENT: CPT

## 2018-02-24 PROCEDURE — 94799 UNLISTED PULMONARY SVC/PX: CPT

## 2018-02-24 PROCEDURE — 25010000002 ENOXAPARIN PER 10 MG: Performed by: FAMILY MEDICINE

## 2018-02-24 PROCEDURE — 25010000002 PIPERACILLIN SOD-TAZOBACTAM PER 1 G: Performed by: PHYSICIAN ASSISTANT

## 2018-02-24 RX ADMIN — PREGABALIN 100 MG: 100 CAPSULE ORAL at 16:05

## 2018-02-24 RX ADMIN — PREGABALIN 100 MG: 100 CAPSULE ORAL at 06:42

## 2018-02-24 RX ADMIN — INSULIN LISPRO 2 UNITS: 100 INJECTION, SOLUTION INTRAVENOUS; SUBCUTANEOUS at 09:25

## 2018-02-24 RX ADMIN — CLONAZEPAM 0.25 MG: 0.5 TABLET ORAL at 20:47

## 2018-02-24 RX ADMIN — NYSTATIN: 100000 POWDER TOPICAL at 20:56

## 2018-02-24 RX ADMIN — INSULIN DETEMIR 15 UNITS: 100 INJECTION, SOLUTION SUBCUTANEOUS at 09:29

## 2018-02-24 RX ADMIN — ALPRAZOLAM 1 MG: 1 TABLET ORAL at 14:34

## 2018-02-24 RX ADMIN — OXYCODONE HYDROCHLORIDE AND ACETAMINOPHEN 1 TABLET: 10; 325 TABLET ORAL at 14:34

## 2018-02-24 RX ADMIN — IPRATROPIUM BROMIDE AND ALBUTEROL SULFATE 3 ML: .5; 3 SOLUTION RESPIRATORY (INHALATION) at 07:46

## 2018-02-24 RX ADMIN — ARFORMOTEROL TARTRATE 15 MCG: 15 SOLUTION RESPIRATORY (INHALATION) at 19:17

## 2018-02-24 RX ADMIN — CARVEDILOL 6.25 MG: 6.25 TABLET, FILM COATED ORAL at 09:26

## 2018-02-24 RX ADMIN — ASPIRIN 81 MG: 81 TABLET, COATED ORAL at 09:26

## 2018-02-24 RX ADMIN — BUSPIRONE HYDROCHLORIDE 7.5 MG: 15 TABLET ORAL at 09:25

## 2018-02-24 RX ADMIN — CETIRIZINE HYDROCHLORIDE 10 MG: 10 TABLET, FILM COATED ORAL at 20:48

## 2018-02-24 RX ADMIN — IPRATROPIUM BROMIDE AND ALBUTEROL SULFATE 3 ML: .5; 3 SOLUTION RESPIRATORY (INHALATION) at 03:53

## 2018-02-24 RX ADMIN — BUSPIRONE HYDROCHLORIDE 7.5 MG: 15 TABLET ORAL at 20:48

## 2018-02-24 RX ADMIN — OXYCODONE HYDROCHLORIDE AND ACETAMINOPHEN 1 TABLET: 10; 325 TABLET ORAL at 09:25

## 2018-02-24 RX ADMIN — LEVOTHYROXINE SODIUM 112 MCG: 112 TABLET ORAL at 06:42

## 2018-02-24 RX ADMIN — ATORVASTATIN CALCIUM 80 MG: 40 TABLET, FILM COATED ORAL at 20:47

## 2018-02-24 RX ADMIN — SACUBITRIL AND VALSARTAN 1 TABLET: 24; 26 TABLET, FILM COATED ORAL at 09:26

## 2018-02-24 RX ADMIN — TAZOBACTAM SODIUM AND PIPERACILLIN SODIUM 4.5 G: 500; 4 INJECTION, SOLUTION INTRAVENOUS at 04:35

## 2018-02-24 RX ADMIN — SACUBITRIL AND VALSARTAN 1 TABLET: 24; 26 TABLET, FILM COATED ORAL at 20:49

## 2018-02-24 RX ADMIN — NYSTATIN: 100000 POWDER TOPICAL at 10:00

## 2018-02-24 RX ADMIN — IPRATROPIUM BROMIDE AND ALBUTEROL SULFATE 3 ML: .5; 3 SOLUTION RESPIRATORY (INHALATION) at 11:37

## 2018-02-24 RX ADMIN — IPRATROPIUM BROMIDE AND ALBUTEROL SULFATE 3 ML: .5; 3 SOLUTION RESPIRATORY (INHALATION) at 19:18

## 2018-02-24 RX ADMIN — CARVEDILOL 6.25 MG: 6.25 TABLET, FILM COATED ORAL at 20:48

## 2018-02-24 RX ADMIN — IPRATROPIUM BROMIDE AND ALBUTEROL SULFATE 3 ML: .5; 3 SOLUTION RESPIRATORY (INHALATION) at 15:05

## 2018-02-24 RX ADMIN — FLUTICASONE PROPIONATE 1 SPRAY: 50 SPRAY, METERED NASAL at 09:25

## 2018-02-24 RX ADMIN — ARFORMOTEROL TARTRATE 15 MCG: 15 SOLUTION RESPIRATORY (INHALATION) at 07:46

## 2018-02-24 RX ADMIN — PANTOPRAZOLE SODIUM 40 MG: 40 TABLET, DELAYED RELEASE ORAL at 09:26

## 2018-02-24 RX ADMIN — TAZOBACTAM SODIUM AND PIPERACILLIN SODIUM 4.5 G: 500; 4 INJECTION, SOLUTION INTRAVENOUS at 20:47

## 2018-02-24 RX ADMIN — TAZOBACTAM SODIUM AND PIPERACILLIN SODIUM 4.5 G: 500; 4 INJECTION, SOLUTION INTRAVENOUS at 13:00

## 2018-02-24 RX ADMIN — CASTOR OIL AND BALSAM, PERU: 788; 87 OINTMENT TOPICAL at 10:00

## 2018-02-24 RX ADMIN — ENOXAPARIN SODIUM 40 MG: 40 INJECTION SUBCUTANEOUS at 16:05

## 2018-02-24 RX ADMIN — INSULIN LISPRO 4 UNITS: 100 INJECTION, SOLUTION INTRAVENOUS; SUBCUTANEOUS at 13:00

## 2018-02-24 RX ADMIN — IPRATROPIUM BROMIDE AND ALBUTEROL SULFATE 3 ML: .5; 3 SOLUTION RESPIRATORY (INHALATION) at 00:06

## 2018-02-24 RX ADMIN — PREGABALIN 100 MG: 100 CAPSULE ORAL at 22:37

## 2018-02-24 RX ADMIN — OXYCODONE HYDROCHLORIDE AND ACETAMINOPHEN 1 TABLET: 10; 325 TABLET ORAL at 20:48

## 2018-02-24 RX ADMIN — INSULIN LISPRO 4 UNITS: 100 INJECTION, SOLUTION INTRAVENOUS; SUBCUTANEOUS at 20:47

## 2018-02-24 RX ADMIN — CLOPIDOGREL BISULFATE 75 MG: 75 TABLET ORAL at 09:26

## 2018-02-24 RX ADMIN — CASTOR OIL AND BALSAM, PERU: 788; 87 OINTMENT TOPICAL at 20:54

## 2018-02-24 RX ADMIN — Medication 2 TABLET: at 20:49

## 2018-02-24 RX ADMIN — FUROSEMIDE 40 MG: 40 TABLET ORAL at 09:25

## 2018-02-25 LAB
ANION GAP SERPL CALCULATED.3IONS-SCNC: 12 MMOL/L (ref 3–11)
BUN BLD-MCNC: 26 MG/DL (ref 9–23)
BUN/CREAT SERPL: 15.3 (ref 7–25)
CALCIUM SPEC-SCNC: 9.6 MG/DL (ref 8.7–10.4)
CHLORIDE SERPL-SCNC: 100 MMOL/L (ref 99–109)
CO2 SERPL-SCNC: 31 MMOL/L (ref 20–31)
CREAT BLD-MCNC: 1.7 MG/DL (ref 0.6–1.3)
GFR SERPL CREATININE-BSD FRML MDRD: 30 ML/MIN/1.73
GLUCOSE BLD-MCNC: 194 MG/DL (ref 70–100)
GLUCOSE BLDC GLUCOMTR-MCNC: 168 MG/DL (ref 70–130)
GLUCOSE BLDC GLUCOMTR-MCNC: 181 MG/DL (ref 70–130)
GLUCOSE BLDC GLUCOMTR-MCNC: 208 MG/DL (ref 70–130)
GLUCOSE BLDC GLUCOMTR-MCNC: 213 MG/DL (ref 70–130)
POTASSIUM BLD-SCNC: 4 MMOL/L (ref 3.5–5.5)
SODIUM BLD-SCNC: 143 MMOL/L (ref 132–146)

## 2018-02-25 PROCEDURE — 25010000002 ENOXAPARIN PER 10 MG: Performed by: FAMILY MEDICINE

## 2018-02-25 PROCEDURE — 94799 UNLISTED PULMONARY SVC/PX: CPT

## 2018-02-25 PROCEDURE — 63710000001 INSULIN DETEMIR PER 5 UNITS: Performed by: INTERNAL MEDICINE

## 2018-02-25 PROCEDURE — 97610 LOW FREQUENCY NON-THERMAL US: CPT

## 2018-02-25 PROCEDURE — 97530 THERAPEUTIC ACTIVITIES: CPT

## 2018-02-25 PROCEDURE — 82962 GLUCOSE BLOOD TEST: CPT

## 2018-02-25 PROCEDURE — 80048 BASIC METABOLIC PNL TOTAL CA: CPT | Performed by: INTERNAL MEDICINE

## 2018-02-25 PROCEDURE — 94640 AIRWAY INHALATION TREATMENT: CPT

## 2018-02-25 PROCEDURE — 94760 N-INVAS EAR/PLS OXIMETRY 1: CPT

## 2018-02-25 PROCEDURE — 25010000002 PIPERACILLIN SOD-TAZOBACTAM PER 1 G: Performed by: PHYSICIAN ASSISTANT

## 2018-02-25 PROCEDURE — 97597 DBRDMT OPN WND 1ST 20 CM/<: CPT

## 2018-02-25 PROCEDURE — 99232 SBSQ HOSP IP/OBS MODERATE 35: CPT | Performed by: FAMILY MEDICINE

## 2018-02-25 RX ORDER — SODIUM CHLORIDE 9 MG/ML
75 INJECTION, SOLUTION INTRAVENOUS ONCE
Status: COMPLETED | OUTPATIENT
Start: 2018-02-25 | End: 2018-02-25

## 2018-02-25 RX ORDER — FUROSEMIDE 40 MG/1
40 TABLET ORAL DAILY
Status: DISCONTINUED | OUTPATIENT
Start: 2018-02-26 | End: 2018-02-27 | Stop reason: HOSPADM

## 2018-02-25 RX ADMIN — ENOXAPARIN SODIUM 40 MG: 40 INJECTION SUBCUTANEOUS at 13:31

## 2018-02-25 RX ADMIN — CASTOR OIL AND BALSAM, PERU: 788; 87 OINTMENT TOPICAL at 20:08

## 2018-02-25 RX ADMIN — INSULIN LISPRO 4 UNITS: 100 INJECTION, SOLUTION INTRAVENOUS; SUBCUTANEOUS at 12:03

## 2018-02-25 RX ADMIN — CLONAZEPAM 0.25 MG: 0.5 TABLET ORAL at 20:12

## 2018-02-25 RX ADMIN — CARVEDILOL 6.25 MG: 6.25 TABLET, FILM COATED ORAL at 20:05

## 2018-02-25 RX ADMIN — IPRATROPIUM BROMIDE AND ALBUTEROL SULFATE 3 ML: .5; 3 SOLUTION RESPIRATORY (INHALATION) at 20:19

## 2018-02-25 RX ADMIN — PREGABALIN 100 MG: 100 CAPSULE ORAL at 06:00

## 2018-02-25 RX ADMIN — PANTOPRAZOLE SODIUM 40 MG: 40 TABLET, DELAYED RELEASE ORAL at 08:15

## 2018-02-25 RX ADMIN — CASTOR OIL AND BALSAM, PERU: 788; 87 OINTMENT TOPICAL at 08:14

## 2018-02-25 RX ADMIN — INSULIN LISPRO 2 UNITS: 100 INJECTION, SOLUTION INTRAVENOUS; SUBCUTANEOUS at 21:49

## 2018-02-25 RX ADMIN — NYSTATIN: 100000 POWDER TOPICAL at 08:14

## 2018-02-25 RX ADMIN — IPRATROPIUM BROMIDE AND ALBUTEROL SULFATE 3 ML: .5; 3 SOLUTION RESPIRATORY (INHALATION) at 16:15

## 2018-02-25 RX ADMIN — CLOPIDOGREL BISULFATE 75 MG: 75 TABLET ORAL at 08:15

## 2018-02-25 RX ADMIN — OXYCODONE HYDROCHLORIDE AND ACETAMINOPHEN 1 TABLET: 10; 325 TABLET ORAL at 09:15

## 2018-02-25 RX ADMIN — INSULIN LISPRO 4 UNITS: 100 INJECTION, SOLUTION INTRAVENOUS; SUBCUTANEOUS at 17:01

## 2018-02-25 RX ADMIN — IPRATROPIUM BROMIDE AND ALBUTEROL SULFATE 3 ML: .5; 3 SOLUTION RESPIRATORY (INHALATION) at 00:11

## 2018-02-25 RX ADMIN — SODIUM CHLORIDE 75 ML/HR: 9 INJECTION, SOLUTION INTRAVENOUS at 15:43

## 2018-02-25 RX ADMIN — PREGABALIN 100 MG: 100 CAPSULE ORAL at 21:44

## 2018-02-25 RX ADMIN — LEVOTHYROXINE SODIUM 112 MCG: 112 TABLET ORAL at 06:00

## 2018-02-25 RX ADMIN — INSULIN LISPRO 2 UNITS: 100 INJECTION, SOLUTION INTRAVENOUS; SUBCUTANEOUS at 08:14

## 2018-02-25 RX ADMIN — SACUBITRIL AND VALSARTAN 1 TABLET: 24; 26 TABLET, FILM COATED ORAL at 08:15

## 2018-02-25 RX ADMIN — INSULIN DETEMIR 15 UNITS: 100 INJECTION, SOLUTION SUBCUTANEOUS at 08:17

## 2018-02-25 RX ADMIN — BUSPIRONE HYDROCHLORIDE 7.5 MG: 15 TABLET ORAL at 08:15

## 2018-02-25 RX ADMIN — IPRATROPIUM BROMIDE AND ALBUTEROL SULFATE 3 ML: .5; 3 SOLUTION RESPIRATORY (INHALATION) at 03:24

## 2018-02-25 RX ADMIN — Medication 2 TABLET: at 20:04

## 2018-02-25 RX ADMIN — FUROSEMIDE 40 MG: 40 TABLET ORAL at 08:15

## 2018-02-25 RX ADMIN — BUSPIRONE HYDROCHLORIDE 7.5 MG: 15 TABLET ORAL at 20:05

## 2018-02-25 RX ADMIN — ACETAMINOPHEN 650 MG: 325 TABLET, FILM COATED ORAL at 20:04

## 2018-02-25 RX ADMIN — TAZOBACTAM SODIUM AND PIPERACILLIN SODIUM 4.5 G: 500; 4 INJECTION, SOLUTION INTRAVENOUS at 05:06

## 2018-02-25 RX ADMIN — ARFORMOTEROL TARTRATE 15 MCG: 15 SOLUTION RESPIRATORY (INHALATION) at 08:23

## 2018-02-25 RX ADMIN — ASPIRIN 81 MG: 81 TABLET, COATED ORAL at 08:15

## 2018-02-25 RX ADMIN — IPRATROPIUM BROMIDE AND ALBUTEROL SULFATE 3 ML: .5; 3 SOLUTION RESPIRATORY (INHALATION) at 08:23

## 2018-02-25 RX ADMIN — PREGABALIN 100 MG: 100 CAPSULE ORAL at 13:32

## 2018-02-25 RX ADMIN — CETIRIZINE HYDROCHLORIDE 10 MG: 10 TABLET, FILM COATED ORAL at 20:05

## 2018-02-25 RX ADMIN — TAZOBACTAM SODIUM AND PIPERACILLIN SODIUM 4.5 G: 500; 4 INJECTION, SOLUTION INTRAVENOUS at 12:03

## 2018-02-25 RX ADMIN — ARFORMOTEROL TARTRATE 15 MCG: 15 SOLUTION RESPIRATORY (INHALATION) at 20:19

## 2018-02-25 RX ADMIN — TAZOBACTAM SODIUM AND PIPERACILLIN SODIUM 4.5 G: 500; 4 INJECTION, SOLUTION INTRAVENOUS at 20:05

## 2018-02-25 RX ADMIN — NYSTATIN: 100000 POWDER TOPICAL at 20:05

## 2018-02-25 RX ADMIN — ATORVASTATIN CALCIUM 80 MG: 40 TABLET, FILM COATED ORAL at 20:04

## 2018-02-25 RX ADMIN — SACUBITRIL AND VALSARTAN 1 TABLET: 24; 26 TABLET, FILM COATED ORAL at 20:12

## 2018-02-26 LAB
ANION GAP SERPL CALCULATED.3IONS-SCNC: 8 MMOL/L (ref 3–11)
BUN BLD-MCNC: 29 MG/DL (ref 9–23)
BUN/CREAT SERPL: 19.3 (ref 7–25)
CALCIUM SPEC-SCNC: 9.3 MG/DL (ref 8.7–10.4)
CHLORIDE SERPL-SCNC: 102 MMOL/L (ref 99–109)
CO2 SERPL-SCNC: 32 MMOL/L (ref 20–31)
CREAT BLD-MCNC: 1.5 MG/DL (ref 0.6–1.3)
GFR SERPL CREATININE-BSD FRML MDRD: 35 ML/MIN/1.73
GLUCOSE BLD-MCNC: 155 MG/DL (ref 70–100)
GLUCOSE BLDC GLUCOMTR-MCNC: 157 MG/DL (ref 70–130)
GLUCOSE BLDC GLUCOMTR-MCNC: 202 MG/DL (ref 70–130)
GLUCOSE BLDC GLUCOMTR-MCNC: 217 MG/DL (ref 70–130)
GLUCOSE BLDC GLUCOMTR-MCNC: 226 MG/DL (ref 70–130)
POTASSIUM BLD-SCNC: 3.8 MMOL/L (ref 3.5–5.5)
SODIUM BLD-SCNC: 142 MMOL/L (ref 132–146)

## 2018-02-26 PROCEDURE — 25010000002 PIPERACILLIN SOD-TAZOBACTAM PER 1 G: Performed by: FAMILY MEDICINE

## 2018-02-26 PROCEDURE — 80048 BASIC METABOLIC PNL TOTAL CA: CPT | Performed by: INTERNAL MEDICINE

## 2018-02-26 PROCEDURE — 25010000002 PIPERACILLIN SOD-TAZOBACTAM PER 1 G: Performed by: PHYSICIAN ASSISTANT

## 2018-02-26 PROCEDURE — 94799 UNLISTED PULMONARY SVC/PX: CPT

## 2018-02-26 PROCEDURE — 82962 GLUCOSE BLOOD TEST: CPT

## 2018-02-26 PROCEDURE — 94640 AIRWAY INHALATION TREATMENT: CPT

## 2018-02-26 PROCEDURE — 99232 SBSQ HOSP IP/OBS MODERATE 35: CPT | Performed by: INTERNAL MEDICINE

## 2018-02-26 PROCEDURE — 99231 SBSQ HOSP IP/OBS SF/LOW 25: CPT | Performed by: FAMILY MEDICINE

## 2018-02-26 PROCEDURE — 25010000002 ENOXAPARIN PER 10 MG: Performed by: FAMILY MEDICINE

## 2018-02-26 PROCEDURE — 63710000001 INSULIN DETEMIR PER 5 UNITS: Performed by: INTERNAL MEDICINE

## 2018-02-26 PROCEDURE — 97116 GAIT TRAINING THERAPY: CPT | Performed by: PHYSICAL THERAPIST

## 2018-02-26 RX ORDER — FLUCONAZOLE 100 MG/1
100 TABLET ORAL EVERY 24 HOURS
Status: DISCONTINUED | OUTPATIENT
Start: 2018-02-26 | End: 2018-02-27 | Stop reason: HOSPADM

## 2018-02-26 RX ADMIN — TAZOBACTAM SODIUM AND PIPERACILLIN SODIUM 4.5 G: 500; 4 INJECTION, SOLUTION INTRAVENOUS at 21:15

## 2018-02-26 RX ADMIN — SACUBITRIL AND VALSARTAN 1 TABLET: 24; 26 TABLET, FILM COATED ORAL at 08:59

## 2018-02-26 RX ADMIN — PANTOPRAZOLE SODIUM 40 MG: 40 TABLET, DELAYED RELEASE ORAL at 08:59

## 2018-02-26 RX ADMIN — OXYCODONE HYDROCHLORIDE AND ACETAMINOPHEN 1 TABLET: 10; 325 TABLET ORAL at 21:14

## 2018-02-26 RX ADMIN — PREGABALIN 100 MG: 100 CAPSULE ORAL at 21:14

## 2018-02-26 RX ADMIN — INSULIN LISPRO 4 UNITS: 100 INJECTION, SOLUTION INTRAVENOUS; SUBCUTANEOUS at 12:47

## 2018-02-26 RX ADMIN — CARVEDILOL 6.25 MG: 6.25 TABLET, FILM COATED ORAL at 21:14

## 2018-02-26 RX ADMIN — OXYCODONE HYDROCHLORIDE AND ACETAMINOPHEN 1 TABLET: 10; 325 TABLET ORAL at 09:03

## 2018-02-26 RX ADMIN — LEVOTHYROXINE SODIUM 112 MCG: 112 TABLET ORAL at 05:09

## 2018-02-26 RX ADMIN — FUROSEMIDE 40 MG: 40 TABLET ORAL at 08:59

## 2018-02-26 RX ADMIN — CLOPIDOGREL BISULFATE 75 MG: 75 TABLET ORAL at 09:13

## 2018-02-26 RX ADMIN — CASTOR OIL AND BALSAM, PERU: 788; 87 OINTMENT TOPICAL at 09:00

## 2018-02-26 RX ADMIN — CASTOR OIL AND BALSAM, PERU: 788; 87 OINTMENT TOPICAL at 21:15

## 2018-02-26 RX ADMIN — INSULIN LISPRO 4 UNITS: 100 INJECTION, SOLUTION INTRAVENOUS; SUBCUTANEOUS at 21:20

## 2018-02-26 RX ADMIN — PREGABALIN 100 MG: 100 CAPSULE ORAL at 05:09

## 2018-02-26 RX ADMIN — IPRATROPIUM BROMIDE AND ALBUTEROL SULFATE 3 ML: .5; 3 SOLUTION RESPIRATORY (INHALATION) at 23:46

## 2018-02-26 RX ADMIN — IPRATROPIUM BROMIDE AND ALBUTEROL SULFATE 3 ML: .5; 3 SOLUTION RESPIRATORY (INHALATION) at 19:23

## 2018-02-26 RX ADMIN — Medication 2 TABLET: at 21:14

## 2018-02-26 RX ADMIN — CETIRIZINE HYDROCHLORIDE 10 MG: 10 TABLET, FILM COATED ORAL at 21:15

## 2018-02-26 RX ADMIN — INSULIN DETEMIR 15 UNITS: 100 INJECTION, SOLUTION SUBCUTANEOUS at 09:09

## 2018-02-26 RX ADMIN — ALPRAZOLAM 1 MG: 1 TABLET ORAL at 09:03

## 2018-02-26 RX ADMIN — NYSTATIN: 100000 POWDER TOPICAL at 21:15

## 2018-02-26 RX ADMIN — FLUCONAZOLE 100 MG: 100 TABLET ORAL at 14:15

## 2018-02-26 RX ADMIN — PREGABALIN 100 MG: 100 CAPSULE ORAL at 14:16

## 2018-02-26 RX ADMIN — BUSPIRONE HYDROCHLORIDE 7.5 MG: 15 TABLET ORAL at 21:15

## 2018-02-26 RX ADMIN — FENTANYL 1 PATCH: 75 PATCH TRANSDERMAL at 00:27

## 2018-02-26 RX ADMIN — NYSTATIN: 100000 POWDER TOPICAL at 09:00

## 2018-02-26 RX ADMIN — TAZOBACTAM SODIUM AND PIPERACILLIN SODIUM 4.5 G: 500; 4 INJECTION, SOLUTION INTRAVENOUS at 13:00

## 2018-02-26 RX ADMIN — BUSPIRONE HYDROCHLORIDE 7.5 MG: 15 TABLET ORAL at 08:59

## 2018-02-26 RX ADMIN — IPRATROPIUM BROMIDE AND ALBUTEROL SULFATE 3 ML: .5; 3 SOLUTION RESPIRATORY (INHALATION) at 08:05

## 2018-02-26 RX ADMIN — ARFORMOTEROL TARTRATE 15 MCG: 15 SOLUTION RESPIRATORY (INHALATION) at 19:23

## 2018-02-26 RX ADMIN — IPRATROPIUM BROMIDE AND ALBUTEROL SULFATE 3 ML: .5; 3 SOLUTION RESPIRATORY (INHALATION) at 03:26

## 2018-02-26 RX ADMIN — SACUBITRIL AND VALSARTAN 1 TABLET: 24; 26 TABLET, FILM COATED ORAL at 21:15

## 2018-02-26 RX ADMIN — ARFORMOTEROL TARTRATE 15 MCG: 15 SOLUTION RESPIRATORY (INHALATION) at 08:09

## 2018-02-26 RX ADMIN — ASPIRIN 81 MG: 81 TABLET, COATED ORAL at 08:59

## 2018-02-26 RX ADMIN — CARVEDILOL 6.25 MG: 6.25 TABLET, FILM COATED ORAL at 08:59

## 2018-02-26 RX ADMIN — INSULIN LISPRO 4 UNITS: 100 INJECTION, SOLUTION INTRAVENOUS; SUBCUTANEOUS at 17:45

## 2018-02-26 RX ADMIN — ATORVASTATIN CALCIUM 80 MG: 40 TABLET, FILM COATED ORAL at 21:15

## 2018-02-26 RX ADMIN — TAZOBACTAM SODIUM AND PIPERACILLIN SODIUM 4.5 G: 500; 4 INJECTION, SOLUTION INTRAVENOUS at 03:34

## 2018-02-26 RX ADMIN — INSULIN LISPRO 2 UNITS: 100 INJECTION, SOLUTION INTRAVENOUS; SUBCUTANEOUS at 08:58

## 2018-02-26 RX ADMIN — OXYCODONE HYDROCHLORIDE AND ACETAMINOPHEN 1 TABLET: 10; 325 TABLET ORAL at 00:29

## 2018-02-26 RX ADMIN — ENOXAPARIN SODIUM 40 MG: 40 INJECTION SUBCUTANEOUS at 14:16

## 2018-02-26 RX ADMIN — OXYCODONE HYDROCHLORIDE AND ACETAMINOPHEN 1 TABLET: 10; 325 TABLET ORAL at 14:59

## 2018-02-26 RX ADMIN — IPRATROPIUM BROMIDE AND ALBUTEROL SULFATE 3 ML: .5; 3 SOLUTION RESPIRATORY (INHALATION) at 12:40

## 2018-02-26 RX ADMIN — FLUTICASONE PROPIONATE 1 SPRAY: 50 SPRAY, METERED NASAL at 09:10

## 2018-02-27 VITALS
HEART RATE: 74 BPM | TEMPERATURE: 97.6 F | WEIGHT: 238.1 LBS | HEIGHT: 66 IN | OXYGEN SATURATION: 93 % | BODY MASS INDEX: 38.27 KG/M2 | RESPIRATION RATE: 18 BRPM | DIASTOLIC BLOOD PRESSURE: 57 MMHG | SYSTOLIC BLOOD PRESSURE: 112 MMHG

## 2018-02-27 LAB
ANION GAP SERPL CALCULATED.3IONS-SCNC: 10 MMOL/L (ref 3–11)
BUN BLD-MCNC: 26 MG/DL (ref 9–23)
BUN/CREAT SERPL: 20 (ref 7–25)
CALCIUM SPEC-SCNC: 9.5 MG/DL (ref 8.7–10.4)
CHLORIDE SERPL-SCNC: 100 MMOL/L (ref 99–109)
CO2 SERPL-SCNC: 32 MMOL/L (ref 20–31)
CREAT BLD-MCNC: 1.3 MG/DL (ref 0.6–1.3)
GFR SERPL CREATININE-BSD FRML MDRD: 41 ML/MIN/1.73
GLUCOSE BLD-MCNC: 163 MG/DL (ref 70–100)
GLUCOSE BLDC GLUCOMTR-MCNC: 182 MG/DL (ref 70–130)
GLUCOSE BLDC GLUCOMTR-MCNC: 202 MG/DL (ref 70–130)
POTASSIUM BLD-SCNC: 3.7 MMOL/L (ref 3.5–5.5)
SODIUM BLD-SCNC: 142 MMOL/L (ref 132–146)

## 2018-02-27 PROCEDURE — 99231 SBSQ HOSP IP/OBS SF/LOW 25: CPT | Performed by: INTERNAL MEDICINE

## 2018-02-27 PROCEDURE — 02HV33Z INSERTION OF INFUSION DEVICE INTO SUPERIOR VENA CAVA, PERCUTANEOUS APPROACH: ICD-10-PCS | Performed by: INTERNAL MEDICINE

## 2018-02-27 PROCEDURE — C1751 CATH, INF, PER/CENT/MIDLINE: HCPCS

## 2018-02-27 PROCEDURE — 94660 CPAP INITIATION&MGMT: CPT

## 2018-02-27 PROCEDURE — 99239 HOSP IP/OBS DSCHRG MGMT >30: CPT | Performed by: PHYSICIAN ASSISTANT

## 2018-02-27 PROCEDURE — 94799 UNLISTED PULMONARY SVC/PX: CPT

## 2018-02-27 PROCEDURE — 25010000002 ENOXAPARIN PER 10 MG: Performed by: FAMILY MEDICINE

## 2018-02-27 PROCEDURE — 25010000002 PIPERACILLIN SOD-TAZOBACTAM PER 1 G: Performed by: FAMILY MEDICINE

## 2018-02-27 PROCEDURE — 63710000001 INSULIN DETEMIR PER 5 UNITS: Performed by: INTERNAL MEDICINE

## 2018-02-27 PROCEDURE — 82962 GLUCOSE BLOOD TEST: CPT

## 2018-02-27 PROCEDURE — 94640 AIRWAY INHALATION TREATMENT: CPT

## 2018-02-27 PROCEDURE — 80048 BASIC METABOLIC PNL TOTAL CA: CPT | Performed by: INTERNAL MEDICINE

## 2018-02-27 PROCEDURE — C1894 INTRO/SHEATH, NON-LASER: HCPCS

## 2018-02-27 RX ORDER — ASPIRIN 81 MG/1
81 TABLET ORAL DAILY
Start: 2018-02-28 | End: 2018-07-17

## 2018-02-27 RX ORDER — LIDOCAINE 50 MG/G
3 PATCH TOPICAL DAILY PRN
Start: 2018-02-27 | End: 2018-04-24

## 2018-02-27 RX ORDER — CLONAZEPAM 0.5 MG/1
0.25 TABLET ORAL 2 TIMES DAILY PRN
Qty: 6 TABLET | Refills: 0 | Status: SHIPPED | OUTPATIENT
Start: 2018-02-27 | End: 2018-03-20 | Stop reason: HOSPADM

## 2018-02-27 RX ORDER — CARVEDILOL 6.25 MG/1
6.25 TABLET ORAL EVERY 12 HOURS SCHEDULED
Start: 2018-02-27 | End: 2018-04-24

## 2018-02-27 RX ORDER — FLUCONAZOLE 100 MG/1
100 TABLET ORAL EVERY 24 HOURS
Qty: 2 TABLET | Refills: 0
Start: 2018-02-27 | End: 2018-03-01

## 2018-02-27 RX ORDER — FENTANYL 75 UG/H
1 PATCH TRANSDERMAL
Qty: 2 PATCH | Refills: 0 | Status: ON HOLD | OUTPATIENT
Start: 2018-02-28 | End: 2018-03-20

## 2018-02-27 RX ORDER — NYSTATIN 100000 [USP'U]/G
POWDER TOPICAL
Start: 2018-02-27 | End: 2018-07-17

## 2018-02-27 RX ORDER — OXYCODONE AND ACETAMINOPHEN 10; 325 MG/1; MG/1
1 TABLET ORAL EVERY 6 HOURS PRN
Qty: 8 TABLET | Refills: 0 | Status: ON HOLD | OUTPATIENT
Start: 2018-02-27 | End: 2018-03-20

## 2018-02-27 RX ORDER — POTASSIUM CHLORIDE 750 MG/1
20 CAPSULE, EXTENDED RELEASE ORAL DAILY
Start: 2018-02-27 | End: 2018-03-20 | Stop reason: HOSPADM

## 2018-02-27 RX ORDER — AMOXICILLIN AND CLAVULANATE POTASSIUM 500; 125 MG/1; MG/1
1 TABLET, FILM COATED ORAL 3 TIMES DAILY
Start: 2018-02-27 | End: 2018-03-20 | Stop reason: HOSPADM

## 2018-02-27 RX ORDER — POLYETHYLENE GLYCOL 3350 17 G/17G
17 POWDER, FOR SOLUTION ORAL 2 TIMES DAILY
Status: ON HOLD
Start: 2018-02-27 | End: 2018-03-20

## 2018-02-27 RX ORDER — ATORVASTATIN CALCIUM 80 MG/1
80 TABLET, FILM COATED ORAL NIGHTLY
Start: 2018-02-27 | End: 2019-11-11

## 2018-02-27 RX ORDER — BUSPIRONE HYDROCHLORIDE 7.5 MG/1
7.5 TABLET ORAL EVERY 12 HOURS SCHEDULED
Start: 2018-02-27 | End: 2018-08-08

## 2018-02-27 RX ORDER — BISACODYL 10 MG
10 SUPPOSITORY, RECTAL RECTAL ONCE
Status: DISCONTINUED | OUTPATIENT
Start: 2018-02-27 | End: 2018-02-27 | Stop reason: HOSPADM

## 2018-02-27 RX ORDER — SENNA AND DOCUSATE SODIUM 50; 8.6 MG/1; MG/1
2 TABLET, FILM COATED ORAL 2 TIMES DAILY
Start: 2018-02-27 | End: 2018-04-24

## 2018-02-27 RX ORDER — PREGABALIN 100 MG/1
100 CAPSULE ORAL EVERY 8 HOURS SCHEDULED
Qty: 9 CAPSULE | Refills: 0 | Status: SHIPPED | OUTPATIENT
Start: 2018-02-27 | End: 2018-06-22 | Stop reason: SDUPTHER

## 2018-02-27 RX ORDER — FUROSEMIDE 40 MG/1
40 TABLET ORAL DAILY
Start: 2018-02-28 | End: 2018-07-08 | Stop reason: SDUPTHER

## 2018-02-27 RX ORDER — SODIUM CHLORIDE 0.9 % (FLUSH) 0.9 %
10 SYRINGE (ML) INJECTION AS NEEDED
Status: DISCONTINUED | OUTPATIENT
Start: 2018-02-27 | End: 2018-02-27 | Stop reason: HOSPADM

## 2018-02-27 RX ORDER — CASTOR OIL AND BALSAM, PERU 788; 87 MG/G; MG/G
OINTMENT TOPICAL
Start: 2018-02-27 | End: 2018-03-08

## 2018-02-27 RX ADMIN — FUROSEMIDE 40 MG: 40 TABLET ORAL at 08:35

## 2018-02-27 RX ADMIN — ENOXAPARIN SODIUM 40 MG: 40 INJECTION SUBCUTANEOUS at 13:08

## 2018-02-27 RX ADMIN — IPRATROPIUM BROMIDE AND ALBUTEROL SULFATE 3 ML: .5; 3 SOLUTION RESPIRATORY (INHALATION) at 07:53

## 2018-02-27 RX ADMIN — PANTOPRAZOLE SODIUM 40 MG: 40 TABLET, DELAYED RELEASE ORAL at 08:36

## 2018-02-27 RX ADMIN — ALPRAZOLAM 1 MG: 1 TABLET ORAL at 08:37

## 2018-02-27 RX ADMIN — ASPIRIN 81 MG: 81 TABLET, COATED ORAL at 08:35

## 2018-02-27 RX ADMIN — OXYCODONE HYDROCHLORIDE AND ACETAMINOPHEN 1 TABLET: 10; 325 TABLET ORAL at 08:36

## 2018-02-27 RX ADMIN — CLOPIDOGREL BISULFATE 75 MG: 75 TABLET ORAL at 08:35

## 2018-02-27 RX ADMIN — IPRATROPIUM BROMIDE AND ALBUTEROL SULFATE 3 ML: .5; 3 SOLUTION RESPIRATORY (INHALATION) at 02:39

## 2018-02-27 RX ADMIN — ARFORMOTEROL TARTRATE 15 MCG: 15 SOLUTION RESPIRATORY (INHALATION) at 07:53

## 2018-02-27 RX ADMIN — LEVOTHYROXINE SODIUM 112 MCG: 112 TABLET ORAL at 06:27

## 2018-02-27 RX ADMIN — SACUBITRIL AND VALSARTAN 1 TABLET: 24; 26 TABLET, FILM COATED ORAL at 08:35

## 2018-02-27 RX ADMIN — NYSTATIN: 100000 POWDER TOPICAL at 08:36

## 2018-02-27 RX ADMIN — CARVEDILOL 6.25 MG: 6.25 TABLET, FILM COATED ORAL at 08:35

## 2018-02-27 RX ADMIN — PREGABALIN 100 MG: 100 CAPSULE ORAL at 06:27

## 2018-02-27 RX ADMIN — ALPRAZOLAM 1 MG: 1 TABLET ORAL at 14:04

## 2018-02-27 RX ADMIN — TAZOBACTAM SODIUM AND PIPERACILLIN SODIUM 4.5 G: 500; 4 INJECTION, SOLUTION INTRAVENOUS at 11:10

## 2018-02-27 RX ADMIN — OXYCODONE HYDROCHLORIDE AND ACETAMINOPHEN 1 TABLET: 10; 325 TABLET ORAL at 14:04

## 2018-02-27 RX ADMIN — BUSPIRONE HYDROCHLORIDE 7.5 MG: 15 TABLET ORAL at 08:35

## 2018-02-27 RX ADMIN — CASTOR OIL AND BALSAM, PERU: 788; 87 OINTMENT TOPICAL at 08:36

## 2018-02-27 RX ADMIN — IPRATROPIUM BROMIDE AND ALBUTEROL SULFATE 3 ML: .5; 3 SOLUTION RESPIRATORY (INHALATION) at 12:32

## 2018-02-27 RX ADMIN — TAZOBACTAM SODIUM AND PIPERACILLIN SODIUM 4.5 G: 500; 4 INJECTION, SOLUTION INTRAVENOUS at 03:40

## 2018-02-27 RX ADMIN — INSULIN DETEMIR 15 UNITS: 100 INJECTION, SOLUTION SUBCUTANEOUS at 08:37

## 2018-02-27 RX ADMIN — FLUCONAZOLE 100 MG: 100 TABLET ORAL at 13:08

## 2018-02-27 RX ADMIN — PREGABALIN 100 MG: 100 CAPSULE ORAL at 13:08

## 2018-03-06 ENCOUNTER — TELEPHONE (OUTPATIENT)
Dept: INTERNAL MEDICINE | Facility: CLINIC | Age: 63
End: 2018-03-06

## 2018-03-07 ENCOUNTER — TELEPHONE (OUTPATIENT)
Dept: INTERNAL MEDICINE | Facility: CLINIC | Age: 63
End: 2018-03-07

## 2018-03-07 NOTE — TELEPHONE ENCOUNTER
PATIENT CALLED WANTING TO KNOW WHY THE NURSING HOME IS ONLY GIVE HER 75 MCG OF HER FENTANYL PATCH? PATIENT SAID SHE NORMALLY GETS 100 MCG.

## 2018-03-08 ENCOUNTER — APPOINTMENT (OUTPATIENT)
Dept: GENERAL RADIOLOGY | Facility: HOSPITAL | Age: 63
End: 2018-03-08

## 2018-03-08 ENCOUNTER — APPOINTMENT (OUTPATIENT)
Dept: CT IMAGING | Facility: HOSPITAL | Age: 63
End: 2018-03-08

## 2018-03-08 ENCOUNTER — HOSPITAL ENCOUNTER (INPATIENT)
Facility: HOSPITAL | Age: 63
LOS: 12 days | Discharge: NURSING, REHAB OR LONG-TERM CARE | End: 2018-03-20
Attending: EMERGENCY MEDICINE | Admitting: INTERNAL MEDICINE

## 2018-03-08 DIAGNOSIS — Z74.09 IMPAIRED FUNCTIONAL MOBILITY, BALANCE, GAIT, AND ENDURANCE: ICD-10-CM

## 2018-03-08 DIAGNOSIS — L89.159 DECUBITUS ULCER OF SACRAL REGION, UNSPECIFIED ULCER STAGE: ICD-10-CM

## 2018-03-08 DIAGNOSIS — Z78.9 IMPAIRED MOBILITY AND ADLS: ICD-10-CM

## 2018-03-08 DIAGNOSIS — R50.9 FEVER, UNSPECIFIED FEVER CAUSE: ICD-10-CM

## 2018-03-08 DIAGNOSIS — Z74.09 IMPAIRED MOBILITY AND ADLS: ICD-10-CM

## 2018-03-08 DIAGNOSIS — R40.4 TRANSIENT ALTERATION OF AWARENESS: Primary | ICD-10-CM

## 2018-03-08 PROBLEM — I50.22 CHRONIC SYSTOLIC HEART FAILURE (HCC): Status: ACTIVE | Noted: 2018-03-08

## 2018-03-08 PROBLEM — I25.10 CORONARY ARTERY DISEASE INVOLVING NATIVE HEART: Chronic | Status: ACTIVE | Noted: 2018-02-15

## 2018-03-08 PROBLEM — J96.11 CHRONIC RESPIRATORY FAILURE WITH HYPOXIA (HCC): Chronic | Status: ACTIVE | Noted: 2018-03-08

## 2018-03-08 PROBLEM — L08.9 INFECTED WOUND: Chronic | Status: ACTIVE | Noted: 2018-03-08

## 2018-03-08 PROBLEM — T14.8XXA INFECTED WOUND: Chronic | Status: ACTIVE | Noted: 2018-03-08

## 2018-03-08 PROBLEM — R53.1 WEAKNESS: Status: ACTIVE | Noted: 2018-03-08

## 2018-03-08 LAB
ALBUMIN SERPL-MCNC: 3.9 G/DL (ref 3.2–4.8)
ALBUMIN/GLOB SERPL: 1.2 G/DL (ref 1.5–2.5)
ALP SERPL-CCNC: 78 U/L (ref 25–100)
ALT SERPL W P-5'-P-CCNC: 26 U/L (ref 7–40)
ANION GAP SERPL CALCULATED.3IONS-SCNC: 6 MMOL/L (ref 3–11)
AST SERPL-CCNC: 32 U/L (ref 0–33)
BACTERIA UR QL AUTO: ABNORMAL /HPF
BASOPHILS # BLD AUTO: 0.07 10*3/MM3 (ref 0–0.2)
BASOPHILS NFR BLD AUTO: 0.4 % (ref 0–1)
BILIRUB SERPL-MCNC: 0.3 MG/DL (ref 0.3–1.2)
BILIRUB UR QL STRIP: NEGATIVE
BNP SERPL-MCNC: 900 PG/ML (ref 0–100)
BUN BLD-MCNC: 32 MG/DL (ref 9–23)
BUN/CREAT SERPL: 21.3 (ref 7–25)
CALCIUM SPEC-SCNC: 9.3 MG/DL (ref 8.7–10.4)
CHLORIDE SERPL-SCNC: 105 MMOL/L (ref 99–109)
CLARITY UR: CLEAR
CO2 SERPL-SCNC: 28 MMOL/L (ref 20–31)
COLOR UR: YELLOW
CREAT BLD-MCNC: 1.5 MG/DL (ref 0.6–1.3)
D-LACTATE SERPL-SCNC: 1.2 MMOL/L (ref 0.5–2)
DEPRECATED RDW RBC AUTO: 55.2 FL (ref 37–54)
EOSINOPHIL # BLD AUTO: 0.76 10*3/MM3 (ref 0–0.3)
EOSINOPHIL NFR BLD AUTO: 4.8 % (ref 0–3)
ERYTHROCYTE [DISTWIDTH] IN BLOOD BY AUTOMATED COUNT: 15.3 % (ref 11.3–14.5)
GFR SERPL CREATININE-BSD FRML MDRD: 35 ML/MIN/1.73
GLOBULIN UR ELPH-MCNC: 3.3 GM/DL
GLUCOSE BLD-MCNC: 176 MG/DL (ref 70–100)
GLUCOSE BLDC GLUCOMTR-MCNC: 191 MG/DL (ref 70–130)
GLUCOSE UR STRIP-MCNC: NEGATIVE MG/DL
HCT VFR BLD AUTO: 33.4 % (ref 34.5–44)
HGB BLD-MCNC: 10.2 G/DL (ref 11.5–15.5)
HGB UR QL STRIP.AUTO: NEGATIVE
HOLD SPECIMEN: NORMAL
HOLD SPECIMEN: NORMAL
HYALINE CASTS UR QL AUTO: ABNORMAL /LPF
IMM GRANULOCYTES # BLD: 0.03 10*3/MM3 (ref 0–0.03)
IMM GRANULOCYTES NFR BLD: 0.2 % (ref 0–0.6)
KETONES UR QL STRIP: NEGATIVE
LEUKOCYTE ESTERASE UR QL STRIP.AUTO: ABNORMAL
LYMPHOCYTES # BLD AUTO: 2.09 10*3/MM3 (ref 0.6–4.8)
LYMPHOCYTES NFR BLD AUTO: 13.1 % (ref 24–44)
MAGNESIUM SERPL-MCNC: 1.9 MG/DL (ref 1.3–2.7)
MCH RBC QN AUTO: 29.8 PG (ref 27–31)
MCHC RBC AUTO-ENTMCNC: 30.5 G/DL (ref 32–36)
MCV RBC AUTO: 97.7 FL (ref 80–99)
MONOCYTES # BLD AUTO: 0.99 10*3/MM3 (ref 0–1)
MONOCYTES NFR BLD AUTO: 6.2 % (ref 0–12)
NEUTROPHILS # BLD AUTO: 12 10*3/MM3 (ref 1.5–8.3)
NEUTROPHILS NFR BLD AUTO: 75.3 % (ref 41–71)
NITRITE UR QL STRIP: NEGATIVE
PH UR STRIP.AUTO: <=5 [PH] (ref 5–8)
PLATELET # BLD AUTO: 192 10*3/MM3 (ref 150–450)
PMV BLD AUTO: 11.4 FL (ref 6–12)
POTASSIUM BLD-SCNC: 5.2 MMOL/L (ref 3.5–5.5)
PROCALCITONIN SERPL-MCNC: 0.23 NG/ML
PROT SERPL-MCNC: 7.2 G/DL (ref 5.7–8.2)
PROT UR QL STRIP: ABNORMAL
RBC # BLD AUTO: 3.42 10*6/MM3 (ref 3.89–5.14)
RBC # UR: ABNORMAL /HPF
REF LAB TEST METHOD: ABNORMAL
SODIUM BLD-SCNC: 139 MMOL/L (ref 132–146)
SP GR UR STRIP: 1.01 (ref 1–1.03)
SQUAMOUS #/AREA URNS HPF: ABNORMAL /HPF
TROPONIN I SERPL-MCNC: 0.07 NG/ML (ref 0–0.07)
UROBILINOGEN UR QL STRIP: ABNORMAL
WBC NRBC COR # BLD: 15.94 10*3/MM3 (ref 3.5–10.8)
WBC UR QL AUTO: ABNORMAL /HPF
WHOLE BLOOD HOLD SPECIMEN: NORMAL
WHOLE BLOOD HOLD SPECIMEN: NORMAL

## 2018-03-08 PROCEDURE — 70450 CT HEAD/BRAIN W/O DYE: CPT

## 2018-03-08 PROCEDURE — 83735 ASSAY OF MAGNESIUM: CPT | Performed by: EMERGENCY MEDICINE

## 2018-03-08 PROCEDURE — 87086 URINE CULTURE/COLONY COUNT: CPT | Performed by: EMERGENCY MEDICINE

## 2018-03-08 PROCEDURE — 94799 UNLISTED PULMONARY SVC/PX: CPT

## 2018-03-08 PROCEDURE — 25010000002 DIPHENHYDRAMINE PER 50 MG: Performed by: INTERNAL MEDICINE

## 2018-03-08 PROCEDURE — 99285 EMERGENCY DEPT VISIT HI MDM: CPT

## 2018-03-08 PROCEDURE — 71045 X-RAY EXAM CHEST 1 VIEW: CPT

## 2018-03-08 PROCEDURE — 84145 PROCALCITONIN (PCT): CPT | Performed by: EMERGENCY MEDICINE

## 2018-03-08 PROCEDURE — 82962 GLUCOSE BLOOD TEST: CPT

## 2018-03-08 PROCEDURE — 80053 COMPREHEN METABOLIC PANEL: CPT | Performed by: EMERGENCY MEDICINE

## 2018-03-08 PROCEDURE — 25010000002 PIPERACILLIN SOD-TAZOBACTAM PER 1 G: Performed by: EMERGENCY MEDICINE

## 2018-03-08 PROCEDURE — 25010000002 VANCOMYCIN HCL IN NACL 2-0.9 GM/500ML-% SOLUTION: Performed by: EMERGENCY MEDICINE

## 2018-03-08 PROCEDURE — 84484 ASSAY OF TROPONIN QUANT: CPT

## 2018-03-08 PROCEDURE — 83880 ASSAY OF NATRIURETIC PEPTIDE: CPT | Performed by: EMERGENCY MEDICINE

## 2018-03-08 PROCEDURE — 81001 URINALYSIS AUTO W/SCOPE: CPT | Performed by: EMERGENCY MEDICINE

## 2018-03-08 PROCEDURE — 93005 ELECTROCARDIOGRAM TRACING: CPT | Performed by: EMERGENCY MEDICINE

## 2018-03-08 PROCEDURE — 83605 ASSAY OF LACTIC ACID: CPT | Performed by: EMERGENCY MEDICINE

## 2018-03-08 PROCEDURE — 87040 BLOOD CULTURE FOR BACTERIA: CPT | Performed by: EMERGENCY MEDICINE

## 2018-03-08 PROCEDURE — 94660 CPAP INITIATION&MGMT: CPT

## 2018-03-08 PROCEDURE — 85025 COMPLETE CBC W/AUTO DIFF WBC: CPT | Performed by: EMERGENCY MEDICINE

## 2018-03-08 PROCEDURE — 99223 1ST HOSP IP/OBS HIGH 75: CPT | Performed by: NURSE PRACTITIONER

## 2018-03-08 RX ORDER — BACLOFEN 10 MG/1
10 TABLET ORAL 2 TIMES DAILY PRN
Status: DISCONTINUED | OUTPATIENT
Start: 2018-03-08 | End: 2018-03-20 | Stop reason: HOSPADM

## 2018-03-08 RX ORDER — VANCOMYCIN 2 GRAM/500 ML IN 0.9 % SODIUM CHLORIDE INTRAVENOUS
20 EVERY 12 HOURS
Status: DISCONTINUED | OUTPATIENT
Start: 2018-03-08 | End: 2018-03-08 | Stop reason: SDUPTHER

## 2018-03-08 RX ORDER — VANCOMYCIN 2 GRAM/500 ML IN 0.9 % SODIUM CHLORIDE INTRAVENOUS
20 ONCE
Status: COMPLETED | OUTPATIENT
Start: 2018-03-08 | End: 2018-03-08

## 2018-03-08 RX ORDER — CLOPIDOGREL BISULFATE 75 MG/1
75 TABLET ORAL DAILY
Status: DISCONTINUED | OUTPATIENT
Start: 2018-03-09 | End: 2018-03-20 | Stop reason: HOSPADM

## 2018-03-08 RX ORDER — PANTOPRAZOLE SODIUM 40 MG/1
40 TABLET, DELAYED RELEASE ORAL
Status: DISCONTINUED | OUTPATIENT
Start: 2018-03-09 | End: 2018-03-20 | Stop reason: HOSPADM

## 2018-03-08 RX ORDER — DIPHENHYDRAMINE HYDROCHLORIDE 50 MG/ML
25 INJECTION INTRAMUSCULAR; INTRAVENOUS ONCE
Status: COMPLETED | OUTPATIENT
Start: 2018-03-08 | End: 2018-03-08

## 2018-03-08 RX ORDER — HEPARIN SODIUM 5000 [USP'U]/ML
5000 INJECTION, SOLUTION INTRAVENOUS; SUBCUTANEOUS EVERY 12 HOURS SCHEDULED
Status: DISCONTINUED | OUTPATIENT
Start: 2018-03-09 | End: 2018-03-20 | Stop reason: HOSPADM

## 2018-03-08 RX ORDER — MAGNESIUM SULFATE HEPTAHYDRATE 40 MG/ML
2 INJECTION, SOLUTION INTRAVENOUS AS NEEDED
Status: DISCONTINUED | OUTPATIENT
Start: 2018-03-08 | End: 2018-03-20 | Stop reason: HOSPADM

## 2018-03-08 RX ORDER — VANCOMYCIN/0.9 % SOD CHLORIDE 1.5G/250ML
15 PLASTIC BAG, INJECTION (ML) INTRAVENOUS EVERY 24 HOURS
Status: DISCONTINUED | OUTPATIENT
Start: 2018-03-09 | End: 2018-03-10 | Stop reason: SDUPTHER

## 2018-03-08 RX ORDER — ATORVASTATIN CALCIUM 40 MG/1
80 TABLET, FILM COATED ORAL NIGHTLY
Status: DISCONTINUED | OUTPATIENT
Start: 2018-03-08 | End: 2018-03-20 | Stop reason: HOSPADM

## 2018-03-08 RX ORDER — PREGABALIN 100 MG/1
100 CAPSULE ORAL EVERY 8 HOURS SCHEDULED
Status: DISCONTINUED | OUTPATIENT
Start: 2018-03-08 | End: 2018-03-20 | Stop reason: HOSPADM

## 2018-03-08 RX ORDER — ACETAMINOPHEN 325 MG/1
650 TABLET ORAL EVERY 4 HOURS PRN
Status: DISCONTINUED | OUTPATIENT
Start: 2018-03-08 | End: 2018-03-20 | Stop reason: HOSPADM

## 2018-03-08 RX ORDER — CETIRIZINE HYDROCHLORIDE 10 MG/1
10 TABLET ORAL NIGHTLY
Status: DISCONTINUED | OUTPATIENT
Start: 2018-03-08 | End: 2018-03-20 | Stop reason: HOSPADM

## 2018-03-08 RX ORDER — ONDANSETRON 4 MG/1
4 TABLET, FILM COATED ORAL EVERY 6 HOURS PRN
Status: DISCONTINUED | OUTPATIENT
Start: 2018-03-08 | End: 2018-03-20 | Stop reason: HOSPADM

## 2018-03-08 RX ORDER — DEXTROSE MONOHYDRATE 25 G/50ML
25 INJECTION, SOLUTION INTRAVENOUS
Status: DISCONTINUED | OUTPATIENT
Start: 2018-03-08 | End: 2018-03-20 | Stop reason: HOSPADM

## 2018-03-08 RX ORDER — ASPIRIN 81 MG/1
81 TABLET ORAL DAILY
Status: DISCONTINUED | OUTPATIENT
Start: 2018-03-09 | End: 2018-03-20 | Stop reason: HOSPADM

## 2018-03-08 RX ORDER — CLONAZEPAM 0.5 MG/1
0.25 TABLET ORAL 2 TIMES DAILY PRN
Status: DISCONTINUED | OUTPATIENT
Start: 2018-03-08 | End: 2018-03-20 | Stop reason: HOSPADM

## 2018-03-08 RX ORDER — POTASSIUM CHLORIDE 750 MG/1
20 CAPSULE, EXTENDED RELEASE ORAL DAILY
Status: DISCONTINUED | OUTPATIENT
Start: 2018-03-09 | End: 2018-03-20 | Stop reason: HOSPADM

## 2018-03-08 RX ORDER — FUROSEMIDE 40 MG/1
40 TABLET ORAL DAILY
Status: DISCONTINUED | OUTPATIENT
Start: 2018-03-09 | End: 2018-03-20 | Stop reason: HOSPADM

## 2018-03-08 RX ORDER — NYSTATIN 100000 [USP'U]/G
POWDER TOPICAL EVERY 12 HOURS SCHEDULED
Status: DISCONTINUED | OUTPATIENT
Start: 2018-03-08 | End: 2018-03-20 | Stop reason: HOSPADM

## 2018-03-08 RX ORDER — POTASSIUM CHLORIDE 750 MG/1
40 CAPSULE, EXTENDED RELEASE ORAL AS NEEDED
Status: DISCONTINUED | OUTPATIENT
Start: 2018-03-08 | End: 2018-03-20 | Stop reason: HOSPADM

## 2018-03-08 RX ORDER — NICOTINE POLACRILEX 4 MG
15 LOZENGE BUCCAL
Status: DISCONTINUED | OUTPATIENT
Start: 2018-03-08 | End: 2018-03-20 | Stop reason: HOSPADM

## 2018-03-08 RX ORDER — SENNA AND DOCUSATE SODIUM 50; 8.6 MG/1; MG/1
2 TABLET, FILM COATED ORAL 2 TIMES DAILY
Status: DISCONTINUED | OUTPATIENT
Start: 2018-03-08 | End: 2018-03-09

## 2018-03-08 RX ORDER — SODIUM CHLORIDE 0.9 % (FLUSH) 0.9 %
10 SYRINGE (ML) INJECTION AS NEEDED
Status: DISCONTINUED | OUTPATIENT
Start: 2018-03-08 | End: 2018-03-20 | Stop reason: HOSPADM

## 2018-03-08 RX ORDER — ONDANSETRON 2 MG/ML
4 INJECTION INTRAMUSCULAR; INTRAVENOUS EVERY 6 HOURS PRN
Status: DISCONTINUED | OUTPATIENT
Start: 2018-03-08 | End: 2018-03-20 | Stop reason: HOSPADM

## 2018-03-08 RX ORDER — LEVOTHYROXINE SODIUM 112 UG/1
112 TABLET ORAL
Status: DISCONTINUED | OUTPATIENT
Start: 2018-03-09 | End: 2018-03-20 | Stop reason: HOSPADM

## 2018-03-08 RX ORDER — LIDOCAINE 50 MG/G
3 PATCH TOPICAL DAILY PRN
Status: DISCONTINUED | OUTPATIENT
Start: 2018-03-08 | End: 2018-03-20 | Stop reason: HOSPADM

## 2018-03-08 RX ORDER — OXYCODONE AND ACETAMINOPHEN 10; 325 MG/1; MG/1
1 TABLET ORAL EVERY 6 HOURS PRN
Status: DISCONTINUED | OUTPATIENT
Start: 2018-03-08 | End: 2018-03-20 | Stop reason: HOSPADM

## 2018-03-08 RX ORDER — CARVEDILOL 6.25 MG/1
6.25 TABLET ORAL EVERY 12 HOURS SCHEDULED
Status: DISCONTINUED | OUTPATIENT
Start: 2018-03-08 | End: 2018-03-20 | Stop reason: HOSPADM

## 2018-03-08 RX ORDER — FENTANYL 75 UG/H
1 PATCH TRANSDERMAL
Status: DISCONTINUED | OUTPATIENT
Start: 2018-03-09 | End: 2018-03-20 | Stop reason: HOSPADM

## 2018-03-08 RX ORDER — BISACODYL 5 MG/1
5 TABLET, DELAYED RELEASE ORAL DAILY PRN
Status: DISCONTINUED | OUTPATIENT
Start: 2018-03-08 | End: 2018-03-20 | Stop reason: HOSPADM

## 2018-03-08 RX ORDER — FAMOTIDINE 20 MG/1
20 TABLET, FILM COATED ORAL 2 TIMES DAILY PRN
Status: DISCONTINUED | OUTPATIENT
Start: 2018-03-08 | End: 2018-03-20 | Stop reason: HOSPADM

## 2018-03-08 RX ORDER — FLUTICASONE PROPIONATE 50 MCG
1 SPRAY, SUSPENSION (ML) NASAL 2 TIMES DAILY
Status: DISCONTINUED | OUTPATIENT
Start: 2018-03-08 | End: 2018-03-20 | Stop reason: HOSPADM

## 2018-03-08 RX ORDER — L.ACID,PARA/B.BIFIDUM/S.THERM 8B CELL
1 CAPSULE ORAL DAILY
Status: DISCONTINUED | OUTPATIENT
Start: 2018-03-09 | End: 2018-03-20 | Stop reason: HOSPADM

## 2018-03-08 RX ORDER — POTASSIUM CHLORIDE 1.5 G/1.77G
40 POWDER, FOR SOLUTION ORAL AS NEEDED
Status: DISCONTINUED | OUTPATIENT
Start: 2018-03-08 | End: 2018-03-20 | Stop reason: HOSPADM

## 2018-03-08 RX ORDER — MAGNESIUM SULFATE HEPTAHYDRATE 40 MG/ML
4 INJECTION, SOLUTION INTRAVENOUS AS NEEDED
Status: DISCONTINUED | OUTPATIENT
Start: 2018-03-08 | End: 2018-03-20 | Stop reason: HOSPADM

## 2018-03-08 RX ORDER — SODIUM CHLORIDE 0.9 % (FLUSH) 0.9 %
1-10 SYRINGE (ML) INJECTION AS NEEDED
Status: DISCONTINUED | OUTPATIENT
Start: 2018-03-08 | End: 2018-03-20 | Stop reason: HOSPADM

## 2018-03-08 RX ORDER — BUSPIRONE HYDROCHLORIDE 15 MG/1
7.5 TABLET ORAL EVERY 12 HOURS SCHEDULED
Status: DISCONTINUED | OUTPATIENT
Start: 2018-03-09 | End: 2018-03-20 | Stop reason: HOSPADM

## 2018-03-08 RX ORDER — POTASSIUM CHLORIDE 7.45 MG/ML
10 INJECTION INTRAVENOUS
Status: DISCONTINUED | OUTPATIENT
Start: 2018-03-08 | End: 2018-03-20 | Stop reason: HOSPADM

## 2018-03-08 RX ADMIN — DIPHENHYDRAMINE HYDROCHLORIDE 25 MG: 50 INJECTION, SOLUTION INTRAMUSCULAR; INTRAVENOUS at 21:22

## 2018-03-08 RX ADMIN — Medication 2000 MG: at 20:54

## 2018-03-08 RX ADMIN — TAZOBACTAM SODIUM AND PIPERACILLIN SODIUM 4.5 G: 500; 4 INJECTION, SOLUTION INTRAVENOUS at 20:11

## 2018-03-08 NOTE — ED PROVIDER NOTES
Subjective   HPI Comments: Tamara Langston is a 62 year old female presenting for weakness onset today. The patient is currently residing in a rehabilitation facility.  is at bedside and states he received a call from the facility this AM regarding inability to rouse the patient. The patient has no recollection of this. She states that upon awakening this AM she felt extremely weak with no focal deficits. Denies recent fall, changes in medication regimen, fever, chills, vomiting, diarrhea, or abdominal pain. There was nausea noted yesterday that has resolved. Dyspnea on exertion that is baseline. There is a decubitus on her sacrum and ANGELO PICC line that she is presumably receiving antibiotic therapy. She is unaware of the antibiotic or last administration. Denies seizure history.     There are no additional complaints.     Patient is a 62 y.o. female presenting with weakness.   History provided by:  Patient and spouse  Weakness - Generalized   Severity:  Moderate  Onset quality:  Sudden  Duration:  1 day  Timing:  Constant  Progression:  Unchanged  Chronicity:  New  Context: not alcohol use, not change in medication, not decreased sleep, not dehydration, not drug use, not increased activity, not pinched nerve, not recent infection, not stress and not urinary tract infection    Relieved by:  None tried  Worsened by:  Activity  Ineffective treatments:  None tried  Associated symptoms: cough, difficulty walking (Baseline; uses cane and walker ), loss of consciousness, nausea and shortness of breath    Associated symptoms: no abdominal pain, no anorexia, no arthralgias, no chest pain, no diarrhea, no dizziness, no dysphagia, no dysuria, no numbness in extremities, no falls, no fever, no foul-smelling urine, no frequency, no hematochezia, no lethargy, no melena, no myalgias, no near-syncope, no seizures, no sensory-motor deficit, no stroke symptoms, no vision change and no vomiting    Risk factors: congestive heart  failure, coronary artery disease and diabetes        Review of Systems   Constitutional: Positive for activity change. Negative for appetite change, chills, diaphoresis, fatigue, fever and unexpected weight change.   Eyes: Negative for photophobia and visual disturbance.   Respiratory: Positive for cough and shortness of breath. Negative for chest tightness, wheezing and stridor.    Cardiovascular: Negative for chest pain, palpitations, leg swelling and near-syncope.   Gastrointestinal: Positive for nausea. Negative for abdominal pain, anorexia, blood in stool, diarrhea, dysphagia, hematochezia, melena and vomiting.   Genitourinary: Negative for dysuria and frequency.   Musculoskeletal: Positive for gait problem. Negative for arthralgias, falls and myalgias.   Skin: Positive for wound (Coccyx wound- dressing care per rehab facility ).   Neurological: Positive for loss of consciousness and weakness. Negative for dizziness and seizures.   All other systems reviewed and are negative.      Past Medical History:   Diagnosis Date   • Acute on chronic respiratory failure with hypoxia 2/13/2018   • ELIF (acute kidney injury) 2/13/2018   • ELIF (acute kidney injury) 2/13/2018   • Altered mental status 2/13/2018   • Bronchitis    • Cellulitis of sacral region 2/13/2018   • Cervical cancer    • Cholelithiasis 5/11/2016   • Chronic anxiety 2/27/2017   • Chronic bronchitis    • Chronic respiratory failure with hypoxia 3/8/2018   • Chronic systolic heart failure 3/8/2018   • Chronically on benzodiazepine therapy 2/27/2017   • Degenerative arthritis    • Diabetes mellitus    • Dyslipidemia 5/11/2016   • Dyspnea    • Elevated troponin level 2/13/2018   • Fatty liver disease, nonalcoholic 11/21/2016   • Fever 2/13/2018   • Fibromyalgia    • GERD (gastroesophageal reflux disease)    • H/O echocardiogram    • History of pneumonia    • Hypertension 5/11/2016    16. H/O echocardiogram (V15.89) (Z92.89)  · A.  Echocardiogram of 02/03/2015  reports an ejection fraction of 60-65%, mild concentric     LVH, trace mitral regurgitation, mild tricuspid and pulmonic regurgitation and calculated     RVSP of 35 mmHg, the main pulmonary artery is also mildly dilated.   • Hypothyroidism 5/11/2016    Description: A.  On replacement therapy.   • Infected wound 3/8/2018   • Nausea    • Obesity    • DINA (obstructive sleep apnea)     intolerant of CPAP therapy   • Osteoporosis    • Osteoporosis    • Polycythemia vera 5/11/2016   • Pulmonary emphysema    • Restrictive ventilatory defect    • Rhinitis    • Sepsis 2/13/2018   • Uncontrolled diabetes mellitus 5/11/2016   • Urine abnormality 2/13/2018   • Uterine cancer    • Vitamin D deficiency 8/1/2016   • Weakness 3/8/2018       Allergies   Allergen Reactions   • Cortisone Other (See Comments)     Hotness all over body and hyper   • Oxycontin [Oxycodone] Other (See Comments)     psoriasis   • Tolmetin Rash     Tolectin-rash and itching   • Vancomycin Rash       Past Surgical History:   Procedure Laterality Date   • AMPUTATION DIGIT Left 11/23/2016    Procedure: AMPUTATION TRANS METATARSAL - ray amutation of the left great toe;  Surgeon: Arsalan Feliciano MD;  Location:  [facility] OR;  Service:    • AMPUTATION DIGIT Left 3/2/2017    Procedure: LEFT FOOT TRANSMETATARSAL AMPUTATION TOES 2,3,4,5;  Surgeon: Joey Guaman MD;  Location:  [facility] OR;  Service:    • BACK SURGERY      lumbar fusions x5--multiple times; 1995, 1997, 1998, 1999 and 2008   • BELOW KNEE AMPUTATION Left 2/25/2017    Procedure: EXTENDED LEFT TOE AMPUTATION;  Surgeon: Arsalan Feliciano MD;  Location:  [facility] OR;  Service:    • CARDIAC CATHETERIZATION N/A 11/26/2016    Procedure: Left Heart Cath;  Surgeon: Ash Nuñez MD;  Location: [facility] CATH INVASIVE LOCATION;  Service:    • CARDIAC CATHETERIZATION N/A 2/20/2018    Procedure: Left Heart Cath;  Surgeon: Lane Zamorano MD;  Location:  [facility] CATH INVASIVE LOCATION;  Service:    • CARDIAC CATHETERIZATION N/A  2/20/2018    Procedure: Right Heart Cath;  Surgeon: Lane Zamorano MD;  Location: Loc[facility] INVASIVE LOCATION;  Service:    • HAND SURGERY Bilateral     x3   • HYSTERECTOMY      status post uterine and cervical cancer   • LUMBAR SPINE SURGERY      arthrodesis by anterior approach addit interspace   • TONSILLECTOMY         Family History   Problem Relation Age of Onset   • Arthritis Mother    • Diabetes Mother    • Colon polyps Mother    • Diverticulitis Mother    • Arthritis Father    • Bleeding Disorder Father    • Diabetes Father    • Kidney disease Father    • COPD Sister      currently smokes   • Arthritis Brother    • Diabetes Brother    • Alcohol abuse Brother    • Heart murmur Daughter    • Arthritis Other    • Diabetes Other        Social History     Social History   • Marital status:    • Number of children: 1     Occupational History   •  Disabled     Social History Main Topics   • Smoking status: Former Smoker     Packs/day: 1.50     Years: 48.00     Types: Cigarettes     Quit date: 2/27/2017   • Smokeless tobacco: Never Used   • Alcohol use No   • Drug use: No   • Sexual activity: Defer     Social History Narrative    Mrs. Langston is a 62 year old white  female. She lives with her spouse in [facility]. She states she does her own ADLs but appears disabled. They have one child and two grandchildren. She has a living will.         Objective   Physical Exam   Constitutional: She is oriented to person, place, and time. She appears well-developed and well-nourished. No distress.   Obese    HENT:   Head: Normocephalic and atraumatic.   Eyes: Conjunctivae and EOM are normal. Pupils are equal, round, and reactive to light.   Neck: Normal range of motion. Neck supple.   Cardiovascular: Normal rate and intact distal pulses.  Exam reveals no gallop and no friction rub.    Murmur heard.   Systolic murmur is present with a grade of 3/6   Pulmonary/Chest: Effort normal. No respiratory distress. She  has no wheezes. She has no rhonchi. She has rales in the left upper field and the left lower field. She exhibits no tenderness.   Airway patent    Abdominal: Soft. Bowel sounds are normal. She exhibits no distension. There is no tenderness.   Musculoskeletal: Normal range of motion.   Neurological: She is alert and oriented to person, place, and time.   Skin: Skin is warm and dry. She is not diaphoretic.   There is an unstageable decubitus ulcer on the coccyx. It is estimated to be 1cm in depth with yellow slough at the base with irregular, erythematous borders.     The right heel has a 3X3cm suspected DTI. Border is intact, red, and blanchable.     ANGELO PICC line in place. Site without redness, streaking, or edema.    Psychiatric: She has a normal mood and affect. Her behavior is normal.       Procedures         ED Course  ED Course       Recent Results (from the past 24 hour(s))   Comprehensive Metabolic Panel    Collection Time: 03/08/18  6:42 PM   Result Value Ref Range    Glucose 176 (H) 70 - 100 mg/dL    BUN 32 (H) 9 - 23 mg/dL    Creatinine 1.50 (H) 0.60 - 1.30 mg/dL    Sodium 139 132 - 146 mmol/L    Potassium 5.2 3.5 - 5.5 mmol/L    Chloride 105 99 - 109 mmol/L    CO2 28.0 20.0 - 31.0 mmol/L    Calcium 9.3 8.7 - 10.4 mg/dL    Total Protein 7.2 5.7 - 8.2 g/dL    Albumin 3.90 3.20 - 4.80 g/dL    ALT (SGPT) 26 7 - 40 U/L    AST (SGOT) 32 0 - 33 U/L    Alkaline Phosphatase 78 25 - 100 U/L    Total Bilirubin 0.3 0.3 - 1.2 mg/dL    eGFR Non African Amer 35 (L) >60 mL/min/1.73    Globulin 3.3 gm/dL    A/G Ratio 1.2 (L) 1.5 - 2.5 g/dL    BUN/Creatinine Ratio 21.3 7.0 - 25.0    Anion Gap 6.0 3.0 - 11.0 mmol/L   Magnesium    Collection Time: 03/08/18  6:42 PM   Result Value Ref Range    Magnesium 1.9 1.3 - 2.7 mg/dL   Light Blue Top    Collection Time: 03/08/18  6:42 PM   Result Value Ref Range    Extra Tube hold for add-on    Green Top (Gel)    Collection Time: 03/08/18  6:42 PM   Result Value Ref Range    Extra Tube  Hold for add-ons.    Lavender Top    Collection Time: 03/08/18  6:42 PM   Result Value Ref Range    Extra Tube hold for add-on    Gold Top - SST    Collection Time: 03/08/18  6:42 PM   Result Value Ref Range    Extra Tube Hold for add-ons.    CBC Auto Differential    Collection Time: 03/08/18  6:42 PM   Result Value Ref Range    WBC 15.94 (H) 3.50 - 10.80 10*3/mm3    RBC 3.42 (L) 3.89 - 5.14 10*6/mm3    Hemoglobin 10.2 (L) 11.5 - 15.5 g/dL    Hematocrit 33.4 (L) 34.5 - 44.0 %    MCV 97.7 80.0 - 99.0 fL    MCH 29.8 27.0 - 31.0 pg    MCHC 30.5 (L) 32.0 - 36.0 g/dL    RDW 15.3 (H) 11.3 - 14.5 %    RDW-SD 55.2 (H) 37.0 - 54.0 fl    MPV 11.4 6.0 - 12.0 fL    Platelets 192 150 - 450 10*3/mm3    Neutrophil % 75.3 (H) 41.0 - 71.0 %    Lymphocyte % 13.1 (L) 24.0 - 44.0 %    Monocyte % 6.2 0.0 - 12.0 %    Eosinophil % 4.8 (H) 0.0 - 3.0 %    Basophil % 0.4 0.0 - 1.0 %    Immature Grans % 0.2 0.0 - 0.6 %    Neutrophils, Absolute 12.00 (H) 1.50 - 8.30 10*3/mm3    Lymphocytes, Absolute 2.09 0.60 - 4.80 10*3/mm3    Monocytes, Absolute 0.99 0.00 - 1.00 10*3/mm3    Eosinophils, Absolute 0.76 (H) 0.00 - 0.30 10*3/mm3    Basophils, Absolute 0.07 0.00 - 0.20 10*3/mm3    Immature Grans, Absolute 0.03 0.00 - 0.03 10*3/mm3   POC Troponin, Rapid    Collection Time: 03/08/18  6:42 PM   Result Value Ref Range    Troponin I 0.07 0.00 - 0.07 ng/mL   BNP    Collection Time: 03/08/18  6:42 PM   Result Value Ref Range    .0 (H) 0.0 - 100.0 pg/mL   Procalcitonin    Collection Time: 03/08/18  6:42 PM   Result Value Ref Range    Procalcitonin 0.23 <=0.25 ng/mL   Lactic Acid, Plasma    Collection Time: 03/08/18  7:40 PM   Result Value Ref Range    Lactate 1.2 0.5 - 2.0 mmol/L   Urinalysis With / Culture If Indicated - Urine, Clean Catch    Collection Time: 03/08/18  8:02 PM   Result Value Ref Range    Color, UA Yellow Yellow, Straw    Appearance, UA Clear Clear    pH, UA <=5.0 5.0 - 8.0    Specific Gravity, UA 1.012 1.001 - 1.030    Glucose,  UA Negative Negative    Ketones, UA Negative Negative    Bilirubin, UA Negative Negative    Blood, UA Negative Negative    Protein, UA 30 mg/dL (1+) (A) Negative    Leuk Esterase, UA Trace (A) Negative    Nitrite, UA Negative Negative    Urobilinogen, UA 0.2 E.U./dL 0.2 - 1.0 E.U./dL   Urinalysis, Microscopic Only - Urine, Clean Catch    Collection Time: 03/08/18  8:02 PM   Result Value Ref Range    RBC, UA 0-2 None Seen, 0-2 /HPF    WBC, UA 6-12 (A) None Seen, 0-2 /HPF    Bacteria, UA None Seen None Seen, Trace /HPF    Squamous Epithelial Cells, UA 0-2 None Seen, 0-2 /HPF    Hyaline Casts, UA 0-6 0 - 6 /LPF    Methodology Automated Microscopy      Note: In addition to lab results from this visit, the labs listed above may include labs taken at another facility or during a different encounter within the last 24 hours. Please correlate lab times with ED admission and discharge times for further clarification of the services performed during this visit.    XR Chest 1 View   Final Result     No acute findings.      THIS DOCUMENT HAS BEEN ELECTRONICALLY SIGNED BY NOEL LYLE MD      CT Head Without Contrast    (Results Pending)     Vitals:    03/08/18 1900 03/08/18 1920 03/08/18 1940 03/08/18 2040   BP: 149/77 152/81 146/66 135/64   BP Location:       Patient Position:       Pulse: 92 91 93 102   Resp:       Temp:       TempSrc:       SpO2: 93% 94% 93% 91%   Weight:       Height:         Medications   sodium chloride 0.9 % flush 10 mL (not administered)   vancomycin IVPB 2000 mg in 0.9% Sodium Chloride (premix) 500 mL (2,000 mg Intravenous New Bag 3/8/18 2054)   piperacillin-tazobactam (ZOSYN) 4.5 g in iso-osmotic dextrose 100 mL IVPB (premix) (0 g Intravenous Stopped 3/8/18 2045)   diphenhydrAMINE (BENADRYL) injection 25 mg (25 mg Intravenous Given 3/8/18 2122)     ECG/EMG Results (last 24 hours)     Procedure Component Value Units Date/Time    ECG 12 Lead [[id number]] Collected:  03/08/18 1815     Updated:  03/08/18  1836    Narrative:       Test Reason : Weak/Dizzy/AMS protocol  Blood Pressure : **/** mmHG  Vent. Rate : 090 BPM     Atrial Rate : 090 BPM     P-R Int : 208 ms          QRS Dur : 088 ms      QT Int : 370 ms       P-R-T Axes : 060 -74 131 degrees     QTc Int : 452 ms    Normal sinus rhythm  Left axis deviation  Pulmonary disease pattern  T wave abnormality, consider lateral ischemia  Abnormal ECG  When compared with ECG of 14-FEB-2018 00:51,  ST no longer elevated in Lateral leads  T wave inversion now evident in Lateral leads  Confirmed by CHRISTEL PALACIOS MD ([id number]) on 3/8/2018 6:36:29 PM    Referred By:  EDMD           Confirmed By:CHRISTEL PALACIOS MD                      [facility]    Final diagnoses:   Transient alteration of awareness   Fever, unspecified fever cause   Decubitus ulcer of sacral region, unspecified ulcer stage       Documentation assistance provided by jacob HERNANDEZ.  Information recorded by the scribe was done at my direction and has been verified and validated by me.     Amanda Hernandez  03/08/18 1911       Christel Palacios MD  03/08/18 2126

## 2018-03-09 LAB
C DIFF TOX GENS STL QL NAA+PROBE: NOT DETECTED
GLUCOSE BLDC GLUCOMTR-MCNC: 156 MG/DL (ref 70–130)
GLUCOSE BLDC GLUCOMTR-MCNC: 204 MG/DL (ref 70–130)
GLUCOSE BLDC GLUCOMTR-MCNC: 231 MG/DL (ref 70–130)
GLUCOSE BLDC GLUCOMTR-MCNC: 308 MG/DL (ref 70–130)

## 2018-03-09 PROCEDURE — 99233 SBSQ HOSP IP/OBS HIGH 50: CPT | Performed by: INTERNAL MEDICINE

## 2018-03-09 PROCEDURE — 87493 C DIFF AMPLIFIED PROBE: CPT | Performed by: NURSE PRACTITIONER

## 2018-03-09 PROCEDURE — 25010000002 HEPARIN (PORCINE) PER 1000 UNITS: Performed by: NURSE PRACTITIONER

## 2018-03-09 PROCEDURE — 25010000002 VANCOMYCIN HCL IN NACL 1.5-0.9 GM/500ML-% SOLUTION

## 2018-03-09 PROCEDURE — 63710000001 INSULIN LISPRO (HUMAN) PER 5 UNITS: Performed by: NURSE PRACTITIONER

## 2018-03-09 PROCEDURE — 94799 UNLISTED PULMONARY SVC/PX: CPT

## 2018-03-09 PROCEDURE — 97165 OT EVAL LOW COMPLEX 30 MIN: CPT

## 2018-03-09 PROCEDURE — G8979 MOBILITY GOAL STATUS: HCPCS

## 2018-03-09 PROCEDURE — 82962 GLUCOSE BLOOD TEST: CPT

## 2018-03-09 PROCEDURE — 97162 PT EVAL MOD COMPLEX 30 MIN: CPT

## 2018-03-09 PROCEDURE — 25010000002 PIPERACILLIN SOD-TAZOBACTAM PER 1 G: Performed by: NURSE PRACTITIONER

## 2018-03-09 PROCEDURE — G8978 MOBILITY CURRENT STATUS: HCPCS

## 2018-03-09 PROCEDURE — 94660 CPAP INITIATION&MGMT: CPT

## 2018-03-09 RX ORDER — L.ACID,PARA/B.BIFIDUM/S.THERM 8B CELL
1 CAPSULE ORAL DAILY
Status: DISCONTINUED | OUTPATIENT
Start: 2018-03-09 | End: 2018-03-10 | Stop reason: SDUPTHER

## 2018-03-09 RX ADMIN — PREGABALIN 100 MG: 100 CAPSULE ORAL at 14:09

## 2018-03-09 RX ADMIN — TAZOBACTAM SODIUM AND PIPERACILLIN SODIUM 4.5 G: 500; 4 INJECTION, SOLUTION INTRAVENOUS at 17:18

## 2018-03-09 RX ADMIN — CARVEDILOL 6.25 MG: 6.25 TABLET, FILM COATED ORAL at 20:01

## 2018-03-09 RX ADMIN — CLOPIDOGREL BISULFATE 75 MG: 75 TABLET ORAL at 09:48

## 2018-03-09 RX ADMIN — HEPARIN SODIUM 5000 UNITS: 5000 INJECTION, SOLUTION INTRAVENOUS; SUBCUTANEOUS at 20:01

## 2018-03-09 RX ADMIN — HEPARIN SODIUM 5000 UNITS: 5000 INJECTION, SOLUTION INTRAVENOUS; SUBCUTANEOUS at 09:01

## 2018-03-09 RX ADMIN — FUROSEMIDE 40 MG: 40 TABLET ORAL at 09:48

## 2018-03-09 RX ADMIN — NYSTATIN 1 APPLICATION: 100000 POWDER TOPICAL at 09:02

## 2018-03-09 RX ADMIN — INSULIN LISPRO 5 UNITS: 100 INJECTION, SOLUTION INTRAVENOUS; SUBCUTANEOUS at 11:50

## 2018-03-09 RX ADMIN — ATORVASTATIN CALCIUM 80 MG: 40 TABLET, FILM COATED ORAL at 20:01

## 2018-03-09 RX ADMIN — OXYCODONE HYDROCHLORIDE AND ACETAMINOPHEN 1 TABLET: 10; 325 TABLET ORAL at 09:48

## 2018-03-09 RX ADMIN — VANCOMYCIN HCL-SODIUM CHLORIDE IV SOLN 1.5 GM/250ML-0.9% 1500 MG: 1.5-0.9/25 SOLUTION at 20:02

## 2018-03-09 RX ADMIN — INSULIN LISPRO 2 UNITS: 100 INJECTION, SOLUTION INTRAVENOUS; SUBCUTANEOUS at 09:12

## 2018-03-09 RX ADMIN — FLUTICASONE PROPIONATE 1 SPRAY: 50 SPRAY, METERED NASAL at 20:02

## 2018-03-09 RX ADMIN — PREGABALIN 100 MG: 100 CAPSULE ORAL at 20:00

## 2018-03-09 RX ADMIN — NYSTATIN: 100000 POWDER TOPICAL at 20:03

## 2018-03-09 RX ADMIN — Medication 1 CAPSULE: at 09:47

## 2018-03-09 RX ADMIN — SACUBITRIL AND VALSARTAN 1 TABLET: 24; 26 TABLET, FILM COATED ORAL at 09:52

## 2018-03-09 RX ADMIN — OXYCODONE HYDROCHLORIDE AND ACETAMINOPHEN 1 TABLET: 10; 325 TABLET ORAL at 20:41

## 2018-03-09 RX ADMIN — INSULIN LISPRO 3 UNITS: 100 INJECTION, SOLUTION INTRAVENOUS; SUBCUTANEOUS at 17:18

## 2018-03-09 RX ADMIN — INSULIN LISPRO 3 UNITS: 100 INJECTION, SOLUTION INTRAVENOUS; SUBCUTANEOUS at 20:41

## 2018-03-09 RX ADMIN — TAZOBACTAM SODIUM AND PIPERACILLIN SODIUM 4.5 G: 500; 4 INJECTION, SOLUTION INTRAVENOUS at 09:52

## 2018-03-09 RX ADMIN — TAZOBACTAM SODIUM AND PIPERACILLIN SODIUM 4.5 G: 500; 4 INJECTION, SOLUTION INTRAVENOUS at 00:52

## 2018-03-09 RX ADMIN — BUSPIRONE HYDROCHLORIDE 7.5 MG: 15 TABLET ORAL at 09:48

## 2018-03-09 RX ADMIN — CETIRIZINE HYDROCHLORIDE 10 MG: 10 TABLET, FILM COATED ORAL at 20:00

## 2018-03-09 RX ADMIN — NYSTATIN: 100000 POWDER TOPICAL at 01:01

## 2018-03-09 RX ADMIN — SACUBITRIL AND VALSARTAN 1 TABLET: 24; 26 TABLET, FILM COATED ORAL at 20:01

## 2018-03-09 RX ADMIN — BUSPIRONE HYDROCHLORIDE 7.5 MG: 15 TABLET ORAL at 20:00

## 2018-03-09 RX ADMIN — FLUTICASONE PROPIONATE 1 SPRAY: 50 SPRAY, METERED NASAL at 09:02

## 2018-03-09 RX ADMIN — POTASSIUM CHLORIDE 20 MEQ: 750 CAPSULE, EXTENDED RELEASE ORAL at 09:47

## 2018-03-09 RX ADMIN — FENTANYL 1 PATCH: 75 PATCH TRANSDERMAL at 09:02

## 2018-03-09 RX ADMIN — CLONAZEPAM 0.25 MG: 0.5 TABLET ORAL at 20:01

## 2018-03-09 RX ADMIN — ASPIRIN 81 MG: 81 TABLET, COATED ORAL at 09:47

## 2018-03-09 RX ADMIN — CARVEDILOL 6.25 MG: 6.25 TABLET, FILM COATED ORAL at 09:48

## 2018-03-09 NOTE — H&P
"    [facility] Medicine Services  HISTORY AND PHYSICAL    Patient Name: Tamara Langston  : 1955  MRN: [id number]  Primary Care Physician: Harinder Aranda MD   ID: Dr. Robert Cunha  Cardiology: Dr. Lane Zamorano  CTS: Dr. Arsalan Feliciano  GI: Dr. Prabhjot Luong    Subjective   Subjective     Chief Complaint:  \"Unresponsive\" episode / weakness    HPI:  Tamara Langston is a 62 y.o. female who was sent to [facility] ED evening by EMS after pt went \"unresponsive.\" Episode occurred PTA, spouse estimates about an hour like this. His sister was there with her talking while resting in bed and thought she seemed baseline, and that instantly she just dozed off and stopped responding at all, still breathing and pulse. Constant. Severe. Improved with time. Pt has no recollection of this. She does note she feels week, and was noted to have a fever earlier today and felt chills.    Recent events include a hospitalization here 18-18 for fever/confusion/worsening pressure ulcers. She was treated for sepsis and the infected buttocks wounds with zosyn by PICC line through 3/2 and then to be on augmentin 500/125 mg TID x2 weeks after. Cultures grew enterococcus, pseudomonas, and coag-negative staph. ID was following. Other events that stay were NSTEMI and had right and left heart caths 18 and 2 stents for severe LAD and diagonal disease - she remains on duel PLT therapy. She was started on entresto and coreg for Takotsubo cardiomyopathy. She was also briefly in the ICU for respiratory failure secondary to volume overload (appears was on bipap but did not require intubation).    She is being admitted to [facility] for further evaluation.    Review of Systems   Constitutional: Positive for activity change, chills, diaphoresis, fatigue and fever. Negative for appetite change.        Baseline is up with walker up to 20 feet to toilet and back, at rehab has been doing this and even made it down the " wick 100 ft  - today only in bed. Intentional wt loss ~13 lbs prior to last stay on a diet.   HENT: Negative for trouble swallowing.    Respiratory: Positive for apnea. Negative for cough, shortness of breath and wheezing.         Chronic 2L continuous.   Cardiovascular: Negative for chest pain, palpitations and leg swelling.   Gastrointestinal: Positive for diarrhea. Negative for abdominal pain, blood in stool, constipation, nausea and vomiting.        Reports pure liquid diarrhea at least 2+ times a day since discharge here.   Endocrine:        Hyperglycemia, not known hypoglycemia, GLU 90s by EMS.   Genitourinary: Negative for difficulty urinating, dysuria, hematuria and vaginal bleeding.        Denies dark/odorous urine. Denies incontinence.   Musculoskeletal: Positive for arthralgias.        Chronic pain, on fentanyl patch.   Skin: Positive for wound.        Buttocks wounds. Spouse states her face/chest/arms do look more red than usual.   Allergic/Immunologic: Negative for immunocompromised state.   Neurological: Positive for weakness. Negative for dizziness, seizures, syncope and headaches.   Psychiatric/Behavioral: Negative for confusion and hallucinations.        Denies memory loss.          Otherwise 10-system ROS reviewed and is negative except as mentioned in the HPI.    Personal History     Past Medical History:   Diagnosis Date   • Acute on chronic respiratory failure with hypoxia 2/13/2018   • ELIF (acute kidney injury) 2/13/2018   • ELIF (acute kidney injury) 2/13/2018   • Altered mental status 2/13/2018   • Bronchitis    • Cellulitis of sacral region 2/13/2018   • Cervical cancer    • Cholelithiasis 5/11/2016   • Chronic anxiety 2/27/2017   • Chronic bronchitis    • Chronic respiratory failure with hypoxia 3/8/2018   • Chronic systolic heart failure 3/8/2018   • Chronically on benzodiazepine therapy 2/27/2017   • Degenerative arthritis    • Diabetes mellitus    • Dyslipidemia 5/11/2016   • Dyspnea    •  Elevated troponin level 2/13/2018   • Fatty liver disease, nonalcoholic 11/21/2016   • Fever 2/13/2018   • Fibromyalgia    • GERD (gastroesophageal reflux disease)    • H/O echocardiogram    • History of pneumonia    • Hypertension 5/11/2016    16. H/O echocardiogram (V15.89) (Z92.89)  · A.  Echocardiogram of 02/03/2015 reports an ejection fraction of 60-65%, mild concentric     LVH, trace mitral regurgitation, mild tricuspid and pulmonic regurgitation and calculated     RVSP of 35 mmHg, the main pulmonary artery is also mildly dilated.   • Hypothyroidism 5/11/2016    Description: A.  On replacement therapy.   • Infected wound 3/8/2018   • Nausea    • Obesity    • DINA (obstructive sleep apnea)     intolerant of CPAP therapy   • Osteoporosis    • Osteoporosis    • Polycythemia vera 5/11/2016   • Pulmonary emphysema    • Restrictive ventilatory defect    • Rhinitis    • Sepsis 2/13/2018   • Uncontrolled diabetes mellitus 5/11/2016   • Urine abnormality 2/13/2018   • Uterine cancer    • Vitamin D deficiency 8/1/2016   • Weakness 3/8/2018       Past Surgical History:   Procedure Laterality Date   • AMPUTATION DIGIT Left 11/23/2016    Procedure: AMPUTATION TRANS METATARSAL - ray amutation of the left great toe;  Surgeon: Arsalan Feliciano MD;  Location:  [facility];  Service:    • AMPUTATION DIGIT Left 3/2/2017    Procedure: LEFT FOOT TRANSMETATARSAL AMPUTATION TOES 2,3,4,5;  Surgeon: Joey Guaman MD;  Location:  [facility];  Service:    • BACK SURGERY      lumbar fusions x5--multiple times; 1995, 1997, 1998, 1999 and 2008   • BELOW KNEE AMPUTATION Left 2/25/2017    Procedure: EXTENDED LEFT TOE AMPUTATION;  Surgeon: Arsalan Feliciano MD;  Location:  [facility];  Service:    • CARDIAC CATHETERIZATION N/A 11/26/2016    Procedure: Left Heart Cath;  Surgeon: Ash Nuñez MD;  Location:  [facility];  Service:    • CARDIAC CATHETERIZATION N/A 2/20/2018    Procedure: Left Heart Cath;  Surgeon: Lane Zamorano MD;   Location:  [facility] IN[facility]IVE [facility];  Service:    • CARDIAC CATHETERIZATION N/A 2/20/2018    Procedure: Right Heart Cath;  Surgeon: Lane Zamorano MD;  Location:  [facility] INVASIVE LOCATION;  Service:    • HAND SURGERY Bilateral     x3   • HYSTERECTOMY      status post uterine and cervical cancer   • LUMBAR SPINE SURGERY      arthrodesis by anterior approach addit interspace   • TONSILLECTOMY         Family History: family history includes Alcohol abuse in her brother; Arthritis in her brother, father, mother, and other; Bleeding Disorder in her father; COPD in her sister; Colon polyps in her mother; Diabetes in her brother, father, mother, and other; Diverticulitis in her mother; Heart murmur in her daughter; Kidney disease in her father.     Social History:  reports that she quit smoking about a year ago. Her smoking use included Cigarettes. She has a 72.00 pack-year smoking history. She has never used smokeless tobacco. She reports that she does not drink alcohol or use illicit drugs.  Social History     Social History Narrative    Mrs. Langston is a 62 year old white  female. She lives with her spouse in [facility]. She states she does her own ADLs but appears disabled. They have one child and two grandchildren. She has a living will.       Medications:    No current facility-administered medications on file prior to encounter.      Current Outpatient Prescriptions on File Prior to Encounter   Medication Sig Dispense Refill   • acetaminophen (TYLENOL) 325 MG tablet Take 2 tablets by mouth Every 4 (Four) Hours As Needed for mild pain (1-3) or fever (temperature greater than 101F). 100 tablet 0   • albuterol (PROVENTIL) (2.5 MG/3ML) 0.083% nebulizer solution Take 2.5 mg by nebulization Every 6 (Six) Hours As Needed.     • amoxicillin-clavulanate (AUGMENTIN) 500-125 MG per tablet Take 1 tablet by mouth 3 (Three) Times a Day for 14 days. STARTING 3/3/18     • aspirin 81 MG EC tablet Take 1 tablet by  mouth Daily.     • atorvastatin (LIPITOR) 80 MG tablet Take 1 tablet by mouth Every Night.     • baclofen (LIORESAL) 10 MG tablet Take 1 tablet by mouth 2 (Two) Times a Day As Needed for Muscle Spasms. 180 tablet 3   • busPIRone (BUSPAR) 7.5 MG tablet Take 1 tablet by mouth Every 12 (Twelve) Hours.     • carvedilol (COREG) 6.25 MG tablet Take 1 tablet by mouth Every 12 (Twelve) Hours.     • cetirizine (ZyrTEC) 10 MG tablet Take 10 mg by mouth Every Night.     • clonazePAM (KlonoPIN) 0.5 MG tablet Take 0.5 tablets by mouth 2 (Two) Times a Day As Needed for Anxiety. 6 tablet 0   • clopidogrel (PLAVIX) 75 MG tablet Take 1 tablet by mouth Daily. 90 tablet 3   • Cyanocobalamin 1000 MCG/ML kit Inject 1,000 mcg as directed Every 30 (Thirty) Days. 1 kit 3   • ezetimibe (ZETIA) 10 MG tablet Take 1 tablet by mouth Daily. 90 tablet 3   • fentaNYL (DURAGESIC) 75 MCG/HR patch Place 1 patch on the skin Every Other Day. 2 patch 0   • fluticasone (FLONASE) 50 MCG/ACT nasal spray 1 spray into each nostril 2 (Two) Times a Day.     • furosemide (LASIX) 40 MG tablet Take 1 tablet by mouth Daily.     • levothyroxine (SYNTHROID, LEVOTHROID) 112 MCG tablet TAKE ONE TABLET BY MOUTH DAILY 90 tablet 2   • lidocaine (LIDODERM) 5 % Place 3 patches on the skin Daily As Needed for Mild Pain . Remove & Discard patch within 12 hours or as directed by MD     • melatonin 5 MG sublingual tablet sublingual tablet Place 1 tablet under the tongue At Night As Needed (insomnia). 30 tablet 0   • nystatin (MYCOSTATIN) 045717 UNIT/GM powder Under breasts, ABD folds, and groin fields every 12 hours     • omeprazole (priLOSEC) 20 MG capsule TAKE ONE CAPSULE BY MOUTH DAILY 90 capsule 0   • oxyCODONE-acetaminophen (PERCOCET)  MG per tablet Take 1 tablet by mouth Every 6 (Six) Hours As Needed for Moderate Pain  or Severe Pain . 8 tablet 0   • polyethylene glycol (MIRALAX) packet Take 17 g by mouth 2 (Two) Times a Day.     • potassium chloride (MICRO-K) 10  MEQ CR capsule Take 2 capsules by mouth Daily.     • pregabalin (LYRICA) 100 MG capsule Take 1 capsule by mouth Every 8 (Eight) Hours. 9 capsule 0   • probiotic (CULTURELLE) capsule capsule Take 1 capsule by mouth Daily. 60 capsule 0   • sacubitril-valsartan (ENTRESTO) 24-26 MG tablet Take 1 tablet by mouth Every 12 (Twelve) Hours. 60 tablet    • sennosides-docusate sodium (SENOKOT-S) 8.6-50 MG tablet Take 2 tablets by mouth 2 (Two) Times a Day.     • SITagliptin (JANUVIA) 100 MG tablet Take 100 mg by mouth Daily.     • [DISCONTINUED] castor oil-balsam peru (VENELEX) ointment Apply to buttocks daily; mix with Z-guard; cover with foam sacral dressing         Allergies   Allergen Reactions   • Cortisone Other (See Comments)     Hotness all over body and hyper   • Oxycontin [Oxycodone] Other (See Comments)     psoriasis   • Tolmetin Rash     Tolectin-rash and itching   • Vancomycin Rash       Objective   Objective     Vital Signs:   Temp:  [100.3 °F (37.9 °C)] 100.3 °F (37.9 °C)  Heart Rate:  [] 102  Resp:  [16] 16  BP: (135-163)/(64-81) 135/64        Physical Exam   Constitutional: No distress.   Obese. Ill appearing. Spouse is present and supportive.   HENT:   Head: Normocephalic and atraumatic.   Mouth/Throat: Oropharynx is clear and moist.   Eyes: Conjunctivae and EOM are normal. Right eye exhibits no discharge. Left eye exhibits no discharge. No scleral icterus.   Right eye slight right upper deviation, they state this is baseline from an old stroke, otherwise PERRL.   Neck: No JVD present. No tracheal deviation present.   Cardiovascular: Normal rate, regular rhythm and intact distal pulses.    Murmur heard.  Pulses:       Radial pulses are 2+ on the right side, and 2+ on the left side.        Dorsalis pedis pulses are 2+ on the right side, and 2+ on the left side.   No edema. RUE PICC.   Pulmonary/Chest: Effort normal and breath sounds normal. No respiratory distress. She has no wheezes. She has no rales.    Bases diminished but sound clear. O2 NC on.   Abdominal: Soft. Bowel sounds are normal. There is no tenderness. There is no guarding.   Large abdominal girth/distention.   Genitourinary:   Genitourinary Comments: Pelvic non-tender.   Musculoskeletal: She exhibits no edema or deformity.   Fentanyl patch on R chest.   Neurological:   Mild lethargy, slow to respond. Oriented to person, place, time, and some situation - she does not appear to recall much os last hospitalization. Midl BUE action tremor they state is baseline. 3/5 strength.   Skin: Skin is warm and dry. No rash noted. She is not diaphoretic. No erythema. No pallor.   Psychiatric: Her behavior is normal.   Calm, relaxed, flat, lethargic, cooperative. Responds to questions asked.        Results Reviewed:  I have personally reviewed current lab, radiology, and data and agree.    Lab Results (last 24 hours)     Procedure Component Value Units Date/Time    CBC & Differential [[id number]] Collected:  03/08/18 1842    Specimen:  Blood Updated:  03/08/18 1903    Narrative:       The following orders were created for panel order CBC & Differential.  Procedure                               Abnormality         Status                     ---------                               -----------         ------                     CBC Auto Differential[[id number]]        Abnormal            Final result                 Please view results for these tests on the individual orders.    Comprehensive Metabolic Panel [[id number]]  (Abnormal) Collected:  03/08/18 1842    Specimen:  Blood Updated:  03/08/18 1916     Glucose 176 (H) mg/dL      BUN 32 (H) mg/dL      Creatinine 1.50 (H) mg/dL      Sodium 139 mmol/L      Potassium 5.2 mmol/L      Chloride 105 mmol/L      CO2 28.0 mmol/L      Calcium 9.3 mg/dL      Total Protein 7.2 g/dL      Albumin 3.90 g/dL      ALT (SGPT) 26 U/L      AST (SGOT) 32 U/L      Alkaline Phosphatase 78 U/L      Total Bilirubin 0.3 mg/dL      eGFR Non   Amer 35 (L) mL/min/1.73      Globulin 3.3 gm/dL      A/G Ratio 1.2 (L) g/dL      BUN/Creatinine Ratio 21.3     Anion Gap 6.0 mmol/L     Narrative:       [employer] Guidelines    Stage     Description        GFR  1         Normal or High     90+  2         Mild decrease      60-89  3         Moderate decrease  30-59  4         Severe decrease    15-29  5         Kidney failure     <15    Magnesium [[id number]]  (Normal) Collected:  03/08/18 1842    Specimen:  Blood Updated:  03/08/18 1914     Magnesium 1.9 mg/dL     CBC Auto Differential [[id number]]  (Abnormal) Collected:  03/08/18 1842    Specimen:  Blood Updated:  03/08/18 1903     WBC 15.94 (H) 10*3/mm3      RBC 3.42 (L) 10*6/mm3      Hemoglobin 10.2 (L) g/dL      Hematocrit 33.4 (L) %      MCV 97.7 fL      MCH 29.8 pg      MCHC 30.5 (L) g/dL      RDW 15.3 (H) %      RDW-SD 55.2 (H) fl      MPV 11.4 fL      Platelets 192 10*3/mm3      Neutrophil % 75.3 (H) %      Lymphocyte % 13.1 (L) %      Monocyte % 6.2 %      Eosinophil % 4.8 (H) %      Basophil % 0.4 %      Immature Grans % 0.2 %      Neutrophils, Absolute 12.00 (H) 10*3/mm3      Lymphocytes, Absolute 2.09 10*3/mm3      Monocytes, Absolute 0.99 10*3/mm3      Eosinophils, Absolute 0.76 (H) 10*3/mm3      Basophils, Absolute 0.07 10*3/mm3      Immature Grans, Absolute 0.03 10*3/mm3     POC Troponin, Rapid [[id number]]  (Normal) Collected:  03/08/18 1842    Specimen:  Blood Updated:  03/08/18 1905     Troponin I 0.07 ng/mL       Serial Number: [id number]:  [id number]       BNP [[id number]]  (Abnormal) Collected:  03/08/18 1842    Specimen:  Blood Updated:  03/08/18 1943     .0 (H) pg/mL       Results may be falsely decreased if patient taking Biotin.       Procalcitonin [[id number]]  (Normal) Collected:  03/08/18 1842    Specimen:  Blood Updated:  03/08/18 1946     Procalcitonin 0.23 ng/mL     Narrative:       As a Marker for Sepsis (Non-Neonates):   1. <0.5 ng/mL represents a low  risk of severe sepsis and/or septic shock.  2. >2 ng/mL represents a high risk of severe sepsis and/or septic shock.    As a Marker for Lower Respiratory Tract Infections that require antibiotic therapy:    PCT on Admission     Antibiotic Therapy       6-12 Hrs later  > 0.5                Strongly Recommended             >0.25 - <0.5         Recommended  0.1 - 0.25           Discouraged              Remeasure/reassess PCT  <0.1                 Strongly Discouraged     Remeasure/reassess PCT                     PCT values of < 0.5 ng/mL do not exclude an infection, because localized infections (without systemic signs) may be associated with such low concentrations, or a systemic infection in its initial stages (< 6 hours). Furthermore, increased PCT can occur without infection. PCT concentrations between 0.5 and 2.0 ng/mL should be interpreted taking into account the patient's history. It is recommended to retest PCT within 6-24 hours if any concentrations < 2 ng/mL are obtained.    Blood Culture - Blood, [[id number]] Collected:  03/08/18 1930    Specimen:  Blood from Arm, Right Updated:  03/08/18 1950    Blood Culture - Blood, [[id number]] Collected:  03/08/18 1935    Specimen:  Blood from Arm, Right Updated:  03/08/18 1952    Lactic Acid, Plasma [[id number]]  (Normal) Collected:  03/08/18 1940    Specimen:  Blood Updated:  03/08/18 1957     Lactate 1.2 mmol/L       Falsely depressed results may occur on samples drawn from patients receiving N-Acetylcysteine (NAC) or Metamizole.       Urinalysis With / Culture If Indicated - Urine, Clean Catch [[id number]]  (Abnormal) Collected:  03/08/18 2002    Specimen:  Urine from Urine, Clean Catch Updated:  03/08/18 2018     Color, UA Yellow     Appearance, UA Clear     pH, UA <=5.0     Specific Gravity, UA 1.012     Glucose, UA Negative     Ketones, UA Negative     Bilirubin, UA Negative     Blood, UA Negative     Protein, UA 30 mg/dL (1+) (A)     Leuk Esterase, UA Trace (A)      Nitrite, UA Negative     Urobilinogen, UA 0.2 E.U./dL    Urinalysis, Microscopic Only - Urine, Clean Catch [[id number]]  (Abnormal) Collected:  03/08/18 2002    Specimen:  Urine from Urine, Clean Catch Updated:  03/08/18 2018     RBC, UA 0-2 /HPF      WBC, UA 6-12 (A) /HPF      Bacteria, UA None Seen /HPF      Squamous Epithelial Cells, UA 0-2 /HPF      Hyaline Casts, UA 0-6 /LPF      Methodology Automated Microscopy    Urine Culture - Urine, Urine, Clean Catch [[id number]] Collected:  03/08/18 2002    Specimen:  Urine from Urine, Clean Catch Updated:  03/08/18 2018          Estimated Creatinine Clearance: 46.4 mL/min (by C-G formula based on Cr of 1.5).  Brief Urine Lab Results  (Last result in the past 365 days)      Color   Clarity   Blood   Leuk Est   Nitrite   Protein   CREAT   Urine HCG        03/08/18 2002 Yellow Clear Negative Trace(A) Negative 30 mg/dL (1+)(A)             BNP   Date Value Ref Range Status   03/08/2018 900.0 (H) 0.0 - 100.0 pg/mL Final     Comment:     Results may be falsely decreased if patient taking Biotin.     No results found for: PHART  Imaging Results (last 24 hours)     Procedure Component Value Units Date/Time    XR Chest 1 View [[id number]] Collected:  03/08/18 1813     Updated:  03/08/18 1912    Narrative:       EXAM:    XR Chest, 1 View    CLINICAL HISTORY:    62 years old, female; Signs and symptoms; Other: Weak/dizzy/ams; Additional   info: Weak/dizzy/ams triage protocol    TECHNIQUE:    Frontal view of the chest.    COMPARISON:    CR - XR CHEST 1  2018-02-19 04:36    FINDINGS:    Lungs:  Unremarkable.  No consolidation.    Pleural space:  Unremarkable.  No pneumothorax.    Heart:  The heart demonstrates mild diffuse enlargement.    Mediastinum:  Unremarkable.    Bones/joints:  Unremarkable.    Other findings:  The patient is rotated to the left.      Impression:         No acute findings.    THIS DOCUMENT HAS BEEN ELECTRONICALLY SIGNED BY NOEL LYLE MD        Results for  orders placed during the hospital encounter of 02/13/18   Adult Transthoracic Echo Limited W/ Cont if Necessary Per Protocol    Narrative · This was a limited echocardiogram performed to evaluate the left   ventricular ejection fraction  · Left ventricular systolic function is normal. Estimated EF appears to be   in the range of 51 - 55%.  · The following left ventricular wall segments are hypokinetic: mid   anterior, apical anterior, apical lateral, apical inferior, apical septal   and apex hypokinetic. The basal segments are hyperdynamic.  · When compared to the prior echocardiogram performed on February 14, 2018, the hypokinetic apical segments have mildly improved. The overall   ejection fraction has improved.          Assessment/Plan   Assessment / Plan     Hospital Problem List     * (Principal)Altered mental status    Obstructive sleep apnea syndrome (Chronic)    Overview Addendum 8/1/2016  9:56 AM by Puja Clarke     intolerant of CPAP therapy  6. 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  Prior PSG reports complex sleep apnea with frequent central apneas and intermittent      Ramona Menard respirations, on auto CPAP 4-16 with supplemental oxygen.         Type 2 diabetes mellitus (Chronic)    Hypertension (Chronic)    Overview Signed 8/1/2016 10:00 AM by Puja Clarke     16. H/O echocardiogram (V15.89) (Z92.89)   · A.  Echocardiogram of 02/03/2015 reports an ejection fraction of 60-65%, mild concentric      LVH, trace mitral regurgitation, mild tricuspid and pulmonic regurgitation and calculated      RVSP of 35 mmHg, the main pulmonary artery is also mildly dilated.         Peripheral vascular disease (Chronic)    Coronary artery disease involving native heart (Chronic)    Overview Signed 2/15/2018 12:52 PM by CINDI Fierro     1. [facility] 11-26-16: Non occlusive disease  ·  50% mid RCA stenosis.  · 40% proximal LAD stenosis   · Normal left ventricular function         Takotsubo  cardiomyopathy    Overview Signed 2/15/2018  1:42 PM by JEOAVNY Greenfield     EF 35%, diastolic dysfunction, left ventricular wall segments are hypokinetic ECHO 2/14/18         Chronic systolic heart failure    Chronic respiratory failure with hypoxia (Chronic)    Infected sacral decubitus ulcers (Chronic)    Weakness            Assessment & Plan:    1. Altered mental status / weakness:  - Stat CT head wo. Consider neuro if appropriate for possible differentials. Hx CVA, just had bilateral heart caths 2/20 with 2 stents.  - Cover for sepsis. Zosyn + vanc. Did have HR>100 PTA, fever, AMS, known infection, leukocytosis, but procal only 0.23 and lactic WNL.  - Baseline Cr looks around 1.0-1.3, is 1.5 today, had some ELIF last stay. Will hold off on fluids for now and encourage PO.  - She also had 25 mg IV benadryl in ED likely making her drowsy - hx rash on vanc.  - Check c diff for a few weeks of watery liquid BMs 2+ a day, abx.    2. Infected decubitus ulcers:  - Hold augmentin she started after zosyn finished 3/2, use zosyn and vanc until ID eval in AM.    3. Takotsubo cardiomyopathy:  - While BNP is 900, this is about what she was discharged at. When admitted last stay was 100s but then jumped >1000 with her acute exac.  - BB, entresto.  - Consider cardiology consult if needed.    4. Coronary artery disease / peripheral vascular disease:  - RHC+LHC 2/20 with 2 stents to severe LAD and diagonal disease.  - Continue dual PLT therapy. Denies any bleeding, anemia stable, had colonoscopy for +occult prior to last stay that was reported as okay.  - Hx left toes amputations r/t osteomyelitis by Dr. Feliciano in past.    5. Chronic respiratory failure with hypoxia / obstructive sleep apnea:  - Chronic 2L NC continuous, and CPAP with O2 HS.  - Continue inhaled meds. Does not appear in any exac whether pulm or cardiac.    6. Chronic pain and anxiety:  - Consider fentanyl reduction r/t AMS/lethargy. Percocet PRN. Lyrica.  Baclofen.  - Buspar, klonopin.  - Bowel program.    DVT prophylaxis: Teds/scuds, heparin SQ.    CODE STATUS:  DNR/DNI, otherwise FULL SUPPORT and no restrictions. Spouse is next of kin.    Admission Status:  I believe this patient meets INPATIENT status due to the need for care which can only be reasonably provided in an hospital setting such as aggressive/expedited ancillary services and/or consultation services, the necessity for IV medications, close physician monitoring and/or the possible need for procedures.  In such, I feel patient’s risk for adverse outcomes and need for care warrant INPATIENT evaluation and predict the patient’s care encounter to likely last beyond 2 midnights.    Electronically signed by JEOVANY Dennis, 03/08/18, 8:01 PM.      Brief Attending Admission Attestation     I have seen and examined the patient, performing an independent face-to-face diagnostic evaluation with plan of care reviewed and developed with the advanced practice clinician (APC).      Brief Summary Statement/HPI:   Tamara Langston is a 62 y.o. female presenting from SNF where she was found to have altered mental status and fever. Patient was recently treated here for infected sacral decub and discharged to [facility] to finish her IV zosyn. She finished IV zosyn 3/2 and had been on Augmentin since that date. Tells me her fever started today. No drainage or redness noted from PICC line.      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  HENT: NCAT, mucous membranes moist  Neck: Supple, no thyromegaly, no lymphadenopathy, trachea midline  Respiratory: Clear to auscultation bilaterally, nonlabored respirations   Cardiovascular: RRR, no murmurs, rubs, or gallops, palpable pedal pulses bilaterally  Gastrointestinal: Positive bowel sounds, soft, nontender, nondistended  Musculoskeletal: Left picc in place without  redness. Left foot amputation noted, well healing.  Psychiatric: Appropriate affect, cooperative  Neurologic: Oriented x 3, strength symmetric in all extremities, Cranial Nerves grossly intact to confrontation, speech clear  Skin: No rashes    CXR personally reviewed no acute airspace disease. Agree with interpretation.     Brief Assessment/Plan :    61 y/o chronically ill female with infected sacral decubitus presenting from [facility] with AMS and fever with concern for persistent decubitus infection  --AMS probably related to fever and is now resolved. ED physician spoke with Dr. Delaney who recommended restarting IV zosyn and have ID see in am. Cultures are pending, including those drawn from PICC by ED. Most likely source is sacral decub. Low suspicion for PICC infection. Will defer repeat imaging of decub to ID.    See above for further detailed assessment and plan developed with APC which I have reviewed and/or edited.    Electronically signed by Bonnie Tompkins II, DO, 03/08/18, 10:23 PM.

## 2018-03-09 NOTE — CONSULTS
INFECTIOUS DISEASE CONSULT/INITIAL HOSPITAL VISIT    Tamara Langston  1955  [id number]    Date of Consult: 3/9/2018    Admission Date: 3/8/2018      Requesting Provider: No ref. provider found  Evaluating Physician: Oksana Cruz MD    Reason for Consultation:   Fever, probable uti    History of present illness:    Patient is a 62-year-old female with hypertension, hyperlipidemia, type 2 diabetes mellitus, morbid obesity admitted 2/13 to 2/27 after presenting with confusion and  fever.  She had an unstageable  sacral deep tissue injury with associated infection which had progressed over several months.  Over the course of a year sbe became  quite debilitated with limited mobility following left TMA.   On  admission, she was septic with fever, leukocytosis and acute on chronic kidney injury  Hospital course complicated by CHF, respiratory distress  and ELIF  She was diagnosed with NSTEMI and stents to LAD and diagonal arteries EF 34% and Dr Zamorano diagnosed Takotsubo myopathy  Her sacral decub improved with mist therapy and sequential debridement . Wound culture grew enterococcus, pseudomonas, and coag negative staph    Sacral MRI showed no evidence of abscess or OM.  She was denied [facility] and transferred to [facility] on zosyn and vancomycin There was a discrepancy in notes about duration of IV antibiotics  ie 3/2 or 3/12   Zosyn and vanc were stopped 3/2 Her picc was left in place   While at the center she evidently made progress with PT and walked up to 100'  She had fever to 102 followed by an episode of unresponsiveness then confusion  Transferred bact to [facility] with T100.3   UA with mild pyuria; culture pending   Wounds include a right heel DTPI and right ischial stage which measures 4.5x3.5x4.0cm  There is  undermining 6-12 o'clock; wound bed is beefy red, moist, with 10% slough noted in wound bed. No purulent drainage noted  On zosyn and vanc her fever appears to be improving  Cultures  pending      Past Medical History:   Diagnosis Date   • Acute on chronic respiratory failure with hypoxia 2/13/2018   • ELIF (acute kidney injury) 2/13/2018   • ELIF (acute kidney injury) 2/13/2018   • Altered mental status 2/13/2018   • Bronchitis    • Cellulitis of sacral region 2/13/2018   • Cervical cancer    • Cholelithiasis 5/11/2016   • Chronic anxiety 2/27/2017   • Chronic bronchitis    • Chronic respiratory failure with hypoxia 3/8/2018   • Chronic systolic heart failure 3/8/2018   • Chronically on benzodiazepine therapy 2/27/2017   • Degenerative arthritis    • Diabetes mellitus    • Dyslipidemia 5/11/2016   • Dyspnea    • Elevated troponin level 2/13/2018   • Fatty liver disease, nonalcoholic 11/21/2016   • Fever 2/13/2018   • Fibromyalgia    • GERD (gastroesophageal reflux disease)    • H/O echocardiogram    • History of pneumonia    • Hypertension 5/11/2016    16. H/O echocardiogram (V15.89) (Z92.89)  · A.  Echocardiogram of 02/03/2015 reports an ejection fraction of 60-65%, mild concentric     LVH, trace mitral regurgitation, mild tricuspid and pulmonic regurgitation and calculated     RVSP of 35 mmHg, the main pulmonary artery is also mildly dilated.   • Hypothyroidism 5/11/2016    Description: A.  On replacement therapy.   • Infected wound 3/8/2018   • Nausea    • Obesity    • DINA (obstructive sleep apnea)     intolerant of CPAP therapy   • Osteoporosis    • Osteoporosis    • Polycythemia vera 5/11/2016   • Pulmonary emphysema    • Restrictive ventilatory defect    • Rhinitis    • Sepsis 2/13/2018   • Uncontrolled diabetes mellitus 5/11/2016   • Urine abnormality 2/13/2018   • Uterine cancer    • Vitamin D deficiency 8/1/2016   • Weakness 3/8/2018       Past Surgical History:   Procedure Laterality Date   • AMPUTATION DIGIT Left 11/23/2016    Procedure: AMPUTATION TRANS METATARSAL - ray amutation of the left great toe;  Surgeon: Arsalan Feliciano MD;  Location: [facility];  Service:    • AMPUTATION DIGIT  Left 3/2/2017    Procedure: LEFT FOOT TRANSMETATARSAL AMPUTATION TOES 2,3,4,5;  Surgeon: Joey Guaman MD;  Location:  [facility];  Service:    • BACK SURGERY      lumbar fusions x5--multiple times; 1995, 1997, 1998, 1999 and 2008   • BELOW KNEE AMPUTATION Left 2/25/2017    Procedure: EXTENDED LEFT TOE AMPUTATION;  Surgeon: Arsalan Feliciano MD;  Location:  [facility];  Service:    • CARDIAC CATHETERIZATION N/A 11/26/2016    Procedure: Left Heart Cath;  Surgeon: Ash Nuñez MD;  Location:  [facility];  Service:    • CARDIAC CATHETERIZATION N/A 2/20/2018    Procedure: Left Heart Cath;  Surgeon: Lane Zamorano MD;  Location:  [facility];  Service:    • CARDIAC CATHETERIZATION N/A 2/20/2018    Procedure: Right Heart Cath;  Surgeon: Lane Zamorano MD;  Location:  [facility];  Service:    • HAND SURGERY Bilateral     x3   • HYSTERECTOMY      status post uterine and cervical cancer   • LUMBAR SPINE SURGERY      arthrodesis by anterior approach addit interspace   • TONSILLECTOMY         Family History   Problem Relation Age of Onset   • Arthritis Mother    • Diabetes Mother    • Colon polyps Mother    • Diverticulitis Mother    • Arthritis Father    • Bleeding Disorder Father    • Diabetes Father    • Kidney disease Father    • COPD Sister      currently smokes   • Arthritis Brother    • Diabetes Brother    • Alcohol abuse Brother    • Heart murmur Daughter    • Arthritis Other    • Diabetes Other        Social History     Social History   • Marital status:      Spouse name: N/A   • Number of children: 1   • Years of education: N/A     Occupational History   •  Disabled     Social History Main Topics   • Smoking status: Former Smoker     Packs/day: 1.50     Years: 48.00     Types: Cigarettes     Quit date: 2/27/2017   • Smokeless tobacco: Never Used   • Alcohol use No   • Drug use: No   • Sexual activity: Defer     Other Topics Concern   • Not on file     Social  History Narrative    Mrs. Langston is a 62 year old white  female. She lives with her spouse in [facility]. She states she does her own ADLs but appears disabled. They have one child and two grandchildren. She has a living will.  Her spouse is ill and has EF < 20%       Allergies   Allergen Reactions   • Cortisone Other (See Comments)     Hotness all over body and hyper   • Oxycontin [Oxycodone] Other (See Comments)     psoriasis   • Tolmetin Rash     Tolectin-rash and itching   • Vancomycin Rash         Medication:    Current Facility-Administered Medications:   •  [START ON 3/10/2018] !vanc trough 3/10@2030, do not hang dose until lab drawn and pharmacy reviewed, , Does not apply, Once, Gilberto Vargas [facility]  •  acetaminophen (TYLENOL) tablet 650 mg, 650 mg, Oral, Q4H PRN, Suha Garza, APRN  •  albuterol (PROVENTIL) nebulizer solution 0.5% 2.5 mg/0.5mL, 2.5 mg, Nebulization, Q6H PRN, Suha Garza, APRN  •  aspirin EC tablet 81 mg, 81 mg, Oral, Daily, Suha W Greg, APRN, 81 mg at 03/09/18 0947  •  atorvastatin (LIPITOR) tablet 80 mg, 80 mg, Oral, Nightly, Suha Garza, APRN  •  baclofen (LIORESAL) tablet 10 mg, 10 mg, Oral, BID PRN, Suha LIVIA Garza, APRN  •  bisacodyl (DULCOLAX) EC tablet 5 mg, 5 mg, Oral, Daily PRN, Suha Garza, APRN  •  busPIRone (BUSPAR) tablet 7.5 mg, 7.5 mg, Oral, Q12H, Suha Garza, APRN, 7.5 mg at 03/09/18 0948  •  carvedilol (COREG) tablet 6.25 mg, 6.25 mg, Oral, Q12H, Suha Garza, APRN, 6.25 mg at 03/09/18 0948  •  cetirizine (zyrTEC) tablet 10 mg, 10 mg, Oral, Nightly, Suha W Greg, APRN  •  clonazePAM (KlonoPIN) tablet 0.25 mg, 0.25 mg, Oral, BID PRN, JEOVANY Dennis  •  clopidogrel (PLAVIX) tablet 75 mg, 75 mg, Oral, Daily, JEOVANY Dennis, 75 mg at 03/09/18 0948  •  dextrose (D50W) solution 25 g, 25 g, Intravenous, Q15 Min PRN, JEOVANY Dennis  •  dextrose (GLUTOSE) oral gel 15 g, 15 g,  Oral, Q15 Min PRN, Suha Garza APRN  •  famotidine (PEPCID) tablet 20 mg, 20 mg, Oral, BID PRN, JEOVANY Dennis  •  fentaNYL (DURAGESIC) 75 MCG/HR patch 1 patch, 1 patch, Transdermal, Q48H, Suha Garza APRN, 1 patch at 03/09/18 0902  •  fluticasone (FLONASE) 50 MCG/ACT nasal spray 1 spray, 1 spray, Each Nare, BID, Suha Garza APRN, 1 spray at 03/09/18 0902  •  furosemide (LASIX) tablet 40 mg, 40 mg, Oral, Daily, JEOVANY Dennis, 40 mg at 03/09/18 0948  •  glucagon (human recombinant) (GLUCAGEN DIAGNOSTIC) injection 1 mg, 1 mg, Subcutaneous, PRN, Suha Garza APRN  •  heparin (porcine) 5000 UNIT/ML injection 5,000 Units, 5,000 Units, Subcutaneous, Q12H, JEOVANY Dennis, 5,000 Units at 03/09/18 0901  •  insulin lispro (humaLOG) injection 0-7 Units, 0-7 Units, Subcutaneous, 4x Daily With Meals & Nightly, JEOVANY Dennis, 5 Units at 03/09/18 1150  •  lactobacillus acidophilus (RISAQUAD) capsule 1 capsule, 1 capsule, Oral, Daily, Suha Garza APRN, 1 capsule at 03/09/18 0947  •  levothyroxine (SYNTHROID, LEVOTHROID) tablet 112 mcg, 112 mcg, Oral, Q AM, JEOVANY Dennis  •  lidocaine (LIDODERM) 5 % 3 patch, 3 patch, Transdermal, Daily PRN, Suha Garza APRN  •  Magnesium Sulfate 2 gram Bolus, followed by 8 gram infusion (total Mg dose 10 grams)- Mg less than or equal to 1mg/dL, 2 g, Intravenous, PRN **OR** Magnesium Sulfate 6 gram Infusion (2 gm x 3) -Mg 1.1 -1.5 mg/dL, 2 g, Intravenous, PRN **OR** magnesium sulfate 4 gram infusion- Mg 1.6-1.9 mg/dL, 4 g, Intravenous, PRN, JEOVANY Dennis  •  melatonin sublingual tablet 5 mg, 5 mg, Sublingual, Nightly PRN, JEOVANY Dennis  •  nystatin (MYCOSTATIN) powder, , Topical, Q12H, JEOVANY Dennis, 1 application at 03/09/18 0902  •  ondansetron (ZOFRAN) tablet 4 mg, 4 mg, Oral, Q6H PRN **OR** ondansetron (ZOFRAN) injection 4 mg, 4 mg, Intravenous, Q6H  PRN, JEOVANY Dennis  •  oxyCODONE-acetaminophen (PERCOCET)  MG per tablet 1 tablet, 1 tablet, Oral, Q6H PRN, Suha Garza APRN, 1 tablet at 03/09/18 0948  •  pantoprazole (PROTONIX) EC tablet 40 mg, 40 mg, Oral, Q AM, JEOVANY Dennis  •  Pharmacy to dose vancomycin, , Does not apply, Continuous PRN, JEOVANY Dnenis  •  piperacillin-tazobactam (ZOSYN) 4.5 g in iso-osmotic dextrose 100 mL IVPB (premix), 4.5 g, Intravenous, Q8H, Suha Garza APRN, 4.5 g at 03/09/18 0952  •  potassium chloride (MICRO-K) CR capsule 40 mEq, 40 mEq, Oral, PRN **OR** potassium chloride (KLOR-CON) packet 40 mEq, 40 mEq, Oral, PRN **OR** potassium chloride 10 mEq in 100 mL IVPB, 10 mEq, Intravenous, Q1H PRN, Suha Garza APRN  •  potassium chloride (MICRO-K) CR capsule 20 mEq, 20 mEq, Oral, Daily, JEOVANY Dennis, 20 mEq at 03/09/18 0947  •  pregabalin (LYRICA) capsule 100 mg, 100 mg, Oral, Q8H, Suha Garza APRN, 100 mg at 03/09/18 1409  •  sacubitril-valsartan (ENTRESTO) 24-26 MG tablet 1 tablet, 1 tablet, Oral, Q12H, Suha Garza APRN, 1 tablet at 03/09/18 0952  •  sodium chloride 0.9 % flush 1-10 mL, 1-10 mL, Intravenous, PRN, Suha Garza APRN  •  sodium chloride 0.9 % flush 10 mL, 10 mL, Intravenous, PRN, Juan Palacios MD  •  vancomycin IVPB 1500 mg in 0.9% NaCl (Premix) 500 mL, 15 mg/kg, Intravenous, Q24H, Gilberto Vargas, [facility]    Antibiotics:  Anti-Infectives     Ordered     Dose/Rate Route Frequency Start Stop    03/08/18 2330  vancomycin IVPB 1500 mg in 0.9% NaCl (Premix) 500 mL     Ordering Provider:  Gilberto Vargas RPH    15 mg/kg × 105 kg Intravenous Every 24 Hours 03/09/18 2100 03/19/18 2059 03/08/18 2314  piperacillin-tazobactam (ZOSYN) 4.5 g in iso-osmotic dextrose 100 mL IVPB (premix)     Ordering Provider:  JEOVANY Dennis    4.5 g  over 4 Hours Intravenous Every 8 Hours 03/09/18 0200 03/19/18 0159 03/08/18 2314   Pharmacy to dose vancomycin     Ordering Provider:  JEOVANY Dennis     Does not apply Continuous PRN 18 2314 18 0013    18  vancomycin IVPB 2000 mg in 0.9% Sodium Chloride (premix) 500 mL     Ordering Provider:  Juan Palacios MD    20 mg/kg × 105 kg  over 2 Hours Intravenous Once 18  piperacillin-tazobactam (ZOSYN) 4.5 g in iso-osmotic dextrose 100 mL IVPB (premix)     Ordering Provider:  Juan Palacios MD    4.5 g  over 30 Minutes Intravenous Once 18            Review of Systems:  Constitutional-- + Fever, chills + sweats.  Appetite good per patient with good oral intake She reports continued progress with PT  HEENT-- No new vision, hearing or throat complaints.  No  oral ulcers or sore throat.  Denies odynophagia or dysphagia. No headache, photophobia or neck stiffness.  CV-- No chest pain, palpitation or syncope  Resp-- No SOB/cough/Hemoptysis  GI- + diarrhea- intermittent.  No hematochezia, melena, or hematemesis. Denies jaundice or chronic liver disease. No odynophagia or dysphagia  -- No dysuria, hematuria, or flank pain.  Denies hesitancy, urgency or flank pain.  Lymph- no swollen lymph nodes in neck/axilla or groin.   Heme- No active bruising or bleeding; no Hx of DVT or PE.  MS-- no swelling or pain in the bones or joints of arms/legs.  No new back pain.  Neuro-- No acute focal weakness or numbness in the arms or legs.  No seizures.  Skin--No rashes or lesions      Physical Exam:   Vital Signs  Temp (24hrs), Av.6 °F (37.6 °C), Min:98.8 °F (37.1 °C), Max:100.3 °F (37.9 °C)    Temp  Min: 98.8 °F (37.1 °C)  Max: 100.3 °F (37.9 °C)  BP  Min: 103/46  Max: 163/80  Pulse  Min: 75  Max: 102  Resp  Min: 16  Max: 18  SpO2  Min: 91 %  Max: 96 %  GENERAL: Awake and alert, in no acute distress; appears to recall medical history accurately.  Chronically ill-appearing with limited mobility in the bed  HEENT:  Normocephalic, atraumatic.  PERRL. EOMI.  slera injected. No icterus. Oropharynx with mild thrush, no exudate. No evidence of peridontal disease.    NECK: Supple without nuchal rigidity  LYMPH: No cervical, axillary or inguinal lymphadenopathy. No neck masses  HEART: RRR; СЕРГЕЙ III/VI.  Bilateral lower extremity trace edema.  faint pedal pulses.  LUNGS: Clear to auscultation bilaterally without wheezing, rales, rhonchi.  Normal effort with supplemental O2 present.   ABDOMEN: Morbidly obese, Soft, nontender, nondistended. Positive bowel sounds. No rebound or guarding.   EXT:  L foot TMA well healed ; right heel with unstageable necrotic ulcer  R UE picc site w/o erythema, drainage or swelling  : No weiss catheter.  MSK: FROM without joint effusions noted    SKIN:  mild groin yeast rash   Her sacral ulceration as above; appears clean  NEURO: Oriented to PPT. No focal deficits.   PSYCHIATRIC: Normal insight and judgement. Cooperative with PE        Laboratory Data      Results from last 7 days  Lab Units 03/08/18  1842   WBC 10*3/mm3 15.94*   HEMOGLOBIN g/dL 10.2*   HEMATOCRIT % 33.4*   PLATELETS 10*3/mm3 192       Results from last 7 days  Lab Units 03/08/18  1842   SODIUM mmol/L 139   POTASSIUM mmol/L 5.2   CHLORIDE mmol/L 105   CO2 mmol/L 28.0   BUN mg/dL 32*   CREATININE mg/dL 1.50*   GLUCOSE mg/dL 176*   CALCIUM mg/dL 9.3       Results from last 7 days  Lab Units 03/08/18  1842   ALK PHOS U/L 78   BILIRUBIN mg/dL 0.3   ALT (SGPT) U/L 26   AST (SGOT) U/L 32               Results from last 7 days  Lab Units 03/08/18  1940   LACTATE mmol/L 1.2             Estimated Creatinine Clearance: 46.7 mL/min (by C-G formula based on Cr of 1.5).      Microbiology:  Blood Culture   Date Value Ref Range Status   03/08/2018 No growth at less than 24 hours  Preliminary                    Urine Culture   Date Value Ref Range Status   03/08/2018 No growth  Preliminary              Radiology:  Imaging Results (last 72 hours)      Procedure Component Value Units Date/Time    XR Chest 1 View [[id number]] Collected:  03/08/18 1813     Updated:  03/08/18 1912    Narrative:       EXAM:    XR Chest, 1 View    CLINICAL HISTORY:    62 years old, female; Signs and symptoms; Other: Weak/dizzy/ams; Additional   info: Weak/dizzy/ams triage protocol    TECHNIQUE:    Frontal view of the chest.    COMPARISON:    CR - XR CHEST 1 VW 2018-02-19 04:36    FINDINGS:    Lungs:  Unremarkable.  No consolidation.    Pleural space:  Unremarkable.  No pneumothorax.    Heart:  The heart demonstrates mild diffuse enlargement.    Mediastinum:  Unremarkable.    Bones/joints:  Unremarkable.    Other findings:  The patient is rotated to the left.      Impression:         No acute findings.    THIS DOCUMENT HAS BEEN ELECTRONICALLY SIGNED BY NOEL LYLE MD    CT Head Without Contrast [[id number]] Collected:  03/08/18 2116     Updated:  03/08/18 2251    Narrative:       EXAM:    CT Head Without Intravenous Contrast    CLINICAL HISTORY:    62 years old, female; Pressure ulcer of sacral region, unspecified stage;   Transient alteration of awareness; Fever, unspecified; Signs and symptoms;   Other: Patient found unresponsive; Additional info: Pt found unresponsive    TECHNIQUE:    Axial computed tomography images of the head/brain without intravenous   contrast.  All CT scans at this facility use one or more dose reduction   techniques, viz.: automated exposure control; ma/kV adjustment per patient size   (including targeted exams where dose is matched to indication; i.e. head); or   iterative reconstruction technique.    COMPARISON:    No relevant prior studies available.    FINDINGS:    Brain:  Nonspecific calcification left occipital lobe.  No hemorrhage.  No   significant white matter disease.    Ventricles:  Unremarkable.  No ventriculomegaly.    Bones/joints:  Unremarkable.  No acute fracture.    Soft tissues:  Unremarkable.    Sinuses:  Mild sphenoid sinusitis.    Mastoid  air cells:  Unremarkable as visualized.      Impression:         No acute findings.    THIS DOCUMENT HAS BEEN ELECTRONICALLY SIGNED BY NOEL LYLE MD            Impression:   --Sepsis with fever, leukocytosis and acute kidney injury  Possible foci of infection include urinary tract infection, picc line infection or the right heel decubitus    Stage 4 sacral  Wound which does not appear to be the source of sepsis  Recent MRI w/o evidence of osteomyelitis     Right heel decubitus which is unstageable    Mild pyuria    Diarrhea- cdiff negative    Recent Non-ST segment elevation MI with increase in troponin    Cardiomyopathy    Acute on chronic kidney disease    Type 2 diabetes mellitus    Hypertension and Hyperlipidemia    COPD with home oxygen use and acute hypoxic respiratory failure at admission  Morbid obesity  Chronic debility        PLAN/RECOMMENDATIONS:   Thank you for asking us to see Tamara Langston, I recommend the following:      Agree with zosyn and vancomycin  Topical treatment of thrush  Stool culture  Consider MRI Right heel depending on how wound responds to mist  Probiotic  Follow inflammatory markers       Oksana Cruz MD  3/9/2018  4:18 PM

## 2018-03-09 NOTE — THERAPY EVALUATION
Acute Care - Physical Therapy Initial Evaluation  [facility]     Patient Name: Tamara Langston  : 1955  MRN: [id number]  Today's Date: 3/9/2018   Onset of Illness/Injury or Date of Surgery Date: 18  Date of Referral to PT: 18  Referring Physician: JEOVANY Cortes      Admit Date: 3/8/2018     Visit Dx:    ICD-10-CM ICD-9-CM   1. Transient alteration of awareness R40.4 780.02   2. Fever, unspecified fever cause R50.9 780.60   3. Decubitus ulcer of sacral region, unspecified ulcer stage L89.159 707.03     707.20   4. Impaired mobility and ADLs Z74.09 799.89   5. Impaired functional mobility, balance, gait, and endurance Z74.09 V49.89     Patient Active Problem List   Diagnosis   • Atopic rhinitis   • Restrictive ventilatory defect   • COPD (chronic obstructive pulmonary disease)   • Osteoporosis   • Obstructive sleep apnea syndrome   • Abnormal liver enzymes   • Cholelithiasis   • Chronic back pain   • Mixed anxiety depressive disorder   • Type 2 diabetes mellitus   • Dyslipidemia   • Essential tremor   • Fibromyalgia   • Gastroesophageal reflux disease without esophagitis   • Hypertension   • Hypothyroidism   • Insomnia   • Osteoarthritis   • Psoriasis   • Branch retinal vein occlusion   • Cobalamin deficiency   • Obesity   • Vitamin D deficiency   • Hypokalemia   • Lactic acidosis   • Fatty liver disease, nonalcoholic   • Sinus bradycardia   • NSTEMI (non-ST elevated myocardial infarction)   • Tobacco abuse   • Hypoxia   • Peripheral vascular disease   • Diabetic polyneuropathy associated with type 2 diabetes mellitus   • Anemia, blood loss   • Chronic pain   • Chronic, continuous use of opioids   • Chronic anxiety   • Chronically on benzodiazepine therapy   • Tubular adenoma   • Elevated troponin level   • Urine abnormality   • ELIF (acute kidney injury)   • Cellulitis of sacral region   • Altered mental status   • Fever   • Sepsis   • Acute on chronic respiratory failure with hypoxia    • Acute cystitis without hematuria   • Coronary artery disease involving native heart   • Takotsubo cardiomyopathy   • Elevated troponin   • Chronic systolic heart failure   • Chronic respiratory failure with hypoxia   • Infected sacral decubitus ulcers   • Weakness     Past Medical History:   Diagnosis Date   • Acute on chronic respiratory failure with hypoxia 2/13/2018   • ELIF (acute kidney injury) 2/13/2018   • ELIF (acute kidney injury) 2/13/2018   • Altered mental status 2/13/2018   • Bronchitis    • Cellulitis of sacral region 2/13/2018   • Cervical cancer    • Cholelithiasis 5/11/2016   • Chronic anxiety 2/27/2017   • Chronic bronchitis    • Chronic respiratory failure with hypoxia 3/8/2018   • Chronic systolic heart failure 3/8/2018   • Chronically on benzodiazepine therapy 2/27/2017   • Degenerative arthritis    • Diabetes mellitus    • Dyslipidemia 5/11/2016   • Dyspnea    • Elevated troponin level 2/13/2018   • Fatty liver disease, nonalcoholic 11/21/2016   • Fever 2/13/2018   • Fibromyalgia    • GERD (gastroesophageal reflux disease)    • H/O echocardiogram    • History of pneumonia    • Hypertension 5/11/2016    16. H/O echocardiogram (V15.89) (Z92.89)  · A.  Echocardiogram of 02/03/2015 reports an ejection fraction of 60-65%, mild concentric     LVH, trace mitral regurgitation, mild tricuspid and pulmonic regurgitation and calculated     RVSP of 35 mmHg, the main pulmonary artery is also mildly dilated.   • Hypothyroidism 5/11/2016    Description: A.  On replacement therapy.   • Infected wound 3/8/2018   • Nausea    • Obesity    • DINA (obstructive sleep apnea)     intolerant of CPAP therapy   • Osteoporosis    • Osteoporosis    • Polycythemia vera 5/11/2016   • Pulmonary emphysema    • Restrictive ventilatory defect    • Rhinitis    • Sepsis 2/13/2018   • Uncontrolled diabetes mellitus 5/11/2016   • Urine abnormality 2/13/2018   • Uterine cancer    • Vitamin D deficiency 8/1/2016   • Weakness 3/8/2018      Past Surgical History:   Procedure Laterality Date   • AMPUTATION DIGIT Left 11/23/2016    Procedure: AMPUTATION TRANS METATARSAL - ray amutation of the left great toe;  Surgeon: Arsalan Feliciano MD;  Location:  [facility];  Service:    • AMPUTATION DIGIT Left 3/2/2017    Procedure: LEFT FOOT TRANSMETATARSAL AMPUTATION TOES 2,3,4,5;  Surgeon: Joey Guaman MD;  Location:  [facility];  Service:    • BACK SURGERY      lumbar fusions x5--multiple times; 1995, 1997, 1998, 1999 and 2008   • BELOW KNEE AMPUTATION Left 2/25/2017    Procedure: EXTENDED LEFT TOE AMPUTATION;  Surgeon: Arsalan Feliciano MD;  Location:  [facility];  Service:    • CARDIAC CATHETERIZATION N/A 11/26/2016    Procedure: Left Heart Cath;  Surgeon: Ash Nuñez MD;  Location:  [facility];  Service:    • CARDIAC CATHETERIZATION N/A 2/20/2018    Procedure: Left Heart Cath;  Surgeon: Lane Zamorano MD;  Location:  [facility];  Service:    • CARDIAC CATHETERIZATION N/A 2/20/2018    Procedure: Right Heart Cath;  Surgeon: Lane Zamorano MD;  Location:  [facility];  Service:    • HAND SURGERY Bilateral     x3   • HYSTERECTOMY      status post uterine and cervical cancer   • LUMBAR SPINE SURGERY      arthrodesis by anterior approach addit interspace   • TONSILLECTOMY            PT ASSESSMENT (last 72 hours)      PT Evaluation       03/09/18 1249 03/09/18 1100    Rehab Evaluation    Document Type  evaluation  -MB    Subjective Information  no complaints;agree to therapy  -MB    Patient Effort, Rehab Treatment  good  -MB    Symptoms Noted During/After Treatment  none  -MB    General Information    Patient Profile Review  yes  -MB    Onset of Illness/Injury or Date of Surgery Date  03/08/18  -MB    Referring Physician  JEOVANY Cortes  -MB    General Observations  Pt. sitting UIC w/ PICC intact, on 2L/min O2 per NC.  RN consent for PT.   -MB    Pertinent History Of Current Problem  Pt. presented to ED via EMS from  SNF, w/ AMS, increased weakness, s/p difficulty rousing by staff.  Pt. adm w/ fever, AMS, increased weakness, sacral decubitus.  Pt. w/ recent adm [facility] 2/13-2/27 for sepsis, STEMI, heart cath for severe LAD s/p 2 stents, and respiratory failure.  PMH: L foot toe amputations, CVA w/ R visual deficits.  -MB    Precautions/Limitations  fall precautions;oxygen therapy device and L/min   Spore precautions; sacral decub  -MB    Prior Level of Function  min assist:;all household mobility;transfer;bed mobility;gait;mod assist:;ADL's  -MB    Equipment Currently Used at Home cane, straight;walker, rolling;oxygen;bipap/ cpap;glucometer   [facility] manages oxygen and cpap. from previous note order for oxygen with [facility] was nocturnal use only, however pt had been using continuously. then went to SNF. will need new order for o2 if she is going to need continuous at d/c  -PP bath bench;grab bar;raised toilet;walker, rolling;bipap/ cpap  -MB    Plans/Goals Discussed With  patient;agreed upon  -MB    Risks Reviewed  patient:;LOB;nausea/vomiting;dizziness;increased discomfort;change in vital signs;increased drainage;lines disloged  -MB    Benefits Reviewed  patient:;improve function;increase independence;increase strength;increase balance;decrease risk of DVT;improve skin integrity;increase knowledge  -MB    Barriers to Rehab  medically complex;previous functional deficit;visual deficit  -MB    Living Environment    Lives With spouse  -PP spouse  -MB    Living Arrangements house  -PP house  -MB    Home Accessibility  no concerns;ramps present at home  -MB    Transportation Available car;family or friend will provide  -PP     Living Environment Comment  Pt. at SNF, PTA, x approx 1 wk following 2wk admit at [facility].  -MB    Clinical Impression    Date of Referral to PT  03/08/18  -MB    PT Diagnosis  Functional decline; Impaired mobility  -MB    Functional Level At Time Of Evaluation  Pt. requires CGA for safe transfers and gait.   -MB     Patient/Family Goals Statement  Independent ambulation  -MB    Criteria for Skilled Therapeutic Interventions Met  yes;treatment indicated  -MB    Rehab Potential  good, to achieve stated therapy goals  -MB    Vital Signs    Pre Systolic BP Rehab  145  -MB    Pre Treatment Diastolic BP  74  -MB    Pretreatment Heart Rate (beats/min)  89  -MB    Pre SpO2 (%)  94  -MB    O2 Delivery Pre Treatment  supplemental O2  -MB    O2 Delivery Intra Treatment  supplemental O2  -MB    Post SpO2 (%)  94  -MB    O2 Delivery Post Treatment  supplemental O2  -MB    Pre Patient Position  Sitting  -MB    Intra Patient Position  Standing  -MB    Post Patient Position  Sitting  -MB    Pain Assessment    Pain Assessment  No/denies pain  -MB    Vision Assessment/Intervention    Visual Impairment  visual impairment, right;limited persistence with tracking  -MB    Cognitive Assessment/Intervention    Current Cognitive/Communication Assessment  functional  -MB    Orientation Status  oriented x 4  -MB    Follows Commands/Answers Questions  100% of the time;able to follow single-step instructions;needs cueing  -MB    Personal Safety  mild impairment;decreased awareness, need for assist;decreased awareness, need for safety;decreased insight to deficits  -MB    Personal Safety Interventions  fall prevention program maintained;gait belt;muscle strengthening facilitated;nonskid shoes/slippers when out of bed  -MB    ROM (Range of Motion)    General ROM  upper extremity range of motion deficits identified  -MB    General ROM Detail  BLEs WFL.  Defer UEs to OT.  -MB    MMT (Manual Muscle Testing)    General MMT Assessment  lower extremity strength deficits identified  -MB    General MMT Assessment Detail  BLEs grossly 4/5.  -MB    Bed Mobility, Assessment/Treatment    Bed Mob, Supine to Sit, [place]  not tested  -MB    Bed Mob, Sit to Supine, [place]  not tested  -MB    Bed Mobility, Comment  Pt. received and left sitting UIC.  -MB     Transfer Assessment/Treatment    Transfers, Sit-Stand [place]  contact guard assist;verbal cues required  -MB    Transfers, Stand-Sit [place]  contact guard assist;verbal cues required  -MB    Transfers, Sit-Stand-Sit, Assist Device  rolling walker  -MB    Transfer, Safety Issues  balance decreased during turns;step length decreased;weight-shifting ability decreased  -MB    Transfer, Impairments  strength decreased;impaired balance  -MB    Transfer, Comment  Pt. required VCs for positioning and set-up, VCs for upright posture in standing.  -MB    Gait Assessment/Treatment    Gait, [place] Level  contact guard assist;verbal cues required  -MB    Gait, Assistive Device  rolling walker  -MB    Gait, Distance (Feet)  30  -MB    Gait, Gait Pattern Analysis  swing-through gait  -MB    Gait, Gait Deviations  [place] decreased;forward flexed posture;bilateral:;step length decreased  -MB    Gait, Safety Issues  balance decreased during turns;step length decreased;supplemental O2  -MB    Gait, Impairments  strength decreased;impaired balance;postural control impaired  -MB    Gait, Comment  Pt. amb w/ step-through gait pattern at slow pace w/ VCs for upright posture, dec UE WBing on RW, and inc B step length.  Distance limited by fatigue.  -MB    Motor Skills/Interventions    Additional Documentation  Balance Skills Training (Group)  -MB    Balance Skills Training    Standing-Level of Assistance  Contact guard  -MB    Static Standing Balance Support  assistive device  -MB    Standing-Balance Activities  Weight Shift R-L;Reaching across midline  -MB    Gait Balance-Level of Assistance  Contact guard  -MB    Gait Balance Support  assistive device  -MB    Gait Balance Activities  side-stepping  -MB    Therapy Exercises    Bilateral Lower Extremities  AROM:;10 reps;sitting;ankle pumps/circles;heel raises;hip abduction/adduction;glut sets;hip flexion;LAQ  -MB    Sensory Assessment/Intervention    Light Touch  LLE;RLE   -MB    LLE Light Touch  moderate impairment  -MB    RLE Light Touch  moderate impairment  -MB    Positioning and Restraints    Pre-Treatment Position  sitting in chair/recliner  -MB    Post Treatment Position  chair  -MB    In Chair  notified nsg;reclined;call light within reach;encouraged to call for assist;exit alarm on;with family/caregiver;waffle cushion;legs elevated;heels elevated  -MB      03/09/18 0840 03/08/18 2311    Rehab Evaluation    Document Type evaluation  -AN     Subjective Information no complaints;agree to therapy  -AN     Patient Effort, Rehab Treatment adequate  -AN     Symptoms Noted During/After Treatment none  -AN     General Information    Patient Profile Review yes  -AN     Onset of Illness/Injury or Date of Surgery Date 03/08/18  -AN     Referring Physician JEOVANY Garza  -AN     General Observations Pt supine in bed with BiPap , RN present during part of eval, Dr. Carson rocha.   -AN     Pertinent History Of Current Problem Pt admitted from [facility] where she has been for approx 1 wk for rehab when staff had difficulty rousing her. pt with fever and increased weakness. Pt with decub on sacrum, PICC line. Pt recently had 2 wk stay here with sepsis, STEMI, heart cath and resp failure.  Pt PMH toe amputations on L foot here at [facility].Hx of CVA with R visual deficits.  -AN     Precautions/Limitations fall precautions;oxygen therapy device and L/min  -AN     Prior Level of Function mod assist:;ADL's;min assist:;transfer;all household mobility   at [facility]. Pt I to min assist prior to toe amputations last mon  -AN     Equipment Currently Used at Home bath bench;bipap/ cpap;ramp;walker, rolling;grab bar;raised toilet  -AN     Plans/Goals Discussed With patient;agreed upon  -AN     Risks Reviewed patient:;LOB;dizziness;increased discomfort;change in vital signs  -AN     Benefits Reviewed patient:;improve function;increase independence;increase strength;increase balance  -AN     Barriers to Rehab medically  complex;previous functional deficit;visual deficit  -AN     Living Environment    Lives With spouse  -AN spouse  -KS    Living Arrangements house  -AN house  -KS    Home Accessibility no concerns;ramps present at home  -AN no concerns  -KS    Stair Railings at Home  inside, present on right side;inside, present on left side  -KS    Type of Financial/Environmental Concern  none  -KS    Transportation Available  car;family or friend will provide  -KS    Living Environment Comment Pt recently at [facility] for rehab for approx 1 wk following 2 wk admit at [facility]  -AN     Vital Signs    Pre Systolic BP Rehab 130  -AN     Pre Treatment Diastolic BP 74  -AN     Post Systolic BP Rehab --   RN reported  -AN     Pre SpO2 (%) 93  -AN     O2 Delivery Pre Treatment supplemental O2   BiPap  -AN     Intra SpO2 (%) 86  -AN     O2 Delivery Intra Treatment room air  -AN     Post SpO2 (%) 93  -AN     O2 Delivery Post Treatment supplemental O2  -AN     Pre Patient Position Sitting  -AN     Intra Patient Position Standing  -AN     Post Patient Position Sitting  -AN     Pain Assessment    Pain Assessment Blackmon-Cabrera FACES  -AN     Blackmon-Cabrera FACES Pain Rating 4  -AN     Post Pain Score 2  -AN     Pain Type Acute pain  -AN     Pain Location Arm  -AN     Pain Orientation Left  -AN     Pain Intervention(s) Repositioned  -AN     Response to Interventions tolereated  -AN     Vision Assessment/Intervention    Visual Impairment visual impairment, right;limited persistence with tracking  -AN     Visual Impairment Comment Pt. with mod imp vision R eye, states CVA affected. decreased periphreal on R.  -AN     Cognitive Assessment/Intervention    Current Cognitive/Communication Assessment functional  -AN     Orientation Status oriented x 4  -AN     Follows Commands/Answers Questions 100% of the time;needs cueing  -AN     Personal Safety mild impairment;decreased awareness, need for safety  -AN     Personal Safety Interventions fall prevention program  maintained;gait belt;nonskid shoes/slippers when out of bed;supervised activity  -AN     ROM (Range of Motion)    General ROM upper extremity range of motion deficits identified  -AN     General ROM Detail R WFL , L shoulder ~ 90 degress flex due to pain in L UE  -AN     MMT (Manual Muscle Testing)    General MMT Assessment Detail grossly 4/5, 4-/5 L shoulder  -AN     Bed Mobility, Assessment/Treatment    Bed Mob, Supine to Sit, [place] minimum assist (75% patient effort)  -AN     Bed Mobility, Impairments strength decreased;impaired balance  -AN     Transfer Assessment/Treatment    Transfers, Bed-Chair [place] verbal cues required;contact guard assist;2 person assist required  -AN     Transfers, Sit-Stand [place] contact guard assist;2 person assist required;verbal cues required  -AN     Transfers, Stand-Sit [place] contact guard assist;2 person assist required;verbal cues required  -AN     Transfers, Sit-Stand-Sit, Assist Device rolling walker  -AN     Transfer, Safety Issues step length decreased;weight-shifting ability decreased  -AN     Transfer, Impairments strength decreased;impaired balance  -AN     Motor Skills/Interventions    Additional Documentation Balance Skills Training (Group);Fine Motor Coordination Training (Group);Gross Motor Coordination Training (Group)  -AN     Balance Skills Training    Sitting-Level of Assistance Minimum assistance   intermittent supv to min assist  -AN     Sitting-Balance Support Right upper extremity supported;Feet supported  -AN     Sitting-Balance Activities Trunk control activities  -AN     Sitting # of Minutes 8  -AN     Standing-Level of Assistance Contact guard;x2  -AN     Static Standing Balance Support assistive device  -AN     Standing-Balance Activities Weight Shift R-L  -AN     Fine Motor Coordination Training    Detail (Fine Motor Coordination Training) impaired  -AN     Gross Motor Coordination Training    Gross Motor Skill, Impairments  Detail impaired  -AN     Positioning and Restraints    Pre-Treatment Position in bed  -AN     Post Treatment Position chair  -AN     In Chair sitting;call light within reach;encouraged to call for assist;exit alarm on;with nsg;waffle cushion  -       03/08/18 6786       General Information    Equipment Currently Used at Home other (see comments)   facility  -KS       User Key  (r) = Recorded By, (t) = Taken By, (c) = Cosigned By    Initials Name Provider Type    TRESA Mooney, OT Occupational Therapist    PEMA Rose, PT Physical Therapist    ROSA Marcus, RN Case Manager    TAJ Pereira, RN Registered Nurse          Physical Therapy Education     Title: PT OT SLP Therapies (Active)     Topic: Physical Therapy (Done)     Point: Mobility training (Done)    Learning Progress Summary    Learner Readiness Method Response Comment Documented by Status   Patient Acceptance E,D VU,NR POC progression, fall precautions, transfer and gait safety, D/C planning, [facility] 03/09/18 1324 Done               Point: Home exercise program (Done)    Learning Progress Summary    Learner Readiness Method Response Comment Documented by Status   Patient Acceptance E,D VU,NR POC progression, fall precautions, transfer and gait safety, D/C planning, [facility] 03/09/18 1324 Done               Point: Body mechanics (Done)    Learning Progress Summary    Learner Readiness Method Response Comment Documented by Status   Patient Acceptance E,D VU,NR POC progression, fall precautions, transfer and gait safety, D/C planning, [facility] 03/09/18 1324 Done               Point: Precautions (Done)    Learning Progress Summary    Learner Readiness Method Response Comment Documented by Status   Patient Acceptance E,D VU,NR POC progression, fall precautions, transfer and gait safety, D/C planning, [facility] 03/09/18 1324 Done                      User Key     Initials Effective Dates Name Provider Type [facility] 03/14/16 -   Ruth Rose, PT Physical Therapist PT                PT Recommendation and Plan  Anticipated Equipment Needs At Discharge:  (TBD)  Anticipated Discharge Disposition: skilled nursing facility  Planned Therapy Interventions: balance training, bed mobility training, gait training, home exercise program, patient/family education, strengthening, transfer training  PT Frequency: daily  Plan of Care Review  Plan Of Care Reviewed With: patient  Progress: progress toward functional goals as expected  Outcome Summary/Follow up Plan: PT eval completed.  Pt. presents w/ evolving symptoms of dec strength, dec functional endurance, balance deficits, gait instability, and functional decline.  Pt. performed sit to stand transfers w/ RW and CGA, and ambulated 30 ft w/ RW and CGA, cues for safety.  Pt. will benefit from skilled IPPT to maximize patient's safety and ind. w/ functional mobility.  Recommend D/C to SNF upon D/C.          IP PT Goals       03/09/18 1326          Bed Mobility PT LTG    Bed Mobility PT LTG, Date Established 03/09/18  -MB      Bed Mobility PT LTG, Time to Achieve 1 wk  -MB      Bed Mobility PT LTG, Activity Type all bed mobility  -MB      Bed Mobility PT LTG, [place] Level independent  -MB      Bed Mobility PT LTG, Outcome goal ongoing  -MB      Transfer Training PT LTG    Transfer Training PT LTG, Date Established 03/09/18  -MB      Transfer Training PT LTG, Time to Achieve 1 wk  -MB      Transfer Training PT LTG, Activity Type bed to chair /chair to bed;sit to stand/stand to sit  -MB      Transfer Training PT LTG, [place] Level independent  -MB      Transfer Training PT LTG, Assist Device walker, rolling  -MB      Transfer Training PT LTG, Outcome goal ongoing  -MB      Gait Training PT LTG    Gait Training Goal PT LTG, Date Established 03/09/18  -MB      Gait Training Goal PT LTG, Time to Achieve 1 wk  -MB      Gait Training Goal PT LTG, [place] Level independent  -MB      Gait  Training Goal PT LTG, Assist Device walker, rolling  -MB      Gait Training Goal PT LTG, Distance to Achieve 150  -MB      Gait Training Goal PT LTG, Outcome goal ongoing  -MB        User Key  (r) = Recorded By, (t) = Taken By, (c) = Cosigned By    Initials Name Provider Type    PEMA Rose, PT Physical Therapist                Outcome Measures       03/09/18 1300 03/09/18 0840       How much help from another person do you currently need...    Turning from your back to your side while in flat bed without using bedrails? 3  -MB      Moving from lying on back to sitting on the side of a flat bed without bedrails? 3  -MB      Moving to and from a bed to a chair (including a wheelchair)? 3  -MB      Standing up from a chair using your arms (e.g., wheelchair, bedside chair)? 3  -MB      Climbing 3-5 steps with a railing? 2  -MB      To walk in hospital room? 3  -MB      AM-PAC 6 Clicks Score 17  -MB      How much help from another is currently needed...    Putting on and taking off regular lower body clothing?  2  -AN     Bathing (including washing, rinsing, and drying)  2  -AN     Toileting (which includes using toilet bed pan or urinal)  2  -AN     Putting on and taking off regular upper body clothing  2  -AN     Taking care of personal grooming (such as brushing teeth)  3  -AN     Eating meals  1   NPO this date  -AN     Score  12  -AN     Modified Luis Scale    Modified [place] Scale  3 - Moderate disability.  Requiring some help, but able to walk without assistance.  -AN     Functional Assessment    Outcome Measure Options AM-PAC 6 Clicks Basic Mobility (PT)  -MB AM-PAC 6 Clicks Daily Activity (OT);Modified [place]  -AN       User Key  (r) = Recorded By, (t) = Taken By, (c) = Cosigned By    Initials Name Provider Type    TRESA Mooney, OT Occupational Therapist    PEMA Rose, PT Physical Therapist           Time Calculation:         PT Charges       03/09/18 1331          Time Calculation     Start Time 1100  -MB      PT Received On 03/09/18  -MB      PT Goal Re-Cert Due Date 03/19/18  -MB        User Key  (r) = Recorded By, (t) = Taken By, (c) = Cosigned By    Initials Name Provider Type    PEMA Rose, PT Physical Therapist          Therapy Charges for Today     Code Description Service Date Service Provider Modifiers Qty    [id number] HC PT MOBILITY CURRENT 3/9/2018 Ruth Rose, PT GP, CK 1    [id number] HC PT MOBILITY PROJECTED 3/9/2018 Ruth Rose, PT GP, CJ 1    [id number] HC PT EVAL MOD COMPLEXITY 4 3/9/2018 Ruth Rose, PT GP 1          PT G-Codes  PT Professional Judgement Used?: Yes  Outcome Measure Options: AM-PAC 6 Clicks Basic Mobility (PT)  Score: 17  Functional Limitation: Mobility: Walking and moving around  Mobility: Walking and Moving Around Current Status (): At least 40 percent but less than 60 percent impaired, limited or restricted  Mobility: Walking and Moving Around Goal Status (): At least 20 percent but less than 40 percent impaired, limited or restricted      Ruth Rose, PT  3/9/2018

## 2018-03-09 NOTE — H&P
"    [facility] Medicine Services  HISTORY AND PHYSICAL    Patient Name: Tamara Langston  : 1955  MRN: [id number]  Primary Care Physician: Harinder Aranda MD   ID: Dr. Robert Cunha  Cardiology: Dr. Lane Zamorano  CTS: Dr. Arsalan Feliciano  GI: Dr. Prabhjot Luong    Subjective   Subjective     Chief Complaint:  \"Unresponsive\" episode / weakness    HPI:  Tamara Langston is a 62 y.o. female who was sent to [facility] ED evening by EMS after pt went \"unresponsive.\" Episode occurred PTA, spouse estimates about an hour like this. His sister was there with her talking while resting in bed and thought she seemed baseline, and that instantly she just dozed off and stopped responding at all, still breathing and pulse. Constant. Severe. Improved with time. Pt has no recollection of this. She does note she feels week, and was noted to have a fever earlier today and felt chills.    Recent events include a hospitalization here 18-18 for fever/confusion/worsening pressure ulcers. She was treated for sepsis and the infected buttocks wounds with zosyn by PICC line through 3/2 and then to be on augmentin 500/125 mg TID x2 weeks after. Cultures grew enterococcus, pseudomonas, and coag-negative staph. ID was following. Other events that stay were NSTEMI and had right and left heart caths 18 and 2 stents for severe LAD and diagonal disease - she remains on duel PLT therapy. She was started on entresto and coreg for Takotsubo cardiomyopathy. She was also briefly in the ICU for respiratory failure secondary to volume overload (appears was on bipap but did not require intubation).    She is being admitted to [facility] for further evaluation.    Review of Systems   Constitutional: Positive for activity change, chills, diaphoresis, fatigue and fever. Negative for appetite change.        Baseline is up with walker up to 20 feet to toilet and back, at rehab has been doing this and even made it down the " wick 100 ft  - today only in bed. Intentional wt loss ~13 lbs prior to last stay on a diet.   HENT: Negative for trouble swallowing.    Respiratory: Positive for apnea. Negative for cough, shortness of breath and wheezing.         Chronic 2L continuous.   Cardiovascular: Negative for chest pain, palpitations and leg swelling.   Gastrointestinal: Positive for diarrhea. Negative for abdominal pain, blood in stool, constipation, nausea and vomiting.        Reports pure liquid diarrhea at least 2+ times a day since discharge here.   Endocrine:        Hyperglycemia, not known hypoglycemia, GLU 90s by EMS.   Genitourinary: Negative for difficulty urinating, dysuria, hematuria and vaginal bleeding.        Denies dark/odorous urine. Denies incontinence.   Musculoskeletal: Positive for arthralgias.        Chronic pain, on fentanyl patch.   Skin: Positive for wound.        Buttocks wounds. Spouse states her face/chest/arms do look more red than usual.   Allergic/Immunologic: Negative for immunocompromised state.   Neurological: Positive for weakness. Negative for dizziness, seizures, syncope and headaches.   Psychiatric/Behavioral: Negative for confusion and hallucinations.        Denies memory loss.          Otherwise 10-system ROS reviewed and is negative except as mentioned in the HPI.    Personal History     Past Medical History:   Diagnosis Date   • Acute on chronic respiratory failure with hypoxia 2/13/2018   • ELIF (acute kidney injury) 2/13/2018   • ELIF (acute kidney injury) 2/13/2018   • Altered mental status 2/13/2018   • Bronchitis    • Cellulitis of sacral region 2/13/2018   • Cervical cancer    • Cholelithiasis 5/11/2016   • Chronic anxiety 2/27/2017   • Chronic bronchitis    • Chronic respiratory failure with hypoxia 3/8/2018   • Chronic systolic heart failure 3/8/2018   • Chronically on benzodiazepine therapy 2/27/2017   • Degenerative arthritis    • Diabetes mellitus    • Dyslipidemia 5/11/2016   • Dyspnea    •  Elevated troponin level 2/13/2018   • Fatty liver disease, nonalcoholic 11/21/2016   • Fever 2/13/2018   • Fibromyalgia    • GERD (gastroesophageal reflux disease)    • H/O echocardiogram    • History of pneumonia    • Hypertension 5/11/2016    16. H/O echocardiogram (V15.89) (Z92.89)  · A.  Echocardiogram of 02/03/2015 reports an ejection fraction of 60-65%, mild concentric     LVH, trace mitral regurgitation, mild tricuspid and pulmonic regurgitation and calculated     RVSP of 35 mmHg, the main pulmonary artery is also mildly dilated.   • Hypothyroidism 5/11/2016    Description: A.  On replacement therapy.   • Infected wound 3/8/2018   • Nausea    • Obesity    • DINA (obstructive sleep apnea)     intolerant of CPAP therapy   • Osteoporosis    • Osteoporosis    • Polycythemia vera 5/11/2016   • Pulmonary emphysema    • Restrictive ventilatory defect    • Rhinitis    • Sepsis 2/13/2018   • Uncontrolled diabetes mellitus 5/11/2016   • Urine abnormality 2/13/2018   • Uterine cancer    • Vitamin D deficiency 8/1/2016   • Weakness 3/8/2018       Past Surgical History:   Procedure Laterality Date   • AMPUTATION DIGIT Left 11/23/2016    Procedure: AMPUTATION TRANS METATARSAL - ray amutation of the left great toe;  Surgeon: Arsalan Feliciano MD;  Location:  [facility] OR;  Service:    • AMPUTATION DIGIT Left 3/2/2017    Procedure: LEFT FOOT TRANSMETATARSAL AMPUTATION TOES 2,3,4,5;  Surgeon: Joey Guaman MD;  Location:  [facility] OR;  Service:    • BACK SURGERY      lumbar fusions x5--multiple times; 1995, 1997, 1998, 1999 and 2008   • BELOW KNEE AMPUTATION Left 2/25/2017    Procedure: EXTENDED LEFT TOE AMPUTATION;  Surgeon: Arsalan Feliciano MD;  Location:  [facility] OR;  Service:    • CARDIAC CATHETERIZATION N/A 11/26/2016    Procedure: Left Heart Cath;  Surgeon: Ash Nuñez MD;  Location:  [facility] CATH INVASIVE LOCATION;  Service:    • CARDIAC CATHETERIZATION N/A 2/20/2018    Procedure: Left Heart Cath;  Surgeon: Lane Zamorano MD;   Location:  [facility] CATH INVASIVE LOCATION;  Service:    • CARDIAC CATHETERIZATION N/A 2/20/2018    Procedure: Right Heart Cath;  Surgeon: Lane Zamorano MD;  Location:  [facility] CATH INVASIVE LOCATION;  Service:    • HAND SURGERY Bilateral     x3   • HYSTERECTOMY      status post uterine and cervical cancer   • LUMBAR SPINE SURGERY      arthrodesis by anterior approach addit interspace   • TONSILLECTOMY         Family History: family history includes Alcohol abuse in her brother; Arthritis in her brother, father, mother, and other; Bleeding Disorder in her father; COPD in her sister; Colon polyps in her mother; Diabetes in her brother, father, mother, and other; Diverticulitis in her mother; Heart murmur in her daughter; Kidney disease in her father.     Social History:  reports that she quit smoking about a year ago. Her smoking use included Cigarettes. She has a 72.00 pack-year smoking history. She has never used smokeless tobacco. She reports that she does not drink alcohol or use illicit drugs.  Social History     Social History Narrative    Mrs. Langston is a 62 year old white  female. She lives with her spouse in [facility]. She states she does her own ADLs but appears disabled. They have one child and two grandchildren. She has a living will.       Medications:    No current facility-administered medications on file prior to encounter.      Current Outpatient Prescriptions on File Prior to Encounter   Medication Sig Dispense Refill   • acetaminophen (TYLENOL) 325 MG tablet Take 2 tablets by mouth Every 4 (Four) Hours As Needed for mild pain (1-3) or fever (temperature greater than 101F). 100 tablet 0   • albuterol (PROVENTIL) (2.5 MG/3ML) 0.083% nebulizer solution Take 2.5 mg by nebulization Every 6 (Six) Hours As Needed.     • amoxicillin-clavulanate (AUGMENTIN) 500-125 MG per tablet Take 1 tablet by mouth 3 (Three) Times a Day for 14 days. STARTING 3/3/18     • aspirin 81 MG EC tablet Take 1 tablet by  mouth Daily.     • atorvastatin (LIPITOR) 80 MG tablet Take 1 tablet by mouth Every Night.     • baclofen (LIORESAL) 10 MG tablet Take 1 tablet by mouth 2 (Two) Times a Day As Needed for Muscle Spasms. 180 tablet 3   • busPIRone (BUSPAR) 7.5 MG tablet Take 1 tablet by mouth Every 12 (Twelve) Hours.     • carvedilol (COREG) 6.25 MG tablet Take 1 tablet by mouth Every 12 (Twelve) Hours.     • cetirizine (ZyrTEC) 10 MG tablet Take 10 mg by mouth Every Night.     • clonazePAM (KlonoPIN) 0.5 MG tablet Take 0.5 tablets by mouth 2 (Two) Times a Day As Needed for Anxiety. 6 tablet 0   • clopidogrel (PLAVIX) 75 MG tablet Take 1 tablet by mouth Daily. 90 tablet 3   • Cyanocobalamin 1000 MCG/ML kit Inject 1,000 mcg as directed Every 30 (Thirty) Days. 1 kit 3   • ezetimibe (ZETIA) 10 MG tablet Take 1 tablet by mouth Daily. 90 tablet 3   • fentaNYL (DURAGESIC) 75 MCG/HR patch Place 1 patch on the skin Every Other Day. 2 patch 0   • fluticasone (FLONASE) 50 MCG/ACT nasal spray 1 spray into each nostril 2 (Two) Times a Day.     • furosemide (LASIX) 40 MG tablet Take 1 tablet by mouth Daily.     • levothyroxine (SYNTHROID, LEVOTHROID) 112 MCG tablet TAKE ONE TABLET BY MOUTH DAILY 90 tablet 2   • lidocaine (LIDODERM) 5 % Place 3 patches on the skin Daily As Needed for Mild Pain . Remove & Discard patch within 12 hours or as directed by MD     • melatonin 5 MG sublingual tablet sublingual tablet Place 1 tablet under the tongue At Night As Needed (insomnia). 30 tablet 0   • nystatin (MYCOSTATIN) 427780 UNIT/GM powder Under breasts, ABD folds, and groin fields every 12 hours     • omeprazole (priLOSEC) 20 MG capsule TAKE ONE CAPSULE BY MOUTH DAILY 90 capsule 0   • oxyCODONE-acetaminophen (PERCOCET)  MG per tablet Take 1 tablet by mouth Every 6 (Six) Hours As Needed for Moderate Pain  or Severe Pain . 8 tablet 0   • polyethylene glycol (MIRALAX) packet Take 17 g by mouth 2 (Two) Times a Day.     • potassium chloride (MICRO-K) 10  MEQ CR capsule Take 2 capsules by mouth Daily.     • pregabalin (LYRICA) 100 MG capsule Take 1 capsule by mouth Every 8 (Eight) Hours. 9 capsule 0   • probiotic (CULTURELLE) capsule capsule Take 1 capsule by mouth Daily. 60 capsule 0   • sacubitril-valsartan (ENTRESTO) 24-26 MG tablet Take 1 tablet by mouth Every 12 (Twelve) Hours. 60 tablet    • sennosides-docusate sodium (SENOKOT-S) 8.6-50 MG tablet Take 2 tablets by mouth 2 (Two) Times a Day.     • SITagliptin (JANUVIA) 100 MG tablet Take 100 mg by mouth Daily.     • [DISCONTINUED] castor oil-balsam peru (VENELEX) ointment Apply to buttocks daily; mix with Z-guard; cover with foam sacral dressing         Allergies   Allergen Reactions   • Cortisone Other (See Comments)     Hotness all over body and hyper   • Oxycontin [Oxycodone] Other (See Comments)     psoriasis   • Tolmetin Rash     Tolectin-rash and itching   • Vancomycin Rash       Objective   Objective     Vital Signs:   Temp:  [100.3 °F (37.9 °C)] 100.3 °F (37.9 °C)  Heart Rate:  [] 102  Resp:  [16] 16  BP: (135-163)/(64-81) 135/64        Physical Exam   Constitutional: No distress.   Obese. Ill appearing. Spouse is present and supportive.   HENT:   Head: Normocephalic and atraumatic.   Mouth/Throat: Oropharynx is clear and moist.   Eyes: Conjunctivae and EOM are normal. Right eye exhibits no discharge. Left eye exhibits no discharge. No scleral icterus.   Right eye slight right upper deviation, they state this is baseline from an old stroke, otherwise PERRL.   Neck: No JVD present. No tracheal deviation present.   Cardiovascular: Normal rate, regular rhythm and intact distal pulses.    Murmur heard.  Pulses:       Radial pulses are 2+ on the right side, and 2+ on the left side.        Dorsalis pedis pulses are 2+ on the right side, and 2+ on the left side.   No edema. RUE PICC.   Pulmonary/Chest: Effort normal and breath sounds normal. No respiratory distress. She has no wheezes. She has no rales.    Bases diminished but sound clear. O2 NC on.   Abdominal: Soft. Bowel sounds are normal. There is no tenderness. There is no guarding.   Large abdominal girth/distention.   Genitourinary:   Genitourinary Comments: Pelvic non-tender.   Musculoskeletal: She exhibits no edema or deformity.   Fentanyl patch on R chest.   Neurological:   Mild lethargy, slow to respond. Oriented to person, place, time, and some situation - she does not appear to recall much os last hospitalization. Midl BUE action tremor they state is baseline. 3/5 strength.   Skin: Skin is warm and dry. No rash noted. She is not diaphoretic. No erythema. No pallor.   Psychiatric: Her behavior is normal.   Calm, relaxed, flat, lethargic, cooperative. Responds to questions asked.        Results Reviewed:  I have personally reviewed current lab, radiology, and data and agree.    Lab Results (last 24 hours)     Procedure Component Value Units Date/Time    CBC & Differential [[id number]] Collected:  03/08/18 1842    Specimen:  Blood Updated:  03/08/18 1903    Narrative:       The following orders were created for panel order CBC & Differential.  Procedure                               Abnormality         Status                     ---------                               -----------         ------                     CBC Auto Differential[[id number]]        Abnormal            Final result                 Please view results for these tests on the individual orders.    Comprehensive Metabolic Panel [[id number]]  (Abnormal) Collected:  03/08/18 1842    Specimen:  Blood Updated:  03/08/18 1916     Glucose 176 (H) mg/dL      BUN 32 (H) mg/dL      Creatinine 1.50 (H) mg/dL      Sodium 139 mmol/L      Potassium 5.2 mmol/L      Chloride 105 mmol/L      CO2 28.0 mmol/L      Calcium 9.3 mg/dL      Total Protein 7.2 g/dL      Albumin 3.90 g/dL      ALT (SGPT) 26 U/L      AST (SGOT) 32 U/L      Alkaline Phosphatase 78 U/L      Total Bilirubin 0.3 mg/dL      eGFR Non   Amer 35 (L) mL/min/1.73      Globulin 3.3 gm/dL      A/G Ratio 1.2 (L) g/dL      BUN/Creatinine Ratio 21.3     Anion Gap 6.0 mmol/L     Narrative:       [employer] Guidelines    Stage     Description        GFR  1         Normal or High     90+  2         Mild decrease      60-89  3         Moderate decrease  30-59  4         Severe decrease    15-29  5         Kidney failure     <15    Magnesium [[id number]]  (Normal) Collected:  03/08/18 1842    Specimen:  Blood Updated:  03/08/18 1914     Magnesium 1.9 mg/dL     CBC Auto Differential [[id number]]  (Abnormal) Collected:  03/08/18 1842    Specimen:  Blood Updated:  03/08/18 1903     WBC 15.94 (H) 10*3/mm3      RBC 3.42 (L) 10*6/mm3      Hemoglobin 10.2 (L) g/dL      Hematocrit 33.4 (L) %      MCV 97.7 fL      MCH 29.8 pg      MCHC 30.5 (L) g/dL      RDW 15.3 (H) %      RDW-SD 55.2 (H) fl      MPV 11.4 fL      Platelets 192 10*3/mm3      Neutrophil % 75.3 (H) %      Lymphocyte % 13.1 (L) %      Monocyte % 6.2 %      Eosinophil % 4.8 (H) %      Basophil % 0.4 %      Immature Grans % 0.2 %      Neutrophils, Absolute 12.00 (H) 10*3/mm3      Lymphocytes, Absolute 2.09 10*3/mm3      Monocytes, Absolute 0.99 10*3/mm3      Eosinophils, Absolute 0.76 (H) 10*3/mm3      Basophils, Absolute 0.07 10*3/mm3      Immature Grans, Absolute 0.03 10*3/mm3     POC Troponin, Rapid [[id number]]  (Normal) Collected:  03/08/18 1842    Specimen:  Blood Updated:  03/08/18 1905     Troponin I 0.07 ng/mL       Serial Number: [id number]:  [id number]       BNP [[id number]]  (Abnormal) Collected:  03/08/18 1842    Specimen:  Blood Updated:  03/08/18 1943     .0 (H) pg/mL       Results may be falsely decreased if patient taking Biotin.       Procalcitonin [[id number]]  (Normal) Collected:  03/08/18 1842    Specimen:  Blood Updated:  03/08/18 1946     Procalcitonin 0.23 ng/mL     Narrative:       As a Marker for Sepsis (Non-Neonates):   1. <0.5 ng/mL represents a low  risk of severe sepsis and/or septic shock.  2. >2 ng/mL represents a high risk of severe sepsis and/or septic shock.    As a Marker for Lower Respiratory Tract Infections that require antibiotic therapy:    PCT on Admission     Antibiotic Therapy       6-12 Hrs later  > 0.5                Strongly Recommended             >0.25 - <0.5         Recommended  0.1 - 0.25           Discouraged              Remeasure/reassess PCT  <0.1                 Strongly Discouraged     Remeasure/reassess PCT                     PCT values of < 0.5 ng/mL do not exclude an infection, because localized infections (without systemic signs) may be associated with such low concentrations, or a systemic infection in its initial stages (< 6 hours). Furthermore, increased PCT can occur without infection. PCT concentrations between 0.5 and 2.0 ng/mL should be interpreted taking into account the patient's history. It is recommended to retest PCT within 6-24 hours if any concentrations < 2 ng/mL are obtained.    Blood Culture - Blood, [[id number]] Collected:  03/08/18 1930    Specimen:  Blood from Arm, Right Updated:  03/08/18 1950    Blood Culture - Blood, [[id number]] Collected:  03/08/18 1935    Specimen:  Blood from Arm, Right Updated:  03/08/18 1952    Lactic Acid, Plasma [[id number]]  (Normal) Collected:  03/08/18 1940    Specimen:  Blood Updated:  03/08/18 1957     Lactate 1.2 mmol/L       Falsely depressed results may occur on samples drawn from patients receiving N-Acetylcysteine (NAC) or Metamizole.       Urinalysis With / Culture If Indicated - Urine, Clean Catch [[id number]]  (Abnormal) Collected:  03/08/18 2002    Specimen:  Urine from Urine, Clean Catch Updated:  03/08/18 2018     Color, UA Yellow     Appearance, UA Clear     pH, UA <=5.0     Specific Gravity, UA 1.012     Glucose, UA Negative     Ketones, UA Negative     Bilirubin, UA Negative     Blood, UA Negative     Protein, UA 30 mg/dL (1+) (A)     Leuk Esterase, UA Trace (A)      Nitrite, UA Negative     Urobilinogen, UA 0.2 E.U./dL    Urinalysis, Microscopic Only - Urine, Clean Catch [[id number]]  (Abnormal) Collected:  03/08/18 2002    Specimen:  Urine from Urine, Clean Catch Updated:  03/08/18 2018     RBC, UA 0-2 /HPF      WBC, UA 6-12 (A) /HPF      Bacteria, UA None Seen /HPF      Squamous Epithelial Cells, UA 0-2 /HPF      Hyaline Casts, UA 0-6 /LPF      Methodology Automated Microscopy    Urine Culture - Urine, Urine, Clean Catch [[id number]] Collected:  03/08/18 2002    Specimen:  Urine from Urine, Clean Catch Updated:  03/08/18 2018          Estimated Creatinine Clearance: 46.4 mL/min (by C-G formula based on Cr of 1.5).  Brief Urine Lab Results  (Last result in the past 365 days)      Color   Clarity   Blood   Leuk Est   Nitrite   Protein   CREAT   Urine HCG        03/08/18 2002 Yellow Clear Negative Trace(A) Negative 30 mg/dL (1+)(A)             BNP   Date Value Ref Range Status   03/08/2018 900.0 (H) 0.0 - 100.0 pg/mL Final     Comment:     Results may be falsely decreased if patient taking Biotin.     No results found for: PHART  Imaging Results (last 24 hours)     Procedure Component Value Units Date/Time    XR Chest 1 View [[id number]] Collected:  03/08/18 1813     Updated:  03/08/18 1912    Narrative:       EXAM:    XR Chest, 1 View    CLINICAL HISTORY:    62 years old, female; Signs and symptoms; Other: Weak/dizzy/ams; Additional   info: Weak/dizzy/ams triage protocol    TECHNIQUE:    Frontal view of the chest.    COMPARISON:    CR - XR CHEST 1  2018-02-19 04:36    FINDINGS:    Lungs:  Unremarkable.  No consolidation.    Pleural space:  Unremarkable.  No pneumothorax.    Heart:  The heart demonstrates mild diffuse enlargement.    Mediastinum:  Unremarkable.    Bones/joints:  Unremarkable.    Other findings:  The patient is rotated to the left.      Impression:         No acute findings.    THIS DOCUMENT HAS BEEN ELECTRONICALLY SIGNED BY NOEL LYLE MD        Results for  orders placed during the hospital encounter of 02/13/18   Adult Transthoracic Echo Limited W/ Cont if Necessary Per Protocol    Narrative · This was a limited echocardiogram performed to evaluate the left   ventricular ejection fraction  · Left ventricular systolic function is normal. Estimated EF appears to be   in the range of 51 - 55%.  · The following left ventricular wall segments are hypokinetic: mid   anterior, apical anterior, apical lateral, apical inferior, apical septal   and apex hypokinetic. The basal segments are hyperdynamic.  · When compared to the prior echocardiogram performed on February 14, 2018, the hypokinetic apical segments have mildly improved. The overall   ejection fraction has improved.          Assessment/Plan   Assessment / Plan     Hospital Problem List     * (Principal)Altered mental status    Obstructive sleep apnea syndrome (Chronic)    Overview Addendum 8/1/2016  9:56 AM by Puja Clarke     intolerant of CPAP therapy  6. 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  Prior PSG reports complex sleep apnea with frequent central apneas and intermittent      Ramona Menard respirations, on auto CPAP 4-16 with supplemental oxygen.         Type 2 diabetes mellitus (Chronic)    Hypertension (Chronic)    Overview Signed 8/1/2016 10:00 AM by Puja Clarke     16. H/O echocardiogram (V15.89) (Z92.89)   · A.  Echocardiogram of 02/03/2015 reports an ejection fraction of 60-65%, mild concentric      LVH, trace mitral regurgitation, mild tricuspid and pulmonic regurgitation and calculated      RVSP of 35 mmHg, the main pulmonary artery is also mildly dilated.         Peripheral vascular disease (Chronic)    Coronary artery disease involving native heart (Chronic)    Overview Signed 2/15/2018 12:52 PM by CINDI Fierro     1. [facility] 11-26-16: Non occlusive disease  ·  50% mid RCA stenosis.  · 40% proximal LAD stenosis   · Normal left ventricular function         Takotsubo  cardiomyopathy    Overview Signed 2/15/2018  1:42 PM by JEOVANY Greenfield     EF 35%, diastolic dysfunction, left ventricular wall segments are hypokinetic ECHO 2/14/18         Chronic systolic heart failure    Chronic respiratory failure with hypoxia (Chronic)    Infected sacral decubitus ulcers (Chronic)    Weakness            Assessment & Plan:    1. Altered mental status / weakness:  - Stat CT head wo. Consider neuro if appropriate for possible differentials. Hx CVA, just had bilateral heart caths 2/20 with 2 stents.  - Cover for sepsis. Zosyn + vanc. Did have HR>100 PTA, fever, AMS, known infection, leukocytosis, but procal only 0.23 and lactic WNL.    2. Infected decubitus ulcers:  - Hold augmentin she started after zosyn finished 3/2, use zosyn and vanc until ID eval in AM.    3. Takotsubo cardiomyopathy:  - While BNP is 900, this is about what she was discharged at. When admitted last stay was 100s but then jumped >1000 with her acute exac.  - BB, entresto.  - Consider cardiology consult if needed.    4. Coronary artery disease / peripheral vascular disease:  - RHC+LHC 2/20 with 2 stents to severe LAD and diagonal disease.  - Continue dual PLT therapy. Denies any bleeding, anemia stable, had colonoscopy for +occult prior to last stay that was reported as okay.  - Hx left toes amputations r/t osteomyelitis by Dr. Feliciano in past.    5. Chronic respiratory failure with hypoxia / obstructive sleep apnea:  - Chronic 2L NC continuous, and CPAP with O2 HS.  - Continue inhaled meds. Does not appear in any exac whether pulm or cardiac.    6. Chronic pain and anxiety:  - Consider fentanyl reduction r/t AMS/lethargy. Percocet PRN. Lyrica. Baclofen.  - Buspar, klonopin.  - Bowel program.    DVT prophylaxis: Teds/scuds, heparin SQ.    CODE STATUS:  DNR/DNI, otherwise FULL SUPPORT and no restrictions. Spouse is next of kin.    Admission Status:  I believe this patient meets INPATIENT status due to the need for  care which can only be reasonably provided in an hospital setting such as aggressive/expedited ancillary services and/or consultation services, the necessity for IV medications, close physician monitoring and/or the possible need for procedures.  In such, I feel patient’s risk for adverse outcomes and need for care warrant INPATIENT evaluation and predict the patient’s care encounter to likely last beyond 2 midnights.      Electronically signed by JEOVANY Dennis, 03/08/18, 8:01 PM.

## 2018-03-09 NOTE — PROGRESS NOTES
"Adult Nutrition  Assessment/PES    Patient Name:  Tamara Langston  YOB: 1955  MRN: [id number]  Admit Date:  3/8/2018    Assessment Date:  3/9/2018    Comments:            Reason for Assessment       03/09/18 1147    Reason for Assessment    Reason For Assessment/Visit identified at risk by screening criteria    Identified At Risk By Screening Criteria MST SCORE 2+    Time Spent (min) 30    Diagnosis Diagnosis    Cardiac SHF;Other (comment);PVD;CABG;Cardiomyopathy   HX OF HTN, HLP & MI    Endocrine DM Type 2;Hypothyroid    Gastrointestinal Other (comment)   HX OF GALLSTONES & GERD    Hepatic NAFLD    Infectious Disease Other (comment)   INFECTED SACRAL DECUB, FEVER    Metabolic/ICU Other (comment)   HX OF VITAMIN B 12 & D DEFICIENCY    Neurological AMS;Other (comment)   HX OF ESSENTIAL TREMOR    Ortho Osteoporosis;Osteoarthritis    Psychosocial Other (comment)   HX OF ANXIETY/ DEPRESSION    Pulmonary/Critical Care Chronic respiratory failure;Obstructive sleep apnea;COPD    Skin Pressure ulcer;Other (comment)   PSORIASIS    Substance Use Tobacco;Other (comment)   CHRONIC NARCS    Other diagnosis WEAKNESS, HX OF FIBROMYALGIA & INSOMNIA, CHRONIC PAIN              Nutrition/Diet History       03/09/18 1154    Nutrition/Diet History    Functional Status/Food Prep Mobility --   PT DENIES A DECREASE IN APPETITE & IS EATING WELL. SHE PROVIDED FOOD PREFS & DENIES INTOLERANCES. SHE REPORTS UPON LAST ADMISSION SHE TRIED PREMIER PROTEIN SUPPLEMENT & LIKED. SHE REPORTS WOULD DRINK IF PROVIDED. SHE REPORTS LOST SOME WEIGHT OVER THE     Food Allergies/Intolerances --   WEEK (ABOUT 10#) DUE TO NOT FEELING WELL.     Factors Affecting Nutritional Intake Factors    Reported/Observed By Patient            Anthropometrics       03/09/18 1156    Anthropometrics    Height Method Stated    Height 167.6 cm (66\")    Weight 106 kg (234 lb)   WEIGHT METHOD NOT SPECIFIED/ WEIGHTS MAY BE SKEWED BY FLUID    Ideal Body Weight " (IBW)    Ideal Body Weight (IBW), Female 59.98    % Ideal Body Weight 177.34    Body Mass Index (BMI)    BMI (kg/m2) 37.85            Labs/Tests/Procedures/Meds       03/09/18 1157    Labs/Tests/Procedures/Meds    Labs/Tests Review Reviewed;BNP    Medication Review Reviewed, pertinent;Diuretic            Physical Findings       03/09/18 1158    Physical Findings/Assessment    Additional Documentation Physical Appearance (Group)    Physical Appearance    Gastrointestinal other (see comments)   WDL PER NURSING DOCUMENTATION              Nutrition Prescription Ordered       03/09/18 1158    Nutrition Prescription PO    Current PO Diet Regular    Common Modifiers Cardiac;Consistent Carbohydrate            Evaluation of Received Nutrient/Fluid Intake       03/09/18 1159    EN Evaluation    Number of Days EN Intake Evaluated Insufficient Data   NO MEALS RECORDED YET            Problem/Interventions:        Problem 1       03/09/18 1159    Nutrition Diagnoses Problem 1    Problem 1 Increased Nutrient Needs    Macronutrient Protein    Etiology (related to) Medical Diagnosis    Signs/Symptoms (evidenced by) Other (comment)   INFECTED PRESSURE ULCERS PER MD NOTES                    Intervention Goal       03/09/18 1200    Intervention Goal    General Nutrition support treatment    PO Establish PO;Increase intake            Nutrition Intervention       03/09/18 1200    Nutrition Intervention    RD/Tech Action Advise alternate selection;Advise available snack;Interview for preference;Menu adjusted;Encourage intake;Recommend/ordered;Follow Tx progress;Care plan reviewd    Recommended/Ordered Supplement            Nutrition Prescription       03/09/18 1200    Nutrition Prescription PO    PO Prescription Begin/change supplement    Supplement Other (comment)   PREMIER PROTEIN            Education/Evaluation       03/09/18 1200    Monitor/Evaluation    Monitor Per protocol;I&O;PO intake;Supplement intake;Pertinent labs;Weight;Skin  status;Symptoms        Electronically signed by:  Nano Lawrence RD  03/09/18 12:01 PM

## 2018-03-09 NOTE — PROGRESS NOTES
Pharmacokinetic Consult - Vancomycin Dosing    Tamara Langston is a 62 y.o. female who has been consulted for vancomycin dosing for SSTI.    Relevant clinical data and objective history reviewed:  Creatinine   Date Value Ref Range Status   03/08/2018 1.50 (H) 0.60 - 1.30 mg/dL Final   02/27/2018 1.30 0.60 - 1.30 mg/dL Final   02/26/2018 1.50 (H) 0.60 - 1.30 mg/dL Final   05/09/2017 1.60 (H) 0.60 - 1.30 mg/dL Final     Comment:     Serial Number: [id number]    : [id number]     BUN   Date Value Ref Range Status   03/08/2018 32 (H) 9 - 23 mg/dL Final   02/27/2018 26 (H) 9 - 23 mg/dL Final   02/26/2018 29 (H) 9 - 23 mg/dL Final     Estimated Creatinine Clearance: 46.4 mL/min (by C-G formula based on Cr of 1.5).  Lab Results   Component Value Date/Time    WBC 15.94 (H) 03/08/2018 06:42 PM    HGB 10.2 (L) 03/08/2018 06:42 PM    HCT 33.4 (L) 03/08/2018 06:42 PM    MCV 97.7 03/08/2018 06:42 PM     03/08/2018 06:42 PM     Temp Readings from Last 3 Encounters:   03/08/18 100.3 °F (37.9 °C) (Oral)   02/27/18 97.6 °F (36.4 °C) (Axillary)   11/09/17 99.4 °F (37.4 °C) (Temporal Artery )       Assessment/Plan  The patient got a 2000mg loading dose in the ED. Will initiate maintenance dose at 1500 mg IV every 24 hours.  Plan for trough as patient approaches steady state, prior to the 3rd dose.  Due to infection severity, will target a trough of 10-15.     Pharmacy will continue to follow the patient’s culture results and clinical progress daily.    Gilberto Vargas, MelodyD

## 2018-03-09 NOTE — PLAN OF CARE
Problem: Patient Care Overview (Adult)  Goal: Plan of Care Review  Outcome: Ongoing (interventions implemented as appropriate)   03/09/18 1147   Coping/Psychosocial Response Interventions   Plan Of Care Reviewed With patient;spouse   Patient Care Overview   Progress no change   Outcome Evaluation   Outcome Summary/Follow up Plan Patient known to WOC nurse. 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 Ordered Dolphin bed per S. Will consult PT wound care of MIST therapy to right heel. Right ischial stag 4 measures 4.5x3.5x4.0cm, undermining 6-12 o'clock, Wound bed is beefy red, moist, with 10% slough noted in wound bed. Cleaned wound and pack with wet Kerlix. To be done BID. See wound care order for care needs. WOC nurse will continue to follow at this time. Please contact WOC nurse if needs arise. Thanks

## 2018-03-09 NOTE — PLAN OF CARE
Problem: Patient Care Overview (Adult)  Goal: Plan of Care Review  Outcome: Ongoing (interventions implemented as appropriate)   03/09/18 1326   Coping/Psychosocial Response Interventions   Plan Of Care Reviewed With patient   Patient Care Overview   Progress progress toward functional goals as expected   Outcome Evaluation   Outcome Summary/Follow up Plan PT eval completed. Pt. presents w/ evolving symptoms of dec strength, dec functional endurance, balance deficits, gait instability, and functional decline. Pt. performed sit to stand transfers w/ RW and CGA, and ambulated 30 ft w/ RW and CGA, cues for safety. Pt. will benefit from skilled IPPT to maximize patient's safety and ind. w/ functional mobility. Recommend D/C to SNF upon D/C.       Problem: Inpatient Physical Therapy  Goal: Bed Mobility Goal LTG- PT  Outcome: Ongoing (interventions implemented as appropriate)   03/09/18 1326   Bed Mobility PT LTG   Bed Mobility PT LTG, Date Established 03/09/18   Bed Mobility PT LTG, Time to Achieve 1 wk   Bed Mobility PT LTG, Activity Type all bed mobility   Bed Mobility PT LTG, Centre Level independent   Bed Mobility PT LTG, Outcome goal ongoing     Goal: Transfer Training Goal 1 LTG- PT  Outcome: Ongoing (interventions implemented as appropriate)   03/09/18 1326   Transfer Training PT LTG   Transfer Training PT LTG, Date Established 03/09/18   Transfer Training PT LTG, Time to Achieve 1 wk   Transfer Training PT LTG, Activity Type bed to chair /chair to bed;sit to stand/stand to sit   Transfer Training PT LTG, Centre Level independent   Transfer Training PT LTG, Assist Device walker, rolling   Transfer Training PT LTG, Outcome goal ongoing     Goal: Gait Training Goal LTG- PT  Outcome: Ongoing (interventions implemented as appropriate)   03/09/18 1326   Gait Training PT LTG   Gait Training Goal PT LTG, Date Established 03/09/18   Gait Training Goal PT LTG, Time to Achieve 1 wk   Gait Training Goal PT LTG,  [place] Level independent   Gait Training Goal PT LTG, Assist Device walker, rolling   Gait Training Goal PT LTG, Distance to Achieve 150   Gait Training Goal PT LTG, Outcome goal ongoing

## 2018-03-09 NOTE — THERAPY EVALUATION
Acute Care - Occupational Therapy Initial Evaluation  [facility]     Patient Name: Tamara Langston  : 1955  MRN: [id number]  Today's Date: 3/9/2018  Onset of Illness/Injury or Date of Surgery Date: 18  Date of Referral to OT: 18  Referring Physician: JEOVANY Garza    Admit Date: 3/8/2018       ICD-10-CM ICD-9-CM   1. Transient alteration of awareness R40.4 780.02   2. Fever, unspecified fever cause R50.9 780.60   3. Decubitus ulcer of sacral region, unspecified ulcer stage L89.159 707.03     707.20   4. Impaired mobility and ADLs Z74.09 799.89     Patient Active Problem List   Diagnosis   • Atopic rhinitis   • Restrictive ventilatory defect   • COPD (chronic obstructive pulmonary disease)   • Osteoporosis   • Obstructive sleep apnea syndrome   • Abnormal liver enzymes   • Cholelithiasis   • Chronic back pain   • Mixed anxiety depressive disorder   • Type 2 diabetes mellitus   • Dyslipidemia   • Essential tremor   • Fibromyalgia   • Gastroesophageal reflux disease without esophagitis   • Hypertension   • Hypothyroidism   • Insomnia   • Osteoarthritis   • Psoriasis   • Branch retinal vein occlusion   • Cobalamin deficiency   • Obesity   • Vitamin D deficiency   • Hypokalemia   • Lactic acidosis   • Fatty liver disease, nonalcoholic   • Sinus bradycardia   • NSTEMI (non-ST elevated myocardial infarction)   • Tobacco abuse   • Hypoxia   • Peripheral vascular disease   • Diabetic polyneuropathy associated with type 2 diabetes mellitus   • Anemia, blood loss   • Chronic pain   • Chronic, continuous use of opioids   • Chronic anxiety   • Chronically on benzodiazepine therapy   • Tubular adenoma   • Elevated troponin level   • Urine abnormality   • ELIF (acute kidney injury)   • Cellulitis of sacral region   • Altered mental status   • Fever   • Sepsis   • Acute on chronic respiratory failure with hypoxia   • Acute cystitis without hematuria   • Coronary artery disease involving native heart   •  Takotsubo cardiomyopathy   • Elevated troponin   • Chronic systolic heart failure   • Chronic respiratory failure with hypoxia   • Infected sacral decubitus ulcers   • Weakness     Past Medical History:   Diagnosis Date   • Acute on chronic respiratory failure with hypoxia 2/13/2018   • ELIF (acute kidney injury) 2/13/2018   • ELIF (acute kidney injury) 2/13/2018   • Altered mental status 2/13/2018   • Bronchitis    • Cellulitis of sacral region 2/13/2018   • Cervical cancer    • Cholelithiasis 5/11/2016   • Chronic anxiety 2/27/2017   • Chronic bronchitis    • Chronic respiratory failure with hypoxia 3/8/2018   • Chronic systolic heart failure 3/8/2018   • Chronically on benzodiazepine therapy 2/27/2017   • Degenerative arthritis    • Diabetes mellitus    • Dyslipidemia 5/11/2016   • Dyspnea    • Elevated troponin level 2/13/2018   • Fatty liver disease, nonalcoholic 11/21/2016   • Fever 2/13/2018   • Fibromyalgia    • GERD (gastroesophageal reflux disease)    • H/O echocardiogram    • History of pneumonia    • Hypertension 5/11/2016    16. H/O echocardiogram (V15.89) (Z92.89)  · A.  Echocardiogram of 02/03/2015 reports an ejection fraction of 60-65%, mild concentric     LVH, trace mitral regurgitation, mild tricuspid and pulmonic regurgitation and calculated     RVSP of 35 mmHg, the main pulmonary artery is also mildly dilated.   • Hypothyroidism 5/11/2016    Description: A.  On replacement therapy.   • Infected wound 3/8/2018   • Nausea    • Obesity    • DINA (obstructive sleep apnea)     intolerant of CPAP therapy   • Osteoporosis    • Osteoporosis    • Polycythemia vera 5/11/2016   • Pulmonary emphysema    • Restrictive ventilatory defect    • Rhinitis    • Sepsis 2/13/2018   • Uncontrolled diabetes mellitus 5/11/2016   • Urine abnormality 2/13/2018   • Uterine cancer    • Vitamin D deficiency 8/1/2016   • Weakness 3/8/2018     Past Surgical History:   Procedure Laterality Date   • AMPUTATION DIGIT Left 11/23/2016     Procedure: AMPUTATION TRANS METATARSAL - ray amutation of the left great toe;  Surgeon: Arsalan Feliciano MD;  Location:  [facility];  Service:    • AMPUTATION DIGIT Left 3/2/2017    Procedure: LEFT FOOT TRANSMETATARSAL AMPUTATION TOES 2,3,4,5;  Surgeon: Joey Guaman MD;  Location:  [facility];  Service:    • BACK SURGERY      lumbar fusions x5--multiple times; 1995, 1997, 1998, 1999 and 2008   • BELOW KNEE AMPUTATION Left 2/25/2017    Procedure: EXTENDED LEFT TOE AMPUTATION;  Surgeon: Arsalan Feliciano MD;  Location:  [facility];  Service:    • CARDIAC CATHETERIZATION N/A 11/26/2016    Procedure: Left Heart Cath;  Surgeon: Ash Nuñez MD;  Location:  [facility] IN[facility];  Service:    • CARDIAC CATHETERIZATION N/A 2/20/2018    Procedure: Left Heart Cath;  Surgeon: Lane Zamorano MD;  Location:  [facility];  Service:    • CARDIAC CATHETERIZATION N/A 2/20/2018    Procedure: Right Heart Cath;  Surgeon: Lane Zamorano MD;  Location:  [facility];  Service:    • HAND SURGERY Bilateral     x3   • HYSTERECTOMY      status post uterine and cervical cancer   • LUMBAR SPINE SURGERY      arthrodesis by anterior approach addit interspace   • TONSILLECTOMY            OT ASSESSMENT FLOWSHEET (last 72 hours)      OT Evaluation       03/09/18 0840 03/08/18 2311 03/08/18 2307          Rehab Evaluation    Document Type evaluation  -AN        Subjective Information no complaints;agree to therapy  -AN        Patient Effort, Rehab Treatment adequate  -AN        Symptoms Noted During/After Treatment none  -AN        General Information    Patient Profile Review yes  -AN        Onset of Illness/Injury or Date of Surgery Date 03/08/18  -AN        Referring Physician JEOVANY Garza  -AN        General Observations Pt supine in bed with BiPap , RN present during part of evDr. Carson yoo rounded.   -AN        Pertinent History Of Current Problem Pt admitted from SNF where she has been for approx 1 wk for  rehab when staff had difficulty rousing her. pt with fever and increased weakness. Pt with decub on sacrum, PICC line. Pt recently had 2 wk stay here with sepsis, STEMI, heart cath and resp failure.  Pt PMH toe amputations on L foot here at [facility].Hx of CVA with R visual deficits.  -AN        Precautions/Limitations fall precautions;oxygen therapy device and L/min  -AN        Prior Level of Function mod assist:;ADL's;min assist:;transfer;all household mobility   at [facility]. Pt I to min assist prior to toe amputations last mon  -AN        Equipment Currently Used at Home bath bench;bipap/ cpap;ramp;walker, rolling;grab bar;raised toilet  -AN  other (see comments)   facility  -KS      Plans/Goals Discussed With patient;agreed upon  -AN        Risks Reviewed patient:;LOB;dizziness;increased discomfort;change in vital signs  -AN        Benefits Reviewed patient:;improve function;increase independence;increase strength;increase balance  -AN        Barriers to Rehab medically complex;previous functional deficit;visual deficit  -AN        Living Environment    Lives With spouse  -AN spouse  -KS       Living Arrangements house  -AN house  -KS       Home Accessibility no concerns;ramps present at home  -AN no concerns  -KS       Stair Railings at Home  inside, present on right side;inside, present on left side  -KS       Type of Financial/Environmental Concern  none  -KS       Transportation Available  car;family or friend will provide  -KS       Living Environment Comment Pt recently at [facility] for rehab for approx 1 wk following 2 wk admit at [facility]  -AN        Clinical Impression    Date of Referral to OT 03/08/18  -AN        OT Diagnosis Decreased ADL I  -AN        Impairments Found (describe specific impairments) aerobic capacity/endurance;gait, locomotion, and balance;motor function;muscle performance  -AN        Patient/Family Goals Statement Agreeable to OT  -AN        Criteria for Skilled Therapeutic Interventions Met  yes;treatment indicated  -AN        Rehab Potential good, to achieve stated therapy goals  -AN        Therapy Frequency daily  -AN        Anticipated Equipment Needs At Discharge --   TBD further  -AN        Anticipated Discharge Disposition skilled nursing facility  -AN        Functional Level Prior    Ambulation   3-->assistive equipment and person  -KS      Transferring   3-->assistive equipment and person  -KS      Toileting   3-->assistive equipment and person  -KS      Bathing   2-->assistive person  -KS      Dressing   2-->assistive person  -KS      Eating   0-->independent  -KS      Communication   0-->understands/communicates without difficulty  -KS      Swallowing   0-->swallows foods/liquids without difficulty  -KS      Vital Signs    Pre Systolic BP Rehab 130  -AN        Pre Treatment Diastolic BP 74  -AN        Post Systolic BP Rehab --   RN reported  -AN        Pre SpO2 (%) 93  -AN        O2 Delivery Pre Treatment supplemental O2   BiPap  -AN        Intra SpO2 (%) 86  -AN        O2 Delivery Intra Treatment room air  -AN        Post SpO2 (%) 93  -AN        O2 Delivery Post Treatment supplemental O2  -AN        Pre Patient Position Sitting  -AN        Intra Patient Position Standing  -AN        Post Patient Position Sitting  -AN        Pain Assessment    Pain Assessment Blackmon-Cabrera FACES  -AN        Blackmon-Cabrera FACES Pain Rating 4  -AN        Post Pain Score 2  -AN        Pain Type Acute pain  -AN        Pain Location Arm  -AN        Pain Orientation Left  -AN        Pain Intervention(s) Repositioned  -AN        Response to Interventions tolereated  -AN        Vision Assessment/Intervention    Visual Impairment visual impairment, right;limited persistence with tracking  -AN        Visual Impairment Comment Pt. with mod imp vision R eye, states CVA affected. decreased periphreal on R.  -AN        Cognitive Assessment/Intervention    Current Cognitive/Communication Assessment functional  -AN         Orientation Status oriented x 4  -AN        Follows Commands/Answers Questions 100% of the time;needs cueing  -AN        Personal Safety mild impairment;decreased awareness, need for safety  -AN        Personal Safety Interventions fall prevention program maintained;gait belt;nonskid shoes/slippers when out of bed;supervised activity  -AN        ROM (Range of Motion)    General ROM upper extremity range of motion deficits identified  -AN        General ROM Detail R WFL , L shoulder ~ 90 degress flex due to pain in L UE  -AN        MMT (Manual Muscle Testing)    General MMT Assessment Detail grossly 4/5, 4-/5 L shoulder  -AN        Bed Mobility, Assessment/Treatment    Bed Mob, Supine to Sit, [place] minimum assist (75% patient effort)  -AN        Bed Mobility, Impairments strength decreased;impaired balance  -AN        Transfer Assessment/Treatment    Transfers, Bed-Chair [place] verbal cues required;contact guard assist;2 person assist required  -AN        Transfers, Sit-Stand [place] contact guard assist;2 person assist required;verbal cues required  -AN        Transfers, Stand-Sit [place] contact guard assist;2 person assist required;verbal cues required  -AN        Transfers, Sit-Stand-Sit, Assist Device rolling walker  -AN        Transfer, Safety Issues step length decreased;weight-shifting ability decreased  -AN        Transfer, Impairments strength decreased;impaired balance  -AN        ADL Assessment/Intervention    Additional Documentation Self-Feeding Assessment/Training (Group)  -AN        Lower Body Bathing Assessment/Training    LB Bathing Assess/Train, Comment simulated max  -AN        Lower Body Dressing Assessment/Training    LB Dressing Assess/Train, Clothing Type donning:;socks  -AN        LB Dressing Assess/Train, Position sitting  -AN        LB Dressing Assess/Train, [place] maximum assist (25% patient effort)  -AN        Toileting Assessment/Training    Toileting  Assess/Train, Comment pt denied need to void  -AN        Self-Feeding Assessment/Training    Self-Feeding Assess/Train, Comment currently NPO, RN to do screen  -AN        Motor Skills/Interventions    Additional Documentation Balance Skills Training (Group);Fine Motor Coordination Training (Group);Gross Motor Coordination Training (Group)  -AN        Balance Skills Training    Sitting-Level of Assistance Minimum assistance   intermittent supv to min assist  -AN        Sitting-Balance Support Right upper extremity supported;Feet supported  -AN        Sitting-Balance Activities Trunk control activities  -AN        Sitting # of Minutes 8  -AN        Standing-Level of Assistance Contact guard;x2  -AN        Static Standing Balance Support assistive device  -AN        Standing-Balance Activities Weight Shift R-L  -AN        Gross Motor Coordination Training    Gross Motor Skill, Impairments Detail impaired  -AN        Fine Motor Coordination Training    Detail (Fine Motor Coordination Training) impaired  -AN        General Therapy Interventions    Planned Therapy Interventions activity intolerance;adaptive equipment training;ADL retraining;bed mobility training;balance training;motor coordination training;strengthening;transfer training  -AN        Positioning and Restraints    Pre-Treatment Position in bed  -AN        Post Treatment Position chair  -AN        In Chair sitting;call light within reach;encouraged to call for assist;exit alarm on;with nsg;waffle cushion  -AN          User Key  (r) = Recorded By, (t) = Taken By, (c) = Cosigned By    Initials Name Effective Dates    AN Jeanne Mooney OT 06/22/15 -     TAJ Pereira RN 06/16/16 -            Occupational Therapy Education     Title: PT OT SLP Therapies (Active)     Topic: Occupational Therapy (Active)     Point: ADL training (Done)    Description: Instruct learner(s) on proper safety adaptation and remediation techniques during self care or transfers.    Instruct in proper use of assistive devices.    Learning Progress Summary    Learner Readiness Method Response Comment Documented by Status   Patient Acceptance E,D PERRI,NR Educated on current deficits and discussed POC. AN 03/09/18 0942 Done                      User Key     Initials Effective Dates Name Provider Type Discipline    AN 06/22/15 -  Jeanne Mooney OT Occupational Therapist OT                  OT Recommendation and Plan  Anticipated Equipment Needs At Discharge:  (TBD further)  Anticipated Discharge Disposition: skilled nursing facility  Planned Therapy Interventions: activity intolerance, adaptive equipment training, ADL retraining, bed mobility training, balance training, motor coordination training, strengthening, transfer training  Therapy Frequency: daily  Plan of Care Review  Plan Of Care Reviewed With: patient  Outcome Summary/Follow up Plan: Pt currently CGA for txfr, mod to max for ADL's. Pt currently on O2NC, pain in L shoulder. Pt with extensive PMH and current evolving symptoms that are hindering I with ADL's. Recommend return to SNF at discharge.           OT Goals       03/09/18 0943          Transfer Training OT LTG    Transfer Training OT LTG, Date Established 03/09/18  -AN      Transfer Training OT LTG, Time to Achieve 1 wk  -AN      Transfer Training OT LTG, Activity Type toilet  -AN      Transfer Training OT LTG, [place] Level contact guard assist  -AN      Transfer Training OT LTG, Assist Device walker, rolling  -AN      Range of Motion OT LTG    Range of Motion Goal OT LTG, Date Established 03/09/18  -AN      Range of Motion Goal OT LTG, Time to Achieve 1 wk  -AN      Range of Motion Goal OT LTG, AROM Measure Pt will participate in UE therapeutic ex each session to increase L shoulder flex increase by 10 degrees for ADL's.   -AN      Toileting OT LTG    Toileting Goal OT LTG, Date Established 03/09/18  -AN      Toileting Goal OT LTG, Time to Achieve 1 wk  -AN      Toileting Goal  OT LTG, [place] Level minimum assist (75% patient effort)  -AN      Toileting Goal OT LTG, Additional Goal with DME/AE as needed  -AN      Coordination OT LTG    Coordination OT LTG, Date Established 03/09/18  -AN      Coordination OT LTG, Time to Achieve 1 wk  -AN      Coordination OT LTG, Activity Type FM task;GM task  -AN      Coordination OT LTG, [place] Level standby assist  -AN        User Key  (r) = Recorded By, (t) = Taken By, (c) = Cosigned By    Initials Name Provider Type    TRESA Mooney OT Occupational Therapist                Outcome Measures       03/09/18 0840          How much help from another is currently needed...    Putting on and taking off regular lower body clothing? 2  -AN      Bathing (including washing, rinsing, and drying) 2  -AN      Toileting (which includes using toilet bed pan or urinal) 2  -AN      Putting on and taking off regular upper body clothing 2  -AN      Taking care of personal grooming (such as brushing teeth) 3  -AN      Eating meals 1   NPO this date  -AN      Score 12  -AN      Modified [place] Scale    Modified [place] Scale 3 - Moderate disability.  Requiring some help, but able to walk without assistance.  -AN      Functional Assessment    Outcome Measure Options AM-PAC 6 Clicks Daily Activity (OT);Modified Luis  -AN        User Key  (r) = Recorded By, (t) = Taken By, (c) = Cosigned By    Initials Name Provider Type    TRESA Mooney OT Occupational Therapist          Time Calculation:   OT Start Time: 0840    Therapy Charges for Today     Code Description Service Date Service Provider Modifiers Qty    [id number]  OT EVAL LOW COMPLEXITY 4 3/9/2018 Jeanne Mooney OT GO 1               Jeanne Mooney OT  3/9/2018

## 2018-03-09 NOTE — PLAN OF CARE
Problem: Patient Care Overview (Adult)  Goal: Plan of Care Review  Outcome: Ongoing (interventions implemented as appropriate)   03/09/18 0966   Outcome Evaluation   Outcome Summary/Follow up Plan Dysphagia Consult cancelled. RN reports no further needs. Reconsult if/as needed. Thanks

## 2018-03-09 NOTE — PLAN OF CARE
Problem: Patient Care Overview (Adult)  Goal: Plan of Care Review  Outcome: Ongoing (interventions implemented as appropriate)   03/09/18 0943   Coping/Psychosocial Response Interventions   Plan Of Care Reviewed With patient   Outcome Evaluation   Outcome Summary/Follow up Plan Pt currently CGA for txfr, mod to max for ADL's. Pt currently on O2NC, pain in L shoulder. Pt with extensive PMH and current evolving symptoms that are hindering I with ADL's. Recommend return to SNF at discharge.        Problem: Inpatient Occupational Therapy  Goal: Transfer Training Goal 1 LTG- OT  Outcome: Ongoing (interventions implemented as appropriate)   03/09/18 0943   Transfer Training OT LTG   Transfer Training OT LTG, Date Established 03/09/18   Transfer Training OT LTG, Time to Achieve 1 wk   Transfer Training OT LTG, Activity Type toilet   Transfer Training OT LTG, [place] Level contact guard assist   Transfer Training OT LTG, Assist Device walker, rolling     Goal: Range of Motion Goal LTG- OT  Outcome: Ongoing (interventions implemented as appropriate)   03/09/18 0943   Range of Motion OT LTG   Range of Motion Goal OT LTG, Date Established 03/09/18   Range of Motion Goal OT LTG, Time to Achieve 1 wk   Range of Motion Goal OT LTG, AROM Measure Pt will participate in UE therapeutic ex each session to increase L shoulder flex increase by 10 degrees for ADL's.      Goal: Toileting Goal LTG- OT  Outcome: Ongoing (interventions implemented as appropriate)   03/09/18 0943   Toileting OT LTG   Toileting Goal OT LTG, Date Established 03/09/18   Toileting Goal OT LTG, Time to Achieve 1 wk   Toileting Goal OT LTG, [place] Level minimum assist (75% patient effort)   Toileting Goal OT LTG, Additional Goal with DME/AE as needed     Goal: Coordination Goal LTG- OT  Outcome: Ongoing (interventions implemented as appropriate)   03/09/18 0943   Coordination OT LTG   Coordination OT LTG, Date Established 03/09/18   Coordination OT LTG,  Time to Achieve 1 wk   Coordination OT LTG, Activity Type FM task;GM task   Coordination OT LTG, [place] Level standby assist

## 2018-03-09 NOTE — PROGRESS NOTES
"    [facility] Medicine Services  PROGRESS NOTE    Patient Name: Tamara Langston  : 1955  MRN: [id number]    Date of Admission: 3/8/2018  Length of Stay: 1  Primary Care Physician: Harinder Aranda MD    Subjective   Subjective     CC: AMS    HPI: No acute events overnight, patient more awake this morning, she does states that she \"feels bad\" has pain all over    Review of Systems  Gen- No fevers, chills  CV- No chest pain, palpitations  Resp- No cough, dyspnea  GI- No N/V/D, abd pain      Objective   Objective     Vital Signs:   Temp:  [98.8 °F (37.1 °C)-100.3 °F (37.9 °C)] 98.8 °F (37.1 °C)  Heart Rate:  [] 78  Resp:  [16-18] 16  BP: (107-163)/(50-81) 107/50      Physical Exam:  Constitutional: chronically ill appearing lady in no acute distress, awake, alert but sluggish  HENT: NCAT, mucous membranes moist  Respiratory: Clear to auscultation bilaterally, respiratory effort normal   Cardiovascular: RRR, no murmurs, rubs, or gallops, palpable pedal pulses bilaterally  Gastrointestinal: Positive bowel sounds, soft, nontender, nondistended  Musculoskeletal: No bilateral ankle edema, infected decub ulcer dressing in place, draining  Psychiatric: Appropriate affect, cooperative  Neurologic: Oriented x 3, strength symmetric in all extremities, no focal deficit  Skin: No rashes    Results Reviewed:  I have personally reviewed current lab, radiology, and data and agree.      Results from last 7 days  Lab Units 18  1842   WBC 10*3/mm3 15.94*   HEMOGLOBIN g/dL 10.2*   HEMATOCRIT % 33.4*   PLATELETS 10*3/mm3 192       Results from last 7 days  Lab Units 18  1842   SODIUM mmol/L 139   POTASSIUM mmol/L 5.2   CHLORIDE mmol/L 105   CO2 mmol/L 28.0   BUN mg/dL 32*   CREATININE mg/dL 1.50*   GLUCOSE mg/dL 176*   CALCIUM mg/dL 9.3   ALT (SGPT) U/L 26   AST (SGOT) U/L 32     Estimated Creatinine Clearance: 46.7 mL/min (by C-G formula based on Cr of 1.5).  BNP   Date Value Ref " Range Status   03/08/2018 900.0 (H) 0.0 - 100.0 pg/mL Final     Comment:     Results may be falsely decreased if patient taking Biotin.     No results found for: PHART    Microbiology Results Abnormal     Procedure Component Value - Date/Time    Urine Culture - Urine, Urine, Clean Catch [[id number]]  (Normal) Collected:  03/08/18 2002    Lab Status:  Preliminary result Specimen:  Urine from Urine, Clean Catch Updated:  03/09/18 0725     Urine Culture No growth    Blood Culture - Blood, [[id number]] Collected:  03/08/18 1935    Lab Status:  Preliminary result Specimen:  Blood from Arm, Right Updated:  03/09/18 0418     Gram Stain Result Few (2+) WBCs seen          Imaging Results (last 24 hours)     Procedure Component Value Units Date/Time    XR Chest 1 View [[id number]] Collected:  03/08/18 1813     Updated:  03/08/18 1912    Narrative:       EXAM:    XR Chest, 1 View    CLINICAL HISTORY:    62 years old, female; Signs and symptoms; Other: Weak/dizzy/ams; Additional   info: Weak/dizzy/ams triage protocol    TECHNIQUE:    Frontal view of the chest.    COMPARISON:    CR - XR CHEST 1 VW 2018-02-19 04:36    FINDINGS:    Lungs:  Unremarkable.  No consolidation.    Pleural space:  Unremarkable.  No pneumothorax.    Heart:  The heart demonstrates mild diffuse enlargement.    Mediastinum:  Unremarkable.    Bones/joints:  Unremarkable.    Other findings:  The patient is rotated to the left.      Impression:         No acute findings.    THIS DOCUMENT HAS BEEN ELECTRONICALLY SIGNED BY NOEL LYLE MD    CT Head Without Contrast [[id number]] Collected:  03/08/18 2116     Updated:  03/08/18 2251    Narrative:       EXAM:    CT Head Without Intravenous Contrast    CLINICAL HISTORY:    62 years old, female; Pressure ulcer of sacral region, unspecified stage;   Transient alteration of awareness; Fever, unspecified; Signs and symptoms;   Other: Patient found unresponsive; Additional info: Pt found unresponsive    TECHNIQUE:    Axial  computed tomography images of the head/brain without intravenous   contrast.  All CT scans at this facility use one or more dose reduction   techniques, viz.: automated exposure control; ma/kV adjustment per patient size   (including targeted exams where dose is matched to indication; i.e. head); or   iterative reconstruction technique.    COMPARISON:    No relevant prior studies available.    FINDINGS:    Brain:  Nonspecific calcification left occipital lobe.  No hemorrhage.  No   significant white matter disease.    Ventricles:  Unremarkable.  No ventriculomegaly.    Bones/joints:  Unremarkable.  No acute fracture.    Soft tissues:  Unremarkable.    Sinuses:  Mild sphenoid sinusitis.    Mastoid air cells:  Unremarkable as visualized.      Impression:         No acute findings.    THIS DOCUMENT HAS BEEN ELECTRONICALLY SIGNED BY NOEL LYLE MD        Results for orders placed during the hospital encounter of 02/13/18   Adult Transthoracic Echo Limited W/ Cont if Necessary Per Protocol    Narrative · This was a limited echocardiogram performed to evaluate the left   ventricular ejection fraction  · Left ventricular systolic function is normal. Estimated EF appears to be   in the range of 51 - 55%.  · The following left ventricular wall segments are hypokinetic: mid   anterior, apical anterior, apical lateral, apical inferior, apical septal   and apex hypokinetic. The basal segments are hyperdynamic.  · When compared to the prior echocardiogram performed on February 14, 2018, the hypokinetic apical segments have mildly improved. The overall   ejection fraction has improved.          I have reviewed the medications.    Assessment/Plan   Assessment / Plan     Hospital Problem List     * (Principal)Altered mental status    Obstructive sleep apnea syndrome (Chronic)    Overview Addendum 8/1/2016  9:56 AM by Puja Clarke     intolerant of CPAP therapy  6. 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  Prior  PSG reports complex sleep apnea with frequent central apneas and intermittent      Ramona Menard respirations, on auto CPAP 4-16 with supplemental oxygen.         Type 2 diabetes mellitus (Chronic)    Hypertension (Chronic)    Overview Signed 8/1/2016 10:00 AM by Puja Clarke     16. H/O echocardiogram (V15.89) (Z92.89)   · A.  Echocardiogram of 02/03/2015 reports an ejection fraction of 60-65%, mild concentric      LVH, trace mitral regurgitation, mild tricuspid and pulmonic regurgitation and calculated      RVSP of 35 mmHg, the main pulmonary artery is also mildly dilated.         Peripheral vascular disease (Chronic)    Coronary artery disease involving native heart (Chronic)    Overview Signed 2/15/2018 12:52 PM by CINDI Fierro     1. [facility] 11-26-16: Non occlusive disease  ·  50% mid RCA stenosis.  · 40% proximal LAD stenosis   · Normal left ventricular function         Takotsubo cardiomyopathy    Overview Signed 2/15/2018  1:42 PM by JEOVANY Greenfield     EF 35%, diastolic dysfunction, left ventricular wall segments are hypokinetic ECHO 2/14/18         Chronic systolic heart failure    Chronic respiratory failure with hypoxia (Chronic)    Infected sacral decubitus ulcers (Chronic)    Weakness          Brief Hospital Course to date:  Tamara Langston is a 62 y.o. female with hx of decubitus ulcer, systolic HF, DINA, T2DM presented with AMS and fever with concern for persistent decub infection    Assessment & Plan:  - Acute encephalopathy, improving, suspected to be sec to persistent decub infection ID consulted and are aware, rec IV zosyn and vanc, f/u blood cultures  - Hx of DINA with chronic respiratory failure on 2L O2 and CPAP qhs.  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      Electronically signed by  Annalee Byrd MD, 03/09/18, 7:30 AM.

## 2018-03-09 NOTE — PROGRESS NOTES
Discharge Planning Assessment  [facility]     Patient Name: Tamara Zuñiga  MRN: [id number]  Today's Date: 3/9/2018    Admit Date: 3/8/2018          Discharge Needs Assessment       03/09/18 1249    Living Environment    Lives With spouse    Living Arrangements house    Transportation Available car;family or friend will provide    Living Environment    Provides Primary Care For no one    Quality Of Family Relationships supportive;helpful;involved    Able to Return to Prior Living Arrangements yes    Discharge Needs Assessment    Concerns To Be Addressed discharge planning concerns    Readmission Within The Last 30 Days previous discharge plan unsuccessful    Anticipated Changes Related to Illness none    Equipment Currently Used at Home cane, straight;walker, rolling;oxygen;bipap/ cpap;glucometer   [facility] manages oxygen and cpap. from previous note order for oxygen with [facility] was nocturnal use only, however pt had been using continuously. then went to SNF. will need new order for o2 if she is going to need continuous at d/c    Equipment Needed After Discharge none    Discharge Facility/Level Of Care Needs nursing facility, skilled    Discharge Disposition skilled nursing facility    Discharge Contact Information if Applicable [phone]    Discharge Planning Comments Back to [facility]            Discharge Plan       03/09/18 1256    Case Management/Social Work Plan    Plan Back to [facility]    Patient/Family In Agreement With Plan yes    Additional Comments ALICE spoke with Mrs. Zuñiga and her Sukhdev orozco at bedside. Mr. zuñiga was admitted here 2/13-2/27 at which time she was discharged to [facility] for STR. She was re-admitted back to [facility] on 3/8. Per discussion with patient plan is to return back to rehab upon discharge. ALICE spoke with Jillian in admission at [facility]. Patient was a skilled bed, no bed hold. New pre-cert will need to be initiated to [facility]  prior to being medically ready to return back to rehab. CM will continue to follow.         Discharge Placement     No information found                Demographic Summary       03/09/18 1247    Referral Information    Admission Type inpatient    Arrived From skilled nursing facility    Referral Source admission list;physician    Reason For Consult discharge planning    Record Reviewed clinical discipline documentation;history and physical;medical record    Contact Information    Permission Granted to Share Information With     Primary Care Physician Information    Name Harinder Aranda            Functional Status       03/09/18 1248    Functional Status Current    Ambulation 3-->assistive equipment and person    Transferring 3-->assistive equipment and person    Toileting 3-->assistive equipment and person    Bathing 2-->assistive person    Dressing 2-->assistive person    Eating 0-->independent    Communication 0-->understands/communicates without difficulty    Swallowing (if score 2 or more for any item, consult Rehab Services) 0-->swallows foods/liquids without difficulty    Change in Functional Status Since Onset of Current Illness/Injury yes    Functional Status Prior    Ambulation 3-->assistive equipment and person    Transferring 3-->assistive equipment and person    Toileting 3-->assistive equipment and person    Bathing 2-->assistive person    Dressing 2-->assistive person    Eating 0-->independent    Communication 0-->understands/communicates without difficulty    Swallowing 0-->swallows foods/liquids without difficulty    IADL    Medications assistive person    Meal Preparation assistive person    Housekeeping assistive person    Laundry assistive person    Shopping assistive person    Oral Care independent    Activity Tolerance    Current Activity Limitations none    Usual Activity Tolerance fair    Current Activity Tolerance fair    Cognitive/Perceptual/Developmental    Current Mental  Status/Cognitive Functioning no deficits noted    Recent Changes in Mental Status/Cognitive Functioning no changes    Employment/Financial    Financial Concerns none            Psychosocial     None            Abuse/Neglect     None            Legal     None            Substance Abuse     None            Patient Forms     None          Radha Marcus RN

## 2018-03-09 NOTE — PLAN OF CARE
Problem: Patient Care Overview (Adult)  Goal: Plan of Care Review  Outcome: Ongoing (interventions implemented as appropriate)   03/09/18 8744   Coping/Psychosocial Response Interventions   Plan Of Care Reviewed With patient   Patient Care Overview   Progress progress towards functional goals is fair   Outcome Evaluation   Outcome Summary/Follow up Plan Pt doing ok with minimal c/o pain. Worked with PT,OT, WOC. BID dressing changes to pressure sore. MIST therapy eval. Continue to monitor back to [facility] when ready.        Problem: Fall Risk (Adult)  Goal: Absence of Falls  Outcome: Ongoing (interventions implemented as appropriate)      Problem: Pressure Ulcer (Adult)  Goal: Signs and Symptoms of Listed Potential Problems Will be Absent or Manageable (Pressure Ulcer)  Outcome: Ongoing (interventions implemented as appropriate)      Problem: Sepsis (Adult)  Goal: Signs and Symptoms of Listed Potential Problems Will be Absent or Manageable (Sepsis)  Outcome: Ongoing (interventions implemented as appropriate)

## 2018-03-09 NOTE — PLAN OF CARE
Problem: Patient Care Overview (Adult)  Goal: Plan of Care Review  Outcome: Ongoing (interventions implemented as appropriate)   03/09/18 0436   Coping/Psychosocial Response Interventions   Plan Of Care Reviewed With patient   Patient Care Overview   Progress no change   Outcome Evaluation   Outcome Summary/Follow up Plan VSS. pt lethargic overnight. WOC to see today. Will continue to monitor.        Problem: Fall Risk (Adult)  Goal: Identify Related Risk Factors and Signs and Symptoms  Outcome: Outcome(s) achieved Date Met: 03/09/18    Goal: Absence of Falls  Outcome: Ongoing (interventions implemented as appropriate)

## 2018-03-10 LAB
ANION GAP SERPL CALCULATED.3IONS-SCNC: 7 MMOL/L (ref 3–11)
BACTERIA SPEC AEROBE CULT: NORMAL
BACTERIA SPEC AEROBE CULT: NORMAL
BUN BLD-MCNC: 28 MG/DL (ref 9–23)
BUN/CREAT SERPL: 21.5 (ref 7–25)
CALCIUM SPEC-SCNC: 9.5 MG/DL (ref 8.7–10.4)
CHLORIDE SERPL-SCNC: 103 MMOL/L (ref 99–109)
CO2 SERPL-SCNC: 30 MMOL/L (ref 20–31)
CREAT BLD-MCNC: 1.3 MG/DL (ref 0.6–1.3)
CRP SERPL-MCNC: 15.1 MG/DL (ref 0–1)
ERYTHROCYTE [SEDIMENTATION RATE] IN BLOOD: 75 MM/HR (ref 0–30)
GFR SERPL CREATININE-BSD FRML MDRD: 41 ML/MIN/1.73
GLUCOSE BLD-MCNC: 181 MG/DL (ref 70–100)
GLUCOSE BLDC GLUCOMTR-MCNC: 161 MG/DL (ref 70–130)
GLUCOSE BLDC GLUCOMTR-MCNC: 170 MG/DL (ref 70–130)
GLUCOSE BLDC GLUCOMTR-MCNC: 176 MG/DL (ref 70–130)
GLUCOSE BLDC GLUCOMTR-MCNC: 267 MG/DL (ref 70–130)
POTASSIUM BLD-SCNC: 5.1 MMOL/L (ref 3.5–5.5)
SODIUM BLD-SCNC: 140 MMOL/L (ref 132–146)
VANCOMYCIN TROUGH SERPL-MCNC: 17.7 MCG/ML (ref 10–20)

## 2018-03-10 PROCEDURE — 97164 PT RE-EVAL EST PLAN CARE: CPT

## 2018-03-10 PROCEDURE — 25010000002 PIPERACILLIN SOD-TAZOBACTAM PER 1 G: Performed by: NURSE PRACTITIONER

## 2018-03-10 PROCEDURE — 99233 SBSQ HOSP IP/OBS HIGH 50: CPT | Performed by: INTERNAL MEDICINE

## 2018-03-10 PROCEDURE — 97610 LOW FREQUENCY NON-THERMAL US: CPT

## 2018-03-10 PROCEDURE — 94660 CPAP INITIATION&MGMT: CPT

## 2018-03-10 PROCEDURE — 82962 GLUCOSE BLOOD TEST: CPT

## 2018-03-10 PROCEDURE — 85652 RBC SED RATE AUTOMATED: CPT | Performed by: INTERNAL MEDICINE

## 2018-03-10 PROCEDURE — 80202 ASSAY OF VANCOMYCIN: CPT

## 2018-03-10 PROCEDURE — 80048 BASIC METABOLIC PNL TOTAL CA: CPT

## 2018-03-10 PROCEDURE — 94799 UNLISTED PULMONARY SVC/PX: CPT

## 2018-03-10 PROCEDURE — 86140 C-REACTIVE PROTEIN: CPT | Performed by: INTERNAL MEDICINE

## 2018-03-10 PROCEDURE — 25010000002 VANCOMYCIN 10 G RECONSTITUTED SOLUTION

## 2018-03-10 PROCEDURE — 25010000002 HEPARIN (PORCINE) PER 1000 UNITS: Performed by: NURSE PRACTITIONER

## 2018-03-10 RX ADMIN — BUSPIRONE HYDROCHLORIDE 7.5 MG: 15 TABLET ORAL at 08:31

## 2018-03-10 RX ADMIN — ATORVASTATIN CALCIUM 80 MG: 40 TABLET, FILM COATED ORAL at 20:30

## 2018-03-10 RX ADMIN — CLOTRIMAZOLE 10 MG: 10 LOZENGE ORAL at 20:31

## 2018-03-10 RX ADMIN — BUSPIRONE HYDROCHLORIDE 7.5 MG: 15 TABLET ORAL at 20:30

## 2018-03-10 RX ADMIN — OXYCODONE HYDROCHLORIDE AND ACETAMINOPHEN 1 TABLET: 10; 325 TABLET ORAL at 20:29

## 2018-03-10 RX ADMIN — HEPARIN SODIUM 5000 UNITS: 5000 INJECTION, SOLUTION INTRAVENOUS; SUBCUTANEOUS at 08:30

## 2018-03-10 RX ADMIN — TAZOBACTAM SODIUM AND PIPERACILLIN SODIUM 4.5 G: 500; 4 INJECTION, SOLUTION INTRAVENOUS at 10:49

## 2018-03-10 RX ADMIN — INSULIN LISPRO 4 UNITS: 100 INJECTION, SOLUTION INTRAVENOUS; SUBCUTANEOUS at 12:09

## 2018-03-10 RX ADMIN — CARVEDILOL 6.25 MG: 6.25 TABLET, FILM COATED ORAL at 08:31

## 2018-03-10 RX ADMIN — PREGABALIN 100 MG: 100 CAPSULE ORAL at 14:23

## 2018-03-10 RX ADMIN — HEPARIN SODIUM 5000 UNITS: 5000 INJECTION, SOLUTION INTRAVENOUS; SUBCUTANEOUS at 20:30

## 2018-03-10 RX ADMIN — FUROSEMIDE 40 MG: 40 TABLET ORAL at 08:31

## 2018-03-10 RX ADMIN — TAZOBACTAM SODIUM AND PIPERACILLIN SODIUM 4.5 G: 500; 4 INJECTION, SOLUTION INTRAVENOUS at 02:30

## 2018-03-10 RX ADMIN — LEVOTHYROXINE SODIUM 112 MCG: 112 TABLET ORAL at 05:41

## 2018-03-10 RX ADMIN — CLONAZEPAM 0.25 MG: 0.5 TABLET ORAL at 20:29

## 2018-03-10 RX ADMIN — TAZOBACTAM SODIUM AND PIPERACILLIN SODIUM 4.5 G: 500; 4 INJECTION, SOLUTION INTRAVENOUS at 17:35

## 2018-03-10 RX ADMIN — FLUTICASONE PROPIONATE 1 SPRAY: 50 SPRAY, METERED NASAL at 20:31

## 2018-03-10 RX ADMIN — CARVEDILOL 6.25 MG: 6.25 TABLET, FILM COATED ORAL at 20:30

## 2018-03-10 RX ADMIN — PANTOPRAZOLE SODIUM 40 MG: 40 TABLET, DELAYED RELEASE ORAL at 05:41

## 2018-03-10 RX ADMIN — NYSTATIN: 100000 POWDER TOPICAL at 20:31

## 2018-03-10 RX ADMIN — INSULIN LISPRO 2 UNITS: 100 INJECTION, SOLUTION INTRAVENOUS; SUBCUTANEOUS at 08:08

## 2018-03-10 RX ADMIN — Medication 1 CAPSULE: at 08:31

## 2018-03-10 RX ADMIN — CLOTRIMAZOLE 10 MG: 10 LOZENGE ORAL at 08:31

## 2018-03-10 RX ADMIN — INSULIN LISPRO 2 UNITS: 100 INJECTION, SOLUTION INTRAVENOUS; SUBCUTANEOUS at 20:30

## 2018-03-10 RX ADMIN — PREGABALIN 100 MG: 100 CAPSULE ORAL at 21:42

## 2018-03-10 RX ADMIN — VANCOMYCIN HYDROCHLORIDE 1250 MG: 10 INJECTION, POWDER, LYOPHILIZED, FOR SOLUTION INTRAVENOUS at 21:57

## 2018-03-10 RX ADMIN — CETIRIZINE HYDROCHLORIDE 10 MG: 10 TABLET, FILM COATED ORAL at 20:29

## 2018-03-10 RX ADMIN — SACUBITRIL AND VALSARTAN 1 TABLET: 24; 26 TABLET, FILM COATED ORAL at 08:33

## 2018-03-10 RX ADMIN — NYSTATIN: 100000 POWDER TOPICAL at 08:34

## 2018-03-10 RX ADMIN — ASPIRIN 81 MG: 81 TABLET, COATED ORAL at 08:30

## 2018-03-10 RX ADMIN — CLOPIDOGREL BISULFATE 75 MG: 75 TABLET ORAL at 08:31

## 2018-03-10 RX ADMIN — SACUBITRIL AND VALSARTAN 1 TABLET: 24; 26 TABLET, FILM COATED ORAL at 21:58

## 2018-03-10 RX ADMIN — PREGABALIN 100 MG: 100 CAPSULE ORAL at 05:41

## 2018-03-10 RX ADMIN — CLOTRIMAZOLE 10 MG: 10 LOZENGE ORAL at 17:35

## 2018-03-10 RX ADMIN — POTASSIUM CHLORIDE 20 MEQ: 750 CAPSULE, EXTENDED RELEASE ORAL at 08:30

## 2018-03-10 RX ADMIN — OXYCODONE HYDROCHLORIDE AND ACETAMINOPHEN 1 TABLET: 10; 325 TABLET ORAL at 08:32

## 2018-03-10 NOTE — THERAPY WOUND CARE TREATMENT
Acute Care - Wound/Debridement Re-Evaluation  [facility]     Patient Name: Tamara Langston  : 1955  MRN: [id number]  Today's Date: 3/10/2018  Onset of Illness/Injury or Date of Surgery: 18  Date of Referral to PT: 18   Referring Physician: MD Carson      Admit Date: 3/8/2018    Visit Dx:    ICD-10-CM ICD-9-CM   1. Transient alteration of awareness R40.4 780.02   2. Fever, unspecified fever cause R50.9 780.60   3. Decubitus ulcer of sacral region, unspecified ulcer stage L89.159 707.03     707.20   4. Impaired mobility and ADLs Z74.09 799.89   5. Impaired functional mobility, balance, gait, and endurance Z74.09 V49.89       Patient Active Problem List   Diagnosis   • Atopic rhinitis   • Restrictive ventilatory defect   • COPD (chronic obstructive pulmonary disease)   • Osteoporosis   • Obstructive sleep apnea syndrome   • Abnormal liver enzymes   • Cholelithiasis   • Chronic back pain   • Mixed anxiety depressive disorder   • Type 2 diabetes mellitus   • Dyslipidemia   • Essential tremor   • Fibromyalgia   • Gastroesophageal reflux disease without esophagitis   • Hypertension   • Hypothyroidism   • Insomnia   • Osteoarthritis   • Psoriasis   • Branch retinal vein occlusion   • Cobalamin deficiency   • Obesity   • Vitamin D deficiency   • Hypokalemia   • Lactic acidosis   • Fatty liver disease, nonalcoholic   • Sinus bradycardia   • NSTEMI (non-ST elevated myocardial infarction)   • Tobacco abuse   • Hypoxia   • Peripheral vascular disease   • Diabetic polyneuropathy associated with type 2 diabetes mellitus   • Anemia, blood loss   • Chronic pain   • Chronic, continuous use of opioids   • Chronic anxiety   • Chronically on benzodiazepine therapy   • Tubular adenoma   • Elevated troponin level   • Urine abnormality   • ELIF (acute kidney injury)   • Cellulitis of sacral region   • Altered mental status   • Fever   • Sepsis   • Acute on chronic respiratory failure with hypoxia   • Acute  cystitis without hematuria   • Coronary artery disease involving native heart   • Takotsubo cardiomyopathy   • Elevated troponin   • Chronic systolic heart failure   • Chronic respiratory failure with hypoxia   • Infected sacral decubitus ulcers   • Weakness        Past Medical History:   Diagnosis Date   • Acute on chronic respiratory failure with hypoxia 2/13/2018   • ELIF (acute kidney injury) 2/13/2018   • ELIF (acute kidney injury) 2/13/2018   • Altered mental status 2/13/2018   • Bronchitis    • Cellulitis of sacral region 2/13/2018   • Cervical cancer    • Cholelithiasis 5/11/2016   • Chronic anxiety 2/27/2017   • Chronic bronchitis    • Chronic respiratory failure with hypoxia 3/8/2018   • Chronic systolic heart failure 3/8/2018   • Chronically on benzodiazepine therapy 2/27/2017   • Degenerative arthritis    • Diabetes mellitus    • Dyslipidemia 5/11/2016   • Dyspnea    • Elevated troponin level 2/13/2018   • Fatty liver disease, nonalcoholic 11/21/2016   • Fever 2/13/2018   • Fibromyalgia    • GERD (gastroesophageal reflux disease)    • H/O echocardiogram    • History of pneumonia    • Hypertension 5/11/2016    16. H/O echocardiogram (V15.89) (Z92.89)  · A.  Echocardiogram of 02/03/2015 reports an ejection fraction of 60-65%, mild concentric     LVH, trace mitral regurgitation, mild tricuspid and pulmonic regurgitation and calculated     RVSP of 35 mmHg, the main pulmonary artery is also mildly dilated.   • Hypothyroidism 5/11/2016    Description: A.  On replacement therapy.   • Infected wound 3/8/2018   • Nausea    • Obesity    • DINA (obstructive sleep apnea)     intolerant of CPAP therapy   • Osteoporosis    • Osteoporosis    • Polycythemia vera 5/11/2016   • Pulmonary emphysema    • Restrictive ventilatory defect    • Rhinitis    • Sepsis 2/13/2018   • Uncontrolled diabetes mellitus 5/11/2016   • Urine abnormality 2/13/2018   • Uterine cancer    • Vitamin D deficiency 8/1/2016   • Weakness 3/8/2018         Past Surgical History:   Procedure Laterality Date   • AMPUTATION DIGIT Left 11/23/2016    Procedure: AMPUTATION TRANS METATARSAL - ray amutation of the left great toe;  Surgeon: Arsalan Feliciano MD;  Location:  [facility];  Service:    • AMPUTATION DIGIT Left 3/2/2017    Procedure: LEFT FOOT TRANSMETATARSAL AMPUTATION TOES 2,3,4,5;  Surgeon: Joey Guaman MD;  Location:  [facility];  Service:    • BACK SURGERY      lumbar fusions x5--multiple times; 1995, 1997, 1998, 1999 and 2008   • BELOW KNEE AMPUTATION Left 2/25/2017    Procedure: EXTENDED LEFT TOE AMPUTATION;  Surgeon: Arsalan Feliciano MD;  Location:  [facility];  Service:    • CARDIAC CATHETERIZATION N/A 11/26/2016    Procedure: Left Heart Cath;  Surgeon: Ash Nuñez MD;  Location:  [facility]ATH INVASIVE LOCATION;  Service:    • CARDIAC CATHETERIZATION N/A 2/20/2018    Procedure: Left Heart Cath;  Surgeon: Lane Zamorano MD;  Location:  [facility] CATH INVASIVE LOCATION;  Service:    • CARDIAC CATHETERIZATION N/A 2/20/2018    Procedure: Right Heart Cath;  Surgeon: Lane Zamorano MD;  Location:  [facility] CATH INVASIVE LOCATION;  Service:    • HAND SURGERY Bilateral     x3   • HYSTERECTOMY      status post uterine and cervical cancer   • LUMBAR SPINE SURGERY      arthrodesis by anterior approach addit interspace   • TONSILLECTOMY                   WOUND DEBRIDEMENT                     PT ASSESSMENT (last 72 hours)      Physical Therapy Evaluation     Row Name 03/10/18 0945          PT Evaluation Time/Intention    Subjective Information no complaints  -     Document Type re-evaluation;wound care  -     Row Name 03/10/18 1830          General Information    Patient Profile Reviewed? yes  -     Onset of Illness/Injury or Date of Surgery 03/08/18  -     Referring Physician MD Carson  -     Patient Observations alert;cooperative;agree to therapy  -     Pertinent History of Current Functional Problem See PT mobility eval for details. Pt with POA DTI to the R posterior  "heel. WOCN and RN staff managing the pt's sacral wound.  -     Risks Reviewed patient and family:;increased discomfort  -MC     Benefits Reviewed patient and family:;improve skin integrity  -     Barriers to Rehab medically complex;previous functional deficit  -     Row Name 03/10/18 0945          Cognitive Assessment/Intervention- PT/OT    Orientation Status (Cognition) oriented x 3  -MC     Follows Commands (Cognition) WNL  -     Row Name 03/10/18 0945          Pain Assessment    Additional Documentation Pain Scale: Numbers Pre/Post-Treatment (Group)  -     Row Name 03/10/18 0945          Pain Scale: Numbers Pre/Post-Treatment    Pain Scale: Numbers, Pretreatment 0/10 - no pain  -MC     Pain Scale: Numbers, Post-Treatment 0/10 - no pain  -     Row Name 03/10/18 0945          Wound 02/14/18 1115 Right medial heel    Wound - Properties Group Date first assessed: 02/14/18  -KS Time first assessed: 1115  -KS Present On Admission : yes  -KS Side: Right  -KS Orientation: medial  -KS Location: heel  -KS Stage, Pressure Injury: deep tissue injury  -KS    Wound Image Images linked: 1  -     Dressing Appearance open to air  -     Base purple;maroon/purple;other (see comments)   blistered  -     Periwound blanchable;pink  -     Periwound Temperature warm  -     Periwound Skin Turgor soft  -     Wound length (cm) 5.1 cm  -     Wound width (cm) 3.5 cm  -     Wound depth (cm) --   raised  -     Drainage Amount none  -     Care, Wound ultrasound therapy, non contact low frequency   MIST 8 minutes  -     Dressing Care, Wound dressing applied;low-adherent;foam   6\" optifoam gentle border  -     Periwound Care, Wound cleansed with pH balanced cleanser;dry periwound area maintained  -     Row Name             Wound 03/09/18 0930 Right upper ischial tuberosity pressure injury    Wound - Properties Group Date first assessed: 03/09/18  -KS Time first assessed: 0930  -KS Present On Admission : yes  " -KS Side: Right  -KS Orientation: upper  -KS Location: ischial tuberosity  -KS Type: pressure injury  -KS Stage, Pressure Injury: Stage 4  -KS    Row Name             Wound 02/13/18 2100 other (see comments) anterior groin other (see comments)    Wound - Properties Group Date first assessed: 02/13/18  -KS Time first assessed: 2100  -KS Side: other (see comments)  -KS, bilateral  Orientation: anterior  -KS Location: groin  -KS, and breast   Type: other (see comments)  -KS, excoriation      Row Name 03/10/18 0945          Physical Therapy Clinical Impression    Date of Referral to PT 03/09/18  -     PT Diagnosis (PT Clinical Impression) impaired skin integrity  -     Patient/Family Goals Statement (PT Clinical Impression) Heal wounds  -     Criteria for Skilled Interventions Met (PT Clinical Impression) yes;treatment indicated  -     Rehab Potential (PT Clinical Summary) good, to achieve stated therapy goals  -     Care Plan Review (PT) evaluation/treatment results reviewed;patient/other agree to care plan  -     Row Name 03/10/18 0945          Physical Therapy Goals    Wound Care Goal Selection (PT) wound care, PT goal 1  -     Additional Documentation Wound Care Goal Selection (PT) (Row)  -     Row Name 03/10/18 0970          Wound Care Goal 1 (PT)    Wound Care Goal 1 (PT) Pt will demonstrate 25% reduction in wound area to indicate healing progress.  -     Time Frame (Wound Care Goal 1, PT) long term goal (LTG);10 days  -     Row Name 03/10/18 0945          Positioning and Restraints    Pre-Treatment Position in bed  -     Post Treatment Position bed  -MC     In Bed supine;call light within reach;encouraged to call for assist;with family/caregiver;waffle boots/both  -       User Key  (r) = Recorded By, (t) = Taken By, (c) = Cosigned By    Initials Name Provider Type    IVAN Chao, PT Physical Therapist    TAJ Pereira, RN Registered Nurse        Physical Therapy Education      Title: PT OT SLP Therapies (Active)     Topic: Physical Therapy (Done)     Point: Mobility training (Done)    Learning Progress Summary     Learner Status Readiness Method Response Comment Documented by    Patient Done Acceptance E,D VU,NR POC progression, fall precautions, transfer and gait safety, D/C planning, [facility] 03/09/18 1324          Point: Home exercise program (Done)    Learning Progress Summary     Learner Status Readiness Method Response Comment Documented by    Patient Done Acceptance E,D VU,NR POC progression, fall precautions, transfer and gait safety, D/C planning, [facility] 03/09/18 1324          Point: Body mechanics (Done)    Learning Progress Summary     Learner Status Readiness Method Response Comment Documented by    Patient Done Acceptance E,D VU,NR POC progression, fall precautions, transfer and gait safety, D/C planning, [facility] 03/09/18 1324          Point: Precautions (Done)    Learning Progress Summary     Learner Status Readiness Method Response Comment Documented by    Patient Done Acceptance E,D VU,NR POC progression, fall precautions, transfer and gait safety, D/C planning, [facility] 03/09/18 1324                      User Key     Initials Effective Dates Name Provider Type Discipline    MB 03/14/16 -  Ruth Rose, PT Physical Therapist PT                    Recommendation and Plan  Anticipated Discharge Disposition (PT): skilled nursing facility (SNF)  Therapy Frequency (PT Clinical Impression): daily    Plan of Care Reviewed With: spouse, patient       Outcome Summary/Treatment Plan (PT)  Anticipated Discharge Disposition (PT): skilled nursing facility (SNF)   Outcome Summary: Pt presents with DTPI to the R heel, POA. The area is raised and blistered, with dark red/purple discoloration. As the pt has a long history of delayed wound healing, PT recommends trying to keep the blister intact for now. The pt will benefit from MIST ultrasound to increase blood flow, decrease bioburden,  and promote cellular activity. D/t the pt's evolving wound symptoms, the pt will benefit from skilled PT wound care to manage the R heel care.  Plan of Care Reviewed With: spouse, patient            Outcome Measures     Row Name 03/09/18 1300 03/09/18 0840          How much help from another person do you currently need...    Turning from your back to your side while in flat bed without using bedrails? 3  -MB  --     Moving from lying on back to sitting on the side of a flat bed without bedrails? 3  -MB  --     Moving to and from a bed to a chair (including a wheelchair)? 3  -MB  --     Standing up from a chair using your arms (e.g., wheelchair, bedside chair)? 3  -MB  --     Climbing 3-5 steps with a railing? 2  -MB  --     To walk in hospital room? 3  -MB  --     AM-[facility] 6 Clicks Score 17  -MB  --        How much help from another is currently needed...    Putting on and taking off regular lower body clothing?  -- 2  -AN     Bathing (including washing, rinsing, and drying)  -- 2  -AN     Toileting (which includes using toilet bed pan or urinal)  -- 2  -AN     Putting on and taking off regular upper body clothing  -- 2  -AN     Taking care of personal grooming (such as brushing teeth)  -- 3  -AN     Eating meals  -- 1   NPO this date  -AN     Score  -- 12  -AN        Modified Luis Scale    Modified [place] Scale  -- 3 - Moderate disability.  Requiring some help, but able to walk without assistance.  -AN        Functional Assessment    Outcome Measure Options AM-PAC 6 Clicks Basic Mobility (PT)  -MB -[facility] 6 Clicks Daily Activity (OT);Modified Luis  -AN       User Key  (r) = Recorded By, (t) = Taken By, (c) = Cosigned By    Initials Name Provider Type    AN Jeanne Mooney, OT Occupational Therapist    PEMA Rose, PT Physical Therapist              Time Calculation        PT Charges     Row Name 03/10/18 0945             Time Calculation    Start Time 0945  -      PT Goal Re-Cert Due Date 03/20/18  -         User Key  (r) = Recorded By, (t) = Taken By, (c) = Cosigned By    Initials Name Provider Type     Chetna Chao, PT Physical Therapist            Therapy Charges for Today     Code Description Service Date Service Provider Modifiers Qty    [id number]  PT RE-EVAL ESTABLISHED PLAN 2 3/10/2018 Chetna Chao, PT GP 1    [id number]  PT NLFU MIST 3/10/2018 Chetna Chao, PT GP 1            PT G-Codes  PT Professional Judgement Used?: Yes  Outcome Measure Options: AM-PAC 6 Clicks Basic Mobility (PT)  Score: 17  Functional Limitation: Mobility: Walking and moving around  Mobility: Walking and Moving Around Current Status (): At least 40 percent but less than 60 percent impaired, limited or restricted  Mobility: Walking and Moving Around Goal Status (): At least 20 percent but less than 40 percent impaired, limited or restricted       Chetna Chao, PT  3/10/2018

## 2018-03-10 NOTE — PLAN OF CARE
Problem: Fall Risk (Adult)  Goal: Identify Related Risk Factors and Signs and Symptoms  Outcome: Ongoing (interventions implemented as appropriate)    Goal: Absence of Fall  Outcome: Ongoing (interventions implemented as appropriate)      Problem: Patient Care Overview  Goal: Plan of Care Review  Outcome: Ongoing (interventions implemented as appropriate)   03/10/18 5189   Coping/Psychosocial   Plan of Care Reviewed With patient   Plan of Care Review   Progress no change   OTHER   Outcome Summary Vital signs wnl. Oxgyen level greater than 90% on 2 liters. dressing changed on right coccyx. pain controlled.        Problem: Wound (Includes Pressure Injury) (Adult)  Goal: Signs and Symptoms of Listed Potential Problems Will be Absent, Minimized or Managed (Wound)  Outcome: Ongoing (interventions implemented as appropriate)      Problem: Sepsis/Septic Shock (Adult)  Goal: Signs and Symptoms of Listed Potential Problems Will be Absent, Minimized or Managed (Sepsis/Septic Shock)  Outcome: Ongoing (interventions implemented as appropriate)

## 2018-03-10 NOTE — PROGRESS NOTES
[facility] Medicine Services  PROGRESS NOTE    Patient Name: Tamara Langston  : 1955  MRN: [id number]    Date of Admission: 3/8/2018  Length of Stay: 2  Primary Care Physician: Harinder Aranda MD    Subjective   Subjective     CC:AMS    HPI: No acute events overnight, patient states that she is doing good, mentation is clear, no fevers or chills.      Review of Systems  Gen- No fevers, chills  CV- No chest pain, palpitations  Resp- No cough, dyspnea  GI- No N/V/D, abd pain    Otherwise ROS is negative except as mentioned in the HPI.    Objective   Objective     Vital Signs:   Temp:  [97.9 °F (36.6 °C)-98.7 °F (37.1 °C)] 98.1 °F (36.7 °C)  Heart Rate:  [80-95] 80  Resp:  [16-18] 16  BP: (103-141)/(46-70) 116/55  FiO2 (%):  [30 %] 30 %        Physical Exam:  Constitutional: chronically ill appearing lady, in no acute distress, awake, alert  HENT: NCAT, mucous membranes moist  Respiratory: Clear to auscultation bilaterally, respiratory effort normal   Cardiovascular: RRR, no murmurs, rubs, or gallops, palpable pedal pulses bilaterally  Gastrointestinal: obese, positive bowel sounds, soft, nontender, nondistended  Musculoskeletal: bilateral LE edema  Psychiatric: Appropriate affect, cooperative  Neurologic: Oriented x 3, strength symmetric in all extremities, Cranial Nerves grossly intact to confrontation, speech clear  Skin: stage 4 decub ulcer,see PT WOC pics    Results Reviewed:  I have personally reviewed current lab, radiology, and data and agree.      Results from last 7 days  Lab Units 18  1842   WBC 10*3/mm3 15.94*   HEMOGLOBIN g/dL 10.2*   HEMATOCRIT % 33.4*   PLATELETS 10*3/mm3 192       Results from last 7 days  Lab Units 18  1842   SODIUM mmol/L 139   POTASSIUM mmol/L 5.2   CHLORIDE mmol/L 105   CO2 mmol/L 28.0   BUN mg/dL 32*   CREATININE mg/dL 1.50*   GLUCOSE mg/dL 176*   CALCIUM mg/dL 9.3   ALT (SGPT) U/L 26   AST (SGOT) U/L 32     Estimated  Creatinine Clearance: 46.7 mL/min (by C-G formula based on SCr of 1.5 mg/dL (H)).  BNP   Date Value Ref Range Status   03/08/2018 900.0 (H) 0.0 - 100.0 pg/mL Final     Comment:     Results may be falsely decreased if patient taking Biotin.     No results found for: PHART    Microbiology Results Abnormal     Procedure Component Value - Date/Time    Blood Culture - Blood, [[id number]]  (Normal) Collected:  03/08/18 1930    Lab Status:  Preliminary result Specimen:  Blood from Arm, Right Updated:  03/09/18 2001     Blood Culture No growth at 24 hours    Blood Culture - Blood, [[id number]]  (Normal) Collected:  03/08/18 1935    Lab Status:  Preliminary result Specimen:  Blood from Arm, Right Updated:  03/09/18 2001     Blood Culture No growth at 24 hours     Gram Stain Result Few (2+) WBCs seen    Clostridium Difficile Toxin - Stool, Per Rectum [[id number]] Collected:  03/09/18 1027    Lab Status:  Final result Specimen:  Stool from Per Rectum Updated:  03/09/18 1203    Narrative:       The following orders were created for panel order Clostridium Difficile Toxin - Stool, Per Rectum.  Procedure                               Abnormality         Status                     ---------                               -----------         ------                     Clostridium Difficile To...[[id number]]  Normal              Final result                 Please view results for these tests on the individual orders.    Clostridium Difficile Toxin, PCR - Stool, Per Rectum [[id number]]  (Normal) Collected:  03/09/18 1027    Lab Status:  Final result Specimen:  Stool from Per Rectum Updated:  03/09/18 1203     C. Difficile Toxins by PCR Not Detected    Narrative:         Performance characteristics of test not established for patients <2 years of age.  Negative for Toxigenic C. Difficile    Urine Culture - Urine, Urine, Clean Catch [[id number]]  (Normal) Collected:  03/08/18 2002    Lab Status:  Preliminary result Specimen:  Urine from  Urine, Clean Catch Updated:  03/09/18 0725     Urine Culture No growth        Results for orders placed during the hospital encounter of 02/13/18   Adult Transthoracic Echo Limited W/ Cont if Necessary Per Protocol    Narrative · This was a limited echocardiogram performed to evaluate the left   ventricular ejection fraction  · Left ventricular systolic function is normal. Estimated EF appears to be   in the range of 51 - 55%.  · The following left ventricular wall segments are hypokinetic: mid   anterior, apical anterior, apical lateral, apical inferior, apical septal   and apex hypokinetic. The basal segments are hyperdynamic.  · When compared to the prior echocardiogram performed on February 14, 2018, the hypokinetic apical segments have mildly improved. The overall   ejection fraction has improved.          I have reviewed the medications.    Assessment/Plan   Assessment / Plan     Hospital Problem List     * (Principal)Altered mental status    Obstructive sleep apnea syndrome (Chronic)    Overview Addendum 8/1/2016  9:56 AM by Puja Clarke     intolerant of CPAP therapy  6. 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  Prior PSG reports complex sleep apnea with frequent central apneas and intermittent      Ramona Menard respirations, on auto CPAP 4-16 with supplemental oxygen.         Type 2 diabetes mellitus (Chronic)    Hypertension (Chronic)    Overview Signed 8/1/2016 10:00 AM by Puja Clarke     16. H/O echocardiogram (V15.89) (Z92.89)   · A.  Echocardiogram of 02/03/2015 reports an ejection fraction of 60-65%, mild concentric      LVH, trace mitral regurgitation, mild tricuspid and pulmonic regurgitation and calculated      RVSP of 35 mmHg, the main pulmonary artery is also mildly dilated.         Peripheral vascular disease (Chronic)    Coronary artery disease involving native heart (Chronic)    Overview Signed 2/15/2018 12:52 PM by CINDI Fierro     1. [facility] 11-26-16: Non occlusive  disease  ·  50% mid RCA stenosis.  · 40% proximal LAD stenosis   · Normal left ventricular function         Takotsubo cardiomyopathy    Overview Signed 2/15/2018  1:42 PM by JEOVANY Greenfield     EF 35%, diastolic dysfunction, left ventricular wall segments are hypokinetic ECHO 2/14/18         Chronic systolic heart failure    Chronic respiratory failure with hypoxia (Chronic)    Infected sacral decubitus ulcers (Chronic)    Weakness         Brief Hospital Course to date:  Tamara Langston is a 62 y.o. female with hx of decubitus ulcer, systolic HF, DINA, T2DM presented with AMS and fever with concern for persistent decub infection.     Assessment & Plan:  - Acute encephalopathy, improving, suspected to be sec to persistent decub infection ID consulted and are aware, rec IV zosyn and vanc, f/u blood cultures  - Hx of DINA with chronic respiratory failure on 2L O2 and CPAP qhs.  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  - PT/OT.     DVT Prophylaxis: [facility]     CODE STATUS: Conditional Code     Disposition:TBD, will likely d/c back to NH when medically ready.    Electronically signed by Annalee Byrd MD, 03/10/18, 10:27 AM.

## 2018-03-10 NOTE — PLAN OF CARE
Problem: Patient Care Overview  Goal: Plan of Care Review  Outcome: Ongoing (interventions implemented as appropriate)   03/10/18 2581   Coping/Psychosocial   Plan of Care Reviewed With spouse;patient   OTHER   Outcome Summary Pt presents with DTPI to the R heel, POA. The area is raised and blistered, with dark red/purple discoloration. As the pt has a long history of delayed wound healing, PT recommends trying to keep the blister intact for now. The pt will benefit from MIST ultrasound to increase blood flow, decrease bioburden, and promote cellular activity. D/t the pt's evolving wound symptoms, the pt will benefit from skilled PT wound care to manage the R heel care.

## 2018-03-10 NOTE — PLAN OF CARE
Problem: Patient Care Overview  Goal: Plan of Care Review  Outcome: Ongoing (interventions implemented as appropriate)   03/10/18 0605   Coping/Psychosocial   Plan of Care Reviewed With patient   Plan of Care Review   Progress improving

## 2018-03-11 LAB
GLUCOSE BLDC GLUCOMTR-MCNC: 160 MG/DL (ref 70–130)
GLUCOSE BLDC GLUCOMTR-MCNC: 183 MG/DL (ref 70–130)
GLUCOSE BLDC GLUCOMTR-MCNC: 198 MG/DL (ref 70–130)
GLUCOSE BLDC GLUCOMTR-MCNC: 201 MG/DL (ref 70–130)

## 2018-03-11 PROCEDURE — 25010000002 VANCOMYCIN

## 2018-03-11 PROCEDURE — 25010000002 PIPERACILLIN SOD-TAZOBACTAM PER 1 G: Performed by: NURSE PRACTITIONER

## 2018-03-11 PROCEDURE — 94799 UNLISTED PULMONARY SVC/PX: CPT

## 2018-03-11 PROCEDURE — 99232 SBSQ HOSP IP/OBS MODERATE 35: CPT | Performed by: HOSPITALIST

## 2018-03-11 PROCEDURE — 25010000002 HEPARIN (PORCINE) PER 1000 UNITS: Performed by: NURSE PRACTITIONER

## 2018-03-11 PROCEDURE — 82962 GLUCOSE BLOOD TEST: CPT

## 2018-03-11 PROCEDURE — 94660 CPAP INITIATION&MGMT: CPT

## 2018-03-11 RX ADMIN — HEPARIN SODIUM 5000 UNITS: 5000 INJECTION, SOLUTION INTRAVENOUS; SUBCUTANEOUS at 21:58

## 2018-03-11 RX ADMIN — INSULIN LISPRO 2 UNITS: 100 INJECTION, SOLUTION INTRAVENOUS; SUBCUTANEOUS at 09:06

## 2018-03-11 RX ADMIN — SACUBITRIL AND VALSARTAN 1 TABLET: 24; 26 TABLET, FILM COATED ORAL at 21:56

## 2018-03-11 RX ADMIN — BUSPIRONE HYDROCHLORIDE 7.5 MG: 15 TABLET ORAL at 09:07

## 2018-03-11 RX ADMIN — Medication 1 CAPSULE: at 09:08

## 2018-03-11 RX ADMIN — FUROSEMIDE 40 MG: 40 TABLET ORAL at 09:07

## 2018-03-11 RX ADMIN — INSULIN LISPRO 2 UNITS: 100 INJECTION, SOLUTION INTRAVENOUS; SUBCUTANEOUS at 11:40

## 2018-03-11 RX ADMIN — PREGABALIN 100 MG: 100 CAPSULE ORAL at 14:26

## 2018-03-11 RX ADMIN — OXYCODONE HYDROCHLORIDE AND ACETAMINOPHEN 1 TABLET: 10; 325 TABLET ORAL at 09:06

## 2018-03-11 RX ADMIN — CLOTRIMAZOLE 10 MG: 10 LOZENGE ORAL at 21:57

## 2018-03-11 RX ADMIN — POTASSIUM CHLORIDE 20 MEQ: 750 CAPSULE, EXTENDED RELEASE ORAL at 09:07

## 2018-03-11 RX ADMIN — TAZOBACTAM SODIUM AND PIPERACILLIN SODIUM 4.5 G: 500; 4 INJECTION, SOLUTION INTRAVENOUS at 03:31

## 2018-03-11 RX ADMIN — ASPIRIN 81 MG: 81 TABLET, COATED ORAL at 09:07

## 2018-03-11 RX ADMIN — PREGABALIN 100 MG: 100 CAPSULE ORAL at 21:56

## 2018-03-11 RX ADMIN — CLOPIDOGREL BISULFATE 75 MG: 75 TABLET ORAL at 09:07

## 2018-03-11 RX ADMIN — INSULIN LISPRO 2 UNITS: 100 INJECTION, SOLUTION INTRAVENOUS; SUBCUTANEOUS at 17:30

## 2018-03-11 RX ADMIN — ATORVASTATIN CALCIUM 80 MG: 40 TABLET, FILM COATED ORAL at 21:56

## 2018-03-11 RX ADMIN — CLOTRIMAZOLE 10 MG: 10 LOZENGE ORAL at 16:18

## 2018-03-11 RX ADMIN — PANTOPRAZOLE SODIUM 40 MG: 40 TABLET, DELAYED RELEASE ORAL at 06:41

## 2018-03-11 RX ADMIN — CLOTRIMAZOLE 10 MG: 10 LOZENGE ORAL at 09:07

## 2018-03-11 RX ADMIN — FLUTICASONE PROPIONATE 1 SPRAY: 50 SPRAY, METERED NASAL at 09:08

## 2018-03-11 RX ADMIN — BUSPIRONE HYDROCHLORIDE 7.5 MG: 15 TABLET ORAL at 21:57

## 2018-03-11 RX ADMIN — CARVEDILOL 6.25 MG: 6.25 TABLET, FILM COATED ORAL at 09:07

## 2018-03-11 RX ADMIN — TAZOBACTAM SODIUM AND PIPERACILLIN SODIUM 4.5 G: 500; 4 INJECTION, SOLUTION INTRAVENOUS at 10:33

## 2018-03-11 RX ADMIN — OXYCODONE HYDROCHLORIDE AND ACETAMINOPHEN 1 TABLET: 10; 325 TABLET ORAL at 14:26

## 2018-03-11 RX ADMIN — OXYCODONE HYDROCHLORIDE AND ACETAMINOPHEN 1 TABLET: 10; 325 TABLET ORAL at 22:12

## 2018-03-11 RX ADMIN — CETIRIZINE HYDROCHLORIDE 10 MG: 10 TABLET, FILM COATED ORAL at 21:58

## 2018-03-11 RX ADMIN — FENTANYL 1 PATCH: 75 PATCH TRANSDERMAL at 09:09

## 2018-03-11 RX ADMIN — CARVEDILOL 6.25 MG: 6.25 TABLET, FILM COATED ORAL at 21:56

## 2018-03-11 RX ADMIN — PREGABALIN 100 MG: 100 CAPSULE ORAL at 06:41

## 2018-03-11 RX ADMIN — CLONAZEPAM 0.25 MG: 0.5 TABLET ORAL at 10:23

## 2018-03-11 RX ADMIN — INSULIN LISPRO 3 UNITS: 100 INJECTION, SOLUTION INTRAVENOUS; SUBCUTANEOUS at 21:59

## 2018-03-11 RX ADMIN — TAZOBACTAM SODIUM AND PIPERACILLIN SODIUM 4.5 G: 500; 4 INJECTION, SOLUTION INTRAVENOUS at 17:33

## 2018-03-11 RX ADMIN — NYSTATIN: 100000 POWDER TOPICAL at 09:00

## 2018-03-11 RX ADMIN — VANCOMYCIN HYDROCHLORIDE 1250 MG: 10 INJECTION, POWDER, LYOPHILIZED, FOR SOLUTION INTRAVENOUS at 22:01

## 2018-03-11 RX ADMIN — NYSTATIN: 100000 POWDER TOPICAL at 22:00

## 2018-03-11 RX ADMIN — HEPARIN SODIUM 5000 UNITS: 5000 INJECTION, SOLUTION INTRAVENOUS; SUBCUTANEOUS at 09:08

## 2018-03-11 RX ADMIN — LEVOTHYROXINE SODIUM 112 MCG: 112 TABLET ORAL at 06:41

## 2018-03-11 NOTE — PROGRESS NOTES
[facility] Progress Note    Admission Date: 3/8/2018    Tamara Langston  1955  [id number]    Date: 3/11/2018    Antibiotics:  Anti-Infectives     Ordered     Dose/Rate Route Frequency Start Stop    03/10/18 2136  vancomycin 1250 mg/250 mL 0.9% NS IVPB ([facility])     Ordering Provider:  Abelardo Whittaker RPH    1,250 mg  over 90 Minutes Intravenous Every 24 Hours Scheduled 03/10/18 2230 03/17/18 2059 03/08/18 2314  piperacillin-tazobactam (ZOSYN) 4.5 g in iso-osmotic dextrose 100 mL IVPB (premix)     Ordering Provider:  JEOVANY Dennis    4.5 g  over 4 Hours Intravenous Every 8 Hours 03/09/18 0200 03/19/18 0159 03/08/18 2314  Pharmacy to dose vancomycin     Ordering Provider:  JEOVANY Dennis     Does not apply Continuous PRN 03/08/18 2314 03/19/18 0013    03/08/18 1916  vancomycin IVPB 2000 mg in 0.9% Sodium Chloride (premix) 500 mL     Ordering Provider:  Juan Palacios MD    20 mg/kg × 105 kg  over 2 Hours Intravenous Once 03/08/18 2000 03/08/18 2254 03/08/18 1916  piperacillin-tazobactam (ZOSYN) 4.5 g in iso-osmotic dextrose 100 mL IVPB (premix)     Ordering Provider:  Juan Palacios MD    4.5 g  over 30 Minutes Intravenous Once 03/08/18 1917 03/08/18 2045          Reason for Consultation:   Fever, probable uti     History of present illness:    Patient is a 62-year-old female with hypertension, hyperlipidemia, type 2 diabetes mellitus, morbid obesity admitted 2/13 to 2/27 after presenting with confusion and  fever.  She had an unstageable  sacral deep tissue injury with associated infection which had progressed over several months.  Over the course of a year sbe became  quite debilitated with limited mobility following left TMA.   On  admission, she was septic with fever, leukocytosis and acute on chronic kidney injury  Hospital course complicated by CHF, respiratory distress  and ELIF  She was diagnosed with NSTEMI and stents to LAD and diagonal arteries EF 34% and Dr Zamorano diagnosed  Takotsubo myopathy  Her sacral decub improved with mist therapy and sequential debridement . Wound culture grew enterococcus, pseudomonas, and coag negative staph    Sacral MRI showed no evidence of abscess or OM.  She was denied [facility] and transferred to [facility] on zosyn and vancomycin There was a discrepancy in notes about duration of IV antibiotics  ie 3/2 or 3/12   Zosyn and vanc were stopped 3/2 Her picc was left in place   While at the center she evidently made progress with PT and walked up to 100'  She had fever to 102 followed by an episode of unresponsiveness then confusion  Transferred bact to [facility] with T100.3   UA with mild pyuria; culture pending   Wounds include a right heel DTPI and right ischial stage which measures 4.5x3.5x4.0cm  There is  undermining 6-12 o'clock; wound bed is beefy red, moist, with 10% slough noted in wound bed. No purulent drainage noted  On zosyn and vanc her fever appears to be improving  Cultures pending    3/11/18 alert, no new c/o  Afebrile on vanc and zosyn       Review of Systems:  Constitutional-- + Fever, chills + sweats.  Appetite good per patient with good oral intake She reports continued progress with PT  HEENT-- No new vision, hearing or throat complaints.  No  oral ulcers or sore throat.  Denies odynophagia or dysphagia. No headache, photophobia or neck stiffness.  CV-- No chest pain, palpitation or syncope  Resp-- No SOB/cough/Hemoptysis  GI- + diarrhea- intermittent.  No hematochezia, melena, or hematemesis. Denies jaundice or chronic liver disease. No odynophagia or dysphagia  -- No dysuria, hematuria, or flank pain.  Denies hesitancy, urgency or flank pain.  Lymph- no swollen lymph nodes in neck/axilla or groin.   Heme- No active bruising or bleeding; no Hx of DVT or PE.  MS-- no swelling or pain in the bones or joints of arms/legs.  No new back pain.  Neuro-- No acute focal weakness or numbness in the arms or legs.  No seizures.  Skin--No rashes or  lesions       PE:  Vital Signs  Temp  Min: 97.9 °F (36.6 °C)  Max: 98.2 °F (36.8 °C)  BP  Min: 101/53  Max: 126/68  Pulse  Min: 68  Max: 76  Resp  Min: 14  Max: 16  SpO2  Min: 90 %  Max: 100 %  GENERAL: Awake and alert, in no acute distress; appears to recall medical history accurately.  Chronically ill-appearing with limited mobility in the bed  HEENT: Normocephalic, atraumatic.  PERRL. EOMI.  slera injected. No icterus. Oropharynx with mild thrush, no exudate. No evidence of peridontal disease.    NECK: Supple without nuchal rigidity  LYMPH: No cervical, axillary or inguinal lymphadenopathy. No neck masses  HEART: RRR; СЕРГЕЙ III/VI.  Bilateral lower extremity trace edema.  faint pedal pulses.  LUNGS: Clear to auscultation bilaterally without wheezing, rales, rhonchi.  Normal effort with supplemental O2 present.   ABDOMEN: Morbidly obese, Soft, nontender, nondistended. Positive bowel sounds. No rebound or guarding.   EXT:  L foot TMA well healed ; right heel with unstageable necrotic ulcer  R UE picc site w/o erythema, drainage or swelling  : No weiss catheter.  MSK: FROM without joint effusions noted    SKIN:  mild groin yeast rash   Her sacral ulceration as above; appears clean  NEURO: Oriented to PPT. No focal deficits.   PSYCHIATRIC: Normal insight and judgement. Cooperative with PE        Laboratory Data      Results from last 7 days  Lab Units 03/08/18  1842   WBC 10*3/mm3 15.94*   HEMOGLOBIN g/dL 10.2*   HEMATOCRIT % 33.4*   PLATELETS 10*3/mm3 192       Results from last 7 days  Lab Units 03/10/18  2031   SODIUM mmol/L 140   POTASSIUM mmol/L 5.1   CHLORIDE mmol/L 103   CO2 mmol/L 30.0   BUN mg/dL 28*   CREATININE mg/dL 1.30   GLUCOSE mg/dL 181*   CALCIUM mg/dL 9.5       Results from last 7 days  Lab Units 03/08/18  1842   ALK PHOS U/L 78   BILIRUBIN mg/dL 0.3   ALT (SGPT) U/L 26   AST (SGOT) U/L 32       Results from last 7 days  Lab Units 03/10/18  0719   SED RATE mm/hr 75*       Results from last 7  days  Lab Units 03/10/18  0719   CRP mg/dL 15.10*       Estimated Creatinine Clearance: 53.8 mL/min (by C-G formula based on SCr of 1.3 mg/dL).      Microbiology:      Radiology:  Imaging Results (last 72 hours)     Procedure Component Value Units Date/Time    CT Head Without Contrast [[id number]] Collected:  03/08/18 2116     Updated:  03/08/18 2251    Narrative:       EXAM:    CT Head Without Intravenous Contrast    CLINICAL HISTORY:    62 years old, female; Pressure ulcer of sacral region, unspecified stage;   Transient alteration of awareness; Fever, unspecified; Signs and symptoms;   Other: Patient found unresponsive; Additional info: Pt found unresponsive    TECHNIQUE:    Axial computed tomography images of the head/brain without intravenous   contrast.  All CT scans at this facility use one or more dose reduction   techniques, viz.: automated exposure control; ma/kV adjustment per patient size   (including targeted exams where dose is matched to indication; i.e. head); or   iterative reconstruction technique.    COMPARISON:    No relevant prior studies available.    FINDINGS:    Brain:  Nonspecific calcification left occipital lobe.  No hemorrhage.  No   significant white matter disease.    Ventricles:  Unremarkable.  No ventriculomegaly.    Bones/joints:  Unremarkable.  No acute fracture.    Soft tissues:  Unremarkable.    Sinuses:  Mild sphenoid sinusitis.    Mastoid air cells:  Unremarkable as visualized.      Impression:         No acute findings.    THIS DOCUMENT HAS BEEN ELECTRONICALLY SIGNED BY NOEL LYLE MD    XR Chest 1 View [[id number]] Collected:  03/08/18 1813     Updated:  03/08/18 1912    Narrative:       EXAM:    XR Chest, 1 View    CLINICAL HISTORY:    62 years old, female; Signs and symptoms; Other: Weak/dizzy/ams; Additional   info: Weak/dizzy/ams triage protocol    TECHNIQUE:    Frontal view of the chest.    COMPARISON:    CR - XR CHEST 1 VW 2018-02-19 04:36    FINDINGS:    Lungs:   Unremarkable.  No consolidation.    Pleural space:  Unremarkable.  No pneumothorax.    Heart:  The heart demonstrates mild diffuse enlargement.    Mediastinum:  Unremarkable.    Bones/joints:  Unremarkable.    Other findings:  The patient is rotated to the left.      Impression:         No acute findings.    THIS DOCUMENT HAS BEEN ELECTRONICALLY SIGNED BY NOEL LYLE MD          I personally reviewed the radiographic studies     Impression:   --Sepsis with fever, leukocytosis and acute kidney injury  Possible foci of infection include urinary tract infection, picc line infection or the right heel decubitus     Stage 4 sacral  Wound which does not appear to be the source of sepsis  Recent MRI w/o evidence of osteomyelitis      Right heel decubitus which is unstageable     Mild pyuria     Diarrhea- cdiff negative     Recent Non-ST segment elevation MI with increase in troponin     Cardiomyopathy     Acute on chronic kidney disease     Type 2 diabetes mellitus     Hypertension and Hyperlipidemia     COPD with home oxygen use and acute hypoxic respiratory failure at admission  Morbid obesity  Chronic debility       PLAN/RECOMMENDATIONS:   Thank you for asking us to see Tamara Langston, I recommend the following:     Agree with zosyn and vancomycin  Topical treatment of thrush  Stool culture  Consider MRI Right heel depending on how wound responds to mist  Probiotic  Follow inflammatory markers        Oksana Cruz MD  3/11/2018

## 2018-03-11 NOTE — PLAN OF CARE
Problem: Fall Risk (Adult)  Goal: Identify Related Risk Factors and Signs and Symptoms  Outcome: Ongoing (interventions implemented as appropriate)    Goal: Absence of Fall  Outcome: Ongoing (interventions implemented as appropriate)      Problem: Patient Care Overview  Goal: Plan of Care Review  Outcome: Ongoing (interventions implemented as appropriate)   03/11/18 1594   Coping/Psychosocial   Plan of Care Reviewed With patient   Plan of Care Review   Progress improving   OTHER   Outcome Summary Vital signs wnl. Oxgyen level greater than 90% on 2 liters. Dressing changed on right coccyx. Pain controlled.        Problem: Wound (Includes Pressure Injury) (Adult)  Goal: Signs and Symptoms of Listed Potential Problems Will be Absent, Minimized or Managed (Wound)  Outcome: Ongoing (interventions implemented as appropriate)      Problem: Sepsis/Septic Shock (Adult)  Goal: Signs and Symptoms of Listed Potential Problems Will be Absent, Minimized or Managed (Sepsis/Septic Shock)  Outcome: Ongoing (interventions implemented as appropriate)

## 2018-03-11 NOTE — PLAN OF CARE
Problem: Patient Care Overview  Goal: Plan of Care Review  Outcome: Ongoing (interventions implemented as appropriate)   03/11/18 0031   Coping/Psychosocial   Plan of Care Reviewed With patient   Plan of Care Review   Progress no change

## 2018-03-11 NOTE — PROGRESS NOTES
"Pharmacy Consult-Vancomycin Dosing  Tamara Langston is a  62 y.o. female receiving vancomycin therapy.     Indication: Skin and soft tissue infection  Consulting Provider: Hospitalist   ID Consult: ID (Anthony)    Goal Trough: 10-15 mcg/mL    Labs    Results from last 7 days   Lab Units 03/10/18  2031 03/08/18  1842   BUN mg/dL 28* 32*   CREATININE mg/dL 1.30 1.50*       Results from last 7 days   Lab Units 03/08/18  1842   WBC 10*3/mm3 15.94*     Ht - 167.6 cm (66\")  Wt - 106 kg (234 lb)  Estimated Creatinine Clearance: 53.8 mL/min (by C-G formula based on SCr of 1.3 mg/dL).  Evaluation of Level  Lab Results   Component Value Date    [facility] 17.70 03/10/2018   Assessment/Plan:  1. Dose of vancomycin changed to 1250mg iv every 24 hours. No further levels ordered at this time.  2. Monitor renal function and s/s of toxicity.  Joey Alvarado [facility]  3/11/2018  8:47 AM        "

## 2018-03-11 NOTE — PROGRESS NOTES
[facility]  PROGRESS NOTE    Patient Name: Tamara Langston  : 1955  MRN: [id number]    Date of Admission: 3/8/2018  Length of Stay: 3  Primary Care Physician: Harinder Aranda MD    Subjective   Subjective     CC:AMS    HPI:  Bowel movement reported today.  Incentive spirometer needed.        Review of Systems  Gen- No fevers, chills  CV- No chest pain, palpitations  Resp- No cough, dyspnea  GI- No N/V/D, abd pain    Otherwise ROS is negative except as mentioned in the HPI.    Objective   Objective     Vital Signs:   Temp:  [97.9 °F (36.6 °C)-98.2 °F (36.8 °C)] 98.2 °F (36.8 °C)  Heart Rate:  [67-76] 67  Resp:  [14-16] 16  BP: (101-126)/(49-68) 120/63  FiO2 (%):  [30 %] 30 %        Physical Exam:  Constitutional: chronically ill appearing lady, in no acute distress, awake, alert  HENT: NCAT, mucous membranes moist  Respiratory: Clear to auscultation bilaterally, respiratory effort normal   Cardiovascular: RRR, no murmurs, rubs, or gallops, palpable pedal pulses bilaterally  Gastrointestinal: obese, positive bowel sounds, soft, nontender, nondistended  Musculoskeletal: bilateral LE edema  Psychiatric: Appropriate affect, cooperative  Neurologic: Oriented x 3, strength symmetric in all extremities, Cranial Nerves grossly intact to confrontation, speech clear  Skin: stage 4 decub ulcer,see PT WOC pics    Results Reviewed:  I have personally reviewed current lab, radiology, and data and agree.      Results from last 7 days  Lab Units 18  1842   WBC 10*3/mm3 15.94*   HEMOGLOBIN g/dL 10.2*   HEMATOCRIT % 33.4*   PLATELETS 10*3/mm3 192       Results from last 7 days  Lab Units 03/10/18  2031 03/08/18  1842   SODIUM mmol/L 140 139   POTASSIUM mmol/L 5.1 5.2   CHLORIDE mmol/L 103 105   CO2 mmol/L 30.0 28.0   BUN mg/dL 28* 32*   CREATININE mg/dL 1.30 1.50*   GLUCOSE mg/dL 181* 176*   CALCIUM mg/dL 9.5 9.3   ALT (SGPT) U/L  --  26   AST (SGOT) U/L  --  32      Estimated Creatinine Clearance: 53.8 mL/min (by C-G formula based on SCr of 1.3 mg/dL).  BNP   Date Value Ref Range Status   03/08/2018 900.0 (H) 0.0 - 100.0 pg/mL Final     Comment:     Results may be falsely decreased if patient taking Biotin.     No results found for: PHART    Microbiology Results Abnormal     Procedure Component Value - Date/Time    Blood Culture - Blood, [[id number]]  (Normal) Collected:  03/08/18 1935    Lab Status:  Preliminary result Specimen:  Blood from Arm, Right Updated:  03/11/18 0746     Blood Culture No growth at 2 days     Gram Stain Result Few (2+) WBCs seen    Blood Culture - Blood, [[id number]]  (Normal) Collected:  03/08/18 1930    Lab Status:  Preliminary result Specimen:  Blood from Arm, Right Updated:  03/10/18 2001     Blood Culture No growth at 2 days    Urine Culture - Urine, Urine, Clean Catch [[id number]] Collected:  03/08/18 2002    Lab Status:  Final result Specimen:  Urine from Urine, Clean Catch Updated:  03/10/18 1110     Urine Culture --      10,000-20,000 CFU/mL Normal Urogenital Patience    Clostridium Difficile Toxin - Stool, Per Rectum [[id number]] Collected:  03/09/18 1027    Lab Status:  Final result Specimen:  Stool from Per Rectum Updated:  03/09/18 1203    Narrative:       The following orders were created for panel order Clostridium Difficile Toxin - Stool, Per Rectum.  Procedure                               Abnormality         Status                     ---------                               -----------         ------                     Clostridium Difficile To...[[id number]]  Normal              Final result                 Please view results for these tests on the individual orders.    Clostridium Difficile Toxin, PCR - Stool, Per Rectum [[id number]]  (Normal) Collected:  03/09/18 1027    Lab Status:  Final result Specimen:  Stool from Per Rectum Updated:  03/09/18 1203     C. Difficile Toxins by PCR Not Detected    Narrative:         Performance  characteristics of test not established for patients <2 years of age.  Negative for Toxigenic C. Difficile        Results for orders placed during the hospital encounter of 02/13/18   Adult Transthoracic Echo Limited W/ Cont if Necessary Per Protocol    Narrative · This was a limited echocardiogram performed to evaluate the left   ventricular ejection fraction  · Left ventricular systolic function is normal. Estimated EF appears to be   in the range of 51 - 55%.  · The following left ventricular wall segments are hypokinetic: mid   anterior, apical anterior, apical lateral, apical inferior, apical septal   and apex hypokinetic. The basal segments are hyperdynamic.  · When compared to the prior echocardiogram performed on February 14, 2018, the hypokinetic apical segments have mildly improved. The overall   ejection fraction has improved.          I have reviewed the medications.    Assessment/Plan   Assessment / Plan     Hospital Problem List     * (Principal)Altered mental status    Obstructive sleep apnea syndrome (Chronic)    Overview Addendum 8/1/2016  9:56 AM by Puja Clarke     intolerant of CPAP therapy  6. 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  Prior PSG reports complex sleep apnea with frequent central apneas and intermittent      Ramona Menard respirations, on auto CPAP 4-16 with supplemental oxygen.         Type 2 diabetes mellitus (Chronic)    Hypertension (Chronic)    Overview Signed 8/1/2016 10:00 AM by Puja Clarke     16. H/O echocardiogram (V15.89) (Z92.89)   · A.  Echocardiogram of 02/03/2015 reports an ejection fraction of 60-65%, mild concentric      LVH, trace mitral regurgitation, mild tricuspid and pulmonic regurgitation and calculated      RVSP of 35 mmHg, the main pulmonary artery is also mildly dilated.         Peripheral vascular disease (Chronic)    Coronary artery disease involving native heart (Chronic)    Overview Signed 2/15/2018 12:52 PM by CINDI Fierro      1. [facility] 11-26-16: Non occlusive disease  ·  50% mid RCA stenosis.  · 40% proximal LAD stenosis   · Normal left ventricular function         Takotsubo cardiomyopathy    Overview Signed 2/15/2018  1:42 PM by JEOVANY Greenfield     EF 35%, diastolic dysfunction, left ventricular wall segments are hypokinetic ECHO 2/14/18         Chronic systolic heart failure    Chronic respiratory failure with hypoxia (Chronic)    Infected sacral decubitus ulcers (Chronic)    Weakness         Brief Hospital Course to date:  Tamara Langston is a 62 y.o. female with hx of decubitus ulcer, systolic HF, DINA, T2DM presented with AMS and fever with concern for persistent decub infection.     Assessment & Plan:  - Acute encephalopathy, improving, suspected to be sec to persistent decub infection ID consulted and are aware, rec IV zosyn and vanc, f/u blood cultures  - Hx of DINA with chronic respiratory failure on 2L O2 and CPAP qhs.  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  Sleep Apnea  - probably mixed central + DINA  - needs BiPAP at night - AutoBiPAP ordered  Chronic Pain  - on Fentanyl and Percocet at home      DVT Prophylaxis: SQH     CODE STATUS: Conditional Code     Disposition:TBD, will likely d/c back to NH when medically ready.    Electronically signed by Omari Ross MD, 03/11/18, 4:42 PM.

## 2018-03-12 LAB
GLUCOSE BLDC GLUCOMTR-MCNC: 151 MG/DL (ref 70–130)
GLUCOSE BLDC GLUCOMTR-MCNC: 162 MG/DL (ref 70–130)
GLUCOSE BLDC GLUCOMTR-MCNC: 194 MG/DL (ref 70–130)
GLUCOSE BLDC GLUCOMTR-MCNC: 214 MG/DL (ref 70–130)

## 2018-03-12 PROCEDURE — 25010000002 PIPERACILLIN SOD-TAZOBACTAM PER 1 G: Performed by: NURSE PRACTITIONER

## 2018-03-12 PROCEDURE — 94799 UNLISTED PULMONARY SVC/PX: CPT

## 2018-03-12 PROCEDURE — 82962 GLUCOSE BLOOD TEST: CPT

## 2018-03-12 PROCEDURE — 99232 SBSQ HOSP IP/OBS MODERATE 35: CPT | Performed by: HOSPITALIST

## 2018-03-12 PROCEDURE — 94660 CPAP INITIATION&MGMT: CPT

## 2018-03-12 PROCEDURE — 97530 THERAPEUTIC ACTIVITIES: CPT

## 2018-03-12 PROCEDURE — 97110 THERAPEUTIC EXERCISES: CPT

## 2018-03-12 PROCEDURE — 25010000002 HEPARIN (PORCINE) PER 1000 UNITS: Performed by: NURSE PRACTITIONER

## 2018-03-12 PROCEDURE — 97116 GAIT TRAINING THERAPY: CPT

## 2018-03-12 PROCEDURE — 25010000002 VANCOMYCIN

## 2018-03-12 PROCEDURE — 97610 LOW FREQUENCY NON-THERMAL US: CPT

## 2018-03-12 RX ADMIN — OXYCODONE HYDROCHLORIDE AND ACETAMINOPHEN 1 TABLET: 10; 325 TABLET ORAL at 14:43

## 2018-03-12 RX ADMIN — OXYCODONE HYDROCHLORIDE AND ACETAMINOPHEN 1 TABLET: 10; 325 TABLET ORAL at 08:37

## 2018-03-12 RX ADMIN — VANCOMYCIN HYDROCHLORIDE 1250 MG: 10 INJECTION, POWDER, LYOPHILIZED, FOR SOLUTION INTRAVENOUS at 22:09

## 2018-03-12 RX ADMIN — BUSPIRONE HYDROCHLORIDE 7.5 MG: 15 TABLET ORAL at 21:57

## 2018-03-12 RX ADMIN — POTASSIUM CHLORIDE 20 MEQ: 750 CAPSULE, EXTENDED RELEASE ORAL at 08:37

## 2018-03-12 RX ADMIN — HEPARIN SODIUM 5000 UNITS: 5000 INJECTION, SOLUTION INTRAVENOUS; SUBCUTANEOUS at 21:59

## 2018-03-12 RX ADMIN — PREGABALIN 100 MG: 100 CAPSULE ORAL at 05:58

## 2018-03-12 RX ADMIN — FUROSEMIDE 40 MG: 40 TABLET ORAL at 08:37

## 2018-03-12 RX ADMIN — CARVEDILOL 6.25 MG: 6.25 TABLET, FILM COATED ORAL at 08:39

## 2018-03-12 RX ADMIN — PANTOPRAZOLE SODIUM 40 MG: 40 TABLET, DELAYED RELEASE ORAL at 05:58

## 2018-03-12 RX ADMIN — CLOTRIMAZOLE 10 MG: 10 LOZENGE ORAL at 21:57

## 2018-03-12 RX ADMIN — CETIRIZINE HYDROCHLORIDE 10 MG: 10 TABLET, FILM COATED ORAL at 21:57

## 2018-03-12 RX ADMIN — PREGABALIN 100 MG: 100 CAPSULE ORAL at 14:39

## 2018-03-12 RX ADMIN — ATORVASTATIN CALCIUM 80 MG: 40 TABLET, FILM COATED ORAL at 21:56

## 2018-03-12 RX ADMIN — INSULIN LISPRO 2 UNITS: 100 INJECTION, SOLUTION INTRAVENOUS; SUBCUTANEOUS at 11:59

## 2018-03-12 RX ADMIN — CARVEDILOL 6.25 MG: 6.25 TABLET, FILM COATED ORAL at 21:57

## 2018-03-12 RX ADMIN — NYSTATIN: 100000 POWDER TOPICAL at 22:02

## 2018-03-12 RX ADMIN — PREGABALIN 100 MG: 100 CAPSULE ORAL at 21:57

## 2018-03-12 RX ADMIN — OXYCODONE HYDROCHLORIDE AND ACETAMINOPHEN 1 TABLET: 10; 325 TABLET ORAL at 21:58

## 2018-03-12 RX ADMIN — Medication 1 CAPSULE: at 08:38

## 2018-03-12 RX ADMIN — TAZOBACTAM SODIUM AND PIPERACILLIN SODIUM 4.5 G: 500; 4 INJECTION, SOLUTION INTRAVENOUS at 17:24

## 2018-03-12 RX ADMIN — BUSPIRONE HYDROCHLORIDE 7.5 MG: 15 TABLET ORAL at 08:38

## 2018-03-12 RX ADMIN — TAZOBACTAM SODIUM AND PIPERACILLIN SODIUM 4.5 G: 500; 4 INJECTION, SOLUTION INTRAVENOUS at 11:16

## 2018-03-12 RX ADMIN — TAZOBACTAM SODIUM AND PIPERACILLIN SODIUM 4.5 G: 500; 4 INJECTION, SOLUTION INTRAVENOUS at 02:20

## 2018-03-12 RX ADMIN — SACUBITRIL AND VALSARTAN 1 TABLET: 24; 26 TABLET, FILM COATED ORAL at 21:57

## 2018-03-12 RX ADMIN — INSULIN LISPRO 2 UNITS: 100 INJECTION, SOLUTION INTRAVENOUS; SUBCUTANEOUS at 17:04

## 2018-03-12 RX ADMIN — CLOTRIMAZOLE 10 MG: 10 LOZENGE ORAL at 16:12

## 2018-03-12 RX ADMIN — LEVOTHYROXINE SODIUM 112 MCG: 112 TABLET ORAL at 05:58

## 2018-03-12 RX ADMIN — ASPIRIN 81 MG: 81 TABLET, COATED ORAL at 08:37

## 2018-03-12 RX ADMIN — HEPARIN SODIUM 5000 UNITS: 5000 INJECTION, SOLUTION INTRAVENOUS; SUBCUTANEOUS at 08:37

## 2018-03-12 RX ADMIN — INSULIN LISPRO 3 UNITS: 100 INJECTION, SOLUTION INTRAVENOUS; SUBCUTANEOUS at 22:00

## 2018-03-12 RX ADMIN — SACUBITRIL AND VALSARTAN 1 TABLET: 24; 26 TABLET, FILM COATED ORAL at 08:40

## 2018-03-12 RX ADMIN — CLOTRIMAZOLE 10 MG: 10 LOZENGE ORAL at 08:40

## 2018-03-12 RX ADMIN — CLOPIDOGREL BISULFATE 75 MG: 75 TABLET ORAL at 08:38

## 2018-03-12 RX ADMIN — INSULIN LISPRO 2 UNITS: 100 INJECTION, SOLUTION INTRAVENOUS; SUBCUTANEOUS at 08:36

## 2018-03-12 NOTE — PROGRESS NOTES
Adult Nutrition  Assessment/PES    Patient Name:  Tamara Langston  YOB: 1955  MRN: [id number]  Admit Date:  3/8/2018    Assessment Date:  3/12/2018    Comments:            Adult Nutrition Assessment     Row Name 03/12/18 1007       Nutrition Prescription PO    Current PO Diet Regular    Supplement Other (comment)   PREMIER PROTEIN    Supplement Frequency 3 times a day    Common Modifiers Cardiac;Consistent Carbohydrate    Row Name 03/12/18 1006       Reason for Assessment    Reason For Assessment follow-up protocol          Problem/Interventions:          Problem 2     Row Name 03/12/18 1007       Nutrition Diagnoses Problem 2    Problem 2 Nutrition Appropriate for Condition at this Time    Signs/Symptoms (evidenced by) PO Intake                        Education/Evaluation     Row Name 03/12/18 1008       Monitor/Evaluation    Monitor Per protocol        Electronically signed by:  Nano Lawrence RD  03/12/18 10:09 AM

## 2018-03-12 NOTE — NURSING NOTE
62 year old female admitted with altered mental status secondary to decubitus ulcer. Receiving IV abx. Patient had hx of Takotsubo CM with last hospitalization but that has resolved. ECHO 2/19/18 showed   · This was a limited echocardiogram performed to evaluate the left ventricular ejection fraction  · Left ventricular systolic function is normal. Estimated EF appears to be in the range of 51 - 55%.  · The following left ventricular wall segments are hypokinetic: mid anterior, apical anterior, apical lateral, apical inferior, apical septal and apex hypokinetic. The basal segments are hyperdynamic.  · When compared to the prior echocardiogram performed on February 14, 2018, the hypokinetic apical segments have mildly improved. The overall ejection fraction has improved.     BP under good control. Patient to follow up prn in [facility].

## 2018-03-12 NOTE — PROGRESS NOTES
Continued Stay Note  [facility]     Patient Name: Tamara Langston  MRN: [id number]  Today's Date: 3/12/2018    Admit Date: 3/8/2018          Discharge Plan     Row Name 03/12/18 1141       Plan    Plan LTACH vs [facility] STR    Patient/Family in Agreement with Plan yes    Plan Comments Consult received Sunday from ID to ask if pt would qualify for 2 weeks at [facility] for Mist therapy and IV antibiotics. Pt has not had an ICU stay this admit. DIscussed with pt and she is agreeable to [facility] and states if she does not qualify, she would return to [facility] for STR. Called Rebekah at [facility] and she will review chart via Epic and get back to CM. Will cont to follow. Leny Moran RN, CM ext. [phone]              Discharge Codes    No documentation.           Leny Moran

## 2018-03-12 NOTE — PROGRESS NOTES
[facility] Progress Note    Admission Date: 3/8/2018    Tamara Langston  1955  [id number]    Date: 3/12/2018    Antibiotics:  Anti-Infectives     Ordered     Dose/Rate Route Frequency Start Stop    03/10/18 2136  vancomycin 1250 mg/250 mL 0.9% NS IVPB ([facility])     Ordering Provider:  Abelardo Whittaker RPH    1,250 mg  over 90 Minutes Intravenous Every 24 Hours Scheduled 03/10/18 2230 03/17/18 2059 03/08/18 2314  piperacillin-tazobactam (ZOSYN) 4.5 g in iso-osmotic dextrose 100 mL IVPB (premix)     Ordering Provider:  JEOVANY Dennis    4.5 g  over 4 Hours Intravenous Every 8 Hours 03/09/18 0200 03/19/18 0159 03/08/18 2314  Pharmacy to dose vancomycin     Ordering Provider:  JEOVANY Dennis     Does not apply Continuous PRN 03/08/18 2314 03/19/18 0013    03/08/18 1916  vancomycin IVPB 2000 mg in 0.9% Sodium Chloride (premix) 500 mL     Ordering Provider:  Juan Palacios MD    20 mg/kg × 105 kg  over 2 Hours Intravenous Once 03/08/18 2000 03/08/18 2254 03/08/18 1916  piperacillin-tazobactam (ZOSYN) 4.5 g in iso-osmotic dextrose 100 mL IVPB (premix)     Ordering Provider:  Juan Palacios MD    4.5 g  over 30 Minutes Intravenous Once 03/08/18 1917 03/08/18 2045          Reason for Consultation:   Fever, probable uti     History of present illness:    Patient is a 62-year-old female with hypertension, hyperlipidemia, type 2 diabetes mellitus, morbid obesity admitted 2/13 to 2/27 after presenting with confusion and  fever.  She had an unstageable  sacral deep tissue injury with associated infection which had progressed over several months.  Over the course of a year sbe became  quite debilitated with limited mobility following left TMA.   On  admission, she was septic with fever, leukocytosis and acute on chronic kidney injury  Hospital course complicated by CHF, respiratory distress  and ELIF  She was diagnosed with NSTEMI and stents to LAD and diagonal arteries EF 34% and Dr Zamorano diagnosed  Takotsubo myopathy  Her sacral decub improved with mist therapy and sequential debridement . Wound culture grew enterococcus, pseudomonas, and coag negative staph    Sacral MRI showed no evidence of abscess or OM.  She was denied [facility] and transferred to [facility] on zosyn and vancomycin There was a discrepancy in notes about duration of IV antibiotics  ie 3/2 or 3/12   Zosyn and vanc were stopped 3/2 Her picc was left in place   While at the center she evidently made progress with PT and walked up to 100'  She had fever to 102 followed by an episode of unresponsiveness then confusion  Transferred bact to [facility] with T100.3   UA with mild pyuria; culture pending   Wounds include a right heel DTPI and right ischial stage which measures 4.5x3.5x4.0cm  There is  undermining 6-12 o'clock; wound bed is beefy red, moist, with 10% slough noted in wound bed. No purulent drainage noted  On zosyn and vanc her fever appears to be improving  Cultures pending    3/11/18 alert, no new c/o  Afebrile on vanc and zosyn       Review of Systems:  Constitutional-- + Fever, chills + sweats.  Appetite good per patient with good oral intake She reports continued progress with PT  HEENT-- No new vision, hearing or throat complaints.  No  oral ulcers or sore throat.  Denies odynophagia or dysphagia. No headache, photophobia or neck stiffness.  CV-- No chest pain, palpitation or syncope  Resp-- No SOB/cough/Hemoptysis  GI- + diarrhea- intermittent.  No hematochezia, melena, or hematemesis. Denies jaundice or chronic liver disease. No odynophagia or dysphagia  -- No dysuria, hematuria, or flank pain.  Denies hesitancy, urgency or flank pain.  Lymph- no swollen lymph nodes in neck/axilla or groin.   Heme- No active bruising or bleeding; no Hx of DVT or PE.  MS-- no swelling or pain in the bones or joints of arms/legs.  No new back pain.  Neuro-- No acute focal weakness or numbness in the arms or legs.  No seizures.  Skin--No rashes or  lesions       PE:  Vital Signs  Temp  Min: 97.5 °F (36.4 °C)  Max: 98.7 °F (37.1 °C)  BP  Min: 105/49  Max: 128/63  Pulse  Min: 61  Max: 75  Resp  Min: 17  Max: 18  SpO2  Min: 91 %  Max: 96 %  GENERAL: Awake and alert, in no acute distress; appears to recall medical history accurately.  Chronically ill-appearing with limited mobility in the bed  HEENT: Normocephalic, atraumatic.  PERRL. EOMI.  slera injected. No icterus. Oropharynx with mild thrush, no exudate. No evidence of peridontal disease.    NECK: Supple without nuchal rigidity  LYMPH: No cervical, axillary or inguinal lymphadenopathy. No neck masses  HEART: RRR; СЕРГЕЙ III/VI.  Bilateral lower extremity trace edema.  faint pedal pulses.  LUNGS: Clear to auscultation bilaterally without wheezing, rales, rhonchi.  Normal effort with supplemental O2 present.   ABDOMEN: Morbidly obese, Soft, nontender, nondistended. Positive bowel sounds. No rebound or guarding.   EXT:  L foot TMA well healed ; right heel with unstageable necrotic ulcer  R UE picc site w/o erythema, drainage or swelling  : No weiss catheter.  MSK: FROM without joint effusions noted    SKIN:  mild groin yeast rash   Her sacral ulceration as above; appears clean  NEURO: Oriented to PPT. No focal deficits.   PSYCHIATRIC: Normal insight and judgement. Cooperative with PE        Laboratory Data      Results from last 7 days  Lab Units 03/08/18  1842   WBC 10*3/mm3 15.94*   HEMOGLOBIN g/dL 10.2*   HEMATOCRIT % 33.4*   PLATELETS 10*3/mm3 192       Results from last 7 days  Lab Units 03/10/18  2031   SODIUM mmol/L 140   POTASSIUM mmol/L 5.1   CHLORIDE mmol/L 103   CO2 mmol/L 30.0   BUN mg/dL 28*   CREATININE mg/dL 1.30   GLUCOSE mg/dL 181*   CALCIUM mg/dL 9.5       Results from last 7 days  Lab Units 03/08/18  1842   ALK PHOS U/L 78   BILIRUBIN mg/dL 0.3   ALT (SGPT) U/L 26   AST (SGOT) U/L 32       Results from last 7 days  Lab Units 03/10/18  0719   SED RATE mm/hr 75*       Results from last 7  days  Lab Units 03/10/18  0719   CRP mg/dL 15.10*       Estimated Creatinine Clearance: 53.8 mL/min (by C-G formula based on SCr of 1.3 mg/dL).      Microbiology:      Radiology:  Imaging Results (last 72 hours)     Procedure Component Value Units Date/Time    CT Head Without Contrast [[id number]] Collected:  03/08/18 2116     Updated:  03/08/18 2251    Narrative:       EXAM:    CT Head Without Intravenous Contrast    CLINICAL HISTORY:    62 years old, female; Pressure ulcer of sacral region, unspecified stage;   Transient alteration of awareness; Fever, unspecified; Signs and symptoms;   Other: Patient found unresponsive; Additional info: Pt found unresponsive    TECHNIQUE:    Axial computed tomography images of the head/brain without intravenous   contrast.  All CT scans at this facility use one or more dose reduction   techniques, viz.: automated exposure control; ma/kV adjustment per patient size   (including targeted exams where dose is matched to indication; i.e. head); or   iterative reconstruction technique.    COMPARISON:    No relevant prior studies available.    FINDINGS:    Brain:  Nonspecific calcification left occipital lobe.  No hemorrhage.  No   significant white matter disease.    Ventricles:  Unremarkable.  No ventriculomegaly.    Bones/joints:  Unremarkable.  No acute fracture.    Soft tissues:  Unremarkable.    Sinuses:  Mild sphenoid sinusitis.    Mastoid air cells:  Unremarkable as visualized.      Impression:         No acute findings.    THIS DOCUMENT HAS BEEN ELECTRONICALLY SIGNED BY NOEL LYLE MD    XR Chest 1 View [[id number]] Collected:  03/08/18 1813     Updated:  03/08/18 1912    Narrative:       EXAM:    XR Chest, 1 View    CLINICAL HISTORY:    62 years old, female; Signs and symptoms; Other: Weak/dizzy/ams; Additional   info: Weak/dizzy/ams triage protocol    TECHNIQUE:    Frontal view of the chest.    COMPARISON:    CR - XR CHEST 1 VW 2018-02-19 04:36    FINDINGS:    Lungs:   Unremarkable.  No consolidation.    Pleural space:  Unremarkable.  No pneumothorax.    Heart:  The heart demonstrates mild diffuse enlargement.    Mediastinum:  Unremarkable.    Bones/joints:  Unremarkable.    Other findings:  The patient is rotated to the left.      Impression:         No acute findings.    THIS DOCUMENT HAS BEEN ELECTRONICALLY SIGNED BY NOEL LYLE MD          I personally reviewed the radiographic studies     Impression:   --Sepsis with fever, leukocytosis and acute kidney injury  Possible foci of infection include urinary tract infection, picc line infection or the right heel decubitus  CRP 15.10 3/10     Stage 4 sacral  Wound which does not appear to be the source of sepsis  Recent MRI w/o evidence of osteomyelitis      Right heel decubitus which is unstageable     Mild pyuria     Diarrhea- cdiff negative     Recent Non-ST segment elevation MI with increase in troponin     Cardiomyopathy     Acute on chronic kidney disease     Type 2 diabetes mellitus     Hypertension and Hyperlipidemia     COPD with home oxygen use and acute hypoxic respiratory failure at admission  Morbid obesity  Chronic debility       PLAN/RECOMMENDATIONS:   Thank you for asking us to see Tamara Langston, I recommend the following:     Agree with zosyn and vancomycin  Topical treatment of thrush  Stool culture  Consider MRI Right heel depending on how wound responds to mist  Probiotic  Check again for [facility] eligibility since I think she would benefit from mist therapy          Oksana Cruz MD  3/12/2018

## 2018-03-12 NOTE — PLAN OF CARE
Problem: Fall Risk (Adult)  Goal: Identify Related Risk Factors and Signs and Symptoms  Outcome: Ongoing (interventions implemented as appropriate)    Goal: Absence of Fall  Outcome: Ongoing (interventions implemented as appropriate)      Problem: Patient Care Overview  Goal: Individualization and Mutuality  Outcome: Ongoing (interventions implemented as appropriate)    Goal: Discharge Needs Assessment  Outcome: Ongoing (interventions implemented as appropriate)    Goal: Interprofessional Rounds/Family Conf  Outcome: Ongoing (interventions implemented as appropriate)   03/12/18 0333   Interdisciplinary Rounds/Family Conf   Summary Patient rested comfortably throughout night. Patient is on 2L O2 via nasal canula and BiPAP and is alert and oriented. Patient reported pain once during night, Vital signs remain stable. Will continue to monitor.    Participants patient       Problem: Wound (Includes Pressure Injury) (Adult)  Goal: Signs and Symptoms of Listed Potential Problems Will be Absent, Minimized or Managed (Wound)  Outcome: Ongoing (interventions implemented as appropriate)      Problem: Sepsis/Septic Shock (Adult)  Goal: Signs and Symptoms of Listed Potential Problems Will be Absent, Minimized or Managed (Sepsis/Septic Shock)  Outcome: Ongoing (interventions implemented as appropriate)

## 2018-03-12 NOTE — THERAPY TREATMENT NOTE
Acute Care - Physical Therapy Treatment Note  [facility]     Patient Name: Tamara Langston  : 1955  MRN: [id number]  Today's Date: 3/12/2018  Onset of Illness/Injury or Date of Surgery: 18  Date of Referral to PT: 18  Referring Physician: MD Carson    Admit Date: 3/8/2018    Visit Dx:    ICD-10-CM ICD-9-CM   1. Transient alteration of awareness R40.4 780.02   2. Fever, unspecified fever cause R50.9 780.60   3. Decubitus ulcer of sacral region, unspecified ulcer stage L89.159 707.03     707.20   4. Impaired mobility and ADLs Z74.09 799.89   5. Impaired functional mobility, balance, gait, and endurance Z74.09 V49.89     Patient Active Problem List   Diagnosis   • Atopic rhinitis   • Restrictive ventilatory defect   • COPD (chronic obstructive pulmonary disease)   • Osteoporosis   • Obstructive sleep apnea syndrome   • Abnormal liver enzymes   • Cholelithiasis   • Chronic back pain   • Mixed anxiety depressive disorder   • Type 2 diabetes mellitus   • Dyslipidemia   • Essential tremor   • Fibromyalgia   • Gastroesophageal reflux disease without esophagitis   • Hypertension   • Hypothyroidism   • Insomnia   • Osteoarthritis   • Psoriasis   • Branch retinal vein occlusion   • Cobalamin deficiency   • Obesity   • Vitamin D deficiency   • Hypokalemia   • Lactic acidosis   • Fatty liver disease, nonalcoholic   • Sinus bradycardia   • NSTEMI (non-ST elevated myocardial infarction)   • Tobacco abuse   • Hypoxia   • Peripheral vascular disease   • Diabetic polyneuropathy associated with type 2 diabetes mellitus   • Anemia, blood loss   • Chronic pain   • Chronic, continuous use of opioids   • Chronic anxiety   • Chronically on benzodiazepine therapy   • Tubular adenoma   • Elevated troponin level   • Urine abnormality   • ELIF (acute kidney injury)   • Cellulitis of sacral region   • Altered mental status   • Fever   • Sepsis   • Acute on chronic respiratory failure with hypoxia   • Acute cystitis  without hematuria   • Coronary artery disease involving native heart   • Takotsubo cardiomyopathy   • Elevated troponin   • Chronic systolic heart failure   • Chronic respiratory failure with hypoxia   • Infected sacral decubitus ulcers   • Weakness       Therapy Treatment    Therapy Treatment / Health Promotion    Treatment Time/Intention  Discipline: physical therapist  Document Type: therapy note (daily note)  Subjective Information: complains of, weakness, pain  Mode of Treatment: individual therapy  Patient/Family Observations: Pt seated EOB upon arrival, tele, O2nc  Care Plan Review: risks/benefits reviewed  Therapy Frequency (PT Clinical Impression): daily  Patient Effort: good  Coping  Observed Emotional State: accepting, calm, cooperative  Verbalized Emotional State: acceptance  Plan of Care Review  Plan of Care Reviewed With: patient    Vitals/Pain/Safety  Vital Signs  Pre Systolic BP Rehab: 118  Pre Treatment Diastolic BP: 58  Pretreatment Heart Rate (beats/min): 75  Posttreatment Heart Rate (beats/min): 67  Pre SpO2 (%): 90  O2 Delivery Pre Treatment: supplemental O2  Post SpO2 (%): 91  O2 Delivery Post Treatment: supplemental O2  Pre Patient Position: Sitting  Intra Patient Position: Standing  Post Patient Position: Supine  Positioning and Restraints  Pre-Treatment Position: sitting in chair/recliner  Post Treatment Position: bed  In Bed: supine, call light within reach, encouraged to call for assist, exit alarm on, heels elevated, waffle boots/both    Mobility,ADL,Motor, Modality  Bed Mobility Assessment/Treatment  Sit-Supine [place] (Bed Mobility): conditional independence  Sit-Stand Transfer  Sit-Stand [place] (Transfers): stand by assist  Stand-Sit Transfer  Stand-Sit [place] (Transfers): conditional independence  Gait/Stairs Assessment/Training  [place] Level (Gait): stand by assist, verbal cues  Assistive Device (Gait): walker, front-wheeled  Distance in Feet (Gait):  120  Pattern (Gait): step-through  Deviations/Abnormal Patterns (Gait): gait speed decreased  Comment (Gait/Stairs): Pt limited by fatigue, dyspnea. VSS     Therapeutic Exercise  Lower Extremity (Therapeutic Exercise): LAQ (long arc quad), bilateral, marching while seated (ankle pumps)  Exercise Type (Therapeutic Exercise): AROM (active range of motion)  Position (Therapeutic Exercise): seated  Sets/Reps (Therapeutic Exercise): 10           ROM/MMT             Sensory, Edema, Orthotics          Cognition, Communication, Swallow  Cognitive Assessment/Intervention- PT/OT  Affect/Mental Status (Cognitive): flat/blunted affect  Orientation Status (Cognition): oriented x 3  Follows Commands (Cognition): WNL  Speaking Valve  Pretreatment Heart Rate (beats/min): 75  Pre SpO2 (%): 90  Posttreatment Heart Rate (beats/min): 67  Post SpO2 (%): 91  General Eating/Swallowing Observations  Pre SpO2 (%): 90  Post SpO2 (%): 91    Outcome Summary  Outcome Summary/Treatment Plan (PT)  Daily Summary of Progress (PT): progress toward functional goals is good            PT Rehab Goals     Row Name 03/10/18 0900             Bed Mobility Goal 1 (PT)    Activity/Assistive Device (Bed Mobility Goal 1, PT) bed mobility activities, all  -ES      [place] Level/Cues Needed (Bed Mobility Goal 1, PT) independent  -ES      Time Frame (Bed Mobility Goal 1, PT) 1 week;long term goal (LTG)  -ES      Progress/Outcomes (Bed Mobility Goal 1, PT) goal ongoing  -ES         Transfer Goal 1 (PT)    Activity/Assistive Device (Transfer Goal 1, PT) sit-to-stand/stand-to-sit;bed-to-chair/chair-to-bed;walker, rolling  -ES      [place] Level/Cues Needed (Transfer Goal 1, PT) independent  -ES      Time Frame (Transfer Goal 1, PT) 1 week;long term goal (LTG)  -ES      Progress/Outcome (Transfer Goal 1, PT) goal ongoing  -ES         Gait Training Goal 1 (PT)    Activity/Assistive Device (Gait Training Goal 1, PT) gait (walking locomotion);walker, rolling   -ES      [place] Level (Gait Training Goal 1, PT) independent  -ES      Distance (Gait Goal 1, PT) 150  -ES      Time Frame (Gait Training Goal 1, PT) 1 week;long term goal (LTG)  -ES      Progress/Outcome (Gait Training Goal 1, PT) goal ongoing  -ES        User Key  (r) = Recorded By, (t) = Taken By, (c) = Cosigned By    Initials Name Provider Type    ES Alisha Gardner, PT Physical Therapist          Physical Therapy Education     Title: PT OT SLP Therapies (Active)     Topic: Physical Therapy (Done)     Point: Mobility training (Done)    Learning Progress Summary     Learner Status Readiness Method Response Comment Documented by    Patient Done Acceptance E VU,[facility] 03/12/18 1507     Done Acceptance E,D VU,NR POC progression, fall precautions, transfer and gait safety, D/C planning, [facility] 03/09/18 1324          Point: Home exercise program (Done)    Learning Progress Summary     Learner Status Readiness Method Response Comment Documented by    Patient Done Acceptance E VU,[facility] 03/12/18 1507     Done Acceptance E,D VU,NR POC progression, fall precautions, transfer and gait safety, D/C planning, [facility] 03/09/18 1324          Point: Body mechanics (Done)    Learning Progress Summary     Learner Status Readiness Method Response Comment Documented by    Patient Done Acceptance E VU,[facility] 03/12/18 1507     Done Acceptance E,D VU,NR POC progression, fall precautions, transfer and gait safety, D/C planning, [facility] 03/09/18 1324          Point: Precautions (Done)    Learning Progress Summary     Learner Status Readiness Method Response Comment Documented by    Patient Done Acceptance E VU,[facility] 03/12/18 1507     Done Acceptance E,D VU,NR POC progression, fall precautions, transfer and gait safety, D/C planning, [facility] 03/09/18 1324                      User Key     Initials Effective Dates Name Provider Type Discipline     06/19/15 -  Mary Goldstein, PT Physical Therapist PT    MB 03/14/16 -  Ruth CLOUD  Rose, PT Physical Therapist PT                    PT Recommendation and Plan  Anticipated Discharge Disposition (PT): skilled nursing facility (SNF)  Therapy Frequency (PT Clinical Impression): daily  Daily Summary of Progress (PT): progress toward functional goals is good  Plan of Care Reviewed With: patient  Progress: improving  Outcome Summary: Pt increased gait distance to 120ft with RW with SBA. Pt limited by fatigue and decreased activity sherry. Continue POC.          Outcome Measures     Row Name 03/12/18 1421 03/12/18 1405          How much help from another person do you currently need...    Turning from your back to your side while in flat bed without using bedrails? 4  -SJ  --     Moving from lying on back to sitting on the side of a flat bed without bedrails? 4  -SJ  --     Moving to and from a bed to a chair (including a wheelchair)? 3  -SJ  --     Standing up from a chair using your arms (e.g., wheelchair, bedside chair)? 4  -SJ  --     Climbing 3-5 steps with a railing? 3  -SJ  --     To walk in hospital room? 3  -SJ  --     AM-PAC 6 Clicks Score 21  -SJ  --        How much help from another is currently needed...    Putting on and taking off regular lower body clothing?  -- 2  -AC     Bathing (including washing, rinsing, and drying)  -- 2  -AC     Toileting (which includes using toilet bed pan or urinal)  -- 2  -AC     Putting on and taking off regular upper body clothing  -- 2  -AC     Taking care of personal grooming (such as brushing teeth)  -- 3  -AC     Eating meals  -- 4  -AC     Score  -- 15  -AC        Modified [place] Scale    Modified [place] Scale 3 - Moderate disability.  Requiring some help, but able to walk without assistance.  -SJ  --        Functional Assessment    Outcome Measure Options AM-PAC 6 Clicks Basic Mobility (PT)  -SJ AM-PAC 6 Clicks Daily Activity (OT)  -       User Key  (r) = Recorded By, (t) = Taken By, (c) = Cosigned By    Initials Name Provider Type    LAITH FREED  Pepe OT Occupational Therapist     Mary Goldstein PT Physical Therapist           Time Calculation:         PT Charges     Row Name 03/12/18 1507             Time Calculation    Start Time 1421  -      PT Received On 03/12/18  -      PT Goal Re-Cert Due Date 03/20/18  -         Time Calculation- PT    Total Timed Code Minutes- PT 23 minute(s)  -        User Key  (r) = Recorded By, (t) = Taken By, (c) = Cosigned By    Initials Name Provider Type     Mary Goldstein PT Physical Therapist          Therapy Charges for Today     Code Description Service Date Service Provider Modifiers Qty    26686673450 HC GAIT TRAINING EA 15 MIN 3/12/2018 Mary Goldstein, PT GP 1    27955749712 HC PT THER PROC EA 15 MIN 3/12/2018 Mary Goldstein, PT GP 1          PT G-Codes  PT Professional Judgement Used?: Yes  Outcome Measure Options: AM-PAC 6 Clicks Basic Mobility (PT)  Score: 17  Functional Limitation: Mobility: Walking and moving around  Mobility: Walking and Moving Around Current Status (): At least 40 percent but less than 60 percent impaired, limited or restricted  Mobility: Walking and Moving Around Goal Status (): At least 20 percent but less than 40 percent impaired, limited or restricted    Mary Goldstein PT  3/12/2018

## 2018-03-12 NOTE — PLAN OF CARE
Problem: Patient Care Overview  Goal: Plan of Care Review  Outcome: Ongoing (interventions implemented as appropriate)   03/12/18 0697   Coping/Psychosocial   Plan of Care Reviewed With patient   Plan of Care Review   Progress improving   OTHER   Outcome Summary Pt increased gait distance to 120ft with RW with SBA. Pt limited by fatigue and decreased activity sherry. Continue POC.

## 2018-03-12 NOTE — PLAN OF CARE
Problem: Patient Care Overview  Goal: Plan of Care Review  Outcome: Ongoing (interventions implemented as appropriate)   03/12/18 1030   Coping/Psychosocial   Plan of Care Reviewed With patient   OTHER   Outcome Summary DTPI stable with moderate fluid filled blister, remaining intact. PT will cont with mist therapy 2-3 x/ week to help increase healing potential.

## 2018-03-12 NOTE — PROGRESS NOTES
[facility] Medicine Services  PROGRESS NOTE    Patient Name: Tamara Langston  : 1955  MRN: [id number]    Date of Admission: 3/8/2018  Length of Stay: 4  Primary Care Physician: Harinder Aranda MD    Subjective   Subjective     CC:AMS    HPI:  No overnight events reported.  No chest pain.  No abdominal pain.  Says she wore her machine.  Mental status appears likely baseline      Review of Systems  Gen- No fevers, chills  CV- No chest pain, palpitations  Resp- No cough, dyspnea  GI- No N/V/D, abd pain    Otherwise ROS is negative except as mentioned in the HPI.    Objective   Objective     Vital Signs:   Temp:  [97.5 °F (36.4 °C)-98.7 °F (37.1 °C)] 97.5 °F (36.4 °C)  Heart Rate:  [61-75] 72  Resp:  [17-18] 18  BP: (105-128)/(49-66) 118/58  FiO2 (%):  [30 %] 30 %        Physical Exam:  Constitutional: chronically ill appearing lady, in no acute distress, awake, alert  HENT: NCAT, mucous membranes moist  Respiratory: Clear to auscultation bilaterally, respiratory effort normal   Cardiovascular: RRR, no murmurs, rubs, or gallops, palpable pedal pulses bilaterally  Gastrointestinal: obese, positive bowel sounds, soft, nontender, nondistended  Musculoskeletal: bilateral LE edema  Psychiatric: Appropriate affect, cooperative  Neurologic: Oriented x 3, strength symmetric in all extremities, Cranial Nerves grossly intact to confrontation, speech clear  Skin: stage 4 decub ulcer,see PT WOC pics    Results Reviewed:  I have personally reviewed current lab, radiology, and data and agree.      Results from last 7 days  Lab Units 18  1842   WBC 10*3/mm3 15.94*   HEMOGLOBIN g/dL 10.2*   HEMATOCRIT % 33.4*   PLATELETS 10*3/mm3 192       Results from last 7 days  Lab Units 03/10/18  2031 03/08/18  1842   SODIUM mmol/L 140 139   POTASSIUM mmol/L 5.1 5.2   CHLORIDE mmol/L 103 105   CO2 mmol/L 30.0 28.0   BUN mg/dL 28* 32*   CREATININE mg/dL 1.30 1.50*   GLUCOSE mg/dL 181* 176*   CALCIUM  mg/dL 9.5 9.3   ALT (SGPT) U/L  --  26   AST (SGOT) U/L  --  32     Estimated Creatinine Clearance: 53.8 mL/min (by C-G formula based on SCr of 1.3 mg/dL).  No results found for: BNP  No results found for: PHART    Microbiology Results Abnormal     Procedure Component Value - Date/Time    Blood Culture - Blood, [[id number]]  (Normal) Collected:  03/08/18 1930    Lab Status:  Preliminary result Specimen:  Blood from Arm, Right Updated:  03/11/18 2101     Blood Culture No growth at 3 days    Blood Culture - Blood, [[id number]]  (Normal) Collected:  03/08/18 1935    Lab Status:  Preliminary result Specimen:  Blood from Arm, Right Updated:  03/11/18 2101     Blood Culture No growth at 3 days     Gram Stain Result Few (2+) WBCs seen    Urine Culture - Urine, Urine, Clean Catch [[id number]] Collected:  03/08/18 2002    Lab Status:  Final result Specimen:  Urine from Urine, Clean Catch Updated:  03/10/18 1110     Urine Culture --      10,000-20,000 CFU/mL Normal Urogenital Patience    Clostridium Difficile Toxin - Stool, Per Rectum [[id number]] Collected:  03/09/18 1027    Lab Status:  Final result Specimen:  Stool from Per Rectum Updated:  03/09/18 1203    Narrative:       The following orders were created for panel order Clostridium Difficile Toxin - Stool, Per Rectum.  Procedure                               Abnormality         Status                     ---------                               -----------         ------                     Clostridium Difficile To...[[id number]]  Normal              Final result                 Please view results for these tests on the individual orders.    Clostridium Difficile Toxin, PCR - Stool, Per Rectum [[id number]]  (Normal) Collected:  03/09/18 1027    Lab Status:  Final result Specimen:  Stool from Per Rectum Updated:  03/09/18 1203     C. Difficile Toxins by PCR Not Detected    Narrative:         Performance characteristics of test not established for patients <2 years of  age.  Negative for Toxigenic C. Difficile        Results for orders placed during the hospital encounter of 02/13/18   Adult Transthoracic Echo Limited W/ Cont if Necessary Per Protocol    Narrative · This was a limited echocardiogram performed to evaluate the left   ventricular ejection fraction  · Left ventricular systolic function is normal. Estimated EF appears to be   in the range of 51 - 55%.  · The following left ventricular wall segments are hypokinetic: mid   anterior, apical anterior, apical lateral, apical inferior, apical septal   and apex hypokinetic. The basal segments are hyperdynamic.  · When compared to the prior echocardiogram performed on February 14, 2018, the hypokinetic apical segments have mildly improved. The overall   ejection fraction has improved.          I have reviewed the medications.    Assessment/Plan   Assessment / Plan     Hospital Problem List     * (Principal)Altered mental status    Obstructive sleep apnea syndrome (Chronic)    Overview Addendum 8/1/2016  9:56 AM by Puja Clarke     intolerant of CPAP therapy  6. 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  Prior PSG reports complex sleep apnea with frequent central apneas and intermittent      Ramona Menard respirations, on auto CPAP 4-16 with supplemental oxygen.         Type 2 diabetes mellitus (Chronic)    Hypertension (Chronic)    Overview Signed 8/1/2016 10:00 AM by Puja Clarke     16. H/O echocardiogram (V15.89) (Z92.89)   · A.  Echocardiogram of 02/03/2015 reports an ejection fraction of 60-65%, mild concentric      LVH, trace mitral regurgitation, mild tricuspid and pulmonic regurgitation and calculated      RVSP of 35 mmHg, the main pulmonary artery is also mildly dilated.         Peripheral vascular disease (Chronic)    Coronary artery disease involving native heart (Chronic)    Overview Signed 2/15/2018 12:52 PM by CINDI Fierro     1. [facility] 11-26-16: Non occlusive disease  ·  50% mid RCA  stenosis.  · 40% proximal LAD stenosis   · Normal left ventricular function         Takotsubo cardiomyopathy    Overview Signed 2/15/2018  1:42 PM by JEOVANY Greenfield     EF 35%, diastolic dysfunction, left ventricular wall segments are hypokinetic ECHO 2/14/18         Chronic systolic heart failure    Chronic respiratory failure with hypoxia (Chronic)    Infected sacral decubitus ulcers (Chronic)    Weakness         Brief Hospital Course to date:  Tamara Langston is a 62 y.o. female with hx of decubitus ulcer, systolic HF, DINA, T2DM presented with AMS and fever with concern for persistent decub infection.     Assessment & Plan:  - Acute encephalopathy, improving, suspected to be sec to persistent decub infection ID consulted and are aware, rec IV zosyn and vanc, f/u blood cultures  - Hx of DINA with chronic respiratory failure on 2L O2 and CPAP qhs.  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  Sleep Apnea  - probably mixed central + DINA  - needs BiPAP at night - AutoBiPAP ordered  Chronic Pain  - on Fentanyl and Percocet at home  Chronic Kidney Disease  - appears baseline  - on Entresto / Lasix  - monitor kidney function  - ? Acute worsening of kidney function could cause Fentanyl and Percocet to not be cleared well    DVT Prophylaxis: H     CODE STATUS: Conditional Code     Disposition:[facility], [place] vs [facility]    Electronically signed by Omari Ross MD, 03/12/18, 5:41 PM.

## 2018-03-12 NOTE — PLAN OF CARE
Problem: Patient Care Overview  Goal: Plan of Care Review   03/12/18 8800   Coping/Psychosocial   Plan of Care Reviewed With patient   Plan of Care Review   Progress improving   OTHER   Outcome Summary Pt CGA sidelying to sit, distant supervision sitting EOB to complete grooming . Pt with good effort with UE ther ex. Pt progressing toward goals, but limited by pain and fatigue.

## 2018-03-12 NOTE — THERAPY TREATMENT NOTE
Acute Care - Occupational Therapy Treatment Note  [facility]     Patient Name: Tamara Langston  : 1955  MRN: [id number]  Today's Date: 3/12/2018  Onset of Illness/Injury or Date of Surgery: 18  Date of Referral to OT: 18  Referring Physician: MD Carson    Admit Date: 3/8/2018       ICD-10-CM ICD-9-CM   1. Transient alteration of awareness R40.4 780.02   2. Fever, unspecified fever cause R50.9 780.60   3. Decubitus ulcer of sacral region, unspecified ulcer stage L89.159 707.03     707.20   4. Impaired mobility and ADLs Z74.09 799.89   5. Impaired functional mobility, balance, gait, and endurance Z74.09 V49.89     Patient Active Problem List   Diagnosis   • Atopic rhinitis   • Restrictive ventilatory defect   • COPD (chronic obstructive pulmonary disease)   • Osteoporosis   • Obstructive sleep apnea syndrome   • Abnormal liver enzymes   • Cholelithiasis   • Chronic back pain   • Mixed anxiety depressive disorder   • Type 2 diabetes mellitus   • Dyslipidemia   • Essential tremor   • Fibromyalgia   • Gastroesophageal reflux disease without esophagitis   • Hypertension   • Hypothyroidism   • Insomnia   • Osteoarthritis   • Psoriasis   • Branch retinal vein occlusion   • Cobalamin deficiency   • Obesity   • Vitamin D deficiency   • Hypokalemia   • Lactic acidosis   • Fatty liver disease, nonalcoholic   • Sinus bradycardia   • NSTEMI (non-ST elevated myocardial infarction)   • Tobacco abuse   • Hypoxia   • Peripheral vascular disease   • Diabetic polyneuropathy associated with type 2 diabetes mellitus   • Anemia, blood loss   • Chronic pain   • Chronic, continuous use of opioids   • Chronic anxiety   • Chronically on benzodiazepine therapy   • Tubular adenoma   • Elevated troponin level   • Urine abnormality   • ELIF (acute kidney injury)   • Cellulitis of sacral region   • Altered mental status   • Fever   • Sepsis   • Acute on chronic respiratory failure with hypoxia   • Acute cystitis without  hematuria   • Coronary artery disease involving native heart   • Takotsubo cardiomyopathy   • Elevated troponin   • Chronic systolic heart failure   • Chronic respiratory failure with hypoxia   • Infected sacral decubitus ulcers   • Weakness     Past Medical History:   Diagnosis Date   • Acute on chronic respiratory failure with hypoxia 2/13/2018   • ELIF (acute kidney injury) 2/13/2018   • ELIF (acute kidney injury) 2/13/2018   • Altered mental status 2/13/2018   • Bronchitis    • Cellulitis of sacral region 2/13/2018   • Cervical cancer    • Cholelithiasis 5/11/2016   • Chronic anxiety 2/27/2017   • Chronic bronchitis    • Chronic respiratory failure with hypoxia 3/8/2018   • Chronic systolic heart failure 3/8/2018   • Chronically on benzodiazepine therapy 2/27/2017   • Degenerative arthritis    • Diabetes mellitus    • Dyslipidemia 5/11/2016   • Dyspnea    • Elevated troponin level 2/13/2018   • Fatty liver disease, nonalcoholic 11/21/2016   • Fever 2/13/2018   • Fibromyalgia    • GERD (gastroesophageal reflux disease)    • H/O echocardiogram    • History of pneumonia    • Hypertension 5/11/2016    16. H/O echocardiogram (V15.89) (Z92.89)  · A.  Echocardiogram of 02/03/2015 reports an ejection fraction of 60-65%, mild concentric     LVH, trace mitral regurgitation, mild tricuspid and pulmonic regurgitation and calculated     RVSP of 35 mmHg, the main pulmonary artery is also mildly dilated.   • Hypothyroidism 5/11/2016    Description: A.  On replacement therapy.   • Infected wound 3/8/2018   • Nausea    • Obesity    • DINA (obstructive sleep apnea)     intolerant of CPAP therapy   • Osteoporosis    • Osteoporosis    • Polycythemia vera 5/11/2016   • Pulmonary emphysema    • Restrictive ventilatory defect    • Rhinitis    • Sepsis 2/13/2018   • Uncontrolled diabetes mellitus 5/11/2016   • Urine abnormality 2/13/2018   • Uterine cancer    • Vitamin D deficiency 8/1/2016   • Weakness 3/8/2018     Past Surgical History:    Procedure Laterality Date   • AMPUTATION DIGIT Left 11/23/2016    Procedure: AMPUTATION TRANS METATARSAL - ray amutation of the left great toe;  Surgeon: Arsalan Feliciano MD;  Location:  [facility];  Service:    • AMPUTATION DIGIT Left 3/2/2017    Procedure: LEFT FOOT TRANSMETATARSAL AMPUTATION TOES 2,3,4,5;  Surgeon: Joey Guaman MD;  Location:  [facility];  Service:    • BACK SURGERY      lumbar fusions x5--multiple times; 1995, 1997, 1998, 1999 and 2008   • BELOW KNEE AMPUTATION Left 2/25/2017    Procedure: EXTENDED LEFT TOE AMPUTATION;  Surgeon: Arsalan Feliciano MD;  Location:  [facility];  Service:    • CARDIAC CATHETERIZATION N/A 11/26/2016    Procedure: Left Heart Cath;  Surgeon: Ash Nuñez MD;  Location: [facility] CATH INVASIVE LOCATION;  Service:    • CARDIAC CATHETERIZATION N/A 2/20/2018    Procedure: Left Heart Cath;  Surgeon: Lane Zamorano MD;  Location: [facility] CATH INVASIVE LOCATION;  Service:    • CARDIAC CATHETERIZATION N/A 2/20/2018    Procedure: Right Heart Cath;  Surgeon: Lane Zamorano MD;  Location: [facility] CATH INVASIVE LOCATION;  Service:    • HAND SURGERY Bilateral     x3   • HYSTERECTOMY      status post uterine and cervical cancer   • LUMBAR SPINE SURGERY      arthrodesis by anterior approach addit interspace   • TONSILLECTOMY         Therapy Treatment    Therapy Treatment / Health Promotion    Treatment Time/Intention  Discipline: occupational therapist  Document Type: therapy note (daily note)  Subjective Information: complains of, weakness, pain  Patient/Family Observations: Pt recived sidelying in bed.  Pt on O2, IV intact  Care Plan Review: risks/benefits reviewed  Therapy Frequency (OT Eval): daily  Patient Effort: good  Plan of Care Review  Plan of Care Reviewed With: patient    Vitals/Pain/Safety  Vital Signs  Pre SpO2 (%): 90  O2 Delivery Pre Treatment: supplemental O2  Post SpO2 (%): 91  O2 Delivery Post Treatment: supplemental O2  Pre Patient Position: Supine  Intra Patient Position:  "Sitting  Post Patient Position: Standing (with PT)  Pain Assessment  Additional Documentation: Pain Scale: Numbers Pre/Post-Treatment (Group)  Pain Scale: Numbers Pre/Post-Treatment  Pain Scale: Numbers, Pretreatment: 8/10  Pain Scale: Numbers, Post-Treatment: 8/10  Pain Location: other (see comments) (\"all over\")  Pain Scale: Word Pre/Post-Treatment  Pain Location: other (see comments) (\"all over\")  Pain Scale: FACES Pre/Post-Treatment  Pain Location: other (see comments) (\"all over\")  Positioning and Restraints  Pre-Treatment Position: in bed  Post Treatment Position: other  In Bed: with PT    Mobility,ADL,Motor, Modality  Bed Mobility Assessment/Treatment  Bed Mobility Assessment/Treatment: sidelying-sit  Sidelying-Sit [place] (Bed Mobility): contact guard  Assistive Device (Bed Mobility): bed rails  Transfer Assessment/Treatment  Transfer Assessment/Treatment: sit-stand transfer  Sit-Stand Transfer  Sit-Stand [place] (Transfers): verbal cues, contact guard  Gait/Stairs Assessment/Training  Comment (Gait/Stairs): deferred; PT entered at end of tx  ADL Assessment/Intervention  BADL Assessment/Intervention: grooming  Grooming Assessment/Training  [place] Level (Grooming): oral care regimen, hair care, combing/brushing, wash face, hands, supervision (OT retrieved grooming items for pt)  Grooming Position: edge of bed sitting  Motor Skills Assessment/Interventions  Additional Documentation: Therapeutic Exercise (Group)  Therapeutic Exercise  Upper Extremity Range of Motion (Therapeutic Exercise): shoulder flexion/extension, bilateral, shoulder horizontal abduction/adduction, bilateral, elbow flexion/extension, bilateral  Exercise Type (Therapeutic Exercise): AROM (active range of motion)  Position (Therapeutic Exercise): seated  Sets/Reps (Therapeutic Exercise): 15           ROM/MMT             Sensory, Edema, Orthotics          Cognition, Communication, Swallow  Cognitive " Assessment/Intervention  Additional Documentation: Cognitive Assessment/Intervention (Group)  Cognitive Assessment/Intervention- PT/OT  Affect/Mental Status (Cognitive): flat/blunted affect  Orientation Status (Cognition): oriented x 3  Follows Commands (Cognition): WNL  Speaking Valve  Pre SpO2 (%): 90  Post SpO2 (%): 91  General Eating/Swallowing Observations  Pre SpO2 (%): 90  Post SpO2 (%): 91    Outcome Summary  Rehab Outcome Summary/Treatment Plan  Daily Summary of Progress (OT): progress toward functional goals as expected        OT Rehab Goals     Row Name 03/12/18 7939             Transfer Goal 1 (OT)    Activity/Assistive Device (Transfer Goal 1, OT) toilet  -AC      [place] Level/Cues Needed (Transfer Goal 1, OT) contact guard assist  -AC      Time Frame (Transfer Goal 1, OT) 1 week  -AC         Toileting Goal 1 (OT)    Activity/Device (Toileting Goal 1, OT) toileting skills, all;grab bar/safety frame  -AC      [place] Level/Cues Needed (Toileting Goal 1, OT) minimum assist (75% or more patient effort)  -AC      Time Frame (Toileting Goal 1, OT) 1 day  -AC         ROM Goal 1 (OT)    ROM Goal 1 (OT) Pt will particiapte in B UE AROM  10 -15 reps daily as needed to increase L shoulder flex 10 degrees and to increase strength and endurance needed  to support ADLs  -AC      Time Frame (ROM Goal 1, OT) 1 week  -AC         Problem Specific Goal 1 (OT)    Problem Specific Goal 1 (OT) Pt will complete FM/GM taks with SBA  -AC      Time Frame (Problem Specific Goal 1, OT) 1 week  -AC      Progress/Outcome (Problem Specific Goal 1, OT) goal met  -AC        User Key  (r) = Recorded By, (t) = Taken By, (c) = Cosigned By    Initials Name Provider Type    AC Gypsy Cantu OT Occupational Therapist        Occupational Therapy Education     Title: PT OT SLP Therapies (Active)     Topic: Occupational Therapy (Active)     Point: ADL training (Done)     Description: Instruct learner(s) on proper safety adaptation  and remediation techniques during self care or transfers.   Instruct in proper use of assistive devices.   Learning Progress Summary     Learner Status Readiness Method Response Comment Documented by    Patient Done Acceptance E PERRI benefits of activity  03/12/18 1458     Done Acceptance E,D PERRI,NR Educated on current deficits and discussed POC.  03/09/18 0942                      User Key     Initials Effective Dates Name Provider Type Discipline     06/23/15 -  Gypsy Cantu, OT Occupational Therapist OT     06/22/15 -  Jeanne Mooney, OT Occupational Therapist OT                  OT Recommendation and Plan  Therapy Frequency (OT Eval): daily  Daily Summary of Progress (OT): progress toward functional goals as expected  Plan of Care Review  Plan of Care Reviewed With: patient  Plan of Care Reviewed With: patient  Progress: improving  Outcome Summary: Pt CGA sidelying to sit,  distant supervision sitting EOB to complete grooming .  Pt with good effort with UE ther ex.  Pt progressing toward goals, but limited by pain and fatigue.         Outcome Measures     Row Name 03/12/18 1421 03/12/18 1405          How much help from another person do you currently need...    Turning from your back to your side while in flat bed without using bedrails? 4  -SJ  --     Moving from lying on back to sitting on the side of a flat bed without bedrails? 4  -SJ  --     Moving to and from a bed to a chair (including a wheelchair)? 3  -SJ  --     Standing up from a chair using your arms (e.g., wheelchair, bedside chair)? 4  -SJ  --     Climbing 3-5 steps with a railing? 3  -SJ  --     To walk in hospital room? 3  -SJ  --     AM-PAC 6 Clicks Score 21  -SJ  --        How much help from another is currently needed...    Putting on and taking off regular lower body clothing?  -- 2  -AC     Bathing (including washing, rinsing, and drying)  -- 2  -AC     Toileting (which includes using toilet bed pan or urinal)  -- 2  -AC     Putting on  and taking off regular upper body clothing  -- 2  -AC     Taking care of personal grooming (such as brushing teeth)  -- 3  -AC     Eating meals  -- 4  -AC     Score  -- 15  -AC        Modified Luis Scale    Modified [place] Scale 3 - Moderate disability.  Requiring some help, but able to walk without assistance.  -  --        Functional Assessment    Outcome Measure Options AM-PAC 6 Clicks Basic Mobility (PT)  - AM-PAC 6 Clicks Daily Activity (OT)  -       User Key  (r) = Recorded By, (t) = Taken By, (c) = Cosigned By    Initials Name Provider Type     Gypsy Cantu OT Occupational Therapist     Mary Goldstein, PT Physical Therapist           Time Calculation:         Time Calculation- OT     Row Name 03/12/18 1503             Time Calculation- OT    OT Start Time 1405  -      Total Timed Code Minutes- OT 15 minute(s)  -      OT Received On 03/12/18  -      OT Goal Re-Cert Due Date 03/19/18  -        User Key  (r) = Recorded By, (t) = Taken By, (c) = Cosigned By    Initials Name Provider Type     Gypsy Cantu OT Occupational Therapist           Therapy Charges for Today     Code Description Service Date Service Provider Modifiers Qty    [id number]  OT THERAPEUTIC ACT EA 15 MIN 3/12/2018 Gypsy Cantu OT GO 1               Gypsy Cantu OT  3/12/2018

## 2018-03-12 NOTE — THERAPY WOUND CARE TREATMENT
Acute Care - Wound/Debridement Treatment Note  [facility]     Patient Name: Tamara Langston  : 1955  MRN: [id number]  Today's Date: 3/12/2018  Onset of Illness/Injury or Date of Surgery: 18   Date of Referral to PT: 18   Referring Physician: MD Carson       Admit Date: 3/8/2018    Visit Dx:    ICD-10-CM ICD-9-CM   1. Transient alteration of awareness R40.4 780.02   2. Fever, unspecified fever cause R50.9 780.60   3. Decubitus ulcer of sacral region, unspecified ulcer stage L89.159 707.03     707.20   4. Impaired mobility and ADLs Z74.09 799.89   5. Impaired functional mobility, balance, gait, and endurance Z74.09 V49.89       Patient Active Problem List   Diagnosis   • Atopic rhinitis   • Restrictive ventilatory defect   • COPD (chronic obstructive pulmonary disease)   • Osteoporosis   • Obstructive sleep apnea syndrome   • Abnormal liver enzymes   • Cholelithiasis   • Chronic back pain   • Mixed anxiety depressive disorder   • Type 2 diabetes mellitus   • Dyslipidemia   • Essential tremor   • Fibromyalgia   • Gastroesophageal reflux disease without esophagitis   • Hypertension   • Hypothyroidism   • Insomnia   • Osteoarthritis   • Psoriasis   • Branch retinal vein occlusion   • Cobalamin deficiency   • Obesity   • Vitamin D deficiency   • Hypokalemia   • Lactic acidosis   • Fatty liver disease, nonalcoholic   • Sinus bradycardia   • NSTEMI (non-ST elevated myocardial infarction)   • Tobacco abuse   • Hypoxia   • Peripheral vascular disease   • Diabetic polyneuropathy associated with type 2 diabetes mellitus   • Anemia, blood loss   • Chronic pain   • Chronic, continuous use of opioids   • Chronic anxiety   • Chronically on benzodiazepine therapy   • Tubular adenoma   • Elevated troponin level   • Urine abnormality   • ELIF (acute kidney injury)   • Cellulitis of sacral region   • Altered mental status   • Fever   • Sepsis   • Acute on chronic respiratory failure with hypoxia   • Acute  cystitis without hematuria   • Coronary artery disease involving native heart   • Takotsubo cardiomyopathy   • Elevated troponin   • Chronic systolic heart failure   • Chronic respiratory failure with hypoxia   • Infected sacral decubitus ulcers   • Weakness               WOUND DEBRIDEMENT               Therapy Treatment    Therapy Treatment / Health Promotion    Treatment Time/Intention  Discipline: physical therapist  Document Type: therapy note (daily note)  Subjective Information: complains of, weakness, pain  Mode of Treatment: individual therapy  Patient/Family Observations: Pt seated EOB upon arrival, tele, O2nc  Care Plan Review: risks/benefits reviewed  Therapy Frequency (PT Clinical Impression): daily  Patient Effort: good  Coping  Observed Emotional State: accepting, calm, cooperative  Verbalized Emotional State: acceptance  Plan of Care Review  Plan of Care Reviewed With: patient    Vitals/Pain/Safety  Vital Signs  Pre Systolic BP Rehab: 118  Pre Treatment Diastolic BP: 58  Pretreatment Heart Rate (beats/min): 75  Posttreatment Heart Rate (beats/min): 67  Pre SpO2 (%): 90  O2 Delivery Pre Treatment: supplemental O2  Post SpO2 (%): 91  O2 Delivery Post Treatment: supplemental O2  Pre Patient Position: Sitting  Intra Patient Position: Standing  Post Patient Position: Supine  Pain Assessment  Additional Documentation: Pain Scale: FACES Pre/Post-Treatment (Group)  Pain Scale: FACES Pre/Post-Treatment  Pain: FACES Scale, Pretreatment: 4-->hurts little more  Pain: FACES Scale, Post-Treatment: 4-->hurts little more  Pre/Post Treatment Pain Comment: generalized pain  Positioning and Restraints  Pre-Treatment Position: sitting in chair/recliner  Post Treatment Position: bed  In Bed: supine, call light within reach, encouraged to call for assist, exit alarm on, heels elevated, waffle boots/both    Mobility,ADL,Motor, Modality  Bed Mobility Assessment/Treatment  Sit-Supine [place] (Bed Mobility): conditional  independence  Sit-Stand Transfer  Sit-Stand [place] (Transfers): stand by assist  Stand-Sit Transfer  Stand-Sit [place] (Transfers): conditional independence  Gait/Stairs Assessment/Training  [place] Level (Gait): stand by assist, verbal cues  Assistive Device (Gait): walker, front-wheeled  Distance in Feet (Gait): 120  Pattern (Gait): step-through  Deviations/Abnormal Patterns (Gait): gait speed decreased  Comment (Gait/Stairs): Pt limited by fatigue, dyspnea. VSS     Therapeutic Exercise  Lower Extremity (Therapeutic Exercise): LAQ (long arc quad), bilateral, marching while seated (ankle pumps)  Exercise Type (Therapeutic Exercise): AROM (active range of motion)  Position (Therapeutic Exercise): seated  Sets/Reps (Therapeutic Exercise): 10           ROM/MMT             Sensory, Edema, Orthotics          Cognition, Communication, Swallow  Cognitive Assessment/Intervention- PT/OT  Affect/Mental Status (Cognitive): flat/blunted affect  Orientation Status (Cognition): oriented x 3  Follows Commands (Cognition): WNL  Speaking Valve  Pretreatment Heart Rate (beats/min): 75  Pre SpO2 (%): 90  Posttreatment Heart Rate (beats/min): 67  Post SpO2 (%): 91  General Eating/Swallowing Observations  Pre SpO2 (%): 90  Post SpO2 (%): 91    Outcome Summary  Outcome Summary/Treatment Plan (PT)  Daily Summary of Progress (PT): progress toward functional goals is good          PT Rehab Goals     Row Name 03/10/18 0900             Bed Mobility Goal 1 (PT)    Activity/Assistive Device (Bed Mobility Goal 1, PT) bed mobility activities, all  -ES      [place] Level/Cues Needed (Bed Mobility Goal 1, PT) independent  -ES      Time Frame (Bed Mobility Goal 1, PT) 1 week;long term goal (LTG)  -ES      Progress/Outcomes (Bed Mobility Goal 1, PT) goal ongoing  -ES         Transfer Goal 1 (PT)    Activity/Assistive Device (Transfer Goal 1, PT) sit-to-stand/stand-to-sit;bed-to-chair/chair-to-bed;walker, rolling  -ES       [place] Level/Cues Needed (Transfer Goal 1, PT) independent  -ES      Time Frame (Transfer Goal 1, PT) 1 week;long term goal (LTG)  -ES      Progress/Outcome (Transfer Goal 1, PT) goal ongoing  -ES         Gait Training Goal 1 (PT)    Activity/Assistive Device (Gait Training Goal 1, PT) gait (walking locomotion);walker, rolling  -ES      [place] Level (Gait Training Goal 1, PT) independent  -ES      Distance (Gait Goal 1, PT) 150  -ES      Time Frame (Gait Training Goal 1, PT) 1 week;long term goal (LTG)  -ES      Progress/Outcome (Gait Training Goal 1, PT) goal ongoing  -ES        User Key  (r) = Recorded By, (t) = Taken By, (c) = Cosigned By    Initials Name Provider Type    NAZARIO Gardner, PT Physical Therapist          Physical Therapy Education     Title: PT OT SLP Therapies (Active)     Topic: Physical Therapy (Done)     Point: Mobility training (Done)    Learning Progress Summary     Learner Status Readiness Method Response Comment Documented by    Patient Done Acceptance E ANA RAYO   03/12/18 1507     Done Acceptance E,D VU,NR POC progression, fall precautions, transfer and gait safety, D/C planning, [facility] 03/09/18 1324          Point: Home exercise program (Done)    Learning Progress Summary     Learner Status Readiness Method Response Comment Documented by    Patient Done Acceptance ANA DIAS   03/12/18 1507     Done Acceptance E,D VU,NR POC progression, fall precautions, transfer and gait safety, D/C planning, [facility] 03/09/18 1324          Point: Body mechanics (Done)    Learning Progress Summary     Learner Status Readiness Method Response Comment Documented by    Patient Done Acceptance ANA DIAS   03/12/18 1507     Done Acceptance E,D VU,NR POC progression, fall precautions, transfer and gait safety, D/C planning, [facility] 03/09/18 1324          Point: Precautions (Done)    Learning Progress Summary     Learner Status Readiness Method Response Comment Documented by    Patient Done Acceptance  E VU,DU   03/12/18 1507     Done Acceptance E,D VU,NR POC progression, fall precautions, transfer and gait safety, D/C planning, [facility] MB 03/09/18 1324                      User Key     Initials Effective Dates Name Provider Type Discipline     06/19/15 -  Mary Goldstein, PT Physical Therapist PT    MB 03/14/16 -  Ruth Rose, PT Physical Therapist PT                   PT ASSESSMENT (last 72 hours)      Physical Therapy Evaluation     Row Name 03/10/18 0945          PT Evaluation Time/Intention    Subjective Information no complaints  -     Document Type re-evaluation;wound care  -     Row Name 03/10/18 0945          General Information    Patient Profile Reviewed? yes  -     Onset of Illness/Injury or Date of Surgery 03/08/18  -     Referring Physician MD Carson  -     Patient Observations alert;cooperative;agree to therapy  -     Pertinent History of Current Functional Problem See PT mobility eval for details. Pt with POA DTI to the R posterior heel. WOCN and RN staff managing the pt's sacral wound.  -     Risks Reviewed patient and family:;increased discomfort  -     Benefits Reviewed patient and family:;improve skin integrity  -     Barriers to Rehab medically complex;previous functional deficit  -     Row Name 03/10/18 0945          Cognitive Assessment/Intervention- PT/OT    Orientation Status (Cognition) oriented x 3  -     Follows Commands (Cognition) WNL  -     Row Name 03/10/18 0945          Pain Assessment    Additional Documentation Pain Scale: Numbers Pre/Post-Treatment (Group)  -     Row Name 03/10/18 0945          Pain Scale: Numbers Pre/Post-Treatment    Pain Scale: Numbers, Pretreatment 0/10 - no pain  -     Pain Scale: Numbers, Post-Treatment 0/10 - no pain  -     Row Name 03/10/18 0945          Wound 02/14/18 1115 Right medial heel    Wound - Properties Group Date first assessed: 02/14/18  -KS Time first assessed: 1115  -KS Present On Admission : yes  -KS  "Side: Right  -KS Orientation: medial  -KS Location: heel  -KS Stage, Pressure Injury: deep tissue injury  -KS    Wound Image Images linked: 1  -     Dressing Appearance open to air  -MC     Base purple;maroon/purple;other (see comments)   blistered  -     Periwound blanchable;pink  -     Periwound Temperature warm  -     Periwound Skin Turgor soft  -MC     Wound length (cm) 5.1 cm  -MC     Wound width (cm) 3.5 cm  -     Wound depth (cm) --   raised  -     Drainage Amount none  -     Care, Wound ultrasound therapy, non contact low frequency   MIST 8 minutes  -     Dressing Care, Wound dressing applied;low-adherent;foam   6\" optifoam gentle border  -     Periwound Care, Wound cleansed with pH balanced cleanser;dry periwound area maintained  -     Row Name             Wound 03/09/18 0930 Right upper ischial tuberosity pressure injury    Wound - Properties Group Date first assessed: 03/09/18  -KS Time first assessed: 0930  -KS Present On Admission : yes  -KS Side: Right  -KS Orientation: upper  -KS Location: ischial tuberosity  -KS Type: pressure injury  -KS Stage, Pressure Injury: Stage 4  -KS    Row Name             Wound 02/13/18 2100 other (see comments) anterior groin other (see comments)    Wound - Properties Group Date first assessed: 02/13/18  -KS Time first assessed: 2100  -KS Side: other (see comments)  -KS, bilateral  Orientation: anterior  -KS Location: groin  -KS, and breast   Type: other (see comments)  -KS, excoriation      Row Name 03/10/18 0945          Physical Therapy Clinical Impression    Date of Referral to PT 03/09/18  -     PT Diagnosis (PT Clinical Impression) impaired skin integrity  -     Patient/Family Goals Statement (PT Clinical Impression) Heal wounds  -     Criteria for Skilled Interventions Met (PT Clinical Impression) yes;treatment indicated  -     Rehab Potential (PT Clinical Summary) good, to achieve stated therapy goals  -     Care Plan Review (PT) " evaluation/treatment results reviewed;patient/other agree to care plan  -     Row Name 03/10/18 0945          Physical Therapy Goals    Wound Care Goal Selection (PT) wound care, PT goal 1  -     Additional Documentation Wound Care Goal Selection (PT) (Row)  -     Row Name 03/10/18 0945          Wound Care Goal 1 (PT)    Wound Care Goal 1 (PT) Pt will demonstrate 25% reduction in wound area to indicate healing progress.  -     Time Frame (Wound Care Goal 1, PT) long term goal (LTG);10 days  -     Row Name 03/10/18 0945          Positioning and Restraints    Pre-Treatment Position in bed  -     Post Treatment Position bed  -MC     In Bed supine;call light within reach;encouraged to call for assist;with family/caregiver;waffle boots/both  -       User Key  (r) = Recorded By, (t) = Taken By, (c) = Cosigned By    Initials Name Provider Type    IVAN Chao, PT Physical Therapist    TAJ Pereira RN Registered Nurse            PT Recommendation and Plan  Anticipated Discharge Disposition (PT): skilled nursing facility (SNF)  Therapy Frequency (PT Clinical Impression): daily               Outcome Summary: DTPI stable with moderate fluid filled blister, remaining intact.  PT will cont with mist therapy 2-3 x/ week to help increase healing potential.   Plan of Care Reviewed With: patient          Outcome Measures     Row Name 03/12/18 1421 03/12/18 1405          How much help from another person do you currently need...    Turning from your back to your side while in flat bed without using bedrails? 4  -SJ  --     Moving from lying on back to sitting on the side of a flat bed without bedrails? 4  -SJ  --     Moving to and from a bed to a chair (including a wheelchair)? 3  -SJ  --     Standing up from a chair using your arms (e.g., wheelchair, bedside chair)? 4  -SJ  --     Climbing 3-5 steps with a railing? 3  -SJ  --     To walk in hospital room? 3  -SJ  --     AM-PAC 6 Clicks Score 21  -SJ  --         How much help from another is currently needed...    Putting on and taking off regular lower body clothing?  -- 2  -AC     Bathing (including washing, rinsing, and drying)  -- 2  -AC     Toileting (which includes using toilet bed pan or urinal)  -- 2  -AC     Putting on and taking off regular upper body clothing  -- 2  -AC     Taking care of personal grooming (such as brushing teeth)  -- 3  -AC     Eating meals  -- 4  -AC     Score  -- 15  -AC        Modified [place] Scale    Modified [place] Scale 3 - Moderate disability.  Requiring some help, but able to walk without assistance.  -  --        Functional Assessment    Outcome Measure Options AM-PAC 6 Clicks Basic Mobility (PT)  - AM-PAC 6 Clicks Daily Activity (OT)  -       User Key  (r) = Recorded By, (t) = Taken By, (c) = Cosigned By    Initials Name Provider Type     Gypsy Cantu, OT Occupational Therapist     Mary Goldstein, PT Physical Therapist              Time Calculation        PT Charges     Row Name 03/12/18 1507 03/12/18 1030          Time Calculation    Start Time 1421  - 1030  -     PT Received On 03/12/18  -  --     PT Goal Re-Cert Due Date 03/20/18  - 03/20/18  -        Time Calculation- PT    Total Timed Code Minutes- PT 23 minute(s)  - 25 minute(s)  -       User Key  (r) = Recorded By, (t) = Taken By, (c) = Cosigned By    Initials Name Provider Type     Lane Yates, PT Physical Therapist     Mary Goldstein, PT Physical Therapist             Therapy Charges for Today     Code Description Service Date Service Provider Modifiers Qty    [id number] HC PT NLFU MIST 3/12/2018 Lane Yates, PT GP 1            PT G-Codes  PT Professional Judgement Used?: Yes  Outcome Measure Options: AM-PAC 6 Clicks Basic Mobility (PT)  Score: 17  Functional Limitation: Mobility: Walking and moving around  Mobility: Walking and Moving Around Current Status (): At least 40 percent but less than 60 percent impaired, limited  or restricted  Mobility: Walking and Moving Around Goal Status (): At least 20 percent but less than 40 percent impaired, limited or restricted        Lane Yates, PT  3/12/2018

## 2018-03-13 LAB
ANION GAP SERPL CALCULATED.3IONS-SCNC: 7 MMOL/L (ref 3–11)
BACTERIA SPEC AEROBE CULT: NORMAL
BACTERIA SPEC AEROBE CULT: NORMAL
BUN BLD-MCNC: 20 MG/DL (ref 9–23)
BUN/CREAT SERPL: 16.7 (ref 7–25)
CALCIUM SPEC-SCNC: 9.6 MG/DL (ref 8.7–10.4)
CHLORIDE SERPL-SCNC: 101 MMOL/L (ref 99–109)
CO2 SERPL-SCNC: 32 MMOL/L (ref 20–31)
CREAT BLD-MCNC: 1.2 MG/DL (ref 0.6–1.3)
GFR SERPL CREATININE-BSD FRML MDRD: 45 ML/MIN/1.73
GLUCOSE BLD-MCNC: 148 MG/DL (ref 70–100)
GLUCOSE BLDC GLUCOMTR-MCNC: 141 MG/DL (ref 70–130)
GLUCOSE BLDC GLUCOMTR-MCNC: 146 MG/DL (ref 70–130)
GLUCOSE BLDC GLUCOMTR-MCNC: 175 MG/DL (ref 70–130)
GLUCOSE BLDC GLUCOMTR-MCNC: 176 MG/DL (ref 70–130)
GRAM STN SPEC: NORMAL
POTASSIUM BLD-SCNC: 4 MMOL/L (ref 3.5–5.5)
SODIUM BLD-SCNC: 140 MMOL/L (ref 132–146)

## 2018-03-13 PROCEDURE — 94799 UNLISTED PULMONARY SVC/PX: CPT

## 2018-03-13 PROCEDURE — 97110 THERAPEUTIC EXERCISES: CPT

## 2018-03-13 PROCEDURE — 80048 BASIC METABOLIC PNL TOTAL CA: CPT

## 2018-03-13 PROCEDURE — 97116 GAIT TRAINING THERAPY: CPT

## 2018-03-13 PROCEDURE — 25010000002 HEPARIN (PORCINE) PER 1000 UNITS: Performed by: NURSE PRACTITIONER

## 2018-03-13 PROCEDURE — 82962 GLUCOSE BLOOD TEST: CPT

## 2018-03-13 PROCEDURE — 25010000002 PIPERACILLIN SOD-TAZOBACTAM PER 1 G: Performed by: NURSE PRACTITIONER

## 2018-03-13 PROCEDURE — 25010000002 VANCOMYCIN 10 G RECONSTITUTED SOLUTION

## 2018-03-13 PROCEDURE — 94660 CPAP INITIATION&MGMT: CPT

## 2018-03-13 PROCEDURE — 25010000002 PIPERACILLIN-TAZOBACTAM: Performed by: HOSPITALIST

## 2018-03-13 PROCEDURE — 99232 SBSQ HOSP IP/OBS MODERATE 35: CPT | Performed by: HOSPITALIST

## 2018-03-13 RX ADMIN — VANCOMYCIN HYDROCHLORIDE 1250 MG: 10 INJECTION, POWDER, LYOPHILIZED, FOR SOLUTION INTRAVENOUS at 21:45

## 2018-03-13 RX ADMIN — BUSPIRONE HYDROCHLORIDE 7.5 MG: 15 TABLET ORAL at 20:53

## 2018-03-13 RX ADMIN — INSULIN LISPRO 2 UNITS: 100 INJECTION, SOLUTION INTRAVENOUS; SUBCUTANEOUS at 17:24

## 2018-03-13 RX ADMIN — CLOPIDOGREL BISULFATE 75 MG: 75 TABLET ORAL at 08:53

## 2018-03-13 RX ADMIN — HEPARIN SODIUM 5000 UNITS: 5000 INJECTION, SOLUTION INTRAVENOUS; SUBCUTANEOUS at 20:53

## 2018-03-13 RX ADMIN — NYSTATIN: 100000 POWDER TOPICAL at 20:56

## 2018-03-13 RX ADMIN — CARVEDILOL 6.25 MG: 6.25 TABLET, FILM COATED ORAL at 20:53

## 2018-03-13 RX ADMIN — PANTOPRAZOLE SODIUM 40 MG: 40 TABLET, DELAYED RELEASE ORAL at 05:10

## 2018-03-13 RX ADMIN — POTASSIUM CHLORIDE 20 MEQ: 750 CAPSULE, EXTENDED RELEASE ORAL at 08:52

## 2018-03-13 RX ADMIN — OXYCODONE HYDROCHLORIDE AND ACETAMINOPHEN 1 TABLET: 10; 325 TABLET ORAL at 20:54

## 2018-03-13 RX ADMIN — FENTANYL 1 PATCH: 75 PATCH TRANSDERMAL at 09:02

## 2018-03-13 RX ADMIN — CLOTRIMAZOLE 10 MG: 10 LOZENGE ORAL at 09:06

## 2018-03-13 RX ADMIN — CLOTRIMAZOLE 10 MG: 10 LOZENGE ORAL at 17:24

## 2018-03-13 RX ADMIN — CARVEDILOL 6.25 MG: 6.25 TABLET, FILM COATED ORAL at 08:54

## 2018-03-13 RX ADMIN — PREGABALIN 100 MG: 100 CAPSULE ORAL at 21:04

## 2018-03-13 RX ADMIN — TAZOBACTAM SODIUM AND PIPERACILLIN SODIUM 4.5 G: 500; 4 INJECTION, SOLUTION INTRAVENOUS at 10:12

## 2018-03-13 RX ADMIN — CLOTRIMAZOLE 10 MG: 10 LOZENGE ORAL at 20:52

## 2018-03-13 RX ADMIN — PREGABALIN 100 MG: 100 CAPSULE ORAL at 13:48

## 2018-03-13 RX ADMIN — SACUBITRIL AND VALSARTAN 1 TABLET: 24; 26 TABLET, FILM COATED ORAL at 08:56

## 2018-03-13 RX ADMIN — HEPARIN SODIUM 5000 UNITS: 5000 INJECTION, SOLUTION INTRAVENOUS; SUBCUTANEOUS at 08:57

## 2018-03-13 RX ADMIN — CETIRIZINE HYDROCHLORIDE 10 MG: 10 TABLET, FILM COATED ORAL at 20:53

## 2018-03-13 RX ADMIN — SACUBITRIL AND VALSARTAN 1 TABLET: 24; 26 TABLET, FILM COATED ORAL at 20:53

## 2018-03-13 RX ADMIN — INSULIN LISPRO 2 UNITS: 100 INJECTION, SOLUTION INTRAVENOUS; SUBCUTANEOUS at 12:11

## 2018-03-13 RX ADMIN — PREGABALIN 100 MG: 100 CAPSULE ORAL at 05:10

## 2018-03-13 RX ADMIN — CLONAZEPAM 0.25 MG: 0.5 TABLET ORAL at 21:13

## 2018-03-13 RX ADMIN — Medication 1 CAPSULE: at 08:55

## 2018-03-13 RX ADMIN — FUROSEMIDE 40 MG: 40 TABLET ORAL at 08:51

## 2018-03-13 RX ADMIN — PIPERACILLIN SODIUM,TAZOBACTAM SODIUM 3.38 G: 3; .375 INJECTION, POWDER, FOR SOLUTION INTRAVENOUS at 17:23

## 2018-03-13 RX ADMIN — BUSPIRONE HYDROCHLORIDE 7.5 MG: 15 TABLET ORAL at 08:55

## 2018-03-13 RX ADMIN — LEVOTHYROXINE SODIUM 112 MCG: 112 TABLET ORAL at 05:10

## 2018-03-13 RX ADMIN — CLONAZEPAM 0.25 MG: 0.5 TABLET ORAL at 13:45

## 2018-03-13 RX ADMIN — NYSTATIN: 100000 POWDER TOPICAL at 08:59

## 2018-03-13 RX ADMIN — OXYCODONE HYDROCHLORIDE AND ACETAMINOPHEN 1 TABLET: 10; 325 TABLET ORAL at 08:53

## 2018-03-13 RX ADMIN — TAZOBACTAM SODIUM AND PIPERACILLIN SODIUM 4.5 G: 500; 4 INJECTION, SOLUTION INTRAVENOUS at 02:24

## 2018-03-13 RX ADMIN — ATORVASTATIN CALCIUM 80 MG: 40 TABLET, FILM COATED ORAL at 20:53

## 2018-03-13 RX ADMIN — ASPIRIN 81 MG: 81 TABLET, COATED ORAL at 08:52

## 2018-03-13 NOTE — PROGRESS NOTES
[facility] Progress Note    Admission Date: 3/8/2018    Tamara Langston  1955  [id number]    Date: 3/13/2018    Antibiotics:  Anti-Infectives     Ordered     Dose/Rate Route Frequency Start Stop    03/10/18 2136  vancomycin 1250 mg/250 mL 0.9% NS IVPB ([facility])     Ordering Provider:  Abelardo Whittaker RPH    1,250 mg  over 90 Minutes Intravenous Every 24 Hours Scheduled 03/10/18 2230 03/17/18 2059 03/08/18 2314  piperacillin-tazobactam (ZOSYN) 4.5 g in iso-osmotic dextrose 100 mL IVPB (premix)     Ordering Provider:  JEOVANY Dennis    4.5 g  over 4 Hours Intravenous Every 8 Hours 03/09/18 0200 03/19/18 0159    03/08/18 2314  Pharmacy to dose vancomycin     Ordering Provider:  JEOVANY Dennis     Does not apply Continuous PRN 03/08/18 2314 03/19/18 0013    03/08/18 1916  vancomycin IVPB 2000 mg in 0.9% Sodium Chloride (premix) 500 mL     Ordering Provider:  Juan Palacios MD    20 mg/kg × 105 kg  over 2 Hours Intravenous Once 03/08/18 2000 03/08/18 2254 03/08/18 1916  piperacillin-tazobactam (ZOSYN) 4.5 g in iso-osmotic dextrose 100 mL IVPB (premix)     Ordering Provider:  Juan Palacios MD    4.5 g  over 30 Minutes Intravenous Once 03/08/18 1917 03/08/18 2045          Reason for Consultation:   Fever, probable uti     History of present illness:    Patient is a 62-year-old female with hypertension, hyperlipidemia, type 2 diabetes mellitus, morbid obesity admitted 2/13 to 2/27 after presenting with confusion and  fever.  She had an unstageable  sacral deep tissue injury with associated infection which had progressed over several months.  Over the course of a year sbe became  quite debilitated with limited mobility following left TMA.   On  admission, she was septic with fever, leukocytosis and acute on chronic kidney injury  Hospital course complicated by CHF, respiratory distress  and ELIF  She was diagnosed with NSTEMI and stents to LAD and diagonal arteries EF 34% and Dr Zamorano diagnosed  Takotsubo myopathy  Her sacral decub improved with mist therapy and sequential debridement . Wound culture grew enterococcus, pseudomonas, and coag negative staph    Sacral MRI showed no evidence of abscess or OM.  She was denied [facility] and transferred to [facility] on zosyn and vancomycin There was a discrepancy in notes about duration of IV antibiotics  ie 3/2 or 3/12   Zosyn and vanc were stopped 3/2 Her picc was left in place   While at the center she evidently made progress with PT and walked up to 100'  She had fever to 102 followed by an episode of unresponsiveness then confusion  Transferred bact to [facility] with T100.3   UA with mild pyuria; culture pending   Wounds include a right heel DTPI and right ischial stage which measures 4.5x3.5x4.0cm  There is  undermining 6-12 o'clock; wound bed is beefy red, moist, with 10% slough noted in wound bed. No purulent drainage noted  On zosyn and vanc her fever appears to be improving  Cultures pending    3/11/18 alert, no new c/o  Afebrile on vanc and zosyn   3/12/18  Alert, no new c/o  Diarrhea improving; evaluation for [facility] stay for MIST therapy requested  3/13       Alert, some improvement with endurance per pt and PT notes; await precert from [facility]      Review of Systems:  Constitutional-- + Fever, chills + sweats.  Appetite good per patient with good oral intake She reports continued progress with PT  HEENT-- No new vision, hearing or throat complaints.  No  oral ulcers or sore throat.  Denies odynophagia or dysphagia. No headache, photophobia or neck stiffness.  CV-- No chest pain, palpitation or syncope  Resp-- No SOB/cough/Hemoptysis  GI- + diarrhea- intermittent.  No hematochezia, melena, or hematemesis. Denies jaundice or chronic liver disease. No odynophagia or dysphagia  -- No dysuria, hematuria, or flank pain.  Denies hesitancy, urgency or flank pain.  Lymph- no swollen lymph nodes in neck/axilla or groin.   Heme- No active bruising or bleeding; no Hx of  DVT or PE.  MS-- no swelling or pain in the bones or joints of arms/legs.  No new back pain.  Neuro-- No acute focal weakness or numbness in the arms or legs.  No seizures.  Skin--No rashes or lesions       PE:  Vital Signs  Temp  Min: 97.5 °F (36.4 °C)  Max: 98.7 °F (37.1 °C)  BP  Min: 115/59  Max: 143/74  Pulse  Min: 62  Max: 77  Resp  Min: 18  Max: 18  SpO2  Min: 92 %  Max: 95 %  GENERAL: Awake and alert, in no acute distress; appears to recall medical history accurately.  Chronically ill-appearing with limited mobility in the bed  HEENT: Normocephalic, atraumatic.  PERRL. EOMI.  slera injected. No icterus. Oropharynx with mild thrush, no exudate. No evidence of peridontal disease.    NECK: Supple without nuchal rigidity  LYMPH: No cervical, axillary or inguinal lymphadenopathy. No neck masses  HEART: RRR; СЕРГЕЙ III/VI.  Bilateral lower extremity trace edema.  faint pedal pulses.  LUNGS: Clear to auscultation bilaterally without wheezing, rales, rhonchi.  Normal effort with supplemental O2 present.   ABDOMEN: Morbidly obese, Soft, nontender, nondistended. Positive bowel sounds. No rebound or guarding.   EXT:  L foot TMA well healed ; right heel with blister which is intact and w/o fluctuance;   R UE picc site w/o erythema, drainage or swelling  : No weiss catheter.  MSK: FROM without joint effusions noted    SKIN:  mild groin yeast rash    Sacral wound granulating; undermining has decreased somewhat; small amt yellow-green exudate on packing  NEURO: Oriented to PPT. No focal deficits.   PSYCHIATRIC: Normal insight and judgement. Cooperative with PE        Laboratory Data      Results from last 7 days  Lab Units 03/08/18  1842   WBC 10*3/mm3 15.94*   HEMOGLOBIN g/dL 10.2*   HEMATOCRIT % 33.4*   PLATELETS 10*3/mm3 192       Results from last 7 days  Lab Units 03/13/18  0445   SODIUM mmol/L 140   POTASSIUM mmol/L 4.0   CHLORIDE mmol/L 101   CO2 mmol/L 32.0*   BUN mg/dL 20   CREATININE mg/dL 1.20   GLUCOSE mg/dL 148*    CALCIUM mg/dL 9.6       Results from last 7 days  Lab Units 03/08/18  1842   ALK PHOS U/L 78   BILIRUBIN mg/dL 0.3   ALT (SGPT) U/L 26   AST (SGOT) U/L 32       Results from last 7 days  Lab Units 03/10/18  0719   SED RATE mm/hr 75*       Results from last 7 days  Lab Units 03/10/18  0719   CRP mg/dL 15.10*       Estimated Creatinine Clearance: 58.3 mL/min (by C-G formula based on SCr of 1.2 mg/dL).      Microbiology:      Radiology:  Imaging Results (last 72 hours)     Procedure Component Value Units Date/Time    CT Head Without Contrast [[id number]] Collected:  03/08/18 2116     Updated:  03/08/18 2251    Narrative:       EXAM:    CT Head Without Intravenous Contrast    CLINICAL HISTORY:    62 years old, female; Pressure ulcer of sacral region, unspecified stage;   Transient alteration of awareness; Fever, unspecified; Signs and symptoms;   Other: Patient found unresponsive; Additional info: Pt found unresponsive    TECHNIQUE:    Axial computed tomography images of the head/brain without intravenous   contrast.  All CT scans at this facility use one or more dose reduction   techniques, viz.: automated exposure control; ma/kV adjustment per patient size   (including targeted exams where dose is matched to indication; i.e. head); or   iterative reconstruction technique.    COMPARISON:    No relevant prior studies available.    FINDINGS:    Brain:  Nonspecific calcification left occipital lobe.  No hemorrhage.  No   significant white matter disease.    Ventricles:  Unremarkable.  No ventriculomegaly.    Bones/joints:  Unremarkable.  No acute fracture.    Soft tissues:  Unremarkable.    Sinuses:  Mild sphenoid sinusitis.    Mastoid air cells:  Unremarkable as visualized.      Impression:         No acute findings.    THIS DOCUMENT HAS BEEN ELECTRONICALLY SIGNED BY NOEL LYLE MD    XR Chest 1 View [[id number]] Collected:  03/08/18 1813     Updated:  03/08/18 1912    Narrative:       EXAM:    XR Chest, 1  View    CLINICAL HISTORY:    62 years old, female; Signs and symptoms; Other: Weak/dizzy/ams; Additional   info: Weak/dizzy/ams triage protocol    TECHNIQUE:    Frontal view of the chest.    COMPARISON:    CR - XR CHEST 1 VW 2018-02-19 04:36    FINDINGS:    Lungs:  Unremarkable.  No consolidation.    Pleural space:  Unremarkable.  No pneumothorax.    Heart:  The heart demonstrates mild diffuse enlargement.    Mediastinum:  Unremarkable.    Bones/joints:  Unremarkable.    Other findings:  The patient is rotated to the left.      Impression:         No acute findings.    THIS DOCUMENT HAS BEEN ELECTRONICALLY SIGNED BY NOEL LYLE MD          I personally reviewed the radiographic studies     Impression:   --Sepsis with fever, leukocytosis and acute kidney injury  Possible foci of infection include urinary tract infection, picc line infection or the right heel decubitus  CRP 15.10 3/10     Stage 4 sacral  Wound which does not appear to be the source of sepsis  Recent MRI w/o evidence of osteomyelitis      Right heel decubitus with intact blister     Mild pyuria     Diarrhea- cdiff negative     Recent Non-ST segment elevation MI with increase in troponin     Cardiomyopathy     Acute on chronic kidney disease     Type 2 diabetes mellitus     Hypertension and Hyperlipidemia     COPD with home oxygen use and acute hypoxic respiratory failure at admission  Morbid obesity  Chronic debility       PLAN/RECOMMENDATIONS:   Thank you for asking us to see Tamara Langston, I recommend the following:     Agree with zosyn and vancomycin; culture wound  Topical treatment of thrush  Stool culture  Right heel w/o signs of osteomyelitis, defer MRI  Probiotic  Check  for [facility] eligibility since I think she would benefit from mist therapy          Oksana Cruz MD  3/13/2018

## 2018-03-13 NOTE — PLAN OF CARE
Problem: Fall Risk (Adult)  Goal: Identify Related Risk Factors and Signs and Symptoms  Outcome: Ongoing (interventions implemented as appropriate)    Goal: Absence of Fall  Outcome: Ongoing (interventions implemented as appropriate)      Problem: Patient Care Overview  Goal: Plan of Care Review  Outcome: Ongoing (interventions implemented as appropriate)   03/13/18 0500   Coping/Psychosocial   Plan of Care Reviewed With patient   Plan of Care Review   Progress no change   OTHER   Outcome Summary Patient rested comfortably throughout night. Patient reported pain once during night and recieved PRN pain medication. Patient's vital signs remain stable. Patient is alert and oriented. Patient is on 2L O2 nasal canula and on biPAP at night. Will continue to monitor.      Goal: Individualization and Mutuality  Outcome: Ongoing (interventions implemented as appropriate)    Goal: Discharge Needs Assessment  Outcome: Ongoing (interventions implemented as appropriate)      Problem: Wound (Includes Pressure Injury) (Adult)  Goal: Signs and Symptoms of Listed Potential Problems Will be Absent, Minimized or Managed (Wound)  Outcome: Ongoing (interventions implemented as appropriate)      Problem: Sepsis/Septic Shock (Adult)  Goal: Signs and Symptoms of Listed Potential Problems Will be Absent, Minimized or Managed (Sepsis/Septic Shock)  Outcome: Ongoing (interventions implemented as appropriate)

## 2018-03-13 NOTE — PROGRESS NOTES
Continued Stay Note  [facility]     Patient Name: Tamara Langston  MRN: [id number]  Today's Date: 3/13/2018    Admit Date: 3/8/2018          Discharge Plan     Row Name 03/13/18 0832       Plan    Plan [facility] vs [facility]    Patient/Family in Agreement with Plan yes    Plan Comments Called and left vm for Rebekah at [facility] regarding referral.               Discharge Codes    No documentation.       Expected Discharge Date and Time     Expected Discharge Date Expected Discharge Time    Mar 13, 2018             Leny Moran

## 2018-03-13 NOTE — THERAPY TREATMENT NOTE
Acute Care - Physical Therapy Treatment Note  [facility]     Patient Name: Tamara Langston  : 1955  MRN: [id number]  Today's Date: 3/13/2018  Onset of Illness/Injury or Date of Surgery: 18  Date of Referral to PT: 18  Referring Physician: MD Carson    Admit Date: 3/8/2018    Visit Dx:    ICD-10-CM ICD-9-CM   1. Transient alteration of awareness R40.4 780.02   2. Fever, unspecified fever cause R50.9 780.60   3. Decubitus ulcer of sacral region, unspecified ulcer stage L89.159 707.03     707.20   4. Impaired mobility and ADLs Z74.09 799.89   5. Impaired functional mobility, balance, gait, and endurance Z74.09 V49.89     Patient Active Problem List   Diagnosis   • Atopic rhinitis   • Restrictive ventilatory defect   • COPD (chronic obstructive pulmonary disease)   • Osteoporosis   • Obstructive sleep apnea syndrome   • Abnormal liver enzymes   • Cholelithiasis   • Chronic back pain   • Mixed anxiety depressive disorder   • Type 2 diabetes mellitus   • Dyslipidemia   • Essential tremor   • Fibromyalgia   • Gastroesophageal reflux disease without esophagitis   • Hypertension   • Hypothyroidism   • Insomnia   • Osteoarthritis   • Psoriasis   • Branch retinal vein occlusion   • Cobalamin deficiency   • Obesity   • Vitamin D deficiency   • Hypokalemia   • Lactic acidosis   • Fatty liver disease, nonalcoholic   • Sinus bradycardia   • NSTEMI (non-ST elevated myocardial infarction)   • Tobacco abuse   • Hypoxia   • Peripheral vascular disease   • Diabetic polyneuropathy associated with type 2 diabetes mellitus   • Anemia, blood loss   • Chronic pain   • Chronic, continuous use of opioids   • Chronic anxiety   • Chronically on benzodiazepine therapy   • Tubular adenoma   • Elevated troponin level   • Urine abnormality   • ELIF (acute kidney injury)   • Cellulitis of sacral region   • Altered mental status   • Fever   • Sepsis   • Acute on chronic respiratory failure with hypoxia   • Acute cystitis  without hematuria   • Coronary artery disease involving native heart   • Takotsubo cardiomyopathy   • Elevated troponin   • Chronic systolic heart failure   • Chronic respiratory failure with hypoxia   • Infected sacral decubitus ulcers   • Weakness       Therapy Treatment    Therapy Treatment / Health Promotion    Treatment Time/Intention  Discipline: physical therapy assistant  Document Type: therapy note (daily note)  Subjective Information: complains of, weakness, pain  Mode of Treatment: physical therapy  Patient Effort: good  Plan of Care Review  Plan of Care Reviewed With: patient    Vitals/Pain/Safety  Vital Signs  Pre SpO2 (%): 93  O2 Delivery Pre Treatment: supplemental O2  Post SpO2 (%): 96  O2 Delivery Post Treatment: supplemental O2  Pre Patient Position: Supine  Intra Patient Position: Standing  Post Patient Position: Supine  Pain Scale: Numbers Pre/Post-Treatment  Pain Scale: Numbers, Pretreatment: 8/10  Pain Scale: Numbers, Post-Treatment: 8/10  Pain Location - Orientation: generalized  Pain Intervention(s): Repositioned, Ambulation/increased activity  Pain Scale: Word Pre/Post-Treatment  Pain Location - Orientation: generalized  Pain Intervention(s): Repositioned, Ambulation/increased activity  Pain Scale: FACES Pre/Post-Treatment  Pain Location - Orientation: generalized  Pain Intervention(s): Repositioned, Ambulation/increased activity  Positioning and Restraints  Pre-Treatment Position: in bed  Post Treatment Position: bed  In Bed: notified nsg, supine, call light within reach, side rails up x2    Mobility,ADL,Motor, Modality  Bed Mobility Assessment/Treatment  Supine-Sit [place] (Bed Mobility): contact guard  Sit-Supine [place] (Bed Mobility): contact guard, supervision  Bed Mobility, Safety Issues: decreased use of legs for bridging/pushing  Transfer Assessment/Treatment  Transfer Assessment/Treatment: sit-stand transfer, stand-sit transfer  Sit-Stand Transfer  Sit-Stand [place]  (Transfers): contact guard  Assistive Device (Sit-Stand Transfers): walker, front-wheeled  Stand-Sit Transfer  Stand-Sit [place] (Transfers): contact guard  Assistive Device (Stand-Sit Transfers): walker, front-wheeled  Gait/Stairs Assessment/Training  [place] Level (Gait): contact guard  Assistive Device (Gait): walker, front-wheeled  Distance in Feet (Gait): 100  Deviations/Abnormal Patterns (Gait): gait speed decreased, stride length decreased     Therapeutic Exercise  Upper Extremity Range of Motion (Therapeutic Exercise): shoulder flexion/extension, bilateral, elbow flexion/extension, bilateral  Lower Extremity (Therapeutic Exercise): SLR (straight leg raise), bilateral, marching while seated  Lower Extremity Range of Motion (Therapeutic Exercise): ankle dorsiflexion/plantar flexion, right  Exercise Type (Therapeutic Exercise): AROM (active range of motion)  Position (Therapeutic Exercise): seated  Sets/Reps (Therapeutic Exercise): 10 reps           ROM/MMT             Sensory, Edema, Orthotics          Cognition, Communication, Swallow  Cognitive Assessment/Intervention- PT/OT  Orientation Status (Cognition): oriented x 3  Follows Commands (Cognition): WNL  Speaking Valve  Pre SpO2 (%): 93  Post SpO2 (%): 96  General Eating/Swallowing Observations  Pre SpO2 (%): 93  Post SpO2 (%): 96    Outcome Summary               PT Rehab Goals     Row Name 03/10/18 0900             Bed Mobility Goal 1 (PT)    Activity/Assistive Device (Bed Mobility Goal 1, PT) bed mobility activities, all  -ES      [place] Level/Cues Needed (Bed Mobility Goal 1, PT) independent  -ES      Time Frame (Bed Mobility Goal 1, PT) 1 week;long term goal (LTG)  -ES      Progress/Outcomes (Bed Mobility Goal 1, PT) goal ongoing  -ES         Transfer Goal 1 (PT)    Activity/Assistive Device (Transfer Goal 1, PT) sit-to-stand/stand-to-sit;bed-to-chair/chair-to-bed;walker, rolling  -ES      [place] Level/Cues Needed (Transfer Goal 1,  PT) independent  -ES      Time Frame (Transfer Goal 1, PT) 1 week;long term goal (LTG)  -ES      Progress/Outcome (Transfer Goal 1, PT) goal ongoing  -ES         Gait Training Goal 1 (PT)    Activity/Assistive Device (Gait Training Goal 1, PT) gait (walking locomotion);walker, rolling  -ES      [place] Level (Gait Training Goal 1, PT) independent  -ES      Distance (Gait Goal 1, PT) 150  -ES      Time Frame (Gait Training Goal 1, PT) 1 week;long term goal (LTG)  -ES      Progress/Outcome (Gait Training Goal 1, PT) goal ongoing  -ES        User Key  (r) = Recorded By, (t) = Taken By, (c) = Cosigned By    Initials Name Provider Type    ES Alisha Gardner, PT Physical Therapist          Physical Therapy Education     Title: PT OT SLP Therapies (Active)     Topic: Physical Therapy (Done)     Point: Mobility training (Done)    Learning Progress Summary     Learner Status Readiness Method Response Comment Documented by    Patient Done Acceptance E [facility] 03/13/18 1114     Done Acceptance E VU,[facility] 03/12/18 1507     Done Acceptance E,D VU,NR POC progression, fall precautions, transfer and gait safety, D/C planning, [facility] 03/09/18 1324          Point: Home exercise program (Done)    Learning Progress Summary     Learner Status Readiness Method Response Comment Documented by    Patient Done Acceptance E [facility] 03/13/18 1114     Done Acceptance E VU,[facility] 03/12/18 1507     Done Acceptance E,D VU,NR POC progression, fall precautions, transfer and gait safety, D/C planning, [facility] 03/09/18 1324          Point: Body mechanics (Done)    Learning Progress Summary     Learner Status Readiness Method Response Comment Documented by    Patient Done Acceptance E [facility] 03/13/18 1114     Done Acceptance E VU,[facility] 03/12/18 1507     Done Acceptance E,D VU,NR POC progression, fall precautions, transfer and gait safety, D/C planning, [facility] 03/09/18 1324          Point: Precautions (Done)    Learning Progress Summary     Learner  Status Readiness Method Response Comment Documented by    Patient Done Acceptance E [facility]   03/13/18 1114     Done Acceptance E VU,[facility]   03/12/18 1507     Done Acceptance E,D VU,NR POC progression, fall precautions, transfer and gait safety, D/C planning, [facility] 03/09/18 1324                      User Key     Initials Effective Dates Name Provider Type Discipline     06/22/15 -  Molly Matias, PTA Physical Therapy Assistant PT     06/19/15 -  Mary Goldstein, PT Physical Therapist PT    MB 03/14/16 -  Ruth Rose, PT Physical Therapist PT                    PT Recommendation and Plan  Anticipated Discharge Disposition (PT): skilled nursing facility (SNF)  Therapy Frequency (PT Clinical Impression): daily  Plan of Care Reviewed With: patient  Progress: no change  Outcome Summary: pt still sore all over.needs minim. assist for transfers and gait.ambulat 100 ft with walker.did not want to sit up          Outcome Measures     Row Name 03/13/18 1000 03/12/18 1421 03/12/18 1405       How much help from another person do you currently need...    Turning from your back to your side while in flat bed without using bedrails? 4  -UD 4  -SJ  --    Moving from lying on back to sitting on the side of a flat bed without bedrails? 3  -UD 4  -SJ  --    Moving to and from a bed to a chair (including a wheelchair)? 3  -UD 3  -SJ  --    Standing up from a chair using your arms (e.g., wheelchair, bedside chair)? 3  -UD 4  -SJ  --    Climbing 3-5 steps with a railing? 2  -UD 3  -SJ  --    To walk in hospital room? 3  -UD 3  -SJ  --    AM-PAC 6 Clicks Score 18  -UD 21  -SJ  --       How much help from another is currently needed...    Putting on and taking off regular lower body clothing?  --  -- 2  -AC    Bathing (including washing, rinsing, and drying)  --  -- 2  -AC    Toileting (which includes using toilet bed pan or urinal)  --  -- 2  -AC    Putting on and taking off regular upper body clothing  --  -- 2  -AC    Taking care  of personal grooming (such as brushing teeth)  --  -- 3  -AC    Eating meals  --  -- 4  -AC    Score  --  -- 15  -AC       Modified [place] Scale    Modified [place] Scale  -- 3 - Moderate disability.  Requiring some help, but able to walk without assistance.  -  --       Functional Assessment    Outcome Measure Options AM-PAC 6 Clicks Basic Mobility (PT)  -UD AM-PAC 6 Clicks Basic Mobility (PT)  - AM-PAC 6 Clicks Daily Activity (OT)  -AC      User Key  (r) = Recorded By, (t) = Taken By, (c) = Cosigned By    Initials Name Provider Type    AC Gypsy Cantu, OT Occupational Therapist    UD Molly Matias PTA Physical Therapy Assistant    WAN Goldstein, PT Physical Therapist           Time Calculation:         PT Charges     Row Name 03/13/18 1116             Time Calculation    Start Time 1000  -UD      PT Received On 03/13/18  -      PT Goal Re-Cert Due Date 03/20/18  -         Time Calculation- PT    Total Timed Code Minutes- PT 23 minute(s)  -        User Key  (r) = Recorded By, (t) = Taken By, (c) = Cosigned By    Initials Name Provider Type    FRANCIA Matias PTA Physical Therapy Assistant          Therapy Charges for Today     Code Description Service Date Service Provider Modifiers Qty    78126758863 HC PT THER PROC EA 15 MIN 3/13/2018 Molly Matias PTA GP 1    54470214305 HC GAIT TRAINING EA 15 MIN 3/13/2018 Molly Matias PTA GP 1          PT G-Codes  PT Professional Judgement Used?: Yes  Outcome Measure Options: AM-PAC 6 Clicks Basic Mobility (PT)  Score: 17  Functional Limitation: Mobility: Walking and moving around  Mobility: Walking and Moving Around Current Status (): At least 40 percent but less than 60 percent impaired, limited or restricted  Mobility: Walking and Moving Around Goal Status (): At least 20 percent but less than 40 percent impaired, limited or restricted    Molly Matias PTA  3/13/2018

## 2018-03-13 NOTE — PROGRESS NOTES
Continued Stay Note  [facility]     Patient Name: Tamara Langston  MRN: [id number]  Today's Date: 3/13/2018    Admit Date: 3/8/2018          Discharge Plan     Row Name 03/13/18 1058       Plan    Plan [facility] vs [facility]    Patient/Family in Agreement with Plan yes    Plan Comments Received call from Geraldine at [facility] and they will start insurance precert today to see length [employer] will approve for mist therapy. Geraldine will get back with CM on Wednesday with status if insurance will approve.     Row Name 03/13/18 0832       Plan    Plan [facility] vs [facility]    Patient/Family in Agreement with Plan yes    Plan Comments Called and left vm for Rebekah at [facility] regarding referral.               Discharge Codes    No documentation.       Expected Discharge Date and Time     Expected Discharge Date Expected Discharge Time    Mar 13, 2018             Leny Moran

## 2018-03-13 NOTE — PROGRESS NOTES
Continued Stay Note  [facility]     Patient Name: Tamara Langston  MRN: [id number]  Today's Date: 3/13/2018    Admit Date: 3/8/2018      Consent obtained for the participation in the [facility] Transitions Program.     Nita Murphy RN        Discharge Plan    No documentation.             Discharge Codes    No documentation.       Expected Discharge Date and Time     Expected Discharge Date Expected Discharge Time    Mar 13, 2018             Nita Murphy RN

## 2018-03-14 PROBLEM — G93.41 METABOLIC ENCEPHALOPATHY: Status: ACTIVE | Noted: 2018-02-13

## 2018-03-14 LAB
ALBUMIN SERPL-MCNC: 3.9 G/DL (ref 3.2–4.8)
ALBUMIN/GLOB SERPL: 1.1 G/DL (ref 1.5–2.5)
ALP SERPL-CCNC: 87 U/L (ref 25–100)
ALT SERPL W P-5'-P-CCNC: 28 U/L (ref 7–40)
ANION GAP SERPL CALCULATED.3IONS-SCNC: 9 MMOL/L (ref 3–11)
AST SERPL-CCNC: 31 U/L (ref 0–33)
BASOPHILS # BLD AUTO: 0.1 10*3/MM3 (ref 0–0.2)
BASOPHILS NFR BLD AUTO: 1.1 % (ref 0–1)
BILIRUB SERPL-MCNC: 0.2 MG/DL (ref 0.3–1.2)
BUN BLD-MCNC: 20 MG/DL (ref 9–23)
BUN/CREAT SERPL: 15.4 (ref 7–25)
CALCIUM SPEC-SCNC: 9.6 MG/DL (ref 8.7–10.4)
CHLORIDE SERPL-SCNC: 103 MMOL/L (ref 99–109)
CO2 SERPL-SCNC: 32 MMOL/L (ref 20–31)
CREAT BLD-MCNC: 1.3 MG/DL (ref 0.6–1.3)
CRP SERPL-MCNC: 2.38 MG/DL (ref 0–1)
DEPRECATED RDW RBC AUTO: 52 FL (ref 37–54)
EOSINOPHIL # BLD AUTO: 0.61 10*3/MM3 (ref 0–0.3)
EOSINOPHIL NFR BLD AUTO: 6.9 % (ref 0–3)
ERYTHROCYTE [DISTWIDTH] IN BLOOD BY AUTOMATED COUNT: 14.8 % (ref 11.3–14.5)
GFR SERPL CREATININE-BSD FRML MDRD: 41 ML/MIN/1.73
GLOBULIN UR ELPH-MCNC: 3.7 GM/DL
GLUCOSE BLD-MCNC: 158 MG/DL (ref 70–100)
GLUCOSE BLDC GLUCOMTR-MCNC: 136 MG/DL (ref 70–130)
GLUCOSE BLDC GLUCOMTR-MCNC: 164 MG/DL (ref 70–130)
GLUCOSE BLDC GLUCOMTR-MCNC: 178 MG/DL (ref 70–130)
GLUCOSE BLDC GLUCOMTR-MCNC: 191 MG/DL (ref 70–130)
HCT VFR BLD AUTO: 34.5 % (ref 34.5–44)
HGB BLD-MCNC: 10.7 G/DL (ref 11.5–15.5)
IMM GRANULOCYTES # BLD: 0.05 10*3/MM3 (ref 0–0.03)
IMM GRANULOCYTES NFR BLD: 0.6 % (ref 0–0.6)
LYMPHOCYTES # BLD AUTO: 2.59 10*3/MM3 (ref 0.6–4.8)
LYMPHOCYTES NFR BLD AUTO: 29.1 % (ref 24–44)
MCH RBC QN AUTO: 29.6 PG (ref 27–31)
MCHC RBC AUTO-ENTMCNC: 31 G/DL (ref 32–36)
MCV RBC AUTO: 95.6 FL (ref 80–99)
MONOCYTES # BLD AUTO: 0.74 10*3/MM3 (ref 0–1)
MONOCYTES NFR BLD AUTO: 8.3 % (ref 0–12)
NEUTROPHILS # BLD AUTO: 4.8 10*3/MM3 (ref 1.5–8.3)
NEUTROPHILS NFR BLD AUTO: 54 % (ref 41–71)
PLATELET # BLD AUTO: 221 10*3/MM3 (ref 150–450)
PMV BLD AUTO: 10.9 FL (ref 6–12)
POTASSIUM BLD-SCNC: 4 MMOL/L (ref 3.5–5.5)
PREALB SERPL-MCNC: 21.8 MG/DL (ref 10–40)
PROT SERPL-MCNC: 7.6 G/DL (ref 5.7–8.2)
RBC # BLD AUTO: 3.61 10*6/MM3 (ref 3.89–5.14)
SODIUM BLD-SCNC: 144 MMOL/L (ref 132–146)
VANCOMYCIN TROUGH SERPL-MCNC: 19.9 MCG/ML (ref 10–20)
WBC NRBC COR # BLD: 8.89 10*3/MM3 (ref 3.5–10.8)

## 2018-03-14 PROCEDURE — 87186 SC STD MICRODIL/AGAR DIL: CPT | Performed by: INTERNAL MEDICINE

## 2018-03-14 PROCEDURE — 25010000002 HEPARIN (PORCINE) PER 1000 UNITS: Performed by: NURSE PRACTITIONER

## 2018-03-14 PROCEDURE — 80053 COMPREHEN METABOLIC PANEL: CPT | Performed by: INTERNAL MEDICINE

## 2018-03-14 PROCEDURE — 87077 CULTURE AEROBIC IDENTIFY: CPT | Performed by: INTERNAL MEDICINE

## 2018-03-14 PROCEDURE — 94799 UNLISTED PULMONARY SVC/PX: CPT

## 2018-03-14 PROCEDURE — 85025 COMPLETE CBC W/AUTO DIFF WBC: CPT | Performed by: INTERNAL MEDICINE

## 2018-03-14 PROCEDURE — 80202 ASSAY OF VANCOMYCIN: CPT

## 2018-03-14 PROCEDURE — 84134 ASSAY OF PREALBUMIN: CPT | Performed by: INTERNAL MEDICINE

## 2018-03-14 PROCEDURE — 87147 CULTURE TYPE IMMUNOLOGIC: CPT | Performed by: INTERNAL MEDICINE

## 2018-03-14 PROCEDURE — 97610 LOW FREQUENCY NON-THERMAL US: CPT

## 2018-03-14 PROCEDURE — 94660 CPAP INITIATION&MGMT: CPT

## 2018-03-14 PROCEDURE — 87205 SMEAR GRAM STAIN: CPT | Performed by: INTERNAL MEDICINE

## 2018-03-14 PROCEDURE — 86140 C-REACTIVE PROTEIN: CPT | Performed by: INTERNAL MEDICINE

## 2018-03-14 PROCEDURE — 87070 CULTURE OTHR SPECIMN AEROBIC: CPT | Performed by: INTERNAL MEDICINE

## 2018-03-14 PROCEDURE — 25010000002 PIPERACILLIN-TAZOBACTAM: Performed by: HOSPITALIST

## 2018-03-14 PROCEDURE — 99232 SBSQ HOSP IP/OBS MODERATE 35: CPT | Performed by: INTERNAL MEDICINE

## 2018-03-14 PROCEDURE — 82962 GLUCOSE BLOOD TEST: CPT

## 2018-03-14 RX ADMIN — HEPARIN SODIUM 5000 UNITS: 5000 INJECTION, SOLUTION INTRAVENOUS; SUBCUTANEOUS at 08:20

## 2018-03-14 RX ADMIN — FLUTICASONE PROPIONATE 1 SPRAY: 50 SPRAY, METERED NASAL at 21:04

## 2018-03-14 RX ADMIN — INSULIN LISPRO 2 UNITS: 100 INJECTION, SOLUTION INTRAVENOUS; SUBCUTANEOUS at 12:14

## 2018-03-14 RX ADMIN — CLONAZEPAM 0.25 MG: 0.5 TABLET ORAL at 21:00

## 2018-03-14 RX ADMIN — ATORVASTATIN CALCIUM 80 MG: 40 TABLET, FILM COATED ORAL at 20:59

## 2018-03-14 RX ADMIN — PIPERACILLIN SODIUM,TAZOBACTAM SODIUM 3.38 G: 3; .375 INJECTION, POWDER, FOR SOLUTION INTRAVENOUS at 02:27

## 2018-03-14 RX ADMIN — PREGABALIN 100 MG: 100 CAPSULE ORAL at 14:54

## 2018-03-14 RX ADMIN — INSULIN LISPRO 2 UNITS: 100 INJECTION, SOLUTION INTRAVENOUS; SUBCUTANEOUS at 21:03

## 2018-03-14 RX ADMIN — OXYCODONE HYDROCHLORIDE AND ACETAMINOPHEN 1 TABLET: 10; 325 TABLET ORAL at 14:54

## 2018-03-14 RX ADMIN — CLOPIDOGREL BISULFATE 75 MG: 75 TABLET ORAL at 08:07

## 2018-03-14 RX ADMIN — CLOTRIMAZOLE 10 MG: 10 LOZENGE ORAL at 17:24

## 2018-03-14 RX ADMIN — PIPERACILLIN SODIUM,TAZOBACTAM SODIUM 3.38 G: 3; .375 INJECTION, POWDER, FOR SOLUTION INTRAVENOUS at 17:28

## 2018-03-14 RX ADMIN — FUROSEMIDE 40 MG: 40 TABLET ORAL at 08:07

## 2018-03-14 RX ADMIN — PREGABALIN 100 MG: 100 CAPSULE ORAL at 05:29

## 2018-03-14 RX ADMIN — BUSPIRONE HYDROCHLORIDE 7.5 MG: 15 TABLET ORAL at 08:08

## 2018-03-14 RX ADMIN — CARVEDILOL 6.25 MG: 6.25 TABLET, FILM COATED ORAL at 21:00

## 2018-03-14 RX ADMIN — HEPARIN SODIUM 5000 UNITS: 5000 INJECTION, SOLUTION INTRAVENOUS; SUBCUTANEOUS at 21:03

## 2018-03-14 RX ADMIN — PANTOPRAZOLE SODIUM 40 MG: 40 TABLET, DELAYED RELEASE ORAL at 05:29

## 2018-03-14 RX ADMIN — OXYCODONE HYDROCHLORIDE AND ACETAMINOPHEN 1 TABLET: 10; 325 TABLET ORAL at 20:59

## 2018-03-14 RX ADMIN — CLOTRIMAZOLE 10 MG: 10 LOZENGE ORAL at 21:05

## 2018-03-14 RX ADMIN — CLOTRIMAZOLE 10 MG: 10 LOZENGE ORAL at 08:08

## 2018-03-14 RX ADMIN — PIPERACILLIN SODIUM,TAZOBACTAM SODIUM 3.38 G: 3; .375 INJECTION, POWDER, FOR SOLUTION INTRAVENOUS at 08:25

## 2018-03-14 RX ADMIN — Medication 1 CAPSULE: at 08:07

## 2018-03-14 RX ADMIN — POTASSIUM CHLORIDE 20 MEQ: 750 CAPSULE, EXTENDED RELEASE ORAL at 08:07

## 2018-03-14 RX ADMIN — PREGABALIN 100 MG: 100 CAPSULE ORAL at 21:00

## 2018-03-14 RX ADMIN — BUSPIRONE HYDROCHLORIDE 7.5 MG: 15 TABLET ORAL at 20:59

## 2018-03-14 RX ADMIN — OXYCODONE HYDROCHLORIDE AND ACETAMINOPHEN 1 TABLET: 10; 325 TABLET ORAL at 08:07

## 2018-03-14 RX ADMIN — Medication: at 21:16

## 2018-03-14 RX ADMIN — LEVOTHYROXINE SODIUM 112 MCG: 112 TABLET ORAL at 05:29

## 2018-03-14 RX ADMIN — CARVEDILOL 6.25 MG: 6.25 TABLET, FILM COATED ORAL at 08:11

## 2018-03-14 RX ADMIN — NYSTATIN: 100000 POWDER TOPICAL at 21:05

## 2018-03-14 RX ADMIN — SACUBITRIL AND VALSARTAN 1 TABLET: 24; 26 TABLET, FILM COATED ORAL at 21:03

## 2018-03-14 RX ADMIN — ASPIRIN 81 MG: 81 TABLET, COATED ORAL at 08:07

## 2018-03-14 RX ADMIN — INSULIN LISPRO 2 UNITS: 100 INJECTION, SOLUTION INTRAVENOUS; SUBCUTANEOUS at 17:21

## 2018-03-14 RX ADMIN — SACUBITRIL AND VALSARTAN 1 TABLET: 24; 26 TABLET, FILM COATED ORAL at 08:08

## 2018-03-14 RX ADMIN — CLONAZEPAM 0.25 MG: 0.5 TABLET ORAL at 12:24

## 2018-03-14 RX ADMIN — NYSTATIN: 100000 POWDER TOPICAL at 08:23

## 2018-03-14 RX ADMIN — CETIRIZINE HYDROCHLORIDE 10 MG: 10 TABLET, FILM COATED ORAL at 21:00

## 2018-03-14 NOTE — PROGRESS NOTES
[facility] Progress Note    Admission Date: 3/8/2018    Tamara Langston  1955  [id number]    Date: 3/14/2018    Antibiotics:  Anti-Infectives     Ordered     Dose/Rate Route Frequency Start Stop    03/13/18 1545  piperacillin-tazobactam (ZOSYN) 3.375 g/100 mL 0.9% NS IVPB (mbp)     Ordering Provider:  Omari Ross MD    3.375 g  over 4 Hours Intravenous Every 8 Hours 03/13/18 1800 03/19/18 0159    03/10/18 2136  vancomycin 1250 mg/250 mL 0.9% NS IVPB ([facility])     Ordering Provider:  Abelardo Whittaker RPH    1,250 mg  over 90 Minutes Intravenous Every 24 Hours Scheduled 03/10/18 2230 03/17/18 2059 03/08/18 2314  Pharmacy to dose vancomycin     Ordering Provider:  JEOVANY Dennis     Does not apply Continuous PRN 03/08/18 2314 03/19/18 0013    03/08/18 1916  vancomycin IVPB 2000 mg in 0.9% Sodium Chloride (premix) 500 mL     Ordering Provider:  Juan Palacios MD    20 mg/kg × 105 kg  over 2 Hours Intravenous Once 03/08/18 2000 03/08/18 2254    03/08/18 1916  piperacillin-tazobactam (ZOSYN) 4.5 g in iso-osmotic dextrose 100 mL IVPB (premix)     Ordering Provider:  Juan Palacios MD    4.5 g  over 30 Minutes Intravenous Once 03/08/18 1917 03/08/18 2045          Reason for Consultation:   Fever, probable uti     History of present illness:    Patient is a 62-year-old female with hypertension, hyperlipidemia, type 2 diabetes mellitus, morbid obesity admitted 2/13 to 2/27 after presenting with confusion and  fever.  She had an unstageable  sacral deep tissue injury with associated infection which had progressed over several months.  Over the course of a year sbe became  quite debilitated with limited mobility following left TMA.   On  admission, she was septic with fever, leukocytosis and acute on chronic kidney injury  Hospital course complicated by CHF, respiratory distress  and ELIF  She was diagnosed with NSTEMI and stents to LAD and diagonal arteries EF 34% and Dr Zamorano diagnosed Takotsubo myopathy  Her  sacral decub improved with mist therapy and sequential debridement . Wound culture grew enterococcus, pseudomonas, and coag negative staph    Sacral MRI showed no evidence of abscess or OM.  She was denied [facility] and transferred to [facility] on zosyn and vancomycin There was a discrepancy in notes about duration of IV antibiotics  ie 3/2 or 3/12   Zosyn and vanc were stopped 3/2 Her picc was left in place   While at the center she evidently made progress with PT and walked up to 100'  She had fever to 102 followed by an episode of unresponsiveness then confusion  Transferred bact to [facility] with T100.3   UA with mild pyuria; culture pending   Wounds include a right heel DTPI and right ischial stage which measures 4.5x3.5x4.0cm  There is  undermining 6-12 o'clock; wound bed is beefy red, moist, with 10% slough noted in wound bed. No purulent drainage noted  On zosyn and vanc her fever appears to be improving  Cultures pending    3/11/18 alert, no new c/o  Afebrile on vanc and zosyn   3/12/18  Alert, no new c/o  Diarrhea improving; evaluation for [facility] stay for MIST therapy requested  3/13       Alert, some improvement with endurance per pt and PT notes; await precert from [facility]  3/14       Alert, no new c/o; note that [facility] notes no progress with trochanteric decubitus and MIST therapy has been requestedfor the trochanteric decubitus      Review of Systems:  Constitutional-- + Fever, chills + sweats.  Appetite good per patient with good oral intake She reports continued progress with PT  HEENT-- No new vision, hearing or throat complaints.  No  oral ulcers or sore throat.  Denies odynophagia or dysphagia. No headache, photophobia or neck stiffness.  CV-- No chest pain, palpitation or syncope  Resp-- No SOB/cough/Hemoptysis  GI- + diarrhea- intermittent.  No hematochezia, melena, or hematemesis. Denies jaundice or chronic liver disease. No odynophagia or dysphagia  -- No dysuria, hematuria, or flank pain.  Denies  hesitancy, urgency or flank pain.  Lymph- no swollen lymph nodes in neck/axilla or groin.   Heme- No active bruising or bleeding; no Hx of DVT or PE.  MS-- no swelling or pain in the bones or joints of arms/legs.  No new back pain.  Neuro-- No acute focal weakness or numbness in the arms or legs.  No seizures.  Skin--No rashes or lesions       PE:  Vital Signs  Temp  Min: 98.4 °F (36.9 °C)  Max: 98.8 °F (37.1 °C)  BP  Min: 124/64  Max: 157/84  Pulse  Min: 68  Max: 80  Resp  Min: 17  Max: 18  SpO2  Min: 93 %  Max: 96 %  GENERAL: Awake and alert, in no acute distress; appears to recall medical history accurately.  Chronically ill-appearing with limited mobility in the bed  HEENT: Normocephalic, atraumatic.  PERRL. EOMI.  slera injected. No icterus. Oropharynx with mild thrush, no exudate. No evidence of peridontal disease.    NECK: Supple without nuchal rigidity  LYMPH: No cervical, axillary or inguinal lymphadenopathy. No neck masses  HEART: RRR; СЕРГЕЙ III/VI.  Bilateral lower extremity trace edema.  faint pedal pulses.  LUNGS: Clear to auscultation bilaterally without wheezing, rales, rhonchi.  Normal effort with supplemental O2 present.   ABDOMEN: Morbidly obese, Soft, nontender, nondistended. Positive bowel sounds. No rebound or guarding.   EXT:  L foot TMA well healed ; right heel with blister which is intact and w/o fluctuance;   R UE picc site w/o erythema, drainage or swelling  : No weiss catheter.  MSK: FROM without joint effusions noted    SKIN:  mild groin yeast rash    Sacral wound granulating; undermining has decreased somewhat; small amt yellow-green exudate on packing  NEURO: Oriented to PPT. No focal deficits.   PSYCHIATRIC: Normal insight and judgement. Cooperative with PE        Laboratory Data      Results from last 7 days  Lab Units 03/14/18  0353 03/08/18  1842   WBC 10*3/mm3 8.89 15.94*   HEMOGLOBIN g/dL 10.7* 10.2*   HEMATOCRIT % 34.5 33.4*   PLATELETS 10*3/mm3 221 192       Results from last 7  days  Lab Units 03/14/18  0353   SODIUM mmol/L 144   POTASSIUM mmol/L 4.0   CHLORIDE mmol/L 103   CO2 mmol/L 32.0*   BUN mg/dL 20   CREATININE mg/dL 1.30   GLUCOSE mg/dL 158*   CALCIUM mg/dL 9.6       Results from last 7 days  Lab Units 03/14/18  0353   ALK PHOS U/L 87   BILIRUBIN mg/dL 0.2*   ALT (SGPT) U/L 28   AST (SGOT) U/L 31       Results from last 7 days  Lab Units 03/10/18  0719   SED RATE mm/hr 75*       Results from last 7 days  Lab Units 03/14/18  0353   CRP mg/dL 2.38*       Estimated Creatinine Clearance: 53.8 mL/min (by C-G formula based on SCr of 1.3 mg/dL).      Microbiology:      Radiology:  Imaging Results (last 72 hours)     Procedure Component Value Units Date/Time    CT Head Without Contrast [[id number]] Collected:  03/08/18 2116     Updated:  03/08/18 2251    Narrative:       EXAM:    CT Head Without Intravenous Contrast    CLINICAL HISTORY:    62 years old, female; Pressure ulcer of sacral region, unspecified stage;   Transient alteration of awareness; Fever, unspecified; Signs and symptoms;   Other: Patient found unresponsive; Additional info: Pt found unresponsive    TECHNIQUE:    Axial computed tomography images of the head/brain without intravenous   contrast.  All CT scans at this facility use one or more dose reduction   techniques, viz.: automated exposure control; ma/kV adjustment per patient size   (including targeted exams where dose is matched to indication; i.e. head); or   iterative reconstruction technique.    COMPARISON:    No relevant prior studies available.    FINDINGS:    Brain:  Nonspecific calcification left occipital lobe.  No hemorrhage.  No   significant white matter disease.    Ventricles:  Unremarkable.  No ventriculomegaly.    Bones/joints:  Unremarkable.  No acute fracture.    Soft tissues:  Unremarkable.    Sinuses:  Mild sphenoid sinusitis.    Mastoid air cells:  Unremarkable as visualized.      Impression:         No acute findings.    THIS DOCUMENT HAS BEEN  ELECTRONICALLY SIGNED BY NOEL LYLE MD    XR Chest 1 View [[id number]] Collected:  03/08/18 1813     Updated:  03/08/18 1912    Narrative:       EXAM:    XR Chest, 1 View    CLINICAL HISTORY:    62 years old, female; Signs and symptoms; Other: Weak/dizzy/ams; Additional   info: Weak/dizzy/ams triage protocol    TECHNIQUE:    Frontal view of the chest.    COMPARISON:    CR - XR CHEST 1 VW 2018-02-19 04:36    FINDINGS:    Lungs:  Unremarkable.  No consolidation.    Pleural space:  Unremarkable.  No pneumothorax.    Heart:  The heart demonstrates mild diffuse enlargement.    Mediastinum:  Unremarkable.    Bones/joints:  Unremarkable.    Other findings:  The patient is rotated to the left.      Impression:         No acute findings.    THIS DOCUMENT HAS BEEN ELECTRONICALLY SIGNED BY NOEL LYLE MD          I personally reviewed the radiographic studies     Impression:   --Sepsis with fever, leukocytosis and acute kidney injury  Possible foci of infection include urinary tract infection, picc line infection or the right heel decubitus  CRP 15.10 3/10     Stage 4 sacral  Wound which does not appear to be the source of sepsis  Prealbumin normal, suggesting that nutrition is not a factor in the failure to improve  Recent MRI w/o evidence of osteomyelitis  R/O infection with MDR gnr     Right heel decubitus with intact blister     Mild pyuria     Diarrhea- cdiff negative     Recent Non-ST segment elevation MI with increase in troponin     Cardiomyopathy     Acute on chronic kidney disease     Type 2 diabetes mellitus     Hypertension and Hyperlipidemia     COPD with home oxygen use and acute hypoxic respiratory failure at admission  Morbid obesity  Chronic debility       PLAN/RECOMMENDATIONS:   Thank you for asking us to see Tamara Langston, I recommend the following:     Agree with zosyn and vancomycin;    reculture wound  Topical treatment of thrush  Stool culture- ordered but not performed  Right heel w/o signs  of osteomyelitis, defer MRI  Probiotic  Check  for [facility] eligibility since I think she would benefit from mist therapy          Oksana Cruz MD  3/14/2018

## 2018-03-14 NOTE — THERAPY WOUND CARE TREATMENT
Acute Care - Wound/Debridement Treatment Note  [facility]     Patient Name: Tamara Langston  : 1955  MRN: [id number]  Today's Date: 3/14/2018  Onset of Illness/Injury or Date of Surgery: 18   Date of Referral to PT: 18   Referring Physician: MD Carson       Admit Date: 3/8/2018    Visit Dx:    ICD-10-CM ICD-9-CM   1. Transient alteration of awareness R40.4 780.02   2. Fever, unspecified fever cause R50.9 780.60   3. Decubitus ulcer of sacral region, unspecified ulcer stage L89.159 707.03     707.20   4. Impaired mobility and ADLs Z74.09 799.89   5. Impaired functional mobility, balance, gait, and endurance Z74.09 V49.89       Patient Active Problem List   Diagnosis   • Atopic rhinitis   • Restrictive ventilatory defect   • COPD (chronic obstructive pulmonary disease)   • Osteoporosis   • Obstructive sleep apnea syndrome   • Abnormal liver enzymes   • Cholelithiasis   • Chronic back pain   • Mixed anxiety depressive disorder   • Type 2 diabetes mellitus   • Dyslipidemia   • Essential tremor   • Fibromyalgia   • Gastroesophageal reflux disease without esophagitis   • Hypertension   • Hypothyroidism   • Insomnia   • Osteoarthritis   • Psoriasis   • Branch retinal vein occlusion   • Cobalamin deficiency   • Obesity   • Vitamin D deficiency   • Hypokalemia   • Lactic acidosis   • Fatty liver disease, nonalcoholic   • Sinus bradycardia   • NSTEMI (non-ST elevated myocardial infarction)   • Tobacco abuse   • Hypoxia   • Peripheral vascular disease   • Diabetic polyneuropathy associated with type 2 diabetes mellitus   • Anemia, blood loss   • Chronic pain   • Chronic, continuous use of opioids   • Chronic anxiety   • Chronically on benzodiazepine therapy   • Tubular adenoma   • Elevated troponin level   • Urine abnormality   • ELIF (acute kidney injury)   • Cellulitis of sacral region   • Metabolic encephalopathy   • Fever   • Sepsis   • Acute on chronic respiratory failure with hypoxia   • Acute  cystitis without hematuria   • Coronary artery disease involving native heart   • Takotsubo cardiomyopathy   • Elevated troponin   • Chronic systolic heart failure   • Chronic respiratory failure with hypoxia   • Infected sacral decubitus ulcers   • Weakness               WOUND DEBRIDEMENT               Therapy Treatment    Therapy Treatment / Health Promotion    Treatment Time/Intention  Discipline: physical therapist (Oksana Stewart, PT)  Document Type: therapy note (daily note), wound care (Lane Yates, PT)  Subjective Information: complains of, weakness, fatigue, pain (Lane Yates, PT)  Care Plan Review: evaluation/treatment results reviewed, care plan/treatment goals reviewed, current/potential barriers reviewed, patient/other agree to care plan (Lane Yates, PT)  Reason Treatment Not Performed: patient/family declined treatment (Oksana Stewart, PT)  Plan of Care Review  Plan of Care Reviewed With: patient (Lane Yates PT)    Vitals/Pain/Safety  Pain Assessment  Additional Documentation: Pain Scale: FACES Pre/Post-Treatment (Group) (Lane Yates PT)  Pain Scale: Numbers Pre/Post-Treatment  Pain Location - Orientation: generalized (Lane Yates PT)  Pain Scale: Word Pre/Post-Treatment  Pain Location - Orientation: generalized (Lane Yates PT)  Pain Scale: FACES Pre/Post-Treatment  Pain: FACES Scale, Pretreatment: 4-->hurts little more (Lane Yates PT)  Pain: FACES Scale, Post-Treatment: 4-->hurts little more (Lane Yates PT)  Pain Location - Orientation: generalized (Lane Yates PT)  Positioning and Restraints  Pre-Treatment Position: in bed (Lane Yates, PT)  Post Treatment Position: bed (Lane Yates PT)  In Bed: supine, call light within reach, with family/caregiver, waffle boots/both (Lane Yates, PT)    Mobility,ADL,Motor, Modality                    ROM/MMT             Sensory, Edema, Orthotics          Cognition,  Communication, Swallow       Outcome Summary             PT Rehab Goals     Row Name 03/10/18 0900             Bed Mobility Goal 1 (PT)    Activity/Assistive Device (Bed Mobility Goal 1, PT) bed mobility activities, all  -ES      Pender Level/Cues Needed (Bed Mobility Goal 1, PT) independent  -ES      Time Frame (Bed Mobility Goal 1, PT) 1 week;long term goal (LTG)  -ES      Progress/Outcomes (Bed Mobility Goal 1, PT) goal ongoing  -ES         Transfer Goal 1 (PT)    Activity/Assistive Device (Transfer Goal 1, PT) sit-to-stand/stand-to-sit;bed-to-chair/chair-to-bed;walker, rolling  -ES      Pender Level/Cues Needed (Transfer Goal 1, PT) independent  -ES      Time Frame (Transfer Goal 1, PT) 1 week;long term goal (LTG)  -ES      Progress/Outcome (Transfer Goal 1, PT) goal ongoing  -ES         Gait Training Goal 1 (PT)    Activity/Assistive Device (Gait Training Goal 1, PT) gait (walking locomotion);walker, rolling  -ES      Pender Level (Gait Training Goal 1, PT) independent  -ES      Distance (Gait Goal 1, PT) 150  -ES      Time Frame (Gait Training Goal 1, PT) 1 week;long term goal (LTG)  -ES      Progress/Outcome (Gait Training Goal 1, PT) goal ongoing  -ES        User Key  (r) = Recorded By, (t) = Taken By, (c) = Cosigned By    Initials Name Provider Type    NAZARIO Gardner, PT Physical Therapist          Physical Therapy Education     Title: PT OT SLP Therapies (Active)     Topic: Physical Therapy (Done)     Point: Mobility training (Done)    Learning Progress Summary     Learner Status Readiness Method Response Comment Documented by    Patient Done Acceptance E DU  UD 03/13/18 1114     Done Acceptance E VU,ANA  SJ 03/12/18 1507     Done Acceptance E,D VU,NR POC progression, fall precautions, transfer and gait safety, D/C planning, HEP MB 03/09/18 1324          Point: Home exercise program (Done)    Learning Progress Summary     Learner Status Readiness Method Response Comment Documented by     Patient Done Acceptance E [facility] 03/13/18 1114     Done Acceptance E VU,[facility] 03/12/18 1507     Done Acceptance E,D VU,NR POC progression, fall precautions, transfer and gait safety, D/C planning, [facility] 03/09/18 1324          Point: Body mechanics (Done)    Learning Progress Summary     Learner Status Readiness Method Response Comment Documented by    Patient Done Acceptance E [facility] 03/13/18 1114     Done Acceptance E VU,[facility] 03/12/18 1507     Done Acceptance E,D VU,NR POC progression, fall precautions, transfer and gait safety, D/C planning, [facility] 03/09/18 1324          Point: Precautions (Done)    Learning Progress Summary     Learner Status Readiness Method Response Comment Documented by    Patient Done Acceptance E [facility] 03/13/18 1114     Done Acceptance E VU,[facility] 03/12/18 1507     Done Acceptance E,D VU,NR POC progression, fall precautions, transfer and gait safety, D/C planning, [facility] 03/09/18 1324                      User Key     Initials Effective Dates Name Provider Type Discipline     06/22/15 -  Molly Matias, PTA Physical Therapy Assistant PT     06/19/15 -  Mary Goldstein, PT Physical Therapist PT    MB 03/14/16 -  Ruth Rose, PT Physical Therapist PT                   PT ASSESSMENT (last 72 hours)      Physical Therapy Evaluation     Row Name             Wound 02/14/18 1115 Right medial heel    Wound - Properties Group Date first assessed: 02/14/18  -KS Time first assessed: 1115  -KS Present On Admission : yes  -KS Side: Right  -KS Orientation: medial  -KS Location: heel  -KS Stage, Pressure Injury: deep tissue injury  -KS    Row Name             Wound 03/09/18 0930 Right upper ischial tuberosity pressure injury    Wound - Properties Group Date first assessed: 03/09/18  -KS Time first assessed: 0930  -KS Present On Admission : yes  -KS Side: Right  -KS Orientation: upper  -KS Location: ischial tuberosity  -KS Type: pressure injury  -KS Stage, Pressure Injury: Stage 4  -KS    Row  Name             Wound 02/13/18 2100 other (see comments) anterior groin other (see comments)    Wound - Properties Group Date first assessed: 02/13/18  -KS Time first assessed: 2100 -KS Side: other (see comments)  -KS, bilateral  Orientation: anterior  -KS Location: groin  -KS, and breast   Type: other (see comments)  -KS, excoriation        User Key  (r) = Recorded By, (t) = Taken By, (c) = Cosigned By    Initials Name Provider Type    TAJ Pereira RN Registered Nurse            PT Recommendation and Plan  Anticipated Discharge Disposition (PT): skilled nursing facility (SNF)  Therapy Frequency (PT Clinical Impression): daily               Outcome Summary: Blister / DTPI intact with no darkening of discoloration noted at this point. Pt will cont to benefit from mist therapy to help improve skin integrity and blood flow to wound / periwound area to improve healing potential.   Plan of Care Reviewed With: patient          Outcome Measures     Row Name 03/13/18 1000 03/12/18 1421 03/12/18 1405       How much help from another person do you currently need...    Turning from your back to your side while in flat bed without using bedrails? 4  -UD 4  -SJ  --    Moving from lying on back to sitting on the side of a flat bed without bedrails? 3  -UD 4  -SJ  --    Moving to and from a bed to a chair (including a wheelchair)? 3  -UD 3  -SJ  --    Standing up from a chair using your arms (e.g., wheelchair, bedside chair)? 3  -UD 4  -SJ  --    Climbing 3-5 steps with a railing? 2  -UD 3  -SJ  --    To walk in hospital room? 3  -UD 3  -SJ  --    AM-PAC 6 Clicks Score 18  -UD 21  -SJ  --       How much help from another is currently needed...    Putting on and taking off regular lower body clothing?  --  -- 2  -AC    Bathing (including washing, rinsing, and drying)  --  -- 2  -AC    Toileting (which includes using toilet bed pan or urinal)  --  -- 2  -AC    Putting on and taking off regular upper body clothing  --  -- 2   -AC    Taking care of personal grooming (such as brushing teeth)  --  -- 3  -AC    Eating meals  --  -- 4  -AC    Score  --  -- 15  -AC       Modified Eagle Scale    Modified Luis Scale  -- 3 - Moderate disability.  Requiring some help, but able to walk without assistance.  -  --       Functional Assessment    Outcome Measure Options AM-PAC 6 Clicks Basic Mobility (PT)  -UD AM-PAC 6 Clicks Basic Mobility (PT)  - AM-PAC 6 Clicks Daily Activity (OT)  -AC      User Key  (r) = Recorded By, (t) = Taken By, (c) = Cosigned By    Initials Name Provider Type    AC Gypsy Cantu, OT Occupational Therapist    UD Molly Matias, PTA Physical Therapy Assistant    SJ Mary Goldstein, PT Physical Therapist              Time Calculation        PT Charges     Row Name 03/14/18 1045             Time Calculation    Start Time 1045  -MF      PT Goal Re-Cert Due Date 03/20/18  -         Time Calculation- PT    Total Timed Code Minutes- PT 25 minute(s)  -        User Key  (r) = Recorded By, (t) = Taken By, (c) = Cosigned By    Initials Name Provider Type     Lane Yates, PT Physical Therapist             Therapy Charges for Today     Code Description Service Date Service Provider Modifiers Qty    [id number] HC PT NLFU MIST 3/14/2018 Lane Yates, PT GP 1            PT G-Codes  PT Professional Judgement Used?: Yes  Outcome Measure Options: AM-PAC 6 Clicks Basic Mobility (PT)  Score: 17  Functional Limitation: Mobility: Walking and moving around  Mobility: Walking and Moving Around Current Status (): At least 40 percent but less than 60 percent impaired, limited or restricted  Mobility: Walking and Moving Around Goal Status (): At least 20 percent but less than 40 percent impaired, limited or restricted        Lane Yates, PT  3/14/2018

## 2018-03-14 NOTE — PLAN OF CARE
Problem: Fall Risk (Adult)  Goal: Identify Related Risk Factors and Signs and Symptoms  Outcome: Ongoing (interventions implemented as appropriate)    Goal: Absence of Fall  Outcome: Ongoing (interventions implemented as appropriate)      Problem: Patient Care Overview  Goal: Plan of Care Review  Outcome: Ongoing (interventions implemented as appropriate)   03/14/18 0316   Coping/Psychosocial   Plan of Care Reviewed With patient   Plan of Care Review   Progress no change   OTHER   Outcome Summary Patient rested more comfortably tonight then previous night. Patient reported pain once during night and recieved PRN pain medication. Patient is alert and oriented. Patient is on 2L O2 nasal canula and biPAP when sleeping. Vital signs remain stable. Will continue to monitor..      Goal: Individualization and Mutuality  Outcome: Ongoing (interventions implemented as appropriate)    Goal: Discharge Needs Assessment  Outcome: Ongoing (interventions implemented as appropriate)      Problem: Wound (Includes Pressure Injury) (Adult)  Goal: Signs and Symptoms of Listed Potential Problems Will be Absent, Minimized or Managed (Wound)  Outcome: Ongoing (interventions implemented as appropriate)      Problem: Sepsis/Septic Shock (Adult)  Goal: Signs and Symptoms of Listed Potential Problems Will be Absent, Minimized or Managed (Sepsis/Septic Shock)  Outcome: Ongoing (interventions implemented as appropriate)

## 2018-03-14 NOTE — PROGRESS NOTES
Continued Stay Note  [facility]     Patient Name: Tamara Langston  MRN: [id number]  Today's Date: 3/14/2018    Admit Date: 3/8/2018          Discharge Plan     Row Name 03/14/18 1456       Plan    Plan [facility]ACH vs [facility]    Patient/Family in Agreement with Plan yes    Plan Comments Spoke to Rebekah at [facility], she is still waiting to hear determination from [employer] for approval of LTAC vs SNF. She will call CM once she is notified. If pt is not approved for LTACH, she states she will return to [facility] to complete her rehab.  Leny Moran, RN,  ext [phone]              Discharge Codes    No documentation.       Expected Discharge Date and Time     Expected Discharge Date Expected Discharge Time    Mar 16, 2018             Leny Moran

## 2018-03-14 NOTE — PLAN OF CARE
Problem: Fall Risk (Adult)  Goal: Identify Related Risk Factors and Signs and Symptoms  Outcome: Outcome(s) achieved Date Met: 03/14/18    Goal: Absence of Fall  Outcome: Outcome(s) achieved Date Met: 03/14/18

## 2018-03-14 NOTE — PLAN OF CARE
Problem: Patient Care Overview (Adult)  Goal: Plan of Care Review  Outcome: Resolved for upgrade, new template will be applied Date Met: 03/14/18      Problem: Patient Care Overview  Goal: Plan of Care Review  Outcome: Ongoing (interventions implemented as appropriate)   03/14/18 1045   Coping/Psychosocial   Plan of Care Reviewed With patient   OTHER   Outcome Summary Blister / DTPI intact with no darkening of discoloration noted at this point. Pt will cont to benefit from mist therapy to help improve skin integrity and blood flow to wound / periwound area to improve healing potential.

## 2018-03-14 NOTE — PLAN OF CARE
Problem: Sepsis/Septic Shock (Adult)  Goal: Signs and Symptoms of Listed Potential Problems Will be Absent, Minimized or Managed (Sepsis/Septic Shock)  Outcome: Ongoing (interventions implemented as appropriate)

## 2018-03-14 NOTE — PLAN OF CARE
Problem: Patient Care Overview (Adult)  Goal: Plan of Care Review  Outcome: Ongoing (interventions implemented as appropriate)   03/14/18 1115   Coping/Psychosocial Response Interventions   Plan Of Care Reviewed With patient   Patient Care Overview   Progress improving   Outcome Evaluation   Outcome Summary/Follow up Plan WOC nurse f/u to assess ischial tuberosity Stage 4 PI. Wound with no significant change over the past week; 10% slough at 7-8:00 position. Wound irrigated with NS, packed loosely with N/S moistened Kerlix; covered with foam dressing. PT Wound care consulted to assess for debridement of IT wound. PT Wound Care continues to do MIST therapy to DTPI right heel. WOC nurse will f/u. Please contact WOC nurse as needed for concerns.        Problem: Wound (Includes Pressure Injury) (Adult)  Goal: Signs and Symptoms of Listed Potential Problems Will be Absent, Minimized or Managed (Wound)  Outcome: Ongoing (interventions implemented as appropriate)   03/14/18 1115   Goal/Outcome Evaluation   Problems Assessed (Wound) all   Problems Present (Wound) none

## 2018-03-15 LAB
GLUCOSE BLDC GLUCOMTR-MCNC: 145 MG/DL (ref 70–130)
GLUCOSE BLDC GLUCOMTR-MCNC: 153 MG/DL (ref 70–130)
GLUCOSE BLDC GLUCOMTR-MCNC: 171 MG/DL (ref 70–130)
GLUCOSE BLDC GLUCOMTR-MCNC: 279 MG/DL (ref 70–130)

## 2018-03-15 PROCEDURE — 25010000002 PIPERACILLIN-TAZOBACTAM: Performed by: HOSPITALIST

## 2018-03-15 PROCEDURE — 97116 GAIT TRAINING THERAPY: CPT

## 2018-03-15 PROCEDURE — 97530 THERAPEUTIC ACTIVITIES: CPT

## 2018-03-15 PROCEDURE — 82962 GLUCOSE BLOOD TEST: CPT

## 2018-03-15 PROCEDURE — 99232 SBSQ HOSP IP/OBS MODERATE 35: CPT | Performed by: NURSE PRACTITIONER

## 2018-03-15 PROCEDURE — 25010000002 HEPARIN (PORCINE) PER 1000 UNITS: Performed by: NURSE PRACTITIONER

## 2018-03-15 PROCEDURE — 25010000002 VANCOMYCIN PER 500 MG

## 2018-03-15 PROCEDURE — 94799 UNLISTED PULMONARY SVC/PX: CPT

## 2018-03-15 RX ORDER — VANCOMYCIN HYDROCHLORIDE 1 G/200ML
1000 INJECTION, SOLUTION INTRAVENOUS EVERY 24 HOURS
Status: DISCONTINUED | OUTPATIENT
Start: 2018-03-15 | End: 2018-03-19

## 2018-03-15 RX ADMIN — OXYCODONE HYDROCHLORIDE AND ACETAMINOPHEN 1 TABLET: 10; 325 TABLET ORAL at 08:46

## 2018-03-15 RX ADMIN — POTASSIUM CHLORIDE 20 MEQ: 750 CAPSULE, EXTENDED RELEASE ORAL at 08:44

## 2018-03-15 RX ADMIN — PANTOPRAZOLE SODIUM 40 MG: 40 TABLET, DELAYED RELEASE ORAL at 05:16

## 2018-03-15 RX ADMIN — PIPERACILLIN SODIUM,TAZOBACTAM SODIUM 3.38 G: 3; .375 INJECTION, POWDER, FOR SOLUTION INTRAVENOUS at 02:16

## 2018-03-15 RX ADMIN — FLUTICASONE PROPIONATE 1 SPRAY: 50 SPRAY, METERED NASAL at 20:59

## 2018-03-15 RX ADMIN — PREGABALIN 100 MG: 100 CAPSULE ORAL at 05:16

## 2018-03-15 RX ADMIN — NYSTATIN: 100000 POWDER TOPICAL at 08:42

## 2018-03-15 RX ADMIN — ATORVASTATIN CALCIUM 80 MG: 40 TABLET, FILM COATED ORAL at 21:00

## 2018-03-15 RX ADMIN — HEPARIN SODIUM 5000 UNITS: 5000 INJECTION, SOLUTION INTRAVENOUS; SUBCUTANEOUS at 21:01

## 2018-03-15 RX ADMIN — CARVEDILOL 6.25 MG: 6.25 TABLET, FILM COATED ORAL at 08:44

## 2018-03-15 RX ADMIN — PREGABALIN 100 MG: 100 CAPSULE ORAL at 14:51

## 2018-03-15 RX ADMIN — VANCOMYCIN HYDROCHLORIDE 1000 MG: 1 INJECTION, SOLUTION INTRAVENOUS at 08:43

## 2018-03-15 RX ADMIN — FUROSEMIDE 40 MG: 40 TABLET ORAL at 08:42

## 2018-03-15 RX ADMIN — Medication 1 CAPSULE: at 08:44

## 2018-03-15 RX ADMIN — PREGABALIN 100 MG: 100 CAPSULE ORAL at 21:01

## 2018-03-15 RX ADMIN — BUSPIRONE HYDROCHLORIDE 7.5 MG: 15 TABLET ORAL at 21:01

## 2018-03-15 RX ADMIN — OXYCODONE HYDROCHLORIDE AND ACETAMINOPHEN 1 TABLET: 10; 325 TABLET ORAL at 14:51

## 2018-03-15 RX ADMIN — CLOTRIMAZOLE 10 MG: 10 LOZENGE ORAL at 16:37

## 2018-03-15 RX ADMIN — OXYCODONE HYDROCHLORIDE AND ACETAMINOPHEN 1 TABLET: 10; 325 TABLET ORAL at 20:56

## 2018-03-15 RX ADMIN — ASPIRIN 81 MG: 81 TABLET, COATED ORAL at 08:42

## 2018-03-15 RX ADMIN — SACUBITRIL AND VALSARTAN 1 TABLET: 24; 26 TABLET, FILM COATED ORAL at 21:01

## 2018-03-15 RX ADMIN — HEPARIN SODIUM 5000 UNITS: 5000 INJECTION, SOLUTION INTRAVENOUS; SUBCUTANEOUS at 08:46

## 2018-03-15 RX ADMIN — LEVOTHYROXINE SODIUM 112 MCG: 112 TABLET ORAL at 05:16

## 2018-03-15 RX ADMIN — CETIRIZINE HYDROCHLORIDE 10 MG: 10 TABLET, FILM COATED ORAL at 21:01

## 2018-03-15 RX ADMIN — CLOTRIMAZOLE 10 MG: 10 LOZENGE ORAL at 08:45

## 2018-03-15 RX ADMIN — INSULIN LISPRO 2 UNITS: 100 INJECTION, SOLUTION INTRAVENOUS; SUBCUTANEOUS at 21:02

## 2018-03-15 RX ADMIN — PIPERACILLIN SODIUM,TAZOBACTAM SODIUM 3.38 G: 3; .375 INJECTION, POWDER, FOR SOLUTION INTRAVENOUS at 10:18

## 2018-03-15 RX ADMIN — CLONAZEPAM 0.25 MG: 0.5 TABLET ORAL at 21:01

## 2018-03-15 RX ADMIN — BUSPIRONE HYDROCHLORIDE 7.5 MG: 15 TABLET ORAL at 08:44

## 2018-03-15 RX ADMIN — FLUTICASONE PROPIONATE 1 SPRAY: 50 SPRAY, METERED NASAL at 08:42

## 2018-03-15 RX ADMIN — INSULIN LISPRO 4 UNITS: 100 INJECTION, SOLUTION INTRAVENOUS; SUBCUTANEOUS at 12:04

## 2018-03-15 RX ADMIN — CARVEDILOL 6.25 MG: 6.25 TABLET, FILM COATED ORAL at 21:00

## 2018-03-15 RX ADMIN — CLOTRIMAZOLE 10 MG: 10 LOZENGE ORAL at 21:01

## 2018-03-15 RX ADMIN — FENTANYL 1 PATCH: 75 PATCH TRANSDERMAL at 08:45

## 2018-03-15 RX ADMIN — INSULIN LISPRO 2 UNITS: 100 INJECTION, SOLUTION INTRAVENOUS; SUBCUTANEOUS at 08:41

## 2018-03-15 RX ADMIN — SACUBITRIL AND VALSARTAN 1 TABLET: 24; 26 TABLET, FILM COATED ORAL at 08:45

## 2018-03-15 RX ADMIN — PIPERACILLIN SODIUM,TAZOBACTAM SODIUM 3.38 G: 3; .375 INJECTION, POWDER, FOR SOLUTION INTRAVENOUS at 18:32

## 2018-03-15 RX ADMIN — NYSTATIN: 100000 POWDER TOPICAL at 21:00

## 2018-03-15 RX ADMIN — CLOPIDOGREL BISULFATE 75 MG: 75 TABLET ORAL at 08:44

## 2018-03-15 NOTE — PLAN OF CARE
Problem: Patient Care Overview  Goal: Plan of Care Review   03/15/18 0128   Coping/Psychosocial   Plan of Care Reviewed With patient   OTHER   Outcome Summary Pt rested comfortably tonight. BIPAP while sleeping. VSS. Wound cx sent of wound on back. Going to LTACH if accepted, if not [place]. Will continue to monitor.     Goal: Individualization and Mutuality  Outcome: Ongoing (interventions implemented as appropriate)    Goal: Discharge Needs Assessment  Outcome: Ongoing (interventions implemented as appropriate)      Problem: Wound (Includes Pressure Injury) (Adult)  Goal: Signs and Symptoms of Listed Potential Problems Will be Absent, Minimized or Managed (Wound)  Outcome: Ongoing (interventions implemented as appropriate)      Problem: Sepsis/Septic Shock (Adult)  Goal: Signs and Symptoms of Listed Potential Problems Will be Absent, Minimized or Managed (Sepsis/Septic Shock)  Outcome: Ongoing (interventions implemented as appropriate)

## 2018-03-15 NOTE — PLAN OF CARE
Problem: Patient Care Overview  Goal: Plan of Care Review  Outcome: Ongoing (interventions implemented as appropriate)   03/15/18 1125   Coping/Psychosocial   Plan of Care Reviewed With patient   OTHER   Outcome Summary Pt declined functional mobility, ADL participation and transfer due to c/o fatigue. Pt conditionally I with bed mobility to get supine to sit. Pt participated in B UE AROM HEP. Continue OT per POC.

## 2018-03-15 NOTE — PROGRESS NOTES
[facility] Medicine Services  PROGRESS NOTE    Patient Name: Tamara Langston  : 1955  MRN: [id number]    Date of Admission: 3/8/2018  Length of Stay: 6  Primary Care Physician: Harinder Aranda MD    Subjective   Subjective     CC:AMS    HPI:    No new issues overnight.  No fevers.  Denies pain.  Feels she is doing better than at admission.      Review of Systems  Gen- No fevers, chills  CV- No chest pain, palpitations  Resp- No cough, dyspnea  GI- No N/V/D, abd pain    Otherwise ROS is negative except as mentioned in the HPI.    Objective   Objective     Vital Signs:   Temp:  [98.4 °F (36.9 °C)-98.8 °F (37.1 °C)] 98.6 °F (37 °C)  Heart Rate:  [68-77] 68  Resp:  [17-18] 18  BP: (124-139)/(62-76) 139/76  FiO2 (%):  [30 %] 30 %        Physical Exam:  Constitutional: chronically ill appearing, in no acute distress, awake, alert, lying in bed  HENT: NCAT, mucous membranes moist  Respiratory: Clear to auscultation bilaterally, respiratory effort normal   Cardiovascular: RRR, no murmurs, rubs, or gallops  Gastrointestinal: obese, positive bowel sounds, soft, nontender, nondistended  Musculoskeletal: bilateral LE edema.  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      Results from last 7 days  Lab Units 18  0353 18  1842   WBC 10*3/mm3 8.89 15.94*   HEMOGLOBIN g/dL 10.7* 10.2*   HEMATOCRIT % 34.5 33.4*   PLATELETS 10*3/mm3 221 192       Results from last 7 days  Lab Units 18  0353 18  0445 03/10/18  2031 18  1842   SODIUM mmol/L 144 140 140 139   POTASSIUM mmol/L 4.0 4.0 5.1 5.2   CHLORIDE mmol/L 103 101 103 105   CO2 mmol/L 32.0* 32.0* 30.0 28.0   BUN mg/dL 20 20 28* 32*   CREATININE mg/dL 1.30 1.20 1.30 1.50*   GLUCOSE mg/dL 158* 148* 181* 176*   CALCIUM  mg/dL 9.6 9.6 9.5 9.3   ALT (SGPT) U/L 28  --   --  26   AST (SGOT) U/L 31  --   --  32     Estimated Creatinine Clearance: 53.8 mL/min (by C-G formula based on SCr of 1.3 mg/dL).  No results found for: BNP  No results found for: PHART    Microbiology Results Abnormal     Procedure Component Value - Date/Time    Blood Culture - Blood, [[id number]]  (Normal) Collected:  03/08/18 1930    Lab Status:  Final result Specimen:  Blood from Arm, Right Updated:  03/13/18 2101     Blood Culture No growth at 5 days    Blood Culture - Blood, [[id number]]  (Normal) Collected:  03/08/18 1935    Lab Status:  Final result Specimen:  Blood from Arm, Right Updated:  03/13/18 2101     Blood Culture No growth at 5 days     Gram Stain Result Few (2+) WBCs seen    Urine Culture - Urine, Urine, Clean Catch [[id number]] Collected:  03/08/18 2002    Lab Status:  Final result Specimen:  Urine from Urine, Clean Catch Updated:  03/10/18 1110     Urine Culture --      10,000-20,000 CFU/mL Normal Urogenital Patience    Clostridium Difficile Toxin - Stool, Per Rectum [[id number]] Collected:  03/09/18 1027    Lab Status:  Final result Specimen:  Stool from Per Rectum Updated:  03/09/18 1203    Narrative:       The following orders were created for panel order Clostridium Difficile Toxin - Stool, Per Rectum.  Procedure                               Abnormality         Status                     ---------                               -----------         ------                     Clostridium Difficile To...[[id number]]  Normal              Final result                 Please view results for these tests on the individual orders.    Clostridium Difficile Toxin, PCR - Stool, Per Rectum [[id number]]  (Normal) Collected:  03/09/18 1027    Lab Status:  Final result Specimen:  Stool from Per Rectum Updated:  03/09/18 1203     C. Difficile Toxins by PCR Not Detected    Narrative:         Performance characteristics of test not established for patients <2 years of  age.  Negative for Toxigenic C. Difficile        Results for orders placed during the hospital encounter of 02/13/18   Adult Transthoracic Echo Limited W/ Cont if Necessary Per Protocol    Narrative · This was a limited echocardiogram performed to evaluate the left   ventricular ejection fraction  · Left ventricular systolic function is normal. Estimated EF appears to be   in the range of 51 - 55%.  · The following left ventricular wall segments are hypokinetic: mid   anterior, apical anterior, apical lateral, apical inferior, apical septal   and apex hypokinetic. The basal segments are hyperdynamic.  · When compared to the prior echocardiogram performed on February 14, 2018, the hypokinetic apical segments have mildly improved. The overall   ejection fraction has improved.          I have reviewed the medications.    Assessment/Plan   Assessment / Plan     Hospital Problem List     * (Principal)Sepsis    Obstructive sleep apnea syndrome (Chronic)    Overview Addendum 8/1/2016  9:56 AM by Puja Clarke     intolerant of CPAP therapy  6. 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  Prior PSG reports complex sleep apnea with frequent central apneas and intermittent      Ramona Menard respirations, on auto CPAP 4-16 with supplemental oxygen.         Type 2 diabetes mellitus (Chronic)    Hypertension (Chronic)    Overview Signed 8/1/2016 10:00 AM by Puja Clarke     16. H/O echocardiogram (V15.89) (Z92.89)   · A.  Echocardiogram of 02/03/2015 reports an ejection fraction of 60-65%, mild concentric      LVH, trace mitral regurgitation, mild tricuspid and pulmonic regurgitation and calculated      RVSP of 35 mmHg, the main pulmonary artery is also mildly dilated.         Peripheral vascular disease (Chronic)    Metabolic encephalopathy    Coronary artery disease involving native heart (Chronic)    Overview Signed 2/15/2018 12:52 PM by CINDI Fierro     1. [facility] 11-26-16: Non occlusive disease  ·  50%  mid RCA stenosis.  · 40% proximal LAD stenosis   · Normal left ventricular function         Takotsubo cardiomyopathy    Overview Signed 2/15/2018  1:42 PM by JEOVANY Greenfield     EF 35%, diastolic dysfunction, left ventricular wall segments are hypokinetic ECHO 2/14/18         Chronic systolic heart failure    Chronic respiratory failure with hypoxia (Chronic)    Infected sacral decubitus ulcers (Chronic)    Weakness         Brief Hospital Course to date:  Tamara Langston is a 62 y.o. female with hx of decubitus ulcer, systolic HF, DINA, T2DM presented with AMS and fever with concern for persistent decub infection.  Previously d/c'd with PICC to SNF but sounds like was stopped early.     Assessment & Plan:  - encephalopathy is improved to baseline, related to fever/infection on admission  - currently on vanc/zosyn via PICC per Dr. Cruz.  Source of infection includes possible UTI, PICC line, or right heel decub.  - has stage 4 sacral decub, cultures from last admission were polymicrobial.  Recent MRI without evidence of osteo.  - acceptable glc control, continue current insulin.  - CKD with baseline  - recent NSTEMI/takosubo's.  Clinically stable.  Continue DAPT, statin, BB, entresto.  - d/w CM - waiting to hear about LTACH acceptance.  If denied, will plan back to [facility] at discharge.      DVT Prophylaxis: [facility]     CODE STATUS: Conditional Code     Disposition:TBD, [facility]ACH vs [facility]    Electronically signed by Derek Jones MD, 03/14/18, 8:57 PM.

## 2018-03-15 NOTE — PROGRESS NOTES
"Pharmacy Consult-Vancomycin Dosing  Tamara Langston is a  62 y.o. female receiving vancomycin therapy.     Indication: Skin and soft tissue infection  Consulting Provider: Hospitalist   ID Consult: ID (Anthony)    Goal Trough: 10-15 mcg/mL    Labs    Results from last 7 days   Lab Units 03/14/18  0353 03/13/18  0445 03/10/18  2031   BUN mg/dL 20 20 28*   CREATININE mg/dL 1.30 1.20 1.30       Results from last 7 days   Lab Units 03/14/18  0353 03/08/18  1842   WBC 10*3/mm3 8.89 15.94*     Ht - 167.6 cm (66\")  Wt - 106 kg (234 lb)    Estimated Creatinine Clearance: 53.8 mL/min (by C-G formula based on SCr of 1.3 mg/dL).    Evaluation of Level    Lab Results   Component Value Date    [facility] 19.90 03/14/2018     ~23 hour level      Assessment/Plan:  Pharmacy to dose vancomycin for skin and soft tissue infection. Patient is currently receiving vancomycin 1250 mg IV q24h, dose was decreased 3/10 due to supratherapeutic trough. Vancomycin trough 3/10 was supratherapeutic at 17.7 mcg/mL, goal trough 10-15 mcg/mL. Repeat trough drawn ~23 hr level on 1250 mg IV q24h was SUPRA-therapeutic for indication at 19.9. Renal function and UOP appear stable. Dose was held evening of 3/14, but 3/15 1000 mg IV q24h was continued. Cont with 1000 mg IV q24h and re-check trough early next week or sooner if clinical status or renal function changes.    Pharmacy will continue to follow closely, and make adjustments as needed. Thank you for this consult.  Mary Lou Swartz, PharmD, BCPS  3/15/2018  2:45 PM        "

## 2018-03-15 NOTE — PROGRESS NOTES
Continued Stay Note  [facility]     Patient Name: Tamara Langston  MRN: [id number]  Today's Date: 3/15/2018    Admit Date: 3/8/2018          Discharge Plan     Row Name 03/15/18 1429       Plan    [facility]    Patient/Family in Agreement with Plan yes    Plan Comments Received call from Rebekah at [facility] with [employer] denial. Pt wishes to do a peer to peer to appeal. Called Lane at [facility] at [phone] and a peer to peer has been scheduled for Friday 3/16 at 10 am with Dr. Peck to Dr. Derek Jones. Dr. Jones here on unit and aware of peer to peer. If peer to peer has to be rescheduled, [employer] must be notified no later than 10:30 am or denial will be upheld. CM will cont to follow.               Discharge Codes    No documentation.       Expected Discharge Date and Time     Expected Discharge Date Expected Discharge Time    Mar 16, 2018             Leny Moran

## 2018-03-15 NOTE — THERAPY TREATMENT NOTE
Acute Care - Occupational Therapy Treatment Note  [facility]     Patient Name: Tamara Langston  : 1955  MRN: [id number]  Today's Date: 3/15/2018  Onset of Illness/Injury or Date of Surgery: 18  Date of Referral to OT: 18  Referring Physician: MD Carson    Admit Date: 3/8/2018       ICD-10-CM ICD-9-CM   1. Transient alteration of awareness R40.4 780.02   2. Fever, unspecified fever cause R50.9 780.60   3. Decubitus ulcer of sacral region, unspecified ulcer stage L89.159 707.03     707.20   4. Impaired mobility and ADLs Z74.09 799.89   5. Impaired functional mobility, balance, gait, and endurance Z74.09 V49.89     Patient Active Problem List   Diagnosis   • Atopic rhinitis   • Restrictive ventilatory defect   • COPD (chronic obstructive pulmonary disease)   • Osteoporosis   • Obstructive sleep apnea syndrome   • Abnormal liver enzymes   • Cholelithiasis   • Chronic back pain   • Mixed anxiety depressive disorder   • Type 2 diabetes mellitus   • Dyslipidemia   • Essential tremor   • Fibromyalgia   • Gastroesophageal reflux disease without esophagitis   • Hypertension   • Hypothyroidism   • Insomnia   • Osteoarthritis   • Psoriasis   • Branch retinal vein occlusion   • Cobalamin deficiency   • Obesity   • Vitamin D deficiency   • Hypokalemia   • Lactic acidosis   • Fatty liver disease, nonalcoholic   • Sinus bradycardia   • NSTEMI (non-ST elevated myocardial infarction)   • Tobacco abuse   • Hypoxia   • Peripheral vascular disease   • Diabetic polyneuropathy associated with type 2 diabetes mellitus   • Anemia, blood loss   • Chronic pain   • Chronic, continuous use of opioids   • Chronic anxiety   • Chronically on benzodiazepine therapy   • Tubular adenoma   • Elevated troponin level   • Urine abnormality   • ELIF (acute kidney injury)   • Cellulitis of sacral region   • Metabolic encephalopathy   • Fever   • Sepsis   • Acute on chronic respiratory failure with hypoxia   • Acute cystitis  without hematuria   • Coronary artery disease involving native heart   • Takotsubo cardiomyopathy   • Elevated troponin   • Chronic systolic heart failure   • Chronic respiratory failure with hypoxia   • Infected sacral decubitus ulcers   • Weakness     Past Medical History:   Diagnosis Date   • Acute on chronic respiratory failure with hypoxia 2/13/2018   • ELIF (acute kidney injury) 2/13/2018   • ELIF (acute kidney injury) 2/13/2018   • Altered mental status 2/13/2018   • Bronchitis    • Cellulitis of sacral region 2/13/2018   • Cervical cancer    • Cholelithiasis 5/11/2016   • Chronic anxiety 2/27/2017   • Chronic bronchitis    • Chronic respiratory failure with hypoxia 3/8/2018   • Chronic systolic heart failure 3/8/2018   • Chronically on benzodiazepine therapy 2/27/2017   • Degenerative arthritis    • Diabetes mellitus    • Dyslipidemia 5/11/2016   • Dyspnea    • Elevated troponin level 2/13/2018   • Fatty liver disease, nonalcoholic 11/21/2016   • Fever 2/13/2018   • Fibromyalgia    • GERD (gastroesophageal reflux disease)    • H/O echocardiogram    • History of pneumonia    • Hypertension 5/11/2016    16. H/O echocardiogram (V15.89) (Z92.89)  · A.  Echocardiogram of 02/03/2015 reports an ejection fraction of 60-65%, mild concentric     LVH, trace mitral regurgitation, mild tricuspid and pulmonic regurgitation and calculated     RVSP of 35 mmHg, the main pulmonary artery is also mildly dilated.   • Hypothyroidism 5/11/2016    Description: A.  On replacement therapy.   • Infected wound 3/8/2018   • Nausea    • Obesity    • DINA (obstructive sleep apnea)     intolerant of CPAP therapy   • Osteoporosis    • Osteoporosis    • Polycythemia vera 5/11/2016   • Pulmonary emphysema    • Restrictive ventilatory defect    • Rhinitis    • Sepsis 2/13/2018   • Uncontrolled diabetes mellitus 5/11/2016   • Urine abnormality 2/13/2018   • Uterine cancer    • Vitamin D deficiency 8/1/2016   • Weakness 3/8/2018     Past Surgical  History:   Procedure Laterality Date   • AMPUTATION DIGIT Left 11/23/2016    Procedure: AMPUTATION TRANS METATARSAL - ray amutation of the left great toe;  Surgeon: Arsalan Feliciano MD;  Location:  [facility];  Service:    • AMPUTATION DIGIT Left 3/2/2017    Procedure: LEFT FOOT TRANSMETATARSAL AMPUTATION TOES 2,3,4,5;  Surgeon: Joey Guaman MD;  Location:  [facility];  Service:    • BACK SURGERY      lumbar fusions x5--multiple times; 1995, 1997, 1998, 1999 and 2008   • BELOW KNEE AMPUTATION Left 2/25/2017    Procedure: EXTENDED LEFT TOE AMPUTATION;  Surgeon: Arsalan Feliciano MD;  Location:  [facility];  Service:    • CARDIAC CATHETERIZATION N/A 11/26/2016    Procedure: Left Heart Cath;  Surgeon: Ash Nuñez MD;  Location:  [facility];  Service:    • CARDIAC CATHETERIZATION N/A 2/20/2018    Procedure: Left Heart Cath;  Surgeon: Lane Zamorano MD;  Location:  [facility];  Service:    • CARDIAC CATHETERIZATION N/A 2/20/2018    Procedure: Right Heart Cath;  Surgeon: Lane Zamorano MD;  Location:  [facility] INV[facility]IVE LOCATION;  Service:    • HAND SURGERY Bilateral     x3   • HYSTERECTOMY      status post uterine and cervical cancer   • LUMBAR SPINE SURGERY      arthrodesis by anterior approach addit interspace   • TONSILLECTOMY         Therapy Treatment    Therapy Treatment / Health Promotion    Treatment Time/Intention  Discipline: (P) occupational therapist (Donis Bajwa, OT Student)  Document Type: (P) therapy note (daily note) (Donis Bajwa, OT Student)  Subjective Information: (P) complains of, fatigue (Donis Bajwa, OT Student)  Mode of Treatment: (P) occupational therapy (Donis Bajwa, OT Student)  Patient/Family Observations: (P) Pt UIC, exit alarm off, O2 2L (Donis Bajwa, OT Student)  Care Plan Review: (P) care plan/treatment goals reviewed (Donis Bajwa, OT Student)  Patient Effort: (P) good (Donis Bajwa, OT  Mary)  Existing Precautions/Restrictions: (P) fall (CARRI Thacker)  Plan of Care Review  Plan of Care Reviewed With: (P) patient (CARRI Thacker)    Vitals/Pain/Safety  Vital Signs  Pre Systolic BP Rehab: (P)  (Vitals stable during treatment. RN approved treatment.) (CARRI Thacker)  Pain Assessment  Additional Documentation: (P) Pain Scale: Numbers Pre/Post-Treatment (Group) (CARRI Thacker)  Pain Scale: Numbers Pre/Post-Treatment  Pain Scale: Numbers, Pretreatment: (P) 0/10 - no pain (CARRI Thacker)  Pain Scale: Numbers, Post-Treatment: (P) 0/10 - no pain (CARRI Thacker)  Positioning and Restraints  Pre-Treatment Position: (P) in bed (CARRI Thacker)  Post Treatment Position: (P) bed (CARRI Thacker)  In Bed: (P) sitting EOB, call light within reach, encouraged to call for assist, with other staff, with family/caregiver (CARRI Thacker)    Mobility,ADL,Motor, Modality  Bed Mobility Assessment/Treatment  Bed Mobility Assessment/Treatment: (P) sit-supine (CARRI Thacker)  Supine-Sit [place] (Bed Mobility): (P) conditional independence (CARRI Thacker)  Assistive Device (Bed Mobility): (P) bed rails, head of bed elevated (CARRI Thacker)  Transfer Assessment/Treatment  Comment (Transfers): (P) Pt declined. States she wants to save energy for physical therapy. (CARRI Thacker)  ADL Assessment/Intervention  Additional Documentation: (P)  (Pt declined need to perform any ADL's.) (CARRI Thacker)  Therapeutic Exercise  Therapeutic Exercise: (P) seated, upper extremities (CARRI Thacker)  Additional Documentation: (P) Therapeutic Exercise (Row) (CARRI Thacker)  Upper Extremity Seated Therapeutic Exercise  Performed, Seated  Upper Extremity (Therapeutic Exercise): (P) shoulder flexion/extension, shoulder abduction/adduction, scapular protraction/retraction, elbow flexion/extension, digit flexion/extension (Donis Bajwa OT Student)  Exercise Type, Seated Upper Extremity (Therapeutic Exercise): (P) AROM (active range of motion) (Donis Bajwa OT Student)  Expected Outcomes, Seated Upper Extremity (Therapeutic Exercise): (P) improve performance, transfer skills, improve performance, BADLs, improve functional tolerance, self-care activity (Donis Bajwa OT Student)  Sets/Reps Detail, Seated Upper Extremity (Therapeutic Exercise): (P) 10 reps each exercise B UE (Donis Bajwa OT Student)  Transfers Skills, Training to Functional Activity, Seated Upper Extremity (Therapeutic Exercise): (P) able to transfer skills from training to functional activity (Donis Bajwa OT Student)           ROM/MMT             Sensory, Edema, Orthotics          Cognition, Communication, Swallow  Cognitive Assessment/Intervention  Additional Documentation: (P) Cognitive Assessment/Intervention (Group) (CARRI Thacker)  Cognitive Assessment/Intervention- PT/OT  Affect/Mental Status (Cognitive): (P) flat/blunted affect (Donis Bajwa OT Student)  Orientation Status (Cognition): (P) oriented x 4 (Donis Bajwa OT Student)  Follows Commands (Cognition): (P) WNL, follows one step commands, over 90% accuracy (Donis Bajwa OT Student)  Cognitive Function (Cognitive): (P) WNL (Donis Bajwa OT Student)    Outcome Summary           OT Rehab Goals     Row Name 03/12/18 2974             Transfer Goal 1 (OT)    Activity/Assistive Device (Transfer Goal 1, OT) toilet  -AC      [place] Level/Cues Needed (Transfer Goal 1, OT) contact guard assist  -AC      Time Frame (Transfer Goal 1, OT) 1 week  -AC         Toileting Goal 1 (OT)    Activity/Device (Toileting Goal 1, OT) toileting  skills, all;grab bar/safety frame  -AC      [place] Level/Cues Needed (Toileting Goal 1, OT) minimum assist (75% or more patient effort)  -AC      Time Frame (Toileting Goal 1, OT) 1 day  -AC         ROM Goal 1 (OT)    ROM Goal 1 (OT) Pt will particiapte in B UE AROM  10 -15 reps daily as needed to increase L shoulder flex 10 degrees and to increase strength and endurance needed  to support ADLs  -AC      Time Frame (ROM Goal 1, OT) 1 week  -AC         Problem Specific Goal 1 (OT)    Problem Specific Goal 1 (OT) Pt will complete FM/GM taks with SBA  -AC      Time Frame (Problem Specific Goal 1, OT) 1 week  -AC      Progress/Outcome (Problem Specific Goal 1, OT) goal met  -AC        User Key  (r) = Recorded By, (t) = Taken By, (c) = Cosigned By    Initials Name Provider Type    AC Gypsy Cantu, OT Occupational Therapist        Occupational Therapy Education     Title: PT OT SLP Therapies (Active)     Topic: Occupational Therapy (Active)     Point: ADL training (Done)     Description: Instruct learner(s) on proper safety adaptation and remediation techniques during self care or transfers.   Instruct in proper use of assistive devices.   Learning Progress Summary     Learner Status Readiness Method Response Comment Documented by    Patient Done Acceptance E VU Pt educated on B UE AROM HEP to faciliate participation in ADL's.  03/15/18 1125     Done Acceptance E VU benefits of activity  03/12/18 0798     Done Acceptance E,D VU,NR Educated on current deficits and discussed POC. AN 03/09/18 0942          Point: Home exercise program (Done)     Description: Instruct learner(s) on appropriate technique for monitoring, assisting and/or progressing therapeutic exercises/activities.   Learning Progress Summary     Learner Status Readiness Method Response Comment Documented by    Patient Done Acceptance E VU Pt educated on B UE AROM HEP to faciliate participation in ADL's.  03/15/18 1125                      User  Key     Initials Effective Dates Name Provider Type Discipline     06/23/15 -  Gypsy Cantu, OT Occupational Therapist OT    AN 06/22/15 -  Jeanne Mooney, OT Occupational Therapist OT    SS 03/07/18 -  Donis Bajwa OT Student OT Student OT                  OT Recommendation and Plan     Plan of Care Review  Plan of Care Reviewed With: (P) patient  Plan of Care Reviewed With: (P) patient  Outcome Summary: (P) Pt declined functional mobility, ADL participation and transfer due to c/o fatigue. Pt conditionally I with bed mobility to get supine to sit. Pt participated in B UE AROM HEP. Continue OT per POC.         Outcome Measures     Row Name 03/15/18 1040 03/13/18 1000 03/12/18 1421       How much help from another person do you currently need...    Turning from your back to your side while in flat bed without using bedrails?  -- 4  -UD 4  -SJ    Moving from lying on back to sitting on the side of a flat bed without bedrails?  -- 3  -UD 4  -SJ    Moving to and from a bed to a chair (including a wheelchair)?  -- 3  -UD 3  -SJ    Standing up from a chair using your arms (e.g., wheelchair, bedside chair)?  -- 3  -UD 4  -SJ    Climbing 3-5 steps with a railing?  -- 2  -UD 3  -SJ    To walk in hospital room?  -- 3  -UD 3  -SJ    AM-PAC 6 Clicks Score  -- 18  -UD 21  -SJ       How much help from another is currently needed...    Putting on and taking off regular lower body clothing? (P)  2  -SS  --  --    Bathing (including washing, rinsing, and drying) (P)  2  -SS  --  --    Toileting (which includes using toilet bed pan or urinal) (P)  2  -SS  --  --    Putting on and taking off regular upper body clothing (P)  2  -SS  --  --    Taking care of personal grooming (such as brushing teeth) (P)  3  -SS  --  --    Eating meals (P)  4  -SS  --  --    Score (P)  15  -SS  --  --       Modified Garrison Scale    Modified Luis Scale (P)  3 - Moderate disability.  Requiring some help, but able to walk without assistance.   -SS  -- 3 - Moderate disability.  Requiring some help, but able to walk without assistance.  -       Functional Assessment    Outcome Measure Options  -- AM-PAC 6 Clicks Basic Mobility (PT)  -UD AM-PAC 6 Clicks Basic Mobility (PT)  -    Row Name 03/12/18 7003             How much help from another is currently needed...    Putting on and taking off regular lower body clothing? 2  -AC      Bathing (including washing, rinsing, and drying) 2  -AC      Toileting (which includes using toilet bed pan or urinal) 2  -AC      Putting on and taking off regular upper body clothing 2  -AC      Taking care of personal grooming (such as brushing teeth) 3  -AC      Eating meals 4  -AC      Score 15  -AC         Functional Assessment    Outcome Measure Options AM-PAC 6 Clicks Daily Activity (OT)  -AC        User Key  (r) = Recorded By, (t) = Taken By, (c) = Cosigned By    Initials Name Provider Type    AC Gypsy Cantu, OT Occupational Therapist    FRANCIA Matias, PTA Physical Therapy Assistant    SJ Mary Goldstein, PT Physical Therapist     Donis Bajwa, OT Student OT Student           Time Calculation:                 Donis Bajwa, OT Student  3/15/2018

## 2018-03-15 NOTE — PLAN OF CARE
Problem: Patient Care Overview  Goal: Plan of Care Review   03/15/18 6677   Coping/Psychosocial   Plan of Care Reviewed With patient   Plan of Care Review   Progress no change   OTHER   Outcome Summary Pt's pain well controlled on current medication regimen. VSS. Patient awaiting d/c tomorrow to facility via ambulance. Will continue to monitor.       Problem: Sepsis/Septic Shock (Adult)  Goal: Signs and Symptoms of Listed Potential Problems Will be Absent, Minimized or Managed (Sepsis/Septic Shock)  Outcome: Ongoing (interventions implemented as appropriate)

## 2018-03-15 NOTE — PROGRESS NOTES
[facility] Progress Note    Admission Date: 3/8/2018    Tamara Langston  1955  [id number]    Date: 3/15/2018    Antibiotics:  Anti-Infectives     Ordered     Dose/Rate Route Frequency Start Stop    03/15/18 0735  vancomycin (VANCOCIN) in iso-osmotic dextrose IVPB 1 g (premix) 200 mL     Ordering Provider:  Mary Lou Swartz RPH    1,000 mg  over 60 Minutes Intravenous Every 24 Hours 03/15/18 0900 03/19/18 0859    03/13/18 1545  piperacillin-tazobactam (ZOSYN) 3.375 g/100 mL 0.9% NS IVPB (mbp)     Ordering Provider:  Omari Ross MD    3.375 g  over 4 Hours Intravenous Every 8 Hours 03/13/18 1800 03/19/18 0159    03/08/18 2314  Pharmacy to dose vancomycin     Ordering Provider:  Suha Garza APRN     Does not apply Continuous PRN 03/08/18 2314 03/19/18 0013    03/08/18 1916  vancomycin IVPB 2000 mg in 0.9% Sodium Chloride (premix) 500 mL     Ordering Provider:  Juan Palacios MD    20 mg/kg × 105 kg  over 2 Hours Intravenous Once 03/08/18 2000 03/08/18 2254    03/08/18 1916  piperacillin-tazobactam (ZOSYN) 4.5 g in iso-osmotic dextrose 100 mL IVPB (premix)     Ordering Provider:  Juan Palacios MD    4.5 g  over 30 Minutes Intravenous Once 03/08/18 1917 03/08/18 2045          Reason for Consultation:   Fever, probable uti     History of present illness:    Patient is a 62-year-old female with hypertension, hyperlipidemia, type 2 diabetes mellitus, morbid obesity admitted 2/13 to 2/27 after presenting with confusion and  fever.  She had an unstageable  sacral deep tissue injury with associated infection which had progressed over several months.  Over the course of a year sbe became  quite debilitated with limited mobility following left TMA.   On  admission, she was septic with fever, leukocytosis and acute on chronic kidney injury  Hospital course complicated by CHF, respiratory distress  and ELIF  She was diagnosed with NSTEMI and stents to LAD and diagonal arteries EF 34% and Dr Zamorano diagnosed  Takotsubo myopathy  Her sacral decub improved with mist therapy and sequential debridement . Wound culture grew enterococcus, pseudomonas, and coag negative staph    Sacral MRI showed no evidence of abscess or OM.  She was denied [facility] and transferred to [facility] on zosyn and vancomycin There was a discrepancy in notes about duration of IV antibiotics  ie 3/2 or 3/12   Zosyn and vanc were stopped 3/2 Her picc was left in place   While at the center she evidently made progress with PT and walked up to 100'  She had fever to 102 followed by an episode of unresponsiveness then confusion  Transferred bact to [facility] with T100.3   UA with mild pyuria; culture pending   Wounds include a right heel DTPI and right ischial stage which measures 4.5x3.5x4.0cm  There is  undermining 6-12 o'clock; wound bed is beefy red, moist, with 10% slough noted in wound bed. No purulent drainage noted  On zosyn and vanc her fever appears to be improving  Cultures pending    3/11/18 alert, no new c/o  Afebrile on vanc and zosyn   3/12/18  Alert, no new c/o  Diarrhea improving; evaluation for [facility] stay for MIST therapy requested  3/13       Alert, some improvement with endurance per pt and PT notes; await precert from [facility]  3/14       Alert, no new c/o; note that [facility] notes no progress with trochanteric decubitus and MIST therapy has been requestedfor the trochanteric decubitus  3/15       Alert, no new c/o today  Long discussion with pt,  and [employer] rep (see below)      Review of Systems:  Constitutional-- + Fever, chills + sweats.  Appetite good per patient with good oral intake She reports continued progress with PT  HEENT-- No new vision, hearing or throat complaints.  No  oral ulcers or sore throat.  Denies odynophagia or dysphagia. No headache, photophobia or neck stiffness.  CV-- No chest pain, palpitation or syncope  Resp-- No SOB/cough/Hemoptysis  GI- + diarrhea- intermittent.  No hematochezia, melena, or hematemesis. Denies  jaundice or chronic liver disease. No odynophagia or dysphagia  -- No dysuria, hematuria, or flank pain.  Denies hesitancy, urgency or flank pain.  Lymph- no swollen lymph nodes in neck/axilla or groin.   Heme- No active bruising or bleeding; no Hx of DVT or PE.  MS-- no swelling or pain in the bones or joints of arms/legs.  No new back pain.  Neuro-- No acute focal weakness or numbness in the arms or legs.  No seizures.  Skin--No rashes or lesions       PE:  Vital Signs  Temp  Min: 97.6 °F (36.4 °C)  Max: 98.2 °F (36.8 °C)  BP  Min: 114/54  Max: 151/81  Pulse  Min: 61  Max: 70  Resp  Min: 16  Max: 18  SpO2  Min: 91 %  Max: 97 %  GENERAL: Awake and alert, in no acute distress; appears to recall medical history accurately.  Chronically ill-appearing with limited mobility in the bed  HEENT: Normocephalic, atraumatic.  PERRL. EOMI.  slera injected. No icterus. Oropharynx with mild thrush, no exudate. No evidence of peridontal disease.    NECK: Supple without nuchal rigidity  LYMPH: No cervical, axillary or inguinal lymphadenopathy. No neck masses  HEART: RRR; СЕРГЕЙ III/VI.  Bilateral lower extremity trace edema.  faint pedal pulses.  LUNGS: Clear to auscultation bilaterally without wheezing, rales, rhonchi.  Normal effort with supplemental O2 present.   ABDOMEN: Morbidly obese, Soft, nontender, nondistended. Positive bowel sounds. No rebound or guarding.   EXT:  L foot TMA well healed ; right heel with blister which is intact and w/o fluctuance;   R UE picc site w/o erythema, drainage or swelling  : No weiss catheter.  MSK: FROM without joint effusions noted    SKIN:  mild groin yeast rash    Sacral wound granulating; undermining has decreased somewhat; small amt yellow-green exudate on packing  NEURO: Oriented to PPT. No focal deficits.   PSYCHIATRIC: Normal insight and judgement. Cooperative with PE        Laboratory Data      Results from last 7 days  Lab Units 03/14/18  0353 03/08/18  1842   WBC 10*3/mm3 8.89  15.94*   HEMOGLOBIN g/dL 10.7* 10.2*   HEMATOCRIT % 34.5 33.4*   PLATELETS 10*3/mm3 221 192       Results from last 7 days  Lab Units 03/14/18  0353   SODIUM mmol/L 144   POTASSIUM mmol/L 4.0   CHLORIDE mmol/L 103   CO2 mmol/L 32.0*   BUN mg/dL 20   CREATININE mg/dL 1.30   GLUCOSE mg/dL 158*   CALCIUM mg/dL 9.6       Results from last 7 days  Lab Units 03/14/18  0353   ALK PHOS U/L 87   BILIRUBIN mg/dL 0.2*   ALT (SGPT) U/L 28   AST (SGOT) U/L 31       Results from last 7 days  Lab Units 03/10/18  0719   SED RATE mm/hr 75*       Results from last 7 days  Lab Units 03/14/18  0353   CRP mg/dL 2.38*       Estimated Creatinine Clearance: 53.8 mL/min (by C-G formula based on SCr of 1.3 mg/dL).      Microbiology:      Radiology:  Imaging Results (last 72 hours)     Procedure Component Value Units Date/Time    CT Head Without Contrast [[id number]] Collected:  03/08/18 2116     Updated:  03/08/18 2251    Narrative:       EXAM:    CT Head Without Intravenous Contrast    CLINICAL HISTORY:    62 years old, female; Pressure ulcer of sacral region, unspecified stage;   Transient alteration of awareness; Fever, unspecified; Signs and symptoms;   Other: Patient found unresponsive; Additional info: Pt found unresponsive    TECHNIQUE:    Axial computed tomography images of the head/brain without intravenous   contrast.  All CT scans at this facility use one or more dose reduction   techniques, viz.: automated exposure control; ma/kV adjustment per patient size   (including targeted exams where dose is matched to indication; i.e. head); or   iterative reconstruction technique.    COMPARISON:    No relevant prior studies available.    FINDINGS:    Brain:  Nonspecific calcification left occipital lobe.  No hemorrhage.  No   significant white matter disease.    Ventricles:  Unremarkable.  No ventriculomegaly.    Bones/joints:  Unremarkable.  No acute fracture.    Soft tissues:  Unremarkable.    Sinuses:  Mild sphenoid sinusitis.     Mastoid air cells:  Unremarkable as visualized.      Impression:         No acute findings.    THIS DOCUMENT HAS BEEN ELECTRONICALLY SIGNED BY NOEL LYLE MD    XR Chest 1 View [[id number]] Collected:  03/08/18 1813     Updated:  03/08/18 1912    Narrative:       EXAM:    XR Chest, 1 View    CLINICAL HISTORY:    62 years old, female; Signs and symptoms; Other: Weak/dizzy/ams; Additional   info: Weak/dizzy/ams triage protocol    TECHNIQUE:    Frontal view of the chest.    COMPARISON:    CR - XR CHEST 1 VW 2018-02-19 04:36    FINDINGS:    Lungs:  Unremarkable.  No consolidation.    Pleural space:  Unremarkable.  No pneumothorax.    Heart:  The heart demonstrates mild diffuse enlargement.    Mediastinum:  Unremarkable.    Bones/joints:  Unremarkable.    Other findings:  The patient is rotated to the left.      Impression:         No acute findings.    THIS DOCUMENT HAS BEEN ELECTRONICALLY SIGNED BY NOEL LYLE MD          I personally reviewed the radiographic studies     Impression:   --Sepsis with fever, leukocytosis and acute kidney injury    Improved    Possible foci of infection include urinary tract infection, picc line infection or the right heel decubitus  CRP 15.10 3/10--> 2.3 3/14     Stage 4 sacral  Wound which does not appear to be the source of sepsis  Prealbumin normal, suggesting that nutrition is not a factor in the failure to improve  Recent MRI w/o evidence of osteomyelitis  R/O infection with MDR gnr     Right heel decubitus with intact blister     Mild pyuria     Diarrhea- cdiff negative     Recent Non-ST segment elevation MI with increase in troponin     Cardiomyopathy     Acute on chronic kidney disease     Type 2 diabetes mellitus     Hypertension and Hyperlipidemia     COPD with home oxygen use and acute hypoxic respiratory failure at admission  Morbid obesity  Chronic debility       PLAN/RECOMMENDATIONS:   Thank you for asking us to see Tamara Langston, I recommend the  following:     Agree with zosyn and vancomycin- plan rx for ~ 4 weeks, total course to be determined by her clinical progress   reculture wound  Topical treatment of thrush  Stool culture- ordered but not performed  Right heel w/o signs of osteomyelitis, defer MRI  Probiotic  If she is turned down for  [facility], we plan to appeal the process since she is benefiting from Mist therapy in 2 different spots (heel and sacral decub)  I do not anticipate that she would need more than 2 weeks of Mist therapy  [facility] advises us that this is best initiated by the patient and family          Oksana Cruz MD  3/15/2018

## 2018-03-15 NOTE — PLAN OF CARE
Problem: Patient Care Overview  Goal: Plan of Care Review  Outcome: Ongoing (interventions implemented as appropriate)   03/15/18 4573   Coping/Psychosocial   Plan of Care Reviewed With patient   Plan of Care Review   Progress no change   OTHER   Outcome Summary pt still limited gait due to fatigue and SOB.needs some assist with le's for transfers and 02 for gait.went 90 ft.

## 2018-03-15 NOTE — PROGRESS NOTES
[facility] Medicine Services  PROGRESS NOTE    Patient Name: Tamara Langston  : 1955  MRN: [id number]    Date of Admission: 3/8/2018  Length of Stay: 7  Primary Care Physician: Harinder Aranda MD    Subjective   Subjective     CC:AMS    HPI:    Patient sitting on side of bed working with OT. She feels good today. Appetite fair. Denies any soa wearing bipap at HS. No acute events overnight per nursing.       Review of Systems  Gen- No fevers, chills  CV- No chest pain, palpitations  Resp- No cough, dyspnea  GI- No N/V/D, abd pain    Otherwise ROS is negative except as mentioned in the HPI.    Objective   Objective     Vital Signs:   Temp:  [97.6 °F (36.4 °C)-98.2 °F (36.8 °C)] 97.6 °F (36.4 °C)  Heart Rate:  [61-70] 61  Resp:  [16-18] 16  BP: (114-151)/(54-81) 114/54  FiO2 (%):  [30 %] 30 %        Physical Exam:  Constitutional: chronically ill appearing, in no acute distress, awake, alert, lying in bed  HENT: NCAT, mucous membranes moist  Respiratory: Clear to auscultation bilaterally, respiratory effort normal   Cardiovascular: RRR, no murmurs, rubs, or gallops  Gastrointestinal: obese, positive bowel sounds, soft, nontender, nondistended  Musculoskeletal: bilateral LE edema.  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      Results from last 7 days  Lab Units 18  0353 18  1842   WBC 10*3/mm3 8.89 15.94*   HEMOGLOBIN g/dL 10.7* 10.2*   HEMATOCRIT % 34.5 33.4*   PLATELETS 10*3/mm3 221 192       Results from last 7 days  Lab Units 18  0353 18  0445 03/10/18  2031 18  1842   SODIUM mmol/L 144 140 140 139   POTASSIUM mmol/L 4.0 4.0 5.1 5.2   CHLORIDE mmol/L 103 101 103 105   CO2 mmol/L 32.0* 32.0* 30.0 28.0   BUN mg/dL 20 20 28* 32*   CREATININE mg/dL 1.30  1.20 1.30 1.50*   GLUCOSE mg/dL 158* 148* 181* 176*   CALCIUM mg/dL 9.6 9.6 9.5 9.3   ALT (SGPT) U/L 28  --   --  26   AST (SGOT) U/L 31  --   --  32     Estimated Creatinine Clearance: 53.8 mL/min (by C-G formula based on SCr of 1.3 mg/dL).  No results found for: BNP  No results found for: PHART    Microbiology Results Abnormal     Procedure Component Value - Date/Time    Wound Culture - Wound, Back, Lower [[id number]]  (Normal) Collected:  03/14/18 2143    Lab Status:  Preliminary result Specimen:  Wound from Back, Lower Updated:  03/15/18 0707     Wound Culture No growth at less than 24 hours    Blood Culture - Blood, [[id number]]  (Normal) Collected:  03/08/18 1930    Lab Status:  Final result Specimen:  Blood from Arm, Right Updated:  03/13/18 2101     Blood Culture No growth at 5 days    Blood Culture - Blood, [[id number]]  (Normal) Collected:  03/08/18 1935    Lab Status:  Final result Specimen:  Blood from Arm, Right Updated:  03/13/18 2101     Blood Culture No growth at 5 days     Gram Stain Result Few (2+) WBCs seen    Urine Culture - Urine, Urine, Clean Catch [[id number]] Collected:  03/08/18 2002    Lab Status:  Final result Specimen:  Urine from Urine, Clean Catch Updated:  03/10/18 1110     Urine Culture --      10,000-20,000 CFU/mL Normal Urogenital Patience    Clostridium Difficile Toxin - Stool, Per Rectum [[id number]] Collected:  03/09/18 1027    Lab Status:  Final result Specimen:  Stool from Per Rectum Updated:  03/09/18 1203    Narrative:       The following orders were created for panel order Clostridium Difficile Toxin - Stool, Per Rectum.  Procedure                               Abnormality         Status                     ---------                               -----------         ------                     Clostridium Difficile To...[[id number]]  Normal              Final result                 Please view results for these tests on the individual orders.    Clostridium Difficile Toxin, PCR -  Stool, Per Rectum [[id number]]  (Normal) Collected:  03/09/18 1027    Lab Status:  Final result Specimen:  Stool from Per Rectum Updated:  03/09/18 1203     C. Difficile Toxins by PCR Not Detected    Narrative:         Performance characteristics of test not established for patients <2 years of age.  Negative for Toxigenic C. Difficile        Results for orders placed during the hospital encounter of 02/13/18   Adult Transthoracic Echo Limited W/ Cont if Necessary Per Protocol    Narrative · This was a limited echocardiogram performed to evaluate the left   ventricular ejection fraction  · Left ventricular systolic function is normal. Estimated EF appears to be   in the range of 51 - 55%.  · The following left ventricular wall segments are hypokinetic: mid   anterior, apical anterior, apical lateral, apical inferior, apical septal   and apex hypokinetic. The basal segments are hyperdynamic.  · When compared to the prior echocardiogram performed on February 14, 2018, the hypokinetic apical segments have mildly improved. The overall   ejection fraction has improved.          I have reviewed the medications.    Assessment/Plan   Assessment / Plan     Hospital Problem List     * (Principal)Sepsis    Obstructive sleep apnea syndrome (Chronic)    Overview Addendum 8/1/2016  9:56 AM by Puja Clarke     intolerant of CPAP therapy  6. 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  Prior PSG reports complex sleep apnea with frequent central apneas and intermittent      Ramona Menard respirations, on auto CPAP 4-16 with supplemental oxygen.         Type 2 diabetes mellitus (Chronic)    Hypertension (Chronic)    Overview Signed 8/1/2016 10:00 AM by Puja Clarke     16. H/O echocardiogram (V15.89) (Z92.89)   · A.  Echocardiogram of 02/03/2015 reports an ejection fraction of 60-65%, mild concentric      LVH, trace mitral regurgitation, mild tricuspid and pulmonic regurgitation and calculated      RVSP of 35 mmHg, the  main pulmonary artery is also mildly dilated.         Peripheral vascular disease (Chronic)    Metabolic encephalopathy    Coronary artery disease involving native heart (Chronic)    Overview Signed 2/15/2018 12:52 PM by CINDI Fierro     1. [facility] 11-26-16: Non occlusive disease  ·  50% mid RCA stenosis.  · 40% proximal LAD stenosis   · Normal left ventricular function         Takotsubo cardiomyopathy    Overview Signed 2/15/2018  1:42 PM by JEOVANY Greenfield     EF 35%, diastolic dysfunction, left ventricular wall segments are hypokinetic ECHO 2/14/18         Chronic systolic heart failure    Chronic respiratory failure with hypoxia (Chronic)    Infected sacral decubitus ulcers (Chronic)    Weakness         Brief Hospital Course to date:  Tamara Langston is a 62 y.o. female with hx of decubitus ulcer, systolic HF, DINA, T2DM presented with AMS and fever with concern for persistent decub infection.  Previously d/c'd with PICC to SNF but sounds like was stopped early.     Assessment & Plan:  - encephalopathy is improved to baseline, related to fever/infection on admission  - currently on vanc/zosyn via PICC per Dr. Cruz. (possible 4 weeks of therapy)  Source of infection includes possible UTI, PICC line, or right heel decub.  - has stage 4 sacral decub, cultures from last admission were polymicrobial.  Recent MRI without evidence of osteo.  - acceptable glc control, continue current insulin.  - CKD with baseline  - recent NSTEMI/takosubo's.  Clinically stable.  Continue DAPT, statin, BB, entresto.  - d/w CM - waiting to hear about LTACH acceptance.  If denied, will plan back to [facility] at discharge. ID would like for her to go to [facility] so she can get mist therapy if denied she wants them to appeal the process      DVT Prophylaxis: [facility]     CODE STATUS: Conditional Code     Disposition:D, [facility] LTACH vs [facility]    Electronically signed by JEOVANY Ramos,  03/15/18, 12:12 PM.

## 2018-03-15 NOTE — THERAPY TREATMENT NOTE
Acute Care - Occupational Therapy Treatment Note  [facility]     Patient Name: Tamara Langston  : 1955  MRN: [id number]  Today's Date: 3/15/2018  Onset of Illness/Injury or Date of Surgery: 18  Date of Referral to OT: 18  Referring Physician: MD Carson    Admit Date: 3/8/2018       ICD-10-CM ICD-9-CM   1. Transient alteration of awareness R40.4 780.02   2. Fever, unspecified fever cause R50.9 780.60   3. Decubitus ulcer of sacral region, unspecified ulcer stage L89.159 707.03     707.20   4. Impaired mobility and ADLs Z74.09 799.89   5. Impaired functional mobility, balance, gait, and endurance Z74.09 V49.89     Patient Active Problem List   Diagnosis   • Atopic rhinitis   • Restrictive ventilatory defect   • COPD (chronic obstructive pulmonary disease)   • Osteoporosis   • Obstructive sleep apnea syndrome   • Abnormal liver enzymes   • Cholelithiasis   • Chronic back pain   • Mixed anxiety depressive disorder   • Type 2 diabetes mellitus   • Dyslipidemia   • Essential tremor   • Fibromyalgia   • Gastroesophageal reflux disease without esophagitis   • Hypertension   • Hypothyroidism   • Insomnia   • Osteoarthritis   • Psoriasis   • Branch retinal vein occlusion   • Cobalamin deficiency   • Obesity   • Vitamin D deficiency   • Hypokalemia   • Lactic acidosis   • Fatty liver disease, nonalcoholic   • Sinus bradycardia   • NSTEMI (non-ST elevated myocardial infarction)   • Tobacco abuse   • Hypoxia   • Peripheral vascular disease   • Diabetic polyneuropathy associated with type 2 diabetes mellitus   • Anemia, blood loss   • Chronic pain   • Chronic, continuous use of opioids   • Chronic anxiety   • Chronically on benzodiazepine therapy   • Tubular adenoma   • Elevated troponin level   • Urine abnormality   • ELIF (acute kidney injury)   • Cellulitis of sacral region   • Metabolic encephalopathy   • Fever   • Sepsis   • Acute on chronic respiratory failure with hypoxia   • Acute cystitis  without hematuria   • Coronary artery disease involving native heart   • Takotsubo cardiomyopathy   • Elevated troponin   • Chronic systolic heart failure   • Chronic respiratory failure with hypoxia   • Infected sacral decubitus ulcers   • Weakness     Past Medical History:   Diagnosis Date   • Acute on chronic respiratory failure with hypoxia 2/13/2018   • ELIF (acute kidney injury) 2/13/2018   • ELIF (acute kidney injury) 2/13/2018   • Altered mental status 2/13/2018   • Bronchitis    • Cellulitis of sacral region 2/13/2018   • Cervical cancer    • Cholelithiasis 5/11/2016   • Chronic anxiety 2/27/2017   • Chronic bronchitis    • Chronic respiratory failure with hypoxia 3/8/2018   • Chronic systolic heart failure 3/8/2018   • Chronically on benzodiazepine therapy 2/27/2017   • Degenerative arthritis    • Diabetes mellitus    • Dyslipidemia 5/11/2016   • Dyspnea    • Elevated troponin level 2/13/2018   • Fatty liver disease, nonalcoholic 11/21/2016   • Fever 2/13/2018   • Fibromyalgia    • GERD (gastroesophageal reflux disease)    • H/O echocardiogram    • History of pneumonia    • Hypertension 5/11/2016    16. H/O echocardiogram (V15.89) (Z92.89)  · A.  Echocardiogram of 02/03/2015 reports an ejection fraction of 60-65%, mild concentric     LVH, trace mitral regurgitation, mild tricuspid and pulmonic regurgitation and calculated     RVSP of 35 mmHg, the main pulmonary artery is also mildly dilated.   • Hypothyroidism 5/11/2016    Description: A.  On replacement therapy.   • Infected wound 3/8/2018   • Nausea    • Obesity    • DINA (obstructive sleep apnea)     intolerant of CPAP therapy   • Osteoporosis    • Osteoporosis    • Polycythemia vera 5/11/2016   • Pulmonary emphysema    • Restrictive ventilatory defect    • Rhinitis    • Sepsis 2/13/2018   • Uncontrolled diabetes mellitus 5/11/2016   • Urine abnormality 2/13/2018   • Uterine cancer    • Vitamin D deficiency 8/1/2016   • Weakness 3/8/2018     Past Surgical  History:   Procedure Laterality Date   • AMPUTATION DIGIT Left 11/23/2016    Procedure: AMPUTATION TRANS METATARSAL - ray amutation of the left great toe;  Surgeon: Arsalan Feliciano MD;  Location:  [facility];  Service:    • AMPUTATION DIGIT Left 3/2/2017    Procedure: LEFT FOOT TRANSMETATARSAL AMPUTATION TOES 2,3,4,5;  Surgeon: Joey Guaman MD;  Location:  [facility];  Service:    • BACK SURGERY      lumbar fusions x5--multiple times; 1995, 1997, 1998, 1999 and 2008   • BELOW KNEE AMPUTATION Left 2/25/2017    Procedure: EXTENDED LEFT TOE AMPUTATION;  Surgeon: Arsalan Feliciano MD;  Location:  [facility];  Service:    • CARDIAC CATHETERIZATION N/A 11/26/2016    Procedure: Left Heart Cath;  Surgeon: Ash Nuñez MD;  Location:  [facility] IN[facility];  Service:    • CARDIAC CATHETERIZATION N/A 2/20/2018    Procedure: Left Heart Cath;  Surgeon: Lane Zamorano MD;  Location:  [facility];  Service:    • CARDIAC CATHETERIZATION N/A 2/20/2018    Procedure: Right Heart Cath;  Surgeon: Lane Zamorano MD;  Location:  [facility] INVASIVE LOCATION;  Service:    • HAND SURGERY Bilateral     x3   • HYSTERECTOMY      status post uterine and cervical cancer   • LUMBAR SPINE SURGERY      arthrodesis by anterior approach addit interspace   • TONSILLECTOMY         Therapy Treatment    Therapy Treatment / Health Promotion    Treatment Time/Intention  Discipline: (P) occupational therapist (Donis Bajwa, OT Student)  Document Type: (P) therapy note (daily note) (Donis Bajwa, OT Student)  Subjective Information: (P) complains of, fatigue (Donis Bajwa, OT Student)  Mode of Treatment: (P) occupational therapy (Donis Bajwa, OT Student)  Patient/Family Observations: (P) Pt UIC, exit alarm off, O2 2L (Donis Bajwa, OT Student)  Care Plan Review: (P) care plan/treatment goals reviewed (Donis Bajwa, OT Student)  Patient Effort: (P) good (Donis Bajwa, OT  Mary)  Existing Precautions/Restrictions: (P) fall (CARRI Thacker)  Plan of Care Review  Plan of Care Reviewed With: (P) patient (CARRI Thacker)    Vitals/Pain/Safety  Vital Signs  Pre Systolic BP Rehab: (P)  (Vitals stable during treatment. RN approved treatment.) (CARRI Thacker)  Pain Assessment  Additional Documentation: (P) Pain Scale: Numbers Pre/Post-Treatment (Group) (CARRI Thacker)  Pain Scale: Numbers Pre/Post-Treatment  Pain Scale: Numbers, Pretreatment: (P) 0/10 - no pain (CARRI Thacker)  Pain Scale: Numbers, Post-Treatment: (P) 0/10 - no pain (CARRI Thacker)  Positioning and Restraints  Pre-Treatment Position: (P) in bed (CARRI Thacker)  Post Treatment Position: (P) bed (CARRI Thacker)  In Bed: (P) sitting EOB, call light within reach, encouraged to call for assist, with other staff, with family/caregiver (CARRI Thacker)    Mobility,ADL,Motor, Modality  Bed Mobility Assessment/Treatment  Bed Mobility Assessment/Treatment: (P) sit-supine (CARRI Thacker)  Supine-Sit [place] (Bed Mobility): (P) conditional independence (CARRI Thacker)  Assistive Device (Bed Mobility): (P) bed rails, head of bed elevated (CARRI Thacker)  Transfer Assessment/Treatment  Comment (Transfers): (P) Pt declined. States she wants to save energy for physical therapy. (CARRI Thacker)  ADL Assessment/Intervention  Additional Documentation: (P)  (Pt declined need to perform any ADL's.) (CARRI Thacker)  Therapeutic Exercise  Therapeutic Exercise: (P) seated, upper extremities (CARRI Thacker)  Additional Documentation: (P) Therapeutic Exercise (Row) (CARRI Thacker)  Upper Extremity Seated Therapeutic Exercise  Performed, Seated  Upper Extremity (Therapeutic Exercise): (P) shoulder flexion/extension, shoulder abduction/adduction, scapular protraction/retraction, elbow flexion/extension, digit flexion/extension (Donis Bajwa OT Student)  Exercise Type, Seated Upper Extremity (Therapeutic Exercise): (P) AROM (active range of motion) (Donis Bajwa OT Student)  Expected Outcomes, Seated Upper Extremity (Therapeutic Exercise): (P) improve performance, transfer skills, improve performance, BADLs, improve functional tolerance, self-care activity (Donis Bajwa OT Student)  Sets/Reps Detail, Seated Upper Extremity (Therapeutic Exercise): (P) 10 reps each exercise B UE (Donis Bajwa OT Student)  Transfers Skills, Training to Functional Activity, Seated Upper Extremity (Therapeutic Exercise): (P) able to transfer skills from training to functional activity (Donis Bajwa OT Student)           ROM/MMT             Sensory, Edema, Orthotics          Cognition, Communication, Swallow  Cognitive Assessment/Intervention  Additional Documentation: (P) Cognitive Assessment/Intervention (Group) (CARRI Thacker)  Cognitive Assessment/Intervention- PT/OT  Affect/Mental Status (Cognitive): (P) flat/blunted affect (Donis Bajwa OT Student)  Orientation Status (Cognition): (P) oriented x 4 (Donis Bajwa OT Student)  Follows Commands (Cognition): (P) WNL, follows one step commands, over 90% accuracy (Donis Bajwa OT Student)  Cognitive Function (Cognitive): (P) WNL (Donis Bajwa OT Student)    Outcome Summary           OT Rehab Goals     Row Name 03/12/18 3330             Transfer Goal 1 (OT)    Activity/Assistive Device (Transfer Goal 1, OT) toilet  -AC      [place] Level/Cues Needed (Transfer Goal 1, OT) contact guard assist  -AC      Time Frame (Transfer Goal 1, OT) 1 week  -AC         Toileting Goal 1 (OT)    Activity/Device (Toileting Goal 1, OT) toileting  skills, all;grab bar/safety frame  -AC      [place] Level/Cues Needed (Toileting Goal 1, OT) minimum assist (75% or more patient effort)  -AC      Time Frame (Toileting Goal 1, OT) 1 day  -AC         ROM Goal 1 (OT)    ROM Goal 1 (OT) Pt will particiapte in B UE AROM  10 -15 reps daily as needed to increase L shoulder flex 10 degrees and to increase strength and endurance needed  to support ADLs  -AC      Time Frame (ROM Goal 1, OT) 1 week  -AC         Problem Specific Goal 1 (OT)    Problem Specific Goal 1 (OT) Pt will complete FM/GM taks with SBA  -AC      Time Frame (Problem Specific Goal 1, OT) 1 week  -AC      Progress/Outcome (Problem Specific Goal 1, OT) goal met  -AC        User Key  (r) = Recorded By, (t) = Taken By, (c) = Cosigned By    Initials Name Provider Type    AC Gypsy Cantu, OT Occupational Therapist        Occupational Therapy Education     Title: PT OT SLP Therapies (Active)     Topic: Occupational Therapy (Active)     Point: ADL training (Done)     Description: Instruct learner(s) on proper safety adaptation and remediation techniques during self care or transfers.   Instruct in proper use of assistive devices.   Learning Progress Summary     Learner Status Readiness Method Response Comment Documented by    Patient Done Acceptance E VU Pt educated on B UE AROM HEP to faciliate participation in ADL's.  03/15/18 1125     Done Acceptance E VU benefits of activity  03/12/18 5708     Done Acceptance E,D VU,NR Educated on current deficits and discussed POC. AN 03/09/18 0942          Point: Home exercise program (Done)     Description: Instruct learner(s) on appropriate technique for monitoring, assisting and/or progressing therapeutic exercises/activities.   Learning Progress Summary     Learner Status Readiness Method Response Comment Documented by    Patient Done Acceptance E VU Pt educated on B UE AROM HEP to faciliate participation in ADL's.  03/15/18 1125                      User  Key     Initials Effective Dates Name Provider Type Discipline     06/23/15 -  Gypsy Cantu, OT Occupational Therapist OT    AN 06/22/15 -  Jeanne Mooney, OT Occupational Therapist OT    SS 03/07/18 -  Donis Bajwa OT Student OT Student OT                  OT Recommendation and Plan     Plan of Care Review  Plan of Care Reviewed With: (P) patient  Plan of Care Reviewed With: (P) patient  Outcome Summary: (P) Pt declined functional mobility, ADL participation and transfer due to c/o fatigue. Pt conditionally I with bed mobility to get supine to sit. Pt participated in B UE AROM HEP. Continue OT per POC.         Outcome Measures     Row Name 03/15/18 1040 03/13/18 1000 03/12/18 1421       How much help from another person do you currently need...    Turning from your back to your side while in flat bed without using bedrails?  -- 4  -UD 4  -SJ    Moving from lying on back to sitting on the side of a flat bed without bedrails?  -- 3  -UD 4  -SJ    Moving to and from a bed to a chair (including a wheelchair)?  -- 3  -UD 3  -SJ    Standing up from a chair using your arms (e.g., wheelchair, bedside chair)?  -- 3  -UD 4  -SJ    Climbing 3-5 steps with a railing?  -- 2  -UD 3  -SJ    To walk in hospital room?  -- 3  -UD 3  -SJ    AM-PAC 6 Clicks Score  -- 18  -UD 21  -SJ       How much help from another is currently needed...    Putting on and taking off regular lower body clothing? (P)  2  -SS  --  --    Bathing (including washing, rinsing, and drying) (P)  2  -SS  --  --    Toileting (which includes using toilet bed pan or urinal) (P)  2  -SS  --  --    Putting on and taking off regular upper body clothing (P)  2  -SS  --  --    Taking care of personal grooming (such as brushing teeth) (P)  3  -SS  --  --    Eating meals (P)  4  -SS  --  --    Score (P)  15  -SS  --  --       Modified [place] Scale    Modified Luis Scale (P)  3 - Moderate disability.  Requiring some help, but able to walk without assistance.   -SS  -- 3 - Moderate disability.  Requiring some help, but able to walk without assistance.  -       Functional Assessment    Outcome Measure Options  -- AM-PAC 6 Clicks Basic Mobility (PT)  -UD AM-PAC 6 Clicks Basic Mobility (PT)  -    Row Name 03/12/18 1405             How much help from another is currently needed...    Putting on and taking off regular lower body clothing? 2  -AC      Bathing (including washing, rinsing, and drying) 2  -AC      Toileting (which includes using toilet bed pan or urinal) 2  -AC      Putting on and taking off regular upper body clothing 2  -AC      Taking care of personal grooming (such as brushing teeth) 3  -AC      Eating meals 4  -AC      Score 15  -         Functional Assessment    Outcome Measure Options AM-PAC 6 Clicks Daily Activity (OT)  -        User Key  (r) = Recorded By, (t) = Taken By, (c) = Cosigned By    Initials Name Provider Type    AC Gypsy Cantu, OT Occupational Therapist    FRANCIA Matias, PTA Physical Therapy Assistant     Mary Goldstein, PT Physical Therapist     Donis Bajwa, OT Student OT Student           Time Calculation:         Time Calculation- OT     Row Name 03/15/18 1340             Time Calculation- OT    OT Start Time (P)  1100  -      Total Timed Code Minutes- OT (P)  15 minute(s)  -      OT Received On (P)  03/15/18  -      OT Goal Re-Cert Due Date (P)  03/19/18  -        User Key  (r) = Recorded By, (t) = Taken By, (c) = Cosigned By    Initials Name Provider Type     Donis Bajwa, OT Student OT Student           Therapy Charges for Today     Code Description Service Date Service Provider Modifiers Qty    [id number]  OT THERAPEUTIC ACT EA 15 MIN 3/15/2018 Donis Bajwa OT Student GO 1               Donis Bajwa OT Student  3/15/2018

## 2018-03-15 NOTE — THERAPY TREATMENT NOTE
Acute Care - Physical Therapy Treatment Note  [facility]     Patient Name: Tamara Langston  : 1955  MRN: [id number]  Today's Date: 3/15/2018  Onset of Illness/Injury or Date of Surgery: 18  Date of Referral to PT: 18  Referring Physician: MD Carson    Admit Date: 3/8/2018    Visit Dx:    ICD-10-CM ICD-9-CM   1. Transient alteration of awareness R40.4 780.02   2. Fever, unspecified fever cause R50.9 780.60   3. Decubitus ulcer of sacral region, unspecified ulcer stage L89.159 707.03     707.20   4. Impaired mobility and ADLs Z74.09 799.89   5. Impaired functional mobility, balance, gait, and endurance Z74.09 V49.89     Patient Active Problem List   Diagnosis   • Atopic rhinitis   • Restrictive ventilatory defect   • COPD (chronic obstructive pulmonary disease)   • Osteoporosis   • Obstructive sleep apnea syndrome   • Abnormal liver enzymes   • Cholelithiasis   • Chronic back pain   • Mixed anxiety depressive disorder   • Type 2 diabetes mellitus   • Dyslipidemia   • Essential tremor   • Fibromyalgia   • Gastroesophageal reflux disease without esophagitis   • Hypertension   • Hypothyroidism   • Insomnia   • Osteoarthritis   • Psoriasis   • Branch retinal vein occlusion   • Cobalamin deficiency   • Obesity   • Vitamin D deficiency   • Hypokalemia   • Lactic acidosis   • Fatty liver disease, nonalcoholic   • Sinus bradycardia   • NSTEMI (non-ST elevated myocardial infarction)   • Tobacco abuse   • Hypoxia   • Peripheral vascular disease   • Diabetic polyneuropathy associated with type 2 diabetes mellitus   • Anemia, blood loss   • Chronic pain   • Chronic, continuous use of opioids   • Chronic anxiety   • Chronically on benzodiazepine therapy   • Tubular adenoma   • Elevated troponin level   • Urine abnormality   • ELIF (acute kidney injury)   • Cellulitis of sacral region   • Metabolic encephalopathy   • Fever   • Sepsis   • Acute on chronic respiratory failure with hypoxia   • Acute cystitis  without hematuria   • Coronary artery disease involving native heart   • Takotsubo cardiomyopathy   • Elevated troponin   • Chronic systolic heart failure   • Chronic respiratory failure with hypoxia   • Infected sacral decubitus ulcers   • Weakness       Therapy Treatment    Therapy Treatment / Health Promotion    Treatment Time/Intention  Discipline: physical therapy assistant (Molly Matias PTA)  Document Type: therapy note (daily note) (Molly Matias PTA)  Subjective Information: complains of, dyspnea (Molly Matias PTA)  Mode of Treatment: physical therapy (Molly Matias PTA)  Therapy Frequency (PT Clinical Impression): daily (Molly Matias PTA)  Patient Effort: good (Molly Matias PTA)  Existing Precautions/Restrictions: oxygen therapy device and L/min (Molly Matias PTA)  Plan of Care Review  Plan of Care Reviewed With: patient (Molly Matias PTA)    Vitals/Pain/Safety  Vital Signs  Pre SpO2 (%): 95 (Molly Matias PTA)  O2 Delivery Pre Treatment: supplemental O2 (Molly Matias PTA)  Post SpO2 (%): 92 (Molly Matias PTA)  O2 Delivery Post Treatment: supplemental O2 (Molly Matias PTA)  Pre Patient Position: Supine (Molly Matias PTA)  Intra Patient Position: Standing (Molly Matias PTA)  Post Patient Position: Supine (Molly Matias PTA)  Pain Scale: Numbers Pre/Post-Treatment  Pain Scale: Numbers, Pretreatment: 0/10 - no pain (Molly Matias PTA)  Pain Scale: Numbers, Post-Treatment: 0/10 - no pain (Molly Matias PTA)  Positioning and Restraints  Pre-Treatment Position: in bed (Molly Matias PTA)  Post Treatment Position: bed (Molly Matias PTA)  In Bed: notified nsg, supine, call light within reach, exit alarm on, side rails up x2 (Molly Matias PTA)    Mobility,ADL,Motor, Modality  Bed Mobility Assessment/Treatment  Bed Mobility Assessment/Treatment: supine-sit, sit-supine (Molly Matias PTA)  Supine-Sit [place] (Bed Mobility): supervision (Molly Matias PTA)  Sit-Supine [place] (Bed Mobility):  contact guard (Molly Florencio, PTA)  Bed Mobility, Safety Issues: decreased use of legs for bridging/pushing (Mloly Florencio, PTA)  Transfer Assessment/Treatment  Transfer Assessment/Treatment: sit-stand transfer, stand-sit transfer (Molly Matias, PTA)  Sit-Stand Transfer  Sit-Stand [place] (Transfers): contact guard (Molly Florencio, PTA)  Assistive Device (Sit-Stand Transfers): walker, front-wheeled (Molly Florencio, PTA)  Stand-Sit Transfer  Stand-Sit [place] (Transfers): contact guard (Molly Florencio, PTA)  Assistive Device (Stand-Sit Transfers): walker, front-wheeled (Molly Florencio, PTA)  Gait/Stairs Assessment/Training  [place] Level (Gait): contact guard (Molly Florencio, PTA)  Assistive Device (Gait): walker, front-wheeled (Molly Florencio, PTA)  Distance in Feet (Gait): 90 (Molly Matias, PTA)  Deviations/Abnormal Patterns (Gait): aleyda decreased, stride length decreased (Molly Matias, PTA)  Comment (Gait/Stairs):  (pt limited by sob) (Molly Matias, PTA)                 ROM/MMT             Sensory, Edema, Orthotics          Cognition, Communication, Swallow  Cognitive Assessment/Intervention- PT/OT  Orientation Status (Cognition): oriented x 4 (Molly Matias PTA)  Follows Commands (Cognition): WNL (Molly Matias PTA)  Personal Safety Interventions: gait belt (Molly Matias PTA)  Speaking Valve  Pre SpO2 (%): 95 (Molly Florencio, PTA)  Post SpO2 (%): 92 (Molly Florencio, PTA)  General Eating/Swallowing Observations  Pre SpO2 (%): 95 (Molly Florencio, PTA)  Post SpO2 (%): 92 (Molly Florencio, PTA)    Outcome Summary               PT Rehab Goals     Row Name 03/10/18 0900             Bed Mobility Goal 1 (PT)    Activity/Assistive Device (Bed Mobility Goal 1, PT) bed mobility activities, all  -ES      [place] Level/Cues Needed (Bed Mobility Goal 1, PT) independent  -ES      Time Frame (Bed Mobility Goal 1, PT) 1 week;long term goal (LTG)  -ES      Progress/Outcomes (Bed Mobility Goal 1, PT) goal ongoing  -ES          Transfer Goal 1 (PT)    Activity/Assistive Device (Transfer Goal 1, PT) sit-to-stand/stand-to-sit;bed-to-chair/chair-to-bed;walker, rolling  -ES      [place] Level/Cues Needed (Transfer Goal 1, PT) independent  -ES      Time Frame (Transfer Goal 1, PT) 1 week;long term goal (LTG)  -ES      Progress/Outcome (Transfer Goal 1, PT) goal ongoing  -ES         Gait Training Goal 1 (PT)    Activity/Assistive Device (Gait Training Goal 1, PT) gait (walking locomotion);walker, rolling  -ES      [place] Level (Gait Training Goal 1, PT) independent  -ES      Distance (Gait Goal 1, PT) 150  -ES      Time Frame (Gait Training Goal 1, PT) 1 week;long term goal (LTG)  -ES      Progress/Outcome (Gait Training Goal 1, PT) goal ongoing  -ES        User Key  (r) = Recorded By, (t) = Taken By, (c) = Cosigned By    Initials Name Provider Type    ES Alisha Gardner, PT Physical Therapist          Physical Therapy Education     Title: PT OT SLP Therapies (Active)     Topic: Physical Therapy (Done)     Point: Mobility training (Done)    Learning Progress Summary     Learner Status Readiness Method Response Comment Documented by    Patient Done Acceptance E,D VU   03/15/18 1516     Done Acceptance E [facility] 03/13/18 1114     Done Acceptance E VU,DU   03/12/18 1507     Done Acceptance E,D VU,NR POC progression, fall precautions, transfer and gait safety, D/C planning, [facility] 03/09/18 1324          Point: Home exercise program (Done)    Learning Progress Summary     Learner Status Readiness Method Response Comment Documented by    Patient Done Acceptance E,D VU   03/15/18 1516     Done Acceptance E DU   03/13/18 1114     Done Acceptance E VU,DU   03/12/18 1507     Done Acceptance E,D VU,NR POC progression, fall precautions, transfer and gait safety, D/C planning, [facility] 03/09/18 1324          Point: Body mechanics (Done)    Learning Progress Summary     Learner Status Readiness Method Response Comment Documented by    Patient  Done Acceptance E,D VU   03/15/18 1516     Done Acceptance E DU   03/13/18 1114     Done Acceptance E VU,DU   03/12/18 1507     Done Acceptance E,D VU,NR POC progression, fall precautions, transfer and gait safety, D/C planning, [facility] 03/09/18 1324          Point: Precautions (Done)    Learning Progress Summary     Learner Status Readiness Method Response Comment Documented by    Patient Done Acceptance E,D VU   03/15/18 1516     Done Acceptance E DU   03/13/18 1114     Done Acceptance E VU,DU   03/12/18 1507     Done Acceptance E,D VU,NR POC progression, fall precautions, transfer and gait safety, D/C planning, [facility] 03/09/18 1324                      User Key     Initials Effective Dates Name Provider Type Discipline     06/22/15 -  Molly Matias, PTA Physical Therapy Assistant PT     06/19/15 -  Mary Goldstein, PT Physical Therapist PT    MB 03/14/16 -  Ruth Rose, PT Physical Therapist PT                    PT Recommendation and Plan  Therapy Frequency (PT Clinical Impression): daily  Plan of Care Reviewed With: patient  Progress: no change  Outcome Summary: pt still limited gait due to fatigue and SOB.needs some assist with le's for transfers and 02 for gait.went 90 ft.          Outcome Measures     Row Name 03/15/18 1445 03/15/18 1040 03/13/18 1000       How much help from another person do you currently need...    Turning from your back to your side while in flat bed without using bedrails? 4  -UD  -- 4  -UD    Moving from lying on back to sitting on the side of a flat bed without bedrails? 4  -UD  -- 3  -UD    Moving to and from a bed to a chair (including a wheelchair)? 3  -UD  -- 3  -UD    Standing up from a chair using your arms (e.g., wheelchair, bedside chair)? 3  -UD  -- 3  -UD    Climbing 3-5 steps with a railing? 2  -UD  -- 2  -UD    To walk in hospital room? 3  -UD  -- 3  -UD    AM-PAC 6 Clicks Score 19  -UD  -- 18  -UD       How much help from another is currently  needed...    Putting on and taking off regular lower body clothing?  -- 2  -AN (r) SS (t) AN (c)  --    Bathing (including washing, rinsing, and drying)  -- 2  -AN (r) SS (t) AN (c)  --    Toileting (which includes using toilet bed pan or urinal)  -- 2  -AN (r) SS (t) AN (c)  --    Putting on and taking off regular upper body clothing  -- 2  -AN (r) SS (t) AN (c)  --    Taking care of personal grooming (such as brushing teeth)  -- 3  -AN (r) SS (t) AN (c)  --    Eating meals  -- 4  -AN (r) SS (t) AN (c)  --    Score  -- 15  -AN (r) SS (t)  --       Modified [place] Scale    Modified [place] Scale  -- 3 - Moderate disability.  Requiring some help, but able to walk without assistance.  -AN (r) SS (t) AN (c)  --       Functional Assessment    Outcome Measure Options AM-PAC 6 Clicks Basic Mobility (PT)  -UD  -- AM-PAC 6 Clicks Basic Mobility (PT)  -UD      User Key  (r) = Recorded By, (t) = Taken By, (c) = Cosigned By    Initials Name Provider Type    FRANCIA Matias PTA Physical Therapy Assistant    TRESA Mooney, OT Occupational Therapist    SS Donis Bajwa, OT Student OT Student           Time Calculation:         PT Charges     Row Name 03/15/18 1518             Time Calculation    Start Time 1445  -      PT Received On 03/15/18  -      PT Goal Re-Cert Due Date 03/20/18  -         Time Calculation- PT    Total Timed Code Minutes- PT 18 minute(s)  -UD        User Key  (r) = Recorded By, (t) = Taken By, (c) = Cosigned By    Initials Name Provider Type    FRANCIA Matias PTA Physical Therapy Assistant          Therapy Charges for Today     Code Description Service Date Service Provider Modifiers Qty    [id number] HC GAIT TRAINING EA 15 MIN 3/15/2018 Molly Matias PTA GP 1          PT G-Codes  PT Professional Judgement Used?: Yes  Outcome Measure Options: AM-PAC 6 Clicks Basic Mobility (PT)  Score: 17  Functional Limitation: Mobility: Walking and moving around  Mobility: Walking and Moving Around  Current Status (): At least 40 percent but less than 60 percent impaired, limited or restricted  Mobility: Walking and Moving Around Goal Status (): At least 20 percent but less than 40 percent impaired, limited or restricted    Molly Matias, PTA  3/15/2018

## 2018-03-16 LAB
GLUCOSE BLDC GLUCOMTR-MCNC: 168 MG/DL (ref 70–130)
GLUCOSE BLDC GLUCOMTR-MCNC: 182 MG/DL (ref 70–130)
GLUCOSE BLDC GLUCOMTR-MCNC: 201 MG/DL (ref 70–130)
GLUCOSE BLDC GLUCOMTR-MCNC: 208 MG/DL (ref 70–130)

## 2018-03-16 PROCEDURE — 25010000002 PIPERACILLIN-TAZOBACTAM: Performed by: HOSPITALIST

## 2018-03-16 PROCEDURE — 94660 CPAP INITIATION&MGMT: CPT

## 2018-03-16 PROCEDURE — 97116 GAIT TRAINING THERAPY: CPT

## 2018-03-16 PROCEDURE — 25010000002 HEPARIN (PORCINE) PER 1000 UNITS: Performed by: NURSE PRACTITIONER

## 2018-03-16 PROCEDURE — 82962 GLUCOSE BLOOD TEST: CPT

## 2018-03-16 PROCEDURE — 97610 LOW FREQUENCY NON-THERMAL US: CPT

## 2018-03-16 PROCEDURE — 99232 SBSQ HOSP IP/OBS MODERATE 35: CPT | Performed by: INTERNAL MEDICINE

## 2018-03-16 PROCEDURE — 25010000002 PIPERACILLIN-TAZOBACTAM: Performed by: INTERNAL MEDICINE

## 2018-03-16 PROCEDURE — 25010000002 VANCOMYCIN PER 500 MG

## 2018-03-16 PROCEDURE — 94799 UNLISTED PULMONARY SVC/PX: CPT

## 2018-03-16 RX ADMIN — FLUTICASONE PROPIONATE 1 SPRAY: 50 SPRAY, METERED NASAL at 22:16

## 2018-03-16 RX ADMIN — PIPERACILLIN SODIUM,TAZOBACTAM SODIUM 3.38 G: 3; .375 INJECTION, POWDER, FOR SOLUTION INTRAVENOUS at 10:14

## 2018-03-16 RX ADMIN — PIPERACILLIN SODIUM,TAZOBACTAM SODIUM 3.38 G: 3; .375 INJECTION, POWDER, FOR SOLUTION INTRAVENOUS at 02:00

## 2018-03-16 RX ADMIN — CLOTRIMAZOLE 10 MG: 10 LOZENGE ORAL at 08:14

## 2018-03-16 RX ADMIN — INSULIN LISPRO 2 UNITS: 100 INJECTION, SOLUTION INTRAVENOUS; SUBCUTANEOUS at 08:15

## 2018-03-16 RX ADMIN — OXYCODONE HYDROCHLORIDE AND ACETAMINOPHEN 1 TABLET: 10; 325 TABLET ORAL at 22:14

## 2018-03-16 RX ADMIN — INSULIN LISPRO 3 UNITS: 100 INJECTION, SOLUTION INTRAVENOUS; SUBCUTANEOUS at 12:10

## 2018-03-16 RX ADMIN — ATORVASTATIN CALCIUM 80 MG: 40 TABLET, FILM COATED ORAL at 22:14

## 2018-03-16 RX ADMIN — SACUBITRIL AND VALSARTAN 1 TABLET: 24; 26 TABLET, FILM COATED ORAL at 22:15

## 2018-03-16 RX ADMIN — PIPERACILLIN SODIUM,TAZOBACTAM SODIUM 3.38 G: 3; .375 INJECTION, POWDER, FOR SOLUTION INTRAVENOUS at 17:17

## 2018-03-16 RX ADMIN — NYSTATIN: 100000 POWDER TOPICAL at 08:15

## 2018-03-16 RX ADMIN — POTASSIUM CHLORIDE 20 MEQ: 750 CAPSULE, EXTENDED RELEASE ORAL at 08:13

## 2018-03-16 RX ADMIN — OXYCODONE HYDROCHLORIDE AND ACETAMINOPHEN 1 TABLET: 10; 325 TABLET ORAL at 08:13

## 2018-03-16 RX ADMIN — INSULIN LISPRO 2 UNITS: 100 INJECTION, SOLUTION INTRAVENOUS; SUBCUTANEOUS at 17:17

## 2018-03-16 RX ADMIN — CLOTRIMAZOLE 10 MG: 10 LOZENGE ORAL at 17:17

## 2018-03-16 RX ADMIN — Medication 1 CAPSULE: at 08:13

## 2018-03-16 RX ADMIN — OXYCODONE HYDROCHLORIDE AND ACETAMINOPHEN 1 TABLET: 10; 325 TABLET ORAL at 14:30

## 2018-03-16 RX ADMIN — BUSPIRONE HYDROCHLORIDE 7.5 MG: 15 TABLET ORAL at 22:15

## 2018-03-16 RX ADMIN — PREGABALIN 100 MG: 100 CAPSULE ORAL at 05:03

## 2018-03-16 RX ADMIN — VANCOMYCIN HYDROCHLORIDE 1000 MG: 1 INJECTION, SOLUTION INTRAVENOUS at 08:14

## 2018-03-16 RX ADMIN — ASPIRIN 81 MG: 81 TABLET, COATED ORAL at 08:14

## 2018-03-16 RX ADMIN — PREGABALIN 100 MG: 100 CAPSULE ORAL at 14:29

## 2018-03-16 RX ADMIN — CARVEDILOL 6.25 MG: 6.25 TABLET, FILM COATED ORAL at 08:13

## 2018-03-16 RX ADMIN — CLOTRIMAZOLE 10 MG: 10 LOZENGE ORAL at 22:14

## 2018-03-16 RX ADMIN — LEVOTHYROXINE SODIUM 112 MCG: 112 TABLET ORAL at 05:03

## 2018-03-16 RX ADMIN — CARVEDILOL 6.25 MG: 6.25 TABLET, FILM COATED ORAL at 22:16

## 2018-03-16 RX ADMIN — PREGABALIN 100 MG: 100 CAPSULE ORAL at 22:15

## 2018-03-16 RX ADMIN — NYSTATIN: 100000 POWDER TOPICAL at 22:17

## 2018-03-16 RX ADMIN — FUROSEMIDE 40 MG: 40 TABLET ORAL at 08:13

## 2018-03-16 RX ADMIN — CETIRIZINE HYDROCHLORIDE 10 MG: 10 TABLET, FILM COATED ORAL at 22:16

## 2018-03-16 RX ADMIN — HEPARIN SODIUM 5000 UNITS: 5000 INJECTION, SOLUTION INTRAVENOUS; SUBCUTANEOUS at 22:14

## 2018-03-16 RX ADMIN — CLOPIDOGREL BISULFATE 75 MG: 75 TABLET ORAL at 08:13

## 2018-03-16 RX ADMIN — CLONAZEPAM 0.25 MG: 0.5 TABLET ORAL at 23:15

## 2018-03-16 RX ADMIN — BUSPIRONE HYDROCHLORIDE 7.5 MG: 15 TABLET ORAL at 08:15

## 2018-03-16 RX ADMIN — SACUBITRIL AND VALSARTAN 1 TABLET: 24; 26 TABLET, FILM COATED ORAL at 08:14

## 2018-03-16 RX ADMIN — PANTOPRAZOLE SODIUM 40 MG: 40 TABLET, DELAYED RELEASE ORAL at 05:03

## 2018-03-16 RX ADMIN — HEPARIN SODIUM 5000 UNITS: 5000 INJECTION, SOLUTION INTRAVENOUS; SUBCUTANEOUS at 08:14

## 2018-03-16 NOTE — PROGRESS NOTES
[facility] Medicine Services  PROGRESS NOTE    Patient Name: Tamara Langston  : 1955  MRN: [id number]    Date of Admission: 3/8/2018  Length of Stay: 8  Primary Care Physician: Harinder Aranda MD    Subjective   Subjective     CC:AMS    HPI:    No problems overnight.  She is appealing insurance decision re: denial of LTACH.    Review of Systems  Gen- No fevers, chills  CV- No chest pain, palpitations  Resp- No cough, dyspnea  GI- No N/V/D, abd pain    Otherwise ROS is negative except as mentioned in the HPI.    Objective   Objective     Vital Signs:   Temp:  [97.5 °F (36.4 °C)-98.2 °F (36.8 °C)] 97.5 °F (36.4 °C)  Heart Rate:  [56-64] 56  Resp:  [18-20] 18  BP: (106-118)/(49-66) 112/58  FiO2 (%):  [30 %] 30 %        Physical Exam:  Constitutional: chronically ill appearing, in no acute distress, awake, alert, lying in bed  HENT: NCAT, mucous membranes moist  Respiratory: Clear to auscultation bilaterally, respiratory effort normal   Cardiovascular: RRR, no murmurs, rubs, or gallops  Gastrointestinal: obese, positive bowel sounds, soft, nontender, nondistended  Musculoskeletal: bilateral LE edema.  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      Results from last 7 days  Lab Units 18  0353   WBC 10*3/mm3 8.89   HEMOGLOBIN g/dL 10.7*   HEMATOCRIT % 34.5   PLATELETS 10*3/mm3 221       Results from last 7 days  Lab Units 18  0353 18  0445 03/10/18  2031   SODIUM mmol/L 144 140 140   POTASSIUM mmol/L 4.0 4.0 5.1   CHLORIDE mmol/L 103 101 103   CO2 mmol/L 32.0* 32.0* 30.0   BUN mg/dL 20 20 28*   CREATININE mg/dL 1.30 1.20 1.30   GLUCOSE mg/dL 158* 148* 181*   CALCIUM mg/dL 9.6 9.6 9.5   ALT (SGPT) U/L 28  --   --    AST  (SGOT) U/L 31  --   --      Estimated Creatinine Clearance: 53.8 mL/min (by C-G formula based on SCr of 1.3 mg/dL).  No results found for: BNP  No results found for: PHART    Microbiology Results Abnormal     Procedure Component Value - Date/Time    Wound Culture - Wound, Back, Lower [[id number]]  (Normal) Collected:  03/14/18 2143    Lab Status:  Preliminary result Specimen:  Wound from Back, Lower Updated:  03/16/18 0957     Wound Culture Culture in progress     Gram Stain Result Moderate (3+) WBCs seen      No organisms seen    Blood Culture - Blood, [[id number]]  (Normal) Collected:  03/08/18 1930    Lab Status:  Final result Specimen:  Blood from Arm, Right Updated:  03/13/18 2101     Blood Culture No growth at 5 days    Blood Culture - Blood, [[id number]]  (Normal) Collected:  03/08/18 1935    Lab Status:  Final result Specimen:  Blood from Arm, Right Updated:  03/13/18 2101     Blood Culture No growth at 5 days     Gram Stain Result Few (2+) WBCs seen    Urine Culture - Urine, Urine, Clean Catch [[id number]] Collected:  03/08/18 2002    Lab Status:  Final result Specimen:  Urine from Urine, Clean Catch Updated:  03/10/18 1110     Urine Culture --      10,000-20,000 CFU/mL Normal Urogenital Patience    Clostridium Difficile Toxin - Stool, Per Rectum [[id number]] Collected:  03/09/18 1027    Lab Status:  Final result Specimen:  Stool from Per Rectum Updated:  03/09/18 1203    Narrative:       The following orders were created for panel order Clostridium Difficile Toxin - Stool, Per Rectum.  Procedure                               Abnormality         Status                     ---------                               -----------         ------                     Clostridium Difficile To...[[id number]]  Normal              Final result                 Please view results for these tests on the individual orders.    Clostridium Difficile Toxin, PCR - Stool, Per Rectum [[id number]]  (Normal) Collected:  03/09/18 1027    Lab  Status:  Final result Specimen:  Stool from Per Rectum Updated:  03/09/18 1203     C. Difficile Toxins by PCR Not Detected    Narrative:         Performance characteristics of test not established for patients <2 years of age.  Negative for Toxigenic C. Difficile        Results for orders placed during the hospital encounter of 02/13/18   Adult Transthoracic Echo Limited W/ Cont if Necessary Per Protocol    Narrative · This was a limited echocardiogram performed to evaluate the left   ventricular ejection fraction  · Left ventricular systolic function is normal. Estimated EF appears to be   in the range of 51 - 55%.  · The following left ventricular wall segments are hypokinetic: mid   anterior, apical anterior, apical lateral, apical inferior, apical septal   and apex hypokinetic. The basal segments are hyperdynamic.  · When compared to the prior echocardiogram performed on February 14, 2018, the hypokinetic apical segments have mildly improved. The overall   ejection fraction has improved.          I have reviewed the medications.    Assessment/Plan   Assessment / Plan     Hospital Problem List     * (Principal)Sepsis    Obstructive sleep apnea syndrome (Chronic)    Overview Addendum 8/1/2016  9:56 AM by Puja Clarke     intolerant of CPAP therapy  6. 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  Prior PSG reports complex sleep apnea with frequent central apneas and intermittent      Ramona Menard respirations, on auto CPAP 4-16 with supplemental oxygen.         Type 2 diabetes mellitus (Chronic)    Hypertension (Chronic)    Overview Signed 8/1/2016 10:00 AM by Puja Clarke     16. H/O echocardiogram (V15.89) (Z92.89)   · A.  Echocardiogram of 02/03/2015 reports an ejection fraction of 60-65%, mild concentric      LVH, trace mitral regurgitation, mild tricuspid and pulmonic regurgitation and calculated      RVSP of 35 mmHg, the main pulmonary artery is also mildly dilated.         Peripheral vascular  disease (Chronic)    Metabolic encephalopathy    Coronary artery disease involving native heart (Chronic)    Overview Signed 2/15/2018 12:52 PM by CINDI Fierro     1. [facility] 11-26-16: Non occlusive disease  ·  50% mid RCA stenosis.  · 40% proximal LAD stenosis   · Normal left ventricular function         Takotsubo cardiomyopathy    Overview Signed 2/15/2018  1:42 PM by JEOVANY Greenfield     EF 35%, diastolic dysfunction, left ventricular wall segments are hypokinetic ECHO 2/14/18         Chronic systolic heart failure    Chronic respiratory failure with hypoxia (Chronic)    Infected sacral decubitus ulcers (Chronic)    Weakness         Brief Hospital Course to date:  Tamara Langston is a 62 y.o. female with hx of decubitus ulcer, systolic HF, DINA, T2DM presented with AMS and fever with concern for persistent decub infection.  Previously d/c'd with PICC to SNF but sounds like was stopped early.     Assessment & Plan:  - encephalopathy is improved to baseline, related to fever/infection on admission  - currently on vanc/zosyn via PICC per Dr. Cruz.  Source of infection includes possible UTI, PICC line, or right heel decub.  - has stage 4 sacral decub, cultures from last admission were polymicrobial.  Recent MRI without evidence of osteo.  - will increase SSI for some better glc control to allow best healing  - CKD with baseline  - recent NSTEMI/takosubo's.  Clinically stable.  Continue DAPT, statin, BB, entresto.  - d/w CM and Dr. Cruz.  She was going to do peer to peer regarding LTACH denial.  Will f/u outcome of that and determine next step.    DVT Prophylaxis: [facility]     CODE STATUS: Conditional Code     Disposition:TBD, [facility] LTACH vs [facility]    Electronically signed by Derek Jones MD, 03/16/18, 5:36 PM.

## 2018-03-16 NOTE — PLAN OF CARE
Problem: Patient Care Overview  Goal: Plan of Care Review  Outcome: Ongoing (interventions implemented as appropriate)   03/16/18 3190   Coping/Psychosocial   Plan of Care Reviewed With patient   Plan of Care Review   Progress improving   OTHER   Outcome Summary pt. still on o2,needs minim. assist for gait.ambulat 150 ft today

## 2018-03-16 NOTE — THERAPY WOUND CARE TREATMENT
Acute Care - Wound/Debridement Treatment Note  [facility]     Patient Name: Tamara Langston  : 1955  MRN: [id number]  Today's Date: 3/16/2018  Onset of Illness/Injury or Date of Surgery: 18   Date of Referral to PT: 18   Referring Physician: MD Carson       Admit Date: 3/8/2018    Visit Dx:    ICD-10-CM ICD-9-CM   1. Transient alteration of awareness R40.4 780.02   2. Fever, unspecified fever cause R50.9 780.60   3. Decubitus ulcer of sacral region, unspecified ulcer stage L89.159 707.03     707.20   4. Impaired mobility and ADLs Z74.09 799.89   5. Impaired functional mobility, balance, gait, and endurance Z74.09 V49.89       Patient Active Problem List   Diagnosis   • Atopic rhinitis   • Restrictive ventilatory defect   • COPD (chronic obstructive pulmonary disease)   • Osteoporosis   • Obstructive sleep apnea syndrome   • Abnormal liver enzymes   • Cholelithiasis   • Chronic back pain   • Mixed anxiety depressive disorder   • Type 2 diabetes mellitus   • Dyslipidemia   • Essential tremor   • Fibromyalgia   • Gastroesophageal reflux disease without esophagitis   • Hypertension   • Hypothyroidism   • Insomnia   • Osteoarthritis   • Psoriasis   • Branch retinal vein occlusion   • Cobalamin deficiency   • Obesity   • Vitamin D deficiency   • Hypokalemia   • Lactic acidosis   • Fatty liver disease, nonalcoholic   • Sinus bradycardia   • NSTEMI (non-ST elevated myocardial infarction)   • Tobacco abuse   • Hypoxia   • Peripheral vascular disease   • Diabetic polyneuropathy associated with type 2 diabetes mellitus   • Anemia, blood loss   • Chronic pain   • Chronic, continuous use of opioids   • Chronic anxiety   • Chronically on benzodiazepine therapy   • Tubular adenoma   • Elevated troponin level   • Urine abnormality   • ELIF (acute kidney injury)   • Cellulitis of sacral region   • Metabolic encephalopathy   • Fever   • Sepsis   • Acute on chronic respiratory failure with hypoxia   • Acute  cystitis without hematuria   • Coronary artery disease involving native heart   • Takotsubo cardiomyopathy   • Elevated troponin   • Chronic systolic heart failure   • Chronic respiratory failure with hypoxia   • Infected sacral decubitus ulcers   • Weakness               WOUND DEBRIDEMENT               Therapy Treatment    Therapy Treatment / Health Promotion    Treatment Time/Intention  Discipline: physical therapy assistant (Lane Yates PT)  Document Type: therapy note (daily note), wound care (Lane Yates, PT)  Subjective Information: complains of, pain (Lane Yates, PT)  Mode of Treatment: physical therapy (Molly Matias PTA)  Therapy Frequency (PT Clinical Impression): daily (Molly Matias PTA)  Patient Effort: good (Molly Matias PTA)  Existing Precautions/Restrictions: oxygen therapy device and L/min (Molly Matias PTA)  Plan of Care Review  Plan of Care Reviewed With: patient (Lane Yates PT)    Vitals/Pain/Safety  Vital Signs  Pre SpO2 (%): 95 (Molly Florencio, PTA)  O2 Delivery Pre Treatment: supplemental O2 (Molly Florencio, PTA)  Post SpO2 (%): 96 (Molly Matias PTA)  O2 Delivery Post Treatment: supplemental O2 (Molly Florencio, PTA)  Pre Patient Position: Supine (Molly Matias, PTA)  Intra Patient Position: Standing (Molly Matias, PTA)  Post Patient Position: Supine (Molly Matias PTA)  Pain Assessment  Additional Documentation: Pain Scale: FACES Pre/Post-Treatment (Group) (Lane Yates, KALIA)  Pain Scale: Numbers Pre/Post-Treatment  Pain Scale: Numbers, Pretreatment: 0/10 - no pain (Lane Yates PT)  Pain Scale: Numbers, Post-Treatment: 0/10 - no pain (Lane Yates PT)  Positioning and Restraints  Pre-Treatment Position: in bed (Lane Yates PT)  Post Treatment Position: bed (Lane aYtes PT)  In Bed: supine, call light within reach (Lane Yates PT)    Mobility,ADL,Motor, Modality  Bed Mobility Assessment/Treatment  Supine-Sit [place] (Bed  Mobility): supervision (Molly Matias PTA)  Sit-Supine [place] (Bed Mobility): supervision (Molly Matias PTA)  Bed Mobility, Safety Issues: decreased use of arms for pushing/pulling (Molly Matias, PTA)  Transfer Assessment/Treatment  Transfer Assessment/Treatment: sit-stand transfer, stand-sit transfer (Molly Matias, PTA)  Sit-Stand Transfer  Sit-Stand [place] (Transfers): supervision (Molly Matias PTA)  Assistive Device (Sit-Stand Transfers): walker, front-wheeled (Molly Matias, PTA)  Stand-Sit Transfer  Stand-Sit [place] (Transfers): contact guard (Molly Matias, PTA)  Assistive Device (Stand-Sit Transfers): walker, front-wheeled (Molly Florencio, PTA)  Gait/Stairs Assessment/Training  [place] Level (Gait): contact guard (Molly Florencio, PTA)  Assistive Device (Gait): walker, front-wheeled (Molly Florencio, PTA)  Distance in Feet (Gait): 150 (Molly Matias, PTA)  Deviations/Abnormal Patterns (Gait): aleyda decreased, stride length decreased (Molly Florencio, PTA)                 ROM/MMT             Sensory, Edema, Orthotics          Cognition, Communication, Swallow  Cognitive Assessment/Intervention- PT/OT  Affect/Mental Status (Cognitive): WNL (Molly Florencio, PTA)  Orientation Status (Cognition): oriented x 4 (Molly Florencio, PTA)  Follows Commands (Cognition): WNL (Molly Florencio, PTA)  Personal Safety Interventions: gait belt (Molly Matias, PTA)  Speaking Valve  Pre SpO2 (%): 95 (Molly Florencio, PTA)  Post SpO2 (%): 96 (Molly Florencio, PTA)  General Eating/Swallowing Observations  Pre SpO2 (%): 95 (Molly Florencio, PTA)  Post SpO2 (%): 96 (Molly Florencio, PTA)    Outcome Summary             PT Rehab Goals     Row Name 03/10/18 0900             Bed Mobility Goal 1 (PT)    Activity/Assistive Device (Bed Mobility Goal 1, PT) bed mobility activities, all  -ES      [place] Level/Cues Needed (Bed Mobility Goal 1, PT) independent  -ES      Time Frame (Bed Mobility Goal 1, PT) 1 week;long term goal (LTG)  -ES       Progress/Outcomes (Bed Mobility Goal 1, PT) goal ongoing  -ES         Transfer Goal 1 (PT)    Activity/Assistive Device (Transfer Goal 1, PT) sit-to-stand/stand-to-sit;bed-to-chair/chair-to-bed;walker, rolling  -ES      [place] Level/Cues Needed (Transfer Goal 1, PT) independent  -ES      Time Frame (Transfer Goal 1, PT) 1 week;long term goal (LTG)  -ES      Progress/Outcome (Transfer Goal 1, PT) goal ongoing  -ES         Gait Training Goal 1 (PT)    Activity/Assistive Device (Gait Training Goal 1, PT) gait (walking locomotion);walker, rolling  -ES      [place] Level (Gait Training Goal 1, PT) independent  -ES      Distance (Gait Goal 1, PT) 150  -ES      Time Frame (Gait Training Goal 1, PT) 1 week;long term goal (LTG)  -ES      Progress/Outcome (Gait Training Goal 1, PT) goal ongoing  -ES        User Key  (r) = Recorded By, (t) = Taken By, (c) = Cosigned By    Initials Name Provider Type    ES Alisha Gardner, PT Physical Therapist          Physical Therapy Education     Title: PT OT SLP Therapies (Active)     Topic: Physical Therapy (Done)     Point: Mobility training (Done)    Learning Progress Summary     Learner Status Readiness Method Response Comment Documented by    Patient Done Acceptance E,D DOROTHY PEREZ   03/16/18 1531     Done Acceptance E,D ANA RAYO   03/16/18 1524     Done Acceptance E,D VU   03/15/18 1516     Done Acceptance E [facility] 03/13/18 1114     Done Acceptance E ANA RAYO   03/12/18 1507     Done Acceptance E,D PERRINR POC progression, fall precautions, transfer and gait safety, D/C planning, HEP MB 03/09/18 1324          Point: Home exercise program (Done)    Learning Progress Summary     Learner Status Readiness Method Response Comment Documented by    Patient Done Acceptance E,D ANANR   03/16/18 1531     Done Acceptance E,D VUANA   03/16/18 1524     Done Acceptance E,D VU   03/15/18 1516     Done Acceptance E DU   03/13/18 1114     Done Acceptance E ANA RAYO   03/12/18 1507     Done  Acceptance E,D VU,NR POC progression, fall precautions, transfer and gait safety, D/C planning, HEP MB 03/09/18 1324          Point: Body mechanics (Done)    Learning Progress Summary     Learner Status Readiness Method Response Comment Documented by    Patient Done Acceptance E,D DU,NR   03/16/18 1531     Done Acceptance E,D VU,DU   03/16/18 1524     Done Acceptance E,D VU   03/15/18 1516     Done Acceptance E DU   03/13/18 1114     Done Acceptance E VU,DU   03/12/18 1507     Done Acceptance E,D VU,NR POC progression, fall precautions, transfer and gait safety, D/C planning, HEP MB 03/09/18 1324          Point: Precautions (Done)    Learning Progress Summary     Learner Status Readiness Method Response Comment Documented by    Patient Done Acceptance E,D DU,NR   03/16/18 1531     Done Acceptance E,D VU,DU   03/16/18 1524     Done Acceptance E,D VU   03/15/18 1516     Done Acceptance E DU   03/13/18 1114     Done Acceptance E VU,DU   03/12/18 1507     Done Acceptance E,D VU,NR POC progression, fall precautions, transfer and gait safety, D/C planning, HEP MB 03/09/18 1324                      User Key     Initials Effective Dates Name Provider Type Discipline     06/22/15 -  Molly Matias, PTA Physical Therapy Assistant PT     06/19/15 -  Mary Goldstein, PT Physical Therapist PT    MB 03/14/16 -  Ruth Rose, PT Physical Therapist PT                   PT ASSESSMENT (last 72 hours)      Physical Therapy Evaluation     Row Name             Wound 02/14/18 1115 Right medial heel    Wound - Properties Group Date first assessed: 02/14/18  -KS Time first assessed: 1115  -KS Present On Admission : yes  -KS Side: Right  -KS Orientation: medial  -KS Location: heel  -KS Stage, Pressure Injury: deep tissue injury  -KS    Row Name             Wound 03/09/18 0930 Right upper ischial tuberosity pressure injury    Wound - Properties Group Date first assessed: 03/09/18  -KS Time first assessed: 0930   -KS Present On Admission : yes  -KS Side: Right  -KS Orientation: upper  -KS Location: ischial tuberosity  -KS Type: pressure injury  -KS Stage, Pressure Injury: Stage 4  -KS    Row Name             Wound 02/13/18 2100 other (see comments) anterior groin other (see comments)    Wound - Properties Group Date first assessed: 02/13/18  -KS Time first assessed: 2100  -KS Side: other (see comments)  -KS, bilateral  Orientation: anterior  -KS Location: groin  -KS, and breast   Type: other (see comments)  -KS, excoriation        User Key  (r) = Recorded By, (t) = Taken By, (c) = Cosigned By    Initials Name Provider Type    TAJ Pereira RN Registered Nurse            PT Recommendation and Plan  Anticipated Discharge Disposition (PT): skilled nursing facility (SNF)  Therapy Frequency (PT Clinical Impression): daily               Outcome Summary: DTPI cont to be intact and blistered with no further breakdown and no darkening of discoloration noted. PT will cont with mist therapy 2-3 x /week to help increase healing potential and skin integrity.   Plan of Care Reviewed With: patient          Outcome Measures     Row Name 03/16/18 1500 03/15/18 1445 03/15/18 1040       How much help from another person do you currently need...    Turning from your back to your side while in flat bed without using bedrails? 4  -UD 4  -UD  --    Moving from lying on back to sitting on the side of a flat bed without bedrails? 4  -UD 4  -UD  --    Moving to and from a bed to a chair (including a wheelchair)? 3  -UD 3  -UD  --    Standing up from a chair using your arms (e.g., wheelchair, bedside chair)? 3  -UD 3  -UD  --    Climbing 3-5 steps with a railing? 2  -UD 2  -UD  --    To walk in hospital room? 3  -UD 3  -UD  --    AM-PAC 6 Clicks Score 19  -UD 19  -UD  --       How much help from another is currently needed...    Putting on and taking off regular lower body clothing?  --  -- 2  -AN (r) SS (t) AN (c)    Bathing (including washing,  rinsing, and drying)  --  -- 2  -AN (r) SS (t) AN (c)    Toileting (which includes using toilet bed pan or urinal)  --  -- 2  -AN (r) SS (t) AN (c)    Putting on and taking off regular upper body clothing  --  -- 2  -AN (r) SS (t) AN (c)    Taking care of personal grooming (such as brushing teeth)  --  -- 3  -AN (r) SS (t) AN (c)    Eating meals  --  -- 4  -AN (r) SS (t) AN (c)    Score  --  -- 15  -AN (r) SS (t)       Modified McLean Scale    Modified McLean Scale  --  -- 3 - Moderate disability.  Requiring some help, but able to walk without assistance.  -AN (r) SS (t) AN (c)       Functional Assessment    Outcome Measure Options AM-PAC 6 Clicks Basic Mobility (PT)  -UD AM-PAC 6 Clicks Basic Mobility (PT)  -UD  --      User Key  (r) = Recorded By, (t) = Taken By, (c) = Cosigned By    Initials Name Provider Type    FRANCIA Matias PTA Physical Therapy Assistant    TRESA Mooney, OT Occupational Therapist    SS Donis Bajwa, OT Student OT Student              Time Calculation        PT Charges     Row Name 03/16/18 1600 03/16/18 1526          Time Calculation    Start Time 1600  - 1500  -     PT Received On  -- 03/16/18  -     PT Goal Re-Cert Due Date 03/20/18  - 03/20/18  -        Time Calculation- PT    Total Timed Code Minutes- PT 25 minute(s)  - 20 minute(s)  -UD       User Key  (r) = Recorded By, (t) = Taken By, (c) = Cosigned By    Initials Name Provider Type    FRANCIA Matias PTA Physical Therapy Assistant     Lane Yates, PT Physical Therapist             Therapy Charges for Today     Code Description Service Date Service Provider Modifiers Qty    [id number] HC PT [facility] MIST 3/16/2018 Lane Yates, PT GP 1            PT G-Codes  PT Professional Judgement Used?: Yes  Outcome Measure Options: AM-PAC 6 Clicks Basic Mobility (PT)  Score: 17  Functional Limitation: Mobility: Walking and moving around  Mobility: Walking and Moving Around Current Status (): At least 40  percent but less than 60 percent impaired, limited or restricted  Mobility: Walking and Moving Around Goal Status (): At least 20 percent but less than 40 percent impaired, limited or restricted        Lane Yates, PT  3/16/2018

## 2018-03-16 NOTE — PROGRESS NOTES
Continued Stay Note  [facility]     Patient Name: Tamara Langston  MRN: [id number]  Today's Date: 3/16/2018    Admit Date: 3/8/2018          Discharge Plan     Row Name 03/16/18 1242       Plan    Plan [facility] vs [facility]    Patient/Family in Agreement with Plan yes    Plan Comments Peer to Peer was completed and denial was upheld. In that denial Dr. Peck at [facility] stated the SNF could transport pt to mist therapy 3x per week. Per JAMAL Sherman, pt is being seen 3x per week for Mist Therapy but could benefit from therapy daily.  Spoke to Jillian at [facility] and she states she is 95% sure they cannot transport to mist therapy.  Discussed with pt and spouse, Sukhdev, at  and they would like to do an appeal. Pt's spouse called Zonia at [facility] Appeals at [phone], option 3 and requested an expedited appeal with Case # [id number].  Manually faxed clinicals with the appointment of representative form completed to [facility] at [phone]. It can take up to 72 hours for decision. CM will cont to follow.     Row Name 03/16/18 0959       Plan    Plan Comments Called Kody at [facility] Peer to Peer line at [phone] and arranged for peer to peer to be done with Dr. Cruz instead of Dr. Jones. Dr. Peck at [facility] to call Dr. Cruz's cell at 10 am. Notified Dr. Cruz. CM will cont to follow.               Discharge Codes    No documentation.       Expected Discharge Date and Time     Expected Discharge Date Expected Discharge Time    Mar 16, 2018             Leny Moran

## 2018-03-16 NOTE — THERAPY TREATMENT NOTE
Acute Care - Physical Therapy Treatment Note  [facility]     Patient Name: Tamara Langston  : 1955  MRN: [id number]  Today's Date: 3/16/2018  Onset of Illness/Injury or Date of Surgery: 18  Date of Referral to PT: 18  Referring Physician: MD Carson    Admit Date: 3/8/2018    Visit Dx:    ICD-10-CM ICD-9-CM   1. Transient alteration of awareness R40.4 780.02   2. Fever, unspecified fever cause R50.9 780.60   3. Decubitus ulcer of sacral region, unspecified ulcer stage L89.159 707.03     707.20   4. Impaired mobility and ADLs Z74.09 799.89   5. Impaired functional mobility, balance, gait, and endurance Z74.09 V49.89     Patient Active Problem List   Diagnosis   • Atopic rhinitis   • Restrictive ventilatory defect   • COPD (chronic obstructive pulmonary disease)   • Osteoporosis   • Obstructive sleep apnea syndrome   • Abnormal liver enzymes   • Cholelithiasis   • Chronic back pain   • Mixed anxiety depressive disorder   • Type 2 diabetes mellitus   • Dyslipidemia   • Essential tremor   • Fibromyalgia   • Gastroesophageal reflux disease without esophagitis   • Hypertension   • Hypothyroidism   • Insomnia   • Osteoarthritis   • Psoriasis   • Branch retinal vein occlusion   • Cobalamin deficiency   • Obesity   • Vitamin D deficiency   • Hypokalemia   • Lactic acidosis   • Fatty liver disease, nonalcoholic   • Sinus bradycardia   • NSTEMI (non-ST elevated myocardial infarction)   • Tobacco abuse   • Hypoxia   • Peripheral vascular disease   • Diabetic polyneuropathy associated with type 2 diabetes mellitus   • Anemia, blood loss   • Chronic pain   • Chronic, continuous use of opioids   • Chronic anxiety   • Chronically on benzodiazepine therapy   • Tubular adenoma   • Elevated troponin level   • Urine abnormality   • ELIF (acute kidney injury)   • Cellulitis of sacral region   • Metabolic encephalopathy   • Fever   • Sepsis   • Acute on chronic respiratory failure with hypoxia   • Acute cystitis  without hematuria   • Coronary artery disease involving native heart   • Takotsubo cardiomyopathy   • Elevated troponin   • Chronic systolic heart failure   • Chronic respiratory failure with hypoxia   • Infected sacral decubitus ulcers   • Weakness       Therapy Treatment    Therapy Treatment / Health Promotion    Treatment Time/Intention  Discipline: physical therapy assistant (Molly Matias PTA)  Document Type: therapy note (daily note) (Molly Matias PTA)  Subjective Information: complains of, dyspnea (Molly Matias PTA)  Mode of Treatment: physical therapy (Molly Matias PTA)  Therapy Frequency (PT Clinical Impression): daily (Molly Matias PTA)  Patient Effort: good (Molly Matias PTA)  Existing Precautions/Restrictions: oxygen therapy device and L/min (Molly Matias PTA)    Vitals/Pain/Safety  Vital Signs  Pre SpO2 (%): 95 (Molly Matias PTA)  O2 Delivery Pre Treatment: supplemental O2 (Molly Matias PTA)  Post SpO2 (%): 96 (Molly Matias PTA)  O2 Delivery Post Treatment: supplemental O2 (Molly Matias PTA)  Pre Patient Position: Supine (Molly Matias PTA)  Intra Patient Position: Standing (Molly Matias PTA)  Post Patient Position: Supine (Molly Matias PTA)  Pain Scale: Numbers Pre/Post-Treatment  Pain Scale: Numbers, Pretreatment: 0/10 - no pain (Molly Matias PTA)  Pain Scale: Numbers, Post-Treatment: 0/10 - no pain (Molly Matias PTA)  Positioning and Restraints  Pre-Treatment Position: in bed (Molly Matias PTA)  Post Treatment Position: bed (Molly Matias PTA)  In Bed: notified nsg, supine, call light within reach, side rails up x2 (Molly Matias PTA)    Mobility,ADL,Motor, Modality  Bed Mobility Assessment/Treatment  Supine-Sit [place] (Bed Mobility): supervision (Molly Matias PTA)  Sit-Supine [place] (Bed Mobility): supervision (Molly Matias PTA)  Bed Mobility, Safety Issues: decreased use of arms for pushing/pulling (Molly Matias PTA)  Transfer Assessment/Treatment  Transfer  Assessment/Treatment: sit-stand transfer, stand-sit transfer (Molly Matias, PTA)  Sit-Stand Transfer  Sit-Stand [place] (Transfers): supervision (Molly Matias PTA)  Assistive Device (Sit-Stand Transfers): walker, front-wheeled (Molly Florencio, PTA)  Stand-Sit Transfer  Stand-Sit [place] (Transfers): contact guard (Molly Florencio, PTA)  Assistive Device (Stand-Sit Transfers): walker, front-wheeled (Molly Florencio, PTA)  Gait/Stairs Assessment/Training  [place] Level (Gait): contact guard (Molly Florencio, PTA)  Assistive Device (Gait): walker, front-wheeled (Molly Florencio, PTA)  Distance in Feet (Gait): 150 (Molly Matias, PTA)  Deviations/Abnormal Patterns (Gait): [place] decreased, stride length decreased (Molly Florencio, PTA)                 ROM/MMT             Sensory, Edema, Orthotics          Cognition, Communication, Swallow  Cognitive Assessment/Intervention- PT/OT  Affect/Mental Status (Cognitive): WNL (Molly Florencio, PTA)  Orientation Status (Cognition): oriented x 4 (Molly Matias, PTA)  Follows Commands (Cognition): WNL (Molly Florencio, PTA)  Personal Safety Interventions: gait belt (Molly Matias PTA)  Speaking Valve  Pre SpO2 (%): 95 (Molly Florencio, PTA)  Post SpO2 (%): 96 (Molly Florencio, PTA)  General Eating/Swallowing Observations  Pre SpO2 (%): 95 (Molly Florencio, PTA)  Post SpO2 (%): 96 (Molly Florencio, PTA)    Outcome Summary               PT Rehab Goals     Row Name 03/10/18 0900             Bed Mobility Goal 1 (PT)    Activity/Assistive Device (Bed Mobility Goal 1, PT) bed mobility activities, all  -ES      [place] Level/Cues Needed (Bed Mobility Goal 1, PT) independent  -ES      Time Frame (Bed Mobility Goal 1, PT) 1 week;long term goal (LTG)  -ES      Progress/Outcomes (Bed Mobility Goal 1, PT) goal ongoing  -ES         Transfer Goal 1 (PT)    Activity/Assistive Device (Transfer Goal 1, PT) sit-to-stand/stand-to-sit;bed-to-chair/chair-to-bed;walker, rolling  -ES      [place] Level/Cues  Needed (Transfer Goal 1, PT) independent  -ES      Time Frame (Transfer Goal 1, PT) 1 week;long term goal (LTG)  -ES      Progress/Outcome (Transfer Goal 1, PT) goal ongoing  -ES         Gait Training Goal 1 (PT)    Activity/Assistive Device (Gait Training Goal 1, PT) gait (walking locomotion);walker, rolling  -ES      [place] Level (Gait Training Goal 1, PT) independent  -ES      Distance (Gait Goal 1, PT) 150  -ES      Time Frame (Gait Training Goal 1, PT) 1 week;long term goal (LTG)  -ES      Progress/Outcome (Gait Training Goal 1, PT) goal ongoing  -ES        User Key  (r) = Recorded By, (t) = Taken By, (c) = Cosigned By    Initials Name Provider Type    ES Alisha Gardner, PT Physical Therapist          Physical Therapy Education     Title: PT OT SLP Therapies (Active)     Topic: Physical Therapy (Done)     Point: Mobility training (Done)    Learning Progress Summary     Learner Status Readiness Method Response Comment Documented by    Patient Done Acceptance E,D VU,Critical access hospital 03/16/18 1524     Done Acceptance E,D VU   03/15/18 1516     Done Acceptance E Critical access hospital 03/13/18 1114     Done Acceptance E VU,ANA   03/12/18 1507     Done Acceptance E,D VU,NR POC progression, fall precautions, transfer and gait safety, D/C planning, HEP MB 03/09/18 1324          Point: Home exercise program (Done)    Learning Progress Summary     Learner Status Readiness Method Response Comment Documented by    Patient Done Acceptance E,D VU,ANA   03/16/18 1524     Done Acceptance E,D VU   03/15/18 1516     Done Acceptance E Critical access hospital 03/13/18 1114     Done Acceptance E VU,[facility] 03/12/18 1507     Done Acceptance E,D VU,NR POC progression, fall precautions, transfer and gait safety, D/C planning, HEP MB 03/09/18 1324          Point: Body mechanics (Done)    Learning Progress Summary     Learner Status Readiness Method Response Comment Documented by    Patient Done Acceptance E,D VU,Critical access hospital 03/16/18 1524     Done Acceptance E,D VU  UD  03/15/18 1516     Done Acceptance E DU   03/13/18 1114     Done Acceptance E VU,[facility] 03/12/18 1507     Done Acceptance E,D VU,NR POC progression, fall precautions, transfer and gait safety, D/C planning, [facility] 03/09/18 1324          Point: Precautions (Done)    Learning Progress Summary     Learner Status Readiness Method Response Comment Documented by    Patient Done Acceptance E,D VU,[facility] 03/16/18 1524     Done Acceptance E,D VU   03/15/18 1516     Done Acceptance E [facility] 03/13/18 1114     Done Acceptance E VU,[facility] 03/12/18 1507     Done Acceptance E,D VU,NR POC progression, fall precautions, transfer and gait safety, D/C planning, [facility] 03/09/18 1324                      User Key     Initials Effective Dates Name Provider Type Discipline     06/22/15 -  Molly Matias, PTA Physical Therapy Assistant PT     06/19/15 -  Mary Goldstein, PT Physical Therapist PT    MB 03/14/16 -  Ruth Rose, PT Physical Therapist PT                    PT Recommendation and Plan  Therapy Frequency (PT Clinical Impression): daily  Plan of Care Reviewed With: patient  Progress: no change  Outcome Summary: pt still limited gait due to fatigue and SOB.needs some assist with le's for transfers and 02 for gait.went 90 ft.          Outcome Measures     Row Name 03/16/18 1500 03/15/18 1445 03/15/18 1040       How much help from another person do you currently need...    Turning from your back to your side while in flat bed without using bedrails? 4  -UD 4  -UD  --    Moving from lying on back to sitting on the side of a flat bed without bedrails? 4  -UD 4  -UD  --    Moving to and from a bed to a chair (including a wheelchair)? 3  -UD 3  -UD  --    Standing up from a chair using your arms (e.g., wheelchair, bedside chair)? 3  -UD 3  -UD  --    Climbing 3-5 steps with a railing? 2  -UD 2  -UD  --    To walk in hospital room? 3  -UD 3  -UD  --    AM-PAC 6 Clicks Score 19  -UD 19  -UD  --       How much help from another  is currently needed...    Putting on and taking off regular lower body clothing?  --  -- 2  -AN (r) SS (t) AN (c)    Bathing (including washing, rinsing, and drying)  --  -- 2  -AN (r) SS (t) AN (c)    Toileting (which includes using toilet bed pan or urinal)  --  -- 2  -AN (r) SS (t) AN (c)    Putting on and taking off regular upper body clothing  --  -- 2  -AN (r) SS (t) AN (c)    Taking care of personal grooming (such as brushing teeth)  --  -- 3  -AN (r) SS (t) AN (c)    Eating meals  --  -- 4  -AN (r) SS (t) AN (c)    Score  --  -- 15  -AN (r) SS (t)       Modified Luis Scale    Modified [place] Scale  --  -- 3 - Moderate disability.  Requiring some help, but able to walk without assistance.  -AN (r) SS (t) AN (c)       Functional Assessment    Outcome Measure Options AM-PAC 6 Clicks Basic Mobility (PT)  -UD AM-PAC 6 Clicks Basic Mobility (PT)  -UD  --      User Key  (r) = Recorded By, (t) = Taken By, (c) = Cosigned By    Initials Name Provider Type    FRANCIA Matias PTA Physical Therapy Assistant    TRESA Mooney, OT Occupational Therapist    SS Donis Bajwa, OT Student OT Student           Time Calculation:         PT Charges     Row Name 03/16/18 1526             Time Calculation    Start Time 1500  -UD      PT Received On 03/16/18  -      PT Goal Re-Cert Due Date 03/20/18  -         Time Calculation- PT    Total Timed Code Minutes- PT 20 minute(s)  -UD        User Key  (r) = Recorded By, (t) = Taken By, (c) = Cosigned By    Initials Name Provider Type    FRANCIA Matias PTA Physical Therapy Assistant          Therapy Charges for Today     Code Description Service Date Service Provider Modifiers Qty    90308599409 HC GAIT TRAINING EA 15 MIN 3/15/2018 Molly Matias PTA GP 1    30356754625 HC GAIT TRAINING EA 15 MIN 3/16/2018 Molly Matias PTA GP 1          PT G-Codes  PT Professional Judgement Used?: Yes  Outcome Measure Options: AM-PAC 6 Clicks Basic Mobility (PT)  Score: 17  Functional  Limitation: Mobility: Walking and moving around  Mobility: Walking and Moving Around Current Status (): At least 40 percent but less than 60 percent impaired, limited or restricted  Mobility: Walking and Moving Around Goal Status (): At least 20 percent but less than 40 percent impaired, limited or restricted    Molly Matias, PTA  3/16/2018

## 2018-03-16 NOTE — PROGRESS NOTES
"Pharmacy Consult-Vancomycin Dosing  Tamara Langston is a  62 y.o. female receiving vancomycin therapy.     Indication: Skin and soft tissue infection  Consulting Provider: Hospitalist   ID Consult: ID (Anthony)    Goal Trough:~15    Labs    Results from last 7 days   Lab Units 03/14/18  0353 03/13/18  0445 03/10/18  2031   BUN mg/dL 20 20 28*   CREATININE mg/dL 1.30 1.20 1.30       Results from last 7 days   Lab Units 03/14/18  0353   WBC 10*3/mm3 8.89     Ht - 167.6 cm (66\")  Wt - 106 kg (234 lb)    Estimated Creatinine Clearance: 53.8 mL/min (by C-G formula based on SCr of 1.3 mg/dL).    Evaluation of Level    Lab Results   Component Value Date    [facility] 19.90 03/14/2018   ~23 hour level    Assessment/Plan:  Pharmacy to dose vancomycin for skin and soft tissue infection. Patient is currently receiving vancomycin 1250 mg IV q24h, dose was decreased 3/10 due to supratherapeutic trough. Vancomycin trough 3/10 was supratherapeutic at 17.7 mcg/mL, goal trough 10-15 mcg/mL. Repeat trough drawn ~23 hr level on 1250 mg IV q24h was SUPRA-therapeutic for indication at 19.9. Renal function and UOP appear stable. Dose was held evening of 3/14, but 3/15 1000 mg IV q24h was continued. Cont with 1000 mg IV q24h and re-check trough Monday, 3/19,  or sooner if clinical status or renal function changes.    Pharmacy will continue to follow closely, and make adjustments as needed. Thank you for this consult.  Mary Lou Swartz, PharmD, BCPS  3/16/2018  10:03 AM  "

## 2018-03-16 NOTE — PROGRESS NOTES
Continued Stay Note  [facility]     Patient Name: Tamara Langston  MRN: [id number]  Today's Date: 3/16/2018    Admit Date: 3/8/2018          Discharge Plan     Row Name 03/16/18 0959       Plan    Plan Comments Called Kody at [facility] Peer to Peer line at [phone] and arranged for peer to peer to be done with Dr. Cruz instead of Dr. Jones. Dr. Peck at [facility] to call Dr. Cruz's cell at 10 am. Notified Dr. Cruz.  will cont to follow.               Discharge Codes    No documentation.       Expected Discharge Date and Time     Expected Discharge Date Expected Discharge Time    Mar 16, 2018             Leny Moran

## 2018-03-16 NOTE — PLAN OF CARE
Problem: Patient Care Overview  Goal: Discharge Needs Assessment   03/15/18 0128   Discharge Needs Assessment   Concerns to be Addressed denies needs/concerns at this time   Discharge Coordination/Progress RON or Tobi

## 2018-03-16 NOTE — PLAN OF CARE
Problem: Patient Care Overview  Goal: Plan of Care Review  Outcome: Ongoing (interventions implemented as appropriate)   03/16/18 1600   Coping/Psychosocial   Plan of Care Reviewed With patient   OTHER   Outcome Summary DTPI cont to be intact and blistered with no further breakdown and no darkening of discoloration noted. PT will cont with mist therapy 2-3 x /week to help increase healing potential and skin integrity.

## 2018-03-17 LAB
ANION GAP SERPL CALCULATED.3IONS-SCNC: 8 MMOL/L (ref 3–11)
BUN BLD-MCNC: 34 MG/DL (ref 9–23)
BUN/CREAT SERPL: 24.3 (ref 7–25)
CALCIUM SPEC-SCNC: 8.9 MG/DL (ref 8.7–10.4)
CHLORIDE SERPL-SCNC: 102 MMOL/L (ref 99–109)
CO2 SERPL-SCNC: 30 MMOL/L (ref 20–31)
CREAT BLD-MCNC: 1.4 MG/DL (ref 0.6–1.3)
GFR SERPL CREATININE-BSD FRML MDRD: 38 ML/MIN/1.73
GLUCOSE BLD-MCNC: 146 MG/DL (ref 70–100)
GLUCOSE BLDC GLUCOMTR-MCNC: 155 MG/DL (ref 70–130)
GLUCOSE BLDC GLUCOMTR-MCNC: 162 MG/DL (ref 70–130)
GLUCOSE BLDC GLUCOMTR-MCNC: 189 MG/DL (ref 70–130)
GLUCOSE BLDC GLUCOMTR-MCNC: 200 MG/DL (ref 70–130)
POTASSIUM BLD-SCNC: 4.1 MMOL/L (ref 3.5–5.5)
SODIUM BLD-SCNC: 140 MMOL/L (ref 132–146)

## 2018-03-17 PROCEDURE — 80048 BASIC METABOLIC PNL TOTAL CA: CPT

## 2018-03-17 PROCEDURE — 99232 SBSQ HOSP IP/OBS MODERATE 35: CPT | Performed by: INTERNAL MEDICINE

## 2018-03-17 PROCEDURE — 94660 CPAP INITIATION&MGMT: CPT

## 2018-03-17 PROCEDURE — 94799 UNLISTED PULMONARY SVC/PX: CPT

## 2018-03-17 PROCEDURE — 25010000002 PIPERACILLIN-TAZOBACTAM: Performed by: INTERNAL MEDICINE

## 2018-03-17 PROCEDURE — 25010000002 HEPARIN (PORCINE) PER 1000 UNITS: Performed by: NURSE PRACTITIONER

## 2018-03-17 PROCEDURE — 82962 GLUCOSE BLOOD TEST: CPT

## 2018-03-17 PROCEDURE — 25010000002 VANCOMYCIN PER 500 MG: Performed by: INTERNAL MEDICINE

## 2018-03-17 RX ADMIN — BUSPIRONE HYDROCHLORIDE 7.5 MG: 15 TABLET ORAL at 09:04

## 2018-03-17 RX ADMIN — LEVOTHYROXINE SODIUM 112 MCG: 112 TABLET ORAL at 05:55

## 2018-03-17 RX ADMIN — OXYCODONE HYDROCHLORIDE AND ACETAMINOPHEN 1 TABLET: 10; 325 TABLET ORAL at 22:00

## 2018-03-17 RX ADMIN — CLOTRIMAZOLE 10 MG: 10 LOZENGE ORAL at 09:03

## 2018-03-17 RX ADMIN — PIPERACILLIN SODIUM,TAZOBACTAM SODIUM 3.38 G: 3; .375 INJECTION, POWDER, FOR SOLUTION INTRAVENOUS at 17:33

## 2018-03-17 RX ADMIN — PREGABALIN 100 MG: 100 CAPSULE ORAL at 05:55

## 2018-03-17 RX ADMIN — NYSTATIN: 100000 POWDER TOPICAL at 22:22

## 2018-03-17 RX ADMIN — FENTANYL 1 PATCH: 75 PATCH TRANSDERMAL at 09:07

## 2018-03-17 RX ADMIN — NYSTATIN: 100000 POWDER TOPICAL at 09:06

## 2018-03-17 RX ADMIN — CLOTRIMAZOLE 10 MG: 10 LOZENGE ORAL at 22:18

## 2018-03-17 RX ADMIN — POTASSIUM CHLORIDE 20 MEQ: 750 CAPSULE, EXTENDED RELEASE ORAL at 09:03

## 2018-03-17 RX ADMIN — PIPERACILLIN SODIUM,TAZOBACTAM SODIUM 3.38 G: 3; .375 INJECTION, POWDER, FOR SOLUTION INTRAVENOUS at 09:08

## 2018-03-17 RX ADMIN — BUSPIRONE HYDROCHLORIDE 7.5 MG: 15 TABLET ORAL at 22:20

## 2018-03-17 RX ADMIN — CLOPIDOGREL BISULFATE 75 MG: 75 TABLET ORAL at 09:03

## 2018-03-17 RX ADMIN — VANCOMYCIN HYDROCHLORIDE 1000 MG: 1 INJECTION, SOLUTION INTRAVENOUS at 09:03

## 2018-03-17 RX ADMIN — HEPARIN SODIUM 5000 UNITS: 5000 INJECTION, SOLUTION INTRAVENOUS; SUBCUTANEOUS at 09:03

## 2018-03-17 RX ADMIN — SACUBITRIL AND VALSARTAN 1 TABLET: 24; 26 TABLET, FILM COATED ORAL at 09:04

## 2018-03-17 RX ADMIN — OXYCODONE HYDROCHLORIDE AND ACETAMINOPHEN 1 TABLET: 10; 325 TABLET ORAL at 09:03

## 2018-03-17 RX ADMIN — PREGABALIN 100 MG: 100 CAPSULE ORAL at 22:20

## 2018-03-17 RX ADMIN — CETIRIZINE HYDROCHLORIDE 10 MG: 10 TABLET, FILM COATED ORAL at 22:19

## 2018-03-17 RX ADMIN — CLOTRIMAZOLE 10 MG: 10 LOZENGE ORAL at 16:21

## 2018-03-17 RX ADMIN — Medication 1 CAPSULE: at 09:04

## 2018-03-17 RX ADMIN — CARVEDILOL 6.25 MG: 6.25 TABLET, FILM COATED ORAL at 09:04

## 2018-03-17 RX ADMIN — PIPERACILLIN SODIUM,TAZOBACTAM SODIUM 3.38 G: 3; .375 INJECTION, POWDER, FOR SOLUTION INTRAVENOUS at 02:00

## 2018-03-17 RX ADMIN — HEPARIN SODIUM 5000 UNITS: 5000 INJECTION, SOLUTION INTRAVENOUS; SUBCUTANEOUS at 22:18

## 2018-03-17 RX ADMIN — CARVEDILOL 6.25 MG: 6.25 TABLET, FILM COATED ORAL at 22:20

## 2018-03-17 RX ADMIN — ATORVASTATIN CALCIUM 80 MG: 40 TABLET, FILM COATED ORAL at 22:19

## 2018-03-17 RX ADMIN — SACUBITRIL AND VALSARTAN 1 TABLET: 24; 26 TABLET, FILM COATED ORAL at 22:17

## 2018-03-17 RX ADMIN — FUROSEMIDE 40 MG: 40 TABLET ORAL at 09:05

## 2018-03-17 RX ADMIN — PANTOPRAZOLE SODIUM 40 MG: 40 TABLET, DELAYED RELEASE ORAL at 05:55

## 2018-03-17 RX ADMIN — ASPIRIN 81 MG: 81 TABLET, COATED ORAL at 09:04

## 2018-03-17 RX ADMIN — PREGABALIN 100 MG: 100 CAPSULE ORAL at 15:12

## 2018-03-17 RX ADMIN — OXYCODONE HYDROCHLORIDE AND ACETAMINOPHEN 1 TABLET: 10; 325 TABLET ORAL at 15:12

## 2018-03-17 NOTE — PROGRESS NOTES
[facility] Progress Note    Admission Date: 3/8/2018    Tamara Langston  1955  [id number]    Date: 3/17/2018    Antibiotics:  Anti-Infectives     Ordered     Dose/Rate Route Frequency Start Stop    03/15/18 0735  vancomycin (VANCOCIN) in iso-osmotic dextrose IVPB 1 g (premix) 200 mL     Mary Lou Swartz [facility] reviewed the order on 03/16/18 0911.   Ordering Provider:  Oksana Cruz MD    1,000 mg  over 60 Minutes Intravenous Every 24 Hours 03/15/18 0900 03/29/18 2359    03/13/18 1545  piperacillin-tazobactam (ZOSYN) 3.375 g/100 mL 0.9% NS IVPB (mbp)     Mary Lou Swartz [facility] reviewed the order on 03/16/18 0911.   Ordering Provider:  Oksana Cruz MD    3.375 g  over 4 Hours Intravenous Every 8 Hours 03/13/18 1800 03/29/18 2359    03/08/18 2314  Pharmacy to dose vancomycin     Mary Lou Swartz [facility] reviewed the order on 03/16/18 0913.   Ordering Provider:  Mary Lou Swartz RPH     Does not apply Continuous PRN 03/08/18 2314 03/29/18 2359    03/08/18 1916  vancomycin IVPB 2000 mg in 0.9% Sodium Chloride (premix) 500 mL     Ordering Provider:  Juan Palacios MD    20 mg/kg × 105 kg  over 2 Hours Intravenous Once 03/08/18 2000 03/08/18 2254    03/08/18 1916  piperacillin-tazobactam (ZOSYN) 4.5 g in iso-osmotic dextrose 100 mL IVPB (premix)     Ordering Provider:  Juan Palacios MD    4.5 g  over 30 Minutes Intravenous Once 03/08/18 1917 03/08/18 2045          Reason for Consultation:   Fever, probable uti     History of present illness:    Patient is a 62-year-old female with hypertension, hyperlipidemia, type 2 diabetes mellitus, morbid obesity admitted 2/13 to 2/27 after presenting with confusion and  fever.  She had an unstageable  sacral deep tissue injury with associated infection which had progressed over several months.  Over the course of a year sbe became  quite debilitated with limited mobility following left TMA.   On  admission, she was septic with fever, leukocytosis and acute on chronic  kidney injury  Hospital course complicated by CHF, respiratory distress  and ELIF  She was diagnosed with NSTEMI and stents to LAD and diagonal arteries EF 34% and Dr Zamorano diagnosed Takotsubo myopathy  Her sacral decub improved with mist therapy and sequential debridement . Wound culture grew enterococcus, pseudomonas, and coag negative staph    Sacral MRI showed no evidence of abscess or OM.  She was denied [facility] and transferred to [facility] on zosyn and vancomycin There was a discrepancy in notes about duration of IV antibiotics  ie 3/2 or 3/12   Zosyn and vanc were stopped 3/2 Her picc was left in place   While at the center she evidently made progress with PT and walked up to 100'  She had fever to 102 followed by an episode of unresponsiveness then confusion  Transferred bact to [facility] with T100.3   UA with mild pyuria; culture pending   Wounds include a right heel DTPI and right ischial stage which measures 4.5x3.5x4.0cm  There is  undermining 6-12 o'clock; wound bed is beefy red, moist, with 10% slough noted in wound bed. No purulent drainage noted  On zosyn and vanc her fever appears to be improving  Cultures pending    3/11/18 alert, no new c/o  Afebrile on vanc and zosyn   3/12/18  Alert, no new c/o  Diarrhea improving; evaluation for [facility] stay for MIST therapy requested  3/13       Alert, some improvement with endurance per pt and PT notes; await precert from [facility]  3/14       Alert, no new c/o; note that WO notes no progress with trochanteric decubitus and MIST therapy has been requestedfor the trochanteric decubitus  3/15       Alert, no new c/o today  Long discussion with pt,  and [employer] rep (see below)  3/17       Alert, no new c/o; [facility] referral denied under the premise that the patient could be transferred from the SNF to the hospital for outpt mist therapy but the facility states they would be unable to do this  Appeal is underway      Review of Systems:  Constitutional-- + Fever, chills +  sweats.  Appetite good per patient with good oral intake She reports continued progress with PT  HEENT-- No new vision, hearing or throat complaints.  No  oral ulcers or sore throat.  Denies odynophagia or dysphagia. No headache, photophobia or neck stiffness.  CV-- No chest pain, palpitation or syncope  Resp-- No SOB/cough/Hemoptysis  GI- + diarrhea- intermittent.  No hematochezia, melena, or hematemesis. Denies jaundice or chronic liver disease. No odynophagia or dysphagia  -- No dysuria, hematuria, or flank pain.  Denies hesitancy, urgency or flank pain.  Lymph- no swollen lymph nodes in neck/axilla or groin.   Heme- No active bruising or bleeding; no Hx of DVT or PE.  MS-- no swelling or pain in the bones or joints of arms/legs.  No new back pain.  Neuro-- No acute focal weakness or numbness in the arms or legs.  No seizures.  Skin--No rashes or lesions       PE:  Vital Signs  Temp  Min: 97.9 °F (36.6 °C)  Max: 98.5 °F (36.9 °C)  BP  Min: 129/68  Max: 160/72  Pulse  Min: 60  Max: 70  Resp  Min: 16  Max: 20  SpO2  Min: 94 %  Max: 96 %  GENERAL: Awake and alert, in no acute distress; appears to recall medical history accurately.  Chronically ill-appearing with limited mobility in the bed  HEENT: Normocephalic, atraumatic.  PERRL. EOMI.  slera injected. No icterus. Oropharynx with mild thrush, no exudate. No evidence of peridontal disease.    NECK: Supple without nuchal rigidity  LYMPH: No cervical, axillary or inguinal lymphadenopathy. No neck masses  HEART: RRR; СЕРГЕЙ III/VI.  Bilateral lower extremity trace edema.  faint pedal pulses.  LUNGS: Clear to auscultation bilaterally without wheezing, rales, rhonchi.  Normal effort with supplemental O2 present.   ABDOMEN: Morbidly obese, Soft, nontender, nondistended. Positive bowel sounds. No rebound or guarding.   EXT:  L foot TMA well healed ; right heel with blister which is intact and w/o fluctuance;   R UE picc site w/o erythema, drainage or swelling  : No weiss  catheter.  MSK: FROM without joint effusions noted    SKIN:  mild groin yeast rash    Sacral wound granulating; undermining has decreased somewhat; small amt yellow-green exudate on packing  NEURO: Oriented to PPT. No focal deficits.   PSYCHIATRIC: Normal insight and judgement. Cooperative with PE        Laboratory Data      Results from last 7 days  Lab Units 03/14/18  0353   WBC 10*3/mm3 8.89   HEMOGLOBIN g/dL 10.7*   HEMATOCRIT % 34.5   PLATELETS 10*3/mm3 221       Results from last 7 days  Lab Units 03/17/18  0356   SODIUM mmol/L 140   POTASSIUM mmol/L 4.1   CHLORIDE mmol/L 102   CO2 mmol/L 30.0   BUN mg/dL 34*   CREATININE mg/dL 1.40*   GLUCOSE mg/dL 146*   CALCIUM mg/dL 8.9       Results from last 7 days  Lab Units 03/14/18  0353   ALK PHOS U/L 87   BILIRUBIN mg/dL 0.2*   ALT (SGPT) U/L 28   AST (SGOT) U/L 31           Results from last 7 days  Lab Units 03/14/18  0353   CRP mg/dL 2.38*       Estimated Creatinine Clearance: 49 mL/min (by C-G formula based on SCr of 1.4 mg/dL (H)).      Microbiology:      Radiology:  Imaging Results (last 72 hours)     Procedure Component Value Units Date/Time    CT Head Without Contrast [[id number]] Collected:  03/08/18 2116     Updated:  03/08/18 2251    Narrative:       EXAM:    CT Head Without Intravenous Contrast    CLINICAL HISTORY:    62 years old, female; Pressure ulcer of sacral region, unspecified stage;   Transient alteration of awareness; Fever, unspecified; Signs and symptoms;   Other: Patient found unresponsive; Additional info: Pt found unresponsive    TECHNIQUE:    Axial computed tomography images of the head/brain without intravenous   contrast.  All CT scans at this facility use one or more dose reduction   techniques, viz.: automated exposure control; ma/kV adjustment per patient size   (including targeted exams where dose is matched to indication; i.e. head); or   iterative reconstruction technique.    COMPARISON:    No relevant prior studies  available.    FINDINGS:    Brain:  Nonspecific calcification left occipital lobe.  No hemorrhage.  No   significant white matter disease.    Ventricles:  Unremarkable.  No ventriculomegaly.    Bones/joints:  Unremarkable.  No acute fracture.    Soft tissues:  Unremarkable.    Sinuses:  Mild sphenoid sinusitis.    Mastoid air cells:  Unremarkable as visualized.      Impression:         No acute findings.    THIS DOCUMENT HAS BEEN ELECTRONICALLY SIGNED BY NOEL LYLE MD    XR Chest 1 View [[id number]] Collected:  03/08/18 1813     Updated:  03/08/18 1912    Narrative:       EXAM:    XR Chest, 1 View    CLINICAL HISTORY:    62 years old, female; Signs and symptoms; Other: Weak/dizzy/ams; Additional   info: Weak/dizzy/ams triage protocol    TECHNIQUE:    Frontal view of the chest.    COMPARISON:    CR - XR CHEST 1 VW 2018-02-19 04:36    FINDINGS:    Lungs:  Unremarkable.  No consolidation.    Pleural space:  Unremarkable.  No pneumothorax.    Heart:  The heart demonstrates mild diffuse enlargement.    Mediastinum:  Unremarkable.    Bones/joints:  Unremarkable.    Other findings:  The patient is rotated to the left.      Impression:         No acute findings.    THIS DOCUMENT HAS BEEN ELECTRONICALLY SIGNED BY NOEL LYLE MD          I personally reviewed the radiographic studies     Impression:   --Sepsis with fever, leukocytosis and acute kidney injury    Improved    Possible foci of infection include urinary tract infection, picc line infection or the right heel decubitus  CRP 15.10 3/10--> 2.3 3/14     Stage 4 sacral  Wound which does not appear to be the source of sepsis  Prealbumin normal, suggesting that nutrition is not a factor in the failure to improve  Recent MRI w/o evidence of osteomyelitis  R/O infection with MDR gnr     Right heel decubitus with intact blister     Mild pyuria     Diarrhea- cdiff negative     Recent Non-ST segment elevation MI with increase in troponin     Cardiomyopathy     Acute on  chronic kidney disease     Type 2 diabetes mellitus     Hypertension and Hyperlipidemia     COPD with home oxygen use and acute hypoxic respiratory failure at admission  Morbid obesity  Chronic debility       PLAN/RECOMMENDATIONS:   Thank you for asking us to see Tamara Langston, I recommend the following:     Agree with zosyn and vancomycin- plan rx for ~ 4 weeks, total course to be determined by her clinical progress   reculture wound  Topical treatment of thrush  Stool culture- ordered but not performed  Right heel w/o signs of osteomyelitis, defer MRI  Probiotic  If she is turned down for  [facility], we plan to appeal the process since she is benefiting from Mist therapy in 2 different spots (heel and sacral decub)  I do not anticipate that she would need more than 2 weeks of Mist therapy  The [facility] advises us that this is best initiated by the patient and family          Oksana Cruz MD  3/17/2018

## 2018-03-17 NOTE — PROGRESS NOTES
[facility] Medicine Services  PROGRESS NOTE    Patient Name: Tamara Langston  : 1955  MRN: [id number]    Date of Admission: 3/8/2018  Length of Stay: 9  Primary Care Physician: Harinder Aranda MD    Subjective   Subjective     CC:AMS    HPI:    No problems overnight.  She is awaiting to hear results of LTACH appeal.  She voices no complaints.    Review of Systems  Gen- No fevers, chills  CV- No chest pain, palpitations  Resp- No cough, dyspnea  GI- No N/V/D, abd pain    Otherwise ROS is negative except as mentioned in the HPI.    Objective   Objective     Vital Signs:   Temp:  [97.9 °F (36.6 °C)-98.5 °F (36.9 °C)] 98.5 °F (36.9 °C)  Heart Rate:  [60-70] 60  Resp:  [16-20] 20  BP: (129-160)/(68-76) 131/76  FiO2 (%):  [30 %] 30 %        Physical Exam:  Constitutional: chronically ill appearing, in no acute distress, awake, alert, lying in bed  HENT: NCAT, mucous membranes moist  Respiratory: Clear to auscultation bilaterally, respiratory effort normal   Cardiovascular: RRR, no murmurs, rubs, or gallops  Gastrointestinal: obese, positive bowel sounds, soft, nontender, nondistended  Musculoskeletal: bilateral LE edema.  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      Results from last 7 days  Lab Units 18  0353   WBC 10*3/mm3 8.89   HEMOGLOBIN g/dL 10.7*   HEMATOCRIT % 34.5   PLATELETS 10*3/mm3 221       Results from last 7 days  Lab Units 18  0356 18  0353 18  0445   SODIUM mmol/L 140 144 140   POTASSIUM mmol/L 4.1 4.0 4.0   CHLORIDE mmol/L 102 103 101   CO2 mmol/L 30.0 32.0* 32.0*   BUN mg/dL 34* 20 20   CREATININE mg/dL 1.40* 1.30 1.20   GLUCOSE mg/dL 146* 158* 148*   CALCIUM mg/dL 8.9 9.6 9.6   ALT  (SGPT) U/L  --  28  --    AST (SGOT) U/L  --  31  --      Estimated Creatinine Clearance: 49 mL/min (by C-G formula based on SCr of 1.4 mg/dL (H)).  No results found for: BNP  No results found for: PHART    Microbiology Results Abnormal     Procedure Component Value - Date/Time    Wound Culture - Wound, Back, Lower [[id number]]  (Abnormal) Collected:  03/14/18 2143    Lab Status:  Preliminary result Specimen:  Wound from Back, Lower Updated:  03/17/18 1033     Wound Culture --      Scant growth (1+) Non-Lactose  (A)      Scant growth (1+) Staphylococcus, coagulase negative (A)     Comment: Susceptibility will not be done unless Doctor notifies Lab          Gram Stain Result Moderate (3+) WBCs seen      No organisms seen    Blood Culture - Blood, [[id number]]  (Normal) Collected:  03/08/18 1930    Lab Status:  Final result Specimen:  Blood from Arm, Right Updated:  03/13/18 2101     Blood Culture No growth at 5 days    Blood Culture - Blood, [[id number]]  (Normal) Collected:  03/08/18 1935    Lab Status:  Final result Specimen:  Blood from Arm, Right Updated:  03/13/18 2101     Blood Culture No growth at 5 days     Gram Stain Result Few (2+) WBCs seen    Urine Culture - Urine, Urine, Clean Catch [[id number]] Collected:  03/08/18 2002    Lab Status:  Final result Specimen:  Urine from Urine, Clean Catch Updated:  03/10/18 1110     Urine Culture --      10,000-20,000 CFU/mL Normal Urogenital Patience    Clostridium Difficile Toxin - Stool, Per Rectum [[id number]] Collected:  03/09/18 1027    Lab Status:  Final result Specimen:  Stool from Per Rectum Updated:  03/09/18 1203    Narrative:       The following orders were created for panel order Clostridium Difficile Toxin - Stool, Per Rectum.  Procedure                               Abnormality         Status                     ---------                               -----------         ------                     Clostridium Difficile To...[[id number]]  Normal               Final result                 Please view results for these tests on the individual orders.    Clostridium Difficile Toxin, PCR - Stool, Per Rectum [[id number]]  (Normal) Collected:  03/09/18 1027    Lab Status:  Final result Specimen:  Stool from Per Rectum Updated:  03/09/18 1203     C. Difficile Toxins by PCR Not Detected    Narrative:         Performance characteristics of test not established for patients <2 years of age.  Negative for Toxigenic C. Difficile        Results for orders placed during the hospital encounter of 02/13/18   Adult Transthoracic Echo Limited W/ Cont if Necessary Per Protocol    Narrative · This was a limited echocardiogram performed to evaluate the left   ventricular ejection fraction  · Left ventricular systolic function is normal. Estimated EF appears to be   in the range of 51 - 55%.  · The following left ventricular wall segments are hypokinetic: mid   anterior, apical anterior, apical lateral, apical inferior, apical septal   and apex hypokinetic. The basal segments are hyperdynamic.  · When compared to the prior echocardiogram performed on February 14, 2018, the hypokinetic apical segments have mildly improved. The overall   ejection fraction has improved.          I have reviewed the medications.    Assessment/Plan   Assessment / Plan     Hospital Problem List     * (Principal)Sepsis    Obstructive sleep apnea syndrome (Chronic)    Overview Addendum 8/1/2016  9:56 AM by Puja Clarke     intolerant of CPAP therapy  6. 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  Prior PSG reports complex sleep apnea with frequent central apneas and intermittent      Ramona Menard respirations, on auto CPAP 4-16 with supplemental oxygen.         Type 2 diabetes mellitus (Chronic)    Hypertension (Chronic)    Overview Signed 8/1/2016 10:00 AM by Puja Clarke     16. H/O echocardiogram (V15.89) (Z92.89)   · A.  Echocardiogram of 02/03/2015 reports an ejection fraction of 60-65%, mild  concentric      LVH, trace mitral regurgitation, mild tricuspid and pulmonic regurgitation and calculated      RVSP of 35 mmHg, the main pulmonary artery is also mildly dilated.         Peripheral vascular disease (Chronic)    Metabolic encephalopathy    Coronary artery disease involving native heart (Chronic)    Overview Signed 2/15/2018 12:52 PM by CINDI Fierro     1. [facility] 11-26-16: Non occlusive disease  ·  50% mid RCA stenosis.  · 40% proximal LAD stenosis   · Normal left ventricular function         Takotsubo cardiomyopathy    Overview Signed 2/15/2018  1:42 PM by JEOVANY Greenfield     EF 35%, diastolic dysfunction, left ventricular wall segments are hypokinetic ECHO 2/14/18         Chronic systolic heart failure    Chronic respiratory failure with hypoxia (Chronic)    Infected sacral decubitus ulcers (Chronic)    Weakness         Brief Hospital Course to date:  Tamara Langston is a 62 y.o. female with hx of decubitus ulcer, systolic HF, DINA, T2DM presented with AMS and fever with concern for persistent decub infection.  Previously d/c'd with PICC to SNF but sounds like was stopped early.     Assessment & Plan:  - encephalopathy is improved to baseline, related to fever/infection on admission  - currently on vanc/zosyn via PICC per Dr. Cruz.  Source of infection includes possible UTI, PICC line, or right heel decub.  - has stage 4 sacral decub, cultures from last admission were polymicrobial.  Recent MRI without evidence of osteo.  - will increase SSI for some better glc control to allow best healing  - CKD is stable within baseline  - recent NSTEMI/takosubo's.  Clinically stable.  Continue DAPT, statin, BB, entresto.  - d/w [facility] and Dr. Cruz yesterday.  Awaiting decision on appeal of LTACH denial.  Will talk with CM on Monday.    DVT Prophylaxis: [facility]     CODE STATUS: Conditional Code     Disposition:TBD, [facility] LTACH vs [facility]    Electronically signed by Derek Jones  MD, 03/17/18, 2:00 PM.

## 2018-03-18 LAB
ALBUMIN SERPL-MCNC: 3.8 G/DL (ref 3.2–4.8)
ALBUMIN/GLOB SERPL: 1.2 G/DL (ref 1.5–2.5)
ALP SERPL-CCNC: 95 U/L (ref 25–100)
ALT SERPL W P-5'-P-CCNC: 32 U/L (ref 7–40)
ANION GAP SERPL CALCULATED.3IONS-SCNC: 7 MMOL/L (ref 3–11)
AST SERPL-CCNC: 47 U/L (ref 0–33)
BACTERIA SPEC AEROBE CULT: ABNORMAL
BASOPHILS # BLD AUTO: 0.1 10*3/MM3 (ref 0–0.2)
BASOPHILS NFR BLD AUTO: 1.1 % (ref 0–1)
BILIRUB SERPL-MCNC: 0.2 MG/DL (ref 0.3–1.2)
BUN BLD-MCNC: 31 MG/DL (ref 9–23)
BUN/CREAT SERPL: 22.1 (ref 7–25)
CALCIUM SPEC-SCNC: 9.2 MG/DL (ref 8.7–10.4)
CHLORIDE SERPL-SCNC: 103 MMOL/L (ref 99–109)
CO2 SERPL-SCNC: 30 MMOL/L (ref 20–31)
CREAT BLD-MCNC: 1.4 MG/DL (ref 0.6–1.3)
CRP SERPL-MCNC: 1.66 MG/DL (ref 0–1)
DEPRECATED RDW RBC AUTO: 53.8 FL (ref 37–54)
EOSINOPHIL # BLD AUTO: 0.7 10*3/MM3 (ref 0–0.3)
EOSINOPHIL NFR BLD AUTO: 8 % (ref 0–3)
ERYTHROCYTE [DISTWIDTH] IN BLOOD BY AUTOMATED COUNT: 15.1 % (ref 11.3–14.5)
GFR SERPL CREATININE-BSD FRML MDRD: 38 ML/MIN/1.73
GLOBULIN UR ELPH-MCNC: 3.2 GM/DL
GLUCOSE BLD-MCNC: 155 MG/DL (ref 70–100)
GLUCOSE BLDC GLUCOMTR-MCNC: 159 MG/DL (ref 70–130)
GLUCOSE BLDC GLUCOMTR-MCNC: 169 MG/DL (ref 70–130)
GLUCOSE BLDC GLUCOMTR-MCNC: 174 MG/DL (ref 70–130)
GLUCOSE BLDC GLUCOMTR-MCNC: 181 MG/DL (ref 70–130)
GRAM STN SPEC: ABNORMAL
GRAM STN SPEC: ABNORMAL
HCT VFR BLD AUTO: 33.5 % (ref 34.5–44)
HGB BLD-MCNC: 10.5 G/DL (ref 11.5–15.5)
IMM GRANULOCYTES # BLD: 0.04 10*3/MM3 (ref 0–0.03)
IMM GRANULOCYTES NFR BLD: 0.5 % (ref 0–0.6)
LYMPHOCYTES # BLD AUTO: 2.59 10*3/MM3 (ref 0.6–4.8)
LYMPHOCYTES NFR BLD AUTO: 29.4 % (ref 24–44)
MCH RBC QN AUTO: 30.3 PG (ref 27–31)
MCHC RBC AUTO-ENTMCNC: 31.3 G/DL (ref 32–36)
MCV RBC AUTO: 96.8 FL (ref 80–99)
MONOCYTES # BLD AUTO: 0.57 10*3/MM3 (ref 0–1)
MONOCYTES NFR BLD AUTO: 6.5 % (ref 0–12)
NEUTROPHILS # BLD AUTO: 4.8 10*3/MM3 (ref 1.5–8.3)
NEUTROPHILS NFR BLD AUTO: 54.5 % (ref 41–71)
PLATELET # BLD AUTO: 216 10*3/MM3 (ref 150–450)
PMV BLD AUTO: 11.2 FL (ref 6–12)
POTASSIUM BLD-SCNC: 4 MMOL/L (ref 3.5–5.5)
PROT SERPL-MCNC: 7 G/DL (ref 5.7–8.2)
RBC # BLD AUTO: 3.46 10*6/MM3 (ref 3.89–5.14)
SODIUM BLD-SCNC: 140 MMOL/L (ref 132–146)
WBC NRBC COR # BLD: 8.8 10*3/MM3 (ref 3.5–10.8)

## 2018-03-18 PROCEDURE — 25010000002 PIPERACILLIN-TAZOBACTAM: Performed by: INTERNAL MEDICINE

## 2018-03-18 PROCEDURE — 25010000002 VANCOMYCIN PER 500 MG: Performed by: INTERNAL MEDICINE

## 2018-03-18 PROCEDURE — 82962 GLUCOSE BLOOD TEST: CPT

## 2018-03-18 PROCEDURE — 94799 UNLISTED PULMONARY SVC/PX: CPT

## 2018-03-18 PROCEDURE — 86140 C-REACTIVE PROTEIN: CPT | Performed by: INTERNAL MEDICINE

## 2018-03-18 PROCEDURE — 80053 COMPREHEN METABOLIC PANEL: CPT | Performed by: INTERNAL MEDICINE

## 2018-03-18 PROCEDURE — 25010000002 HEPARIN (PORCINE) PER 1000 UNITS: Performed by: NURSE PRACTITIONER

## 2018-03-18 PROCEDURE — 94660 CPAP INITIATION&MGMT: CPT

## 2018-03-18 PROCEDURE — 99232 SBSQ HOSP IP/OBS MODERATE 35: CPT | Performed by: INTERNAL MEDICINE

## 2018-03-18 PROCEDURE — 85025 COMPLETE CBC W/AUTO DIFF WBC: CPT | Performed by: INTERNAL MEDICINE

## 2018-03-18 RX ADMIN — PANTOPRAZOLE SODIUM 40 MG: 40 TABLET, DELAYED RELEASE ORAL at 06:34

## 2018-03-18 RX ADMIN — CETIRIZINE HYDROCHLORIDE 10 MG: 10 TABLET, FILM COATED ORAL at 22:11

## 2018-03-18 RX ADMIN — PIPERACILLIN SODIUM,TAZOBACTAM SODIUM 3.38 G: 3; .375 INJECTION, POWDER, FOR SOLUTION INTRAVENOUS at 02:31

## 2018-03-18 RX ADMIN — CLOTRIMAZOLE 10 MG: 10 LOZENGE ORAL at 22:10

## 2018-03-18 RX ADMIN — NYSTATIN: 100000 POWDER TOPICAL at 22:11

## 2018-03-18 RX ADMIN — BUSPIRONE HYDROCHLORIDE 7.5 MG: 15 TABLET ORAL at 22:12

## 2018-03-18 RX ADMIN — CLOPIDOGREL BISULFATE 75 MG: 75 TABLET ORAL at 08:45

## 2018-03-18 RX ADMIN — BUSPIRONE HYDROCHLORIDE 7.5 MG: 15 TABLET ORAL at 08:45

## 2018-03-18 RX ADMIN — HEPARIN SODIUM 5000 UNITS: 5000 INJECTION, SOLUTION INTRAVENOUS; SUBCUTANEOUS at 22:10

## 2018-03-18 RX ADMIN — CARVEDILOL 6.25 MG: 6.25 TABLET, FILM COATED ORAL at 22:11

## 2018-03-18 RX ADMIN — PREGABALIN 100 MG: 100 CAPSULE ORAL at 22:12

## 2018-03-18 RX ADMIN — CLOTRIMAZOLE 10 MG: 10 LOZENGE ORAL at 10:58

## 2018-03-18 RX ADMIN — PIPERACILLIN SODIUM,TAZOBACTAM SODIUM 3.38 G: 3; .375 INJECTION, POWDER, FOR SOLUTION INTRAVENOUS at 10:57

## 2018-03-18 RX ADMIN — OXYCODONE HYDROCHLORIDE AND ACETAMINOPHEN 1 TABLET: 10; 325 TABLET ORAL at 16:00

## 2018-03-18 RX ADMIN — LEVOTHYROXINE SODIUM 112 MCG: 112 TABLET ORAL at 06:34

## 2018-03-18 RX ADMIN — SACUBITRIL AND VALSARTAN 1 TABLET: 24; 26 TABLET, FILM COATED ORAL at 22:12

## 2018-03-18 RX ADMIN — PIPERACILLIN SODIUM,TAZOBACTAM SODIUM 3.38 G: 3; .375 INJECTION, POWDER, FOR SOLUTION INTRAVENOUS at 17:31

## 2018-03-18 RX ADMIN — FUROSEMIDE 40 MG: 40 TABLET ORAL at 08:45

## 2018-03-18 RX ADMIN — NYSTATIN: 100000 POWDER TOPICAL at 08:46

## 2018-03-18 RX ADMIN — CLONAZEPAM 0.25 MG: 0.5 TABLET ORAL at 08:44

## 2018-03-18 RX ADMIN — OXYCODONE HYDROCHLORIDE AND ACETAMINOPHEN 1 TABLET: 10; 325 TABLET ORAL at 22:12

## 2018-03-18 RX ADMIN — CLOTRIMAZOLE 10 MG: 10 LOZENGE ORAL at 17:36

## 2018-03-18 RX ADMIN — VANCOMYCIN HYDROCHLORIDE 1000 MG: 1 INJECTION, SOLUTION INTRAVENOUS at 08:34

## 2018-03-18 RX ADMIN — CARVEDILOL 6.25 MG: 6.25 TABLET, FILM COATED ORAL at 08:45

## 2018-03-18 RX ADMIN — PREGABALIN 100 MG: 100 CAPSULE ORAL at 06:34

## 2018-03-18 RX ADMIN — POTASSIUM CHLORIDE 20 MEQ: 750 CAPSULE, EXTENDED RELEASE ORAL at 08:44

## 2018-03-18 RX ADMIN — ASPIRIN 81 MG: 81 TABLET, COATED ORAL at 08:45

## 2018-03-18 RX ADMIN — HEPARIN SODIUM 5000 UNITS: 5000 INJECTION, SOLUTION INTRAVENOUS; SUBCUTANEOUS at 08:45

## 2018-03-18 RX ADMIN — PREGABALIN 100 MG: 100 CAPSULE ORAL at 16:00

## 2018-03-18 RX ADMIN — SACUBITRIL AND VALSARTAN 1 TABLET: 24; 26 TABLET, FILM COATED ORAL at 10:58

## 2018-03-18 RX ADMIN — ATORVASTATIN CALCIUM 80 MG: 40 TABLET, FILM COATED ORAL at 22:09

## 2018-03-18 RX ADMIN — Medication 1 CAPSULE: at 08:44

## 2018-03-18 RX ADMIN — FLUTICASONE PROPIONATE 1 SPRAY: 50 SPRAY, METERED NASAL at 08:46

## 2018-03-18 RX ADMIN — OXYCODONE HYDROCHLORIDE AND ACETAMINOPHEN 1 TABLET: 10; 325 TABLET ORAL at 08:44

## 2018-03-18 NOTE — PROGRESS NOTES
[facility] Medicine Services  PROGRESS NOTE    Patient Name: Tamara Langston  : 1955  MRN: [id number]    Date of Admission: 3/8/2018  Length of Stay: 10  Primary Care Physician: Harinder Aranda MD    Subjective   Subjective     CC:AMS    HPI:    No new issues overnight.  She voices no new complaints.  Waiting to hear about her appeal.  Remains weak.    Review of Systems  Gen- No fevers, chills  CV- No chest pain, palpitations  Resp- No cough, dyspnea  GI- No N/V/D, abd pain    Otherwise ROS is negative except as mentioned in the HPI.    Objective   Objective     Vital Signs:   Temp:  [98.4 °F (36.9 °C)] 98.4 °F (36.9 °C)  Heart Rate:  [63-68] 64  Resp:  [20] 20  BP: (116-152)/(46-65) 116/46  FiO2 (%):  [30 %] 30 %        Physical Exam:  Constitutional: chronically ill appearing, in no acute distress, awake, alert, lying in bed  HENT: NCAT, mucous membranes moist  Respiratory: Clear to auscultation bilaterally, respiratory effort normal   Cardiovascular: RRR, no murmurs, rubs, or gallops  Gastrointestinal: obese, positive bowel sounds, soft, nontender, nondistended  Musculoskeletal: bilateral LE edema.  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  Feet in boots.  Psychiatric: Appropriate affect, cooperative  Neurologic: Oriented x 3, no focal deficits  Skin: did not examine decub due to positioning  Exam remains unchanged from previous    Results Reviewed:  I have personally reviewed current lab, radiology, and data and agree.      Results from last 7 days  Lab Units 18  0617 18  0353   WBC 10*3/mm3 8.80 8.89   HEMOGLOBIN g/dL 10.5* 10.7*   HEMATOCRIT % 33.5* 34.5   PLATELETS 10*3/mm3 216 221       Results from last 7 days  Lab Units 18  0617 18  0356 18  0353   SODIUM mmol/L 140 140 144   POTASSIUM mmol/L 4.0 4.1 4.0   CHLORIDE mmol/L 103 102 103   CO2 mmol/L 30.0 30.0 32.0*   BUN mg/dL 31* 34* 20   CREATININE mg/dL 1.40* 1.40* 1.30   GLUCOSE mg/dL 155* 146*  158*   CALCIUM mg/dL 9.2 8.9 9.6   ALT (SGPT) U/L 32  --  28   AST (SGOT) U/L 47*  --  31     Estimated Creatinine Clearance: 49 mL/min (by C-G formula based on SCr of 1.4 mg/dL (H)).  No results found for: BNP  No results found for: PHART    Microbiology Results Abnormal     Procedure Component Value - Date/Time    Wound Culture - Wound, Back, Lower [[id number]]  (Abnormal)  (Susceptibility) Collected:  03/14/18 2143    Lab Status:  Final result Specimen:  Wound from Back, Lower Updated:  03/18/18 0937     Wound Culture --      Scant growth (1+) Pseudomonas aeruginosa (A)      Scant growth (1+) Staphylococcus, coagulase negative (A)     Comment: Susceptibility will not be done unless Doctor notifies Lab          Gram Stain Result Moderate (3+) WBCs seen      No organisms seen    Susceptibility      Pseudomonas aeruginosa     ASHWINI     Aztreonam >16 ug/ml Resistant     Cefepime <=8 ug/ml Susceptible     Ceftazidime 8 ug/ml Susceptible     Gentamicin <=4 ug/ml Susceptible     Levofloxacin <=2 ug/ml Susceptible     Meropenem 2 ug/ml Susceptible     Piperacillin + Tazobactam <=16 ug/ml Susceptible     Tobramycin <=4 ug/ml Susceptible                    Blood Culture - Blood, [[id number]]  (Normal) Collected:  03/08/18 1930    Lab Status:  Final result Specimen:  Blood from Arm, Right Updated:  03/13/18 2101     Blood Culture No growth at 5 days    Blood Culture - Blood, [[id number]]  (Normal) Collected:  03/08/18 1935    Lab Status:  Final result Specimen:  Blood from Arm, Right Updated:  03/13/18 2101     Blood Culture No growth at 5 days     Gram Stain Result Few (2+) WBCs seen    Urine Culture - Urine, Urine, Clean Catch [[id number]] Collected:  03/08/18 2002    Lab Status:  Final result Specimen:  Urine from Urine, Clean Catch Updated:  03/10/18 1110     Urine Culture --      10,000-20,000 CFU/mL Normal Urogenital Patience    Clostridium Difficile Toxin - Stool, Per Rectum [[id number]] Collected:  03/09/18 1027    Lab  Status:  Final result Specimen:  Stool from Per Rectum Updated:  03/09/18 1203    Narrative:       The following orders were created for panel order Clostridium Difficile Toxin - Stool, Per Rectum.  Procedure                               Abnormality         Status                     ---------                               -----------         ------                     Clostridium Difficile To...[[id number]]  Normal              Final result                 Please view results for these tests on the individual orders.    Clostridium Difficile Toxin, PCR - Stool, Per Rectum [[id number]]  (Normal) Collected:  03/09/18 1027    Lab Status:  Final result Specimen:  Stool from Per Rectum Updated:  03/09/18 1203     C. Difficile Toxins by PCR Not Detected    Narrative:         Performance characteristics of test not established for patients <2 years of age.  Negative for Toxigenic C. Difficile        Results for orders placed during the hospital encounter of 02/13/18   Adult Transthoracic Echo Limited W/ Cont if Necessary Per Protocol    Narrative · This was a limited echocardiogram performed to evaluate the left   ventricular ejection fraction  · Left ventricular systolic function is normal. Estimated EF appears to be   in the range of 51 - 55%.  · The following left ventricular wall segments are hypokinetic: mid   anterior, apical anterior, apical lateral, apical inferior, apical septal   and apex hypokinetic. The basal segments are hyperdynamic.  · When compared to the prior echocardiogram performed on February 14, 2018, the hypokinetic apical segments have mildly improved. The overall   ejection fraction has improved.          I have reviewed the medications.    Assessment/Plan   Assessment / Plan     Hospital Problem List     * (Principal)Sepsis    Obstructive sleep apnea syndrome (Chronic)    Overview Addendum 8/1/2016  9:56 AM by Puja Clarke     intolerant of CPAP therapy  6. 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  Prior PSG reports complex sleep apnea with frequent central apneas and intermittent      Ramona Menard respirations, on auto CPAP 4-16 with supplemental oxygen.         Type 2 diabetes mellitus (Chronic)    Hypertension (Chronic)    Overview Signed 8/1/2016 10:00 AM by Puja Clarke     16. H/O echocardiogram (V15.89) (Z92.89)   · A.  Echocardiogram of 02/03/2015 reports an ejection fraction of 60-65%, mild concentric      LVH, trace mitral regurgitation, mild tricuspid and pulmonic regurgitation and calculated      RVSP of 35 mmHg, the main pulmonary artery is also mildly dilated.         Peripheral vascular disease (Chronic)    Metabolic encephalopathy    Coronary artery disease involving native heart (Chronic)    Overview Signed 2/15/2018 12:52 PM by CINDI Fierro     1. [facility] 11-26-16: Non occlusive disease  ·  50% mid RCA stenosis.  · 40% proximal LAD stenosis   · Normal left ventricular function         Takotsubo cardiomyopathy    Overview Signed 2/15/2018  1:42 PM by JEOVANY Greenfield     EF 35%, diastolic dysfunction, left ventricular wall segments are hypokinetic ECHO 2/14/18         Chronic systolic heart failure    Chronic respiratory failure with hypoxia (Chronic)    Infected sacral decubitus ulcers (Chronic)    Weakness         Brief Hospital Course to date:  Tamara Langston is a 62 y.o. female with hx of decubitus ulcer, systolic HF, DINA, T2DM presented with AMS and fever with concern for persistent decub infection.  Previously d/c'd with PICC to SNF but sounds like was stopped early.     Assessment & Plan:  - encephalopathy is improved to baseline, related to fever/infection on admission  - currently on vanc/zosyn via PICC per Dr. Crzu.  Source of infection includes possible UTI, PICC line, or right heel decub.  - has stage 4 sacral decub, cultures from last admission were polymicrobial.  Recent MRI without evidence of osteo.  - will increase SSI for some  better glc control to allow best healing  - CKD is stable within baseline  - recent NSTEMI/takosubo's.  Clinically stable.  Continue DAPT, statin, BB, entresto.  - d/w [facility] and Dr. Cruz Friday.  Awaiting decision on appeal of LTACH denial.  Will need talk with CM on Monday.    DVT Prophylaxis: [facility]     CODE STATUS: Conditional Code     Disposition:D, [facility] LTACH vs [facility]    Electronically signed by Derek Jones MD, 03/18/18, 10:32 AM.

## 2018-03-19 LAB
ANION GAP SERPL CALCULATED.3IONS-SCNC: 11 MMOL/L (ref 3–11)
BUN BLD-MCNC: 34 MG/DL (ref 9–23)
BUN/CREAT SERPL: 22.7 (ref 7–25)
CALCIUM SPEC-SCNC: 9.3 MG/DL (ref 8.7–10.4)
CHLORIDE SERPL-SCNC: 105 MMOL/L (ref 99–109)
CO2 SERPL-SCNC: 28 MMOL/L (ref 20–31)
CREAT BLD-MCNC: 1.5 MG/DL (ref 0.6–1.3)
GFR SERPL CREATININE-BSD FRML MDRD: 35 ML/MIN/1.73
GLUCOSE BLD-MCNC: 222 MG/DL (ref 70–100)
GLUCOSE BLDC GLUCOMTR-MCNC: 147 MG/DL (ref 70–130)
GLUCOSE BLDC GLUCOMTR-MCNC: 158 MG/DL (ref 70–130)
GLUCOSE BLDC GLUCOMTR-MCNC: 197 MG/DL (ref 70–130)
GLUCOSE BLDC GLUCOMTR-MCNC: 254 MG/DL (ref 70–130)
POTASSIUM BLD-SCNC: 4.2 MMOL/L (ref 3.5–5.5)
SODIUM BLD-SCNC: 144 MMOL/L (ref 132–146)
VANCOMYCIN TROUGH SERPL-MCNC: 18.1 MCG/ML (ref 10–20)

## 2018-03-19 PROCEDURE — 80048 BASIC METABOLIC PNL TOTAL CA: CPT

## 2018-03-19 PROCEDURE — 99232 SBSQ HOSP IP/OBS MODERATE 35: CPT | Performed by: INTERNAL MEDICINE

## 2018-03-19 PROCEDURE — 25010000002 PIPERACILLIN-TAZOBACTAM: Performed by: INTERNAL MEDICINE

## 2018-03-19 PROCEDURE — 97110 THERAPEUTIC EXERCISES: CPT

## 2018-03-19 PROCEDURE — 82962 GLUCOSE BLOOD TEST: CPT

## 2018-03-19 PROCEDURE — 97610 LOW FREQUENCY NON-THERMAL US: CPT

## 2018-03-19 PROCEDURE — 94799 UNLISTED PULMONARY SVC/PX: CPT

## 2018-03-19 PROCEDURE — 80202 ASSAY OF VANCOMYCIN: CPT

## 2018-03-19 PROCEDURE — 25010000002 VANCOMYCIN PER 500 MG

## 2018-03-19 PROCEDURE — 97116 GAIT TRAINING THERAPY: CPT

## 2018-03-19 PROCEDURE — 94660 CPAP INITIATION&MGMT: CPT

## 2018-03-19 PROCEDURE — 25010000002 HEPARIN (PORCINE) PER 1000 UNITS: Performed by: NURSE PRACTITIONER

## 2018-03-19 PROCEDURE — 97530 THERAPEUTIC ACTIVITIES: CPT

## 2018-03-19 RX ORDER — SODIUM CHLORIDE 9 MG/ML
50 INJECTION, SOLUTION INTRAVENOUS CONTINUOUS
Status: DISCONTINUED | OUTPATIENT
Start: 2018-03-19 | End: 2018-03-19

## 2018-03-19 RX ADMIN — ATORVASTATIN CALCIUM 80 MG: 40 TABLET, FILM COATED ORAL at 20:57

## 2018-03-19 RX ADMIN — CARVEDILOL 6.25 MG: 6.25 TABLET, FILM COATED ORAL at 09:05

## 2018-03-19 RX ADMIN — CLONAZEPAM 0.25 MG: 0.5 TABLET ORAL at 20:56

## 2018-03-19 RX ADMIN — PREGABALIN 100 MG: 100 CAPSULE ORAL at 15:03

## 2018-03-19 RX ADMIN — ASPIRIN 81 MG: 81 TABLET, COATED ORAL at 09:05

## 2018-03-19 RX ADMIN — SACUBITRIL AND VALSARTAN 1 TABLET: 24; 26 TABLET, FILM COATED ORAL at 20:57

## 2018-03-19 RX ADMIN — HEPARIN SODIUM 5000 UNITS: 5000 INJECTION, SOLUTION INTRAVENOUS; SUBCUTANEOUS at 20:55

## 2018-03-19 RX ADMIN — LEVOTHYROXINE SODIUM 112 MCG: 112 TABLET ORAL at 06:33

## 2018-03-19 RX ADMIN — CLOTRIMAZOLE 10 MG: 10 LOZENGE ORAL at 09:23

## 2018-03-19 RX ADMIN — NYSTATIN: 100000 POWDER TOPICAL at 20:58

## 2018-03-19 RX ADMIN — CARVEDILOL 6.25 MG: 6.25 TABLET, FILM COATED ORAL at 20:57

## 2018-03-19 RX ADMIN — BUSPIRONE HYDROCHLORIDE 7.5 MG: 15 TABLET ORAL at 09:05

## 2018-03-19 RX ADMIN — PIPERACILLIN SODIUM,TAZOBACTAM SODIUM 3.38 G: 3; .375 INJECTION, POWDER, FOR SOLUTION INTRAVENOUS at 02:01

## 2018-03-19 RX ADMIN — PIPERACILLIN SODIUM,TAZOBACTAM SODIUM 3.38 G: 3; .375 INJECTION, POWDER, FOR SOLUTION INTRAVENOUS at 09:19

## 2018-03-19 RX ADMIN — PANTOPRAZOLE SODIUM 40 MG: 40 TABLET, DELAYED RELEASE ORAL at 06:33

## 2018-03-19 RX ADMIN — PIPERACILLIN SODIUM,TAZOBACTAM SODIUM 3.38 G: 3; .375 INJECTION, POWDER, FOR SOLUTION INTRAVENOUS at 17:01

## 2018-03-19 RX ADMIN — POTASSIUM CHLORIDE 20 MEQ: 750 CAPSULE, EXTENDED RELEASE ORAL at 09:19

## 2018-03-19 RX ADMIN — VANCOMYCIN HYDROCHLORIDE 750 MG: 750 INJECTION, SOLUTION INTRAVENOUS at 12:08

## 2018-03-19 RX ADMIN — PREGABALIN 100 MG: 100 CAPSULE ORAL at 06:33

## 2018-03-19 RX ADMIN — FUROSEMIDE 40 MG: 40 TABLET ORAL at 09:04

## 2018-03-19 RX ADMIN — SACUBITRIL AND VALSARTAN 1 TABLET: 24; 26 TABLET, FILM COATED ORAL at 09:10

## 2018-03-19 RX ADMIN — OXYCODONE HYDROCHLORIDE AND ACETAMINOPHEN 1 TABLET: 10; 325 TABLET ORAL at 09:19

## 2018-03-19 RX ADMIN — FENTANYL 1 PATCH: 75 PATCH TRANSDERMAL at 09:04

## 2018-03-19 RX ADMIN — CLOTRIMAZOLE 10 MG: 10 LOZENGE ORAL at 20:58

## 2018-03-19 RX ADMIN — OXYCODONE HYDROCHLORIDE AND ACETAMINOPHEN 1 TABLET: 10; 325 TABLET ORAL at 21:56

## 2018-03-19 RX ADMIN — OXYCODONE HYDROCHLORIDE AND ACETAMINOPHEN 1 TABLET: 10; 325 TABLET ORAL at 16:01

## 2018-03-19 RX ADMIN — CLOPIDOGREL BISULFATE 75 MG: 75 TABLET ORAL at 09:05

## 2018-03-19 RX ADMIN — CLOTRIMAZOLE 10 MG: 10 LOZENGE ORAL at 15:03

## 2018-03-19 RX ADMIN — NYSTATIN: 100000 POWDER TOPICAL at 09:05

## 2018-03-19 RX ADMIN — Medication 1 CAPSULE: at 09:05

## 2018-03-19 RX ADMIN — BUSPIRONE HYDROCHLORIDE 7.5 MG: 15 TABLET ORAL at 20:57

## 2018-03-19 RX ADMIN — PREGABALIN 100 MG: 100 CAPSULE ORAL at 20:57

## 2018-03-19 RX ADMIN — CETIRIZINE HYDROCHLORIDE 10 MG: 10 TABLET, FILM COATED ORAL at 20:57

## 2018-03-19 RX ADMIN — HEPARIN SODIUM 5000 UNITS: 5000 INJECTION, SOLUTION INTRAVENOUS; SUBCUTANEOUS at 09:03

## 2018-03-19 NOTE — THERAPY WOUND CARE TREATMENT
Acute Care - Wound/Debridement Treatment Note  [facility]     Patient Name: Tamara Langston  : 1955  MRN: [id number]  Today's Date: 3/19/2018  Onset of Illness/Injury or Date of Surgery: 18   Date of Referral to PT: 18   Referring Physician: MD Carson       Admit Date: 3/8/2018    Visit Dx:    ICD-10-CM ICD-9-CM   1. Transient alteration of awareness R40.4 780.02   2. Fever, unspecified fever cause R50.9 780.60   3. Decubitus ulcer of sacral region, unspecified ulcer stage L89.159 707.03     707.20   4. Impaired mobility and ADLs Z74.09 799.89   5. Impaired functional mobility, balance, gait, and endurance Z74.09 V49.89       Patient Active Problem List   Diagnosis   • Atopic rhinitis   • Restrictive ventilatory defect   • COPD (chronic obstructive pulmonary disease)   • Osteoporosis   • Obstructive sleep apnea syndrome   • Abnormal liver enzymes   • Cholelithiasis   • Chronic back pain   • Mixed anxiety depressive disorder   • Type 2 diabetes mellitus   • Dyslipidemia   • Essential tremor   • Fibromyalgia   • Gastroesophageal reflux disease without esophagitis   • Hypertension   • Hypothyroidism   • Insomnia   • Osteoarthritis   • Psoriasis   • Branch retinal vein occlusion   • Cobalamin deficiency   • Obesity   • Vitamin D deficiency   • Hypokalemia   • Lactic acidosis   • Fatty liver disease, nonalcoholic   • Sinus bradycardia   • NSTEMI (non-ST elevated myocardial infarction)   • Tobacco abuse   • Hypoxia   • Peripheral vascular disease   • Diabetic polyneuropathy associated with type 2 diabetes mellitus   • Anemia, blood loss   • Chronic pain   • Chronic, continuous use of opioids   • Chronic anxiety   • Chronically on benzodiazepine therapy   • Tubular adenoma   • Elevated troponin level   • Urine abnormality   • ELIF (acute kidney injury)   • Cellulitis of sacral region   • Metabolic encephalopathy   • Fever   • Sepsis   • Acute on chronic respiratory failure with hypoxia   • Acute  cystitis without hematuria   • Coronary artery disease involving native heart   • Takotsubo cardiomyopathy   • Elevated troponin   • Chronic systolic heart failure   • Chronic respiratory failure with hypoxia   • Infected sacral decubitus ulcers   • Weakness               WOUND DEBRIDEMENT               Therapy Treatment    Therapy Treatment / Health Promotion    Treatment Time/Intention  Discipline: physical therapist (Lane Yates PT)  Document Type: therapy note (daily note) (Lane Yates PT)  Subjective Information: complains of, pain (Lane Yates, PT)  Mode of Treatment: physical therapy (Oksana Stewart PT)  Patient/Family Observations: pt supine in bed with HOB elevated. (Oksana Stewart PT)  Care Plan Review: care plan/treatment goals reviewed, current/potential barriers reviewed, risks/benefits reviewed, patient/other agree to care plan (Lane Yates PT)  Total Minutes, Physical Therapy Treatment: 24 (Oksana Stewart PT)  Existing Precautions/Restrictions: oxygen therapy device and L/min (Oksana Stewart PT)  Treatment Considerations/Comments: fatigues easily (Oksana Stewart PT)  Patient Response to Treatment: takes standing rest breaks as appropriate. (Oksana Stewart PT)  Plan of Care Review  Plan of Care Reviewed With: patient (Lane Yates PT)    Vitals/Pain/Safety  Vital Signs  Post Systolic BP Rehab:  (BP stable) (Oksana Stewart PT)  Intratreatment Heart Rate (beats/min): 78 (Oksnaa Stewart PT)  Posttreatment Heart Rate (beats/min): 72 (Oksana Stewart PT)  Pre SpO2 (%): 92 (Oksana Stewart PT)  O2 Delivery Pre Treatment: supplemental O2 (Oksana Stewart PT)  Post SpO2 (%): 94 (Oksana Stewart PT)  O2 Delivery Post Treatment: supplemental O2 (Oksana Stewart PT)  Pre Patient Position: Supine (Oksana Stewart PT)  Intra Patient Position: Standing (Oksana Stewart PT)  Post Patient Position: Supine (Oksana VERA  Terry PT)  Pain Assessment  Additional Documentation: Pain Scale: FACES Pre/Post-Treatment (Group) (Lane Yates PT)  Pain Scale: Numbers Pre/Post-Treatment  Pain Location - Side: Right (Oksana Stewart PT)  Pain Location - Orientation:  (dorsal) (Oksana Stewart PT)  Pain Location: foot (Oksana Stewart PT)  Pain Scale: Word Pre/Post-Treatment  Pain Location - Side: Right (Oksana Stewart PT)  Pain Location - Orientation:  (dorsal) (Oksana Stewart PT)  Pain Location: foot (Oksana Stewart PT)  Pain Scale: FACES Pre/Post-Treatment  Pain: FACES Scale, Pretreatment: 2-->hurts little bit (Lane Yates PT)  Pain: FACES Scale, Post-Treatment: 2-->hurts little bit (Lane Yates PT)  Pain Location - Side: Right (Oksana Stewart PT)  Pain Location - Orientation:  (dorsal) (Oksana Stewart PT)  Pain Location: foot (Oksana Stewart PT)  Pre/Post Treatment Pain Comment: generalized (Lane Yates PT)  Positioning and Restraints  Pre-Treatment Position: in bed (Lane Yates PT)  Post Treatment Position: bed (Lane Yates PT)  In Bed: supine, call light within reach, waffle boots/both (Lane Yates, PT)    Mobility,ADL,Motor, Modality  Bed Mobility Assessment/Treatment  Bed Mobility Assessment/Treatment: supine-sit, sit-supine (Oksana Stewart PT)  Supine-Sit [place] (Bed Mobility): conditional independence (Oksana Stewart PT)  Sit-Supine [place] (Bed Mobility): conditional independence (Oksana Stewart PT)  Assistive Device (Bed Mobility): bed rails, head of bed elevated (Oksana Stewart PT)  Transfer Assessment/Treatment  Transfer Assessment/Treatment: sit-stand transfer, stand-sit transfer (Oksana Stewart PT)  Sit-Stand Transfer  Sit-Stand [place] (Transfers): supervision (Oksana Stewart PT)  Assistive Device (Sit-Stand Transfers): walker, front-wheeled (Oksana Stewart PT)  Stand-Sit  Transfer  Stand-Sit [place] (Transfers): supervision (Oksana Stewart PT)  Assistive Device (Stand-Sit Transfers): walker, front-wheeled (Oksana Stewart PT)  Gait/Stairs Assessment/Training  [place] Level (Gait): contact guard (Oksana Stewart PT)  Assistive Device (Gait): walker, front-wheeled (Oksana Stewart PT)  Distance in Feet (Gait): 150 (Oksana Stewart PT)  Pattern (Gait): swing-through (Oksana Stewart PT)  Deviations/Abnormal Patterns (Gait): aleyda decreased, bilateral deviations, stride length decreased (forward flexed posture) (Oksana Stewart PT)  Comment (Gait/Stairs): CGA given initially then intermittently with increased moments of fatigued when pt has tendency to look downward. Cues for PLB given (Oksana Stewart PT)     Motor Skills Assessment/Interventions  Additional Documentation: Balance Interventions (Group) (Oksana Stewart PT)  Therapeutic Exercise  Exercise Type (Therapeutic Exercise): AROM (active range of motion) (Oksana Stewart PT)  Position (Therapeutic Exercise): seated (Oksana Stewart PT)  Sets/Reps (Therapeutic Exercise): 10 reps (Oksana Stewart PT)  Balance Interventions  Training Strategies (Balance): ADL tasks performed in standing with pt reaching, changing posture, maintaining balance with Rwx. (Oksana Stewart PT)  Identified Impairments Contributing to Balance Disturbance (Balance): postural control impaired (Oksana Stewart PT)        ROM/MMT             Sensory, Edema, Orthotics          Cognition, Communication, Swallow  Cognitive Assessment/Intervention- PT/OT  Affect/Mental Status (Cognitive): WNL (Oksana Stewart PT)  Orientation Status (Cognition): oriented x 4 (Oksana Stewart PT)  Follows Commands (Cognition): WNL (Oksana Stewart PT)  Cognitive Function (Cognitive): WNL (Oksana Stewart PT)  Speaking Valve  Pre SpO2 (%): 92 (Oksana Stewart PT)  Posttreatment Heart Rate  (beats/min): 72 (Oksana Stewart, PT)  Post SpO2 (%): 94 (Oksana Stewart, PT)  General Eating/Swallowing Observations  Pre SpO2 (%): 92 (Oksana Stewart, PT)  Post SpO2 (%): 94 (Oksana Stewart, PT)    Outcome Summary  Outcome Summary/Treatment Plan (PT)  Daily Summary of Progress (PT): progress towards functional goals is fair (Oksana Stewart, PT)          PT Rehab Goals     Row Name 03/10/18 0900             Bed Mobility Goal 1 (PT)    Activity/Assistive Device (Bed Mobility Goal 1, PT) bed mobility activities, all  -ES      [place] Level/Cues Needed (Bed Mobility Goal 1, PT) independent  -ES      Time Frame (Bed Mobility Goal 1, PT) 1 week;long term goal (LTG)  -ES      Progress/Outcomes (Bed Mobility Goal 1, PT) goal ongoing  -ES         Transfer Goal 1 (PT)    Activity/Assistive Device (Transfer Goal 1, PT) sit-to-stand/stand-to-sit;bed-to-chair/chair-to-bed;walker, rolling  -ES      [place] Level/Cues Needed (Transfer Goal 1, PT) independent  -ES      Time Frame (Transfer Goal 1, PT) 1 week;long term goal (LTG)  -ES      Progress/Outcome (Transfer Goal 1, PT) goal ongoing  -ES         Gait Training Goal 1 (PT)    Activity/Assistive Device (Gait Training Goal 1, PT) gait (walking locomotion);walker, rolling  -ES      [place] Level (Gait Training Goal 1, PT) independent  -ES      Distance (Gait Goal 1, PT) 150  -ES      Time Frame (Gait Training Goal 1, PT) 1 week;long term goal (LTG)  -ES      Progress/Outcome (Gait Training Goal 1, PT) goal ongoing  -ES        User Key  (r) = Recorded By, (t) = Taken By, (c) = Cosigned By    Initials Name Provider Type    ES Alisha Gardner, PT Physical Therapist          Physical Therapy Education     Title: PT OT SLP Therapies (Done)     Topic: Physical Therapy (Done)     Point: Mobility training (Done)    Learning Progress Summary     Learner Status Readiness Method Response Comment Documented by    Patient Done Acceptance E PERRI,NR   03/19/18  1026     Done Acceptance E,D DU,[facility] 03/16/18 1531     Done Acceptance E,D VU,DU   03/16/18 1524     Done Acceptance E,D VU   03/15/18 1516     Done Acceptance E [facility] 03/13/18 1114     Done Acceptance E VU,DU   03/12/18 1507     Done Acceptance E,D VU,NR POC progression, fall precautions, transfer and gait safety, D/C planning, [facility] 03/09/18 1324          Point: Home exercise program (Done)    Learning Progress Summary     Learner Status Readiness Method Response Comment Documented by    Patient Done Acceptance E VU,NR   03/19/18 1026     Done Acceptance E,D DU,[facility] 03/16/18 1531     Done Acceptance E,D VU,DU   03/16/18 1524     Done Acceptance E,D VU   03/15/18 1516     Done Acceptance E [facility] 03/13/18 1114     Done Acceptance E VU,[facility] 03/12/18 1507     Done Acceptance E,D VU,NR POC progression, fall precautions, transfer and gait safety, D/C planning, [facility] 03/09/18 1324          Point: Body mechanics (Done)    Learning Progress Summary     Learner Status Readiness Method Response Comment Documented by    Patient Done Acceptance E VU,[facility] 03/19/18 1026     Done Acceptance E,D DU,[facility] 03/16/18 1531     Done Acceptance E,D VU,DU   03/16/18 1524     Done Acceptance E,D VU   03/15/18 1516     Done Acceptance E [facility] 03/13/18 1114     Done Acceptance E VU,DU   03/12/18 1507     Done Acceptance E,D VU,NR POC progression, fall precautions, transfer and gait safety, D/C planning, [facility] 03/09/18 1324          Point: Precautions (Done)    Learning Progress Summary     Learner Status Readiness Method Response Comment Documented by    Patient Done Acceptance E VU,NR   03/19/18 1026     Done Acceptance E,D DU,[facility] 03/16/18 1531     Done Acceptance E,D VU,DU   03/16/18 1524     Done Acceptance E,D VU   03/15/18 1516     Done Acceptance E [facility] 03/13/18 1114     Done Acceptance E VU,DU   03/12/18 1507     Done Acceptance E,D VU,NR POC progression, fall precautions, transfer and  gait safety, D/C planning, [facility] 03/09/18 1324                      User Key     Initials Effective Dates Name Provider Type Discipline    UD 06/22/15 -  Molly Matias, PTA Physical Therapy Assistant PT    EH 06/19/15 -  Oksana Stewart, PT Physical Therapist PT    SJ 06/19/15 -  Mary Goldstein, PT Physical Therapist PT    MB 03/14/16 -  Ruth Rose, PT Physical Therapist PT                   PT ASSESSMENT (last 72 hours)      Physical Therapy Evaluation     Row Name             Wound 02/14/18 1115 Right medial heel    Wound - Properties Group Date first assessed: 02/14/18  -KS Time first assessed: 1115  -KS Present On Admission : yes  -KS Side: Right  -KS Orientation: medial  -KS Location: heel  -KS Stage, Pressure Injury: deep tissue injury  -KS    Row Name             Wound 03/09/18 0930 Right upper ischial tuberosity pressure injury    Wound - Properties Group Date first assessed: 03/09/18  -KS Time first assessed: 0930  -KS Present On Admission : yes  -KS Side: Right  -KS Orientation: upper  -KS Location: ischial tuberosity  -KS Type: pressure injury  -KS Stage, Pressure Injury: Stage 4  -KS    Row Name             Wound 02/13/18 2100 other (see comments) anterior groin other (see comments)    Wound - Properties Group Date first assessed: 02/13/18  -KS Time first assessed: 2100  -KS Side: other (see comments)  -KS, bilateral  Orientation: anterior  -KS Location: groin  -KS, and breast   Type: other (see comments)  -KS, excoriation        User Key  (r) = Recorded By, (t) = Taken By, (c) = Cosigned By    Initials Name Provider Type    KS Devika Pereira, RN Registered Nurse            PT Recommendation and Plan  Anticipated Discharge Disposition (PT): skilled nursing facility (SNF)  Therapy Frequency (PT Clinical Impression): daily               Outcome Summary: R heel with no change in size, but some drying / darkening of discoloration.  Fluid from blister may be reabsorbing and PT will cont to mist  wound and attempt to keep skin intact to help facilitate wound healing to skin underneath wound.    Plan of Care Reviewed With: patient          Outcome Measures     Row Name 03/19/18 1315 03/19/18 0951          How much help from another person do you currently need...    Turning from your back to your side while in flat bed without using bedrails?  -- 4  -EH     Moving from lying on back to sitting on the side of a flat bed without bedrails?  -- 4  -EH     Moving to and from a bed to a chair (including a wheelchair)?  -- 3  -EH     Standing up from a chair using your arms (e.g., wheelchair, bedside chair)?  -- 3  -EH     Climbing 3-5 steps with a railing?  -- 2  -EH     To walk in hospital room?  -- 3  -     AM-PAC 6 Clicks Score  -- 19  -        How much help from another is currently needed...    Putting on and taking off regular lower body clothing? 2  -HK  --     Bathing (including washing, rinsing, and drying) 2  -HK  --     Toileting (which includes using toilet bed pan or urinal) 1  -HK  --     Putting on and taking off regular upper body clothing 3  -HK  --     Taking care of personal grooming (such as brushing teeth) 3  -HK  --     Eating meals 4  -HK  --     Score 15  -HK  --        Functional Assessment    Outcome Measure Options AM-PAC 6 Clicks Daily Activity (OT)  - AM-[facility] 6 Clicks Basic Mobility (PT)  -       User Key  (r) = Recorded By, (t) = Taken By, (c) = Cosigned By    Initials Name Provider Type     Oksana Stewart, PT Physical Therapist     Akosua Aguirre, OT Occupational Therapist              Time Calculation        PT Charges     Row Name 03/19/18 1415 03/19/18 1029          Time Calculation    Start Time 1415  - 0951  [facility]     PT Received On  -- 03/19/18  -     PT Goal Re-Cert Due Date 03/20/18  - 03/20/18  -        Time Calculation- PT    Total Timed Code Minutes- PT 25 minute(s)  - 24 minute(s)  -       User Key  (r) = Recorded By, (t) = Taken By, (c) = Cosigned  By    Initials Name Provider Type    ANN-MARIE Yates, PT Physical Therapist    EH Oksana Stewart, PT Physical Therapist             Therapy Charges for Today     Code Description Service Date Service Provider Modifiers Qty    [id number] HC PT [facility] MIST 3/19/2018 Lane Yates, PT GP 1            PT G-Codes  PT Professional Judgement Used?: Yes  Outcome Measure Options: AM-PAC 6 Clicks Daily Activity (OT)  Score: 17  Functional Limitation: Mobility: Walking and moving around  Mobility: Walking and Moving Around Current Status (): At least 40 percent but less than 60 percent impaired, limited or restricted  Mobility: Walking and Moving Around Goal Status (): At least 20 percent but less than 40 percent impaired, limited or restricted        Lane Yates, PT  3/19/2018

## 2018-03-19 NOTE — PROGRESS NOTES
[facility] Medicine Services  PROGRESS NOTE    Patient Name: Tamara Langston  : 1955  MRN: [id number]    Date of Admission: 3/8/2018  Length of Stay: 11  Primary Care Physician: Harinder Aranda MD    Subjective   Subjective     CC:AMS    HPI:    No new issues overnight.  No fever, no change from her chronic pain. No new dyspnea    Review of Systems  Gen- No fevers, chills  CV- No chest pain, palpitations  Resp- No cough, dyspnea  GI- No N/V/D, abd pain    Otherwise ROS is negative except as mentioned in the HPI.    Objective   Objective     Vital Signs:   Temp:  [98 °F (36.7 °C)] 98 °F (36.7 °C)  Heart Rate:  [59-74] 67  Resp:  [18] 18  BP: (112-140)/(55-61) 112/56  FiO2 (%):  [30 %] 30 %        Physical Exam:  Constitutional: chronically ill appearing, in no acute distress, awake, alert, lying in bed  HENT: NCAT, mucous membranes moist  Respiratory: Clear to auscultation bilaterally, respiratory effort normal   Cardiovascular: RRR, no murmurs, rubs, or gallops  Gastrointestinal: obese, positive bowel sounds, soft, nontender, nondistended  Musculoskeletal: bilateral LE edema.  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  Feet in boots.  Psychiatric: Appropriate affect, cooperative  Neurologic: Oriented x 3, no focal deficits  Skin: did not examine decub due to positioning  Exam remains unchanged from previous    Results Reviewed:  I have personally reviewed current lab, radiology, and data and agree.      Results from last 7 days  Lab Units 18  0617 18  0353   WBC 10*3/mm3 8.80 8.89   HEMOGLOBIN g/dL 10.5* 10.7*   HEMATOCRIT % 33.5* 34.5   PLATELETS 10*3/mm3 216 221       Results from last 7 days  Lab Units 18  0850 18  0617 18  0356 18  0353   SODIUM mmol/L 144 140 140 144   POTASSIUM mmol/L 4.2 4.0 4.1 4.0   CHLORIDE mmol/L 105 103 102 103   CO2 mmol/L 28.0 30.0 30.0 32.0*   BUN mg/dL 34* 31* 34* 20   CREATININE mg/dL 1.50* 1.40* 1.40* 1.30   GLUCOSE  mg/dL 222* 155* 146* 158*   CALCIUM mg/dL 9.3 9.2 8.9 9.6   ALT (SGPT) U/L  --  32  --  28   AST (SGOT) U/L  --  47*  --  31     Estimated Creatinine Clearance: 45.7 mL/min (by C-G formula based on SCr of 1.5 mg/dL (H)).  No results found for: BNP  No results found for: PHART    Microbiology Results Abnormal     Procedure Component Value - Date/Time    Wound Culture - Wound, Back, Lower [[id number]]  (Abnormal)  (Susceptibility) Collected:  03/14/18 2143    Lab Status:  Final result Specimen:  Wound from Back, Lower Updated:  03/18/18 0937     Wound Culture --      Scant growth (1+) Pseudomonas aeruginosa (A)      Scant growth (1+) Staphylococcus, coagulase negative (A)     Comment: Susceptibility will not be done unless Doctor notifies Lab          Gram Stain Result Moderate (3+) WBCs seen      No organisms seen    Susceptibility      Pseudomonas aeruginosa     ASHWINI     Aztreonam >16 ug/ml Resistant     Cefepime <=8 ug/ml Susceptible     Ceftazidime 8 ug/ml Susceptible     Gentamicin <=4 ug/ml Susceptible     Levofloxacin <=2 ug/ml Susceptible     Meropenem 2 ug/ml Susceptible     Piperacillin + Tazobactam <=16 ug/ml Susceptible     Tobramycin <=4 ug/ml Susceptible                    Blood Culture - Blood, [[id number]]  (Normal) Collected:  03/08/18 1930    Lab Status:  Final result Specimen:  Blood from Arm, Right Updated:  03/13/18 2101     Blood Culture No growth at 5 days    Blood Culture - Blood, [[id number]]  (Normal) Collected:  03/08/18 1935    Lab Status:  Final result Specimen:  Blood from Arm, Right Updated:  03/13/18 2101     Blood Culture No growth at 5 days     Gram Stain Result Few (2+) WBCs seen    Urine Culture - Urine, Urine, Clean Catch [[id number]] Collected:  03/08/18 2002    Lab Status:  Final result Specimen:  Urine from Urine, Clean Catch Updated:  03/10/18 1110     Urine Culture --      10,000-20,000 CFU/mL Normal Urogenital Patience    Clostridium Difficile Toxin - Stool, Per Rectum [[id number]]  Collected:  03/09/18 1027    Lab Status:  Final result Specimen:  Stool from Per Rectum Updated:  03/09/18 1203    Narrative:       The following orders were created for panel order Clostridium Difficile Toxin - Stool, Per Rectum.  Procedure                               Abnormality         Status                     ---------                               -----------         ------                     Clostridium Difficile To...[[id number]]  Normal              Final result                 Please view results for these tests on the individual orders.    Clostridium Difficile Toxin, PCR - Stool, Per Rectum [[id number]]  (Normal) Collected:  03/09/18 1027    Lab Status:  Final result Specimen:  Stool from Per Rectum Updated:  03/09/18 1203     C. Difficile Toxins by PCR Not Detected    Narrative:         Performance characteristics of test not established for patients <2 years of age.  Negative for Toxigenic C. Difficile        Results for orders placed during the hospital encounter of 02/13/18   Adult Transthoracic Echo Limited W/ Cont if Necessary Per Protocol    Narrative · This was a limited echocardiogram performed to evaluate the left   ventricular ejection fraction  · Left ventricular systolic function is normal. Estimated EF appears to be   in the range of 51 - 55%.  · The following left ventricular wall segments are hypokinetic: mid   anterior, apical anterior, apical lateral, apical inferior, apical septal   and apex hypokinetic. The basal segments are hyperdynamic.  · When compared to the prior echocardiogram performed on February 14, 2018, the hypokinetic apical segments have mildly improved. The overall   ejection fraction has improved.          I have reviewed the medications.    Assessment/Plan   Assessment / Plan     Hospital Problem List     * (Principal)Sepsis    Obstructive sleep apnea syndrome (Chronic)    Overview Addendum 8/1/2016  9:56 AM by Puja Clarke     intolerant of CPAP therapy  6.  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  Prior PSG reports complex sleep apnea with frequent central apneas and intermittent      Ramona Menard respirations, on auto CPAP 4-16 with supplemental oxygen.         Type 2 diabetes mellitus (Chronic)    Hypertension (Chronic)    Overview Signed 8/1/2016 10:00 AM by Puja Clarke     16. H/O echocardiogram (V15.89) (Z92.89)   · A.  Echocardiogram of 02/03/2015 reports an ejection fraction of 60-65%, mild concentric      LVH, trace mitral regurgitation, mild tricuspid and pulmonic regurgitation and calculated      RVSP of 35 mmHg, the main pulmonary artery is also mildly dilated.         Peripheral vascular disease (Chronic)    Metabolic encephalopathy    Coronary artery disease involving native heart (Chronic)    Overview Signed 2/15/2018 12:52 PM by CINDI Fierro     1. [facility] 11-26-16: Non occlusive disease  ·  50% mid RCA stenosis.  · 40% proximal LAD stenosis   · Normal left ventricular function         Takotsubo cardiomyopathy    Overview Signed 2/15/2018  1:42 PM by JEOVANY Greenfield     EF 35%, diastolic dysfunction, left ventricular wall segments are hypokinetic ECHO 2/14/18         Chronic systolic heart failure    Chronic respiratory failure with hypoxia (Chronic)    Infected sacral decubitus ulcers (Chronic)    Weakness         Brief Hospital Course to date:  Tamara Langston is a 62 y.o. female with hx of decubitus ulcer, systolic HF, DINA, T2DM, ckd (baseline cr ~1.3-1.5) presented with AMS and fever with concern for persistent decub infection.  Previously d/c'd with PICC to SNF but sounds like was stopped early.     Assessment & Plan:  - encephalopathy is improved to baseline, related to fever/infection on admission  - currently on vanc/zosyn via PICC per Dr. Cruz.  Source of infection includes possible UTI, PICC line, or right heel decub.  - has stage 4 sacral decub, cultures from last admission were polymicrobial.  Recent  MRI without evidence of osteo.  - will increase SSI for some better glc control to allow best healing  - CKD is stable within baseline  - recent NSTEMI/takosubo's.  Clinically stable.  Continue DAPT, statin, BB, entresto.    -d/w dr. monte 3/19; plan to [facility] [place] 3/19 if insurance approved, ambulance tentatively set up for 3/20 at 1pm(continue vanc/zosyn ~4 weeks, total duration to be determined by clinical progress)      DVT Prophylaxis: [facility]     CODE STATUS: Conditional Code     [facility]BD[facility]    Electronically signed by Adalid Monet MD, 03/19/18, 5:49 PM.

## 2018-03-19 NOTE — PLAN OF CARE
Problem: Patient Care Overview (Adult)  Goal: Plan of Care Review  Outcome: Ongoing (interventions implemented as appropriate)   03/19/18 1511   Coping/Psychosocial Response Interventions   Plan Of Care Reviewed With patient   Patient Care Overview   Progress improving   Outcome Evaluation   Outcome Summary/Follow up Plan Pt compeltes bed mobility with conditional independence and completes sit to stand with supervision from bed. Pt is dependent for post toilet hygiene and clothing management. Pt completed BUE AROM x15 reps. Pt met functional goal for tranfers. All goals assessed and still appropriate. Continue IP OT per POC.

## 2018-03-19 NOTE — PLAN OF CARE
Problem: Patient Care Overview  Goal: Plan of Care Review  Outcome: Ongoing (interventions implemented as appropriate)   03/19/18 1027   Coping/Psychosocial   Plan of Care Reviewed With patient   OTHER   Outcome Summary Pt ambulates 150 ft with RWx on 3L O2 per NC. Pt needs multiple standing rest breaks with cueing for PLB 2/2 SOB. Vitals stable upon return to room.

## 2018-03-19 NOTE — THERAPY PROGRESS REPORT/RE-CERT
Acute Care - Occupational Therapy Progress Note  [facility]     Patient Name: Tamara Langston  : 1955  MRN: [id number]  Today's Date: 3/19/2018  Onset of Illness/Injury or Date of Surgery: 18  Date of Referral to OT: 18  Referring Physician: MD Carson    Admit Date: 3/8/2018       ICD-10-CM ICD-9-CM   1. Transient alteration of awareness R40.4 780.02   2. Fever, unspecified fever cause R50.9 780.60   3. Decubitus ulcer of sacral region, unspecified ulcer stage L89.159 707.03     707.20   4. Impaired mobility and ADLs Z74.09 799.89   5. Impaired functional mobility, balance, gait, and endurance Z74.09 V49.89     Patient Active Problem List   Diagnosis   • Atopic rhinitis   • Restrictive ventilatory defect   • COPD (chronic obstructive pulmonary disease)   • Osteoporosis   • Obstructive sleep apnea syndrome   • Abnormal liver enzymes   • Cholelithiasis   • Chronic back pain   • Mixed anxiety depressive disorder   • Type 2 diabetes mellitus   • Dyslipidemia   • Essential tremor   • Fibromyalgia   • Gastroesophageal reflux disease without esophagitis   • Hypertension   • Hypothyroidism   • Insomnia   • Osteoarthritis   • Psoriasis   • Branch retinal vein occlusion   • Cobalamin deficiency   • Obesity   • Vitamin D deficiency   • Hypokalemia   • Lactic acidosis   • Fatty liver disease, nonalcoholic   • Sinus bradycardia   • NSTEMI (non-ST elevated myocardial infarction)   • Tobacco abuse   • Hypoxia   • Peripheral vascular disease   • Diabetic polyneuropathy associated with type 2 diabetes mellitus   • Anemia, blood loss   • Chronic pain   • Chronic, continuous use of opioids   • Chronic anxiety   • Chronically on benzodiazepine therapy   • Tubular adenoma   • Elevated troponin level   • Urine abnormality   • ELIF (acute kidney injury)   • Cellulitis of sacral region   • Metabolic encephalopathy   • Fever   • Sepsis   • Acute on chronic respiratory failure with hypoxia   • Acute cystitis without  hematuria   • Coronary artery disease involving native heart   • Takotsubo cardiomyopathy   • Elevated troponin   • Chronic systolic heart failure   • Chronic respiratory failure with hypoxia   • Infected sacral decubitus ulcers   • Weakness     Past Medical History:   Diagnosis Date   • Acute on chronic respiratory failure with hypoxia 2/13/2018   • ELIF (acute kidney injury) 2/13/2018   • ELIF (acute kidney injury) 2/13/2018   • Altered mental status 2/13/2018   • Bronchitis    • Cellulitis of sacral region 2/13/2018   • Cervical cancer    • Cholelithiasis 5/11/2016   • Chronic anxiety 2/27/2017   • Chronic bronchitis    • Chronic respiratory failure with hypoxia 3/8/2018   • Chronic systolic heart failure 3/8/2018   • Chronically on benzodiazepine therapy 2/27/2017   • Degenerative arthritis    • Diabetes mellitus    • Dyslipidemia 5/11/2016   • Dyspnea    • Elevated troponin level 2/13/2018   • Fatty liver disease, nonalcoholic 11/21/2016   • Fever 2/13/2018   • Fibromyalgia    • GERD (gastroesophageal reflux disease)    • H/O echocardiogram    • History of pneumonia    • Hypertension 5/11/2016    16. H/O echocardiogram (V15.89) (Z92.89)  · A.  Echocardiogram of 02/03/2015 reports an ejection fraction of 60-65%, mild concentric     LVH, trace mitral regurgitation, mild tricuspid and pulmonic regurgitation and calculated     RVSP of 35 mmHg, the main pulmonary artery is also mildly dilated.   • Hypothyroidism 5/11/2016    Description: A.  On replacement therapy.   • Infected wound 3/8/2018   • Nausea    • Obesity    • DINA (obstructive sleep apnea)     intolerant of CPAP therapy   • Osteoporosis    • Osteoporosis    • Polycythemia vera 5/11/2016   • Pulmonary emphysema    • Restrictive ventilatory defect    • Rhinitis    • Sepsis 2/13/2018   • Uncontrolled diabetes mellitus 5/11/2016   • Urine abnormality 2/13/2018   • Uterine cancer    • Vitamin D deficiency 8/1/2016   • Weakness 3/8/2018     Past Surgical History:    Procedure Laterality Date   • AMPUTATION DIGIT Left 11/23/2016    Procedure: AMPUTATION TRANS METATARSAL - ray amutation of the left great toe;  Surgeon: Arsalan Feliciano MD;  Location:  [facility];  Service:    • AMPUTATION DIGIT Left 3/2/2017    Procedure: LEFT FOOT TRANSMETATARSAL AMPUTATION TOES 2,3,4,5;  Surgeon: Joey Guaman MD;  Location:  [facility];  Service:    • BACK SURGERY      lumbar fusions x5--multiple times; 1995, 1997, 1998, 1999 and 2008   • BELOW KNEE AMPUTATION Left 2/25/2017    Procedure: EXTENDED LEFT TOE AMPUTATION;  Surgeon: Arsalan Feliciano MD;  Location:  [facility];  Service:    • CARDIAC CATHETERIZATION N/A 11/26/2016    Procedure: Left Heart Cath;  Surgeon: Ash Nuñez MD;  Location:  [facility];  Service:    • CARDIAC CATHETERIZATION N/A 2/20/2018    Procedure: Left Heart Cath;  Surgeon: Lane Zamorano MD;  Location:  [facility];  Service:    • CARDIAC CATHETERIZATION N/A 2/20/2018    Procedure: Right Heart Cath;  Surgeon: Lane Zamorano MD;  Location:  [facility];  Service:    • HAND SURGERY Bilateral     x3   • HYSTERECTOMY      status post uterine and cervical cancer   • LUMBAR SPINE SURGERY      arthrodesis by anterior approach addit interspace   • TONSILLECTOMY         Therapy Treatment    Therapy Treatment / Health Promotion    Treatment Time/Intention  Discipline: occupational therapist (Akosua Aguirre OT)  Document Type: progress note/recertification (Akosua Aguirre OT)  Subjective Information: complains of, weakness, fatigue (diarrhea) (Akosua Aguirre OT)  Mode of Treatment: individual therapy (Akosua Aguirre OT)  Patient/Family Observations: Pt received supine in bed with IV and O2 intact.  (Akosua Aguirre OT)  Care Plan Review: care plan/treatment goals reviewed, patient/other agree to care plan (Akosua Aguirre OT)  Patient Effort: adequate (Akosua Aguirre OT)  Plan of Care Review  Plan of Care Reviewed With: patient (Akosua Aguirre  OT)    Vitals/Pain/Safety  Vital Signs  Pre Systolic BP Rehab: 146 (Akosua D Aguirre, OT)  Pre Treatment Diastolic BP: 61 (Akosua D Aguirre, OT)  Post Systolic BP Rehab: 136 (Akosua D Aguirre, OT)  Post Treatment Diastolic BP: 74 (Akosua D Aguirre, OT)  Pretreatment Heart Rate (beats/min): 65 (Akosua D Aguirre, OT)  Posttreatment Heart Rate (beats/min): 82 (Akosua D Aguirre, OT)  Pre Patient Position: Supine (Akosua D Aguirre, OT)  Intra Patient Position: Standing (Akosua D Aguirre, OT)  Post Patient Position: Sitting (Akosua D Aguirre, OT)  Pain Assessment  Additional Documentation: Pain Scale: Numbers Pre/Post-Treatment (Group) (Akosua D Aguirre, OT)  Pain Scale: Numbers Pre/Post-Treatment  Pain Scale: Numbers, Pretreatment: 8/10 (Akosua D Aguirre, OT)  Pain Scale: Numbers, Post-Treatment: 8/10 (Akosua D Aguirre, OT)  Pain Location - Orientation: generalized (Akosua D Aguirre, OT)  Pain Location: other (see comments) (all over) (Akosua D Aguirre, OT)  Pain Intervention(s): Repositioned, Ambulation/increased activity (Akosua D Aguirre, OT)  Pain Scale: Word Pre/Post-Treatment  Pain Location - Orientation: generalized (Akosua D Aguirre, OT)  Pain Location: other (see comments) (all over) (Akosua D Aguirre, OT)  Pain Intervention(s): Repositioned, Ambulation/increased activity (Akosua D Aguirre, OT)  Pain Scale: FACES Pre/Post-Treatment  Pain Location - Orientation: generalized (Akosua D Aguirre, OT)  Pain Location: other (see comments) (all over) (Akosua D Aguirre, OT)  Pain Intervention(s): Repositioned, Ambulation/increased activity (Akosua D Aguirre, OT)  Positioning and Restraints  Pre-Treatment Position: in bed (Akosua D Aguirre, OT)  Post Treatment Position: bed (Akosua D Aguirre, OT)  In Bed: notified nsg, supine, call light within reach, encouraged to call for assist (Akosua SERVANDO Aguirre, OT)    Mobility,ADL,Motor, Modality  Bed Mobility Assessment/Treatment  Bed Mobility Assessment/Treatment: supine-sit-supine (Akosua D Aguirre, OT)  Supine-Sit [place] (Bed Mobility): conditional independence (Akosua AL  Timothy, OT)  Sit-Supine [place] (Bed Mobility): conditional independence (Akosuavin Aguirre, OT)  Assistive Device (Bed Mobility): bed rails (Akosua Aguirre, OT)  Comment (Bed Mobility): Pt advanced to EOB with use of bed rail  (Akosua Aguirre, OT)  Transfer Assessment/Treatment  Transfer Assessment/Treatment: sit-stand transfer, stand-sit transfer, toilet transfer (Akosua Aguirre, OT)  Comment (Transfers): Pt requires verbal cues for safe hand placement and sequening.  (Akosua Aguirre, OT)  Sit-Stand Transfer  Sit-Stand [place] (Transfers): supervision (Akosua Aguirre, OT)  Assistive Device (Sit-Stand Transfers): walker, front-wheeled (Akosua Aguirre, OT)  Stand-Sit Transfer  Stand-Sit [place] (Transfers): supervision, verbal cues (Akosua Aguirre, OT)  Assistive Device (Stand-Sit Transfers): walker, front-wheeled (Akosua Mondragond, OT)  Toilet Transfer  Type (Toilet Transfer): sit-stand, stand-sit (Akosua Aguirre, OT)  [place] Level (Toilet Transfer): contact guard, verbal cues (Akosua Aguirre, OT)  Assistive Device (Toilet Transfer): commode, bedside without drop arms, walker, front-wheeled (Akosua Aguirre, OT)  ADL Assessment/Intervention  BADL Assessment/Intervention: toileting (Akosua Aguirre, OT)  Toileting Assessment/Training  [place] Level (Toileting): dependent (less than 25% patient effort) (Akosua Aguirre, OT)  Toileting Position: unsupported sitting (Akosua Aguirre, OT)  Comment (Toileting): Pt is dependent for post toilet hygiene and clothing management.  (Akosua Aguirre, OT)  BADL Safety/Performance  Impairments, BADL Safety/Performance: strength, other (see comments) (ROM/flexibility ) (Akosua Aguirre, OT)  Motor Skills Assessment/Interventions  Additional Documentation: Balance (Group) (Akosua Aguirre, OT)  Therapeutic Exercise  Therapeutic Exercise: seated, upper extremities (Akosua Aguirre, OT)  Additional Documentation: Therapeutic Exercise (Row) (Akosua Aguirre, OT)  Upper Extremity Seated Therapeutic  Exercise  Performed, Seated Upper Extremity (Therapeutic Exercise): wrist flexion/extension, elbow flexion/extension, shoulder flexion/extension, shoulder abduction/adduction, digit flexion/extension, other (see comments) (shoulder shrugs) (Akosua Aguirre OT)  Sets/Reps Detail, Seated Upper Extremity (Therapeutic Exercise): 15 (Akosua Aguirre OT)  Balance  Balance: static sitting balance, dynamic sitting balance (Akosua Aguirre OT)  Static Sitting Balance  Level of [place] (Unsupported Sitting, Static Balance): independent (Akosua Aguirre OT)  Sitting Position (Unsupported Sitting, Static Balance): sitting on edge of bed (Akosua Aguirre OT)  Time Able to Maintain Position (Unsupported Sitting, Static Balance): 1 to 2 minutes (Akosua Aguirre OT)  Dynamic Sitting Balance  Level of [place], Reaches Outside Midline (Sitting, Dynamic Balance): independent (Akosua Aguirre OT)  Sitting Position, Reaches Outside Midline (Sitting, Dynamic Balance): sitting on edge of bed (Akosua Aguirre OT)        ROM/MMT             Sensory, Edema, Orthotics          Cognition, Communication, Swallow  Cognitive Assessment/Intervention- PT/OT  Affect/Mental Status (Cognitive): WNL (Akosua Aguirre OT)  Orientation Status (Cognition): oriented x 4 (Akosua Aguirre OT)  Follows Commands (Cognition): WNL (Akosua Aguirre OT)  Cognitive Function (Cognitive): WNL (Akosua Aguirre OT)  Personal Safety Interventions: gait belt, fall prevention program maintained, nonskid shoes/slippers when out of bed (Akosua Aguirre OT)  Speaking Valve  Pretreatment Heart Rate (beats/min): 65 (Akosua Aguirre OT)  Posttreatment Heart Rate (beats/min): 82 (Akosua Aguirre OT)    Outcome Summary  Rehab Outcome Summary/Treatment Plan  Daily Summary of Progress (OT): progress toward functional goals as expected (Akosua Aguirre OT)        OT Rehab Goals     Row Name 03/19/18 1315 03/12/18 1405          Transfer Goal 1 (OT)    Activity/Assistive Device (Transfer Goal 1, OT) toilet   -HK toilet  -AC     [place] Level/Cues Needed (Transfer Goal 1, OT) contact guard assist  -HK contact guard assist  -AC     Time Frame (Transfer Goal 1, OT) 1 week  -HK 1 week  -AC     Progress/Outcome (Transfer Goal 1, OT) goal met  -HK  --        Toileting Goal 1 (OT)    Activity/Device (Toileting Goal 1, OT) toileting skills, all;grab bar/safety frame  -HK toileting skills, all;grab bar/safety frame  -AC     [place] Level/Cues Needed (Toileting Goal 1, OT) moderate assist (50-74% patient effort);verbal cues required  -HK minimum assist (75% or more patient effort)  -AC     Time Frame (Toileting Goal 1, OT) 5 days  -HK 1 day  -AC     Progress/Outcome (Toileting Goal 1, OT) goal revised this date  -HK  --        ROM Goal 1 (OT)    ROM Goal 1 (OT) Pt will particiapte in B UE AROM  10 -15 reps daily as needed to increase L shoulder flex 10 degrees and to increase strength and endurance needed  to support ADLs  -HK Pt will particiapte in B UE AROM  10 -15 reps daily as needed to increase L shoulder flex 10 degrees and to increase strength and endurance needed  to support ADLs  -AC     Time Frame (ROM Goal 1, OT) 1 week  -HK 1 week  -AC     Progress/Outcome (ROM Goal 1, OT) goal ongoing   goal still appropriate.  -HK  --        Problem Specific Goal 1 (OT)    Problem Specific Goal 1 (OT)  -- Pt will complete FM/GM taks with SBA  -AC     Time Frame (Problem Specific Goal 1, OT)  -- 1 week  -AC     Progress/Outcome (Problem Specific Goal 1, OT)  -- goal met  -AC       User Key  (r) = Recorded By, (t) = Taken By, (c) = Cosigned By    Initials Name Provider Type    AC Gypsy Cantu OT Occupational Therapist    HK Akosua Aguirre OT Occupational Therapist        Occupational Therapy Education     Title: PT OT SLP Therapies (Done)     Topic: Occupational Therapy (Done)     Point: ADL training (Done)     Description: Instruct learner(s) on proper safety adaptation and remediation techniques during self care or  transfers.   Instruct in proper use of assistive devices.   Learning Progress Summary     Learner Status Readiness Method Response Comment Documented by    Patient Done Eager E VU Pt educated on ADL retraining with toileting, safety precautions, and appropriate body mechanics.  03/19/18 1511     Done Acceptance E VU Pt educated on B UE AROM HEP to faciliate participation in ADL's.  03/15/18 1125     Done Acceptance E VU benefits of activity AC 03/12/18 1458     Done Acceptance E,D VU,NR Educated on current deficits and discussed POC. AN 03/09/18 0942          Point: Home exercise program (Done)     Description: Instruct learner(s) on appropriate technique for monitoring, assisting and/or progressing therapeutic exercises/activities.   Learning Progress Summary     Learner Status Readiness Method Response Comment Documented by    Patient Done Eager E VU Pt educated on ADL retraining with toileting, safety precautions, and appropriate body mechanics.  03/19/18 1511     Done Acceptance E VU Pt educated on B UE AROM HEP to faciliate participation in ADL's.  03/15/18 1125          Point: Precautions (Done)     Description: Instruct learner(s) on prescribed precautions during self-care and functional transfers.   Learning Progress Summary     Learner Status Readiness Method Response Comment Documented by    Patient Done Eager E VU Pt educated on ADL retraining with toileting, safety precautions, and appropriate body mechanics.  03/19/18 1511          Point: Body mechanics (Done)     Description: Instruct learner(s) on proper positioning and spine alignment during self-care, functional mobility activities and/or exercises.   Learning Progress Summary     Learner Status Readiness Method Response Comment Documented by    Patient Done Eager E VU Pt educated on ADL retraining with toileting, safety precautions, and appropriate body mechanics.  03/19/18 1511                      User Key     Initials Effective Dates  Name Provider Type Discipline     06/23/15 -  Gypsy Cantu, OT Occupational Therapist OT    AN 06/22/15 -  Jeanne Mooney, OT Occupational Therapist OT     03/07/18 -  Akosua Aguirre, OT Occupational Therapist OT     03/07/18 -  Donis Bajwa, OT Student OT Student OT                  OT Recommendation and Plan  Rehab Outcome Summary/Treatment Plan  Daily Summary of Progress (OT): progress toward functional goals as expected  Daily Summary of Progress (OT): progress toward functional goals as expected  Plan of Care Review  Plan of Care Reviewed With: patient  Plan of Care Reviewed With: patient        Outcome Measures     Row Name 03/19/18 1315 03/19/18 0951          How much help from another person do you currently need...    Turning from your back to your side while in flat bed without using bedrails?  -- 4  -EH     Moving from lying on back to sitting on the side of a flat bed without bedrails?  -- 4  -EH     Moving to and from a bed to a chair (including a wheelchair)?  -- 3  -EH     Standing up from a chair using your arms (e.g., wheelchair, bedside chair)?  -- 3  -EH     Climbing 3-5 steps with a railing?  -- 2  -EH     To walk in hospital room?  -- 3  -EH     AM-PAC 6 Clicks Score  -- 19  -EH        How much help from another is currently needed...    Putting on and taking off regular lower body clothing? 2  -HK  --     Bathing (including washing, rinsing, and drying) 2  -HK  --     Toileting (which includes using toilet bed pan or urinal) 1  -HK  --     Putting on and taking off regular upper body clothing 3  -HK  --     Taking care of personal grooming (such as brushing teeth) 3  -HK  --     Eating meals 4  -HK  --     Score 15  -HK  --        Functional Assessment    Outcome Measure Options AM-PAC 6 Clicks Daily Activity (OT)  -HK AM-PAC 6 Clicks Basic Mobility (PT)  -       User Key  (r) = Recorded By, (t) = Taken By, (c) = Cosigned By    Initials Name Provider Type     Oksana Stewart,  PT Physical Therapist     Akosua Aguirre OT Occupational Therapist           Time Calculation:         Time Calculation- OT     Row Name 03/19/18 1514             Time Calculation- OT    OT Start Time 1315  -      Total Timed Code Minutes- OT 25 minute(s)  -      OT Received On 03/19/18  -      OT Goal Re-Cert Due Date 03/29/18  -        User Key  (r) = Recorded By, (t) = Taken By, (c) = Cosigned By    Initials Name Provider Type     Akosua Aguirre OT Occupational Therapist           Therapy Charges for Today     Code Description Service Date Service Provider Modifiers Qty    [id number]  OT THERAPEUTIC ACT EA 15 MIN 3/19/2018 Akosua Aguirre OT GO 2               Akosua Aguirre OT  3/19/2018

## 2018-03-19 NOTE — PROGRESS NOTES
"Pharmacy Consult-Vancomycin Dosing  Tamara Langston is a  62 y.o. female receiving vancomycin therapy.     Indication: Skin and soft tissue infection  Consulting Provider: Hospitalist   ID Consult: ID (Anthony)    Goal Trough ~15 mcg/ml    Labs    Results from last 7 days   Lab Units 03/19/18  0850 03/18/18  0617 03/17/18  0356   BUN mg/dL 34* 31* 34*   CREATININE mg/dL 1.50* 1.40* 1.40*       Results from last 7 days   Lab Units 03/18/18  0617 03/14/18  0353   WBC 10*3/mm3 8.80 8.89     Ht - 167.6 cm (66\")  Wt - 102 kg (224 lb 8 oz)    Estimated Creatinine Clearance: 45.7 mL/min (by C-G formula based on SCr of 1.5 mg/dL (H)).    Evaluation of Level    Trough 3/10 supratherapeutic at 17.7 mcg/mL, goal trough ~15 mcg/mL.     Repeat trough drawn on 3/14 (~23 hr level) on 1250 mg IV q24h was SUPRA-therapeutic for indication at 19.9 mcg/ml - dose reduced to Vanc 1g IV q24h    Trough 3/19 @ 0850 SUPRAtherapeutic for indication at 18.1mcg/mL--on Vancomycin 1g IV q24h    Assessment/Plan:  Pharmacy to dose vancomycin for skin and soft tissue infection, goal trough ~15mcg/mL    Trough drawn appropriately prior to next dose and is SUPRAtherapeutic for indication at 18.1mcg/mL. Scr bumped slightly overnight to 1.5 mg/dL. Will decrease to Vancomycin 750mg IV q24h and continue to follow closely given picture. Will obtain further levels as needed based on renal function and clinical picture.    Joon Cabrera, PharmD  Pharmacy Resident  3/19/2018  9:51 AM                "

## 2018-03-19 NOTE — PROGRESS NOTES
Continued Stay Note  [facility]     Patient Name: Tamara Langston  MRN: [id number]  Today's Date: 3/19/2018    Admit Date: 3/8/2018          Discharge Plan     Row Name 03/19/18 1639       Plan    Plan update    Patient/Family in Agreement with Plan yes    Plan Comments Spoke with Dr. Cruz and she states  reported to her that [employer] overturned appeal and patient has been approved for LTAC at [facility]. CM called Rebekah at [facility] to discuss, they have not received notification at this time and she will contact [employer] Nurse to check. CM called [facility] and arranged for transport to LTAC at [facility] at [facility]. Arranged for transport for 3/20 at 1300.               Discharge Codes    No documentation.       Expected Discharge Date and Time     Expected Discharge Date Expected Discharge Time    Mar 23, 2018             Radha Marcus RN

## 2018-03-19 NOTE — THERAPY TREATMENT NOTE
Acute Care - Physical Therapy Treatment Note  [facility]     Patient Name: Tamara Langston  : 1955  MRN: [id number]  Today's Date: 3/19/2018  Onset of Illness/Injury or Date of Surgery: 18  Date of Referral to PT: 18  Referring Physician: MD Carson    Admit Date: 3/8/2018    Visit Dx:    ICD-10-CM ICD-9-CM   1. Transient alteration of awareness R40.4 780.02   2. Fever, unspecified fever cause R50.9 780.60   3. Decubitus ulcer of sacral region, unspecified ulcer stage L89.159 707.03     707.20   4. Impaired mobility and ADLs Z74.09 799.89   5. Impaired functional mobility, balance, gait, and endurance Z74.09 V49.89     Patient Active Problem List   Diagnosis   • Atopic rhinitis   • Restrictive ventilatory defect   • COPD (chronic obstructive pulmonary disease)   • Osteoporosis   • Obstructive sleep apnea syndrome   • Abnormal liver enzymes   • Cholelithiasis   • Chronic back pain   • Mixed anxiety depressive disorder   • Type 2 diabetes mellitus   • Dyslipidemia   • Essential tremor   • Fibromyalgia   • Gastroesophageal reflux disease without esophagitis   • Hypertension   • Hypothyroidism   • Insomnia   • Osteoarthritis   • Psoriasis   • Branch retinal vein occlusion   • Cobalamin deficiency   • Obesity   • Vitamin D deficiency   • Hypokalemia   • Lactic acidosis   • Fatty liver disease, nonalcoholic   • Sinus bradycardia   • NSTEMI (non-ST elevated myocardial infarction)   • Tobacco abuse   • Hypoxia   • Peripheral vascular disease   • Diabetic polyneuropathy associated with type 2 diabetes mellitus   • Anemia, blood loss   • Chronic pain   • Chronic, continuous use of opioids   • Chronic anxiety   • Chronically on benzodiazepine therapy   • Tubular adenoma   • Elevated troponin level   • Urine abnormality   • ELIF (acute kidney injury)   • Cellulitis of sacral region   • Metabolic encephalopathy   • Fever   • Sepsis   • Acute on chronic respiratory failure with hypoxia   • Acute cystitis  without hematuria   • Coronary artery disease involving native heart   • Takotsubo cardiomyopathy   • Elevated troponin   • Chronic systolic heart failure   • Chronic respiratory failure with hypoxia   • Infected sacral decubitus ulcers   • Weakness       Therapy Treatment    Therapy Treatment / Health Promotion    Treatment Time/Intention  Discipline: physical therapist (Oksana Stewart PT)  Document Type: therapy note (daily note) (Oksana Stewart PT)  Subjective Information: complains of (Oksana Stewart PT)  Mode of Treatment: physical therapy (Oksana Stewart PT)  Patient/Family Observations: pt supine in bed with HOB elevated. (Oksana Stewart PT)  Total Minutes, Physical Therapy Treatment: 24 (Oksana Stewart PT)  Existing Precautions/Restrictions: oxygen therapy device and L/min (Oksana Stewart PT)  Treatment Considerations/Comments: fatigues easily (Oksana Stewart PT)  Patient Response to Treatment: takes standing rest breaks as appropriate. (Oksana Stewart PT)  Plan of Care Review  Plan of Care Reviewed With: patient (Oksana Stewart PT)    Vitals/Pain/Safety  Vital Signs  Post Systolic BP Rehab:  (BP stable) (Oksana Stewart PT)  Intratreatment Heart Rate (beats/min): 78 (Oksana Stewart PT)  Posttreatment Heart Rate (beats/min): 72 (Oksana Stewart PT)  Pre SpO2 (%): 92 (Oksana Stewart PT)  O2 Delivery Pre Treatment: supplemental O2 (Oksana Stewart PT)  Post SpO2 (%): 94 (Oksana Stewart PT)  O2 Delivery Post Treatment: supplemental O2 (Oksana Stewart PT)  Pre Patient Position: Supine (Oksana Stewart PT)  Intra Patient Position: Standing (Oksana Stewart PT)  Post Patient Position: Supine (Oksana Stewart PT)  Pain Scale: Numbers Pre/Post-Treatment  Pain Location - Side: Right (Oksana Stewart PT)  Pain Location - Orientation:  (dorsal) (Oksana Stewart PT)  Pain Location: foot (Oksana Stewart PT)  Pain  Scale: Word Pre/Post-Treatment  Pain Location - Side: Right (Oksana Stewart PT)  Pain Location - Orientation:  (dorsal) (Oksana Stewart PT)  Pain Location: foot (Oksana Stewart PT)  Pain Scale: FACES Pre/Post-Treatment  Pain: FACES Scale, Pretreatment: 2-->hurts little bit (Oksana Stewart PT)  Pain: FACES Scale, Post-Treatment: 2-->hurts little bit (Oksana Stewart PT)  Pain Location - Side: Right (Oksana Stewart PT)  Pain Location - Orientation:  (dorsal) (Oksana Stewart PT)  Pain Location: foot (Oksana Stewart PT)  Positioning and Restraints  Pre-Treatment Position: in bed (Oksana Stewart PT)  Post Treatment Position: bed (Oksana Stewart PT)  In Bed: supine, call light within reach, encouraged to call for assist (Oksana Stewart PT)    Mobility,ADL,Motor, Modality  Bed Mobility Assessment/Treatment  Bed Mobility Assessment/Treatment: supine-sit, sit-supine (Oksana Stewart PT)  Supine-Sit [place] (Bed Mobility): conditional independence (Oksana Stewart PT)  Sit-Supine [place] (Bed Mobility): conditional independence (Oksana Stewart PT)  Assistive Device (Bed Mobility): bed rails, head of bed elevated (Oksana Stewart PT)  Transfer Assessment/Treatment  Transfer Assessment/Treatment: sit-stand transfer, stand-sit transfer (Oksana Stewart PT)  Sit-Stand Transfer  Sit-Stand [place] (Transfers): supervision (Oksana Stewart PT)  Assistive Device (Sit-Stand Transfers): walker, front-wheeled (Oksana Stewart PT)  Stand-Sit Transfer  Stand-Sit [place] (Transfers): supervision (Oksana Stewart PT)  Assistive Device (Stand-Sit Transfers): walker, front-wheeled (Oksana Stewart PT)  Gait/Stairs Assessment/Training  [place] Level (Gait): contact guard (Oksana Stewart PT)  Assistive Device (Gait): walker, front-wheeled (Oksana Stewart PT)  Distance in Feet (Gait): 150 (Oksana Stewart, PT)  Pattern  (Gait): swing-through (Oksana Stewart PT)  Deviations/Abnormal Patterns (Gait): aleyda decreased, bilateral deviations, stride length decreased (forward flexed posture) (Oksana Stewart PT)  Comment (Gait/Stairs): CGA given initially then intermittently with increased moments of fatigued when pt has tendency to look downward. Cues for PLB given (Oksana Stewart PT)     Motor Skills Assessment/Interventions  Additional Documentation: Balance Interventions (Group) (Oksana Stewart PT)  Therapeutic Exercise  Exercise Type (Therapeutic Exercise): AROM (active range of motion) (Oksana Stewart PT)  Position (Therapeutic Exercise): seated (Oksana Stewart PT)  Sets/Reps (Therapeutic Exercise): 10 reps (Oksana Stewart PT)  Balance Interventions  Training Strategies (Balance): ADL tasks performed in standing with pt reaching, changing posture, maintaining balance with Rwx. (Oksana Stewart PT)  Identified Impairments Contributing to Balance Disturbance (Balance): postural control impaired (Oksana Stewart PT)        ROM/MMT             Sensory, Edema, Orthotics          Cognition, Communication, Swallow  Cognitive Assessment/Intervention- PT/OT  Affect/Mental Status (Cognitive): WNL (Oksana Stewart PT)  Orientation Status (Cognition): oriented x 4 (Oksana Stewart PT)  Follows Commands (Cognition): WNL (Oksana Stewart PT)  Cognitive Function (Cognitive): WNL (Oksana Stewart PT)  Speaking Valve  Pre SpO2 (%): 92 (Oksana Stewart PT)  Posttreatment Heart Rate (beats/min): 72 (Oksana Stewart PT)  Post SpO2 (%): 94 (Oksana Stewart PT)  General Eating/Swallowing Observations  Pre SpO2 (%): 92 (Oksana Stewart PT)  Post SpO2 (%): 94 (Oksana Stewart PT)    Outcome Summary  Outcome Summary/Treatment Plan (PT)  Daily Summary of Progress (PT): progress towards functional goals is fair (Oksana Stewart PT)            PT Rehab Goals     Row Name  03/10/18 0900             Bed Mobility Goal 1 (PT)    Activity/Assistive Device (Bed Mobility Goal 1, PT) bed mobility activities, all  -ES      [place] Level/Cues Needed (Bed Mobility Goal 1, PT) independent  -ES      Time Frame (Bed Mobility Goal 1, PT) 1 week;long term goal (LTG)  -ES      Progress/Outcomes (Bed Mobility Goal 1, PT) goal ongoing  -ES         Transfer Goal 1 (PT)    Activity/Assistive Device (Transfer Goal 1, PT) sit-to-stand/stand-to-sit;bed-to-chair/chair-to-bed;walker, rolling  -ES      [place] Level/Cues Needed (Transfer Goal 1, PT) independent  -ES      Time Frame (Transfer Goal 1, PT) 1 week;long term goal (LTG)  -ES      Progress/Outcome (Transfer Goal 1, PT) goal ongoing  -ES         Gait Training Goal 1 (PT)    Activity/Assistive Device (Gait Training Goal 1, PT) gait (walking locomotion);walker, rolling  -ES      [place] Level (Gait Training Goal 1, PT) independent  -ES      Distance (Gait Goal 1, PT) 150  -ES      Time Frame (Gait Training Goal 1, PT) 1 week;long term goal (LTG)  -ES      Progress/Outcome (Gait Training Goal 1, PT) goal ongoing  -ES        User Key  (r) = Recorded By, (t) = Taken By, (c) = Cosigned By    Initials Name Provider Type    NAZARIO Gardner, PT Physical Therapist          Physical Therapy Education     Title: PT OT SLP Therapies (Active)     Topic: Physical Therapy (Done)     Point: Mobility training (Done)    Learning Progress Summary     Learner Status Readiness Method Response Comment Documented by    Patient Done Acceptance E DOROTHY RAYO   03/19/18 1026     Done Acceptance E,D ANANR  UD 03/16/18 1531     Done Acceptance E,D ANA RAYO  UD 03/16/18 1524     Done Acceptance E,D PERRI   03/15/18 1516     Done Acceptance E ANA  UD 03/13/18 1114     Done Acceptance E ANA RAYO   03/12/18 1507     Done Acceptance E,D VU,NR POC progression, fall precautions, transfer and gait safety, D/C planning, HEP MB 03/09/18 1324          Point: Home exercise program  (Done)    Learning Progress Summary     Learner Status Readiness Method Response Comment Documented by    Patient Done Acceptance E VU,NR   03/19/18 1026     Done Acceptance E,D DU,[facility] 03/16/18 1531     Done Acceptance E,D VU,DU   03/16/18 1524     Done Acceptance E,D VU   03/15/18 1516     Done Acceptance E DU   03/13/18 1114     Done Acceptance E VU,[facility] 03/12/18 1507     Done Acceptance E,D VU,NR POC progression, fall precautions, transfer and gait safety, D/C planning, HEP MB 03/09/18 1324          Point: Body mechanics (Done)    Learning Progress Summary     Learner Status Readiness Method Response Comment Documented by    Patient Done Acceptance E VU,[facility] 03/19/18 1026     Done Acceptance E,D DU,[facility] 03/16/18 1531     Done Acceptance E,D VU,DU   03/16/18 1524     Done Acceptance E,D VU   03/15/18 1516     Done Acceptance E DU   03/13/18 1114     Done Acceptance E VU,[facility] 03/12/18 1507     Done Acceptance E,D VU,NR POC progression, fall precautions, transfer and gait safety, D/C planning, HEP MB 03/09/18 1324          Point: Precautions (Done)    Learning Progress Summary     Learner Status Readiness Method Response Comment Documented by    Patient Done Acceptance E VU,[facility] 03/19/18 1026     Done Acceptance E,D DU,[facility] 03/16/18 1531     Done Acceptance E,D VU,DU   03/16/18 1524     Done Acceptance E,D VU   03/15/18 1516     Done Acceptance E [facility] 03/13/18 1114     Done Acceptance E VU,[facility] 03/12/18 1507     Done Acceptance E,D VU,NR POC progression, fall precautions, transfer and gait safety, D/C planning, HEP MB 03/09/18 1324                      User Key     Initials Effective Dates Name Provider Type Discipline     06/22/15 -  Molly Matias, PTA Physical Therapy Assistant PT     06/19/15 -  Oksana Stewart, PT Physical Therapist PT     06/19/15 -  Mary Goldstein, PT Physical Therapist PT    MB 03/14/16 -  Ruth Rose, PT Physical Therapist PT                     PT Recommendation and Plan     Outcome Summary/Treatment Plan (PT)  Daily Summary of Progress (PT): progress towards functional goals is fair  Plan of Care Reviewed With: patient  Outcome Summary: Pt ambulates 150 ft with RWx on 3L O2 per NC. Pt needs multiple standing rest breaks with cueing for PLB 2/2 SOB. Vitals stable upon return to room.          Outcome Measures     Row Name 03/19/18 0951 03/16/18 1500          How much help from another person do you currently need...    Turning from your back to your side while in flat bed without using bedrails? 4  - 4  -UD     Moving from lying on back to sitting on the side of a flat bed without bedrails? 4  - 4  -UD     Moving to and from a bed to a chair (including a wheelchair)? 3  - 3  -UD     Standing up from a chair using your arms (e.g., wheelchair, bedside chair)? 3  - 3  -UD     Climbing 3-5 steps with a railing? 2  - 2  -UD     To walk in hospital room? 3  - 3  -UD     AM-PAC 6 Clicks Score 19  - 19  -UD        Functional Assessment    Outcome Measure Options AM-PAC 6 Clicks Basic Mobility (PT)  - AM-PAC 6 Clicks Basic Mobility (PT)  -       User Key  (r) = Recorded By, (t) = Taken By, (c) = Cosigned By    Initials Name Provider Type    UD Molly Matias, PTA Physical Therapy Assistant     Oksana Stewart, PT Physical Therapist           Time Calculation:         PT Charges     Row Name 03/19/18 1029             Time Calculation    Start Time 0951  -      PT Received On 03/19/18  -      PT Goal Re-Cert Due Date 03/20/18  [facility]         Time Calculation- PT    Total Timed Code Minutes- PT 24 minute(s)  -        User Key  (r) = Recorded By, (t) = Taken By, (c) = Cosigned By    Initials Name Provider Type     Oksana Stewart, PT Physical Therapist          Therapy Charges for Today     Code Description Service Date Service Provider Modifiers Qty    [id number] HC GAIT TRAINING EA 15 MIN 3/19/2018 Oksana Stewart, PT GP 1     [id number]  PT THER PROC EA 15 MIN 3/19/2018 Oksana Stewart, PT GP 1          PT G-Codes  PT Professional Judgement Used?: Yes  Outcome Measure Options: AM-PAC 6 Clicks Basic Mobility (PT)  Score: 17  Functional Limitation: Mobility: Walking and moving around  Mobility: Walking and Moving Around Current Status (): At least 40 percent but less than 60 percent impaired, limited or restricted  Mobility: Walking and Moving Around Goal Status (): At least 20 percent but less than 40 percent impaired, limited or restricted    Oksana Stewart, PT  3/19/2018

## 2018-03-19 NOTE — PLAN OF CARE
Problem: Patient Care Overview  Goal: Plan of Care Review  Outcome: Ongoing (interventions implemented as appropriate)   03/19/18 1979   Coping/Psychosocial   Plan of Care Reviewed With patient   OTHER   Outcome Summary R heel with no change in size, but some drying / darkening of discoloration. Fluid from blister may be reabsorbing and PT will cont to mist wound and attempt to keep skin intact to help facilitate wound healing to skin underneath wound.

## 2018-03-19 NOTE — PROGRESS NOTES
[facility] Progress Note    Admission Date: 3/8/2018    Tamara Langston  1955  [id number]    Date: 3/19/2018    Antibiotics:  Anti-Infectives     Ordered     Dose/Rate Route Frequency Start Stop    03/19/18 0956  vancomycin in dextrose 5% 150 mL (VANCOCIN) IVPB 750 mg     Ordering Provider:  Joon Cabrera RPH    750 mg  over 60 Minutes Intravenous Every 24 Hours 03/19/18 1100 04/02/18 1059    03/13/18 1545  piperacillin-tazobactam (ZOSYN) 3.375 g/100 mL 0.9% NS IVPB (mbp)     Mary Lou Swartz [facility] reviewed the order on 03/16/18 0911.   Ordering Provider:  Oksana Cruz MD    3.375 g  over 4 Hours Intravenous Every 8 Hours 03/13/18 1800 03/29/18 2359    03/08/18 2314  Pharmacy to dose vancomycin     Mary Lou Swartz [facility] reviewed the order on 03/16/18 0913.   Ordering Provider:  Mary Lou Swartz RPH     Does not apply Continuous PRN 03/08/18 2314 03/29/18 2359    03/08/18 1916  vancomycin IVPB 2000 mg in 0.9% Sodium Chloride (premix) 500 mL     Ordering Provider:  Juan Palacios MD    20 mg/kg × 105 kg  over 2 Hours Intravenous Once 03/08/18 2000 03/08/18 2254    03/08/18 1916  piperacillin-tazobactam (ZOSYN) 4.5 g in iso-osmotic dextrose 100 mL IVPB (premix)     Ordering Provider:  Juan Palacios MD    4.5 g  over 30 Minutes Intravenous Once 03/08/18 1917 03/08/18 2045          Reason for Consultation:   Fever, probable uti     History of present illness:    Patient is a 62-year-old female with hypertension, hyperlipidemia, type 2 diabetes mellitus, morbid obesity admitted 2/13 to 2/27 after presenting with confusion and  fever.  She had an unstageable  sacral deep tissue injury with associated infection which had progressed over several months.  Over the course of a year sbe became  quite debilitated with limited mobility following left TMA.   On  admission, she was septic with fever, leukocytosis and acute on chronic kidney injury  Hospital course complicated by CHF, respiratory distress  and ELIF   She was diagnosed with NSTEMI and stents to LAD and diagonal arteries EF 34% and Dr Zamorano diagnosed Takotsubo myopathy  Her sacral decub improved with mist therapy and sequential debridement . Wound culture grew enterococcus, pseudomonas, and coag negative staph    Sacral MRI showed no evidence of abscess or OM.  She was denied [facility] and transferred to [facility] on zosyn and vancomycin There was a discrepancy in notes about duration of IV antibiotics  ie 3/2 or 3/12   Zosyn and vanc were stopped 3/2 Her picc was left in place   While at the center she evidently made progress with PT and walked up to 100'  She had fever to 102 followed by an episode of unresponsiveness then confusion  Transferred bact to [facility] with T100.3   UA with mild pyuria; culture pending   Wounds include a right heel DTPI and right ischial stage which measures 4.5x3.5x4.0cm  There is  undermining 6-12 o'clock; wound bed is beefy red, moist, with 10% slough noted in wound bed. No purulent drainage noted  On zosyn and vanc her fever appears to be improving  Cultures pending    3/11/18 alert, no new c/o  Afebrile on vanc and zosyn   3/12/18  Alert, no new c/o  Diarrhea improving; evaluation for [facility] stay for MIST therapy requested  3/13       Alert, some improvement with endurance per pt and PT notes; await precert from [facility]  3/14       Alert, no new c/o; note that WO notes no progress with trochanteric decubitus and MIST therapy has been requestedfor the trochanteric decubitus  3/15       Alert, no new c/o today  Long discussion with pt,  and [employer] rep (see below)  3/17       Alert, no new c/o; [facility] referral denied under the premise that the patient could be transferred from the SNF to the hospital for outpt mist therapy but the facility states they would be unable to do this  Appeal is underway  3/19  Alert; she says her  was contacted by [employer] to inform him that they have approved [facility]      Review of Systems:  Constitutional--  + Fever, chills + sweats.  Appetite good per patient with good oral intake She reports continued progress with PT  HEENT-- No new vision, hearing or throat complaints.  No  oral ulcers or sore throat.  Denies odynophagia or dysphagia. No headache, photophobia or neck stiffness.  CV-- No chest pain, palpitation or syncope  Resp-- No SOB/cough/Hemoptysis  GI- + diarrhea- intermittent.  No hematochezia, melena, or hematemesis. Denies jaundice or chronic liver disease. No odynophagia or dysphagia  -- No dysuria, hematuria, or flank pain.  Denies hesitancy, urgency or flank pain.  Lymph- no swollen lymph nodes in neck/axilla or groin.   Heme- No active bruising or bleeding; no Hx of DVT or PE.  MS-- no swelling or pain in the bones or joints of arms/legs.  No new back pain.  Neuro-- No acute focal weakness or numbness in the arms or legs.  No seizures.  Skin--No rashes or lesions       PE:  Vital Signs  Temp  Min: 97.7 °F (36.5 °C)  Max: 98 °F (36.7 °C)  BP  Min: 112/56  Max: 140/61  Pulse  Min: 59  Max: 74  Resp  Min: 18  Max: 19  SpO2  Min: 91 %  Max: 98 %  GENERAL: Awake and alert, in no acute distress; appears to recall medical history accurately.  Chronically ill-appearing with limited mobility in the bed  HEENT: Normocephalic, atraumatic.  PERRL. EOMI.  slera injected. No icterus. Oropharynx with mild thrush, no exudate. No evidence of peridontal disease.    NECK: Supple without nuchal rigidity  LYMPH: No cervical, axillary or inguinal lymphadenopathy. No neck masses  HEART: RRR; СЕРГЕЙ III/VI.  Bilateral lower extremity trace edema.  faint pedal pulses.  LUNGS: Clear to auscultation bilaterally without wheezing, rales, rhonchi.  Normal effort with supplemental O2 present.   ABDOMEN: Morbidly obese, Soft, nontender, nondistended. Positive bowel sounds. No rebound or guarding.   EXT:  L foot TMA well healed ; right heel with blister which is intact and w/o fluctuance;   R UE picc site w/o erythema, drainage or  swelling  : No weiss catheter.  MSK: FROM without joint effusions noted    SKIN:  mild groin yeast rash    Sacral wound granulating; undermining has decreased somewhat; small amt yellow-green exudate on packing  NEURO: Oriented to PPT. No focal deficits.   PSYCHIATRIC: Normal insight and judgement. Cooperative with PE        Laboratory Data      Results from last 7 days  Lab Units 03/18/18  0617 03/14/18  0353   WBC 10*3/mm3 8.80 8.89   HEMOGLOBIN g/dL 10.5* 10.7*   HEMATOCRIT % 33.5* 34.5   PLATELETS 10*3/mm3 216 221       Results from last 7 days  Lab Units 03/19/18  0850   SODIUM mmol/L 144   POTASSIUM mmol/L 4.2   CHLORIDE mmol/L 105   CO2 mmol/L 28.0   BUN mg/dL 34*   CREATININE mg/dL 1.50*   GLUCOSE mg/dL 222*   CALCIUM mg/dL 9.3       Results from last 7 days  Lab Units 03/18/18  0617   ALK PHOS U/L 95   BILIRUBIN mg/dL 0.2*   ALT (SGPT) U/L 32   AST (SGOT) U/L 47*           Results from last 7 days  Lab Units 03/18/18  0617   CRP mg/dL 1.66*       Estimated Creatinine Clearance: 45.7 mL/min (by C-G formula based on SCr of 1.5 mg/dL (H)).      Microbiology:      Radiology:  Imaging Results (last 72 hours)     Procedure Component Value Units Date/Time    CT Head Without Contrast [[id number]] Collected:  03/08/18 2116     Updated:  03/08/18 2251    Narrative:       EXAM:    CT Head Without Intravenous Contrast    CLINICAL HISTORY:    62 years old, female; Pressure ulcer of sacral region, unspecified stage;   Transient alteration of awareness; Fever, unspecified; Signs and symptoms;   Other: Patient found unresponsive; Additional info: Pt found unresponsive    TECHNIQUE:    Axial computed tomography images of the head/brain without intravenous   contrast.  All CT scans at this facility use one or more dose reduction   techniques, viz.: automated exposure control; ma/kV adjustment per patient size   (including targeted exams where dose is matched to indication; i.e. head); or   iterative reconstruction  technique.    COMPARISON:    No relevant prior studies available.    FINDINGS:    Brain:  Nonspecific calcification left occipital lobe.  No hemorrhage.  No   significant white matter disease.    Ventricles:  Unremarkable.  No ventriculomegaly.    Bones/joints:  Unremarkable.  No acute fracture.    Soft tissues:  Unremarkable.    Sinuses:  Mild sphenoid sinusitis.    Mastoid air cells:  Unremarkable as visualized.      Impression:         No acute findings.    THIS DOCUMENT HAS BEEN ELECTRONICALLY SIGNED BY NOEL LYLE MD    XR Chest 1 View [[id number]] Collected:  03/08/18 1813     Updated:  03/08/18 1912    Narrative:       EXAM:    XR Chest, 1 View    CLINICAL HISTORY:    62 years old, female; Signs and symptoms; Other: Weak/dizzy/ams; Additional   info: Weak/dizzy/ams triage protocol    TECHNIQUE:    Frontal view of the chest.    COMPARISON:    CR - XR CHEST 1 VW 2018-02-19 04:36    FINDINGS:    Lungs:  Unremarkable.  No consolidation.    Pleural space:  Unremarkable.  No pneumothorax.    Heart:  The heart demonstrates mild diffuse enlargement.    Mediastinum:  Unremarkable.    Bones/joints:  Unremarkable.    Other findings:  The patient is rotated to the left.      Impression:         No acute findings.    THIS DOCUMENT HAS BEEN ELECTRONICALLY SIGNED BY NOEL LYLE MD          I personally reviewed the radiographic studies     Impression:   --Sepsis with fever, leukocytosis and acute kidney injury    Improved    Possible foci of infection include urinary tract infection, picc line infection or the right heel decubitus  CRP 15.10 3/10--> 2.3 3/14     Stage 4 sacral  Wound which does not appear to be the source of sepsis  Prealbumin normal, suggesting that nutrition is not a factor in the failure to improve  Recent MRI w/o evidence of osteomyelitis  R/O infection with MDR gnr     Right heel decubitus with intact blister     Mild pyuria     Diarrhea- cdiff negative     Recent Non-ST segment elevation MI with  increase in troponin     Cardiomyopathy     Acute on chronic kidney disease     Type 2 diabetes mellitus     Hypertension and Hyperlipidemia     COPD with home oxygen use and acute hypoxic respiratory failure at admission  Morbid obesity  Chronic debility       PLAN/RECOMMENDATIONS:   Thank you for asking us to see Tamara Langston, I recommend the following:     Agree with zosyn and vancomycin- plan rx for ~ 4 weeks, total course to be determined by her clinical progress   reculture wound  Topical treatment of thrush  Stool culture- ordered but not performed  Right heel w/o signs of osteomyelitis, defer MRI  Probiotic    Hopefully transfer to [facility] soon    Discussed with Dr Monet and ALICE Cruz MD  3/19/2018

## 2018-03-19 NOTE — PROGRESS NOTES
"Adult Nutrition  Assessment/PES    Patient Name:  Tamara Langston  YOB: 1955  MRN: [id number]  Admit Date:  3/8/2018    Assessment Date:  3/19/2018    Comments:            Adult Nutrition Assessment     Row Name 03/19/18 0849       Reason for Assessment    Reason For Assessment follow-up protocol   5\"       Nutrition Prescription PO    Current PO Diet Regular    Supplement Other (comment)   PREMIER PROTEIN    Supplement Frequency 3 times a day    Common Modifiers Cardiac;Consistent Carbohydrate          Problem/Interventions:          Problem 2     Row Name 03/19/18 0849       Nutrition Diagnoses Problem 2    Problem 2 Nutrition Appropriate for Condition at this Time    Signs/Symptoms (evidenced by) PO Intake    Percent (%) intake recorded 67 %    Over number of meals 3                        Education/Evaluation     Row Name 03/19/18 0849       Monitor/Evaluation    Monitor Per protocol        Electronically signed by:  Nano Lawrence RD  03/19/18 8:50 AM  "

## 2018-03-20 VITALS
OXYGEN SATURATION: 94 % | HEART RATE: 76 BPM | DIASTOLIC BLOOD PRESSURE: 72 MMHG | RESPIRATION RATE: 18 BRPM | TEMPERATURE: 97.8 F | WEIGHT: 242.73 LBS | SYSTOLIC BLOOD PRESSURE: 138 MMHG | HEIGHT: 66 IN | BODY MASS INDEX: 39.01 KG/M2

## 2018-03-20 PROBLEM — L89.90 INFECTED DECUBITUS ULCER: Status: ACTIVE | Noted: 2018-03-20

## 2018-03-20 PROBLEM — L08.9 INFECTED DECUBITUS ULCER: Status: ACTIVE | Noted: 2018-03-20

## 2018-03-20 LAB
GLUCOSE BLDC GLUCOMTR-MCNC: 159 MG/DL (ref 70–130)
GLUCOSE BLDC GLUCOMTR-MCNC: 199 MG/DL (ref 70–130)

## 2018-03-20 PROCEDURE — 25010000002 VANCOMYCIN PER 500 MG

## 2018-03-20 PROCEDURE — 25010000002 PIPERACILLIN-TAZOBACTAM: Performed by: INTERNAL MEDICINE

## 2018-03-20 PROCEDURE — 94660 CPAP INITIATION&MGMT: CPT

## 2018-03-20 PROCEDURE — 99239 HOSP IP/OBS DSCHRG MGMT >30: CPT | Performed by: INTERNAL MEDICINE

## 2018-03-20 PROCEDURE — 25010000002 HEPARIN (PORCINE) PER 1000 UNITS: Performed by: NURSE PRACTITIONER

## 2018-03-20 PROCEDURE — 82962 GLUCOSE BLOOD TEST: CPT

## 2018-03-20 PROCEDURE — 94799 UNLISTED PULMONARY SVC/PX: CPT

## 2018-03-20 RX ORDER — FENTANYL 75 UG/H
1 PATCH TRANSDERMAL
Qty: 2 PATCH | Refills: 0 | Status: SHIPPED | OUTPATIENT
Start: 2018-03-20 | End: 2018-04-13 | Stop reason: SDUPTHER

## 2018-03-20 RX ORDER — FAMOTIDINE 20 MG/1
20 TABLET, FILM COATED ORAL 2 TIMES DAILY PRN
Start: 2018-03-20 | End: 2018-04-24

## 2018-03-20 RX ORDER — BACLOFEN 10 MG/1
10 TABLET ORAL 2 TIMES DAILY PRN
Start: 2018-03-20 | End: 2018-06-22 | Stop reason: SDUPTHER

## 2018-03-20 RX ORDER — ONDANSETRON 4 MG/1
4 TABLET, FILM COATED ORAL EVERY 6 HOURS PRN
Start: 2018-03-20 | End: 2018-07-17

## 2018-03-20 RX ORDER — NICOTINE POLACRILEX 4 MG
15 LOZENGE BUCCAL
Start: 2018-03-20 | End: 2018-07-17

## 2018-03-20 RX ORDER — BISACODYL 5 MG/1
5 TABLET, DELAYED RELEASE ORAL DAILY PRN
Start: 2018-03-20 | End: 2018-07-17

## 2018-03-20 RX ORDER — POTASSIUM CHLORIDE 750 MG/1
20 CAPSULE, EXTENDED RELEASE ORAL DAILY
Start: 2018-03-21 | End: 2018-07-17

## 2018-03-20 RX ORDER — DEXTROSE MONOHYDRATE 25 G/50ML
25 INJECTION, SOLUTION INTRAVENOUS
Start: 2018-03-20 | End: 2018-07-17

## 2018-03-20 RX ORDER — POLYETHYLENE GLYCOL 3350 17 G/17G
17 POWDER, FOR SOLUTION ORAL DAILY PRN
Start: 2018-03-20 | End: 2018-07-17

## 2018-03-20 RX ORDER — PANTOPRAZOLE SODIUM 40 MG/1
40 TABLET, DELAYED RELEASE ORAL DAILY
Start: 2018-03-20 | End: 2018-04-24

## 2018-03-20 RX ORDER — OXYCODONE AND ACETAMINOPHEN 10; 325 MG/1; MG/1
1 TABLET ORAL EVERY 6 HOURS PRN
Qty: 8 TABLET | Refills: 0 | Status: SHIPPED | OUTPATIENT
Start: 2018-03-20 | End: 2018-04-13 | Stop reason: DRUGHIGH

## 2018-03-20 RX ORDER — CLONAZEPAM 0.5 MG/1
0.25 TABLET ORAL 2 TIMES DAILY PRN
Qty: 8 TABLET | Refills: 0 | Status: SHIPPED | OUTPATIENT
Start: 2018-03-20 | End: 2018-04-17 | Stop reason: SDUPTHER

## 2018-03-20 RX ADMIN — CLOPIDOGREL BISULFATE 75 MG: 75 TABLET ORAL at 08:12

## 2018-03-20 RX ADMIN — PIPERACILLIN SODIUM,TAZOBACTAM SODIUM 3.38 G: 3; .375 INJECTION, POWDER, FOR SOLUTION INTRAVENOUS at 10:33

## 2018-03-20 RX ADMIN — CARVEDILOL 6.25 MG: 6.25 TABLET, FILM COATED ORAL at 08:11

## 2018-03-20 RX ADMIN — FLUTICASONE PROPIONATE 1 SPRAY: 50 SPRAY, METERED NASAL at 08:13

## 2018-03-20 RX ADMIN — PREGABALIN 100 MG: 100 CAPSULE ORAL at 05:28

## 2018-03-20 RX ADMIN — PANTOPRAZOLE SODIUM 40 MG: 40 TABLET, DELAYED RELEASE ORAL at 05:28

## 2018-03-20 RX ADMIN — POTASSIUM CHLORIDE 20 MEQ: 750 CAPSULE, EXTENDED RELEASE ORAL at 08:12

## 2018-03-20 RX ADMIN — SACUBITRIL AND VALSARTAN 1 TABLET: 24; 26 TABLET, FILM COATED ORAL at 08:11

## 2018-03-20 RX ADMIN — Medication 1 CAPSULE: at 08:12

## 2018-03-20 RX ADMIN — VANCOMYCIN HYDROCHLORIDE 750 MG: 750 INJECTION, SOLUTION INTRAVENOUS at 12:04

## 2018-03-20 RX ADMIN — FUROSEMIDE 40 MG: 40 TABLET ORAL at 08:10

## 2018-03-20 RX ADMIN — ASPIRIN 81 MG: 81 TABLET, COATED ORAL at 08:10

## 2018-03-20 RX ADMIN — BUSPIRONE HYDROCHLORIDE 7.5 MG: 15 TABLET ORAL at 08:11

## 2018-03-20 RX ADMIN — PIPERACILLIN SODIUM,TAZOBACTAM SODIUM 3.38 G: 3; .375 INJECTION, POWDER, FOR SOLUTION INTRAVENOUS at 01:42

## 2018-03-20 RX ADMIN — OXYCODONE HYDROCHLORIDE AND ACETAMINOPHEN 1 TABLET: 10; 325 TABLET ORAL at 08:27

## 2018-03-20 RX ADMIN — LEVOTHYROXINE SODIUM 112 MCG: 112 TABLET ORAL at 05:28

## 2018-03-20 RX ADMIN — NYSTATIN: 100000 POWDER TOPICAL at 08:13

## 2018-03-20 RX ADMIN — HEPARIN SODIUM 5000 UNITS: 5000 INJECTION, SOLUTION INTRAVENOUS; SUBCUTANEOUS at 08:12

## 2018-03-20 RX ADMIN — CLOTRIMAZOLE 10 MG: 10 LOZENGE ORAL at 08:11

## 2018-03-20 RX ADMIN — PREGABALIN 100 MG: 100 CAPSULE ORAL at 13:06

## 2018-03-20 NOTE — DISCHARGE SUMMARY
[facility]  DISCHARGE SUMMARY    Patient Name: Tamara Langston  : 1955  MRN: [id number]    Date of Admission: 3/8/2018  Date of Discharge:  3/20/2018  Primary Care Physician: Harinder Aranda MD    Consults     Date and Time Order Name Status Description    3/8/2018 2314 Inpatient Consult to Infectious Diseases Completed     2018 2256 Inpatient Consult to Cardiology Completed     2018 2241 Inpatient Consult to Infectious Diseases Completed         Hospital Course     Presenting Problem:   Altered mental status [R41.82]    Active Hospital Problems (** Indicates Principal Problem)    Diagnosis Date Noted   • **Sepsis [A41.9] 2018   • Infected decubitus ulcer [L89.90, L08.9] 2018     Priority: High   • Infected sacral decubitus ulcers [T14.8XXA, L08.9] 2018     Priority: High   • Chronic systolic heart failure [I50.22] 2018   • Chronic respiratory failure with hypoxia [J96.11] 2018   • Weakness [R53.1] 2018   • Takotsubo cardiomyopathy [I51.81] 02/15/2018   • Coronary artery disease involving native heart [I25.10] 02/15/2018   • Metabolic encephalopathy [G93.41] 2018   • Peripheral vascular disease [I73.9] 12/15/2016   • Type 2 diabetes mellitus [E11.9] 2016   • Hypertension [I10] 2016   • Obstructive sleep apnea syndrome [G47.33]       Resolved Hospital Problems    Diagnosis Date Noted Date Resolved   No resolved problems to display.          Hospital Course:  Tamara Langston is a 62 y.o. female with hx of decubitus ulcer,cad, systolic HF, DINA, T2DM, ckd (baseline cr ~1.3-1.5) who presented withconfusion and fever with concern for persistent decub infection.  Patient on chronic opioids for chronic pain and chronic benzodiazepines for anxiety.   Infectious disease was consulted. Ultimately wound grew pseudomonas (scant growth) and coagulase negative staph from the sacral wound. Patient's  mentation normalized. Patient now is being discharged to [facility] for wound care and continued vancomycin & zosyn antibiotics, Dr. Zabrina Cruz (of infectious disease) to follow while at [facility] and determine duration of treatment.   Of note baseline creatinine seems to run between 1.3-1.5. Last creatinine 1.5. Recommend monitoring renal function with repeat bmp in 2-3 days, will defer to accepting physician.   Of note, have weaned down to klonopin 0.25 bid prn and recommend continued weaning off of this medicine in future. Also recommend weaning opioids as patient tolerates.         Day of Discharge     HPI:   Infected sacral decubus ulcers    Review of Systems  No dysuria, no dyspnea currently    Otherwise ROS is negative except as mentioned in the HPI.    Vital Signs:   Temp:  [97.2 °F (36.2 °C)-98.4 °F (36.9 °C)] 98 °F (36.7 °C)  Heart Rate:  [61-72] 70  Resp:  [16-20] 16  BP: (118-137)/(56-64) 119/58  FiO2 (%):  [30 %] 30 %     Physical Exam:  Constitutional: chronically ill appearing, in no acute distress, awake, alert, lying in bed  HENT: NCAT, mucous membranes moist  Respiratory: Clear to auscultation bilaterally, respiratory effort normal   Cardiovascular: RRR, no murmurs, rubs, or gallops  Gastrointestinal: obese, positive bowel sounds, soft, nontender, nondistended  Musculoskeletal: bilateral LE edema.  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  Feet in boots.  Psychiatric: Appropriate affect, cooperative  Neurologic: Oriented x 3, no focal deficits  Skin: did not examine decub due to positioning  Exam remains unchanged from previous    Pertinent  and/or Most Recent Results       Results from last 7 days  Lab Units 03/19/18  0850 03/18/18  0617 03/17/18  0356 03/14/18  0353   WBC 10*3/mm3  --  8.80  --  8.89   HEMOGLOBIN g/dL  --  10.5*  --  10.7*   HEMATOCRIT %  --  33.5*  --  34.5   PLATELETS 10*3/mm3  --  216  --  221   SODIUM mmol/L 144 140 140 144   POTASSIUM mmol/L 4.2 4.0 4.1 4.0   CHLORIDE mmol/L 105 103 102 103   CO2 mmol/L  28.0 30.0 30.0 32.0*   BUN mg/dL 34* 31* 34* 20   CREATININE mg/dL 1.50* 1.40* 1.40* 1.30   GLUCOSE mg/dL 222* 155* 146* 158*   CALCIUM mg/dL 9.3 9.2 8.9 9.6       Results from last 7 days  Lab Units 03/18/18  0617 03/14/18  0353   BILIRUBIN mg/dL 0.2* 0.2*   ALK PHOS U/L 95 87   ALT (SGPT) U/L 32 28   AST (SGOT) U/L 47* 31           Invalid input(s): TG, LDLCALC, LDLREALC      Brief Urine Lab Results  (Last result in the past 365 days)      Color   Clarity   Blood   Leuk Est   Nitrite   Protein   CREAT   Urine HCG        03/08/18 2002 Yellow Clear Negative Trace(A) Negative 30 mg/dL (1+)(A)               Microbiology Results Abnormal     Procedure Component Value - Date/Time    Wound Culture - Wound, Back, Lower [[id number]]  (Abnormal)  (Susceptibility) Collected:  03/14/18 2143    Lab Status:  Final result Specimen:  Wound from Back, Lower Updated:  03/18/18 0937     Wound Culture --      Scant growth (1+) Pseudomonas aeruginosa (A)      Scant growth (1+) Staphylococcus, coagulase negative (A)     Comment: Susceptibility will not be done unless Doctor notifies Lab          Gram Stain Result Moderate (3+) WBCs seen      No organisms seen    Susceptibility      Pseudomonas aeruginosa     ASHWINI     Aztreonam >16 ug/ml Resistant     Cefepime <=8 ug/ml Susceptible     Ceftazidime 8 ug/ml Susceptible     Gentamicin <=4 ug/ml Susceptible     Levofloxacin <=2 ug/ml Susceptible     Meropenem 2 ug/ml Susceptible     Piperacillin + Tazobactam <=16 ug/ml Susceptible     Tobramycin <=4 ug/ml Susceptible                    Blood Culture - Blood, [[id number]]  (Normal) Collected:  03/08/18 1930    Lab Status:  Final result Specimen:  Blood from Arm, Right Updated:  03/13/18 2101     Blood Culture No growth at 5 days    Blood Culture - Blood, [[id number]]  (Normal) Collected:  03/08/18 1935    Lab Status:  Final result Specimen:  Blood from Arm, Right Updated:  03/13/18 2101     Blood Culture No growth at 5 days     Gram Stain Result  Few (2+) WBCs seen    Urine Culture - Urine, Urine, Clean Catch [[id number]] Collected:  03/08/18 2002    Lab Status:  Final result Specimen:  Urine from Urine, Clean Catch Updated:  03/10/18 1110     Urine Culture --      10,000-20,000 CFU/mL Normal Urogenital Patience    Clostridium Difficile Toxin - Stool, Per Rectum [[id number]] Collected:  03/09/18 1027    Lab Status:  Final result Specimen:  Stool from Per Rectum Updated:  03/09/18 1203    Narrative:       The following orders were created for panel order Clostridium Difficile Toxin - Stool, Per Rectum.  Procedure                               Abnormality         Status                     ---------                               -----------         ------                     Clostridium Difficile To...[[id number]]  Normal              Final result                 Please view results for these tests on the individual orders.    Clostridium Difficile Toxin, PCR - Stool, Per Rectum [[id number]]  (Normal) Collected:  03/09/18 1027    Lab Status:  Final result Specimen:  Stool from Per Rectum Updated:  03/09/18 1203     C. Difficile Toxins by PCR Not Detected    Narrative:         Performance characteristics of test not established for patients <2 years of age.  Negative for Toxigenic C. Difficile          Imaging Results (all)     Procedure Component Value Units Date/Time    CT Head Without Contrast [[id number]] Collected:  03/08/18 2116     Updated:  03/08/18 2251    Narrative:       EXAM:    CT Head Without Intravenous Contrast    CLINICAL HISTORY:    62 years old, female; Pressure ulcer of sacral region, unspecified stage;   Transient alteration of awareness; Fever, unspecified; Signs and symptoms;   Other: Patient found unresponsive; Additional info: Pt found unresponsive    TECHNIQUE:    Axial computed tomography images of the head/brain without intravenous   contrast.  All CT scans at this facility use one or more dose reduction   techniques, viz.: automated  exposure control; ma/kV adjustment per patient size   (including targeted exams where dose is matched to indication; i.e. head); or   iterative reconstruction technique.    COMPARISON:    No relevant prior studies available.    FINDINGS:    Brain:  Nonspecific calcification left occipital lobe.  No hemorrhage.  No   significant white matter disease.    Ventricles:  Unremarkable.  No ventriculomegaly.    Bones/joints:  Unremarkable.  No acute fracture.    Soft tissues:  Unremarkable.    Sinuses:  Mild sphenoid sinusitis.    Mastoid air cells:  Unremarkable as visualized.      Impression:         No acute findings.    THIS DOCUMENT HAS BEEN ELECTRONICALLY SIGNED BY NOEL LYLE MD    XR Chest 1 View [[id number]] Collected:  03/08/18 1813     Updated:  03/08/18 1912    Narrative:       EXAM:    XR Chest, 1 View    CLINICAL HISTORY:    62 years old, female; Signs and symptoms; Other: Weak/dizzy/ams; Additional   info: Weak/dizzy/ams triage protocol    TECHNIQUE:    Frontal view of the chest.    COMPARISON:    CR - XR CHEST 1 VW 2018-02-19 04:36    FINDINGS:    Lungs:  Unremarkable.  No consolidation.    Pleural space:  Unremarkable.  No pneumothorax.    Heart:  The heart demonstrates mild diffuse enlargement.    Mediastinum:  Unremarkable.    Bones/joints:  Unremarkable.    Other findings:  The patient is rotated to the left.      Impression:         No acute findings.    THIS DOCUMENT HAS BEEN ELECTRONICALLY SIGNED BY NOEL LYLE MD                    Results for orders placed during the hospital encounter of 02/13/18   Adult Transthoracic Echo Limited W/ Cont if Necessary Per Protocol    Narrative · This was a limited echocardiogram performed to evaluate the left   ventricular ejection fraction  · Left ventricular systolic function is normal. Estimated EF appears to be   in the range of 51 - 55%.  · The following left ventricular wall segments are hypokinetic: mid   anterior, apical anterior, apical lateral,  apical inferior, apical septal   and apex hypokinetic. The basal segments are hyperdynamic.  · When compared to the prior echocardiogram performed on February 14, 2018, the hypokinetic apical segments have mildly improved. The overall   ejection fraction has improved.            Discharge Details      Tamara Langston   Home Medication Instructions ADRIANA:[id number]    Printed on:03/20/18 1100   Medication Information                      acetaminophen (TYLENOL) 325 MG tablet  Take 2 tablets by mouth Every 4 (Four) Hours As Needed for mild pain (1-3) or fever (temperature greater than 101F).             albuterol (PROVENTIL) (2.5 MG/3ML) 0.083% nebulizer solution  Take 2.5 mg by nebulization Every 6 (Six) Hours As Needed.             aspirin 81 MG EC tablet  Take 1 tablet by mouth Daily.             atorvastatin (LIPITOR) 80 MG tablet  Take 1 tablet by mouth Every Night.             baclofen (LIORESAL) 10 MG tablet  Take 1 tablet by mouth 2 (Two) Times a Day As Needed for Muscle Spasms.             bisacodyl (DULCOLAX) 5 MG EC tablet  Take 1 tablet by mouth Daily As Needed for Constipation.             busPIRone (BUSPAR) 7.5 MG tablet  Take 1 tablet by mouth Every 12 (Twelve) Hours.             carvedilol (COREG) 6.25 MG tablet  Take 1 tablet by mouth Every 12 (Twelve) Hours.             cetirizine (ZyrTEC) 10 MG tablet  Take 10 mg by mouth Every Night.             clonazePAM (KlonoPIN) 0.5 MG tablet  Take 0.5 tablets by mouth 2 (Two) Times a Day As Needed for Anxiety.             clopidogrel (PLAVIX) 75 MG tablet  Take 1 tablet by mouth Daily.             clotrimazole (MYCELEX) 10 MG lesa  Take 1 tablet by mouth 3 (Three) Times a Day.             Cyanocobalamin 1000 MCG/ML kit  Inject 1,000 mcg as directed Every 30 (Thirty) Days.             dextrose (D50W) 50 % solution  Infuse 50 mL into a venous catheter Every 15 (Fifteen) Minutes As Needed for Low Blood Sugar (Blood Sugar Less Than 70).              dextrose (GLUTOSE) 40 % gel  Take 15 g by mouth Every 15 (Fifteen) Minutes As Needed for Low Blood Sugar (Blood sugar less than 70).             ezetimibe (ZETIA) 10 MG tablet  Take 1 tablet by mouth Daily.             famotidine (PEPCID) 20 MG tablet  Take 1 tablet by mouth 2 (Two) Times a Day As Needed for Indigestion or Heartburn.             fentaNYL (DURAGESIC) 75 MCG/HR patch  Place 1 patch on the skin Every Other Day.             fluticasone (FLONASE) 50 MCG/ACT nasal spray  1 spray into each nostril 2 (Two) Times a Day.             furosemide (LASIX) 40 MG tablet  Take 1 tablet by mouth Daily.             glucagon, human recombinant, (GLUCAGEN DIAGNOSTIC) 1 MG injection  Inject 1 mg under the skin As Needed (Blood Glucose Less Than 70).             insulin lispro (humaLOG) 100 UNIT/ML injection  Inject 0-9 Units under the skin 4 (Four) Times a Day With Meals & at Bedtime.             levothyroxine (SYNTHROID, LEVOTHROID) 112 MCG tablet  TAKE ONE TABLET BY MOUTH DAILY             lidocaine (LIDODERM) 5 %  Place 3 patches on the skin Daily As Needed for Mild Pain . Remove & Discard patch within 12 hours or as directed by MD             melatonin 5 MG sublingual tablet sublingual tablet  Place 1 tablet under the tongue At Night As Needed (insomnia).             nystatin (MYCOSTATIN) 270602 UNIT/GM powder  Under breasts, ABD folds, and groin fields every 12 hours             ondansetron (ZOFRAN) 4 MG tablet  Take 1 tablet by mouth Every 6 (Six) Hours As Needed for Nausea or Vomiting.             oxyCODONE-acetaminophen (PERCOCET)  MG per tablet  Take 1 tablet by mouth Every 6 (Six) Hours As Needed for Moderate Pain  or Severe Pain  for up to 12 doses.  HOLD FOR SEDATION           pantoprazole (PROTONIX) 40 MG EC tablet  Take 1 tablet by mouth Daily.             piperacillin-tazobactam (ZOSYN) 3.375 g/100 mL 0.9% NS IVPB (mbp)  Infuse 100 mL into a venous catheter Every 8 (Eight) Hours for 28 doses.              polyethylene glycol (MIRALAX) packet  Take 17 g by mouth Daily As Needed (CONSTIPATION).             potassium chloride (MICRO-K) 10 MEQ CR capsule  Take 2 capsules by mouth Daily.             pregabalin (LYRICA) 100 MG capsule  Take 1 capsule by mouth Every 8 (Eight) Hours.             probiotic (CULTURELLE) capsule capsule  Take 1 capsule by mouth Daily.             sacubitril-valsartan (ENTRESTO) 24-26 MG tablet  Take 1 tablet by mouth Every 12 (Twelve) Hours.             sennosides-docusate sodium (SENOKOT-S) 8.6-50 MG tablet  Take 2 tablets by mouth 2 (Two) Times a Day.             vancomycin in dextrose 5% 150 mL (VANCOCIN) 750-5 MG/150ML-% IVPB  Infuse 150 mL into a venous catheter Daily for 13 doses. 750mg iv q24h                   Discharge Disposition:  Rehab Facility or Unit (DC - External)    Discharge Diet:  -CARDIAC/DIABETIC    Discharge Activity:   -AD MULUGETA    Special Instructions:  -WOUND CARE FOR DECUBITI ULCERS    -RECOMMEND RECHECK RENAL FUNCTION/BMP IN 2-3 DAYS    Future Appointments  Date Time Provider Department Center   4/23/2018 3:00 PM Lane Zamorano MD [facility] None           Time Spent on Discharge:  40 min spent on discharge    Electronically signed by Adalid Monet MD, 03/20/18, 11:00 AM.

## 2018-03-20 NOTE — PROGRESS NOTES
Continued Stay Note  [facility]     Patient Name: Tamara Langston  MRN: [id number]  Today's Date: 3/20/2018    Admit Date: 3/8/2018          Discharge Plan     Row Name 03/20/18 0910       Plan    Plan update    Patient/Family in Agreement with Plan yes    Plan Comments VM saima Welch with [facility] at [place], she is still awaiting call back from [employer] nurse reviewer with confirmation and auth # of the appeal being overturned.               Discharge Codes    No documentation.       Expected Discharge Date and Time     Expected Discharge Date Expected Discharge Time    Mar 23, 2018             Radha Marcus RN

## 2018-03-20 NOTE — PROGRESS NOTES
Continued Stay Note  [facility]     Patient Name: Tamara Lansgton  MRN: [id number]  Today's Date: 3/20/2018    Admit Date: 3/8/2018          Discharge Plan     Row Name 03/20/18 1230       Plan    Plan [facility] at [place]    Patient/Family in Agreement with Plan yes    Plan Comments Authorization obtained from [employer]. Per [facility] patient can transfer today. Nurse to call report to [phone]. CM has faxed d/c summary to [phone].    Final Discharge Disposition Code 63 - [facility]    Row Name 03/20/18 1041       Plan    Plan update    Patient/Family in Agreement with Plan yes    Plan Comments Called [facility] and cancelled EMS transport for today,  states he will transport. Received call from Elaina with [employer] and she confirms appeal was overturned and Mrs. Langston can proceed to [facility]. [facility] has still yet to receive confirmation or auth #. Awaiting final confirmation from [facility].               Discharge Codes    No documentation.       Expected Discharge Date and Time     Expected Discharge Date Expected Discharge Time    Mar 20, 2018             Radha Marcus RN

## 2018-03-20 NOTE — PROGRESS NOTES
Continued Stay Note  [facility]     Patient Name: Tamara Langston  MRN: [id number]  Today's Date: 3/20/2018    Admit Date: 3/8/2018          Discharge Plan     Row Name 03/20/18 1041       Plan    Plan update    Patient/Family in Agreement with Plan yes    Plan Comments Called [facility] and cancelled EMS transport for today,  states he will transport. Received call from Elaina with [employer] and she confirms appeal was overturned and Mrs. Langston can proceed to [facility]. [facility] has still yet to receive confirmation or auth #. Awaiting final confirmation from [facility].     Row Name 03/20/18 0910       Plan    Plan update    Patient/Family in Agreement with Plan yes    Plan Comments VM from Rebekah with [facility] at [place], she is still awaiting call back from [employer] nurse reviewer with confirmation and auth # of the appeal being overturned.               Discharge Codes    No documentation.       Expected Discharge Date and Time     Expected Discharge Date Expected Discharge Time    Mar 23, 2018             Radha Marcus RN

## 2018-03-20 NOTE — DISCHARGE PLACEMENT REQUEST
"John Langston (62 y.o. Female)     Date of Birth Social Security Number Address Home Phone MRN    1955  [id number] [id number] [phone] [id number]    Mu-ism Marital Status          Cheondoism        Admission Date Admission Type Admitting Provider Attending Provider Department, Room/Bed    3/8/18 Emergency Adalid Monet MD West, Christopher R, MD [facility] 4H, S474/1    Discharge Date Discharge Disposition Discharge Destination         Rehab Facility or Unit (DC - External)              Attending Provider:  Adalid Monet MD    Allergies:  Cortisone, Oxycontin [Oxycodone], Tolmetin, Vancomycin    Isolation:  None   Infection:  None   Code Status:  Conditional    Ht:  167.6 cm (66\")   Wt:  110 kg (242 lb 11.6 oz)    Admission Cmt:  None   Principal Problem:  Sepsis [A41.9]                 Active Insurance as of 3/8/2018     Primary Coverage     Payor Plan Insurance Group Employer/Plan Group    HUMANA MEDICARE REPLACEMENT HUMANA MEDICARE REPL [id number]     Payor Plan Address Payor Plan Phone Number Effective From Effective To    [address] [address] [phone] 3/1/2013     [place] [address]       Subscriber Name Subscriber Birth Date Member ID       JOHN LANGSTON 1955 [id number]                 Emergency Contacts      (Rel.) Home Phone Work Phone Mobile Phone    Sukhdev Langston (Spouse) [phone] -- [phone]               Discharge Summary      Adalid Monet MD at 3/20/2018 11:00 AM              [facility] Medicine Services  DISCHARGE SUMMARY    Patient Name: John Langston  : 1955  MRN: [id number]    Date of Admission: 3/8/2018  Date of Discharge:  3/20/2018  Primary Care Physician: Harinder Aranda MD    Consults     Date and Time Order Name Status Description    3/8/2018 2314 Inpatient Consult to Infectious Diseases Completed     2018 2256 Inpatient Consult " to Cardiology Completed     2/13/2018 2241 Inpatient Consult to Infectious Diseases Completed         Hospital Course     Presenting Problem:   Altered mental status [R41.82]    Active Hospital Problems (** Indicates Principal Problem)    Diagnosis Date Noted   • **Sepsis [A41.9] 02/13/2018   • Infected decubitus ulcer [L89.90, L08.9] 03/20/2018     Priority: High   • Infected sacral decubitus ulcers [T14.8XXA, L08.9] 03/08/2018     Priority: High   • Chronic systolic heart failure [I50.22] 03/08/2018   • Chronic respiratory failure with hypoxia [J96.11] 03/08/2018   • Weakness [R53.1] 03/08/2018   • Takotsubo cardiomyopathy [I51.81] 02/15/2018   • Coronary artery disease involving native heart [I25.10] 02/15/2018   • Metabolic encephalopathy [G93.41] 02/13/2018   • Peripheral vascular disease [I73.9] 12/15/2016   • Type 2 diabetes mellitus [E11.9] 05/11/2016   • Hypertension [I10] 05/11/2016   • Obstructive sleep apnea syndrome [G47.33]       Resolved Hospital Problems    Diagnosis Date Noted Date Resolved   No resolved problems to display.          Hospital Course:  Tamara Langston is a 62 y.o. female with hx of decubitus ulcer,cad, systolic HF, DINA, T2DM, ckd (baseline cr ~1.3-1.5)  who presented withconfusion and fever with concern for persistent decub infection.   Patient on chronic opioids for chronic pain and chronic benzodiazepines for anxiety.   Infectious disease was consulted. Ultimately wound grew pseudomonas (scant growth) and coagulase negative staph from the sacral wound. Patient's mentation normalized. Patient now is being discharged to [facility] for wound care and continued vancomycin & zosyn antibiotics, Dr. Zabrina Cruz (of infectious disease) to follow while at [facility] and determine duration of treatment.   Of note baseline creatinine seems to run between 1.3-1.5. Last creatinine 1.5. Recommend monitoring renal function with repeat bmp in 2-3 days, will defer to accepting physician.   Of note, have  weaned down to klonopin 0.25 bid prn and recommend continued weaning off of this medicine in future. Also recommend weaning opioids as patient tolerates.         Day of Discharge     HPI:   Infected sacral decubus ulcers    Review of Systems  No dysuria, no dyspnea currently    Otherwise ROS is negative except as mentioned in the HPI.    Vital Signs:   Temp:  [97.2 °F (36.2 °C)-98.4 °F (36.9 °C)] 98 °F (36.7 °C)  Heart Rate:  [61-72] 70  Resp:  [16-20] 16  BP: (118-137)/(56-64) 119/58  FiO2 (%):  [30 %] 30 %     Physical Exam:  Constitutional: chronically ill appearing, in no acute distress, awake, alert, lying in bed  HENT: NCAT, mucous membranes moist  Respiratory: Clear to auscultation bilaterally, respiratory effort normal   Cardiovascular: RRR, no murmurs, rubs, or gallops  Gastrointestinal: obese, positive bowel sounds, soft, nontender, nondistended  Musculoskeletal: bilateral LE edema.  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  Feet in boots.  Psychiatric: Appropriate affect, cooperative  Neurologic: Oriented x 3, no focal deficits  Skin: did not examine decub due to positioning  Exam remains unchanged from previous    Pertinent  and/or Most Recent Results       Results from last 7 days  Lab Units 03/19/18  0850 03/18/18  0617 03/17/18  0356 03/14/18  0353   WBC 10*3/mm3  --  8.80  --  8.89   HEMOGLOBIN g/dL  --  10.5*  --  10.7*   HEMATOCRIT %  --  33.5*  --  34.5   PLATELETS 10*3/mm3  --  216  --  221   SODIUM mmol/L 144 140 140 144   POTASSIUM mmol/L 4.2 4.0 4.1 4.0   CHLORIDE mmol/L 105 103 102 103   CO2 mmol/L 28.0 30.0 30.0 32.0*   BUN mg/dL 34* 31* 34* 20   CREATININE mg/dL 1.50* 1.40* 1.40* 1.30   GLUCOSE mg/dL 222* 155* 146* 158*   CALCIUM mg/dL 9.3 9.2 8.9 9.6       Results from last 7 days  Lab Units 03/18/18  0617 03/14/18  0353   BILIRUBIN mg/dL 0.2* 0.2*   ALK PHOS U/L 95 87   ALT (SGPT) U/L 32 28   AST (SGOT) U/L 47* 31           Invalid input(s): TG, LDLCALC, LDLREALC      Brief Urine Lab Results  (Last  result in the past 365 days)      Color   Clarity   Blood   Leuk Est   Nitrite   Protein   CREAT   Urine HCG        03/08/18 2002 Yellow Clear Negative Trace(A) Negative 30 mg/dL (1+)(A)               Microbiology Results Abnormal     Procedure Component Value - Date/Time    Wound Culture - Wound, Back, Lower [[id number]]  (Abnormal)  (Susceptibility) Collected:  03/14/18 2149    Lab Status:  Final result Specimen:  Wound from Back, Lower Updated:  03/18/18 0937     Wound Culture --      Scant growth (1+) Pseudomonas aeruginosa (A)      Scant growth (1+) Staphylococcus, coagulase negative (A)     Comment: Susceptibility will not be done unless Doctor notifies Lab          Gram Stain Result Moderate (3+) WBCs seen      No organisms seen    Susceptibility      Pseudomonas aeruginosa     ASHWINI     Aztreonam >16 ug/ml Resistant     Cefepime <=8 ug/ml Susceptible     Ceftazidime 8 ug/ml Susceptible     Gentamicin <=4 ug/ml Susceptible     Levofloxacin <=2 ug/ml Susceptible     Meropenem 2 ug/ml Susceptible     Piperacillin + Tazobactam <=16 ug/ml Susceptible     Tobramycin <=4 ug/ml Susceptible                    Blood Culture - Blood, [[id number]]  (Normal) Collected:  03/08/18 1930    Lab Status:  Final result Specimen:  Blood from Arm, Right Updated:  03/13/18 2101     Blood Culture No growth at 5 days    Blood Culture - Blood, [[id number]]  (Normal) Collected:  03/08/18 1935    Lab Status:  Final result Specimen:  Blood from Arm, Right Updated:  03/13/18 2101     Blood Culture No growth at 5 days     Gram Stain Result Few (2+) WBCs seen    Urine Culture - Urine, Urine, Clean Catch [[id number]] Collected:  03/08/18 2002    Lab Status:  Final result Specimen:  Urine from Urine, Clean Catch Updated:  03/10/18 1110     Urine Culture --      10,000-20,000 CFU/mL Normal Urogenital Patience    Clostridium Difficile Toxin - Stool, Per Rectum [[id number]] Collected:  03/09/18 1027    Lab Status:  Final result Specimen:  Stool from Per  Rectum Updated:  03/09/18 1203    Narrative:       The following orders were created for panel order Clostridium Difficile Toxin - Stool, Per Rectum.  Procedure                               Abnormality         Status                     ---------                               -----------         ------                     Clostridium Difficile To...[[id number]]  Normal              Final result                 Please view results for these tests on the individual orders.    Clostridium Difficile Toxin, PCR - Stool, Per Rectum [[id number]]  (Normal) Collected:  03/09/18 1027    Lab Status:  Final result Specimen:  Stool from Per Rectum Updated:  03/09/18 1203     C. Difficile Toxins by PCR Not Detected    Narrative:         Performance characteristics of test not established for patients <2 years of age.  Negative for Toxigenic C. Difficile          Imaging Results (all)     Procedure Component Value Units Date/Time    CT Head Without Contrast [[id number]] Collected:  03/08/18 2116     Updated:  03/08/18 2251    Narrative:       EXAM:    CT Head Without Intravenous Contrast    CLINICAL HISTORY:    62 years old, female; Pressure ulcer of sacral region, unspecified stage;   Transient alteration of awareness; Fever, unspecified; Signs and symptoms;   Other: Patient found unresponsive; Additional info: Pt found unresponsive    TECHNIQUE:    Axial computed tomography images of the head/brain without intravenous   contrast.  All CT scans at this facility use one or more dose reduction   techniques, viz.: automated exposure control; ma/kV adjustment per patient size   (including targeted exams where dose is matched to indication; i.e. head); or   iterative reconstruction technique.    COMPARISON:    No relevant prior studies available.    FINDINGS:    Brain:  Nonspecific calcification left occipital lobe.  No hemorrhage.  No   significant white matter disease.    Ventricles:  Unremarkable.  No ventriculomegaly.     Bones/joints:  Unremarkable.  No acute fracture.    Soft tissues:  Unremarkable.    Sinuses:  Mild sphenoid sinusitis.    Mastoid air cells:  Unremarkable as visualized.      Impression:         No acute findings.    THIS DOCUMENT HAS BEEN ELECTRONICALLY SIGNED BY NOEL LYLE MD    XR Chest 1 View [[id number]] Collected:  03/08/18 1813     Updated:  03/08/18 1912    Narrative:       EXAM:    XR Chest, 1 View    CLINICAL HISTORY:    62 years old, female; Signs and symptoms; Other: Weak/dizzy/ams; Additional   info: Weak/dizzy/ams triage protocol    TECHNIQUE:    Frontal view of the chest.    COMPARISON:    CR - XR CHEST 1 VW 2018-02-19 04:36    FINDINGS:    Lungs:  Unremarkable.  No consolidation.    Pleural space:  Unremarkable.  No pneumothorax.    Heart:  The heart demonstrates mild diffuse enlargement.    Mediastinum:  Unremarkable.    Bones/joints:  Unremarkable.    Other findings:  The patient is rotated to the left.      Impression:         No acute findings.    THIS DOCUMENT HAS BEEN ELECTRONICALLY SIGNED BY NOEL LYLE MD                    Results for orders placed during the hospital encounter of 02/13/18   Adult Transthoracic Echo Limited W/ Cont if Necessary Per Protocol    Narrative · This was a limited echocardiogram performed to evaluate the left   ventricular ejection fraction  · Left ventricular systolic function is normal. Estimated EF appears to be   in the range of 51 - 55%.  · The following left ventricular wall segments are hypokinetic: mid   anterior, apical anterior, apical lateral, apical inferior, apical septal   and apex hypokinetic. The basal segments are hyperdynamic.  · When compared to the prior echocardiogram performed on February 14, 2018, the hypokinetic apical segments have mildly improved. The overall   ejection fraction has improved.            Discharge Details      Tamara Langston   Home Medication Instructions ADRIANA:[id number]    Printed on:03/20/18 1100    Medication Information                      acetaminophen (TYLENOL) 325 MG tablet  Take 2 tablets by mouth Every 4 (Four) Hours As Needed for mild pain (1-3) or fever (temperature greater than 101F).             albuterol (PROVENTIL) (2.5 MG/3ML) 0.083% nebulizer solution  Take 2.5 mg by nebulization Every 6 (Six) Hours As Needed.             aspirin 81 MG EC tablet  Take 1 tablet by mouth Daily.             atorvastatin (LIPITOR) 80 MG tablet  Take 1 tablet by mouth Every Night.             baclofen (LIORESAL) 10 MG tablet  Take 1 tablet by mouth 2 (Two) Times a Day As Needed for Muscle Spasms.             bisacodyl (DULCOLAX) 5 MG EC tablet  Take 1 tablet by mouth Daily As Needed for Constipation.             busPIRone (BUSPAR) 7.5 MG tablet  Take 1 tablet by mouth Every 12 (Twelve) Hours.             carvedilol (COREG) 6.25 MG tablet  Take 1 tablet by mouth Every 12 (Twelve) Hours.             cetirizine (ZyrTEC) 10 MG tablet  Take 10 mg by mouth Every Night.             clonazePAM (KlonoPIN) 0.5 MG tablet  Take 0.5 tablets by mouth 2 (Two) Times a Day As Needed for Anxiety.             clopidogrel (PLAVIX) 75 MG tablet  Take 1 tablet by mouth Daily.             clotrimazole (MYCELEX) 10 MG lesa  Take 1 tablet by mouth 3 (Three) Times a Day.             Cyanocobalamin 1000 MCG/ML kit  Inject 1,000 mcg as directed Every 30 (Thirty) Days.             dextrose (D50W) 50 % solution  Infuse 50 mL into a venous catheter Every 15 (Fifteen) Minutes As Needed for Low Blood Sugar (Blood Sugar Less Than 70).             dextrose (GLUTOSE) 40 % gel  Take 15 g by mouth Every 15 (Fifteen) Minutes As Needed for Low Blood Sugar (Blood sugar less than 70).             ezetimibe (ZETIA) 10 MG tablet  Take 1 tablet by mouth Daily.             famotidine (PEPCID) 20 MG tablet  Take 1 tablet by mouth 2 (Two) Times a Day As Needed for Indigestion or Heartburn.             fentaNYL (DURAGESIC) 75 MCG/HR patch  Place 1 patch on the  skin Every Other Day.             fluticasone (FLONASE) 50 MCG/ACT nasal spray  1 spray into each nostril 2 (Two) Times a Day.             furosemide (LASIX) 40 MG tablet  Take 1 tablet by mouth Daily.             glucagon, human recombinant, (GLUCAGEN DIAGNOSTIC) 1 MG injection  Inject 1 mg under the skin As Needed (Blood Glucose Less Than 70).             insulin lispro (humaLOG) 100 UNIT/ML injection  Inject 0-9 Units under the skin 4 (Four) Times a Day With Meals & at Bedtime.             levothyroxine (SYNTHROID, LEVOTHROID) 112 MCG tablet  TAKE ONE TABLET BY MOUTH DAILY             lidocaine (LIDODERM) 5 %  Place 3 patches on the skin Daily As Needed for Mild Pain . Remove & Discard patch within 12 hours or as directed by MD             melatonin 5 MG sublingual tablet sublingual tablet  Place 1 tablet under the tongue At Night As Needed (insomnia).             nystatin (MYCOSTATIN) 128793 UNIT/GM powder  Under breasts, ABD folds, and groin fields every 12 hours             ondansetron (ZOFRAN) 4 MG tablet  Take 1 tablet by mouth Every 6 (Six) Hours As Needed for Nausea or Vomiting.             oxyCODONE-acetaminophen (PERCOCET)  MG per tablet  Take 1 tablet by mouth Every 6 (Six) Hours As Needed for Moderate Pain  or Severe Pain  for up to 12 doses.  HOLD FOR SEDATION           pantoprazole (PROTONIX) 40 MG EC tablet  Take 1 tablet by mouth Daily.             piperacillin-tazobactam (ZOSYN) 3.375 g/100 mL 0.9% NS IVPB (mbp)  Infuse 100 mL into a venous catheter Every 8 (Eight) Hours for 28 doses.             polyethylene glycol (MIRALAX) packet  Take 17 g by mouth Daily As Needed (CONSTIPATION).             potassium chloride (MICRO-K) 10 MEQ CR capsule  Take 2 capsules by mouth Daily.             pregabalin (LYRICA) 100 MG capsule  Take 1 capsule by mouth Every 8 (Eight) Hours.             probiotic (CULTURELLE) capsule capsule  Take 1 capsule by mouth Daily.             sacubitril-valsartan (ENTRESTO)  24-26 MG tablet  Take 1 tablet by mouth Every 12 (Twelve) Hours.             sennosides-docusate sodium (SENOKOT-S) 8.6-50 MG tablet  Take 2 tablets by mouth 2 (Two) Times a Day.             vancomycin in dextrose 5% 150 mL (VANCOCIN) 750-5 MG/150ML-% IVPB  Infuse 150 mL into a venous catheter Daily for 13 doses. 750mg iv q24h                   Discharge Disposition:  Rehab Facility or Unit (DC - External)    Discharge Diet:  -CARDIAC/DIABETIC    Discharge Activity:   -AD MULUGETA    Special Instructions:  -WOUND CARE FOR DECUBITI ULCERS    -RECOMMEND RECHECK RENAL FUNCTION/BMP IN 2-3 DAYS    Future Appointments  Date Time Provider Department Center   4/23/2018 3:00 PM Lane Zamorano MD E [facility] None           Time Spent on Discharge:  40 min spent on discharge    Electronically signed by Adalid Monet MD, 03/20/18, 11:00 AM.      Electronically signed by Adalid Monet MD at 3/20/2018 11:31 AM

## 2018-03-20 NOTE — PLAN OF CARE
Problem: Patient Care Overview (Adult)  Goal: Plan of Care Review   03/20/18 1216   Coping/Psychosocial Response Interventions   Plan Of Care Reviewed With patient   Patient Care Overview   Progress improving   Outcome Evaluation   Outcome Summary/Follow up Plan Patient doing well. Ready to transition to LTACH.

## 2018-03-20 NOTE — PLAN OF CARE
Problem: Patient Care Overview (Adult)  Goal: Plan of Care Review   03/20/18 0412   Coping/Psychosocial Response Interventions   Plan Of Care Reviewed With patient   Patient Care Overview   Progress improving   Outcome Evaluation   Outcome Summary/Follow up Plan no issues overnight, vss, cpap hs, bmx1, c/o pain at times but appears comfortable, ltach today? ambulance 1300

## 2018-03-20 NOTE — PLAN OF CARE
Problem: Patient Care Overview (Adult)  Goal: Plan of Care Review  Outcome: Ongoing (interventions implemented as appropriate)   03/20/18 1115   Coping/Psychosocial Response Interventions   Plan Of Care Reviewed With patient;spouse   Patient Care Overview   Progress improving   Outcome Evaluation   Outcome Summary/Follow up Plan WOC f/u to assess Stage 4 pressure ulcer right coccyx. Ulcer granulating; no slough; irrigated with N/S; packed loosely with N/S moistened gauze with silver gel; covered with foam sacral dressing. 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 Skin interventions in place. WOC nurse will f/u. Please contact WOC nurse as needed for concerns.

## 2018-04-12 ENCOUNTER — TRANSITIONAL CARE MANAGEMENT TELEPHONE ENCOUNTER (OUTPATIENT)
Dept: INTERNAL MEDICINE | Facility: CLINIC | Age: 63
End: 2018-04-12

## 2018-04-12 ENCOUNTER — TELEPHONE (OUTPATIENT)
Dept: INTERNAL MEDICINE | Facility: CLINIC | Age: 63
End: 2018-04-12

## 2018-04-12 NOTE — TELEPHONE ENCOUNTER
Pt was just d/c from hospital. She was only wanting appt earlier due to running out of meds before appt. Will ask dr saeed what he would prefer to do

## 2018-04-13 RX ORDER — OXYCODONE AND ACETAMINOPHEN 7.5; 325 MG/1; MG/1
1 TABLET ORAL EVERY 6 HOURS PRN
Qty: 120 TABLET | Refills: 0
Start: 2018-04-13 | End: 2018-05-11 | Stop reason: SDUPTHER

## 2018-04-13 RX ORDER — FENTANYL 75 UG/H
1 PATCH TRANSDERMAL
Qty: 2 PATCH | Refills: 0
Start: 2018-04-13 | End: 2018-05-11 | Stop reason: SDUPTHER

## 2018-04-16 ENCOUNTER — LAB REQUISITION (OUTPATIENT)
Dept: LAB | Facility: HOSPITAL | Age: 63
End: 2018-04-16

## 2018-04-16 DIAGNOSIS — A41.9 SEPSIS (HCC): ICD-10-CM

## 2018-04-16 LAB
ALBUMIN SERPL-MCNC: 3.6 G/DL (ref 3.2–4.8)
ALBUMIN/GLOB SERPL: 1.3 G/DL (ref 1.5–2.5)
ALP SERPL-CCNC: 92 U/L (ref 25–100)
ALT SERPL W P-5'-P-CCNC: 36 U/L (ref 7–40)
ANION GAP SERPL CALCULATED.3IONS-SCNC: 9 MMOL/L (ref 3–11)
AST SERPL-CCNC: 62 U/L (ref 0–33)
BASOPHILS # BLD AUTO: 0.08 10*3/MM3 (ref 0–0.2)
BASOPHILS NFR BLD AUTO: 0.9 % (ref 0–1)
BILIRUB SERPL-MCNC: 0.2 MG/DL (ref 0.3–1.2)
BUN BLD-MCNC: 30 MG/DL (ref 9–23)
BUN/CREAT SERPL: 18.8 (ref 7–25)
CALCIUM SPEC-SCNC: 8.9 MG/DL (ref 8.7–10.4)
CHLORIDE SERPL-SCNC: 103 MMOL/L (ref 99–109)
CO2 SERPL-SCNC: 26 MMOL/L (ref 20–31)
CREAT BLD-MCNC: 1.6 MG/DL (ref 0.6–1.3)
CRP SERPL-MCNC: 0.94 MG/DL (ref 0–1)
DEPRECATED RDW RBC AUTO: 55.2 FL (ref 37–54)
EOSINOPHIL # BLD AUTO: 0.4 10*3/MM3 (ref 0–0.3)
EOSINOPHIL NFR BLD AUTO: 4.5 % (ref 0–3)
ERYTHROCYTE [DISTWIDTH] IN BLOOD BY AUTOMATED COUNT: 15.7 % (ref 11.3–14.5)
GFR SERPL CREATININE-BSD FRML MDRD: 32 ML/MIN/1.73
GLOBULIN UR ELPH-MCNC: 2.7 GM/DL
GLUCOSE BLD-MCNC: 109 MG/DL (ref 70–100)
HCT VFR BLD AUTO: 30.5 % (ref 34.5–44)
HGB BLD-MCNC: 9.4 G/DL (ref 11.5–15.5)
IMM GRANULOCYTES # BLD: 0.02 10*3/MM3 (ref 0–0.03)
IMM GRANULOCYTES NFR BLD: 0.2 % (ref 0–0.6)
LYMPHOCYTES # BLD AUTO: 2.57 10*3/MM3 (ref 0.6–4.8)
LYMPHOCYTES NFR BLD AUTO: 28.9 % (ref 24–44)
MCH RBC QN AUTO: 29.3 PG (ref 27–31)
MCHC RBC AUTO-ENTMCNC: 30.8 G/DL (ref 32–36)
MCV RBC AUTO: 95 FL (ref 80–99)
MONOCYTES # BLD AUTO: 0.79 10*3/MM3 (ref 0–1)
MONOCYTES NFR BLD AUTO: 8.9 % (ref 0–12)
NEUTROPHILS # BLD AUTO: 5.06 10*3/MM3 (ref 1.5–8.3)
NEUTROPHILS NFR BLD AUTO: 56.8 % (ref 41–71)
PLATELET # BLD AUTO: 189 10*3/MM3 (ref 150–450)
PMV BLD AUTO: 11.9 FL (ref 6–12)
POTASSIUM BLD-SCNC: 4.1 MMOL/L (ref 3.5–5.5)
PROT SERPL-MCNC: 6.3 G/DL (ref 5.7–8.2)
RBC # BLD AUTO: 3.21 10*6/MM3 (ref 3.89–5.14)
SODIUM BLD-SCNC: 138 MMOL/L (ref 132–146)
WBC NRBC COR # BLD: 8.9 10*3/MM3 (ref 3.5–10.8)

## 2018-04-16 PROCEDURE — 80053 COMPREHEN METABOLIC PANEL: CPT | Performed by: INTERNAL MEDICINE

## 2018-04-16 PROCEDURE — 86140 C-REACTIVE PROTEIN: CPT | Performed by: INTERNAL MEDICINE

## 2018-04-16 PROCEDURE — 85025 COMPLETE CBC W/AUTO DIFF WBC: CPT | Performed by: INTERNAL MEDICINE

## 2018-04-17 RX ORDER — OMEPRAZOLE 20 MG/1
CAPSULE, DELAYED RELEASE ORAL
Qty: 90 CAPSULE | Refills: 1 | OUTPATIENT
Start: 2018-04-17 | End: 2018-10-20 | Stop reason: SDUPTHER

## 2018-04-17 RX ORDER — CLONAZEPAM 0.5 MG/1
TABLET ORAL
Qty: 90 TABLET | Refills: 0 | OUTPATIENT
Start: 2018-04-17 | End: 2018-07-19 | Stop reason: SDUPTHER

## 2018-04-23 RX ORDER — BUSPIRONE HYDROCHLORIDE 10 MG/1
TABLET ORAL
Qty: 360 TABLET | Refills: 0 | Status: SHIPPED | OUTPATIENT
Start: 2018-04-23 | End: 2018-04-24

## 2018-04-24 ENCOUNTER — OFFICE VISIT (OUTPATIENT)
Dept: INTERNAL MEDICINE | Facility: CLINIC | Age: 63
End: 2018-04-24

## 2018-04-24 VITALS — HEIGHT: 66 IN | SYSTOLIC BLOOD PRESSURE: 120 MMHG | HEART RATE: 68 BPM | DIASTOLIC BLOOD PRESSURE: 70 MMHG

## 2018-04-24 DIAGNOSIS — J30.1 CHRONIC SEASONAL ALLERGIC RHINITIS DUE TO POLLEN: ICD-10-CM

## 2018-04-24 DIAGNOSIS — I51.81 TAKOTSUBO CARDIOMYOPATHY: ICD-10-CM

## 2018-04-24 DIAGNOSIS — K21.9 GASTROESOPHAGEAL REFLUX DISEASE WITHOUT ESOPHAGITIS: ICD-10-CM

## 2018-04-24 DIAGNOSIS — G89.4 CHRONIC PAIN SYNDROME: ICD-10-CM

## 2018-04-24 DIAGNOSIS — G47.33 OBSTRUCTIVE SLEEP APNEA SYNDROME: Chronic | ICD-10-CM

## 2018-04-24 DIAGNOSIS — E55.9 VITAMIN D DEFICIENCY: ICD-10-CM

## 2018-04-24 DIAGNOSIS — G47.00 INSOMNIA, UNSPECIFIED TYPE: ICD-10-CM

## 2018-04-24 DIAGNOSIS — F11.90 CHRONIC, CONTINUOUS USE OF OPIOIDS: Chronic | ICD-10-CM

## 2018-04-24 DIAGNOSIS — E78.5 DYSLIPIDEMIA: ICD-10-CM

## 2018-04-24 DIAGNOSIS — K76.0 FATTY LIVER DISEASE, NONALCOHOLIC: Chronic | ICD-10-CM

## 2018-04-24 DIAGNOSIS — I25.10 CORONARY ARTERY DISEASE INVOLVING NATIVE CORONARY ARTERY OF NATIVE HEART WITHOUT ANGINA PECTORIS: Chronic | ICD-10-CM

## 2018-04-24 DIAGNOSIS — E03.8 OTHER SPECIFIED HYPOTHYROIDISM: ICD-10-CM

## 2018-04-24 DIAGNOSIS — E53.8 COBALAMIN DEFICIENCY: ICD-10-CM

## 2018-04-24 DIAGNOSIS — M54.40 CHRONIC LOW BACK PAIN WITH SCIATICA, SCIATICA LATERALITY UNSPECIFIED, UNSPECIFIED BACK PAIN LATERALITY: ICD-10-CM

## 2018-04-24 DIAGNOSIS — R74.8 ABNORMAL LIVER ENZYMES: ICD-10-CM

## 2018-04-24 DIAGNOSIS — Z79.899 CHRONICALLY ON BENZODIAZEPINE THERAPY: ICD-10-CM

## 2018-04-24 DIAGNOSIS — J96.21 ACUTE ON CHRONIC RESPIRATORY FAILURE WITH HYPOXIA (HCC): ICD-10-CM

## 2018-04-24 DIAGNOSIS — G25.0 ESSENTIAL TREMOR: ICD-10-CM

## 2018-04-24 DIAGNOSIS — I50.22 CHRONIC SYSTOLIC HEART FAILURE (HCC): Primary | ICD-10-CM

## 2018-04-24 DIAGNOSIS — M15.9 PRIMARY OSTEOARTHRITIS INVOLVING MULTIPLE JOINTS: ICD-10-CM

## 2018-04-24 DIAGNOSIS — I10 ESSENTIAL HYPERTENSION: Chronic | ICD-10-CM

## 2018-04-24 DIAGNOSIS — J43.8 OTHER EMPHYSEMA (HCC): ICD-10-CM

## 2018-04-24 DIAGNOSIS — G89.29 CHRONIC LOW BACK PAIN WITH SCIATICA, SCIATICA LATERALITY UNSPECIFIED, UNSPECIFIED BACK PAIN LATERALITY: ICD-10-CM

## 2018-04-24 DIAGNOSIS — E11.10 TYPE 2 DIABETES MELLITUS WITH KETOACIDOSIS WITHOUT COMA, WITHOUT LONG-TERM CURRENT USE OF INSULIN (HCC): Chronic | ICD-10-CM

## 2018-04-24 LAB
25(OH)D3+25(OH)D2 SERPL-MCNC: 53.5 NG/ML
ALBUMIN SERPL-MCNC: 4 G/DL (ref 3.2–4.8)
ALBUMIN/GLOB SERPL: 1.5 G/DL (ref 1.5–2.5)
ALP SERPL-CCNC: 101 U/L (ref 25–100)
ALT SERPL-CCNC: 40 U/L (ref 7–40)
AST SERPL-CCNC: 42 U/L (ref 0–33)
BILIRUB SERPL-MCNC: 0.2 MG/DL (ref 0.3–1.2)
BUN SERPL-MCNC: 17 MG/DL (ref 9–23)
BUN/CREAT SERPL: 13.1 (ref 7–25)
CALCIUM SERPL-MCNC: 9.4 MG/DL (ref 8.7–10.4)
CHLORIDE SERPL-SCNC: 105 MMOL/L (ref 99–109)
CO2 SERPL-SCNC: 30 MMOL/L (ref 20–31)
CREAT SERPL-MCNC: 1.3 MG/DL (ref 0.6–1.3)
ERYTHROCYTE [DISTWIDTH] IN BLOOD BY AUTOMATED COUNT: 15.9 % (ref 11.3–14.5)
GFR SERPLBLD CREATININE-BSD FMLA CKD-EPI: 41 ML/MIN/1.73
GFR SERPLBLD CREATININE-BSD FMLA CKD-EPI: 50 ML/MIN/1.73
GLOBULIN SER CALC-MCNC: 2.7 GM/DL
GLUCOSE SERPL-MCNC: 213 MG/DL (ref 70–100)
HCT VFR BLD AUTO: 33.3 % (ref 34.5–44)
HGB BLD-MCNC: 10.3 G/DL (ref 11.5–15.5)
MCH RBC QN AUTO: 29.6 PG (ref 27–31)
MCHC RBC AUTO-ENTMCNC: 30.9 G/DL (ref 32–36)
MCV RBC AUTO: 95.7 FL (ref 80–99)
PLATELET # BLD AUTO: 191 10*3/MM3 (ref 150–450)
POTASSIUM SERPL-SCNC: 3.9 MMOL/L (ref 3.5–5.5)
PROT SERPL-MCNC: 6.7 G/DL (ref 5.7–8.2)
RBC # BLD AUTO: 3.48 10*6/MM3 (ref 3.89–5.14)
SODIUM SERPL-SCNC: 144 MMOL/L (ref 132–146)
TSH SERPL DL<=0.005 MIU/L-ACNC: 1.44 MIU/ML (ref 0.35–5.35)
VIT B12 SERPL-MCNC: 549 PG/ML (ref 211–911)
WBC # BLD AUTO: 8.86 10*3/MM3 (ref 3.5–10.8)

## 2018-04-24 PROCEDURE — 99495 TRANSJ CARE MGMT MOD F2F 14D: CPT | Performed by: INTERNAL MEDICINE

## 2018-04-24 RX ORDER — LISINOPRIL 10 MG/1
10 TABLET ORAL DAILY
COMMUNITY
End: 2018-11-21 | Stop reason: SDUPTHER

## 2018-04-24 NOTE — PROGRESS NOTES
Patient is a 62 y.o. female who is here for a follow up of recent STANTON.  Chief Complaint   Patient presents with   • Follow-up     STANTON         HPI:    Here for f/u.  Patient has been in and out of the hospital for decubitus ulcer.  Has a wound vac that is managed by HH.  Off antibiotics.  No fever or chills.  Does not check fsbs.  No foot sores.  Appetite is good.  Fatigued since being home.  Has not felt well.  Having diarrhea but better off antibiotics.  Recently seen by ID.     Current outpatient and discharge medications have been reconciled for the patient.  Harinder Aranda MD    History:    Patient Active Problem List   Diagnosis   • Atopic rhinitis   • Restrictive ventilatory defect   • COPD (chronic obstructive pulmonary disease)   • Osteoporosis   • Obstructive sleep apnea syndrome   • Abnormal liver enzymes   • Cholelithiasis   • Chronic back pain   • Mixed anxiety depressive disorder   • Type 2 diabetes mellitus   • Dyslipidemia   • Essential tremor   • Fibromyalgia   • Gastroesophageal reflux disease without esophagitis   • Hypertension   • Hypothyroidism   • Insomnia   • Osteoarthritis   • Psoriasis   • Branch retinal vein occlusion   • Cobalamin deficiency   • Obesity   • Vitamin D deficiency   • Hypokalemia   • Lactic acidosis   • Fatty liver disease, nonalcoholic   • Sinus bradycardia   • NSTEMI (non-ST elevated myocardial infarction)   • Tobacco abuse   • Hypoxia   • Peripheral vascular disease   • Diabetic polyneuropathy associated with type 2 diabetes mellitus   • Anemia, blood loss   • Chronic pain   • Chronic, continuous use of opioids   • Chronic anxiety   • Chronically on benzodiazepine therapy   • Tubular adenoma   • Elevated troponin level   • Urine abnormality   • ELIF (acute kidney injury)   • Cellulitis of sacral region   • Metabolic encephalopathy   • Fever   • Sepsis   • Acute on chronic respiratory failure with hypoxia   • Acute cystitis without hematuria   • Coronary artery disease  involving native heart   • Takotsubo cardiomyopathy   • Elevated troponin   • Chronic systolic heart failure   • Chronic respiratory failure with hypoxia   • Infected sacral decubitus ulcers   • Weakness   • Infected decubitus ulcer       Past Medical History:   Diagnosis Date   • Acute on chronic respiratory failure with hypoxia 2/13/2018   • ELIF (acute kidney injury) 2/13/2018   • ELIF (acute kidney injury) 2/13/2018   • Altered mental status 2/13/2018   • Bronchitis    • Cellulitis of sacral region 2/13/2018   • Cervical cancer    • Cholelithiasis 5/11/2016   • Chronic anxiety 2/27/2017   • Chronic bronchitis    • Chronic respiratory failure with hypoxia 3/8/2018   • Chronic systolic heart failure 3/8/2018   • Chronically on benzodiazepine therapy 2/27/2017   • Degenerative arthritis    • Diabetes mellitus    • Dyslipidemia 5/11/2016   • Dyspnea    • Elevated troponin level 2/13/2018   • Fatty liver disease, nonalcoholic 11/21/2016   • Fever 2/13/2018   • Fibromyalgia    • GERD (gastroesophageal reflux disease)    • H/O echocardiogram    • History of pneumonia    • Hypertension 5/11/2016    16. H/O echocardiogram (V15.89) (Z92.89)  · A.  Echocardiogram of 02/03/2015 reports an ejection fraction of 60-65%, mild concentric     LVH, trace mitral regurgitation, mild tricuspid and pulmonic regurgitation and calculated     RVSP of 35 mmHg, the main pulmonary artery is also mildly dilated.   • Hypothyroidism 5/11/2016    Description: A.  On replacement therapy.   • Infected wound 3/8/2018   • Nausea    • Obesity    • DINA (obstructive sleep apnea)     intolerant of CPAP therapy   • Osteoporosis    • Osteoporosis    • Polycythemia vera 5/11/2016   • Pulmonary emphysema    • Restrictive ventilatory defect    • Rhinitis    • Sepsis 2/13/2018   • Uncontrolled diabetes mellitus 5/11/2016   • Urine abnormality 2/13/2018   • Uterine cancer    • Vitamin D deficiency 8/1/2016   • Weakness 3/8/2018       Past Surgical History:    Procedure Laterality Date   • AMPUTATION DIGIT Left 11/23/2016    Procedure: AMPUTATION TRANS METATARSAL - ray amutation of the left great toe;  Surgeon: Arsalan Feliciano MD;  Location:  [facility];  Service:    • AMPUTATION DIGIT Left 3/2/2017    Procedure: LEFT FOOT TRANSMETATARSAL AMPUTATION TOES 2,3,4,5;  Surgeon: Joey Guaman MD;  Location:  [facility];  Service:    • BACK SURGERY      lumbar fusions x5--multiple times; 1995, 1997, 1998, 1999 and 2008   • BELOW KNEE AMPUTATION Left 2/25/2017    Procedure: EXTENDED LEFT TOE AMPUTATION;  Surgeon: Arsalan Feliciano MD;  Location:  [facility];  Service:    • CARDIAC CATHETERIZATION N/A 11/26/2016    Procedure: Left Heart Cath;  Surgeon: Ash Nuñez MD;  Location:  [facility];  Service:    • CARDIAC CATHETERIZATION N/A 2/20/2018    Procedure: Left Heart Cath;  Surgeon: Lane Zamorano MD;  Location:  [facility];  Service:    • CARDIAC CATHETERIZATION N/A 2/20/2018    Procedure: Right Heart Cath;  Surgeon: Lane Zamorano MD;  Location:  [facility];  Service:    • HAND SURGERY Bilateral     x3   • HYSTERECTOMY      status post uterine and cervical cancer   • LUMBAR SPINE SURGERY      arthrodesis by anterior approach addit interspace   • TONSILLECTOMY         Current Outpatient Prescriptions on File Prior to Visit   Medication Sig   • acetaminophen (TYLENOL) 325 MG tablet Take 2 tablets by mouth Every 4 (Four) Hours As Needed for mild pain (1-3) or fever (temperature greater than 101F).   • albuterol (PROVENTIL) (2.5 MG/3ML) 0.083% nebulizer solution Take 2.5 mg by nebulization Every 6 (Six) Hours As Needed.   • aspirin 81 MG EC tablet Take 1 tablet by mouth Daily.   • atorvastatin (LIPITOR) 80 MG tablet Take 1 tablet by mouth Every Night.   • baclofen (LIORESAL) 10 MG tablet Take 1 tablet by mouth 2 (Two) Times a Day As Needed for Muscle Spasms.   • bisacodyl (DULCOLAX) 5 MG EC tablet Take 1 tablet by mouth Daily As Needed  for Constipation.   • busPIRone (BUSPAR) 7.5 MG tablet Take 1 tablet by mouth Every 12 (Twelve) Hours.   • cetirizine (ZyrTEC) 10 MG tablet Take 10 mg by mouth Every Night.   • clonazePAM (KlonoPIN) 0.5 MG tablet TAKE ONE-HALF TABLET BY MOUTH TWICE A DAY AS NEEDED   • clopidogrel (PLAVIX) 75 MG tablet Take 1 tablet by mouth Daily.   • clotrimazole (MYCELEX) 10 MG lesa Take 1 tablet by mouth 3 (Three) Times a Day.   • Cyanocobalamin 1000 MCG/ML kit Inject 1,000 mcg as directed Every 30 (Thirty) Days.   • ezetimibe (ZETIA) 10 MG tablet Take 1 tablet by mouth Daily.   • fentaNYL (DURAGESIC) 75 MCG/HR patch Place 1 patch on the skin Every Other Day.   • fluticasone (FLONASE) 50 MCG/ACT nasal spray 1 spray into each nostril 2 (Two) Times a Day.   • furosemide (LASIX) 40 MG tablet Take 1 tablet by mouth Daily.   • levothyroxine (SYNTHROID, LEVOTHROID) 112 MCG tablet TAKE ONE TABLET BY MOUTH DAILY   • melatonin 5 MG sublingual tablet sublingual tablet Place 1 tablet under the tongue At Night As Needed (insomnia).   • nystatin (MYCOSTATIN) 791166 UNIT/GM powder Under breasts, ABD folds, and groin fields every 12 hours   • omeprazole (priLOSEC) 20 MG capsule TAKE ONE CAPSULE BY MOUTH DAILY   • ondansetron (ZOFRAN) 4 MG tablet Take 1 tablet by mouth Every 6 (Six) Hours As Needed for Nausea or Vomiting.   • oxyCODONE-acetaminophen (PERCOCET) 7.5-325 MG per tablet Take 1 tablet by mouth Every 6 (Six) Hours As Needed for Moderate Pain .   • polyethylene glycol (MIRALAX) packet Take 17 g by mouth Daily As Needed (CONSTIPATION).   • potassium chloride (MICRO-K) 10 MEQ CR capsule Take 2 capsules by mouth Daily.   • pregabalin (LYRICA) 100 MG capsule Take 1 capsule by mouth Every 8 (Eight) Hours.   • probiotic (CULTURELLE) capsule capsule Take 1 capsule by mouth Daily.   • dextrose (D50W) 50 % solution Infuse 50 mL into a venous catheter Every 15 (Fifteen) Minutes As Needed for Low Blood Sugar (Blood Sugar Less Than 70).   •  dextrose (GLUTOSE) 40 % gel Take 15 g by mouth Every 15 (Fifteen) Minutes As Needed for Low Blood Sugar (Blood sugar less than 70).   • glucagon, human recombinant, (GLUCAGEN DIAGNOSTIC) 1 MG injection Inject 1 mg under the skin As Needed (Blood Glucose Less Than 70).   • insulin lispro (humaLOG) 100 UNIT/ML injection Inject 0-9 Units under the skin 4 (Four) Times a Day With Meals & at Bedtime.   • [DISCONTINUED] busPIRone (BUSPAR) 10 MG tablet TAKE TWO TABLETS BY MOUTH TWICE A DAY   • [DISCONTINUED] carvedilol (COREG) 6.25 MG tablet Take 1 tablet by mouth Every 12 (Twelve) Hours.   • [DISCONTINUED] famotidine (PEPCID) 20 MG tablet Take 1 tablet by mouth 2 (Two) Times a Day As Needed for Indigestion or Heartburn.   • [DISCONTINUED] lidocaine (LIDODERM) 5 % Place 3 patches on the skin Daily As Needed for Mild Pain . Remove & Discard patch within 12 hours or as directed by MD   • [DISCONTINUED] pantoprazole (PROTONIX) 40 MG EC tablet Take 1 tablet by mouth Daily.   • [DISCONTINUED] sacubitril-valsartan (ENTRESTO) 24-26 MG tablet Take 1 tablet by mouth Every 12 (Twelve) Hours.   • [DISCONTINUED] sennosides-docusate sodium (SENOKOT-S) 8.6-50 MG tablet Take 2 tablets by mouth 2 (Two) Times a Day.     No current facility-administered medications on file prior to visit.        Family History   Problem Relation Age of Onset   • Arthritis Mother    • Diabetes Mother    • Colon polyps Mother    • Diverticulitis Mother    • Arthritis Father    • Bleeding Disorder Father    • Diabetes Father    • Kidney disease Father    • COPD Sister      currently smokes   • Arthritis Brother    • Diabetes Brother    • Alcohol abuse Brother    • Heart murmur Daughter    • Arthritis Other    • Diabetes Other        Social History     Social History   • Marital status:      Spouse name: N/A   • Number of children: 1   • Years of education: N/A     Occupational History   •  Disabled     Social History Main Topics   • Smoking status:  "Former Smoker     Packs/day: 1.50     Years: 48.00     Types: Cigarettes     Quit date: 2/27/2017   • Smokeless tobacco: Never Used   • Alcohol use No   • Drug use: No   • Sexual activity: Defer     Other Topics Concern   • Not on file     Social History Narrative    Mrs. Langston is a 62 year old white  female. She lives with her spouse in [facility]. She states she does her own ADLs but appears disabled. They have one child and two grandchildren. She has a living will.         ROS:    Review of Systems   Constitutional: Positive for fatigue. Negative for chills, diaphoresis, fever and unexpected weight change.   HENT: Negative for congestion, ear pain, hearing loss, nosebleeds, postnasal drip, sinus pressure and sore throat.    Eyes: Negative for pain, discharge and itching.   Respiratory: Positive for shortness of breath. Negative for cough, chest tightness and wheezing.    Cardiovascular: Positive for palpitations. Negative for chest pain and leg swelling.   Gastrointestinal: Positive for abdominal pain. Negative for abdominal distention, blood in stool, constipation, diarrhea, nausea and vomiting.   Endocrine: Negative for heat intolerance, polydipsia and polyuria.   Genitourinary: Negative for difficulty urinating, dysuria, frequency and hematuria.   Musculoskeletal: Positive for arthralgias and back pain. Negative for gait problem, joint swelling and myalgias.   Skin: Positive for rash and wound.   Neurological: Positive for tremors, weakness and light-headedness. Negative for dizziness, syncope and headaches.   Psychiatric/Behavioral: Positive for decreased concentration, dysphoric mood and sleep disturbance. The patient is nervous/anxious.        /70   Pulse 68   Ht 167.6 cm (65.98\")   LMP  (LMP Unknown)     Physical Exam:    Physical Exam   Constitutional: She is oriented to person, place, and time. She appears well-developed and well-nourished.   HENT:   Head: Normocephalic and " atraumatic.   Right Ear: External ear normal.   Left Ear: External ear normal.   Mouth/Throat: Oropharynx is clear and moist.   Eyes: Conjunctivae and EOM are normal.   Neck: Normal range of motion. Neck supple.   Cardiovascular: Normal rate and regular rhythm.    2/6 СЕРГЕЙ   Pulmonary/Chest: Effort normal.   Mildly diminished   Abdominal: Soft. Bowel sounds are normal.   Musculoskeletal: She exhibits tenderness (pain on back flexion past 30  ).   Using wheelchair   Lymphadenopathy:     She has no cervical adenopathy.   Neurological: She is alert and oriented to person, place, and time.   Skin: Skin is warm and dry. No rash noted.   Right buttock decub examined without foul drainage   Psychiatric: She has a normal mood and affect. Her behavior is normal. Thought content normal.       Procedure:    This patient care was coordinated using transitional care management strategy. I have reviewed The discharge records are available and reviewed    Discussion/Summary:    chronic back pain- refill patch and percocet as needed, advised need to reduce the dose as we are doing  htn-stable  fibromylagia- cont current tx  hyperlipidemia-cont lipitor and zetia  hypothroid-cont replacement, recheck today  depression with anxiety-cont buspar and klonopine ,advised her of risk of addiction  polycythemia-cbc today  retinal vein occlusion- cont rf modification  b12 def-admin on rtc and repeat in 2/18  nausea-phenergan prn, will hold reglan  DM-labs noted, counseled on low carb, recheck after 5/14, cont januvia  diaphoresis-better   Elevated lft/?autoimmune-f/u gastro recs , labs today  Eczema-cont prn steroids  Left foot ulcer and osteo-resolved  FE def anemia-cont FE, recheck today  Tremor-cont topamax and BB  Buttocks decub-cont local wound care  high risk meds/jerking-labs today      labs noted and dw patient, counseled on adequate hydration and diet/wt loss    Current Outpatient Prescriptions:   •  acetaminophen (TYLENOL) 325 MG  tablet, Take 2 tablets by mouth Every 4 (Four) Hours As Needed for mild pain (1-3) or fever (temperature greater than 101F)., Disp: 100 tablet, Rfl: 0  •  albuterol (PROVENTIL) (2.5 MG/3ML) 0.083% nebulizer solution, Take 2.5 mg by nebulization Every 6 (Six) Hours As Needed., Disp: , Rfl:   •  aspirin 81 MG EC tablet, Take 1 tablet by mouth Daily., Disp: , Rfl:   •  atorvastatin (LIPITOR) 80 MG tablet, Take 1 tablet by mouth Every Night., Disp: , Rfl:   •  baclofen (LIORESAL) 10 MG tablet, Take 1 tablet by mouth 2 (Two) Times a Day As Needed for Muscle Spasms., Disp: , Rfl:   •  bisacodyl (DULCOLAX) 5 MG EC tablet, Take 1 tablet by mouth Daily As Needed for Constipation., Disp: , Rfl:   •  busPIRone (BUSPAR) 7.5 MG tablet, Take 1 tablet by mouth Every 12 (Twelve) Hours., Disp: , Rfl:   •  cetirizine (ZyrTEC) 10 MG tablet, Take 10 mg by mouth Every Night., Disp: , Rfl:   •  clonazePAM (KlonoPIN) 0.5 MG tablet, TAKE ONE-HALF TABLET BY MOUTH TWICE A DAY AS NEEDED, Disp: 90 tablet, Rfl: 0  •  clopidogrel (PLAVIX) 75 MG tablet, Take 1 tablet by mouth Daily., Disp: 90 tablet, Rfl: 3  •  clotrimazole (MYCELEX) 10 MG lesa, Take 1 tablet by mouth 3 (Three) Times a Day., Disp: , Rfl:   •  Cyanocobalamin 1000 MCG/ML kit, Inject 1,000 mcg as directed Every 30 (Thirty) Days., Disp: 1 kit, Rfl: 3  •  ezetimibe (ZETIA) 10 MG tablet, Take 1 tablet by mouth Daily., Disp: 90 tablet, Rfl: 3  •  fentaNYL (DURAGESIC) 75 MCG/HR patch, Place 1 patch on the skin Every Other Day., Disp: 2 patch, Rfl: 0  •  fluticasone (FLONASE) 50 MCG/ACT nasal spray, 1 spray into each nostril 2 (Two) Times a Day., Disp: , Rfl:   •  furosemide (LASIX) 40 MG tablet, Take 1 tablet by mouth Daily., Disp: , Rfl:   •  levothyroxine (SYNTHROID, LEVOTHROID) 112 MCG tablet, TAKE ONE TABLET BY MOUTH DAILY, Disp: 90 tablet, Rfl: 2  •  lisinopril (PRINIVIL,ZESTRIL) 10 MG tablet, Take 10 mg by mouth Daily., Disp: , Rfl:   •  melatonin 5 MG sublingual tablet  sublingual tablet, Place 1 tablet under the tongue At Night As Needed (insomnia)., Disp: 30 tablet, Rfl: 0  •  nystatin (MYCOSTATIN) 335052 UNIT/GM powder, Under breasts, ABD folds, and groin fields every 12 hours, Disp: , Rfl:   •  omeprazole (priLOSEC) 20 MG capsule, TAKE ONE CAPSULE BY MOUTH DAILY, Disp: 90 capsule, Rfl: 1  •  ondansetron (ZOFRAN) 4 MG tablet, Take 1 tablet by mouth Every 6 (Six) Hours As Needed for Nausea or Vomiting., Disp: , Rfl:   •  oxyCODONE-acetaminophen (PERCOCET) 7.5-325 MG per tablet, Take 1 tablet by mouth Every 6 (Six) Hours As Needed for Moderate Pain ., Disp: 120 tablet, Rfl: 0  •  polyethylene glycol (MIRALAX) packet, Take 17 g by mouth Daily As Needed (CONSTIPATION)., Disp: , Rfl:   •  potassium chloride (MICRO-K) 10 MEQ CR capsule, Take 2 capsules by mouth Daily., Disp: , Rfl:   •  pregabalin (LYRICA) 100 MG capsule, Take 1 capsule by mouth Every 8 (Eight) Hours., Disp: 9 capsule, Rfl: 0  •  probiotic (CULTURELLE) capsule capsule, Take 1 capsule by mouth Daily., Disp: 60 capsule, Rfl: 0  •  dextrose (D50W) 50 % solution, Infuse 50 mL into a venous catheter Every 15 (Fifteen) Minutes As Needed for Low Blood Sugar (Blood Sugar Less Than 70)., Disp: , Rfl:   •  dextrose (GLUTOSE) 40 % gel, Take 15 g by mouth Every 15 (Fifteen) Minutes As Needed for Low Blood Sugar (Blood sugar less than 70)., Disp: , Rfl:   •  glucagon, human recombinant, (GLUCAGEN DIAGNOSTIC) 1 MG injection, Inject 1 mg under the skin As Needed (Blood Glucose Less Than 70)., Disp: , Rfl:   •  insulin lispro (humaLOG) 100 UNIT/ML injection, Inject 0-9 Units under the skin 4 (Four) Times a Day With Meals & at Bedtime., Disp: , Rfl: 12        Tamara was seen today for follow-up.    Diagnoses and all orders for this visit:    Chronic systolic heart failure  -     CBC (No Diff)  -     TSH    Coronary artery disease involving native coronary artery of native heart without angina pectoris    Essential  hypertension    Takotsubo cardiomyopathy    Acute on chronic respiratory failure with hypoxia    Chronic seasonal allergic rhinitis due to pollen    Other emphysema    Obstructive sleep apnea syndrome    Cobalamin deficiency  -     Vitamin B12    Fatty liver disease, nonalcoholic    Gastroesophageal reflux disease without esophagitis    Vitamin D deficiency  -     Vitamin D 25 Hydroxy    Other specified hypothyroidism    Type 2 diabetes mellitus with ketoacidosis without coma, without long-term current use of insulin    Chronic low back pain with sciatica, sciatica laterality unspecified, unspecified back pain laterality    Essential tremor    Primary osteoarthritis involving multiple joints    Abnormal liver enzymes  -     Comprehensive Metabolic Panel    Chronic pain syndrome    Chronic, continuous use of opioids    Chronically on benzodiazepine therapy    Dyslipidemia    Insomnia, unspecified type

## 2018-04-30 RX ORDER — FLUCONAZOLE 150 MG/1
150 TABLET ORAL ONCE
Qty: 1 TABLET | Refills: 0 | Status: SHIPPED | OUTPATIENT
Start: 2018-04-30 | End: 2018-04-30

## 2018-05-04 ENCOUNTER — TELEPHONE (OUTPATIENT)
Dept: INTERNAL MEDICINE | Facility: CLINIC | Age: 63
End: 2018-05-04

## 2018-05-06 DIAGNOSIS — E78.5 DYSLIPIDEMIA: ICD-10-CM

## 2018-05-07 RX ORDER — ATORVASTATIN CALCIUM 40 MG/1
TABLET, FILM COATED ORAL
Qty: 90 TABLET | Refills: 2 | Status: SHIPPED | OUTPATIENT
Start: 2018-05-07 | End: 2018-07-17 | Stop reason: SDUPTHER

## 2018-05-11 RX ORDER — OXYCODONE AND ACETAMINOPHEN 7.5; 325 MG/1; MG/1
1 TABLET ORAL EVERY 6 HOURS PRN
Qty: 120 TABLET | Refills: 0
Start: 2018-05-11 | End: 2018-06-08 | Stop reason: SDUPTHER

## 2018-05-11 RX ORDER — FENTANYL 75 UG/H
1 PATCH TRANSDERMAL
Qty: 15 PATCH | Refills: 0
Start: 2018-05-11 | End: 2018-06-08 | Stop reason: SDUPTHER

## 2018-05-17 DIAGNOSIS — I73.9 PERIPHERAL VASCULAR DISEASE (HCC): ICD-10-CM

## 2018-05-17 RX ORDER — CLOPIDOGREL BISULFATE 75 MG/1
TABLET ORAL
Qty: 90 TABLET | Refills: 2 | Status: SHIPPED | OUTPATIENT
Start: 2018-05-17 | End: 2019-02-10 | Stop reason: SDUPTHER

## 2018-05-24 RX ORDER — FLUCONAZOLE 100 MG/1
100 TABLET ORAL DAILY
Qty: 7 TABLET | Refills: 0 | Status: SHIPPED | OUTPATIENT
Start: 2018-05-24 | End: 2018-06-08

## 2018-05-30 RX ORDER — LEVOTHYROXINE SODIUM 112 UG/1
TABLET ORAL
Qty: 90 TABLET | Refills: 1 | Status: SHIPPED | OUTPATIENT
Start: 2018-05-30 | End: 2018-11-28 | Stop reason: SDUPTHER

## 2018-06-08 ENCOUNTER — OFFICE VISIT (OUTPATIENT)
Dept: INTERNAL MEDICINE | Facility: CLINIC | Age: 63
End: 2018-06-08

## 2018-06-08 VITALS
SYSTOLIC BLOOD PRESSURE: 140 MMHG | DIASTOLIC BLOOD PRESSURE: 72 MMHG | BODY MASS INDEX: 37.61 KG/M2 | WEIGHT: 234 LBS | HEIGHT: 66 IN | HEART RATE: 68 BPM

## 2018-06-08 DIAGNOSIS — F11.90 CHRONIC, CONTINUOUS USE OF OPIOIDS: Chronic | ICD-10-CM

## 2018-06-08 DIAGNOSIS — M54.40 CHRONIC LOW BACK PAIN WITH SCIATICA, SCIATICA LATERALITY UNSPECIFIED, UNSPECIFIED BACK PAIN LATERALITY: ICD-10-CM

## 2018-06-08 DIAGNOSIS — M79.7 FIBROMYALGIA: ICD-10-CM

## 2018-06-08 DIAGNOSIS — E11.10 TYPE 2 DIABETES MELLITUS WITH KETOACIDOSIS WITHOUT COMA, WITHOUT LONG-TERM CURRENT USE OF INSULIN (HCC): Chronic | ICD-10-CM

## 2018-06-08 DIAGNOSIS — R74.8 ABNORMAL LIVER ENZYMES: ICD-10-CM

## 2018-06-08 DIAGNOSIS — G89.29 CHRONIC LOW BACK PAIN WITH SCIATICA, SCIATICA LATERALITY UNSPECIFIED, UNSPECIFIED BACK PAIN LATERALITY: ICD-10-CM

## 2018-06-08 DIAGNOSIS — E55.9 VITAMIN D DEFICIENCY: ICD-10-CM

## 2018-06-08 DIAGNOSIS — E53.8 COBALAMIN DEFICIENCY: ICD-10-CM

## 2018-06-08 DIAGNOSIS — F41.9 CHRONIC ANXIETY: ICD-10-CM

## 2018-06-08 DIAGNOSIS — L08.9 INFECTED DECUBITUS ULCER, UNSPECIFIED ULCER STAGE: ICD-10-CM

## 2018-06-08 DIAGNOSIS — M81.6 LOCALIZED OSTEOPOROSIS WITHOUT CURRENT PATHOLOGICAL FRACTURE: ICD-10-CM

## 2018-06-08 DIAGNOSIS — K76.0 FATTY LIVER DISEASE, NONALCOHOLIC: Chronic | ICD-10-CM

## 2018-06-08 DIAGNOSIS — I51.81 TAKOTSUBO CARDIOMYOPATHY: ICD-10-CM

## 2018-06-08 DIAGNOSIS — D36.9 TUBULAR ADENOMA: ICD-10-CM

## 2018-06-08 DIAGNOSIS — I73.9 PERIPHERAL VASCULAR DISEASE (HCC): Chronic | ICD-10-CM

## 2018-06-08 DIAGNOSIS — L89.90 INFECTED DECUBITUS ULCER, UNSPECIFIED ULCER STAGE: ICD-10-CM

## 2018-06-08 DIAGNOSIS — E78.5 DYSLIPIDEMIA: ICD-10-CM

## 2018-06-08 DIAGNOSIS — K21.9 GASTROESOPHAGEAL REFLUX DISEASE WITHOUT ESOPHAGITIS: ICD-10-CM

## 2018-06-08 DIAGNOSIS — E11.42 DIABETIC POLYNEUROPATHY ASSOCIATED WITH TYPE 2 DIABETES MELLITUS (HCC): ICD-10-CM

## 2018-06-08 DIAGNOSIS — I10 ESSENTIAL HYPERTENSION: Chronic | ICD-10-CM

## 2018-06-08 DIAGNOSIS — F41.8 MIXED ANXIETY DEPRESSIVE DISORDER: ICD-10-CM

## 2018-06-08 DIAGNOSIS — E78.49 OTHER HYPERLIPIDEMIA: ICD-10-CM

## 2018-06-08 DIAGNOSIS — G89.4 CHRONIC PAIN SYNDROME: ICD-10-CM

## 2018-06-08 DIAGNOSIS — I50.22 CHRONIC SYSTOLIC HEART FAILURE (HCC): Primary | ICD-10-CM

## 2018-06-08 DIAGNOSIS — G47.00 INSOMNIA, UNSPECIFIED TYPE: ICD-10-CM

## 2018-06-08 DIAGNOSIS — G25.0 ESSENTIAL TREMOR: ICD-10-CM

## 2018-06-08 DIAGNOSIS — J30.1 CHRONIC SEASONAL ALLERGIC RHINITIS DUE TO POLLEN: ICD-10-CM

## 2018-06-08 DIAGNOSIS — R53.1 WEAKNESS: ICD-10-CM

## 2018-06-08 DIAGNOSIS — I25.10 CORONARY ARTERY DISEASE INVOLVING NATIVE CORONARY ARTERY OF NATIVE HEART WITHOUT ANGINA PECTORIS: Chronic | ICD-10-CM

## 2018-06-08 DIAGNOSIS — E03.8 OTHER SPECIFIED HYPOTHYROIDISM: ICD-10-CM

## 2018-06-08 DIAGNOSIS — J43.8 OTHER EMPHYSEMA (HCC): ICD-10-CM

## 2018-06-08 DIAGNOSIS — M15.9 PRIMARY OSTEOARTHRITIS INVOLVING MULTIPLE JOINTS: ICD-10-CM

## 2018-06-08 PROCEDURE — 99214 OFFICE O/P EST MOD 30 MIN: CPT | Performed by: INTERNAL MEDICINE

## 2018-06-08 RX ORDER — OXYCODONE AND ACETAMINOPHEN 7.5; 325 MG/1; MG/1
1 TABLET ORAL EVERY 6 HOURS PRN
Qty: 120 TABLET | Refills: 0 | Status: SHIPPED | OUTPATIENT
Start: 2018-06-08 | End: 2018-07-05 | Stop reason: SDUPTHER

## 2018-06-08 RX ORDER — ALBUTEROL SULFATE 2.5 MG/3ML
2.5 SOLUTION RESPIRATORY (INHALATION) EVERY 6 HOURS PRN
Qty: 120 VIAL | Refills: 5 | Status: SHIPPED | OUTPATIENT
Start: 2018-06-08 | End: 2020-10-07 | Stop reason: SDUPTHER

## 2018-06-08 RX ORDER — EZETIMIBE 10 MG/1
10 TABLET ORAL DAILY
Qty: 90 TABLET | Refills: 3 | Status: SHIPPED | OUTPATIENT
Start: 2018-06-08 | End: 2018-10-31 | Stop reason: SDUPTHER

## 2018-06-08 RX ORDER — FENTANYL 75 UG/H
1 PATCH TRANSDERMAL
Qty: 15 PATCH | Refills: 0 | Status: SHIPPED | OUTPATIENT
Start: 2018-06-08 | End: 2018-07-05 | Stop reason: SDUPTHER

## 2018-06-08 NOTE — PROGRESS NOTES
Patient is a 62 y.o. female who is here for a follow up of chronic conditions.  Chief Complaint   Patient presents with   • Hypothyroidism   • Diabetes   • Pain   • Hypertension         HPI:  Here for f/u.  Patient's sacral wound is almost healed.  Energy is not the best.  No fever or chills.  Appetite is good.  No nausea or emesis.   is ill.  Breathing is difficult at times.     History:    Patient Active Problem List   Diagnosis   • Atopic rhinitis   • Restrictive ventilatory defect   • COPD (chronic obstructive pulmonary disease)   • Osteoporosis   • Obstructive sleep apnea syndrome   • Abnormal liver enzymes   • Cholelithiasis   • Chronic back pain   • Mixed anxiety depressive disorder   • Type 2 diabetes mellitus   • Dyslipidemia   • Essential tremor   • Fibromyalgia   • Gastroesophageal reflux disease without esophagitis   • Hypertension   • Hypothyroidism   • Insomnia   • Osteoarthritis   • Psoriasis   • Branch retinal vein occlusion   • Cobalamin deficiency   • Obesity   • Vitamin D deficiency   • Hypokalemia   • Lactic acidosis   • Fatty liver disease, nonalcoholic   • Sinus bradycardia   • NSTEMI (non-ST elevated myocardial infarction)   • Tobacco abuse   • Hypoxia   • Peripheral vascular disease   • Diabetic polyneuropathy associated with type 2 diabetes mellitus   • Anemia, blood loss   • Chronic pain   • Chronic, continuous use of opioids   • Chronic anxiety   • Chronically on benzodiazepine therapy   • Tubular adenoma   • Elevated troponin level   • Urine abnormality   • ELIF (acute kidney injury)   • Cellulitis of sacral region   • Metabolic encephalopathy   • Fever   • Sepsis   • Acute on chronic respiratory failure with hypoxia   • Acute cystitis without hematuria   • Coronary artery disease involving native heart   • Takotsubo cardiomyopathy   • Elevated troponin   • Chronic systolic heart failure   • Chronic respiratory failure with hypoxia   • Infected sacral decubitus ulcers   • Weakness    • Infected decubitus ulcer       Past Medical History:   Diagnosis Date   • Acute on chronic respiratory failure with hypoxia 2/13/2018   • ELIF (acute kidney injury) 2/13/2018   • ELIF (acute kidney injury) 2/13/2018   • Altered mental status 2/13/2018   • Bronchitis    • Cellulitis of sacral region 2/13/2018   • Cervical cancer    • Cholelithiasis 5/11/2016   • Chronic anxiety 2/27/2017   • Chronic bronchitis    • Chronic respiratory failure with hypoxia 3/8/2018   • Chronic systolic heart failure 3/8/2018   • Chronically on benzodiazepine therapy 2/27/2017   • Degenerative arthritis    • Diabetes mellitus    • Dyslipidemia 5/11/2016   • Dyspnea    • Elevated troponin level 2/13/2018   • Fatty liver disease, nonalcoholic 11/21/2016   • Fever 2/13/2018   • Fibromyalgia    • GERD (gastroesophageal reflux disease)    • H/O echocardiogram    • History of pneumonia    • Hypertension 5/11/2016    16. H/O echocardiogram (V15.89) (Z92.89)  · A.  Echocardiogram of 02/03/2015 reports an ejection fraction of 60-65%, mild concentric     LVH, trace mitral regurgitation, mild tricuspid and pulmonic regurgitation and calculated     RVSP of 35 mmHg, the main pulmonary artery is also mildly dilated.   • Hypothyroidism 5/11/2016    Description: A.  On replacement therapy.   • Infected wound 3/8/2018   • Nausea    • Obesity    • DINA (obstructive sleep apnea)     intolerant of CPAP therapy   • Osteoporosis    • Osteoporosis    • Polycythemia vera 5/11/2016   • Pulmonary emphysema    • Restrictive ventilatory defect    • Rhinitis    • Sepsis 2/13/2018   • Uncontrolled diabetes mellitus 5/11/2016   • Urine abnormality 2/13/2018   • Uterine cancer    • Vitamin D deficiency 8/1/2016   • Weakness 3/8/2018       Past Surgical History:   Procedure Laterality Date   • AMPUTATION DIGIT Left 11/23/2016    Procedure: AMPUTATION TRANS METATARSAL - ray amutation of the left great toe;  Surgeon: Arsalan Feliciano MD;  Location: [facility];  Service:     • AMPUTATION DIGIT Left 3/2/2017    Procedure: LEFT FOOT TRANSMETATARSAL AMPUTATION TOES 2,3,4,5;  Surgeon: Joey Guaman MD;  Location:  [facility];  Service:    • BACK SURGERY      lumbar fusions x5--multiple times; 1995, 1997, 1998, 1999 and 2008   • BELOW KNEE AMPUTATION Left 2/25/2017    Procedure: EXTENDED LEFT TOE AMPUTATION;  Surgeon: Arsalan Feliciano MD;  Location:  [facility];  Service:    • CARDIAC CATHETERIZATION N/A 11/26/2016    Procedure: Left Heart Cath;  Surgeon: Ash Nuñez MD;  Location:  [facility];  Service:    • CARDIAC CATHETERIZATION N/A 2/20/2018    Procedure: Left Heart Cath;  Surgeon: Lane Zamorano MD;  Location:  [facility];  Service:    • CARDIAC CATHETERIZATION N/A 2/20/2018    Procedure: Right Heart Cath;  Surgeon: Lane Zamorano MD;  Location:  [facility];  Service:    • HAND SURGERY Bilateral     x3   • HYSTERECTOMY      status post uterine and cervical cancer   • LUMBAR SPINE SURGERY      arthrodesis by anterior approach addit interspace   • TONSILLECTOMY         Current Outpatient Prescriptions on File Prior to Visit   Medication Sig   • acetaminophen (TYLENOL) 325 MG tablet Take 2 tablets by mouth Every 4 (Four) Hours As Needed for mild pain (1-3) or fever (temperature greater than 101F).   • aspirin 81 MG EC tablet Take 1 tablet by mouth Daily.   • atorvastatin (LIPITOR) 40 MG tablet TAKE ONE TABLET BY MOUTH EVERY NIGHT   • atorvastatin (LIPITOR) 80 MG tablet Take 1 tablet by mouth Every Night.   • baclofen (LIORESAL) 10 MG tablet Take 1 tablet by mouth 2 (Two) Times a Day As Needed for Muscle Spasms.   • bisacodyl (DULCOLAX) 5 MG EC tablet Take 1 tablet by mouth Daily As Needed for Constipation.   • busPIRone (BUSPAR) 7.5 MG tablet Take 1 tablet by mouth Every 12 (Twelve) Hours.   • cetirizine (ZyrTEC) 10 MG tablet Take 10 mg by mouth Every Night.   • clonazePAM (KlonoPIN) 0.5 MG tablet TAKE ONE-HALF TABLET BY MOUTH TWICE A  DAY AS NEEDED   • clopidogrel (PLAVIX) 75 MG tablet TAKE ONE TABLET BY MOUTH DAILY   • clotrimazole (MYCELEX) 10 MG lesa Take 1 tablet by mouth 3 (Three) Times a Day.   • Cyanocobalamin 1000 MCG/ML kit Inject 1,000 mcg as directed Every 30 (Thirty) Days.   • fluticasone (FLONASE) 50 MCG/ACT nasal spray 1 spray into each nostril 2 (Two) Times a Day.   • furosemide (LASIX) 40 MG tablet Take 1 tablet by mouth Daily.   • levothyroxine (SYNTHROID, LEVOTHROID) 112 MCG tablet TAKE ONE TABLET BY MOUTH DAILY   • lisinopril (PRINIVIL,ZESTRIL) 10 MG tablet Take 10 mg by mouth Daily.   • melatonin 5 MG sublingual tablet sublingual tablet Place 1 tablet under the tongue At Night As Needed (insomnia).   • nystatin (MYCOSTATIN) 084354 UNIT/GM powder Under breasts, ABD folds, and groin fields every 12 hours   • omeprazole (priLOSEC) 20 MG capsule TAKE ONE CAPSULE BY MOUTH DAILY   • ondansetron (ZOFRAN) 4 MG tablet Take 1 tablet by mouth Every 6 (Six) Hours As Needed for Nausea or Vomiting.   • polyethylene glycol (MIRALAX) packet Take 17 g by mouth Daily As Needed (CONSTIPATION).   • potassium chloride (MICRO-K) 10 MEQ CR capsule Take 2 capsules by mouth Daily.   • pregabalin (LYRICA) 100 MG capsule Take 1 capsule by mouth Every 8 (Eight) Hours.   • probiotic (CULTURELLE) capsule capsule Take 1 capsule by mouth Daily.   • [DISCONTINUED] albuterol (PROVENTIL) (2.5 MG/3ML) 0.083% nebulizer solution Take 2.5 mg by nebulization Every 6 (Six) Hours As Needed.   • [DISCONTINUED] ezetimibe (ZETIA) 10 MG tablet Take 1 tablet by mouth Daily.   • [DISCONTINUED] fentaNYL (DURAGESIC) 75 MCG/HR patch Place 1 patch on the skin Every Other Day.   • [DISCONTINUED] oxyCODONE-acetaminophen (PERCOCET) 7.5-325 MG per tablet Take 1 tablet by mouth Every 6 (Six) Hours As Needed for Moderate Pain .   • dextrose (D50W) 50 % solution Infuse 50 mL into a venous catheter Every 15 (Fifteen) Minutes As Needed for Low Blood Sugar (Blood Sugar Less Than 70).    • dextrose (GLUTOSE) 40 % gel Take 15 g by mouth Every 15 (Fifteen) Minutes As Needed for Low Blood Sugar (Blood sugar less than 70).   • glucagon, human recombinant, (GLUCAGEN DIAGNOSTIC) 1 MG injection Inject 1 mg under the skin As Needed (Blood Glucose Less Than 70).   • insulin lispro (humaLOG) 100 UNIT/ML injection Inject 0-9 Units under the skin 4 (Four) Times a Day With Meals & at Bedtime.   • [DISCONTINUED] fluconazole (DIFLUCAN) 100 MG tablet Take 1 tablet by mouth Daily.     No current facility-administered medications on file prior to visit.        Family History   Problem Relation Age of Onset   • Arthritis Mother    • Diabetes Mother    • Colon polyps Mother    • Diverticulitis Mother    • Arthritis Father    • Bleeding Disorder Father    • Diabetes Father    • Kidney disease Father    • COPD Sister         currently smokes   • Arthritis Brother    • Diabetes Brother    • Alcohol abuse Brother    • Heart murmur Daughter    • Arthritis Other    • Diabetes Other        Social History     Social History   • Marital status:      Spouse name: N/A   • Number of children: 1   • Years of education: N/A     Occupational History   •  Disabled     Social History Main Topics   • Smoking status: Former Smoker     Packs/day: 1.50     Years: 48.00     Types: Cigarettes     Quit date: 2/27/2017   • Smokeless tobacco: Never Used   • Alcohol use No   • Drug use: No   • Sexual activity: Defer     Other Topics Concern   • Not on file     Social History Narrative    Mrs. Langston is a 62 year old white  female. She lives with her spouse in [facility]. She states she does her own ADLs but appears disabled. They have one child and two grandchildren. She has a living will.         ROS:    Review of Systems   Constitutional: Positive for fatigue. Negative for chills, diaphoresis, fever and unexpected weight change.   HENT: Negative for congestion, ear pain, hearing loss, nosebleeds, postnasal drip, sinus  "pressure and sore throat.    Eyes: Negative for pain, discharge and itching.   Respiratory: Positive for shortness of breath. Negative for cough, chest tightness and wheezing.    Cardiovascular: Positive for palpitations. Negative for chest pain and leg swelling.   Gastrointestinal: Positive for abdominal pain. Negative for abdominal distention, blood in stool, constipation, diarrhea, nausea and vomiting.   Endocrine: Negative for heat intolerance, polydipsia and polyuria.   Genitourinary: Negative for difficulty urinating, dysuria, frequency and hematuria.   Musculoskeletal: Positive for arthralgias, back pain and gait problem. Negative for joint swelling and myalgias.   Skin: Positive for rash and wound.   Neurological: Positive for tremors, weakness and light-headedness. Negative for dizziness, syncope and headaches.   Psychiatric/Behavioral: Positive for decreased concentration, dysphoric mood and sleep disturbance. The patient is nervous/anxious.        /72   Pulse 68   Ht 167.6 cm (65.98\")   Wt 106 kg (234 lb)   LMP  (LMP Unknown)   BMI 37.79 kg/m²     Physical Exam:    Physical Exam   Constitutional: She is oriented to person, place, and time. She appears well-developed and well-nourished.   HENT:   Head: Normocephalic and atraumatic.   Right Ear: External ear normal.   Left Ear: External ear normal.   Mouth/Throat: Oropharynx is clear and moist.   Eyes: Conjunctivae and EOM are normal.   Neck: Normal range of motion. Neck supple.   Cardiovascular: Normal rate and regular rhythm.    2/6 СЕРГЕЙ   Pulmonary/Chest: Effort normal.   Mildly diminished   Abdominal: Soft. Bowel sounds are normal.   Musculoskeletal: She exhibits tenderness (pain on back flexion past 30  ).   Using wheelchair   Lymphadenopathy:     She has no cervical adenopathy.   Neurological: She is alert and oriented to person, place, and time.   Skin: Skin is warm and dry. No rash noted.   Right buttock decub not examined   Psychiatric: " She has a normal mood and affect. Her behavior is normal. Thought content normal.       Procedure:      Discussion/Summary:    chronic back pain- refill patch and percocet as needed, advised need to reduce the dose as we are doing  htn-stable  fibromylagia- cont current tx  hyperlipidemia-cont lipitor and zetia  hypothroid-cont replacement, recheck noted  depression with anxiety-cont buspar and klonopine ,advised her of risk of addiction  polycythemia-cbc noted  retinal vein occlusion- cont rf modification  b12 def-admin on rtc and repeat in 2 mos  nausea-phenergan prn, will hold reglan  DM-labs noted, counseled on low carb, recheck after 5/14, cont januvia  diaphoresis-better   Elevated lft/?autoimmune-f/u gastro recs , labs noted  Eczema-cont prn steroids  Left foot ulcer and osteo-resolved  FE def anemia-cont FE, recheck on rtc  Tremor-cont topamax and BB  Buttocks decub-cont local wound care  high risk meds/jerking-labs noted      labs noted and dw patient, counseled on adequate hydration and diet/wt loss    Current Outpatient Prescriptions:   •  acetaminophen (TYLENOL) 325 MG tablet, Take 2 tablets by mouth Every 4 (Four) Hours As Needed for mild pain (1-3) or fever (temperature greater than 101F)., Disp: 100 tablet, Rfl: 0  •  albuterol (PROVENTIL) (2.5 MG/3ML) 0.083% nebulizer solution, Take 2.5 mg by nebulization Every 6 (Six) Hours As Needed for Wheezing or Shortness of Air., Disp: 120 vial, Rfl: 5  •  aspirin 81 MG EC tablet, Take 1 tablet by mouth Daily., Disp: , Rfl:   •  atorvastatin (LIPITOR) 40 MG tablet, TAKE ONE TABLET BY MOUTH EVERY NIGHT, Disp: 90 tablet, Rfl: 2  •  atorvastatin (LIPITOR) 80 MG tablet, Take 1 tablet by mouth Every Night., Disp: , Rfl:   •  baclofen (LIORESAL) 10 MG tablet, Take 1 tablet by mouth 2 (Two) Times a Day As Needed for Muscle Spasms., Disp: , Rfl:   •  bisacodyl (DULCOLAX) 5 MG EC tablet, Take 1 tablet by mouth Daily As Needed for Constipation., Disp: , Rfl:   •   busPIRone (BUSPAR) 7.5 MG tablet, Take 1 tablet by mouth Every 12 (Twelve) Hours., Disp: , Rfl:   •  cetirizine (ZyrTEC) 10 MG tablet, Take 10 mg by mouth Every Night., Disp: , Rfl:   •  clonazePAM (KlonoPIN) 0.5 MG tablet, TAKE ONE-HALF TABLET BY MOUTH TWICE A DAY AS NEEDED, Disp: 90 tablet, Rfl: 0  •  clopidogrel (PLAVIX) 75 MG tablet, TAKE ONE TABLET BY MOUTH DAILY, Disp: 90 tablet, Rfl: 2  •  clotrimazole (MYCELEX) 10 MG lesa, Take 1 tablet by mouth 3 (Three) Times a Day., Disp: , Rfl:   •  Cyanocobalamin 1000 MCG/ML kit, Inject 1,000 mcg as directed Every 30 (Thirty) Days., Disp: 1 kit, Rfl: 3  •  ezetimibe (ZETIA) 10 MG tablet, Take 1 tablet by mouth Daily., Disp: 90 tablet, Rfl: 3  •  fentaNYL (DURAGESIC) 75 MCG/HR patch, Place 1 patch on the skin Every Other Day., Disp: 15 patch, Rfl: 0  •  fluticasone (FLONASE) 50 MCG/ACT nasal spray, 1 spray into each nostril 2 (Two) Times a Day., Disp: , Rfl:   •  furosemide (LASIX) 40 MG tablet, Take 1 tablet by mouth Daily., Disp: , Rfl:   •  levothyroxine (SYNTHROID, LEVOTHROID) 112 MCG tablet, TAKE ONE TABLET BY MOUTH DAILY, Disp: 90 tablet, Rfl: 1  •  lisinopril (PRINIVIL,ZESTRIL) 10 MG tablet, Take 10 mg by mouth Daily., Disp: , Rfl:   •  melatonin 5 MG sublingual tablet sublingual tablet, Place 1 tablet under the tongue At Night As Needed (insomnia)., Disp: 30 tablet, Rfl: 0  •  nystatin (MYCOSTATIN) 949706 UNIT/GM powder, Under breasts, ABD folds, and groin fields every 12 hours, Disp: , Rfl:   •  omeprazole (priLOSEC) 20 MG capsule, TAKE ONE CAPSULE BY MOUTH DAILY, Disp: 90 capsule, Rfl: 1  •  ondansetron (ZOFRAN) 4 MG tablet, Take 1 tablet by mouth Every 6 (Six) Hours As Needed for Nausea or Vomiting., Disp: , Rfl:   •  oxyCODONE-acetaminophen (PERCOCET) 7.5-325 MG per tablet, Take 1 tablet by mouth Every 6 (Six) Hours As Needed for Moderate Pain ., Disp: 120 tablet, Rfl: 0  •  polyethylene glycol (MIRALAX) packet, Take 17 g by mouth Daily As Needed  (CONSTIPATION)., Disp: , Rfl:   •  potassium chloride (MICRO-K) 10 MEQ CR capsule, Take 2 capsules by mouth Daily., Disp: , Rfl:   •  pregabalin (LYRICA) 100 MG capsule, Take 1 capsule by mouth Every 8 (Eight) Hours., Disp: 9 capsule, Rfl: 0  •  probiotic (CULTURELLE) capsule capsule, Take 1 capsule by mouth Daily., Disp: 60 capsule, Rfl: 0  •  dextrose (D50W) 50 % solution, Infuse 50 mL into a venous catheter Every 15 (Fifteen) Minutes As Needed for Low Blood Sugar (Blood Sugar Less Than 70)., Disp: , Rfl:   •  dextrose (GLUTOSE) 40 % gel, Take 15 g by mouth Every 15 (Fifteen) Minutes As Needed for Low Blood Sugar (Blood sugar less than 70)., Disp: , Rfl:   •  glucagon, human recombinant, (GLUCAGEN DIAGNOSTIC) 1 MG injection, Inject 1 mg under the skin As Needed (Blood Glucose Less Than 70)., Disp: , Rfl:   •  insulin lispro (humaLOG) 100 UNIT/ML injection, Inject 0-9 Units under the skin 4 (Four) Times a Day With Meals & at Bedtime., Disp: , Rfl: 12        Tamara was seen today for hypothyroidism, diabetes, pain and hypertension.    Diagnoses and all orders for this visit:    Chronic systolic heart failure    Coronary artery disease involving native coronary artery of native heart without angina pectoris    Essential hypertension    Peripheral vascular disease    Takotsubo cardiomyopathy    Chronic seasonal allergic rhinitis due to pollen    Other emphysema    Cobalamin deficiency    Fatty liver disease, nonalcoholic    Gastroesophageal reflux disease without esophagitis    Vitamin D deficiency    Diabetic polyneuropathy associated with type 2 diabetes mellitus    Other specified hypothyroidism    Type 2 diabetes mellitus with ketoacidosis without coma, without long-term current use of insulin    Chronic low back pain with sciatica, sciatica laterality unspecified, unspecified back pain laterality    Essential tremor    Infected decubitus ulcer, unspecified ulcer stage    Primary osteoarthritis involving multiple  joints    Localized osteoporosis without current pathological fracture    Abnormal liver enzymes    Chronic anxiety    Chronic pain syndrome  -     oxyCODONE-acetaminophen (PERCOCET) 7.5-325 MG per tablet; Take 1 tablet by mouth Every 6 (Six) Hours As Needed for Moderate Pain .  -     fentaNYL (DURAGESIC) 75 MCG/HR patch; Place 1 patch on the skin Every Other Day.    Chronic, continuous use of opioids  -     oxyCODONE-acetaminophen (PERCOCET) 7.5-325 MG per tablet; Take 1 tablet by mouth Every 6 (Six) Hours As Needed for Moderate Pain .  -     fentaNYL (DURAGESIC) 75 MCG/HR patch; Place 1 patch on the skin Every Other Day.    Dyslipidemia  -     ezetimibe (ZETIA) 10 MG tablet; Take 1 tablet by mouth Daily.    Fibromyalgia    Insomnia, unspecified type    Mixed anxiety depressive disorder    Tubular adenoma    Weakness    Other hyperlipidemia  -     ezetimibe (ZETIA) 10 MG tablet; Take 1 tablet by mouth Daily.    Other orders  -     albuterol (PROVENTIL) (2.5 MG/3ML) 0.083% nebulizer solution; Take 2.5 mg by nebulization Every 6 (Six) Hours As Needed for Wheezing or Shortness of Air.

## 2018-06-22 RX ORDER — BACLOFEN 10 MG/1
10 TABLET ORAL 2 TIMES DAILY PRN
Qty: 60 TABLET | Refills: 2 | Status: SHIPPED | OUTPATIENT
Start: 2018-06-22 | End: 2018-08-08 | Stop reason: SDUPTHER

## 2018-06-22 RX ORDER — PREGABALIN 100 MG/1
100 CAPSULE ORAL EVERY 8 HOURS SCHEDULED
Qty: 90 CAPSULE | Refills: 2 | Status: SHIPPED | OUTPATIENT
Start: 2018-06-22 | End: 2018-07-30 | Stop reason: SDUPTHER

## 2018-06-22 RX ORDER — BACLOFEN 20 MG/1
TABLET ORAL
Qty: 270 TABLET | Refills: 0 | OUTPATIENT
Start: 2018-06-22

## 2018-07-05 ENCOUNTER — TELEPHONE (OUTPATIENT)
Dept: INTERNAL MEDICINE | Facility: CLINIC | Age: 63
End: 2018-07-05

## 2018-07-05 DIAGNOSIS — F11.90 CHRONIC, CONTINUOUS USE OF OPIOIDS: Chronic | ICD-10-CM

## 2018-07-05 DIAGNOSIS — G89.4 CHRONIC PAIN SYNDROME: ICD-10-CM

## 2018-07-05 RX ORDER — FENTANYL 75 UG/H
1 PATCH TRANSDERMAL
Qty: 15 PATCH | Refills: 0 | Status: SHIPPED | OUTPATIENT
Start: 2018-07-05 | End: 2018-08-03 | Stop reason: SDUPTHER

## 2018-07-05 RX ORDER — VARENICLINE TARTRATE 1 MG/1
1 TABLET, FILM COATED ORAL DAILY
Qty: 30 TABLET | Refills: 0 | Status: SHIPPED | OUTPATIENT
Start: 2018-07-05 | End: 2018-08-08

## 2018-07-05 RX ORDER — OXYCODONE AND ACETAMINOPHEN 7.5; 325 MG/1; MG/1
1 TABLET ORAL EVERY 6 HOURS PRN
Qty: 120 TABLET | Refills: 0 | Status: SHIPPED | OUTPATIENT
Start: 2018-07-05 | End: 2018-08-03 | Stop reason: SDUPTHER

## 2018-07-05 NOTE — PROGRESS NOTES
[facility] AT [address], [address]  [place], [place], [address] [phone]  [email or web address]           OUTPATIENT CLINIC FOLLOW UP NOTE    Patient Care Team:  Patient Care Team:  Harinder Aranda MD as PCP - General  Harinder Aranda MD as PCP - Family Medicine  Arsalan Feliciano MD as Surgeon (Cardiothoracic Surgery)    Subjective:      Chief Complaint   Patient presents with   • Coronary Artery Disease   • Hypertension       HPI:    Tamara Langston is a 62 y.o. female.  History of Present Illness    The patient has a history of CAD, hypertension, hyperlipidemia, diabetes, sleep apnea, CAD, arthritis, uterine and cervical cancer, fatty liver, hypothyroidism, polycythemia vera, fibromyalgia, and osteoporosis.  The patient presents today for follow-up.    Since she was discharged from the hospital the patient has been doing fairly well. He decubitus ulcer has healed after the deo of a wound vacuum. She denies any issues with her medications. She developed lower extremity edema ~12 days ago and has been unable to walk due to the pain caused by the edema.  She followed up with Dr. Feliciano on Friday and he instructed her to double her lasix dose.  She reports that she has had a minimal change in her urine output and her edema is only slightly improved. She reports she has had an increased amount of palpitations.  She denies any chest pain, worsening shortness of breath, orthopnea, PND, dizziness, or syncope.    Review of Systems:  Positive for palpitations, lower extremity edema  Negative for exertional chest pain, dyspnea with exertion, orthopnea, PND, lower extremity edema, palpitations, lightheadedness, syncope.    PFSH:  Patient Active Problem List   Diagnosis   • Atopic rhinitis   • Restrictive ventilatory defect   • COPD (chronic obstructive pulmonary disease) (CMS/[facility])   • Osteoporosis   • Obstructive sleep apnea syndrome   • Abnormal liver enzymes   •  Cholelithiasis   • Chronic back pain   • Mixed anxiety depressive disorder   • Type 2 diabetes mellitus (CMS/HCC)   • Dyslipidemia   • Essential tremor   • Fibromyalgia   • Gastroesophageal reflux disease without esophagitis   • Hypertension   • Hypothyroidism   • Insomnia   • Osteoarthritis   • Psoriasis   • Branch retinal vein occlusion   • Cobalamin deficiency   • Obesity   • Vitamin D deficiency   • Hypokalemia   • Lactic acidosis   • Fatty liver disease, nonalcoholic   • Sinus bradycardia   • NSTEMI (non-ST elevated myocardial infarction) (CMS/HCC)   • Tobacco abuse   • Hypoxia   • Peripheral vascular disease (CMS/HCC)   • Diabetic polyneuropathy associated with type 2 diabetes mellitus (CMS/HCC)   • Anemia, blood loss   • Chronic pain   • Chronic, continuous use of opioids   • Chronic anxiety   • Chronically on benzodiazepine therapy   • Tubular adenoma   • Elevated troponin level   • Urine abnormality   • ELIF (acute kidney injury) (CMS/HCC)   • Cellulitis of sacral region   • Metabolic encephalopathy   • Fever   • Sepsis (CMS/HCC)   • Acute on chronic respiratory failure with hypoxia (CMS/HCC)   • Acute cystitis without hematuria   • Coronary artery disease involving native heart   • Takotsubo cardiomyopathy   • Elevated troponin   • Chronic systolic heart failure (CMS/HCC)   • Chronic respiratory failure with hypoxia (CMS/HCC)   • Infected sacral decubitus ulcers   • Weakness   • Infected decubitus ulcer         Current Outpatient Prescriptions:   •  acetaminophen (TYLENOL) 325 MG tablet, Take 2 tablets by mouth Every 4 (Four) Hours As Needed for mild pain (1-3) or fever (temperature greater than 101F)., Disp: 100 tablet, Rfl: 0  •  albuterol (PROVENTIL) (2.5 MG/3ML) 0.083% nebulizer solution, Take 2.5 mg by nebulization Every 6 (Six) Hours As Needed for Wheezing or Shortness of Air., Disp: 120 vial, Rfl: 5  •  aspirin 81 MG EC tablet, Take 1 tablet by mouth Daily., Disp: , Rfl:   •  atorvastatin (LIPITOR)  40 MG tablet, TAKE ONE TABLET BY MOUTH EVERY NIGHT, Disp: 90 tablet, Rfl: 2  •  atorvastatin (LIPITOR) 80 MG tablet, Take 1 tablet by mouth Every Night., Disp: , Rfl:   •  baclofen (LIORESAL) 10 MG tablet, Take 1 tablet by mouth 2 (Two) Times a Day As Needed for Muscle Spasms., Disp: 60 tablet, Rfl: 2  •  bisacodyl (DULCOLAX) 5 MG EC tablet, Take 1 tablet by mouth Daily As Needed for Constipation., Disp: , Rfl:   •  busPIRone (BUSPAR) 7.5 MG tablet, Take 1 tablet by mouth Every 12 (Twelve) Hours., Disp: , Rfl:   •  cetirizine (ZyrTEC) 10 MG tablet, Take 10 mg by mouth Every Night., Disp: , Rfl:   •  clonazePAM (KlonoPIN) 0.5 MG tablet, TAKE ONE-HALF TABLET BY MOUTH TWICE A DAY AS NEEDED, Disp: 90 tablet, Rfl: 0  •  clopidogrel (PLAVIX) 75 MG tablet, TAKE ONE TABLET BY MOUTH DAILY, Disp: 90 tablet, Rfl: 2  •  clotrimazole (MYCELEX) 10 MG lesa, Take 1 tablet by mouth 3 (Three) Times a Day., Disp: , Rfl:   •  Cyanocobalamin 1000 MCG/ML kit, Inject 1,000 mcg as directed Every 30 (Thirty) Days., Disp: 1 kit, Rfl: 3  •  dextrose (D50W) 50 % solution, Infuse 50 mL into a venous catheter Every 15 (Fifteen) Minutes As Needed for Low Blood Sugar (Blood Sugar Less Than 70)., Disp: , Rfl:   •  dextrose (GLUTOSE) 40 % gel, Take 15 g by mouth Every 15 (Fifteen) Minutes As Needed for Low Blood Sugar (Blood sugar less than 70)., Disp: , Rfl:   •  ezetimibe (ZETIA) 10 MG tablet, Take 1 tablet by mouth Daily., Disp: 90 tablet, Rfl: 3  •  fentaNYL (DURAGESIC) 75 MCG/HR patch, Place 1 patch on the skin Every Other Day., Disp: 15 patch, Rfl: 0  •  fluticasone (FLONASE) 50 MCG/ACT nasal spray, 1 spray into each nostril 2 (Two) Times a Day., Disp: , Rfl:   •  furosemide (LASIX) 40 MG tablet, TAKE ONE TABLET BY MOUTH TWICE A DAY, Disp: 180 tablet, Rfl: 1  •  glucagon, human recombinant, (GLUCAGEN DIAGNOSTIC) 1 MG injection, Inject 1 mg under the skin As Needed (Blood Glucose Less Than 70)., Disp: , Rfl:   •  insulin lispro (humaLOG) 100  UNIT/ML injection, Inject 0-9 Units under the skin 4 (Four) Times a Day With Meals & at Bedtime., Disp: , Rfl: 12  •  levothyroxine (SYNTHROID, LEVOTHROID) 112 MCG tablet, TAKE ONE TABLET BY MOUTH DAILY, Disp: 90 tablet, Rfl: 1  •  lisinopril (PRINIVIL,ZESTRIL) 10 MG tablet, Take 10 mg by mouth Daily., Disp: , Rfl:   •  melatonin 5 MG sublingual tablet sublingual tablet, Place 1 tablet under the tongue At Night As Needed (insomnia)., Disp: 30 tablet, Rfl: 0  •  nystatin (MYCOSTATIN) 632366 UNIT/GM powder, Under breasts, ABD folds, and groin fields every 12 hours, Disp: , Rfl:   •  omeprazole (priLOSEC) 20 MG capsule, TAKE ONE CAPSULE BY MOUTH DAILY, Disp: 90 capsule, Rfl: 1  •  ondansetron (ZOFRAN) 4 MG tablet, Take 1 tablet by mouth Every 6 (Six) Hours As Needed for Nausea or Vomiting., Disp: , Rfl:   •  oxyCODONE-acetaminophen (PERCOCET) 7.5-325 MG per tablet, Take 1 tablet by mouth Every 6 (Six) Hours As Needed for Moderate Pain ., Disp: 120 tablet, Rfl: 0  •  polyethylene glycol (MIRALAX) packet, Take 17 g by mouth Daily As Needed (CONSTIPATION)., Disp: , Rfl:   •  potassium chloride (MICRO-K) 10 MEQ CR capsule, Take 2 capsules by mouth Daily., Disp: , Rfl:   •  pregabalin (LYRICA) 100 MG capsule, Take 1 capsule by mouth Every 8 (Eight) Hours., Disp: 90 capsule, Rfl: 2  •  probiotic (CULTURELLE) capsule capsule, Take 1 capsule by mouth Daily., Disp: 60 capsule, Rfl: 0  •  varenicline (CHANTIX CONTINUING MONTH SOCORRO) 1 MG tablet, Take 1 tablet by mouth Daily., Disp: 30 tablet, Rfl: 0  •  metOLazone (ZAROXOLYN) 5 MG tablet, Take 1 tablet by mouth Every Other Day for 3 doses., Disp: 3 tablet, Rfl: 0    Allergies   Allergen Reactions   • Cortisone Other (See Comments)     Hotness all over body and hyper   • Oxycontin [Oxycodone] Other (See Comments)     psoriasis   • Tolmetin Rash     Tolectin-rash and itching   • Vancomycin Rash        reports that she quit smoking about 16 months ago. Her smoking use included  "Cigarettes. She has a 72.00 pack-year smoking history. She has never used smokeless tobacco.    Family History   Problem Relation Age of Onset   • Arthritis Mother    • Diabetes Mother    • Colon polyps Mother    • Diverticulitis Mother    • Arthritis Father    • Bleeding Disorder Father    • Diabetes Father    • Kidney disease Father    • COPD Sister         currently smokes   • Arthritis Brother    • Diabetes Brother    • Alcohol abuse Brother    • Heart murmur Daughter    • Arthritis Other    • Diabetes Other          Objective:   Blood pressure 146/60, pulse 70, height 167.6 cm (66\"), weight 104 kg (230 lb), not currently breastfeeding.  CONSTITUTIONAL: No acute distress, normal affect  RESPIRATORY: Normal effort. Clear to auscultation bilaterally without wheezing or rales  CARDIOVASCULAR: Carotids with normal upstrokes without bruits.  Regular rate and rhythm with normal S1 and S2. Without gallop or rub. СЕРГЕЙ LUSB  PERIPHERAL VASCULAR: Normal radial pulses bilaterally. There is 2+ pedal edema bilaterally.    Labs:    Glucose   Date Value Ref Range Status   04/16/2018 109 (H) 70 - 100 mg/dL Final     BUN   Date Value Ref Range Status   04/24/2018 17 9 - 23 mg/dL Final   04/16/2018 30 (H) 9 - 23 mg/dL Final     Creatinine   Date Value Ref Range Status   04/24/2018 1.30 0.60 - 1.30 mg/dL Final   04/16/2018 1.60 (H) 0.60 - 1.30 mg/dL Final   05/09/2017 1.60 (H) 0.60 - 1.30 mg/dL Final     Comment:     Serial Number: [id number]    : [id number]     Sodium   Date Value Ref Range Status   04/24/2018 144 132 - 146 mmol/L Final   04/16/2018 138 132 - 146 mmol/L Final     Potassium   Date Value Ref Range Status   04/24/2018 3.9 3.5 - 5.5 mmol/L Final   04/16/2018 4.1 3.5 - 5.5 mmol/L Final     Chloride   Date Value Ref Range Status   04/24/2018 105 99 - 109 mmol/L Final   04/16/2018 103 99 - 109 mmol/L Final     CO2   Date Value Ref Range Status   04/16/2018 26.0 20.0 - 31.0 mmol/L Final     Total CO2   Date Value Ref " Range Status   04/24/2018 30.0 20.0 - 31.0 mmol/L Final     Calcium   Date Value Ref Range Status   04/24/2018 9.4 8.7 - 10.4 mg/dL Final   04/16/2018 8.9 8.7 - 10.4 mg/dL Final     Total Protein   Date Value Ref Range Status   04/16/2018 6.3 5.7 - 8.2 g/dL Final     Albumin   Date Value Ref Range Status   04/24/2018 4.00 3.20 - 4.80 g/dL Final   04/16/2018 3.60 3.20 - 4.80 g/dL Final     ALT (SGPT)   Date Value Ref Range Status   04/24/2018 40 7 - 40 U/L Final   04/16/2018 36 7 - 40 U/L Final     AST (SGOT)   Date Value Ref Range Status   04/24/2018 42 (H) 0 - 33 U/L Final   04/16/2018 62 (H) 0 - 33 U/L Final     Alkaline Phosphatase   Date Value Ref Range Status   04/24/2018 101 (H) 25 - 100 U/L Final   04/16/2018 92 25 - 100 U/L Final     Total Bilirubin   Date Value Ref Range Status   04/24/2018 0.2 (L) 0.3 - 1.2 mg/dL Final   04/16/2018 0.2 (L) 0.3 - 1.2 mg/dL Final     eGFR Non  Amer   Date Value Ref Range Status   04/16/2018 32 (L) >60 mL/min/1.73 Final     eGFR Non  Am   Date Value Ref Range Status   04/24/2018 41 (L) >60 mL/min/1.73 Final     Comment:     [employer] Guidelines  Stage     Description        GFR  1         Normal or High     90+  2         Mild decrease      60-89  3         Moderate decrease  30-59  4         Severe decrease    15-29  5         Kidney failure     <15       A/G Ratio   Date Value Ref Range Status   04/24/2018 1.5 1.5 - 2.5 g/dL Final   04/16/2018 1.3 (L) 1.5 - 2.5 g/dL Final     BUN/Creatinine Ratio   Date Value Ref Range Status   04/24/2018 13.1 7.0 - 25.0 Final   04/16/2018 18.8 7.0 - 25.0 Final     Anion Gap   Date Value Ref Range Status   04/16/2018 9.0 3.0 - 11.0 mmol/L Final       Lab Results   Component Value Date    CHOL 112 02/14/2018    CHOL 123 02/25/2017    CHOL 244 (H) 11/27/2016     Lab Results   Component Value Date    TRIG 141 02/14/2018    TRIG 349 (H) 10/03/2017    TRIG 303 (H) 06/01/2017     Lab Results   Component Value Date     HDL 28 (L) 02/14/2018    HDL 37 (L) 10/03/2017    HDL 34 (L) 06/01/2017     No components found for: LDLCALC  Lab Results   Component Value Date    VLDL 69.8 10/03/2017    VLDL 60.6 06/01/2017    VLDL 38 12/13/2016     No results found for: LDLHDL    Diagnostic Data:    Procedures    TTE 2/2018  · This was a limited echocardiogram performed to evaluate the left ventricular ejection fraction  · Left ventricular systolic function is normal. Estimated EF appears to be in the range of 51 - 55%.  · The following left ventricular wall segments are hypokinetic: mid anterior, apical anterior, apical lateral, apical inferior, apical septal and apex hypokinetic. The basal segments are hyperdynamic.  · When compared to the prior echocardiogram performed on February 14, 2018, the hypokinetic apical segments have mildly improved. The overall ejection fraction has improved.    TTE 2/2018  · Left ventricular systolic function is moderately decreased. Estimated EF = 35%.  · Left ventricular wall thickness is consistent with mild-to-moderate concentric hypertrophy.  · The following left ventricular wall segments are hypokinetic: apical anterior, apical lateral, apical inferior, apical septal and apex hypokinetic. The basal segments are hyperdynamic.  · The findings have the apperance of stress-induced cardiomyopathy (Takotsubo)  · Left ventricular diastolic dysfunction (grade I a) consistent with impaired relaxation.  · Right ventricular cavity is small in size.  · The main pulmonary artery is moderately dilated.  · Left atrial cavity size is borderline dilated.  · There is moderate calcification of the aortic valve.  · Moderate aortic valve stenosis is present.    [facility] 02/20/2018  · There was severe coronary artery disease involving the mid LAD and first diagonal branch that is now status post intervention with a Tryton 3.5mm proximal, 3.0mm distal bifurcation stent extending from the LAD into the diagonal branch and a Synergy  3.5 x 38 mm drug-eluting stent extending from the proximal to mid LAD.  · Normal left ventricular ejection fraction but with hypokinesis of the apical segments  · Normal right heart filling pressures.  Normal pulmonate capillary wedge pressure  · Normal cardiac index    [facility] 11/2016  1.  50% mid RCA stenosis.  2.  40% proximal LAD stenosis   3.  Normal left ventricular function  4.  Hypertension  Assessment and Plan:   Tamara was seen today for coronary artery disease and hypertension.    Diagnoses and all orders for this visit:    Coronary artery disease involving native coronary artery of native heart without angina pectoris  -Currently chest pain free.  -Continue DAPT, statin    Takotsubo cardiomyopathy  Chronic systolic heart failure (CMS/HCC)  -Repeat limited ECHO to evaluate EF.  -Metolazone 5 mg on MWF for 3 doses.  -Continue Lasix at 80mg daily for now  -CMP & BNP today and in 1 week.  -Follow-up in one week in Heart and Valve clinic    Essential hypertension  -Continue current medications.    Lower extremity edema  -Bilateral venous duplex to evaluate for DVT.    - Return in about 3 months (around 10/9/2018).    JEOVANY Farooq obtained past medical, family history, social history, review of systems and functioned as a scribe for the remainder of the dictation for Dr. Zamorano.     I, Lane Zamorano MD, personally performed the services as scribed by the above named individual. I have made any necessary edits and it is both accurate and complete.     Lane Zamorano MD, MSc, [facility]  Interventional Cardiology  [facility]

## 2018-07-06 ENCOUNTER — OFFICE VISIT (OUTPATIENT)
Dept: CARDIAC SURGERY | Facility: CLINIC | Age: 63
End: 2018-07-06

## 2018-07-06 VITALS
BODY MASS INDEX: 36.96 KG/M2 | SYSTOLIC BLOOD PRESSURE: 145 MMHG | HEART RATE: 78 BPM | TEMPERATURE: 99.3 F | WEIGHT: 230 LBS | HEIGHT: 66 IN | OXYGEN SATURATION: 90 % | DIASTOLIC BLOOD PRESSURE: 71 MMHG

## 2018-07-06 DIAGNOSIS — M79.605 PAIN IN BOTH LOWER EXTREMITIES: Primary | ICD-10-CM

## 2018-07-06 DIAGNOSIS — I50.22 CHRONIC SYSTOLIC HEART FAILURE (HCC): ICD-10-CM

## 2018-07-06 DIAGNOSIS — I25.10 CORONARY ARTERY DISEASE INVOLVING NATIVE CORONARY ARTERY OF NATIVE HEART WITHOUT ANGINA PECTORIS: ICD-10-CM

## 2018-07-06 DIAGNOSIS — M79.604 PAIN IN BOTH LOWER EXTREMITIES: Primary | ICD-10-CM

## 2018-07-06 PROCEDURE — 99212 OFFICE O/P EST SF 10 MIN: CPT | Performed by: THORACIC SURGERY (CARDIOTHORACIC VASCULAR SURGERY)

## 2018-07-06 NOTE — PROGRESS NOTES
"Patient Information  Tamara Langston                                                                                          [address] [address]      1955  [phone]    Chief Complaint   Patient presents with   • Edema     Follow-up per patient for evaluation of left foot.        History of Present Illness:The patient requested to be seen by me did day for recent swelling in both feet and ankles over the last week to 10 days.  She is concerned that she might have infection since we were involved in a transmetatarsal tarsal amputation of her left foot over one year ago and the patient was last seen by me on 11/09/2017 for that amputation and at that time we did not have return visit scheduled to us.  Since that visit in November 2017 she has been admitted to the hospital in February 2018  and found to have coronary artery disease with systolic congestive heart failure and she underwent angioplasty stenting to the LAD and diagonal by  at that time.  She is been maintained on Lasix 40 mg daily.  She is scheduled to see Dr. Zamorano as an outpatient for cardiology follow-up on this coming Monday.  She states that she is started back smoking recently but is going to try to quit and has been started on Chantix    Vitals:    07/06/18 0959   BP: 145/71   BP Location: Right arm   Patient Position: Sitting   Pulse: 78   Temp: 99.3 °F (37.4 °C)   SpO2: 90%   Weight: 104 kg (230 lb)   Height: 167.6 cm (66\")        Physical Exam examination today reveals both feet to be well perfused with palpable pedal pulses.  She does have 1+ edema on the dorsum of both feet as well as in the ankle area.  There are no areas of skin ulceration seen in either foot    Lab/other results: No laboratory results to review    Assessment: #1.  Transmetatarsal amputation of the left foot is completely healed  #2.  Diabetes mellitus  #3.  Remote history of smoking but is started smoking a few cigarettes " again recently on a daily basis  #4.  Recent coronary arteries stenting to the LAD and diagonal in February 2018 by Dr. Zamorano  Plan: I Think most of the patient's complaints of the swelling as well as pain in the feet are related to her edema that is secondary to her systolic congestive failure.  I tried to reassure her today and do not think I should be changing any of her medications from a cardiac standpoint since she is going to see Dr. Zamorano, her cardiologist, this coming Monday in the office and I will let him make the decision on any changes in her diuretic therapy or any afterload medications.  I am not going to plan to see her back unless some need arises    Arsalan Feliciano M.D.

## 2018-07-09 ENCOUNTER — OFFICE VISIT (OUTPATIENT)
Dept: CARDIOLOGY | Facility: CLINIC | Age: 63
End: 2018-07-09

## 2018-07-09 ENCOUNTER — RESULTS ENCOUNTER (OUTPATIENT)
Dept: CARDIOLOGY | Facility: CLINIC | Age: 63
End: 2018-07-09

## 2018-07-09 VITALS
HEIGHT: 66 IN | SYSTOLIC BLOOD PRESSURE: 146 MMHG | DIASTOLIC BLOOD PRESSURE: 60 MMHG | BODY MASS INDEX: 36.96 KG/M2 | WEIGHT: 230 LBS | HEART RATE: 70 BPM

## 2018-07-09 DIAGNOSIS — I51.81 TAKOTSUBO CARDIOMYOPATHY: ICD-10-CM

## 2018-07-09 DIAGNOSIS — I25.10 CORONARY ARTERY DISEASE INVOLVING NATIVE CORONARY ARTERY OF NATIVE HEART WITHOUT ANGINA PECTORIS: Primary | Chronic | ICD-10-CM

## 2018-07-09 DIAGNOSIS — I50.22 CHRONIC SYSTOLIC HEART FAILURE (HCC): ICD-10-CM

## 2018-07-09 DIAGNOSIS — I10 ESSENTIAL HYPERTENSION: Chronic | ICD-10-CM

## 2018-07-09 DIAGNOSIS — R60.0 BILATERAL LOWER EXTREMITY EDEMA: ICD-10-CM

## 2018-07-09 PROCEDURE — 99214 OFFICE O/P EST MOD 30 MIN: CPT | Performed by: INTERNAL MEDICINE

## 2018-07-09 RX ORDER — METOLAZONE 5 MG/1
5 TABLET ORAL EVERY OTHER DAY
Qty: 3 TABLET | Refills: 0 | Status: SHIPPED | OUTPATIENT
Start: 2018-07-09 | End: 2018-07-14

## 2018-07-09 RX ORDER — FUROSEMIDE 40 MG/1
TABLET ORAL
Qty: 180 TABLET | Refills: 1 | Status: SHIPPED | OUTPATIENT
Start: 2018-07-09 | End: 2019-02-19 | Stop reason: SDUPTHER

## 2018-07-16 ENCOUNTER — RESULTS ENCOUNTER (OUTPATIENT)
Dept: CARDIOLOGY | Facility: CLINIC | Age: 63
End: 2018-07-16

## 2018-07-16 DIAGNOSIS — I50.22 CHRONIC SYSTOLIC HEART FAILURE (HCC): ICD-10-CM

## 2018-07-17 ENCOUNTER — OFFICE VISIT (OUTPATIENT)
Dept: CARDIOLOGY | Facility: HOSPITAL | Age: 63
End: 2018-07-17

## 2018-07-17 ENCOUNTER — LAB REQUISITION (OUTPATIENT)
Dept: LAB | Facility: HOSPITAL | Age: 63
End: 2018-07-17

## 2018-07-17 ENCOUNTER — HOSPITAL ENCOUNTER (OUTPATIENT)
Dept: CARDIOLOGY | Facility: HOSPITAL | Age: 63
Discharge: HOME | End: 2018-07-17
Admitting: NURSE PRACTITIONER

## 2018-07-17 ENCOUNTER — HOSPITAL ENCOUNTER (OUTPATIENT)
Dept: CARDIOLOGY | Facility: HOSPITAL | Age: 63
Discharge: HOME | End: 2018-07-17

## 2018-07-17 VITALS
TEMPERATURE: 97.8 F | RESPIRATION RATE: 16 BRPM | WEIGHT: 230 LBS | OXYGEN SATURATION: 94 % | SYSTOLIC BLOOD PRESSURE: 128 MMHG | DIASTOLIC BLOOD PRESSURE: 62 MMHG | HEIGHT: 66 IN | BODY MASS INDEX: 36.96 KG/M2 | HEART RATE: 83 BPM

## 2018-07-17 DIAGNOSIS — Z00.00 ROUTINE GENERAL MEDICAL EXAMINATION AT A HEALTH CARE FACILITY: ICD-10-CM

## 2018-07-17 DIAGNOSIS — I50.22 CHRONIC SYSTOLIC HEART FAILURE (HCC): Primary | ICD-10-CM

## 2018-07-17 DIAGNOSIS — I51.81 TAKOTSUBO CARDIOMYOPATHY: ICD-10-CM

## 2018-07-17 DIAGNOSIS — R60.0 BILATERAL LOWER EXTREMITY EDEMA: ICD-10-CM

## 2018-07-17 DIAGNOSIS — I25.10 CORONARY ARTERY DISEASE INVOLVING NATIVE CORONARY ARTERY OF NATIVE HEART WITHOUT ANGINA PECTORIS: ICD-10-CM

## 2018-07-17 DIAGNOSIS — I50.22 CHRONIC SYSTOLIC HEART FAILURE (HCC): ICD-10-CM

## 2018-07-17 DIAGNOSIS — I10 ESSENTIAL HYPERTENSION: ICD-10-CM

## 2018-07-17 LAB
ALBUMIN SERPL-MCNC: 3.87 G/DL (ref 3.2–4.8)
ALBUMIN/GLOB SERPL: 1.4 G/DL (ref 1.5–2.5)
ALP SERPL-CCNC: 190 U/L (ref 25–100)
ALT SERPL W P-5'-P-CCNC: 31 U/L (ref 7–40)
ANION GAP SERPL CALCULATED.3IONS-SCNC: 6 MMOL/L (ref 3–11)
AST SERPL-CCNC: 85 U/L (ref 0–33)
BH CV ECHO MEAS - BSA(HAYCOCK): 2.3 M^2
BH CV ECHO MEAS - BSA: 2.1 M^2
BH CV ECHO MEAS - BZI_BMI: 37.1 KILOGRAMS/M^2
BH CV ECHO MEAS - BZI_METRIC_HEIGHT: 167.6 CM
BH CV ECHO MEAS - BZI_METRIC_WEIGHT: 104.3 KG
BH CV ECHO MEAS - CONTRAST EF (2CH): 55.7 ML/M^2
BH CV ECHO MEAS - CONTRAST EF 4CH: 49.6 ML/M^2
BH CV ECHO MEAS - EDV(MOD-SP2): 106 ML
BH CV ECHO MEAS - EDV(MOD-SP4): 129 ML
BH CV ECHO MEAS - EF(MOD-SP2): 55.7 %
BH CV ECHO MEAS - EF(MOD-SP4): 49.6 %
BH CV ECHO MEAS - ESV(MOD-SP2): 47 ML
BH CV ECHO MEAS - ESV(MOD-SP4): 65 ML
BH CV ECHO MEAS - LV DIASTOLIC VOL/BSA (35-75): 60.8 ML/M^2
BH CV ECHO MEAS - LV SYSTOLIC VOL/BSA (12-30): 30.6 ML/M^2
BH CV ECHO MEAS - LVLD AP2: 6.1 CM
BH CV ECHO MEAS - LVLD AP4: 7.5 CM
BH CV ECHO MEAS - LVLS AP2: 5.9 CM
BH CV ECHO MEAS - LVLS AP4: 6.1 CM
BH CV ECHO MEAS - SI(MOD-SP2): 27.8 ML/M^2
BH CV ECHO MEAS - SI(MOD-SP4): 30.2 ML/M^2
BH CV ECHO MEAS - SV(MOD-SP2): 59 ML
BH CV ECHO MEAS - SV(MOD-SP4): 64 ML
BH CV LOWER VASCULAR LEFT COMMON FEMORAL AUGMENT: NORMAL
BH CV LOWER VASCULAR LEFT COMMON FEMORAL COMPRESS: NORMAL
BH CV LOWER VASCULAR LEFT COMMON FEMORAL PHASIC: NORMAL
BH CV LOWER VASCULAR LEFT COMMON FEMORAL SPONT: NORMAL
BH CV LOWER VASCULAR LEFT DISTAL FEMORAL AUGMENT: NORMAL
BH CV LOWER VASCULAR LEFT DISTAL FEMORAL COMPRESS: NORMAL
BH CV LOWER VASCULAR LEFT GASTRONEMIUS COMPRESS: NORMAL
BH CV LOWER VASCULAR LEFT GREATER SAPH AK COMPRESS: NORMAL
BH CV LOWER VASCULAR LEFT GREATER SAPH BK COMPRESS: NORMAL
BH CV LOWER VASCULAR LEFT LESSER SAPH COMPRESS: NORMAL
BH CV LOWER VASCULAR LEFT MID FEMORAL AUGMENT: NORMAL
BH CV LOWER VASCULAR LEFT MID FEMORAL COMPRESS: NORMAL
BH CV LOWER VASCULAR LEFT MID FEMORAL PHASIC: NORMAL
BH CV LOWER VASCULAR LEFT MID FEMORAL SPONT: NORMAL
BH CV LOWER VASCULAR LEFT PERONEAL AUGMENT: NORMAL
BH CV LOWER VASCULAR LEFT PERONEAL COMPRESS: NORMAL
BH CV LOWER VASCULAR LEFT POPLITEAL AUGMENT: NORMAL
BH CV LOWER VASCULAR LEFT POPLITEAL COMPRESS: NORMAL
BH CV LOWER VASCULAR LEFT POPLITEAL PHASIC: NORMAL
BH CV LOWER VASCULAR LEFT POPLITEAL SPONT: NORMAL
BH CV LOWER VASCULAR LEFT POSTERIOR TIBIAL AUGMENT: NORMAL
BH CV LOWER VASCULAR LEFT POSTERIOR TIBIAL COMPRESS: NORMAL
BH CV LOWER VASCULAR LEFT PROFUNDA FEMORAL AUGMENT: NORMAL
BH CV LOWER VASCULAR LEFT PROFUNDA FEMORAL COMPRESS: NORMAL
BH CV LOWER VASCULAR LEFT PROFUNDA FEMORAL PHASIC: NORMAL
BH CV LOWER VASCULAR LEFT PROFUNDA FEMORAL SPONT: NORMAL
BH CV LOWER VASCULAR LEFT PROXIMAL FEMORAL AUGMENT: NORMAL
BH CV LOWER VASCULAR LEFT PROXIMAL FEMORAL COMPRESS: NORMAL
BH CV LOWER VASCULAR LEFT SAPHENOFEMORAL JUNCTION AUGMENT: NORMAL
BH CV LOWER VASCULAR LEFT SAPHENOFEMORAL JUNCTION COMPRESS: NORMAL
BH CV LOWER VASCULAR LEFT SAPHENOFEMORAL JUNCTION PHASIC: NORMAL
BH CV LOWER VASCULAR LEFT SAPHENOFEMORAL JUNCTION SPONT: NORMAL
BH CV LOWER VASCULAR RIGHT COMMON FEMORAL AUGMENT: NORMAL
BH CV LOWER VASCULAR RIGHT COMMON FEMORAL COMPRESS: NORMAL
BH CV LOWER VASCULAR RIGHT COMMON FEMORAL PHASIC: NORMAL
BH CV LOWER VASCULAR RIGHT COMMON FEMORAL SPONT: NORMAL
BH CV LOWER VASCULAR RIGHT DISTAL FEMORAL AUGMENT: NORMAL
BH CV LOWER VASCULAR RIGHT DISTAL FEMORAL COMPRESS: NORMAL
BH CV LOWER VASCULAR RIGHT GASTRONEMIUS COMPRESS: NORMAL
BH CV LOWER VASCULAR RIGHT GREATER SAPH AK COMPRESS: NORMAL
BH CV LOWER VASCULAR RIGHT GREATER SAPH BK COMPRESS: NORMAL
BH CV LOWER VASCULAR RIGHT LESSER SAPH COMPRESS: NORMAL
BH CV LOWER VASCULAR RIGHT MID FEMORAL AUGMENT: NORMAL
BH CV LOWER VASCULAR RIGHT MID FEMORAL COMPRESS: NORMAL
BH CV LOWER VASCULAR RIGHT MID FEMORAL PHASIC: NORMAL
BH CV LOWER VASCULAR RIGHT MID FEMORAL SPONT: NORMAL
BH CV LOWER VASCULAR RIGHT PERONEAL AUGMENT: NORMAL
BH CV LOWER VASCULAR RIGHT PERONEAL COMPRESS: NORMAL
BH CV LOWER VASCULAR RIGHT POPLITEAL AUGMENT: NORMAL
BH CV LOWER VASCULAR RIGHT POPLITEAL COMPRESS: NORMAL
BH CV LOWER VASCULAR RIGHT POPLITEAL PHASIC: NORMAL
BH CV LOWER VASCULAR RIGHT POPLITEAL SPONT: NORMAL
BH CV LOWER VASCULAR RIGHT POSTERIOR TIBIAL AUGMENT: NORMAL
BH CV LOWER VASCULAR RIGHT POSTERIOR TIBIAL COMPRESS: NORMAL
BH CV LOWER VASCULAR RIGHT PROFUNDA FEMORAL AUGMENT: NORMAL
BH CV LOWER VASCULAR RIGHT PROFUNDA FEMORAL COMPRESS: NORMAL
BH CV LOWER VASCULAR RIGHT PROXIMAL FEMORAL AUGMENT: NORMAL
BH CV LOWER VASCULAR RIGHT PROXIMAL FEMORAL COMPRESS: NORMAL
BH CV LOWER VASCULAR RIGHT SAPHENOFEMORAL JUNCTION AUGMENT: NORMAL
BH CV LOWER VASCULAR RIGHT SAPHENOFEMORAL JUNCTION COMPRESS: NORMAL
BH CV LOWER VASCULAR RIGHT SAPHENOFEMORAL JUNCTION PHASIC: NORMAL
BH CV LOWER VASCULAR RIGHT SAPHENOFEMORAL JUNCTION SPONT: NORMAL
BILIRUB SERPL-MCNC: 0.2 MG/DL (ref 0.3–1.2)
BNP SERPL-MCNC: 47 PG/ML (ref 0–100)
BUN BLD-MCNC: 22 MG/DL (ref 9–23)
BUN/CREAT SERPL: 17.7 (ref 7–25)
CALCIUM SPEC-SCNC: 9 MG/DL (ref 8.7–10.4)
CHLORIDE SERPL-SCNC: 102 MMOL/L (ref 99–109)
CO2 SERPL-SCNC: 30 MMOL/L (ref 20–31)
CREAT BLD-MCNC: 1.24 MG/DL (ref 0.6–1.3)
GFR SERPL CREATININE-BSD FRML MDRD: 44 ML/MIN/1.73
GLOBULIN UR ELPH-MCNC: 2.7 GM/DL
GLUCOSE BLD-MCNC: 204 MG/DL (ref 70–100)
LV EF 2D ECHO EST: 60 %
MAXIMAL PREDICTED HEART RATE: 156 BPM
POTASSIUM BLD-SCNC: 4.7 MMOL/L (ref 3.5–5.5)
PROT SERPL-MCNC: 6.6 G/DL (ref 5.7–8.2)
SODIUM BLD-SCNC: 138 MMOL/L (ref 132–146)
STRESS TARGET HR: 133 BPM

## 2018-07-17 PROCEDURE — 93970 EXTREMITY STUDY: CPT

## 2018-07-17 PROCEDURE — 83880 ASSAY OF NATRIURETIC PEPTIDE: CPT | Performed by: NURSE PRACTITIONER

## 2018-07-17 PROCEDURE — 93970 EXTREMITY STUDY: CPT | Performed by: INTERNAL MEDICINE

## 2018-07-17 PROCEDURE — 93308 TTE F-UP OR LMTD: CPT

## 2018-07-17 PROCEDURE — 36415 COLL VENOUS BLD VENIPUNCTURE: CPT | Performed by: NURSE PRACTITIONER

## 2018-07-17 PROCEDURE — 99214 OFFICE O/P EST MOD 30 MIN: CPT | Performed by: NURSE PRACTITIONER

## 2018-07-17 PROCEDURE — 93308 TTE F-UP OR LMTD: CPT | Performed by: INTERNAL MEDICINE

## 2018-07-17 PROCEDURE — 80053 COMPREHEN METABOLIC PANEL: CPT | Performed by: NURSE PRACTITIONER

## 2018-07-17 RX ORDER — ASPIRIN 325 MG
325 TABLET ORAL DAILY
COMMUNITY
End: 2020-03-20

## 2018-07-17 NOTE — PROGRESS NOTES
[facility]      Encounter Date:07/17/2018     Tamara Langston  [address] [facility] [address]  [phone]    1955    Harinder Aranda MD    Tamara Langston is a 62 y.o. female.      Subjective:     Chief Complaint:   CHF    HPI   Ms. Langston is a 63 YO F with past medical history significant for CAD, HTN, HLP, DMP, DINA, uterine and cervical cancer, fatty liver, hypothyroidism, fibromyalgia and CHF who [facility] at the request of Dr. Zamorano for CHF. Patient had recent hospitalization due to sepsis and decubitus ulcer. She developed worsening BLE and her lasix was doubled without improvement. At her office visit 7/9/18 she was started on metolazone for 3 doses. Echo 2/14/18 showed  showed LVEF 35% with the appearance of Takostubo cardiomyopathy. Patient underwent LHC 2/20 with PCI of the diagonal branch and mid LAD. Repeat echo 2/20/18 showed improved LVEF of 51-55%. She has repeat echo scheduled for today. She took the metolazone MWF but did not take her lasix with it. She just resumed her lasix 40mg this week. She thinks there is some improvement in her swelling. Major complaint is bilateral leg and foot pain. She is very tender to touch and has a history of neuropathy. She is mostly bed bound but has been up a little more lately to help care for her ill . Dyspnea has been stable. She has chronic dyspnea that she relates to her COPD. Wears 2L O2 at home    Patient Active Problem List   Diagnosis   • Atopic rhinitis   • Restrictive ventilatory defect   • COPD (chronic obstructive pulmonary disease) (CMS/HCC)   • Osteoporosis   • Obstructive sleep apnea syndrome   • Abnormal liver enzymes   • Cholelithiasis   • Chronic back pain   • Mixed anxiety depressive disorder   • Type 2 diabetes mellitus (CMS/HCC)   • Dyslipidemia   • Essential tremor   • Fibromyalgia   • Gastroesophageal reflux disease without esophagitis   •  Hypertension   • Hypothyroidism   • Insomnia   • Osteoarthritis   • Psoriasis   • Branch retinal vein occlusion   • Cobalamin deficiency   • Obesity   • Vitamin D deficiency   • Hypokalemia   • Lactic acidosis   • Fatty liver disease, nonalcoholic   • Sinus bradycardia   • NSTEMI (non-ST elevated myocardial infarction) (CMS/[facility])   • Tobacco abuse   • Hypoxia   • Peripheral vascular disease (CMS/HCC)   • Diabetic polyneuropathy associated with type 2 diabetes mellitus (CMS/HCC)   • Anemia, blood loss   • Chronic pain   • Chronic, continuous use of opioids   • Chronic anxiety   • Chronically on benzodiazepine therapy   • Tubular adenoma   • Elevated troponin level   • Urine abnormality   • ELIF (acute kidney injury) (CMS/HCC)   • Cellulitis of sacral region   • Metabolic encephalopathy   • Fever   • Sepsis (CMS/HCC)   • Acute on chronic respiratory failure with hypoxia (CMS/HCC)   • Acute cystitis without hematuria   • Coronary artery disease involving native heart   • Takotsubo cardiomyopathy   • Elevated troponin   • Chronic systolic heart failure (CMS/HCC)   • Chronic respiratory failure with hypoxia (CMS/[facility])   • Infected sacral decubitus ulcers   • Weakness   • Infected decubitus ulcer       Past Medical History:   Diagnosis Date   • Acute on chronic respiratory failure with hypoxia (CMS/[facility]) 2/13/2018   • ELIF (acute kidney injury) (CMS/HCC) 2/13/2018   • ELIF (acute kidney injury) (CMS/HCC) 2/13/2018   • Altered mental status 2/13/2018   • Bronchitis    • Cellulitis of sacral region 2/13/2018   • Cervical cancer (CMS/[facility])    • Cholelithiasis 5/11/2016   • Chronic anxiety 2/27/2017   • Chronic bronchitis (CMS/[facility])    • Chronic respiratory failure with hypoxia (CMS/[facility]) 3/8/2018   • Chronic systolic heart failure (CMS/[facility]) 3/8/2018   • Chronically on benzodiazepine therapy 2/27/2017   • Degenerative arthritis    • Diabetes mellitus (CMS/[facility])    • Dyslipidemia 5/11/2016   • Dyspnea    • Elevated troponin level  2/13/2018   • Fatty liver disease, nonalcoholic 11/21/2016   • Fever 2/13/2018   • Fibromyalgia    • GERD (gastroesophageal reflux disease)    • H/O echocardiogram    • History of pneumonia    • Hypertension 5/11/2016    16. H/O echocardiogram (V15.89) (Z92.89)  · A.  Echocardiogram of 02/03/2015 reports an ejection fraction of 60-65%, mild concentric     LVH, trace mitral regurgitation, mild tricuspid and pulmonic regurgitation and calculated     RVSP of 35 mmHg, the main pulmonary artery is also mildly dilated.   • Hypothyroidism 5/11/2016    Description: A.  On replacement therapy.   • Infected wound 3/8/2018   • Nausea    • Obesity    • DINA (obstructive sleep apnea)     intolerant of CPAP therapy   • Osteoporosis    • Osteoporosis    • Polycythemia vera (CMS/HCC) 5/11/2016   • Pulmonary emphysema (CMS/[facility])    • Restrictive ventilatory defect    • Rhinitis    • Sepsis (CMS/[facility]) 2/13/2018   • Uncontrolled diabetes mellitus (CMS/[facility]) 5/11/2016   • Urine abnormality 2/13/2018   • Uterine cancer (CMS/[facility])    • Vitamin D deficiency 8/1/2016   • Weakness 3/8/2018       Past Surgical History:   Procedure Laterality Date   • AMPUTATION DIGIT Left 11/23/2016    Procedure: AMPUTATION TRANS METATARSAL - ray amutation of the left great toe;  Surgeon: Arsalan Feliciano MD;  Location:  [facility] OR;  Service:    • AMPUTATION DIGIT Left 3/2/2017    Procedure: LEFT FOOT TRANSMETATARSAL AMPUTATION TOES 2,3,4,5;  Surgeon: Joey Guaman MD;  Location:  [facility] OR;  Service:    • BACK SURGERY      lumbar fusions x5--multiple times; 1995, 1997, 1998, 1999 and 2008   • BELOW KNEE AMPUTATION Left 2/25/2017    Procedure: EXTENDED LEFT TOE AMPUTATION;  Surgeon: Arsalan Feliciano MD;  Location:  [facility] OR;  Service:    • CARDIAC CATHETERIZATION N/A 11/26/2016    Procedure: Left Heart Cath;  Surgeon: Ash Nuñez MD;  Location:  [facility] CATH INVASIVE LOCATION;  Service:    • CARDIAC CATHETERIZATION N/A 2/20/2018    Procedure: Left Heart Cath;  Surgeon:  Lane Zamorano MD;  Location:  [facility] CATH INVASIVE LOCATION;  Service:    • CARDIAC CATHETERIZATION N/A 2/20/2018    Procedure: Right Heart Cath;  Surgeon: Lane Zamorano MD;  Location:  [facility] CATH INVASIVE LOCATION;  Service:    • HAND SURGERY Bilateral     x3   • HYSTERECTOMY      status post uterine and cervical cancer   • LUMBAR SPINE SURGERY      arthrodesis by anterior approach addit interspace   • TONSILLECTOMY         Family History   Problem Relation Age of Onset   • Arthritis Mother    • Diabetes Mother    • Colon polyps Mother    • Diverticulitis Mother    • Heart failure Mother    • Arthritis Father    • Bleeding Disorder Father    • Diabetes Father    • Kidney disease Father    • Heart disease Father    • COPD Sister         currently smokes   • Arthritis Brother    • Diabetes Brother    • Alcohol abuse Brother    • Heart murmur Daughter    • Arthritis Other    • Diabetes Other        Social History     Social History   • Marital status:      Spouse name: N/A   • Number of children: 1   • Years of education: N/A     Occupational History   •  Disabled     Social History Main Topics   • Smoking status: Former Smoker     Packs/day: 1.50     Years: 48.00     Types: Cigarettes     Quit date: 2/27/2017   • Smokeless tobacco: Never Used   • Alcohol use No   • Drug use: No   • Sexual activity: Defer     Other Topics Concern   • Not on file     Social History Narrative    Mrs. Langston is a 62 year old white  female. She lives with her spouse in [facility]. She states she does her own ADLs but appears disabled. They have one child and two grandchildren. She has a living will.    Caffeine: 2 serving per day. Pt lives at home with .           Allergies   Allergen Reactions   • Cortisone Other (See Comments)     Hotness all over body and hyper   • Oxycontin [Oxycodone] Other (See Comments)     psoriasis   • Tolmetin Rash     Tolectin-rash and itching   • Vancomycin Rash         Current  Outpatient Prescriptions:   •  acetaminophen (TYLENOL) 325 MG tablet, Take 2 tablets by mouth Every 4 (Four) Hours As Needed for mild pain (1-3) or fever (temperature greater than 101F)., Disp: 100 tablet, Rfl: 0  •  albuterol (PROVENTIL) (2.5 MG/3ML) 0.083% nebulizer solution, Take 2.5 mg by nebulization Every 6 (Six) Hours As Needed for Wheezing or Shortness of Air., Disp: 120 vial, Rfl: 5  •  aspirin 325 MG tablet, Take 325 mg by mouth Daily., Disp: , Rfl:   •  atorvastatin (LIPITOR) 80 MG tablet, Take 1 tablet by mouth Every Night., Disp: , Rfl:   •  baclofen (LIORESAL) 10 MG tablet, Take 1 tablet by mouth 2 (Two) Times a Day As Needed for Muscle Spasms., Disp: 60 tablet, Rfl: 2  •  busPIRone (BUSPAR) 7.5 MG tablet, Take 1 tablet by mouth Every 12 (Twelve) Hours., Disp: , Rfl:   •  cetirizine (ZyrTEC) 10 MG tablet, Take 10 mg by mouth Every Night., Disp: , Rfl:   •  clonazePAM (KlonoPIN) 0.5 MG tablet, TAKE ONE-HALF TABLET BY MOUTH TWICE A DAY AS NEEDED, Disp: 90 tablet, Rfl: 0  •  clopidogrel (PLAVIX) 75 MG tablet, TAKE ONE TABLET BY MOUTH DAILY, Disp: 90 tablet, Rfl: 2  •  Cyanocobalamin 1000 MCG/ML kit, Inject 1,000 mcg as directed Every 30 (Thirty) Days., Disp: 1 kit, Rfl: 3  •  ezetimibe (ZETIA) 10 MG tablet, Take 1 tablet by mouth Daily., Disp: 90 tablet, Rfl: 3  •  fentaNYL (DURAGESIC) 75 MCG/HR patch, Place 1 patch on the skin Every Other Day., Disp: 15 patch, Rfl: 0  •  furosemide (LASIX) 40 MG tablet, TAKE ONE TABLET BY MOUTH TWICE A DAY, Disp: 180 tablet, Rfl: 1  •  levothyroxine (SYNTHROID, LEVOTHROID) 112 MCG tablet, TAKE ONE TABLET BY MOUTH DAILY, Disp: 90 tablet, Rfl: 1  •  lisinopril (PRINIVIL,ZESTRIL) 10 MG tablet, Take 10 mg by mouth Daily., Disp: , Rfl:   •  melatonin 5 MG sublingual tablet sublingual tablet, Place 1 tablet under the tongue At Night As Needed (insomnia)., Disp: 30 tablet, Rfl: 0  •  metoprolol tartrate (LOPRESSOR) 25 MG tablet, Take 25 mg by mouth Daily., Disp: , Rfl:   •   omeprazole (priLOSEC) 20 MG capsule, TAKE ONE CAPSULE BY MOUTH DAILY (Patient taking differently: TAKE TWO CAPSULES BY MOUTH DAILY), Disp: 90 capsule, Rfl: 1  •  oxyCODONE-acetaminophen (PERCOCET) 7.5-325 MG per tablet, Take 1 tablet by mouth Every 6 (Six) Hours As Needed for Moderate Pain ., Disp: 120 tablet, Rfl: 0  •  pregabalin (LYRICA) 100 MG capsule, Take 1 capsule by mouth Every 8 (Eight) Hours. (Patient taking differently: Take 75 mg by mouth Every 8 (Eight) Hours.), Disp: 90 capsule, Rfl: 2  •  SITagliptin (JANUVIA) 100 MG tablet, Take 50 mg by mouth Daily., Disp: , Rfl:   •  varenicline (CHANTIX CONTINUING MONTH SOCORRO) 1 MG tablet, Take 1 tablet by mouth Daily., Disp: 30 tablet, Rfl: 0  •  probiotic (CULTURELLE) capsule capsule, Take 1 capsule by mouth Daily., Disp: 60 capsule, Rfl: 0    The following portions of the patient's history were reviewed and updated as appropriate: allergies, current medications, past family history, past medical history, past social history, past surgical history and problem list.    Review of Systems   Constitution: Positive for malaise/fatigue. Negative for chills and fever.   HENT: Positive for congestion.    Eyes: Positive for blurred vision.   Cardiovascular: Positive for dyspnea on exertion, irregular heartbeat, leg swelling and palpitations. Negative for chest pain, claudication, cyanosis, near-syncope, orthopnea, paroxysmal nocturnal dyspnea and syncope.   Respiratory: Positive for shortness of breath. Negative for cough and snoring.    Endocrine: Positive for cold intolerance and heat intolerance.   Hematologic/Lymphatic: Bruises/bleeds easily.   Skin: Positive for rash.   Musculoskeletal: Positive for joint pain, muscle cramps and muscle weakness.   Gastrointestinal: Positive for abdominal pain, diarrhea and nausea. Negative for heartburn, hematemesis, melena and vomiting.   Genitourinary: Negative.  Negative for hematuria.   Neurological: Positive for excessive daytime  "sleepiness, dizziness, headaches, light-headedness and loss of balance.   Psychiatric/Behavioral: Negative.    Allergic/Immunologic: Positive for environmental allergies.       Objective:     Vitals:    07/17/18 1313 07/17/18 1316   BP: (!) 114/39 128/62   BP Location: Right arm Left arm   Patient Position: Sitting Sitting   Pulse: 76 83   Resp: 16    Temp: 97.8 °F (36.6 °C)    TempSrc: Temporal Artery     SpO2: 94%    Weight: 104 kg (230 lb)    Height: 167.6 cm (66\")        Physical Exam   Constitutional: She is oriented to person, place, and time. She appears well-developed and well-nourished. No distress.   HENT:   Head: Normocephalic.   Eyes: Conjunctivae are normal. Pupils are equal, round, and reactive to light.   Neck: Neck supple. No JVD present. No thyromegaly present.   Cardiovascular: Normal rate, regular rhythm and intact distal pulses.  Exam reveals no gallop and no friction rub.    Murmur (СЕРГЕЙ grade II) heard.  Pulmonary/Chest: Effort normal and breath sounds normal. No respiratory distress. She has no wheezes. She has no rales. She exhibits no tenderness.   Abdominal: Soft. Bowel sounds are normal.   Musculoskeletal: Normal range of motion. She exhibits tenderness (generalized to bilateral feet and ankles) and deformity (left metatarsals amputated). She exhibits no edema (trace BLE).   Neurological: She is alert and oriented to person, place, and time.   Skin: Skin is warm and dry.   Psychiatric: She has a normal mood and affect. Her behavior is normal. Thought content normal.   Vitals reviewed.      Lab and Diagnostic Review:  Results for orders placed or performed in visit on 04/24/18   CBC (No Diff)   Result Value Ref Range    WBC 8.86 3.50 - 10.80 10*3/mm3    RBC 3.48 (L) 3.89 - 5.14 10*6/mm3    Hemoglobin 10.3 (L) 11.5 - 15.5 g/dL    Hematocrit 33.3 (L) 34.5 - 44.0 %    MCV 95.7 80.0 - 99.0 fL    MCH 29.6 27.0 - 31.0 pg    MCHC 30.9 (L) 32.0 - 36.0 g/dL    RDW 15.9 (H) 11.3 - 14.5 %    Platelets " 191 150 - 450 10*3/mm3   Comprehensive Metabolic Panel   Result Value Ref Range    Glucose 213 (H) 70 - 100 mg/dL    BUN 17 9 - 23 mg/dL    Creatinine 1.30 0.60 - 1.30 mg/dL    eGFR Non African Am 41 (L) >60 mL/min/1.73    eGFR African Am 50 (L) >60 mL/min/1.73    BUN/Creatinine Ratio 13.1 7.0 - 25.0    Sodium 144 132 - 146 mmol/L    Potassium 3.9 3.5 - 5.5 mmol/L    Chloride 105 99 - 109 mmol/L    Total CO2 30.0 20.0 - 31.0 mmol/L    Calcium 9.4 8.7 - 10.4 mg/dL    Total Protein 6.7 5.7 - 8.2 g/dL    Albumin 4.00 3.20 - 4.80 g/dL    Globulin 2.7 gm/dL    A/G Ratio 1.5 1.5 - 2.5 g/dL    Total Bilirubin 0.2 (L) 0.3 - 1.2 mg/dL    Alkaline Phosphatase 101 (H) 25 - 100 U/L    AST (SGOT) 42 (H) 0 - 33 U/L    ALT (SGPT) 40 7 - 40 U/L   TSH   Result Value Ref Range    TSH 1.443 0.350 - 5.350 mIU/mL   Vitamin B12   Result Value Ref Range    Vitamin B-12 549 211 - 911 pg/mL   Vitamin D 25 Hydroxy   Result Value Ref Range    25 Hydroxy, Vitamin D 53.5 ng/ml         Assessment and Plan:   1. Chronic systolic heart failure (CMS/HCC)  - Appears near euvolemic today. Continue lasix 40mg. Will consider increasing to 80mg if BNP significantly elevated. Advised to take extra lasix PRN 3lb wt gain in 24 hours/5lbs in a week, increased edema/SOB  - Heart failure education provided today including signs and symptoms, causes of heart failure, medications, daily weights, low sodium diet (less than 2000 mg per day). Reviewed HF Zones with patient and family.  - Echo today  - BNP; Future  - Comprehensive Metabolic Panel; Future    2. Essential hypertension  - Controlled    3. Coronary artery disease involving native coronary artery of native heart without angina pectoris  - No angina. On DAPT, statin, BB, ACEI      4. Takotsubo cardiomyopathy  - Repeat echo today. Last echo in February showed improved LVEF of 51-55%      Echo and venous duplex today, as well as labs  If LVEF stable, patient can follow up as needed    It has been a  pleasure to participate in the care of this patient.  Patient was instructed to call the [facility] with any questions, concerns, or worsening symptoms.    *Please note that portions of this note were completed with a voice recognition program. Efforts were made to edit the dictations, but occasionally words are mistranscribed.

## 2018-07-18 ENCOUNTER — TELEPHONE (OUTPATIENT)
Dept: CARDIOLOGY | Facility: CLINIC | Age: 63
End: 2018-07-18

## 2018-07-18 NOTE — TELEPHONE ENCOUNTER
Results of US and echo reviewed with the patient.  All questions and concerns addressed at this time.  Understanding verbalized.

## 2018-07-19 ENCOUNTER — APPOINTMENT (OUTPATIENT)
Dept: CARDIOLOGY | Facility: HOSPITAL | Age: 63
End: 2018-07-19

## 2018-07-19 RX ORDER — CLONAZEPAM 0.5 MG/1
TABLET ORAL
Qty: 90 TABLET | Refills: 0 | Status: SHIPPED | OUTPATIENT
Start: 2018-07-19 | End: 2018-10-21 | Stop reason: SDUPTHER

## 2018-07-25 RX ORDER — BUSPIRONE HYDROCHLORIDE 10 MG/1
TABLET ORAL
Qty: 360 TABLET | Refills: 0 | Status: SHIPPED | OUTPATIENT
Start: 2018-07-25 | End: 2018-10-27 | Stop reason: SDUPTHER

## 2018-07-30 RX ORDER — PREGABALIN 100 MG/1
100 CAPSULE ORAL EVERY 8 HOURS SCHEDULED
Qty: 270 CAPSULE | Refills: 0 | Status: SHIPPED | OUTPATIENT
Start: 2018-07-30 | End: 2018-10-30 | Stop reason: SDUPTHER

## 2018-08-03 DIAGNOSIS — G89.4 CHRONIC PAIN SYNDROME: ICD-10-CM

## 2018-08-03 DIAGNOSIS — F11.90 CHRONIC, CONTINUOUS USE OF OPIOIDS: Chronic | ICD-10-CM

## 2018-08-03 RX ORDER — OXYCODONE AND ACETAMINOPHEN 7.5; 325 MG/1; MG/1
1 TABLET ORAL EVERY 6 HOURS PRN
Qty: 120 TABLET | Refills: 0 | Status: SHIPPED | OUTPATIENT
Start: 2018-08-03 | End: 2018-08-30 | Stop reason: SDUPTHER

## 2018-08-03 RX ORDER — FENTANYL 75 UG/H
1 PATCH TRANSDERMAL
Qty: 15 PATCH | Refills: 0 | Status: SHIPPED | OUTPATIENT
Start: 2018-08-03 | End: 2018-08-30 | Stop reason: SDUPTHER

## 2018-08-08 ENCOUNTER — OFFICE VISIT (OUTPATIENT)
Dept: INTERNAL MEDICINE | Facility: CLINIC | Age: 63
End: 2018-08-08

## 2018-08-08 VITALS — HEART RATE: 68 BPM | HEIGHT: 66 IN | SYSTOLIC BLOOD PRESSURE: 110 MMHG | DIASTOLIC BLOOD PRESSURE: 70 MMHG

## 2018-08-08 DIAGNOSIS — M81.6 LOCALIZED OSTEOPOROSIS WITHOUT CURRENT PATHOLOGICAL FRACTURE: ICD-10-CM

## 2018-08-08 DIAGNOSIS — D36.9 TUBULAR ADENOMA: ICD-10-CM

## 2018-08-08 DIAGNOSIS — E11.42 DIABETIC POLYNEUROPATHY ASSOCIATED WITH TYPE 2 DIABETES MELLITUS (HCC): ICD-10-CM

## 2018-08-08 DIAGNOSIS — I10 ESSENTIAL HYPERTENSION: Chronic | ICD-10-CM

## 2018-08-08 DIAGNOSIS — M15.9 PRIMARY OSTEOARTHRITIS INVOLVING MULTIPLE JOINTS: ICD-10-CM

## 2018-08-08 DIAGNOSIS — F11.90 CHRONIC, CONTINUOUS USE OF OPIOIDS: Chronic | ICD-10-CM

## 2018-08-08 DIAGNOSIS — R00.1 SINUS BRADYCARDIA: ICD-10-CM

## 2018-08-08 DIAGNOSIS — G93.41 METABOLIC ENCEPHALOPATHY: ICD-10-CM

## 2018-08-08 DIAGNOSIS — Z72.0 TOBACCO ABUSE: Chronic | ICD-10-CM

## 2018-08-08 DIAGNOSIS — E78.5 DYSLIPIDEMIA: ICD-10-CM

## 2018-08-08 DIAGNOSIS — I50.22 CHRONIC SYSTOLIC HEART FAILURE (HCC): Primary | ICD-10-CM

## 2018-08-08 DIAGNOSIS — Z79.899 CHRONICALLY ON BENZODIAZEPINE THERAPY: ICD-10-CM

## 2018-08-08 DIAGNOSIS — E55.9 VITAMIN D DEFICIENCY: ICD-10-CM

## 2018-08-08 DIAGNOSIS — J96.21 ACUTE ON CHRONIC RESPIRATORY FAILURE WITH HYPOXIA (HCC): ICD-10-CM

## 2018-08-08 DIAGNOSIS — I25.10 CORONARY ARTERY DISEASE INVOLVING NATIVE CORONARY ARTERY OF NATIVE HEART WITHOUT ANGINA PECTORIS: Chronic | ICD-10-CM

## 2018-08-08 DIAGNOSIS — G25.0 ESSENTIAL TREMOR: ICD-10-CM

## 2018-08-08 DIAGNOSIS — F41.9 CHRONIC ANXIETY: ICD-10-CM

## 2018-08-08 DIAGNOSIS — E03.8 OTHER SPECIFIED HYPOTHYROIDISM: ICD-10-CM

## 2018-08-08 DIAGNOSIS — G47.00 INSOMNIA, UNSPECIFIED TYPE: ICD-10-CM

## 2018-08-08 DIAGNOSIS — J96.11 CHRONIC RESPIRATORY FAILURE WITH HYPOXIA (HCC): Chronic | ICD-10-CM

## 2018-08-08 DIAGNOSIS — E11.10 TYPE 2 DIABETES MELLITUS WITH KETOACIDOSIS WITHOUT COMA, WITHOUT LONG-TERM CURRENT USE OF INSULIN (HCC): Chronic | ICD-10-CM

## 2018-08-08 DIAGNOSIS — M79.7 FIBROMYALGIA: ICD-10-CM

## 2018-08-08 DIAGNOSIS — I21.4 NSTEMI (NON-ST ELEVATED MYOCARDIAL INFARCTION) (HCC): ICD-10-CM

## 2018-08-08 DIAGNOSIS — K21.9 GASTROESOPHAGEAL REFLUX DISEASE WITHOUT ESOPHAGITIS: ICD-10-CM

## 2018-08-08 DIAGNOSIS — F41.8 MIXED ANXIETY DEPRESSIVE DISORDER: ICD-10-CM

## 2018-08-08 DIAGNOSIS — R53.1 WEAKNESS: ICD-10-CM

## 2018-08-08 DIAGNOSIS — G89.4 CHRONIC PAIN SYNDROME: ICD-10-CM

## 2018-08-08 DIAGNOSIS — J30.1 CHRONIC SEASONAL ALLERGIC RHINITIS DUE TO POLLEN: ICD-10-CM

## 2018-08-08 DIAGNOSIS — I73.9 PERIPHERAL VASCULAR DISEASE (HCC): Chronic | ICD-10-CM

## 2018-08-08 DIAGNOSIS — I51.81 TAKOTSUBO CARDIOMYOPATHY: ICD-10-CM

## 2018-08-08 DIAGNOSIS — L40.9 PSORIASIS: ICD-10-CM

## 2018-08-08 DIAGNOSIS — M54.40 CHRONIC LOW BACK PAIN WITH SCIATICA, SCIATICA LATERALITY UNSPECIFIED, UNSPECIFIED BACK PAIN LATERALITY: ICD-10-CM

## 2018-08-08 DIAGNOSIS — G47.33 OBSTRUCTIVE SLEEP APNEA SYNDROME: Chronic | ICD-10-CM

## 2018-08-08 DIAGNOSIS — E53.8 COBALAMIN DEFICIENCY: ICD-10-CM

## 2018-08-08 DIAGNOSIS — K76.0 FATTY LIVER DISEASE, NONALCOHOLIC: Chronic | ICD-10-CM

## 2018-08-08 DIAGNOSIS — G89.29 CHRONIC LOW BACK PAIN WITH SCIATICA, SCIATICA LATERALITY UNSPECIFIED, UNSPECIFIED BACK PAIN LATERALITY: ICD-10-CM

## 2018-08-08 DIAGNOSIS — J43.8 OTHER EMPHYSEMA (HCC): ICD-10-CM

## 2018-08-08 DIAGNOSIS — R74.8 ABNORMAL LIVER ENZYMES: ICD-10-CM

## 2018-08-08 PROBLEM — R77.8 ELEVATED TROPONIN: Status: RESOLVED | Noted: 2018-02-13 | Resolved: 2018-08-08

## 2018-08-08 PROBLEM — T14.8XXA INFECTED WOUND: Chronic | Status: RESOLVED | Noted: 2018-03-08 | Resolved: 2018-08-08

## 2018-08-08 PROBLEM — N30.00 ACUTE CYSTITIS WITHOUT HEMATURIA: Status: RESOLVED | Noted: 2018-02-13 | Resolved: 2018-08-08

## 2018-08-08 PROBLEM — D50.0 ANEMIA, BLOOD LOSS: Status: RESOLVED | Noted: 2017-02-27 | Resolved: 2018-08-08

## 2018-08-08 PROBLEM — R79.89 ELEVATED TROPONIN: Status: RESOLVED | Noted: 2018-02-13 | Resolved: 2018-08-08

## 2018-08-08 PROBLEM — L08.9 INFECTED WOUND: Chronic | Status: RESOLVED | Noted: 2018-03-08 | Resolved: 2018-08-08

## 2018-08-08 PROBLEM — R50.9 FEVER: Status: RESOLVED | Noted: 2018-02-13 | Resolved: 2018-08-08

## 2018-08-08 PROBLEM — N17.9 AKI (ACUTE KIDNEY INJURY) (HCC): Status: RESOLVED | Noted: 2018-02-13 | Resolved: 2018-08-08

## 2018-08-08 PROBLEM — R79.89 ELEVATED TROPONIN LEVEL: Status: RESOLVED | Noted: 2018-02-13 | Resolved: 2018-08-08

## 2018-08-08 PROBLEM — L08.9 INFECTED DECUBITUS ULCER: Status: RESOLVED | Noted: 2018-03-20 | Resolved: 2018-08-08

## 2018-08-08 PROBLEM — R82.90 URINE ABNORMALITY: Status: RESOLVED | Noted: 2018-02-13 | Resolved: 2018-08-08

## 2018-08-08 PROBLEM — L89.90 INFECTED DECUBITUS ULCER: Status: RESOLVED | Noted: 2018-03-20 | Resolved: 2018-08-08

## 2018-08-08 PROBLEM — R77.8 ELEVATED TROPONIN LEVEL: Status: RESOLVED | Noted: 2018-02-13 | Resolved: 2018-08-08

## 2018-08-08 PROBLEM — A41.9 SEPSIS (HCC): Status: RESOLVED | Noted: 2018-02-13 | Resolved: 2018-08-08

## 2018-08-08 PROCEDURE — 99214 OFFICE O/P EST MOD 30 MIN: CPT | Performed by: INTERNAL MEDICINE

## 2018-08-08 PROCEDURE — 96372 THER/PROPH/DIAG INJ SC/IM: CPT | Performed by: INTERNAL MEDICINE

## 2018-08-08 RX ORDER — BACLOFEN 10 MG/1
10 TABLET ORAL 2 TIMES DAILY PRN
Qty: 180 TABLET | Refills: 0 | Status: SHIPPED | OUTPATIENT
Start: 2018-08-08 | End: 2018-12-24 | Stop reason: SDUPTHER

## 2018-08-09 RX ORDER — VARENICLINE TARTRATE 0.5 MG/1
0.5 TABLET, FILM COATED ORAL 2 TIMES DAILY
Qty: 60 TABLET | Refills: 1 | Status: SHIPPED | OUTPATIENT
Start: 2018-08-09 | End: 2018-08-30 | Stop reason: DRUGHIGH

## 2018-08-10 RX ORDER — CYANOCOBALAMIN 1000 UG/ML
1000 INJECTION, SOLUTION INTRAMUSCULAR; SUBCUTANEOUS ONCE
Status: COMPLETED | OUTPATIENT
Start: 2018-08-08 | End: 2018-08-10

## 2018-08-10 RX ADMIN — CYANOCOBALAMIN 1000 MCG: 1000 INJECTION, SOLUTION INTRAMUSCULAR; SUBCUTANEOUS at 08:54

## 2018-08-30 DIAGNOSIS — G89.4 CHRONIC PAIN SYNDROME: ICD-10-CM

## 2018-08-30 DIAGNOSIS — F11.90 CHRONIC, CONTINUOUS USE OF OPIOIDS: Chronic | ICD-10-CM

## 2018-08-30 RX ORDER — FENTANYL 75 UG/H
1 PATCH TRANSDERMAL
Qty: 15 PATCH | Refills: 0 | Status: SHIPPED | OUTPATIENT
Start: 2018-08-30 | End: 2018-09-28 | Stop reason: SDUPTHER

## 2018-08-30 RX ORDER — VARENICLINE TARTRATE 1 MG/1
1 TABLET, FILM COATED ORAL 2 TIMES DAILY
Qty: 60 TABLET | Refills: 2 | Status: SHIPPED | OUTPATIENT
Start: 2018-08-30 | End: 2018-11-12

## 2018-08-30 RX ORDER — OXYCODONE AND ACETAMINOPHEN 7.5; 325 MG/1; MG/1
1 TABLET ORAL EVERY 6 HOURS PRN
Qty: 120 TABLET | Refills: 0 | Status: SHIPPED | OUTPATIENT
Start: 2018-08-30 | End: 2018-09-28 | Stop reason: SDUPTHER

## 2018-09-19 LAB
ALBUMIN SERPL-MCNC: 3.87 G/DL (ref 3.2–4.8)
ALBUMIN/GLOB SERPL: 1.4 G/DL (ref 1.5–2.5)
ALP SERPL-CCNC: 190 U/L (ref 25–100)
AST SERPL-CCNC: 85 U/L (ref 0–33)
BILIRUB SERPL-MCNC: 0.2 MG/DL (ref 0.3–1.2)
BNP SERPL-MCNC: 47 PG/ML (ref 0–100)
BUN SERPL-MCNC: 22 MG/DL (ref 9–23)
BUN/CREAT SERPL: 17.7 (ref 7–25)
CALCIUM SERPL-MCNC: 9 MG/DL (ref 8.7–10.4)
CHLORIDE SERPL-SCNC: 102 MMOL/L (ref 99–109)
CREAT SERPL-MCNC: 1.24 MG/DL (ref 0.6–1.3)
GLOBULIN SER CALC-MCNC: 2.7 G/DL
GLUCOSE SERPL-MCNC: 204 MG/DL (ref 70–100)
POTASSIUM SERPL-SCNC: 4.7 MMOL/L (ref 3.5–5.5)
PROT SERPL-MCNC: 6.6 G/DL (ref 5.7–8.2)
SODIUM SERPL-SCNC: 138 MMOL/L (ref 132–146)

## 2018-09-28 DIAGNOSIS — G89.4 CHRONIC PAIN SYNDROME: ICD-10-CM

## 2018-09-28 DIAGNOSIS — F11.90 CHRONIC, CONTINUOUS USE OF OPIOIDS: Chronic | ICD-10-CM

## 2018-09-28 RX ORDER — FENTANYL 75 UG/H
1 PATCH TRANSDERMAL
Qty: 15 PATCH | Refills: 0 | Status: SHIPPED | OUTPATIENT
Start: 2018-09-28 | End: 2018-10-26 | Stop reason: SDUPTHER

## 2018-09-28 RX ORDER — OXYCODONE AND ACETAMINOPHEN 7.5; 325 MG/1; MG/1
1 TABLET ORAL EVERY 6 HOURS PRN
Qty: 120 TABLET | Refills: 0 | Status: SHIPPED | OUTPATIENT
Start: 2018-09-28 | End: 2018-10-26 | Stop reason: SDUPTHER

## 2018-10-10 ENCOUNTER — OFFICE VISIT (OUTPATIENT)
Dept: INTERNAL MEDICINE | Facility: CLINIC | Age: 63
End: 2018-10-10

## 2018-10-10 VITALS — HEART RATE: 68 BPM | DIASTOLIC BLOOD PRESSURE: 74 MMHG | HEIGHT: 66 IN | SYSTOLIC BLOOD PRESSURE: 120 MMHG

## 2018-10-10 DIAGNOSIS — I25.10 CORONARY ARTERY DISEASE INVOLVING NATIVE CORONARY ARTERY OF NATIVE HEART WITHOUT ANGINA PECTORIS: Chronic | ICD-10-CM

## 2018-10-10 DIAGNOSIS — E55.9 VITAMIN D DEFICIENCY: ICD-10-CM

## 2018-10-10 DIAGNOSIS — M81.6 LOCALIZED OSTEOPOROSIS WITHOUT CURRENT PATHOLOGICAL FRACTURE: ICD-10-CM

## 2018-10-10 DIAGNOSIS — I73.9 PERIPHERAL VASCULAR DISEASE (HCC): Chronic | ICD-10-CM

## 2018-10-10 DIAGNOSIS — J43.8 OTHER EMPHYSEMA (HCC): ICD-10-CM

## 2018-10-10 DIAGNOSIS — I50.22 CHRONIC SYSTOLIC HEART FAILURE (HCC): Primary | ICD-10-CM

## 2018-10-10 DIAGNOSIS — Z23 NEED FOR INFLUENZA VACCINATION: ICD-10-CM

## 2018-10-10 DIAGNOSIS — K21.9 GASTROESOPHAGEAL REFLUX DISEASE WITHOUT ESOPHAGITIS: ICD-10-CM

## 2018-10-10 DIAGNOSIS — G89.4 CHRONIC PAIN SYNDROME: ICD-10-CM

## 2018-10-10 DIAGNOSIS — I10 ESSENTIAL HYPERTENSION: Chronic | ICD-10-CM

## 2018-10-10 DIAGNOSIS — E78.5 DYSLIPIDEMIA: ICD-10-CM

## 2018-10-10 DIAGNOSIS — G47.00 INSOMNIA, UNSPECIFIED TYPE: ICD-10-CM

## 2018-10-10 DIAGNOSIS — J30.1 SEASONAL ALLERGIC RHINITIS DUE TO POLLEN: ICD-10-CM

## 2018-10-10 DIAGNOSIS — E53.8 COBALAMIN DEFICIENCY: ICD-10-CM

## 2018-10-10 DIAGNOSIS — D36.9 TUBULAR ADENOMA: ICD-10-CM

## 2018-10-10 DIAGNOSIS — E03.8 OTHER SPECIFIED HYPOTHYROIDISM: ICD-10-CM

## 2018-10-10 DIAGNOSIS — F41.8 MIXED ANXIETY DEPRESSIVE DISORDER: ICD-10-CM

## 2018-10-10 DIAGNOSIS — Z72.0 TOBACCO ABUSE: Chronic | ICD-10-CM

## 2018-10-10 DIAGNOSIS — F41.9 CHRONIC ANXIETY: ICD-10-CM

## 2018-10-10 DIAGNOSIS — J96.11 CHRONIC RESPIRATORY FAILURE WITH HYPOXIA (HCC): Chronic | ICD-10-CM

## 2018-10-10 DIAGNOSIS — M15.9 PRIMARY OSTEOARTHRITIS INVOLVING MULTIPLE JOINTS: ICD-10-CM

## 2018-10-10 DIAGNOSIS — M79.7 FIBROMYALGIA: ICD-10-CM

## 2018-10-10 DIAGNOSIS — E11.10 TYPE 2 DIABETES MELLITUS WITH KETOACIDOSIS WITHOUT COMA, WITHOUT LONG-TERM CURRENT USE OF INSULIN (HCC): Chronic | ICD-10-CM

## 2018-10-10 DIAGNOSIS — L40.9 PSORIASIS: ICD-10-CM

## 2018-10-10 DIAGNOSIS — I21.4 NSTEMI (NON-ST ELEVATED MYOCARDIAL INFARCTION) (HCC): ICD-10-CM

## 2018-10-10 DIAGNOSIS — G47.33 OBSTRUCTIVE SLEEP APNEA SYNDROME: Chronic | ICD-10-CM

## 2018-10-10 DIAGNOSIS — K76.0 FATTY LIVER DISEASE, NONALCOHOLIC: Chronic | ICD-10-CM

## 2018-10-10 DIAGNOSIS — G25.0 ESSENTIAL TREMOR: ICD-10-CM

## 2018-10-10 PROCEDURE — 99214 OFFICE O/P EST MOD 30 MIN: CPT | Performed by: INTERNAL MEDICINE

## 2018-10-10 PROCEDURE — 99406 BEHAV CHNG SMOKING 3-10 MIN: CPT | Performed by: INTERNAL MEDICINE

## 2018-10-10 PROCEDURE — G0008 ADMIN INFLUENZA VIRUS VAC: HCPCS | Performed by: INTERNAL MEDICINE

## 2018-10-10 PROCEDURE — 90662 IIV NO PRSV INCREASED AG IM: CPT | Performed by: INTERNAL MEDICINE

## 2018-10-11 LAB
25(OH)D3+25(OH)D2 SERPL-MCNC: 39.5 NG/ML
ALBUMIN SERPL-MCNC: 3.8 G/DL (ref 3.2–4.8)
ALBUMIN/GLOB SERPL: 1.6 G/DL (ref 1.5–2.5)
ALP SERPL-CCNC: 92 U/L (ref 25–100)
ALT SERPL-CCNC: 23 U/L (ref 7–40)
AST SERPL-CCNC: 44 U/L (ref 0–33)
BILIRUB SERPL-MCNC: 0.3 MG/DL (ref 0.3–1.2)
BUN SERPL-MCNC: 16 MG/DL (ref 9–23)
BUN/CREAT SERPL: 15.2 (ref 7–25)
CALCIUM SERPL-MCNC: 9.1 MG/DL (ref 8.7–10.4)
CHLORIDE SERPL-SCNC: 96 MMOL/L (ref 99–109)
CHOLEST SERPL-MCNC: 137 MG/DL (ref 0–200)
CO2 SERPL-SCNC: 35 MMOL/L (ref 20–31)
CREAT SERPL-MCNC: 1.05 MG/DL (ref 0.6–1.3)
ERYTHROCYTE [DISTWIDTH] IN BLOOD BY AUTOMATED COUNT: 17.8 % (ref 11.3–14.5)
GLOBULIN SER CALC-MCNC: 2.4 GM/DL
GLUCOSE SERPL-MCNC: 94 MG/DL (ref 70–100)
HBA1C MFR BLD: 7.2 % (ref 4.8–5.6)
HCT VFR BLD AUTO: 36.1 % (ref 34.5–44)
HDLC SERPL-MCNC: 34 MG/DL (ref 40–60)
HGB BLD-MCNC: 10.5 G/DL (ref 11.5–15.5)
LDLC SERPL CALC-MCNC: 54 MG/DL (ref 0–100)
MCH RBC QN AUTO: 26.4 PG (ref 27–31)
MCHC RBC AUTO-ENTMCNC: 29.1 G/DL (ref 32–36)
MCV RBC AUTO: 90.9 FL (ref 80–99)
PLATELET # BLD AUTO: 242 10*3/MM3 (ref 150–450)
POTASSIUM SERPL-SCNC: 4.5 MMOL/L (ref 3.5–5.5)
PROT SERPL-MCNC: 6.2 G/DL (ref 5.7–8.2)
RBC # BLD AUTO: 3.97 10*6/MM3 (ref 3.89–5.14)
SODIUM SERPL-SCNC: 141 MMOL/L (ref 132–146)
TRIGL SERPL-MCNC: 245 MG/DL (ref 0–150)
TSH SERPL DL<=0.005 MIU/L-ACNC: 1.11 MIU/ML (ref 0.35–5.35)
VIT B12 SERPL-MCNC: >2000 PG/ML (ref 211–911)
VLDLC SERPL CALC-MCNC: 49 MG/DL
WBC # BLD AUTO: 11.25 10*3/MM3 (ref 3.5–10.8)

## 2018-10-22 RX ORDER — OMEPRAZOLE 20 MG/1
CAPSULE, DELAYED RELEASE ORAL
Qty: 90 CAPSULE | Refills: 0 | Status: SHIPPED | OUTPATIENT
Start: 2018-10-22 | End: 2019-01-17 | Stop reason: SDUPTHER

## 2018-10-22 RX ORDER — CLONAZEPAM 0.5 MG/1
TABLET ORAL
Qty: 90 TABLET | Refills: 0 | Status: SHIPPED | OUTPATIENT
Start: 2018-10-22 | End: 2018-11-26 | Stop reason: SDUPTHER

## 2018-10-25 ENCOUNTER — TELEPHONE (OUTPATIENT)
Dept: INTERNAL MEDICINE | Facility: CLINIC | Age: 63
End: 2018-10-25

## 2018-10-26 ENCOUNTER — TELEPHONE (OUTPATIENT)
Dept: INTERNAL MEDICINE | Facility: CLINIC | Age: 63
End: 2018-10-26

## 2018-10-26 DIAGNOSIS — G89.4 CHRONIC PAIN SYNDROME: ICD-10-CM

## 2018-10-26 DIAGNOSIS — F11.90 CHRONIC, CONTINUOUS USE OF OPIOIDS: Chronic | ICD-10-CM

## 2018-10-26 RX ORDER — OXYCODONE AND ACETAMINOPHEN 7.5; 325 MG/1; MG/1
1 TABLET ORAL EVERY 6 HOURS PRN
Qty: 120 TABLET | Refills: 0 | Status: SHIPPED | OUTPATIENT
Start: 2018-10-26 | End: 2018-11-21 | Stop reason: SDUPTHER

## 2018-10-26 RX ORDER — FENTANYL 75 UG/H
1 PATCH TRANSDERMAL
Qty: 15 PATCH | Refills: 0 | Status: SHIPPED | OUTPATIENT
Start: 2018-10-26 | End: 2018-11-21 | Stop reason: SDUPTHER

## 2018-10-29 RX ORDER — BUSPIRONE HYDROCHLORIDE 10 MG/1
TABLET ORAL
Qty: 360 TABLET | Refills: 0 | Status: SHIPPED | OUTPATIENT
Start: 2018-10-29 | End: 2018-10-30 | Stop reason: DRUGHIGH

## 2018-10-30 RX ORDER — PREGABALIN 100 MG/1
100 CAPSULE ORAL EVERY 8 HOURS SCHEDULED
Qty: 270 CAPSULE | Refills: 0 | Status: SHIPPED | OUTPATIENT
Start: 2018-10-30 | End: 2019-02-23 | Stop reason: SDUPTHER

## 2018-10-30 RX ORDER — BUSPIRONE HYDROCHLORIDE 7.5 MG/1
TABLET ORAL
Qty: 360 TABLET | Refills: 0 | Status: SHIPPED | OUTPATIENT
Start: 2018-10-30 | End: 2019-01-25 | Stop reason: SDUPTHER

## 2018-10-30 NOTE — TELEPHONE ENCOUNTER
Pt said that her scripts are suppose to be 90 days and not 30 days. Pt said that the buspar has not been called in yet. Pt also said that the lyrica was only called in for 30 days and not 90. Pt wants you to call tashi

## 2018-10-31 ENCOUNTER — TELEPHONE (OUTPATIENT)
Dept: INTERNAL MEDICINE | Facility: CLINIC | Age: 63
End: 2018-10-31

## 2018-10-31 DIAGNOSIS — E78.49 OTHER HYPERLIPIDEMIA: ICD-10-CM

## 2018-10-31 DIAGNOSIS — E78.5 DYSLIPIDEMIA: ICD-10-CM

## 2018-10-31 RX ORDER — EZETIMIBE 10 MG/1
10 TABLET ORAL DAILY
Qty: 90 TABLET | Refills: 3 | Status: SHIPPED | OUTPATIENT
Start: 2018-10-31 | End: 2019-10-24 | Stop reason: SDUPTHER

## 2018-11-08 NOTE — PROGRESS NOTES
[facility] AT [address], [address]  [place], [place], [id number] [phone]  [email or web address] Center[facility]           OUTPATIENT CLINIC FOLLOW UP NOTE    Patient Care Team:  Patient Care Team:  Harinder Aranda MD as PCP - General  Harinder Aranda MD as PCP - Family Medicine  Arsalan Feliciano MD as Surgeon (Cardiothoracic Surgery)    Subjective:      Chief Complaint   Patient presents with   • Coronary Artery Disease       HPI:    Tamara Langston is a 63 y.o. female.  History of Present Illness    The patient has a history of CAD, hypertension, hyperlipidemia, diabetes, sleep apnea, CAD, arthritis, uterine and cervical cancer, fatty liver, hypothyroidism, polycythemia vera, fibromyalgia, and osteoporosis.  The patient presents today for follow-up.    Since the patient was last seen she reports that she has been doing well.  She has noticed daily palpitations.  These occur typically when she is at rest.  She also has had a pins and needles feeling in both of her upper arms at times.  She also notes that she is at her baseline for her chronic shortness of breath.  She denies any chest pain or lower extremity edema.  She has not had to take any additional doses of Lasix.  She had a mechanical fall last week and hurt her back.    Review of Systems:  Positive for palpitations, arm tingling, shortness of breath  Negative for exertional chest pain,  orthopnea, PND, lower extremity edema, lightheadedness, syncope.    PFSH:  Patient Active Problem List   Diagnosis   • Atopic rhinitis   • Restrictive ventilatory defect   • COPD (chronic obstructive pulmonary disease) (CMS/HCC)   • Osteoporosis   • Obstructive sleep apnea syndrome   • Abnormal liver enzymes   • Cholelithiasis   • Chronic back pain   • Mixed anxiety depressive disorder   • Type 2 diabetes mellitus (CMS/HCC)   • Dyslipidemia   • Essential tremor   • Fibromyalgia   • Gastroesophageal reflux disease without esophagitis   •  Hypertension   • Hypothyroidism   • Insomnia   • Osteoarthritis   • Psoriasis   • Branch retinal vein occlusion   • Cobalamin deficiency   • Obesity   • Vitamin D deficiency   • Fatty liver disease, nonalcoholic   • Sinus bradycardia   • Tobacco abuse   • Peripheral vascular disease (CMS/HCC)   • Diabetic polyneuropathy associated with type 2 diabetes mellitus (CMS/HCC)   • Chronic pain   • Chronic, continuous use of opioids   • Chronic anxiety   • Chronically on benzodiazepine therapy   • Tubular adenoma   • Cellulitis of sacral region   • Metabolic encephalopathy   • Acute on chronic respiratory failure with hypoxia (CMS/HCC)   • Coronary artery disease involving native heart   • Takotsubo cardiomyopathy   • Chronic systolic heart failure (CMS/HCC)   • Chronic respiratory failure with hypoxia (CMS/HCC)   • Weakness   • Nonrheumatic aortic valve stenosis         Current Outpatient Medications:   •  acetaminophen (TYLENOL) 325 MG tablet, Take 2 tablets by mouth Every 4 (Four) Hours As Needed for mild pain (1-3) or fever (temperature greater than 101F)., Disp: 100 tablet, Rfl: 0  •  albuterol (PROVENTIL) (2.5 MG/3ML) 0.083% nebulizer solution, Take 2.5 mg by nebulization Every 6 (Six) Hours As Needed for Wheezing or Shortness of Air., Disp: 120 vial, Rfl: 5  •  aspirin 325 MG tablet, Take 325 mg by mouth Daily., Disp: , Rfl:   •  atorvastatin (LIPITOR) 80 MG tablet, Take 1 tablet by mouth Every Night., Disp: , Rfl:   •  baclofen (LIORESAL) 10 MG tablet, Take 1 tablet by mouth 2 (Two) Times a Day As Needed for Muscle Spasms., Disp: 180 tablet, Rfl: 0  •  busPIRone (BUSPAR) 7.5 MG tablet, 2 po bid, Disp: 360 tablet, Rfl: 0  •  cetirizine (ZyrTEC) 10 MG tablet, Take 10 mg by mouth Every Night., Disp: , Rfl:   •  clonazePAM (KlonoPIN) 0.5 MG tablet, TAKE ONE-HALF OF A TABLET BY MOUTH TWICE A DAY, Disp: 90 tablet, Rfl: 0  •  clopidogrel (PLAVIX) 75 MG tablet, TAKE ONE TABLET BY MOUTH DAILY, Disp: 90 tablet, Rfl: 2  •   Cyanocobalamin 1000 MCG/ML kit, Inject 1,000 mcg as directed Every 30 (Thirty) Days. (Patient taking differently: Inject 1,000 mcg as directed Every 3 (Three) Months.), Disp: 1 kit, Rfl: 3  •  ezetimibe (ZETIA) 10 MG tablet, Take 1 tablet by mouth Daily., Disp: 90 tablet, Rfl: 3  •  fentaNYL (DURAGESIC) 75 MCG/HR patch, Place 1 patch on the skin as directed by provider Every Other Day., Disp: 15 patch, Rfl: 0  •  furosemide (LASIX) 40 MG tablet, TAKE ONE TABLET BY MOUTH TWICE A DAY, Disp: 180 tablet, Rfl: 1  •  levothyroxine (SYNTHROID, LEVOTHROID) 112 MCG tablet, TAKE ONE TABLET BY MOUTH DAILY, Disp: 90 tablet, Rfl: 1  •  lisinopril (PRINIVIL,ZESTRIL) 10 MG tablet, Take 10 mg by mouth Daily., Disp: , Rfl:   •  melatonin 5 MG sublingual tablet sublingual tablet, Place 1 tablet under the tongue At Night As Needed (insomnia)., Disp: 30 tablet, Rfl: 0  •  metoprolol tartrate (LOPRESSOR) 25 MG tablet, Take 25 mg by mouth Daily., Disp: , Rfl:   •  omeprazole (priLOSEC) 20 MG capsule, TAKE ONE CAPSULE BY MOUTH DAILY, Disp: 90 capsule, Rfl: 0  •  oxyCODONE-acetaminophen (PERCOCET) 7.5-325 MG per tablet, Take 1 tablet by mouth Every 6 (Six) Hours As Needed for Moderate Pain ., Disp: 120 tablet, Rfl: 0  •  pregabalin (LYRICA) 100 MG capsule, Take 1 capsule by mouth Every 8 (Eight) Hours., Disp: 270 capsule, Rfl: 0  •  probiotic (CULTURELLE) capsule capsule, Take 1 capsule by mouth Daily., Disp: 60 capsule, Rfl: 0  •  SITagliptin (JANUVIA) 100 MG tablet, Take 50 mg by mouth Daily., Disp: , Rfl:     Allergies   Allergen Reactions   • Cortisone Other (See Comments)     Hotness all over body and hyper   • Oxycontin [Oxycodone] Other (See Comments)     psoriasis   • Tolmetin Rash     Tolectin-rash and itching   • Vancomycin Rash        reports that she quit smoking about 20 months ago. Her smoking use included cigarettes. She has a 72.00 pack-year smoking history. she has never used smokeless tobacco.    Family History   Problem  "Relation Age of Onset   • Arthritis Mother    • Diabetes Mother    • Colon polyps Mother    • Diverticulitis Mother    • Heart failure Mother    • Arthritis Father    • Bleeding Disorder Father    • Diabetes Father    • Kidney disease Father    • Heart disease Father    • COPD Sister         currently smokes   • Arthritis Brother    • Diabetes Brother    • Alcohol abuse Brother    • Heart murmur Daughter    • Arthritis Other    • Diabetes Other          Objective:   Blood pressure 124/70, pulse 78, height 167.6 cm (66\"), weight 103 kg (226 lb 12.8 oz), not currently breastfeeding.  CONSTITUTIONAL: No acute distress, normal affect  RESPIRATORY: Normal effort. Clear to auscultation bilaterally without wheezing or rales  CARDIOVASCULAR:  Regular rate and rhythm with normal S1 and S2. Without gallop or rub. СЕРГЕЙ LUSB with radiation to the carotid  PERIPHERAL VASCULAR: Normal radial pulses bilaterally. There is trace pedal edema bilaterally.    Labs:    Glucose   Date Value Ref Range Status   07/17/2018 204 (H) 70 - 100 mg/dL Final     BUN   Date Value Ref Range Status   10/10/2018 16 9 - 23 mg/dL Final   07/17/2018 22 9 - 23 mg/dL Final     Creatinine   Date Value Ref Range Status   10/10/2018 1.05 0.60 - 1.30 mg/dL Final   07/17/2018 1.24 0.60 - 1.30 mg/dL Final   05/09/2017 1.60 (H) 0.60 - 1.30 mg/dL Final     Comment:     Serial Number: [id number]    : [id number]     Sodium   Date Value Ref Range Status   10/10/2018 141 132 - 146 mmol/L Final   07/17/2018 138 132 - 146 mmol/L Final     Potassium   Date Value Ref Range Status   10/10/2018 4.5 3.5 - 5.5 mmol/L Final   07/17/2018 4.7 3.5 - 5.5 mmol/L Final     Chloride   Date Value Ref Range Status   10/10/2018 96 (L) 99 - 109 mmol/L Final   07/17/2018 102 99 - 109 mmol/L Final     CO2   Date Value Ref Range Status   07/17/2018 30.0 20.0 - 31.0 mmol/L Final     Total CO2   Date Value Ref Range Status   10/10/2018 35.0 (H) 20.0 - 31.0 mmol/L Final     Calcium   Date Value " Ref Range Status   10/10/2018 9.1 8.7 - 10.4 mg/dL Final   07/17/2018 9.0 8.7 - 10.4 mg/dL Final     Total Protein   Date Value Ref Range Status   07/17/2018 6.6 5.7 - 8.2 g/dL Final     Albumin   Date Value Ref Range Status   10/10/2018 3.80 3.20 - 4.80 g/dL Final   07/17/2018 3.87 3.20 - 4.80 g/dL Final     ALT (SGPT)   Date Value Ref Range Status   10/10/2018 23 7 - 40 U/L Final   07/17/2018 31 7 - 40 U/L Final     AST (SGOT)   Date Value Ref Range Status   10/10/2018 44 (H) 0 - 33 U/L Final   07/17/2018 85 (H) 0 - 33 U/L Final     Alkaline Phosphatase   Date Value Ref Range Status   10/10/2018 92 25 - 100 U/L Final   07/17/2018 190 (H) 25 - 100 U/L Final     Total Bilirubin   Date Value Ref Range Status   10/10/2018 0.3 0.3 - 1.2 mg/dL Final   07/17/2018 0.2 (L) 0.3 - 1.2 mg/dL Final     eGFR Non  Amer   Date Value Ref Range Status   07/17/2018 44 (L) >60 mL/min/1.73 Final     eGFR Non  Am   Date Value Ref Range Status   10/10/2018 53 (L) >60 mL/min/1.73 Final     Comment:     [employer] Guidelines  Stage     Description        GFR  1         Normal or High     90+  2         Mild decrease      60-89  3         Moderate decrease  30-59  4         Severe decrease    15-29  5         Kidney failure     <15  The MDRD GFR formula is only valid for adults with stable  renal function between ages 18 and 70.       A/G Ratio   Date Value Ref Range Status   10/10/2018 1.6 1.5 - 2.5 g/dL Final     BUN/Creatinine Ratio   Date Value Ref Range Status   10/10/2018 15.2 7.0 - 25.0 Final   07/17/2018 17.7 7.0 - 25.0 Final     Anion Gap   Date Value Ref Range Status   07/17/2018 6.0 3.0 - 11.0 mmol/L Final       Lab Results   Component Value Date    CHOL 112 02/14/2018    CHOL 123 02/25/2017    CHOL 244 (H) 11/27/2016     Lab Results   Component Value Date    TRIG 245 (H) 10/10/2018    TRIG 141 02/14/2018    TRIG 349 (H) 10/03/2017     Lab Results   Component Value Date    HDL 34 (L) 10/10/2018     HDL 28 (L) 02/14/2018    HDL 37 (L) 10/03/2017     No components found for: LDLCALC  Lab Results   Component Value Date    VLDL 49 10/10/2018    VLDL 69.8 10/03/2017    VLDL 60.6 06/01/2017     No results found for: LDLHDL    Diagnostic Data:    Procedures    TTE 7/2018  · Left ventricular systolic function is normal. Estimated EF = 60%.    TTE 2/2018  · This was a limited echocardiogram performed to evaluate the left ventricular ejection fraction  · Left ventricular systolic function is normal. Estimated EF appears to be in the range of 51 - 55%.  · The following left ventricular wall segments are hypokinetic: mid anterior, apical anterior, apical lateral, apical inferior, apical septal and apex hypokinetic. The basal segments are hyperdynamic.  · When compared to the prior echocardiogram performed on February 14, 2018, the hypokinetic apical segments have mildly improved. The overall ejection fraction has improved.    TTE 2/2018  · Left ventricular systolic function is moderately decreased. Estimated EF = 35%.  · Left ventricular wall thickness is consistent with mild-to-moderate concentric hypertrophy.  · The following left ventricular wall segments are hypokinetic: apical anterior, apical lateral, apical inferior, apical septal and apex hypokinetic. The basal segments are hyperdynamic.  · The findings have the apperance of stress-induced cardiomyopathy (Takotsubo)  · Left ventricular diastolic dysfunction (grade I a) consistent with impaired relaxation.  · Right ventricular cavity is small in size.  · The main pulmonary artery is moderately dilated.  · Left atrial cavity size is borderline dilated.  · There is moderate calcification of the aortic valve.  · Moderate aortic valve stenosis is present.    [facility] 02/20/2018  · There was severe coronary artery disease involving the mid LAD and first diagonal branch that is now status post intervention with a Tryton 3.5mm proximal, 3.0mm distal bifurcation stent extending  from the LAD into the diagonal branch and a Synergy 3.5 x 38 mm drug-eluting stent extending from the proximal to mid LAD.  · Normal left ventricular ejection fraction but with hypokinesis of the apical segments  · Normal right heart filling pressures.  Normal pulmonate capillary wedge pressure  · Normal cardiac index    [facility] 11/2016  1.  50% mid RCA stenosis.  2.  40% proximal LAD stenosis   3.  Normal left ventricular function  4.  Hypertension    Assessment and Plan:   Tamara was seen today for coronary artery disease and hypertension.    Diagnoses and all orders for this visit:    Coronary artery disease involving native coronary artery of native heart without angina pectoris  -Currently chest pain free.  -Continue DAPT, statin    Moderate AS  -Repeat TTE 2/2019    Takotsubo cardiomyopathy  Chronic systolic heart failure (CMS/HCC)  -Most recent EF 60%  -Metolazone 5 mg on MWF for 3 doses.  -Continue Lasix 40 mg BID    Essential hypertension  -Continue current medications.    Lower extremity edema  -Improved.    - Return in about 3 months (around 2/12/2019).    JEOVANY Farooq obtained past medical, family history, social history, review of systems and functioned as a scribe for the remainder of the dictation for Dr. Zamorano.    I, Lane Zamorano MD, personally performed the services as scribed by the above named individual. I have made any necessary edits and it is both accurate and complete.     Lane Zamorano MD, MSc, [facility]  Interventional Cardiology  [facility]

## 2018-11-12 ENCOUNTER — OFFICE VISIT (OUTPATIENT)
Dept: CARDIOLOGY | Facility: CLINIC | Age: 63
End: 2018-11-12

## 2018-11-12 VITALS
DIASTOLIC BLOOD PRESSURE: 70 MMHG | HEIGHT: 66 IN | BODY MASS INDEX: 36.45 KG/M2 | SYSTOLIC BLOOD PRESSURE: 124 MMHG | WEIGHT: 226.8 LBS | HEART RATE: 78 BPM

## 2018-11-12 DIAGNOSIS — R00.1 SINUS BRADYCARDIA: ICD-10-CM

## 2018-11-12 DIAGNOSIS — I51.81 TAKOTSUBO CARDIOMYOPATHY: ICD-10-CM

## 2018-11-12 DIAGNOSIS — I35.0 NONRHEUMATIC AORTIC VALVE STENOSIS: ICD-10-CM

## 2018-11-12 DIAGNOSIS — I73.9 PERIPHERAL VASCULAR DISEASE (HCC): Chronic | ICD-10-CM

## 2018-11-12 DIAGNOSIS — I25.10 CORONARY ARTERY DISEASE INVOLVING NATIVE CORONARY ARTERY OF NATIVE HEART WITHOUT ANGINA PECTORIS: Primary | Chronic | ICD-10-CM

## 2018-11-12 PROCEDURE — 99214 OFFICE O/P EST MOD 30 MIN: CPT | Performed by: INTERNAL MEDICINE

## 2018-11-20 DIAGNOSIS — I10 ESSENTIAL HYPERTENSION: ICD-10-CM

## 2018-11-20 RX ORDER — LISINOPRIL 10 MG/1
TABLET ORAL
Qty: 90 TABLET | Refills: 2 | OUTPATIENT
Start: 2018-11-20

## 2018-11-21 DIAGNOSIS — F11.90 CHRONIC, CONTINUOUS USE OF OPIOIDS: Chronic | ICD-10-CM

## 2018-11-21 DIAGNOSIS — G89.4 CHRONIC PAIN SYNDROME: ICD-10-CM

## 2018-11-21 RX ORDER — OXYCODONE AND ACETAMINOPHEN 7.5; 325 MG/1; MG/1
1 TABLET ORAL EVERY 6 HOURS PRN
Qty: 120 TABLET | Refills: 0 | Status: SHIPPED | OUTPATIENT
Start: 2018-11-21 | End: 2018-12-20 | Stop reason: SDUPTHER

## 2018-11-21 RX ORDER — FENTANYL 75 UG/H
1 PATCH TRANSDERMAL
Qty: 15 PATCH | Refills: 0 | Status: SHIPPED | OUTPATIENT
Start: 2018-11-21 | End: 2018-12-20 | Stop reason: SDUPTHER

## 2018-11-21 RX ORDER — LISINOPRIL 10 MG/1
10 TABLET ORAL DAILY
Qty: 90 TABLET | Refills: 1 | Status: SHIPPED | OUTPATIENT
Start: 2018-11-21 | End: 2019-05-19 | Stop reason: SDUPTHER

## 2018-11-26 RX ORDER — CLONAZEPAM 0.5 MG/1
0.25 TABLET ORAL 2 TIMES DAILY PRN
Qty: 90 TABLET | Refills: 0 | Status: SHIPPED | OUTPATIENT
Start: 2018-11-26 | End: 2019-04-06 | Stop reason: SDUPTHER

## 2018-11-26 RX ORDER — CLONAZEPAM 0.5 MG/1
TABLET ORAL
Qty: 90 TABLET | Refills: 0 | Status: CANCELLED | OUTPATIENT
Start: 2018-11-26

## 2018-11-26 RX ORDER — CLONAZEPAM 0.5 MG/1
TABLET ORAL
Qty: 90 TABLET | Refills: 0 | OUTPATIENT
Start: 2018-11-26

## 2018-11-27 ENCOUNTER — TELEPHONE (OUTPATIENT)
Dept: INTERNAL MEDICINE | Facility: CLINIC | Age: 63
End: 2018-11-27

## 2018-11-28 RX ORDER — LEVOTHYROXINE SODIUM 112 UG/1
TABLET ORAL
Qty: 90 TABLET | Refills: 0 | Status: SHIPPED | OUTPATIENT
Start: 2018-11-28 | End: 2019-02-19 | Stop reason: SDUPTHER

## 2018-12-13 ENCOUNTER — TELEPHONE (OUTPATIENT)
Dept: INTERNAL MEDICINE | Facility: CLINIC | Age: 63
End: 2018-12-13

## 2018-12-13 RX ORDER — PROMETHAZINE HYDROCHLORIDE 25 MG/1
25 TABLET ORAL EVERY 6 HOURS PRN
Qty: 15 TABLET | Refills: 1 | Status: SHIPPED | OUTPATIENT
Start: 2018-12-13 | End: 2018-12-24 | Stop reason: SDUPTHER

## 2018-12-20 ENCOUNTER — TELEPHONE (OUTPATIENT)
Dept: INTERNAL MEDICINE | Facility: CLINIC | Age: 63
End: 2018-12-20

## 2018-12-20 DIAGNOSIS — G89.4 CHRONIC PAIN SYNDROME: ICD-10-CM

## 2018-12-20 DIAGNOSIS — F11.90 CHRONIC, CONTINUOUS USE OF OPIOIDS: Chronic | ICD-10-CM

## 2018-12-20 RX ORDER — OXYCODONE AND ACETAMINOPHEN 7.5; 325 MG/1; MG/1
1 TABLET ORAL EVERY 6 HOURS PRN
Qty: 120 TABLET | Refills: 0 | Status: SHIPPED | OUTPATIENT
Start: 2018-12-20 | End: 2019-01-17 | Stop reason: SDUPTHER

## 2018-12-20 RX ORDER — FENTANYL 75 UG/H
1 PATCH TRANSDERMAL
Qty: 15 PATCH | Refills: 0 | Status: SHIPPED | OUTPATIENT
Start: 2018-12-20 | End: 2019-01-17 | Stop reason: SDUPTHER

## 2018-12-24 ENCOUNTER — TELEPHONE (OUTPATIENT)
Dept: INTERNAL MEDICINE | Facility: CLINIC | Age: 63
End: 2018-12-24

## 2018-12-24 RX ORDER — PROMETHAZINE HYDROCHLORIDE 25 MG/1
25 TABLET ORAL EVERY 6 HOURS PRN
Qty: 90 TABLET | Refills: 1 | Status: SHIPPED | OUTPATIENT
Start: 2018-12-24 | End: 2019-04-23

## 2018-12-24 RX ORDER — BACLOFEN 10 MG/1
10 TABLET ORAL 2 TIMES DAILY PRN
Qty: 180 TABLET | Refills: 0 | Status: SHIPPED | OUTPATIENT
Start: 2018-12-24 | End: 2019-04-23 | Stop reason: SDUPTHER

## 2018-12-24 NOTE — TELEPHONE ENCOUNTER
Ann Marie Tamara wants 90 days on her phenegran. #180 3 rf's Instead of 30. She only got 30. She needs her backlofen. 10mg #180 I bid. 3 rf's tashi on [facility]. [phone]

## 2018-12-26 ENCOUNTER — HOSPITAL ENCOUNTER (OUTPATIENT)
Dept: CARDIOLOGY | Facility: HOSPITAL | Age: 63
Discharge: HOME | End: 2018-12-26
Attending: INTERNAL MEDICINE | Admitting: INTERNAL MEDICINE

## 2018-12-26 VITALS — HEIGHT: 66 IN | WEIGHT: 230 LBS | BODY MASS INDEX: 36.96 KG/M2

## 2018-12-26 DIAGNOSIS — I35.0 NONRHEUMATIC AORTIC VALVE STENOSIS: ICD-10-CM

## 2018-12-26 LAB
BH CV ECHO MEAS - AO MAX PG (FULL): 26.4 MMHG
BH CV ECHO MEAS - AO MAX PG: 32 MMHG
BH CV ECHO MEAS - AO MEAN PG (FULL): 14.3 MMHG
BH CV ECHO MEAS - AO MEAN PG: 22 MMHG
BH CV ECHO MEAS - AO ROOT AREA (BSA CORRECTED): 1.3
BH CV ECHO MEAS - AO ROOT AREA: 6.2 CM^2
BH CV ECHO MEAS - AO ROOT DIAM: 2.8 CM
BH CV ECHO MEAS - AO V2 MAX: 3.5 CM/SEC
BH CV ECHO MEAS - AO V2 MEAN: 195.8 CM/SEC
BH CV ECHO MEAS - AO V2 VTI: 63.4 CM
BH CV ECHO MEAS - AVA(I,A): 1.3 CM^2
BH CV ECHO MEAS - AVA(I,D): 1.3 CM^2
BH CV ECHO MEAS - AVA(V,A): 1.3 CM^2
BH CV ECHO MEAS - AVA(V,D): 1.3 CM^2
BH CV ECHO MEAS - BSA(HAYCOCK): 2.3 M^2
BH CV ECHO MEAS - BSA: 2.1 M^2
BH CV ECHO MEAS - BZI_BMI: 37.1 KILOGRAMS/M^2
BH CV ECHO MEAS - BZI_METRIC_HEIGHT: 167.6 CM
BH CV ECHO MEAS - BZI_METRIC_WEIGHT: 104.3 KG
BH CV ECHO MEAS - EDV(CUBED): 150.6 ML
BH CV ECHO MEAS - EDV(MOD-SP2): 106 ML
BH CV ECHO MEAS - EDV(MOD-SP4): 122 ML
BH CV ECHO MEAS - EDV(TEICH): 136.5 ML
BH CV ECHO MEAS - EF(CUBED): 84.6 %
BH CV ECHO MEAS - EF(MOD-BP): 65 %
BH CV ECHO MEAS - EF(MOD-SP2): 66 %
BH CV ECHO MEAS - EF(MOD-SP4): 63.1 %
BH CV ECHO MEAS - EF(TEICH): 77.4 %
BH CV ECHO MEAS - ESV(CUBED): 23.1 ML
BH CV ECHO MEAS - ESV(MOD-SP2): 36 ML
BH CV ECHO MEAS - ESV(MOD-SP4): 45 ML
BH CV ECHO MEAS - ESV(TEICH): 30.9 ML
BH CV ECHO MEAS - FS: 46.4 %
BH CV ECHO MEAS - IVS/LVPW: 0.99
BH CV ECHO MEAS - IVSD: 1.3 CM
BH CV ECHO MEAS - LAD MAJOR: 5.6 CM
BH CV ECHO MEAS - LATERAL E/E' RATIO: 8.7
BH CV ECHO MEAS - LV DIASTOLIC VOL/BSA (35-75): 57.5 ML/M^2
BH CV ECHO MEAS - LV MASS(C)D: 296.5 GRAMS
BH CV ECHO MEAS - LV MASS(C)DI: 139.7 GRAMS/M^2
BH CV ECHO MEAS - LV MAX PG: 5.6 MMHG
BH CV ECHO MEAS - LV MEAN PG: 3.8 MMHG
BH CV ECHO MEAS - LV SYSTOLIC VOL/BSA (12-30): 21.2 ML/M^2
BH CV ECHO MEAS - LV V1 MAX: 117.7 CM/SEC
BH CV ECHO MEAS - LV V1 MEAN: 86.4 CM/SEC
BH CV ECHO MEAS - LV V1 VTI: 26.6 CM
BH CV ECHO MEAS - LVIDD: 5.3 CM
BH CV ECHO MEAS - LVIDS: 2.9 CM
BH CV ECHO MEAS - LVLD AP2: 7.9 CM
BH CV ECHO MEAS - LVLD AP4: 7.9 CM
BH CV ECHO MEAS - LVLS AP2: 6.5 CM
BH CV ECHO MEAS - LVLS AP4: 6.4 CM
BH CV ECHO MEAS - LVOT AREA (M): 3.1 CM^2
BH CV ECHO MEAS - LVOT AREA: 3.1 CM^2
BH CV ECHO MEAS - LVOT DIAM: 2 CM
BH CV ECHO MEAS - LVPWD: 1.3 CM
BH CV ECHO MEAS - MEDIAL E/E' RATIO: 9.5
BH CV ECHO MEAS - MV A MAX VEL: 101 CM/SEC
BH CV ECHO MEAS - MV E MAX VEL: 103 CM/SEC
BH CV ECHO MEAS - MV E/A: 1
BH CV ECHO MEAS - PA ACC SLOPE: 1133 CM/SEC^2
BH CV ECHO MEAS - PA ACC TIME: 0.1 SEC
BH CV ECHO MEAS - PA PR(ACCEL): 33.1 MMHG
BH CV ECHO MEAS - PI END-D VEL: 127 CM/SEC
BH CV ECHO MEAS - RAP SYSTOLE: 3 MMHG
BH CV ECHO MEAS - RVSP: 42 MMHG
BH CV ECHO MEAS - SI(AO): 184 ML/M^2
BH CV ECHO MEAS - SI(CUBED): 60 ML/M^2
BH CV ECHO MEAS - SI(LVOT): 39.3 ML/M^2
BH CV ECHO MEAS - SI(MOD-SP2): 33 ML/M^2
BH CV ECHO MEAS - SI(MOD-SP4): 36.3 ML/M^2
BH CV ECHO MEAS - SI(TEICH): 49.8 ML/M^2
BH CV ECHO MEAS - SV(AO): 390.6 ML
BH CV ECHO MEAS - SV(CUBED): 127.4 ML
BH CV ECHO MEAS - SV(LVOT): 83.5 ML
BH CV ECHO MEAS - SV(MOD-SP2): 70 ML
BH CV ECHO MEAS - SV(MOD-SP4): 77 ML
BH CV ECHO MEAS - SV(TEICH): 105.7 ML
BH CV ECHO MEAS - TAPSE (>1.6): 2.1 CM2
BH CV ECHO MEAS - TR MAX PG: 39 MMHG
BH CV ECHO MEAS - TR MAX VEL: 313 CM/SEC
BH CV VAS BP LEFT ARM: NORMAL MMHG
BH CV XLRA - RV BASE: 4.1 CM
BH CV XLRA - RV LENGTH: 7.6 CM
BH CV XLRA - RV MID: 2.8 CM
BH CV XLRA - TDI S': 19.9 CM/SEC
LEFT ATRIUM VOLUME INDEX: 36.7 ML/M^2
LEFT ATRIUM VOLUME: 78 ML
LV EF 2D ECHO EST: 65 %

## 2018-12-26 PROCEDURE — 93306 TTE W/DOPPLER COMPLETE: CPT | Performed by: INTERNAL MEDICINE

## 2018-12-26 PROCEDURE — 93306 TTE W/DOPPLER COMPLETE: CPT

## 2018-12-28 ENCOUNTER — TELEPHONE (OUTPATIENT)
Dept: CARDIOLOGY | Facility: CLINIC | Age: 63
End: 2018-12-28

## 2019-01-17 ENCOUNTER — TELEPHONE (OUTPATIENT)
Dept: INTERNAL MEDICINE | Facility: CLINIC | Age: 64
End: 2019-01-17

## 2019-01-17 DIAGNOSIS — G89.4 CHRONIC PAIN SYNDROME: ICD-10-CM

## 2019-01-17 DIAGNOSIS — F11.90 CHRONIC, CONTINUOUS USE OF OPIOIDS: Chronic | ICD-10-CM

## 2019-01-17 RX ORDER — OMEPRAZOLE 20 MG/1
CAPSULE, DELAYED RELEASE ORAL
Qty: 90 CAPSULE | Refills: 0 | Status: SHIPPED | OUTPATIENT
Start: 2019-01-17 | End: 2019-04-16 | Stop reason: SDUPTHER

## 2019-01-17 RX ORDER — DOXYCYCLINE HYCLATE 100 MG/1
100 CAPSULE ORAL 2 TIMES DAILY
Qty: 20 CAPSULE | Refills: 0 | Status: SHIPPED | OUTPATIENT
Start: 2019-01-17 | End: 2019-01-17

## 2019-01-17 RX ORDER — PROMETHAZINE HYDROCHLORIDE 25 MG/1
TABLET ORAL
Qty: 15 TABLET | Refills: 0 | Status: SHIPPED | OUTPATIENT
Start: 2019-01-17 | End: 2019-04-23

## 2019-01-17 RX ORDER — OXYCODONE AND ACETAMINOPHEN 7.5; 325 MG/1; MG/1
1 TABLET ORAL EVERY 6 HOURS PRN
Qty: 120 TABLET | Refills: 0 | Status: SHIPPED | OUTPATIENT
Start: 2019-01-17 | End: 2019-02-14 | Stop reason: SDUPTHER

## 2019-01-17 RX ORDER — CEFDINIR 300 MG/1
300 CAPSULE ORAL 2 TIMES DAILY
Qty: 20 CAPSULE | Refills: 0 | Status: SHIPPED | OUTPATIENT
Start: 2019-01-17 | End: 2019-04-23

## 2019-01-17 RX ORDER — FENTANYL 75 UG/H
1 PATCH TRANSDERMAL
Qty: 15 PATCH | Refills: 0 | Status: SHIPPED | OUTPATIENT
Start: 2019-01-17 | End: 2019-02-14 | Stop reason: SDUPTHER

## 2019-01-17 NOTE — TELEPHONE ENCOUNTER
PAIN MEDS. AND SHE IS SICK. SHE HAS A SORE THROAT, EARS HURT, AND SHE IS COUGHING UP YELLOW MUCUS. PLEASE CALL IN A ANTIBIOTIC. SHE SAID A Z-PACK DOES NOT WORK. [address]

## 2019-01-18 ENCOUNTER — TELEPHONE (OUTPATIENT)
Dept: INTERNAL MEDICINE | Facility: CLINIC | Age: 64
End: 2019-01-18

## 2019-01-21 ENCOUNTER — OFFICE VISIT (OUTPATIENT)
Dept: INTERNAL MEDICINE | Facility: CLINIC | Age: 64
End: 2019-01-21

## 2019-01-21 VITALS
SYSTOLIC BLOOD PRESSURE: 140 MMHG | HEART RATE: 72 BPM | BODY MASS INDEX: 37.14 KG/M2 | DIASTOLIC BLOOD PRESSURE: 70 MMHG | HEIGHT: 66 IN

## 2019-01-21 DIAGNOSIS — F11.90 CHRONIC, CONTINUOUS USE OF OPIOIDS: Chronic | ICD-10-CM

## 2019-01-21 DIAGNOSIS — E78.5 DYSLIPIDEMIA: ICD-10-CM

## 2019-01-21 DIAGNOSIS — D36.9 TUBULAR ADENOMA: ICD-10-CM

## 2019-01-21 DIAGNOSIS — J96.21 ACUTE ON CHRONIC RESPIRATORY FAILURE WITH HYPOXIA (HCC): ICD-10-CM

## 2019-01-21 DIAGNOSIS — J96.11 CHRONIC RESPIRATORY FAILURE WITH HYPOXIA (HCC): Chronic | ICD-10-CM

## 2019-01-21 DIAGNOSIS — E55.9 VITAMIN D DEFICIENCY: ICD-10-CM

## 2019-01-21 DIAGNOSIS — M81.6 LOCALIZED OSTEOPOROSIS WITHOUT CURRENT PATHOLOGICAL FRACTURE: ICD-10-CM

## 2019-01-21 DIAGNOSIS — M79.7 FIBROMYALGIA: ICD-10-CM

## 2019-01-21 DIAGNOSIS — K76.0 FATTY LIVER DISEASE, NONALCOHOLIC: Chronic | ICD-10-CM

## 2019-01-21 DIAGNOSIS — F41.9 CHRONIC ANXIETY: ICD-10-CM

## 2019-01-21 DIAGNOSIS — R00.1 SINUS BRADYCARDIA: ICD-10-CM

## 2019-01-21 DIAGNOSIS — M54.40 CHRONIC LOW BACK PAIN WITH SCIATICA, SCIATICA LATERALITY UNSPECIFIED, UNSPECIFIED BACK PAIN LATERALITY: ICD-10-CM

## 2019-01-21 DIAGNOSIS — G93.41 METABOLIC ENCEPHALOPATHY: ICD-10-CM

## 2019-01-21 DIAGNOSIS — I35.0 NONRHEUMATIC AORTIC VALVE STENOSIS: ICD-10-CM

## 2019-01-21 DIAGNOSIS — I10 ESSENTIAL HYPERTENSION: Chronic | ICD-10-CM

## 2019-01-21 DIAGNOSIS — I51.81 TAKOTSUBO CARDIOMYOPATHY: ICD-10-CM

## 2019-01-21 DIAGNOSIS — L40.9 PSORIASIS: ICD-10-CM

## 2019-01-21 DIAGNOSIS — G89.4 CHRONIC PAIN SYNDROME: ICD-10-CM

## 2019-01-21 DIAGNOSIS — J30.1 SEASONAL ALLERGIC RHINITIS DUE TO POLLEN: ICD-10-CM

## 2019-01-21 DIAGNOSIS — G47.00 INSOMNIA, UNSPECIFIED TYPE: ICD-10-CM

## 2019-01-21 DIAGNOSIS — E03.8 OTHER SPECIFIED HYPOTHYROIDISM: ICD-10-CM

## 2019-01-21 DIAGNOSIS — F41.8 MIXED ANXIETY DEPRESSIVE DISORDER: ICD-10-CM

## 2019-01-21 DIAGNOSIS — G25.0 ESSENTIAL TREMOR: ICD-10-CM

## 2019-01-21 DIAGNOSIS — M15.9 PRIMARY OSTEOARTHRITIS INVOLVING MULTIPLE JOINTS: ICD-10-CM

## 2019-01-21 DIAGNOSIS — I50.22 CHRONIC SYSTOLIC HEART FAILURE (HCC): ICD-10-CM

## 2019-01-21 DIAGNOSIS — I73.9 PERIPHERAL VASCULAR DISEASE (HCC): Chronic | ICD-10-CM

## 2019-01-21 DIAGNOSIS — Z79.899 CHRONICALLY ON BENZODIAZEPINE THERAPY: ICD-10-CM

## 2019-01-21 DIAGNOSIS — E11.10 TYPE 2 DIABETES MELLITUS WITH KETOACIDOSIS WITHOUT COMA, WITHOUT LONG-TERM CURRENT USE OF INSULIN (HCC): Chronic | ICD-10-CM

## 2019-01-21 DIAGNOSIS — E53.8 COBALAMIN DEFICIENCY: Primary | ICD-10-CM

## 2019-01-21 DIAGNOSIS — Z72.0 TOBACCO ABUSE: Chronic | ICD-10-CM

## 2019-01-21 DIAGNOSIS — G47.33 OBSTRUCTIVE SLEEP APNEA SYNDROME: Chronic | ICD-10-CM

## 2019-01-21 DIAGNOSIS — R53.1 WEAKNESS: ICD-10-CM

## 2019-01-21 DIAGNOSIS — G89.29 CHRONIC LOW BACK PAIN WITH SCIATICA, SCIATICA LATERALITY UNSPECIFIED, UNSPECIFIED BACK PAIN LATERALITY: ICD-10-CM

## 2019-01-21 DIAGNOSIS — E11.42 DIABETIC POLYNEUROPATHY ASSOCIATED WITH TYPE 2 DIABETES MELLITUS (HCC): ICD-10-CM

## 2019-01-21 DIAGNOSIS — K21.9 GASTROESOPHAGEAL REFLUX DISEASE WITHOUT ESOPHAGITIS: ICD-10-CM

## 2019-01-21 DIAGNOSIS — I25.10 CORONARY ARTERY DISEASE INVOLVING NATIVE CORONARY ARTERY OF NATIVE HEART WITHOUT ANGINA PECTORIS: Chronic | ICD-10-CM

## 2019-01-21 DIAGNOSIS — J43.8 OTHER EMPHYSEMA (HCC): ICD-10-CM

## 2019-01-21 LAB
ALBUMIN SERPL-MCNC: 3.53 G/DL (ref 3.2–4.8)
ALBUMIN/GLOB SERPL: 1.5 G/DL (ref 1.5–2.5)
ALP SERPL-CCNC: 81 U/L (ref 25–100)
ALT SERPL-CCNC: 18 U/L (ref 7–40)
AST SERPL-CCNC: 28 U/L (ref 0–33)
BILIRUB SERPL-MCNC: 0.2 MG/DL (ref 0.3–1.2)
BUN SERPL-MCNC: 21 MG/DL (ref 9–23)
BUN/CREAT SERPL: 18.8 (ref 7–25)
CALCIUM SERPL-MCNC: 9.1 MG/DL (ref 8.7–10.4)
CHLORIDE SERPL-SCNC: 105 MMOL/L (ref 99–109)
CO2 SERPL-SCNC: 31 MMOL/L (ref 20–31)
CREAT SERPL-MCNC: 1.12 MG/DL (ref 0.6–1.3)
ERYTHROCYTE [DISTWIDTH] IN BLOOD BY AUTOMATED COUNT: 19.2 % (ref 11.3–14.5)
GLOBULIN SER CALC-MCNC: 2.4 GM/DL
GLUCOSE SERPL-MCNC: 140 MG/DL (ref 70–100)
HBA1C MFR BLD: 6.3 % (ref 4.8–5.6)
HCT VFR BLD AUTO: 35.3 % (ref 34.5–44)
HGB BLD-MCNC: 10.7 G/DL (ref 11.5–15.5)
MCH RBC QN AUTO: 27 PG (ref 27–31)
MCHC RBC AUTO-ENTMCNC: 30.3 G/DL (ref 32–36)
MCV RBC AUTO: 88.9 FL (ref 80–99)
PLATELET # BLD AUTO: 197 10*3/MM3 (ref 150–450)
POTASSIUM SERPL-SCNC: 3.8 MMOL/L (ref 3.5–5.5)
PROT SERPL-MCNC: 5.9 G/DL (ref 5.7–8.2)
RBC # BLD AUTO: 3.97 10*6/MM3 (ref 3.89–5.14)
SODIUM SERPL-SCNC: 141 MMOL/L (ref 132–146)
TSH SERPL DL<=0.005 MIU/L-ACNC: 0.99 MIU/ML (ref 0.35–5.35)
VIT B12 SERPL-MCNC: >2000 PG/ML (ref 211–911)
WBC # BLD AUTO: 9.3 10*3/MM3 (ref 3.5–10.8)

## 2019-01-21 PROCEDURE — 99214 OFFICE O/P EST MOD 30 MIN: CPT | Performed by: INTERNAL MEDICINE

## 2019-01-21 PROCEDURE — 96372 THER/PROPH/DIAG INJ SC/IM: CPT | Performed by: INTERNAL MEDICINE

## 2019-01-21 RX ORDER — VENLAFAXINE HYDROCHLORIDE 75 MG/1
75 CAPSULE, EXTENDED RELEASE ORAL DAILY
Qty: 90 CAPSULE | Refills: 3 | Status: SHIPPED | OUTPATIENT
Start: 2019-01-21 | End: 2019-04-23

## 2019-01-21 RX ORDER — CYANOCOBALAMIN 1000 UG/ML
1000 INJECTION, SOLUTION INTRAMUSCULAR; SUBCUTANEOUS
Status: DISCONTINUED | OUTPATIENT
Start: 2019-01-21 | End: 2020-01-23

## 2019-01-21 RX ADMIN — CYANOCOBALAMIN 1000 MCG: 1000 INJECTION, SOLUTION INTRAMUSCULAR; SUBCUTANEOUS at 15:15

## 2019-01-21 NOTE — PROGRESS NOTES
"Patient is a 63 y.o. female who is here for a follow up of chronic conditions.  Chief Complaint   Patient presents with   • Hypertension   • Pain         HPI:    Here for f/u.  Having problems with anxiety and depression.  Feels she is \"rollercoastering\".    Has a lot of abdominal bloating and alternating diarrhea and constipation.  Currently on antibiotics.  Feels shaky when she wakes up in the am.     History:    Patient Active Problem List   Diagnosis   • Atopic rhinitis   • Restrictive ventilatory defect   • COPD (chronic obstructive pulmonary disease) (CMS/[facility])   • Osteoporosis   • Obstructive sleep apnea syndrome   • Abnormal liver enzymes   • Cholelithiasis   • Chronic back pain   • Mixed anxiety depressive disorder   • Type 2 diabetes mellitus (CMS/[facility])   • Dyslipidemia   • Essential tremor   • Fibromyalgia   • Gastroesophageal reflux disease without esophagitis   • Hypertension   • Hypothyroidism   • Insomnia   • Osteoarthritis   • Psoriasis   • Branch retinal vein occlusion   • Cobalamin deficiency   • Obesity   • Vitamin D deficiency   • Fatty liver disease, nonalcoholic   • Sinus bradycardia   • Tobacco abuse   • Peripheral vascular disease (CMS/[facility])   • Diabetic polyneuropathy associated with type 2 diabetes mellitus (CMS/[facility])   • Chronic pain   • Chronic, continuous use of opioids   • Chronic anxiety   • Chronically on benzodiazepine therapy   • Tubular adenoma   • Cellulitis of sacral region   • Metabolic encephalopathy   • Acute on chronic respiratory failure with hypoxia (CMS/[facility])   • Coronary artery disease involving native heart   • Takotsubo cardiomyopathy   • Chronic systolic heart failure (CMS/HCC)   • Chronic respiratory failure with hypoxia (CMS/[facility])   • Weakness   • Nonrheumatic aortic valve stenosis       Past Medical History:   Diagnosis Date   • Acute on chronic respiratory failure with hypoxia (CMS/[facility]) 2/13/2018   • ELIF (acute kidney injury) (CMS/[facility]) 2/13/2018   • ELIF (acute kidney " injury) (CMS/[facility]) 2/13/2018   • Altered mental status 2/13/2018   • Bronchitis    • Cellulitis of sacral region 2/13/2018   • Cervical cancer (CMS/[facility])    • Cholelithiasis 5/11/2016   • Chronic anxiety 2/27/2017   • Chronic bronchitis (CMS/[facility])    • Chronic respiratory failure with hypoxia (CMS/[facility]) 3/8/2018   • Chronic systolic heart failure (CMS/[facility]) 3/8/2018   • Chronically on benzodiazepine therapy 2/27/2017   • Degenerative arthritis    • Diabetes mellitus (CMS/[facility])    • Dyslipidemia 5/11/2016   • Dyspnea    • Elevated troponin level 2/13/2018   • Fatty liver disease, nonalcoholic 11/21/2016   • Fever 2/13/2018   • Fibromyalgia    • GERD (gastroesophageal reflux disease)    • H/O echocardiogram    • History of pneumonia    • Hypertension 5/11/2016    16. H/O echocardiogram (V15.89) (Z92.89)  · A.  Echocardiogram of 02/03/2015 reports an ejection fraction of 60-65%, mild concentric     LVH, trace mitral regurgitation, mild tricuspid and pulmonic regurgitation and calculated     RVSP of 35 mmHg, the main pulmonary artery is also mildly dilated.   • Hypothyroidism 5/11/2016    Description: A.  On replacement therapy.   • Infected wound 3/8/2018   • Nausea    • Obesity    • DINA (obstructive sleep apnea)     intolerant of CPAP therapy   • Osteoporosis    • Osteoporosis    • Polycythemia vera (CMS/[facility]) 5/11/2016   • Pulmonary emphysema (CMS/[facility])    • Restrictive ventilatory defect    • Rhinitis    • Sepsis (CMS/[facility]) 2/13/2018   • Uncontrolled diabetes mellitus (CMS/[facility]) 5/11/2016   • Urine abnormality 2/13/2018   • Uterine cancer (CMS/[facility])    • Vitamin D deficiency 8/1/2016   • Weakness 3/8/2018       Past Surgical History:   Procedure Laterality Date   • AMPUTATION DIGIT Left 11/23/2016    Procedure: AMPUTATION TRANS METATARSAL - ray amutation of the left great toe;  Surgeon: Arsalan Feliciano MD;  Location: formerly [facility];  Service:    • AMPUTATION DIGIT Left 3/2/2017    Procedure: LEFT FOOT TRANSMETATARSAL AMPUTATION TOES  2,3,4,5;  Surgeon: Joey Guaman MD;  Location:  [facility];  Service:    • BACK SURGERY      lumbar fusions x5--multiple times; 1995, 1997, 1998, 1999 and 2008   • BELOW KNEE AMPUTATION Left 2/25/2017    Procedure: EXTENDED LEFT TOE AMPUTATION;  Surgeon: Arsalan Feliciano MD;  Location:  [facility];  Service:    • CARDIAC CATHETERIZATION N/A 11/26/2016    Procedure: Left Heart Cath;  Surgeon: Ash Nuñez MD;  Location:  [facility];  Service:    • CARDIAC CATHETERIZATION N/A 2/20/2018    Procedure: Left Heart Cath;  Surgeon: Lane Zamorano MD;  Location:  [facility];  Service:    • CARDIAC CATHETERIZATION N/A 2/20/2018    Procedure: Right Heart Cath;  Surgeon: Lane Zamorano MD;  Location:  [facility];  Service:    • HAND SURGERY Bilateral     x3   • HYSTERECTOMY      status post uterine and cervical cancer   • LUMBAR SPINE SURGERY      arthrodesis by anterior approach addit interspace   • TONSILLECTOMY         Current Outpatient Medications on File Prior to Visit   Medication Sig   • acetaminophen (TYLENOL) 325 MG tablet Take 2 tablets by mouth Every 4 (Four) Hours As Needed for mild pain (1-3) or fever (temperature greater than 101F).   • albuterol (PROVENTIL) (2.5 MG/3ML) 0.083% nebulizer solution Take 2.5 mg by nebulization Every 6 (Six) Hours As Needed for Wheezing or Shortness of Air.   • aspirin 325 MG tablet Take 325 mg by mouth Daily.   • atorvastatin (LIPITOR) 80 MG tablet Take 1 tablet by mouth Every Night.   • baclofen (LIORESAL) 10 MG tablet Take 1 tablet by mouth 2 (Two) Times a Day As Needed for Muscle Spasms.   • busPIRone (BUSPAR) 7.5 MG tablet 2 po bid   • cefdinir (OMNICEF) 300 MG capsule Take 1 capsule by mouth 2 (Two) Times a Day.   • cetirizine (ZyrTEC) 10 MG tablet Take 10 mg by mouth Every Night.   • clonazePAM (KlonoPIN) 0.5 MG tablet Take 0.5 tablets by mouth 2 (Two) Times a Day As Needed for Anxiety.   • clopidogrel (PLAVIX) 75 MG tablet  TAKE ONE TABLET BY MOUTH DAILY   • Cyanocobalamin 1000 MCG/ML kit Inject 1,000 mcg as directed Every 30 (Thirty) Days. (Patient taking differently: Inject 1,000 mcg as directed Every 3 (Three) Months.)   • ezetimibe (ZETIA) 10 MG tablet Take 1 tablet by mouth Daily.   • fentaNYL (DURAGESIC) 75 MCG/HR patch Place 1 patch on the skin as directed by provider Every Other Day.   • furosemide (LASIX) 40 MG tablet TAKE ONE TABLET BY MOUTH TWICE A DAY   • levothyroxine (SYNTHROID, LEVOTHROID) 112 MCG tablet TAKE ONE TABLET BY MOUTH DAILY   • lisinopril (PRINIVIL,ZESTRIL) 10 MG tablet Take 1 tablet by mouth Daily.   • melatonin 5 MG sublingual tablet sublingual tablet Place 1 tablet under the tongue At Night As Needed (insomnia).   • metoprolol tartrate (LOPRESSOR) 25 MG tablet Take 25 mg by mouth Daily.   • omeprazole (priLOSEC) 20 MG capsule TAKE ONE CAPSULE BY MOUTH DAILY   • oxyCODONE-acetaminophen (PERCOCET) 7.5-325 MG per tablet Take 1 tablet by mouth Every 6 (Six) Hours As Needed for Moderate Pain .   • pregabalin (LYRICA) 100 MG capsule Take 1 capsule by mouth Every 8 (Eight) Hours.   • probiotic (CULTURELLE) capsule capsule Take 1 capsule by mouth Daily.   • promethazine (PHENERGAN) 25 MG tablet Take 1 tablet by mouth Every 6 (Six) Hours As Needed for Nausea or Vomiting.   • promethazine (PHENERGAN) 25 MG tablet TAKE ONE TABLET BY MOUTH EVERY 6 HOURS AS NEEDED FOR NAUSEA OR VOMITING   • SITagliptin (JANUVIA) 100 MG tablet Take 50 mg by mouth Daily.     No current facility-administered medications on file prior to visit.        Family History   Problem Relation Age of Onset   • Arthritis Mother    • Diabetes Mother    • Colon polyps Mother    • Diverticulitis Mother    • Heart failure Mother    • Arthritis Father    • Bleeding Disorder Father    • Diabetes Father    • Kidney disease Father    • Heart disease Father    • COPD Sister         currently smokes   • Arthritis Brother    • Diabetes Brother    • Alcohol  abuse Brother    • Heart murmur Daughter    • Arthritis Other    • Diabetes Other        Social History     Socioeconomic History   • Marital status:      Spouse name: Not on file   • Number of children: 1   • Years of education: Not on file   • Highest education level: Not on file   Social Needs   • Financial resource strain: Not on file   • Food insecurity - worry: Not on file   • Food insecurity - inability: Not on file   • Transportation needs - medical: Not on file   • Transportation needs - non-medical: Not on file   Occupational History     Employer: DISABLED   Tobacco Use   • Smoking status: Former Smoker     Packs/day: 1.50     Years: 48.00     Pack years: 72.00     Types: Cigarettes     Last attempt to quit: 2017     Years since quittin.8   • Smokeless tobacco: Never Used   Substance and Sexual Activity   • Alcohol use: No   • Drug use: No   • Sexual activity: Defer   Other Topics Concern   • Not on file   Social History Narrative    Mrs. Langston is a 62 year old white  female. She lives with her spouse in [facility]. She states she does her own ADLs but appears disabled. They have one child and two grandchildren. She has a living will.    Caffeine: 2 serving per day. Pt lives at home with .         Review of Systems   Constitutional: Positive for fatigue. Negative for chills, diaphoresis, fever and unexpected weight change.   HENT: Negative for congestion, ear pain, hearing loss, nosebleeds, postnasal drip, sinus pressure and sore throat.    Eyes: Negative for pain, discharge and itching.   Respiratory: Positive for shortness of breath. Negative for cough, chest tightness and wheezing.    Cardiovascular: Positive for palpitations. Negative for chest pain and leg swelling.   Gastrointestinal: Negative for abdominal distention, blood in stool, constipation, diarrhea, nausea and vomiting.   Endocrine: Negative for heat intolerance, polydipsia and polyuria.   Genitourinary:  "Negative for difficulty urinating, dysuria, frequency and hematuria.   Musculoskeletal: Positive for arthralgias, back pain and gait problem. Negative for joint swelling and myalgias.   Neurological: Positive for tremors, weakness and light-headedness. Negative for dizziness, syncope and headaches.   Psychiatric/Behavioral: Positive for decreased concentration, dysphoric mood and sleep disturbance. The patient is nervous/anxious.        /70   Pulse 72   Ht 167.6 cm (65.98\")   LMP  (LMP Unknown)   BMI 37.14 kg/m²       Physical Exam   Constitutional: She is oriented to person, place, and time. She appears well-developed and well-nourished.   HENT:   Head: Normocephalic and atraumatic.   Right Ear: External ear normal.   Left Ear: External ear normal.   Mouth/Throat: Oropharynx is clear and moist.   Eyes: Conjunctivae and EOM are normal.   Neck: Normal range of motion. Neck supple.   Cardiovascular: Normal rate and regular rhythm.   2/6 СЕРГЕЙ   Pulmonary/Chest: Effort normal. She has rales (mild).   Mildly diminished   Abdominal: Soft. Bowel sounds are normal.   Musculoskeletal: She exhibits tenderness (pain on back flexion past 30  ).   Using wheelchair   Lymphadenopathy:     She has no cervical adenopathy.   Neurological: She is alert and oriented to person, place, and time.   Skin: Skin is warm and dry. No rash noted.   Psychiatric: She has a normal mood and affect. Her behavior is normal. Thought content normal.       Procedure:      Discussion/Summary:    chronic back pain- refill patch and percocet as needed, advised need to reduce the dose as we are doing  htn-stable  fibromylagia- cont current tx  hyperlipidemia-cont lipitor and zetia, recheck on rtc  hypothroid-cont replacement, recheck today  depression with anxiety-cont buspar and klonopine ,advised her of risk of addiction  polycythemia-cbc today  retinal vein occlusion- cont rf modification  b12 def-admin today and check labs  nausea-phenergan " prn  DM-labs today, counseled on low carb, cont januvia  diaphoresis-better   Elevated lft/?autoimmune-f/u gastro recs , labs today  Eczema-cont prn steroids  FE def anemia-cont FE, recheck today  A/D-rx Effexor  Tremor-cont BB  high risk meds-labs today      labs noted and dw patient, counseled on adequate hydration and diet/wt loss        Current Outpatient Medications:   •  acetaminophen (TYLENOL) 325 MG tablet, Take 2 tablets by mouth Every 4 (Four) Hours As Needed for mild pain (1-3) or fever (temperature greater than 101F)., Disp: 100 tablet, Rfl: 0  •  albuterol (PROVENTIL) (2.5 MG/3ML) 0.083% nebulizer solution, Take 2.5 mg by nebulization Every 6 (Six) Hours As Needed for Wheezing or Shortness of Air., Disp: 120 vial, Rfl: 5  •  aspirin 325 MG tablet, Take 325 mg by mouth Daily., Disp: , Rfl:   •  atorvastatin (LIPITOR) 80 MG tablet, Take 1 tablet by mouth Every Night., Disp: , Rfl:   •  baclofen (LIORESAL) 10 MG tablet, Take 1 tablet by mouth 2 (Two) Times a Day As Needed for Muscle Spasms., Disp: 180 tablet, Rfl: 0  •  busPIRone (BUSPAR) 7.5 MG tablet, 2 po bid, Disp: 360 tablet, Rfl: 0  •  cefdinir (OMNICEF) 300 MG capsule, Take 1 capsule by mouth 2 (Two) Times a Day., Disp: 20 capsule, Rfl: 0  •  cetirizine (ZyrTEC) 10 MG tablet, Take 10 mg by mouth Every Night., Disp: , Rfl:   •  clonazePAM (KlonoPIN) 0.5 MG tablet, Take 0.5 tablets by mouth 2 (Two) Times a Day As Needed for Anxiety., Disp: 90 tablet, Rfl: 0  •  clopidogrel (PLAVIX) 75 MG tablet, TAKE ONE TABLET BY MOUTH DAILY, Disp: 90 tablet, Rfl: 2  •  Cyanocobalamin 1000 MCG/ML kit, Inject 1,000 mcg as directed Every 30 (Thirty) Days. (Patient taking differently: Inject 1,000 mcg as directed Every 3 (Three) Months.), Disp: 1 kit, Rfl: 3  •  ezetimibe (ZETIA) 10 MG tablet, Take 1 tablet by mouth Daily., Disp: 90 tablet, Rfl: 3  •  fentaNYL (DURAGESIC) 75 MCG/HR patch, Place 1 patch on the skin as directed by provider Every Other Day., Disp: 15  patch, Rfl: 0  •  furosemide (LASIX) 40 MG tablet, TAKE ONE TABLET BY MOUTH TWICE A DAY, Disp: 180 tablet, Rfl: 1  •  levothyroxine (SYNTHROID, LEVOTHROID) 112 MCG tablet, TAKE ONE TABLET BY MOUTH DAILY, Disp: 90 tablet, Rfl: 0  •  lisinopril (PRINIVIL,ZESTRIL) 10 MG tablet, Take 1 tablet by mouth Daily., Disp: 90 tablet, Rfl: 1  •  melatonin 5 MG sublingual tablet sublingual tablet, Place 1 tablet under the tongue At Night As Needed (insomnia)., Disp: 30 tablet, Rfl: 0  •  metoprolol tartrate (LOPRESSOR) 25 MG tablet, Take 25 mg by mouth Daily., Disp: , Rfl:   •  omeprazole (priLOSEC) 20 MG capsule, TAKE ONE CAPSULE BY MOUTH DAILY, Disp: 90 capsule, Rfl: 0  •  oxyCODONE-acetaminophen (PERCOCET) 7.5-325 MG per tablet, Take 1 tablet by mouth Every 6 (Six) Hours As Needed for Moderate Pain ., Disp: 120 tablet, Rfl: 0  •  pregabalin (LYRICA) 100 MG capsule, Take 1 capsule by mouth Every 8 (Eight) Hours., Disp: 270 capsule, Rfl: 0  •  probiotic (CULTURELLE) capsule capsule, Take 1 capsule by mouth Daily., Disp: 60 capsule, Rfl: 0  •  promethazine (PHENERGAN) 25 MG tablet, Take 1 tablet by mouth Every 6 (Six) Hours As Needed for Nausea or Vomiting., Disp: 90 tablet, Rfl: 1  •  promethazine (PHENERGAN) 25 MG tablet, TAKE ONE TABLET BY MOUTH EVERY 6 HOURS AS NEEDED FOR NAUSEA OR VOMITING, Disp: 15 tablet, Rfl: 0  •  SITagliptin (JANUVIA) 100 MG tablet, Take 50 mg by mouth Daily., Disp: , Rfl:   •  venlafaxine XR (EFFEXOR-XR) 75 MG 24 hr capsule, Take 1 capsule by mouth Daily., Disp: 90 capsule, Rfl: 3        Tamara was seen today for hypertension and pain.    Diagnoses and all orders for this visit:    Takotsubo cardiomyopathy    Sinus bradycardia    Peripheral vascular disease (CMS/HCC)    Essential hypertension    Nonrheumatic aortic valve stenosis    Coronary artery disease involving native coronary artery of native heart without angina pectoris    Chronic systolic heart failure (CMS/HCC)  -     [facility] (No  Diff)    Obstructive sleep apnea syndrome    Other emphysema (CMS/HCC)    Chronic respiratory failure with hypoxia (CMS/HCC)    Seasonal allergic rhinitis due to pollen    Acute on chronic respiratory failure with hypoxia (CMS/HCC)    Vitamin D deficiency    Gastroesophageal reflux disease without esophagitis    Fatty liver disease, nonalcoholic    Cobalamin deficiency  -     Vitamin B12    Type 2 diabetes mellitus with ketoacidosis without coma, without long-term current use of insulin (CMS/HCC)  -     Comprehensive Metabolic Panel  -     Hemoglobin A1c    Other specified hypothyroidism  -     TSH    Diabetic polyneuropathy associated with type 2 diabetes mellitus (CMS/HCC)    Metabolic encephalopathy    Fibromyalgia    Essential tremor    Chronic pain syndrome    Chronic low back pain with sciatica, sciatica laterality unspecified, unspecified back pain laterality    Psoriasis    Localized osteoporosis without current pathological fracture    Primary osteoarthritis involving multiple joints    Weakness    Tubular adenoma    Tobacco abuse    Mixed anxiety depressive disorder  -     venlafaxine XR (EFFEXOR-XR) 75 MG 24 hr capsule; Take 1 capsule by mouth Daily.    Insomnia, unspecified type    Dyslipidemia    Chronically on benzodiazepine therapy    Chronic, continuous use of opioids    Chronic anxiety

## 2019-01-25 RX ORDER — BUSPIRONE HYDROCHLORIDE 7.5 MG/1
TABLET ORAL
Qty: 360 TABLET | Refills: 0 | Status: SHIPPED | OUTPATIENT
Start: 2019-01-25 | End: 2019-04-24 | Stop reason: SDUPTHER

## 2019-02-05 ENCOUNTER — TELEPHONE (OUTPATIENT)
Dept: INTERNAL MEDICINE | Facility: CLINIC | Age: 64
End: 2019-02-05

## 2019-02-05 NOTE — TELEPHONE ENCOUNTER
PT WAS PUT ON EFFEXOR, AND AN HOUR LATER SHE WAS SWEATING AND VERY HOT. SHE IS TOO AFRAID TO TAKE IT AGAIN.    Try the Effexor XR 37.5 mg dose

## 2019-02-10 DIAGNOSIS — I73.9 PERIPHERAL VASCULAR DISEASE (HCC): ICD-10-CM

## 2019-02-11 RX ORDER — CLOPIDOGREL BISULFATE 75 MG/1
TABLET ORAL
Qty: 90 TABLET | Refills: 1 | Status: SHIPPED | OUTPATIENT
Start: 2019-02-11 | End: 2019-08-13 | Stop reason: SDUPTHER

## 2019-02-11 RX ORDER — ATORVASTATIN CALCIUM 40 MG/1
TABLET, FILM COATED ORAL
Qty: 90 TABLET | Refills: 1 | Status: SHIPPED | OUTPATIENT
Start: 2019-02-11 | End: 2019-08-09 | Stop reason: SDUPTHER

## 2019-02-14 ENCOUNTER — TELEPHONE (OUTPATIENT)
Dept: INTERNAL MEDICINE | Facility: CLINIC | Age: 64
End: 2019-02-14

## 2019-02-14 DIAGNOSIS — G89.4 CHRONIC PAIN SYNDROME: ICD-10-CM

## 2019-02-14 DIAGNOSIS — F11.90 CHRONIC, CONTINUOUS USE OF OPIOIDS: Chronic | ICD-10-CM

## 2019-02-14 RX ORDER — OXYCODONE AND ACETAMINOPHEN 7.5; 325 MG/1; MG/1
1 TABLET ORAL EVERY 6 HOURS PRN
Qty: 120 TABLET | Refills: 0 | Status: SHIPPED | OUTPATIENT
Start: 2019-02-14 | End: 2019-03-14 | Stop reason: SDUPTHER

## 2019-02-14 RX ORDER — FENTANYL 75 UG/H
1 PATCH TRANSDERMAL
Qty: 15 PATCH | Refills: 0 | Status: SHIPPED | OUTPATIENT
Start: 2019-02-14 | End: 2019-03-14 | Stop reason: SDUPTHER

## 2019-02-18 RX ORDER — PROMETHAZINE HYDROCHLORIDE 25 MG/1
TABLET ORAL
Qty: 15 TABLET | Refills: 0 | Status: SHIPPED | OUTPATIENT
Start: 2019-02-18 | End: 2019-08-07 | Stop reason: SDUPTHER

## 2019-02-19 RX ORDER — FUROSEMIDE 40 MG/1
TABLET ORAL
Qty: 180 TABLET | Refills: 0 | Status: SHIPPED | OUTPATIENT
Start: 2019-02-19 | End: 2019-05-27 | Stop reason: SDUPTHER

## 2019-02-19 RX ORDER — LEVOTHYROXINE SODIUM 112 UG/1
TABLET ORAL
Qty: 90 TABLET | Refills: 0 | Status: SHIPPED | OUTPATIENT
Start: 2019-02-19 | End: 2019-05-25 | Stop reason: SDUPTHER

## 2019-03-13 ENCOUNTER — TELEPHONE (OUTPATIENT)
Dept: INTERNAL MEDICINE | Facility: CLINIC | Age: 64
End: 2019-03-13

## 2019-03-14 DIAGNOSIS — F11.90 CHRONIC, CONTINUOUS USE OF OPIOIDS: Chronic | ICD-10-CM

## 2019-03-14 DIAGNOSIS — G89.4 CHRONIC PAIN SYNDROME: ICD-10-CM

## 2019-03-14 RX ORDER — OXYCODONE AND ACETAMINOPHEN 7.5; 325 MG/1; MG/1
1 TABLET ORAL EVERY 6 HOURS PRN
Qty: 120 TABLET | Refills: 0 | Status: SHIPPED | OUTPATIENT
Start: 2019-03-14 | End: 2019-04-12 | Stop reason: SDUPTHER

## 2019-03-14 RX ORDER — FENTANYL 75 UG/H
1 PATCH TRANSDERMAL
Qty: 15 PATCH | Refills: 0 | Status: SHIPPED | OUTPATIENT
Start: 2019-03-14 | End: 2019-04-12 | Stop reason: SDUPTHER

## 2019-03-18 DIAGNOSIS — I21.4 NSTEMI (NON-ST ELEVATED MYOCARDIAL INFARCTION) (HCC): ICD-10-CM

## 2019-03-18 DIAGNOSIS — I10 ESSENTIAL HYPERTENSION: ICD-10-CM

## 2019-03-18 NOTE — TELEPHONE ENCOUNTER
Pt said that [facility] is sending over a refill and she said to make sure its for 90 days(metoprolol tartrate)

## 2019-03-25 RX ORDER — PROMETHAZINE HYDROCHLORIDE 25 MG/1
TABLET ORAL
Qty: 15 TABLET | Refills: 0 | Status: SHIPPED | OUTPATIENT
Start: 2019-03-25 | End: 2019-08-07 | Stop reason: SDUPTHER

## 2019-04-08 ENCOUNTER — TELEPHONE (OUTPATIENT)
Dept: INTERNAL MEDICINE | Facility: CLINIC | Age: 64
End: 2019-04-08

## 2019-04-08 RX ORDER — CLONAZEPAM 0.5 MG/1
TABLET ORAL
Qty: 30 TABLET | Refills: 0 | Status: SHIPPED | OUTPATIENT
Start: 2019-04-08 | End: 2019-04-08 | Stop reason: SDUPTHER

## 2019-04-08 RX ORDER — CLONAZEPAM 0.5 MG/1
0.25 TABLET ORAL 2 TIMES DAILY PRN
Qty: 90 TABLET | Refills: 2 | Status: SHIPPED | OUTPATIENT
Start: 2019-04-08 | End: 2019-10-24 | Stop reason: SDUPTHER

## 2019-04-11 ENCOUNTER — TELEPHONE (OUTPATIENT)
Dept: INTERNAL MEDICINE | Facility: CLINIC | Age: 64
End: 2019-04-11

## 2019-04-11 NOTE — TELEPHONE ENCOUNTER
FYI PATIENT CALLED AND SAID HER FENTANYL PATCH AND PERCOCET WILL BE DUE FOR A REFILL ON Monday April 15, 2019

## 2019-04-12 DIAGNOSIS — G89.4 CHRONIC PAIN SYNDROME: ICD-10-CM

## 2019-04-12 DIAGNOSIS — F11.90 CHRONIC, CONTINUOUS USE OF OPIOIDS: Chronic | ICD-10-CM

## 2019-04-12 RX ORDER — OXYCODONE AND ACETAMINOPHEN 7.5; 325 MG/1; MG/1
1 TABLET ORAL EVERY 6 HOURS PRN
Qty: 120 TABLET | Refills: 0 | Status: SHIPPED | OUTPATIENT
Start: 2019-04-12 | End: 2019-05-09 | Stop reason: SDUPTHER

## 2019-04-12 RX ORDER — FENTANYL 75 UG/H
1 PATCH TRANSDERMAL
Qty: 15 PATCH | Refills: 0 | Status: SHIPPED | OUTPATIENT
Start: 2019-04-12 | End: 2019-05-09 | Stop reason: SDUPTHER

## 2019-04-17 RX ORDER — OMEPRAZOLE 20 MG/1
CAPSULE, DELAYED RELEASE ORAL
Qty: 90 CAPSULE | Refills: 0 | Status: SHIPPED | OUTPATIENT
Start: 2019-04-17 | End: 2019-07-16 | Stop reason: SDUPTHER

## 2019-04-23 ENCOUNTER — OFFICE VISIT (OUTPATIENT)
Dept: INTERNAL MEDICINE | Facility: CLINIC | Age: 64
End: 2019-04-23

## 2019-04-23 VITALS
SYSTOLIC BLOOD PRESSURE: 128 MMHG | HEART RATE: 64 BPM | DIASTOLIC BLOOD PRESSURE: 60 MMHG | HEIGHT: 66 IN | BODY MASS INDEX: 37.14 KG/M2

## 2019-04-23 DIAGNOSIS — G47.00 INSOMNIA, UNSPECIFIED TYPE: ICD-10-CM

## 2019-04-23 DIAGNOSIS — M79.7 FIBROMYALGIA: ICD-10-CM

## 2019-04-23 DIAGNOSIS — I73.9 PERIPHERAL VASCULAR DISEASE (HCC): Chronic | ICD-10-CM

## 2019-04-23 DIAGNOSIS — I50.22 CHRONIC SYSTOLIC HEART FAILURE (HCC): ICD-10-CM

## 2019-04-23 DIAGNOSIS — M81.6 LOCALIZED OSTEOPOROSIS WITHOUT CURRENT PATHOLOGICAL FRACTURE: ICD-10-CM

## 2019-04-23 DIAGNOSIS — M15.9 PRIMARY OSTEOARTHRITIS INVOLVING MULTIPLE JOINTS: ICD-10-CM

## 2019-04-23 DIAGNOSIS — K76.0 FATTY LIVER DISEASE, NONALCOHOLIC: Chronic | ICD-10-CM

## 2019-04-23 DIAGNOSIS — J43.8 OTHER EMPHYSEMA (HCC): ICD-10-CM

## 2019-04-23 DIAGNOSIS — K21.9 GASTROESOPHAGEAL REFLUX DISEASE WITHOUT ESOPHAGITIS: ICD-10-CM

## 2019-04-23 DIAGNOSIS — G47.33 OBSTRUCTIVE SLEEP APNEA SYNDROME: Chronic | ICD-10-CM

## 2019-04-23 DIAGNOSIS — G25.0 ESSENTIAL TREMOR: ICD-10-CM

## 2019-04-23 DIAGNOSIS — I10 ESSENTIAL HYPERTENSION: ICD-10-CM

## 2019-04-23 DIAGNOSIS — G89.4 CHRONIC PAIN SYNDROME: ICD-10-CM

## 2019-04-23 DIAGNOSIS — F41.8 MIXED ANXIETY DEPRESSIVE DISORDER: ICD-10-CM

## 2019-04-23 DIAGNOSIS — J30.1 SEASONAL ALLERGIC RHINITIS DUE TO POLLEN: ICD-10-CM

## 2019-04-23 DIAGNOSIS — E11.10 TYPE 2 DIABETES MELLITUS WITH KETOACIDOSIS WITHOUT COMA, WITHOUT LONG-TERM CURRENT USE OF INSULIN (HCC): Chronic | ICD-10-CM

## 2019-04-23 DIAGNOSIS — R74.8 ABNORMAL LIVER ENZYMES: ICD-10-CM

## 2019-04-23 DIAGNOSIS — Z79.899 CHRONICALLY ON BENZODIAZEPINE THERAPY: ICD-10-CM

## 2019-04-23 DIAGNOSIS — E03.8 OTHER SPECIFIED HYPOTHYROIDISM: ICD-10-CM

## 2019-04-23 DIAGNOSIS — E55.9 VITAMIN D DEFICIENCY: ICD-10-CM

## 2019-04-23 DIAGNOSIS — F11.90 CHRONIC, CONTINUOUS USE OF OPIOIDS: Chronic | ICD-10-CM

## 2019-04-23 DIAGNOSIS — E78.5 DYSLIPIDEMIA: ICD-10-CM

## 2019-04-23 DIAGNOSIS — F41.9 CHRONIC ANXIETY: ICD-10-CM

## 2019-04-23 DIAGNOSIS — E53.8 COBALAMIN DEFICIENCY: ICD-10-CM

## 2019-04-23 DIAGNOSIS — I25.10 CORONARY ARTERY DISEASE INVOLVING NATIVE CORONARY ARTERY OF NATIVE HEART WITHOUT ANGINA PECTORIS: ICD-10-CM

## 2019-04-23 DIAGNOSIS — I51.81 TAKOTSUBO CARDIOMYOPATHY: Primary | ICD-10-CM

## 2019-04-23 DIAGNOSIS — R00.1 SINUS BRADYCARDIA: ICD-10-CM

## 2019-04-23 DIAGNOSIS — E11.42 DIABETIC POLYNEUROPATHY ASSOCIATED WITH TYPE 2 DIABETES MELLITUS (HCC): ICD-10-CM

## 2019-04-23 PROBLEM — G93.41 METABOLIC ENCEPHALOPATHY: Status: RESOLVED | Noted: 2018-02-13 | Resolved: 2019-04-23

## 2019-04-23 PROCEDURE — 99214 OFFICE O/P EST MOD 30 MIN: CPT | Performed by: INTERNAL MEDICINE

## 2019-04-23 PROCEDURE — 96372 THER/PROPH/DIAG INJ SC/IM: CPT | Performed by: INTERNAL MEDICINE

## 2019-04-23 RX ORDER — BACLOFEN 10 MG/1
10 TABLET ORAL 2 TIMES DAILY PRN
Qty: 180 TABLET | Refills: 1 | Status: SHIPPED | OUTPATIENT
Start: 2019-04-23 | End: 2019-08-29

## 2019-04-23 RX ORDER — PROMETHAZINE HYDROCHLORIDE 25 MG/1
25 TABLET ORAL EVERY 6 HOURS PRN
Qty: 20 TABLET | Refills: 2 | Status: SHIPPED | OUTPATIENT
Start: 2019-04-23 | End: 2020-01-20

## 2019-04-23 RX ADMIN — CYANOCOBALAMIN 1000 MCG: 1000 INJECTION, SOLUTION INTRAMUSCULAR; SUBCUTANEOUS at 12:47

## 2019-04-24 LAB
ALBUMIN SERPL-MCNC: 3.1 G/DL (ref 3.5–5.2)
ALBUMIN/GLOB SERPL: 0.9 G/DL
ALP SERPL-CCNC: 63 U/L (ref 39–117)
ALT SERPL-CCNC: 18 U/L (ref 1–33)
AST SERPL-CCNC: 25 U/L (ref 1–32)
BILIRUB SERPL-MCNC: <0.2 MG/DL (ref 0.2–1.2)
BUN SERPL-MCNC: 16 MG/DL (ref 8–23)
BUN/CREAT SERPL: 13.7 (ref 7–25)
CALCIUM SERPL-MCNC: 9.4 MG/DL (ref 8.6–10.5)
CHLORIDE SERPL-SCNC: 102 MMOL/L (ref 98–107)
CO2 SERPL-SCNC: 27.8 MMOL/L (ref 22–29)
CREAT SERPL-MCNC: 1.17 MG/DL (ref 0.57–1)
ERYTHROCYTE [DISTWIDTH] IN BLOOD BY AUTOMATED COUNT: 20.7 % (ref 12.3–15.4)
GLOBULIN SER CALC-MCNC: 3.4 GM/DL
GLUCOSE SERPL-MCNC: 163 MG/DL (ref 65–99)
HBA1C MFR BLD: 5.7 % (ref 4.8–5.6)
HCT VFR BLD AUTO: 37.5 % (ref 34–46.6)
HGB BLD-MCNC: 11.3 G/DL (ref 12–15.9)
MCH RBC QN AUTO: 28.8 PG (ref 26.6–33)
MCHC RBC AUTO-ENTMCNC: 30.1 G/DL (ref 31.5–35.7)
MCV RBC AUTO: 95.7 FL (ref 79–97)
PLATELET # BLD AUTO: 221 10*3/MM3 (ref 140–450)
POTASSIUM SERPL-SCNC: 4.7 MMOL/L (ref 3.5–5.2)
PROT SERPL-MCNC: 6.5 G/DL (ref 6–8.5)
RBC # BLD AUTO: 3.92 10*6/MM3 (ref 3.77–5.28)
SODIUM SERPL-SCNC: 143 MMOL/L (ref 136–145)
TSH SERPL DL<=0.005 MIU/L-ACNC: 0.76 UIU/ML (ref 0.45–4.5)
WBC # BLD AUTO: 9.6 10*3/MM3 (ref 3.4–10.8)

## 2019-04-24 RX ORDER — BUSPIRONE HYDROCHLORIDE 7.5 MG/1
TABLET ORAL
Qty: 360 TABLET | Refills: 0 | Status: SHIPPED | OUTPATIENT
Start: 2019-04-24 | End: 2019-07-24 | Stop reason: SDUPTHER

## 2019-05-09 ENCOUNTER — TELEPHONE (OUTPATIENT)
Dept: INTERNAL MEDICINE | Facility: CLINIC | Age: 64
End: 2019-05-09

## 2019-05-09 DIAGNOSIS — F11.90 CHRONIC, CONTINUOUS USE OF OPIOIDS: Chronic | ICD-10-CM

## 2019-05-09 DIAGNOSIS — G89.4 CHRONIC PAIN SYNDROME: ICD-10-CM

## 2019-05-09 RX ORDER — FENTANYL 75 UG/H
1 PATCH TRANSDERMAL
Qty: 15 PATCH | Refills: 0 | Status: SHIPPED | OUTPATIENT
Start: 2019-05-09 | End: 2019-06-07 | Stop reason: SDUPTHER

## 2019-05-09 RX ORDER — OXYCODONE AND ACETAMINOPHEN 7.5; 325 MG/1; MG/1
1 TABLET ORAL EVERY 6 HOURS PRN
Qty: 120 TABLET | Refills: 0 | Status: SHIPPED | OUTPATIENT
Start: 2019-05-09 | End: 2019-06-07 | Stop reason: SDUPTHER

## 2019-05-09 NOTE — TELEPHONE ENCOUNTER
PATIENT IS NEEDING A REFILL FOR PERCOCET AND FENTANYL PATCH SENT INTO [facility] [address]

## 2019-05-20 ENCOUNTER — TELEPHONE (OUTPATIENT)
Dept: INTERNAL MEDICINE | Facility: CLINIC | Age: 64
End: 2019-05-20

## 2019-05-20 RX ORDER — LISINOPRIL 10 MG/1
TABLET ORAL
Qty: 90 TABLET | Refills: 0 | Status: SHIPPED | OUTPATIENT
Start: 2019-05-20 | End: 2019-08-13 | Stop reason: SDUPTHER

## 2019-05-28 RX ORDER — FUROSEMIDE 40 MG/1
TABLET ORAL
Qty: 180 TABLET | Refills: 0 | Status: SHIPPED | OUTPATIENT
Start: 2019-05-28 | End: 2019-08-30 | Stop reason: SDUPTHER

## 2019-05-28 RX ORDER — LEVOTHYROXINE SODIUM 112 UG/1
TABLET ORAL
Qty: 90 TABLET | Refills: 0 | Status: SHIPPED | OUTPATIENT
Start: 2019-05-28 | End: 2019-08-22 | Stop reason: SDUPTHER

## 2019-06-06 ENCOUNTER — TELEPHONE (OUTPATIENT)
Dept: INTERNAL MEDICINE | Facility: CLINIC | Age: 64
End: 2019-06-06

## 2019-06-07 DIAGNOSIS — G89.4 CHRONIC PAIN SYNDROME: ICD-10-CM

## 2019-06-07 DIAGNOSIS — F11.90 CHRONIC, CONTINUOUS USE OF OPIOIDS: Chronic | ICD-10-CM

## 2019-06-07 RX ORDER — FENTANYL 75 UG/H
1 PATCH TRANSDERMAL
Qty: 15 PATCH | Refills: 0 | Status: SHIPPED | OUTPATIENT
Start: 2019-06-07 | End: 2019-07-09 | Stop reason: SDUPTHER

## 2019-06-07 RX ORDER — OXYCODONE AND ACETAMINOPHEN 7.5; 325 MG/1; MG/1
1 TABLET ORAL EVERY 6 HOURS PRN
Qty: 120 TABLET | Refills: 0 | Status: SHIPPED | OUTPATIENT
Start: 2019-06-07 | End: 2019-07-09 | Stop reason: SDUPTHER

## 2019-07-09 ENCOUNTER — TELEPHONE (OUTPATIENT)
Dept: INTERNAL MEDICINE | Facility: CLINIC | Age: 64
End: 2019-07-09

## 2019-07-09 DIAGNOSIS — F11.90 CHRONIC, CONTINUOUS USE OF OPIOIDS: Chronic | ICD-10-CM

## 2019-07-09 DIAGNOSIS — G89.4 CHRONIC PAIN SYNDROME: ICD-10-CM

## 2019-07-09 RX ORDER — FENTANYL 75 UG/H
1 PATCH TRANSDERMAL
Qty: 15 PATCH | Refills: 0 | Status: SHIPPED | OUTPATIENT
Start: 2019-07-09 | End: 2019-08-07 | Stop reason: SDUPTHER

## 2019-07-09 RX ORDER — OXYCODONE AND ACETAMINOPHEN 7.5; 325 MG/1; MG/1
1 TABLET ORAL EVERY 6 HOURS PRN
Qty: 120 TABLET | Refills: 0 | Status: SHIPPED | OUTPATIENT
Start: 2019-07-09 | End: 2019-08-07 | Stop reason: SDUPTHER

## 2019-07-09 NOTE — TELEPHONE ENCOUNTER
PERCOCET AND FENTANYL [facility]    Can you have these messages go straight to you

## 2019-07-17 RX ORDER — OMEPRAZOLE 20 MG/1
CAPSULE, DELAYED RELEASE ORAL
Qty: 90 CAPSULE | Refills: 0 | Status: SHIPPED | OUTPATIENT
Start: 2019-07-17 | End: 2019-08-22 | Stop reason: SDUPTHER

## 2019-07-21 NOTE — TELEPHONE ENCOUNTER
MD at bedside for assessment. X ray at bedside with portable machine.   Pt needs a refill on clonazpam for 90 days. Pt is out of it

## 2019-07-24 RX ORDER — BUSPIRONE HYDROCHLORIDE 7.5 MG/1
TABLET ORAL
Qty: 360 TABLET | Refills: 0 | Status: SHIPPED | OUTPATIENT
Start: 2019-07-24 | End: 2019-10-24 | Stop reason: SDUPTHER

## 2019-08-07 ENCOUNTER — TELEPHONE (OUTPATIENT)
Dept: INTERNAL MEDICINE | Facility: CLINIC | Age: 64
End: 2019-08-07

## 2019-08-07 DIAGNOSIS — G89.4 CHRONIC PAIN SYNDROME: ICD-10-CM

## 2019-08-07 DIAGNOSIS — F11.90 CHRONIC, CONTINUOUS USE OF OPIOIDS: Chronic | ICD-10-CM

## 2019-08-07 RX ORDER — OXYCODONE AND ACETAMINOPHEN 7.5; 325 MG/1; MG/1
1 TABLET ORAL EVERY 6 HOURS PRN
Qty: 120 TABLET | Refills: 0 | Status: SHIPPED | OUTPATIENT
Start: 2019-08-07 | End: 2019-09-05 | Stop reason: SDUPTHER

## 2019-08-07 RX ORDER — FENTANYL 75 UG/H
1 PATCH TRANSDERMAL
Qty: 15 PATCH | Refills: 0 | Status: SHIPPED | OUTPATIENT
Start: 2019-08-07 | End: 2019-09-05 | Stop reason: SDUPTHER

## 2019-08-09 RX ORDER — ATORVASTATIN CALCIUM 40 MG/1
TABLET, FILM COATED ORAL
Qty: 90 TABLET | Refills: 0 | Status: SHIPPED | OUTPATIENT
Start: 2019-08-09 | End: 2019-11-07 | Stop reason: SDUPTHER

## 2019-08-13 DIAGNOSIS — I73.9 PERIPHERAL VASCULAR DISEASE (HCC): ICD-10-CM

## 2019-08-13 RX ORDER — LISINOPRIL 10 MG/1
TABLET ORAL
Qty: 90 TABLET | Refills: 0 | Status: SHIPPED | OUTPATIENT
Start: 2019-08-13 | End: 2019-11-15 | Stop reason: SDUPTHER

## 2019-08-13 RX ORDER — CLOPIDOGREL BISULFATE 75 MG/1
TABLET ORAL
Qty: 90 TABLET | Refills: 0 | Status: SHIPPED | OUTPATIENT
Start: 2019-08-13 | End: 2019-11-13 | Stop reason: SDUPTHER

## 2019-08-22 ENCOUNTER — TELEPHONE (OUTPATIENT)
Dept: INTERNAL MEDICINE | Facility: CLINIC | Age: 64
End: 2019-08-22

## 2019-08-22 RX ORDER — PREGABALIN 100 MG/1
100 CAPSULE ORAL EVERY 8 HOURS
Qty: 270 CAPSULE | Refills: 0 | Status: SHIPPED | OUTPATIENT
Start: 2019-08-22 | End: 2019-08-22 | Stop reason: SDUPTHER

## 2019-08-22 RX ORDER — OMEPRAZOLE 20 MG/1
CAPSULE, DELAYED RELEASE ORAL
Qty: 90 CAPSULE | Refills: 0 | Status: SHIPPED | OUTPATIENT
Start: 2019-08-22 | End: 2020-01-14

## 2019-08-22 RX ORDER — LEVOTHYROXINE SODIUM 112 UG/1
TABLET ORAL
Qty: 90 TABLET | Refills: 0 | Status: SHIPPED | OUTPATIENT
Start: 2019-08-22 | End: 2019-11-20 | Stop reason: SDUPTHER

## 2019-08-22 RX ORDER — PREGABALIN 100 MG/1
100 CAPSULE ORAL EVERY 8 HOURS
Qty: 270 CAPSULE | Refills: 0 | Status: SHIPPED | OUTPATIENT
Start: 2019-08-22 | End: 2019-11-21 | Stop reason: SDUPTHER

## 2019-08-29 ENCOUNTER — OFFICE VISIT (OUTPATIENT)
Dept: INTERNAL MEDICINE | Facility: CLINIC | Age: 64
End: 2019-08-29

## 2019-08-29 VITALS
HEIGHT: 66 IN | SYSTOLIC BLOOD PRESSURE: 120 MMHG | BODY MASS INDEX: 37.14 KG/M2 | DIASTOLIC BLOOD PRESSURE: 68 MMHG | HEART RATE: 68 BPM

## 2019-08-29 DIAGNOSIS — I51.81 TAKOTSUBO CARDIOMYOPATHY: Primary | ICD-10-CM

## 2019-08-29 DIAGNOSIS — E03.8 OTHER SPECIFIED HYPOTHYROIDISM: ICD-10-CM

## 2019-08-29 DIAGNOSIS — G89.4 CHRONIC PAIN SYNDROME: ICD-10-CM

## 2019-08-29 DIAGNOSIS — M62.838 MUSCLE SPASMS OF NECK: ICD-10-CM

## 2019-08-29 DIAGNOSIS — J43.8 OTHER EMPHYSEMA (HCC): ICD-10-CM

## 2019-08-29 DIAGNOSIS — J30.1 SEASONAL ALLERGIC RHINITIS DUE TO POLLEN: ICD-10-CM

## 2019-08-29 DIAGNOSIS — I50.22 CHRONIC SYSTOLIC HEART FAILURE (HCC): ICD-10-CM

## 2019-08-29 DIAGNOSIS — R74.8 ABNORMAL LIVER ENZYMES: ICD-10-CM

## 2019-08-29 DIAGNOSIS — G47.00 INSOMNIA, UNSPECIFIED TYPE: ICD-10-CM

## 2019-08-29 DIAGNOSIS — G89.29 CHRONIC LOW BACK PAIN WITH SCIATICA, SCIATICA LATERALITY UNSPECIFIED, UNSPECIFIED BACK PAIN LATERALITY: ICD-10-CM

## 2019-08-29 DIAGNOSIS — G47.33 OBSTRUCTIVE SLEEP APNEA SYNDROME: Chronic | ICD-10-CM

## 2019-08-29 DIAGNOSIS — M15.9 PRIMARY OSTEOARTHRITIS INVOLVING MULTIPLE JOINTS: ICD-10-CM

## 2019-08-29 DIAGNOSIS — K21.9 GASTROESOPHAGEAL REFLUX DISEASE WITHOUT ESOPHAGITIS: ICD-10-CM

## 2019-08-29 DIAGNOSIS — E78.5 DYSLIPIDEMIA: ICD-10-CM

## 2019-08-29 DIAGNOSIS — I10 ESSENTIAL HYPERTENSION: ICD-10-CM

## 2019-08-29 DIAGNOSIS — E53.8 COBALAMIN DEFICIENCY: ICD-10-CM

## 2019-08-29 DIAGNOSIS — Z72.0 TOBACCO ABUSE: Chronic | ICD-10-CM

## 2019-08-29 DIAGNOSIS — E11.10 TYPE 2 DIABETES MELLITUS WITH KETOACIDOSIS WITHOUT COMA, WITHOUT LONG-TERM CURRENT USE OF INSULIN (HCC): Chronic | ICD-10-CM

## 2019-08-29 DIAGNOSIS — K74.69 OTHER CIRRHOSIS OF LIVER (HCC): ICD-10-CM

## 2019-08-29 DIAGNOSIS — G25.0 ESSENTIAL TREMOR: ICD-10-CM

## 2019-08-29 DIAGNOSIS — E55.9 VITAMIN D DEFICIENCY: ICD-10-CM

## 2019-08-29 DIAGNOSIS — M81.6 LOCALIZED OSTEOPOROSIS WITHOUT CURRENT PATHOLOGICAL FRACTURE: ICD-10-CM

## 2019-08-29 DIAGNOSIS — D45 POLYCYTHEMIA VERA (HCC): ICD-10-CM

## 2019-08-29 DIAGNOSIS — I73.9 PERIPHERAL VASCULAR DISEASE (HCC): Chronic | ICD-10-CM

## 2019-08-29 DIAGNOSIS — Z89.422 ACQUIRED ABSENCE OF OTHER LEFT TOE(S) (HCC): ICD-10-CM

## 2019-08-29 DIAGNOSIS — M54.40 CHRONIC LOW BACK PAIN WITH SCIATICA, SCIATICA LATERALITY UNSPECIFIED, UNSPECIFIED BACK PAIN LATERALITY: ICD-10-CM

## 2019-08-29 PROCEDURE — 99214 OFFICE O/P EST MOD 30 MIN: CPT | Performed by: INTERNAL MEDICINE

## 2019-08-29 RX ORDER — TIZANIDINE 4 MG/1
4 TABLET ORAL 2 TIMES DAILY PRN
Qty: 60 TABLET | Refills: 2 | Status: SHIPPED | OUTPATIENT
Start: 2019-08-29 | End: 2019-11-26 | Stop reason: SDUPTHER

## 2019-08-29 NOTE — PROGRESS NOTES
Patient is a 63 y.o. female who is here for a follow up of hypertension, pain and anxiety.  Chief Complaint   Patient presents with   • Hypertension   • Pain   • Anxiety         HPI:    Here for f/u.  Recently fell and injured her neck.  Neck feels stiff.  Onset about 2-3 weeks.  Her gait is not the best.  Difficulty moving head to left.  Using a left shoulder brace with some help.  Throbbing in sensation.  No HA.  No fever or chills.  Radiation of pain to left arm and base of neck.  Using baclofen with some help.      History:     Patient Active Problem List   Diagnosis   • Atopic rhinitis   • Restrictive ventilatory defect   • COPD (chronic obstructive pulmonary disease) (CMS/HCC)   • Osteoporosis   • Obstructive sleep apnea syndrome   • Abnormal liver enzymes   • Cholelithiasis   • Chronic back pain   • Mixed anxiety depressive disorder   • Type 2 diabetes mellitus (CMS/HCC)   • Dyslipidemia   • Essential tremor   • Fibromyalgia   • Gastroesophageal reflux disease without esophagitis   • Essential hypertension   • Hypothyroidism   • Insomnia   • Osteoarthritis   • Psoriasis   • Branch retinal vein occlusion   • Cobalamin deficiency   • Obesity   • Vitamin D deficiency   • Fatty liver disease, nonalcoholic   • Sinus bradycardia   • Tobacco abuse   • Peripheral vascular disease (CMS/HCC)   • Diabetic polyneuropathy associated with type 2 diabetes mellitus (CMS/HCC)   • Chronic pain   • Chronic, continuous use of opioids   • Chronic anxiety   • Chronically on benzodiazepine therapy   • Tubular adenoma   • Cellulitis of sacral region   • Acute on chronic respiratory failure with hypoxia (CMS/HCC)   • Coronary artery disease involving native coronary artery of native heart without angina pectoris   • Takotsubo cardiomyopathy   • Chronic systolic heart failure (CMS/HCC)   • Chronic respiratory failure with hypoxia (CMS/HCC)   • Weakness   • Nonrheumatic aortic valve stenosis   • Acquired absence of other left toe(s)  (CMS/HCC)   • Polycythemia vera (CMS/HCC)   • Other cirrhosis of liver (CMS/HCC)       Past Medical History:   Diagnosis Date   • Acute on chronic respiratory failure with hypoxia (CMS/HCC) 2/13/2018   • ELIF (acute kidney injury) (CMS/HCC) 2/13/2018   • ELIF (acute kidney injury) (CMS/HCC) 2/13/2018   • Altered mental status 2/13/2018   • Bronchitis    • Cellulitis of sacral region 2/13/2018   • Cervical cancer (CMS/HCC)    • Cholelithiasis 5/11/2016   • Chronic anxiety 2/27/2017   • Chronic bronchitis (CMS/HCC)    • Chronic respiratory failure with hypoxia (CMS/HCC) 3/8/2018   • Chronic systolic heart failure (CMS/HCC) 3/8/2018   • Chronically on benzodiazepine therapy 2/27/2017   • Degenerative arthritis    • Diabetes mellitus (CMS/HCC)    • Dyslipidemia 5/11/2016   • Dyspnea    • Elevated troponin level 2/13/2018   • Fatty liver disease, nonalcoholic 11/21/2016   • Fever 2/13/2018   • Fibromyalgia    • GERD (gastroesophageal reflux disease)    • H/O echocardiogram    • History of pneumonia    • Hypertension 5/11/2016    16. H/O echocardiogram (V15.89) (Z92.89)  · A.  Echocardiogram of 02/03/2015 reports an ejection fraction of 60-65%, mild concentric     LVH, trace mitral regurgitation, mild tricuspid and pulmonic regurgitation and calculated     RVSP of 35 mmHg, the main pulmonary artery is also mildly dilated.   • Hypothyroidism 5/11/2016    Description: A.  On replacement therapy.   • Infected wound 3/8/2018   • Nausea    • Obesity    • DINA (obstructive sleep apnea)     intolerant of CPAP therapy   • Osteoporosis    • Osteoporosis    • Polycythemia vera (CMS/HCC) 5/11/2016   • Pulmonary emphysema (CMS/HCC)    • Restrictive ventilatory defect    • Rhinitis    • Sepsis (CMS/HCC) 2/13/2018   • Uncontrolled diabetes mellitus (CMS/HCC) 5/11/2016   • Urine abnormality 2/13/2018   • Uterine cancer (CMS/HCC)    • Vitamin D deficiency 8/1/2016   • Weakness 3/8/2018       Past Surgical History:   Procedure Laterality  Date   • AMPUTATION DIGIT Left 11/23/2016    Procedure: AMPUTATION TRANS METATARSAL - ray amutation of the left great toe;  Surgeon: Arsalan Feliciano MD;  Location:  [facility];  Service:    • AMPUTATION DIGIT Left 3/2/2017    Procedure: LEFT FOOT TRANSMETATARSAL AMPUTATION TOES 2,3,4,5;  Surgeon: Joey Guaman MD;  Location:  [facility];  Service:    • BACK SURGERY      lumbar fusions x5--multiple times; 1995, 1997, 1998, 1999 and 2008   • BELOW KNEE AMPUTATION Left 2/25/2017    Procedure: EXTENDED LEFT TOE AMPUTATION;  Surgeon: Arsalan Feliciano MD;  Location:  [facility];  Service:    • CARDIAC CATHETERIZATION N/A 11/26/2016    Procedure: Left Heart Cath;  Surgeon: Ash Nuñez MD;  Location:  [facility];  Service:    • CARDIAC CATHETERIZATION N/A 2/20/2018    Procedure: Left Heart Cath;  Surgeon: Lane Zamorano MD;  Location:  [facility];  Service:    • CARDIAC CATHETERIZATION N/A 2/20/2018    Procedure: Right Heart Cath;  Surgeon: Lane Zamorano MD;  Location:  [facility];  Service:    • HAND SURGERY Bilateral     x3   • HYSTERECTOMY      status post uterine and cervical cancer   • LUMBAR SPINE SURGERY      arthrodesis by anterior approach addit interspace   • TONSILLECTOMY         Current Outpatient Medications on File Prior to Visit   Medication Sig   • acetaminophen (TYLENOL) 325 MG tablet Take 2 tablets by mouth Every 4 (Four) Hours As Needed for mild pain (1-3) or fever (temperature greater than 101F).   • albuterol (PROVENTIL) (2.5 MG/3ML) 0.083% nebulizer solution Take 2.5 mg by nebulization Every 6 (Six) Hours As Needed for Wheezing or Shortness of Air.   • aspirin 325 MG tablet Take 325 mg by mouth Daily.   • busPIRone (BUSPAR) 7.5 MG tablet TAKE TWO TABLETS BY MOUTH TWICE A DAY   • cetirizine (ZyrTEC) 10 MG tablet Take 10 mg by mouth Every Night.   • clonazePAM (KlonoPIN) 0.5 MG tablet Take 0.5 tablets by mouth 2 (Two) Times a Day As Needed for Seizures.   •  clopidogrel (PLAVIX) 75 MG tablet TAKE ONE TABLET BY MOUTH DAILY   • Cyanocobalamin 1000 MCG/ML kit Inject 1,000 mcg as directed Every 30 (Thirty) Days. (Patient taking differently: Inject 1,000 mcg as directed Every 3 (Three) Months.)   • ezetimibe (ZETIA) 10 MG tablet Take 1 tablet by mouth Daily.   • fentaNYL (DURAGESIC) 75 MCG/HR patch Place 1 patch on the skin as directed by provider Every Other Day.   • furosemide (LASIX) 40 MG tablet TAKE ONE TABLET BY MOUTH TWICE A DAY   • levothyroxine (SYNTHROID, LEVOTHROID) 112 MCG tablet TAKE ONE TABLET BY MOUTH DAILY   • lisinopril (PRINIVIL,ZESTRIL) 10 MG tablet TAKE ONE TABLET BY MOUTH DAILY   • melatonin 5 MG sublingual tablet sublingual tablet Place 1 tablet under the tongue At Night As Needed (insomnia).   • metoprolol tartrate (LOPRESSOR) 25 MG tablet TAKE ONE TABLET BY MOUTH TWICE A DAY   • omeprazole (priLOSEC) 20 MG capsule TAKE ONE CAPSULE BY MOUTH DAILY   • oxyCODONE-acetaminophen (PERCOCET) 7.5-325 MG per tablet Take 1 tablet by mouth Every 6 (Six) Hours As Needed for Moderate Pain .   • pregabalin (LYRICA) 100 MG capsule Take 1 capsule by mouth Every 8 (Eight) Hours.   • probiotic (CULTURELLE) capsule capsule Take 1 capsule by mouth Daily.   • promethazine (PHENERGAN) 25 MG tablet Take 1 tablet by mouth Every 6 (Six) Hours As Needed for Nausea or Vomiting.   • sertraline (ZOLOFT) 50 MG tablet Take 1 tablet by mouth Daily.   • SITagliptin (JANUVIA) 100 MG tablet Take 50 mg by mouth Daily.   • atorvastatin (LIPITOR) 40 MG tablet TAKE ONE TABLET BY MOUTH EVERY NIGHT   • atorvastatin (LIPITOR) 80 MG tablet Take 1 tablet by mouth Every Night.     Current Facility-Administered Medications on File Prior to Visit   Medication   • cyanocobalamin injection 1,000 mcg       Family History   Problem Relation Age of Onset   • Arthritis Mother    • Diabetes Mother    • Colon polyps Mother    • Diverticulitis Mother    • Heart failure Mother    • Arthritis Father    •  Bleeding Disorder Father    • Diabetes Father    • Kidney disease Father    • Heart disease Father    • COPD Sister         currently smokes   • Arthritis Brother    • Diabetes Brother    • Alcohol abuse Brother    • Heart murmur Daughter    • Arthritis Other    • Diabetes Other        Social History     Socioeconomic History   • Marital status:      Spouse name: Not on file   • Number of children: 1   • Years of education: Not on file   • Highest education level: Not on file   Occupational History     Employer: DISABLED   Tobacco Use   • Smoking status: Former Smoker     Packs/day: 1.50     Years: 48.00     Pack years: 72.00     Types: Cigarettes     Last attempt to quit: 2017     Years since quittin.5   • Smokeless tobacco: Never Used   Substance and Sexual Activity   • Alcohol use: No   • Drug use: No   • Sexual activity: Defer   Social History Narrative    Mrs. Langston is a 62 year old white  female. She lives with her spouse in [facility]. She states she does her own ADLs but appears disabled. They have one child and two grandchildren. She has a living will.    Caffeine: 2 serving per day. Pt lives at home with .         Review of Systems   Constitutional: Positive for fatigue. Negative for chills, diaphoresis, fever and unexpected weight change.   HENT: Negative for congestion, ear pain, hearing loss, nosebleeds, postnasal drip, sinus pressure and sore throat.    Eyes: Negative for pain, discharge and itching.   Respiratory: Positive for shortness of breath. Negative for cough, chest tightness and wheezing.    Cardiovascular: Positive for palpitations. Negative for chest pain and leg swelling.   Gastrointestinal: Negative for abdominal distention, blood in stool, constipation, diarrhea, nausea and vomiting.   Endocrine: Negative for heat intolerance, polydipsia and polyuria.   Genitourinary: Negative for difficulty urinating, dysuria, frequency and hematuria.   Musculoskeletal:  "Positive for arthralgias, back pain, gait problem and neck stiffness. Negative for joint swelling and myalgias.   Neurological: Positive for tremors, weakness and light-headedness. Negative for dizziness, syncope and headaches.   Psychiatric/Behavioral: Positive for decreased concentration, dysphoric mood and sleep disturbance. The patient is nervous/anxious.        /68 (BP Location: Left arm, Patient Position: Sitting)   Pulse 68   Ht 167.6 cm (65.98\")   LMP  (LMP Unknown)   BMI 37.14 kg/m²       Physical Exam   Constitutional: She is oriented to person, place, and time. She appears well-developed and well-nourished.   HENT:   Head: Normocephalic and atraumatic.   Right Ear: External ear normal.   Left Ear: External ear normal.   Mouth/Throat: Oropharynx is clear and moist.   Eyes: Conjunctivae and EOM are normal.   Neck: Normal range of motion. Neck supple.   Cardiovascular: Normal rate and regular rhythm.   2/6 СЕРГЕЙ   Pulmonary/Chest: Effort normal. She has wheezes. She has rales (mild).   Mildly diminished   Abdominal: Soft. Bowel sounds are normal.   Musculoskeletal: She exhibits tenderness (pain on back flexion past 30 and left trap discomfort with palpation).   Using wheelchair     Lymphadenopathy:     She has no cervical adenopathy.   Neurological: She is alert and oriented to person, place, and time.   Skin: Skin is warm and dry. No rash noted.   Psychiatric: She has a normal mood and affect. Her behavior is normal. Thought content normal.       Procedure:      Discussion/Summary:    chronic back pain- refill patch and percocet as needed, advised need to reduce the dose as we are doing, no better or worst  htn-stable on ACE and BB  fibromylagia- cont current tx, worst with neck pain  hyperlipidemia-cont lipitor and zetia, recheck on rtc, counseled on low chol diet  Neck spasm-change to zanaflex  hypothroid-cont replacement, recheck today  depression with anxiety-cont buspar and klonopine ,advised " her of risk of addiction, improved with addition of zoloft  polycythemia-cbc today  retinal vein occlusion- cont rf modification, counseled on tob cessation  b12 def-admin today and check labs, will hold further tx for now  DM-labs today, counseled on low carb, hold januvia  Elevated lft/?autoimmune-f/u gastro recs , labs today, advised wt loss, improved  Eczema-cont prn steroids  FE def anemia-cont FE, stable  A/D-cont zoloft, improved  Tremor-cont BB  high risk meds-labs today      8/29 labs noted and dw patient, counseled on adequate hydration and diet/wt loss    Current Outpatient Medications:   •  acetaminophen (TYLENOL) 325 MG tablet, Take 2 tablets by mouth Every 4 (Four) Hours As Needed for mild pain (1-3) or fever (temperature greater than 101F)., Disp: 100 tablet, Rfl: 0  •  albuterol (PROVENTIL) (2.5 MG/3ML) 0.083% nebulizer solution, Take 2.5 mg by nebulization Every 6 (Six) Hours As Needed for Wheezing or Shortness of Air., Disp: 120 vial, Rfl: 5  •  aspirin 325 MG tablet, Take 325 mg by mouth Daily., Disp: , Rfl:   •  busPIRone (BUSPAR) 7.5 MG tablet, TAKE TWO TABLETS BY MOUTH TWICE A DAY, Disp: 360 tablet, Rfl: 0  •  cetirizine (ZyrTEC) 10 MG tablet, Take 10 mg by mouth Every Night., Disp: , Rfl:   •  clonazePAM (KlonoPIN) 0.5 MG tablet, Take 0.5 tablets by mouth 2 (Two) Times a Day As Needed for Seizures., Disp: 90 tablet, Rfl: 2  •  clopidogrel (PLAVIX) 75 MG tablet, TAKE ONE TABLET BY MOUTH DAILY, Disp: 90 tablet, Rfl: 0  •  Cyanocobalamin 1000 MCG/ML kit, Inject 1,000 mcg as directed Every 30 (Thirty) Days. (Patient taking differently: Inject 1,000 mcg as directed Every 3 (Three) Months.), Disp: 1 kit, Rfl: 3  •  ezetimibe (ZETIA) 10 MG tablet, Take 1 tablet by mouth Daily., Disp: 90 tablet, Rfl: 3  •  fentaNYL (DURAGESIC) 75 MCG/HR patch, Place 1 patch on the skin as directed by provider Every Other Day., Disp: 15 patch, Rfl: 0  •  furosemide (LASIX) 40 MG tablet, TAKE ONE TABLET BY MOUTH TWICE A  DAY, Disp: 180 tablet, Rfl: 0  •  levothyroxine (SYNTHROID, LEVOTHROID) 112 MCG tablet, TAKE ONE TABLET BY MOUTH DAILY, Disp: 90 tablet, Rfl: 0  •  lisinopril (PRINIVIL,ZESTRIL) 10 MG tablet, TAKE ONE TABLET BY MOUTH DAILY, Disp: 90 tablet, Rfl: 0  •  melatonin 5 MG sublingual tablet sublingual tablet, Place 1 tablet under the tongue At Night As Needed (insomnia)., Disp: 30 tablet, Rfl: 0  •  metoprolol tartrate (LOPRESSOR) 25 MG tablet, TAKE ONE TABLET BY MOUTH TWICE A DAY, Disp: 180 tablet, Rfl: 2  •  omeprazole (priLOSEC) 20 MG capsule, TAKE ONE CAPSULE BY MOUTH DAILY, Disp: 90 capsule, Rfl: 0  •  oxyCODONE-acetaminophen (PERCOCET) 7.5-325 MG per tablet, Take 1 tablet by mouth Every 6 (Six) Hours As Needed for Moderate Pain ., Disp: 120 tablet, Rfl: 0  •  pregabalin (LYRICA) 100 MG capsule, Take 1 capsule by mouth Every 8 (Eight) Hours., Disp: 270 capsule, Rfl: 0  •  probiotic (CULTURELLE) capsule capsule, Take 1 capsule by mouth Daily., Disp: 60 capsule, Rfl: 0  •  promethazine (PHENERGAN) 25 MG tablet, Take 1 tablet by mouth Every 6 (Six) Hours As Needed for Nausea or Vomiting., Disp: 20 tablet, Rfl: 2  •  sertraline (ZOLOFT) 50 MG tablet, Take 1 tablet by mouth Daily., Disp: 90 tablet, Rfl: 1  •  SITagliptin (JANUVIA) 100 MG tablet, Take 50 mg by mouth Daily., Disp: , Rfl:   •  atorvastatin (LIPITOR) 40 MG tablet, TAKE ONE TABLET BY MOUTH EVERY NIGHT, Disp: 90 tablet, Rfl: 0  •  atorvastatin (LIPITOR) 80 MG tablet, Take 1 tablet by mouth Every Night., Disp: , Rfl:   •  tiZANidine (ZANAFLEX) 4 MG tablet, Take 1 tablet by mouth 2 (Two) Times a Day As Needed for Muscle Spasms., Disp: 60 tablet, Rfl: 2    Current Facility-Administered Medications:   •  cyanocobalamin injection 1,000 mcg, 1,000 mcg, Intramuscular, Q28 Days, Harinder Aranda MD, 1,000 mcg at 04/23/19 1247        Tamara was seen today for hypertension, pain and anxiety.    Diagnoses and all orders for this visit:    Takotsubo  cardiomyopathy    Peripheral vascular disease (CMS/HCC)    Essential hypertension    Chronic systolic heart failure (CMS/HCC)    Obstructive sleep apnea syndrome    Other emphysema (CMS/HCC)    Seasonal allergic rhinitis due to pollen    Vitamin D deficiency    Gastroesophageal reflux disease without esophagitis    Cobalamin deficiency  -     CBC (No Diff)  -     Vitamin B12    Other specified hypothyroidism  -     TSH    Type 2 diabetes mellitus with ketoacidosis without coma, without long-term current use of insulin (CMS/HCC)  -     Comprehensive Metabolic Panel  -     Hemoglobin A1c    Essential tremor    Chronic pain syndrome    Chronic low back pain with sciatica, sciatica laterality unspecified, unspecified back pain laterality    Localized osteoporosis without current pathological fracture    Primary osteoarthritis involving multiple joints    Tobacco abuse    Insomnia, unspecified type    Dyslipidemia    Abnormal liver enzymes    Acquired absence of other left toe(s) (CMS/HCC)    Polycythemia vera (CMS/HCC)    Other cirrhosis of liver (CMS/HCC)    Muscle spasms of neck  -     tiZANidine (ZANAFLEX) 4 MG tablet; Take 1 tablet by mouth 2 (Two) Times a Day As Needed for Muscle Spasms.

## 2019-08-30 LAB
ALBUMIN SERPL-MCNC: 3.2 G/DL (ref 3.5–5.2)
ALBUMIN/GLOB SERPL: 1.1 G/DL
ALP SERPL-CCNC: 68 U/L (ref 39–117)
ALT SERPL-CCNC: 14 U/L (ref 1–33)
AST SERPL-CCNC: 29 U/L (ref 1–32)
BILIRUB SERPL-MCNC: 0.2 MG/DL (ref 0.2–1.2)
BUN SERPL-MCNC: 16 MG/DL (ref 8–23)
BUN/CREAT SERPL: 17 (ref 7–25)
CALCIUM SERPL-MCNC: 9 MG/DL (ref 8.6–10.5)
CHLORIDE SERPL-SCNC: 100 MMOL/L (ref 98–107)
CO2 SERPL-SCNC: 32.2 MMOL/L (ref 22–29)
CREAT SERPL-MCNC: 0.94 MG/DL (ref 0.57–1)
ERYTHROCYTE [DISTWIDTH] IN BLOOD BY AUTOMATED COUNT: 19.1 % (ref 12.3–15.4)
GLOBULIN SER CALC-MCNC: 2.8 GM/DL
GLUCOSE SERPL-MCNC: 135 MG/DL (ref 65–99)
HBA1C MFR BLD: 5.2 % (ref 4.8–5.6)
HCT VFR BLD AUTO: 44.9 % (ref 34–46.6)
HGB BLD-MCNC: 13 G/DL (ref 12–15.9)
MCH RBC QN AUTO: 28.3 PG (ref 26.6–33)
MCHC RBC AUTO-ENTMCNC: 29 G/DL (ref 31.5–35.7)
MCV RBC AUTO: 97.8 FL (ref 79–97)
PLATELET # BLD AUTO: 177 10*3/MM3 (ref 140–450)
POTASSIUM SERPL-SCNC: 4.5 MMOL/L (ref 3.5–5.2)
PROT SERPL-MCNC: 6 G/DL (ref 6–8.5)
RBC # BLD AUTO: 4.59 10*6/MM3 (ref 3.77–5.28)
SODIUM SERPL-SCNC: 145 MMOL/L (ref 136–145)
TSH SERPL DL<=0.005 MIU/L-ACNC: 0.76 UIU/ML (ref 0.27–4.2)
VIT B12 SERPL-MCNC: >2000 PG/ML (ref 211–946)
WBC # BLD AUTO: 7.02 10*3/MM3 (ref 3.4–10.8)

## 2019-08-30 RX ORDER — FUROSEMIDE 40 MG/1
TABLET ORAL
Qty: 180 TABLET | Refills: 0 | Status: SHIPPED | OUTPATIENT
Start: 2019-08-30 | End: 2019-12-04 | Stop reason: SDUPTHER

## 2019-09-05 ENCOUNTER — TELEPHONE (OUTPATIENT)
Dept: INTERNAL MEDICINE | Facility: CLINIC | Age: 64
End: 2019-09-05

## 2019-09-05 DIAGNOSIS — G89.4 CHRONIC PAIN SYNDROME: ICD-10-CM

## 2019-09-05 DIAGNOSIS — F11.90 CHRONIC, CONTINUOUS USE OF OPIOIDS: Chronic | ICD-10-CM

## 2019-09-05 RX ORDER — OXYCODONE AND ACETAMINOPHEN 7.5; 325 MG/1; MG/1
1 TABLET ORAL EVERY 6 HOURS PRN
Qty: 120 TABLET | Refills: 0 | Status: SHIPPED | OUTPATIENT
Start: 2019-09-05 | End: 2019-10-03 | Stop reason: SDUPTHER

## 2019-09-05 RX ORDER — FENTANYL 75 UG/H
1 PATCH TRANSDERMAL
Qty: 15 PATCH | Refills: 0 | Status: SHIPPED | OUTPATIENT
Start: 2019-09-05 | End: 2019-10-03 | Stop reason: SDUPTHER

## 2019-09-16 ENCOUNTER — TELEPHONE (OUTPATIENT)
Dept: CARDIOLOGY | Facility: CLINIC | Age: 64
End: 2019-09-16

## 2019-09-16 NOTE — TELEPHONE ENCOUNTER
Patient called to report that she has had to reschedule her last 4 appts with [facility] (pt last seen in November 2018) but she has been having palpitations. Pt denies chest pain, edema. Pt reports that she does not take her BP/ HR at home. Pt is not willing to reschedule sooner in clinic, pt advised to set up appt with Dr. Aranda for EKG evaluation. Pt is agreeable to this plan.

## 2019-10-03 ENCOUNTER — TELEPHONE (OUTPATIENT)
Dept: INTERNAL MEDICINE | Facility: CLINIC | Age: 64
End: 2019-10-03

## 2019-10-03 DIAGNOSIS — F11.90 CHRONIC, CONTINUOUS USE OF OPIOIDS: Chronic | ICD-10-CM

## 2019-10-03 DIAGNOSIS — G89.4 CHRONIC PAIN SYNDROME: ICD-10-CM

## 2019-10-03 RX ORDER — FENTANYL 75 UG/H
1 PATCH TRANSDERMAL
Qty: 15 PATCH | Refills: 0 | Status: SHIPPED | OUTPATIENT
Start: 2019-10-03 | End: 2019-10-31 | Stop reason: SDUPTHER

## 2019-10-03 RX ORDER — OXYCODONE AND ACETAMINOPHEN 7.5; 325 MG/1; MG/1
1 TABLET ORAL EVERY 6 HOURS PRN
Qty: 120 TABLET | Refills: 0 | Status: SHIPPED | OUTPATIENT
Start: 2019-10-03 | End: 2019-10-31 | Stop reason: SDUPTHER

## 2019-10-03 NOTE — TELEPHONE ENCOUNTER
KYRA, BRIAN FENTYL PATCH AND PERCOCET ARE DUE ON SAT. ALSO HER CARDIOLOGIST WANTS HER TO COME IN FOR A E.K.G. AND A FLU SHOT. PLEASE CALL HER OR LET ME KNOW WHERE YOU WANT HER AT AND ILL CALL HER BACK. THANKS

## 2019-10-15 DIAGNOSIS — F41.8 MIXED ANXIETY DEPRESSIVE DISORDER: ICD-10-CM

## 2019-10-24 DIAGNOSIS — E78.49 OTHER HYPERLIPIDEMIA: ICD-10-CM

## 2019-10-24 DIAGNOSIS — E78.5 DYSLIPIDEMIA: ICD-10-CM

## 2019-10-24 RX ORDER — CLONAZEPAM 0.5 MG/1
TABLET ORAL
Qty: 30 TABLET | Refills: 1 | Status: SHIPPED | OUTPATIENT
Start: 2019-10-24 | End: 2019-10-25 | Stop reason: SDUPTHER

## 2019-10-24 RX ORDER — BUSPIRONE HYDROCHLORIDE 7.5 MG/1
TABLET ORAL
Qty: 360 TABLET | Refills: 0 | Status: SHIPPED | OUTPATIENT
Start: 2019-10-24 | End: 2020-01-22

## 2019-10-24 RX ORDER — EZETIMIBE 10 MG/1
TABLET ORAL
Qty: 90 TABLET | Refills: 2 | Status: SHIPPED | OUTPATIENT
Start: 2019-10-24 | End: 2020-07-24

## 2019-10-28 RX ORDER — CLONAZEPAM 0.5 MG/1
TABLET ORAL
Qty: 30 TABLET | Refills: 0 | Status: SHIPPED | OUTPATIENT
Start: 2019-10-28 | End: 2020-01-22

## 2019-10-31 ENCOUNTER — TELEPHONE (OUTPATIENT)
Dept: INTERNAL MEDICINE | Facility: CLINIC | Age: 64
End: 2019-10-31

## 2019-10-31 DIAGNOSIS — F11.90 CHRONIC, CONTINUOUS USE OF OPIOIDS: Chronic | ICD-10-CM

## 2019-10-31 DIAGNOSIS — G89.4 CHRONIC PAIN SYNDROME: ICD-10-CM

## 2019-10-31 RX ORDER — FENTANYL 75 UG/H
1 PATCH TRANSDERMAL
Qty: 15 PATCH | Refills: 0 | Status: SHIPPED | OUTPATIENT
Start: 2019-10-31 | End: 2019-11-26 | Stop reason: SDUPTHER

## 2019-10-31 RX ORDER — OXYCODONE AND ACETAMINOPHEN 7.5; 325 MG/1; MG/1
1 TABLET ORAL EVERY 6 HOURS PRN
Qty: 120 TABLET | Refills: 0 | Status: SHIPPED | OUTPATIENT
Start: 2019-10-31 | End: 2019-11-26 | Stop reason: SDUPTHER

## 2019-11-07 ENCOUNTER — FLU SHOT (OUTPATIENT)
Dept: INTERNAL MEDICINE | Facility: CLINIC | Age: 64
End: 2019-11-07

## 2019-11-07 DIAGNOSIS — Z23 IMMUNIZATION, PNEUMOCOCCUS AND INFLUENZA: ICD-10-CM

## 2019-11-07 PROCEDURE — 90653 IIV ADJUVANT VACCINE IM: CPT | Performed by: INTERNAL MEDICINE

## 2019-11-07 PROCEDURE — G0008 ADMIN INFLUENZA VIRUS VAC: HCPCS | Performed by: INTERNAL MEDICINE

## 2019-11-07 RX ORDER — ATORVASTATIN CALCIUM 40 MG/1
TABLET, FILM COATED ORAL
Qty: 90 TABLET | Refills: 2 | Status: SHIPPED | OUTPATIENT
Start: 2019-11-07 | End: 2020-08-04

## 2019-11-11 ENCOUNTER — OFFICE VISIT (OUTPATIENT)
Dept: CARDIOLOGY | Facility: CLINIC | Age: 64
End: 2019-11-11

## 2019-11-11 VITALS
HEART RATE: 68 BPM | HEIGHT: 66 IN | OXYGEN SATURATION: 90 % | DIASTOLIC BLOOD PRESSURE: 50 MMHG | WEIGHT: 212 LBS | BODY MASS INDEX: 34.07 KG/M2 | SYSTOLIC BLOOD PRESSURE: 116 MMHG

## 2019-11-11 DIAGNOSIS — I25.10 CORONARY ARTERY DISEASE INVOLVING NATIVE CORONARY ARTERY OF NATIVE HEART WITHOUT ANGINA PECTORIS: Primary | ICD-10-CM

## 2019-11-11 DIAGNOSIS — I35.0 NONRHEUMATIC AORTIC VALVE STENOSIS: ICD-10-CM

## 2019-11-11 DIAGNOSIS — I51.81 TAKOTSUBO CARDIOMYOPATHY: ICD-10-CM

## 2019-11-11 DIAGNOSIS — I10 ESSENTIAL HYPERTENSION: ICD-10-CM

## 2019-11-11 DIAGNOSIS — R00.2 HEART PALPITATIONS: ICD-10-CM

## 2019-11-11 DIAGNOSIS — I50.22 CHRONIC SYSTOLIC HEART FAILURE (HCC): ICD-10-CM

## 2019-11-11 PROCEDURE — 99213 OFFICE O/P EST LOW 20 MIN: CPT | Performed by: INTERNAL MEDICINE

## 2019-11-11 PROCEDURE — 93000 ELECTROCARDIOGRAM COMPLETE: CPT | Performed by: INTERNAL MEDICINE

## 2019-11-11 RX ORDER — FEXOFENADINE HCL 180 MG/1
180 TABLET ORAL 2 TIMES DAILY
COMMUNITY

## 2019-11-11 NOTE — PROGRESS NOTES
[facility] AT [address], [address]  [place], [place], [address]    [phone]  WWW.[facility] HospitalSeeControlEx[facility]           OUTPATIENT CLINIC FOLLOW UP NOTE    Patient Care Team:  Patient Care Team:  Harinder Aranda MD as PCP - General  Harinder Aranda MD as PCP - Family Medicine  Arsalan Feliciano MD as Surgeon (Cardiothoracic Surgery)    Subjective:      Chief Complaint   Patient presents with   • Coronary Artery Disease   • Shortness of Breath   • Dizziness       HPI:    Tamara Langston is a 64 y.o. female.    The patient has a history of CAD s/p PCI, history of systolic heart failure/Takotsubo, hypertension, hyperlipidemia, diabetes, sleep apnea, arthritis, uterine and cervical cancer, fatty liver, hypothyroidism, polycythemia vera, fibromyalgia, and osteoporosis.      The patient presents today for follow-up.    The patient has been having palpitations on and off for the last several months.  Most notable when she lays down.  Not when she is active.  Lasts seconds.  Not associated chest pain, dyspnea.  She has chronic dyspnea due to her COPD, smoking 1 pack/day, no significant associated lower extremity swelling on Lasix    Review of Systems:  Positive for palpitations, shortness of breath  Negative for exertional chest pain,  orthopnea, PND, lower extremity edema, lightheadedness, syncope.    PFSH:  Patient Active Problem List   Diagnosis   • Atopic rhinitis   • Restrictive ventilatory defect   • COPD (chronic obstructive pulmonary disease) (CMS/HCC)   • Osteoporosis   • Obstructive sleep apnea syndrome   • Abnormal liver enzymes   • Cholelithiasis   • Chronic back pain   • Mixed anxiety depressive disorder   • Type 2 diabetes mellitus (CMS/HCC)   • Dyslipidemia   • Essential tremor   • Fibromyalgia   • Gastroesophageal reflux disease without esophagitis   • Essential hypertension   • Hypothyroidism   • Insomnia   • Osteoarthritis   • Psoriasis   • Branch retinal vein occlusion    • Cobalamin deficiency   • Obesity   • Vitamin D deficiency   • Fatty liver disease, nonalcoholic   • Sinus bradycardia   • Tobacco abuse   • Peripheral vascular disease (CMS/HCC)   • Diabetic polyneuropathy associated with type 2 diabetes mellitus (CMS/HCC)   • Chronic pain   • Chronic, continuous use of opioids   • Chronic anxiety   • Chronically on benzodiazepine therapy   • Tubular adenoma   • Cellulitis of sacral region   • Acute on chronic respiratory failure with hypoxia (CMS/HCC)   • Coronary artery disease involving native coronary artery of native heart without angina pectoris   • Takotsubo cardiomyopathy   • Chronic systolic heart failure (CMS/HCC)   • Chronic respiratory failure with hypoxia (CMS/HCC)   • Weakness   • Nonrheumatic aortic valve stenosis   • Acquired absence of other left toe(s) (CMS/HCC)   • Polycythemia vera (CMS/HCC)   • Other cirrhosis of liver (CMS/HCC)         Current Outpatient Medications:   •  acetaminophen (TYLENOL) 325 MG tablet, Take 2 tablets by mouth Every 4 (Four) Hours As Needed for mild pain (1-3) or fever (temperature greater than 101F)., Disp: 100 tablet, Rfl: 0  •  albuterol (PROVENTIL) (2.5 MG/3ML) 0.083% nebulizer solution, Take 2.5 mg by nebulization Every 6 (Six) Hours As Needed for Wheezing or Shortness of Air., Disp: 120 vial, Rfl: 5  •  aspirin 325 MG tablet, Take 325 mg by mouth Daily., Disp: , Rfl:   •  atorvastatin (LIPITOR) 40 MG tablet, TAKE ONE TABLET BY MOUTH EVERY NIGHT, Disp: 90 tablet, Rfl: 2  •  busPIRone (BUSPAR) 7.5 MG tablet, TAKE TWO TABLETS BY MOUTH TWICE A DAY, Disp: 360 tablet, Rfl: 0  •  clonazePAM (KlonoPIN) 0.5 MG tablet, TAKE ONE-HALF TABLET BY MOUTH TWICE A DAY AS NEEDED FOR SEIZURES, Disp: 30 tablet, Rfl: 0  •  clopidogrel (PLAVIX) 75 MG tablet, TAKE ONE TABLET BY MOUTH DAILY, Disp: 90 tablet, Rfl: 0  •  ezetimibe (ZETIA) 10 MG tablet, TAKE ONE TABLET BY MOUTH DAILY, Disp: 90 tablet, Rfl: 2  •  fentaNYL (DURAGESIC) 75 MCG/HR patch, Place  1 patch on the skin as directed by provider Every Other Day., Disp: 15 patch, Rfl: 0  •  fexofenadine (ALLEGRA) 180 MG tablet, Take 180 mg by mouth Daily., Disp: , Rfl:   •  furosemide (LASIX) 40 MG tablet, TAKE ONE TABLET BY MOUTH TWICE A DAY, Disp: 180 tablet, Rfl: 0  •  levothyroxine (SYNTHROID, LEVOTHROID) 112 MCG tablet, TAKE ONE TABLET BY MOUTH DAILY, Disp: 90 tablet, Rfl: 0  •  lisinopril (PRINIVIL,ZESTRIL) 10 MG tablet, TAKE ONE TABLET BY MOUTH DAILY, Disp: 90 tablet, Rfl: 0  •  melatonin 5 MG sublingual tablet sublingual tablet, Place 1 tablet under the tongue At Night As Needed (insomnia). (Patient taking differently: Place 10 mg under the tongue At Night As Needed (insomnia).), Disp: 30 tablet, Rfl: 0  •  metoprolol tartrate (LOPRESSOR) 25 MG tablet, TAKE ONE TABLET BY MOUTH TWICE A DAY, Disp: 180 tablet, Rfl: 2  •  omeprazole (priLOSEC) 20 MG capsule, TAKE ONE CAPSULE BY MOUTH DAILY, Disp: 90 capsule, Rfl: 0  •  oxyCODONE-acetaminophen (PERCOCET) 7.5-325 MG per tablet, Take 1 tablet by mouth Every 6 (Six) Hours As Needed for Moderate Pain ., Disp: 120 tablet, Rfl: 0  •  pregabalin (LYRICA) 100 MG capsule, Take 1 capsule by mouth Every 8 (Eight) Hours., Disp: 270 capsule, Rfl: 0  •  probiotic (CULTURELLE) capsule capsule, Take 1 capsule by mouth Daily., Disp: 60 capsule, Rfl: 0  •  promethazine (PHENERGAN) 25 MG tablet, Take 1 tablet by mouth Every 6 (Six) Hours As Needed for Nausea or Vomiting., Disp: 20 tablet, Rfl: 2  •  sertraline (ZOLOFT) 50 MG tablet, TAKE ONE TABLET BY MOUTH DAILY, Disp: 90 tablet, Rfl: 0  •  tiZANidine (ZANAFLEX) 4 MG tablet, Take 1 tablet by mouth 2 (Two) Times a Day As Needed for Muscle Spasms., Disp: 60 tablet, Rfl: 2    Current Facility-Administered Medications:   •  cyanocobalamin injection 1,000 mcg, 1,000 mcg, Intramuscular, Q28 Days, Harinder Aranda MD, 1,000 mcg at 04/23/19 1247    Allergies   Allergen Reactions   • Cortisone Other (See Comments)     Hotness all over  "body and hyper   • Oxycontin [Oxycodone] Other (See Comments)     psoriasis   • Tolmetin Rash     Tolectin-rash and itching   • Vancomycin Rash        reports that she has been smoking cigarettes.  She has a 48.00 pack-year smoking history. She has never used smokeless tobacco.    Family History   Problem Relation Age of Onset   • Arthritis Mother    • Diabetes Mother    • Colon polyps Mother    • Diverticulitis Mother    • Heart failure Mother    • Arthritis Father    • Bleeding Disorder Father    • Diabetes Father    • Kidney disease Father    • Heart disease Father    • COPD Sister         currently smokes   • Arthritis Brother    • Diabetes Brother    • Alcohol abuse Brother    • Heart murmur Daughter    • Arthritis Other    • Diabetes Other          Objective:   Blood pressure 116/50, pulse 68, height 167.6 cm (66\"), weight 96.2 kg (212 lb), SpO2 90 %, not currently breastfeeding.  CONSTITUTIONAL: No acute distress, normal affect  RESPIRATORY: Normal effort. Clear to auscultation bilaterally without wheezing or rales  CARDIOVASCULAR:  Regular rate and rhythm with normal S1 and S2. Without gallop or rub. СЕРГЕЙ LUSB with radiation to the carotids  PERIPHERAL VASCULAR: Normal radial pulses bilaterally. There is trace pedal edema bilaterally.    Labs:      Lab Results   Component Value Date    CHOL 112 02/14/2018    CHOL 123 02/25/2017    CHOL 244 (H) 11/27/2016     Lab Results   Component Value Date    TRIG 245 (H) 10/10/2018    TRIG 141 02/14/2018    TRIG 349 (H) 10/03/2017     Lab Results   Component Value Date    HDL 34 (L) 10/10/2018    HDL 28 (L) 02/14/2018    HDL 37 (L) 10/03/2017     Lab Results   Component Value Date    LDL 54 10/10/2018    LDL 60 02/14/2018    LDL 54 10/03/2017     Lab Results   Component Value Date    VLDL 49 10/10/2018    VLDL 69.8 10/03/2017    VLDL 60.6 06/01/2017     No results found for: LDLHDL      Diagnostic Data:      ECG 12 Lead  Date/Time: 11/11/2019 10:47 AM  Performed by: Lona" MD Lane  Authorized by: Lane Zamorano MD   Comparison: compared with previous ECG from 3/8/2018  Comparison to previous ECG: T waves are no longer inverted  Rhythm: sinus rhythm  Conduction: left anterior fascicular block and 1st degree AV block  Comments: Heart rate 66, QRS 90, QTc 440            TTE 12/2018  · Left ventricular systolic function is normal. Estimated EF = 65%.  · Left ventricular wall thickness is consistent with mild-to-moderate concentric hypertrophy.  · Left atrial cavity size is mildly dilated.  · There is severe calcification of the aortic valve.  · Moderate aortic valve stenosis is present.  · Estimated right ventricular systolic pressure from tricuspid regurgitation is mildly elevated (35-45 mmHg).     Limited TTE 7/2018  · Left ventricular systolic function is normal. Estimated EF = 60%.    TTE 2/2018  · Left ventricular systolic function is moderately decreased. Estimated EF = 35%.  · Left ventricular wall thickness is consistent with mild-to-moderate concentric hypertrophy.  · The following left ventricular wall segments are hypokinetic: apical anterior, apical lateral, apical inferior, apical septal and apex hypokinetic. The basal segments are hyperdynamic.  · The findings have the apperance of stress-induced cardiomyopathy (Takotsubo)  · Left ventricular diastolic dysfunction (grade I a) consistent with impaired relaxation.  · Right ventricular cavity is small in size.  · The main pulmonary artery is moderately dilated.  · Left atrial cavity size is borderline dilated.  · There is moderate calcification of the aortic valve.  · Moderate aortic valve stenosis is present.    [facility] 02/20/2018  · There was severe coronary artery disease involving the mid LAD and first diagonal branch that is now status post intervention with a Tryton 3.5mm proximal, 3.0mm distal bifurcation stent extending from the LAD into the diagonal branch and a Synergy 3.5 x 38 mm drug-eluting stent extending from the  proximal to mid LAD.  · Normal left ventricular ejection fraction but with hypokinesis of the apical segments  · Normal right heart filling pressures.  Normal pulmonate capillary wedge pressure  · Normal cardiac index    [facility] 11/2016  1.  50% mid RCA stenosis.  2.  40% proximal LAD stenosis   3.  Normal left ventricular function  4.  Hypertension    Assessment and Plan:   Tamara was seen today for coronary artery disease and hypertension.    Diagnoses and all orders for this visit:    Coronary artery disease involving native coronary artery of native heart without angina pectoris  Mixed hyperlipidemia  Polycythemia vera  History of stroke  -Currently chest pain free.  -Continue DAPT, statin  -Continue dual antiplatelet therapy.  On Plavix also due to her polycythemia vera and prior stroke  -Repeat lipid panel/LFTs with PCP, in the near future    Moderate AS  -Repeat TTE, persistent dyspnea    Takotsubo cardiomyopathy  Chronic systolic heart failure (CMS/HCC)  -Most recent EF 60%  -Continue Lasix 40 mg BID, swelling stable    Essential hypertension  -Continue current medications.    Palpitations  -Septum sound like ectopic heartbeats, ZIO  -If profound, will consider discontinuing lisinopril and increasing metoprolol    - Return in about 6 months (around 5/11/2020).    Lane Zamorano MD, MSc, [facility]  Interventional Cardiology  [facility]

## 2019-11-13 DIAGNOSIS — I73.9 PERIPHERAL VASCULAR DISEASE (HCC): ICD-10-CM

## 2019-11-13 RX ORDER — CLOPIDOGREL BISULFATE 75 MG/1
TABLET ORAL
Qty: 90 TABLET | Refills: 0 | Status: SHIPPED | OUTPATIENT
Start: 2019-11-13 | End: 2020-02-13

## 2019-11-16 ENCOUNTER — RESULTS ENCOUNTER (OUTPATIENT)
Dept: CARDIOLOGY | Facility: CLINIC | Age: 64
End: 2019-11-16

## 2019-11-16 DIAGNOSIS — I35.0 NONRHEUMATIC AORTIC VALVE STENOSIS: ICD-10-CM

## 2019-11-16 DIAGNOSIS — I51.81 TAKOTSUBO CARDIOMYOPATHY: ICD-10-CM

## 2019-11-16 DIAGNOSIS — I50.22 CHRONIC SYSTOLIC HEART FAILURE (HCC): ICD-10-CM

## 2019-11-16 DIAGNOSIS — I25.10 CORONARY ARTERY DISEASE INVOLVING NATIVE CORONARY ARTERY OF NATIVE HEART WITHOUT ANGINA PECTORIS: ICD-10-CM

## 2019-11-16 DIAGNOSIS — I10 ESSENTIAL HYPERTENSION: ICD-10-CM

## 2019-11-18 RX ORDER — LISINOPRIL 10 MG/1
TABLET ORAL
Qty: 90 TABLET | Refills: 0 | Status: ON HOLD | OUTPATIENT
Start: 2019-11-18 | End: 2020-01-24 | Stop reason: SDUPTHER

## 2019-11-19 NOTE — TELEPHONE ENCOUNTER
Patient called and wanted me to let you know that she got things situated about her PA so disregard her message   [___] : [unfilled]

## 2019-11-20 ENCOUNTER — TELEPHONE (OUTPATIENT)
Dept: INTERNAL MEDICINE | Facility: CLINIC | Age: 64
End: 2019-11-20

## 2019-11-20 RX ORDER — LEVOTHYROXINE SODIUM 112 UG/1
112 TABLET ORAL DAILY
Qty: 90 TABLET | Refills: 0 | Status: SHIPPED | OUTPATIENT
Start: 2019-11-20 | End: 2019-11-23 | Stop reason: SDUPTHER

## 2019-11-20 NOTE — TELEPHONE ENCOUNTER
Pt needs a refill on levothyroxine (SYNTHROID, LEVOTHROID) 112 MCG tablet, 90 day supply.  [facility].

## 2019-11-21 ENCOUNTER — TELEPHONE (OUTPATIENT)
Dept: INTERNAL MEDICINE | Facility: CLINIC | Age: 64
End: 2019-11-21

## 2019-11-21 RX ORDER — PREGABALIN 100 MG/1
100 CAPSULE ORAL EVERY 8 HOURS
Qty: 270 CAPSULE | Refills: 0 | Status: SHIPPED | OUTPATIENT
Start: 2019-11-21 | End: 2020-05-18

## 2019-11-21 NOTE — TELEPHONE ENCOUNTER
PT  CALLED  WANTED TO SPEAK WITH KYRA TO SEE IF SHE HAD CALLED IN HER LYRICA    PLEASE ADVISE AND GIVE CALL BACK     CONFIRMED MARCUS

## 2019-11-25 ENCOUNTER — DOCUMENTATION (OUTPATIENT)
Dept: CARDIOLOGY | Facility: CLINIC | Age: 64
End: 2019-11-25

## 2019-11-25 RX ORDER — LEVOTHYROXINE SODIUM 112 UG/1
TABLET ORAL
Qty: 90 TABLET | Refills: 0 | Status: SHIPPED | OUTPATIENT
Start: 2019-11-25 | End: 2020-08-18

## 2019-11-25 RX ORDER — LEVOTHYROXINE SODIUM 112 UG/1
TABLET ORAL
Qty: 90 TABLET | Refills: 0 | Status: ON HOLD | OUTPATIENT
Start: 2019-11-25 | End: 2020-01-10

## 2019-11-25 NOTE — PROGRESS NOTES
Pt came in to the office today with complaints of Zio not adhering well. Will place epatch. Pt verbalized understanding.

## 2019-11-26 ENCOUNTER — TELEPHONE (OUTPATIENT)
Dept: INTERNAL MEDICINE | Facility: CLINIC | Age: 64
End: 2019-11-26

## 2019-11-26 DIAGNOSIS — F11.90 CHRONIC, CONTINUOUS USE OF OPIOIDS: Chronic | ICD-10-CM

## 2019-11-26 DIAGNOSIS — M62.838 MUSCLE SPASMS OF NECK: ICD-10-CM

## 2019-11-26 DIAGNOSIS — G89.4 CHRONIC PAIN SYNDROME: ICD-10-CM

## 2019-11-26 RX ORDER — OXYCODONE AND ACETAMINOPHEN 7.5; 325 MG/1; MG/1
1 TABLET ORAL EVERY 6 HOURS PRN
Qty: 120 TABLET | Refills: 0 | Status: SHIPPED | OUTPATIENT
Start: 2019-11-26 | End: 2019-12-26 | Stop reason: SDUPTHER

## 2019-11-26 RX ORDER — TIZANIDINE 4 MG/1
TABLET ORAL
Qty: 60 TABLET | Refills: 1 | Status: SHIPPED | OUTPATIENT
Start: 2019-11-26 | End: 2020-01-31

## 2019-11-26 RX ORDER — FENTANYL 75 UG/H
1 PATCH TRANSDERMAL
Qty: 15 PATCH | Refills: 0 | Status: SHIPPED | OUTPATIENT
Start: 2019-11-26 | End: 2019-12-26 | Stop reason: SDUPTHER

## 2019-11-26 NOTE — TELEPHONE ENCOUNTER
Pt called requesting two refills  oxyCODONE-acetaminophen (PERCOCET) 7.5-325 MG per tablet  fentaNYL (DURAGESIC) 75 MCG/HR patch    Pt call back [phone]  [facility]

## 2019-12-05 ENCOUNTER — OFFICE VISIT (OUTPATIENT)
Dept: INTERNAL MEDICINE | Facility: CLINIC | Age: 64
End: 2019-12-05

## 2019-12-05 VITALS
SYSTOLIC BLOOD PRESSURE: 90 MMHG | HEART RATE: 64 BPM | DIASTOLIC BLOOD PRESSURE: 60 MMHG | BODY MASS INDEX: 34.23 KG/M2 | HEIGHT: 66 IN

## 2019-12-05 DIAGNOSIS — K21.9 GASTROESOPHAGEAL REFLUX DISEASE WITHOUT ESOPHAGITIS: ICD-10-CM

## 2019-12-05 DIAGNOSIS — E53.8 COBALAMIN DEFICIENCY: ICD-10-CM

## 2019-12-05 DIAGNOSIS — E03.8 OTHER SPECIFIED HYPOTHYROIDISM: ICD-10-CM

## 2019-12-05 DIAGNOSIS — I51.81 TAKOTSUBO CARDIOMYOPATHY: Primary | ICD-10-CM

## 2019-12-05 DIAGNOSIS — E61.1 IRON DEFICIENCY: ICD-10-CM

## 2019-12-05 DIAGNOSIS — K76.0 FATTY LIVER DISEASE, NONALCOHOLIC: Chronic | ICD-10-CM

## 2019-12-05 DIAGNOSIS — J43.8 OTHER EMPHYSEMA (HCC): ICD-10-CM

## 2019-12-05 DIAGNOSIS — I50.22 CHRONIC SYSTOLIC HEART FAILURE (HCC): ICD-10-CM

## 2019-12-05 DIAGNOSIS — I25.10 CORONARY ARTERY DISEASE INVOLVING NATIVE CORONARY ARTERY OF NATIVE HEART WITHOUT ANGINA PECTORIS: ICD-10-CM

## 2019-12-05 DIAGNOSIS — G25.0 ESSENTIAL TREMOR: ICD-10-CM

## 2019-12-05 DIAGNOSIS — E11.10 TYPE 2 DIABETES MELLITUS WITH KETOACIDOSIS WITHOUT COMA, WITHOUT LONG-TERM CURRENT USE OF INSULIN (HCC): Chronic | ICD-10-CM

## 2019-12-05 DIAGNOSIS — R00.1 SINUS BRADYCARDIA: ICD-10-CM

## 2019-12-05 DIAGNOSIS — Z72.0 TOBACCO ABUSE: Chronic | ICD-10-CM

## 2019-12-05 DIAGNOSIS — I10 ESSENTIAL HYPERTENSION: ICD-10-CM

## 2019-12-05 DIAGNOSIS — E55.9 VITAMIN D DEFICIENCY: ICD-10-CM

## 2019-12-05 PROCEDURE — 99214 OFFICE O/P EST MOD 30 MIN: CPT | Performed by: INTERNAL MEDICINE

## 2019-12-05 RX ORDER — TOPIRAMATE 25 MG/1
25 TABLET ORAL 2 TIMES DAILY
Qty: 180 TABLET | Refills: 3 | Status: ON HOLD | OUTPATIENT
Start: 2019-12-05 | End: 2020-01-10

## 2019-12-05 RX ORDER — FUROSEMIDE 40 MG/1
TABLET ORAL
Qty: 180 TABLET | Refills: 0 | Status: SHIPPED | OUTPATIENT
Start: 2019-12-05 | End: 2020-03-12

## 2019-12-05 NOTE — PROGRESS NOTES
Patient is a 64 y.o. female who is here for a follow up of hypertension,diabetes and pain.  Chief Complaint   Patient presents with   • Hypertension   • Diabetes   • Pain         HPI:    Here for mgmt of HTN and hypothyroid and DM.  Onset years.  Having more dizzy spells.  No palpitations.  Has issues with tremors.  Did well on the topamax in the past for the tremors.  Having increased wt loss.  Appetite is good.  Still smoking.  Breathing is fair.  Occasional cough.     History:     Patient Active Problem List   Diagnosis   • Atopic rhinitis   • Restrictive ventilatory defect   • COPD (chronic obstructive pulmonary disease) (CMS/HCC)   • Osteoporosis   • Obstructive sleep apnea syndrome   • Abnormal liver enzymes   • Cholelithiasis   • Chronic back pain   • Mixed anxiety depressive disorder   • Type 2 diabetes mellitus (CMS/HCC)   • Essential tremor   • Fibromyalgia   • Gastroesophageal reflux disease without esophagitis   • Essential hypertension   • Hypothyroidism   • Insomnia   • Osteoarthritis   • Psoriasis   • Branch retinal vein occlusion   • Cobalamin deficiency   • Obesity   • Vitamin D deficiency   • Fatty liver disease, nonalcoholic   • Sinus bradycardia   • Tobacco abuse   • Peripheral vascular disease (CMS/HCC)   • Diabetic polyneuropathy associated with type 2 diabetes mellitus (CMS/HCC)   • Chronic pain   • Chronic, continuous use of opioids   • Chronic anxiety   • Chronically on benzodiazepine therapy   • Tubular adenoma   • Cellulitis of sacral region   • Acute on chronic respiratory failure with hypoxia (CMS/HCC)   • Coronary artery disease involving native coronary artery of native heart without angina pectoris   • Takotsubo cardiomyopathy   • Chronic systolic heart failure (CMS/HCC)   • Chronic respiratory failure with hypoxia (CMS/HCC)   • Weakness   • Nonrheumatic aortic valve stenosis   • Acquired absence of other left toe(s) (CMS/HCC)   • Polycythemia vera (CMS/HCC)   • Other cirrhosis of  liver (CMS/HCC)       Past Medical History:   Diagnosis Date   • Acute on chronic respiratory failure with hypoxia (CMS/HCC) 2/13/2018   • ELIF (acute kidney injury) (CMS/HCC) 2/13/2018   • ELIF (acute kidney injury) (CMS/HCC) 2/13/2018   • Altered mental status 2/13/2018   • Bronchitis    • Cellulitis of sacral region 2/13/2018   • Cervical cancer (CMS/HCC)    • Cholelithiasis 5/11/2016   • Chronic anxiety 2/27/2017   • Chronic bronchitis (CMS/HCC)    • Chronic respiratory failure with hypoxia (CMS/HCC) 3/8/2018   • Chronic systolic heart failure (CMS/HCC) 3/8/2018   • Chronic systolic heart failure (CMS/HCC)    • Chronically on benzodiazepine therapy 2/27/2017   • Degenerative arthritis    • Diabetes mellitus (CMS/HCC)    • Dyslipidemia 5/11/2016   • Dyspnea    • Elevated troponin level 2/13/2018   • Fatty liver disease, nonalcoholic 11/21/2016   • Fever 2/13/2018   • Fibromyalgia    • GERD (gastroesophageal reflux disease)    • H/O echocardiogram    • History of pneumonia    • Hypertension 5/11/2016    16. H/O echocardiogram (V15.89) (Z92.89)  · A.  Echocardiogram of 02/03/2015 reports an ejection fraction of 60-65%, mild concentric     LVH, trace mitral regurgitation, mild tricuspid and pulmonic regurgitation and calculated     RVSP of 35 mmHg, the main pulmonary artery is also mildly dilated.   • Hypothyroidism 5/11/2016    Description: A.  On replacement therapy.   • Infected wound 3/8/2018   • Nausea    • Obesity    • DINA (obstructive sleep apnea)     intolerant of CPAP therapy   • Osteoporosis    • Osteoporosis    • Polycythemia vera (CMS/HCC) 5/11/2016   • Pulmonary emphysema (CMS/HCC)    • Restrictive ventilatory defect    • Rhinitis    • Sepsis (CMS/HCC) 2/13/2018   • Uncontrolled diabetes mellitus (CMS/HCC) 5/11/2016   • Urine abnormality 2/13/2018   • Uterine cancer (CMS/HCC)    • Vitamin D deficiency 8/1/2016   • Weakness 3/8/2018       Past Surgical History:   Procedure Laterality Date   • AMPUTATION  DIGIT Left 11/23/2016    Procedure: AMPUTATION TRANS METATARSAL - ray amutation of the left great toe;  Surgeon: Arsalan Feliciano MD;  Location:  [facility];  Service:    • AMPUTATION DIGIT Left 3/2/2017    Procedure: LEFT FOOT TRANSMETATARSAL AMPUTATION TOES 2,3,4,5;  Surgeon: Joey Guaman MD;  Location:  [facility];  Service:    • BACK SURGERY      lumbar fusions x5--multiple times; 1995, 1997, 1998, 1999 and 2008   • BELOW KNEE AMPUTATION Left 2/25/2017    Procedure: EXTENDED LEFT TOE AMPUTATION;  Surgeon: Arsalan Feliciano MD;  Location:  [facility];  Service:    • CARDIAC CATHETERIZATION N/A 11/26/2016    Procedure: Left Heart Cath;  Surgeon: Ash Nuñez MD;  Location:  [facility];  Service:    • CARDIAC CATHETERIZATION N/A 2/20/2018    Procedure: Left Heart Cath;  Surgeon: Lane Zamorano MD;  Location:  [facility];  Service:    • CARDIAC CATHETERIZATION N/A 2/20/2018    Procedure: Right Heart Cath;  Surgeon: Lane Zamorano MD;  Location:  [facility];  Service:    • HAND SURGERY Bilateral     x3   • HYSTERECTOMY      status post uterine and cervical cancer   • LUMBAR SPINE SURGERY      arthrodesis by anterior approach addit interspace   • TONSILLECTOMY         Current Outpatient Medications on File Prior to Visit   Medication Sig   • acetaminophen (TYLENOL) 325 MG tablet Take 2 tablets by mouth Every 4 (Four) Hours As Needed for mild pain (1-3) or fever (temperature greater than 101F).   • albuterol (PROVENTIL) (2.5 MG/3ML) 0.083% nebulizer solution Take 2.5 mg by nebulization Every 6 (Six) Hours As Needed for Wheezing or Shortness of Air.   • aspirin 325 MG tablet Take 325 mg by mouth Daily.   • atorvastatin (LIPITOR) 40 MG tablet TAKE ONE TABLET BY MOUTH EVERY NIGHT   • busPIRone (BUSPAR) 7.5 MG tablet TAKE TWO TABLETS BY MOUTH TWICE A DAY   • clonazePAM (KlonoPIN) 0.5 MG tablet TAKE ONE-HALF TABLET BY MOUTH TWICE A DAY AS NEEDED FOR SEIZURES   • clopidogrel  (PLAVIX) 75 MG tablet TAKE ONE TABLET BY MOUTH DAILY   • ezetimibe (ZETIA) 10 MG tablet TAKE ONE TABLET BY MOUTH DAILY   • fentaNYL (DURAGESIC) 75 MCG/HR patch Place 1 patch on the skin as directed by provider Every Other Day.   • fexofenadine (ALLEGRA) 180 MG tablet Take 180 mg by mouth Daily.   • furosemide (LASIX) 40 MG tablet TAKE ONE TABLET BY MOUTH TWICE A DAY   • levothyroxine (SYNTHROID, LEVOTHROID) 112 MCG tablet TAKE ONE TABLET BY MOUTH DAILY   • levothyroxine (SYNTHROID, LEVOTHROID) 112 MCG tablet TAKE ONE TABLET BY MOUTH DAILY   • lisinopril (PRINIVIL,ZESTRIL) 10 MG tablet TAKE ONE TABLET BY MOUTH DAILY   • melatonin 5 MG sublingual tablet sublingual tablet Place 1 tablet under the tongue At Night As Needed (insomnia). (Patient taking differently: Place 10 mg under the tongue At Night As Needed (insomnia).)   • metoprolol tartrate (LOPRESSOR) 25 MG tablet TAKE ONE TABLET BY MOUTH TWICE A DAY   • omeprazole (priLOSEC) 20 MG capsule TAKE ONE CAPSULE BY MOUTH DAILY   • oxyCODONE-acetaminophen (PERCOCET) 7.5-325 MG per tablet Take 1 tablet by mouth Every 6 (Six) Hours As Needed for Moderate Pain .   • pregabalin (LYRICA) 100 MG capsule Take 1 capsule by mouth Every 8 (Eight) Hours.   • probiotic (CULTURELLE) capsule capsule Take 1 capsule by mouth Daily.   • promethazine (PHENERGAN) 25 MG tablet Take 1 tablet by mouth Every 6 (Six) Hours As Needed for Nausea or Vomiting.   • sertraline (ZOLOFT) 50 MG tablet TAKE ONE TABLET BY MOUTH DAILY   • tiZANidine (ZANAFLEX) 4 MG tablet TAKE ONE TABLET BY MOUTH TWICE A DAY AS NEEDED FOR MUSCLE SPASMS     Current Facility-Administered Medications on File Prior to Visit   Medication   • cyanocobalamin injection 1,000 mcg       Family History   Problem Relation Age of Onset   • Arthritis Mother    • Diabetes Mother    • Colon polyps Mother    • Diverticulitis Mother    • Heart failure Mother    • Arthritis Father    • Bleeding Disorder Father    • Diabetes Father    •  Kidney disease Father    • Heart disease Father    • COPD Sister         currently smokes   • Arthritis Brother    • Diabetes Brother    • Alcohol abuse Brother    • Heart murmur Daughter    • Arthritis Other    • Diabetes Other        Social History     Socioeconomic History   • Marital status:      Spouse name: Not on file   • Number of children: 1   • Years of education: Not on file   • Highest education level: Not on file   Occupational History     Employer: DISABLED   Tobacco Use   • Smoking status: Current Every Day Smoker     Packs/day: 1.00     Years: 48.00     Pack years: 48.00     Types: Cigarettes   • Smokeless tobacco: Never Used   Substance and Sexual Activity   • Alcohol use: No   • Drug use: No   • Sexual activity: Defer   Social History Narrative    Mrs. Langston is a 62 year old white  female. She lives with her spouse in [facility]. She states she does her own ADLs but appears disabled. They have one child and two grandchildren. She has a living will.    Caffeine: 2 serving per day. Pt lives at home with .         Review of Systems   Constitutional: Positive for fatigue. Negative for chills, diaphoresis, fever and unexpected weight change.   HENT: Negative for congestion, ear pain, hearing loss, nosebleeds, postnasal drip, sinus pressure and sore throat.    Eyes: Negative for pain, discharge and itching.   Respiratory: Positive for shortness of breath. Negative for cough, chest tightness and wheezing.    Cardiovascular: Negative for chest pain and leg swelling.   Gastrointestinal: Negative for abdominal distention, blood in stool, constipation, diarrhea, nausea and vomiting.   Endocrine: Negative for heat intolerance, polydipsia and polyuria.   Genitourinary: Negative for difficulty urinating, dysuria, frequency and hematuria.   Musculoskeletal: Positive for arthralgias, back pain, gait problem and neck stiffness. Negative for joint swelling and myalgias.   Skin:        Hair  "loss   Neurological: Positive for tremors, weakness and light-headedness. Negative for dizziness, syncope and headaches.   Psychiatric/Behavioral: Positive for dysphoric mood and sleep disturbance. The patient is nervous/anxious.        BP 90/60   Pulse 64   Ht 167.6 cm (65.98\")   LMP  (LMP Unknown)   BMI 34.23 kg/m²       Physical Exam   Constitutional: She is oriented to person, place, and time. She appears well-developed and well-nourished.   HENT:   Head: Normocephalic and atraumatic.   Right Ear: External ear normal.   Left Ear: External ear normal.   Mouth/Throat: Oropharynx is clear and moist.   Eyes: Conjunctivae and EOM are normal.   Neck: Normal range of motion. Neck supple.   Cardiovascular: Normal rate and regular rhythm.   2/6 СЕРГЕЙ   Pulmonary/Chest: Effort normal. She has wheezes. She has rales (mild).   Mildly diminished   Abdominal: Soft. Bowel sounds are normal.   Musculoskeletal:   Using wheelchair     Lymphadenopathy:     She has no cervical adenopathy.   Neurological: She is alert and oriented to person, place, and time.   Skin: Skin is warm and dry. No rash noted.   Psychiatric: She has a normal mood and affect. Her behavior is normal. Thought content normal.       Procedure:      Discussion/Summary:    chronic back pain- refill patch and percocet as needed, advised need to reduce the dose as we are doing, no better or worst  htn-stable on ACE and BB  hyperlipidemia-cont lipitor and zetia, recheck today at goal except for TG and HDL, counseled on low chol diet  hypothroid-cont replacement, recheck today at goal  depression with anxiety-cont buspar and klonopine ,advised her of risk of addiction, improved with addition of zoloft  polycythemia-cbc today noted, advised tob cessation  retinal vein occlusion- cont rf modification, counseled on tob cessation  b12 def-check today , advised B12 500 mcg qd  DM-labs today off Januvia, counseled on low carb, labs at goal  Elevated lft/?autoimmune-f/u " gastro recs , labs today at goal, advised wt loss  A/D-cont zoloft, improved  GERD-cont PPI  Tremor-cont BB but add topamax  Fe def anemia-will start FE and check EGD      12/5 labs noted and dw patient    Current Outpatient Medications:   •  acetaminophen (TYLENOL) 325 MG tablet, Take 2 tablets by mouth Every 4 (Four) Hours As Needed for mild pain (1-3) or fever (temperature greater than 101F)., Disp: 100 tablet, Rfl: 0  •  albuterol (PROVENTIL) (2.5 MG/3ML) 0.083% nebulizer solution, Take 2.5 mg by nebulization Every 6 (Six) Hours As Needed for Wheezing or Shortness of Air., Disp: 120 vial, Rfl: 5  •  aspirin 325 MG tablet, Take 325 mg by mouth Daily., Disp: , Rfl:   •  atorvastatin (LIPITOR) 40 MG tablet, TAKE ONE TABLET BY MOUTH EVERY NIGHT, Disp: 90 tablet, Rfl: 2  •  busPIRone (BUSPAR) 7.5 MG tablet, TAKE TWO TABLETS BY MOUTH TWICE A DAY, Disp: 360 tablet, Rfl: 0  •  clonazePAM (KlonoPIN) 0.5 MG tablet, TAKE ONE-HALF TABLET BY MOUTH TWICE A DAY AS NEEDED FOR SEIZURES, Disp: 30 tablet, Rfl: 0  •  clopidogrel (PLAVIX) 75 MG tablet, TAKE ONE TABLET BY MOUTH DAILY, Disp: 90 tablet, Rfl: 0  •  ezetimibe (ZETIA) 10 MG tablet, TAKE ONE TABLET BY MOUTH DAILY, Disp: 90 tablet, Rfl: 2  •  fentaNYL (DURAGESIC) 75 MCG/HR patch, Place 1 patch on the skin as directed by provider Every Other Day., Disp: 15 patch, Rfl: 0  •  fexofenadine (ALLEGRA) 180 MG tablet, Take 180 mg by mouth Daily., Disp: , Rfl:   •  furosemide (LASIX) 40 MG tablet, TAKE ONE TABLET BY MOUTH TWICE A DAY, Disp: 180 tablet, Rfl: 0  •  levothyroxine (SYNTHROID, LEVOTHROID) 112 MCG tablet, TAKE ONE TABLET BY MOUTH DAILY, Disp: 90 tablet, Rfl: 0  •  levothyroxine (SYNTHROID, LEVOTHROID) 112 MCG tablet, TAKE ONE TABLET BY MOUTH DAILY, Disp: 90 tablet, Rfl: 0  •  lisinopril (PRINIVIL,ZESTRIL) 10 MG tablet, TAKE ONE TABLET BY MOUTH DAILY, Disp: 90 tablet, Rfl: 0  •  melatonin 5 MG sublingual tablet sublingual tablet, Place 1 tablet under the tongue At Night As  Needed (insomnia). (Patient taking differently: Place 10 mg under the tongue At Night As Needed (insomnia).), Disp: 30 tablet, Rfl: 0  •  metoprolol tartrate (LOPRESSOR) 25 MG tablet, TAKE ONE TABLET BY MOUTH TWICE A DAY, Disp: 180 tablet, Rfl: 2  •  omeprazole (priLOSEC) 20 MG capsule, TAKE ONE CAPSULE BY MOUTH DAILY, Disp: 90 capsule, Rfl: 0  •  oxyCODONE-acetaminophen (PERCOCET) 7.5-325 MG per tablet, Take 1 tablet by mouth Every 6 (Six) Hours As Needed for Moderate Pain ., Disp: 120 tablet, Rfl: 0  •  pregabalin (LYRICA) 100 MG capsule, Take 1 capsule by mouth Every 8 (Eight) Hours., Disp: 270 capsule, Rfl: 0  •  probiotic (CULTURELLE) capsule capsule, Take 1 capsule by mouth Daily., Disp: 60 capsule, Rfl: 0  •  promethazine (PHENERGAN) 25 MG tablet, Take 1 tablet by mouth Every 6 (Six) Hours As Needed for Nausea or Vomiting., Disp: 20 tablet, Rfl: 2  •  sertraline (ZOLOFT) 50 MG tablet, TAKE ONE TABLET BY MOUTH DAILY, Disp: 90 tablet, Rfl: 0  •  tiZANidine (ZANAFLEX) 4 MG tablet, TAKE ONE TABLET BY MOUTH TWICE A DAY AS NEEDED FOR MUSCLE SPASMS, Disp: 60 tablet, Rfl: 1  •  ferrous sulfate 325 (65 FE) MG tablet, Take 1 tablet by mouth 2 (Two) Times a Day., Disp: 60 tablet, Rfl: 2  •  topiramate (TOPAMAX) 25 MG tablet, Take 1 tablet by mouth 2 (Two) Times a Day., Disp: 180 tablet, Rfl: 3    Current Facility-Administered Medications:   •  cyanocobalamin injection 1,000 mcg, 1,000 mcg, Intramuscular, Q28 Days, Harinder Aranda MD, 1,000 mcg at 04/23/19 1247        Tamara was seen today for hypertension, diabetes and pain.    Diagnoses and all orders for this visit:    Takotsubo cardiomyopathy    Sinus bradycardia    Essential hypertension  -     CBC (No Diff)  -     Comprehensive Metabolic Panel  -     Lipid Panel  -     TSH    Coronary artery disease involving native coronary artery of native heart without angina pectoris    Chronic systolic heart failure (CMS/HCC)    Other emphysema (CMS/HCC)    Vitamin D  deficiency    Gastroesophageal reflux disease without esophagitis  -     Ambulatory Referral to Gastroenterology    Fatty liver disease, nonalcoholic    Cobalamin deficiency  -     Vitamin B12    Type 2 diabetes mellitus with ketoacidosis without coma, without long-term current use of insulin (CMS/[facility])  -     Hemoglobin A1c    Other specified hypothyroidism    Essential tremor  -     topiramate (TOPAMAX) 25 MG tablet; Take 1 tablet by mouth 2 (Two) Times a Day.    Tobacco abuse    Iron deficiency  -     ferrous sulfate 325 (65 FE) MG tablet; Take 1 tablet by mouth 2 (Two) Times a Day.  -     Ambulatory Referral to Gastroenterology    Other orders  -     Folate  -     Iron  -     Written Authorization

## 2019-12-06 LAB
ALBUMIN SERPL-MCNC: 3.4 G/DL (ref 3.6–4.8)
ALBUMIN/GLOB SERPL: 1.3 {RATIO} (ref 1.2–2.2)
ALP SERPL-CCNC: 68 IU/L (ref 39–117)
ALT SERPL-CCNC: 14 IU/L (ref 0–32)
AST SERPL-CCNC: 27 IU/L (ref 0–40)
BILIRUB SERPL-MCNC: 0.2 MG/DL (ref 0–1.2)
BUN SERPL-MCNC: 18 MG/DL (ref 8–27)
BUN/CREAT SERPL: 14 (ref 12–28)
CALCIUM SERPL-MCNC: 8.9 MG/DL (ref 8.7–10.3)
CHLORIDE SERPL-SCNC: 97 MMOL/L (ref 96–106)
CHOLEST SERPL-MCNC: 119 MG/DL (ref 100–199)
CO2 SERPL-SCNC: 31 MMOL/L (ref 20–29)
CREAT SERPL-MCNC: 1.25 MG/DL (ref 0.57–1)
ERYTHROCYTE [DISTWIDTH] IN BLOOD BY AUTOMATED COUNT: 17.3 % (ref 12.3–15.4)
GLOBULIN SER CALC-MCNC: 2.6 G/DL (ref 1.5–4.5)
GLUCOSE SERPL-MCNC: 136 MG/DL (ref 65–99)
HBA1C MFR BLD: 5.6 % (ref 4.8–5.6)
HCT VFR BLD AUTO: 29.2 % (ref 34–46.6)
HDLC SERPL-MCNC: 31 MG/DL
HGB BLD-MCNC: 9.1 G/DL (ref 11.1–15.9)
LDLC SERPL CALC-MCNC: 44 MG/DL (ref 0–99)
MCH RBC QN AUTO: 27.7 PG (ref 26.6–33)
MCHC RBC AUTO-ENTMCNC: 31.2 G/DL (ref 31.5–35.7)
MCV RBC AUTO: 89 FL (ref 79–97)
PLATELET # BLD AUTO: 246 X10E3/UL (ref 150–450)
POTASSIUM SERPL-SCNC: 5.3 MMOL/L (ref 3.5–5.2)
PROT SERPL-MCNC: 6 G/DL (ref 6–8.5)
RBC # BLD AUTO: 3.29 X10E6/UL (ref 3.77–5.28)
SODIUM SERPL-SCNC: 143 MMOL/L (ref 134–144)
TRIGL SERPL-MCNC: 220 MG/DL (ref 0–149)
TSH SERPL DL<=0.005 MIU/L-ACNC: 0.84 UIU/ML (ref 0.45–4.5)
VIT B12 SERPL-MCNC: 537 PG/ML (ref 232–1245)
VLDLC SERPL CALC-MCNC: 44 MG/DL (ref 5–40)
WBC # BLD AUTO: 9.8 X10E3/UL (ref 3.4–10.8)

## 2019-12-10 ENCOUNTER — HOSPITAL ENCOUNTER (OUTPATIENT)
Dept: CARDIOLOGY | Facility: HOSPITAL | Age: 64
Discharge: HOME | End: 2019-12-10
Admitting: INTERNAL MEDICINE

## 2019-12-10 ENCOUNTER — TELEPHONE (OUTPATIENT)
Dept: CARDIOLOGY | Facility: CLINIC | Age: 64
End: 2019-12-10

## 2019-12-10 VITALS
DIASTOLIC BLOOD PRESSURE: 59 MMHG | HEIGHT: 65 IN | BODY MASS INDEX: 35.32 KG/M2 | WEIGHT: 212 LBS | SYSTOLIC BLOOD PRESSURE: 108 MMHG

## 2019-12-10 DIAGNOSIS — I25.10 CORONARY ARTERY DISEASE INVOLVING NATIVE CORONARY ARTERY OF NATIVE HEART WITHOUT ANGINA PECTORIS: ICD-10-CM

## 2019-12-10 DIAGNOSIS — I35.0 NONRHEUMATIC AORTIC VALVE STENOSIS: ICD-10-CM

## 2019-12-10 DIAGNOSIS — I51.81 TAKOTSUBO CARDIOMYOPATHY: ICD-10-CM

## 2019-12-10 DIAGNOSIS — I10 ESSENTIAL HYPERTENSION: ICD-10-CM

## 2019-12-10 DIAGNOSIS — I50.22 CHRONIC SYSTOLIC HEART FAILURE (HCC): ICD-10-CM

## 2019-12-10 LAB
BH CV ECHO MEAS - AO MAX PG (FULL): 52.3 MMHG
BH CV ECHO MEAS - AO MAX PG: 65 MMHG
BH CV ECHO MEAS - AO MEAN PG (FULL): 33 MMHG
BH CV ECHO MEAS - AO MEAN PG: 41 MMHG
BH CV ECHO MEAS - AO ROOT AREA (BSA CORRECTED): 1.4
BH CV ECHO MEAS - AO ROOT AREA: 6.2 CM^2
BH CV ECHO MEAS - AO ROOT DIAM: 2.8 CM
BH CV ECHO MEAS - AO V2 MAX: 443 CM/SEC
BH CV ECHO MEAS - AO V2 MEAN: 293 CM/SEC
BH CV ECHO MEAS - AO V2 VTI: 95.9 CM
BH CV ECHO MEAS - AVA(I,A): 1.4 CM^2
BH CV ECHO MEAS - AVA(I,D): 1.4 CM^2
BH CV ECHO MEAS - AVA(V,A): 1.3 CM^2
BH CV ECHO MEAS - AVA(V,D): 1.3 CM^2
BH CV ECHO MEAS - BSA(HAYCOCK): 2.1 M^2
BH CV ECHO MEAS - BSA: 2 M^2
BH CV ECHO MEAS - BZI_BMI: 35.3 KILOGRAMS/M^2
BH CV ECHO MEAS - BZI_METRIC_HEIGHT: 165.1 CM
BH CV ECHO MEAS - BZI_METRIC_WEIGHT: 96.2 KG
BH CV ECHO MEAS - EDV(CUBED): 113.4 ML
BH CV ECHO MEAS - EDV(MOD-SP2): 98 ML
BH CV ECHO MEAS - EDV(MOD-SP4): 95 ML
BH CV ECHO MEAS - EDV(TEICH): 109.6 ML
BH CV ECHO MEAS - EF(CUBED): 76.2 %
BH CV ECHO MEAS - EF(MOD-BP): 70 %
BH CV ECHO MEAS - EF(MOD-SP2): 63.3 %
BH CV ECHO MEAS - EF(MOD-SP4): 75.8 %
BH CV ECHO MEAS - EF(TEICH): 68.1 %
BH CV ECHO MEAS - ESV(CUBED): 27 ML
BH CV ECHO MEAS - ESV(MOD-SP2): 36 ML
BH CV ECHO MEAS - ESV(MOD-SP4): 23 ML
BH CV ECHO MEAS - ESV(TEICH): 35 ML
BH CV ECHO MEAS - FS: 38 %
BH CV ECHO MEAS - IVS/LVPW: 1
BH CV ECHO MEAS - IVSD: 1.1 CM
BH CV ECHO MEAS - LA DIMENSION: 3.2 CM
BH CV ECHO MEAS - LA/AO: 1.1
BH CV ECHO MEAS - LAD MAJOR: 6.8 CM
BH CV ECHO MEAS - LAT PEAK E' VEL: 10.3 CM/SEC
BH CV ECHO MEAS - LATERAL E/E' RATIO: 13.7
BH CV ECHO MEAS - LV DIASTOLIC VOL/BSA (35-75): 46.9 ML/M^2
BH CV ECHO MEAS - LV MASS(C)D: 200.3 GRAMS
BH CV ECHO MEAS - LV MASS(C)DI: 98.8 GRAMS/M^2
BH CV ECHO MEAS - LV MAX PG: 12.7 MMHG
BH CV ECHO MEAS - LV MEAN PG: 8 MMHG
BH CV ECHO MEAS - LV SYSTOLIC VOL/BSA (12-30): 11.3 ML/M^2
BH CV ECHO MEAS - LV V1 MAX: 178 CM/SEC
BH CV ECHO MEAS - LV V1 MEAN: 128 CM/SEC
BH CV ECHO MEAS - LV V1 VTI: 44.1 CM
BH CV ECHO MEAS - LVIDD: 4.8 CM
BH CV ECHO MEAS - LVIDS: 3 CM
BH CV ECHO MEAS - LVLD AP2: 9.5 CM
BH CV ECHO MEAS - LVLD AP4: 8.9 CM
BH CV ECHO MEAS - LVLS AP2: 7.2 CM
BH CV ECHO MEAS - LVLS AP4: 6.4 CM
BH CV ECHO MEAS - LVOT AREA (M): 3.1 CM^2
BH CV ECHO MEAS - LVOT AREA: 3.1 CM^2
BH CV ECHO MEAS - LVOT DIAM: 2 CM
BH CV ECHO MEAS - LVPWD: 1.1 CM
BH CV ECHO MEAS - MED PEAK E' VEL: 10 CM/SEC
BH CV ECHO MEAS - MEDIAL E/E' RATIO: 14.1
BH CV ECHO MEAS - MV A MAX VEL: 125 CM/SEC
BH CV ECHO MEAS - MV DEC TIME: 0.26 SEC
BH CV ECHO MEAS - MV E MAX VEL: 141 CM/SEC
BH CV ECHO MEAS - MV E/A: 1.1
BH CV ECHO MEAS - MV MAX PG: 8.6 MMHG
BH CV ECHO MEAS - MV MEAN PG: 4 MMHG
BH CV ECHO MEAS - MV V2 MAX: 147 CM/SEC
BH CV ECHO MEAS - MV V2 MEAN: 89.9 CM/SEC
BH CV ECHO MEAS - MV V2 VTI: 47.1 CM
BH CV ECHO MEAS - MVA(VTI): 2.9 CM^2
BH CV ECHO MEAS - PA ACC SLOPE: 544 CM/SEC^2
BH CV ECHO MEAS - PA ACC TIME: 0.21 SEC
BH CV ECHO MEAS - PA MAX PG: 9.5 MMHG
BH CV ECHO MEAS - PA PR(ACCEL): -16 MMHG
BH CV ECHO MEAS - PA V2 MAX: 154 CM/SEC
BH CV ECHO MEAS - RAP SYSTOLE: 8 MMHG
BH CV ECHO MEAS - RVSP: 51.3 MMHG
BH CV ECHO MEAS - SI(AO): 291.2 ML/M^2
BH CV ECHO MEAS - SI(CUBED): 42.6 ML/M^2
BH CV ECHO MEAS - SI(LVOT): 68.3 ML/M^2
BH CV ECHO MEAS - SI(MOD-SP2): 30.6 ML/M^2
BH CV ECHO MEAS - SI(MOD-SP4): 35.5 ML/M^2
BH CV ECHO MEAS - SI(TEICH): 36.8 ML/M^2
BH CV ECHO MEAS - SV(AO): 590.5 ML
BH CV ECHO MEAS - SV(CUBED): 86.4 ML
BH CV ECHO MEAS - SV(LVOT): 138.5 ML
BH CV ECHO MEAS - SV(MOD-SP2): 62 ML
BH CV ECHO MEAS - SV(MOD-SP4): 72 ML
BH CV ECHO MEAS - SV(TEICH): 74.6 ML
BH CV ECHO MEAS - TAPSE (>1.6): 2.4 CM2
BH CV ECHO MEAS - TR MAX PG: 43.3 MMHG
BH CV ECHO MEAS - TR MAX VEL: 329 CM/SEC
BH CV ECHO MEASUREMENTS AVERAGE E/E' RATIO: 13.89
BH CV VAS BP LEFT ARM: NORMAL MMHG
BH CV XLRA - RV BASE: 4.3 CM
BH CV XLRA - RV LENGTH: 8.6 CM
BH CV XLRA - RV MID: 3.8 CM
BH CV XLRA - TDI S': 18.9 CM/SEC
FOLATE SERPL-MCNC: >20 NG/ML
IRON SERPL-MCNC: 22 UG/DL (ref 27–139)
LEFT ATRIUM VOLUME INDEX: 33 ML/M^2
LEFT ATRIUM VOLUME: 67 ML
LV EF 2D ECHO EST: 60 %
WRITTEN AUTHORIZATION: NORMAL

## 2019-12-10 PROCEDURE — 93306 TTE W/DOPPLER COMPLETE: CPT | Performed by: INTERNAL MEDICINE

## 2019-12-10 PROCEDURE — 93306 TTE W/DOPPLER COMPLETE: CPT

## 2019-12-10 RX ORDER — FERROUS SULFATE 325(65) MG
325 TABLET ORAL 2 TIMES DAILY
Qty: 60 TABLET | Refills: 2 | Status: ON HOLD | OUTPATIENT
Start: 2019-12-10 | End: 2020-01-23

## 2019-12-10 NOTE — TELEPHONE ENCOUNTER
----- Message from Lane Zamorano MD sent at 12/10/2019 12:24 PM EST -----  Please let the patient know her aortic stenosis has worsened and is now in the severe range.  This is contributing to her shortness of breath.  I like her to be set up with the heart valve clinic to discuss the options of TAVR.  Please have her see Soheila Shoemaker.

## 2019-12-10 NOTE — TELEPHONE ENCOUNTER
Patient notified of message above from . Talked to Soheila THAYER and she is going to call patient this week to discuss options of TAVR.

## 2019-12-14 DIAGNOSIS — I10 ESSENTIAL HYPERTENSION: ICD-10-CM

## 2019-12-14 DIAGNOSIS — I21.4 NSTEMI (NON-ST ELEVATED MYOCARDIAL INFARCTION) (HCC): ICD-10-CM

## 2019-12-16 ENCOUNTER — TELEPHONE (OUTPATIENT)
Dept: INTERNAL MEDICINE | Facility: CLINIC | Age: 64
End: 2019-12-16

## 2019-12-16 DIAGNOSIS — I21.4 NSTEMI (NON-ST ELEVATED MYOCARDIAL INFARCTION) (HCC): ICD-10-CM

## 2019-12-16 DIAGNOSIS — I10 ESSENTIAL HYPERTENSION: ICD-10-CM

## 2019-12-16 RX ORDER — DOXYCYCLINE HYCLATE 100 MG/1
100 CAPSULE ORAL 2 TIMES DAILY
Qty: 20 CAPSULE | Refills: 0 | Status: SHIPPED | OUTPATIENT
Start: 2019-12-16 | End: 2019-12-26

## 2019-12-17 ENCOUNTER — TELEPHONE (OUTPATIENT)
Dept: INTERNAL MEDICINE | Facility: CLINIC | Age: 64
End: 2019-12-17

## 2019-12-18 ENCOUNTER — TELEPHONE (OUTPATIENT)
Dept: INTERNAL MEDICINE | Facility: CLINIC | Age: 64
End: 2019-12-18

## 2019-12-18 ENCOUNTER — OFFICE VISIT (OUTPATIENT)
Dept: CARDIAC SURGERY | Facility: CLINIC | Age: 64
End: 2019-12-18

## 2019-12-18 VITALS
OXYGEN SATURATION: 95 % | HEART RATE: 68 BPM | DIASTOLIC BLOOD PRESSURE: 63 MMHG | TEMPERATURE: 98 F | WEIGHT: 214 LBS | SYSTOLIC BLOOD PRESSURE: 168 MMHG | HEIGHT: 66 IN | BODY MASS INDEX: 34.39 KG/M2

## 2019-12-18 DIAGNOSIS — I96 GANGRENE (HCC): Primary | ICD-10-CM

## 2019-12-18 PROCEDURE — 99212 OFFICE O/P EST SF 10 MIN: CPT | Performed by: THORACIC SURGERY (CARDIOTHORACIC VASCULAR SURGERY)

## 2019-12-18 RX ORDER — AMOXICILLIN AND CLAVULANATE POTASSIUM 875; 125 MG/1; MG/1
1 TABLET, FILM COATED ORAL 2 TIMES DAILY
Qty: 20 TABLET | Refills: 0 | Status: SHIPPED | OUTPATIENT
Start: 2019-12-18 | End: 2019-12-26

## 2019-12-18 NOTE — PROGRESS NOTES
"Patient Information  Tamara Langston                                                                                          [address] [facility] [address]      1955  [unfilled]  [unfilled]    Chief Complaint   Patient presents with   • Follow-up     Follow up to evaluate a right foot ulcer.   • Foot Ulcer       History of Present Illness: Patient seen today at her request for evaluation of a small ulceration on the plantar surface of the right foot under the metatarsal head of the right toe.  She noticed this while she was bathing recently.  She has had previous transmetatarsal amputation of all the toes left foot performed on me for diabetic ulceration in the past and she is worried about this ulceration area.  She has no fever chills    Vitals:    12/18/19 1503   BP: 168/63   BP Location: Right arm   Patient Position: Sitting   Pulse: 68   Temp: 98 °F (36.7 °C)   SpO2: 95%   Weight: 97.1 kg (214 lb)   Height: 167.6 cm (66\")        Physical Exam examination reveals a very superficial callus over the metatarsal head of the right great toe on the plantar surface.  This was painted with Betadine and Optifoam dressing applied.  Regarding her left foot amputation is completely healed and she is ambulating well without difficulties    Lab/other results:    Assessment: #1Transmetatarsal amputation of all the toes left foot completely healed.  Procedure was performed in 2017.  #2.  Callus on the metatarsal head of the right great toe on the plantar surface very superficial.    Plan: We will plan to see the patient back in 6 weeks for follow-up I told her she needs to go to a podiatrist to get special fitted shoes for both her transmetatarsal amputation of the left foot as well as try to unload all these pressure points of her plantar surface of her right foot.  I really think that this callus area of the plantar surface of right great toe is very superficial and self-limiting.    Arsalan Feliciano M.D.   "

## 2019-12-18 NOTE — TELEPHONE ENCOUNTER
Patient called and said that her antibiotic that dr saeed prescribed to her hasnt been called in and wanted to know if he can call it in today?    augmentin 875 mg bid for 10 days

## 2019-12-19 ENCOUNTER — PREP FOR SURGERY (OUTPATIENT)
Dept: OTHER | Facility: HOSPITAL | Age: 64
End: 2019-12-19

## 2019-12-19 DIAGNOSIS — K21.9 GASTRO-ESOPHAGEAL REFLUX DISEASE WITHOUT ESOPHAGITIS: Primary | ICD-10-CM

## 2019-12-19 DIAGNOSIS — E61.1 IRON DEFICIENCY: ICD-10-CM

## 2019-12-20 PROBLEM — K21.9 GASTRO-ESOPHAGEAL REFLUX DISEASE WITHOUT ESOPHAGITIS: Status: ACTIVE | Noted: 2019-12-20

## 2019-12-20 PROBLEM — E61.1 IRON DEFICIENCY: Status: ACTIVE | Noted: 2019-12-20

## 2019-12-26 ENCOUNTER — OFFICE VISIT (OUTPATIENT)
Dept: CARDIOLOGY | Facility: HOSPITAL | Age: 64
End: 2019-12-26

## 2019-12-26 ENCOUNTER — TELEPHONE (OUTPATIENT)
Dept: CARDIOLOGY | Facility: HOSPITAL | Age: 64
End: 2019-12-26

## 2019-12-26 ENCOUNTER — TELEPHONE (OUTPATIENT)
Dept: INTERNAL MEDICINE | Facility: CLINIC | Age: 64
End: 2019-12-26

## 2019-12-26 ENCOUNTER — TELEPHONE (OUTPATIENT)
Dept: GASTROENTEROLOGY | Facility: CLINIC | Age: 64
End: 2019-12-26

## 2019-12-26 VITALS
SYSTOLIC BLOOD PRESSURE: 156 MMHG | RESPIRATION RATE: 15 BRPM | OXYGEN SATURATION: 91 % | WEIGHT: 229 LBS | DIASTOLIC BLOOD PRESSURE: 70 MMHG | HEART RATE: 67 BPM | BODY MASS INDEX: 36.8 KG/M2 | HEIGHT: 66 IN | TEMPERATURE: 98.2 F

## 2019-12-26 DIAGNOSIS — E11.42 DIABETIC POLYNEUROPATHY ASSOCIATED WITH TYPE 2 DIABETES MELLITUS (HCC): ICD-10-CM

## 2019-12-26 DIAGNOSIS — I35.0 NONRHEUMATIC AORTIC VALVE STENOSIS: Primary | ICD-10-CM

## 2019-12-26 DIAGNOSIS — F11.90 CHRONIC, CONTINUOUS USE OF OPIOIDS: Chronic | ICD-10-CM

## 2019-12-26 DIAGNOSIS — G89.4 CHRONIC PAIN SYNDROME: ICD-10-CM

## 2019-12-26 DIAGNOSIS — R09.89 CAROTID BRUIT, UNSPECIFIED LATERALITY: ICD-10-CM

## 2019-12-26 DIAGNOSIS — I73.9 PERIPHERAL VASCULAR DISEASE (HCC): Chronic | ICD-10-CM

## 2019-12-26 DIAGNOSIS — I50.22 CHRONIC SYSTOLIC HEART FAILURE (HCC): ICD-10-CM

## 2019-12-26 DIAGNOSIS — J43.9 PULMONARY EMPHYSEMA, UNSPECIFIED EMPHYSEMA TYPE (HCC): ICD-10-CM

## 2019-12-26 DIAGNOSIS — H34.8312 STABLE BRANCH RETINAL VEIN OCCLUSION OF RIGHT EYE: ICD-10-CM

## 2019-12-26 DIAGNOSIS — I25.10 CORONARY ARTERY DISEASE INVOLVING NATIVE CORONARY ARTERY OF NATIVE HEART WITHOUT ANGINA PECTORIS: ICD-10-CM

## 2019-12-26 PROCEDURE — 99215 OFFICE O/P EST HI 40 MIN: CPT | Performed by: NURSE PRACTITIONER

## 2019-12-26 RX ORDER — OXYCODONE AND ACETAMINOPHEN 7.5; 325 MG/1; MG/1
1 TABLET ORAL EVERY 6 HOURS PRN
Qty: 120 TABLET | Refills: 0 | Status: SHIPPED | OUTPATIENT
Start: 2019-12-26 | End: 2019-12-27 | Stop reason: SDUPTHER

## 2019-12-26 RX ORDER — FENTANYL 75 UG/H
1 PATCH TRANSDERMAL
Qty: 15 PATCH | Refills: 0 | Status: SHIPPED | OUTPATIENT
Start: 2019-12-26 | End: 2020-01-27 | Stop reason: SDUPTHER

## 2019-12-26 NOTE — TELEPHONE ENCOUNTER
Patient left a message on MA line requesting a call back. I called patient back. She stated that Alisha had already taken care of her and she thanked me for calling her back.

## 2019-12-26 NOTE — TELEPHONE ENCOUNTER
Patient was scheduled for EGD with you on 01/10/2020. She called and needed to cancel due to having a heart procedure. I told her that I would cancel and to call back to reschedule once everything was taken care of with her heart. I asked her to speak with her cardiologist once she is finished to get clearance for EGD.     Just wanted to let you know.

## 2019-12-26 NOTE — PROGRESS NOTES
TAVR APRN Evaluation    Tamara Langston, 1955, [id number]     12/27/19    PCP: Harinder Aranda MD  Primary Cardiologist: Lane Zamorano MD    TAVR Team:  1.  Ash Briones MD  2.  Joey Guaman MD  3.  Marielena Francois MD  4.  Qi Valdez MD    Chief Complaint: Severe AS/ CAD/ systolic heart failure      Identification: This is a 64 y.o. year old female from [address] [place] [address].    History of Present Illness: Ms. Langston was referred for consideration of TAVR due to worsening symptoms of dyspnea.  Her aortic valve indices are as follows:  Aortic valve Vmax 4.1 m/sec, and AV mean gradient 41 mm Hg. She has multiple co-morbid health conditions and poor mobility and Cardiology felt she would be better suited for TAVR consideration than open AVR.    Patient is accompanied by spouse today.  She states her RAZO has definitely been getting worse over the past year and that she frequently feels dizzy and lightheaded during activities throughout the day.      Problem List:   1.  Aortic stenosis:   A.  TTE 12/26/18:  LVEF 65%, mild to moderate LVH, JUAN ANTONIO 1.3 cm2, AV mean 22 mm Hg, and AV Vmax 3.5 m/sec   B.  TTE 12/10/19:  LVEF 60%, mild LVH, severe AS with AV Vamx 4.4 m/sec, AV mean 41 mm Hg.  Elevated RVSP 45-55 mm Hg  2.  CAD   A. ACS with positive troponin post op 11/26/16 (left transmetatarsal amputation).  RCA mid segment 50% stenosis, LAD proximal 40% stenosis, normal LVEF.   B.  NSTEMI.  Cardiac cath 2/20/2019.  :LAD 40% proximal stenosis and 70% midsegment stenosis with IFR 0.68.  D1 60% stenosis with IFR 0.7.  Treated with NAYANA.  Cx contained no significant stenosis.  RCA 60% mid-segment stenosis with IFR 0.92.  LVEF 55% with hypokinesis in all apical segments.    3.  Systolic heart failure   A.  NYHA class III symptoms.  Most recent LVEF 60% with medical tx   B.  Notation of Takotsubo cardiomyopathy 2/14/18 with LVEF 35%   C.  Most recent LVEF 60% with medical tx  4.  Type 2 DM with  neuropathy and PVD   A.  Multiple ulcerations and infections   B.  S/p amputation left left great toe for osteomyelitis and distal 4th toe 11/23/2016   C.  S/p amputation of remainder of 1st metatarsal bone and portion of left cuneiform bone 2/28/17   D.  S/p amputation of 2nd thru 5th toes in transmetatarsal resection 3/2/17 per Dr. Braedn EVANS  S/p sacral decubitus ulcer with hospitalization for sepsis 3/2018   F.  Most recent HgA1C 5.6% 12/2019  5.  Complex sleep apnea   A.  Current treatment with autoPap with supplemental oxygen.  Follows @ [facility]  6.  HTN  7.  Chronic pain syndrome   A.  Multiple surgeries for lumbar disc disease/ eventual lumbar fusion [id number]    B.  Pain medication management per Dr. Aranda: Fentanyl patch, Percocet PRN, Lyrica, Zanaflex, and Topamax  8.  Chronic anxiety   A.  Follows with Dr. Rosi VIZCARRA  Klonopin, Buspar, Zoloft  9.  COPD   A.  Multiple failed attempts to quit smoking   B.  Currently smoking 1 ppd  10.  CVA   A.  Retinal vein occlusion noted by [id number]   B.  Treated with ASA/plavix  11.  Polycythemia Vera   A.  Follows with Dr. Rosi VIZCARRA  ASA/plavix  12.  Obesity   A.  Current BMI 34.2     Patient Active Problem List   Diagnosis   • Atopic rhinitis   • Restrictive ventilatory defect   • COPD (chronic obstructive pulmonary disease) (CMS/HCC)   • Osteoporosis   • Obstructive sleep apnea syndrome   • Abnormal liver enzymes   • Cholelithiasis   • Chronic back pain   • Mixed anxiety depressive disorder   • Type 2 diabetes mellitus (CMS/HCC)   • Essential tremor   • Fibromyalgia   • Gastroesophageal reflux disease without esophagitis   • Essential hypertension   • Hypothyroidism   • Insomnia   • Osteoarthritis   • Psoriasis   • Branch retinal vein occlusion   • Cobalamin deficiency   • Obesity   • Vitamin D deficiency   • Fatty liver disease, nonalcoholic   • Sinus bradycardia   • Tobacco abuse   • Peripheral vascular disease (CMS/HCC)   • Diabetic  polyneuropathy associated with type 2 diabetes mellitus (CMS/HCC)   • Chronic pain   • Chronic, continuous use of opioids   • Chronic anxiety   • Chronically on benzodiazepine therapy   • Tubular adenoma   • Cellulitis of sacral region   • Acute on chronic respiratory failure with hypoxia (CMS/HCC)   • Coronary artery disease involving native coronary artery of native heart without angina pectoris   • Takotsubo cardiomyopathy   • Chronic systolic heart failure (CMS/HCC)   • Chronic respiratory failure with hypoxia (CMS/HCC)   • Weakness   • Nonrheumatic aortic valve stenosis   • Acquired absence of other left toe(s) (CMS/HCC)   • Polycythemia vera (CMS/HCC)   • Other cirrhosis of liver (CMS/HCC)   • Gastro-esophageal reflux disease without esophagitis   • Iron deficiency   • Carotid bruit       Past Surgical History:  Past Surgical History:   Procedure Laterality Date   • AMPUTATION DIGIT Left 11/23/2016    Procedure: AMPUTATION TRANS METATARSAL - ray amutation of the left great toe;  Surgeon: Arsalan Feliciano MD;  Location:  [facility];  Service:    • AMPUTATION DIGIT Left 3/2/2017    Procedure: LEFT FOOT TRANSMETATARSAL AMPUTATION TOES 2,3,4,5;  Surgeon: Joey Guaman MD;  Location:  [facility];  Service:    • BACK SURGERY      lumbar fusions x5--multiple times; 1995, 1997, 1998, 1999 and 2008   • BELOW KNEE AMPUTATION Left 2/25/2017    Procedure: EXTENDED LEFT TOE AMPUTATION;  Surgeon: Arsaaln Feliciano MD;  Location:  [facility];  Service:    • CARDIAC CATHETERIZATION N/A 11/26/2016    Procedure: Left Heart Cath;  Surgeon: Ash Nuñez MD;  Location: [facility];  Service:    • CARDIAC CATHETERIZATION N/A 2/20/2018    Procedure: Left Heart Cath;  Surgeon: Lane Zamorano MD;  Location: [facility];  Service:    • CARDIAC CATHETERIZATION N/A 2/20/2018    Procedure: Right Heart Cath;  Surgeon: Lane Zamorano MD;  Location: [facility];  Service:    • HAND SURGERY Bilateral      x3   • HYSTERECTOMY      status post uterine and cervical cancer   • LUMBAR SPINE SURGERY      arthrodesis by anterior approach addit interspace   • TONSILLECTOMY         Allergies:  Augmentin [amoxicillin-pot clavulanate]; Cortisone; Oxycontin [oxycodone]; Doxycycline; Tolmetin; and Vancomycin    Social History:  Social History     Socioeconomic History   • Marital status:      Spouse name: Sukhdev   • Number of children: 1   • Years of education: High school   Occupational History   • 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 Langston is a 62 year old white  female. She lives with her spouse in [facility]. She states she does her own ADLs but appears disabled. They have one child and two grandchildren. She has a living will.    Caffeine: 2 serving per day. Pt lives at home with .       Current Medications:    Current Outpatient Medications:   •  acetaminophen (TYLENOL) 325 MG tablet, Take 2 tablets by mouth Every 4 (Four) Hours As Needed for mild pain (1-3) or fever (temperature greater than 101F)., Disp: 100 tablet, Rfl: 0  •  albuterol (PROVENTIL) (2.5 MG/3ML) 0.083% nebulizer solution, Take 2.5 mg by nebulization Every 6 (Six) Hours As Needed for Wheezing or Shortness of Air., Disp: 120 vial, Rfl: 5  •  aspirin 325 MG tablet, Take 325 mg by mouth Daily., Disp: , Rfl:   •  atorvastatin (LIPITOR) 40 MG tablet, TAKE ONE TABLET BY MOUTH EVERY NIGHT, Disp: 90 tablet, Rfl: 2  •  busPIRone (BUSPAR) 7.5 MG tablet, TAKE TWO TABLETS BY MOUTH TWICE A DAY, Disp: 360 tablet, Rfl: 0  •  clonazePAM (KlonoPIN) 0.5 MG tablet, TAKE ONE-HALF TABLET BY MOUTH TWICE A DAY  AS NEEDED FOR SEIZURES, Disp: 30 tablet, Rfl: 0  •  clopidogrel (PLAVIX) 75 MG tablet, TAKE ONE TABLET BY MOUTH DAILY, Disp: 90 tablet, Rfl: 0  •  ezetimibe (ZETIA) 10 MG tablet, TAKE ONE TABLET BY MOUTH DAILY, Disp: 90 tablet, Rfl: 2  •  ferrous sulfate 325 (65 FE) MG tablet, Take 1 tablet by mouth 2 (Two) Times a Day., Disp: 60 tablet, Rfl: 2  •  fexofenadine (ALLEGRA) 180 MG tablet, Take 180 mg by mouth Daily., Disp: , Rfl:   •  furosemide (LASIX) 40 MG tablet, TAKE ONE TABLET BY MOUTH TWICE A DAY, Disp: 180 tablet, Rfl: 0  •  levothyroxine (SYNTHROID, LEVOTHROID) 112 MCG tablet, TAKE ONE TABLET BY MOUTH DAILY, Disp: 90 tablet, Rfl: 0  •  levothyroxine (SYNTHROID, LEVOTHROID) 112 MCG tablet, TAKE ONE TABLET BY MOUTH DAILY, Disp: 90 tablet, Rfl: 0  •  lisinopril (PRINIVIL,ZESTRIL) 10 MG tablet, TAKE ONE TABLET BY MOUTH DAILY, Disp: 90 tablet, Rfl: 0  •  melatonin 5 MG sublingual tablet sublingual tablet, Place 1 tablet under the tongue At Night As Needed (insomnia). (Patient taking differently: Place 10 mg under the tongue At Night As Needed (insomnia).), Disp: 30 tablet, Rfl: 0  •  metoprolol tartrate (LOPRESSOR) 25 MG tablet, TAKE ONE TABLET BY MOUTH TWICE A DAY, Disp: 180 tablet, Rfl: 1  •  omeprazole (priLOSEC) 20 MG capsule, TAKE ONE CAPSULE BY MOUTH DAILY, Disp: 90 capsule, Rfl: 0  •  pregabalin (LYRICA) 100 MG capsule, Take 1 capsule by mouth Every 8 (Eight) Hours., Disp: 270 capsule, Rfl: 0  •  probiotic (CULTURELLE) capsule capsule, Take 1 capsule by mouth Daily., Disp: 60 capsule, Rfl: 0  •  promethazine (PHENERGAN) 25 MG tablet, Take 1 tablet by mouth Every 6 (Six) Hours As Needed for Nausea or Vomiting., Disp: 20 tablet, Rfl: 2  •  sertraline (ZOLOFT) 50 MG tablet, TAKE ONE TABLET BY MOUTH DAILY, Disp: 90 tablet, Rfl: 0  •  tiZANidine (ZANAFLEX) 4 MG tablet, TAKE ONE TABLET BY MOUTH TWICE A DAY AS NEEDED FOR MUSCLE SPASMS, Disp: 60 tablet, Rfl: 1  •  topiramate (TOPAMAX) 25 MG tablet, Take 1 tablet by  mouth 2 (Two) Times a Day., Disp: 180 tablet, Rfl: 3  •  fentaNYL (DURAGESIC) 75 MCG/HR patch, Place 1 patch on the skin as directed by provider Every Other Day., Disp: 15 patch, Rfl: 0  •  oxyCODONE-acetaminophen (PERCOCET) 7.5-325 MG per tablet, Take 1 tablet by mouth Every 6 (Six) Hours As Needed for Moderate Pain ., Disp: 120 tablet, Rfl: 0    Current Facility-Administered Medications:   •  cyanocobalamin injection 1,000 mcg, 1,000 mcg, Intramuscular, Q28 Days, Harinder Aranda MD, 1,000 mcg at 04/23/19 1247    Review of Systems:  Review of Systems   Constitution: Positive for malaise/fatigue and weight gain.   HENT: Positive for congestion.    Eyes: Positive for vision loss in right eye.        S/p retinal vein occlusion/ some central vision loss   Cardiovascular: Positive for claudication, dyspnea on exertion, irregular heartbeat, leg swelling, orthopnea, palpitations and paroxysmal nocturnal dyspnea.   Respiratory: Positive for cough, shortness of breath, snoring, sputum production and wheezing.    Hematologic/Lymphatic: Bruises/bleeds easily.   Skin: Positive for color change, itching, poor wound healing and rash.   Musculoskeletal: Positive for falls, joint pain, muscle cramps and muscle weakness.   Gastrointestinal: Positive for bloating, abdominal pain, diarrhea, nausea and vomiting.   Genitourinary: Positive for frequency.   Neurological: Positive for excessive daytime sleepiness, dizziness, headaches, light-headedness and loss of balance.   Psychiatric/Behavioral: Positive for altered mental status, depression and memory loss. The patient has insomnia and is nervous/anxious.    Allergic/Immunologic: Positive for environmental allergies.        Physical Exam:  Physical Exam   Constitutional: She is oriented to person, place, and time. She appears well-developed. No distress.   Obese chronically ill middle age female accompanied by spouse.  Appears older than stated age   HENT:   Head: Normocephalic and  "atraumatic.   Eyes: Pupils are equal, round, and reactive to light. Conjunctivae are normal. No scleral icterus.   Neck: Normal range of motion. Neck supple. No JVD present. No thyromegaly present.   Cardiovascular: Normal rate, regular rhythm and intact distal pulses. Exam reveals no gallop and no friction rub.   Murmur heard.  Harsh III/VI systolic murmur which radiates to left carotid   Pulmonary/Chest: Effort normal. No respiratory distress. She has wheezes. She has no rales.   Bilateral expiratory wheezing.  No rales   Musculoskeletal: Normal range of motion. She exhibits edema and deformity. She exhibits no tenderness.   Grossly normal ROM.  Seated in hospital WC.  +1 edema bilateral lower extremities. S/p left transmetatarsal amputation.     Lymphadenopathy:     She has no cervical adenopathy.   Neurological: She is alert and oriented to person, place, and time. No cranial nerve deficit.   Skin: Skin is warm and dry.   Psychiatric: She has a normal mood and affect. Her behavior is normal. Judgment and thought content normal.   Somewhat anxious   Vitals reviewed.      Vitals:    12/26/19 0941 12/26/19 0948 12/26/19 0949   BP: 144/66 130/62 156/70   BP Location: Right arm Left arm Left arm   Patient Position: Sitting Sitting Standing   Cuff Size: Adult Adult Adult   Pulse: 61  67   Resp: 15     Temp: 98.2 °F (36.8 °C)     TempSrc: Temporal     SpO2: 93%  91%   Weight: 104 kg (229 lb)     Height: 167.6 cm (66\")         Diagnostic Data:  Transthoracic echo: 12/10/19    · Left ventricular systolic function is normal. Estimated EF = 60%.  · Left ventricular wall thickness is consistent with mild concentric hypertrophy.  · Right ventricular cavity is borderline dilated.  · There is severe calcification of the aortic valve.  · Severe aortic valve stenosis is present. The peak velocity is 4.4 m/s, the mean gradient is 41 mmHg.  · Mild tricuspid valve regurgitation is present.  · Estimated right ventricular systolic " pressure from tricuspid regurgitation is moderately elevated (45-55 mmHg).       Transesophageal echo: pending    Cardiac Cath: update exam to be scheduled    CTA Chest, Abdomen, and Pelvis: to be scheduled    Carotid Doppler: pending       Functional Assessment Data:    KCQ12 Questionnaire Score: (see scanned copy) 15/70      [place] Basic Activities of Daily Living (ADL) Scale    Bathing (sponge bath, tub bath, or shower).  Receives either no assistance,   or assistance with bathing only on body part.   Yes    Dressing Gets clothes and dresses without any assistance, except for  tying shoes.        Yes    Toileting Goes to toilet room, uses toilet, arranges clothes, and returns  without any assistance (may use cane or walker for support and may use  bedpan / urinal at night.      Yes    Transferring Moves into and out of bed and chair without assistance  (may use cane or walker)      No    Continence Controls bowel and bladder completely without occasional  accidents        No    Feeding Feeds self without assistance (except for help with cutting meat  or buttering bread)       Yes        Total (Number of Yesses of 6) 4/6      [place] Instrumental Activities of Daily Living Scale (IADL)    Ability to Use Telephone   · Operates telephone on own initiative.  Looks up and dials numbers, etc.         1  · Shopping  Needs to be accompanied on any shopping trip  0     · Food Preparation          0  · Housekeeping  · Performs light daily tasks such as dish washing, bed making          1  · Laundry  Launders small items.  Rinses stockings, etc  1     · Mode of Transportation  · Arranges own travel via family    0    Responsibility for Own Medications  · Is responsible for taking medications in correct dosages at correct time          1  · Ability to Handle Finances  · Manages day-to-day purchases, but needs help with banking,   major purchases      0      Total (Number of Instrumental Activities of 8) 4/8       [place]  Assessment Utilizing the Hydraulic Hand Dynamometer    Dominate Hand: Right    Trail 1: 0 kg    Trial 2: 0 kg    Trial 3: 0 kg    Mean  Strength in Dominate Hand: 0 kg (mean age/gender 15-30 kg)      Female Scores  Male Scores   Age Group Right Hand Left Hand  Age Group Right Hand Left Hand   18-19 23-42 19-37  18-19 31-67 21-63   20-24 21-43 18-37  20-24 40-70 31-64   25-29 23-44 20-38  25-29 38-72 38-62   30-34 21-50 18-44  30-34 38-72 34-66   35-39 26-42 21-39  35-39 36-72 35-67   40-44 22-42 18-39  40-44 37-69 37-65   45-49 17-39 16-35  45-49 33-67 29-63   50-54 21-39 18-34  50-54 38-65 33-59   55-59 17-35 13-30  55-59 26-66 20-55   60-64 17-33 13-28  60-64 25-56 20-50   65-69 15-30 12-25  65-69 26-57 20-50   70-74 14-31 11-26  70-74 18-50 16-43   75 11-28 11-24  75 14-45 12-38       Five Meter Walk Test    Utilized Walking Aid? Yes     Walk 1: 20.49 s/5m     Walk 2: 27.92 s/5m     Walk 3: 30.41 s/5m    Five Meter Walk Average: 19.44 s/5m    Gait Speed: Slow (Average > 6 s/5m)         STS risk of mortality with open AVR:  4.08%  http://riskcalc.sts.org/stswebriskcalc/calculate       Creatinine Clearance: 67.2 ml/min  [email or web address]://reference.[employer]wire/calculator/creatinine-clearance-cockcroft-gault    Assessment/ Plan:     ICD-10-CM ICD-9-CM   1. Severe symptomatic aortic valve stenosis I35.0 424.1   2. Coronary artery disease s/p NSTEMI and PCI to LAD/ RCA 2018 I25.10 414.01   3. Chronic systolic heart failure (CMS/HCC) with hx of Takotsubo cardiomyopathy I50.22 428.22   4. Peripheral vascular disease (CMS/HCC) I73.9 443.9   5. Pulmonary emphysema, unspecified emphysema type (CMS/[facility]) with ongoing cigarette use J43.9 492.8   6. Diabetic polyneuropathy associated with type 2 diabetes mellitus (CMS/[facility]) E11.42 250.60     357.2   7. Chronic pain, chronic narcotic use, poor mobility R09.89 785.9   8. S/p CVA involving retinal vein occlusion/vision loss in right eye H34.8312 362.36       TAVR education materials  reviewed with patient/ spouse today.  This included printed brochure detailing pathophysiology of aortic stenosis, patient specific aortic stenosis symptoms, and treatment options (medical therapy, surgical aortic valve replacement, and transcatheter aortic valve replacement).  A model of the valve and procedural animation were also used to explain TAVR.  We discussed patient's goals of care: keep going to take care of grandchildren and remain independent at home.    Our talk also included procedural details, procedure risks, anticipated pre-op and post-op expectations, as well as follow up visit schedule @ one month and one year.  Further discussion and final decision making will occur with Multi- Disciplinary Heart Team.  Orders placed for diagnostic pre-requisite testing today.  I will contact patient/spouse with scheduling details.      Soheila Shoemaker, APRN, 12/27/19, 8:47 AM

## 2019-12-26 NOTE — TELEPHONE ENCOUNTER
Patient called needing a refill on her Percocet and another medication that I could not find on her medication list (please call to confirm). She also states that the Augmentin that she was put on for being sick is causing her mouth and tongue to become raw. She quit taking this med a few days ago and is still having symptoms of congestion and coughing. She is wanting something else called in for this. She is going to a doctor appointment later on today so she might not be available, so she stated that we can call her 's cell at [phone].    [address]      Too complex to be taken care of over the phone, will need to be seen in local [facility] or see one of the NP

## 2019-12-26 NOTE — TELEPHONE ENCOUNTER
Patient called and needed  to know if it is ok to quit plavix a week before her CT on 1/10/20?    Called patient.  Advised to continue plavix during CT

## 2019-12-27 ENCOUNTER — TELEPHONE (OUTPATIENT)
Dept: INTERNAL MEDICINE | Facility: CLINIC | Age: 64
End: 2019-12-27

## 2019-12-27 DIAGNOSIS — F11.90 CHRONIC, CONTINUOUS USE OF OPIOIDS: Chronic | ICD-10-CM

## 2019-12-27 DIAGNOSIS — G89.4 CHRONIC PAIN SYNDROME: ICD-10-CM

## 2019-12-27 PROBLEM — R09.89 CAROTID BRUIT: Status: ACTIVE | Noted: 2019-12-27

## 2019-12-27 RX ORDER — OXYCODONE AND ACETAMINOPHEN 7.5; 325 MG/1; MG/1
1 TABLET ORAL EVERY 6 HOURS PRN
Qty: 120 TABLET | Refills: 0 | Status: SHIPPED | OUTPATIENT
Start: 2019-12-27 | End: 2020-01-27 | Stop reason: SDUPTHER

## 2019-12-27 NOTE — TELEPHONE ENCOUNTER
Patient called stating that it is okay to fill her Percocet with the [facility].

## 2019-12-30 ENCOUNTER — HOSPITAL ENCOUNTER (OUTPATIENT)
Dept: CARDIOLOGY | Facility: HOSPITAL | Age: 64
Discharge: HOME | End: 2019-12-30
Admitting: NURSE PRACTITIONER

## 2019-12-30 VITALS — BODY MASS INDEX: 36.8 KG/M2 | HEIGHT: 66 IN | WEIGHT: 229 LBS

## 2019-12-30 DIAGNOSIS — R09.89 CAROTID BRUIT, UNSPECIFIED LATERALITY: ICD-10-CM

## 2019-12-30 LAB
BH CV ECHO MEAS - BSA(HAYCOCK): 2.2 M^2
BH CV ECHO MEAS - BSA: 2.1 M^2
BH CV ECHO MEAS - BZI_BMI: 37 KILOGRAMS/M^2
BH CV ECHO MEAS - BZI_METRIC_HEIGHT: 167.6 CM
BH CV ECHO MEAS - BZI_METRIC_WEIGHT: 103.9 KG
BH CV XLRA MEAS LEFT CCA RATIO VEL: 72.3 CM/SEC
BH CV XLRA MEAS LEFT DIST CCA EDV: 18.1 CM/SEC
BH CV XLRA MEAS LEFT DIST CCA PSV: 73.1 CM/SEC
BH CV XLRA MEAS LEFT DIST ICA EDV: 35.4 CM/SEC
BH CV XLRA MEAS LEFT DIST ICA PSV: 108.4 CM/SEC
BH CV XLRA MEAS LEFT ICA RATIO VEL: 87.2 CM/SEC
BH CV XLRA MEAS LEFT ICA/CCA RATIO: 1.2
BH CV XLRA MEAS LEFT MID CCA EDV: 26.7 CM/SEC
BH CV XLRA MEAS LEFT MID CCA PSV: 78.7 CM/SEC
BH CV XLRA MEAS LEFT MID ICA EDV: 29.9 CM/SEC
BH CV XLRA MEAS LEFT MID ICA PSV: 88 CM/SEC
BH CV XLRA MEAS LEFT PROX CCA EDV: 22.5 CM/SEC
BH CV XLRA MEAS LEFT PROX CCA PSV: 87.8 CM/SEC
BH CV XLRA MEAS LEFT PROX ECA PSV: 88.8 CM/SEC
BH CV XLRA MEAS LEFT PROX ICA EDV: 23.6 CM/SEC
BH CV XLRA MEAS LEFT PROX ICA PSV: 84.9 CM/SEC
BH CV XLRA MEAS LEFT PROX SCLA PSV: 124.4 CM/SEC
BH CV XLRA MEAS LEFT VERTEBRAL A EDV: 18.9 CM/SEC
BH CV XLRA MEAS LEFT VERTEBRAL A PSV: 47.1 CM/SEC
BH CV XLRA MEAS RIGHT CCA RATIO VEL: 76.2 CM/SEC
BH CV XLRA MEAS RIGHT DIST CCA EDV: 22 CM/SEC
BH CV XLRA MEAS RIGHT DIST CCA PSV: 77 CM/SEC
BH CV XLRA MEAS RIGHT DIST ICA EDV: 36.9 CM/SEC
BH CV XLRA MEAS RIGHT DIST ICA PSV: 93.5 CM/SEC
BH CV XLRA MEAS RIGHT ICA RATIO VEL: 85.6 CM/SEC
BH CV XLRA MEAS RIGHT ICA/CCA RATIO: 1.1
BH CV XLRA MEAS RIGHT MID CCA EDV: 30.6 CM/SEC
BH CV XLRA MEAS RIGHT MID CCA PSV: 91.9 CM/SEC
BH CV XLRA MEAS RIGHT MID ICA EDV: 27.5 CM/SEC
BH CV XLRA MEAS RIGHT MID ICA PSV: 86.4 CM/SEC
BH CV XLRA MEAS RIGHT PROX CCA EDV: 17.3 CM/SEC
BH CV XLRA MEAS RIGHT PROX CCA PSV: 77 CM/SEC
BH CV XLRA MEAS RIGHT PROX ECA PSV: 86.9 CM/SEC
BH CV XLRA MEAS RIGHT PROX ICA EDV: 23.6 CM/SEC
BH CV XLRA MEAS RIGHT PROX ICA PSV: 80.1 CM/SEC
BH CV XLRA MEAS RIGHT PROX SCLA PSV: 123.1 CM/SEC
BH CV XLRA MEAS RIGHT VERTEBRAL A EDV: 15.7 CM/SEC
BH CV XLRA MEAS RIGHT VERTEBRAL A PSV: 62.1 CM/SEC

## 2019-12-30 PROCEDURE — 93880 EXTRACRANIAL BILAT STUDY: CPT

## 2019-12-30 PROCEDURE — 93880 EXTRACRANIAL BILAT STUDY: CPT | Performed by: INTERNAL MEDICINE

## 2019-12-30 RX ORDER — BACLOFEN 10 MG/1
TABLET ORAL
Qty: 180 TABLET | Refills: 0 | Status: ON HOLD | OUTPATIENT
Start: 2019-12-30 | End: 2020-01-10

## 2020-01-02 ENCOUNTER — TELEPHONE (OUTPATIENT)
Dept: CARDIOLOGY | Facility: CLINIC | Age: 65
End: 2020-01-02

## 2020-01-02 ENCOUNTER — DOCUMENTATION (OUTPATIENT)
Dept: CARDIOLOGY | Facility: CLINIC | Age: 65
End: 2020-01-02

## 2020-01-02 NOTE — TELEPHONE ENCOUNTER
----- Message from Lane Zamorano MD sent at 1/2/2020  2:45 PM EST -----  Please let the patient know there was no significant arrhythmia on her heart monitor.  Also let her know we will be following along with her TAVR work-up.  She will likely need a heart cath, which will likely be done by me down the road prior to a new valve

## 2020-01-07 ENCOUNTER — TELEPHONE (OUTPATIENT)
Dept: CARDIOLOGY | Facility: HOSPITAL | Age: 65
End: 2020-01-07

## 2020-01-07 NOTE — TELEPHONE ENCOUNTER
Called patient with dates, times, check in instructions/ locations and prep for cath/ [facility] on 1/10, Dr. Guaman 1/14, and [facility] on 1/16.  Patient read back to me the above information correctly.  Anticipated TAVR date provided studies are acceptable on 1/23/20    Soheila THAYER

## 2020-01-08 DIAGNOSIS — F41.8 MIXED ANXIETY DEPRESSIVE DISORDER: ICD-10-CM

## 2020-01-09 ENCOUNTER — APPOINTMENT (OUTPATIENT)
Dept: PREADMISSION TESTING | Facility: HOSPITAL | Age: 65
End: 2020-01-09

## 2020-01-10 ENCOUNTER — HOSPITAL ENCOUNTER (OUTPATIENT)
Facility: HOSPITAL | Age: 65
Discharge: HOME | End: 2020-01-11
Attending: INTERNAL MEDICINE | Admitting: INTERNAL MEDICINE

## 2020-01-10 ENCOUNTER — HOSPITAL ENCOUNTER (OUTPATIENT)
Dept: CARDIOLOGY | Facility: HOSPITAL | Age: 65
Discharge: HOME | End: 2020-01-10

## 2020-01-10 DIAGNOSIS — I35.0 NONRHEUMATIC AORTIC VALVE STENOSIS: ICD-10-CM

## 2020-01-10 DIAGNOSIS — I25.10 CORONARY ARTERY DISEASE INVOLVING NATIVE CORONARY ARTERY OF NATIVE HEART WITHOUT ANGINA PECTORIS: ICD-10-CM

## 2020-01-10 DIAGNOSIS — I50.22 CHRONIC SYSTOLIC HEART FAILURE (HCC): ICD-10-CM

## 2020-01-10 DIAGNOSIS — I73.9 PERIPHERAL VASCULAR DISEASE (HCC): ICD-10-CM

## 2020-01-10 LAB
ACT BLD: 208 SECONDS (ref 82–152)
ACT BLD: 263 SECONDS (ref 82–152)
ACT BLD: 307 SECONDS (ref 82–152)
ACT BLD: 422 SECONDS (ref 82–152)
ANION GAP SERPL CALCULATED.3IONS-SCNC: 9 MMOL/L (ref 5–15)
BH CV ECHO MEAS - AO MAX PG (FULL): 63.5 MMHG
BH CV ECHO MEAS - AO MAX PG: 71.4 MMHG
BH CV ECHO MEAS - AO MEAN PG (FULL): 26.8 MMHG
BH CV ECHO MEAS - AO MEAN PG: 30.7 MMHG
BH CV ECHO MEAS - AO V2 MAX: 422.6 CM/SEC
BH CV ECHO MEAS - AO V2 MEAN: 251.7 CM/SEC
BH CV ECHO MEAS - AO V2 VTI: 107.6 CM
BH CV ECHO MEAS - AVA(I,A): 0.88 CM^2
BH CV ECHO MEAS - AVA(I,D): 0.88 CM^2
BH CV ECHO MEAS - AVA(V,A): 0.99 CM^2
BH CV ECHO MEAS - AVA(V,D): 0.99 CM^2
BH CV ECHO MEAS - BSA(HAYCOCK): 2.2 M^2
BH CV ECHO MEAS - BSA: 2.1 M^2
BH CV ECHO MEAS - BZI_BMI: 38.1 KILOGRAMS/M^2
BH CV ECHO MEAS - BZI_METRIC_HEIGHT: 165.1 CM
BH CV ECHO MEAS - BZI_METRIC_WEIGHT: 103.9 KG
BH CV ECHO MEAS - LV MAX PG: 7.9 MMHG
BH CV ECHO MEAS - LV MEAN PG: 3.9 MMHG
BH CV ECHO MEAS - LV V1 MAX: 140.5 CM/SEC
BH CV ECHO MEAS - LV V1 MEAN: 88.4 CM/SEC
BH CV ECHO MEAS - LV V1 VTI: 31.8 CM
BH CV ECHO MEAS - LVOT AREA (M): 2.8 CM^2
BH CV ECHO MEAS - LVOT AREA: 3 CM^2
BH CV ECHO MEAS - LVOT DIAM: 1.9 CM
BH CV ECHO MEAS - SI(LVOT): 45.1 ML/M^2
BH CV ECHO MEAS - SV(LVOT): 94.5 ML
BUN BLD-MCNC: 17 MG/DL (ref 8–23)
BUN/CREAT SERPL: 15.6 (ref 7–25)
CALCIUM SPEC-SCNC: 9.5 MG/DL (ref 8.6–10.5)
CHLORIDE SERPL-SCNC: 104 MMOL/L (ref 98–107)
CO2 SERPL-SCNC: 31 MMOL/L (ref 22–29)
CREAT BLD-MCNC: 1.09 MG/DL (ref 0.57–1)
DEPRECATED RDW RBC AUTO: 79.6 FL (ref 37–54)
ERYTHROCYTE [DISTWIDTH] IN BLOOD BY AUTOMATED COUNT: 22.2 % (ref 12.3–15.4)
GFR SERPL CREATININE-BSD FRML MDRD: 50 ML/MIN/1.73
GLUCOSE BLD-MCNC: 123 MG/DL (ref 65–99)
GLUCOSE BLDC GLUCOMTR-MCNC: 149 MG/DL (ref 70–130)
HCT VFR BLD AUTO: 34.4 % (ref 34–46.6)
HGB BLD-MCNC: 10.2 G/DL (ref 12–15.9)
LV EF 2D ECHO EST: 60 %
MCH RBC QN AUTO: 29.5 PG (ref 26.6–33)
MCHC RBC AUTO-ENTMCNC: 29.7 G/DL (ref 31.5–35.7)
MCV RBC AUTO: 99.4 FL (ref 79–97)
PLATELET # BLD AUTO: 158 10*3/MM3 (ref 140–450)
PMV BLD AUTO: 11.6 FL (ref 6–12)
POTASSIUM BLD-SCNC: 3.8 MMOL/L (ref 3.5–5.2)
RBC # BLD AUTO: 3.46 10*6/MM3 (ref 3.77–5.28)
SODIUM BLD-SCNC: 144 MMOL/L (ref 136–145)
WBC NRBC COR # BLD: 9.81 10*3/MM3 (ref 3.4–10.8)

## 2020-01-10 PROCEDURE — 93454 CORONARY ARTERY ANGIO S&I: CPT | Performed by: INTERNAL MEDICINE

## 2020-01-10 PROCEDURE — 80048 BASIC METABOLIC PNL TOTAL CA: CPT | Performed by: INTERNAL MEDICINE

## 2020-01-10 PROCEDURE — 93325 DOPPLER ECHO COLOR FLOW MAPG: CPT

## 2020-01-10 PROCEDURE — 99152 MOD SED SAME PHYS/QHP 5/>YRS: CPT

## 2020-01-10 PROCEDURE — C1725 CATH, TRANSLUMIN NON-LASER: HCPCS | Performed by: INTERNAL MEDICINE

## 2020-01-10 PROCEDURE — 25010000002 MIDAZOLAM PER 1 MG: Performed by: INTERNAL MEDICINE

## 2020-01-10 PROCEDURE — 85027 COMPLETE CBC AUTOMATED: CPT | Performed by: INTERNAL MEDICINE

## 2020-01-10 PROCEDURE — 93325 DOPPLER ECHO COLOR FLOW MAPG: CPT | Performed by: INTERNAL MEDICINE

## 2020-01-10 PROCEDURE — C1769 GUIDE WIRE: HCPCS | Performed by: INTERNAL MEDICINE

## 2020-01-10 PROCEDURE — 92978 ENDOLUMINL IVUS OCT C 1ST: CPT | Performed by: INTERNAL MEDICINE

## 2020-01-10 PROCEDURE — 99152 MOD SED SAME PHYS/QHP 5/>YRS: CPT | Performed by: INTERNAL MEDICINE

## 2020-01-10 PROCEDURE — C1874 STENT, COATED/COV W/DEL SYS: HCPCS | Performed by: INTERNAL MEDICINE

## 2020-01-10 PROCEDURE — 25010000002 HEPARIN (PORCINE) PER 1000 UNITS: Performed by: INTERNAL MEDICINE

## 2020-01-10 PROCEDURE — C1894 INTRO/SHEATH, NON-LASER: HCPCS | Performed by: INTERNAL MEDICINE

## 2020-01-10 PROCEDURE — 93571 IV DOP VEL&/PRESS C FLO 1ST: CPT | Performed by: INTERNAL MEDICINE

## 2020-01-10 PROCEDURE — 85347 COAGULATION TIME ACTIVATED: CPT

## 2020-01-10 PROCEDURE — 93005 ELECTROCARDIOGRAM TRACING: CPT | Performed by: INTERNAL MEDICINE

## 2020-01-10 PROCEDURE — 25010000002 FENTANYL CITRATE (PF) 100 MCG/2ML SOLUTION: Performed by: INTERNAL MEDICINE

## 2020-01-10 PROCEDURE — 92929 PR PRQ TRLUML CORONARY STENT W/ANGIO ADDL ART/BRNCH: CPT | Performed by: INTERNAL MEDICINE

## 2020-01-10 PROCEDURE — 82962 GLUCOSE BLOOD TEST: CPT

## 2020-01-10 PROCEDURE — C1887 CATHETER, GUIDING: HCPCS | Performed by: INTERNAL MEDICINE

## 2020-01-10 PROCEDURE — C9600 PERC DRUG-EL COR STENT SING: HCPCS | Performed by: INTERNAL MEDICINE

## 2020-01-10 PROCEDURE — 93312 ECHO TRANSESOPHAGEAL: CPT | Performed by: INTERNAL MEDICINE

## 2020-01-10 PROCEDURE — 25010000002 ONDANSETRON PER 1 MG: Performed by: INTERNAL MEDICINE

## 2020-01-10 PROCEDURE — C1753 CATH, INTRAVAS ULTRASOUND: HCPCS | Performed by: INTERNAL MEDICINE

## 2020-01-10 PROCEDURE — 36415 COLL VENOUS BLD VENIPUNCTURE: CPT

## 2020-01-10 PROCEDURE — 92928 PRQ TCAT PLMT NTRAC ST 1 LES: CPT | Performed by: INTERNAL MEDICINE

## 2020-01-10 PROCEDURE — C9601 PERC DRUG-EL COR STENT BRAN: HCPCS | Performed by: INTERNAL MEDICINE

## 2020-01-10 PROCEDURE — 0 IOPAMIDOL PER 1 ML: Performed by: INTERNAL MEDICINE

## 2020-01-10 PROCEDURE — 93312 ECHO TRANSESOPHAGEAL: CPT

## 2020-01-10 PROCEDURE — 93010 ELECTROCARDIOGRAM REPORT: CPT | Performed by: INTERNAL MEDICINE

## 2020-01-10 PROCEDURE — 93321 DOPPLER ECHO F-UP/LMTD STD: CPT

## 2020-01-10 PROCEDURE — 99153 MOD SED SAME PHYS/QHP EA: CPT

## 2020-01-10 PROCEDURE — 93321 DOPPLER ECHO F-UP/LMTD STD: CPT | Performed by: INTERNAL MEDICINE

## 2020-01-10 DEVICE — XIENCE SIERRA™ EVEROLIMUS ELUTING CORONARY STENT SYSTEM 3.25 MM X 23 MM / RAPID-EXCHANGE
Type: IMPLANTABLE DEVICE | Status: FUNCTIONAL
Brand: XIENCE SIERRA™

## 2020-01-10 DEVICE — XIENCE SIERRA™ EVEROLIMUS ELUTING CORONARY STENT SYSTEM 4.00 MM X 33 MM / RAPID-EXCHANGE
Type: IMPLANTABLE DEVICE | Status: FUNCTIONAL
Brand: XIENCE SIERRA™

## 2020-01-10 RX ORDER — ACETAMINOPHEN 325 MG/1
650 TABLET ORAL EVERY 4 HOURS PRN
Status: DISCONTINUED | OUTPATIENT
Start: 2020-01-10 | End: 2020-01-11 | Stop reason: HOSPADM

## 2020-01-10 RX ORDER — CLOPIDOGREL BISULFATE 75 MG/1
TABLET ORAL AS NEEDED
Status: DISCONTINUED | OUTPATIENT
Start: 2020-01-10 | End: 2020-01-10 | Stop reason: HOSPADM

## 2020-01-10 RX ORDER — ATORVASTATIN CALCIUM 40 MG/1
40 TABLET, FILM COATED ORAL NIGHTLY
Status: DISCONTINUED | OUTPATIENT
Start: 2020-01-10 | End: 2020-01-11 | Stop reason: HOSPADM

## 2020-01-10 RX ORDER — LISINOPRIL 10 MG/1
10 TABLET ORAL DAILY
Status: DISCONTINUED | OUTPATIENT
Start: 2020-01-10 | End: 2020-01-11 | Stop reason: HOSPADM

## 2020-01-10 RX ORDER — FERROUS SULFATE 325(65) MG
325 TABLET ORAL 2 TIMES DAILY
Status: DISCONTINUED | OUTPATIENT
Start: 2020-01-10 | End: 2020-01-11 | Stop reason: HOSPADM

## 2020-01-10 RX ORDER — FENTANYL CITRATE 50 UG/ML
INJECTION, SOLUTION INTRAMUSCULAR; INTRAVENOUS AS NEEDED
Status: DISCONTINUED | OUTPATIENT
Start: 2020-01-10 | End: 2020-01-10 | Stop reason: HOSPADM

## 2020-01-10 RX ORDER — SODIUM CHLORIDE 9 MG/ML
3 INJECTION, SOLUTION INTRAVENOUS CONTINUOUS
Status: CANCELLED | OUTPATIENT
Start: 2020-01-10 | End: 2020-01-10

## 2020-01-10 RX ORDER — FENTANYL 75 UG/H
1 PATCH TRANSDERMAL
Status: DISCONTINUED | OUTPATIENT
Start: 2020-01-11 | End: 2020-01-11 | Stop reason: HOSPADM

## 2020-01-10 RX ORDER — TOPIRAMATE 25 MG/1
50 TABLET ORAL EVERY 12 HOURS SCHEDULED
Status: DISCONTINUED | OUTPATIENT
Start: 2020-01-10 | End: 2020-01-11 | Stop reason: HOSPADM

## 2020-01-10 RX ORDER — FUROSEMIDE 40 MG/1
40 TABLET ORAL
Status: DISCONTINUED | OUTPATIENT
Start: 2020-01-10 | End: 2020-01-11 | Stop reason: HOSPADM

## 2020-01-10 RX ORDER — TIZANIDINE 4 MG/1
4 TABLET ORAL 2 TIMES DAILY PRN
Status: DISCONTINUED | OUTPATIENT
Start: 2020-01-10 | End: 2020-01-11 | Stop reason: HOSPADM

## 2020-01-10 RX ORDER — SODIUM CHLORIDE 9 MG/ML
75 INJECTION, SOLUTION INTRAVENOUS CONTINUOUS
Status: ACTIVE | OUTPATIENT
Start: 2020-01-10 | End: 2020-01-10

## 2020-01-10 RX ORDER — MIDAZOLAM HYDROCHLORIDE 1 MG/ML
INJECTION INTRAMUSCULAR; INTRAVENOUS
Status: DISCONTINUED
Start: 2020-01-10 | End: 2020-01-11 | Stop reason: HOSPADM

## 2020-01-10 RX ORDER — FENTANYL CITRATE 50 UG/ML
INJECTION, SOLUTION INTRAMUSCULAR; INTRAVENOUS
Status: DISCONTINUED
Start: 2020-01-10 | End: 2020-01-11 | Stop reason: HOSPADM

## 2020-01-10 RX ORDER — NALOXONE HYDROCHLORIDE 0.4 MG/ML
INJECTION, SOLUTION INTRAMUSCULAR; INTRAVENOUS; SUBCUTANEOUS
Status: DISCONTINUED
Start: 2020-01-10 | End: 2020-01-10 | Stop reason: WASHOUT

## 2020-01-10 RX ORDER — BUSPIRONE HYDROCHLORIDE 15 MG/1
15 TABLET ORAL 2 TIMES DAILY
Status: DISCONTINUED | OUTPATIENT
Start: 2020-01-10 | End: 2020-01-11 | Stop reason: HOSPADM

## 2020-01-10 RX ORDER — L.ACID,PARA/B.BIFIDUM/S.THERM 8B CELL
1 CAPSULE ORAL DAILY
Status: DISCONTINUED | OUTPATIENT
Start: 2020-01-11 | End: 2020-01-11 | Stop reason: HOSPADM

## 2020-01-10 RX ORDER — HEPARIN SODIUM 1000 [USP'U]/ML
INJECTION, SOLUTION INTRAVENOUS; SUBCUTANEOUS AS NEEDED
Status: DISCONTINUED | OUTPATIENT
Start: 2020-01-10 | End: 2020-01-10 | Stop reason: HOSPADM

## 2020-01-10 RX ORDER — MIDAZOLAM HYDROCHLORIDE 1 MG/ML
INJECTION INTRAMUSCULAR; INTRAVENOUS AS NEEDED
Status: DISCONTINUED | OUTPATIENT
Start: 2020-01-10 | End: 2020-01-10 | Stop reason: HOSPADM

## 2020-01-10 RX ORDER — ONDANSETRON 2 MG/ML
INJECTION INTRAMUSCULAR; INTRAVENOUS AS NEEDED
Status: DISCONTINUED | OUTPATIENT
Start: 2020-01-10 | End: 2020-01-10 | Stop reason: HOSPADM

## 2020-01-10 RX ORDER — PROMETHAZINE HYDROCHLORIDE 25 MG/1
25 TABLET ORAL EVERY 6 HOURS PRN
Status: DISCONTINUED | OUTPATIENT
Start: 2020-01-10 | End: 2020-01-11 | Stop reason: HOSPADM

## 2020-01-10 RX ORDER — FENTANYL CITRATE 50 UG/ML
INJECTION, SOLUTION INTRAMUSCULAR; INTRAVENOUS
Status: DISCONTINUED | OUTPATIENT
Start: 2020-01-10 | End: 2020-01-11 | Stop reason: HOSPADM

## 2020-01-10 RX ORDER — CHOLECALCIFEROL (VITAMIN D3) 125 MCG
10 CAPSULE ORAL NIGHTLY PRN
Status: DISCONTINUED | OUTPATIENT
Start: 2020-01-10 | End: 2020-01-11 | Stop reason: HOSPADM

## 2020-01-10 RX ORDER — ASPIRIN 325 MG
325 TABLET ORAL DAILY
Status: DISCONTINUED | OUTPATIENT
Start: 2020-01-10 | End: 2020-01-11 | Stop reason: HOSPADM

## 2020-01-10 RX ORDER — MIDAZOLAM HYDROCHLORIDE 1 MG/ML
INJECTION INTRAMUSCULAR; INTRAVENOUS
Status: DISCONTINUED | OUTPATIENT
Start: 2020-01-10 | End: 2020-01-11 | Stop reason: HOSPADM

## 2020-01-10 RX ORDER — ALBUTEROL SULFATE 2.5 MG/3ML
2.5 SOLUTION RESPIRATORY (INHALATION) EVERY 6 HOURS PRN
Status: DISCONTINUED | OUTPATIENT
Start: 2020-01-10 | End: 2020-01-11 | Stop reason: HOSPADM

## 2020-01-10 RX ORDER — LIDOCAINE HYDROCHLORIDE 10 MG/ML
INJECTION, SOLUTION EPIDURAL; INFILTRATION; INTRACAUDAL; PERINEURAL AS NEEDED
Status: DISCONTINUED | OUTPATIENT
Start: 2020-01-10 | End: 2020-01-10 | Stop reason: HOSPADM

## 2020-01-10 RX ORDER — CLOPIDOGREL BISULFATE 75 MG/1
75 TABLET ORAL DAILY
Status: DISCONTINUED | OUTPATIENT
Start: 2020-01-10 | End: 2020-01-11 | Stop reason: HOSPADM

## 2020-01-10 RX ORDER — TOPIRAMATE 25 MG/1
25-50 TABLET ORAL 2 TIMES DAILY
COMMUNITY
End: 2020-02-04 | Stop reason: SDUPTHER

## 2020-01-10 RX ORDER — CETIRIZINE HYDROCHLORIDE 10 MG/1
10 TABLET ORAL DAILY
Status: DISCONTINUED | OUTPATIENT
Start: 2020-01-11 | End: 2020-01-11 | Stop reason: HOSPADM

## 2020-01-10 RX ORDER — FLUMAZENIL 0.1 MG/ML
INJECTION INTRAVENOUS
Status: DISCONTINUED
Start: 2020-01-10 | End: 2020-01-10 | Stop reason: WASHOUT

## 2020-01-10 RX ORDER — PREGABALIN 100 MG/1
100 CAPSULE ORAL EVERY 8 HOURS
Status: DISCONTINUED | OUTPATIENT
Start: 2020-01-10 | End: 2020-01-11 | Stop reason: HOSPADM

## 2020-01-10 RX ORDER — PANTOPRAZOLE SODIUM 40 MG/1
40 TABLET, DELAYED RELEASE ORAL EVERY MORNING
Status: DISCONTINUED | OUTPATIENT
Start: 2020-01-11 | End: 2020-01-11 | Stop reason: HOSPADM

## 2020-01-10 RX ORDER — LEVOTHYROXINE SODIUM 112 UG/1
112 TABLET ORAL
Status: DISCONTINUED | OUTPATIENT
Start: 2020-01-11 | End: 2020-01-11 | Stop reason: HOSPADM

## 2020-01-10 RX ADMIN — METHOHEXITAL SODIUM 20 MG: 500 INJECTION, POWDER, LYOPHILIZED, FOR SOLUTION INTRAMUSCULAR; INTRAVENOUS; RECTAL at 11:56

## 2020-01-10 RX ADMIN — MIDAZOLAM 2 MG: 1 INJECTION INTRAMUSCULAR; INTRAVENOUS at 11:38

## 2020-01-10 RX ADMIN — TIZANIDINE 4 MG: 4 TABLET ORAL at 21:34

## 2020-01-10 RX ADMIN — MIDAZOLAM 2 MG: 1 INJECTION INTRAMUSCULAR; INTRAVENOUS at 12:00

## 2020-01-10 RX ADMIN — MIDAZOLAM 2 MG: 1 INJECTION INTRAMUSCULAR; INTRAVENOUS at 11:43

## 2020-01-10 RX ADMIN — MIDAZOLAM 2 MG: 1 INJECTION INTRAMUSCULAR; INTRAVENOUS at 11:46

## 2020-01-10 RX ADMIN — METOPROLOL TARTRATE 25 MG: 25 TABLET ORAL at 20:01

## 2020-01-10 RX ADMIN — ATORVASTATIN CALCIUM 40 MG: 40 TABLET, FILM COATED ORAL at 20:02

## 2020-01-10 RX ADMIN — FENTANYL CITRATE 50 MCG: 50 INJECTION, SOLUTION INTRAMUSCULAR; INTRAVENOUS at 11:39

## 2020-01-10 RX ADMIN — METHOHEXITAL SODIUM 20 MG: 500 INJECTION, POWDER, LYOPHILIZED, FOR SOLUTION INTRAMUSCULAR; INTRAVENOUS; RECTAL at 12:01

## 2020-01-10 RX ADMIN — FENTANYL CITRATE 50 MCG: 50 INJECTION, SOLUTION INTRAMUSCULAR; INTRAVENOUS at 12:01

## 2020-01-10 RX ADMIN — MELATONIN TAB 5 MG 10 MG: 5 TAB at 21:34

## 2020-01-10 RX ADMIN — SODIUM CHLORIDE 75 ML/HR: 9 INJECTION, SOLUTION INTRAVENOUS at 18:26

## 2020-01-10 RX ADMIN — METHOHEXITAL SODIUM 20 MG: 500 INJECTION, POWDER, LYOPHILIZED, FOR SOLUTION INTRAMUSCULAR; INTRAVENOUS; RECTAL at 11:48

## 2020-01-10 RX ADMIN — BUSPIRONE HYDROCHLORIDE 15 MG: 15 TABLET ORAL at 20:02

## 2020-01-10 RX ADMIN — TOPIRAMATE 25 MG: 25 TABLET, FILM COATED ORAL at 20:01

## 2020-01-10 RX ADMIN — FENTANYL CITRATE 50 MCG: 50 INJECTION, SOLUTION INTRAMUSCULAR; INTRAVENOUS at 11:43

## 2020-01-10 RX ADMIN — METHOHEXITAL SODIUM 20 MG: 500 INJECTION, POWDER, LYOPHILIZED, FOR SOLUTION INTRAMUSCULAR; INTRAVENOUS; RECTAL at 12:07

## 2020-01-10 RX ADMIN — FENTANYL CITRATE 50 MCG: 50 INJECTION, SOLUTION INTRAMUSCULAR; INTRAVENOUS at 11:46

## 2020-01-10 NOTE — H&P
[facility] AT [address], [address]  [place], [place], [address]    [phone]  WWW.[facility]           History and Physical Note    Patient Care Team:  Patient Care Team:  Harinder Aranda MD as PCP - General  Harinder Aranda MD as PCP - Family Medicine  Arsalan Feliciano MD as Surgeon (Cardiothoracic Surgery)    Chief complaint: Severe aortic stenosis         HPI:    Tamara Langston is a 64 y.o. female.  The patient has a history of CAD s/p PCI, history of systolic heart failure/Takotsubo, hypertension, hyperlipidemia, diabetes, sleep apnea, arthritis, uterine and cervical cancer, fatty liver, hypothyroidism, polycythemia vera, fibromyalgia, and osteoporosis.    She presents today for cardiac catheterization and transesophageal echocardiogram prior to undergoing TAVR.  She reports recently she has been feeling fairly well.  She continues to have stable chronic dyspnea.  She denies any chest pain, palpitations, or significant lower extremity edema.    Review of Systems:  Positive for dyspnea  Negative for exertional chest pain, orthopnea, PND, lower extremity edema, palpitations, lightheadedness, syncope.    PFSH:  Patient Active Problem List   Diagnosis   • Atopic rhinitis   • Restrictive ventilatory defect   • COPD (chronic obstructive pulmonary disease) (CMS/HCC)   • Osteoporosis   • Obstructive sleep apnea syndrome   • Abnormal liver enzymes   • Cholelithiasis   • Chronic back pain   • Mixed anxiety depressive disorder   • Type 2 diabetes mellitus (CMS/HCC)   • Essential tremor   • Fibromyalgia   • Gastroesophageal reflux disease without esophagitis   • Essential hypertension   • Hypothyroidism   • Insomnia   • Osteoarthritis   • Psoriasis   • Branch retinal vein occlusion   • Cobalamin deficiency   • Obesity   • Vitamin D deficiency   • Fatty liver disease, nonalcoholic   • Sinus bradycardia   • Tobacco abuse   • Peripheral vascular disease (CMS/HCC)   •  Diabetic polyneuropathy associated with type 2 diabetes mellitus (CMS/HCC)   • Chronic pain   • Chronic, continuous use of opioids   • Chronic anxiety   • Chronically on benzodiazepine therapy   • Tubular adenoma   • Cellulitis of sacral region   • Acute on chronic respiratory failure with hypoxia (CMS/HCC)   • Coronary artery disease involving native coronary artery of native heart without angina pectoris   • Takotsubo cardiomyopathy   • Chronic systolic heart failure (CMS/HCC)   • Chronic respiratory failure with hypoxia (CMS/HCC)   • Weakness   • Nonrheumatic aortic valve stenosis   • Acquired absence of other left toe(s) (CMS/HCC)   • Polycythemia vera (CMS/HCC)   • Other cirrhosis of liver (CMS/HCC)   • Gastro-esophageal reflux disease without esophagitis   • Iron deficiency   • Carotid bruit       No current facility-administered medications on file prior to encounter.      Current Outpatient Medications on File Prior to Encounter   Medication Sig Dispense Refill   • acetaminophen (TYLENOL) 325 MG tablet Take 2 tablets by mouth Every 4 (Four) Hours As Needed for mild pain (1-3) or fever (temperature greater than 101F). 100 tablet 0   • albuterol (PROVENTIL) (2.5 MG/3ML) 0.083% nebulizer solution Take 2.5 mg by nebulization Every 6 (Six) Hours As Needed for Wheezing or Shortness of Air. 120 vial 5   • aspirin 325 MG tablet Take 325 mg by mouth Daily.     • atorvastatin (LIPITOR) 40 MG tablet TAKE ONE TABLET BY MOUTH EVERY NIGHT 90 tablet 2   • busPIRone (BUSPAR) 7.5 MG tablet TAKE TWO TABLETS BY MOUTH TWICE A  tablet 0   • clonazePAM (KlonoPIN) 0.5 MG tablet TAKE ONE-HALF TABLET BY MOUTH TWICE A DAY AS NEEDED FOR SEIZURES 30 tablet 0   • clopidogrel (PLAVIX) 75 MG tablet TAKE ONE TABLET BY MOUTH DAILY 90 tablet 0   • ezetimibe (ZETIA) 10 MG tablet TAKE ONE TABLET BY MOUTH DAILY 90 tablet 2   • ferrous sulfate 325 (65 FE) MG tablet Take 1 tablet by mouth 2 (Two) Times a Day. 60 tablet 2   • fexofenadine  (ALLEGRA) 180 MG tablet Take 180 mg by mouth Daily.     • furosemide (LASIX) 40 MG tablet TAKE ONE TABLET BY MOUTH TWICE A  tablet 0   • levothyroxine (SYNTHROID, LEVOTHROID) 112 MCG tablet TAKE ONE TABLET BY MOUTH DAILY 90 tablet 0   • lisinopril (PRINIVIL,ZESTRIL) 10 MG tablet TAKE ONE TABLET BY MOUTH DAILY 90 tablet 0   • melatonin 5 MG sublingual tablet sublingual tablet Place 1 tablet under the tongue At Night As Needed (insomnia). (Patient taking differently: Place 10 mg under the tongue At Night As Needed (insomnia).) 30 tablet 0   • metoprolol tartrate (LOPRESSOR) 25 MG tablet TAKE ONE TABLET BY MOUTH TWICE A  tablet 1   • omeprazole (priLOSEC) 20 MG capsule TAKE ONE CAPSULE BY MOUTH DAILY 90 capsule 0   • pregabalin (LYRICA) 100 MG capsule Take 1 capsule by mouth Every 8 (Eight) Hours. 270 capsule 0   • probiotic (CULTURELLE) capsule capsule Take 1 capsule by mouth Daily. 60 capsule 0   • promethazine (PHENERGAN) 25 MG tablet Take 1 tablet by mouth Every 6 (Six) Hours As Needed for Nausea or Vomiting. 20 tablet 2   • tiZANidine (ZANAFLEX) 4 MG tablet TAKE ONE TABLET BY MOUTH TWICE A DAY AS NEEDED FOR MUSCLE SPASMS 60 tablet 1   • topiramate (TOPAMAX) 25 MG tablet Take  by mouth 2 (Two) Times a Day. 50 mg q am; 25 mg q pm     • [DISCONTINUED] levothyroxine (SYNTHROID, LEVOTHROID) 112 MCG tablet TAKE ONE TABLET BY MOUTH DAILY 90 tablet 0   • [DISCONTINUED] topiramate (TOPAMAX) 25 MG tablet Take 1 tablet by mouth 2 (Two) Times a Day. 180 tablet 3     Allergies   Allergen Reactions   • Augmentin [Amoxicillin-Pot Clavulanate] Swelling     Mouth raw, tongue swelling   • Cortisone Other (See Comments)     Hotness all over body and hyper   • Oxycontin [Oxycodone] Other (See Comments)     psoriasis   • Doxycycline Rash   • Tolmetin Rash     Tolectin-rash and itching   • Vancomycin Rash       Social History     Socioeconomic History   • Marital status:      Spouse name: Not on file   • Number of  children: 1   • Years of education: Not on file   • Highest education level: Not on file   Occupational History   • Occupation: book keeper     Employer: DISABLED     Comment: back injury   Tobacco Use   • Smoking status: Current Every Day Smoker     Packs/day: 1.00     Years: 48.00     Pack years: 48.00     Types: Cigarettes   • Smokeless tobacco: Never Used   Substance and Sexual Activity   • Alcohol use: No   • Drug use: No   • Sexual activity: Defer   Social History Narrative    Mrs. Langston is a 62 year old white  female. She lives with her spouse in [facility]. She states she does her own ADLs but appears disabled. They have one child and two grandchildren. She has a living will.    Caffeine: 2 serving per day. Pt lives at home with .     Family History   Problem Relation Age of Onset   • Arthritis Mother    • Diabetes Mother    • Colon polyps Mother    • Diverticulitis Mother    • Heart failure Mother    • Arthritis Father    • Bleeding Disorder Father    • Diabetes Father    • Kidney disease Father    • Heart disease Father    • COPD Sister         currently smokes   • Arthritis Brother    • Diabetes Brother    • Alcohol abuse Brother    • Heart murmur Daughter    • Arthritis Other    • Diabetes Other             Objective:     Vital Sign Min/Max for last 24 hours  Temp  Min: 97.8 °F (36.6 °C)  Max: 97.8 °F (36.6 °C)   BP  Min: 140/69  Max: 147/67   Pulse  Min: 64  Max: 65   No data recorded   SpO2  Min: 92 %  Max: 92 %   No data recorded    No intake or output data in the 24 hours ending 01/10/20 0959        Vitals:    01/10/20 0852   BP: 147/67   Pulse: 64   Temp: 97.8 °F (36.6 °C)   SpO2: 92%       CONSTITUTIONAL: Well-nourished. In no acute distress.   LUNGS: Normal effort.  Wheezing noted bilaterally.  CARDIOVASCULAR: The heart has a regular rate and rhythm with a normal S1 and S2. There is no gallop, rub, or click appreciated.  СЕРГЕЙ LUSB with radiation to the carotids.  PERIPHERAL  VASCULAR: Radial pulses are 2+ bilaterally. There is trace lower extremity edema.   NEUROLOGICAL: Nonfocal.  PSYCHIATRIC: Alert, orientated x 3, appropriate affect     Labs:  Lab Results   Component Value Date    CKTOTAL 608 (H) 02/14/2018    TROPONINI 10.994 (C) 02/15/2018       Glucose   Date Value Ref Range Status   01/10/2020 123 (H) 65 - 99 mg/dL Final     BUN   Date Value Ref Range Status   01/10/2020 17 8 - 23 mg/dL Final     Creatinine   Date Value Ref Range Status   01/10/2020 1.09 (H) 0.57 - 1.00 mg/dL Final     Sodium   Date Value Ref Range Status   01/10/2020 144 136 - 145 mmol/L Final     Potassium   Date Value Ref Range Status   01/10/2020 3.8 3.5 - 5.2 mmol/L Final     Chloride   Date Value Ref Range Status   01/10/2020 104 98 - 107 mmol/L Final     CO2   Date Value Ref Range Status   01/10/2020 31.0 (H) 22.0 - 29.0 mmol/L Final     Calcium   Date Value Ref Range Status   01/10/2020 9.5 8.6 - 10.5 mg/dL Final     eGFR Non  Amer   Date Value Ref Range Status   01/10/2020 50 (L) >60 mL/min/1.73 Final     BUN/Creatinine Ratio   Date Value Ref Range Status   01/10/2020 15.6 7.0 - 25.0 Final     Anion Gap   Date Value Ref Range Status   01/10/2020 9.0 5.0 - 15.0 mmol/L Final       Lab Results   Component Value Date    CHOL 112 02/14/2018    CHOL 123 02/25/2017    CHOL 244 (H) 11/27/2016     Lab Results   Component Value Date    TRIG 220 (H) 12/05/2019    TRIG 245 (H) 10/10/2018    TRIG 141 02/14/2018     Lab Results   Component Value Date    HDL 31 (L) 12/05/2019    HDL 34 (L) 10/10/2018    HDL 28 (L) 02/14/2018     Lab Results   Component Value Date    LDL 44 12/05/2019     No components found for: LDLDIRECTC      WBC   Date Value Ref Range Status   01/10/2020 9.81 3.40 - 10.80 10*3/mm3 Final     RBC   Date Value Ref Range Status   01/10/2020 3.46 (L) 3.77 - 5.28 10*6/mm3 Final     Hemoglobin   Date Value Ref Range Status   01/10/2020 10.2 (L) 12.0 - 15.9 g/dL Final     Hematocrit   Date Value Ref  Range Status   01/10/2020 34.4 34.0 - 46.6 % Final     MCV   Date Value Ref Range Status   01/10/2020 99.4 (H) 79.0 - 97.0 fL Final     MCH   Date Value Ref Range Status   01/10/2020 29.5 26.6 - 33.0 pg Final     MCHC   Date Value Ref Range Status   01/10/2020 29.7 (L) 31.5 - 35.7 g/dL Final     RDW   Date Value Ref Range Status   01/10/2020 22.2 (H) 12.3 - 15.4 % Final     RDW-SD   Date Value Ref Range Status   01/10/2020 79.6 (H) 37.0 - 54.0 fl Final     MPV   Date Value Ref Range Status   01/10/2020 11.6 6.0 - 12.0 fL Final     Platelets   Date Value Ref Range Status   01/10/2020 158 140 - 450 10*3/mm3 Final         Diagnostic Data:    EKG 11/11/2019: Normal sinus rhythm with 1 AVB, left anterior fascicular block    Tele: Normal sinus rhythm    [facility] 2/2018  · There was severe coronary artery disease involving the mid LAD and first diagonal branch that is now status post intervention with a Tryton 3.5mm proximal, 3.0mm distal bifurcation stent extending from the LAD into the diagonal branch and a Synergy 3.5 x 38 mm drug-eluting stent extending from the proximal to mid LAD.  · Normal left ventricular ejection fraction but with hypokinesis of the apical segments  · Normal right heart filling pressures.  Normal pulmonate capillary wedge pressure  · Normal cardiac index    TTE 12/2019  · Left ventricular systolic function is normal. Estimated EF = 60%.  · Left ventricular wall thickness is consistent with mild concentric hypertrophy.  · Right ventricular cavity is borderline dilated.  · There is severe calcification of the aortic valve.  · Severe aortic valve stenosis is present. The peak velocity is 4.4 m/s, the mean gradient is 41 mmHg.  · Mild tricuspid valve regurgitation is present.  · Estimated right ventricular systolic pressure from tricuspid regurgitation is moderately elevated (45-55 mmHg).         Assessment and Plan:   ASSESSMENT:  -Severe aortic stenosis per TTE 12/2019.  Tentative TAVR scheduled  1/23/2020 if all preoperative testing appropriate.  -CAD s/p PCI, stable on current cardiac medications.  DAPT, statin, ACEi, beta-blocker.  -Hypertension, stable on current cardiac medications.  -Hyperlipidemia, statin therapy  -Obstructive sleep apnea  -Diabetes mellitus  -Hypothyroidism    PLAN:  -LHC +/- CBI today with Dr. Zamorano and EMMANUEL with Dr. Francois. The risks, benefits, and alternatives of the procedure have been reviewed and the patient wishes to proceed.   -Fluid protocol prior to cardiac catheterization.    Lillie Quiroz, JEOVANY  01/10/20 10:37 AM

## 2020-01-11 VITALS
WEIGHT: 229.5 LBS | RESPIRATION RATE: 16 BRPM | OXYGEN SATURATION: 94 % | SYSTOLIC BLOOD PRESSURE: 191 MMHG | HEIGHT: 65 IN | DIASTOLIC BLOOD PRESSURE: 81 MMHG | HEART RATE: 72 BPM | TEMPERATURE: 97.9 F | BODY MASS INDEX: 38.24 KG/M2

## 2020-01-11 PROCEDURE — 99213 OFFICE O/P EST LOW 20 MIN: CPT | Performed by: INTERNAL MEDICINE

## 2020-01-11 RX ADMIN — Medication 1 CAPSULE: at 09:08

## 2020-01-11 RX ADMIN — FUROSEMIDE 40 MG: 40 TABLET ORAL at 09:10

## 2020-01-11 RX ADMIN — FERROUS SULFATE TAB 325 MG (65 MG ELEMENTAL FE) 325 MG: 325 (65 FE) TAB at 09:08

## 2020-01-11 RX ADMIN — TOPIRAMATE 50 MG: 25 TABLET, FILM COATED ORAL at 10:14

## 2020-01-11 RX ADMIN — ACETAMINOPHEN 650 MG: 325 TABLET ORAL at 10:13

## 2020-01-11 RX ADMIN — CETIRIZINE HYDROCHLORIDE 10 MG: 10 TABLET, FILM COATED ORAL at 09:09

## 2020-01-11 RX ADMIN — METOPROLOL TARTRATE 25 MG: 25 TABLET ORAL at 09:08

## 2020-01-11 RX ADMIN — LISINOPRIL 10 MG: 10 TABLET ORAL at 09:09

## 2020-01-11 RX ADMIN — ASPIRIN 325 MG ORAL TABLET 325 MG: 325 PILL ORAL at 09:08

## 2020-01-11 RX ADMIN — PREGABALIN 100 MG: 100 CAPSULE ORAL at 00:06

## 2020-01-11 RX ADMIN — LEVOTHYROXINE SODIUM 112 MCG: 112 TABLET ORAL at 06:05

## 2020-01-11 RX ADMIN — CLOPIDOGREL BISULFATE 75 MG: 75 TABLET ORAL at 09:09

## 2020-01-11 RX ADMIN — PANTOPRAZOLE SODIUM 40 MG: 40 TABLET, DELAYED RELEASE ORAL at 06:05

## 2020-01-11 RX ADMIN — TIZANIDINE 4 MG: 4 TABLET ORAL at 11:07

## 2020-01-11 RX ADMIN — SERTRALINE HYDROCHLORIDE 50 MG: 50 TABLET ORAL at 09:09

## 2020-01-11 RX ADMIN — BUSPIRONE HYDROCHLORIDE 15 MG: 15 TABLET ORAL at 09:08

## 2020-01-11 NOTE — PLAN OF CARE
Problem: Patient Care Overview  Goal: Plan of Care Review  Outcome: Ongoing (interventions implemented as appropriate)  Flowsheets (Taken 1/11/2020 0656)  Progress: improving  Outcome Summary: VSS. Normal sinus to sinus martinez on the monitor. No complaints of pain. Patient was able to rest on/off tonight. L radial site remains clean, dry, and intact. Will continue to monitor.

## 2020-01-11 NOTE — PROGRESS NOTES
"[facility] at [facility]  IP Progress Note   LOS: 0 days   Patient Care Team:  Harinder Aranda MD as PCP - General  Harinder Aranda MD as PCP - Family Medicine  Arsalan Feliciano MD as Surgeon (Cardiothoracic Surgery)    Chief Complaint: Follow up for CAD/valvular Heart Disease    Subjective    Feels well, denies chest pain, ambulated in the room, on continuous oxygen, gets short of breath with exertion but states that this is at baseline.      Tele: Sinus Rythym    Vitals:  /88 (BP Location: Right arm, Patient Position: Lying)   Pulse 69   Temp 98.1 °F (36.7 °C) (Oral)   Resp 16   Ht 165.1 cm (65\")   Wt 104 kg (229 lb 8 oz)   LMP  (LMP Unknown)   SpO2 93%   BMI 38.19 kg/m²    Body mass index is 38.19 kg/m².    Intake/Output Summary (Last 24 hours) at 1/11/2020 0944  Last data filed at 1/11/2020 0536  Gross per 24 hour   Intake 763.16 ml   Output 1600 ml   Net -836.84 ml       Physical Exam:  General: No apparent distress.  Neck: no JVD.  Chest:No respiratory distress, breath sounds are slightly diminished at bases, scattered rhonchi.   Cardiovascular: Normal S1 and S2, 3/6 murmur.  Extremities: No significant edema.  Wrist stable.    Results Review:     I reviewed the patient's new clinical results.    Results from last 7 days   Lab Units 01/10/20  0914   WBC 10*3/mm3 9.81   HEMOGLOBIN g/dL 10.2*   HEMATOCRIT % 34.4   PLATELETS 10*3/mm3 158     Results from last 7 days   Lab Units 01/10/20  0914   SODIUM mmol/L 144   POTASSIUM mmol/L 3.8   CHLORIDE mmol/L 104   CO2 mmol/L 31.0*   BUN mg/dL 17   CREATININE mg/dL 1.09*   GLUCOSE mg/dL 123*   CALCIUM mg/dL 9.5     Estimated Creatinine Clearance: 60.8 mL/min (A) (by C-G formula based on SCr of 1.09 mg/dL (H)).  Lab Results   Component Value Date    HGBA1C 5.6 12/05/2019         Scheduled Meds:  aspirin 325 mg Oral Daily   atorvastatin 40 mg Oral Nightly   busPIRone 15 mg Oral BID   cetirizine 10 mg Oral Daily   clopidogrel 75 mg " Oral Daily   fentaNYL 1 patch Transdermal Q48H   fentaNYL citrate (PF)      ferrous sulfate 325 mg Oral BID   furosemide 40 mg Oral BID   lactobacillus acidophilus 1 capsule Oral Daily   levothyroxine 112 mcg Oral Q AM   lisinopril 10 mg Oral Daily   methohexital      metoprolol tartrate 25 mg Oral BID   midazolam      pantoprazole 40 mg Oral QAM   pregabalin 100 mg Oral Q8H   sertraline 50 mg Oral Daily   topiramate 50 mg Oral Q12H         ASSESSMENT:  Severe aortic stenosis per TTE 12/2019.     -Tentative TAVR scheduled 1/23/2020 if all preoperative testing appropriate.   -EMMANUEL 1-10-20: The valve appears trileaflet. Mild aortic valve regurgitation is present. Severe aortic valve   stenosis is present.     CAD s/p PCI, stable on current cardiac medications.     -DAPT, statin, ACEi, beta-blocker.   - S/P NAYANA x 2 to RCA 1-10-20      -Hypertension, currently elevated   -will optimize after TAVR    -Hyperlipidemia, statin therapy  -Obstructive sleep apnea  -Diabetes mellitus       PLAN:  1. Condition stable  2. tentative TAVR 1-23-20   3. DC to home, continue current medical management      Electronically signed by CINDI Tang, 01/11/20, 10:06 AM.    I have seen and examined the patient, case was discussed with the physician extender, reviewed the above note, necessary changes were made and I agree with the final note.   Qi Valdez MD, FACC, [facility]

## 2020-01-14 ENCOUNTER — OFFICE VISIT (OUTPATIENT)
Dept: CARDIAC SURGERY | Facility: CLINIC | Age: 65
End: 2020-01-14

## 2020-01-14 VITALS
TEMPERATURE: 99.3 F | BODY MASS INDEX: 36.8 KG/M2 | SYSTOLIC BLOOD PRESSURE: 128 MMHG | HEART RATE: 62 BPM | WEIGHT: 229 LBS | DIASTOLIC BLOOD PRESSURE: 64 MMHG | OXYGEN SATURATION: 95 % | HEIGHT: 66 IN

## 2020-01-14 DIAGNOSIS — I35.0 NONRHEUMATIC AORTIC VALVE STENOSIS: Primary | ICD-10-CM

## 2020-01-14 PROCEDURE — 99214 OFFICE O/P EST MOD 30 MIN: CPT | Performed by: THORACIC SURGERY (CARDIOTHORACIC VASCULAR SURGERY)

## 2020-01-14 RX ORDER — OMEPRAZOLE 20 MG/1
CAPSULE, DELAYED RELEASE ORAL
Qty: 90 CAPSULE | Refills: 3 | Status: SHIPPED | OUTPATIENT
Start: 2020-01-14 | End: 2021-01-14

## 2020-01-14 NOTE — H&P (VIEW-ONLY)
01/14/2020  Patient Information  Tamara Langston                                                                                          [address] [place] [address]   1955  'PCP/Referring Physician'  Harinder Aranda MD  [phone]  Soheila Shoemaker APRN  [phone]  Chief Complaint   Patient presents with   • Consult     New patient per Guillermina THAYER for TAVR eval.        History of Present Illness: 64-year-old  female with a history of hypertension, hyperlipidemia, diabetes mellitus, coronary artery disease, TIA, fibromyalgia, obesity, COPD and active tobacco abuse who presents with fatigue.  She notes worsening fatigue over the past several years.  The patient sleeps approximately 16 hours a day due to her severe fatigue.  She has shortness of breath at rest and is on 2 L of oxygen with her COPD.  The patient has occasional chest pain approximately 2 times per month that she describes as a sharp midsternal pain that radiates to the right side.  This is been present over the past year.  She denies any syncopal events.  Mrs. Langston is present in a wheelchair, but can walk short distances with a walker or cane.      Patient Active Problem List   Diagnosis   • Atopic rhinitis   • Restrictive ventilatory defect   • COPD (chronic obstructive pulmonary disease) (CMS/HCC)   • Osteoporosis   • Obstructive sleep apnea syndrome   • Abnormal liver enzymes   • Cholelithiasis   • Chronic back pain   • Mixed anxiety depressive disorder   • Type 2 diabetes mellitus (CMS/HCC)   • Essential tremor   • Fibromyalgia   • Gastroesophageal reflux disease without esophagitis   • Essential hypertension   • Hypothyroidism   • Insomnia   • Osteoarthritis   • Psoriasis   • Branch retinal vein occlusion   • Cobalamin deficiency   • Obesity   • Vitamin D deficiency   • Fatty liver disease, nonalcoholic   • Sinus bradycardia   • Tobacco abuse   • Peripheral vascular disease (CMS/HCC)   • Diabetic  polyneuropathy associated with type 2 diabetes mellitus (CMS/HCC)   • Chronic pain   • Chronic, continuous use of opioids   • Chronic anxiety   • Chronically on benzodiazepine therapy   • Tubular adenoma   • Cellulitis of sacral region   • Acute on chronic respiratory failure with hypoxia (CMS/HCC)   • Coronary artery disease involving native coronary artery of native heart without angina pectoris   • Takotsubo cardiomyopathy   • Chronic systolic heart failure (CMS/HCC)   • Chronic respiratory failure with hypoxia (CMS/HCC)   • Weakness   • Nonrheumatic aortic valve stenosis   • Acquired absence of other left toe(s) (CMS/HCC)   • Polycythemia vera (CMS/HCC)   • Other cirrhosis of liver (CMS/HCC)   • Gastro-esophageal reflux disease without esophagitis   • Iron deficiency   • Carotid bruit     Past Medical History:   Diagnosis Date   • Acute on chronic respiratory failure with hypoxia (CMS/HCC) 2/13/2018   • ELIF (acute kidney injury) (CMS/HCC) 2/13/2018   • ELIF (acute kidney injury) (CMS/HCC) 2/13/2018   • Altered mental status 2/13/2018   • Bronchitis    • Cellulitis of sacral region 2/13/2018   • Cervical cancer (CMS/HCC)    • Cholelithiasis 5/11/2016   • Chronic anxiety 2/27/2017   • Chronic bronchitis (CMS/HCC)    • Chronic respiratory failure with hypoxia (CMS/HCC) 3/8/2018   • Chronic systolic heart failure (CMS/HCC) 3/8/2018   • Chronic systolic heart failure (CMS/HCC)    • Chronically on benzodiazepine therapy 2/27/2017   • Coronary artery disease    • Degenerative arthritis    • Diabetes mellitus (CMS/HCC)    • Dyslipidemia 5/11/2016   • Dyspnea    • Elevated troponin level 2/13/2018   • Fatty liver disease, nonalcoholic 11/21/2016   • Fever 2/13/2018   • Fibromyalgia    • GERD (gastroesophageal reflux disease)    • H/O echocardiogram    • History of pneumonia    • Hypertension 5/11/2016    16. H/O echocardiogram (V15.89) (Z92.89)  · A.  Echocardiogram of 02/03/2015 reports an ejection fraction of 60-65%,  mild concentric     LVH, trace mitral regurgitation, mild tricuspid and pulmonic regurgitation and calculated     RVSP of 35 mmHg, the main pulmonary artery is also mildly dilated.   • Hypothyroidism 5/11/2016    Description: A.  On replacement therapy.   • Infected wound 3/8/2018   • Nausea    • Obesity    • DINA (obstructive sleep apnea)     intolerant of CPAP therapy   • Osteoporosis    • Polycythemia vera (CMS/[facility]) 5/11/2016   • Pulmonary emphysema (CMS/[facility])    • Restrictive ventilatory defect    • Rhinitis    • Sepsis (CMS/[facility]) 2/13/2018   • Uncontrolled diabetes mellitus (CMS/[facility]) 5/11/2016   • Urine abnormality 2/13/2018   • Uterine cancer (CMS/[facility])    • Vitamin D deficiency 8/1/2016   • Weakness 3/8/2018     Past Surgical History:   Procedure Laterality Date   • AMPUTATION DIGIT Left 11/23/2016    Procedure: AMPUTATION TRANS METATARSAL - ray amutation of the left great toe;  Surgeon: Arsalan Feliciano MD;  Location:  [facility] OR;  Service:    • AMPUTATION DIGIT Left 3/2/2017    Procedure: LEFT FOOT TRANSMETATARSAL AMPUTATION TOES 2,3,4,5;  Surgeon: Joey Guaman MD;  Location:  [facility] OR;  Service:    • BACK SURGERY      lumbar fusions x5--multiple times; 1995, 1997, 1998, 1999 and 2008   • BELOW KNEE AMPUTATION Left 2/25/2017    Procedure: EXTENDED LEFT TOE AMPUTATION;  Surgeon: Arsalan Feliciano MD;  Location:  [facility] OR;  Service:    • CARDIAC CATHETERIZATION N/A 11/26/2016    Procedure: Left Heart Cath;  Surgeon: Ash Nuñez MD;  Location:  [facility] CATH INVASIVE LOCATION;  Service:    • CARDIAC CATHETERIZATION N/A 2/20/2018    Procedure: Left Heart Cath;  Surgeon: Lane Zamorano MD;  Location: [facility] CATH INVASIVE LOCATION;  Service:    • CARDIAC CATHETERIZATION N/A 2/20/2018    Procedure: Right Heart Cath;  Surgeon: Lane Zamorano MD;  Location:  [facility] CATH INVASIVE LOCATION;  Service:    • CARDIAC CATHETERIZATION Left 1/10/2020    Procedure: Cardiac Catheterization/Vascular Study;  Surgeon: Lane Zamorano MD;   Location: [facility] INVASIVE LOCATION;  Service: Cardiology   • HAND SURGERY Bilateral     x3   • HYSTERECTOMY      status post uterine and cervical cancer   • LUMBAR SPINE SURGERY      arthrodesis by anterior approach addit interspace   • TONSILLECTOMY         Current Outpatient Medications:   •  acetaminophen (TYLENOL) 325 MG tablet, Take 2 tablets by mouth Every 4 (Four) Hours As Needed for mild pain (1-3) or fever (temperature greater than 101F)., Disp: 100 tablet, Rfl: 0  •  albuterol (PROVENTIL) (2.5 MG/3ML) 0.083% nebulizer solution, Take 2.5 mg by nebulization Every 6 (Six) Hours As Needed for Wheezing or Shortness of Air., Disp: 120 vial, Rfl: 5  •  aspirin 325 MG tablet, Take 325 mg by mouth Daily., Disp: , Rfl:   •  atorvastatin (LIPITOR) 40 MG tablet, TAKE ONE TABLET BY MOUTH EVERY NIGHT, Disp: 90 tablet, Rfl: 2  •  busPIRone (BUSPAR) 7.5 MG tablet, TAKE TWO TABLETS BY MOUTH TWICE A DAY, Disp: 360 tablet, Rfl: 0  •  clonazePAM (KlonoPIN) 0.5 MG tablet, TAKE ONE-HALF TABLET BY MOUTH TWICE A DAY AS NEEDED FOR SEIZURES, Disp: 30 tablet, Rfl: 0  •  clopidogrel (PLAVIX) 75 MG tablet, TAKE ONE TABLET BY MOUTH DAILY, Disp: 90 tablet, Rfl: 0  •  ezetimibe (ZETIA) 10 MG tablet, TAKE ONE TABLET BY MOUTH DAILY, Disp: 90 tablet, Rfl: 2  •  fentaNYL (DURAGESIC) 75 MCG/HR patch, Place 1 patch on the skin as directed by provider Every Other Day., Disp: 15 patch, Rfl: 0  •  ferrous sulfate 325 (65 FE) MG tablet, Take 1 tablet by mouth 2 (Two) Times a Day., Disp: 60 tablet, Rfl: 2  •  fexofenadine (ALLEGRA) 180 MG tablet, Take 180 mg by mouth Daily., Disp: , Rfl:   •  furosemide (LASIX) 40 MG tablet, TAKE ONE TABLET BY MOUTH TWICE A DAY, Disp: 180 tablet, Rfl: 0  •  levothyroxine (SYNTHROID, LEVOTHROID) 112 MCG tablet, TAKE ONE TABLET BY MOUTH DAILY, Disp: 90 tablet, Rfl: 0  •  lisinopril (PRINIVIL,ZESTRIL) 10 MG tablet, TAKE ONE TABLET BY MOUTH DAILY, Disp: 90 tablet, Rfl: 0  •  melatonin 5 MG sublingual tablet  sublingual tablet, Place 1 tablet under the tongue At Night As Needed (insomnia). (Patient taking differently: Place 10 mg under the tongue At Night As Needed (insomnia).), Disp: 30 tablet, Rfl: 0  •  metoprolol tartrate (LOPRESSOR) 25 MG tablet, TAKE ONE TABLET BY MOUTH TWICE A DAY, Disp: 180 tablet, Rfl: 1  •  omeprazole (priLOSEC) 20 MG capsule, TAKE ONE CAPSULE BY MOUTH DAILY, Disp: 90 capsule, Rfl: 0  •  oxyCODONE-acetaminophen (PERCOCET) 7.5-325 MG per tablet, Take 1 tablet by mouth Every 6 (Six) Hours As Needed for Moderate Pain ., Disp: 120 tablet, Rfl: 0  •  pregabalin (LYRICA) 100 MG capsule, Take 1 capsule by mouth Every 8 (Eight) Hours., Disp: 270 capsule, Rfl: 0  •  probiotic (CULTURELLE) capsule capsule, Take 1 capsule by mouth Daily., Disp: 60 capsule, Rfl: 0  •  promethazine (PHENERGAN) 25 MG tablet, Take 1 tablet by mouth Every 6 (Six) Hours As Needed for Nausea or Vomiting., Disp: 20 tablet, Rfl: 2  •  sertraline (ZOLOFT) 50 MG tablet, TAKE ONE TABLET BY MOUTH DAILY, Disp: 90 tablet, Rfl: 0  •  tiZANidine (ZANAFLEX) 4 MG tablet, TAKE ONE TABLET BY MOUTH TWICE A DAY AS NEEDED FOR MUSCLE SPASMS, Disp: 60 tablet, Rfl: 1  •  topiramate (TOPAMAX) 25 MG tablet, Take  by mouth 2 (Two) Times a Day. 50 mg q am; 25 mg q pm, Disp: , Rfl:     Current Facility-Administered Medications:   •  cyanocobalamin injection 1,000 mcg, 1,000 mcg, Intramuscular, Q28 Days, Harinder Aranda MD, 1,000 mcg at 04/23/19 1247  Allergies   Allergen Reactions   • Augmentin [Amoxicillin-Pot Clavulanate] Swelling     Mouth raw, tongue swelling   • Cortisone Other (See Comments)     Hotness all over body and hyper   • Oxycontin [Oxycodone] Other (See Comments)     psoriasis   • Doxycycline Rash   • Tolmetin Rash     Tolectin-rash and itching   • Vancomycin Rash     Social History     Socioeconomic History   • Marital status:      Spouse name: Not on file   • Number of children: 1   • Years of education: Not on file   •  Highest education level: Not on file   Occupational History   • Occupation: book keeper     Employer: DISABLED     Comment: back injury   Tobacco Use   • Smoking status: Current Every Day Smoker     Packs/day: 1.00     Years: 48.00     Pack years: 48.00     Types: Cigarettes   • Smokeless tobacco: Never Used   Substance and Sexual Activity   • Alcohol use: No   • Drug use: No   • Sexual activity: Defer   Social History Narrative    Mrs. Langston is a 62 year old white  female. She lives with her spouse in [facility]. She states she does her own ADLs but appears disabled. They have one child and two grandchildren. She has a living will.    Caffeine: 2 serving per day. Pt lives at home with .     Family History   Problem Relation Age of Onset   • Arthritis Mother    • Diabetes Mother    • Colon polyps Mother    • Diverticulitis Mother    • Heart failure Mother    • Arthritis Father    • Bleeding Disorder Father    • Diabetes Father    • Kidney disease Father    • Heart disease Father    • COPD Sister         currently smokes   • Arthritis Brother    • Diabetes Brother    • Alcohol abuse Brother    • Heart murmur Daughter    • Arthritis Other    • Diabetes Other      Review of Systems   Constitution: Positive for chills, fever, malaise/fatigue and night sweats. Negative for weight loss.   HENT: Positive for congestion (sinus) and ear pain (left ear). Negative for hearing loss, nosebleeds and odynophagia.    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   Cardiovascular: Positive for leg swelling (bilateral leg swelling at the ankles) and palpitations. Negative for chest pain, claudication, dyspnea on exertion, orthopnea and syncope.   Respiratory: Positive for cough and shortness of breath. Negative for hemoptysis and wheezing.    Endocrine: Positive for cold intolerance and heat intolerance. Negative for polydipsia, polyphagia and polyuria.  "  Hematologic/Lymphatic: Positive for bleeding problem. Bruises/bleeds easily.   Skin: Negative for itching, poor wound healing and rash.   Musculoskeletal: Positive for back pain, joint pain and joint swelling. Negative for myalgias. Arthritis: osteo,    Gastrointestinal: Positive for bloating, abdominal pain, flatus and nausea. Negative for constipation, diarrhea, hematemesis, melena and vomiting.   Genitourinary: Negative for dysuria, frequency, hematuria, nocturia and urgency.   Neurological: Positive for loss of balance (b/c of amputation). Negative for dizziness, light-headedness and numbness.   Psychiatric/Behavioral: Positive for depression and memory loss. Negative for suicidal ideas. The patient is nervous/anxious.    Allergic/Immunologic: Positive for environmental allergies. Negative for HIV exposure.     Vitals:    01/14/20 1122   BP: 128/64   Pulse: 62   Temp: 99.3 °F (37.4 °C)   TempSrc: Temporal   SpO2: 95%   Weight: 104 kg (229 lb)   Height: 167.6 cm (66\")      Physical Exam   Constitutional: She is oriented to person, place, and time. She appears well-developed and well-nourished. No distress.   Obese  female who is present in a wheelchair with her .   HENT:   Head: Normocephalic and atraumatic.   Eyes: Conjunctivae are normal. No scleral icterus.   Neck: Normal range of motion. No JVD present. Carotid bruit is not present. No tracheal deviation present.   Cardiovascular: Normal rate and regular rhythm. Exam reveals no gallop and no friction rub.   Murmur heard.  III/VI systolic ejection murmur at the left sternal border.     Pulmonary/Chest: Effort normal and breath sounds normal. No stridor. No respiratory distress. She has no wheezes. She has no rales.   Abdominal: Soft. She exhibits no distension and no mass. There is no tenderness. There is no rebound and no guarding.   Musculoskeletal: Normal range of motion. She exhibits no edema.   Neurological: She is alert and oriented to " person, place, and time.   Skin: Skin is warm and dry. No rash noted. She is not diaphoretic. No erythema.   There are no open foot wounds present.   Psychiatric: She has a normal mood and affect. Her behavior is normal. Judgment and thought content normal.       Labs/Imaging:  -Cardiac catheterization performed 1/10/2020, personally reviewed, demonstrates patent bifurcating LAD stent, patent diagonal stent, 30% mid circumflex stenosis, 70% mid RCA stenosis involving the acute marginal and 60% posterior lateral branch stenosis.  A bifurcating stent was placed into the acute marginal branch and RCA.  -Transesophageal echocardiogram performed 1/10/2020, personally reviewed, demonstrates EF 60%, mild aortic regurgitation, severe aortic stenosis with an aortic valve area of 0.88 cm², aortic peak velocity of 422.6 cm/s, max gradient of 71.4 mmHg and mean gradient of 30.7 mmHg.  Trace mitral regurgitation is present and mild tricuspid regurgitation is present.  Grade 2 plaque is present within the ascending aorta along with grade 3 plaque in the aortic arch and descending aorta.  A trivial pericardial effusion is present.  -Carotid duplex performed 12/30/2019, demonstrates 0 to 49% bilateral internal carotid artery stenosis.  There is antegrade vertebral flow bilaterally.    Assessment/Plan:  64-year-old  female with a history of hypertension, hyperlipidemia, diabetes mellitus, coronary artery disease, TIA, fibromyalgia, obesity, COPD and active tobacco abuse who presents with fatigue, shortness of breath and chest pain.  She has severe symptomatic aortic valve stenosis.  The patient is not a candidate for surgical aortic valve replacement given her limited functional status and extensive list of comorbidities.  Her STS risk of mortality is calculated at 4.08%, placing her within the intermediate risk category.  We discussed possible transcatheter aortic valve replacement.  The risks and benefits of the procedure  were discussed with the patient including pain, bleeding, infection, renal failure, stroke, heart block requiring a pacemaker, myocardial infarction and death.  The patient understood these risks and wished to proceed with further work-up for possible TAVR.  She will need a CT TAVR protocol prior to proceeding with surgery.  This study will be performed and if acceptable, her TAVR will be arranged.      Patient Active Problem List   Diagnosis   • Atopic rhinitis   • Restrictive ventilatory defect   • COPD (chronic obstructive pulmonary disease) (CMS/HCC)   • Osteoporosis   • Obstructive sleep apnea syndrome   • Abnormal liver enzymes   • Cholelithiasis   • Chronic back pain   • Mixed anxiety depressive disorder   • Type 2 diabetes mellitus (CMS/HCC)   • Essential tremor   • Fibromyalgia   • Gastroesophageal reflux disease without esophagitis   • Essential hypertension   • Hypothyroidism   • Insomnia   • Osteoarthritis   • Psoriasis   • Branch retinal vein occlusion   • Cobalamin deficiency   • Obesity   • Vitamin D deficiency   • Fatty liver disease, nonalcoholic   • Sinus bradycardia   • Tobacco abuse   • Peripheral vascular disease (CMS/HCC)   • Diabetic polyneuropathy associated with type 2 diabetes mellitus (CMS/HCC)   • Chronic pain   • Chronic, continuous use of opioids   • Chronic anxiety   • Chronically on benzodiazepine therapy   • Tubular adenoma   • Cellulitis of sacral region   • Acute on chronic respiratory failure with hypoxia (CMS/HCC)   • Coronary artery disease involving native coronary artery of native heart without angina pectoris   • Takotsubo cardiomyopathy   • Chronic systolic heart failure (CMS/HCC)   • Chronic respiratory failure with hypoxia (CMS/HCC)   • Weakness   • Nonrheumatic aortic valve stenosis   • Acquired absence of other left toe(s) (CMS/HCC)   • Polycythemia vera (CMS/HCC)   • Other cirrhosis of liver (CMS/HCC)   • Gastro-esophageal reflux disease without esophagitis   • Iron  deficiency   • Carotid bruit

## 2020-01-14 NOTE — PROGRESS NOTES
01/14/2020  Patient Information  Tamara Langston                                                                                          [address] [facility] [address]   1955  'PCP/Referring Physician'  Harinder Aranda MD  [phone]  Soheila Shoemaker APRN  [phone]  Chief Complaint   Patient presents with   • Consult     New patient per Guillermina THAYER for TAVR eval.        History of Present Illness: 64-year-old  female with a history of hypertension, hyperlipidemia, diabetes mellitus, coronary artery disease, TIA, fibromyalgia, obesity, COPD and active tobacco abuse who presents with fatigue.  She notes worsening fatigue over the past several years.  The patient sleeps approximately 16 hours a day due to her severe fatigue.  She has shortness of breath at rest and is on 2 L of oxygen with her COPD.  The patient has occasional chest pain approximately 2 times per month that she describes as a sharp midsternal pain that radiates to the right side.  This is been present over the past year.  She denies any syncopal events.  Mrs. Langston is present in a wheelchair, but can walk short distances with a walker or cane.      Patient Active Problem List   Diagnosis   • Atopic rhinitis   • Restrictive ventilatory defect   • COPD (chronic obstructive pulmonary disease) (CMS/HCC)   • Osteoporosis   • Obstructive sleep apnea syndrome   • Abnormal liver enzymes   • Cholelithiasis   • Chronic back pain   • Mixed anxiety depressive disorder   • Type 2 diabetes mellitus (CMS/HCC)   • Essential tremor   • Fibromyalgia   • Gastroesophageal reflux disease without esophagitis   • Essential hypertension   • Hypothyroidism   • Insomnia   • Osteoarthritis   • Psoriasis   • Branch retinal vein occlusion   • Cobalamin deficiency   • Obesity   • Vitamin D deficiency   • Fatty liver disease, nonalcoholic   • Sinus bradycardia   • Tobacco abuse   • Peripheral vascular disease (CMS/HCC)   • Diabetic  polyneuropathy associated with type 2 diabetes mellitus (CMS/HCC)   • Chronic pain   • Chronic, continuous use of opioids   • Chronic anxiety   • Chronically on benzodiazepine therapy   • Tubular adenoma   • Cellulitis of sacral region   • Acute on chronic respiratory failure with hypoxia (CMS/HCC)   • Coronary artery disease involving native coronary artery of native heart without angina pectoris   • Takotsubo cardiomyopathy   • Chronic systolic heart failure (CMS/HCC)   • Chronic respiratory failure with hypoxia (CMS/HCC)   • Weakness   • Nonrheumatic aortic valve stenosis   • Acquired absence of other left toe(s) (CMS/HCC)   • Polycythemia vera (CMS/HCC)   • Other cirrhosis of liver (CMS/HCC)   • Gastro-esophageal reflux disease without esophagitis   • Iron deficiency   • Carotid bruit     Past Medical History:   Diagnosis Date   • Acute on chronic respiratory failure with hypoxia (CMS/HCC) 2/13/2018   • ELIF (acute kidney injury) (CMS/HCC) 2/13/2018   • ELIF (acute kidney injury) (CMS/HCC) 2/13/2018   • Altered mental status 2/13/2018   • Bronchitis    • Cellulitis of sacral region 2/13/2018   • Cervical cancer (CMS/HCC)    • Cholelithiasis 5/11/2016   • Chronic anxiety 2/27/2017   • Chronic bronchitis (CMS/HCC)    • Chronic respiratory failure with hypoxia (CMS/HCC) 3/8/2018   • Chronic systolic heart failure (CMS/HCC) 3/8/2018   • Chronic systolic heart failure (CMS/HCC)    • Chronically on benzodiazepine therapy 2/27/2017   • Coronary artery disease    • Degenerative arthritis    • Diabetes mellitus (CMS/HCC)    • Dyslipidemia 5/11/2016   • Dyspnea    • Elevated troponin level 2/13/2018   • Fatty liver disease, nonalcoholic 11/21/2016   • Fever 2/13/2018   • Fibromyalgia    • GERD (gastroesophageal reflux disease)    • H/O echocardiogram    • History of pneumonia    • Hypertension 5/11/2016    16. H/O echocardiogram (V15.89) (Z92.89)  · A.  Echocardiogram of 02/03/2015 reports an ejection fraction of 60-65%,  mild concentric     LVH, trace mitral regurgitation, mild tricuspid and pulmonic regurgitation and calculated     RVSP of 35 mmHg, the main pulmonary artery is also mildly dilated.   • Hypothyroidism 5/11/2016    Description: A.  On replacement therapy.   • Infected wound 3/8/2018   • Nausea    • Obesity    • DINA (obstructive sleep apnea)     intolerant of CPAP therapy   • Osteoporosis    • Polycythemia vera (CMS/[facility]) 5/11/2016   • Pulmonary emphysema (CMS/[facility])    • Restrictive ventilatory defect    • Rhinitis    • Sepsis (CMS/[facility]) 2/13/2018   • Uncontrolled diabetes mellitus (CMS/[facility]) 5/11/2016   • Urine abnormality 2/13/2018   • Uterine cancer (CMS/[facility])    • Vitamin D deficiency 8/1/2016   • Weakness 3/8/2018     Past Surgical History:   Procedure Laterality Date   • AMPUTATION DIGIT Left 11/23/2016    Procedure: AMPUTATION TRANS METATARSAL - ray amutation of the left great toe;  Surgeon: Arsalan Feliciano MD;  Location:  [facility] OR;  Service:    • AMPUTATION DIGIT Left 3/2/2017    Procedure: LEFT FOOT TRANSMETATARSAL AMPUTATION TOES 2,3,4,5;  Surgeon: Joey Guaman MD;  Location:  [facility] OR;  Service:    • BACK SURGERY      lumbar fusions x5--multiple times; 1995, 1997, 1998, 1999 and 2008   • BELOW KNEE AMPUTATION Left 2/25/2017    Procedure: EXTENDED LEFT TOE AMPUTATION;  Surgeon: Arsalan Feliciano MD;  Location:  [facility] OR;  Service:    • CARDIAC CATHETERIZATION N/A 11/26/2016    Procedure: Left Heart Cath;  Surgeon: Ash Nuñez MD;  Location:  [facility] CATH INVASIVE LOCATION;  Service:    • CARDIAC CATHETERIZATION N/A 2/20/2018    Procedure: Left Heart Cath;  Surgeon: Lane Zamorano MD;  Location: [facility] CATH INVASIVE LOCATION;  Service:    • CARDIAC CATHETERIZATION N/A 2/20/2018    Procedure: Right Heart Cath;  Surgeon: Lane Zamorano MD;  Location:  [facility] CATH INVASIVE LOCATION;  Service:    • CARDIAC CATHETERIZATION Left 1/10/2020    Procedure: Cardiac Catheterization/Vascular Study;  Surgeon: Lane Zamorano MD;   Location: [facility] INVASIVE LOCATION;  Service: Cardiology   • HAND SURGERY Bilateral     x3   • HYSTERECTOMY      status post uterine and cervical cancer   • LUMBAR SPINE SURGERY      arthrodesis by anterior approach addit interspace   • TONSILLECTOMY         Current Outpatient Medications:   •  acetaminophen (TYLENOL) 325 MG tablet, Take 2 tablets by mouth Every 4 (Four) Hours As Needed for mild pain (1-3) or fever (temperature greater than 101F)., Disp: 100 tablet, Rfl: 0  •  albuterol (PROVENTIL) (2.5 MG/3ML) 0.083% nebulizer solution, Take 2.5 mg by nebulization Every 6 (Six) Hours As Needed for Wheezing or Shortness of Air., Disp: 120 vial, Rfl: 5  •  aspirin 325 MG tablet, Take 325 mg by mouth Daily., Disp: , Rfl:   •  atorvastatin (LIPITOR) 40 MG tablet, TAKE ONE TABLET BY MOUTH EVERY NIGHT, Disp: 90 tablet, Rfl: 2  •  busPIRone (BUSPAR) 7.5 MG tablet, TAKE TWO TABLETS BY MOUTH TWICE A DAY, Disp: 360 tablet, Rfl: 0  •  clonazePAM (KlonoPIN) 0.5 MG tablet, TAKE ONE-HALF TABLET BY MOUTH TWICE A DAY AS NEEDED FOR SEIZURES, Disp: 30 tablet, Rfl: 0  •  clopidogrel (PLAVIX) 75 MG tablet, TAKE ONE TABLET BY MOUTH DAILY, Disp: 90 tablet, Rfl: 0  •  ezetimibe (ZETIA) 10 MG tablet, TAKE ONE TABLET BY MOUTH DAILY, Disp: 90 tablet, Rfl: 2  •  fentaNYL (DURAGESIC) 75 MCG/HR patch, Place 1 patch on the skin as directed by provider Every Other Day., Disp: 15 patch, Rfl: 0  •  ferrous sulfate 325 (65 FE) MG tablet, Take 1 tablet by mouth 2 (Two) Times a Day., Disp: 60 tablet, Rfl: 2  •  fexofenadine (ALLEGRA) 180 MG tablet, Take 180 mg by mouth Daily., Disp: , Rfl:   •  furosemide (LASIX) 40 MG tablet, TAKE ONE TABLET BY MOUTH TWICE A DAY, Disp: 180 tablet, Rfl: 0  •  levothyroxine (SYNTHROID, LEVOTHROID) 112 MCG tablet, TAKE ONE TABLET BY MOUTH DAILY, Disp: 90 tablet, Rfl: 0  •  lisinopril (PRINIVIL,ZESTRIL) 10 MG tablet, TAKE ONE TABLET BY MOUTH DAILY, Disp: 90 tablet, Rfl: 0  •  melatonin 5 MG sublingual tablet  sublingual tablet, Place 1 tablet under the tongue At Night As Needed (insomnia). (Patient taking differently: Place 10 mg under the tongue At Night As Needed (insomnia).), Disp: 30 tablet, Rfl: 0  •  metoprolol tartrate (LOPRESSOR) 25 MG tablet, TAKE ONE TABLET BY MOUTH TWICE A DAY, Disp: 180 tablet, Rfl: 1  •  omeprazole (priLOSEC) 20 MG capsule, TAKE ONE CAPSULE BY MOUTH DAILY, Disp: 90 capsule, Rfl: 0  •  oxyCODONE-acetaminophen (PERCOCET) 7.5-325 MG per tablet, Take 1 tablet by mouth Every 6 (Six) Hours As Needed for Moderate Pain ., Disp: 120 tablet, Rfl: 0  •  pregabalin (LYRICA) 100 MG capsule, Take 1 capsule by mouth Every 8 (Eight) Hours., Disp: 270 capsule, Rfl: 0  •  probiotic (CULTURELLE) capsule capsule, Take 1 capsule by mouth Daily., Disp: 60 capsule, Rfl: 0  •  promethazine (PHENERGAN) 25 MG tablet, Take 1 tablet by mouth Every 6 (Six) Hours As Needed for Nausea or Vomiting., Disp: 20 tablet, Rfl: 2  •  sertraline (ZOLOFT) 50 MG tablet, TAKE ONE TABLET BY MOUTH DAILY, Disp: 90 tablet, Rfl: 0  •  tiZANidine (ZANAFLEX) 4 MG tablet, TAKE ONE TABLET BY MOUTH TWICE A DAY AS NEEDED FOR MUSCLE SPASMS, Disp: 60 tablet, Rfl: 1  •  topiramate (TOPAMAX) 25 MG tablet, Take  by mouth 2 (Two) Times a Day. 50 mg q am; 25 mg q pm, Disp: , Rfl:     Current Facility-Administered Medications:   •  cyanocobalamin injection 1,000 mcg, 1,000 mcg, Intramuscular, Q28 Days, Harinder Aranda MD, 1,000 mcg at 04/23/19 1247  Allergies   Allergen Reactions   • Augmentin [Amoxicillin-Pot Clavulanate] Swelling     Mouth raw, tongue swelling   • Cortisone Other (See Comments)     Hotness all over body and hyper   • Oxycontin [Oxycodone] Other (See Comments)     psoriasis   • Doxycycline Rash   • Tolmetin Rash     Tolectin-rash and itching   • Vancomycin Rash     Social History     Socioeconomic History   • Marital status:      Spouse name: Not on file   • Number of children: 1   • Years of education: Not on file   •  Highest education level: Not on file   Occupational History   • Occupation: book keeper     Employer: DISABLED     Comment: back injury   Tobacco Use   • Smoking status: Current Every Day Smoker     Packs/day: 1.00     Years: 48.00     Pack years: 48.00     Types: Cigarettes   • Smokeless tobacco: Never Used   Substance and Sexual Activity   • Alcohol use: No   • Drug use: No   • Sexual activity: Defer   Social History Narrative    Mrs. aLngston is a 62 year old white  female. She lives with her spouse in [facility]. She states she does her own ADLs but appears disabled. They have one child and two grandchildren. She has a living will.    Caffeine: 2 serving per day. Pt lives at home with .     Family History   Problem Relation Age of Onset   • Arthritis Mother    • Diabetes Mother    • Colon polyps Mother    • Diverticulitis Mother    • Heart failure Mother    • Arthritis Father    • Bleeding Disorder Father    • Diabetes Father    • Kidney disease Father    • Heart disease Father    • COPD Sister         currently smokes   • Arthritis Brother    • Diabetes Brother    • Alcohol abuse Brother    • Heart murmur Daughter    • Arthritis Other    • Diabetes Other      Review of Systems   Constitution: Positive for chills, fever, malaise/fatigue and night sweats. Negative for weight loss.   HENT: Positive for congestion (sinus) and ear pain (left ear). Negative for hearing loss, nosebleeds and odynophagia.    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   Cardiovascular: Positive for leg swelling (bilateral leg swelling at the ankles) and palpitations. Negative for chest pain, claudication, dyspnea on exertion, orthopnea and syncope.   Respiratory: Positive for cough and shortness of breath. Negative for hemoptysis and wheezing.    Endocrine: Positive for cold intolerance and heat intolerance. Negative for polydipsia, polyphagia and polyuria.  "  Hematologic/Lymphatic: Positive for bleeding problem. Bruises/bleeds easily.   Skin: Negative for itching, poor wound healing and rash.   Musculoskeletal: Positive for back pain, joint pain and joint swelling. Negative for myalgias. Arthritis: osteo,    Gastrointestinal: Positive for bloating, abdominal pain, flatus and nausea. Negative for constipation, diarrhea, hematemesis, melena and vomiting.   Genitourinary: Negative for dysuria, frequency, hematuria, nocturia and urgency.   Neurological: Positive for loss of balance (b/c of amputation). Negative for dizziness, light-headedness and numbness.   Psychiatric/Behavioral: Positive for depression and memory loss. Negative for suicidal ideas. The patient is nervous/anxious.    Allergic/Immunologic: Positive for environmental allergies. Negative for HIV exposure.     Vitals:    01/14/20 1122   BP: 128/64   Pulse: 62   Temp: 99.3 °F (37.4 °C)   TempSrc: Temporal   SpO2: 95%   Weight: 104 kg (229 lb)   Height: 167.6 cm (66\")      Physical Exam   Constitutional: She is oriented to person, place, and time. She appears well-developed and well-nourished. No distress.   Obese  female who is present in a wheelchair with her .   HENT:   Head: Normocephalic and atraumatic.   Eyes: Conjunctivae are normal. No scleral icterus.   Neck: Normal range of motion. No JVD present. Carotid bruit is not present. No tracheal deviation present.   Cardiovascular: Normal rate and regular rhythm. Exam reveals no gallop and no friction rub.   Murmur heard.  III/VI systolic ejection murmur at the left sternal border.     Pulmonary/Chest: Effort normal and breath sounds normal. No stridor. No respiratory distress. She has no wheezes. She has no rales.   Abdominal: Soft. She exhibits no distension and no mass. There is no tenderness. There is no rebound and no guarding.   Musculoskeletal: Normal range of motion. She exhibits no edema.   Neurological: She is alert and oriented to " person, place, and time.   Skin: Skin is warm and dry. No rash noted. She is not diaphoretic. No erythema.   There are no open foot wounds present.   Psychiatric: She has a normal mood and affect. Her behavior is normal. Judgment and thought content normal.       Labs/Imaging:  -Cardiac catheterization performed 1/10/2020, personally reviewed, demonstrates patent bifurcating LAD stent, patent diagonal stent, 30% mid circumflex stenosis, 70% mid RCA stenosis involving the acute marginal and 60% posterior lateral branch stenosis.  A bifurcating stent was placed into the acute marginal branch and RCA.  -Transesophageal echocardiogram performed 1/10/2020, personally reviewed, demonstrates EF 60%, mild aortic regurgitation, severe aortic stenosis with an aortic valve area of 0.88 cm², aortic peak velocity of 422.6 cm/s, max gradient of 71.4 mmHg and mean gradient of 30.7 mmHg.  Trace mitral regurgitation is present and mild tricuspid regurgitation is present.  Grade 2 plaque is present within the ascending aorta along with grade 3 plaque in the aortic arch and descending aorta.  A trivial pericardial effusion is present.  -Carotid duplex performed 12/30/2019, demonstrates 0 to 49% bilateral internal carotid artery stenosis.  There is antegrade vertebral flow bilaterally.    Assessment/Plan:  64-year-old  female with a history of hypertension, hyperlipidemia, diabetes mellitus, coronary artery disease, TIA, fibromyalgia, obesity, COPD and active tobacco abuse who presents with fatigue, shortness of breath and chest pain.  She has severe symptomatic aortic valve stenosis.  The patient is not a candidate for surgical aortic valve replacement given her limited functional status and extensive list of comorbidities.  Her STS risk of mortality is calculated at 4.08%, placing her within the intermediate risk category.  We discussed possible transcatheter aortic valve replacement.  The risks and benefits of the procedure  were discussed with the patient including pain, bleeding, infection, renal failure, stroke, heart block requiring a pacemaker, myocardial infarction and death.  The patient understood these risks and wished to proceed with further work-up for possible TAVR.  She will need a CT TAVR protocol prior to proceeding with surgery.  This study will be performed and if acceptable, her TAVR will be arranged.      Patient Active Problem List   Diagnosis   • Atopic rhinitis   • Restrictive ventilatory defect   • COPD (chronic obstructive pulmonary disease) (CMS/HCC)   • Osteoporosis   • Obstructive sleep apnea syndrome   • Abnormal liver enzymes   • Cholelithiasis   • Chronic back pain   • Mixed anxiety depressive disorder   • Type 2 diabetes mellitus (CMS/HCC)   • Essential tremor   • Fibromyalgia   • Gastroesophageal reflux disease without esophagitis   • Essential hypertension   • Hypothyroidism   • Insomnia   • Osteoarthritis   • Psoriasis   • Branch retinal vein occlusion   • Cobalamin deficiency   • Obesity   • Vitamin D deficiency   • Fatty liver disease, nonalcoholic   • Sinus bradycardia   • Tobacco abuse   • Peripheral vascular disease (CMS/HCC)   • Diabetic polyneuropathy associated with type 2 diabetes mellitus (CMS/HCC)   • Chronic pain   • Chronic, continuous use of opioids   • Chronic anxiety   • Chronically on benzodiazepine therapy   • Tubular adenoma   • Cellulitis of sacral region   • Acute on chronic respiratory failure with hypoxia (CMS/HCC)   • Coronary artery disease involving native coronary artery of native heart without angina pectoris   • Takotsubo cardiomyopathy   • Chronic systolic heart failure (CMS/HCC)   • Chronic respiratory failure with hypoxia (CMS/HCC)   • Weakness   • Nonrheumatic aortic valve stenosis   • Acquired absence of other left toe(s) (CMS/HCC)   • Polycythemia vera (CMS/HCC)   • Other cirrhosis of liver (CMS/HCC)   • Gastro-esophageal reflux disease without esophagitis   • Iron  deficiency   • Carotid bruit

## 2020-01-15 ENCOUNTER — PREP FOR SURGERY (OUTPATIENT)
Dept: OTHER | Facility: HOSPITAL | Age: 65
End: 2020-01-15

## 2020-01-15 DIAGNOSIS — I35.0 NONRHEUMATIC AORTIC VALVE STENOSIS: Primary | ICD-10-CM

## 2020-01-16 ENCOUNTER — APPOINTMENT (OUTPATIENT)
Dept: CT IMAGING | Facility: HOSPITAL | Age: 65
End: 2020-01-16

## 2020-01-16 ENCOUNTER — HOSPITAL ENCOUNTER (OUTPATIENT)
Dept: CT IMAGING | Facility: HOSPITAL | Age: 65
Discharge: HOME | End: 2020-01-16
Admitting: NURSE PRACTITIONER

## 2020-01-16 VITALS
TEMPERATURE: 98 F | DIASTOLIC BLOOD PRESSURE: 77 MMHG | RESPIRATION RATE: 18 BRPM | WEIGHT: 229 LBS | HEART RATE: 63 BPM | BODY MASS INDEX: 36.8 KG/M2 | SYSTOLIC BLOOD PRESSURE: 184 MMHG | OXYGEN SATURATION: 91 % | HEIGHT: 66 IN

## 2020-01-16 DIAGNOSIS — I73.9 PERIPHERAL VASCULAR DISEASE (HCC): Chronic | ICD-10-CM

## 2020-01-16 DIAGNOSIS — I35.0 NONRHEUMATIC AORTIC VALVE STENOSIS: ICD-10-CM

## 2020-01-16 DIAGNOSIS — E11.42 DIABETIC POLYNEUROPATHY ASSOCIATED WITH TYPE 2 DIABETES MELLITUS (HCC): ICD-10-CM

## 2020-01-16 DIAGNOSIS — I50.22 CHRONIC SYSTOLIC HEART FAILURE (HCC): ICD-10-CM

## 2020-01-16 PROCEDURE — 71275 CT ANGIOGRAPHY CHEST: CPT

## 2020-01-16 PROCEDURE — 0 IOPAMIDOL PER 1 ML: Performed by: NURSE PRACTITIONER

## 2020-01-16 PROCEDURE — 74174 CTA ABD&PLVS W/CONTRAST: CPT

## 2020-01-16 RX ORDER — SODIUM CHLORIDE 0.9 % (FLUSH) 0.9 %
3 SYRINGE (ML) INJECTION EVERY 12 HOURS SCHEDULED
Status: DISCONTINUED | OUTPATIENT
Start: 2020-01-16 | End: 2020-01-17 | Stop reason: HOSPADM

## 2020-01-16 RX ORDER — NITROGLYCERIN 0.4 MG/1
0.4 TABLET SUBLINGUAL
Status: CANCELLED | OUTPATIENT
Start: 2020-01-16 | End: 2020-01-16

## 2020-01-16 RX ORDER — SODIUM CHLORIDE 0.9 % (FLUSH) 0.9 %
10 SYRINGE (ML) INJECTION AS NEEDED
Status: DISCONTINUED | OUTPATIENT
Start: 2020-01-16 | End: 2020-01-17 | Stop reason: HOSPADM

## 2020-01-16 RX ADMIN — IOPAMIDOL 85 ML: 755 INJECTION, SOLUTION INTRAVENOUS at 11:08

## 2020-01-16 NOTE — NURSING NOTE
Given printed and oral discharge instructions. Emphasized to drink at least 8 - 10 glasses of fluid per day. Verbalizes understanding. Discharged per wheelchair to front entrance with family driving.

## 2020-01-20 RX ORDER — PROMETHAZINE HYDROCHLORIDE 25 MG/1
TABLET ORAL
Qty: 20 TABLET | Refills: 1 | Status: SHIPPED | OUTPATIENT
Start: 2020-01-20 | End: 2020-09-02

## 2020-01-22 ENCOUNTER — HOSPITAL ENCOUNTER (OUTPATIENT)
Dept: GENERAL RADIOLOGY | Facility: HOSPITAL | Age: 65
Discharge: HOME | End: 2020-01-22
Admitting: PHYSICIAN ASSISTANT

## 2020-01-22 ENCOUNTER — PREP FOR SURGERY (OUTPATIENT)
Dept: OTHER | Facility: HOSPITAL | Age: 65
End: 2020-01-22

## 2020-01-22 ENCOUNTER — TELEPHONE (OUTPATIENT)
Dept: CARDIAC SURGERY | Facility: CLINIC | Age: 65
End: 2020-01-22

## 2020-01-22 ENCOUNTER — DOCUMENTATION (OUTPATIENT)
Dept: CARDIOLOGY | Facility: HOSPITAL | Age: 65
End: 2020-01-22

## 2020-01-22 ENCOUNTER — HOSPITAL ENCOUNTER (OUTPATIENT)
Dept: PULMONOLOGY | Facility: HOSPITAL | Age: 65
Discharge: HOME | End: 2020-01-22

## 2020-01-22 ENCOUNTER — APPOINTMENT (OUTPATIENT)
Dept: PREADMISSION TESTING | Facility: HOSPITAL | Age: 65
End: 2020-01-22

## 2020-01-22 VITALS — WEIGHT: 229 LBS | OXYGEN SATURATION: 90 % | HEIGHT: 66 IN | BODY MASS INDEX: 36.8 KG/M2

## 2020-01-22 DIAGNOSIS — I35.0 NONRHEUMATIC AORTIC VALVE STENOSIS: ICD-10-CM

## 2020-01-22 DIAGNOSIS — I35.9 AORTIC VALVE DISORDER: Primary | ICD-10-CM

## 2020-01-22 DIAGNOSIS — I35.9 AORTIC VALVE DISORDER: ICD-10-CM

## 2020-01-22 DIAGNOSIS — I50.22 CHRONIC SYSTOLIC HEART FAILURE (HCC): ICD-10-CM

## 2020-01-22 LAB
ABO GROUP BLD: NORMAL
ALBUMIN SERPL-MCNC: 3.4 G/DL (ref 3.5–5.2)
ALBUMIN/GLOB SERPL: 1.2 G/DL
ALP SERPL-CCNC: 66 U/L (ref 39–117)
ALT SERPL W P-5'-P-CCNC: 15 U/L (ref 1–33)
ANION GAP SERPL CALCULATED.3IONS-SCNC: 13 MMOL/L (ref 5–15)
APTT PPP: 31.2 SECONDS (ref 24–37)
AST SERPL-CCNC: 28 U/L (ref 1–32)
BASOPHILS # BLD AUTO: 0.06 10*3/MM3 (ref 0–0.2)
BASOPHILS NFR BLD AUTO: 0.7 % (ref 0–1.5)
BILIRUB SERPL-MCNC: <0.2 MG/DL (ref 0.2–1.2)
BLD GP AB SCN SERPL QL: NEGATIVE
BUN BLD-MCNC: 17 MG/DL (ref 8–23)
BUN/CREAT SERPL: 15.2 (ref 7–25)
CALCIUM SPEC-SCNC: 8.9 MG/DL (ref 8.6–10.5)
CHLORIDE SERPL-SCNC: 102 MMOL/L (ref 98–107)
CO2 SERPL-SCNC: 29 MMOL/L (ref 22–29)
CREAT BLD-MCNC: 1.12 MG/DL (ref 0.57–1)
DEPRECATED RDW RBC AUTO: 72.7 FL (ref 37–54)
EOSINOPHIL # BLD AUTO: 0.15 10*3/MM3 (ref 0–0.4)
EOSINOPHIL NFR BLD AUTO: 1.7 % (ref 0.3–6.2)
ERYTHROCYTE [DISTWIDTH] IN BLOOD BY AUTOMATED COUNT: 20.1 % (ref 12.3–15.4)
GFR SERPL CREATININE-BSD FRML MDRD: 49 ML/MIN/1.73
GLOBULIN UR ELPH-MCNC: 2.9 GM/DL
GLUCOSE BLD-MCNC: 207 MG/DL (ref 65–99)
HBA1C MFR BLD: 5.3 % (ref 4.8–5.6)
HCT VFR BLD AUTO: 35 % (ref 34–46.6)
HGB BLD-MCNC: 10.5 G/DL (ref 12–15.9)
IMM GRANULOCYTES # BLD AUTO: 0.01 10*3/MM3 (ref 0–0.05)
IMM GRANULOCYTES NFR BLD AUTO: 0.1 % (ref 0–0.5)
INR PPP: 1.06 (ref 0.85–1.16)
LYMPHOCYTES # BLD AUTO: 1.45 10*3/MM3 (ref 0.7–3.1)
LYMPHOCYTES NFR BLD AUTO: 16.2 % (ref 19.6–45.3)
MAGNESIUM SERPL-MCNC: 1.8 MG/DL (ref 1.6–2.4)
MCH RBC QN AUTO: 29.7 PG (ref 26.6–33)
MCHC RBC AUTO-ENTMCNC: 30 G/DL (ref 31.5–35.7)
MCV RBC AUTO: 98.9 FL (ref 79–97)
MONOCYTES # BLD AUTO: 0.6 10*3/MM3 (ref 0.1–0.9)
MONOCYTES NFR BLD AUTO: 6.7 % (ref 5–12)
NEUTROPHILS # BLD AUTO: 6.68 10*3/MM3 (ref 1.7–7)
NEUTROPHILS NFR BLD AUTO: 74.6 % (ref 42.7–76)
NRBC BLD AUTO-RTO: 0 /100 WBC (ref 0–0.2)
PA ADP PRP-ACNC: 274 PRU
PLATELET # BLD AUTO: 186 10*3/MM3 (ref 140–450)
PMV BLD AUTO: 11.6 FL (ref 6–12)
POTASSIUM BLD-SCNC: 4 MMOL/L (ref 3.5–5.2)
PROT SERPL-MCNC: 6.3 G/DL (ref 6–8.5)
PROTHROMBIN TIME: 13.2 SECONDS (ref 11.2–14.3)
RBC # BLD AUTO: 3.54 10*6/MM3 (ref 3.77–5.28)
RH BLD: NEGATIVE
SODIUM BLD-SCNC: 144 MMOL/L (ref 136–145)
T&S EXPIRATION DATE: NORMAL
WBC NRBC COR # BLD: 8.95 10*3/MM3 (ref 3.4–10.8)

## 2020-01-22 PROCEDURE — 71046 X-RAY EXAM CHEST 2 VIEWS: CPT

## 2020-01-22 PROCEDURE — 86850 RBC ANTIBODY SCREEN: CPT | Performed by: PHYSICIAN ASSISTANT

## 2020-01-22 PROCEDURE — 94010 BREATHING CAPACITY TEST: CPT

## 2020-01-22 PROCEDURE — 86923 COMPATIBILITY TEST ELECTRIC: CPT

## 2020-01-22 PROCEDURE — 83880 ASSAY OF NATRIURETIC PEPTIDE: CPT | Performed by: PHYSICIAN ASSISTANT

## 2020-01-22 PROCEDURE — 86900 BLOOD TYPING SEROLOGIC ABO: CPT | Performed by: PHYSICIAN ASSISTANT

## 2020-01-22 PROCEDURE — 85730 THROMBOPLASTIN TIME PARTIAL: CPT | Performed by: PHYSICIAN ASSISTANT

## 2020-01-22 PROCEDURE — 85025 COMPLETE CBC W/AUTO DIFF WBC: CPT | Performed by: PHYSICIAN ASSISTANT

## 2020-01-22 PROCEDURE — 36415 COLL VENOUS BLD VENIPUNCTURE: CPT

## 2020-01-22 PROCEDURE — 85610 PROTHROMBIN TIME: CPT | Performed by: PHYSICIAN ASSISTANT

## 2020-01-22 PROCEDURE — 80053 COMPREHEN METABOLIC PANEL: CPT | Performed by: PHYSICIAN ASSISTANT

## 2020-01-22 PROCEDURE — 86901 BLOOD TYPING SEROLOGIC RH(D): CPT | Performed by: PHYSICIAN ASSISTANT

## 2020-01-22 PROCEDURE — 83036 HEMOGLOBIN GLYCOSYLATED A1C: CPT | Performed by: PHYSICIAN ASSISTANT

## 2020-01-22 PROCEDURE — 85576 BLOOD PLATELET AGGREGATION: CPT | Performed by: PHYSICIAN ASSISTANT

## 2020-01-22 PROCEDURE — 94010 BREATHING CAPACITY TEST: CPT | Performed by: INTERNAL MEDICINE

## 2020-01-22 PROCEDURE — 83735 ASSAY OF MAGNESIUM: CPT | Performed by: PHYSICIAN ASSISTANT

## 2020-01-22 RX ORDER — CHLORHEXIDINE GLUCONATE 500 MG/1
1 CLOTH TOPICAL EVERY 12 HOURS PRN
Status: CANCELLED | OUTPATIENT
Start: 2020-01-22

## 2020-01-22 RX ORDER — NITROGLYCERIN 0.4 MG/1
0.4 TABLET SUBLINGUAL
Status: CANCELLED | OUTPATIENT
Start: 2020-01-22

## 2020-01-22 RX ORDER — CLONAZEPAM 0.5 MG/1
TABLET ORAL
Qty: 30 TABLET | Refills: 2 | Status: SHIPPED | OUTPATIENT
Start: 2020-01-22 | End: 2020-02-04

## 2020-01-22 RX ORDER — CHLORHEXIDINE GLUCONATE 0.12 MG/ML
15 RINSE ORAL ONCE
Status: CANCELLED | OUTPATIENT
Start: 2020-01-22 | End: 2020-01-22

## 2020-01-22 RX ORDER — CHLORHEXIDINE GLUCONATE 500 MG/1
1 CLOTH TOPICAL EVERY 12 HOURS PRN
Status: DISCONTINUED | OUTPATIENT
Start: 2020-01-22 | End: 2020-01-23

## 2020-01-22 RX ORDER — ACETAMINOPHEN 325 MG/1
650 TABLET ORAL EVERY 4 HOURS PRN
Status: CANCELLED | OUTPATIENT
Start: 2020-01-22

## 2020-01-22 RX ORDER — BUSPIRONE HYDROCHLORIDE 7.5 MG/1
TABLET ORAL
Qty: 360 TABLET | Refills: 0 | Status: SHIPPED | OUTPATIENT
Start: 2020-01-22 | End: 2020-04-23

## 2020-01-22 NOTE — PROGRESS NOTES
"Ms. Kian in PAT for pre-TAVR with questions regarding pre-procedure antibiotics. Patient has a documented allergy to Augmentin (amoxicillin) which is \"mouth raw\" and \"tongue swelling\" and an allergy to vancomycin which is rash. Spoke to nurse and patient in PAT to clarify allergies prior to ordering antibiotics. Patient has documentation of administration of cefdinir and ceftriaxone in April 2019 and she stated that she had not problem with these medications. Notified CT surgery staff that administration of cefuroxime shouldn't be a problem as she appears to have tolerated cephalosporins in the past.     Lauren Caceres, PharmD  "

## 2020-01-22 NOTE — PAT
Patient to apply Chlorhexadine wipes  to surgical area (as instructed) the night before procedure and the AM of procedure. Wipes provided.    BACTROBAN APPLIED TO EACH NOSTRIL DURING PAT VISIT     EKG on chart from 1/10/20    Notified Guzman Li at 1426 that pt did not stop Plavix.  Per Guzman, she will let Dr. Guaman know the pt didn't stop Plavix.  Pt told to stay on aspirin.      Pt unable to acquire urine specimen for UDS, due to limited mobility, but she did try.      Left message for wound/ostomy nurse re: pt states she will need an air bed  at home due to hx of pressure ulcers.

## 2020-01-23 ENCOUNTER — APPOINTMENT (OUTPATIENT)
Dept: GENERAL RADIOLOGY | Facility: HOSPITAL | Age: 65
End: 2020-01-23

## 2020-01-23 ENCOUNTER — ANESTHESIA EVENT (OUTPATIENT)
Dept: PERIOP | Facility: HOSPITAL | Age: 65
End: 2020-01-23

## 2020-01-23 ENCOUNTER — ANESTHESIA (OUTPATIENT)
Dept: PERIOP | Facility: HOSPITAL | Age: 65
End: 2020-01-23

## 2020-01-23 ENCOUNTER — ANCILLARY PROCEDURE (OUTPATIENT)
Dept: PERIOP | Facility: HOSPITAL | Age: 65
End: 2020-01-23

## 2020-01-23 ENCOUNTER — TELEPHONE (OUTPATIENT)
Dept: INTERNAL MEDICINE | Facility: CLINIC | Age: 65
End: 2020-01-23

## 2020-01-23 ENCOUNTER — HOSPITAL ENCOUNTER (INPATIENT)
Facility: HOSPITAL | Age: 65
LOS: 1 days | Discharge: HOME | End: 2020-01-24
Attending: THORACIC SURGERY (CARDIOTHORACIC VASCULAR SURGERY) | Admitting: THORACIC SURGERY (CARDIOTHORACIC VASCULAR SURGERY)

## 2020-01-23 DIAGNOSIS — I50.22 CHRONIC SYSTOLIC HEART FAILURE (HCC): ICD-10-CM

## 2020-01-23 DIAGNOSIS — I35.0 NONRHEUMATIC AORTIC VALVE STENOSIS: Primary | ICD-10-CM

## 2020-01-23 DIAGNOSIS — I35.0 NONRHEUMATIC AORTIC VALVE STENOSIS: ICD-10-CM

## 2020-01-23 DIAGNOSIS — I35.9 AORTIC VALVE DISORDER: ICD-10-CM

## 2020-01-23 LAB
ACT BLD: 125 SECONDS (ref 82–152)
ACT BLD: 125 SECONDS (ref 82–152)
ACT BLD: 147 SECONDS (ref 82–152)
ACT BLD: 373 SECONDS (ref 82–152)
ANION GAP SERPL CALCULATED.3IONS-SCNC: 11 MMOL/L (ref 5–15)
APTT PPP: 32.8 SECONDS (ref 24–37)
BASE EXCESS BLDA CALC-SCNC: -1 MMOL/L (ref -5–5)
BASE EXCESS BLDA CALC-SCNC: 4 MMOL/L (ref -5–5)
BASE EXCESS BLDA CALC-SCNC: 4 MMOL/L (ref -5–5)
BUN BLD-MCNC: 13 MG/DL (ref 8–23)
BUN/CREAT SERPL: 15.7 (ref 7–25)
CA-I BLDA-SCNC: 1.12 MMOL/L (ref 1.2–1.32)
CA-I BLDA-SCNC: 1.23 MMOL/L (ref 1.2–1.32)
CA-I BLDA-SCNC: 1.23 MMOL/L (ref 1.2–1.32)
CALCIUM SPEC-SCNC: 8.8 MG/DL (ref 8.6–10.5)
CHLORIDE SERPL-SCNC: 109 MMOL/L (ref 98–107)
CO2 BLDA-SCNC: 27 MMOL/L (ref 24–29)
CO2 BLDA-SCNC: 30 MMOL/L (ref 24–29)
CO2 BLDA-SCNC: 31 MMOL/L (ref 24–29)
CO2 SERPL-SCNC: 23 MMOL/L (ref 22–29)
CREAT BLD-MCNC: 0.83 MG/DL (ref 0.57–1)
DEPRECATED RDW RBC AUTO: 72.3 FL (ref 37–54)
ERYTHROCYTE [DISTWIDTH] IN BLOOD BY AUTOMATED COUNT: 20.1 % (ref 12.3–15.4)
GFR SERPL CREATININE-BSD FRML MDRD: 69 ML/MIN/1.73
GLUCOSE BLD-MCNC: 212 MG/DL (ref 65–99)
GLUCOSE BLDC GLUCOMTR-MCNC: 125 MG/DL (ref 70–130)
GLUCOSE BLDC GLUCOMTR-MCNC: 173 MG/DL (ref 70–130)
GLUCOSE BLDC GLUCOMTR-MCNC: 175 MG/DL (ref 70–130)
GLUCOSE BLDC GLUCOMTR-MCNC: 190 MG/DL (ref 70–130)
HCO3 BLDA-SCNC: 25.3 MMOL/L (ref 22–26)
HCO3 BLDA-SCNC: 28.9 MMOL/L (ref 22–26)
HCO3 BLDA-SCNC: 29.7 MMOL/L (ref 22–26)
HCT VFR BLD AUTO: 31.6 % (ref 34–46.6)
HCT VFR BLDA CALC: 27 % (ref 38–51)
HCT VFR BLDA CALC: 30 % (ref 38–51)
HCT VFR BLDA CALC: 33 % (ref 38–51)
HGB BLD-MCNC: 9.1 G/DL (ref 12–15.9)
HGB BLDA-MCNC: 10.2 G/DL (ref 12–17)
HGB BLDA-MCNC: 11.2 G/DL (ref 12–17)
HGB BLDA-MCNC: 9.2 G/DL (ref 12–17)
INR PPP: 1.18 (ref 0.85–1.16)
MAGNESIUM SERPL-MCNC: 1.5 MG/DL (ref 1.6–2.4)
MCH RBC QN AUTO: 28.4 PG (ref 26.6–33)
MCHC RBC AUTO-ENTMCNC: 28.8 G/DL (ref 31.5–35.7)
MCV RBC AUTO: 98.8 FL (ref 79–97)
NT-PROBNP SERPL-MCNC: 779.2 PG/ML (ref 5–900)
PCO2 BLDA: 45.3 MM HG (ref 35–45)
PCO2 BLDA: 50 MM HG (ref 35–45)
PCO2 BLDA: 55.6 MM HG (ref 35–45)
PH BLDA: 7.31 PH UNITS (ref 7.35–7.6)
PH BLDA: 7.34 PH UNITS (ref 7.35–7.6)
PH BLDA: 7.41 PH UNITS (ref 7.35–7.6)
PLATELET # BLD AUTO: 173 10*3/MM3 (ref 140–450)
PMV BLD AUTO: 11.6 FL (ref 6–12)
PO2 BLDA: 311 MMHG (ref 80–105)
PO2 BLDA: 82 MMHG (ref 80–105)
PO2 BLDA: 90 MMHG (ref 80–105)
POTASSIUM BLD-SCNC: 3.7 MMOL/L (ref 3.5–5.2)
POTASSIUM BLDA-SCNC: 3.4 MMOL/L (ref 3.5–4.9)
POTASSIUM BLDA-SCNC: 3.8 MMOL/L (ref 3.5–4.9)
POTASSIUM BLDA-SCNC: 4.1 MMOL/L (ref 3.5–4.9)
PROTHROMBIN TIME: 14.5 SECONDS (ref 11.2–14.3)
RBC # BLD AUTO: 3.2 10*6/MM3 (ref 3.77–5.28)
SAO2 % BLDA: 100 % (ref 95–98)
SAO2 % BLDA: 95 % (ref 95–98)
SAO2 % BLDA: 96 % (ref 95–98)
SODIUM BLD-SCNC: 143 MMOL/L (ref 136–145)
SODIUM BLDA-SCNC: 140 MMOL/L (ref 138–146)
SODIUM BLDA-SCNC: 140 MMOL/L (ref 138–146)
SODIUM BLDA-SCNC: 143 MMOL/L (ref 138–146)
WBC NRBC COR # BLD: 10.41 10*3/MM3 (ref 3.4–10.8)

## 2020-01-23 PROCEDURE — 63710000001 INSULIN LISPRO (HUMAN) PER 5 UNITS: Performed by: THORACIC SURGERY (CARDIOTHORACIC VASCULAR SURGERY)

## 2020-01-23 PROCEDURE — 25010000002 HEPARIN (PORCINE) PER 1000 UNITS: Performed by: THORACIC SURGERY (CARDIOTHORACIC VASCULAR SURGERY)

## 2020-01-23 PROCEDURE — C1769 GUIDE WIRE: HCPCS | Performed by: THORACIC SURGERY (CARDIOTHORACIC VASCULAR SURGERY)

## 2020-01-23 PROCEDURE — 33361 REPLACE AORTIC VALVE PERQ: CPT | Performed by: THORACIC SURGERY (CARDIOTHORACIC VASCULAR SURGERY)

## 2020-01-23 PROCEDURE — B41D1ZZ FLUOROSCOPY OF AORTA AND BILATERAL LOWER EXTREMITY ARTERIES USING LOW OSMOLAR CONTRAST: ICD-10-PCS | Performed by: THORACIC SURGERY (CARDIOTHORACIC VASCULAR SURGERY)

## 2020-01-23 PROCEDURE — 71045 X-RAY EXAM CHEST 1 VIEW: CPT

## 2020-01-23 PROCEDURE — 82947 ASSAY GLUCOSE BLOOD QUANT: CPT

## 2020-01-23 PROCEDURE — C1894 INTRO/SHEATH, NON-LASER: HCPCS | Performed by: THORACIC SURGERY (CARDIOTHORACIC VASCULAR SURGERY)

## 2020-01-23 PROCEDURE — 02RF38Z REPLACEMENT OF AORTIC VALVE WITH ZOOPLASTIC TISSUE, PERCUTANEOUS APPROACH: ICD-10-PCS | Performed by: THORACIC SURGERY (CARDIOTHORACIC VASCULAR SURGERY)

## 2020-01-23 PROCEDURE — 94799 UNLISTED PULMONARY SVC/PX: CPT

## 2020-01-23 PROCEDURE — 99232 SBSQ HOSP IP/OBS MODERATE 35: CPT | Performed by: INTERNAL MEDICINE

## 2020-01-23 PROCEDURE — 82330 ASSAY OF CALCIUM: CPT

## 2020-01-23 PROCEDURE — 85027 COMPLETE CBC AUTOMATED: CPT | Performed by: PHYSICIAN ASSISTANT

## 2020-01-23 PROCEDURE — 85610 PROTHROMBIN TIME: CPT | Performed by: PHYSICIAN ASSISTANT

## 2020-01-23 PROCEDURE — 93355 ECHO TRANSESOPHAGEAL (TEE): CPT

## 2020-01-23 PROCEDURE — 94660 CPAP INITIATION&MGMT: CPT

## 2020-01-23 PROCEDURE — 25010000002 HYDROMORPHONE PER 4 MG: Performed by: INTERNAL MEDICINE

## 2020-01-23 PROCEDURE — 25010000002 HEPARIN (PORCINE) PER 1000 UNITS: Performed by: NURSE ANESTHETIST, CERTIFIED REGISTERED

## 2020-01-23 PROCEDURE — C1751 CATH, INF, PER/CENT/MIDLINE: HCPCS | Performed by: THORACIC SURGERY (CARDIOTHORACIC VASCULAR SURGERY)

## 2020-01-23 PROCEDURE — 85347 COAGULATION TIME ACTIVATED: CPT

## 2020-01-23 PROCEDURE — 85014 HEMATOCRIT: CPT

## 2020-01-23 PROCEDURE — 25010000002 PROTAMINE SULFATE PER 10 MG: Performed by: NURSE ANESTHETIST, CERTIFIED REGISTERED

## 2020-01-23 PROCEDURE — 25010000002 DEXAMETHASONE PER 1 MG: Performed by: NURSE ANESTHETIST, CERTIFIED REGISTERED

## 2020-01-23 PROCEDURE — 25010000002 MORPHINE PER 10 MG: Performed by: PHYSICIAN ASSISTANT

## 2020-01-23 PROCEDURE — 25010000002 ONDANSETRON PER 1 MG: Performed by: NURSE ANESTHETIST, CERTIFIED REGISTERED

## 2020-01-23 PROCEDURE — 80048 BASIC METABOLIC PNL TOTAL CA: CPT | Performed by: PHYSICIAN ASSISTANT

## 2020-01-23 PROCEDURE — 84295 ASSAY OF SERUM SODIUM: CPT

## 2020-01-23 PROCEDURE — 84132 ASSAY OF SERUM POTASSIUM: CPT

## 2020-01-23 PROCEDURE — C1887 CATHETER, GUIDING: HCPCS | Performed by: THORACIC SURGERY (CARDIOTHORACIC VASCULAR SURGERY)

## 2020-01-23 PROCEDURE — 25010000002 PROPOFOL 10 MG/ML EMULSION: Performed by: NURSE ANESTHETIST, CERTIFIED REGISTERED

## 2020-01-23 PROCEDURE — C1760 CLOSURE DEV, VASC: HCPCS | Performed by: THORACIC SURGERY (CARDIOTHORACIC VASCULAR SURGERY)

## 2020-01-23 PROCEDURE — 85730 THROMBOPLASTIN TIME PARTIAL: CPT | Performed by: PHYSICIAN ASSISTANT

## 2020-01-23 PROCEDURE — 83735 ASSAY OF MAGNESIUM: CPT | Performed by: PHYSICIAN ASSISTANT

## 2020-01-23 PROCEDURE — 82803 BLOOD GASES ANY COMBINATION: CPT

## 2020-01-23 PROCEDURE — 93355 ECHO TRANSESOPHAGEAL (TEE): CPT | Performed by: INTERNAL MEDICINE

## 2020-01-23 PROCEDURE — 93005 ELECTROCARDIOGRAM TRACING: CPT | Performed by: PHYSICIAN ASSISTANT

## 2020-01-23 PROCEDURE — 94640 AIRWAY INHALATION TREATMENT: CPT

## 2020-01-23 DEVICE — VLV HEART TRNSCATH SAPIEN3 23MM: Type: IMPLANTABLE DEVICE | Site: HEART | Status: FUNCTIONAL

## 2020-01-23 RX ORDER — CLOPIDOGREL BISULFATE 75 MG/1
75 TABLET ORAL DAILY
Status: DISCONTINUED | OUTPATIENT
Start: 2020-01-24 | End: 2020-01-24 | Stop reason: HOSPADM

## 2020-01-23 RX ORDER — ATORVASTATIN CALCIUM 40 MG/1
40 TABLET, FILM COATED ORAL NIGHTLY
Status: DISCONTINUED | OUTPATIENT
Start: 2020-01-23 | End: 2020-01-24 | Stop reason: HOSPADM

## 2020-01-23 RX ORDER — CALCIUM CHLORIDE 100 MG/ML
INJECTION INTRAVENOUS; INTRAVENTRICULAR AS NEEDED
Status: DISCONTINUED | OUTPATIENT
Start: 2020-01-23 | End: 2020-01-23 | Stop reason: SURG

## 2020-01-23 RX ORDER — SODIUM CHLORIDE 0.9 % (FLUSH) 0.9 %
10 SYRINGE (ML) INJECTION EVERY 12 HOURS SCHEDULED
Status: CANCELLED | OUTPATIENT
Start: 2020-01-23

## 2020-01-23 RX ORDER — FAMOTIDINE 10 MG/ML
20 INJECTION, SOLUTION INTRAVENOUS ONCE
Status: CANCELLED | OUTPATIENT
Start: 2020-01-23 | End: 2020-01-23

## 2020-01-23 RX ORDER — CLINDAMYCIN PHOSPHATE 600 MG/50ML
INJECTION INTRAVENOUS AS NEEDED
Status: DISCONTINUED | OUTPATIENT
Start: 2020-01-23 | End: 2020-01-23 | Stop reason: SURG

## 2020-01-23 RX ORDER — DEXAMETHASONE SODIUM PHOSPHATE 4 MG/ML
INJECTION, SOLUTION INTRA-ARTICULAR; INTRALESIONAL; INTRAMUSCULAR; INTRAVENOUS; SOFT TISSUE AS NEEDED
Status: DISCONTINUED | OUTPATIENT
Start: 2020-01-23 | End: 2020-01-23 | Stop reason: SURG

## 2020-01-23 RX ORDER — SODIUM CHLORIDE 0.9 % (FLUSH) 0.9 %
10 SYRINGE (ML) INJECTION AS NEEDED
Status: CANCELLED | OUTPATIENT
Start: 2020-01-23

## 2020-01-23 RX ORDER — ONDANSETRON 2 MG/ML
INJECTION INTRAMUSCULAR; INTRAVENOUS AS NEEDED
Status: DISCONTINUED | OUTPATIENT
Start: 2020-01-23 | End: 2020-01-23 | Stop reason: SURG

## 2020-01-23 RX ORDER — NITROGLYCERIN 0.4 MG/1
0.4 TABLET SUBLINGUAL
Status: DISCONTINUED | OUTPATIENT
Start: 2020-01-23 | End: 2020-01-24 | Stop reason: HOSPADM

## 2020-01-23 RX ORDER — PROTAMINE SULFATE 10 MG/ML
INJECTION, SOLUTION INTRAVENOUS AS NEEDED
Status: DISCONTINUED | OUTPATIENT
Start: 2020-01-23 | End: 2020-01-23 | Stop reason: SURG

## 2020-01-23 RX ORDER — LIDOCAINE HYDROCHLORIDE 10 MG/ML
0.5 INJECTION, SOLUTION EPIDURAL; INFILTRATION; INTRACAUDAL; PERINEURAL ONCE AS NEEDED
Status: COMPLETED | OUTPATIENT
Start: 2020-01-23 | End: 2020-01-23

## 2020-01-23 RX ORDER — PHENYLEPHRINE HCL IN 0.9% NACL 0.5 MG/5ML
.5-3 SYRINGE (ML) INTRAVENOUS ONCE
Status: DISCONTINUED | OUTPATIENT
Start: 2020-01-23 | End: 2020-01-23

## 2020-01-23 RX ORDER — NITROGLYCERIN 0.4 MG/1
0.4 TABLET SUBLINGUAL
Status: DISCONTINUED | OUTPATIENT
Start: 2020-01-23 | End: 2020-01-23 | Stop reason: HOSPADM

## 2020-01-23 RX ORDER — PANTOPRAZOLE SODIUM 40 MG/1
40 TABLET, DELAYED RELEASE ORAL
Status: DISCONTINUED | OUTPATIENT
Start: 2020-01-24 | End: 2020-01-24 | Stop reason: HOSPADM

## 2020-01-23 RX ORDER — ETOMIDATE 2 MG/ML
INJECTION INTRAVENOUS AS NEEDED
Status: DISCONTINUED | OUTPATIENT
Start: 2020-01-23 | End: 2020-01-23 | Stop reason: SURG

## 2020-01-23 RX ORDER — TOPIRAMATE 25 MG/1
25 TABLET ORAL NIGHTLY
Status: DISCONTINUED | OUTPATIENT
Start: 2020-01-24 | End: 2020-01-24 | Stop reason: HOSPADM

## 2020-01-23 RX ORDER — CHLORHEXIDINE GLUCONATE 0.12 MG/ML
15 RINSE ORAL ONCE
Status: COMPLETED | OUTPATIENT
Start: 2020-01-23 | End: 2020-01-23

## 2020-01-23 RX ORDER — ASPIRIN 325 MG
325 TABLET, DELAYED RELEASE (ENTERIC COATED) ORAL DAILY
Status: DISCONTINUED | OUTPATIENT
Start: 2020-01-24 | End: 2020-01-24 | Stop reason: HOSPADM

## 2020-01-23 RX ORDER — SODIUM CHLORIDE 9 MG/ML
1 INJECTION, SOLUTION INTRAVENOUS CONTINUOUS
Status: ACTIVE | OUTPATIENT
Start: 2020-01-23 | End: 2020-01-23

## 2020-01-23 RX ORDER — IPRATROPIUM BROMIDE AND ALBUTEROL SULFATE 2.5; .5 MG/3ML; MG/3ML
3 SOLUTION RESPIRATORY (INHALATION) ONCE
Status: DISCONTINUED | OUTPATIENT
Start: 2020-01-23 | End: 2020-01-23 | Stop reason: HOSPADM

## 2020-01-23 RX ORDER — MORPHINE SULFATE 4 MG/ML
4 INJECTION, SOLUTION INTRAMUSCULAR; INTRAVENOUS EVERY 4 HOURS PRN
Status: DISCONTINUED | OUTPATIENT
Start: 2020-01-23 | End: 2020-01-24 | Stop reason: HOSPADM

## 2020-01-23 RX ORDER — SODIUM CHLORIDE 9 MG/ML
INJECTION, SOLUTION INTRAVENOUS AS NEEDED
Status: DISCONTINUED | OUTPATIENT
Start: 2020-01-23 | End: 2020-01-23 | Stop reason: HOSPADM

## 2020-01-23 RX ORDER — LEVOTHYROXINE SODIUM 112 UG/1
112 TABLET ORAL
Status: DISCONTINUED | OUTPATIENT
Start: 2020-01-24 | End: 2020-01-24 | Stop reason: HOSPADM

## 2020-01-23 RX ORDER — NOREPINEPHRINE BIT/0.9 % NACL 8 MG/250ML
.02-.3 INFUSION BOTTLE (ML) INTRAVENOUS ONCE
Status: DISCONTINUED | OUTPATIENT
Start: 2020-01-23 | End: 2020-01-23

## 2020-01-23 RX ORDER — LIDOCAINE HYDROCHLORIDE 10 MG/ML
INJECTION, SOLUTION EPIDURAL; INFILTRATION; INTRACAUDAL; PERINEURAL AS NEEDED
Status: DISCONTINUED | OUTPATIENT
Start: 2020-01-23 | End: 2020-01-23 | Stop reason: SURG

## 2020-01-23 RX ORDER — TOPIRAMATE 25 MG/1
50 TABLET ORAL DAILY
Status: DISCONTINUED | OUTPATIENT
Start: 2020-01-23 | End: 2020-01-24 | Stop reason: HOSPADM

## 2020-01-23 RX ORDER — ROCURONIUM BROMIDE 10 MG/ML
INJECTION, SOLUTION INTRAVENOUS AS NEEDED
Status: DISCONTINUED | OUTPATIENT
Start: 2020-01-23 | End: 2020-01-23 | Stop reason: SURG

## 2020-01-23 RX ORDER — IPRATROPIUM BROMIDE AND ALBUTEROL SULFATE 2.5; .5 MG/3ML; MG/3ML
3 SOLUTION RESPIRATORY (INHALATION)
Status: DISCONTINUED | OUTPATIENT
Start: 2020-01-23 | End: 2020-01-24 | Stop reason: HOSPADM

## 2020-01-23 RX ORDER — ACETAMINOPHEN 325 MG/1
650 TABLET ORAL EVERY 4 HOURS PRN
Status: DISCONTINUED | OUTPATIENT
Start: 2020-01-23 | End: 2020-01-23 | Stop reason: HOSPADM

## 2020-01-23 RX ORDER — NITROGLYCERIN 10 MG/100ML
INJECTION INTRAVENOUS CONTINUOUS PRN
Status: DISCONTINUED | OUTPATIENT
Start: 2020-01-23 | End: 2020-01-23 | Stop reason: SURG

## 2020-01-23 RX ORDER — LISINOPRIL 10 MG/1
10 TABLET ORAL DAILY
Status: DISCONTINUED | OUTPATIENT
Start: 2020-01-23 | End: 2020-01-24

## 2020-01-23 RX ORDER — CLINDAMYCIN PHOSPHATE 600 MG/50ML
600 INJECTION INTRAVENOUS ONCE
Status: DISCONTINUED | OUTPATIENT
Start: 2020-01-23 | End: 2020-01-23 | Stop reason: HOSPADM

## 2020-01-23 RX ORDER — HYDROCODONE BITARTRATE AND ACETAMINOPHEN 7.5; 325 MG/1; MG/1
1 TABLET ORAL EVERY 6 HOURS PRN
Status: DISCONTINUED | OUTPATIENT
Start: 2020-01-23 | End: 2020-01-24 | Stop reason: HOSPADM

## 2020-01-23 RX ORDER — CHLORHEXIDINE GLUCONATE 500 MG/1
1 CLOTH TOPICAL EVERY 12 HOURS PRN
Status: DISCONTINUED | OUTPATIENT
Start: 2020-01-23 | End: 2020-01-23 | Stop reason: HOSPADM

## 2020-01-23 RX ORDER — NITROGLYCERIN 20 MG/100ML
5-200 INJECTION INTRAVENOUS
Status: DISCONTINUED | OUTPATIENT
Start: 2020-01-23 | End: 2020-01-24 | Stop reason: HOSPADM

## 2020-01-23 RX ORDER — PROPOFOL 10 MG/ML
VIAL (ML) INTRAVENOUS AS NEEDED
Status: DISCONTINUED | OUTPATIENT
Start: 2020-01-23 | End: 2020-01-23 | Stop reason: SURG

## 2020-01-23 RX ORDER — SODIUM CHLORIDE, SODIUM LACTATE, POTASSIUM CHLORIDE, CALCIUM CHLORIDE 600; 310; 30; 20 MG/100ML; MG/100ML; MG/100ML; MG/100ML
9 INJECTION, SOLUTION INTRAVENOUS CONTINUOUS
Status: DISCONTINUED | OUTPATIENT
Start: 2020-01-23 | End: 2020-01-23

## 2020-01-23 RX ORDER — FAMOTIDINE 20 MG/1
20 TABLET, FILM COATED ORAL ONCE
Status: COMPLETED | OUTPATIENT
Start: 2020-01-23 | End: 2020-01-23

## 2020-01-23 RX ORDER — HYDROMORPHONE HYDROCHLORIDE 1 MG/ML
0.5 INJECTION, SOLUTION INTRAMUSCULAR; INTRAVENOUS; SUBCUTANEOUS
Status: DISCONTINUED | OUTPATIENT
Start: 2020-01-23 | End: 2020-01-24 | Stop reason: HOSPADM

## 2020-01-23 RX ORDER — HEPARIN SODIUM 1000 [USP'U]/ML
INJECTION, SOLUTION INTRAVENOUS; SUBCUTANEOUS AS NEEDED
Status: DISCONTINUED | OUTPATIENT
Start: 2020-01-23 | End: 2020-01-23 | Stop reason: SURG

## 2020-01-23 RX ADMIN — SODIUM CHLORIDE 12.5 MG/HR: 9 INJECTION, SOLUTION INTRAVENOUS at 14:06

## 2020-01-23 RX ADMIN — LIDOCAINE HYDROCHLORIDE 0.5 ML: 10 INJECTION, SOLUTION EPIDURAL; INFILTRATION; INTRACAUDAL; PERINEURAL at 08:52

## 2020-01-23 RX ADMIN — PROTAMINE SULFATE 100 MG: 10 INJECTION, SOLUTION INTRAVENOUS at 12:45

## 2020-01-23 RX ADMIN — DEXAMETHASONE SODIUM PHOSPHATE 8 MG: 4 INJECTION, SOLUTION INTRAMUSCULAR; INTRAVENOUS at 11:46

## 2020-01-23 RX ADMIN — ONDANSETRON 4 MG: 2 INJECTION INTRAMUSCULAR; INTRAVENOUS at 12:38

## 2020-01-23 RX ADMIN — NITROGLYCERIN 10 MCG/MIN: 20 INJECTION INTRAVENOUS at 14:06

## 2020-01-23 RX ADMIN — CLINDAMYCIN PHOSPHATE 600 MG: 12 INJECTION, SOLUTION INTRAVENOUS at 11:41

## 2020-01-23 RX ADMIN — SUGAMMADEX 200 MG: 100 INJECTION, SOLUTION INTRAVENOUS at 12:48

## 2020-01-23 RX ADMIN — SODIUM CHLORIDE 12.5 MG/HR: 9 INJECTION, SOLUTION INTRAVENOUS at 15:18

## 2020-01-23 RX ADMIN — IPRATROPIUM BROMIDE AND ALBUTEROL SULFATE 3 ML: 2.5; .5 SOLUTION RESPIRATORY (INHALATION) at 15:10

## 2020-01-23 RX ADMIN — IPRATROPIUM BROMIDE AND ALBUTEROL SULFATE 3 ML: 2.5; .5 SOLUTION RESPIRATORY (INHALATION) at 11:21

## 2020-01-23 RX ADMIN — SERTRALINE HYDROCHLORIDE 50 MG: 50 TABLET ORAL at 17:30

## 2020-01-23 RX ADMIN — MORPHINE SULFATE 4 MG: 4 INJECTION, SOLUTION INTRAMUSCULAR; INTRAVENOUS at 14:15

## 2020-01-23 RX ADMIN — NITROGLYCERIN 10 MCG/MIN: 10 INJECTION INTRAVENOUS at 12:34

## 2020-01-23 RX ADMIN — LISINOPRIL 10 MG: 10 TABLET ORAL at 17:29

## 2020-01-23 RX ADMIN — MUPIROCIN 1 APPLICATION: 20 OINTMENT TOPICAL at 08:52

## 2020-01-23 RX ADMIN — INSULIN LISPRO 2 UNITS: 100 INJECTION, SOLUTION INTRAVENOUS; SUBCUTANEOUS at 17:47

## 2020-01-23 RX ADMIN — CALCIUM CHLORIDE 500 MG: 100 INJECTION INTRAVENOUS; INTRAVENTRICULAR at 12:27

## 2020-01-23 RX ADMIN — METOPROLOL TARTRATE 5 MG: 5 INJECTION INTRAVENOUS at 14:16

## 2020-01-23 RX ADMIN — IPRATROPIUM BROMIDE AND ALBUTEROL SULFATE 3 ML: 2.5; .5 SOLUTION RESPIRATORY (INHALATION) at 19:55

## 2020-01-23 RX ADMIN — HYDROMORPHONE HYDROCHLORIDE 0.5 MG: 1 INJECTION, SOLUTION INTRAMUSCULAR; INTRAVENOUS; SUBCUTANEOUS at 15:55

## 2020-01-23 RX ADMIN — CHLORHEXIDINE GLUCONATE 0.12% ORAL RINSE 15 ML: 1.2 LIQUID ORAL at 08:52

## 2020-01-23 RX ADMIN — PROPOFOL 50 MG: 10 INJECTION, EMULSION INTRAVENOUS at 11:42

## 2020-01-23 RX ADMIN — ETOMIDATE 40 MG: 2 INJECTION, SOLUTION INTRAVENOUS at 11:41

## 2020-01-23 RX ADMIN — ROCURONIUM BROMIDE 50 MG: 10 INJECTION INTRAVENOUS at 11:41

## 2020-01-23 RX ADMIN — FAMOTIDINE 20 MG: 20 TABLET, FILM COATED ORAL at 08:52

## 2020-01-23 RX ADMIN — SODIUM CHLORIDE 1 ML/KG/HR: 9 INJECTION, SOLUTION INTRAVENOUS at 14:05

## 2020-01-23 RX ADMIN — ATORVASTATIN CALCIUM 40 MG: 40 TABLET, FILM COATED ORAL at 20:33

## 2020-01-23 RX ADMIN — HYDROCODONE BITARTRATE AND ACETAMINOPHEN 1 TABLET: 7.5; 325 TABLET ORAL at 14:52

## 2020-01-23 RX ADMIN — TOPIRAMATE 25 MG: 25 TABLET, FILM COATED ORAL at 17:34

## 2020-01-23 RX ADMIN — LIDOCAINE HYDROCHLORIDE 100 MG: 10 INJECTION, SOLUTION EPIDURAL; INFILTRATION; INTRACAUDAL; PERINEURAL at 11:41

## 2020-01-23 RX ADMIN — METOPROLOL TARTRATE 25 MG: 25 TABLET ORAL at 17:47

## 2020-01-23 RX ADMIN — PROPOFOL 30 MG: 10 INJECTION, EMULSION INTRAVENOUS at 11:54

## 2020-01-23 RX ADMIN — SODIUM CHLORIDE, POTASSIUM CHLORIDE, SODIUM LACTATE AND CALCIUM CHLORIDE 9 ML/HR: 600; 310; 30; 20 INJECTION, SOLUTION INTRAVENOUS at 08:53

## 2020-01-23 RX ADMIN — NICARDIPINE HYDROCHLORIDE 2.5 MG/HR: 25 INJECTION INTRAVENOUS at 12:21

## 2020-01-23 RX ADMIN — MORPHINE SULFATE 4 MG: 4 INJECTION, SOLUTION INTRAMUSCULAR; INTRAVENOUS at 18:01

## 2020-01-23 RX ADMIN — NITROGLYCERIN 5 MCG/MIN: 10 INJECTION INTRAVENOUS at 12:30

## 2020-01-23 RX ADMIN — INSULIN LISPRO 2 UNITS: 100 INJECTION, SOLUTION INTRAVENOUS; SUBCUTANEOUS at 20:33

## 2020-01-23 RX ADMIN — HEPARIN SODIUM 20000 UNITS: 1000 INJECTION, SOLUTION INTRAVENOUS; SUBCUTANEOUS at 12:20

## 2020-01-23 NOTE — OP NOTE
REFERRING PHYSICIAN:     PROCEDURE(S):   1. Right femoral arterial sheath placement.  2.  Right femoral venous sheath placement.  3. Temporary transvenous pacemaker insertion.  4. Catheter placement in the aortic root.  5. Multiple intraprocedural aortograms.   6. Balloon aortic valvuloplasty.  7. Transcatheter aortic valve replacement with 23 mm Limon GINGER III tissue  valve.     INDICATIONS:   1. Critical aortic stenosis.  2. Patient is not a suitable candidate for conventional surgery.    INTERVENTIONAL CARDIOLOGISTS:  1. Qi Valdez MD.  2. Marielena Francois M.D.    CARDIAC SURGEONS:   1. Joey Guaman MD.  2. Ash Brioens MD.    DESCRIPTION OF PROCEDURE:   After informed consent, the patient was brought to the OR in a fasting condition, prepped and draped from level of chin to knees by standard surgical technique. Right femoral arterial access was obtained by percutaneous anterior wall puncture technique and a 6-Citizen of [place] arterial sheath was placed. Venous access was obtained in similar fashion and an 8-Citizen of [place] venous sheath was placed. Temporary transvenous pacing electrode was inserted via the right femoral venous access and advanced to the right ventricular apex and excellent pacing/sensing was noted. A pigtail catheter was advanced from the femoral arterial access and this was advanced to the aortic root, and multiple intraprocedural aortograms were performed.   At this time, left femoral arterial access was obtained by the surgeons and a 14 Citizen of [place] Limon sheath was placed(see separate note). Using this access, AL1 catheter, and straight wire, the aortic stenosis was crossed. The catheter was advanced into the left ventricle and the wire was exchanged for a Safari wire. At this time, balloon aortic valvuloplasty was performed. Subsequently, a 23 mm Limon GINGER III valve was advanced using standard delivery system. This was positioned under fluoroscopy. After the satisfactory position was confirmed,  the valve was expanded under rapid pacing protocol. Satisfactory position was noted. Post implant images revealed trivial aortic insufficiency. No significant regurgitation was noted.   At this time, the delivery system was removed. The arterial sheath was removed and the access site was closed by the surgeons (see separate note). The patient tolerated the procedure well and without complications.    FINAL IMPRESSION:   · Successful transcatheter aortic valve replacement with 23 mm        Limon GINGER tissue valve.   · No acute procedure-related complications.     Qi Valdez MD, FACC, [facility]

## 2020-01-23 NOTE — TELEPHONE ENCOUNTER
Advised pt  that it is a few days early and the pt is currently in the hospital. Can not fill at this time

## 2020-01-23 NOTE — ANESTHESIA PROCEDURE NOTES
Airway  Urgency: elective    Date/Time: 1/23/2020 11:44 AM  Airway not difficult    General Information and Staff    Patient location during procedure: OR  CRNA: Hilario Gómez CRNA    Indications and Patient Condition  Indications for airway management: airway protection    Preoxygenated: yes  MILS not maintained throughout  Mask difficulty assessment: 2 - vent by mask + OA or adjuvant +/- NMBA    Final Airway Details  Final airway type: endotracheal airway      Successful airway: ETT  Cuffed: yes   Successful intubation technique: video laryngoscopy  Facilitating devices/methods: intubating stylet  Endotracheal tube insertion site: oral  Blade: Anish  Blade size: 3  ETT size (mm): 7.0  Cormack-Lehane Classification: grade I - full view of glottis  Placement verified by: chest auscultation and capnometry   Cuff volume (mL): 6  Measured from: lips  ETT/EBT  to lips (cm): 21  Number of attempts at approach: 1  Assessment: lips, teeth, and gum same as pre-op and atraumatic intubation    Additional Comments  Negative epigastric sounds, Breath sound equal bilaterally with symmetric chest rise and fall

## 2020-01-23 NOTE — ANESTHESIA PREPROCEDURE EVALUATION
Anesthesia Evaluation     NPO Solid Status: > 8 hours  NPO Liquid Status: > 8 hours           Airway   Mallampati: III  TM distance: >3 FB  Difficult intubation highly probable  Dental - normal exam     Pulmonary    (+) a smoker Current, COPD, home oxygen, decreased breath sounds,   Wheezes: slight expiratory wheezes.  Cardiovascular     ECG reviewed  Rhythm: regular  Rate: normal    (+) cardiac stents (4 coronary stents per patient, cardiology is Dr Zamorano) murmur,   (-) pacemaker      Neuro/Psych  (+) TIA (Lost central vision in her right eye),     GI/Hepatic/Renal/Endo    (+) obesity,   liver disease fatty liver disease,     Musculoskeletal     Abdominal    Substance History      OB/GYN          Other   blood dyscrasia,     ROS/Med Hx Other: Home O2 and uses bipap                  Anesthesia Plan    ASA 4     general   (GA  A line  EMMANUEL  Glidescope)  intravenous induction

## 2020-01-23 NOTE — INTERVAL H&P NOTE
Pre-Op H&P (See Recent Office Note Attached for Full H&P)    Chief complaint: Fatigue, SOA    Review of Systems:  General ROS:  no fever, chills, rashes, No change since last office visit  Cardiovascular ROS: no chest pain or +dyspnea on exertion.  Recent NAYANA RCA x 2 1/10/20, on DAPT  Respiratory ROS: no cough, +shortness of breath, or wheezing.  +COPD.  Wears 2 L NC O2 at all times    Meds:    No current facility-administered medications on file prior to encounter.      Current Outpatient Medications on File Prior to Encounter   Medication Sig Dispense Refill   • acetaminophen (TYLENOL) 325 MG tablet Take 2 tablets by mouth Every 4 (Four) Hours As Needed for mild pain (1-3) or fever (temperature greater than 101F). 100 tablet 0   • aspirin 325 MG tablet Take 325 mg by mouth Daily.     • atorvastatin (LIPITOR) 40 MG tablet TAKE ONE TABLET BY MOUTH EVERY NIGHT (Patient taking differently: Take 40 mg by mouth Every Night.) 90 tablet 2   • clopidogrel (PLAVIX) 75 MG tablet TAKE ONE TABLET BY MOUTH DAILY (Patient taking differently: Take 75 mg by mouth Daily.) 90 tablet 0   • ezetimibe (ZETIA) 10 MG tablet TAKE ONE TABLET BY MOUTH DAILY (Patient taking differently: Take 10 mg by mouth Daily.) 90 tablet 2   • fentaNYL (DURAGESIC) 75 MCG/HR patch Place 1 patch on the skin as directed by provider Every Other Day. 15 patch 0   • fexofenadine (ALLEGRA) 180 MG tablet Take 180 mg by mouth Daily As Needed.     • furosemide (LASIX) 40 MG tablet TAKE ONE TABLET BY MOUTH TWICE A DAY (Patient taking differently: Take 80 mg by mouth Daily.) 180 tablet 0   • levothyroxine (SYNTHROID, LEVOTHROID) 112 MCG tablet TAKE ONE TABLET BY MOUTH DAILY (Patient taking differently: Take 112 mcg by mouth Daily.) 90 tablet 0   • lisinopril (PRINIVIL,ZESTRIL) 10 MG tablet TAKE ONE TABLET BY MOUTH DAILY (Patient taking differently: Take 10 mg by mouth Daily.) 90 tablet 0   • melatonin 5 MG sublingual tablet sublingual tablet Place 1 tablet under the  "tongue At Night As Needed (insomnia). (Patient taking differently: Place 10 mg under the tongue At Night As Needed (insomnia).) 30 tablet 0   • metoprolol tartrate (LOPRESSOR) 25 MG tablet TAKE ONE TABLET BY MOUTH TWICE A DAY (Patient taking differently: Take 25 mg by mouth 2 (Two) Times a Day.) 180 tablet 1   • omeprazole (priLOSEC) 20 MG capsule TAKE ONE CAPSULE BY MOUTH DAILY (Patient taking differently: Take 20 mg by mouth Daily.) 90 capsule 3   • oxyCODONE-acetaminophen (PERCOCET) 7.5-325 MG per tablet Take 1 tablet by mouth Every 6 (Six) Hours As Needed for Moderate Pain . 120 tablet 0   • pregabalin (LYRICA) 100 MG capsule Take 1 capsule by mouth Every 8 (Eight) Hours. 270 capsule 0   • probiotic (CULTURELLE) capsule capsule Take 1 capsule by mouth Daily. 60 capsule 0   • sertraline (ZOLOFT) 50 MG tablet TAKE ONE TABLET BY MOUTH DAILY (Patient taking differently: Take 50 mg by mouth Daily.) 90 tablet 0   • tiZANidine (ZANAFLEX) 4 MG tablet TAKE ONE TABLET BY MOUTH TWICE A DAY AS NEEDED FOR MUSCLE SPASMS (Patient taking differently: Take 4 mg by mouth 2 (Two) Times a Day As Needed.) 60 tablet 1   • topiramate (TOPAMAX) 25 MG tablet Take 25-50 mg by mouth 2 (Two) Times a Day. 50 mg q am; 25 mg q pm      • albuterol (PROVENTIL) (2.5 MG/3ML) 0.083% nebulizer solution Take 2.5 mg by nebulization Every 6 (Six) Hours As Needed for Wheezing or Shortness of Air. 120 vial 5   • ferrous sulfate 325 (65 FE) MG tablet Take 1 tablet by mouth 2 (Two) Times a Day. 60 tablet 2       Vital Signs:  /67 (BP Location: Left arm, Patient Position: Lying)   Pulse 70   Temp 97.3 °F (36.3 °C) (Tympanic)   Resp 20   Ht 167.6 cm (66\")   Wt 104 kg (229 lb)   LMP  (LMP Unknown)   SpO2 91%   BMI 36.96 kg/m²     Physical Exam:    CV:  S1S2 regular rate and rhythm,3/6 Systolic murmur               Resp:  Clear to auscultation; respirations regular, even and unlabored    Results Review:     Lab Results   Component Value Date    " WBC 8.95 01/22/2020    HGB 10.5 (L) 01/22/2020    HCT 35.0 01/22/2020    MCV 98.9 (H) 01/22/2020     01/22/2020    NEUTROABS 6.68 01/22/2020    GLUCOSE 207 (H) 01/22/2020    BUN 17 01/22/2020    CREATININE 1.12 (H) 01/22/2020    EGFRIFNONA 49 (L) 01/22/2020    EGFRIFAFRI 52 (L) 12/05/2019     01/22/2020    K 4.0 01/22/2020     01/22/2020    CO2 29.0 01/22/2020    MG 1.8 01/22/2020    PHOS 4.6 02/19/2018    CALCIUM 8.9 01/22/2020    ALBUMIN 3.40 (L) 01/22/2020    AST 28 01/22/2020    ALT 15 01/22/2020    BILITOT <0.2 (L) 01/22/2020        I reviewed the patient's new clinical results.   1/16/20 TAVR CTA:  1.  Enlargement identified of the pulmonary arteries. Extensive coronary  artery calcification identified with calcification seen within the  aortic valve.  2.  Ectasia of the infrarenal abdominal aorta with stones in the  gallbladder. Cysts in both kidneys with small myelolipoma identified on  the left adrenal gland and small adenoma on the left adrenal gland. No  acute intra-abdominal or pelvic abnormality is present with vascular  calcification seen throughout the abdominal aorta and pelvic vessels.    Cancer Staging (if applicable)  Cancer Patient: __ yes _x_no __unknown; If yes, clinical stage T:__ N:__M:__, stage group or __N/A    Assessment/Plan:    64-year-old  female with a history of hypertension, hyperlipidemia, diabetes mellitus, coronary artery disease s/p NAYANA RCA 1/10/20 on DAPT, TIA, fibromyalgia, obesity, COPD and active tobacco abuse who presents with fatigue, shortness of breath and chest pain.  She has severe symptomatic aortic valve stenosis.  The patient is not a candidate for surgical aortic valve replacement given her limited functional status and extensive list of comorbidities.  Her STS risk of mortality is calculated at 4.08%, placing her within the intermediate risk category.  We discussed possible transcatheter aortic valve replacement.  The risks and benefits  of the procedure were discussed with the patient including pain, bleeding, infection, renal failure, stroke, heart block requiring a pacemaker, myocardial infarction and death.  The patient understood these risks and wished to proceed with TAVR.     Christie Wilkins, APRRAVEN  1/23/2020   9:04 AM

## 2020-01-23 NOTE — OP NOTE
DATE OF PROCEDURE: 1/23/2020      PREOPERATIVE DIAGNOSES:  1. Severe aortic stenosis  2. Hypertension  3. Hyperlipidemia  4. Diabetes mellitus  5. Coronary artery disease  6. Obesity  7. Active tobacco abuse      POSTOPERATIVE DIAGNOSES:    1. Severe aortic stenosis  2. Hypertension  3. Hyperlipidemia  4. Diabetes mellitus  5. Coronary artery disease  6. Obesity  7. Active tobacco abuse      PROCEDURES PERFORMED:    1. Percutaneous left common femoral arterial sheath placement  2. Multiple aortograms  3. Balloon aortic valvuloplasty  4. Transcatheter aortic valve replacement (23 mm Vik 3 tissue valve)  5. Completion aortogram      SURGEON: Joey Guaman MD        Assistant: Ash Briones MD    CARDIOLOGISTS:  1. Dr. Qi Valdez MD  2. Dr. Marielena Francois MD      ANESTHESIA: General endotracheal anesthesia with Dr Donis Servin DO      ESTIMATED BLOOD LOSS: Less than 100 mL.        FLUOROSCOPY TIME: 7:42 with an exposure of 662 mGy      CONTRAST: 85 mL       INDICATIONS:  64-year-old  female with a history of hypertension, hyperlipidemia, diabetes mellitus, coronary artery disease, TIA, fibromyalgia, obesity, COPD and active tobacco abuse who presented with fatigue, shortness of breath and chest pain.  She was found to have severe aortic stenosis and was felt to be a reasonable candidate for TAVR. The risks and benefits of surgery were discussed with the patient and his family including pain, bleeding, infection, renal failure, stroke, heart block and death. The patient understood these risks and wished to proceed with surgery.       DESCRIPTION OF PROCEDURE: The patient was taken to the operating room and placed under general endotracheal anesthesia. She was prepped and draped in the usual sterile fashion. A timeout was performed including the patient’s name, procedure, and antibiotic administration. Right common femoral arterial and venous lines were placed by the cardiologists for placement  of a pigtail catheter and temporary venous pacer. Needle access of the left common femoral artery over the femoral head was obtained and the incision was enlarged using a #11 blade. Systemic heparin was administered. A 7-Cypriot dilator was then placed over the wire using modified Seldinger technique and 2 Perclose devices were deployed. A 7-Cypriot sheath was then placed followed by a 14-Cypriot sheath within the abdominal aorta. This was secured using a silk suture. An AL-1 catheter was utilized to cross the aortic valve with placement of a Safari wire. The balloon was then placed over the wire into the aortic valve and rapid pacing was performed with satisfactory balloon valvuloplasty. The balloon was then exchanged for the 23 mm GINGER 3 valve, which was placed in the correct position at the aortic annulus. The valve was deployed with two additional milliliters of fluid in the balloon after rapid pacing and was found to be in satisfactory position with no paravalvular leak on echocardiogram. Aortogram was performed that revealed no aortic regurgitation. The wire and valve device were removed and a Magic Torque wire was then placed within the descending thoracic aorta along with the dilator and the sheath was pulled back into the common femoral artery. An aortogram was then performed that revealed intact vasculature. The groin sheath was then removed and 2 Perclose devices were deployed with satisfactory hemostasis. The groin incision was then sealed with skin glue and the right groin pigtail and pacing wire were removed. The sheaths were left in place and the patient was extubated in the operating room and transported to the cardiac ICU in stable condition.

## 2020-01-23 NOTE — NURSING NOTE
TAVR Multidisciplinary Team Meeting         Patient Name: Tamara Langston     YOB: 1955     Referring Physician: Lane Zamorano MD  Admission Status: Outpatient    Attendees: Ash Briones MD, Joey Guaman MD, Marielena Francois MD, Qi Valdez MD, Braxton Clinical Specialist, Maxime Nielsen, Donis Servin MD, Soheila THAYER and Hilario Gómez CRNA    Primary presentation of AS: Chronic combined systolic/ diastolic heart failure   Heart Failure:  NYHA class III    NYHA Functional Class: III    LVEF:  60%    Major Organ Compromise:   Cardiac (Hx of severe LV systolic  Pulmonary (active tobacco use, home oxygen)  Renal (CKD stage 3 )  Musculoskeletal dysfunction (DJD, generalized poor mobility, amputations, neuropathy, chronic pain/ chronic narcotics)  Peripheral vascular disease (femoral/ iliac disease)    Procedure Specific Impediment:   N/A    Other Factors:  Major Nutritional Deficit: Morbid obesity.  BMI 34.2  Cognitive Impairment: No  Oxygen dependent: Yes (oxygen + CPAP)    STS Risk Score:  Mortality Risk: 4.08%  Mortality and Morbidity Risk: 20.4%    TAVR Rationale: Despite STS risk score of 4.08%, poor mobility and ongoing tobacco use/ PVD makes her high risk for open AVR per CT Surgery.           Diagnostic Studies Discussed/Reviewed: CTA TAVR protocol, EMMANUEL, cardiac catheter    Procedure planning details:  Significant STJ calcification noted/ this area is smaller than annulus.  Potential femoral artery cutdown due to severe PVD  Valve Size: 23 mm Limon Vik III with 1 or 2 ml extra volume @ deployment  Cardiology sheath access: right femoral  CT Surgery sheath access: left femoral    Post Procedure Considerations: Monitor respiratory status and potential oversedation due to COPD/ chronic narcotics.  Monitor groin access sites carefully for bleeding/ hematoma due to PVD.  Blood glucose management per Intensivist.        Soheila THAYER

## 2020-01-23 NOTE — ANESTHESIA PROCEDURE NOTES
Arterial Line      Patient reassessed immediately prior to procedure    Patient location during procedure: pre-op   Line placed for hemodynamic monitoring.  Performed By   CRNA: Donis Servin MD  Preanesthetic Checklist  Completed: patient identified, site marked, surgical consent, pre-op evaluation, timeout performed, IV checked, risks and benefits discussed and monitors and equipment checked  Arterial Line Prep   Sterile Tech: cap, gloves and sterile barriers  Prep: ChloraPrep  Patient monitoring: blood pressure monitoring, continuous pulse oximetry and EKG  Arterial Line Procedure   Laterality:left  Location:  radial artery  Catheter size: 20 G   Guidance: palpation technique  Number of attempts: 1  Successful placement: yes  Post Assessment   Dressing Type: line sutured, occlusive dressing applied, secured with tape and wrist guard applied.   Complications no  Circ/Move/Sens Assessment: normal and unchanged.   Patient Tolerance: patient tolerated the procedure well with no apparent complications

## 2020-01-23 NOTE — TELEPHONE ENCOUNTER
PT'S  CALLED TO REQUEST A REFILL ON HER oxyCODONE-acetaminophen (PERCOCET) 7.5-325 MG per tablet AND HER   fentaNYL (DURAGESIC) 75 MCG/HR patch [85801] the patient WOULD LIKE FOR THOSE TO BE SENT TO THE [facility] THAT IS ON FILE. PT CAN BE REACHED [phone]

## 2020-01-23 NOTE — ANESTHESIA POSTPROCEDURE EVALUATION
Patient: Tamara Langston    Procedure Summary     Date:  01/23/20 Room / Location:   [facility] OR 15 /  [facility] HY[facility] OR 15    Anesthesia Start:  1132 Anesthesia Stop:      Procedures:       TRANSCATHETER AORTIC VALVE REPLACEMENT (N/A Chest)      Transfemoral Transcatheter Aortic Valve Replacement (N/A ) Diagnosis:       Nonrheumatic aortic valve stenosis      (Nonrheumatic aortic valve stenosis [I35.0])    Surgeon:  Joey Guaman MD; Qi Valdez MD Provider:  Donis Servin MD    Anesthesia Type:  general ASA Status:  4          Anesthesia Type: general    Vitals  Vitals Value Taken Time   BP     Temp     Pulse     Resp     SpO2 98 % 1/23/2020  1:22 PM   Vitals shown include unvalidated device data.        Post Anesthesia Care and Evaluation    Patient location during evaluation: ICU  Patient participation: complete - patient participated  Level of consciousness: awake and awake and alert  Pain score: 0  Pain management: adequate  Airway patency: patent  Anesthetic complications: No anesthetic complications  PONV Status: none  Cardiovascular status: acceptable and stable  Respiratory status: unassisted, acceptable, spontaneous ventilation and face mask  Hydration status: acceptable

## 2020-01-23 NOTE — PROGRESS NOTES
Intensive Care Follow-up     Hospital:  LOS: 0 days   Ms. Tamara Langston, 64 y.o. female is followed for:   Nonrheumatic aortic valve stenosis     History of present illness:   Tamara Langston is a 64 y.o. female who presents to [facility] 01/23/20 for a scheduled TAVR procedure per Dr. Guaman.     The patient has a complicated medical history including severe AVS, CAD s/p multiple stents (most recently NAYANA x2 to RCA 1/10/20), HTN, PVD s/p transmetatarsal amputation, COPD on home O2, complicated sleep apnea, and ongoing tobacco use. She was experiencing worsening dyspnea and excessive fatigue (sleeping up to 16 hrs per day) and was subsequently referred to Dr. Guaman of [facility] for evaluation of her symptomatic AVS. She was not a candidate for surgical AVR d/t her limited functional status and multiple comorbidities placing her at high risk, however she was found to be a candidate for transcatheter approach. Risks/benefits/alternatives to TAVR were discussed with the patient, who ultimately elected to undergo the procedure. This was performed 01/23/20 per Dr. Guaman and the patient was transferred to the ICU post-procedure for further management.     Subjective   Interval History:  Nitro per cardiology patient is doing very well and seen upon arrival to the ICU.  She is on BiPAP but able to open her eyes to communicate with me following commands.  She denies any chest pain, shortness of breath, fever, or chills.  She is currently on nicardipine and nitro.  She is afebrile.         The patient's past medical, surgical and social history were reviewed and updated in Epic as appropriate.       Objective     Infusions:    No current facility-administered medications for this encounter.   Medications:    I reviewed the patient's medications.    Vital Sign Min/Max for last 24 hours  No data recorded   No data recorded   No data recorded   No data recorded   SpO2  Min: 90 %  Max: 90 %   No data recorded       Input/Output  for last 24 hour shift  No intake/output data recorded.      GENERAL : NAD, conversant  RESPIRATORY/THORAX : normal respiratory effort and no intercostal retractions, bilateral expiratory wheezing  CARDIOVASCULAR : Normal S1/S2, RRR.  No lower ext edema.  GASTROINTESTINAL : Soft, NT/ND. BS x 4 normoactive. No hepatosplenomegaly.  MUSCULOSKELETAL : No cyanosis, clubbing, or ischemia  NEUROLOGICAL: alert and oriented to person, place and time  PSYCHOLOGICAL : Appropriate affect    Results from last 7 days   Lab Units 01/22/20  1410   WBC 10*3/mm3 8.95   HEMOGLOBIN g/dL 10.5*   PLATELETS 10*3/mm3 186     Results from last 7 days   Lab Units 01/22/20  1410   SODIUM mmol/L 144   POTASSIUM mmol/L 4.0   CO2 mmol/L 29.0   BUN mg/dL 17   CREATININE mg/dL 1.12*   MAGNESIUM mg/dL 1.8   GLUCOSE mg/dL 207*     Estimated Creatinine Clearance: 60.2 mL/min (A) (by C-G formula based on SCr of 1.12 mg/dL (H)).           Chest X-ray Pa & Lateral    Result Date: 1/22/2020  1. Cardiomegaly and pulmonary venous hypertension. 2. Mild diffuse peribronchial thickening, similar to prior exams, and likely chronic. No clearly new chest disease is seen. 3. Prominent but stable pulmonary artery shadows.        Ct Angio Tavr Chest Abdomen Pelvis    Result Date: 1/17/2020  1.  Enlargement identified of the pulmonary arteries. Extensive coronary artery calcification identified with calcification seen within the aortic valve. 2.  Ectasia of the infrarenal abdominal aorta with stones in the gallbladder. Cysts in both kidneys with small myelolipoma identified on the left adrenal gland and small adenoma on the left adrenal gland. No acute intra-abdominal or pelvic abnormality is present with vascular calcification seen throughout the abdominal aorta and pelvic vessels.  D:  01/17/2020 E:  01/17/2020  This report was finalized on 1/17/2020 6:44 PM by Dr. Afsaneh East MD.      I reviewed the patient's new clinical results.  I reviewed the patient's  new imaging results/reports including actual images and agree with reports.     Assessment/Plan   Impression        Nonrheumatic AVS    CAD s/p multiple stents    COPD on home O2    Complicated sleep apnea    GERD    HTN    Hypothyroidism    Obesity    Tobacco abuse    PVD    Chronic, continuous use of opioids    Hx takotsubo cardiomyopathy    Mixed anxiety depressive disorder       Plan        64-year-old female with past medical history; artery disease, COPD on home O2, sleep apnea, hypertension, hypothyroidism, and morbid obesity.  Who presents to the ICU status post TAVR by Dr. Guaman on 1/23/2020.    1. We will continue BiPAP for now and wean as able  2. Continue home blood pressure medications  3. Postop orders per CT surgery  4. Encourage pulmonary toileting  5. Mobilize patient  6. With expiratory wheezing on arrival to the ICU will go ahead and schedule duo nebs every 4 hours.  7. ICU will continue to follow    Plan of care and goals reviewed with mulitdisciplinary/antibiotic stewardship team during rounds.   I discussed the patient's findings and my recommendations with patient and nursing staff     High level of risk due to:  illness with threat to life or bodily function.    Rodrigo Bird, DO  Pulmonary, Critical care and Sleep Medicine

## 2020-01-24 VITALS
DIASTOLIC BLOOD PRESSURE: 51 MMHG | SYSTOLIC BLOOD PRESSURE: 110 MMHG | HEIGHT: 66 IN | OXYGEN SATURATION: 97 % | BODY MASS INDEX: 36.8 KG/M2 | RESPIRATION RATE: 18 BRPM | WEIGHT: 229 LBS | HEART RATE: 78 BPM | TEMPERATURE: 98.6 F

## 2020-01-24 LAB
ANION GAP SERPL CALCULATED.3IONS-SCNC: 12 MMOL/L (ref 5–15)
BUN BLD-MCNC: 16 MG/DL (ref 8–23)
BUN/CREAT SERPL: 15 (ref 7–25)
CALCIUM SPEC-SCNC: 9 MG/DL (ref 8.6–10.5)
CHLORIDE SERPL-SCNC: 106 MMOL/L (ref 98–107)
CO2 SERPL-SCNC: 25 MMOL/L (ref 22–29)
CREAT BLD-MCNC: 1.07 MG/DL (ref 0.57–1)
DEPRECATED RDW RBC AUTO: 73.5 FL (ref 37–54)
ERYTHROCYTE [DISTWIDTH] IN BLOOD BY AUTOMATED COUNT: 20.3 % (ref 12.3–15.4)
GFR SERPL CREATININE-BSD FRML MDRD: 51 ML/MIN/1.73
GLUCOSE BLD-MCNC: 144 MG/DL (ref 65–99)
GLUCOSE BLDC GLUCOMTR-MCNC: 138 MG/DL (ref 70–130)
GLUCOSE BLDC GLUCOMTR-MCNC: 159 MG/DL (ref 70–130)
HCT VFR BLD AUTO: 28.8 % (ref 34–46.6)
HGB BLD-MCNC: 8.7 G/DL (ref 12–15.9)
MCH RBC QN AUTO: 30.2 PG (ref 26.6–33)
MCHC RBC AUTO-ENTMCNC: 30.2 G/DL (ref 31.5–35.7)
MCV RBC AUTO: 100 FL (ref 79–97)
PLATELET # BLD AUTO: 169 10*3/MM3 (ref 140–450)
PMV BLD AUTO: 11.2 FL (ref 6–12)
POTASSIUM BLD-SCNC: 4.6 MMOL/L (ref 3.5–5.2)
RBC # BLD AUTO: 2.88 10*6/MM3 (ref 3.77–5.28)
SODIUM BLD-SCNC: 143 MMOL/L (ref 136–145)
WBC NRBC COR # BLD: 11.33 10*3/MM3 (ref 3.4–10.8)

## 2020-01-24 PROCEDURE — 82962 GLUCOSE BLOOD TEST: CPT

## 2020-01-24 PROCEDURE — 99232 SBSQ HOSP IP/OBS MODERATE 35: CPT | Performed by: INTERNAL MEDICINE

## 2020-01-24 PROCEDURE — 94799 UNLISTED PULMONARY SVC/PX: CPT

## 2020-01-24 PROCEDURE — 25010000002 MORPHINE PER 10 MG: Performed by: PHYSICIAN ASSISTANT

## 2020-01-24 PROCEDURE — 99231 SBSQ HOSP IP/OBS SF/LOW 25: CPT | Performed by: PHYSICIAN ASSISTANT

## 2020-01-24 PROCEDURE — 80048 BASIC METABOLIC PNL TOTAL CA: CPT | Performed by: PHYSICIAN ASSISTANT

## 2020-01-24 PROCEDURE — 93005 ELECTROCARDIOGRAM TRACING: CPT | Performed by: PHYSICIAN ASSISTANT

## 2020-01-24 PROCEDURE — 63710000001 PROMETHAZINE PER 25 MG: Performed by: THORACIC SURGERY (CARDIOTHORACIC VASCULAR SURGERY)

## 2020-01-24 PROCEDURE — 93010 ELECTROCARDIOGRAM REPORT: CPT | Performed by: INTERNAL MEDICINE

## 2020-01-24 PROCEDURE — 63710000001 INSULIN LISPRO (HUMAN) PER 5 UNITS: Performed by: THORACIC SURGERY (CARDIOTHORACIC VASCULAR SURGERY)

## 2020-01-24 PROCEDURE — 85027 COMPLETE CBC AUTOMATED: CPT | Performed by: PHYSICIAN ASSISTANT

## 2020-01-24 PROCEDURE — 99231 SBSQ HOSP IP/OBS SF/LOW 25: CPT | Performed by: INTERNAL MEDICINE

## 2020-01-24 RX ORDER — CARVEDILOL 25 MG/1
25 TABLET ORAL 2 TIMES DAILY WITH MEALS
Qty: 180 TABLET | Refills: 1 | Status: SHIPPED | OUTPATIENT
Start: 2020-01-24 | End: 2020-01-26

## 2020-01-24 RX ORDER — CARVEDILOL 12.5 MG/1
25 TABLET ORAL 2 TIMES DAILY WITH MEALS
Status: DISCONTINUED | OUTPATIENT
Start: 2020-01-24 | End: 2020-01-24 | Stop reason: HOSPADM

## 2020-01-24 RX ORDER — LISINOPRIL 20 MG/1
20 TABLET ORAL DAILY
Status: DISCONTINUED | OUTPATIENT
Start: 2020-01-24 | End: 2020-01-24 | Stop reason: HOSPADM

## 2020-01-24 RX ORDER — PROMETHAZINE HYDROCHLORIDE 25 MG/1
25 TABLET ORAL EVERY 8 HOURS PRN
Status: DISCONTINUED | OUTPATIENT
Start: 2020-01-24 | End: 2020-01-24 | Stop reason: HOSPADM

## 2020-01-24 RX ORDER — LISINOPRIL 20 MG/1
20 TABLET ORAL DAILY
Qty: 90 TABLET | Refills: 1 | Status: SHIPPED | OUTPATIENT
Start: 2020-01-24 | End: 2020-01-26 | Stop reason: SDUPTHER

## 2020-01-24 RX ORDER — PREGABALIN 100 MG/1
100 CAPSULE ORAL EVERY 8 HOURS SCHEDULED
Status: DISCONTINUED | OUTPATIENT
Start: 2020-01-24 | End: 2020-01-24 | Stop reason: HOSPADM

## 2020-01-24 RX ADMIN — TOPIRAMATE 50 MG: 25 TABLET, FILM COATED ORAL at 09:04

## 2020-01-24 RX ADMIN — PREGABALIN 100 MG: 100 CAPSULE ORAL at 09:01

## 2020-01-24 RX ADMIN — IPRATROPIUM BROMIDE AND ALBUTEROL SULFATE 3 ML: 2.5; .5 SOLUTION RESPIRATORY (INHALATION) at 11:32

## 2020-01-24 RX ADMIN — PREGABALIN 100 MG: 100 CAPSULE ORAL at 13:03

## 2020-01-24 RX ADMIN — PROMETHAZINE HYDROCHLORIDE 25 MG: 25 TABLET ORAL at 07:58

## 2020-01-24 RX ADMIN — MORPHINE SULFATE 4 MG: 4 INJECTION, SOLUTION INTRAMUSCULAR; INTRAVENOUS at 10:50

## 2020-01-24 RX ADMIN — MORPHINE SULFATE 4 MG: 4 INJECTION, SOLUTION INTRAMUSCULAR; INTRAVENOUS at 06:31

## 2020-01-24 RX ADMIN — CLOPIDOGREL BISULFATE 75 MG: 75 TABLET ORAL at 09:00

## 2020-01-24 RX ADMIN — CARVEDILOL 25 MG: 12.5 TABLET, FILM COATED ORAL at 09:00

## 2020-01-24 RX ADMIN — NITROGLYCERIN 80 MCG/MIN: 20 INJECTION INTRAVENOUS at 06:28

## 2020-01-24 RX ADMIN — SERTRALINE HYDROCHLORIDE 50 MG: 50 TABLET ORAL at 09:01

## 2020-01-24 RX ADMIN — ASPIRIN 325 MG: 325 TABLET, COATED ORAL at 09:01

## 2020-01-24 RX ADMIN — LEVOTHYROXINE SODIUM 112 MCG: 112 TABLET ORAL at 06:01

## 2020-01-24 RX ADMIN — LISINOPRIL 20 MG: 20 TABLET ORAL at 09:00

## 2020-01-24 RX ADMIN — HYDROCODONE BITARTRATE AND ACETAMINOPHEN 1 TABLET: 7.5; 325 TABLET ORAL at 06:00

## 2020-01-24 RX ADMIN — HYDROCODONE BITARTRATE AND ACETAMINOPHEN 1 TABLET: 7.5; 325 TABLET ORAL at 13:03

## 2020-01-24 RX ADMIN — IPRATROPIUM BROMIDE AND ALBUTEROL SULFATE 3 ML: 2.5; .5 SOLUTION RESPIRATORY (INHALATION) at 08:20

## 2020-01-24 RX ADMIN — INSULIN LISPRO 2 UNITS: 100 INJECTION, SOLUTION INTRAVENOUS; SUBCUTANEOUS at 13:02

## 2020-01-24 RX ADMIN — PANTOPRAZOLE SODIUM 40 MG: 40 TABLET, DELAYED RELEASE ORAL at 06:00

## 2020-01-24 NOTE — DISCHARGE SUMMARY
Discharge Summary TAVR    Date of Admission: 1/23/2020  Date of Discharge:  1/24/2020    PCP: Harinder Aranda MD  ATTENDING: Joey Guaman MD  Primary Cardiologist: Lane Zamorano MD      Consulting Physician(s)     Provider Relationship Specialty    Qi Valdez MD Consulting Physician Cardiology          TAVR Team  1.  Ash Briones MD  2.  Joey Guaman MD  3.  Qi Valdez MD  4.  Marielena Francois MD    Presenting Problem/ HPI: Patient is a 64 y.o.  female from [place], [place].  She follows regularly with Dr. Lane Zamorano for hx of Takotsubo cardiomyopathy, PVD, and CAD.  Serial echo performed fall 2019 indicated severe aortic stenosis.  Symptomatically, she was experiencing worsening RAZO, fatigue, and reduced activity tolerance.  She was referred for TAVR consideration due to multiple co-morbidities.       Discharge Diagnosis:     1.  Nonrheumatic aortic stenosis s/p TAVR 1/23/20    2.  COPD on home O2 with ongoing tobacco use    3.  Complicated sleep apnea with CPAP use    4.  Mixed anxiety depressive disorder     5.  HTN     6.  Obesity with poor mobility     7.  PVD with prior amputations     8.  Chronic, continuous use of opioids     9.  CAD s/p multiple stents    10.  Hx takotsubo cardiomyopathy.         Procedures Performed:   Transfemoral Transcatheter Aortic Valve Replacement utilizing 23 mm Limon Vik III prosthesis    Hospital Course:The patient is an 64 y.o. female from [address] [place] [address] with symptoms of severe aortic stenosis including RAZO, fatigue, and reduced activity tolerance which began to worsen since fall 2019.  An echocardiogram was done 12/10/19 which revealed the aortic valve indices to meet TAVR criteria, which included the aortic valve area of  sq cm, mean gradient 41 mm Hg and aortic valve V-max 4.43 m/sec.  The JUAN ANTONIO per EMMANUEL was noted to be 0.88 cm2.  She was evaluated by Dr. Briones and deemed to be high risk of mortality with traditional open AVR  primarily based upon intermediate STS risk score in addition to severe deconditioning/ poor mobility.  She was admitted the morning of the scheduled OR procedure on 123/20.    Mrs. Langstno was taken to the OR in fasting condition and placed under general anesthesia.  Dr. Valdez accessed the right femoral artery and vein.  The venous access was used to advance a temporary pacing wire to the right ventricular apex.  The arterial access was used to place a pigtail catheter to the aortic root for intra-procedural aortograms.   Meanwhile, the left femoral artery was accessed by Dr. Guaman.  The Limon delivery system was advanced.  An AL1 catheter was used to cross the aortic stenosis.  Balloon aortic valvuloplasty was performed under rapid pacing protocol.  Afterwards, the Limon Vik III valve was advanced and positioned under fluoroscopy then expanded under rapid pacing protocol.  Post implant EMMANUEL revealed satisfactory position and trivial aortic paravalvular insufficiency.  The delivery system was removed and femoral aortogram performed to reveal intact vasculature.  Two Perclose devices were tied down for hemostasis.  Patient tolerated extubation in the OR and was transferred to CT ICU in stable condition.      Upon transfer to CT ICU IV Cardene and NTG were utilized to maintain SBP <140.  Bipap was in use with 35% FIO2.  Patient was awake and alert.  Pain level addressed by RN staff with PO Percocet/ morphine and dilaudid.  Remaining femoral sheaths were discontinued at 1600.  Both groin access sites remained stable throughout the night.  Patient completed six hours bedrest and ambulated with ICU staff around 0400.      POD #1:  CT Surgeon and Cardiology evaluated patient.  Patient up in chair/ alert/ oriented.  Both femoral access sites stable, tender but no hematoma.  IV NTG and Cardene weaned per RN staff.  Supplemental oxygen 2 liters.  Afebrile.  Ambulated with PT.  Once IV antihypertensives weaned and BP  "satisfactory, approved for DC home.         Physical Exam on date of discharge:   Height 167.6 cm (66\"), weight 104 kg (229 lb), SpO2 90 %  Vitals:    01/24/20 1300 01/24/20 1320 01/24/20 1340 01/24/20 1400   BP: 136/53 136/53 138/57 110/51   BP Location:       Patient Position:       Pulse: 82 80 82 78   Resp:       Temp:       TempSrc:       SpO2: 97% 96% 96% 97%   Weight:       Height:         General:  Back to bed.  Spouse @ bedside.    Lungs:  Scattered rhonchi/ clears with cough.  O2@ 2 liters  Heart:  Regular rhythm and rate.  SR per bedside telemetry.  No murmur, gallop or rub  Abdomen:  Soft, obese, + bowel sounds  Extremities:  Both groins have superficial ecchymosis but no hematoma.  Tender but not warm/ erythematous.  Right groin has skin tear covered with dressing.  Pedal pulses palpable bilaterally.  No pedal edema.  Left foot deformity due to prior amputation.      Pertinent Test Results:   Basic Metabolic Panel   Order: [id number]   Status:  Final result   Visible to patient:  No (Not Released) Next appt:  01/29/2020 at 03:15 PM in Cardiothoracic Surgery (Arsalan Feliciano MD)   Component  Ref Range & Units 02:59  (1/24/20) 1d ago  (1/23/20) 1d ago  (1/23/20)   Glucose  65 - 99 mg/dL 144High   175High  R 173High  R   BUN  8 - 23 mg/dL 16      Creatinine  0.57 - 1.00 mg/dL 1.07High       Sodium  136 - 145 mmol/L 143      Potassium  3.5 - 5.2 mmol/L 4.6      Chloride  98 - 107 mmol/L 106      CO2  22.0 - 29.0 mmol/L 25.0      Calcium  8.6 - 10.5 mg/dL 9.0      eGFR Non African Amer  >60 mL/min/1.73 51Low       BUN/Creatinine Ratio  7.0 - 25.0 15.0      Anion Gap  5.0 - 15.0 mmol/L 12.0              CBC (No Diff)   Order: [id number]   Status:  Final result   Visible to patient:  No (Not Released) Next appt:  01/29/2020 at 03:15 PM in Cardiothoracic Surgery (Arsalan Feliciano MD)   Component  Ref Range & Units 02:59 1d ago 2d ago 2wk ago 1mo ago   WBC  3.40 - 10.80 10*3/mm3 11.33High   10.41  8.95  9.81  9.8 R   "   RBC  3.77 - 5.28 10*6/mm3 2.88Low   3.20Low   3.54Low   3.46Low   3.29Low  R   Hemoglobin  12.0 - 15.9 g/dL 8.7Low   9.1Low   10.5Low   10.2Low   9.1Low  R   Hematocrit  34.0 - 46.6 % 28.8Low   31.6Low   35.0  34.4  29.2Low     MCV  79.0 - 97.0 fL 100.0High   98.8High   98.9High   99.4High   89 R   MCH  26.6 - 33.0 pg 30.2  28.4  29.7  29.5  27.7    MCHC  31.5 - 35.7 g/dL 30.2Low   28.8Low   30.0Low   29.7Low   31.2Low     RDW  12.3 - 15.4 % 20.3High   20.1High   20.1High   22.2High   17.3High     RDW-SD  37.0 - 54.0 fl 73.5High   72.3High   72.7High   79.6High      MPV  6.0 - 12.0 fL 11.2  11.6  11.6  11.6     Platelets  140 - 450 10*3/mm3 169  173  186  158  246 R           EXAMINATION: XR CHEST 1 VW-01/23/2020:      INDICATION: Post-Op Check Line & Tube Placement; I35.9-Nonrheumatic  aortic valve disorder, unspecified; I35.0-Nonrheumatic aortic (valve)  stenosis.      COMPARISON: 01/22/2020.     FINDINGS: The heart is large. The heart is compensated. There is a  prosthetic aortic valve. There is no pulmonary inflammatory process,  mass or effusion.        IMPRESSION:  The prosthetic aortic valve appears well positioned. The  heart is large but compensated.     D:  01/23/2020      Discharge Disposition  Home or Self Care    Discharge Medications     Discharge Medications      New Medications      Instructions Start Date   carvedilol 25 MG tablet  Commonly known as:  COREG   25 mg, Oral, 2 Times Daily With Meals         Changes to Medications      Instructions Start Date   lisinopril 20 MG tablet  Commonly known as:  PRINIVIL,ZESTRIL  What changed:    · medication strength  · how much to take   20 mg, Oral, Daily         Continue These Medications      Instructions Start Date   acetaminophen 325 MG tablet  Commonly known as:  TYLENOL   650 mg, Oral, Every 4 Hours PRN      albuterol (2.5 MG/3ML) 0.083% nebulizer solution  Commonly known as:  PROVENTIL   2.5 mg, Nebulization, Every 6 Hours PRN      aspirin 325 MG  tablet   325 mg, Oral, Daily      atorvastatin 40 MG tablet  Commonly known as:  LIPITOR   TAKE ONE TABLET BY MOUTH EVERY NIGHT      busPIRone 7.5 MG tablet  Commonly known as:  BUSPAR   TAKE TWO TABLETS BY MOUTH TWICE A DAY      clonazePAM 0.5 MG tablet  Commonly known as:  KlonoPIN   TAKE ONE-HALF TABLET BY MOUTH TWICE A DAY AS NEEDED FOR SEIZURES      clopidogrel 75 MG tablet  Commonly known as:  PLAVIX   TAKE ONE TABLET BY MOUTH DAILY      ezetimibe 10 MG tablet  Commonly known as:  ZETIA   TAKE ONE TABLET BY MOUTH DAILY      fentaNYL 75 MCG/HR patch  Commonly known as:  DURAGESIC   1 patch, Transdermal, Every 48 Hours      fexofenadine 180 MG tablet  Commonly known as:  ALLEGRA   180 mg, Oral, Daily PRN      furosemide 40 MG tablet  Commonly known as:  LASIX   TAKE ONE TABLET BY MOUTH TWICE A DAY      levothyroxine 112 MCG tablet  Commonly known as:  SYNTHROID, LEVOTHROID   TAKE ONE TABLET BY MOUTH DAILY      melatonin 5 MG sublingual tablet sublingual tablet   5 mg, Sublingual, Nightly PRN      omeprazole 20 MG capsule  Commonly known as:  priLOSEC   TAKE ONE CAPSULE BY MOUTH DAILY      oxyCODONE-acetaminophen 7.5-325 MG per tablet  Commonly known as:  PERCOCET   1 tablet, Oral, Every 6 Hours PRN      pregabalin 100 MG capsule  Commonly known as:  LYRICA   100 mg, Oral, Every 8 Hours      probiotic capsule capsule   1 capsule, Oral, Daily      promethazine 25 MG tablet  Commonly known as:  PHENERGAN   TAKE ONE TABLET BY MOUTH EVERY 6 HOURS AS NEEDED FOR NAUSEA OR VOMITING      sertraline 50 MG tablet  Commonly known as:  ZOLOFT   TAKE ONE TABLET BY MOUTH DAILY      tiZANidine 4 MG tablet  Commonly known as:  ZANAFLEX   TAKE ONE TABLET BY MOUTH TWICE A DAY AS NEEDED FOR MUSCLE SPASMS      topiramate 25 MG tablet  Commonly known as:  TOPAMAX   25-50 mg, Oral, 2 Times Daily, 50 mg q am; 25 mg q pm         Stop These Medications    metoprolol tartrate 25 MG tablet  Commonly known as:  LOPRESSOR                   Discharge Diet:   Diet Instructions     Diet: Consistent Carbohydrate, Cardiac; Thin      Discharge Diet:   Consistent Carbohydrate  Cardiac       Fluid Consistency:  Thin          Activity at Discharge:   Activity Instructions     Gradually Increase Activity Until at Pre-Hospitalization Level      Lifting Restrictions      Type of Restriction:  Lifting    Lifting Restrictions:  Lifting Restriction (Indicate Limit)    Weight Limit (Pounds):  10    Length of Lifting Restriction:  2 weeks          Special Instructions:   1.  Incision care: Check groin sites daily. Clean daily with a clean washcloth, soap and water. Report erythema, drainage, swelling or significant tenderness to [facility] CT Surgery or to Soheila THAYER at [facility].   2.  Patient may shower, but no tub baths until CT Surgery followup.   3.  Walk daily starting with 5 minutes four times daily.  Increase walking by one minute per walk as tolerated.    4.  No lifting over 10 lbs until CT Surgery follow up.  5.  No driving until released to do so by the surgeon.   6.  Weigh daily and report weight gain of 3 pounds overnight or 5 pounds in a week to [facility].   7.  Report symptoms of increased shortness of breath, chest pain, or temperature greater than 101degrees to [facility] at [phone] or [facility] CT Surgery [phone].    Follow-up Appointments  Future Appointments   Date Time Provider Department Center   1/29/2020 10:45 AM Soheila Shoemaker APRN [facility]   1/29/2020  3:15 PM Arsalan Feliciano MD [facility] None   2/4/2020 11:45 AM Harinder Aranda MD [facility] None   3/9/2020 10:00 AM Lane Zamorano MD [facility] None   3/12/2020 11:45 AM Harinder Aranda MD [facility] None         Thank you for allowing the [facility] TAVR  team to care for Mrs. Langston.  If you have any questions or concerns please call Soheila THAYER at [facility] [phone].  Dr. Guaman may be reached at [phone] and Dr. Valdez/ may be reached at [phone].       JEOVANY Malhotra  01/24/20  2:35 PM

## 2020-01-24 NOTE — PLAN OF CARE
Pt S/P TAVR:  Hypertensive requiring high dose Cardene and NTG and pain meds to keep SBP < 140. NSR, Neuro intact. Arrived on Bipap, weaned to 35%.  Femoral sheaths removed after ACT and INR within ordered parameters.  All femoral sites soft but extremely tender.  Safeguards used at < maximum fill levels, bounding pedal pulses.  Skin tears dressed w Petroleum gauze.  Has not voided since OR, external catheter in place.  Sherry liquids, will advance as sherry.  Stable labs.  Pain level rarely got < 6, with Percocets/ morphine and Dilaudid, though pt sleeps between care.  Spouse updated on status, POC for rest of night.

## 2020-01-24 NOTE — PROGRESS NOTES
CTS Progress Note      POD 1 s/p TAVR       LOS: 1 day   Patient Care Team:  Harinder Aranda MD as PCP - General  Harinder Aranda MD as PCP - Family Medicine  Arsalan Feliciano MD as Surgeon (Cardiothoracic Surgery)  Lane Zamorano MD as Consulting Physician (Cardiology)    Subjective  Complaints of pain in the left groin, skin tender  On nitroglycerin and Cardene secondary to hypertension  Moves all extremities to command, oriented x3.      CC:     Objective    Vital Signs  Temp:  [97.3 °F (36.3 °C)-98.5 °F (36.9 °C)] 98.3 °F (36.8 °C)  Heart Rate:  [67-97] 85  Resp:  [16-22] 20  BP: ()/(44-77) 122/61  Arterial Line BP: (104-168)/(42-66) 151/52    Physical Exam:   General Appearance: alert, appears stated age and cooperative   Lungs: clear to auscultation, respirations regular, respirations even and respirations unlabored   Heart: regular rhythm & normal rate, normal S1, S2, no murmur, no gallop, no rub and no click   Skin: Warm, dry,    Groins: Minimal ecchymosis, no hematoma               Abdomen: Soft, nontender,               Extremities: Good distal pulses bilaterally.  Results   Results from last 7 days   Lab Units 01/24/20  0259   WBC 10*3/mm3 11.33*   HEMOGLOBIN g/dL 8.7*   HEMATOCRIT % 28.8*   PLATELETS 10*3/mm3 169     Results from last 7 days   Lab Units 01/24/20  0259   SODIUM mmol/L 143   POTASSIUM mmol/L 4.6   CHLORIDE mmol/L 106   CO2 mmol/L 25.0   BUN mg/dL 16   CREATININE mg/dL 1.07*   GLUCOSE mg/dL 144*   CALCIUM mg/dL 9.0           Imaging Results (Last 24 Hours)     Procedure Component Value Units Date/Time    XR Chest 1 View [[id number]] Collected:  01/23/20 1402     Updated:  01/23/20 1612    Narrative:       EXAMINATION: XR CHEST 1 VW-01/23/2020:      INDICATION: Post-Op Check Line & Tube Placement; I35.9-Nonrheumatic  aortic valve disorder, unspecified; I35.0-Nonrheumatic aortic (valve)  stenosis.      COMPARISON: 01/22/2020.     FINDINGS: The heart is large. The heart is  compensated. There is a  prosthetic aortic valve. There is no pulmonary inflammatory process,  mass or effusion.          Impression:       The prosthetic aortic valve appears well positioned. The  heart is large but compensated.     D:  01/23/2020  E:  01/23/2020     This report was finalized on 1/23/2020 4:09 PM by Dr. Maxime Kenny MD.             Assessment      Nonrheumatic AVS    COPD on home O2    Complicated sleep apnea    Mixed anxiety depressive disorder    GERD    HTN    Hypothyroidism    Obesity    Tobacco abuse    PVD    Chronic, continuous use of opioids    CAD s/p multiple stents    Hx takotsubo cardiomyopathy    Aortic valve disorder  Hemodynamically stable, except hypertension  Seeing patient for aortic stenosis now status post TAVR  Needs to mobilize      Plan   Ambulate  Adjust antihypertensives per cardiology  Hopefully home today    CINDI Shin  01/24/20  6:32 AM

## 2020-01-24 NOTE — DISCHARGE INSTRUCTIONS
Special Instructions:   1.  Incision care: Check groin sites daily. Clean daily with a clean washcloth, soap and water. Report erythema, drainage, swelling or significant tenderness to [facility] CT Surgery or to Soheila THAYER at [facility].   2.  Patient may shower, but no tub baths until CT Surgery followup.   3.  Walk daily starting with 5 minutes four times daily.  Increase walking by one minute per walk as tolerated.    4.  No lifting over 10 lbs until CT Surgery follow up.  5.  No driving until released to do so by the surgeon.   6.  Weigh daily and report weight gain of 3 pounds overnight or 5 pounds in a week to [facility].   7.  Report symptoms of increased shortness of breath, chest pain, or temperature greater than 101degrees to [facility] at [phone] or [facility] CT Surgery [phone].

## 2020-01-24 NOTE — PROGRESS NOTES
Intensive Care Follow-up     Hospital:  LOS: 1 day   Ms. Tamara Langston, 64 y.o. female is followed for:   Nonrheumatic aortic valve stenosis            History of present illness:   Tamara Langston is a 64 y.o. female who presents to [facility] 01/23/20 for a scheduled TAVR procedure per Dr. Guaman.      The patient has a complicated medical history including severe AVS, CAD s/p multiple stents (most recently NAYANA x2 to RCA 1/10/20), HTN, PVD s/p transmetatarsal amputation, COPD on home O2, complicated sleep apnea, and ongoing tobacco use. She was experiencing worsening dyspnea and excessive fatigue (sleeping up to 16 hrs per day) and was subsequently referred to Dr. Guaman of [facility] for evaluation of her symptomatic AVS. She was not a candidate for surgical AVR d/t her limited functional status and multiple comorbidities placing her at high risk, however she was found to be a candidate for transcatheter approach. Risks/benefits/alternatives to TAVR were discussed with the patient, who ultimately elected to undergo the procedure. This was performed 01/23/20 per Dr. Guaman and the patient was transferred to the ICU post-procedure for further management.     Subjective   Interval History:  Patient continues to be nicardipine and nitroglycerin, blood pressure meds were increased this morning.  She is on her home 2 L nasal cannula.  She denies any chest pain, shortness of breath, fever, or chills.  She remains afebrile.           The patient's past medical, surgical and social history were reviewed and updated in Epic as appropriate.       Objective     Infusions:    niCARdipine 5-15 mg/hr Last Rate: 2.5 mg/hr (01/24/20 0600)   nitroglycerin 5-200 mcg/min Last Rate: 80 mcg/min (01/24/20 0628)     Medications:    aspirin 325 mg Oral Daily   atorvastatin 40 mg Oral Nightly   carvedilol 25 mg Oral BID With Meals   clopidogrel 75 mg Oral Daily   insulin lispro 0-9 Units Subcutaneous 4x Daily With Meals & Nightly    ipratropium-albuterol 3 mL Nebulization 4x Daily - RT   levothyroxine 112 mcg Oral Q AM   lisinopril 20 mg Oral Daily   nitroglycerin 0.4 mg Sublingual Once in imaging   pantoprazole 40 mg Oral Q AM   Pharmacy Meds to Bed Consult  Does not apply Daily   pregabalin 100 mg Oral Q8H   sertraline 50 mg Oral Daily   topiramate 25 mg Oral Nightly   topiramate 50 mg Oral Daily     I reviewed the patient's medications.    Vital Sign Min/Max for last 24 hours  Temp  Min: 97.7 °F (36.5 °C)  Max: 98.5 °F (36.9 °C)   BP  Min: 94/44  Max: 158/72   Pulse  Min: 67  Max: 100   Resp  Min: 16  Max: 22   SpO2  Min: 89 %  Max: 100 %   Flow (L/min)  Min: 2  Max: 35       Input/Output for last 24 hour shift  01/23 0701 - 01/24 0700  In: 2258 [P.O.:240; I.V.:2018]  Out: 900 [Urine:900]      GENERAL : NAD, conversant  RESPIRATORY/THORAX : normal respiratory effort and no intercostal retractions,  clear to auscultation bilaterally  CARDIOVASCULAR : Normal S1/S2, RRR.  No lower ext edema.  GASTROINTESTINAL : Soft, NT/ND. BS x 4 normoactive. No hepatosplenomegaly.  MUSCULOSKELETAL : No cyanosis, clubbing, or ischemia  NEUROLOGICAL: alert and oriented to person, place and time  PSYCHOLOGICAL : Appropriate affect    Results from last 7 days   Lab Units 01/24/20  0259 01/23/20  1341 01/23/20  1257  01/22/20  1410   WBC 10*3/mm3 11.33* 10.41  --   --  8.95   HEMOGLOBIN g/dL 8.7* 9.1*  --   --  10.5*   HEMOGLOBIN, POC g/dL  --   --  9.2*   < >  --    PLATELETS 10*3/mm3 169 173  --   --  186    < > = values in this interval not displayed.     Results from last 7 days   Lab Units 01/24/20  0259 01/23/20  1341 01/22/20  1410   SODIUM mmol/L 143 143 144   POTASSIUM mmol/L 4.6 3.7 4.0   CO2 mmol/L 25.0 23.0 29.0   BUN mg/dL 16 13 17   CREATININE mg/dL 1.07* 0.83 1.12*   MAGNESIUM mg/dL  --  1.5* 1.8   GLUCOSE mg/dL 144* 212* 207*     Estimated Creatinine Clearance: 63 mL/min (A) (by C-G formula based on SCr of 1.07 mg/dL (H)).    Results from last 7  days   Lab Units 01/23/20  1257   PH, ARTERIAL pH units 7.31*       I reviewed the patient's new clinical results.  I reviewed the patient's new imaging results/reports including actual images and agree with reports.       Imaging Results (Last 24 Hours)     Procedure Component Value Units Date/Time    XR Chest 1 View [[id number]] Collected:  01/23/20 1402     Updated:  01/23/20 1612    Narrative:       EXAMINATION: XR CHEST 1 VW-01/23/2020:      INDICATION: Post-Op Check Line & Tube Placement; I35.9-Nonrheumatic  aortic valve disorder, unspecified; I35.0-Nonrheumatic aortic (valve)  stenosis.      COMPARISON: 01/22/2020.     FINDINGS: The heart is large. The heart is compensated. There is a  prosthetic aortic valve. There is no pulmonary inflammatory process,  mass or effusion.          Impression:       The prosthetic aortic valve appears well positioned. The  heart is large but compensated.     D:  01/23/2020  E:  01/23/2020     This report was finalized on 1/23/2020 4:09 PM by Dr. Maxime Kenny MD.             Assessment/Plan   Impression        Nonrheumatic AVS    COPD on home O2    Complicated sleep apnea    Mixed anxiety depressive disorder    GERD    HTN    Hypothyroidism    Obesity    Tobacco abuse    PVD    Chronic, continuous use of opioids    CAD s/p multiple stents    Hx takotsubo cardiomyopathy    Aortic valve disorder       Plan        64-year-old female with past medical history; artery disease, COPD on home O2, sleep apnea, hypertension, hypothyroidism, and morbid obesity.  Who presents to the ICU status post TAVR by Dr. Guaman on 1/23/2020.     1. Patient currently on home oxygen, continue aggressive pulmonary toileting  2. Increase blood pressure medications, will wean off nicardipine nitro  3. Ambulate  4. BiPAP ordered for night  5. Continue sliding scale insulin  6. ICU will continue to follow, possibly home today    Plan of care and goals reviewed with mulitdisciplinary/antibiotic stewardship  team during rounds.   I discussed the patient's findings and my recommendations with patient and nursing staff       Rodrigo Bird DO  Pulmonary, Critical care and Sleep Medicine

## 2020-01-24 NOTE — PLAN OF CARE
Problem: Patient Care Overview  Goal: Plan of Care Review  Outcome: Ongoing (interventions implemented as appropriate)  Flowsheets (Taken 1/24/2020 0424)  Progress: improving  Plan of Care Reviewed With: patient  Note:   Pt did well overnight. Neurologically intact. On BIPAP throughout night, no issues. Clear lung sounds, diminished throughout. NSR. Pt requiring cardene and nitro for BP control to keep SBP<140. Tolerating diet, no n/v. UOP adequate. Pt got up to bedside commode and marched in place after bedrest ended, pt does not tolerate being up for long periods at a time. Afebrile. No c/o pain. Slept well overnight with no issues. Plans to get up to chair this A.M. No other concerns at this time, will continue to monitor.

## 2020-01-24 NOTE — PROGRESS NOTES
"[facility]  IP Progress Note      Chief Complaint: Follow-up of CAD, hypertension, VHD.    Subjective   Stable post TAVR, no chest pain or shortness of air.  Blood pressure has been quite elevated, currently on Cardene and nitroglycerin.    Objective     Blood pressure 137/94, pulse 100, temperature 98.5 °F (36.9 °C), temperature source Axillary, resp. rate 18, height 167.6 cm (66\"), weight 104 kg (229 lb), SpO2 90 %, not currently breastfeeding.     Intake/Output Summary (Last 24 hours) at 1/24/2020 0758  Last data filed at 1/24/2020 0500  Gross per 24 hour   Intake 2258 ml   Output 900 ml   Net 1358 ml       Physical Exam:  General: No acute distress.   Groins reportedly stable.  Patient not examined as she was on commode.    Results Review:     I reviewed the patient's new clinical results.    Results from last 7 days   Lab Units 01/24/20  0259   WBC 10*3/mm3 11.33*   HEMOGLOBIN g/dL 8.7*   HEMATOCRIT % 28.8*   PLATELETS 10*3/mm3 169     Results from last 7 days   Lab Units 01/24/20  0259  01/22/20  1410   SODIUM mmol/L 143   < > 144   POTASSIUM mmol/L 4.6   < > 4.0   CHLORIDE mmol/L 106   < > 102   CO2 mmol/L 25.0   < > 29.0   BUN mg/dL 16   < > 17   CREATININE mg/dL 1.07*   < > 1.12*   CALCIUM mg/dL 9.0   < > 8.9   BILIRUBIN mg/dL  --   --  <0.2*   ALK PHOS U/L  --   --  66   ALT (SGPT) U/L  --   --  15   AST (SGOT) U/L  --   --  28   GLUCOSE mg/dL 144*   < > 207*    < > = values in this interval not displayed.     Results from last 7 days   Lab Units 01/24/20  0259   SODIUM mmol/L 143   POTASSIUM mmol/L 4.6   CHLORIDE mmol/L 106   CO2 mmol/L 25.0   BUN mg/dL 16   CREATININE mg/dL 1.07*   GLUCOSE mg/dL 144*   CALCIUM mg/dL 9.0     Results from last 7 days   Lab Units 01/23/20  1341 01/22/20  1410   INR  1.18* 1.06     Lab Results   Component Value Date    CKTOTAL 608 (H) 02/14/2018    TROPONINI 10.994 (C) 02/15/2018                   Tele: Sinus " Ann      Assessment:  1. CAD, status post stents of the LAD and RCA, stable, no current angina or CHF.  2. VHD, severe aortic stenosis, status post TAVR with 23 mm valve, stable post procedure.  3. Uncontrolled hypertension.  4. Dyslipidemia.  5. COPD.  Plan:  1. Optimize antihypertensive therapy.  We will discontinue metoprolol and start carvedilol.  Increase lisinopril to 20 mg daily.  2. Continue dual antiplatelet therapy and rest of the current medications.  3. Wean off nitroglycerin and Cardene.  Hopefully home today.  4. Follow-up with heart valve clinic, Dr. Lane Zamorano and Dr. Guaman as scheduled.    Qi Valdez MD, [facility], [facility]

## 2020-01-24 NOTE — PROGRESS NOTES
Multidisciplinary Rounds    Time: 20min  Patient Name: Tamara Langston  Date of Encounter: 01/24/20 9:15 AM  MRN: [id number]  Admission date: 1/23/2020      Reason for visit: MDR. RD to continue to follow per protocol.     Additional information obtained during MDR: Pt s/p TAVR (1/23). Planning for pt to possibly be d/c'd home today.     Current diet: Diet Regular; Consistent Carbohydrate, Cardiac      Intervention:  Follow treatment plan  Care plan reviewed    Follow up:   Per protocol      Imani Hirsch MS RD/LD CNSC  9:15 AM

## 2020-01-24 NOTE — PROGRESS NOTES
Case Management Discharge Note      Final Note: Pt will be discharged home today with family.  Her  and granddaughter were present in the room.  Family will provide transportation home.  Pt. has sufficient DME and will follow-up with her PCP regarding skin irritation on her abdomen.  Pt has a hospital follow-up appointment with her PCP on Tuesday, 2/4/2020 at 11:45am.  No further needs were reported.         Destination      No service has been selected for the patient.      Durable Medical Equipment      No service has been selected for the patient.      Dialysis/Infusion      No service has been selected for the patient.      Home Medical Care      No service has been selected for the patient.      Therapy      No service has been selected for the patient.      Community Resources      No service has been selected for the patient.        Transportation Services  Private: Car

## 2020-01-25 ENCOUNTER — READMISSION MANAGEMENT (OUTPATIENT)
Dept: CALL CENTER | Facility: HOSPITAL | Age: 65
End: 2020-01-25

## 2020-01-25 NOTE — OUTREACH NOTE
Prep Survey      Responses   Facility patient discharged from?  [place]   Is patient eligible?  Yes   Discharge diagnosis  Nonrheumatic AVS, s/p TAVR   Does the patient have one of the following disease processes/diagnoses(primary or secondary)?  Other   Does the patient have Home health ordered?  No   Is there a DME ordered?  No   What DME was ordered?  Pt has all needed DME at home   Comments regarding appointments  See AVS   Prep survey completed?  Yes          Marbella Jama RN

## 2020-01-26 LAB
ABO + RH BLD: NORMAL
ABO + RH BLD: NORMAL
BH BB BLOOD EXPIRATION DATE: NORMAL
BH BB BLOOD EXPIRATION DATE: NORMAL
BH BB BLOOD TYPE BARCODE: 600
BH BB BLOOD TYPE BARCODE: 600
BH BB DISPENSE STATUS: NORMAL
BH BB DISPENSE STATUS: NORMAL
BH BB PRODUCT CODE: NORMAL
BH BB PRODUCT CODE: NORMAL
BH BB UNIT NUMBER: NORMAL
BH BB UNIT NUMBER: NORMAL
UNIT  ABO: NORMAL
UNIT  ABO: NORMAL
UNIT  RH: NORMAL
UNIT  RH: NORMAL

## 2020-01-26 RX ORDER — LISINOPRIL 20 MG/1
20 TABLET ORAL DAILY
Qty: 90 TABLET | Refills: 1 | Status: SHIPPED | OUTPATIENT
Start: 2020-01-26 | End: 2020-04-29

## 2020-01-26 RX ORDER — CARVEDILOL 25 MG/1
25 TABLET ORAL 2 TIMES DAILY WITH MEALS
Qty: 180 TABLET | Refills: 1 | Status: SHIPPED | OUTPATIENT
Start: 2020-01-26 | End: 2020-02-13

## 2020-01-27 ENCOUNTER — TELEPHONE (OUTPATIENT)
Dept: INTERNAL MEDICINE | Facility: CLINIC | Age: 65
End: 2020-01-27

## 2020-01-27 ENCOUNTER — READMISSION MANAGEMENT (OUTPATIENT)
Dept: CALL CENTER | Facility: HOSPITAL | Age: 65
End: 2020-01-27

## 2020-01-27 DIAGNOSIS — F11.90 CHRONIC, CONTINUOUS USE OF OPIOIDS: Chronic | ICD-10-CM

## 2020-01-27 DIAGNOSIS — I51.81 TAKOTSUBO CARDIOMYOPATHY: Primary | ICD-10-CM

## 2020-01-27 DIAGNOSIS — G89.4 CHRONIC PAIN SYNDROME: ICD-10-CM

## 2020-01-27 DIAGNOSIS — I50.22 CHRONIC SYSTOLIC HEART FAILURE (HCC): ICD-10-CM

## 2020-01-27 RX ORDER — OXYCODONE AND ACETAMINOPHEN 7.5; 325 MG/1; MG/1
1 TABLET ORAL EVERY 6 HOURS PRN
Qty: 120 TABLET | Refills: 0 | Status: SHIPPED | OUTPATIENT
Start: 2020-01-27 | End: 2020-02-24 | Stop reason: SDUPTHER

## 2020-01-27 RX ORDER — FENTANYL 75 UG/H
1 PATCH TRANSDERMAL
Qty: 15 PATCH | Refills: 0 | Status: SHIPPED | OUTPATIENT
Start: 2020-01-27 | End: 2020-02-24 | Stop reason: SDUPTHER

## 2020-01-27 NOTE — OUTREACH NOTE
Medical Week 1 Survey      Responses   Facility patient discharged from?  [place]   Does the patient have one of the following disease processes/diagnoses(primary or secondary)?  Other   Is there a successful TCM telephone encounter documented?  No   Week 1 attempt successful?  Yes   Call start time  1245   Call end time  1251   General alerts for this patient  weakness   Is patient permission given to speak with other caregiver?  Yes   List who call center can speak with  ,Sukhdev Maxwell reviewed with patient/caregiver?  Yes   Is the patient having any side effects they believe may be caused by any medication additions or changes?  No   Does the patient have all medications ordered at discharge?  Yes   Is the patient taking all medications as directed (includes completed medication regime)?  Yes   Comments regarding appointments  2/4 Cardiology   Does the patient have a primary care provider?   Yes   Does the patient have an appointment with their PCP within 7 days of discharge?  Yes   Comments regarding PCP  Harinder Aranda   Has the patient kept scheduled appointments due by today?  Yes   Has home health visited the patient within 72 hours of discharge?  N/A   Psychosocial issues?  No   Comments  oxygen used, very weak, uses wheel chair, rollator and cane as needed, hospital bed, with air mattress, is speaking ot  about HH , has pressure ulcers, bed sores being treated.    Did the patient receive a copy of their discharge instructions?  Yes   Nursing interventions  Reviewed instructions with patient, Educated on MyChart   What is the patient's perception of their health status since discharge?  Improving   Is the patient/caregiver able to teach back signs and symptoms related to disease process for when to call PCP?  Yes   Is the patient/caregiver able to teach back signs and symptoms related to disease process for when to call 911?  Yes   Is the patient/caregiver able to teach back the hierarchy of who  to call/visit for symptoms/problems? PCP, Specialist, Home health nurse, Urgent Care, ED, 911  Yes   Week 1 call completed?  Yes   Wrap up additional comments  Takes of meds and is very tired most of time and very tired in afternoons.           Pascale Recinos, RN

## 2020-01-27 NOTE — TELEPHONE ENCOUNTER
PT CALLED AND STATED THAT SHE HAD SURGERY LAST Thursday - SHE STATES THAT SHE COULD NOT GET HOME HEALTH CARE THOUGH  -  PATIENT IS REQUESTING AN ORDER FOR HOME HEALTH WOUND CARE FOR HER GROIN AREA. PATIENT STATES THAT SHE CANNOT CARE FOR IT.  PATIENT STATES THAT SHE HAS HAD [facility] IN THE PAST AND THIS IS WHO SHE WOULD LIKE TO USE    PT CALL BACK [phone]

## 2020-01-27 NOTE — TELEPHONE ENCOUNTER
Pt called saying that it is time for a a refill of the selected meds. Pt says that she was released from the hospital on Friday 1/24/2020.     Pt says that she has enough meds to get through today.    [address]

## 2020-01-27 NOTE — TELEPHONE ENCOUNTER
I spoke with pt and she advised me she has 2 small incisions on each side of groin from having a valve replaced and a stent placed around it. She said they need to be kept cleaned and dry. She said she has excess skin from her stomach that flaps over and she is unable to lift and clean the area. She said she also has some beds sores that need to be treated. She said she has had them prior to this procedure and has had them to get infected in the past. I advised her I would let Dr. Aranda know and see if he could place the order for home health and we could call her back and let her know.

## 2020-01-28 ENCOUNTER — TELEPHONE (OUTPATIENT)
Dept: INTERNAL MEDICINE | Facility: CLINIC | Age: 65
End: 2020-01-28

## 2020-01-28 NOTE — TELEPHONE ENCOUNTER
Pt called and stated that she was in the process of trying to get Home Health. Pt called to report a fall she had last night and would like a return call

## 2020-01-31 DIAGNOSIS — M62.838 MUSCLE SPASMS OF NECK: ICD-10-CM

## 2020-01-31 RX ORDER — TIZANIDINE 4 MG/1
TABLET ORAL
Qty: 60 TABLET | Refills: 0 | Status: SHIPPED | OUTPATIENT
Start: 2020-01-31 | End: 2020-03-03

## 2020-02-03 ENCOUNTER — TELEPHONE (OUTPATIENT)
Dept: CARDIOLOGY | Facility: HOSPITAL | Age: 65
End: 2020-02-03

## 2020-02-03 NOTE — TELEPHONE ENCOUNTER
"Patient is requesting a phone call from provider. She states that she had a bad fall after her TAVR and injured herself badly. She states that since she had her surgery she has been experiencing a \"thumping\" in her chest that she had experienced prior to the TAVR. She would like for you to call after 12pm today.    Returned call to patient@ 12:45.  She states she was @ home on Monday 1/27 she tripped on her roller/walker.  She struck her fireplace with her left shoulder and hip/ abdomen.  She c/o generalized pain in her abdomen/ groin areas as well as shoulders and knees.      Today, groin area feels better now.  She cancelled her appointment here in [facility] due to being so sore.  Home health is coming to monitor her groin openings.  Advised patient to keep her appointment with Dr. Aranda tomorrow.  If he sees anything he is concerned about, he can call me or Dr. Guaman.  I will see her on 2/18 following her CT Surgery visit.  Post TAVR echo has been scheduled for 2/27/20.  Also advised that Dr. Zamorano can consider cardiac monitor after he reviewed the echo if he thinks the \"heart thumping\" may be arrhythmia.      Ms. Langston verbalized understanding.      Soheila THAYER  "

## 2020-02-04 ENCOUNTER — OFFICE VISIT (OUTPATIENT)
Dept: INTERNAL MEDICINE | Facility: CLINIC | Age: 65
End: 2020-02-04

## 2020-02-04 ENCOUNTER — TELEPHONE (OUTPATIENT)
Dept: INTERNAL MEDICINE | Facility: CLINIC | Age: 65
End: 2020-02-04

## 2020-02-04 ENCOUNTER — READMISSION MANAGEMENT (OUTPATIENT)
Dept: CALL CENTER | Facility: HOSPITAL | Age: 65
End: 2020-02-04

## 2020-02-04 VITALS
DIASTOLIC BLOOD PRESSURE: 70 MMHG | SYSTOLIC BLOOD PRESSURE: 110 MMHG | HEART RATE: 68 BPM | HEIGHT: 66 IN | BODY MASS INDEX: 36.98 KG/M2

## 2020-02-04 DIAGNOSIS — J43.9 PULMONARY EMPHYSEMA, UNSPECIFIED EMPHYSEMA TYPE (HCC): Chronic | ICD-10-CM

## 2020-02-04 DIAGNOSIS — F11.90 CHRONIC, CONTINUOUS USE OF OPIOIDS: Chronic | ICD-10-CM

## 2020-02-04 DIAGNOSIS — F41.8 MIXED ANXIETY DEPRESSIVE DISORDER: Chronic | ICD-10-CM

## 2020-02-04 DIAGNOSIS — I73.9 PERIPHERAL VASCULAR DISEASE (HCC): Primary | Chronic | ICD-10-CM

## 2020-02-04 DIAGNOSIS — E03.8 OTHER SPECIFIED HYPOTHYROIDISM: Chronic | ICD-10-CM

## 2020-02-04 DIAGNOSIS — Z72.0 TOBACCO ABUSE: Chronic | ICD-10-CM

## 2020-02-04 DIAGNOSIS — K21.9 GASTROESOPHAGEAL REFLUX DISEASE WITHOUT ESOPHAGITIS: Chronic | ICD-10-CM

## 2020-02-04 DIAGNOSIS — I10 ESSENTIAL HYPERTENSION: Chronic | ICD-10-CM

## 2020-02-04 LAB
BH CV ECHO MEAS - AO MEAN PG: 18 MMHG
LV EF 2D ECHO EST: 60 %

## 2020-02-04 PROCEDURE — 99214 OFFICE O/P EST MOD 30 MIN: CPT | Performed by: INTERNAL MEDICINE

## 2020-02-04 RX ORDER — TOPIRAMATE 50 MG/1
50 TABLET, FILM COATED ORAL 2 TIMES DAILY
Qty: 180 TABLET | Refills: 1 | Status: SHIPPED | OUTPATIENT
Start: 2020-02-04 | End: 2020-08-04

## 2020-02-04 RX ORDER — CLONAZEPAM 0.5 MG/1
0.25 TABLET ORAL 2 TIMES DAILY PRN
Qty: 30 TABLET | Refills: 2
Start: 2020-02-04 | End: 2020-04-23

## 2020-02-04 NOTE — OUTREACH NOTE
Medical Week 2 Survey      Responses   Facility patient discharged from?  [place]   Does the patient have one of the following disease processes/diagnoses(primary or secondary)?  Other   Week 2 attempt successful?  Yes   Call start time  1311   Rescheduled  Rescheduled-pt requested          Melina Romero RN

## 2020-02-04 NOTE — PROGRESS NOTES
Patient is a 64 y.o. female who is here for a follow up of hypertension and hyperlipidemia.  Chief Complaint   Patient presents with   • Hypertension   • Hypothyroidism         HPI:    Here for mgmt of HTN/hypothyroid and chronic pain.  Recently in hospital for TAVR.  Has been weak since she has returned home.  Feels sore all over.  Her gait is not the best.  Still smoking.  Her groin area is sore and followed by HH.  No fever.  Some chills.  Breathing is not the best.  Using O2 on irregular  basis.      History:     Patient Active Problem List   Diagnosis   • COPD on home O2   • Complicated sleep apnea   • Mixed anxiety depressive disorder   • GERD   • HTN   • Hypothyroidism   • Obesity   • Tobacco abuse   • PVD   • Chronic, continuous use of opioids   • CAD s/p multiple stents   • Hx takotsubo cardiomyopathy   • Nonrheumatic AVS   • Aortic valve disorder       Past Medical History:   Diagnosis Date   • Altered mental status 2/13/2018   • Bronchitis    • Cellulitis of sacral region 2/13/2018   • Cervical cancer (CMS/HCC)    • Chronic anxiety 2/27/2017   • Chronic bronchitis (CMS/HCC)    • Chronic respiratory failure with hypoxia (CMS/HCC) 3/8/2018   • Chronic systolic heart failure (CMS/HCC)    • Chronically on benzodiazepine therapy 2/27/2017   • Coronary artery disease    • Degenerative arthritis    • Diabetes mellitus (CMS/HCC)    • Dyslipidemia 5/11/2016   • Dyspnea    • Fatty liver disease, nonalcoholic 11/21/2016   • Fibromyalgia    • GERD (gastroesophageal reflux disease)    • H/O echocardiogram    • History of pneumonia    • Hypertension 5/11/2016    16. H/O echocardiogram (V15.89) (Z92.89)  · A.  Echocardiogram of 02/03/2015 reports an ejection fraction of 60-65%, mild concentric     LVH, trace mitral regurgitation, mild tricuspid and pulmonic regurgitation and calculated     RVSP of 35 mmHg, the main pulmonary artery is also mildly dilated.   • Hypothyroidism 5/11/2016    Description: A.  On replacement  therapy.   • Infected wound 3/8/2018   • Lung nodule     per pt report   • Macular degeneration    • Meningioma (CMS/[facility])     behind left eye per pt report   • Obesity    • DINA (obstructive sleep apnea)     intolerant of CPAP therapy, uses bipap   • Osteoporosis    • Polycythemia vera (CMS/[facility] located [facility]) 5/11/2016   • Pulmonary emphysema (CMS/[facility] located [facility])    • Restrictive ventilatory defect    • Sepsis (CMS/[facility] located [facility]) 2/13/2018   • TIA (transient ischemic attack) 2015    blurred vision    • Uterine cancer (CMS/[facility] located [facility])    • Vitamin D deficiency 8/1/2016   • Weakness 3/8/2018       Past Surgical History:   Procedure Laterality Date   • AMPUTATION DIGIT Left 11/23/2016    Procedure: AMPUTATION TRANS METATARSAL - ray amutation of the left great toe;  Surgeon: Arsalan Feliciano MD;  Location:  [facility] OR;  Service:    • AMPUTATION DIGIT Left 3/2/2017    Procedure: LEFT FOOT TRANSMETATARSAL AMPUTATION TOES 2,3,4,5;  Surgeon: Joey Guaman MD;  Location:  [facility] OR;  Service:    • AORTIC VALVE REPAIR/REPLACEMENT N/A 1/23/2020    Procedure: TRANSCATHETER AORTIC VALVE REPLACEMENT;  Surgeon: Joey Guaman MD;  Location:  [facility] HYBRID OR 15;  Service: Cardiothoracic   • AORTIC VALVE REPAIR/REPLACEMENT N/A 1/23/2020    Procedure: Transfemoral Transcatheter Aortic Valve Replacement;  Surgeon: Qi Valdez MD;  Location:  [facility] HYBRID OR 15;  Service: Cardiovascular   • BACK SURGERY      lumbar fusions x5--multiple times; 1995, 1997, 1998, 1999 and 2008   • BELOW KNEE AMPUTATION Left 2/25/2017    Procedure: EXTENDED LEFT TOE AMPUTATION;  Surgeon: Arsalan Feliciano MD;  Location: [facility] OR;  Service:    • CARDIAC CATHETERIZATION N/A 11/26/2016    Procedure: Left Heart Cath;  Surgeon: Ash Nuñez MD;  Location: [facility] CATH INVASIVE LOCATION;  Service:    • CARDIAC CATHETERIZATION N/A 2/20/2018    Procedure: Left Heart Cath;  Surgeon: Lane Zamorano MD;  Location: [facility] CATH INVASIVE LOCATION;  Service:    • CARDIAC CATHETERIZATION N/A 2/20/2018    Procedure:  Right Heart Cath;  Surgeon: Lane Zamorano MD;  Location:  [facility] CATH INVASIVE LOCATION;  Service:    • CARDIAC CATHETERIZATION Left 1/10/2020    Procedure: Cardiac Catheterization/Vascular Study;  Surgeon: Lane Zamorano MD;  Location:  [facility] CATH INVASIVE LOCATION;  Service: Cardiology   • COLONOSCOPY     • HAND SURGERY Bilateral     x3    • HYSTERECTOMY      status post uterine and cervical cancer   • LUMBAR SPINE SURGERY      arthrodesis by anterior approach addit interspace   • TONSILLECTOMY         Current Outpatient Medications on File Prior to Visit   Medication Sig   • acetaminophen (TYLENOL) 325 MG tablet Take 2 tablets by mouth Every 4 (Four) Hours As Needed for mild pain (1-3) or fever (temperature greater than 101F).   • albuterol (PROVENTIL) (2.5 MG/3ML) 0.083% nebulizer solution Take 2.5 mg by nebulization Every 6 (Six) Hours As Needed for Wheezing or Shortness of Air.   • aspirin 325 MG tablet Take 325 mg by mouth Daily.   • atorvastatin (LIPITOR) 40 MG tablet TAKE ONE TABLET BY MOUTH EVERY NIGHT   • busPIRone (BUSPAR) 7.5 MG tablet TAKE TWO TABLETS BY MOUTH TWICE A DAY   • carvedilol (COREG) 25 MG tablet Take 1 tablet by mouth 2 (Two) Times a Day With Meals.   • clopidogrel (PLAVIX) 75 MG tablet TAKE ONE TABLET BY MOUTH DAILY   • ezetimibe (ZETIA) 10 MG tablet TAKE ONE TABLET BY MOUTH DAILY   • fentaNYL (DURAGESIC) 75 MCG/HR patch Place 1 patch on the skin as directed by provider Every Other Day.   • fexofenadine (ALLEGRA) 180 MG tablet Take 180 mg by mouth Daily As Needed.   • furosemide (LASIX) 40 MG tablet TAKE ONE TABLET BY MOUTH TWICE A DAY   • levothyroxine (SYNTHROID, LEVOTHROID) 112 MCG tablet TAKE ONE TABLET BY MOUTH DAILY   • lisinopril (PRINIVIL,ZESTRIL) 20 MG tablet Take 1 tablet by mouth Daily.   • melatonin 5 MG sublingual tablet sublingual tablet Place 1 tablet under the tongue At Night As Needed (insomnia).   • omeprazole (priLOSEC) 20 MG capsule TAKE ONE CAPSULE BY MOUTH DAILY   •  oxyCODONE-acetaminophen (PERCOCET) 7.5-325 MG per tablet Take 1 tablet by mouth Every 6 (Six) Hours As Needed for Moderate Pain .   • pregabalin (LYRICA) 100 MG capsule Take 1 capsule by mouth Every 8 (Eight) Hours.   • probiotic (CULTURELLE) capsule capsule Take 1 capsule by mouth Daily.   • promethazine (PHENERGAN) 25 MG tablet TAKE ONE TABLET BY MOUTH EVERY 6 HOURS AS NEEDED FOR NAUSEA OR VOMITING   • sertraline (ZOLOFT) 50 MG tablet TAKE ONE TABLET BY MOUTH DAILY   • tiZANidine (ZANAFLEX) 4 MG tablet TAKE ONE TABLET BY MOUTH TWICE A DAY AS NEEDED FOR MUSCLE SPASMS   • [DISCONTINUED] clonazePAM (KlonoPIN) 0.5 MG tablet TAKE ONE-HALF TABLET BY MOUTH TWICE A DAY AS NEEDED FOR SEIZURES (Patient taking differently: 0.25 mg.)   • [DISCONTINUED] topiramate (TOPAMAX) 25 MG tablet Take 25-50 mg by mouth 2 (Two) Times a Day. 50 mg q am; 25 mg q pm      No current facility-administered medications on file prior to visit.        Family History   Problem Relation Age of Onset   • Arthritis Mother    • Diabetes Mother    • Colon polyps Mother    • Diverticulitis Mother    • Heart failure Mother    • Arthritis Father    • Bleeding Disorder Father    • Diabetes Father    • Kidney disease Father    • Heart disease Father    • COPD Sister         currently smokes   • Arthritis Brother    • Diabetes Brother    • Alcohol abuse Brother    • Heart murmur Daughter    • Arthritis Other    • Diabetes Other        Social History     Socioeconomic History   • Marital status:      Spouse name: Wali   • Number of children: 1   • Years of education: Not on file   • Highest education level: Not on file   Occupational History   • Occupation: book keeper     Employer: DISABLED     Comment: back injury   Tobacco Use   • Smoking status: Current Every Day Smoker     Packs/day: 1.00     Years: 48.00     Pack years: 48.00     Types: Cigarettes   • Smokeless tobacco: Never Used   Substance and Sexual Activity   • Alcohol use: No   • Drug use:  "No   • Sexual activity: Defer   Social History Narrative    Mrs. Langston is a 62 year old white  female. She lives with her spouse in [facility]. She states she does her own ADLs but appears disabled. They have one child and two grandchildren. She has a living will.    Caffeine: 2 serving per day. Pt lives at home with .         Review of Systems   Constitutional: Positive for fatigue. Negative for chills, diaphoresis, fever and unexpected weight change.   HENT: Negative for congestion, ear pain, hearing loss, nosebleeds, postnasal drip, sinus pressure and sore throat.    Eyes: Negative for pain, discharge and itching.   Respiratory: Positive for shortness of breath. Negative for cough, chest tightness and wheezing.    Cardiovascular: Negative for chest pain and leg swelling.   Gastrointestinal: Negative for abdominal distention, blood in stool, constipation, diarrhea, nausea and vomiting.   Endocrine: Negative for heat intolerance, polydipsia and polyuria.   Genitourinary: Negative for difficulty urinating, dysuria, frequency and hematuria.   Musculoskeletal: Positive for arthralgias, back pain, gait problem and neck stiffness. Negative for joint swelling and myalgias.   Skin:        Hair loss   Neurological: Positive for tremors, weakness and light-headedness. Negative for dizziness, syncope and headaches.   Psychiatric/Behavioral: Positive for dysphoric mood and sleep disturbance. The patient is nervous/anxious.        /70   Pulse 68   Ht 167.6 cm (65.98\")   LMP  (LMP Unknown)   BMI 36.98 kg/m²       Physical Exam   Constitutional: She is oriented to person, place, and time. She appears well-developed and well-nourished.   HENT:   Head: Normocephalic and atraumatic.   Right Ear: External ear normal.   Left Ear: External ear normal.   Mouth/Throat: Oropharynx is clear and moist.   Eyes: Conjunctivae and EOM are normal.   Neck: Normal range of motion. Neck supple.   Cardiovascular: Normal " rate and regular rhythm.   2/6 СЕРГЕЙ   Pulmonary/Chest: Effort normal.   Mildly diminished   Abdominal: Soft. Bowel sounds are normal.   Musculoskeletal:   Using wheelchair     Lymphadenopathy:     She has no cervical adenopathy.   Neurological: She is alert and oriented to person, place, and time.   Skin: Skin is warm and dry. No rash noted.   Psychiatric: She has a normal mood and affect. Her behavior is normal. Thought content normal.       Procedure:      Discussion/Summary:    chronic back pain- refill patch and percocet as needed, advised need to reduce the dose as we are doing, no better or worst  htn-controlled on ACE and BB  hyperlipidemia-cont lipitor and zetia, recheck  at goal except for TG and HDL, counseled on low chol diet  hypothroid-cont replacement, recheck today at goal  depression with anxiety-cont buspar and klonopine ,advised her of risk of addiction, improved with addition of zoloft  polycythemia-cbc on rtc, advised tob cessation  retinal vein occlusion- cont rf modification, counseled on tob cessation  b12 def-check rtc , advised B12 500 mcg qd  DM-labs on rtc, counseled on low carb, labs at goal  Elevated lft/?autoimmune-f/u gastro recs , labs today at goal, advised wt loss  A/D-cont zoloft, improved  GERD-cont PPI, stable  Tremor-cont BB but increase topamax  Fe def anemia-will recheck on rtc      Hospital labs noted and dw patient    Current Outpatient Medications:   •  acetaminophen (TYLENOL) 325 MG tablet, Take 2 tablets by mouth Every 4 (Four) Hours As Needed for mild pain (1-3) or fever (temperature greater than 101F)., Disp: 100 tablet, Rfl: 0  •  albuterol (PROVENTIL) (2.5 MG/3ML) 0.083% nebulizer solution, Take 2.5 mg by nebulization Every 6 (Six) Hours As Needed for Wheezing or Shortness of Air., Disp: 120 vial, Rfl: 5  •  aspirin 325 MG tablet, Take 325 mg by mouth Daily., Disp: , Rfl:   •  atorvastatin (LIPITOR) 40 MG tablet, TAKE ONE TABLET BY MOUTH EVERY NIGHT, Disp: 90 tablet,  Rfl: 2  •  busPIRone (BUSPAR) 7.5 MG tablet, TAKE TWO TABLETS BY MOUTH TWICE A DAY, Disp: 360 tablet, Rfl: 0  •  carvedilol (COREG) 25 MG tablet, Take 1 tablet by mouth 2 (Two) Times a Day With Meals., Disp: 180 tablet, Rfl: 1  •  clonazePAM (KlonoPIN) 0.5 MG tablet, Take 0.5 tablets by mouth 2 (Two) Times a Day As Needed for Anxiety., Disp: 30 tablet, Rfl: 2  •  clopidogrel (PLAVIX) 75 MG tablet, TAKE ONE TABLET BY MOUTH DAILY, Disp: 90 tablet, Rfl: 0  •  ezetimibe (ZETIA) 10 MG tablet, TAKE ONE TABLET BY MOUTH DAILY, Disp: 90 tablet, Rfl: 2  •  fentaNYL (DURAGESIC) 75 MCG/HR patch, Place 1 patch on the skin as directed by provider Every Other Day., Disp: 15 patch, Rfl: 0  •  fexofenadine (ALLEGRA) 180 MG tablet, Take 180 mg by mouth Daily As Needed., Disp: , Rfl:   •  furosemide (LASIX) 40 MG tablet, TAKE ONE TABLET BY MOUTH TWICE A DAY, Disp: 180 tablet, Rfl: 0  •  levothyroxine (SYNTHROID, LEVOTHROID) 112 MCG tablet, TAKE ONE TABLET BY MOUTH DAILY, Disp: 90 tablet, Rfl: 0  •  lisinopril (PRINIVIL,ZESTRIL) 20 MG tablet, Take 1 tablet by mouth Daily., Disp: 90 tablet, Rfl: 1  •  melatonin 5 MG sublingual tablet sublingual tablet, Place 1 tablet under the tongue At Night As Needed (insomnia)., Disp: 30 tablet, Rfl: 0  •  omeprazole (priLOSEC) 20 MG capsule, TAKE ONE CAPSULE BY MOUTH DAILY, Disp: 90 capsule, Rfl: 3  •  oxyCODONE-acetaminophen (PERCOCET) 7.5-325 MG per tablet, Take 1 tablet by mouth Every 6 (Six) Hours As Needed for Moderate Pain ., Disp: 120 tablet, Rfl: 0  •  pregabalin (LYRICA) 100 MG capsule, Take 1 capsule by mouth Every 8 (Eight) Hours., Disp: 270 capsule, Rfl: 0  •  probiotic (CULTURELLE) capsule capsule, Take 1 capsule by mouth Daily., Disp: 60 capsule, Rfl: 0  •  promethazine (PHENERGAN) 25 MG tablet, TAKE ONE TABLET BY MOUTH EVERY 6 HOURS AS NEEDED FOR NAUSEA OR VOMITING, Disp: 20 tablet, Rfl: 1  •  sertraline (ZOLOFT) 50 MG tablet, TAKE ONE TABLET BY MOUTH DAILY, Disp: 90 tablet, Rfl: 0  •   tiZANidine (ZANAFLEX) 4 MG tablet, TAKE ONE TABLET BY MOUTH TWICE A DAY AS NEEDED FOR MUSCLE SPASMS, Disp: 60 tablet, Rfl: 0  •  topiramate (TOPAMAX) 50 MG tablet, Take 1 tablet by mouth 2 (Two) Times a Day. 50 mg q am; 25 mg q pm, Disp: 180 tablet, Rfl: 1        Tamara was seen today for hypertension and hypothyroidism.    Diagnoses and all orders for this visit:    PVD    HTN    Pulmonary emphysema, unspecified emphysema type (CMS/HCC)    GERD    Other specified hypothyroidism    Tobacco abuse    Mixed anxiety depressive disorder  -     clonazePAM (KlonoPIN) 0.5 MG tablet; Take 0.5 tablets by mouth 2 (Two) Times a Day As Needed for Anxiety.    Chronic, continuous use of opioids    Other orders  -     topiramate (TOPAMAX) 50 MG tablet; Take 1 tablet by mouth 2 (Two) Times a Day. 50 mg q am; 25 mg q pm

## 2020-02-04 NOTE — TELEPHONE ENCOUNTER
Pharmacy called stating that the rx topiramate (TOPAMAX) 50 MG tablet dosage is different but the quantity is the same. They would like a call back to clarify directions. Please advise.    [address]   [phone]

## 2020-02-05 ENCOUNTER — OUTSIDE FACILITY SERVICE (OUTPATIENT)
Dept: INTERNAL MEDICINE | Facility: CLINIC | Age: 65
End: 2020-02-05

## 2020-02-05 ENCOUNTER — TELEPHONE (OUTPATIENT)
Dept: CARDIOLOGY | Facility: HOSPITAL | Age: 65
End: 2020-02-05

## 2020-02-05 ENCOUNTER — READMISSION MANAGEMENT (OUTPATIENT)
Dept: CALL CENTER | Facility: HOSPITAL | Age: 65
End: 2020-02-05

## 2020-02-05 PROCEDURE — G0180 MD CERTIFICATION HHA PATIENT: HCPCS | Performed by: INTERNAL MEDICINE

## 2020-02-05 NOTE — OUTREACH NOTE
Medical Week 2 Survey      Responses   Facility patient discharged from?  [facility]   Does the patient have one of the following disease processes/diagnoses(primary or secondary)?  Other   Week 2 attempt successful?  No   Rescheduled  Revoked [No answer after asking to reschedule.]          Seamus Damico RN

## 2020-02-05 NOTE — TELEPHONE ENCOUNTER
"Called Ms. Langston at the request of CT Surgery.  She states that she is feeling palpitations this AM but they are \"easing up\" now.  She wants to come in tomorrow.  Scheduled for OV tomorrow @ 10:15 am.  Advised to go to ER if symptoms worsen or change before then.      Soheila THAYER  "

## 2020-02-06 ENCOUNTER — HOSPITAL ENCOUNTER (OUTPATIENT)
Dept: CARDIOLOGY | Facility: HOSPITAL | Age: 65
Discharge: HOME | End: 2020-02-06

## 2020-02-06 ENCOUNTER — OFFICE VISIT (OUTPATIENT)
Dept: CARDIOLOGY | Facility: HOSPITAL | Age: 65
End: 2020-02-06

## 2020-02-06 ENCOUNTER — HOSPITAL ENCOUNTER (OUTPATIENT)
Dept: CARDIOLOGY | Facility: HOSPITAL | Age: 65
Discharge: HOME | End: 2020-02-06
Admitting: NURSE PRACTITIONER

## 2020-02-06 VITALS
RESPIRATION RATE: 18 BRPM | DIASTOLIC BLOOD PRESSURE: 63 MMHG | OXYGEN SATURATION: 89 % | TEMPERATURE: 99 F | HEIGHT: 66 IN | SYSTOLIC BLOOD PRESSURE: 143 MMHG | WEIGHT: 229 LBS | HEART RATE: 67 BPM | BODY MASS INDEX: 36.8 KG/M2

## 2020-02-06 DIAGNOSIS — J43.9 PULMONARY EMPHYSEMA, UNSPECIFIED EMPHYSEMA TYPE (HCC): Chronic | ICD-10-CM

## 2020-02-06 DIAGNOSIS — I35.9 AORTIC VALVE DISORDER: ICD-10-CM

## 2020-02-06 DIAGNOSIS — R00.2 PALPITATIONS: ICD-10-CM

## 2020-02-06 DIAGNOSIS — R00.2 PALPITATIONS: Primary | ICD-10-CM

## 2020-02-06 DIAGNOSIS — I35.0 NONRHEUMATIC AORTIC VALVE STENOSIS: ICD-10-CM

## 2020-02-06 DIAGNOSIS — I25.10 CORONARY ARTERY DISEASE INVOLVING NATIVE CORONARY ARTERY OF NATIVE HEART WITHOUT ANGINA PECTORIS: ICD-10-CM

## 2020-02-06 PROCEDURE — 93270 REMOTE 30 DAY ECG REV/REPORT: CPT

## 2020-02-06 PROCEDURE — 93010 ELECTROCARDIOGRAM REPORT: CPT | Performed by: INTERNAL MEDICINE

## 2020-02-06 PROCEDURE — 99214 OFFICE O/P EST MOD 30 MIN: CPT | Performed by: NURSE PRACTITIONER

## 2020-02-06 PROCEDURE — 93005 ELECTROCARDIOGRAM TRACING: CPT | Performed by: NURSE PRACTITIONER

## 2020-02-06 NOTE — PROGRESS NOTES
"[facility]  [facility]  TAVR Clinic    Encounter Date:02/06/2020     Tamara Langston  [address] [address]    1955    Harinder Aranda MD    Tamara Langston is a 64 y.o. female.      Subjective:     Chief Complaint:  Follow-up       HPI :  Ms. Langston called the Clinic yesterday to say that she was still feeling her heart \"thumping\" and lisa since TAVR on 1/23/20.  Longest episode was approximately 6 hours which occurred on 2/3/20.  No associated increased dyspnea, dizziness, or syncope.  However, she did note dull chest pressure.      She has hx of Takotsubo cardiomyopathy, CAD, PVD, COPD, and poor mobility. Patient suffered a fall the first week home post op and was evaluated by PCP on 2/4/20.  Today's visit was arranged for EKG and possibly cardiac monitor placement to assess for post op dysrhythmias.  Notably, patient also wore a cardiac monitor in November but no dysrhythmias identified.      Home health nursing continues to visit her to monitor groin skin integrity    Past Medical History:   Diagnosis Date   • Altered mental status 2/13/2018   • Bronchitis    • Cellulitis of sacral region 2/13/2018   • Cervical cancer (CMS/HCC)    • Chronic anxiety 2/27/2017   • Chronic bronchitis (CMS/HCC)    • Chronic respiratory failure with hypoxia (CMS/HCC) 3/8/2018   • Chronic systolic heart failure (CMS/HCC)    • Chronically on benzodiazepine therapy 2/27/2017   • Coronary artery disease    • Degenerative arthritis    • Diabetes mellitus (CMS/HCC)    • Dyslipidemia 5/11/2016   • Dyspnea    • Fatty liver disease, nonalcoholic 11/21/2016   • Fibromyalgia    • GERD (gastroesophageal reflux disease)    • H/O echocardiogram    • History of pneumonia    • Hypertension 5/11/2016    16. H/O echocardiogram (V15.89) (Z92.89)  · A.  Echocardiogram of 02/03/2015 reports an ejection fraction of 60-65%, mild concentric     LVH, trace mitral regurgitation, mild tricuspid " and pulmonic regurgitation and calculated     RVSP of 35 mmHg, the main pulmonary artery is also mildly dilated.   • Hypothyroidism 5/11/2016    Description: A.  On replacement therapy.   • Infected wound 3/8/2018   • Lung nodule     per pt report   • Macular degeneration    • Meningioma (CMS/[facility])     behind left eye per pt report   • Obesity    • DINA (obstructive sleep apnea)     intolerant of CPAP therapy, uses bipap   • Osteoporosis    • Polycythemia vera (CMS/HCC) 5/11/2016   • Pulmonary emphysema (CMS/HCC)    • Restrictive ventilatory defect    • Sepsis (CMS/[facility]) 2/13/2018   • TIA (transient ischemic attack) 2015    blurred vision    • Uterine cancer (CMS/[facility])    • Vitamin D deficiency 8/1/2016   • Weakness 3/8/2018     Past Surgical History:   Procedure Laterality Date   • AMPUTATION DIGIT Left 11/23/2016    Procedure: AMPUTATION TRANS METATARSAL - ray amutation of the left great toe;  Surgeon: Arsalan Feliciano MD;  Location:  [facility] OR;  Service:    • AMPUTATION DIGIT Left 3/2/2017    Procedure: LEFT FOOT TRANSMETATARSAL AMPUTATION TOES 2,3,4,5;  Surgeon: Joey Guaman MD;  Location:  [facility] OR;  Service:    • AORTIC VALVE REPAIR/REPLACEMENT N/A 1/23/2020    Procedure: TRANSCATHETER AORTIC VALVE REPLACEMENT;  Surgeon: Joey Guaman MD;  Location:  [facility] HYBRID OR 15;  Service: Cardiothoracic   • AORTIC VALVE REPAIR/REPLACEMENT N/A 1/23/2020    Procedure: Transfemoral Transcatheter Aortic Valve Replacement;  Surgeon: Qi Valdez MD;  Location: [facility] HYBRID OR 15;  Service: Cardiovascular   • BACK SURGERY      lumbar fusions x5--multiple times; 1995, 1997, 1998, 1999 and 2008   • BELOW KNEE AMPUTATION Left 2/25/2017    Procedure: EXTENDED LEFT TOE AMPUTATION;  Surgeon: Arsalan Feliciano MD;  Location: [facility] OR;  Service:    • CARDIAC CATHETERIZATION N/A 11/26/2016    Procedure: Left Heart Cath;  Surgeon: Ash Nuñez MD;  Location: [facility] CATH INVASIVE LOCATION;  Service:    • CARDIAC CATHETERIZATION N/A  2/20/2018    Procedure: Left Heart Cath;  Surgeon: Lane Zamorano MD;  Location:  [facility] CATH INVASIVE LOCATION;  Service:    • CARDIAC CATHETERIZATION N/A 2/20/2018    Procedure: Right Heart Cath;  Surgeon: Lane Zamorano MD;  Location:  [facility] CATH INVASIVE LOCATION;  Service:    • CARDIAC CATHETERIZATION Left 1/10/2020    Procedure: Cardiac Catheterization/Vascular Study;  Surgeon: Lane Zamorano MD;  Location:  [facility] CATH INVASIVE LOCATION;  Service: Cardiology   • COLONOSCOPY     • HAND SURGERY Bilateral     x3    • HYSTERECTOMY      status post uterine and cervical cancer   • LUMBAR SPINE SURGERY      arthrodesis by anterior approach addit interspace   • TONSILLECTOMY         Allergies   Allergen Reactions   • Augmentin [Amoxicillin-Pot Clavulanate] Swelling     Mouth raw, tongue swelling   • Cortisone Other (See Comments)     Hotness all over body and hyper   • Oxycontin [Oxycodone] Other (See Comments)     Psoriasis  Tolerates Percocet   • Doxycycline Rash   • Tolmetin Rash     Tolectin-rash and itching   • Vancomycin Rash     Despite rash, pt continued to stay on it 2017         Current Outpatient Medications:   •  acetaminophen (TYLENOL) 325 MG tablet, Take 2 tablets by mouth Every 4 (Four) Hours As Needed for mild pain (1-3) or fever (temperature greater than 101F)., Disp: 100 tablet, Rfl: 0  •  albuterol (PROVENTIL) (2.5 MG/3ML) 0.083% nebulizer solution, Take 2.5 mg by nebulization Every 6 (Six) Hours As Needed for Wheezing or Shortness of Air., Disp: 120 vial, Rfl: 5  •  aspirin 325 MG tablet, Take 325 mg by mouth Daily., Disp: , Rfl:   •  atorvastatin (LIPITOR) 40 MG tablet, TAKE ONE TABLET BY MOUTH EVERY NIGHT, Disp: 90 tablet, Rfl: 2  •  busPIRone (BUSPAR) 7.5 MG tablet, TAKE TWO TABLETS BY MOUTH TWICE A DAY, Disp: 360 tablet, Rfl: 0  •  carvedilol (COREG) 25 MG tablet, Take 1 tablet by mouth 2 (Two) Times a Day With Meals., Disp: 180 tablet, Rfl: 1  •  clonazePAM (KlonoPIN) 0.5 MG tablet, Take 0.5  tablets by mouth 2 (Two) Times a Day As Needed for Anxiety., Disp: 30 tablet, Rfl: 2  •  clopidogrel (PLAVIX) 75 MG tablet, TAKE ONE TABLET BY MOUTH DAILY, Disp: 90 tablet, Rfl: 0  •  ezetimibe (ZETIA) 10 MG tablet, TAKE ONE TABLET BY MOUTH DAILY, Disp: 90 tablet, Rfl: 2  •  fentaNYL (DURAGESIC) 75 MCG/HR patch, Place 1 patch on the skin as directed by provider Every Other Day., Disp: 15 patch, Rfl: 0  •  fexofenadine (ALLEGRA) 180 MG tablet, Take 180 mg by mouth Daily As Needed., Disp: , Rfl:   •  furosemide (LASIX) 40 MG tablet, TAKE ONE TABLET BY MOUTH TWICE A DAY, Disp: 180 tablet, Rfl: 0  •  levothyroxine (SYNTHROID, LEVOTHROID) 112 MCG tablet, TAKE ONE TABLET BY MOUTH DAILY, Disp: 90 tablet, Rfl: 0  •  lisinopril (PRINIVIL,ZESTRIL) 20 MG tablet, Take 1 tablet by mouth Daily., Disp: 90 tablet, Rfl: 1  •  melatonin 5 MG sublingual tablet sublingual tablet, Place 1 tablet under the tongue At Night As Needed (insomnia)., Disp: 30 tablet, Rfl: 0  •  omeprazole (priLOSEC) 20 MG capsule, TAKE ONE CAPSULE BY MOUTH DAILY, Disp: 90 capsule, Rfl: 3  •  oxyCODONE-acetaminophen (PERCOCET) 7.5-325 MG per tablet, Take 1 tablet by mouth Every 6 (Six) Hours As Needed for Moderate Pain ., Disp: 120 tablet, Rfl: 0  •  pregabalin (LYRICA) 100 MG capsule, Take 1 capsule by mouth Every 8 (Eight) Hours., Disp: 270 capsule, Rfl: 0  •  probiotic (CULTURELLE) capsule capsule, Take 1 capsule by mouth Daily., Disp: 60 capsule, Rfl: 0  •  promethazine (PHENERGAN) 25 MG tablet, TAKE ONE TABLET BY MOUTH EVERY 6 HOURS AS NEEDED FOR NAUSEA OR VOMITING, Disp: 20 tablet, Rfl: 1  •  sertraline (ZOLOFT) 50 MG tablet, TAKE ONE TABLET BY MOUTH DAILY, Disp: 90 tablet, Rfl: 0  •  tiZANidine (ZANAFLEX) 4 MG tablet, TAKE ONE TABLET BY MOUTH TWICE A DAY AS NEEDED FOR MUSCLE SPASMS, Disp: 60 tablet, Rfl: 0  •  topiramate (TOPAMAX) 50 MG tablet, Take 1 tablet by mouth 2 (Two) Times a Day. 50 mg q am; 25 mg q pm (Patient taking differently: Take 50 mg by  "mouth 2 (Two) Times a Day.), Disp: 180 tablet, Rfl: 1    The following portions of the patient's history were reviewed and updated as appropriate in Epic:  Problem list, allergies, current medications, past medical and surgical history, past social and family history.     Review of Systems   Constitution: Positive for malaise/fatigue.   HENT: Positive for congestion and hearing loss.    Eyes: Positive for visual disturbance.   Cardiovascular: Positive for chest pain, claudication, dyspnea on exertion, irregular heartbeat, leg swelling, near-syncope and palpitations.   Respiratory: Positive for cough and shortness of breath.    Endocrine: Positive for cold intolerance, heat intolerance and polydipsia.   Hematologic/Lymphatic: Bruises/bleeds easily.   Skin: Positive for itching and rash.   Gastrointestinal: Positive for abdominal pain, diarrhea and nausea.   Genitourinary: Negative.    Neurological: Positive for weakness.   Psychiatric/Behavioral: Positive for altered mental status, depression and memory loss. The patient is nervous/anxious.    Allergic/Immunologic: Positive for environmental allergies.       Objective:     Vitals:    02/06/20 1008   BP: 143/63   BP Location: Left arm   Patient Position: Sitting   Cuff Size: Adult   Pulse: 67   Resp: 18   Temp: 99 °F (37.2 °C)   TempSrc: Temporal   SpO2: (!) 89%   Weight: 104 kg (229 lb)   Height: 167.6 cm (66\")         Physical Exam   Constitutional: She is oriented to person, place, and time.   HENT:   Head: Normocephalic and atraumatic.   Eyes: Pupils are equal, round, and reactive to light. Conjunctivae are normal.   Neck: Normal range of motion. Neck supple. No JVD present.   Cardiovascular: Normal rate and regular rhythm. Exam reveals no gallop and no friction rub.   Murmur heard.  Diminished pedal pulses bilaterally.  Systolic murmur II/VI    Pulmonary/Chest: Effort normal. No respiratory distress. She has wheezes. She has no rales.   Wears O2 @ home.  " Expiratory wheezing bilateral upper lobes.     Musculoskeletal: Normal range of motion. She exhibits edema.   Trace bilateral ankle/pedal edema   Neurological: She is alert and oriented to person, place, and time. No cranial nerve deficit.   Skin: Skin is warm and dry.   Lower abdomen and groins inspected/ palpated.  Resolving ecchymosis on left.  Abdominal fold beneath pannus has superficial erythema/ excoriation (treated with medicated powder) but no open lesions or exudate.  TAVR groin access sites intact without exudate or tenderness   Psychiatric: She has a normal mood and affect. Her behavior is normal. Judgment and thought content normal.   Vitals reviewed.      Lab and Diagnostic Review: PCP notes, recent Cardiology notes    Assessment and Plan:     1. Palpitations  - Recent TAVR can be associated with post procedure dysrhythmias  - ECG 12 Lead currently NSR @ 66 with first degree AVB (this was noted post op)  - Cardiac Event Monitor 30 days  - Study should be completed and reviewed by primary cardiology around the time of follow up first week of march    2. Severe AS s/p TAVR 1/23/20  - upcoming post procedure echo 2/27/20  - upcoming CT surgery follow up 2/18/20      5. Pulmonary emphysema, unspecified emphysema type (CMS/HCC)  - ongoing tobacco use        *Please note that portions of this note were completed with a voice recognition program. Efforts were made to edit the dictations, but occasionally words are mistranscribed.

## 2020-02-10 ENCOUNTER — TELEPHONE (OUTPATIENT)
Dept: INTERNAL MEDICINE | Facility: CLINIC | Age: 65
End: 2020-02-10

## 2020-02-10 NOTE — TELEPHONE ENCOUNTER
BLAISE FROM [facility] CALLED WITH UP DATE   PATIENT WAS SEEN TODAY AND BROKEN OUT IN RASH UPPER TORSO BOTH ARMS. NOT TOO BAD BUT CONCERNED IT MIGHT BE A MEDIATION ISSUE. SHE RECENTLY STATED TAKING carvedilol (COREG) 25 MG tablet  AND INCREASED DOSAGE OF topiramate (TOPAMAX) 50 MG tablet.  TWICE A  MG. TOTAL  PLEASE CALL AND ADVISE [phone]

## 2020-02-12 ENCOUNTER — TELEPHONE (OUTPATIENT)
Dept: CARDIOLOGY | Facility: HOSPITAL | Age: 65
End: 2020-02-12

## 2020-02-12 NOTE — TELEPHONE ENCOUNTER
Mrs. Langston called and stated that she has broken out in a rash all over. Stated it started Sunday. Talked her PCP and they stated to take Benadryl and Allegra bid. She said she has been using the Benadryl spray also and nothing seems to help. She said her PCP doesn't think it is any of her medication cause she has been taking it too long to develop a rash.     She seems to think it is the carvedilol but isn't sure.     She would like for you to call her.    Thank you.    Returned call to Mrs. Langston.  Her only new medication since TAVR procedure is the coreg.  Will send RX for metoprolol that she was on pre-op x 30 days and then she can see if rash resolved and see what her monitor looks like when she follows up with Dr. Zamorano on March 9th.  See Rx sent in below.    Soheila THAYER

## 2020-02-13 DIAGNOSIS — I73.9 PERIPHERAL VASCULAR DISEASE (HCC): ICD-10-CM

## 2020-02-13 RX ORDER — CLOPIDOGREL BISULFATE 75 MG/1
TABLET ORAL
Qty: 90 TABLET | Refills: 0 | Status: SHIPPED | OUTPATIENT
Start: 2020-02-13 | End: 2020-05-12

## 2020-02-18 ENCOUNTER — OFFICE VISIT (OUTPATIENT)
Dept: CARDIAC SURGERY | Facility: CLINIC | Age: 65
End: 2020-02-18

## 2020-02-18 ENCOUNTER — APPOINTMENT (OUTPATIENT)
Dept: CARDIOLOGY | Facility: HOSPITAL | Age: 65
End: 2020-02-18

## 2020-02-18 VITALS
OXYGEN SATURATION: 90 % | TEMPERATURE: 98 F | BODY MASS INDEX: 36.64 KG/M2 | HEIGHT: 66 IN | SYSTOLIC BLOOD PRESSURE: 133 MMHG | WEIGHT: 228 LBS | HEART RATE: 66 BPM | DIASTOLIC BLOOD PRESSURE: 56 MMHG

## 2020-02-18 DIAGNOSIS — Z95.2 S/P TAVR (TRANSCATHETER AORTIC VALVE REPLACEMENT): ICD-10-CM

## 2020-02-18 PROCEDURE — 99212 OFFICE O/P EST SF 10 MIN: CPT | Performed by: NURSE PRACTITIONER

## 2020-02-18 PROCEDURE — 71045 X-RAY EXAM CHEST 1 VIEW: CPT | Performed by: NURSE PRACTITIONER

## 2020-02-18 NOTE — PROGRESS NOTES
"     [facility] Cardiothoracic Surgery Follow-Up Note    Name:  Tamara Langston  MRN Number:  [id number]  Date of Encounter:  02/18/2020    Referred By:  No ref. provider found  PCP:  Harinder Aranda MD    Chief Complaint:    Chief Complaint   Patient presents with   • Follow-up     Hospital follow up s/p TAVR 1/23/2020,complains of some dizziness and a bad fall since surgery.   • Aortic Stenosis       History of Present Illness:    Ms. Tamara Langston is a pleasant 64 y.o. female with a history of tension, hyperlipidemia, diabetes mellitus, coronary artery disease, TIA, fibromyalgia, obesity, COPD, active tobacco abuse and severe aortic stenosis status post TAVR 1/23/2020 per Dr. Guaman who returns the office today for postoperative exam.  The patient denies pain in her groin sites and worsening shortness of breath.  The patient presents in a wheelchair today.  The patient is to follow-up with her cardiologist, Dr. Zamorano, 3/9/20 and is scheduled for her postoperative echocardiogram on 2/27/2020.  The patient reports that she has had several episodes of dizziness that occurs while sitting that typically and in a \"numbness and sharp tingling all over\" her whole body.  The patient also reports that she has been experiencing palpitations and is currently wearing a monitor.  The patient does wear 2 L of nasal cannula oxygen at all times except when she is out of her house.  The patient does not check her blood pressures at home.    Review of Systems:  Review of Systems   Constitution: Positive for diaphoresis, fever and malaise/fatigue. Negative for chills, night sweats and weight loss.   HENT: Positive for hearing loss and nosebleeds. Negative for odynophagia and sore throat.    Eyes: Positive for vision loss in left eye, vision loss in right eye and visual disturbance (macular degeneration).   Cardiovascular: Positive for dyspnea on exertion and leg swelling. Negative for chest pain, orthopnea and " palpitations.   Respiratory: Positive for cough and shortness of breath. Negative for hemoptysis.    Endocrine: Negative.  Negative for cold intolerance, heat intolerance, polydipsia, polyphagia and polyuria.   Hematologic/Lymphatic: Bruises/bleeds easily.   Skin: Positive for dry skin and rash. Negative for itching.   Musculoskeletal: Positive for arthritis, back pain, falls and joint pain. Negative for joint swelling and myalgias.   Gastrointestinal: Positive for constipation, dysphagia and nausea. Negative for abdominal pain, diarrhea, hematemesis, hematochezia, melena and vomiting.   Genitourinary: Negative.  Negative for dysuria, frequency and hematuria.   Neurological: Positive for dizziness, loss of balance and tremors. Negative for focal weakness, headaches, numbness and seizures.   Psychiatric/Behavioral: Positive for depression. Negative for suicidal ideas. The patient is nervous/anxious.    Allergic/Immunologic: Positive for environmental allergies.   All other systems reviewed and are negative.      Past Medical History:    Past Medical History:   Diagnosis Date   • Altered mental status 2/13/2018   • Bronchitis    • Cellulitis of sacral region 2/13/2018   • Cervical cancer (CMS/HCC)    • Chronic anxiety 2/27/2017   • Chronic bronchitis (CMS/HCC)    • Chronic respiratory failure with hypoxia (CMS/HCC) 3/8/2018   • Chronic systolic heart failure (CMS/HCC)    • Chronically on benzodiazepine therapy 2/27/2017   • Coronary artery disease    • Degenerative arthritis    • Diabetes mellitus (CMS/HCC)    • Dyslipidemia 5/11/2016   • Dyspnea    • Fatty liver disease, nonalcoholic 11/21/2016   • Fibromyalgia    • GERD (gastroesophageal reflux disease)    • H/O echocardiogram    • History of pneumonia    • Hypertension 5/11/2016    16. H/O echocardiogram (V15.89) (Z92.89)  · A.  Echocardiogram of 02/03/2015 reports an ejection fraction of 60-65%, mild concentric     LVH, trace mitral regurgitation, mild tricuspid  and pulmonic regurgitation and calculated     RVSP of 35 mmHg, the main pulmonary artery is also mildly dilated.   • Hypothyroidism 5/11/2016    Description: A.  On replacement therapy.   • Infected wound 3/8/2018   • Lung nodule     per pt report   • Macular degeneration    • Meningioma (CMS/[facility])     behind left eye per pt report   • Obesity    • DINA (obstructive sleep apnea)     intolerant of CPAP therapy, uses bipap   • Osteoporosis    • Polycythemia vera (CMS/HCC) 5/11/2016   • Pulmonary emphysema (CMS/[facility])    • Restrictive ventilatory defect    • Sepsis (CMS/[facility]) 2/13/2018   • TIA (transient ischemic attack) 2015    blurred vision    • Uterine cancer (CMS/[facility])    • Vitamin D deficiency 8/1/2016   • Weakness 3/8/2018       Past Surgical History:    Past Surgical History:   Procedure Laterality Date   • AMPUTATION DIGIT Left 11/23/2016    Procedure: AMPUTATION TRANS METATARSAL - ray amutation of the left great toe;  Surgeon: Arsalan Feliciano MD;  Location:  [facility] OR;  Service:    • AMPUTATION DIGIT Left 3/2/2017    Procedure: LEFT FOOT TRANSMETATARSAL AMPUTATION TOES 2,3,4,5;  Surgeon: Joey Guaman MD;  Location:  [facility] OR;  Service:    • AORTIC VALVE REPAIR/REPLACEMENT N/A 1/23/2020    Procedure: TRANSCATHETER AORTIC VALVE REPLACEMENT;  Surgeon: Joey Guaman MD;  Location: [facility];  Service: Cardiothoracic   • AORTIC VALVE REPAIR/REPLACEMENT N/A 1/23/2020    Procedure: Transfemoral Transcatheter Aortic Valve Replacement;  Surgeon: Qi Valdez MD;  Location: [facility];  Service: Cardiovascular   • BACK SURGERY      lumbar fusions x5--multiple times; 1995, 1997, 1998, 1999 and 2008   • BELOW KNEE AMPUTATION Left 2/25/2017    Procedure: EXTENDED LEFT TOE AMPUTATION;  Surgeon: Arsalan Feliciano MD;  Location: [facility] OR;  Service:    • CARDIAC CATHETERIZATION N/A 11/26/2016    Procedure: Left Heart Cath;  Surgeon: Ash Nuñez MD;  Location:  [facility] CATH INVASIVE LOCATION;  Service:    •  CARDIAC CATHETERIZATION N/A 2/20/2018    Procedure: Left Heart Cath;  Surgeon: Lane Zamorano MD;  Location:  [facility] CATH INVASIVE LOCATION;  Service:    • CARDIAC CATHETERIZATION N/A 2/20/2018    Procedure: Right Heart Cath;  Surgeon: Lane Zamorano MD;  Location:  [facility] CATH INVASIVE LOCATION;  Service:    • CARDIAC CATHETERIZATION Left 1/10/2020    Procedure: Cardiac Catheterization/Vascular Study;  Surgeon: Lane Zamorano MD;  Location:  [facility] CATH INVASIVE LOCATION;  Service: Cardiology   • COLONOSCOPY     • HAND SURGERY Bilateral     x3    • HYSTERECTOMY      status post uterine and cervical cancer   • LUMBAR SPINE SURGERY      arthrodesis by anterior approach addit interspace   • TONSILLECTOMY         Patient Active Problem List   Diagnosis   • COPD on home O2   • Complicated sleep apnea   • Mixed anxiety depressive disorder   • GERD   • HTN   • Hypothyroidism   • Obesity   • Tobacco abuse   • PVD   • Chronic, continuous use of opioids   • CAD s/p multiple stents   • Hx takotsubo cardiomyopathy   • Severe AS s/p TAVR 1/23/20     Social History     Tobacco Use   • Smoking status: Current Every Day Smoker     Packs/day: 1.00     Years: 48.00     Pack years: 48.00     Types: Cigarettes   • Smokeless tobacco: Never Used   Substance Use Topics   • Alcohol use: No   • Drug use: No     Family History   Problem Relation Age of Onset   • Arthritis Mother    • Diabetes Mother    • Colon polyps Mother    • Diverticulitis Mother    • Heart failure Mother    • Arthritis Father    • Bleeding Disorder Father    • Diabetes Father    • Kidney disease Father    • Heart disease Father    • COPD Sister         currently smokes   • Arthritis Brother    • Diabetes Brother    • Alcohol abuse Brother    • Heart murmur Daughter    • Arthritis Other    • Diabetes Other        Medications:      Current Outpatient Medications:   •  acetaminophen (TYLENOL) 325 MG tablet, Take 2 tablets by mouth Every 4 (Four) Hours As Needed for mild pain  (1-3) or fever (temperature greater than 101F)., Disp: 100 tablet, Rfl: 0  •  albuterol (PROVENTIL) (2.5 MG/3ML) 0.083% nebulizer solution, Take 2.5 mg by nebulization Every 6 (Six) Hours As Needed for Wheezing or Shortness of Air., Disp: 120 vial, Rfl: 5  •  aspirin 325 MG tablet, Take 325 mg by mouth Daily., Disp: , Rfl:   •  atorvastatin (LIPITOR) 40 MG tablet, TAKE ONE TABLET BY MOUTH EVERY NIGHT, Disp: 90 tablet, Rfl: 2  •  busPIRone (BUSPAR) 7.5 MG tablet, TAKE TWO TABLETS BY MOUTH TWICE A DAY, Disp: 360 tablet, Rfl: 0  •  clonazePAM (KlonoPIN) 0.5 MG tablet, Take 0.5 tablets by mouth 2 (Two) Times a Day As Needed for Anxiety., Disp: 30 tablet, Rfl: 2  •  clopidogrel (PLAVIX) 75 MG tablet, TAKE ONE TABLET BY MOUTH DAILY, Disp: 90 tablet, Rfl: 0  •  ezetimibe (ZETIA) 10 MG tablet, TAKE ONE TABLET BY MOUTH DAILY, Disp: 90 tablet, Rfl: 2  •  fentaNYL (DURAGESIC) 75 MCG/HR patch, Place 1 patch on the skin as directed by provider Every Other Day., Disp: 15 patch, Rfl: 0  •  fexofenadine (ALLEGRA) 180 MG tablet, Take 180 mg by mouth Daily As Needed., Disp: , Rfl:   •  furosemide (LASIX) 40 MG tablet, TAKE ONE TABLET BY MOUTH TWICE A DAY, Disp: 180 tablet, Rfl: 0  •  levothyroxine (SYNTHROID, LEVOTHROID) 112 MCG tablet, TAKE ONE TABLET BY MOUTH DAILY, Disp: 90 tablet, Rfl: 0  •  lisinopril (PRINIVIL,ZESTRIL) 20 MG tablet, Take 1 tablet by mouth Daily., Disp: 90 tablet, Rfl: 1  •  melatonin 5 MG sublingual tablet sublingual tablet, Place 1 tablet under the tongue At Night As Needed (insomnia)., Disp: 30 tablet, Rfl: 0  •  metoprolol tartrate (LOPRESSOR) 25 MG tablet, Take 1 tablet by mouth 2 (Two) Times a Day., Disp: 60 tablet, Rfl: 0  •  omeprazole (priLOSEC) 20 MG capsule, TAKE ONE CAPSULE BY MOUTH DAILY, Disp: 90 capsule, Rfl: 3  •  oxyCODONE-acetaminophen (PERCOCET) 7.5-325 MG per tablet, Take 1 tablet by mouth Every 6 (Six) Hours As Needed for Moderate Pain ., Disp: 120 tablet, Rfl: 0  •  pregabalin (LYRICA) 100  "MG capsule, Take 1 capsule by mouth Every 8 (Eight) Hours., Disp: 270 capsule, Rfl: 0  •  probiotic (CULTURELLE) capsule capsule, Take 1 capsule by mouth Daily., Disp: 60 capsule, Rfl: 0  •  promethazine (PHENERGAN) 25 MG tablet, TAKE ONE TABLET BY MOUTH EVERY 6 HOURS AS NEEDED FOR NAUSEA OR VOMITING, Disp: 20 tablet, Rfl: 1  •  sertraline (ZOLOFT) 50 MG tablet, TAKE ONE TABLET BY MOUTH DAILY, Disp: 90 tablet, Rfl: 0  •  tiZANidine (ZANAFLEX) 4 MG tablet, TAKE ONE TABLET BY MOUTH TWICE A DAY AS NEEDED FOR MUSCLE SPASMS, Disp: 60 tablet, Rfl: 0  •  topiramate (TOPAMAX) 50 MG tablet, Take 1 tablet by mouth 2 (Two) Times a Day. 50 mg q am; 25 mg q pm (Patient taking differently: Take 50 mg by mouth 2 (Two) Times a Day.), Disp: 180 tablet, Rfl: 1    Allergies:  Allergies   Allergen Reactions   • Augmentin [Amoxicillin-Pot Clavulanate] Swelling     Mouth raw, tongue swelling   • Cortisone Other (See Comments)     Hotness all over body and hyper   • Oxycontin [Oxycodone] Other (See Comments)     Psoriasis  Tolerates Percocet   • Doxycycline Rash   • Tolmetin Rash     Tolectin-rash and itching   • Vancomycin Rash     Despite rash, pt continued to stay on it 2017       Physical Exam:  Vital Signs:    Vitals:    02/18/20 0933   BP: 133/56   BP Location: Right arm   Patient Position: Sitting   Pulse: 66   Temp: 98 °F (36.7 °C)   SpO2: 90%   Weight: 103 kg (228 lb)   Height: 167.6 cm (66\")       Physical Exam   Gen- NAD, pleasant, cooperative  CV- Regular rate and rhythm, no gallop or rub, murmur heard  Pulm- Wheezes throughout all lung fields, no rhonci or rales noted.  GI- Soft, normoactive bowel sounds, non-tender  Ext- Without bilateral 2+ non pitting edema  Incision-the patient groin sites are well-healed with no erythema, drainage or pseudoaneurysm noted.  Neuro- CN II- XII grossly intact, tongue midline, voice normal.    Labs/Imaging:  Chest x-ray, [facility], 2/18/2020  Impression:  A prosthetic aortic device is in " position. The heart is slightly large but radiographically compensated. There has been little change when compared with 01/23/2020.  I personally reviewed these images in the office today.    Assessment / Plan:  Ms. Tamara Langston is a pleasant 64 y.o. female with a history of tension, hyperlipidemia, diabetes mellitus, coronary artery disease, TIA, fibromyalgia, obesity, COPD, active tobacco abuse and severe aortic stenosis status post TAVR 1/23/2020 per Dr. Guaman who returns the office today for postoperative exam.  The patient has had a steady postoperative course.  Her groin sites are healing well and chest x-ray obtained in the office today is satisfactory.  I am uncertain as to what is causing the patient to have dizzy spells, but I have asked her to monitor her blood pressure during these events to make sure she is not experiencing hypotension.  The patient has a monitor in place today for evaluation of her palpitations and, or possible arrhythmias that may be contributing to these dizzy spells.  The patient is set to follow-up with her cardiologist, Dr. Zamorano, 3/9/20.  At this time, we will plan to see the patient back on a as needed basis.    Please note, this document was produced using voice recognition software.    JEOVANY Mcnulty  [facility] Cardiothoracic Surgery

## 2020-02-18 NOTE — PROGRESS NOTES
TAVR APRN    Met with patient following CT surgery visit.  Completed KCCQ12 form.  Score is 53/70 (pre-op score was 15/70).  As noted previously, she fell at home approximately two weeks ago and exacerbated her chronic back pain. Therefore, she was unable/ unwilling to walk today. NYHA class II symptoms in regards to her systolic HF.  Next TAVR clinic visit in one year.    Soheila THAYER

## 2020-02-24 DIAGNOSIS — F11.90 CHRONIC, CONTINUOUS USE OF OPIOIDS: Chronic | ICD-10-CM

## 2020-02-24 DIAGNOSIS — G89.4 CHRONIC PAIN SYNDROME: ICD-10-CM

## 2020-02-24 RX ORDER — OXYCODONE AND ACETAMINOPHEN 7.5; 325 MG/1; MG/1
1 TABLET ORAL EVERY 6 HOURS PRN
Qty: 120 TABLET | Refills: 0 | Status: SHIPPED | OUTPATIENT
Start: 2020-02-24 | End: 2020-03-23 | Stop reason: SDUPTHER

## 2020-02-24 RX ORDER — FENTANYL 75 UG/H
1 PATCH TRANSDERMAL
Qty: 15 PATCH | Refills: 0 | Status: SHIPPED | OUTPATIENT
Start: 2020-02-24 | End: 2020-03-23 | Stop reason: SDUPTHER

## 2020-03-03 DIAGNOSIS — M62.838 MUSCLE SPASMS OF NECK: ICD-10-CM

## 2020-03-03 RX ORDER — TIZANIDINE 4 MG/1
TABLET ORAL
Qty: 60 TABLET | Refills: 0 | Status: SHIPPED | OUTPATIENT
Start: 2020-03-03 | End: 2020-04-02

## 2020-03-09 ENCOUNTER — OFFICE VISIT (OUTPATIENT)
Dept: CARDIOLOGY | Facility: CLINIC | Age: 65
End: 2020-03-09

## 2020-03-09 VITALS
DIASTOLIC BLOOD PRESSURE: 61 MMHG | WEIGHT: 223 LBS | OXYGEN SATURATION: 92 % | HEART RATE: 73 BPM | SYSTOLIC BLOOD PRESSURE: 132 MMHG | HEIGHT: 66 IN | BODY MASS INDEX: 35.84 KG/M2

## 2020-03-09 DIAGNOSIS — E78.2 MIXED HYPERLIPIDEMIA: ICD-10-CM

## 2020-03-09 DIAGNOSIS — I35.0 NONRHEUMATIC AORTIC VALVE STENOSIS: ICD-10-CM

## 2020-03-09 DIAGNOSIS — I10 ESSENTIAL HYPERTENSION: ICD-10-CM

## 2020-03-09 DIAGNOSIS — I25.10 CORONARY ARTERY DISEASE INVOLVING NATIVE CORONARY ARTERY OF NATIVE HEART WITHOUT ANGINA PECTORIS: Primary | ICD-10-CM

## 2020-03-09 DIAGNOSIS — I51.81 TAKOTSUBO CARDIOMYOPATHY: ICD-10-CM

## 2020-03-09 DIAGNOSIS — I50.22 CHRONIC SYSTOLIC HEART FAILURE (HCC): ICD-10-CM

## 2020-03-09 PROCEDURE — 99214 OFFICE O/P EST MOD 30 MIN: CPT | Performed by: INTERNAL MEDICINE

## 2020-03-09 NOTE — PROGRESS NOTES
[facility] AT [address], [address]  [place], [place], [id number] [phone]  [email or web address].[email or web address]           OUTPATIENT CLINIC FOLLOW UP NOTE    Patient Care Team:  Patient Care Team:  Harinder Aranda MD as PCP - General  Harinder Aranda MD as PCP - Family Medicine  Arsalan Feliciano MD as Surgeon (Cardiothoracic Surgery)  Lane Zamorano MD as Consulting Physician (Cardiology)    Subjective:      Chief Complaint   Patient presents with   • Coronary Artery Disease     F/u       HPI:    Tamara Langston is a 64 y.o. female.  Cardiac problem list:  1. CAD s/p multiple PCI  2. Severe aortic stenosis status post TAVR (23 mm CPN 3 tissue valve) on 1/23/2020  3. History of systolic heart failure/Takotsubo  4. Hypertension  5. Hyperlipidemia  6. Diabetes  7. Sleep apnea, arthritis, uterine and cervical cancer, fatty liver, hypothyroidism, polycythemia vera, fibromyalgia, and osteoporosis.      The patient presents today for follow-up.  Since patient underwent TAVR she has had palpitations, intermittent dizziness, stable lower extremity edema and shortness of breath.  She was evaluated in the heart valve clinic and a 30-day M cot was placed.  Since that placement her palpitations have slightly improved.  Her most recent dizzy episode was yesterday she woke up dizzy and had associated tremors and slight confusion.  She was unable to check her blood glucose or her blood pressure at that time.  She also reports that she developed a rash from Coreg and has since been restarted on metoprolol.    Review of Systems:  Positive for palpitations, shortness of breath, lower extremity edema, dizziness  Negative for exertional chest pain,  orthopnea, PND, lightheadedness, syncope.    PFSH:  Patient Active Problem List   Diagnosis   • COPD on home O2   • Complicated sleep apnea   • Mixed anxiety depressive disorder   • GERD   • HTN   • Hypothyroidism   • Obesity   • Tobacco abuse   • PVD    • Chronic, continuous use of opioids   • CAD s/p multiple stents   • Hx takotsubo cardiomyopathy   • Severe AS s/p TAVR 1/23/20         Current Outpatient Medications:   •  acetaminophen (TYLENOL) 325 MG tablet, Take 2 tablets by mouth Every 4 (Four) Hours As Needed for mild pain (1-3) or fever (temperature greater than 101F)., Disp: 100 tablet, Rfl: 0  •  albuterol (PROVENTIL) (2.5 MG/3ML) 0.083% nebulizer solution, Take 2.5 mg by nebulization Every 6 (Six) Hours As Needed for Wheezing or Shortness of Air., Disp: 120 vial, Rfl: 5  •  aspirin 325 MG tablet, Take 325 mg by mouth Daily., Disp: , Rfl:   •  atorvastatin (LIPITOR) 40 MG tablet, TAKE ONE TABLET BY MOUTH EVERY NIGHT, Disp: 90 tablet, Rfl: 2  •  busPIRone (BUSPAR) 7.5 MG tablet, TAKE TWO TABLETS BY MOUTH TWICE A DAY, Disp: 360 tablet, Rfl: 0  •  clonazePAM (KlonoPIN) 0.5 MG tablet, Take 0.5 tablets by mouth 2 (Two) Times a Day As Needed for Anxiety., Disp: 30 tablet, Rfl: 2  •  clopidogrel (PLAVIX) 75 MG tablet, TAKE ONE TABLET BY MOUTH DAILY, Disp: 90 tablet, Rfl: 0  •  ezetimibe (ZETIA) 10 MG tablet, TAKE ONE TABLET BY MOUTH DAILY, Disp: 90 tablet, Rfl: 2  •  fentaNYL (DURAGESIC) 75 MCG/HR patch, Place 1 patch on the skin as directed by provider Every Other Day., Disp: 15 patch, Rfl: 0  •  fexofenadine (ALLEGRA) 180 MG tablet, Take 180 mg by mouth Daily As Needed., Disp: , Rfl:   •  furosemide (LASIX) 40 MG tablet, TAKE ONE TABLET BY MOUTH TWICE A DAY, Disp: 180 tablet, Rfl: 0  •  levothyroxine (SYNTHROID, LEVOTHROID) 112 MCG tablet, TAKE ONE TABLET BY MOUTH DAILY, Disp: 90 tablet, Rfl: 0  •  lisinopril (PRINIVIL,ZESTRIL) 20 MG tablet, Take 1 tablet by mouth Daily., Disp: 90 tablet, Rfl: 1  •  melatonin 5 MG sublingual tablet sublingual tablet, Place 1 tablet under the tongue At Night As Needed (insomnia)., Disp: 30 tablet, Rfl: 0  •  metoprolol tartrate (LOPRESSOR) 25 MG tablet, Take 1 tablet by mouth 2 (Two) Times a Day., Disp: 60 tablet, Rfl: 0  •   omeprazole (priLOSEC) 20 MG capsule, TAKE ONE CAPSULE BY MOUTH DAILY, Disp: 90 capsule, Rfl: 3  •  oxyCODONE-acetaminophen (PERCOCET) 7.5-325 MG per tablet, Take 1 tablet by mouth Every 6 (Six) Hours As Needed for Moderate Pain ., Disp: 120 tablet, Rfl: 0  •  pregabalin (LYRICA) 100 MG capsule, Take 1 capsule by mouth Every 8 (Eight) Hours., Disp: 270 capsule, Rfl: 0  •  probiotic (CULTURELLE) capsule capsule, Take 1 capsule by mouth Daily., Disp: 60 capsule, Rfl: 0  •  promethazine (PHENERGAN) 25 MG tablet, TAKE ONE TABLET BY MOUTH EVERY 6 HOURS AS NEEDED FOR NAUSEA OR VOMITING, Disp: 20 tablet, Rfl: 1  •  sertraline (ZOLOFT) 50 MG tablet, TAKE ONE TABLET BY MOUTH DAILY, Disp: 90 tablet, Rfl: 0  •  tiZANidine (ZANAFLEX) 4 MG tablet, TAKE ONE TABLET BY MOUTH TWICE A DAY AS NEEDED FOR MUSCLE SPASMS, Disp: 60 tablet, Rfl: 0  •  topiramate (TOPAMAX) 50 MG tablet, Take 1 tablet by mouth 2 (Two) Times a Day. 50 mg q am; 25 mg q pm (Patient taking differently: Take 50 mg by mouth 2 (Two) Times a Day.), Disp: 180 tablet, Rfl: 1    Allergies   Allergen Reactions   • Augmentin [Amoxicillin-Pot Clavulanate] Swelling     Mouth raw, tongue swelling   • Cortisone Other (See Comments)     Hotness all over body and hyper   • Oxycontin [Oxycodone] Other (See Comments)     Psoriasis  Tolerates Percocet   • Coreg [Carvedilol] Rash   • Doxycycline Rash   • Tolmetin Rash     Tolectin-rash and itching   • Vancomycin Rash     Despite rash, pt continued to stay on it 2017        reports that she has been smoking cigarettes. She has a 48.00 pack-year smoking history. She has never used smokeless tobacco.    Family History   Problem Relation Age of Onset   • Arthritis Mother    • Diabetes Mother    • Colon polyps Mother    • Diverticulitis Mother    • Heart failure Mother    • Arthritis Father    • Bleeding Disorder Father    • Diabetes Father    • Kidney disease Father    • Heart disease Father    • COPD Sister         currently smokes   •  "Arthritis Brother    • Diabetes Brother    • Alcohol abuse Brother    • Heart murmur Daughter    • Arthritis Other    • Diabetes Other          Objective:   Blood pressure 132/61, pulse 73, height 167.6 cm (66\"), weight 101 kg (223 lb), SpO2 92 %, not currently breastfeeding.  CONSTITUTIONAL: No acute distress, normal affect  RESPIRATORY: Normal effort. Clear to auscultation bilaterally without wheezing or rales.  CARDIOVASCULAR:  Regular rate and rhythm with normal S1 and S2. Without gallop or rub. СЕРГЕЙ LUSB with radiation to the carotids  PERIPHERAL VASCULAR: Normal radial pulses bilaterally. There is trace pedal edema bilaterally with left greater than right.    Labs:      Lab Results   Component Value Date    CHOL 112 02/14/2018    CHOL 123 02/25/2017    CHOL 244 (H) 11/27/2016     Lab Results   Component Value Date    TRIG 220 (H) 12/05/2019    TRIG 245 (H) 10/10/2018    TRIG 141 02/14/2018     Lab Results   Component Value Date    HDL 31 (L) 12/05/2019    HDL 34 (L) 10/10/2018    HDL 28 (L) 02/14/2018     Lab Results   Component Value Date    LDL 44 12/05/2019    LDL 54 10/10/2018    LDL 60 02/14/2018     Lab Results   Component Value Date    VLDL 44 (H) 12/05/2019    VLDL 49 10/10/2018    VLDL 69.8 10/03/2017     No results found for: LDLHDL      Diagnostic Data:    Procedures    TTE 12/2018  · Left ventricular systolic function is normal. Estimated EF = 65%.  · Left ventricular wall thickness is consistent with mild-to-moderate concentric hypertrophy.  · Left atrial cavity size is mildly dilated.  · There is severe calcification of the aortic valve.  · Moderate aortic valve stenosis is present.  · Estimated right ventricular systolic pressure from tricuspid regurgitation is mildly elevated (35-45 mmHg).     Limited TTE 7/2018  · Left ventricular systolic function is normal. Estimated EF = 60%.    TTE 2/2018  · Left ventricular systolic function is moderately decreased. Estimated EF = 35%.  · Left ventricular " wall thickness is consistent with mild-to-moderate concentric hypertrophy.  · The following left ventricular wall segments are hypokinetic: apical anterior, apical lateral, apical inferior, apical septal and apex hypokinetic. The basal segments are hyperdynamic.  · The findings have the apperance of stress-induced cardiomyopathy (Takotsubo)  · Left ventricular diastolic dysfunction (grade I a) consistent with impaired relaxation.  · Right ventricular cavity is small in size.  · The main pulmonary artery is moderately dilated.  · Left atrial cavity size is borderline dilated.  · There is moderate calcification of the aortic valve.  · Moderate aortic valve stenosis is present.    [facility] 02/20/2018  · There was severe coronary artery disease involving the mid LAD and first diagonal branch that is now status post intervention with a Tryton 3.5mm proximal, 3.0mm distal bifurcation stent extending from the LAD into the diagonal branch and a Synergy 3.5 x 38 mm drug-eluting stent extending from the proximal to mid LAD.  · Normal left ventricular ejection fraction but with hypokinesis of the apical segments  · Normal right heart filling pressures.  Normal pulmonate capillary wedge pressure  · Normal cardiac index    [facility] 11/2016  1.  50% mid RCA stenosis.  2.  40% proximal LAD stenosis   3.  Normal left ventricular function  4.  Hypertension    Assessment and Plan:   Tamara was seen today for coronary artery disease and hypertension.    Diagnoses and all orders for this visit:    Coronary artery disease involving native coronary artery of native heart without angina pectoris  Mixed hyperlipidemia  Polycythemia vera  History of stroke  -Currently chest pain free.  -Continue DAPT. On Plavix also due to her polycythemia vera and prior stroke  -Continue other current related medications    Severe AS status post TAVR  -Status post TAVR 1/23/2020  -Repeat TTE due to recent symptoms (dyspnea, swelling, lightheadedness) to reassess  aortic valve    Takotsubo cardiomyopathy  Chronic systolic heart failure (CMS/HCC)  -Most recent EF 60%  -Continue Lasix    Essential hypertension  -Continue current medications.    Palpitations  -Recent 30-day MCOT.  Patient has not mailed this back yet.  Will review when available.    - Return in about 3 months (around 6/9/2020).    Scribed for Lane Zamorano MD by Lillie Quiroz, JEOVANY  . 3/9/2020  10:43 AM     I, Lane Zamorano MD, personally performed the services as scribed by the above named individual. I have made any necessary edits and it is both accurate and complete.     Lane Zamorano MD, MSc, [facility]  Interventional Cardiology  [facility]

## 2020-03-12 ENCOUNTER — OFFICE VISIT (OUTPATIENT)
Dept: INTERNAL MEDICINE | Facility: CLINIC | Age: 65
End: 2020-03-12

## 2020-03-12 VITALS
BODY MASS INDEX: 36.01 KG/M2 | DIASTOLIC BLOOD PRESSURE: 50 MMHG | SYSTOLIC BLOOD PRESSURE: 90 MMHG | HEART RATE: 68 BPM | HEIGHT: 66 IN

## 2020-03-12 DIAGNOSIS — K21.9 GASTROESOPHAGEAL REFLUX DISEASE WITHOUT ESOPHAGITIS: Chronic | ICD-10-CM

## 2020-03-12 DIAGNOSIS — F41.8 MIXED ANXIETY DEPRESSIVE DISORDER: Chronic | ICD-10-CM

## 2020-03-12 DIAGNOSIS — Z72.0 TOBACCO ABUSE: Chronic | ICD-10-CM

## 2020-03-12 DIAGNOSIS — Z12.39 BREAST CANCER SCREENING: ICD-10-CM

## 2020-03-12 DIAGNOSIS — E03.8 OTHER SPECIFIED HYPOTHYROIDISM: Chronic | ICD-10-CM

## 2020-03-12 DIAGNOSIS — I73.9 PERIPHERAL VASCULAR DISEASE (HCC): Primary | Chronic | ICD-10-CM

## 2020-03-12 DIAGNOSIS — E55.9 VITAMIN D DEFICIENCY: ICD-10-CM

## 2020-03-12 DIAGNOSIS — F11.90 CHRONIC, CONTINUOUS USE OF OPIOIDS: Chronic | ICD-10-CM

## 2020-03-12 DIAGNOSIS — E61.1 IRON DEFICIENCY: ICD-10-CM

## 2020-03-12 DIAGNOSIS — Z12.31 ENCOUNTER FOR SCREENING MAMMOGRAM FOR MALIGNANT NEOPLASM OF BREAST: ICD-10-CM

## 2020-03-12 DIAGNOSIS — I10 ESSENTIAL HYPERTENSION: Chronic | ICD-10-CM

## 2020-03-12 DIAGNOSIS — J43.9 PULMONARY EMPHYSEMA, UNSPECIFIED EMPHYSEMA TYPE (HCC): Chronic | ICD-10-CM

## 2020-03-12 PROCEDURE — 93272 ECG/REVIEW INTERPRET ONLY: CPT | Performed by: INTERNAL MEDICINE

## 2020-03-12 PROCEDURE — 99214 OFFICE O/P EST MOD 30 MIN: CPT | Performed by: INTERNAL MEDICINE

## 2020-03-12 RX ORDER — FUROSEMIDE 40 MG/1
TABLET ORAL
Qty: 180 TABLET | Refills: 0 | Status: SHIPPED | OUTPATIENT
Start: 2020-03-12 | End: 2020-03-13

## 2020-03-12 NOTE — PROGRESS NOTES
Patient is a 64 y.o. female who is here for a follow up of hypertension,hypothyroidism and pain.  Chief Complaint   Patient presents with   • Hypertension   • Hypothyroidism   • Pain         HPI:    Here for mgmt of HTN and DM and chronic pain.   Last AIC was 5.3 on 1/22.  Not checking FSBS.  Occasional dizziness and generalized weakness.  No HAs.  Still smoking.  Appetite is good.  Energy level is not the best.  No CP or palpitations.  GERD is controlled.     History:     Patient Active Problem List   Diagnosis   • COPD on home O2   • Complicated sleep apnea   • Mixed anxiety depressive disorder   • GERD   • HTN   • Hypothyroidism   • Obesity   • Tobacco abuse   • PVD   • Chronic, continuous use of opioids   • CAD s/p multiple stents   • Hx takotsubo cardiomyopathy   • Severe AS s/p TAVR 1/23/20       Past Medical History:   Diagnosis Date   • Altered mental status 2/13/2018   • Bronchitis    • Cellulitis of sacral region 2/13/2018   • Cervical cancer (CMS/HCC)    • Chronic anxiety 2/27/2017   • Chronic bronchitis (CMS/HCC)    • Chronic respiratory failure with hypoxia (CMS/HCC) 3/8/2018   • Chronic systolic heart failure (CMS/HCC)    • Chronically on benzodiazepine therapy 2/27/2017   • Coronary artery disease    • Degenerative arthritis    • Diabetes mellitus (CMS/HCC)    • Dyslipidemia 5/11/2016   • Dyspnea    • Fatty liver disease, nonalcoholic 11/21/2016   • Fibromyalgia    • GERD (gastroesophageal reflux disease)    • H/O echocardiogram    • History of pneumonia    • Hypertension 5/11/2016    16. H/O echocardiogram (V15.89) (Z92.89)  · A.  Echocardiogram of 02/03/2015 reports an ejection fraction of 60-65%, mild concentric     LVH, trace mitral regurgitation, mild tricuspid and pulmonic regurgitation and calculated     RVSP of 35 mmHg, the main pulmonary artery is also mildly dilated.   • Hypothyroidism 5/11/2016    Description: A.  On replacement therapy.   • Infected wound 3/8/2018   • Lung nodule     per  pt report   • Macular degeneration    • Meningioma (CMS/[facility])     behind left eye per pt report   • Obesity    • DINA (obstructive sleep apnea)     intolerant of CPAP therapy, uses bipap   • Osteoporosis    • Polycythemia vera (CMS/[facility]) 5/11/2016   • Pulmonary emphysema (CMS/[facility])    • Restrictive ventilatory defect    • Sepsis (CMS/[facility]) 2/13/2018   • TIA (transient ischemic attack) 2015    blurred vision    • Uterine cancer (CMS/[facility])    • Vitamin D deficiency 8/1/2016   • Weakness 3/8/2018       Past Surgical History:   Procedure Laterality Date   • AMPUTATION DIGIT Left 11/23/2016    Procedure: AMPUTATION TRANS METATARSAL - ray amutation of the left great toe;  Surgeon: Arsalan Feliciano MD;  Location:  [facility] OR;  Service:    • AMPUTATION DIGIT Left 3/2/2017    Procedure: LEFT FOOT TRANSMETATARSAL AMPUTATION TOES 2,3,4,5;  Surgeon: Joey Guaman MD;  Location: [facility] OR;  Service:    • AORTIC VALVE REPAIR/REPLACEMENT N/A 1/23/2020    Procedure: TRANSCATHETER AORTIC VALVE REPLACEMENT;  Surgeon: Joey Guaman MD;  Location: [facility] HYBRID OR 15;  Service: Cardiothoracic   • AORTIC VALVE REPAIR/REPLACEMENT N/A 1/23/2020    Procedure: Transfemoral Transcatheter Aortic Valve Replacement;  Surgeon: Qi Valdez MD;  Location:  [facility] HYBRID OR 15;  Service: Cardiovascular   • BACK SURGERY      lumbar fusions x5--multiple times; 1995, 1997, 1998, 1999 and 2008   • BELOW KNEE AMPUTATION Left 2/25/2017    Procedure: EXTENDED LEFT TOE AMPUTATION;  Surgeon: Arsalan Feliciano MD;  Location: [facility] OR;  Service:    • CARDIAC CATHETERIZATION N/A 11/26/2016    Procedure: Left Heart Cath;  Surgeon: Ash Nuñez MD;  Location: [facility] CATH INVASIVE LOCATION;  Service:    • CARDIAC CATHETERIZATION N/A 2/20/2018    Procedure: Left Heart Cath;  Surgeon: Lane Zamorano MD;  Location: [facility] CATH INVASIVE LOCATION;  Service:    • CARDIAC CATHETERIZATION N/A 2/20/2018    Procedure: Right Heart Cath;  Surgeon: Lane Zamorano MD;  Location: [facility]  CATH INVASIVE LOCATION;  Service:    • CARDIAC CATHETERIZATION Left 1/10/2020    Procedure: Cardiac Catheterization/Vascular Study;  Surgeon: Lane Zamorano MD;  Location: [facility] CATH INVASIVE [facility];  Service: Cardiology   • COLONOSCOPY     • HAND SURGERY Bilateral     x3    • HYSTERECTOMY      status post uterine and cervical cancer   • LUMBAR SPINE SURGERY      arthrodesis by anterior approach addit interspace   • TONSILLECTOMY         Current Outpatient Medications on File Prior to Visit   Medication Sig   • acetaminophen (TYLENOL) 325 MG tablet Take 2 tablets by mouth Every 4 (Four) Hours As Needed for mild pain (1-3) or fever (temperature greater than 101F).   • albuterol (PROVENTIL) (2.5 MG/3ML) 0.083% nebulizer solution Take 2.5 mg by nebulization Every 6 (Six) Hours As Needed for Wheezing or Shortness of Air.   • aspirin 325 MG tablet Take 325 mg by mouth Daily.   • atorvastatin (LIPITOR) 40 MG tablet TAKE ONE TABLET BY MOUTH EVERY NIGHT   • busPIRone (BUSPAR) 7.5 MG tablet TAKE TWO TABLETS BY MOUTH TWICE A DAY   • clonazePAM (KlonoPIN) 0.5 MG tablet Take 0.5 tablets by mouth 2 (Two) Times a Day As Needed for Anxiety.   • clopidogrel (PLAVIX) 75 MG tablet TAKE ONE TABLET BY MOUTH DAILY   • ezetimibe (ZETIA) 10 MG tablet TAKE ONE TABLET BY MOUTH DAILY   • fentaNYL (DURAGESIC) 75 MCG/HR patch Place 1 patch on the skin as directed by provider Every Other Day.   • fexofenadine (ALLEGRA) 180 MG tablet Take 180 mg by mouth Daily As Needed.   • levothyroxine (SYNTHROID, LEVOTHROID) 112 MCG tablet TAKE ONE TABLET BY MOUTH DAILY   • lisinopril (PRINIVIL,ZESTRIL) 20 MG tablet Take 1 tablet by mouth Daily.   • melatonin 5 MG sublingual tablet sublingual tablet Place 1 tablet under the tongue At Night As Needed (insomnia).   • metoprolol tartrate (LOPRESSOR) 25 MG tablet Take 1 tablet by mouth 2 (Two) Times a Day.   • omeprazole (priLOSEC) 20 MG capsule TAKE ONE CAPSULE BY MOUTH DAILY   • oxyCODONE-acetaminophen  (PERCOCET) 7.5-325 MG per tablet Take 1 tablet by mouth Every 6 (Six) Hours As Needed for Moderate Pain .   • pregabalin (LYRICA) 100 MG capsule Take 1 capsule by mouth Every 8 (Eight) Hours.   • probiotic (CULTURELLE) capsule capsule Take 1 capsule by mouth Daily.   • promethazine (PHENERGAN) 25 MG tablet TAKE ONE TABLET BY MOUTH EVERY 6 HOURS AS NEEDED FOR NAUSEA OR VOMITING   • sertraline (ZOLOFT) 50 MG tablet TAKE ONE TABLET BY MOUTH DAILY   • tiZANidine (ZANAFLEX) 4 MG tablet TAKE ONE TABLET BY MOUTH TWICE A DAY AS NEEDED FOR MUSCLE SPASMS   • topiramate (TOPAMAX) 50 MG tablet Take 1 tablet by mouth 2 (Two) Times a Day. 50 mg q am; 25 mg q pm (Patient taking differently: Take 50 mg by mouth 2 (Two) Times a Day.)     No current facility-administered medications on file prior to visit.        Family History   Problem Relation Age of Onset   • Arthritis Mother    • Diabetes Mother    • Colon polyps Mother    • Diverticulitis Mother    • Heart failure Mother    • Arthritis Father    • Bleeding Disorder Father    • Diabetes Father    • Kidney disease Father    • Heart disease Father    • COPD Sister         currently smokes   • Arthritis Brother    • Diabetes Brother    • Alcohol abuse Brother    • Heart murmur Daughter    • Arthritis Other    • Diabetes Other        Social History     Socioeconomic History   • Marital status:      Spouse name: Wali   • Number of children: 1   • Years of education: Not on file   • Highest education level: Not on file   Occupational History   • Occupation: book keeper     Employer: DISABLED     Comment: back injury   Tobacco Use   • Smoking status: Current Every Day Smoker     Packs/day: 1.00     Years: 48.00     Pack years: 48.00     Types: Cigarettes   • Smokeless tobacco: Never Used   Substance and Sexual Activity   • Alcohol use: No   • Drug use: No   • Sexual activity: Defer   Social History Narrative    Mrs. Langston is a 62 year old white  female. She lives with  "her spouse in [facility]. She states she does her own ADLs but appears disabled. They have one child and two grandchildren. She has a living will.    Caffeine: 2 serving per day. Pt lives at home with .         Review of Systems   Constitutional: Positive for fatigue. Negative for chills, diaphoresis, fever and unexpected weight change.   HENT: Negative for congestion, ear pain, hearing loss, nosebleeds, postnasal drip, sinus pressure and sore throat.    Eyes: Negative for pain, discharge and itching.   Respiratory: Positive for shortness of breath. Negative for cough, chest tightness and wheezing.    Cardiovascular: Negative for chest pain and leg swelling.   Gastrointestinal: Negative for abdominal distention, blood in stool, constipation, diarrhea, nausea and vomiting.   Endocrine: Negative for heat intolerance, polydipsia and polyuria.   Genitourinary: Negative for difficulty urinating, dysuria, frequency and hematuria.   Musculoskeletal: Positive for arthralgias, back pain, gait problem and neck stiffness. Negative for joint swelling and myalgias.   Skin:        Hair loss   Neurological: Positive for dizziness, tremors, weakness and light-headedness. Negative for syncope and headaches.   Psychiatric/Behavioral: Positive for dysphoric mood and sleep disturbance. The patient is nervous/anxious.        BP 90/50   Pulse 68   Ht 167.6 cm (65.98\")   LMP  (LMP Unknown)   BMI 36.01 kg/m²       Physical Exam   Constitutional: She is oriented to person, place, and time. She appears well-developed and well-nourished.   HENT:   Head: Normocephalic and atraumatic.   Right Ear: External ear normal.   Left Ear: External ear normal.   Mouth/Throat: Oropharynx is clear and moist.   Eyes: Conjunctivae and EOM are normal.   Neck: Normal range of motion. Neck supple.   Cardiovascular: Normal rate and regular rhythm.   2/6 СЕРГЕЙ   Pulmonary/Chest: Effort normal.   Mildly diminished   Abdominal: Soft. Bowel sounds are " normal.   Musculoskeletal:   Using wheelchair     Lymphadenopathy:     She has no cervical adenopathy.   Neurological: She is alert and oriented to person, place, and time.   Skin: Skin is warm and dry. No rash noted.   Psychiatric: She has a normal mood and affect. Her behavior is normal. Thought content normal.       Procedure:      Discussion/Summary:    chronic back pain- refill patch and percocet as needed, advised need to reduce the dose as we are doing, no better or worst  htn-too low, cut lisinopril in 1/2  hyperlipidemia-cont lipitor and zetia, recheck  at goal except for TG and HDL, counseled on low chol diet  hypothroid-cont replacement, recheck today   depression with anxiety-cont buspar and klonopine and zoloft ,advised her of risk of addiction  polycythemia-cbc today, advised tob cessation  retinal vein occlusion- cont rf modification, counseled on tob cessation  b12 def-check today  DM-labs on rtc, counseled on low carb  Elevated lft/?autoimmune-f/u gastro recs , labs today, advised wt loss  A/D-cont zoloft, improved  GERD-cont PPI, stable  Tremor-cont BB and topamax  Fe def anemia-will recheck today,improving and advised to start MVI with FE      3/12  labs noted and dw patient    Current Outpatient Medications:   •  acetaminophen (TYLENOL) 325 MG tablet, Take 2 tablets by mouth Every 4 (Four) Hours As Needed for mild pain (1-3) or fever (temperature greater than 101F)., Disp: 100 tablet, Rfl: 0  •  albuterol (PROVENTIL) (2.5 MG/3ML) 0.083% nebulizer solution, Take 2.5 mg by nebulization Every 6 (Six) Hours As Needed for Wheezing or Shortness of Air., Disp: 120 vial, Rfl: 5  •  aspirin 325 MG tablet, Take 325 mg by mouth Daily., Disp: , Rfl:   •  atorvastatin (LIPITOR) 40 MG tablet, TAKE ONE TABLET BY MOUTH EVERY NIGHT, Disp: 90 tablet, Rfl: 2  •  busPIRone (BUSPAR) 7.5 MG tablet, TAKE TWO TABLETS BY MOUTH TWICE A DAY, Disp: 360 tablet, Rfl: 0  •  clonazePAM (KlonoPIN) 0.5 MG tablet, Take 0.5 tablets  by mouth 2 (Two) Times a Day As Needed for Anxiety., Disp: 30 tablet, Rfl: 2  •  clopidogrel (PLAVIX) 75 MG tablet, TAKE ONE TABLET BY MOUTH DAILY, Disp: 90 tablet, Rfl: 0  •  ezetimibe (ZETIA) 10 MG tablet, TAKE ONE TABLET BY MOUTH DAILY, Disp: 90 tablet, Rfl: 2  •  fentaNYL (DURAGESIC) 75 MCG/HR patch, Place 1 patch on the skin as directed by provider Every Other Day., Disp: 15 patch, Rfl: 0  •  fexofenadine (ALLEGRA) 180 MG tablet, Take 180 mg by mouth Daily As Needed., Disp: , Rfl:   •  levothyroxine (SYNTHROID, LEVOTHROID) 112 MCG tablet, TAKE ONE TABLET BY MOUTH DAILY, Disp: 90 tablet, Rfl: 0  •  lisinopril (PRINIVIL,ZESTRIL) 20 MG tablet, Take 1 tablet by mouth Daily., Disp: 90 tablet, Rfl: 1  •  melatonin 5 MG sublingual tablet sublingual tablet, Place 1 tablet under the tongue At Night As Needed (insomnia)., Disp: 30 tablet, Rfl: 0  •  metoprolol tartrate (LOPRESSOR) 25 MG tablet, Take 1 tablet by mouth 2 (Two) Times a Day., Disp: 60 tablet, Rfl: 0  •  omeprazole (priLOSEC) 20 MG capsule, TAKE ONE CAPSULE BY MOUTH DAILY, Disp: 90 capsule, Rfl: 3  •  oxyCODONE-acetaminophen (PERCOCET) 7.5-325 MG per tablet, Take 1 tablet by mouth Every 6 (Six) Hours As Needed for Moderate Pain ., Disp: 120 tablet, Rfl: 0  •  pregabalin (LYRICA) 100 MG capsule, Take 1 capsule by mouth Every 8 (Eight) Hours., Disp: 270 capsule, Rfl: 0  •  probiotic (CULTURELLE) capsule capsule, Take 1 capsule by mouth Daily., Disp: 60 capsule, Rfl: 0  •  promethazine (PHENERGAN) 25 MG tablet, TAKE ONE TABLET BY MOUTH EVERY 6 HOURS AS NEEDED FOR NAUSEA OR VOMITING, Disp: 20 tablet, Rfl: 1  •  sertraline (ZOLOFT) 50 MG tablet, TAKE ONE TABLET BY MOUTH DAILY, Disp: 90 tablet, Rfl: 0  •  tiZANidine (ZANAFLEX) 4 MG tablet, TAKE ONE TABLET BY MOUTH TWICE A DAY AS NEEDED FOR MUSCLE SPASMS, Disp: 60 tablet, Rfl: 0  •  topiramate (TOPAMAX) 50 MG tablet, Take 1 tablet by mouth 2 (Two) Times a Day. 50 mg q am; 25 mg q pm (Patient taking differently: Take  50 mg by mouth 2 (Two) Times a Day.), Disp: 180 tablet, Rfl: 1  •  furosemide (LASIX) 40 MG tablet, TAKE ONE TABLET BY MOUTH TWICE A DAY, Disp: 180 tablet, Rfl: 0        Tamara was seen today for hypertension, hypothyroidism and pain.    Diagnoses and all orders for this visit:    PVD    HTN  -     Comprehensive Metabolic Panel    Pulmonary emphysema, unspecified emphysema type (CMS/HCC)    GERD    Other specified hypothyroidism    Tobacco abuse    Mixed anxiety depressive disorder    Chronic, continuous use of opioids    Vitamin D deficiency  -     Vitamin D 25 Hydroxy    Iron deficiency  -     CBC (No Diff)  -     Iron  -     Vitamin B12    Breast cancer screening  -     Mammo Screening Digital Tomosynthesis Bilateral With CAD    Encounter for screening mammogram for malignant neoplasm of breast   -     Mammo Screening Digital Tomosynthesis Bilateral With CAD

## 2020-03-13 LAB
25(OH)D3+25(OH)D2 SERPL-MCNC: 85 NG/ML (ref 30–100)
ALBUMIN SERPL-MCNC: 3.4 G/DL (ref 3.5–5.2)
ALBUMIN/GLOB SERPL: 1.2 G/DL
ALP SERPL-CCNC: 70 U/L (ref 39–117)
ALT SERPL-CCNC: 18 U/L (ref 1–33)
AST SERPL-CCNC: 33 U/L (ref 1–32)
BILIRUB SERPL-MCNC: 0.2 MG/DL (ref 0.2–1.2)
BUN SERPL-MCNC: 15 MG/DL (ref 8–23)
BUN/CREAT SERPL: 13.2 (ref 7–25)
CALCIUM SERPL-MCNC: 8.8 MG/DL (ref 8.6–10.5)
CHLORIDE SERPL-SCNC: 103 MMOL/L (ref 98–107)
CO2 SERPL-SCNC: 28.4 MMOL/L (ref 22–29)
CREAT SERPL-MCNC: 1.14 MG/DL (ref 0.57–1)
ERYTHROCYTE [DISTWIDTH] IN BLOOD BY AUTOMATED COUNT: 15.3 % (ref 12.3–15.4)
GLOBULIN SER CALC-MCNC: 2.8 GM/DL
GLUCOSE SERPL-MCNC: 134 MG/DL (ref 65–99)
HCT VFR BLD AUTO: 31.5 % (ref 34–46.6)
HGB BLD-MCNC: 9.6 G/DL (ref 12–15.9)
IRON SERPL-MCNC: 28 MCG/DL (ref 37–145)
MCH RBC QN AUTO: 27.9 PG (ref 26.6–33)
MCHC RBC AUTO-ENTMCNC: 30.5 G/DL (ref 31.5–35.7)
MCV RBC AUTO: 91.6 FL (ref 79–97)
PLATELET # BLD AUTO: 162 10*3/MM3 (ref 140–450)
POTASSIUM SERPL-SCNC: 4.7 MMOL/L (ref 3.5–5.2)
PROT SERPL-MCNC: 6.2 G/DL (ref 6–8.5)
RBC # BLD AUTO: 3.44 10*6/MM3 (ref 3.77–5.28)
SODIUM SERPL-SCNC: 140 MMOL/L (ref 136–145)
VIT B12 SERPL-MCNC: >2000 PG/ML (ref 211–946)
WBC # BLD AUTO: 8.61 10*3/MM3 (ref 3.4–10.8)

## 2020-03-13 RX ORDER — FUROSEMIDE 40 MG/1
TABLET ORAL
Qty: 180 TABLET | Refills: 0 | Status: SHIPPED | OUTPATIENT
Start: 2020-03-13 | End: 2020-09-23

## 2020-03-14 DIAGNOSIS — N64.4 BREAST PAIN, RIGHT: Primary | ICD-10-CM

## 2020-03-17 DIAGNOSIS — N64.4 BREAST TENDERNESS IN FEMALE: Primary | ICD-10-CM

## 2020-03-19 ENCOUNTER — TELEPHONE (OUTPATIENT)
Dept: CARDIOLOGY | Facility: HOSPITAL | Age: 65
End: 2020-03-19

## 2020-03-19 ENCOUNTER — HOSPITAL ENCOUNTER (OUTPATIENT)
Dept: CARDIOLOGY | Facility: HOSPITAL | Age: 65
Discharge: HOME | End: 2020-03-19
Admitting: NURSE PRACTITIONER

## 2020-03-19 VITALS — WEIGHT: 223 LBS | HEIGHT: 66 IN | BODY MASS INDEX: 35.84 KG/M2

## 2020-03-19 DIAGNOSIS — I35.0 NONRHEUMATIC AORTIC VALVE STENOSIS: ICD-10-CM

## 2020-03-19 LAB
AORTIC DIMENSIONLESS INDEX: 0.4 (DI)
BH CV ECHO MEAS - AO MAX PG (FULL): 50.9 MMHG
BH CV ECHO MEAS - AO MAX PG: 70 MMHG
BH CV ECHO MEAS - AO MEAN PG (FULL): 23 MMHG
BH CV ECHO MEAS - AO MEAN PG: 35 MMHG
BH CV ECHO MEAS - AO ROOT AREA (BSA CORRECTED): 1.5
BH CV ECHO MEAS - AO ROOT AREA: 8 CM^2
BH CV ECHO MEAS - AO ROOT DIAM: 3.2 CM
BH CV ECHO MEAS - AO V2 MAX: 418 CM/SEC
BH CV ECHO MEAS - AO V2 MEAN: 240.8 CM/SEC
BH CV ECHO MEAS - AO V2 VTI: 97.1 CM
BH CV ECHO MEAS - ASC AORTA: 3.5 CM
BH CV ECHO MEAS - AVA(I,A): 1.2 CM^2
BH CV ECHO MEAS - AVA(I,D): 1 CM^2
BH CV ECHO MEAS - AVA(V,A): 1.1 CM^2
BH CV ECHO MEAS - AVA(V,D): 1.1 CM^2
BH CV ECHO MEAS - BSA(HAYCOCK): 2.2 M^2
BH CV ECHO MEAS - BSA: 2.1 M^2
BH CV ECHO MEAS - BZI_BMI: 36 KILOGRAMS/M^2
BH CV ECHO MEAS - BZI_METRIC_HEIGHT: 167.6 CM
BH CV ECHO MEAS - BZI_METRIC_WEIGHT: 101.2 KG
BH CV ECHO MEAS - EDV(CUBED): 152.7 ML
BH CV ECHO MEAS - EDV(MOD-SP2): 162 ML
BH CV ECHO MEAS - EDV(MOD-SP4): 166 ML
BH CV ECHO MEAS - EDV(TEICH): 138 ML
BH CV ECHO MEAS - EF(CUBED): 74.1 %
BH CV ECHO MEAS - EF(MOD-BP): 69 %
BH CV ECHO MEAS - EF(MOD-SP2): 71 %
BH CV ECHO MEAS - EF(MOD-SP4): 68.7 %
BH CV ECHO MEAS - EF(TEICH): 65.5 %
BH CV ECHO MEAS - ESV(CUBED): 39.5 ML
BH CV ECHO MEAS - ESV(MOD-SP2): 47 ML
BH CV ECHO MEAS - ESV(MOD-SP4): 52 ML
BH CV ECHO MEAS - ESV(TEICH): 47.6 ML
BH CV ECHO MEAS - FS: 36.3 %
BH CV ECHO MEAS - IVS/LVPW: 0.86
BH CV ECHO MEAS - IVSD: 1.1 CM
BH CV ECHO MEAS - LA DIMENSION: 4.5 CM
BH CV ECHO MEAS - LA/AO: 1.4
BH CV ECHO MEAS - LAD MAJOR: 6.7 CM
BH CV ECHO MEAS - LAT PEAK E' VEL: 9 CM/SEC
BH CV ECHO MEAS - LATERAL E/E' RATIO: 18.1
BH CV ECHO MEAS - LV DIASTOLIC VOL/BSA (35-75): 79.2 ML/M^2
BH CV ECHO MEAS - LV IVRT: 0.07 SEC
BH CV ECHO MEAS - LV MASS(C)D: 266.9 GRAMS
BH CV ECHO MEAS - LV MASS(C)DI: 127.4 GRAMS/M^2
BH CV ECHO MEAS - LV MAX PG: 10.1 MMHG
BH CV ECHO MEAS - LV MEAN PG: 5 MMHG
BH CV ECHO MEAS - LV SYSTOLIC VOL/BSA (12-30): 24.8 ML/M^2
BH CV ECHO MEAS - LV V1 MAX: 158.7 CM/SEC
BH CV ECHO MEAS - LV V1 MEAN: 101.7 CM/SEC
BH CV ECHO MEAS - LV V1 VTI: 39.2 CM
BH CV ECHO MEAS - LVIDD: 5.3 CM
BH CV ECHO MEAS - LVIDS: 3.4 CM
BH CV ECHO MEAS - LVLD AP2: 9.8 CM
BH CV ECHO MEAS - LVLD AP4: 10 CM
BH CV ECHO MEAS - LVLS AP2: 7.7 CM
BH CV ECHO MEAS - LVLS AP4: 7.1 CM
BH CV ECHO MEAS - LVOT AREA (M): 2.8 CM^2
BH CV ECHO MEAS - LVOT AREA: 2.7 CM^2
BH CV ECHO MEAS - LVOT DIAM: 1.9 CM
BH CV ECHO MEAS - LVPWD: 1.3 CM
BH CV ECHO MEAS - MED PEAK E' VEL: 7.9 CM/SEC
BH CV ECHO MEAS - MEDIAL E/E' RATIO: 20.6
BH CV ECHO MEAS - MV A MAX VEL: 160 CM/SEC
BH CV ECHO MEAS - MV DEC TIME: 0.19 SEC
BH CV ECHO MEAS - MV E MAX VEL: 163 CM/SEC
BH CV ECHO MEAS - MV E/A: 1
BH CV ECHO MEAS - MV MAX PG: 13.1 MMHG
BH CV ECHO MEAS - MV MEAN PG: 6 MMHG
BH CV ECHO MEAS - MV V2 MAX: 181 CM/SEC
BH CV ECHO MEAS - MV V2 MEAN: 110 CM/SEC
BH CV ECHO MEAS - MV V2 VTI: 50.8 CM
BH CV ECHO MEAS - MVA(VTI): 2.1 CM^2
BH CV ECHO MEAS - PA ACC SLOPE: 1318 CM/SEC^2
BH CV ECHO MEAS - PA ACC TIME: 0.1 SEC
BH CV ECHO MEAS - PA PR(ACCEL): 32.2 MMHG
BH CV ECHO MEAS - PI END-D VEL: 135 CM/SEC
BH CV ECHO MEAS - PULM A REVS VEL: 36.2 CM/SEC
BH CV ECHO MEAS - PULM DIAS VEL: 48.2 CM/SEC
BH CV ECHO MEAS - PULM S/D: 1.3
BH CV ECHO MEAS - PULM SYS VEL: 61.3 CM/SEC
BH CV ECHO MEAS - RAP SYSTOLE: 8 MMHG
BH CV ECHO MEAS - RVSP: 61 MMHG
BH CV ECHO MEAS - SI(AO): 343.8 ML/M^2
BH CV ECHO MEAS - SI(CUBED): 54 ML/M^2
BH CV ECHO MEAS - SI(LVOT): 50.3 ML/M^2
BH CV ECHO MEAS - SI(MOD-SP2): 54.9 ML/M^2
BH CV ECHO MEAS - SI(MOD-SP4): 54.4 ML/M^2
BH CV ECHO MEAS - SI(TEICH): 43.2 ML/M^2
BH CV ECHO MEAS - SV(AO): 720.3 ML
BH CV ECHO MEAS - SV(CUBED): 113.2 ML
BH CV ECHO MEAS - SV(LVOT): 105.3 ML
BH CV ECHO MEAS - SV(MOD-SP2): 115 ML
BH CV ECHO MEAS - SV(MOD-SP4): 114 ML
BH CV ECHO MEAS - SV(TEICH): 90.4 ML
BH CV ECHO MEAS - TAPSE (>1.6): 2.9 CM2
BH CV ECHO MEAS - TR MAX PG: 53 MMHG
BH CV ECHO MEAS - TR MAX VEL: 365 CM/SEC
BH CV ECHO MEASUREMENTS AVERAGE E/E' RATIO: 19.29
BH CV VAS BP LEFT ARM: NORMAL MMHG
BH CV XLRA - RV BASE: 4.5 CM
BH CV XLRA - RV LENGTH: 9.6 CM
BH CV XLRA - RV MID: 3.6 CM
BH CV XLRA - TDI S': 18.3 CM/SEC
LEFT ATRIUM VOLUME INDEX: 55 ML/M^2
LEFT ATRIUM VOLUME: 121 ML

## 2020-03-19 PROCEDURE — 93306 TTE W/DOPPLER COMPLETE: CPT

## 2020-03-19 PROCEDURE — 93306 TTE W/DOPPLER COMPLETE: CPT | Performed by: INTERNAL MEDICINE

## 2020-03-19 NOTE — TELEPHONE ENCOUNTER
Patient had TAVR and states that she has been having edema in her foot and legs with associated redness and pain.    Returned call to patient.  She states right foot is swollen and red.  Lower leg slightly swollen.  She is not sure when it started.  She is worried about an ulcer or blood clot.  Scheduled patient to be seen in [facility] in AM @ 9.  Patient verbalized understanding.        Soheila THAYER

## 2020-03-20 ENCOUNTER — TELEPHONE (OUTPATIENT)
Dept: CARDIOLOGY | Facility: CLINIC | Age: 65
End: 2020-03-20

## 2020-03-20 ENCOUNTER — TELEPHONE (OUTPATIENT)
Dept: CARDIOLOGY | Facility: HOSPITAL | Age: 65
End: 2020-03-20

## 2020-03-20 DIAGNOSIS — I35.0 NONRHEUMATIC AORTIC VALVE STENOSIS: Primary | ICD-10-CM

## 2020-03-20 NOTE — TELEPHONE ENCOUNTER
Patient was scheduled today to see Bonnie (due to Soheila being out of office). Patient states that her  fainted this morning and as she ran to check on him that she fell as well. Patient states that she injured her ankle and has been experiencing some swelling. She states that she is afraid to let her  drive and she has no one to drive her to the appointment.

## 2020-03-20 NOTE — TELEPHONE ENCOUNTER
----- Message from Lane Zamorano MD sent at 3/20/2020  1:56 PM EDT -----  1) Shy, Can you let the patient know that there were some elevated velocities across the aortic valve on her echocardiogram.  This may be contributing to some of her symptoms.  Please have her stop aspirin and start Eliquis 5 mg twice daily.  Have her undergo a repeat limited echocardiogram to reassess her aortic valve in 6 weeks.  Please make sure she has a follow-up with us in early May.  Thanks.    2) ANTOINETTE Parnell, I spoke with Dr Valdez about the above issue.

## 2020-03-20 NOTE — TELEPHONE ENCOUNTER
Patient contacted with echo results and recommendations. Pt verbalizes understanding, all questions answered at this time.

## 2020-03-20 NOTE — TELEPHONE ENCOUNTER
Returned call to patient.  She states her foot looks the same as yesterday.  Some swelling and redness but no ulcers or discharge.  Advised to keep the foot elevated when possible today, apply cool compress, and take Tylenol.  Come in on Monday as re-scheduled.  She also states her  feels fine now.  Dr. Zamorano is her Cardiologist.      Soheila THAYER

## 2020-03-23 ENCOUNTER — HOSPITAL ENCOUNTER (OUTPATIENT)
Dept: CARDIOLOGY | Facility: HOSPITAL | Age: 65
Discharge: HOME | End: 2020-03-23

## 2020-03-23 ENCOUNTER — OFFICE VISIT (OUTPATIENT)
Dept: CARDIOLOGY | Facility: HOSPITAL | Age: 65
End: 2020-03-23

## 2020-03-23 ENCOUNTER — TELEPHONE (OUTPATIENT)
Dept: CARDIOLOGY | Facility: HOSPITAL | Age: 65
End: 2020-03-23

## 2020-03-23 ENCOUNTER — HOSPITAL ENCOUNTER (OUTPATIENT)
Dept: CARDIOLOGY | Facility: HOSPITAL | Age: 65
Discharge: HOME | End: 2020-03-23
Admitting: NURSE PRACTITIONER

## 2020-03-23 ENCOUNTER — LAB REQUISITION (OUTPATIENT)
Dept: LAB | Facility: HOSPITAL | Age: 65
End: 2020-03-23

## 2020-03-23 VITALS
DIASTOLIC BLOOD PRESSURE: 67 MMHG | SYSTOLIC BLOOD PRESSURE: 140 MMHG | WEIGHT: 225 LBS | HEIGHT: 66 IN | OXYGEN SATURATION: 93 % | BODY MASS INDEX: 36.16 KG/M2 | RESPIRATION RATE: 20 BRPM | TEMPERATURE: 97.3 F | HEART RATE: 63 BPM

## 2020-03-23 VITALS — HEIGHT: 66 IN | WEIGHT: 225 LBS | BODY MASS INDEX: 36.16 KG/M2

## 2020-03-23 DIAGNOSIS — R60.0 LOWER EXTREMITY EDEMA: ICD-10-CM

## 2020-03-23 DIAGNOSIS — I73.9 PERIPHERAL VASCULAR DISEASE (HCC): ICD-10-CM

## 2020-03-23 DIAGNOSIS — I73.9 PERIPHERAL VASCULAR DISEASE (HCC): Chronic | ICD-10-CM

## 2020-03-23 DIAGNOSIS — F11.90 CHRONIC, CONTINUOUS USE OF OPIOIDS: Chronic | ICD-10-CM

## 2020-03-23 DIAGNOSIS — Z00.00 ROUTINE GENERAL MEDICAL EXAMINATION AT A HEALTH CARE FACILITY: ICD-10-CM

## 2020-03-23 DIAGNOSIS — I35.0 NONRHEUMATIC AORTIC VALVE STENOSIS: ICD-10-CM

## 2020-03-23 DIAGNOSIS — I25.10 CORONARY ARTERY DISEASE INVOLVING NATIVE CORONARY ARTERY OF NATIVE HEART WITHOUT ANGINA PECTORIS: ICD-10-CM

## 2020-03-23 DIAGNOSIS — I10 ESSENTIAL HYPERTENSION: Chronic | ICD-10-CM

## 2020-03-23 DIAGNOSIS — I73.9 PERIPHERAL VASCULAR DISEASE (HCC): Primary | Chronic | ICD-10-CM

## 2020-03-23 DIAGNOSIS — G89.4 CHRONIC PAIN SYNDROME: ICD-10-CM

## 2020-03-23 DIAGNOSIS — J43.9 PULMONARY EMPHYSEMA, UNSPECIFIED EMPHYSEMA TYPE (HCC): Chronic | ICD-10-CM

## 2020-03-23 LAB
ANION GAP SERPL CALCULATED.3IONS-SCNC: 12 MMOL/L (ref 5–15)
BASOPHILS # BLD AUTO: 0.08 10*3/MM3 (ref 0–0.2)
BASOPHILS NFR BLD AUTO: 0.8 % (ref 0–1.5)
BH CV ECHO MEAS - BSA(HAYCOCK): 2.2 M^2
BH CV ECHO MEAS - BSA: 2.1 M^2
BH CV ECHO MEAS - BZI_BMI: 36.3 KILOGRAMS/M^2
BH CV ECHO MEAS - BZI_METRIC_HEIGHT: 167.6 CM
BH CV ECHO MEAS - BZI_METRIC_WEIGHT: 102.1 KG
BH CV LOWER ARTERIAL LEFT ABI RATIO: 0.94
BH CV LOWER ARTERIAL LEFT DORSALIS PEDIS SYS MAX: 172 MMHG
BH CV LOWER ARTERIAL LEFT GREAT TOE SYS MAX: 0 MMHG
BH CV LOWER ARTERIAL LEFT HIGH THIGH SYS MAX: 214 MMHG
BH CV LOWER ARTERIAL LEFT LOW THIGH SYS MAX: 198 MMHG
BH CV LOWER ARTERIAL LEFT POPLITEAL SYS MAX: 170 MMHG
BH CV LOWER ARTERIAL LEFT POST TIBIAL SYS MAX: 168 MMHG
BH CV LOWER ARTERIAL RIGHT ABI RATIO: 0.96
BH CV LOWER ARTERIAL RIGHT DORSALIS PEDIS SYS MAX: 176 MMHG
BH CV LOWER ARTERIAL RIGHT GREAT TOE SYS MAX: 153 MMHG
BH CV LOWER ARTERIAL RIGHT HIGH THIGH SYS MAX: 217 MMHG
BH CV LOWER ARTERIAL RIGHT LOW THIGH SYS MAX: 198 MMHG
BH CV LOWER ARTERIAL RIGHT POPLITEAL SYS MAX: 190 MMHG
BH CV LOWER ARTERIAL RIGHT POST TIBIAL SYS MAX: 170 MMHG
BH CV LOWER ARTERIAL RIGHT TBI RATIO: 0.84
BH CV LOWER VASCULAR LEFT COMMON FEMORAL AUGMENT: NORMAL
BH CV LOWER VASCULAR LEFT COMMON FEMORAL COMPRESS: NORMAL
BH CV LOWER VASCULAR LEFT COMMON FEMORAL PHASIC: NORMAL
BH CV LOWER VASCULAR LEFT COMMON FEMORAL SPONT: NORMAL
BH CV LOWER VASCULAR LEFT SAPHENOFEMORAL JUNCTION AUGMENT: NORMAL
BH CV LOWER VASCULAR LEFT SAPHENOFEMORAL JUNCTION COMPRESS: NORMAL
BH CV LOWER VASCULAR LEFT SAPHENOFEMORAL JUNCTION PHASIC: NORMAL
BH CV LOWER VASCULAR LEFT SAPHENOFEMORAL JUNCTION SPONT: NORMAL
BH CV LOWER VASCULAR RIGHT COMMON FEMORAL AUGMENT: NORMAL
BH CV LOWER VASCULAR RIGHT COMMON FEMORAL COMPRESS: NORMAL
BH CV LOWER VASCULAR RIGHT COMMON FEMORAL PHASIC: NORMAL
BH CV LOWER VASCULAR RIGHT COMMON FEMORAL SPONT: NORMAL
BH CV LOWER VASCULAR RIGHT DISTAL FEMORAL AUGMENT: NORMAL
BH CV LOWER VASCULAR RIGHT DISTAL FEMORAL COMPRESS: NORMAL
BH CV LOWER VASCULAR RIGHT DISTAL FEMORAL PHASIC: NORMAL
BH CV LOWER VASCULAR RIGHT DISTAL FEMORAL SPONT: NORMAL
BH CV LOWER VASCULAR RIGHT GASTRONEMIUS COMPRESS: NORMAL
BH CV LOWER VASCULAR RIGHT GREATER SAPH AK COMPRESS: NORMAL
BH CV LOWER VASCULAR RIGHT GREATER SAPH BK COMPRESS: NORMAL
BH CV LOWER VASCULAR RIGHT LESSER SAPH COMPRESS: NORMAL
BH CV LOWER VASCULAR RIGHT MID FEMORAL AUGMENT: NORMAL
BH CV LOWER VASCULAR RIGHT MID FEMORAL COMPRESS: NORMAL
BH CV LOWER VASCULAR RIGHT MID FEMORAL PHASIC: NORMAL
BH CV LOWER VASCULAR RIGHT MID FEMORAL SPONT: NORMAL
BH CV LOWER VASCULAR RIGHT PERONEAL AUGMENT: NORMAL
BH CV LOWER VASCULAR RIGHT PERONEAL COMPRESS: NORMAL
BH CV LOWER VASCULAR RIGHT POPLITEAL AUGMENT: NORMAL
BH CV LOWER VASCULAR RIGHT POPLITEAL COMPRESS: NORMAL
BH CV LOWER VASCULAR RIGHT POPLITEAL PHASIC: NORMAL
BH CV LOWER VASCULAR RIGHT POPLITEAL SPONT: NORMAL
BH CV LOWER VASCULAR RIGHT POSTERIOR TIBIAL AUGMENT: NORMAL
BH CV LOWER VASCULAR RIGHT POSTERIOR TIBIAL COMPRESS: NORMAL
BH CV LOWER VASCULAR RIGHT PROFUNDA FEMORAL AUGMENT: NORMAL
BH CV LOWER VASCULAR RIGHT PROFUNDA FEMORAL PHASIC: NORMAL
BH CV LOWER VASCULAR RIGHT PROFUNDA FEMORAL SPONT: NORMAL
BH CV LOWER VASCULAR RIGHT PROXIMAL FEMORAL AUGMENT: NORMAL
BH CV LOWER VASCULAR RIGHT PROXIMAL FEMORAL COMPRESS: NORMAL
BH CV LOWER VASCULAR RIGHT PROXIMAL FEMORAL PHASIC: NORMAL
BH CV LOWER VASCULAR RIGHT PROXIMAL FEMORAL SPONT: NORMAL
BH CV LOWER VASCULAR RIGHT SAPHENOFEMORAL JUNCTION AUGMENT: NORMAL
BH CV LOWER VASCULAR RIGHT SAPHENOFEMORAL JUNCTION COMPRESS: NORMAL
BH CV LOWER VASCULAR RIGHT SAPHENOFEMORAL JUNCTION PHASIC: NORMAL
BH CV LOWER VASCULAR RIGHT SAPHENOFEMORAL JUNCTION SPONT: NORMAL
BUN BLD-MCNC: 15 MG/DL (ref 8–23)
BUN/CREAT SERPL: 12.7 (ref 7–25)
CALCIUM SPEC-SCNC: 9 MG/DL (ref 8.6–10.5)
CHLORIDE SERPL-SCNC: 105 MMOL/L (ref 98–107)
CO2 SERPL-SCNC: 26 MMOL/L (ref 22–29)
CREAT BLD-MCNC: 1.18 MG/DL (ref 0.57–1)
DEPRECATED RDW RBC AUTO: 58.9 FL (ref 37–54)
EOSINOPHIL # BLD AUTO: 0.28 10*3/MM3 (ref 0–0.4)
EOSINOPHIL NFR BLD AUTO: 2.7 % (ref 0.3–6.2)
ERYTHROCYTE [DISTWIDTH] IN BLOOD BY AUTOMATED COUNT: 16.8 % (ref 12.3–15.4)
GFR SERPL CREATININE-BSD FRML MDRD: 46 ML/MIN/1.73
GLUCOSE BLD-MCNC: 162 MG/DL (ref 65–99)
HCT VFR BLD AUTO: 32.2 % (ref 34–46.6)
HGB BLD-MCNC: 9.3 G/DL (ref 12–15.9)
IMM GRANULOCYTES # BLD AUTO: 0.03 10*3/MM3 (ref 0–0.05)
IMM GRANULOCYTES NFR BLD AUTO: 0.3 % (ref 0–0.5)
LYMPHOCYTES # BLD AUTO: 1.69 10*3/MM3 (ref 0.7–3.1)
LYMPHOCYTES NFR BLD AUTO: 16.6 % (ref 19.6–45.3)
MCH RBC QN AUTO: 27.7 PG (ref 26.6–33)
MCHC RBC AUTO-ENTMCNC: 28.9 G/DL (ref 31.5–35.7)
MCV RBC AUTO: 95.8 FL (ref 79–97)
MONOCYTES # BLD AUTO: 0.83 10*3/MM3 (ref 0.1–0.9)
MONOCYTES NFR BLD AUTO: 8.1 % (ref 5–12)
NEUTROPHILS # BLD AUTO: 7.28 10*3/MM3 (ref 1.7–7)
NEUTROPHILS NFR BLD AUTO: 71.5 % (ref 42.7–76)
NRBC BLD AUTO-RTO: 0 /100 WBC (ref 0–0.2)
PLATELET # BLD AUTO: 215 10*3/MM3 (ref 140–450)
PMV BLD AUTO: 11.2 FL (ref 6–12)
POTASSIUM BLD-SCNC: 3.9 MMOL/L (ref 3.5–5.2)
RBC # BLD AUTO: 3.36 10*6/MM3 (ref 3.77–5.28)
SODIUM BLD-SCNC: 143 MMOL/L (ref 136–145)
UPPER ARTERIAL LEFT ARM BRACHIAL SYS MAX: 183 MMHG
UPPER ARTERIAL RIGHT ARM BRACHIAL SYS MAX: 178 MMHG
URATE SERPL-MCNC: 9.1 MG/DL (ref 2.4–5.7)
WBC NRBC COR # BLD: 10.19 10*3/MM3 (ref 3.4–10.8)

## 2020-03-23 PROCEDURE — 85025 COMPLETE CBC W/AUTO DIFF WBC: CPT | Performed by: NURSE PRACTITIONER

## 2020-03-23 PROCEDURE — 93971 EXTREMITY STUDY: CPT

## 2020-03-23 PROCEDURE — 80048 BASIC METABOLIC PNL TOTAL CA: CPT | Performed by: NURSE PRACTITIONER

## 2020-03-23 PROCEDURE — 99214 OFFICE O/P EST MOD 30 MIN: CPT | Performed by: NURSE PRACTITIONER

## 2020-03-23 PROCEDURE — 93923 UPR/LXTR ART STDY 3+ LVLS: CPT | Performed by: INTERNAL MEDICINE

## 2020-03-23 PROCEDURE — 93923 UPR/LXTR ART STDY 3+ LVLS: CPT

## 2020-03-23 PROCEDURE — 93971 EXTREMITY STUDY: CPT | Performed by: INTERNAL MEDICINE

## 2020-03-23 PROCEDURE — 84550 ASSAY OF BLOOD/URIC ACID: CPT | Performed by: NURSE PRACTITIONER

## 2020-03-23 RX ORDER — FENTANYL 75 UG/H
1 PATCH TRANSDERMAL
Qty: 15 PATCH | Refills: 0 | Status: SHIPPED | OUTPATIENT
Start: 2020-03-23 | End: 2020-04-20 | Stop reason: SDUPTHER

## 2020-03-23 RX ORDER — OXYCODONE AND ACETAMINOPHEN 7.5; 325 MG/1; MG/1
1 TABLET ORAL EVERY 6 HOURS PRN
Qty: 120 TABLET | Refills: 0 | Status: SHIPPED | OUTPATIENT
Start: 2020-03-23 | End: 2020-04-20 | Stop reason: SDUPTHER

## 2020-03-23 RX ORDER — CEPHALEXIN 500 MG/1
500 CAPSULE ORAL 4 TIMES DAILY
Qty: 28 CAPSULE | Refills: 0 | Status: SHIPPED | OUTPATIENT
Start: 2020-03-23 | End: 2020-03-30

## 2020-03-23 NOTE — PROGRESS NOTES
Communicated update per Dr. Zamorano.  Ok to start antibiotic for cellulitis and keep appointment to see Dr. Feliciano on Wednesday 3/25/20.  Mrs. Langston verbalized understanding.  Reviewed med allergies.  Despite reaction to Augmentin and doxycycline, she states she tolerates Keflex.  See order below.        Soheila THAYER

## 2020-03-23 NOTE — TELEPHONE ENCOUNTER
PT NEEDS AND REFILL ON fentaNYL (DURAGESIC) 75 MCG/HR patch AND oxyCODONE-acetaminophen (PERCOCET) 7.5-325 MG per tablet. PT ADVISED SHE WILL BE OUT OF MEDICATION TOMORROW.     SENT TO THE [facility] ON [address].     PLEASE ADVISE PT [phone].

## 2020-03-23 NOTE — TELEPHONE ENCOUNTER
Patient wants to know if she needs to see Dr. Rockwell since she had all testing done this morning.    Returned call to patient.  Advised no DVT noted and SAL is normal.  Labs show WBC count is normal, kidney function is stable.  Mildly elevated uric acid number.  Will consult with Dr. Zamorano for his recommendations and cancel Dr. Rockwell's appointment for now.      Soheila THAYER

## 2020-03-23 NOTE — PROGRESS NOTES
[facility]  [facility]    Encounter Date:03/23/2020     Tamara Langston  [address] [address]    1955    Harinder Aranda MD    Tamara Langston is a 64 y.o. female.      Subjective:     Chief Complaint:  Congestive Heart Failure and Establish Care       HPI :  Ms. Langston called the clinic last week requesting to be seen for lower extremity edema, redness and pain. She has hx of PVD as well as poor mobility and recent TAVR.  Patient has frequent falls due to neuropathy as well.  She is also s/p amputations of the L foot/ toes due to PVD/ neuropathy ulcers per Dr. Feliciano.      Ms. Langston has followed instructions for conservative treatment  3/19/20 which has included cool compress and elevation.      Past Medical History:   Diagnosis Date   • Altered mental status 2/13/2018   • Bronchitis    • Cellulitis of sacral region 2/13/2018   • Cervical cancer (CMS/HCC)    • Chronic anxiety 2/27/2017   • Chronic bronchitis (CMS/HCC)    • Chronic respiratory failure with hypoxia (CMS/HCC) 3/8/2018   • Chronic systolic heart failure (CMS/HCC)    • Chronically on benzodiazepine therapy 2/27/2017   • Coronary artery disease    • Degenerative arthritis    • Diabetes mellitus (CMS/HCC)    • Dyslipidemia 5/11/2016   • Dyspnea    • Fatty liver disease, nonalcoholic 11/21/2016   • Fibromyalgia    • GERD (gastroesophageal reflux disease)    • H/O echocardiogram    • History of pneumonia    • Hypertension 5/11/2016    16. H/O echocardiogram (V15.89) (Z92.89)  · A.  Echocardiogram of 02/03/2015 reports an ejection fraction of 60-65%, mild concentric     LVH, trace mitral regurgitation, mild tricuspid and pulmonic regurgitation and calculated     RVSP of 35 mmHg, the main pulmonary artery is also mildly dilated.   • Hypothyroidism 5/11/2016    Description: A.  On replacement therapy.   • Infected wound 3/8/2018   • Lung nodule     per pt report   • Macular degeneration    •  Meningioma (CMS/[facility])     behind left eye per pt report   • Obesity    • DINA (obstructive sleep apnea)     intolerant of CPAP therapy, uses bipap   • Osteoporosis    • Polycythemia vera (CMS/[facility]) 5/11/2016   • Pulmonary emphysema (CMS/[facility])    • Restrictive ventilatory defect    • Sepsis (CMS/[facility]) 2/13/2018   • TIA (transient ischemic attack) 2015    blurred vision    • Uterine cancer (CMS/[facility])    • Vitamin D deficiency 8/1/2016   • Weakness 3/8/2018     Past Surgical History:   Procedure Laterality Date   • AMPUTATION DIGIT Left 11/23/2016    Procedure: AMPUTATION TRANS METATARSAL - ray amutation of the left great toe;  Surgeon: Arsalan Feliciano MD;  Location:  [facility] OR;  Service:    • AMPUTATION DIGIT Left 3/2/2017    Procedure: LEFT FOOT TRANSMETATARSAL AMPUTATION TOES 2,3,4,5;  Surgeon: Joey Guaman MD;  Location: [facility] OR;  Service:    • AORTIC VALVE REPAIR/REPLACEMENT N/A 1/23/2020    Procedure: TRANSCATHETER AORTIC VALVE REPLACEMENT;  Surgeon: Joey Guaman MD;  Location:  [facility] HYBRID OR 15;  Service: Cardiothoracic   • AORTIC VALVE REPAIR/REPLACEMENT N/A 1/23/2020    Procedure: Transfemoral Transcatheter Aortic Valve Replacement;  Surgeon: Qi Valdez MD;  Location:  [facility] HYBRID OR 15;  Service: Cardiovascular   • BACK SURGERY      lumbar fusions x5--multiple times; 1995, 1997, 1998, 1999 and 2008   • BELOW KNEE AMPUTATION Left 2/25/2017    Procedure: EXTENDED LEFT TOE AMPUTATION;  Surgeon: Arsalan Feliciano MD;  Location:  [facility] OR;  Service:    • CARDIAC CATHETERIZATION N/A 11/26/2016    Procedure: Left Heart Cath;  Surgeon: Ash Nuñez MD;  Location: [facility] CATH INVASIVE LOCATION;  Service:    • CARDIAC CATHETERIZATION N/A 2/20/2018    Procedure: Left Heart Cath;  Surgeon: Lane Zamorano MD;  Location: [facility] CATH INVASIVE LOCATION;  Service:    • CARDIAC CATHETERIZATION N/A 2/20/2018    Procedure: Right Heart Cath;  Surgeon: Lane Zamorano MD;  Location: [facility] CATH INVASIVE LOCATION;  Service:    •  CARDIAC CATHETERIZATION Left 1/10/2020    Procedure: Cardiac Catheterization/Vascular Study;  Surgeon: Lane Zamorano MD;  Location: [facility] CATH INVASIVE LOCATION;  Service: Cardiology   • COLONOSCOPY     • HAND SURGERY Bilateral     x3    • HYSTERECTOMY      status post uterine and cervical cancer   • LUMBAR SPINE SURGERY      arthrodesis by anterior approach addit interspace   • TONSILLECTOMY         Allergies   Allergen Reactions   • Augmentin [Amoxicillin-Pot Clavulanate] Swelling     Mouth raw, tongue swelling   • Cortisone Other (See Comments)     Hotness all over body and hyper   • Oxycontin [Oxycodone] Other (See Comments)     Psoriasis  Tolerates Percocet   • Coreg [Carvedilol] Rash   • Doxycycline Rash   • Tolmetin Rash     Tolectin-rash and itching   • Vancomycin Rash     Despite rash, pt continued to stay on it 2017         Current Outpatient Medications:   •  acetaminophen (TYLENOL) 325 MG tablet, Take 2 tablets by mouth Every 4 (Four) Hours As Needed for mild pain (1-3) or fever (temperature greater than 101F)., Disp: 100 tablet, Rfl: 0  •  albuterol (PROVENTIL) (2.5 MG/3ML) 0.083% nebulizer solution, Take 2.5 mg by nebulization Every 6 (Six) Hours As Needed for Wheezing or Shortness of Air., Disp: 120 vial, Rfl: 5  •  atorvastatin (LIPITOR) 40 MG tablet, TAKE ONE TABLET BY MOUTH EVERY NIGHT, Disp: 90 tablet, Rfl: 2  •  busPIRone (BUSPAR) 7.5 MG tablet, TAKE TWO TABLETS BY MOUTH TWICE A DAY, Disp: 360 tablet, Rfl: 0  •  clonazePAM (KlonoPIN) 0.5 MG tablet, Take 0.5 tablets by mouth 2 (Two) Times a Day As Needed for Anxiety., Disp: 30 tablet, Rfl: 2  •  clopidogrel (PLAVIX) 75 MG tablet, TAKE ONE TABLET BY MOUTH DAILY, Disp: 90 tablet, Rfl: 0  •  ezetimibe (ZETIA) 10 MG tablet, TAKE ONE TABLET BY MOUTH DAILY, Disp: 90 tablet, Rfl: 2  •  fentaNYL (DURAGESIC) 75 MCG/HR patch, Place 1 patch on the skin as directed by provider Every Other Day., Disp: 15 patch, Rfl: 0  •  fexofenadine (ALLEGRA) 180 MG  tablet, Take 180 mg by mouth Daily As Needed., Disp: , Rfl:   •  furosemide (LASIX) 40 MG tablet, TAKE ONE TABLET BY MOUTH TWICE A DAY, Disp: 180 tablet, Rfl: 0  •  levothyroxine (SYNTHROID, LEVOTHROID) 112 MCG tablet, TAKE ONE TABLET BY MOUTH DAILY, Disp: 90 tablet, Rfl: 0  •  lisinopril (PRINIVIL,ZESTRIL) 20 MG tablet, Take 1 tablet by mouth Daily. (Patient taking differently: Take 10 mg by mouth Daily.), Disp: 90 tablet, Rfl: 1  •  melatonin 5 MG sublingual tablet sublingual tablet, Place 1 tablet under the tongue At Night As Needed (insomnia)., Disp: 30 tablet, Rfl: 0  •  metoprolol tartrate (LOPRESSOR) 25 MG tablet, Take 1 tablet by mouth 2 (Two) Times a Day., Disp: 60 tablet, Rfl: 0  •  omeprazole (priLOSEC) 20 MG capsule, TAKE ONE CAPSULE BY MOUTH DAILY, Disp: 90 capsule, Rfl: 3  •  oxyCODONE-acetaminophen (PERCOCET) 7.5-325 MG per tablet, Take 1 tablet by mouth Every 6 (Six) Hours As Needed for Moderate Pain ., Disp: 120 tablet, Rfl: 0  •  pregabalin (LYRICA) 100 MG capsule, Take 1 capsule by mouth Every 8 (Eight) Hours., Disp: 270 capsule, Rfl: 0  •  probiotic (CULTURELLE) capsule capsule, Take 1 capsule by mouth Daily., Disp: 60 capsule, Rfl: 0  •  promethazine (PHENERGAN) 25 MG tablet, TAKE ONE TABLET BY MOUTH EVERY 6 HOURS AS NEEDED FOR NAUSEA OR VOMITING, Disp: 20 tablet, Rfl: 1  •  sertraline (ZOLOFT) 50 MG tablet, TAKE ONE TABLET BY MOUTH DAILY, Disp: 90 tablet, Rfl: 0  •  tiZANidine (ZANAFLEX) 4 MG tablet, TAKE ONE TABLET BY MOUTH TWICE A DAY AS NEEDED FOR MUSCLE SPASMS, Disp: 60 tablet, Rfl: 0  •  topiramate (TOPAMAX) 50 MG tablet, Take 1 tablet by mouth 2 (Two) Times a Day. 50 mg q am; 25 mg q pm (Patient taking differently: Take 50 mg by mouth 2 (Two) Times a Day.), Disp: 180 tablet, Rfl: 1  •  apixaban (ELIQUIS) 5 MG tablet tablet, Take 1 tablet by mouth Every 12 (Twelve) Hours., Disp: 60 tablet, Rfl: 11    The following portions of the patient's history were reviewed and updated as appropriate  "in Epic:  Problem list, allergies, current medications, past medical and surgical history, past social and family history.     Review of Systems   Constitution: Positive for malaise/fatigue and weight loss. Negative for fever.   HENT: Positive for congestion, hearing loss and nosebleeds.    Eyes: Negative.    Cardiovascular: Positive for claudication, dyspnea on exertion, irregular heartbeat and palpitations.   Respiratory: Positive for cough, shortness of breath, snoring, sputum production and wheezing.    Hematologic/Lymphatic: Bruises/bleeds easily.   Skin: Positive for color change and dry skin.   Musculoskeletal: Positive for falls, joint pain, muscle cramps and muscle weakness.   Gastrointestinal: Positive for abdominal pain and diarrhea.   Genitourinary: Negative.    Neurological: Positive for excessive daytime sleepiness, headaches and loss of balance.   Psychiatric/Behavioral: The patient has insomnia and is nervous/anxious.    Allergic/Immunologic: Positive for environmental allergies.       Objective:     Vitals:    03/23/20 0838   BP: 140/67   BP Location: Right arm   Patient Position: Sitting   Cuff Size: Adult   Pulse: 63   Resp: 20   Temp: 97.3 °F (36.3 °C)   TempSrc: Temporal   SpO2: 93%   Weight: 102 kg (225 lb)   Height: 167.6 cm (66\")         Physical Exam   Constitutional: She is oriented to person, place, and time. She appears well-developed. No distress.   Chronically ill appearing middle age female in NAD.  Seated in WC   HENT:   Head: Normocephalic and atraumatic.   Eyes: Pupils are equal, round, and reactive to light. Conjunctivae are normal.   Neck: Normal range of motion. Neck supple.   Cardiovascular: Normal rate and regular rhythm. Exam reveals no gallop and no friction rub.   Murmur heard.  Systolic murmur II/VI   Pulmonary/Chest: Effort normal. No respiratory distress. She has wheezes. She has no rales.   Expiratory wheeze RUL   Musculoskeletal: She exhibits edema and deformity.   Left " foot toes and forefoot formerly amputated.  +1 pitting edema right lower leg, foot/ ankle   Neurological: She is alert and oriented to person, place, and time. No cranial nerve deficit.   Skin: Skin is warm and dry.   Multiple abrasions/ ecchymotic areas r/t recent falls.  Left knee abrasion and medial ecchymosis.  Anterior right leg abrasion covered with bandage CDI.  Right forefoot warm, erythematous but not-tender. No open ulcers.  Most redness just proximal to 2nd, 3rd, and 4th metatarsophalangeal joints with pitting edema.  Pedal pulses non-palpable.  Patient has no sensation in either foot.  Black toenail on right 2nd toe.  Abrasion noted on dorsal surface of first toe.     Psychiatric: She has a normal mood and affect. Her behavior is normal.   Vitals reviewed.      Lab and Diagnostic Review: labs pending    Assessment and Plan:     1. PVD  - Spoke with Dr. Zamorano, primary Cardiologist.  Patient has strong risk factors for either venous DVT or arterial occlusion (neuropathy, prior amputation, SHF, poor mobility, continued tobacco use, recent TAVR procedure/ hospitalization/ weakenss, and falls)  - Dr. Zamorano recommends venous doppler study and multi-level SAL (he is contacting Cardiovascular lab staff to assist in approval/ arrangement)  - CBC & Differential, Uric Acid, and Basic Metabolic Panel today  - patient can be seen in Dr. Feliciano's clinic on Wednesday if arterial study points toward ischemic source    2. Severe AS s/p TAVR 1/23/20  - elevated AV velocities on one month post TAVR echo  - Per Dr. Zamorano's notes last week, he recommended switch from ASA/ Plavix to Eliquis/ plavix  - Patient not yet picked up new meds  - Reviewed medical rationale for the change and gave first month free Rx card  - Patient indicated she will start today    3. CAD s/p multiple stents and SHF  - plavix, statin, BB, lasix 40 mg BID  - Edema today is unilateral on exam    4. HTN  - Recent reduction in ACE per PCP due to  hypotension    5. Pulmonary emphysema, unspecified emphysema type (CMS/HCC)  - unfortunately  Patient continues to use cigarettes

## 2020-03-25 ENCOUNTER — OFFICE VISIT (OUTPATIENT)
Dept: CARDIAC SURGERY | Facility: CLINIC | Age: 65
End: 2020-03-25

## 2020-03-25 VITALS
DIASTOLIC BLOOD PRESSURE: 50 MMHG | BODY MASS INDEX: 36.16 KG/M2 | TEMPERATURE: 98.6 F | WEIGHT: 225 LBS | SYSTOLIC BLOOD PRESSURE: 118 MMHG | OXYGEN SATURATION: 98 % | HEIGHT: 66 IN | HEART RATE: 68 BPM

## 2020-03-25 DIAGNOSIS — Z95.2 S/P TAVR (TRANSCATHETER AORTIC VALVE REPLACEMENT): Primary | ICD-10-CM

## 2020-03-25 PROCEDURE — 99212 OFFICE O/P EST SF 10 MIN: CPT | Performed by: THORACIC SURGERY (CARDIOTHORACIC VASCULAR SURGERY)
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
## 2020-03-27 ENCOUNTER — TELEPHONE (OUTPATIENT)
Dept: CARDIOLOGY | Facility: HOSPITAL | Age: 65
End: 2020-03-27

## 2020-04-02 DIAGNOSIS — M62.838 MUSCLE SPASMS OF NECK: ICD-10-CM

## 2020-04-02 RX ORDER — TIZANIDINE 4 MG/1
TABLET ORAL
Qty: 60 TABLET | Refills: 2 | Status: SHIPPED | OUTPATIENT
Start: 2020-04-02 | End: 2020-07-06

## 2020-04-06 ENCOUNTER — TELEPHONE (OUTPATIENT)
Dept: CARDIOLOGY | Facility: HOSPITAL | Age: 65
End: 2020-04-06

## 2020-04-06 NOTE — TELEPHONE ENCOUNTER
Patient would like to speak to Soheila about a phone call with Dr. Rockwell. Transferred to Soheila.

## 2020-04-06 NOTE — TELEPHONE ENCOUNTER
"Patient left message on MA line requesting phone call from myself. Attempted to call patient, no answer/vm.    Returned call to patient. She states leg swelling is somewhat improved.  She is wearing some light compression stockings daily.  However, her main worry today is palpitations.  She asked to review the cardiac monitor report.  Dr. Zamorano reviewed it to say no significant arrhythmias.  Occasional short atrial runs.  Patient did not tolerate Coreg (due to rash).  Back on metoprolol tartrate 25 mg BID.  Suggested she try 50 mg BID and let me know in one week how she is feeling.    Also reviewed the reasoning for taking short term Eliquis due to post TAVR echo AV mean gradient 35 mm Hg.    Explained this therapy is intended to improve blood flow and will be re-checked by Dr. Zamorano in three months.  However, any anticoagulate will increase risk of bleeding related to falls.  Patient states she understands and she/ spouse are \"doing the best we can\" to stay safe.        "

## 2020-04-07 LAB
BASOPHILS # BLD AUTO: 0.08 10E3/MM3 (ref 0–0.2)
BASOPHILS NFR BLD AUTO: 0.8 % (ref 0–1.5)
BUN SERPL-MCNC: 15 MG/DL (ref 8–23)
BUN/CREAT SERPL: 12.7 (ref 7–25)
CALCIUM SERPL-MCNC: 9 MG/DL (ref 8.6–10.5)
CHLORIDE SERPL-SCNC: 105 MMOL/L (ref 98–107)
CO2 SERPL-SCNC: 26 MMOL/L (ref 22–29)
CREAT SERPL-MCNC: 1.18 MG/DL (ref 0.57–1)
EOSINOPHIL # BLD AUTO: 0.28 10E3/MM3 (ref 0–0.4)
EOSINOPHIL NFR BLD AUTO: 2.7 % (ref 0.3–6.2)
ERYTHROCYTE [DISTWIDTH] IN BLOOD BY AUTOMATED COUNT: 16.8 % (ref 12.3–15.4)
GLUCOSE SERPL-MCNC: 162 MG/DL (ref 65–99)
HCT VFR BLD AUTO: 32.2 % (ref 34–46.6)
HGB BLD-MCNC: 9.3 G/DL (ref 12–15.9)
IMM GRANULOCYTES # BLD AUTO: 0.03 10E3/MM3 (ref 0–0.05)
IMM GRANULOCYTES NFR BLD AUTO: 0.3 % (ref 0–0.5)
LYMPHOCYTES # BLD AUTO: 1.69 10E3/MM3 (ref 0.7–3.1)
LYMPHOCYTES NFR BLD AUTO: 16.6 % (ref 19.6–45.3)
MCH RBC QN AUTO: 27.7 PG (ref 26.6–33)
MCHC RBC AUTO-ENTMCNC: 28.9 G/DL (ref 31.5–35.7)
MCV RBC AUTO: 95.8 FL (ref 79–97)
MONOCYTES # BLD AUTO: 0.83 10E3/MM3 (ref 0.1–0.9)
MONOCYTES NFR BLD AUTO: 8.1 % (ref 5–12)
NEUTROPHILS # BLD AUTO: 7.28 10E3/MM3 (ref 1.7–7)
NEUTROPHILS NFR BLD AUTO: 71.5 % (ref 42.7–76)
PLATELET # BLD AUTO: 215 10E3/MM3 (ref 140–450)
POTASSIUM SERPL-SCNC: 3.9 MMOL/L (ref 3.5–5.2)
RBC # BLD AUTO: 3.36 10E6/MM3 (ref 3.77–5.28)
SODIUM SERPL-SCNC: 143 MMOL/L (ref 136–145)
URATE SERPL-MCNC: 9.1 MG/DL (ref 2.4–5.7)
WBC # BLD AUTO: 10.19 10E3/MM3 (ref 3.4–10.8)

## 2020-04-08 DIAGNOSIS — F41.8 MIXED ANXIETY DEPRESSIVE DISORDER: ICD-10-CM

## 2020-04-10 RX ORDER — LISINOPRIL 10 MG/1
TABLET ORAL
Qty: 90 TABLET | Refills: 0 | Status: SHIPPED | OUTPATIENT
Start: 2020-04-10 | End: 2020-09-03

## 2020-04-13 ENCOUNTER — APPOINTMENT (OUTPATIENT)
Dept: MAMMOGRAPHY | Facility: HOSPITAL | Age: 65
End: 2020-04-13

## 2020-04-20 DIAGNOSIS — G89.4 CHRONIC PAIN SYNDROME: ICD-10-CM

## 2020-04-20 DIAGNOSIS — F11.90 CHRONIC, CONTINUOUS USE OF OPIOIDS: Chronic | ICD-10-CM

## 2020-04-20 RX ORDER — FENTANYL 75 UG/H
1 PATCH TRANSDERMAL
Qty: 15 PATCH | Refills: 0 | Status: SHIPPED | OUTPATIENT
Start: 2020-04-20 | End: 2020-05-18 | Stop reason: SDUPTHER

## 2020-04-20 RX ORDER — OXYCODONE AND ACETAMINOPHEN 7.5; 325 MG/1; MG/1
1 TABLET ORAL EVERY 6 HOURS PRN
Qty: 120 TABLET | Refills: 0 | Status: SHIPPED | OUTPATIENT
Start: 2020-04-20 | End: 2020-05-18 | Stop reason: SDUPTHER

## 2020-04-20 NOTE — TELEPHONE ENCOUNTER
PT CALLED REQUESTING A REFILL FOR     oxyCODONE-acetaminophen (Percocet) 7.5-325 MG per tablet    AND    fentaNYL (DURAGESIC) 75 MCG/HR patch    AND    metoprolol tartrate (LOPRESSOR) 25 MG tablet  PT STATED SHE NEEDS A NEW SCRIPT FOR 50 MG      [facility] [address] - [address] [place] [address] AT [address] [place] - [phone] [facility] [phone] FX

## 2020-04-21 ENCOUNTER — APPOINTMENT (OUTPATIENT)
Dept: MAMMOGRAPHY | Facility: HOSPITAL | Age: 65
End: 2020-04-21

## 2020-04-21 DIAGNOSIS — I25.10 CORONARY ARTERY DISEASE INVOLVING NATIVE CORONARY ARTERY OF NATIVE HEART WITHOUT ANGINA PECTORIS: Primary | ICD-10-CM

## 2020-04-21 RX ORDER — METOPROLOL TARTRATE 50 MG/1
50 TABLET, FILM COATED ORAL 2 TIMES DAILY
Qty: 60 TABLET | Refills: 3 | Status: SHIPPED | OUTPATIENT
Start: 2020-04-21 | End: 2020-05-05

## 2020-04-21 NOTE — PROGRESS NOTES
Patient called me to say she is feeling better with regard to palpitations with higher dose metoprolol.  Please see refill order below.

## 2020-04-22 DIAGNOSIS — F41.8 MIXED ANXIETY DEPRESSIVE DISORDER: Chronic | ICD-10-CM

## 2020-04-22 RX ORDER — METOPROLOL TARTRATE 50 MG/1
50 TABLET, FILM COATED ORAL 2 TIMES DAILY
Qty: 60 TABLET | Refills: 3 | Status: CANCELLED | OUTPATIENT
Start: 2020-04-22

## 2020-04-22 NOTE — TELEPHONE ENCOUNTER
PT CALLED STATED THAT THE RX metoprolol tartrate (LOPRESSOR) tablet, WAS CALLED IN TO HER PHARMACY BUT IS THE WRING DOSAGE, DOSAGE IS SUPPOSE TO BE 50 MG AND RX WAS CALLED IN AS 25 MG.  PT IS NEEDING metoprolol tartrate (LOPRESSOR) 50 MG tablet    PLEASE ADVISE.  CALL BACK:[phone]       [facility] [address] AT [address]

## 2020-04-23 RX ORDER — CLONAZEPAM 0.5 MG/1
TABLET ORAL
Qty: 30 TABLET | Refills: 1 | Status: SHIPPED | OUTPATIENT
Start: 2020-04-23 | End: 2020-06-24 | Stop reason: SDUPTHER

## 2020-04-23 RX ORDER — BUSPIRONE HYDROCHLORIDE 7.5 MG/1
TABLET ORAL
Qty: 360 TABLET | Refills: 3 | Status: SHIPPED | OUTPATIENT
Start: 2020-04-23 | End: 2021-06-03

## 2020-04-29 ENCOUNTER — OFFICE VISIT (OUTPATIENT)
Dept: INTERNAL MEDICINE | Facility: CLINIC | Age: 65
End: 2020-04-29

## 2020-04-29 VITALS
HEIGHT: 66 IN | BODY MASS INDEX: 36.33 KG/M2 | SYSTOLIC BLOOD PRESSURE: 120 MMHG | DIASTOLIC BLOOD PRESSURE: 70 MMHG | TEMPERATURE: 97.1 F | HEART RATE: 64 BPM

## 2020-04-29 DIAGNOSIS — F11.90 CHRONIC, CONTINUOUS USE OF OPIOIDS: Chronic | ICD-10-CM

## 2020-04-29 DIAGNOSIS — I73.9 PERIPHERAL VASCULAR DISEASE (HCC): Primary | Chronic | ICD-10-CM

## 2020-04-29 DIAGNOSIS — E66.01 MORBIDLY OBESE (HCC): ICD-10-CM

## 2020-04-29 DIAGNOSIS — F41.8 MIXED ANXIETY DEPRESSIVE DISORDER: Chronic | ICD-10-CM

## 2020-04-29 DIAGNOSIS — E03.8 OTHER SPECIFIED HYPOTHYROIDISM: Chronic | ICD-10-CM

## 2020-04-29 DIAGNOSIS — J30.1 SEASONAL ALLERGIC RHINITIS DUE TO POLLEN: ICD-10-CM

## 2020-04-29 DIAGNOSIS — E11.10 TYPE 2 DIABETES MELLITUS WITH KETOACIDOSIS WITHOUT COMA, WITHOUT LONG-TERM CURRENT USE OF INSULIN (HCC): ICD-10-CM

## 2020-04-29 DIAGNOSIS — J43.9 PULMONARY EMPHYSEMA, UNSPECIFIED EMPHYSEMA TYPE (HCC): Chronic | ICD-10-CM

## 2020-04-29 DIAGNOSIS — Z72.0 TOBACCO ABUSE: Chronic | ICD-10-CM

## 2020-04-29 DIAGNOSIS — I10 ESSENTIAL HYPERTENSION: Chronic | ICD-10-CM

## 2020-04-29 DIAGNOSIS — E61.1 IRON DEFICIENCY: ICD-10-CM

## 2020-04-29 PROCEDURE — 99214 OFFICE O/P EST MOD 30 MIN: CPT | Performed by: INTERNAL MEDICINE

## 2020-04-29 RX ORDER — FLUTICASONE PROPIONATE 50 MCG
1 SPRAY, SUSPENSION (ML) NASAL 2 TIMES DAILY
Qty: 1 BOTTLE | Refills: 5 | Status: SHIPPED | OUTPATIENT
Start: 2020-04-29 | End: 2021-03-29

## 2020-04-29 NOTE — PROGRESS NOTES
Patient is a 64 y.o. female who is here for a follow up of hypertension and hypothyroidism.  Chief Complaint   Patient presents with   • Hypertension   • Hypothyroidism         HPI:    Here for mgmt of back pain and HTN/DM/hypothyroid.  Onset years.  Pain is tolerable.  No dizziness or lightheadedness.  Energy level is not the best.  Today c/o right ear pain that radiates down into her jaw.  Occasional wax dc.    GERD is good.  No nausea or emesis.  Feels like she is loosing more hair than usual.     History:     Patient Active Problem List   Diagnosis   • COPD on home O2   • Complicated sleep apnea   • Mixed anxiety depressive disorder   • GERD   • HTN   • Hypothyroidism   • Morbidly obese (CMS/HCC)   • Tobacco abuse   • PVD   • Chronic, continuous use of opioids   • CAD s/p multiple stents   • Hx takotsubo cardiomyopathy   • Severe AS s/p TAVR 1/23/20   • Seasonal allergic rhinitis due to pollen       Past Medical History:   Diagnosis Date   • Altered mental status 2/13/2018   • Bronchitis    • Cellulitis of sacral region 2/13/2018   • Cervical cancer (CMS/HCC)    • Chronic anxiety 2/27/2017   • Chronic bronchitis (CMS/HCC)    • Chronic respiratory failure with hypoxia (CMS/HCC) 3/8/2018   • Chronic systolic heart failure (CMS/HCC)    • Chronically on benzodiazepine therapy 2/27/2017   • Coronary artery disease    • Degenerative arthritis    • Diabetes mellitus (CMS/HCC)    • Dyslipidemia 5/11/2016   • Dyspnea    • Fatty liver disease, nonalcoholic 11/21/2016   • Fibromyalgia    • GERD (gastroesophageal reflux disease)    • H/O echocardiogram    • History of pneumonia    • Hypertension 5/11/2016    16. H/O echocardiogram (V15.89) (Z92.89)  · A.  Echocardiogram of 02/03/2015 reports an ejection fraction of 60-65%, mild concentric     LVH, trace mitral regurgitation, mild tricuspid and pulmonic regurgitation and calculated     RVSP of 35 mmHg, the main pulmonary artery is also mildly dilated.   • Hypothyroidism  5/11/2016    Description: A.  On replacement therapy.   • Infected wound 3/8/2018   • Lung nodule     per pt report   • Macular degeneration    • Meningioma (CMS/[facility])     behind left eye per pt report   • Obesity    • DINA (obstructive sleep apnea)     intolerant of CPAP therapy, uses bipap   • Osteoporosis    • Polycythemia vera (CMS/HCC) 5/11/2016   • Pulmonary emphysema (CMS/[facility])    • Restrictive ventilatory defect    • Sepsis (CMS/[facility]) 2/13/2018   • TIA (transient ischemic attack) 2015    blurred vision    • Uterine cancer (CMS/[facility])    • Vitamin D deficiency 8/1/2016   • Weakness 3/8/2018       Past Surgical History:   Procedure Laterality Date   • AMPUTATION DIGIT Left 11/23/2016    Procedure: AMPUTATION TRANS METATARSAL - ray amutation of the left great toe;  Surgeon: Arsalan Feliciano MD;  Location: [facility];  Service:    • AMPUTATION DIGIT Left 3/2/2017    Procedure: LEFT FOOT TRANSMETATARSAL AMPUTATION TOES 2,3,4,5;  Surgeon: Joey Guaman MD;  Location: [facility];  Service:    • AORTIC VALVE REPAIR/REPLACEMENT N/A 1/23/2020    Procedure: TRANSCATHETER AORTIC VALVE REPLACEMENT;  Surgeon: Joey Guaman MD;  Location: [facility] HYBRID OR [facility];  Service: Cardiothoracic   • AORTIC VALVE REPAIR/REPLACEMENT N/A 1/23/2020    Procedure: Transfemoral Transcatheter Aortic Valve Replacement;  Surgeon: Qi Valdez MD;  Location: [facility] HYBRID OR [facility];  Service: Cardiovascular   • BACK SURGERY      lumbar fusions x5--multiple times; 1995, 1997, 1998, 1999 and 2008   • BELOW KNEE AMPUTATION Left 2/25/2017    Procedure: EXTENDED LEFT TOE AMPUTATION;  Surgeon: Arsalan Feliciano MD;  Location: [facility];  Service:    • CARDIAC CATHETERIZATION N/A 11/26/2016    Procedure: Left Heart Cath;  Surgeon: Ash Nuñez MD;  Location: [facility] CATH INVASIVE LOCATION;  Service:    • CARDIAC CATHETERIZATION N/A 2/20/2018    Procedure: Left Heart Cath;  Surgeon: Lane Zamorano MD;  Location: [facility]ATH INVASIVE LOCATION;  Service:    • CARDIAC  CATHETERIZATION N/A 2/20/2018    Procedure: Right Heart Cath;  Surgeon: Lane Zamorano MD;  Location:  [facility] INVASIVE LOCATION;  Service:    • CARDIAC CATHETERIZATION Left 1/10/2020    Procedure: Cardiac Catheterization/Vascular Study;  Surgeon: Lane Zamorano MD;  Location:  [facility] INVASIVE LOCATION;  Service: Cardiology   • COLONOSCOPY     • HAND SURGERY Bilateral     x3    • HYSTERECTOMY      status post uterine and cervical cancer   • LUMBAR SPINE SURGERY      arthrodesis by anterior approach addit interspace   • TONSILLECTOMY         Current Outpatient Medications on File Prior to Visit   Medication Sig   • acetaminophen (TYLENOL) 325 MG tablet Take 2 tablets by mouth Every 4 (Four) Hours As Needed for mild pain (1-3) or fever (temperature greater than 101F).   • albuterol (PROVENTIL) (2.5 MG/3ML) 0.083% nebulizer solution Take 2.5 mg by nebulization Every 6 (Six) Hours As Needed for Wheezing or Shortness of Air.   • apixaban (ELIQUIS) 5 MG tablet tablet Take 1 tablet by mouth Every 12 (Twelve) Hours.   • atorvastatin (LIPITOR) 40 MG tablet TAKE ONE TABLET BY MOUTH EVERY NIGHT   • busPIRone (BUSPAR) 7.5 MG tablet TAKE TWO TABLETS BY MOUTH TWICE A DAY   • clonazePAM (KlonoPIN) 0.5 MG tablet TAKE ONE-HALF TABLET BY MOUTH TWO TIMES A DAY AS NEEDED FOR SEIZURES   • clopidogrel (PLAVIX) 75 MG tablet TAKE ONE TABLET BY MOUTH DAILY   • ezetimibe (ZETIA) 10 MG tablet TAKE ONE TABLET BY MOUTH DAILY   • fentaNYL (DURAGESIC) 75 MCG/HR patch Place 1 patch on the skin as directed by provider Every Other Day.   • fexofenadine (ALLEGRA) 180 MG tablet Take 180 mg by mouth Daily As Needed.   • furosemide (LASIX) 40 MG tablet TAKE ONE TABLET BY MOUTH TWICE A DAY   • levothyroxine (SYNTHROID, LEVOTHROID) 112 MCG tablet TAKE ONE TABLET BY MOUTH DAILY   • lisinopril (PRINIVIL,ZESTRIL) 10 MG tablet TAKE ONE TABLET BY MOUTH DAILY   • melatonin 5 MG sublingual tablet sublingual tablet Place 1 tablet under the tongue At Night  As Needed (insomnia).   • metoprolol tartrate (LOPRESSOR) 50 MG tablet Take 1 tablet by mouth 2 (Two) Times a Day.   • omeprazole (priLOSEC) 20 MG capsule TAKE ONE CAPSULE BY MOUTH DAILY   • oxyCODONE-acetaminophen (Percocet) 7.5-325 MG per tablet Take 1 tablet by mouth Every 6 (Six) Hours As Needed for Moderate Pain .   • pregabalin (LYRICA) 100 MG capsule Take 1 capsule by mouth Every 8 (Eight) Hours.   • probiotic (CULTURELLE) capsule capsule Take 1 capsule by mouth Daily.   • promethazine (PHENERGAN) 25 MG tablet TAKE ONE TABLET BY MOUTH EVERY 6 HOURS AS NEEDED FOR NAUSEA OR VOMITING   • sertraline (ZOLOFT) 50 MG tablet TAKE ONE TABLET BY MOUTH DAILY   • tiZANidine (ZANAFLEX) 4 MG tablet TAKE ONE TABLET BY MOUTH TWICE A DAY AS NEEDED FOR MUSCLE SPASMS   • topiramate (TOPAMAX) 50 MG tablet Take 1 tablet by mouth 2 (Two) Times a Day. 50 mg q am; 25 mg q pm (Patient taking differently: Take 50 mg by mouth 2 (Two) Times a Day.)     No current facility-administered medications on file prior to visit.        Family History   Problem Relation Age of Onset   • Arthritis Mother    • Diabetes Mother    • Colon polyps Mother    • Diverticulitis Mother    • Heart failure Mother    • Arthritis Father    • Bleeding Disorder Father    • Diabetes Father    • Kidney disease Father    • Heart disease Father    • COPD Sister         currently smokes   • Arthritis Brother    • Diabetes Brother    • Alcohol abuse Brother    • Heart murmur Daughter    • Arthritis Other    • Diabetes Other        Social History     Socioeconomic History   • Marital status:      Spouse name: Wali   • Number of children: 1   • Years of education: Not on file   • Highest education level: Not on file   Occupational History   • Occupation: Biolase keeper     Employer: DISABLED     Comment: back injury   Tobacco Use   • Smoking status: Current Every Day Smoker     Packs/day: 1.00     Years: 48.00     Pack years: 48.00     Types: Cigarettes   • Smokeless  "tobacco: Never Used   Substance and Sexual Activity   • Alcohol use: No   • Drug use: No   • Sexual activity: Defer   Social History Narrative    Mrs. Langston is a 62 year old white  female. She lives with her spouse in [facility]. She states she does her own ADLs but appears disabled. They have one child and two grandchildren. She has a living will.    Caffeine: 2 serving per day. Pt lives at home with .         Review of Systems   Constitutional: Positive for fatigue. Negative for chills, diaphoresis, fever and unexpected weight change.   HENT: Positive for ear pain. Negative for congestion, hearing loss, nosebleeds, postnasal drip, sinus pressure and sore throat.    Eyes: Negative for pain, discharge and itching.   Respiratory: Positive for shortness of breath. Negative for cough, chest tightness and wheezing.    Cardiovascular: Negative for chest pain and leg swelling.   Gastrointestinal: Negative for abdominal distention, blood in stool, constipation, diarrhea, nausea and vomiting.   Endocrine: Negative for heat intolerance, polydipsia and polyuria.   Genitourinary: Negative for difficulty urinating, dysuria, frequency and hematuria.   Musculoskeletal: Positive for arthralgias, back pain, gait problem and neck stiffness. Negative for joint swelling and myalgias.   Skin:        Hair loss   Neurological: Positive for dizziness, tremors, weakness and light-headedness. Negative for syncope and headaches.   Psychiatric/Behavioral: Positive for dysphoric mood and sleep disturbance. The patient is nervous/anxious.        /70   Pulse 64   Temp 97.1 °F (36.2 °C) (Temporal)   Ht 167.6 cm (65.98\")   LMP  (LMP Unknown)   BMI 36.33 kg/m²       Physical Exam   Constitutional: She is oriented to person, place, and time. She appears well-developed and well-nourished.   HENT:   Head: Normocephalic and atraumatic.   Right Ear: External ear normal.   Left Ear: External ear normal.   Mouth/Throat: " Oropharynx is clear and moist.   Dull tm   Eyes: Conjunctivae and EOM are normal.   Neck: Normal range of motion. Neck supple.   Cardiovascular: Normal rate and regular rhythm.   2/6 СЕРГЕЙ   Pulmonary/Chest: Effort normal.   Mildly diminished   Abdominal: Soft. Bowel sounds are normal.   Musculoskeletal:   Using wheelchair     Lymphadenopathy:     She has no cervical adenopathy.   Neurological: She is alert and oriented to person, place, and time.   Skin: Skin is warm and dry. No rash noted.   Psychiatric: She has a normal mood and affect. Her behavior is normal. Thought content normal.       Procedure:      Discussion/Summary:    chronic back pain- refill patch and percocet as needed, advised need to reduce the dose as we are doing, controlled  htn-stable on ACE  hyperlipidemia-cont lipitor and zetia, recheck  at goal except for TG and HDL, counseled on low chol diet  hypothroid-cont replacement, recheck today at goal  depression with anxiety-cont buspar and klonopine and zoloft ,advised her of risk of addiction  polycythemia-cbc today, advised tob cessation  retinal vein occlusion- cont rf modification, counseled on tob cessation  b12 def-recheck at goal  DM-labs today controlled, counseled on low carb  Elevated lft/?autoimmune-f/u gastro recs , labs today stable, advised wt loss  A/D-cont zoloft, stable  GERD-cont PPI, stable  Tremor-cont BB and topamax, stable  Fe def anemia-will recheck today, improving      4/29  labs noted and dw patient    Current Outpatient Medications:   •  acetaminophen (TYLENOL) 325 MG tablet, Take 2 tablets by mouth Every 4 (Four) Hours As Needed for mild pain (1-3) or fever (temperature greater than 101F)., Disp: 100 tablet, Rfl: 0  •  albuterol (PROVENTIL) (2.5 MG/3ML) 0.083% nebulizer solution, Take 2.5 mg by nebulization Every 6 (Six) Hours As Needed for Wheezing or Shortness of Air., Disp: 120 vial, Rfl: 5  •  apixaban (ELIQUIS) 5 MG tablet tablet, Take 1 tablet by mouth Every 12  (Twelve) Hours., Disp: 60 tablet, Rfl: 11  •  atorvastatin (LIPITOR) 40 MG tablet, TAKE ONE TABLET BY MOUTH EVERY NIGHT, Disp: 90 tablet, Rfl: 2  •  busPIRone (BUSPAR) 7.5 MG tablet, TAKE TWO TABLETS BY MOUTH TWICE A DAY, Disp: 360 tablet, Rfl: 3  •  clonazePAM (KlonoPIN) 0.5 MG tablet, TAKE ONE-HALF TABLET BY MOUTH TWO TIMES A DAY AS NEEDED FOR SEIZURES, Disp: 30 tablet, Rfl: 1  •  clopidogrel (PLAVIX) 75 MG tablet, TAKE ONE TABLET BY MOUTH DAILY, Disp: 90 tablet, Rfl: 0  •  ezetimibe (ZETIA) 10 MG tablet, TAKE ONE TABLET BY MOUTH DAILY, Disp: 90 tablet, Rfl: 2  •  fentaNYL (DURAGESIC) 75 MCG/HR patch, Place 1 patch on the skin as directed by provider Every Other Day., Disp: 15 patch, Rfl: 0  •  fexofenadine (ALLEGRA) 180 MG tablet, Take 180 mg by mouth Daily As Needed., Disp: , Rfl:   •  furosemide (LASIX) 40 MG tablet, TAKE ONE TABLET BY MOUTH TWICE A DAY, Disp: 180 tablet, Rfl: 0  •  levothyroxine (SYNTHROID, LEVOTHROID) 112 MCG tablet, TAKE ONE TABLET BY MOUTH DAILY, Disp: 90 tablet, Rfl: 0  •  lisinopril (PRINIVIL,ZESTRIL) 10 MG tablet, TAKE ONE TABLET BY MOUTH DAILY, Disp: 90 tablet, Rfl: 0  •  melatonin 5 MG sublingual tablet sublingual tablet, Place 1 tablet under the tongue At Night As Needed (insomnia)., Disp: 30 tablet, Rfl: 0  •  metoprolol tartrate (LOPRESSOR) 50 MG tablet, Take 1 tablet by mouth 2 (Two) Times a Day., Disp: 60 tablet, Rfl: 3  •  omeprazole (priLOSEC) 20 MG capsule, TAKE ONE CAPSULE BY MOUTH DAILY, Disp: 90 capsule, Rfl: 3  •  oxyCODONE-acetaminophen (Percocet) 7.5-325 MG per tablet, Take 1 tablet by mouth Every 6 (Six) Hours As Needed for Moderate Pain ., Disp: 120 tablet, Rfl: 0  •  pregabalin (LYRICA) 100 MG capsule, Take 1 capsule by mouth Every 8 (Eight) Hours., Disp: 270 capsule, Rfl: 0  •  probiotic (CULTURELLE) capsule capsule, Take 1 capsule by mouth Daily., Disp: 60 capsule, Rfl: 0  •  promethazine (PHENERGAN) 25 MG tablet, TAKE ONE TABLET BY MOUTH EVERY 6 HOURS AS NEEDED FOR  NAUSEA OR VOMITING, Disp: 20 tablet, Rfl: 1  •  sertraline (ZOLOFT) 50 MG tablet, TAKE ONE TABLET BY MOUTH DAILY, Disp: 90 tablet, Rfl: 0  •  tiZANidine (ZANAFLEX) 4 MG tablet, TAKE ONE TABLET BY MOUTH TWICE A DAY AS NEEDED FOR MUSCLE SPASMS, Disp: 60 tablet, Rfl: 2  •  topiramate (TOPAMAX) 50 MG tablet, Take 1 tablet by mouth 2 (Two) Times a Day. 50 mg q am; 25 mg q pm (Patient taking differently: Take 50 mg by mouth 2 (Two) Times a Day.), Disp: 180 tablet, Rfl: 1  •  fluticasone (Flonase) 50 MCG/ACT nasal spray, 1 spray into the nostril(s) as directed by provider 2 (Two) Times a Day., Disp: 1 bottle, Rfl: 5        Tamara was seen today for hypertension and hypothyroidism.    Diagnoses and all orders for this visit:    PVD    Morbidly obese (CMS/[facility])    HTN  -     Basic Metabolic Panel  -     CBC (No Diff)  -     Lipid Panel    Pulmonary emphysema, unspecified emphysema type (CMS/[facility])    Other specified hypothyroidism  -     TSH    Tobacco abuse    Mixed anxiety depressive disorder    Chronic, continuous use of opioids    Seasonal allergic rhinitis due to pollen  -     fluticasone (Flonase) 50 MCG/ACT nasal spray; 1 spray into the nostril(s) as directed by provider 2 (Two) Times a Day.    Type 2 diabetes mellitus with ketoacidosis without coma, without long-term current use of insulin (CMS/[facility])  -     Hemoglobin A1c    Iron deficiency  -     Iron

## 2020-04-30 LAB
BUN SERPL-MCNC: 15 MG/DL (ref 8–23)
BUN/CREAT SERPL: 13.4 (ref 7–25)
CALCIUM SERPL-MCNC: 8.7 MG/DL (ref 8.6–10.5)
CHLORIDE SERPL-SCNC: 103 MMOL/L (ref 98–107)
CHOLEST SERPL-MCNC: 132 MG/DL (ref 0–200)
CO2 SERPL-SCNC: 27.2 MMOL/L (ref 22–29)
CREAT SERPL-MCNC: 1.12 MG/DL (ref 0.57–1)
ERYTHROCYTE [DISTWIDTH] IN BLOOD BY AUTOMATED COUNT: 16.3 % (ref 12.3–15.4)
GLUCOSE SERPL-MCNC: 133 MG/DL (ref 65–99)
HBA1C MFR BLD: 5.4 % (ref 4.8–5.6)
HCT VFR BLD AUTO: 35.9 % (ref 34–46.6)
HDLC SERPL-MCNC: 31 MG/DL (ref 40–60)
HGB BLD-MCNC: 11.4 G/DL (ref 12–15.9)
IRON SERPL-MCNC: 105 MCG/DL (ref 37–145)
LDLC SERPL CALC-MCNC: 49 MG/DL (ref 0–100)
MCH RBC QN AUTO: 29.4 PG (ref 26.6–33)
MCHC RBC AUTO-ENTMCNC: 31.8 G/DL (ref 31.5–35.7)
MCV RBC AUTO: 92.5 FL (ref 79–97)
PLATELET # BLD AUTO: 152 10*3/MM3 (ref 140–450)
POTASSIUM SERPL-SCNC: 4.4 MMOL/L (ref 3.5–5.2)
RBC # BLD AUTO: 3.88 10*6/MM3 (ref 3.77–5.28)
SODIUM SERPL-SCNC: 141 MMOL/L (ref 136–145)
TRIGL SERPL-MCNC: 260 MG/DL (ref 0–150)
TSH SERPL DL<=0.005 MIU/L-ACNC: 1.15 UIU/ML (ref 0.27–4.2)
VLDLC SERPL CALC-MCNC: 52 MG/DL (ref 5–40)
WBC # BLD AUTO: 8.51 10*3/MM3 (ref 3.4–10.8)

## 2020-05-04 ENCOUNTER — TELEPHONE (OUTPATIENT)
Dept: CARDIOLOGY | Facility: CLINIC | Age: 65
End: 2020-05-04

## 2020-05-04 NOTE — TELEPHONE ENCOUNTER
Patient called to report that she has been experiencing hair loss. Patient states that Dr. Aranda had mentioned at her last appt that this could be caused by her metoprolol. She is currently taking 50 mg BID. Pt last BP at PCP was 120/70 HR 64. She does not take her BP at home. Pt states that she is still having some palpitations but not as frequent. She is wishing to change metoprolol to something else.     Please advise. Thanks!

## 2020-05-05 RX ORDER — DOXAZOSIN 2 MG/1
2 TABLET ORAL NIGHTLY
Qty: 30 TABLET | Refills: 11 | Status: SHIPPED | OUTPATIENT
Start: 2020-05-05 | End: 2020-06-08 | Stop reason: SDUPTHER

## 2020-05-05 NOTE — TELEPHONE ENCOUNTER
Patient states that she was experiencing hair loss when she started on metoprolol 25 mg BID. She states this has been ongoing since starting and worsened with dose increase.

## 2020-05-05 NOTE — TELEPHONE ENCOUNTER
If the hair loss worsens increasing her metoprolol dose?  If so, we can go back to 25 mg twice daily.  This may affect her palpitations.

## 2020-05-05 NOTE — TELEPHONE ENCOUNTER
Lets go ahead and stop metoprolol.  Please added to her list of allergies as well as her prior reaction to carvedilol (rash).  Start Cardura 2 mg nightly for her blood pressure in place of the metoprolol

## 2020-05-11 NOTE — TELEPHONE ENCOUNTER
Patient called to report that after coming off of metoprolol due to hair loss, she has been experiencing an increased heart rate and palpitations. Patient does not have a blood pressure cuff to monitor BP or HR. Patient states that increased HR is worsening her tremors. Pt advised to obtain blood pressure cuff if able.       Please advise. Thanks.

## 2020-05-12 DIAGNOSIS — I73.9 PERIPHERAL VASCULAR DISEASE (HCC): ICD-10-CM

## 2020-05-12 RX ORDER — CLOPIDOGREL BISULFATE 75 MG/1
TABLET ORAL
Qty: 90 TABLET | Refills: 0 | Status: SHIPPED | OUTPATIENT
Start: 2020-05-12 | End: 2020-07-20 | Stop reason: SDUPTHER

## 2020-05-12 RX ORDER — DILTIAZEM HYDROCHLORIDE 120 MG/1
120 CAPSULE, COATED, EXTENDED RELEASE ORAL DAILY
Qty: 30 CAPSULE | Refills: 11 | Status: SHIPPED | OUTPATIENT
Start: 2020-05-12 | End: 2020-06-08 | Stop reason: SDUPTHER

## 2020-05-12 NOTE — TELEPHONE ENCOUNTER
Agree that she should buy a blood pressure cuff.  Start long acting cardizem 120 mg once daily (Cartia XT, vs Cardizem CD. Either is fine) for the palpitations

## 2020-05-12 NOTE — TELEPHONE ENCOUNTER
Patient contacted with recommendations per [facility]. Pt verbalizes understanding, all questions answered at this time.

## 2020-05-18 DIAGNOSIS — F11.90 CHRONIC, CONTINUOUS USE OF OPIOIDS: Chronic | ICD-10-CM

## 2020-05-18 DIAGNOSIS — G89.4 CHRONIC PAIN SYNDROME: ICD-10-CM

## 2020-05-18 RX ORDER — FENTANYL 75 UG/H
1 PATCH TRANSDERMAL
Qty: 15 PATCH | Refills: 0 | Status: SHIPPED | OUTPATIENT
Start: 2020-05-18 | End: 2020-06-16 | Stop reason: SDUPTHER

## 2020-05-18 RX ORDER — PREGABALIN 100 MG/1
CAPSULE ORAL
Qty: 270 CAPSULE | Refills: 0 | Status: SHIPPED | OUTPATIENT
Start: 2020-05-18 | End: 2020-08-18

## 2020-05-18 RX ORDER — OXYCODONE AND ACETAMINOPHEN 7.5; 325 MG/1; MG/1
1 TABLET ORAL EVERY 6 HOURS PRN
Qty: 120 TABLET | Refills: 0 | Status: SHIPPED | OUTPATIENT
Start: 2020-05-18 | End: 2020-06-16 | Stop reason: SDUPTHER

## 2020-05-18 NOTE — TELEPHONE ENCOUNTER
PT CALLED AND NEEDS FENTANYL PATCH 75 MG PATCH AND PERCOCET 7.5 MG 4X DAILY    [address]ARC[address]

## 2020-05-26 ENCOUNTER — HOSPITAL ENCOUNTER (OUTPATIENT)
Dept: CARDIOLOGY | Facility: HOSPITAL | Age: 65
Discharge: HOME | End: 2020-05-26
Admitting: INTERNAL MEDICINE

## 2020-05-26 DIAGNOSIS — I35.0 NONRHEUMATIC AORTIC VALVE STENOSIS: ICD-10-CM

## 2020-05-26 LAB
BH CV ECHO MEAS - AO MAX PG (FULL): 43.9 MMHG
BH CV ECHO MEAS - AO MAX PG: 51.1 MMHG
BH CV ECHO MEAS - AO MEAN PG (FULL): 21.7 MMHG
BH CV ECHO MEAS - AO MEAN PG: 26 MMHG
BH CV ECHO MEAS - AO ROOT AREA (BSA CORRECTED): 1.6
BH CV ECHO MEAS - AO ROOT AREA: 8.4 CM^2
BH CV ECHO MEAS - AO ROOT DIAM: 3.3 CM
BH CV ECHO MEAS - AO V2 MAX: 357.5 CM/SEC
BH CV ECHO MEAS - AO V2 MEAN: 229.1 CM/SEC
BH CV ECHO MEAS - AO V2 VTI: 91.2 CM
BH CV ECHO MEAS - AVA(I,A): 1.3 CM^2
BH CV ECHO MEAS - AVA(I,D): 1.3 CM^2
BH CV ECHO MEAS - AVA(V,A): 1.2 CM^2
BH CV ECHO MEAS - AVA(V,D): 1.2 CM^2
BH CV ECHO MEAS - BSA(HAYCOCK): 2.2 M^2
BH CV ECHO MEAS - BSA: 2.1 M^2
BH CV ECHO MEAS - BZI_BMI: 36.3 KILOGRAMS/M^2
BH CV ECHO MEAS - BZI_METRIC_HEIGHT: 167.6 CM
BH CV ECHO MEAS - BZI_METRIC_WEIGHT: 102.1 KG
BH CV ECHO MEAS - EDV(CUBED): 139.3 ML
BH CV ECHO MEAS - EDV(MOD-SP2): 136 ML
BH CV ECHO MEAS - EDV(MOD-SP4): 112 ML
BH CV ECHO MEAS - EDV(TEICH): 128.5 ML
BH CV ECHO MEAS - EF(CUBED): 84.6 %
BH CV ECHO MEAS - EF(MOD-SP4): 65.2 %
BH CV ECHO MEAS - EF(TEICH): 77.4 %
BH CV ECHO MEAS - ESV(CUBED): 21.5 ML
BH CV ECHO MEAS - ESV(MOD-SP4): 39 ML
BH CV ECHO MEAS - ESV(TEICH): 29.1 ML
BH CV ECHO MEAS - FS: 46.3 %
BH CV ECHO MEAS - IVS/LVPW: 0.98
BH CV ECHO MEAS - IVSD: 1.1 CM
BH CV ECHO MEAS - LA DIMENSION: 4.7 CM
BH CV ECHO MEAS - LA/AO: 1.4
BH CV ECHO MEAS - LV DIASTOLIC VOL/BSA (35-75): 53.3 ML/M^2
BH CV ECHO MEAS - LV MASS(C)D: 230.6 GRAMS
BH CV ECHO MEAS - LV MASS(C)DI: 109.7 GRAMS/M^2
BH CV ECHO MEAS - LV MAX PG: 7.2 MMHG
BH CV ECHO MEAS - LV MEAN PG: 4.3 MMHG
BH CV ECHO MEAS - LV SYSTOLIC VOL/BSA (12-30): 18.5 ML/M^2
BH CV ECHO MEAS - LV V1 MAX: 134.2 CM/SEC
BH CV ECHO MEAS - LV V1 MEAN: 95.2 CM/SEC
BH CV ECHO MEAS - LV V1 VTI: 36.9 CM
BH CV ECHO MEAS - LVIDD: 5.2 CM
BH CV ECHO MEAS - LVIDS: 2.8 CM
BH CV ECHO MEAS - LVLD AP2: 9.3 CM
BH CV ECHO MEAS - LVLD AP4: 8 CM
BH CV ECHO MEAS - LVLS AP4: 6.7 CM
BH CV ECHO MEAS - LVOT AREA (M): 3.1 CM^2
BH CV ECHO MEAS - LVOT AREA: 3.3 CM^2
BH CV ECHO MEAS - LVOT DIAM: 2 CM
BH CV ECHO MEAS - LVPWD: 1.1 CM
BH CV ECHO MEAS - RVDD: 2.3 CM
BH CV ECHO MEAS - SI(AO): 363.3 ML/M^2
BH CV ECHO MEAS - SI(CUBED): 56 ML/M^2
BH CV ECHO MEAS - SI(LVOT): 57.2 ML/M^2
BH CV ECHO MEAS - SI(MOD-SP4): 34.7 ML/M^2
BH CV ECHO MEAS - SI(TEICH): 47.3 ML/M^2
BH CV ECHO MEAS - SV(AO): 764 ML
BH CV ECHO MEAS - SV(CUBED): 117.7 ML
BH CV ECHO MEAS - SV(LVOT): 120.2 ML
BH CV ECHO MEAS - SV(MOD-SP4): 73 ML
BH CV ECHO MEAS - SV(TEICH): 99.5 ML

## 2020-05-26 PROCEDURE — 93308 TTE F-UP OR LMTD: CPT | Performed by: INTERNAL MEDICINE

## 2020-05-26 PROCEDURE — 93325 DOPPLER ECHO COLOR FLOW MAPG: CPT

## 2020-05-26 PROCEDURE — 93325 DOPPLER ECHO COLOR FLOW MAPG: CPT | Performed by: INTERNAL MEDICINE

## 2020-05-26 PROCEDURE — 93321 DOPPLER ECHO F-UP/LMTD STD: CPT | Performed by: INTERNAL MEDICINE

## 2020-05-26 PROCEDURE — 93321 DOPPLER ECHO F-UP/LMTD STD: CPT

## 2020-05-26 PROCEDURE — 93308 TTE F-UP OR LMTD: CPT

## 2020-06-08 ENCOUNTER — OFFICE VISIT (OUTPATIENT)
Dept: CARDIOLOGY | Facility: CLINIC | Age: 65
End: 2020-06-08

## 2020-06-08 VITALS
OXYGEN SATURATION: 95 % | BODY MASS INDEX: 35.03 KG/M2 | HEART RATE: 90 BPM | SYSTOLIC BLOOD PRESSURE: 112 MMHG | HEIGHT: 66 IN | TEMPERATURE: 97.8 F | WEIGHT: 218 LBS | DIASTOLIC BLOOD PRESSURE: 50 MMHG

## 2020-06-08 DIAGNOSIS — R00.2 PALPITATIONS: ICD-10-CM

## 2020-06-08 DIAGNOSIS — I35.0 NONRHEUMATIC AORTIC VALVE STENOSIS: Primary | ICD-10-CM

## 2020-06-08 DIAGNOSIS — I50.22 CHRONIC SYSTOLIC HEART FAILURE (HCC): ICD-10-CM

## 2020-06-08 DIAGNOSIS — E78.2 MIXED HYPERLIPIDEMIA: ICD-10-CM

## 2020-06-08 DIAGNOSIS — I25.10 CORONARY ARTERY DISEASE INVOLVING NATIVE CORONARY ARTERY OF NATIVE HEART WITHOUT ANGINA PECTORIS: ICD-10-CM

## 2020-06-08 DIAGNOSIS — I10 ESSENTIAL HYPERTENSION: ICD-10-CM

## 2020-06-08 DIAGNOSIS — I51.81 TAKOTSUBO CARDIOMYOPATHY: ICD-10-CM

## 2020-06-08 PROCEDURE — 99214 OFFICE O/P EST MOD 30 MIN: CPT | Performed by: INTERNAL MEDICINE

## 2020-06-08 RX ORDER — DOXAZOSIN MESYLATE 1 MG/1
1 TABLET ORAL NIGHTLY
Qty: 30 TABLET | Refills: 5 | Status: SHIPPED | OUTPATIENT
Start: 2020-06-08 | End: 2020-07-02 | Stop reason: SDUPTHER

## 2020-06-08 RX ORDER — DILTIAZEM HYDROCHLORIDE 240 MG/1
240 CAPSULE, COATED, EXTENDED RELEASE ORAL DAILY
Qty: 30 CAPSULE | Refills: 5 | Status: SHIPPED | OUTPATIENT
Start: 2020-06-08 | End: 2020-10-05 | Stop reason: SDUPTHER

## 2020-06-08 NOTE — PROGRESS NOTES
[facility] AT [address], [address]  [place], [place], [address] [phone]  WWW.[facility]           OUTPATIENT CLINIC FOLLOW UP NOTE    Patient Care Team:  Patient Care Team:  Harinder Aranda MD as PCP - General  Harinder Aranda MD as PCP - Family Medicine  Arsalan Feliciano MD as Surgeon (Cardiothoracic Surgery)  Lane Zamorano MD as Consulting Physician (Cardiology)    Subjective:      Chief Complaint   Patient presents with   • CAD s/p multiple stents       HPI:    Tamara Langston is a 64 y.o. female.  Cardiac problem list:  1. CAD s/p multiple PCI  2. Severe aortic stenosis status post TAVR (23 mm CPN 3 tissue valve) on 1/23/2020  3. History of systolic heart failure/Takotsubo  4. Hypertension  5. Hyperlipidemia  6. Diabetes  7. Sleep apnea, arthritis, uterine and cervical cancer, fatty liver, hypothyroidism, polycythemia vera, fibromyalgia, and osteoporosis.      The patient presents today for follow-up.  The patient was last seen she reports she has continued to have significant palpitations.  She also notes one episode of dizziness and one episode of sharp, centralized, brief chest pain.  She denies lower extremity edema, syncope, orthopnea, or PND.    Review of Systems:  Positive for palpitations,dizziness  Negative for exertional chest pain, shortness of breath, lower extremity edema, orthopnea, PND, syncope.    PFSH:  Patient Active Problem List   Diagnosis   • COPD on home O2   • Complicated sleep apnea   • Mixed anxiety depressive disorder   • GERD   • HTN   • Hypothyroidism   • Morbidly obese (CMS/HCC)   • Tobacco abuse   • PVD   • Chronic, continuous use of opioids   • CAD s/p multiple stents   • Hx takotsubo cardiomyopathy   • Severe AS s/p TAVR 1/23/20   • Seasonal allergic rhinitis due to pollen         Current Outpatient Medications:   •  acetaminophen (TYLENOL) 325 MG tablet, Take 2 tablets by mouth Every 4 (Four) Hours As Needed for mild  pain (1-3) or fever (temperature greater than 101F)., Disp: 100 tablet, Rfl: 0  •  albuterol (PROVENTIL) (2.5 MG/3ML) 0.083% nebulizer solution, Take 2.5 mg by nebulization Every 6 (Six) Hours As Needed for Wheezing or Shortness of Air., Disp: 120 vial, Rfl: 5  •  apixaban (ELIQUIS) 5 MG tablet tablet, Take 1 tablet by mouth Every 12 (Twelve) Hours., Disp: 60 tablet, Rfl: 11  •  atorvastatin (LIPITOR) 40 MG tablet, TAKE ONE TABLET BY MOUTH EVERY NIGHT, Disp: 90 tablet, Rfl: 2  •  busPIRone (BUSPAR) 7.5 MG tablet, TAKE TWO TABLETS BY MOUTH TWICE A DAY, Disp: 360 tablet, Rfl: 3  •  clonazePAM (KlonoPIN) 0.5 MG tablet, TAKE ONE-HALF TABLET BY MOUTH TWO TIMES A DAY AS NEEDED FOR SEIZURES, Disp: 30 tablet, Rfl: 1  •  clopidogrel (PLAVIX) 75 MG tablet, TAKE ONE TABLET BY MOUTH DAILY, Disp: 90 tablet, Rfl: 0  •  dilTIAZem CD (Cardizem CD) 120 MG 24 hr capsule, Take 1 capsule by mouth Daily., Disp: 30 capsule, Rfl: 11  •  doxazosin (Cardura) 2 MG tablet, Take 1 tablet by mouth Every Night., Disp: 30 tablet, Rfl: 11  •  ezetimibe (ZETIA) 10 MG tablet, TAKE ONE TABLET BY MOUTH DAILY, Disp: 90 tablet, Rfl: 2  •  fentaNYL (DURAGESIC) 75 MCG/HR patch, Place 1 patch on the skin as directed by provider Every Other Day., Disp: 15 patch, Rfl: 0  •  fexofenadine (ALLEGRA) 180 MG tablet, Take 180 mg by mouth Daily As Needed., Disp: , Rfl:   •  fluticasone (Flonase) 50 MCG/ACT nasal spray, 1 spray into the nostril(s) as directed by provider 2 (Two) Times a Day. (Patient taking differently: 1 spray into the nostril(s) as directed by provider As Needed.), Disp: 1 bottle, Rfl: 5  •  furosemide (LASIX) 40 MG tablet, TAKE ONE TABLET BY MOUTH TWICE A DAY, Disp: 180 tablet, Rfl: 0  •  levothyroxine (SYNTHROID, LEVOTHROID) 112 MCG tablet, TAKE ONE TABLET BY MOUTH DAILY, Disp: 90 tablet, Rfl: 0  •  lisinopril (PRINIVIL,ZESTRIL) 10 MG tablet, TAKE ONE TABLET BY MOUTH DAILY, Disp: 90 tablet, Rfl: 0  •  melatonin 5 MG sublingual tablet sublingual  tablet, Place 1 tablet under the tongue At Night As Needed (insomnia)., Disp: 30 tablet, Rfl: 0  •  omeprazole (priLOSEC) 20 MG capsule, TAKE ONE CAPSULE BY MOUTH DAILY, Disp: 90 capsule, Rfl: 3  •  oxyCODONE-acetaminophen (Percocet) 7.5-325 MG per tablet, Take 1 tablet by mouth Every 6 (Six) Hours As Needed for Moderate Pain ., Disp: 120 tablet, Rfl: 0  •  pregabalin (LYRICA) 100 MG capsule, TAKE ONE CAPSULE BY MOUTH EVERY 8 HOURS, Disp: 270 capsule, Rfl: 0  •  probiotic (CULTURELLE) capsule capsule, Take 1 capsule by mouth Daily., Disp: 60 capsule, Rfl: 0  •  promethazine (PHENERGAN) 25 MG tablet, TAKE ONE TABLET BY MOUTH EVERY 6 HOURS AS NEEDED FOR NAUSEA OR VOMITING, Disp: 20 tablet, Rfl: 1  •  sertraline (ZOLOFT) 50 MG tablet, TAKE ONE TABLET BY MOUTH DAILY, Disp: 90 tablet, Rfl: 0  •  tiZANidine (ZANAFLEX) 4 MG tablet, TAKE ONE TABLET BY MOUTH TWICE A DAY AS NEEDED FOR MUSCLE SPASMS, Disp: 60 tablet, Rfl: 2  •  topiramate (TOPAMAX) 50 MG tablet, Take 1 tablet by mouth 2 (Two) Times a Day. 50 mg q am; 25 mg q pm (Patient taking differently: Take 50 mg by mouth 2 (Two) Times a Day.), Disp: 180 tablet, Rfl: 1    Allergies   Allergen Reactions   • Augmentin [Amoxicillin-Pot Clavulanate] Swelling     Mouth raw, tongue swelling   • Cortisone Other (See Comments)     Hotness all over body and hyper   • Oxycontin [Oxycodone] Other (See Comments)     Psoriasis  Tolerates Percocet   • Coreg [Carvedilol] Rash   • Doxycycline Rash   • Metoprolol Tartrate Other (See Comments)     Hair loss    • Tolmetin Rash     Tolectin-rash and itching   • Vancomycin Rash     Despite rash, pt continued to stay on it 2017        reports that she has been smoking cigarettes. She has a 48.00 pack-year smoking history. She has never used smokeless tobacco.    Family History   Problem Relation Age of Onset   • Arthritis Mother    • Diabetes Mother    • Colon polyps Mother    • Diverticulitis Mother    • Heart failure Mother    • Arthritis  "Father    • Bleeding Disorder Father    • Diabetes Father    • Kidney disease Father    • Heart disease Father    • COPD Sister         currently smokes   • Arthritis Brother    • Diabetes Brother    • Alcohol abuse Brother    • Heart murmur Daughter    • Arthritis Other    • Diabetes Other          Objective:   Blood pressure 112/50, pulse 90, temperature 97.8 °F (36.6 °C), height 167.6 cm (66\"), weight 98.9 kg (218 lb), SpO2 95 %, not currently breastfeeding.  CONSTITUTIONAL: No acute distress, normal affect  RESPIRATORY: Normal effort. Clear to auscultation bilaterally without wheezing or rales.  CARDIOVASCULAR:  Regular rate and rhythm with normal S1 and S2. Without gallop or rub. СЕРГЕЙ RUSB with radiation to the carotids  PERIPHERAL VASCULAR: Normal radial pulses bilaterally.  There is no lower extremity edema bilaterally    Labs:      Lab Results   Component Value Date    CHOL 112 02/14/2018    CHOL 123 02/25/2017    CHOL 244 (H) 11/27/2016     Lab Results   Component Value Date    TRIG 260 (H) 04/29/2020    TRIG 220 (H) 12/05/2019    TRIG 245 (H) 10/10/2018     Lab Results   Component Value Date    HDL 31 (L) 04/29/2020    HDL 31 (L) 12/05/2019    HDL 34 (L) 10/10/2018     Lab Results   Component Value Date    LDL 49 04/29/2020    LDL 44 12/05/2019    LDL 54 10/10/2018     Lab Results   Component Value Date    VLDL 52 (H) 04/29/2020    VLDL 44 (H) 12/05/2019    VLDL 49 10/10/2018     No results found for: LDLHDL      Diagnostic Data:    Procedures    TTE 12/2018  · Left ventricular systolic function is normal. Estimated EF = 65%.  · Left ventricular wall thickness is consistent with mild-to-moderate concentric hypertrophy.  · Left atrial cavity size is mildly dilated.  · There is severe calcification of the aortic valve.  · Moderate aortic valve stenosis is present.  · Estimated right ventricular systolic pressure from tricuspid regurgitation is mildly elevated (35-45 mmHg).     Limited TTE 7/2018  · Left " ventricular systolic function is normal. Estimated EF = 60%.    TTE 2/2018  · Left ventricular systolic function is moderately decreased. Estimated EF = 35%.  · Left ventricular wall thickness is consistent with mild-to-moderate concentric hypertrophy.  · The following left ventricular wall segments are hypokinetic: apical anterior, apical lateral, apical inferior, apical septal and apex hypokinetic. The basal segments are hyperdynamic.  · The findings have the apperance of stress-induced cardiomyopathy (Takotsubo)  · Left ventricular diastolic dysfunction (grade I a) consistent with impaired relaxation.  · Right ventricular cavity is small in size.  · The main pulmonary artery is moderately dilated.  · Left atrial cavity size is borderline dilated.  · There is moderate calcification of the aortic valve.  · Moderate aortic valve stenosis is present.    [facility] 02/20/2018  · There was severe coronary artery disease involving the mid LAD and first diagonal branch that is now status post intervention with a Tryton 3.5mm proximal, 3.0mm distal bifurcation stent extending from the LAD into the diagonal branch and a Synergy 3.5 x 38 mm drug-eluting stent extending from the proximal to mid LAD.  · Normal left ventricular ejection fraction but with hypokinesis of the apical segments  · Normal right heart filling pressures.  Normal pulmonate capillary wedge pressure  · Normal cardiac index    [facility] 11/2016  1.  50% mid RCA stenosis.  2.  40% proximal LAD stenosis   3.  Normal left ventricular function  4.  Hypertension    Assessment and Plan:   Tamara was seen today for coronary artery disease and hypertension.    Diagnoses and all orders for this visit:    Coronary artery disease involving native coronary artery of native heart without angina pectoris  Mixed hyperlipidemia  Polycythemia vera  History of stroke  -Continue clopidogrel. On clopidogrel also due to her polycythemia vera and prior stroke  -Not on an aspirin to avoid  triple therapy while on apixaban  -Continue other current related medications    Severe AS status post TAVR  -Status post TAVR 1/23/2020  -Repeat TTE again to reassess the aortic valve in 11/2020.  -Continue Eliquis until repeat TTE in 11/2020.  Will consider discontinuation thereafter if the valve is stable or not proved.  Will discuss with Dr. Valdez at that time.  If the velocities are concerning or worsening, would repeat a EMMANUEL    Takotsubo cardiomyopathy  Chronic systolic heart failure (CMS/HCC)  -Most recent EF 61-65% on 5/26/2020  -Continue current related medications.    Essential hypertension  -Increasing diltiazem extended release to 240 mg daily due to palpitations.  -Decrease Cardura to 1 mg p.o. nightly.    Palpitations  -Recent 30-day MCOT without significant arrhythmia.  Patient with continued to have significant palpitations.  -Increase diltiazem as above.    - Return in about 3 months (around 9/8/2020).    Scribed for Lane Zamorano MD by Lillie Quiroz, JEOVANY  . 6/8/2020  12:42     I, Lane Zamorano MD, personally performed the services as scribed by the above named individual. I have made any necessary edits and it is both accurate and complete.     Lane Zamorano MD, MSc, [facility]  Interventional Cardiology  [facility]

## 2020-06-16 DIAGNOSIS — F11.90 CHRONIC, CONTINUOUS USE OF OPIOIDS: Chronic | ICD-10-CM

## 2020-06-16 DIAGNOSIS — G89.4 CHRONIC PAIN SYNDROME: ICD-10-CM

## 2020-06-16 RX ORDER — OXYCODONE AND ACETAMINOPHEN 7.5; 325 MG/1; MG/1
1 TABLET ORAL EVERY 6 HOURS PRN
Qty: 120 TABLET | Refills: 0 | Status: SHIPPED | OUTPATIENT
Start: 2020-06-16 | End: 2020-07-14 | Stop reason: SDUPTHER

## 2020-06-16 RX ORDER — FENTANYL 75 UG/H
1 PATCH TRANSDERMAL
Qty: 15 PATCH | Refills: 0 | Status: SHIPPED | OUTPATIENT
Start: 2020-06-16 | End: 2020-07-14 | Stop reason: SDUPTHER

## 2020-06-24 DIAGNOSIS — F41.8 MIXED ANXIETY DEPRESSIVE DISORDER: Chronic | ICD-10-CM

## 2020-06-24 RX ORDER — CLONAZEPAM 0.5 MG/1
0.25 TABLET ORAL 2 TIMES DAILY PRN
Qty: 30 TABLET | Refills: 2 | Status: SHIPPED | OUTPATIENT
Start: 2020-06-24 | End: 2020-09-23

## 2020-07-02 RX ORDER — DOXAZOSIN MESYLATE 1 MG/1
1 TABLET ORAL NIGHTLY
Qty: 90 TABLET | Refills: 3 | Status: SHIPPED | OUTPATIENT
Start: 2020-07-02 | End: 2020-08-03 | Stop reason: SDUPTHER

## 2020-07-03 DIAGNOSIS — M62.838 MUSCLE SPASMS OF NECK: ICD-10-CM

## 2020-07-06 ENCOUNTER — TELEPHONE (OUTPATIENT)
Dept: INTERNAL MEDICINE | Facility: CLINIC | Age: 65
End: 2020-07-06

## 2020-07-06 RX ORDER — TIZANIDINE 4 MG/1
TABLET ORAL
Qty: 60 TABLET | Refills: 1 | Status: SHIPPED | OUTPATIENT
Start: 2020-07-06 | End: 2020-09-03

## 2020-07-09 DIAGNOSIS — F41.8 MIXED ANXIETY DEPRESSIVE DISORDER: ICD-10-CM

## 2020-07-14 DIAGNOSIS — F11.90 CHRONIC, CONTINUOUS USE OF OPIOIDS: Chronic | ICD-10-CM

## 2020-07-14 DIAGNOSIS — G89.4 CHRONIC PAIN SYNDROME: ICD-10-CM

## 2020-07-14 NOTE — TELEPHONE ENCOUNTER
PT NEEDING REFILL ON PERCOCET 7.5  MG 4X DAILY AND ALSO HER FENTANYL 75 MG PATCH    [address] [address]

## 2020-07-15 RX ORDER — OXYCODONE AND ACETAMINOPHEN 7.5; 325 MG/1; MG/1
1 TABLET ORAL EVERY 6 HOURS PRN
Qty: 120 TABLET | Refills: 0 | Status: SHIPPED | OUTPATIENT
Start: 2020-07-15 | End: 2020-07-29

## 2020-07-15 RX ORDER — FENTANYL 75 UG/H
1 PATCH TRANSDERMAL
Qty: 15 PATCH | Refills: 0 | Status: SHIPPED | OUTPATIENT
Start: 2020-07-15 | End: 2020-08-13 | Stop reason: SDUPTHER

## 2020-07-15 NOTE — TELEPHONE ENCOUNTER
Patient requested refills of fentaNYL (DURAGESIC) 75 MCG/HR patch and oxyCODONE-acetaminophen (Percocet) 7.5-325 MG per tablet. Patient will run out of medication today.    Please call and advise. Patient call back [phone]    [facility] [address] [place] - [phone]  - [phone] FX

## 2020-07-20 ENCOUNTER — TELEPHONE (OUTPATIENT)
Dept: INTERNAL MEDICINE | Facility: CLINIC | Age: 65
End: 2020-07-20

## 2020-07-20 DIAGNOSIS — I73.9 PERIPHERAL VASCULAR DISEASE (HCC): ICD-10-CM

## 2020-07-20 RX ORDER — CLOPIDOGREL BISULFATE 75 MG/1
75 TABLET ORAL DAILY
Qty: 90 TABLET | Refills: 1 | Status: SHIPPED | OUTPATIENT
Start: 2020-07-20 | End: 2020-10-26

## 2020-07-20 NOTE — TELEPHONE ENCOUNTER
Patient called and stated that she has a concern regarding her clopidogrel (PLAVIX) 75 MG tablet. She states that her pharmacy puts her medication in a bigger bottle due to her having a 90 day prescription. The patient states that when she was refilling her medication organizer she noticed the medication was almost out. The patient states that she had called her pharmacy and they had advised her that she had 30 days left on her last refill. The patient states she has looked all over her house for the medication bottle and is unable to find it. She called the pharmacy back and was told that the only that could be done is for the patient to have a new prescription sent in. Please advise.     Pharmacy is [address]     Patient call back [phone]

## 2020-07-24 DIAGNOSIS — E78.49 OTHER HYPERLIPIDEMIA: ICD-10-CM

## 2020-07-24 DIAGNOSIS — E78.5 DYSLIPIDEMIA: ICD-10-CM

## 2020-07-24 RX ORDER — EZETIMIBE 10 MG/1
TABLET ORAL
Qty: 90 TABLET | Refills: 1 | Status: SHIPPED | OUTPATIENT
Start: 2020-07-24 | End: 2021-01-25

## 2020-07-29 ENCOUNTER — LAB REQUISITION (OUTPATIENT)
Dept: LAB | Facility: HOSPITAL | Age: 65
End: 2020-07-29

## 2020-07-29 ENCOUNTER — LAB (OUTPATIENT)
Dept: LAB | Facility: HOSPITAL | Age: 65
End: 2020-07-29

## 2020-07-29 ENCOUNTER — OFFICE VISIT (OUTPATIENT)
Dept: INTERNAL MEDICINE | Facility: CLINIC | Age: 65
End: 2020-07-29

## 2020-07-29 VITALS
TEMPERATURE: 97.7 F | SYSTOLIC BLOOD PRESSURE: 120 MMHG | HEART RATE: 72 BPM | BODY MASS INDEX: 35.2 KG/M2 | HEIGHT: 66 IN | DIASTOLIC BLOOD PRESSURE: 60 MMHG

## 2020-07-29 DIAGNOSIS — I73.9 PERIPHERAL VASCULAR DISEASE (HCC): Primary | Chronic | ICD-10-CM

## 2020-07-29 DIAGNOSIS — E03.8 OTHER SPECIFIED HYPOTHYROIDISM: Chronic | ICD-10-CM

## 2020-07-29 DIAGNOSIS — Z72.0 TOBACCO ABUSE: Chronic | ICD-10-CM

## 2020-07-29 DIAGNOSIS — G89.4 CHRONIC PAIN SYNDROME: ICD-10-CM

## 2020-07-29 DIAGNOSIS — K21.9 GASTROESOPHAGEAL REFLUX DISEASE WITHOUT ESOPHAGITIS: Chronic | ICD-10-CM

## 2020-07-29 DIAGNOSIS — J43.9 PULMONARY EMPHYSEMA, UNSPECIFIED EMPHYSEMA TYPE (HCC): Chronic | ICD-10-CM

## 2020-07-29 DIAGNOSIS — F11.90 CHRONIC, CONTINUOUS USE OF OPIOIDS: Chronic | ICD-10-CM

## 2020-07-29 DIAGNOSIS — E11.10 TYPE 2 DIABETES MELLITUS WITH KETOACIDOSIS WITHOUT COMA, WITHOUT LONG-TERM CURRENT USE OF INSULIN (HCC): ICD-10-CM

## 2020-07-29 DIAGNOSIS — F41.8 MIXED ANXIETY DEPRESSIVE DISORDER: Chronic | ICD-10-CM

## 2020-07-29 DIAGNOSIS — E66.01 MORBIDLY OBESE (HCC): Chronic | ICD-10-CM

## 2020-07-29 DIAGNOSIS — I10 ESSENTIAL (PRIMARY) HYPERTENSION: ICD-10-CM

## 2020-07-29 DIAGNOSIS — I10 ESSENTIAL HYPERTENSION: Chronic | ICD-10-CM

## 2020-07-29 PROCEDURE — 99214 OFFICE O/P EST MOD 30 MIN: CPT | Performed by: INTERNAL MEDICINE

## 2020-07-29 PROCEDURE — 36415 COLL VENOUS BLD VENIPUNCTURE: CPT | Performed by: INTERNAL MEDICINE

## 2020-07-29 RX ORDER — OXYCODONE HYDROCHLORIDE AND ACETAMINOPHEN 5; 325 MG/1; MG/1
1 TABLET ORAL EVERY 6 HOURS PRN
Qty: 120 TABLET | Refills: 0
Start: 2020-07-29 | End: 2020-08-13 | Stop reason: SDUPTHER

## 2020-07-29 NOTE — PROGRESS NOTES
Patient is a 64 y.o. female who is here for a follow up of hypertension and hypothyroidism.  Chief Complaint   Patient presents with   • Hypertension   • Hypothyroidism         HPI:    Here for mgmt of chronic pain and HTN and DM.  Onset years.  Has had a bad 3 weeks.  Wants to sleep all the time.  Only stays awake 6 hours of the day.  Thinks its her cardiac meds.  Her pain control is not adequate.  Breathing well.  No fever or chills.      History:     Patient Active Problem List   Diagnosis   • COPD on home O2   • Complicated sleep apnea   • Mixed anxiety depressive disorder   • GERD   • HTN   • Hypothyroidism   • Morbidly obese (CMS/HCC)   • Tobacco abuse   • PVD   • Chronic, continuous use of opioids   • CAD s/p multiple stents   • Hx takotsubo cardiomyopathy   • Severe AS s/p TAVR 1/23/20   • Seasonal allergic rhinitis due to pollen       Past Medical History:   Diagnosis Date   • Altered mental status 2/13/2018   • Bronchitis    • Cellulitis of sacral region 2/13/2018   • Cervical cancer (CMS/[facility])    • Chronic anxiety 2/27/2017   • Chronic bronchitis (CMS/[facility])    • Chronic respiratory failure with hypoxia (CMS/[facility]) 3/8/2018   • Chronic systolic heart failure (CMS/[facility])    • Chronically on benzodiazepine therapy 2/27/2017   • Coronary artery disease    • Degenerative arthritis    • Diabetes mellitus (CMS/[facility])    • Dyslipidemia 5/11/2016   • Dyspnea    • Fatty liver disease, nonalcoholic 11/21/2016   • Fibromyalgia    • GERD (gastroesophageal reflux disease)    • H/O echocardiogram    • History of pneumonia    • Hypertension 5/11/2016    16. H/O echocardiogram (V15.89) (Z92.89)  · A.  Echocardiogram of 02/03/2015 reports an ejection fraction of 60-65%, mild concentric     LVH, trace mitral regurgitation, mild tricuspid and pulmonic regurgitation and calculated     RVSP of 35 mmHg, the main pulmonary artery is also mildly dilated.   • Hypothyroidism 5/11/2016    Description: A.  On replacement therapy.   •  Infected wound 3/8/2018   • Lung nodule     per pt report   • Macular degeneration    • Meningioma (CMS/[facility])     behind left eye per pt report   • Obesity    • DINA (obstructive sleep apnea)     intolerant of CPAP therapy, uses bipap   • Osteoporosis    • Polycythemia vera (CMS/[facility]) 5/11/2016   • Pulmonary emphysema (CMS/[facility])    • Restrictive ventilatory defect    • Sepsis (CMS/[facility]) 2/13/2018   • TIA (transient ischemic attack) 2015    blurred vision    • Uterine cancer (CMS/[facility])    • Vitamin D deficiency 8/1/2016   • Weakness 3/8/2018       Past Surgical History:   Procedure Laterality Date   • AMPUTATION DIGIT Left 11/23/2016    Procedure: AMPUTATION TRANS METATARSAL - ray amutation of the left great toe;  Surgeon: Arsalan Feliciano MD;  Location: [facility];  Service:    • AMPUTATION DIGIT Left 3/2/2017    Procedure: LEFT FOOT TRANSMETATARSAL AMPUTATION TOES 2,3,4,5;  Surgeon: Joey Guaman MD;  Location: [facility];  Service:    • AORTIC VALVE REPAIR/REPLACEMENT N/A 1/23/2020    Procedure: TRANSCATHETER AORTIC VALVE REPLACEMENT;  Surgeon: Joey Guaman MD;  Location: [facility] HYBRID OR 15;  Service: Cardiothoracic   • AORTIC VALVE REPAIR/REPLACEMENT N/A 1/23/2020    Procedure: Transfemoral Transcatheter Aortic Valve Replacement;  Surgeon: Qi Valdez MD;  Location: [facility] HYBRID OR 15;  Service: Cardiovascular   • BACK SURGERY      lumbar fusions x5--multiple times; 1995, 1997, 1998, 1999 and 2008   • BELOW KNEE AMPUTATION Left 2/25/2017    Procedure: EXTENDED LEFT TOE AMPUTATION;  Surgeon: Arsalan Feliciano MD;  Location: [facility];  Service:    • CARDIAC CATHETERIZATION N/A 11/26/2016    Procedure: Left Heart Cath;  Surgeon: Ash Nuñez MD;  Location: [facility] CATH INVASIVE LOCATION;  Service:    • CARDIAC CATHETERIZATION N/A 2/20/2018    Procedure: Left Heart Cath;  Surgeon: Lane Zamorano MD;  Location: [facility] CATH INVASIVE LOCATION;  Service:    • CARDIAC CATHETERIZATION N/A 2/20/2018    Procedure: Right Heart  Cath;  Surgeon: Lane Zamorano MD;  Location:  [facility] CATH INVASIVE LOCATION;  Service:    • CARDIAC CATHETERIZATION Left 1/10/2020    Procedure: Cardiac Catheterization/Vascular Study;  Surgeon: Lane Zamorano MD;  Location:  [facility] CATH INVASIVE LOCATION;  Service: Cardiology   • COLONOSCOPY     • HAND SURGERY Bilateral     x3    • HYSTERECTOMY      status post uterine and cervical cancer   • LUMBAR SPINE SURGERY      arthrodesis by anterior approach addit interspace   • TONSILLECTOMY         Current Outpatient Medications on File Prior to Visit   Medication Sig   • acetaminophen (TYLENOL) 325 MG tablet Take 2 tablets by mouth Every 4 (Four) Hours As Needed for mild pain (1-3) or fever (temperature greater than 101F).   • albuterol (PROVENTIL) (2.5 MG/3ML) 0.083% nebulizer solution Take 2.5 mg by nebulization Every 6 (Six) Hours As Needed for Wheezing or Shortness of Air.   • apixaban (ELIQUIS) 5 MG tablet tablet Take 1 tablet by mouth Every 12 (Twelve) Hours.   • atorvastatin (LIPITOR) 40 MG tablet TAKE ONE TABLET BY MOUTH EVERY NIGHT   • busPIRone (BUSPAR) 7.5 MG tablet TAKE TWO TABLETS BY MOUTH TWICE A DAY   • clonazePAM (KlonoPIN) 0.5 MG tablet Take 0.5 tablets by mouth 2 (Two) Times a Day As Needed for Anxiety.   • clopidogrel (PLAVIX) 75 MG tablet Take 1 tablet by mouth Daily.   • dilTIAZem CD (CARDIZEM CD) 240 MG 24 hr capsule Take 1 capsule by mouth Daily.   • doxazosin (CARDURA) 1 MG tablet Take 1 tablet by mouth Every Night.   • ezetimibe (ZETIA) 10 MG tablet TAKE ONE TABLET BY MOUTH DAILY   • fentaNYL (DURAGESIC) 75 MCG/HR patch Place 1 patch on the skin as directed by provider Every Other Day.   • fexofenadine (ALLEGRA) 180 MG tablet Take 180 mg by mouth Daily As Needed.   • fluticasone (Flonase) 50 MCG/ACT nasal spray 1 spray into the nostril(s) as directed by provider 2 (Two) Times a Day. (Patient taking differently: 1 spray into the nostril(s) as directed by provider As Needed.)   • furosemide (LASIX)  40 MG tablet TAKE ONE TABLET BY MOUTH TWICE A DAY   • levothyroxine (SYNTHROID, LEVOTHROID) 112 MCG tablet TAKE ONE TABLET BY MOUTH DAILY   • lisinopril (PRINIVIL,ZESTRIL) 10 MG tablet TAKE ONE TABLET BY MOUTH DAILY   • melatonin 5 MG sublingual tablet sublingual tablet Place 1 tablet under the tongue At Night As Needed (insomnia).   • omeprazole (priLOSEC) 20 MG capsule TAKE ONE CAPSULE BY MOUTH DAILY   • pregabalin (LYRICA) 100 MG capsule TAKE ONE CAPSULE BY MOUTH EVERY 8 HOURS   • probiotic (CULTURELLE) capsule capsule Take 1 capsule by mouth Daily.   • promethazine (PHENERGAN) 25 MG tablet TAKE ONE TABLET BY MOUTH EVERY 6 HOURS AS NEEDED FOR NAUSEA OR VOMITING   • sertraline (ZOLOFT) 50 MG tablet TAKE ONE TABLET BY MOUTH DAILY   • tiZANidine (ZANAFLEX) 4 MG tablet TAKE ONE TABLET BY MOUTH TWICE A DAY AS NEEDED FOR MUSCLE SPASMS   • topiramate (TOPAMAX) 50 MG tablet Take 1 tablet by mouth 2 (Two) Times a Day. 50 mg q am; 25 mg q pm (Patient taking differently: Take 50 mg by mouth 2 (Two) Times a Day.)   • [DISCONTINUED] oxyCODONE-acetaminophen (Percocet) 7.5-325 MG per tablet Take 1 tablet by mouth Every 6 (Six) Hours As Needed for Moderate Pain .     No current facility-administered medications on file prior to visit.        Family History   Problem Relation Age of Onset   • Arthritis Mother    • Diabetes Mother    • Colon polyps Mother    • Diverticulitis Mother    • Heart failure Mother    • Arthritis Father    • Bleeding Disorder Father    • Diabetes Father    • Kidney disease Father    • Heart disease Father    • COPD Sister         currently smokes   • Arthritis Brother    • Diabetes Brother    • Alcohol abuse Brother    • Heart murmur Daughter    • Arthritis Other    • Diabetes Other        Social History     Socioeconomic History   • Marital status:      Spouse name: Wali   • Number of children: 1   • Years of education: Not on file   • Highest education level: Not on file   Occupational History   •  "Occupation: book keeper     Employer: DISABLED     Comment: back injury   Tobacco Use   • Smoking status: Current Every Day Smoker     Packs/day: 1.00     Years: 48.00     Pack years: 48.00     Types: Cigarettes   • Smokeless tobacco: Never Used   Substance and Sexual Activity   • Alcohol use: No   • Drug use: No   • Sexual activity: Defer   Social History Narrative    Mrs. Langston is a 62 year old white  female. She lives with her spouse in [facility]. She states she does her own ADLs but appears disabled. They have one child and two grandchildren. She has a living will.    Caffeine: 2 serving per day. Pt lives at home with .         Review of Systems   Constitutional: Positive for fatigue. Negative for chills, diaphoresis, fever and unexpected weight change.   HENT: Negative for congestion, hearing loss, nosebleeds, postnasal drip, sinus pressure and sore throat.    Eyes: Negative for pain, discharge and itching.   Respiratory: Positive for shortness of breath. Negative for cough, chest tightness and wheezing.    Cardiovascular: Negative for chest pain and leg swelling.   Gastrointestinal: Positive for constipation. Negative for abdominal distention, blood in stool, diarrhea, nausea and vomiting.   Endocrine: Negative for heat intolerance, polydipsia and polyuria.   Genitourinary: Positive for difficulty urinating. Negative for dysuria, frequency and hematuria.   Musculoskeletal: Positive for arthralgias, back pain, gait problem and neck stiffness. Negative for joint swelling and myalgias.   Skin:        Hair loss   Neurological: Positive for dizziness, tremors, weakness and light-headedness. Negative for syncope and headaches.   Psychiatric/Behavioral: Positive for dysphoric mood and sleep disturbance. The patient is nervous/anxious.        /60 (BP Location: Left arm, Patient Position: Sitting)   Pulse 72   Temp 97.7 °F (36.5 °C) (Infrared)   Ht 167.6 cm (65.98\")   LMP  (LMP Unknown)   " BMI 35.20 kg/m²       Physical Exam   Constitutional: She is oriented to person, place, and time. She appears well-developed and well-nourished.   HENT:   Head: Normocephalic and atraumatic.   Right Ear: External ear normal.   Left Ear: External ear normal.   Mouth/Throat: Oropharynx is clear and moist.   Dull tm   Eyes: Conjunctivae and EOM are normal.   Neck: Normal range of motion. Neck supple.   Cardiovascular: Normal rate and regular rhythm.   2/6 СЕРГЕЙ   Pulmonary/Chest: Effort normal.   Mildly diminished   Abdominal: Soft. Bowel sounds are normal.   Musculoskeletal:   Using wheelchair     Lymphadenopathy:     She has no cervical adenopathy.   Neurological: She is alert and oriented to person, place, and time.   Skin: Skin is warm and dry. No rash noted.   Psychiatric: She has a normal mood and affect. Her behavior is normal. Thought content normal.       Procedure:      Discussion/Summary:    chronic back pain- refill patch and percocet as needed, advised need to reduce the dose to percocet 5 mg qid  htn-stable on ACE/CCB and cardura  hyperlipidemia-cont lipitor and zetia, recheck  at goal except for TG and HDL, counseled on low chol diet  hypothroid-cont replacement, recheck today on target  depression with anxiety-cont buspar and klonopine and zoloft ,advised her of risk of addiction, stable  polycythemia-cbc today, advised tob cessation  retinal vein occlusion- cont rf modification, counseled on tob cessation, not change  b12 def-recheck stable  DM-labs today, counseled on low carb  Elevated lft/?autoimmune-f/u gastro recs , labs today, advised wt loss  GERD-cont PPI, controlled  Tremor-cont BB and topamax, controlled  Fe def anemia-will recheck today, stable      7/29  labs noted and dw patient    Current Outpatient Medications:   •  acetaminophen (TYLENOL) 325 MG tablet, Take 2 tablets by mouth Every 4 (Four) Hours As Needed for mild pain (1-3) or fever (temperature greater than 101F)., Disp: 100 tablet,  Rfl: 0  •  albuterol (PROVENTIL) (2.5 MG/3ML) 0.083% nebulizer solution, Take 2.5 mg by nebulization Every 6 (Six) Hours As Needed for Wheezing or Shortness of Air., Disp: 120 vial, Rfl: 5  •  apixaban (ELIQUIS) 5 MG tablet tablet, Take 1 tablet by mouth Every 12 (Twelve) Hours., Disp: 60 tablet, Rfl: 11  •  atorvastatin (LIPITOR) 40 MG tablet, TAKE ONE TABLET BY MOUTH EVERY NIGHT, Disp: 90 tablet, Rfl: 2  •  busPIRone (BUSPAR) 7.5 MG tablet, TAKE TWO TABLETS BY MOUTH TWICE A DAY, Disp: 360 tablet, Rfl: 3  •  clonazePAM (KlonoPIN) 0.5 MG tablet, Take 0.5 tablets by mouth 2 (Two) Times a Day As Needed for Anxiety., Disp: 30 tablet, Rfl: 2  •  clopidogrel (PLAVIX) 75 MG tablet, Take 1 tablet by mouth Daily., Disp: 90 tablet, Rfl: 1  •  dilTIAZem CD (CARDIZEM CD) 240 MG 24 hr capsule, Take 1 capsule by mouth Daily., Disp: 30 capsule, Rfl: 5  •  doxazosin (CARDURA) 1 MG tablet, Take 1 tablet by mouth Every Night., Disp: 90 tablet, Rfl: 3  •  ezetimibe (ZETIA) 10 MG tablet, TAKE ONE TABLET BY MOUTH DAILY, Disp: 90 tablet, Rfl: 1  •  fentaNYL (DURAGESIC) 75 MCG/HR patch, Place 1 patch on the skin as directed by provider Every Other Day., Disp: 15 patch, Rfl: 0  •  fexofenadine (ALLEGRA) 180 MG tablet, Take 180 mg by mouth Daily As Needed., Disp: , Rfl:   •  fluticasone (Flonase) 50 MCG/ACT nasal spray, 1 spray into the nostril(s) as directed by provider 2 (Two) Times a Day. (Patient taking differently: 1 spray into the nostril(s) as directed by provider As Needed.), Disp: 1 bottle, Rfl: 5  •  furosemide (LASIX) 40 MG tablet, TAKE ONE TABLET BY MOUTH TWICE A DAY, Disp: 180 tablet, Rfl: 0  •  levothyroxine (SYNTHROID, LEVOTHROID) 112 MCG tablet, TAKE ONE TABLET BY MOUTH DAILY, Disp: 90 tablet, Rfl: 0  •  lisinopril (PRINIVIL,ZESTRIL) 10 MG tablet, TAKE ONE TABLET BY MOUTH DAILY, Disp: 90 tablet, Rfl: 0  •  melatonin 5 MG sublingual tablet sublingual tablet, Place 1 tablet under the tongue At Night As Needed (insomnia).,  Disp: 30 tablet, Rfl: 0  •  omeprazole (priLOSEC) 20 MG capsule, TAKE ONE CAPSULE BY MOUTH DAILY, Disp: 90 capsule, Rfl: 3  •  pregabalin (LYRICA) 100 MG capsule, TAKE ONE CAPSULE BY MOUTH EVERY 8 HOURS, Disp: 270 capsule, Rfl: 0  •  probiotic (CULTURELLE) capsule capsule, Take 1 capsule by mouth Daily., Disp: 60 capsule, Rfl: 0  •  promethazine (PHENERGAN) 25 MG tablet, TAKE ONE TABLET BY MOUTH EVERY 6 HOURS AS NEEDED FOR NAUSEA OR VOMITING, Disp: 20 tablet, Rfl: 1  •  sertraline (ZOLOFT) 50 MG tablet, TAKE ONE TABLET BY MOUTH DAILY, Disp: 90 tablet, Rfl: 1  •  tiZANidine (ZANAFLEX) 4 MG tablet, TAKE ONE TABLET BY MOUTH TWICE A DAY AS NEEDED FOR MUSCLE SPASMS, Disp: 60 tablet, Rfl: 1  •  topiramate (TOPAMAX) 50 MG tablet, Take 1 tablet by mouth 2 (Two) Times a Day. 50 mg q am; 25 mg q pm (Patient taking differently: Take 50 mg by mouth 2 (Two) Times a Day.), Disp: 180 tablet, Rfl: 1  •  oxyCODONE-acetaminophen (Percocet) 5-325 MG per tablet, Take 1 tablet by mouth Every 6 (Six) Hours As Needed for Severe Pain ., Disp: 120 tablet, Rfl: 0        Tamara was seen today for hypertension and hypothyroidism.    Diagnoses and all orders for this visit:    PVD    HTN  -     CBC (No Diff)  -     Comprehensive Metabolic Panel    Pulmonary emphysema, unspecified emphysema type (CMS/HCC)    Morbidly obese (CMS/HCC)    GERD    Other specified hypothyroidism  -     TSH    Tobacco abuse    Mixed anxiety depressive disorder    Chronic, continuous use of opioids    Type 2 diabetes mellitus with ketoacidosis without coma, without long-term current use of insulin (CMS/HCC)  -     Hemoglobin A1c    Chronic pain syndrome  -     oxyCODONE-acetaminophen (Percocet) 5-325 MG per tablet; Take 1 tablet by mouth Every 6 (Six) Hours As Needed for Severe Pain .

## 2020-07-30 LAB
ALBUMIN SERPL-MCNC: 3.3 G/DL (ref 3.8–4.8)
ALBUMIN/GLOB SERPL: 0.9 {RATIO} (ref 1.2–2.2)
ALP SERPL-CCNC: 65 IU/L (ref 39–117)
ALT SERPL-CCNC: 12 IU/L (ref 0–32)
AST SERPL-CCNC: 27 IU/L (ref 0–40)
BILIRUB SERPL-MCNC: <0.2 MG/DL (ref 0–1.2)
BUN SERPL-MCNC: 18 MG/DL (ref 8–27)
BUN/CREAT SERPL: 15 (ref 12–28)
CALCIUM SERPL-MCNC: 9 MG/DL (ref 8.7–10.3)
CHLORIDE SERPL-SCNC: 106 MMOL/L (ref 96–106)
CO2 SERPL-SCNC: 23 MMOL/L (ref 20–29)
CREAT SERPL-MCNC: 1.19 MG/DL (ref 0.57–1)
ERYTHROCYTE [DISTWIDTH] IN BLOOD BY AUTOMATED COUNT: 13.9 % (ref 11.7–15.4)
GLOBULIN SER CALC-MCNC: 3.5 G/DL (ref 1.5–4.5)
GLUCOSE SERPL-MCNC: 156 MG/DL (ref 65–99)
HBA1C MFR BLD: 6.3 % (ref 4.8–5.6)
HCT VFR BLD AUTO: 34.3 % (ref 34–46.6)
HGB BLD-MCNC: 11.3 G/DL (ref 11.1–15.9)
MCH RBC QN AUTO: 30.5 PG (ref 26.6–33)
MCHC RBC AUTO-ENTMCNC: 32.9 G/DL (ref 31.5–35.7)
MCV RBC AUTO: 93 FL (ref 79–97)
PLATELET # BLD AUTO: 192 X10E3/UL (ref 150–450)
POTASSIUM SERPL-SCNC: 4.3 MMOL/L (ref 3.5–5.2)
PROT SERPL-MCNC: 6.8 G/DL (ref 6–8.5)
RBC # BLD AUTO: 3.71 X10E6/UL (ref 3.77–5.28)
SODIUM SERPL-SCNC: 141 MMOL/L (ref 134–144)
TSH SERPL DL<=0.005 MIU/L-ACNC: 0.43 UIU/ML (ref 0.45–4.5)
WBC # BLD AUTO: 11.1 X10E3/UL (ref 3.4–10.8)

## 2020-08-03 ENCOUNTER — TELEPHONE (OUTPATIENT)
Dept: CARDIOLOGY | Facility: CLINIC | Age: 65
End: 2020-08-03

## 2020-08-03 RX ORDER — DOXAZOSIN MESYLATE 1 MG/1
1 TABLET ORAL NIGHTLY
Qty: 90 TABLET | Refills: 3 | Status: SHIPPED | OUTPATIENT
Start: 2020-08-03 | End: 2020-08-04 | Stop reason: SDUPTHER

## 2020-08-03 NOTE — TELEPHONE ENCOUNTER
Patient is to have oral surgery due to  abscesses that her oral surgeon feels need to be addressed semi-urgently. Patient needs to hold Plavix 5 days prior and Eliquis 2 days prior. Patient has no new cardiac complaints at this time.     Please advise. Thanks.

## 2020-08-03 NOTE — TELEPHONE ENCOUNTER
Okay to hold Plavix 5 days prior and Eliquis 2 days prior, as requested.  Would start aspirin 81 mg once daily starting on day 2 and continue until able to restart both the Plavix and Eliquis.

## 2020-08-04 RX ORDER — ATORVASTATIN CALCIUM 40 MG/1
TABLET, FILM COATED ORAL
Qty: 90 TABLET | Refills: 1 | Status: SHIPPED | OUTPATIENT
Start: 2020-08-04 | End: 2021-02-15

## 2020-08-04 RX ORDER — DOXAZOSIN MESYLATE 1 MG/1
1 TABLET ORAL NIGHTLY
Qty: 90 TABLET | Refills: 3 | Status: SHIPPED | OUTPATIENT
Start: 2020-08-04

## 2020-08-04 RX ORDER — TOPIRAMATE 50 MG/1
TABLET, FILM COATED ORAL
Qty: 180 TABLET | Refills: 0 | Status: SHIPPED | OUTPATIENT
Start: 2020-08-04 | End: 2020-11-03

## 2020-08-04 RX ORDER — CLINDAMYCIN HYDROCHLORIDE 300 MG/1
600 CAPSULE ORAL ONCE
Qty: 2 CAPSULE | Refills: 0 | Status: SHIPPED | OUTPATIENT
Start: 2020-08-04 | End: 2020-08-04

## 2020-08-04 NOTE — TELEPHONE ENCOUNTER
Patient contacted to review recommendations. Patient verbalizes understanding, per pt request, office will mail medication recommendations to patient.

## 2020-08-04 NOTE — TELEPHONE ENCOUNTER
Antibiotics prescribed based on ADA guidelines s/p TAVR procedure. Allergies reviewed. Pt verbalizes understanding.

## 2020-08-13 DIAGNOSIS — G89.4 CHRONIC PAIN SYNDROME: ICD-10-CM

## 2020-08-13 DIAGNOSIS — F11.90 CHRONIC, CONTINUOUS USE OF OPIOIDS: Chronic | ICD-10-CM

## 2020-08-13 RX ORDER — OXYCODONE HYDROCHLORIDE AND ACETAMINOPHEN 5; 325 MG/1; MG/1
1 TABLET ORAL EVERY 6 HOURS PRN
Qty: 120 TABLET | Refills: 0
Start: 2020-08-13 | End: 2020-08-14 | Stop reason: SDUPTHER

## 2020-08-13 RX ORDER — FENTANYL 75 UG/H
1 PATCH TRANSDERMAL
Qty: 15 PATCH | Refills: 0 | Status: SHIPPED | OUTPATIENT
Start: 2020-08-13 | End: 2020-09-10 | Stop reason: SDUPTHER

## 2020-08-13 NOTE — TELEPHONE ENCOUNTER
PATIENT CALLED TO REQUEST REFILLS ON    fentaNYL (DURAGESIC) 75 MCG/HR patch    oxyCODONE-acetaminophen (Percocet) 5-325 MG per tablet    PATIENT NEEDS THESE BY TOMORROW 08/14    PH[facility]Y    [facility] - [address] [place] [address] [address] [place] - [phone]  - [phone] FX     PATIENT CALL BACK    [phone]

## 2020-08-14 ENCOUNTER — TELEPHONE (OUTPATIENT)
Dept: INTERNAL MEDICINE | Facility: CLINIC | Age: 65
End: 2020-08-14

## 2020-08-14 DIAGNOSIS — G89.4 CHRONIC PAIN SYNDROME: ICD-10-CM

## 2020-08-14 RX ORDER — OXYCODONE HYDROCHLORIDE AND ACETAMINOPHEN 5; 325 MG/1; MG/1
1 TABLET ORAL EVERY 6 HOURS PRN
Qty: 120 TABLET | Refills: 0 | Status: SHIPPED | OUTPATIENT
Start: 2020-08-14 | End: 2020-09-10 | Stop reason: SDUPTHER

## 2020-08-14 NOTE — TELEPHONE ENCOUNTER
Pharmacy Name: [facility]     Pharmacy representative name BLANCHE    Pharmacy representative phone number: [phone]    What medication are you calling in regards to:oxyCODONE-acetaminophen (Percocet) 5-325 MG per tablet    What question does the pharmacy have:PHARM DID NOT RECEIVE RX FOR THE PT     Who is the provider that prescribed the medication: BALDEV    Additional notesCAN THIS BE RESENT

## 2020-08-18 RX ORDER — PREGABALIN 100 MG/1
CAPSULE ORAL
Qty: 270 CAPSULE | Refills: 0 | Status: SHIPPED | OUTPATIENT
Start: 2020-08-18 | End: 2020-11-18

## 2020-08-18 RX ORDER — LEVOTHYROXINE SODIUM 112 UG/1
TABLET ORAL
Qty: 90 TABLET | Refills: 0 | Status: SHIPPED | OUTPATIENT
Start: 2020-08-18 | End: 2020-11-18

## 2020-08-24 ENCOUNTER — OFFICE VISIT (OUTPATIENT)
Dept: INTERNAL MEDICINE | Facility: CLINIC | Age: 65
End: 2020-08-24

## 2020-08-24 ENCOUNTER — HOSPITAL ENCOUNTER (OUTPATIENT)
Dept: GENERAL RADIOLOGY | Facility: HOSPITAL | Age: 65
Discharge: HOME | End: 2020-08-24
Admitting: INTERNAL MEDICINE

## 2020-08-24 VITALS
BODY MASS INDEX: 35.2 KG/M2 | HEIGHT: 66 IN | DIASTOLIC BLOOD PRESSURE: 68 MMHG | TEMPERATURE: 98.2 F | SYSTOLIC BLOOD PRESSURE: 128 MMHG | HEART RATE: 72 BPM

## 2020-08-24 DIAGNOSIS — E03.8 OTHER SPECIFIED HYPOTHYROIDISM: Chronic | ICD-10-CM

## 2020-08-24 DIAGNOSIS — I25.10 CORONARY ARTERY DISEASE INVOLVING NATIVE CORONARY ARTERY OF NATIVE HEART WITHOUT ANGINA PECTORIS: ICD-10-CM

## 2020-08-24 DIAGNOSIS — G89.29 CHRONIC LEFT SHOULDER PAIN: ICD-10-CM

## 2020-08-24 DIAGNOSIS — F41.8 MIXED ANXIETY DEPRESSIVE DISORDER: Chronic | ICD-10-CM

## 2020-08-24 DIAGNOSIS — J30.1 SEASONAL ALLERGIC RHINITIS DUE TO POLLEN: ICD-10-CM

## 2020-08-24 DIAGNOSIS — K21.9 GASTROESOPHAGEAL REFLUX DISEASE WITHOUT ESOPHAGITIS: Chronic | ICD-10-CM

## 2020-08-24 DIAGNOSIS — F11.90 CHRONIC, CONTINUOUS USE OF OPIOIDS: Chronic | ICD-10-CM

## 2020-08-24 DIAGNOSIS — I10 ESSENTIAL HYPERTENSION: Primary | Chronic | ICD-10-CM

## 2020-08-24 DIAGNOSIS — M25.512 CHRONIC LEFT SHOULDER PAIN: ICD-10-CM

## 2020-08-24 DIAGNOSIS — M54.2 NECK PAIN: ICD-10-CM

## 2020-08-24 PROCEDURE — 99214 OFFICE O/P EST MOD 30 MIN: CPT | Performed by: INTERNAL MEDICINE

## 2020-08-24 PROCEDURE — 73030 X-RAY EXAM OF SHOULDER: CPT

## 2020-08-24 PROCEDURE — 72040 X-RAY EXAM NECK SPINE 2-3 VW: CPT

## 2020-08-24 NOTE — PROGRESS NOTES
"Patient is a 64 y.o. female who is here for shoulder pain.  Chief Complaint   Patient presents with   • Shoulder Pain         HPI:    Patient c/o left shoulder pain.  Onset years.  Worst over the past few weeks.  Feels stiff and difficulty moving the shoulder.  Radiation up to her neck and into her shoulder blade into her arm.  Throbbing in quality at times.  Reports \"hurts like hell\".  Making her nauseous.  Pain will wake her up at night.  No rash.    Using pain meds with some help.      History:     Patient Active Problem List   Diagnosis   • COPD on home O2   • Complicated sleep apnea   • Mixed anxiety depressive disorder   • GERD   • HTN   • Hypothyroidism   • Morbidly obese (CMS/HCC)   • Tobacco abuse   • PVD   • Chronic, continuous use of opioids   • CAD s/p multiple stents   • Hx takotsubo cardiomyopathy   • Severe AS s/p TAVR 1/23/20   • Seasonal allergic rhinitis due to pollen   • Chronic left shoulder pain   • Neck pain       Past Medical History:   Diagnosis Date   • Altered mental status 2/13/2018   • Bronchitis    • Cellulitis of sacral region 2/13/2018   • Cervical cancer (CMS/HCC)    • Chronic anxiety 2/27/2017   • Chronic bronchitis (CMS/HCC)    • Chronic respiratory failure with hypoxia (CMS/HCC) 3/8/2018   • Chronic systolic heart failure (CMS/HCC)    • Chronically on benzodiazepine therapy 2/27/2017   • Coronary artery disease    • Degenerative arthritis    • Diabetes mellitus (CMS/HCC)    • Dyslipidemia 5/11/2016   • Dyspnea    • Fatty liver disease, nonalcoholic 11/21/2016   • Fibromyalgia    • GERD (gastroesophageal reflux disease)    • H/O echocardiogram    • History of pneumonia    • Hypertension 5/11/2016    16. H/O echocardiogram (V15.89) (Z92.89)  · A.  Echocardiogram of 02/03/2015 reports an ejection fraction of 60-65%, mild concentric     LVH, trace mitral regurgitation, mild tricuspid and pulmonic regurgitation and calculated     RVSP of 35 mmHg, the main pulmonary artery is also mildly " dilated.   • Hypothyroidism 5/11/2016    Description: A.  On replacement therapy.   • Infected wound 3/8/2018   • Lung nodule     per pt report   • Macular degeneration    • Meningioma (CMS/[facility])     behind left eye per pt report   • Obesity    • DINA (obstructive sleep apnea)     intolerant of CPAP therapy, uses bipap   • Osteoporosis    • Polycythemia vera (CMS/HCC) 5/11/2016   • Pulmonary emphysema (CMS/[facility])    • Restrictive ventilatory defect    • Sepsis (CMS/[facility]) 2/13/2018   • TIA (transient ischemic attack) 2015    blurred vision    • Uterine cancer (CMS/[facility])    • Vitamin D deficiency 8/1/2016   • Weakness 3/8/2018       Past Surgical History:   Procedure Laterality Date   • AMPUTATION DIGIT Left 11/23/2016    Procedure: AMPUTATION TRANS METATARSAL - ray amutation of the left great toe;  Surgeon: Arsalan Feliciano MD;  Location:  [facility] OR;  Service:    • AMPUTATION DIGIT Left 3/2/2017    Procedure: LEFT FOOT TRANSMETATARSAL AMPUTATION TOES 2,3,4,5;  Surgeon: Joey Guaman MD;  Location: [facility] OR;  Service:    • AORTIC VALVE REPAIR/REPLACEMENT N/A 1/23/2020    Procedure: TRANSCATHETER AORTIC VALVE REPLACEMENT;  Surgeon: Joey Guaman MD;  Location: [facility] HYBRID OR 15;  Service: Cardiothoracic   • AORTIC VALVE REPAIR/REPLACEMENT N/A 1/23/2020    Procedure: Transfemoral Transcatheter Aortic Valve Replacement;  Surgeon: Qi Valdez MD;  Location: [facility] HYBRID OR 15;  Service: Cardiovascular   • BACK SURGERY      lumbar fusions x5--multiple times; 1995, 1997, 1998, 1999 and 2008   • BELOW KNEE AMPUTATION Left 2/25/2017    Procedure: EXTENDED LEFT TOE AMPUTATION;  Surgeon: Arsalan Feliciano MD;  Location: [facility] OR;  Service:    • CARDIAC CATHETERIZATION N/A 11/26/2016    Procedure: Left Heart Cath;  Surgeon: Ash Nuñez MD;  Location: [facility] CATH INVASIVE LOCATION;  Service:    • CARDIAC CATHETERIZATION N/A 2/20/2018    Procedure: Left Heart Cath;  Surgeon: Lane Zamorano MD;  Location: [facility] CATH INVASIVE  LOCATION;  Service:    • CARDIAC CATHETERIZATION N/A 2/20/2018    Procedure: Right Heart Cath;  Surgeon: Lane Zamorano MD;  Location:  [facility] CATH INVASIVE LOCATION;  Service:    • CARDIAC CATHETERIZATION Left 1/10/2020    Procedure: Cardiac Catheterization/Vascular Study;  Surgeon: Lane Zamorano MD;  Location:  [facility] CATH INVASIVE LOCATION;  Service: Cardiology   • COLONOSCOPY     • HAND SURGERY Bilateral     x3    • HYSTERECTOMY      status post uterine and cervical cancer   • LUMBAR SPINE SURGERY      arthrodesis by anterior approach addit interspace   • TONSILLECTOMY         Current Outpatient Medications on File Prior to Visit   Medication Sig   • acetaminophen (TYLENOL) 325 MG tablet Take 2 tablets by mouth Every 4 (Four) Hours As Needed for mild pain (1-3) or fever (temperature greater than 101F).   • albuterol (PROVENTIL) (2.5 MG/3ML) 0.083% nebulizer solution Take 2.5 mg by nebulization Every 6 (Six) Hours As Needed for Wheezing or Shortness of Air.   • apixaban (ELIQUIS) 5 MG tablet tablet Take 1 tablet by mouth Every 12 (Twelve) Hours.   • atorvastatin (LIPITOR) 40 MG tablet TAKE ONE TABLET BY MOUTH ONCE NIGHTLY   • busPIRone (BUSPAR) 7.5 MG tablet TAKE TWO TABLETS BY MOUTH TWICE A DAY   • clonazePAM (KlonoPIN) 0.5 MG tablet Take 0.5 tablets by mouth 2 (Two) Times a Day As Needed for Anxiety.   • clopidogrel (PLAVIX) 75 MG tablet Take 1 tablet by mouth Daily.   • dilTIAZem CD (CARDIZEM CD) 240 MG 24 hr capsule Take 1 capsule by mouth Daily.   • doxazosin (CARDURA) 1 MG tablet Take 1 tablet by mouth Every Night.   • ezetimibe (ZETIA) 10 MG tablet TAKE ONE TABLET BY MOUTH DAILY   • fentaNYL (DURAGESIC) 75 MCG/HR patch Place 1 patch on the skin as directed by provider Every Other Day.   • fexofenadine (ALLEGRA) 180 MG tablet Take 180 mg by mouth Daily As Needed.   • fluticasone (Flonase) 50 MCG/ACT nasal spray 1 spray into the nostril(s) as directed by provider 2 (Two) Times a Day. (Patient taking  differently: 1 spray into the nostril(s) as directed by provider As Needed.)   • furosemide (LASIX) 40 MG tablet TAKE ONE TABLET BY MOUTH TWICE A DAY   • levothyroxine (SYNTHROID, LEVOTHROID) 112 MCG tablet TAKE ONE TABLET BY MOUTH DAILY   • lisinopril (PRINIVIL,ZESTRIL) 10 MG tablet TAKE ONE TABLET BY MOUTH DAILY   • melatonin 5 MG sublingual tablet sublingual tablet Place 1 tablet under the tongue At Night As Needed (insomnia).   • omeprazole (priLOSEC) 20 MG capsule TAKE ONE CAPSULE BY MOUTH DAILY   • oxyCODONE-acetaminophen (Percocet) 5-325 MG per tablet Take 1 tablet by mouth Every 6 (Six) Hours As Needed for Severe Pain .   • pregabalin (LYRICA) 100 MG capsule TAKE ONE CAPSULE BY MOUTH EVERY 8 HOURS   • probiotic (CULTURELLE) capsule capsule Take 1 capsule by mouth Daily.   • promethazine (PHENERGAN) 25 MG tablet TAKE ONE TABLET BY MOUTH EVERY 6 HOURS AS NEEDED FOR NAUSEA OR VOMITING   • sertraline (ZOLOFT) 50 MG tablet TAKE ONE TABLET BY MOUTH DAILY   • tiZANidine (ZANAFLEX) 4 MG tablet TAKE ONE TABLET BY MOUTH TWICE A DAY AS NEEDED FOR MUSCLE SPASMS   • topiramate (TOPAMAX) 50 MG tablet TAKE ONE TABLET BY MOUTH TWICE A DAY     No current facility-administered medications on file prior to visit.        Family History   Problem Relation Age of Onset   • Arthritis Mother    • Diabetes Mother    • Colon polyps Mother    • Diverticulitis Mother    • Heart failure Mother    • Arthritis Father    • Bleeding Disorder Father    • Diabetes Father    • Kidney disease Father    • Heart disease Father    • COPD Sister         currently smokes   • Arthritis Brother    • Diabetes Brother    • Alcohol abuse Brother    • Heart murmur Daughter    • Arthritis Other    • Diabetes Other        Social History     Socioeconomic History   • Marital status:      Spouse name: Wali   • Number of children: 1   • Years of education: Not on file   • Highest education level: Not on file   Occupational History   • Occupation: book  "keeper     Employer: DISABLED     Comment: back injury   Tobacco Use   • Smoking status: Current Every Day Smoker     Packs/day: 1.00     Years: 48.00     Pack years: 48.00     Types: Cigarettes   • Smokeless tobacco: Never Used   Substance and Sexual Activity   • Alcohol use: No   • Drug use: No   • Sexual activity: Defer   Social History Narrative    Mrs. Langston is a 62 year old white  female. She lives with her spouse in [facility]. She states she does her own ADLs but appears disabled. They have one child and two grandchildren. She has a living will.    Caffeine: 2 serving per day. Pt lives at home with .         Review of Systems   Constitutional: Positive for fatigue. Negative for chills, diaphoresis, fever and unexpected weight change.   HENT: Negative for congestion, hearing loss, nosebleeds, postnasal drip, sinus pressure and sore throat.    Eyes: Negative for pain, discharge and itching.   Respiratory: Positive for shortness of breath. Negative for cough, chest tightness and wheezing.    Cardiovascular: Negative for chest pain and leg swelling.   Gastrointestinal: Positive for constipation. Negative for abdominal distention, blood in stool, diarrhea, nausea and vomiting.   Endocrine: Negative for heat intolerance, polydipsia and polyuria.   Genitourinary: Positive for difficulty urinating. Negative for dysuria, frequency and hematuria.   Musculoskeletal: Positive for arthralgias, back pain, gait problem, neck pain and neck stiffness. Negative for joint swelling and myalgias.   Skin:        Hair loss   Neurological: Positive for dizziness, tremors, weakness and light-headedness. Negative for syncope and headaches.   Psychiatric/Behavioral: Positive for dysphoric mood and sleep disturbance. The patient is nervous/anxious.        /68 (BP Location: Left arm, Patient Position: Sitting)   Pulse 72   Temp 98.2 °F (36.8 °C) (Infrared)   Ht 167.6 cm (65.98\")   LMP  (LMP Unknown)   BMI " 35.20 kg/m²       Physical Exam   Constitutional: She is oriented to person, place, and time. She appears well-developed and well-nourished.   HENT:   Head: Normocephalic and atraumatic.   Right Ear: External ear normal.   Left Ear: External ear normal.   Mouth/Throat: Oropharynx is clear and moist.   Dull tm   Eyes: Conjunctivae and EOM are normal.   Neck: Normal range of motion. Neck supple.   Cardiovascular: Normal rate and regular rhythm.   2/6 СЕРГЕЙ   Pulmonary/Chest: Effort normal.   Mildly diminished   Abdominal: Soft. Bowel sounds are normal.   Musculoskeletal: She exhibits tenderness (on rom of right shoulder).   Using wheelchair     Lymphadenopathy:     She has no cervical adenopathy.   Neurological: She is alert and oriented to person, place, and time.   Skin: Skin is warm and dry. No rash noted.   Psychiatric: She has a normal mood and affect. Her behavior is normal. Thought content normal.       Procedure:      Discussion/Summary:    chronic back pain- refill patch and percocet as needed  Shoulder and neck pain-check xray  htn-controlled on ACE/CCB and cardura  hyperlipidemia-cont lipitor and zetia, recheck 7/29 at goal except for TG and HDL, counseled on low chol diet  hypothroid-cont replacement, recheck 7/29 on target  depression with anxiety-cont buspar and klonopine and zoloft ,advised her of risk of addiction, stable  polycythemia-cbc 7/29 dw patient, advised tob cessation  retinal vein occlusion- cont rf modification, counseled on tob cessation, no change  b12 def-recheck 7/29 stable  DM-labs 7/29, counseled on low carb  Elevated lft/?autoimmune-f/u gastro recs , labs 7/29 dw patient, advised wt loss  GERD-cont PPI, stable  Tremor-cont BB and topamax, stable  Fe def anemia- recheck 7/29, stable      7/29  labs noted and dw patient    Current Outpatient Medications:   •  acetaminophen (TYLENOL) 325 MG tablet, Take 2 tablets by mouth Every 4 (Four) Hours As Needed for mild pain (1-3) or fever  (temperature greater than 101F)., Disp: 100 tablet, Rfl: 0  •  albuterol (PROVENTIL) (2.5 MG/3ML) 0.083% nebulizer solution, Take 2.5 mg by nebulization Every 6 (Six) Hours As Needed for Wheezing or Shortness of Air., Disp: 120 vial, Rfl: 5  •  apixaban (ELIQUIS) 5 MG tablet tablet, Take 1 tablet by mouth Every 12 (Twelve) Hours., Disp: 60 tablet, Rfl: 11  •  atorvastatin (LIPITOR) 40 MG tablet, TAKE ONE TABLET BY MOUTH ONCE NIGHTLY, Disp: 90 tablet, Rfl: 1  •  busPIRone (BUSPAR) 7.5 MG tablet, TAKE TWO TABLETS BY MOUTH TWICE A DAY, Disp: 360 tablet, Rfl: 3  •  clonazePAM (KlonoPIN) 0.5 MG tablet, Take 0.5 tablets by mouth 2 (Two) Times a Day As Needed for Anxiety., Disp: 30 tablet, Rfl: 2  •  clopidogrel (PLAVIX) 75 MG tablet, Take 1 tablet by mouth Daily., Disp: 90 tablet, Rfl: 1  •  dilTIAZem CD (CARDIZEM CD) 240 MG 24 hr capsule, Take 1 capsule by mouth Daily., Disp: 30 capsule, Rfl: 5  •  doxazosin (CARDURA) 1 MG tablet, Take 1 tablet by mouth Every Night., Disp: 90 tablet, Rfl: 3  •  ezetimibe (ZETIA) 10 MG tablet, TAKE ONE TABLET BY MOUTH DAILY, Disp: 90 tablet, Rfl: 1  •  fentaNYL (DURAGESIC) 75 MCG/HR patch, Place 1 patch on the skin as directed by provider Every Other Day., Disp: 15 patch, Rfl: 0  •  fexofenadine (ALLEGRA) 180 MG tablet, Take 180 mg by mouth Daily As Needed., Disp: , Rfl:   •  fluticasone (Flonase) 50 MCG/ACT nasal spray, 1 spray into the nostril(s) as directed by provider 2 (Two) Times a Day. (Patient taking differently: 1 spray into the nostril(s) as directed by provider As Needed.), Disp: 1 bottle, Rfl: 5  •  furosemide (LASIX) 40 MG tablet, TAKE ONE TABLET BY MOUTH TWICE A DAY, Disp: 180 tablet, Rfl: 0  •  levothyroxine (SYNTHROID, LEVOTHROID) 112 MCG tablet, TAKE ONE TABLET BY MOUTH DAILY, Disp: 90 tablet, Rfl: 0  •  lisinopril (PRINIVIL,ZESTRIL) 10 MG tablet, TAKE ONE TABLET BY MOUTH DAILY, Disp: 90 tablet, Rfl: 0  •  melatonin 5 MG sublingual tablet sublingual tablet, Place 1 tablet  under the tongue At Night As Needed (insomnia)., Disp: 30 tablet, Rfl: 0  •  omeprazole (priLOSEC) 20 MG capsule, TAKE ONE CAPSULE BY MOUTH DAILY, Disp: 90 capsule, Rfl: 3  •  oxyCODONE-acetaminophen (Percocet) 5-325 MG per tablet, Take 1 tablet by mouth Every 6 (Six) Hours As Needed for Severe Pain ., Disp: 120 tablet, Rfl: 0  •  pregabalin (LYRICA) 100 MG capsule, TAKE ONE CAPSULE BY MOUTH EVERY 8 HOURS, Disp: 270 capsule, Rfl: 0  •  probiotic (CULTURELLE) capsule capsule, Take 1 capsule by mouth Daily., Disp: 60 capsule, Rfl: 0  •  promethazine (PHENERGAN) 25 MG tablet, TAKE ONE TABLET BY MOUTH EVERY 6 HOURS AS NEEDED FOR NAUSEA OR VOMITING, Disp: 20 tablet, Rfl: 1  •  sertraline (ZOLOFT) 50 MG tablet, TAKE ONE TABLET BY MOUTH DAILY, Disp: 90 tablet, Rfl: 1  •  tiZANidine (ZANAFLEX) 4 MG tablet, TAKE ONE TABLET BY MOUTH TWICE A DAY AS NEEDED FOR MUSCLE SPASMS, Disp: 60 tablet, Rfl: 1  •  topiramate (TOPAMAX) 50 MG tablet, TAKE ONE TABLET BY MOUTH TWICE A DAY, Disp: 180 tablet, Rfl: 0        Tamara was seen today for shoulder pain.    Diagnoses and all orders for this visit:    HTN    CAD s/p multiple stents    Seasonal allergic rhinitis due to pollen    GERD    Other specified hypothyroidism    Neck pain  -     XR Spine Cervical 2 or 3 View    Chronic left shoulder pain  -     XR Shoulder 2+ View Left    Mixed anxiety depressive disorder    Chronic, continuous use of opioids

## 2020-09-02 RX ORDER — PROMETHAZINE HYDROCHLORIDE 25 MG/1
TABLET ORAL
Qty: 20 TABLET | Refills: 0 | Status: SHIPPED | OUTPATIENT
Start: 2020-09-02 | End: 2020-11-04

## 2020-09-03 DIAGNOSIS — M62.838 MUSCLE SPASMS OF NECK: ICD-10-CM

## 2020-09-03 RX ORDER — TIZANIDINE 4 MG/1
TABLET ORAL
Qty: 60 TABLET | Refills: 0 | Status: SHIPPED | OUTPATIENT
Start: 2020-09-03 | End: 2020-10-06

## 2020-09-03 RX ORDER — LISINOPRIL 10 MG/1
TABLET ORAL
Qty: 90 TABLET | Refills: 0 | Status: SHIPPED | OUTPATIENT
Start: 2020-09-03 | End: 2020-12-02

## 2020-09-10 DIAGNOSIS — F11.90 CHRONIC, CONTINUOUS USE OF OPIOIDS: Chronic | ICD-10-CM

## 2020-09-10 DIAGNOSIS — G89.4 CHRONIC PAIN SYNDROME: ICD-10-CM

## 2020-09-10 RX ORDER — OXYCODONE HYDROCHLORIDE AND ACETAMINOPHEN 5; 325 MG/1; MG/1
1 TABLET ORAL EVERY 6 HOURS PRN
Qty: 120 TABLET | Refills: 0 | Status: SHIPPED | OUTPATIENT
Start: 2020-09-10 | End: 2020-10-08 | Stop reason: SDUPTHER

## 2020-09-10 RX ORDER — FENTANYL 75 UG/H
1 PATCH TRANSDERMAL
Qty: 15 PATCH | Refills: 0 | Status: SHIPPED | OUTPATIENT
Start: 2020-09-10 | End: 2020-10-08 | Stop reason: SDUPTHER

## 2020-09-10 NOTE — TELEPHONE ENCOUNTER
Caller: Tamara Langston    Relationship: Self    Best call back number: [phone]     Medication needed:   Requested Prescriptions     Pending Prescriptions Disp Refills   • fentaNYL (DURAGESIC) 75 MCG/HR patch 15 patch 0     Sig: Place 1 patch on the skin as directed by provider Every Other Day.   • oxyCODONE-acetaminophen (Percocet) 5-325 MG per tablet 120 tablet 0     Sig: Take 1 tablet by mouth Every 6 (Six) Hours As Needed for Severe Pain .       When do you need the refill by: TODAY   What details did the patient provide when requesting the medication: PT STATED THAT SHE HAS BEEN OUT SINCE Saturday.    Does the patient have less than a 3 day supply:  [x] Yes  [] No    What is the patient's preferred pharmacy: [facility] [address] [facility] [phone] [facility] [phone] FX

## 2020-09-14 ENCOUNTER — OFFICE VISIT (OUTPATIENT)
Dept: CARDIOLOGY | Facility: CLINIC | Age: 65
End: 2020-09-14

## 2020-09-14 VITALS
BODY MASS INDEX: 33.11 KG/M2 | HEIGHT: 66 IN | DIASTOLIC BLOOD PRESSURE: 50 MMHG | HEART RATE: 71 BPM | SYSTOLIC BLOOD PRESSURE: 112 MMHG | OXYGEN SATURATION: 91 % | TEMPERATURE: 97.8 F | WEIGHT: 206 LBS

## 2020-09-14 DIAGNOSIS — Z01.810 PRE-OPERATIVE CARDIOVASCULAR EXAMINATION: ICD-10-CM

## 2020-09-14 DIAGNOSIS — I51.81 TAKOTSUBO CARDIOMYOPATHY: ICD-10-CM

## 2020-09-14 DIAGNOSIS — I35.0 NONRHEUMATIC AORTIC VALVE STENOSIS: Primary | ICD-10-CM

## 2020-09-14 DIAGNOSIS — I25.10 CORONARY ARTERY DISEASE INVOLVING NATIVE CORONARY ARTERY OF NATIVE HEART WITHOUT ANGINA PECTORIS: ICD-10-CM

## 2020-09-14 DIAGNOSIS — I10 ESSENTIAL HYPERTENSION: ICD-10-CM

## 2020-09-14 DIAGNOSIS — E78.2 MIXED HYPERLIPIDEMIA: ICD-10-CM

## 2020-09-14 DIAGNOSIS — I50.22 CHRONIC SYSTOLIC HEART FAILURE (HCC): ICD-10-CM

## 2020-09-14 PROCEDURE — 99214 OFFICE O/P EST MOD 30 MIN: CPT | Performed by: INTERNAL MEDICINE

## 2020-09-14 NOTE — PROGRESS NOTES
[facility] AT [address], [address]  [place], [place], [address] [phone]  WWW[facility]           OUTPATIENT CLINIC FOLLOW UP NOTE    Patient Care Team:  Patient Care Team:  Harinder Aranda MD as PCP - General  Harinder Aranda MD as PCP - Family Medicine  Arsalan Feliciano MD as Surgeon (Cardiothoracic Surgery)  Lane Zamorano MD as Consulting Physician (Cardiology)    Subjective:      Chief Complaint   Patient presents with   • Severe AS s/p TAVR 1/23/20       HPI:    Tamara Langston is a 64 y.o. female.  Cardiac problem list:  1. CAD s/p multiple PCI  2. Severe aortic stenosis status post TAVR (23 mm CPN 3 tissue valve) on 1/23/2020  3. History of systolic heart failure/Takotsubo  4. Hypertension  5. Hyperlipidemia  6. Diabetes  7. Sleep apnea, arthritis, uterine and cervical cancer, fatty liver, hypothyroidism, polycythemia vera, fibromyalgia, and osteoporosis.      The patient presents today for follow-up.      Since the patient's last visit her palpitations have improved on the increased dose of diltiazem.  Denies chest pain.    Main complaint is left shoulder pain radiating from her neck, does not radiate to the arm, is worse with rotation of her neck.     Review of Systems:  Positive for left arm pain  Negative for exertional chest pain, shortness of breath, lower extremity edema, orthopnea, PND, syncope.    PFSH:  Patient Active Problem List   Diagnosis   • COPD on home O2   • Complicated sleep apnea   • Mixed anxiety depressive disorder   • GERD   • HTN   • Hypothyroidism   • Morbidly obese (CMS/HCC)   • Tobacco abuse   • PVD   • Chronic, continuous use of opioids   • CAD s/p multiple stents   • Hx takotsubo cardiomyopathy   • Severe AS s/p TAVR 1/23/20   • Seasonal allergic rhinitis due to pollen   • Chronic left shoulder pain   • Neck pain         Current Outpatient Medications:   •  acetaminophen (TYLENOL) 325 MG tablet, Take 2 tablets by  mouth Every 4 (Four) Hours As Needed for mild pain (1-3) or fever (temperature greater than 101F)., Disp: 100 tablet, Rfl: 0  •  albuterol (PROVENTIL) (2.5 MG/3ML) 0.083% nebulizer solution, Take 2.5 mg by nebulization Every 6 (Six) Hours As Needed for Wheezing or Shortness of Air., Disp: 120 vial, Rfl: 5  •  apixaban (ELIQUIS) 5 MG tablet tablet, Take 1 tablet by mouth Every 12 (Twelve) Hours., Disp: 60 tablet, Rfl: 11  •  atorvastatin (LIPITOR) 40 MG tablet, TAKE ONE TABLET BY MOUTH ONCE NIGHTLY, Disp: 90 tablet, Rfl: 1  •  busPIRone (BUSPAR) 7.5 MG tablet, TAKE TWO TABLETS BY MOUTH TWICE A DAY, Disp: 360 tablet, Rfl: 3  •  clonazePAM (KlonoPIN) 0.5 MG tablet, Take 0.5 tablets by mouth 2 (Two) Times a Day As Needed for Anxiety., Disp: 30 tablet, Rfl: 2  •  clopidogrel (PLAVIX) 75 MG tablet, Take 1 tablet by mouth Daily., Disp: 90 tablet, Rfl: 1  •  dilTIAZem CD (CARDIZEM CD) 240 MG 24 hr capsule, Take 1 capsule by mouth Daily., Disp: 30 capsule, Rfl: 5  •  doxazosin (CARDURA) 1 MG tablet, Take 1 tablet by mouth Every Night., Disp: 90 tablet, Rfl: 3  •  ezetimibe (ZETIA) 10 MG tablet, TAKE ONE TABLET BY MOUTH DAILY, Disp: 90 tablet, Rfl: 1  •  fentaNYL (DURAGESIC) 75 MCG/HR patch, Place 1 patch on the skin as directed by provider Every Other Day., Disp: 15 patch, Rfl: 0  •  fexofenadine (ALLEGRA) 180 MG tablet, Take 180 mg by mouth Daily As Needed., Disp: , Rfl:   •  fluticasone (Flonase) 50 MCG/ACT nasal spray, 1 spray into the nostril(s) as directed by provider 2 (Two) Times a Day. (Patient taking differently: 1 spray into the nostril(s) as directed by provider As Needed.), Disp: 1 bottle, Rfl: 5  •  furosemide (LASIX) 40 MG tablet, TAKE ONE TABLET BY MOUTH TWICE A DAY, Disp: 180 tablet, Rfl: 0  •  levothyroxine (SYNTHROID, LEVOTHROID) 112 MCG tablet, TAKE ONE TABLET BY MOUTH DAILY, Disp: 90 tablet, Rfl: 0  •  lisinopril (PRINIVIL,ZESTRIL) 10 MG tablet, TAKE ONE TABLET BY MOUTH DAILY, Disp: 90 tablet, Rfl: 0  •   melatonin 5 MG sublingual tablet sublingual tablet, Place 1 tablet under the tongue At Night As Needed (insomnia)., Disp: 30 tablet, Rfl: 0  •  omeprazole (priLOSEC) 20 MG capsule, TAKE ONE CAPSULE BY MOUTH DAILY, Disp: 90 capsule, Rfl: 3  •  oxyCODONE-acetaminophen (Percocet) 5-325 MG per tablet, Take 1 tablet by mouth Every 6 (Six) Hours As Needed for Severe Pain ., Disp: 120 tablet, Rfl: 0  •  pregabalin (LYRICA) 100 MG capsule, TAKE ONE CAPSULE BY MOUTH EVERY 8 HOURS, Disp: 270 capsule, Rfl: 0  •  probiotic (CULTURELLE) capsule capsule, Take 1 capsule by mouth Daily., Disp: 60 capsule, Rfl: 0  •  promethazine (PHENERGAN) 25 MG tablet, TAKE ONE TABLET BY MOUTH EVERY 6 HOURS AS NEEDED FOR NAUSEA AND VOMITING, Disp: 20 tablet, Rfl: 0  •  sertraline (ZOLOFT) 50 MG tablet, TAKE ONE TABLET BY MOUTH DAILY, Disp: 90 tablet, Rfl: 1  •  tiZANidine (ZANAFLEX) 4 MG tablet, TAKE ONE TABLET BY MOUTH TWICE A DAY AS NEEDED FOR MUSCLE SPASMS, Disp: 60 tablet, Rfl: 0  •  topiramate (TOPAMAX) 50 MG tablet, TAKE ONE TABLET BY MOUTH TWICE A DAY, Disp: 180 tablet, Rfl: 0    Allergies   Allergen Reactions   • Augmentin [Amoxicillin-Pot Clavulanate] Swelling     Mouth raw, tongue swelling   • Cortisone Other (See Comments)     Hotness all over body and hyper   • Oxycontin [Oxycodone] Other (See Comments)     Psoriasis  Tolerates Percocet   • Coreg [Carvedilol] Rash   • Doxycycline Rash   • Metoprolol Tartrate Other (See Comments)     Hair loss    • Tolmetin Rash     Tolectin-rash and itching   • Vancomycin Rash     Despite rash, pt continued to stay on it 2017        reports that she has been smoking cigarettes. She has a 48.00 pack-year smoking history. She has never used smokeless tobacco.    Family History   Problem Relation Age of Onset   • Arthritis Mother    • Diabetes Mother    • Colon polyps Mother    • Diverticulitis Mother    • Heart failure Mother    • Arthritis Father    • Bleeding Disorder Father    • Diabetes Father    •  "Kidney disease Father    • Heart disease Father    • COPD Sister         currently smokes   • Arthritis Brother    • Diabetes Brother    • Alcohol abuse Brother    • Heart murmur Daughter    • Arthritis Other    • Diabetes Other          Objective:   Blood pressure 112/50, pulse 71, temperature 97.8 °F (36.6 °C), height 167.6 cm (66\"), weight 93.4 kg (206 lb), SpO2 91 %, not currently breastfeeding.  CONSTITUTIONAL: No acute distress, normal affect  RESPIRATORY: Normal effort. Clear to auscultation bilaterally without wheezing or rales.  CARDIOVASCULAR:  Regular rate and rhythm with normal S1 and S2. Without gallop or rub. СЕРГЕЙ RUSB with radiation to the carotids  PERIPHERAL VASCULAR: Normal radial pulse on the left.  There is no lower extremity edema bilaterally    Labs:      Lab Results   Component Value Date    CHOL 112 02/14/2018    CHOL 123 02/25/2017    CHOL 244 (H) 11/27/2016     Lab Results   Component Value Date    TRIG 260 (H) 04/29/2020    TRIG 220 (H) 12/05/2019    TRIG 245 (H) 10/10/2018     Lab Results   Component Value Date    HDL 31 (L) 04/29/2020    HDL 31 (L) 12/05/2019    HDL 34 (L) 10/10/2018     Lab Results   Component Value Date    LDL 49 04/29/2020    LDL 44 12/05/2019    LDL 54 10/10/2018     Lab Results   Component Value Date    VLDL 52 (H) 04/29/2020    VLDL 44 (H) 12/05/2019    VLDL 49 10/10/2018     No results found for: LDLHDL      Diagnostic Data:    Procedures    TTE 12/2018  · Left ventricular systolic function is normal. Estimated EF = 65%.  · Left ventricular wall thickness is consistent with mild-to-moderate concentric hypertrophy.  · Left atrial cavity size is mildly dilated.  · There is severe calcification of the aortic valve.  · Moderate aortic valve stenosis is present.  · Estimated right ventricular systolic pressure from tricuspid regurgitation is mildly elevated (35-45 mmHg).     Limited TTE 7/2018  · Left ventricular systolic function is normal. Estimated EF = 60%.    TTE " 2/2018  · Left ventricular systolic function is moderately decreased. Estimated EF = 35%.  · Left ventricular wall thickness is consistent with mild-to-moderate concentric hypertrophy.  · The following left ventricular wall segments are hypokinetic: apical anterior, apical lateral, apical inferior, apical septal and apex hypokinetic. The basal segments are hyperdynamic.  · The findings have the apperance of stress-induced cardiomyopathy (Takotsubo)  · Left ventricular diastolic dysfunction (grade I a) consistent with impaired relaxation.  · Right ventricular cavity is small in size.  · The main pulmonary artery is moderately dilated.  · Left atrial cavity size is borderline dilated.  · There is moderate calcification of the aortic valve.  · Moderate aortic valve stenosis is present.    [facility] 02/20/2018  · There was severe coronary artery disease involving the mid LAD and first diagonal branch that is now status post intervention with a Tryton 3.5mm proximal, 3.0mm distal bifurcation stent extending from the LAD into the diagonal branch and a Synergy 3.5 x 38 mm drug-eluting stent extending from the proximal to mid LAD.  · Normal left ventricular ejection fraction but with hypokinesis of the apical segments  · Normal right heart filling pressures.  Normal pulmonate capillary wedge pressure  · Normal cardiac index    [facility] 11/2016  1.  50% mid RCA stenosis.  2.  40% proximal LAD stenosis   3.  Normal left ventricular function  4.  Hypertension    Assessment and Plan:   Tamara was seen today for coronary artery disease and hypertension.    Diagnoses and all orders for this visit:    Coronary artery disease involving native coronary artery of native heart without angina pectoris  Mixed hyperlipidemia  Polycythemia vera  History of stroke  -Continue clopidogrel. On clopidogrel also due to her polycythemia vera and prior stroke  -Not on an aspirin to avoid triple therapy while on apixaban  -Continue other current related  medications    Severe AS status post TAVR  -Status post TAVR 1/23/2020  -Repeat TTE again to reassess the aortic valve in 11/2020.  -Continue Eliquis until repeat TTE in 11/2020.  Will consider discontinuation thereafter if the valve is stable or not proved.  Will discuss with Dr. Valdez at that time.  If the velocities are concerning or worsening, would repeat a EMMANUEL    Takotsubo cardiomyopathy  Chronic systolic heart failure (CMS/HCC)  -Most recent EF 61-65% on 5/26/2020  -Continue current related medications.    Essential hypertension  -Continue current medication    Palpitations  -Recent 30-day MCOT without significant arrhythmia  -Palpitations improved with increased diltiazem    Upcoming oral surgery  Preoperative cardiac risk assessment  -Requested that the patient determine what kind of anesthesia she will be undergoing.  -If needing general anesthesia, would recommend repeat echocardiogram prior to surgery  -Otherwise, would be okay to proceed from a cardiac standpoint with holding Eliquis 2 days prior to the procedure and restarting as soon as possible postoperatively  -Close telemetry monitoring perioperatively    - Return in about 6 months (around 3/14/2021) for Next scheduled follow up, with an ECG.    Lane Zamorano MD, MSc, [facility]  Interventional Cardiology  [facility] at [facility]

## 2020-09-23 DIAGNOSIS — F41.8 MIXED ANXIETY DEPRESSIVE DISORDER: Chronic | ICD-10-CM

## 2020-09-23 RX ORDER — FUROSEMIDE 40 MG/1
TABLET ORAL
Qty: 180 TABLET | Refills: 0 | Status: SHIPPED | OUTPATIENT
Start: 2020-09-23 | End: 2020-12-24

## 2020-09-23 RX ORDER — CLONAZEPAM 0.5 MG/1
TABLET ORAL
Qty: 30 TABLET | Refills: 1 | Status: SHIPPED | OUTPATIENT
Start: 2020-09-23 | End: 2020-11-24

## 2020-09-24 ENCOUNTER — TELEPHONE (OUTPATIENT)
Dept: CARDIOLOGY | Facility: CLINIC | Age: 65
End: 2020-09-24

## 2020-09-24 NOTE — TELEPHONE ENCOUNTER
Dr. Tabor's office called to report that for dental surgery, patient will need general sedation. Per MJS last note, pt will need echo prior to surgery.       Patient notified. Patient would like to have echo rescheduled on [address].

## 2020-10-05 DIAGNOSIS — M62.838 MUSCLE SPASMS OF NECK: ICD-10-CM

## 2020-10-05 RX ORDER — DILTIAZEM HYDROCHLORIDE 240 MG/1
240 CAPSULE, COATED, EXTENDED RELEASE ORAL DAILY
Qty: 30 CAPSULE | Refills: 5 | Status: SHIPPED | OUTPATIENT
Start: 2020-10-05 | End: 2020-10-06 | Stop reason: SDUPTHER

## 2020-10-05 RX ORDER — DILTIAZEM HYDROCHLORIDE 240 MG/1
240 CAPSULE, COATED, EXTENDED RELEASE ORAL DAILY
Qty: 30 CAPSULE | Refills: 5 | Status: CANCELLED | OUTPATIENT
Start: 2020-10-05

## 2020-10-06 RX ORDER — TIZANIDINE 4 MG/1
TABLET ORAL
Qty: 60 TABLET | Refills: 6 | Status: SHIPPED | OUTPATIENT
Start: 2020-10-06 | End: 2021-06-03

## 2020-10-07 RX ORDER — DILTIAZEM HYDROCHLORIDE 240 MG/1
240 CAPSULE, COATED, EXTENDED RELEASE ORAL DAILY
Qty: 90 CAPSULE | Refills: 3 | Status: SHIPPED | OUTPATIENT
Start: 2020-10-07 | End: 2021-01-01

## 2020-10-07 RX ORDER — ALBUTEROL SULFATE 2.5 MG/3ML
2.5 SOLUTION RESPIRATORY (INHALATION) EVERY 6 HOURS PRN
Qty: 120 VIAL | Refills: 11 | Status: SHIPPED | OUTPATIENT
Start: 2020-10-07 | End: 2021-03-29 | Stop reason: SDUPTHER

## 2020-10-07 NOTE — TELEPHONE ENCOUNTER
Caller: Tamara Langston    Relationship: Self    Best call back number: [phone]    Medication needed:   Requested Prescriptions     Pending Prescriptions Disp Refills   • albuterol (PROVENTIL) (2.5 MG/3ML) 0.083% nebulizer solution 120 vial 5     Sig: Take 2.5 mg by nebulization Every 6 (Six) Hours As Needed for Wheezing or Shortness of Air.       When do you need the refill by: ASAP  What details did the patient provide when requesting the medication: OUT OF MEDICATION - PATIENT IS REQUESTING THE BIG SIZE FOR THIS MEDICATION  Does the patient have less than a 3 day supply:  [x] Yes  [] No    What is the patient's preferred pharmacy: [facility] [address] [facility] [phone] [facility] [phone] [facility]

## 2020-10-08 DIAGNOSIS — F11.90 CHRONIC, CONTINUOUS USE OF OPIOIDS: Chronic | ICD-10-CM

## 2020-10-08 DIAGNOSIS — G89.4 CHRONIC PAIN SYNDROME: ICD-10-CM

## 2020-10-08 RX ORDER — FENTANYL 75 UG/H
1 PATCH TRANSDERMAL
Qty: 15 PATCH | Refills: 0 | Status: SHIPPED | OUTPATIENT
Start: 2020-10-08 | End: 2020-11-06 | Stop reason: SDUPTHER

## 2020-10-08 RX ORDER — OXYCODONE HYDROCHLORIDE AND ACETAMINOPHEN 5; 325 MG/1; MG/1
1 TABLET ORAL EVERY 6 HOURS PRN
Qty: 120 TABLET | Refills: 0 | Status: SHIPPED | OUTPATIENT
Start: 2020-10-08 | End: 2020-11-06 | Stop reason: SDUPTHER

## 2020-10-08 NOTE — TELEPHONE ENCOUNTER
Caller: Tamara Langston    Relationship: Self    Best call back number:[phone]    Medication needed:   Requested Prescriptions     Pending Prescriptions Disp Refills   • oxyCODONE-acetaminophen (Percocet) 5-325 MG per tablet 120 tablet 0     Sig: Take 1 tablet by mouth Every 6 (Six) Hours As Needed for Severe Pain .   • fentaNYL (DURAGESIC) 75 MCG/HR patch 15 patch 0     Sig: Place 1 patch on the skin as directed by provider Every Other Day.       When do you need the refill by:ASAP    What details did the patient provide when requesting the medication: PT STATES THAT REFILL IS NOT DUE UNTIL Tito 10/11    Does the patient have less than a 3 day supply:  [x] Yes  [] No    What is the patient's preferred pharmacy: [facility] [address] [facility] [phone] [facility] [phone] [facility]

## 2020-10-24 DIAGNOSIS — I73.9 PERIPHERAL VASCULAR DISEASE (HCC): ICD-10-CM

## 2020-10-26 RX ORDER — CLOPIDOGREL BISULFATE 75 MG/1
TABLET ORAL
Qty: 90 TABLET | Refills: 3 | Status: SHIPPED | OUTPATIENT
Start: 2020-10-26 | End: 2021-01-01

## 2020-10-27 ENCOUNTER — HOSPITAL ENCOUNTER (OUTPATIENT)
Dept: CARDIOLOGY | Facility: HOSPITAL | Age: 65
Discharge: HOME | End: 2020-10-27
Admitting: NURSE PRACTITIONER

## 2020-10-27 DIAGNOSIS — I35.0 NONRHEUMATIC AORTIC VALVE STENOSIS: ICD-10-CM

## 2020-10-27 LAB
ASCENDING AORTA: 3.5 CM
BH CV ECHO MEAS - AO MAX PG (FULL): 68 MMHG
BH CV ECHO MEAS - AO MAX PG: 75.5 MMHG
BH CV ECHO MEAS - AO MEAN PG (FULL): 37.3 MMHG
BH CV ECHO MEAS - AO MEAN PG: 41.2 MMHG
BH CV ECHO MEAS - AO ROOT AREA (BSA CORRECTED): 1.5
BH CV ECHO MEAS - AO ROOT AREA: 6.9 CM^2
BH CV ECHO MEAS - AO ROOT DIAM: 3 CM
BH CV ECHO MEAS - AO V2 MAX: 434.4 CM/SEC
BH CV ECHO MEAS - AO V2 MEAN: 298.9 CM/SEC
BH CV ECHO MEAS - AO V2 VTI: 94.7 CM
BH CV ECHO MEAS - ASC AORTA: 3.5 CM
BH CV ECHO MEAS - AVA(I,A): 1.1 CM^2
BH CV ECHO MEAS - AVA(I,D): 1.1 CM^2
BH CV ECHO MEAS - AVA(V,A): 1 CM^2
BH CV ECHO MEAS - AVA(V,D): 1 CM^2
BH CV ECHO MEAS - BSA(HAYCOCK): 2.1 M^2
BH CV ECHO MEAS - BSA: 2 M^2
BH CV ECHO MEAS - BZI_BMI: 33.2 KILOGRAMS/M^2
BH CV ECHO MEAS - BZI_METRIC_HEIGHT: 167.6 CM
BH CV ECHO MEAS - BZI_METRIC_WEIGHT: 93.4 KG
BH CV ECHO MEAS - EDV(CUBED): 120.6 ML
BH CV ECHO MEAS - EDV(MOD-SP2): 104 ML
BH CV ECHO MEAS - EDV(MOD-SP4): 122 ML
BH CV ECHO MEAS - EDV(TEICH): 115 ML
BH CV ECHO MEAS - EF(CUBED): 69.1 %
BH CV ECHO MEAS - EF(MOD-BP): 67 %
BH CV ECHO MEAS - EF(MOD-SP2): 71.2 %
BH CV ECHO MEAS - EF(MOD-SP4): 65.6 %
BH CV ECHO MEAS - EF(TEICH): 60.5 %
BH CV ECHO MEAS - ESV(CUBED): 37.3 ML
BH CV ECHO MEAS - ESV(MOD-SP2): 30 ML
BH CV ECHO MEAS - ESV(MOD-SP4): 42 ML
BH CV ECHO MEAS - ESV(TEICH): 45.5 ML
BH CV ECHO MEAS - FS: 32.4 %
BH CV ECHO MEAS - IVS/LVPW: 0.99
BH CV ECHO MEAS - IVSD: 1.3 CM
BH CV ECHO MEAS - LA DIMENSION: 4.1 CM
BH CV ECHO MEAS - LA/AO: 1.4
BH CV ECHO MEAS - LAD MAJOR: 6.6 CM
BH CV ECHO MEAS - LAT PEAK E' VEL: 7.9 CM/SEC
BH CV ECHO MEAS - LATERAL E/E' RATIO: 16.5
BH CV ECHO MEAS - LV DIASTOLIC VOL/BSA (35-75): 60.2 ML/M^2
BH CV ECHO MEAS - LV IVRT: 0.09 SEC
BH CV ECHO MEAS - LV MASS(C)D: 267.4 GRAMS
BH CV ECHO MEAS - LV MASS(C)DI: 132 GRAMS/M^2
BH CV ECHO MEAS - LV MAX PG: 7.5 MMHG
BH CV ECHO MEAS - LV MEAN PG: 3.9 MMHG
BH CV ECHO MEAS - LV SYSTOLIC VOL/BSA (12-30): 20.7 ML/M^2
BH CV ECHO MEAS - LV V1 MAX: 137 CM/SEC
BH CV ECHO MEAS - LV V1 MEAN: 90.6 CM/SEC
BH CV ECHO MEAS - LV V1 VTI: 33.5 CM
BH CV ECHO MEAS - LVIDD: 4.9 CM
BH CV ECHO MEAS - LVIDS: 3.3 CM
BH CV ECHO MEAS - LVLD AP2: 8.7 CM
BH CV ECHO MEAS - LVLD AP4: 8.5 CM
BH CV ECHO MEAS - LVLS AP2: 6 CM
BH CV ECHO MEAS - LVLS AP4: 6.8 CM
BH CV ECHO MEAS - LVOT AREA (M): 3.1 CM^2
BH CV ECHO MEAS - LVOT AREA: 3.2 CM^2
BH CV ECHO MEAS - LVOT DIAM: 2 CM
BH CV ECHO MEAS - LVPWD: 1.3 CM
BH CV ECHO MEAS - MED PEAK E' VEL: 6.6 CM/SEC
BH CV ECHO MEAS - MEDIAL E/E' RATIO: 19.7
BH CV ECHO MEAS - MV A MAX VEL: 118 CM/SEC
BH CV ECHO MEAS - MV DEC TIME: 0.28 SEC
BH CV ECHO MEAS - MV E MAX VEL: 132.7 CM/SEC
BH CV ECHO MEAS - MV E/A: 1.1
BH CV ECHO MEAS - MV MAX PG: 6.9 MMHG
BH CV ECHO MEAS - MV MEAN PG: 3.1 MMHG
BH CV ECHO MEAS - MV V2 MAX: 131.7 CM/SEC
BH CV ECHO MEAS - MV V2 MEAN: 82.7 CM/SEC
BH CV ECHO MEAS - MV V2 VTI: 45.2 CM
BH CV ECHO MEAS - MVA(VTI): 2.4 CM^2
BH CV ECHO MEAS - PA ACC SLOPE: 771.4 CM/SEC^2
BH CV ECHO MEAS - PA ACC TIME: 0.12 SEC
BH CV ECHO MEAS - PA MAX PG: 13.5 MMHG
BH CV ECHO MEAS - PA PR(ACCEL): 26.7 MMHG
BH CV ECHO MEAS - PA V2 MAX: 183 CM/SEC
BH CV ECHO MEAS - RAP SYSTOLE: 3 MMHG
BH CV ECHO MEAS - RVSP: 35.5 MMHG
BH CV ECHO MEAS - SI(AO): 321 ML/M^2
BH CV ECHO MEAS - SI(CUBED): 41.2 ML/M^2
BH CV ECHO MEAS - SI(LVOT): 53.4 ML/M^2
BH CV ECHO MEAS - SI(MOD-SP2): 36.5 ML/M^2
BH CV ECHO MEAS - SI(MOD-SP4): 39.5 ML/M^2
BH CV ECHO MEAS - SI(TEICH): 34.3 ML/M^2
BH CV ECHO MEAS - SV(AO): 650.3 ML
BH CV ECHO MEAS - SV(CUBED): 83.4 ML
BH CV ECHO MEAS - SV(LVOT): 108.1 ML
BH CV ECHO MEAS - SV(MOD-SP2): 74 ML
BH CV ECHO MEAS - SV(MOD-SP4): 80 ML
BH CV ECHO MEAS - SV(TEICH): 69.6 ML
BH CV ECHO MEAS - TAPSE (>1.6): 2.4 CM
BH CV ECHO MEAS - TR MAX PG: 32.5 MMHG
BH CV ECHO MEAS - TR MAX VEL: 285 CM/SEC
BH CV ECHO MEASUREMENTS AVERAGE E/E' RATIO: 18.3
BH CV VAS BP LEFT ARM: NORMAL MMHG
BH CV XLRA - RV BASE: 4.1 CM
BH CV XLRA - RV LENGTH: 7.9 CM
BH CV XLRA - RV MID: 3.5 CM
BH CV XLRA - TDI S': 14.9 CM/SEC
IVRT: 88 MSEC
LEFT ATRIUM VOLUME INDEX: 29.6 ML/M^2
LEFT ATRIUM VOLUME: 60 ML

## 2020-10-27 PROCEDURE — 93306 TTE W/DOPPLER COMPLETE: CPT | Performed by: INTERNAL MEDICINE

## 2020-10-27 PROCEDURE — 93306 TTE W/DOPPLER COMPLETE: CPT

## 2020-10-28 ENCOUNTER — OFFICE VISIT (OUTPATIENT)
Dept: INTERNAL MEDICINE | Facility: CLINIC | Age: 65
End: 2020-10-28

## 2020-10-28 VITALS
HEART RATE: 68 BPM | BODY MASS INDEX: 33.27 KG/M2 | TEMPERATURE: 97.3 F | DIASTOLIC BLOOD PRESSURE: 50 MMHG | HEIGHT: 66 IN | SYSTOLIC BLOOD PRESSURE: 100 MMHG

## 2020-10-28 DIAGNOSIS — F11.90 CHRONIC, CONTINUOUS USE OF OPIOIDS: Chronic | ICD-10-CM

## 2020-10-28 DIAGNOSIS — Z00.00 ENCOUNTER FOR MEDICARE ANNUAL WELLNESS EXAM: ICD-10-CM

## 2020-10-28 DIAGNOSIS — J43.9 PULMONARY EMPHYSEMA, UNSPECIFIED EMPHYSEMA TYPE (HCC): Chronic | ICD-10-CM

## 2020-10-28 DIAGNOSIS — I10 ESSENTIAL HYPERTENSION: Chronic | ICD-10-CM

## 2020-10-28 DIAGNOSIS — K21.9 GASTROESOPHAGEAL REFLUX DISEASE WITHOUT ESOPHAGITIS: Chronic | ICD-10-CM

## 2020-10-28 DIAGNOSIS — I25.10 CORONARY ARTERY DISEASE INVOLVING NATIVE CORONARY ARTERY OF NATIVE HEART WITHOUT ANGINA PECTORIS: ICD-10-CM

## 2020-10-28 DIAGNOSIS — Z72.0 TOBACCO ABUSE: Chronic | ICD-10-CM

## 2020-10-28 DIAGNOSIS — Z23 NEED FOR INFLUENZA VACCINATION: ICD-10-CM

## 2020-10-28 DIAGNOSIS — E03.8 OTHER SPECIFIED HYPOTHYROIDISM: Chronic | ICD-10-CM

## 2020-10-28 DIAGNOSIS — F41.8 MIXED ANXIETY DEPRESSIVE DISORDER: Chronic | ICD-10-CM

## 2020-10-28 DIAGNOSIS — I73.9 PERIPHERAL VASCULAR DISEASE (HCC): Primary | Chronic | ICD-10-CM

## 2020-10-28 PROCEDURE — 90694 VACC AIIV4 NO PRSRV 0.5ML IM: CPT | Performed by: INTERNAL MEDICINE

## 2020-10-28 PROCEDURE — G0008 ADMIN INFLUENZA VIRUS VAC: HCPCS | Performed by: INTERNAL MEDICINE

## 2020-10-28 PROCEDURE — G0439 PPPS, SUBSEQ VISIT: HCPCS | Performed by: INTERNAL MEDICINE

## 2020-10-28 PROCEDURE — 96160 PT-FOCUSED HLTH RISK ASSMT: CPT | Performed by: INTERNAL MEDICINE

## 2020-10-28 NOTE — PROGRESS NOTES
The ABCs of the Annual Wellness Visit  Subsequent Medicare Wellness Visit    Chief Complaint   Patient presents with   • Annual Exam       Subjective   History of Present Illness:  Tamara Langston is a 64 y.o. female who presents for a Subsequent Medicare Wellness Visit.    HEALTH RISK ASSESSMENT    Recent Hospitalizations:  Recently treated at the following:  [facility]    Current Medical Providers:  Patient Care Team:  Harinder Aranda MD as PCP - General  Harinder Aranda MD as PCP - Family Medicine  Arsalan Feliciano MD as Surgeon (Cardiothoracic Surgery)  Lane Zamorano MD as Consulting Physician (Cardiology)    Smoking Status:  Social History     Tobacco Use   Smoking Status Current Every Day Smoker   • Packs/day: 1.00   • Years: 48.00   • Pack years: 48.00   • Types: Cigarettes   Smokeless Tobacco Never Used       Alcohol Consumption:  Social History     Substance and Sexual Activity   Alcohol Use No       Depression Screen:   PHQ-2/PHQ-9 Depression Screening 10/28/2020   Little interest or pleasure in doing things 1   Feeling down, depressed, or hopeless 0   Trouble falling or staying asleep, or sleeping too much 0   Feeling tired or having little energy 3   Poor appetite or overeating 0   Feeling bad about yourself - or that you are a failure or have let yourself or your family down 3   Trouble concentrating on things, such as reading the newspaper or watching television 3   Moving or speaking so slowly that other people could have noticed. Or the opposite - being so fidgety or restless that you have been moving around a lot more than usual 0   Thoughts that you would be better off dead, or of hurting yourself in some way 0   Total Score 10   If you checked off any problems, how difficult have these problems made it for you to do your work, take care of things at home, or get along with other people? Very difficult       Fall Risk Screen:  STEADI Fall Risk Assessment was completed, and  patient is at HIGH risk for falls. Assessment completed on:10/28/2020    Health Habits and Functional and Cognitive Screening:  Functional & Cognitive Status 10/28/2020   Do you have difficulty preparing food and eating? Yes   Do you have difficulty bathing yourself, getting dressed or grooming yourself? No   Do you have difficulty using the toilet? No   Do you have difficulty moving around from place to place? Yes   Do you have trouble with steps or getting out of a bed or a chair? Yes   Current Diet Limited Junk Food   Dental Exam Not up to date   Eye Exam Not up to date   Do you need help using the phone?  No   Are you deaf or do you have serious difficulty hearing?  No   Do you need help with transportation? Yes   Do you need help shopping? Yes   Do you need help preparing meals?  Yes   Do you need help with housework?  Yes   Do you need help with laundry? Yes   Do you need help taking your medications? No   Do you need help managing money? No   Do you ever drive or ride in a car without wearing a seat belt? No   Have you felt unusual stress, anger or loneliness in the last month? No   Who do you live with? Spouse   If you need help, do you have trouble finding someone available to you? No   Have you been bothered in the last four weeks by sexual problems? No   Do you have difficulty concentrating, remembering or making decisions? No         Does the patient have evidence of cognitive impairment? No    Asprin use counseling:Taking ASA appropriately as indicated    Age-appropriate Screening Schedule:  Refer to the list below for future screening recommendations based on patient's age, sex and/or medical conditions. Orders for these recommended tests are listed in the plan section. The patient has been provided with a written plan.    Health Maintenance   Topic Date Due   • URINE MICROALBUMIN  1955   • TDAP/TD VACCINES (1 - Tdap) 10/30/1972   • ZOSTER VACCINE (1 of 2) 10/30/2003   • MAMMOGRAM  04/28/2016   •  DXA SCAN  07/19/2016   • DIABETIC FOOT EXAM  02/02/2018   • DIABETIC EYE EXAM  12/17/2019   • INFLUENZA VACCINE  08/01/2020   • HEMOGLOBIN A1C  01/29/2021   • LIPID PANEL  04/29/2021   • COLONOSCOPY  01/31/2028            Fall Risk Assessment was completed, and patient is at moderate risk for falls.      The following portions of the patient's history were reviewed and updated as appropriate: current medications, past family history, past medical history, past social history, past surgical history and problem list.    Outpatient Medications Prior to Visit   Medication Sig Dispense Refill   • acetaminophen (TYLENOL) 325 MG tablet Take 2 tablets by mouth Every 4 (Four) Hours As Needed for mild pain (1-3) or fever (temperature greater than 101F). 100 tablet 0   • albuterol (PROVENTIL) (2.5 MG/3ML) 0.083% nebulizer solution Take 2.5 mg by nebulization Every 6 (Six) Hours As Needed for Wheezing or Shortness of Air. 120 vial 11   • apixaban (ELIQUIS) 5 MG tablet tablet Take 1 tablet by mouth Every 12 (Twelve) Hours. 180 tablet 3   • atorvastatin (LIPITOR) 40 MG tablet TAKE ONE TABLET BY MOUTH ONCE NIGHTLY 90 tablet 1   • busPIRone (BUSPAR) 7.5 MG tablet TAKE TWO TABLETS BY MOUTH TWICE A  tablet 3   • clonazePAM (KlonoPIN) 0.5 MG tablet TAKE 1/2 TABLET BY MOUTH TWICE A DAY AS NEEDED FOR ANXIETY 30 tablet 1   • clopidogrel (PLAVIX) 75 MG tablet TAKE ONE TABLET BY MOUTH DAILY 90 tablet 3   • dilTIAZem CD (CARDIZEM CD) 240 MG 24 hr capsule Take 1 capsule by mouth Daily. 90 capsule 3   • doxazosin (CARDURA) 1 MG tablet Take 1 tablet by mouth Every Night. 90 tablet 3   • ezetimibe (ZETIA) 10 MG tablet TAKE ONE TABLET BY MOUTH DAILY 90 tablet 1   • fentaNYL (DURAGESIC) 75 MCG/HR patch Place 1 patch on the skin as directed by provider Every Other Day. 15 patch 0   • fexofenadine (ALLEGRA) 180 MG tablet Take 180 mg by mouth Daily As Needed.     • fluticasone (Flonase) 50 MCG/ACT nasal spray 1 spray into the nostril(s) as  directed by provider 2 (Two) Times a Day. (Patient taking differently: 1 spray into the nostril(s) as directed by provider As Needed.) 1 bottle 5   • furosemide (LASIX) 40 MG tablet TAKE ONE TABLET BY MOUTH TWICE A  tablet 0   • levothyroxine (SYNTHROID, LEVOTHROID) 112 MCG tablet TAKE ONE TABLET BY MOUTH DAILY 90 tablet 0   • lisinopril (PRINIVIL,ZESTRIL) 10 MG tablet TAKE ONE TABLET BY MOUTH DAILY 90 tablet 0   • melatonin 5 MG sublingual tablet sublingual tablet Place 1 tablet under the tongue At Night As Needed (insomnia). 30 tablet 0   • omeprazole (priLOSEC) 20 MG capsule TAKE ONE CAPSULE BY MOUTH DAILY 90 capsule 3   • oxyCODONE-acetaminophen (Percocet) 5-325 MG per tablet Take 1 tablet by mouth Every 6 (Six) Hours As Needed for Severe Pain . 120 tablet 0   • pregabalin (LYRICA) 100 MG capsule TAKE ONE CAPSULE BY MOUTH EVERY 8 HOURS 270 capsule 0   • probiotic (CULTURELLE) capsule capsule Take 1 capsule by mouth Daily. 60 capsule 0   • promethazine (PHENERGAN) 25 MG tablet TAKE ONE TABLET BY MOUTH EVERY 6 HOURS AS NEEDED FOR NAUSEA AND VOMITING 20 tablet 0   • sertraline (ZOLOFT) 50 MG tablet TAKE ONE TABLET BY MOUTH DAILY 90 tablet 1   • tiZANidine (ZANAFLEX) 4 MG tablet TAKE ONE TABLET BY MOUTH TWICE A DAY AS NEEDED FOR MUSCLE SPASMS 60 tablet 6   • topiramate (TOPAMAX) 50 MG tablet TAKE ONE TABLET BY MOUTH TWICE A  tablet 0     No facility-administered medications prior to visit.        Patient Active Problem List   Diagnosis   • COPD on home O2   • Complicated sleep apnea   • Mixed anxiety depressive disorder   • GERD   • HTN   • Hypothyroidism   • Morbidly obese (CMS/HCC)   • Tobacco abuse   • PVD   • Chronic, continuous use of opioids   • CAD s/p multiple stents   • Hx takotsubo cardiomyopathy   • Severe AS s/p TAVR 1/23/20   • Seasonal allergic rhinitis due to pollen   • Chronic left shoulder pain   • Neck pain   • Encounter for Medicare annual wellness exam       Advanced Care  "Planning:  ACP discussion was held with the patient during this visit. Patient does not have an advance directive, information provided.    Review of Systems   Constitutional: Positive for fatigue. Negative for chills, diaphoresis, fever and unexpected weight change.   HENT: Negative for congestion, hearing loss, nosebleeds, postnasal drip, sinus pressure and sore throat.    Eyes: Negative for pain, discharge and itching.   Respiratory: Positive for shortness of breath. Negative for cough, chest tightness and wheezing.    Cardiovascular: Negative for chest pain and leg swelling.   Gastrointestinal: Positive for constipation. Negative for abdominal distention, blood in stool, diarrhea, nausea and vomiting.   Endocrine: Negative for heat intolerance, polydipsia and polyuria.   Genitourinary: Positive for difficulty urinating. Negative for dysuria, frequency and hematuria.   Musculoskeletal: Positive for arthralgias, back pain, gait problem, neck pain and neck stiffness. Negative for joint swelling and myalgias.   Skin:        Hair loss   Neurological: Positive for dizziness, tremors, weakness and light-headedness. Negative for syncope and headaches.   Psychiatric/Behavioral: Positive for dysphoric mood and sleep disturbance. The patient is nervous/anxious.        Compared to one year ago, the patient feels her physical health is worse.  Compared to one year ago, the patient feels her mental health is the same.    Reviewed chart for potential of high risk medication in the elderly: yes  Reviewed chart for potential of harmful drug interactions in the elderly:yes    Objective         Vitals:    10/28/20 1145 10/28/20 1205   BP: 120/60 100/50   BP Location: Left arm    Patient Position: Sitting    Pulse: 68    Temp: 97.3 °F (36.3 °C)    TempSrc: Infrared    Height: 167.6 cm (65.98\")    PainSc:   6        Body mass index is 33.27 kg/m².  Discussed the patient's BMI with her. The BMI is above average; BMI management plan " is completed.    Physical Exam  Constitutional:       Appearance: Normal appearance. She is well-developed.   HENT:      Head: Normocephalic and atraumatic.      Right Ear: External ear normal.      Left Ear: External ear normal.      Nose: Nose normal.      Mouth/Throat:      Mouth: Mucous membranes are moist.      Pharynx: Oropharynx is clear.   Eyes:      Extraocular Movements: Extraocular movements intact.      Conjunctiva/sclera: Conjunctivae normal.      Pupils: Pupils are equal, round, and reactive to light.   Neck:      Musculoskeletal: Normal range of motion and neck supple.   Cardiovascular:      Rate and Rhythm: Normal rate and regular rhythm.      Comments: 2/6 СЕРГЕЙ  Pulmonary:      Effort: Pulmonary effort is normal.      Breath sounds: Normal breath sounds.   Abdominal:      General: Bowel sounds are normal.      Palpations: Abdomen is soft.   Musculoskeletal:      Comments: In a wheel chair   Lymphadenopathy:      Cervical: No cervical adenopathy.   Skin:     General: Skin is warm and dry.   Neurological:      General: No focal deficit present.      Mental Status: She is alert and oriented to person, place, and time.   Psychiatric:         Mood and Affect: Mood normal.         Behavior: Behavior normal.         Thought Content: Thought content normal.               Assessment/Plan   Medicare Risks and Personalized Health Plan  CMS Preventative Services Quick Reference  Cardiovascular risk  Chronic Pain   Depression/Dysphoria  Fall Risk  Immunizations Discussed/Encouraged (specific immunizations; Td, Influenza and Shingrix )  Inactivity/Sedentary  Lung Cancer Risk  Obesity/Overweight   Osteoprorosis Risk  Polypharmacy    The above risks/problems have been discussed with the patient.  Pertinent information has been shared with the patient in the After Visit Summary.  Follow up plans and orders are seen below in the Assessment/Plan Section.    Diagnoses and all orders for this visit:    1. TONOD  (Primary)    2. HTN    3. CAD s/p multiple stents    4. Pulmonary emphysema, unspecified emphysema type (CMS/HCC)    5. GERD    6. Other specified hypothyroidism    7. Tobacco abuse    8. Mixed anxiety depressive disorder    9. Chronic, continuous use of opioids    10. Need for influenza vaccination  -     Fluad Quad >65 years    11. Encounter for Medicare annual wellness exam      Follow Up:  Return in about 3 months (around 1/28/2021) for Recheck, Fasting labs at their convenience.     An After Visit Summary and PPPS were given to the patient.         HME- advised tob cessation and wt loss and exercise, flu shot today  chronic back pain- refill patch and percocet as needed, counseled on LT risk  htn-controlled on ACE/CCB , dc cardura  hyperlipidemia-cont lipitor and zetia, recheck 7/29 at goal except for TG and HDL, counseled on low chol diet  hypothroid-cont replacement, recheck 7/29 on target  depression with anxiety-cont buspar and klonopine and zoloft ,advised her of risk of addiction, stable  polycythemia-cbc 7/29 dw patient, advised tob cessation  retinal vein occlusion- cont rf modification, counseled on tob cessation, no change  b12 def-recheck 7/29 stable  DM-labs 7/29, counseled on low carb  Elevated lft/?autoimmune-f/u gastro recs , labs 7/29 dw patient, advised wt loss  GERD-controlled PPI  Tremor-cont BB and topamax, controlled  Fe def anemia- recheck 7/29, stable      7/29  labs noted and dw patient    Reviewed the following with the patient: advised patient to avoid alcoholic beverages, encouraged patient to exercise 5-7 days per week for 30 minutes at a time, ideal body weight discussed with patient and weight loss encouraged.

## 2020-10-29 ENCOUNTER — TELEPHONE (OUTPATIENT)
Dept: CARDIOLOGY | Facility: CLINIC | Age: 65
End: 2020-10-29

## 2020-10-29 DIAGNOSIS — I35.0 NONRHEUMATIC AORTIC VALVE STENOSIS: Primary | ICD-10-CM

## 2020-10-29 NOTE — TELEPHONE ENCOUNTER
----- Message from Lane Zamorano MD sent at 10/29/2020 12:57 PM EDT -----  If EMMANUEL looks okay, we'll get her off of Eliquis. Thanks.  ----- Message -----  From: Shy Monet RN  Sent: 10/29/2020  12:00 PM EDT  To: aLne Zamorano MD    She will want to know if it okay for her to have her dental surgery with general anesthesia and holding her eliquis? She was to have this echo prior to being okayed for the surgery. Thanks!     ----- Message -----  From: Lane Zamorano MD  Sent: 10/29/2020  11:05 AM EDT  To: Shy Monet RN    Please let the patient know that the valve velocities remain high, but overall functioning well. Please let her know and set her up for a EMMANUEL for definitive diagnosis of the elevated valve velocities. At that point, if it's stable, can take her of Eliquis and switch back to aspirin

## 2020-10-29 NOTE — TELEPHONE ENCOUNTER
Patient returned call. Reviewed echo results and recommendations per MJS. Patient agreeable to EMMANUEL. All questions answered at this time.

## 2020-11-03 RX ORDER — TOPIRAMATE 50 MG/1
TABLET, FILM COATED ORAL
Qty: 180 TABLET | Refills: 3 | Status: SHIPPED | OUTPATIENT
Start: 2020-11-03 | End: 2021-01-01 | Stop reason: SDUPTHER

## 2020-11-04 RX ORDER — PROMETHAZINE HYDROCHLORIDE 25 MG/1
TABLET ORAL
Qty: 20 TABLET | Refills: 3 | Status: SHIPPED | OUTPATIENT
Start: 2020-11-04 | End: 2021-01-01

## 2020-11-06 DIAGNOSIS — G89.4 CHRONIC PAIN SYNDROME: ICD-10-CM

## 2020-11-06 DIAGNOSIS — F11.90 CHRONIC, CONTINUOUS USE OF OPIOIDS: Chronic | ICD-10-CM

## 2020-11-06 RX ORDER — FENTANYL 75 UG/H
1 PATCH TRANSDERMAL
Qty: 15 PATCH | Refills: 0 | Status: SHIPPED | OUTPATIENT
Start: 2020-11-06 | End: 2020-12-04 | Stop reason: SDUPTHER

## 2020-11-06 RX ORDER — OXYCODONE HYDROCHLORIDE AND ACETAMINOPHEN 5; 325 MG/1; MG/1
1 TABLET ORAL EVERY 6 HOURS PRN
Qty: 120 TABLET | Refills: 0 | Status: SHIPPED | OUTPATIENT
Start: 2020-11-06 | End: 2020-12-04 | Stop reason: SDUPTHER

## 2020-11-06 NOTE — TELEPHONE ENCOUNTER
Caller: Tamara Langston    Relationship: Self    Best call back number: [phone]    Medication needed:   Requested Prescriptions     Pending Prescriptions Disp Refills   • fentaNYL (DURAGESIC) 75 MCG/HR patch 15 patch 0     Sig: Place 1 patch on the skin as directed by provider Every Other Day.   • oxyCODONE-acetaminophen (Percocet) 5-325 MG per tablet 120 tablet 0     Sig: Take 1 tablet by mouth Every 6 (Six) Hours As Needed for Severe Pain .       When do you need the refill by: 11/06/20    What details did the patient provide when requesting the medication: PATIENT SAID THEY ARE DUE TO BE CALLED IN TODAY  Does the patient have less than a 3 day supply:  [x] Yes  [] No    What is the patient's preferred pharmacy:    [address]

## 2020-11-15 ENCOUNTER — APPOINTMENT (OUTPATIENT)
Dept: PREADMISSION TESTING | Facility: HOSPITAL | Age: 65
End: 2020-11-15

## 2020-11-15 LAB — SARS-COV-2 RNA RESP QL NAA+PROBE: NOT DETECTED

## 2020-11-15 PROCEDURE — U0004 COV-19 TEST NON-CDC HGH THRU: HCPCS

## 2020-11-15 PROCEDURE — C9803 HOPD COVID-19 SPEC COLLECT: HCPCS

## 2020-11-17 ENCOUNTER — HOSPITAL ENCOUNTER (OUTPATIENT)
Dept: CARDIOLOGY | Facility: HOSPITAL | Age: 65
Discharge: HOME | End: 2020-11-17
Admitting: INTERNAL MEDICINE

## 2020-11-17 VITALS
WEIGHT: 206 LBS | OXYGEN SATURATION: 89 % | RESPIRATION RATE: 18 BRPM | SYSTOLIC BLOOD PRESSURE: 132 MMHG | HEART RATE: 68 BPM | DIASTOLIC BLOOD PRESSURE: 65 MMHG | HEIGHT: 66 IN | BODY MASS INDEX: 33.11 KG/M2

## 2020-11-17 DIAGNOSIS — I35.0 NONRHEUMATIC AORTIC VALVE STENOSIS: ICD-10-CM

## 2020-11-17 LAB
ASCENDING AORTA: 3.7 CM
BH CV ECHO MEAS - AO MAX PG (FULL): 22.2 MMHG
BH CV ECHO MEAS - AO MAX PG: 32 MMHG
BH CV ECHO MEAS - AO MEAN PG (FULL): 9.9 MMHG
BH CV ECHO MEAS - AO MEAN PG: 15.4 MMHG
BH CV ECHO MEAS - AO V2 MAX: 285.4 CM/SEC
BH CV ECHO MEAS - AO V2 MEAN: 181.1 CM/SEC
BH CV ECHO MEAS - AO V2 VTI: 52.6 CM
BH CV ECHO MEAS - AVA(I,A): 1.5 CM^2
BH CV ECHO MEAS - AVA(I,D): 1.5 CM^2
BH CV ECHO MEAS - AVA(V,A): 1.4 CM^2
BH CV ECHO MEAS - AVA(V,D): 1.4 CM^2
BH CV ECHO MEAS - BSA(HAYCOCK): 2.1 M^2
BH CV ECHO MEAS - BSA: 2 M^2
BH CV ECHO MEAS - BZI_BMI: 33.2 KILOGRAMS/M^2
BH CV ECHO MEAS - BZI_METRIC_HEIGHT: 167.6 CM
BH CV ECHO MEAS - BZI_METRIC_WEIGHT: 93.4 KG
BH CV ECHO MEAS - LV MAX PG: 9.8 MMHG
BH CV ECHO MEAS - LV MEAN PG: 5.5 MMHG
BH CV ECHO MEAS - LV V1 MAX: 156.3 CM/SEC
BH CV ECHO MEAS - LV V1 MEAN: 110.7 CM/SEC
BH CV ECHO MEAS - LV V1 VTI: 31.6 CM
BH CV ECHO MEAS - LVOT AREA (M): 2.5 CM^2
BH CV ECHO MEAS - LVOT AREA: 2.5 CM^2
BH CV ECHO MEAS - LVOT DIAM: 1.8 CM
BH CV ECHO MEAS - RAP SYSTOLE: 8 MMHG
BH CV ECHO MEAS - RVSP: 59 MMHG
BH CV ECHO MEAS - SI(LVOT): 38.8 ML/M^2
BH CV ECHO MEAS - SV(LVOT): 78.5 ML
BH CV ECHO MEAS - TR MAX PG: 51 MMHG
BH CV ECHO MEAS - TR MAX VEL: 355 CM/SEC
BH CV VAS BP LEFT ARM: NORMAL MMHG
LV EF 2D ECHO EST: 60 %

## 2020-11-17 PROCEDURE — 25010000002 FENTANYL CITRATE (PF) 100 MCG/2ML SOLUTION: Performed by: INTERNAL MEDICINE

## 2020-11-17 PROCEDURE — 25010000002 MIDAZOLAM PER 1 MG: Performed by: INTERNAL MEDICINE

## 2020-11-17 PROCEDURE — 93320 DOPPLER ECHO COMPLETE: CPT

## 2020-11-17 PROCEDURE — 93320 DOPPLER ECHO COMPLETE: CPT | Performed by: INTERNAL MEDICINE

## 2020-11-17 PROCEDURE — 93312 ECHO TRANSESOPHAGEAL: CPT | Performed by: INTERNAL MEDICINE

## 2020-11-17 PROCEDURE — 99152 MOD SED SAME PHYS/QHP 5/>YRS: CPT

## 2020-11-17 PROCEDURE — 93325 DOPPLER ECHO COLOR FLOW MAPG: CPT

## 2020-11-17 PROCEDURE — 93325 DOPPLER ECHO COLOR FLOW MAPG: CPT | Performed by: INTERNAL MEDICINE

## 2020-11-17 PROCEDURE — 99152 MOD SED SAME PHYS/QHP 5/>YRS: CPT | Performed by: INTERNAL MEDICINE

## 2020-11-17 PROCEDURE — 93312 ECHO TRANSESOPHAGEAL: CPT

## 2020-11-17 RX ORDER — FENTANYL CITRATE 50 UG/ML
INJECTION, SOLUTION INTRAMUSCULAR; INTRAVENOUS
Status: COMPLETED | OUTPATIENT
Start: 2020-11-17 | End: 2020-11-17

## 2020-11-17 RX ORDER — ASPIRIN 81 MG/1
81 TABLET ORAL DAILY
Qty: 90 TABLET | Refills: 3 | Status: SHIPPED | OUTPATIENT
Start: 2020-11-17 | End: 2021-02-12 | Stop reason: HOSPADM

## 2020-11-17 RX ORDER — MIDAZOLAM HYDROCHLORIDE 1 MG/ML
INJECTION INTRAMUSCULAR; INTRAVENOUS
Status: COMPLETED | OUTPATIENT
Start: 2020-11-17 | End: 2020-11-17

## 2020-11-17 RX ADMIN — MIDAZOLAM HYDROCHLORIDE 2 MG: 1 INJECTION, SOLUTION INTRAMUSCULAR; INTRAVENOUS at 10:33

## 2020-11-17 RX ADMIN — MIDAZOLAM HYDROCHLORIDE 2 MG: 1 INJECTION, SOLUTION INTRAMUSCULAR; INTRAVENOUS at 10:22

## 2020-11-17 RX ADMIN — MIDAZOLAM HYDROCHLORIDE 2 MG: 1 INJECTION, SOLUTION INTRAMUSCULAR; INTRAVENOUS at 10:23

## 2020-11-17 RX ADMIN — FENTANYL CITRATE 50 MCG: 50 INJECTION, SOLUTION INTRAMUSCULAR; INTRAVENOUS at 10:22

## 2020-11-17 NOTE — H&P
"[facility] CARDIOLOGY AT [facility]   [address] [place] [address], [address]  [place], [place], [id number] [phone]  [email or web address]           INPATIENT H&P NOTE    Patient Care Team:  Patient Care Team:  Harinder Aranda MD as PCP - General  Harinder Aranda MD as PCP - Family Medicine  Arsalan Feliciano MD as Surgeon (Cardiothoracic Surgery)  Lane Zamorano MD as Consulting Physician (Cardiology)      Chief complaint: No chief complaint on file.    Cardiac problem list:  1. CAD s/p multiple PCI  2. Severe aortic stenosis status post TAVR (23 mm CPN 3 tissue valve) on 1/23/2020  3. History of systolic heart failure/Takotsubo  4. Hypertension  5. Hyperlipidemia  6. Diabetes  7. Sleep apnea, arthritis, uterine and cervical cancer, fatty liver, hypothyroidism, polycythemia vera, fibromyalgia, and osteoporosis.         HPI:    Tamara Langston is a 65 y.o. female with the above detailed past medical history. She presents to [facility] today 11/17/2020 for EMMANUEL to better evaluate the status of her post TAVR AV.     She underwent TAVR 1/23/2020, and has been on Eliquis since 1 month s/pTAVR due to elevated velocities across her valve. Repeat surface echo on 10/27/2020 revealed EF of 61 to 65%, grade 2 diastolic dysfunction, TAVR valve present with peak velocity through the valve elevated at 4.3 m/s with a mean pressure gradient of 41 mmHg.  Her post TAVR EMMANUEL in January revealed a peak velocity of 3.2 m/s and mean pressure gradient of 21 mmHg.      She reports stable dyspnea on exertion which she feels is due to her COPD and ongoing tobacco abuse, smoking ~1 ppd. She endorses occasional sharp shooting CP that are random, and occasional feeling of \"a fist pushing on me\" in her epigastric area, usually at rest, sometimes after eating. Her palpitations have improved to short non bothersome episodes ~3-4 times a week since her recent increase in diltiazem. Her BP has been low/low normal at home, and PCP recently " decreased on of her meds. She reports getting dizzy in the evenings, when she is already alying down. She is largely sedentary. She has been NPO since midnight. Covid test was negative.     Review of Systems:  Positive for CP, dyspnea on exertion, palpitations, dizziness.  All other systems reviewed are negative.    PFSH:  Patient Active Problem List   Diagnosis   • COPD on home O2   • Complicated sleep apnea   • Mixed anxiety depressive disorder   • GERD   • HTN   • Hypothyroidism   • Morbidly obese (CMS/HCC)   • Tobacco abuse   • PVD   • Chronic, continuous use of opioids   • CAD s/p multiple stents   • Hx takotsubo cardiomyopathy   • Severe AS s/p TAVR 1/23/20   • Seasonal allergic rhinitis due to pollen   • Chronic left shoulder pain   • Neck pain   • Encounter for Medicare annual wellness exam       Current Outpatient Medications on File Prior to Encounter   Medication Sig Dispense Refill   • acetaminophen (TYLENOL) 325 MG tablet Take 2 tablets by mouth Every 4 (Four) Hours As Needed for mild pain (1-3) or fever (temperature greater than 101F). 100 tablet 0   • albuterol (PROVENTIL) (2.5 MG/3ML) 0.083% nebulizer solution Take 2.5 mg by nebulization Every 6 (Six) Hours As Needed for Wheezing or Shortness of Air. 120 vial 11   • apixaban (ELIQUIS) 5 MG tablet tablet Take 1 tablet by mouth Every 12 (Twelve) Hours. 180 tablet 3   • atorvastatin (LIPITOR) 40 MG tablet TAKE ONE TABLET BY MOUTH ONCE NIGHTLY 90 tablet 1   • busPIRone (BUSPAR) 7.5 MG tablet TAKE TWO TABLETS BY MOUTH TWICE A  tablet 3   • clonazePAM (KlonoPIN) 0.5 MG tablet TAKE 1/2 TABLET BY MOUTH TWICE A DAY AS NEEDED FOR ANXIETY 30 tablet 1   • clopidogrel (PLAVIX) 75 MG tablet TAKE ONE TABLET BY MOUTH DAILY 90 tablet 3   • dilTIAZem CD (CARDIZEM CD) 240 MG 24 hr capsule Take 1 capsule by mouth Daily. 90 capsule 3   • doxazosin (CARDURA) 1 MG tablet Take 1 tablet by mouth Every Night. 90 tablet 3   • ezetimibe (ZETIA) 10 MG tablet TAKE ONE  TABLET BY MOUTH DAILY 90 tablet 1   • fentaNYL (DURAGESIC) 75 MCG/HR patch Place 1 patch on the skin as directed by provider Every Other Day. 15 patch 0   • fexofenadine (ALLEGRA) 180 MG tablet Take 180 mg by mouth Daily As Needed.     • fluticasone (Flonase) 50 MCG/ACT nasal spray 1 spray into the nostril(s) as directed by provider 2 (Two) Times a Day. (Patient taking differently: 1 spray into the nostril(s) as directed by provider As Needed.) 1 bottle 5   • furosemide (LASIX) 40 MG tablet TAKE ONE TABLET BY MOUTH TWICE A  tablet 0   • levothyroxine (SYNTHROID, LEVOTHROID) 112 MCG tablet TAKE ONE TABLET BY MOUTH DAILY 90 tablet 0   • lisinopril (PRINIVIL,ZESTRIL) 10 MG tablet TAKE ONE TABLET BY MOUTH DAILY 90 tablet 0   • melatonin 5 MG sublingual tablet sublingual tablet Place 1 tablet under the tongue At Night As Needed (insomnia). 30 tablet 0   • omeprazole (priLOSEC) 20 MG capsule TAKE ONE CAPSULE BY MOUTH DAILY 90 capsule 3   • oxyCODONE-acetaminophen (Percocet) 5-325 MG per tablet Take 1 tablet by mouth Every 6 (Six) Hours As Needed for Severe Pain . 120 tablet 0   • pregabalin (LYRICA) 100 MG capsule TAKE ONE CAPSULE BY MOUTH EVERY 8 HOURS 270 capsule 0   • probiotic (CULTURELLE) capsule capsule Take 1 capsule by mouth Daily. 60 capsule 0   • promethazine (PHENERGAN) 25 MG tablet TAKE ONE TABLET BY MOUTH EVERY 6 HOURS AS NEEDED FOR NAUSEA AND VOMITING 20 tablet 3   • sertraline (ZOLOFT) 50 MG tablet TAKE ONE TABLET BY MOUTH DAILY 90 tablet 1   • tiZANidine (ZANAFLEX) 4 MG tablet TAKE ONE TABLET BY MOUTH TWICE A DAY AS NEEDED FOR MUSCLE SPASMS 60 tablet 6   • topiramate (TOPAMAX) 50 MG tablet TAKE ONE TABLET BY MOUTH TWICE A  tablet 3     No current facility-administered medications on file prior to encounter.      Allergies   Allergen Reactions   • Augmentin [Amoxicillin-Pot Clavulanate] Swelling     Mouth raw, tongue swelling   • Cortisone Other (See Comments)     Hotness all over body and  hyper   • Oxycontin [Oxycodone] Other (See Comments)     Psoriasis  Tolerates Percocet   • Coreg [Carvedilol] Rash   • Doxycycline Rash   • Metoprolol Tartrate Other (See Comments)     Hair loss    • Tolmetin Rash     Tolectin-rash and itching   • Vancomycin Rash     Despite rash, pt continued to stay on it 2017       Social History     Socioeconomic History   • Marital status:      Spouse name: Wali   • Number of children: 1   • Years of education: Not on file   • Highest education level: Not on file   Occupational History   • Occupation: book keeper     Employer: DISABLED     Comment: back injury   Tobacco Use   • Smoking status: Current Every Day Smoker     Packs/day: 1.00     Years: 48.00     Pack years: 48.00     Types: Cigarettes   • Smokeless tobacco: Never Used   Substance and Sexual Activity   • Alcohol use: No   • Drug use: No   • Sexual activity: Defer   Social History Narrative    Mrs. Langston is a 62 year old white  female. She lives with her spouse in [facility]. She states she does her own ADLs but appears disabled. They have one child and two grandchildren. She has a living will.    Caffeine: 2 serving per day. Pt lives at home with .     Family History   Problem Relation Age of Onset   • Arthritis Mother    • Diabetes Mother    • Colon polyps Mother    • Diverticulitis Mother    • Heart failure Mother    • Arthritis Father    • Bleeding Disorder Father    • Diabetes Father    • Kidney disease Father    • Heart disease Father    • COPD Sister         currently smokes   • Arthritis Brother    • Diabetes Brother    • Alcohol abuse Brother    • Heart murmur Daughter    • Arthritis Other    • Diabetes Other             Objective:     Vital Sign Min/Max for last 24 hours  No data recorded   BP  Min: 139/79  Max: 139/79   Pulse  Min: 69  Max: 69   Resp  Min: 18  Max: 18   SpO2  Min: 88 %  Max: 88 %   No data recorded    No intake or output data in the 24 hours ending 11/17/20 1014         Vitals:    11/17/20 0954   BP: 139/79   Pulse: 69   Resp: 18   SpO2: (!) 88%       CONSTITUTIONAL: Well-nourished. Obese. In no acute distress.   SKIN: Warm and dry. No rashes noted  HEENT: Head is normocephalic and atraumatic. Mucous membranes are pink and moist.   NECK: Supple without masses or thyromegaly.   LUNGS: Normal effort. Scattered rhonchi and wheezing with prolonged expiration.   CARDIOVASCULAR: The heart has a regular rate and rhythm with a normal S1 and S2. There is a harsh СЕРГЕЙ at RUSB radiating to carotids, no gallop, rub, or click appreciated.   PERIPHERAL VASCULAR: Carotid upstroke is 2+ bilaterally and without bruits. Radial pulses are 2+ bilaterally. Posterior tibial pulses are 2+ and symmetrical. There is no lower extremity edema.   ABDOMEN: Normal bowel sounds.  Soft with no tenderness with palpitation. No hepatosplenomegaly  MUSCULOSKELETAL:  No digital cyanosis  NEUROLOGICAL: Nonfocal.  PSYCHIATRIC: Alert, orientated x 3, appropriate affect     Labs:  Lab Results   Component Value Date    CKTOTAL 608 (H) 02/14/2018    TROPONINI 10.994 (C) 02/15/2018       No results found for: GLUCOSE, BUN, CREATININE, NA, K, CL, CO2, CALCIUM, PROTEINTOT, ALBUMIN, ALT, AST, ALKPHOS, BILITOT, EGFRIFNONA, LABIL2, BCR, ANIONGAP    Lab Results   Component Value Date    CHOL 112 02/14/2018    CHOL 123 02/25/2017    CHOL 244 (H) 11/27/2016     Lab Results   Component Value Date    TRIG 260 (H) 04/29/2020    TRIG 220 (H) 12/05/2019    TRIG 245 (H) 10/10/2018     Lab Results   Component Value Date    HDL 31 (L) 04/29/2020    HDL 31 (L) 12/05/2019    HDL 34 (L) 10/10/2018     Lab Results   Component Value Date    LDL 49 04/29/2020     No components found for: LDLDIRECTC      No results found for: WBC, RBC, HGB, HCT, MCV, MCH, MCHC, RDW, RDWSD, MPV, PLT, NEUTRORELPCT, LYMPHORELPCT, MONORELPCT, EOSRELPCT, BASORELPCT, AUTOIGPER, NEUTROABS, LYMPHSABS, MONOSABS, EOSABS, BASOSABS, AUTOIGNUM, NRBC      Diagnostic Data:     EKG: n/a    Tele:           Assessment and Plan:   ASSESSMENT:  1. Severe AS s/p TAVR with elevated velocities  2. CAD  3. HTN  4. HLD  5. T2DM    PLAN:  Will proceed with EMMANUEL today. Risks, benefits were discussed with the patient and she is agreeable to proceed.       Electronically signed by Soheila Leach PA-C, 11/17/20, 9:36 AM EST.

## 2020-11-18 RX ORDER — LEVOTHYROXINE SODIUM 112 UG/1
TABLET ORAL
Qty: 90 TABLET | Refills: 1 | Status: SHIPPED | OUTPATIENT
Start: 2020-11-18 | End: 2021-05-24

## 2020-11-18 RX ORDER — PREGABALIN 100 MG/1
CAPSULE ORAL
Qty: 270 CAPSULE | Refills: 0 | Status: SHIPPED | OUTPATIENT
Start: 2020-11-18 | End: 2021-03-01

## 2020-11-23 DIAGNOSIS — F41.8 MIXED ANXIETY DEPRESSIVE DISORDER: Chronic | ICD-10-CM

## 2020-11-24 RX ORDER — CLONAZEPAM 0.5 MG/1
TABLET ORAL
Qty: 30 TABLET | Refills: 2 | Status: SHIPPED | OUTPATIENT
Start: 2020-11-24 | End: 2021-03-03

## 2020-12-02 RX ORDER — LISINOPRIL 10 MG/1
TABLET ORAL
Qty: 90 TABLET | Refills: 3 | Status: SHIPPED | OUTPATIENT
Start: 2020-12-02 | End: 2021-03-29

## 2020-12-04 DIAGNOSIS — F11.90 CHRONIC, CONTINUOUS USE OF OPIOIDS: Chronic | ICD-10-CM

## 2020-12-04 DIAGNOSIS — G89.4 CHRONIC PAIN SYNDROME: ICD-10-CM

## 2020-12-04 RX ORDER — OXYCODONE HYDROCHLORIDE AND ACETAMINOPHEN 5; 325 MG/1; MG/1
1 TABLET ORAL EVERY 6 HOURS PRN
Qty: 120 TABLET | Refills: 0 | Status: SHIPPED | OUTPATIENT
Start: 2020-12-04 | End: 2020-12-30 | Stop reason: SDUPTHER

## 2020-12-04 RX ORDER — FENTANYL 75 UG/H
1 PATCH TRANSDERMAL
Qty: 15 PATCH | Refills: 0 | Status: SHIPPED | OUTPATIENT
Start: 2020-12-04 | End: 2020-12-30 | Stop reason: SDUPTHER

## 2020-12-04 NOTE — TELEPHONE ENCOUNTER
Caller: Tamara Langston    Relationship: Self    Best call back number: [phone]    Medication needed:   Requested Prescriptions     Pending Prescriptions Disp Refills   • oxyCODONE-acetaminophen (Percocet) 5-325 MG per tablet 120 tablet 0     Sig: Take 1 tablet by mouth Every 6 (Six) Hours As Needed for Severe Pain .   • fentaNYL (DURAGESIC) 75 MCG/HR patch 15 patch 0     Sig: Place 1 patch on the skin as directed by provider Every Other Day.       When do you need the refill by: ASAP    What details did the patient provide when requesting the medication: PATIENT HAS ONE MORE DAY    Does the patient have less than a 3 day supply:  [x] Yes  [] No    What is the patient's preferred pharmacy: [facility] [address] [facility] [phone] [facility] [phone] FX

## 2020-12-24 RX ORDER — FUROSEMIDE 40 MG/1
TABLET ORAL
Qty: 180 TABLET | Refills: 3 | Status: SHIPPED | OUTPATIENT
Start: 2020-12-24 | End: 2022-01-01

## 2020-12-30 DIAGNOSIS — F11.90 CHRONIC, CONTINUOUS USE OF OPIOIDS: Chronic | ICD-10-CM

## 2020-12-30 DIAGNOSIS — G89.4 CHRONIC PAIN SYNDROME: ICD-10-CM

## 2020-12-30 RX ORDER — OXYCODONE HYDROCHLORIDE AND ACETAMINOPHEN 5; 325 MG/1; MG/1
1 TABLET ORAL EVERY 6 HOURS PRN
Qty: 120 TABLET | Refills: 0 | Status: SHIPPED | OUTPATIENT
Start: 2020-12-30 | End: 2020-12-31 | Stop reason: SDUPTHER

## 2020-12-30 RX ORDER — FENTANYL 75 UG/H
1 PATCH TRANSDERMAL
Qty: 15 PATCH | Refills: 0 | Status: SHIPPED | OUTPATIENT
Start: 2020-12-30 | End: 2021-01-29 | Stop reason: SDUPTHER

## 2020-12-31 DIAGNOSIS — G89.4 CHRONIC PAIN SYNDROME: ICD-10-CM

## 2020-12-31 RX ORDER — OXYCODONE HYDROCHLORIDE AND ACETAMINOPHEN 5; 325 MG/1; MG/1
1 TABLET ORAL EVERY 6 HOURS PRN
Qty: 120 TABLET | Refills: 0 | Status: SHIPPED | OUTPATIENT
Start: 2020-12-31 | End: 2021-01-29 | Stop reason: SDUPTHER

## 2021-01-01 ENCOUNTER — TELEPHONE (OUTPATIENT)
Dept: PULMONOLOGY | Facility: CLINIC | Age: 66
End: 2021-01-01

## 2021-01-01 ENCOUNTER — APPOINTMENT (OUTPATIENT)
Dept: OTHER | Facility: HOSPITAL | Age: 66
End: 2021-01-01

## 2021-01-01 ENCOUNTER — HOSPITAL ENCOUNTER (OUTPATIENT)
Dept: ULTRASOUND IMAGING | Facility: HOSPITAL | Age: 66
Discharge: HOME | End: 2021-11-30

## 2021-01-01 ENCOUNTER — TELEPHONE (OUTPATIENT)
Dept: INTERNAL MEDICINE | Facility: CLINIC | Age: 66
End: 2021-01-01

## 2021-01-01 ENCOUNTER — OFFICE VISIT (OUTPATIENT)
Dept: CARDIOLOGY | Facility: CLINIC | Age: 66
End: 2021-01-01

## 2021-01-01 ENCOUNTER — HOSPITAL ENCOUNTER (OUTPATIENT)
Dept: MRI IMAGING | Facility: HOSPITAL | Age: 66
Discharge: HOME | End: 2021-11-24

## 2021-01-01 ENCOUNTER — HOSPITAL ENCOUNTER (OUTPATIENT)
Dept: MAMMOGRAPHY | Facility: HOSPITAL | Age: 66
Discharge: HOME | End: 2021-11-30

## 2021-01-01 ENCOUNTER — OFFICE VISIT (OUTPATIENT)
Dept: INTERNAL MEDICINE | Facility: CLINIC | Age: 66
End: 2021-01-01

## 2021-01-01 ENCOUNTER — HOSPITAL ENCOUNTER (OUTPATIENT)
Dept: CT IMAGING | Facility: HOSPITAL | Age: 66
Discharge: HOME | End: 2021-12-06
Admitting: INTERNAL MEDICINE

## 2021-01-01 ENCOUNTER — TELEPHONE (OUTPATIENT)
Dept: CARDIOLOGY | Facility: CLINIC | Age: 66
End: 2021-01-01

## 2021-01-01 VITALS
HEART RATE: 63 BPM | DIASTOLIC BLOOD PRESSURE: 60 MMHG | SYSTOLIC BLOOD PRESSURE: 100 MMHG | HEIGHT: 66 IN | TEMPERATURE: 96.9 F | OXYGEN SATURATION: 98 % | BODY MASS INDEX: 30.2 KG/M2

## 2021-01-01 VITALS
SYSTOLIC BLOOD PRESSURE: 116 MMHG | HEART RATE: 60 BPM | BODY MASS INDEX: 30.05 KG/M2 | HEIGHT: 66 IN | OXYGEN SATURATION: 96 % | WEIGHT: 187 LBS | DIASTOLIC BLOOD PRESSURE: 58 MMHG

## 2021-01-01 DIAGNOSIS — J42 CHRONIC BRONCHITIS, UNSPECIFIED CHRONIC BRONCHITIS TYPE (HCC): Primary | ICD-10-CM

## 2021-01-01 DIAGNOSIS — F11.90 CHRONIC, CONTINUOUS USE OF OPIOIDS: Chronic | ICD-10-CM

## 2021-01-01 DIAGNOSIS — Z86.018 HISTORY OF MENINGIOMA: Primary | ICD-10-CM

## 2021-01-01 DIAGNOSIS — N64.4 BREAST PAIN IN FEMALE: Primary | ICD-10-CM

## 2021-01-01 DIAGNOSIS — Z92.89 H/O MAMMOGRAM: ICD-10-CM

## 2021-01-01 DIAGNOSIS — G89.4 CHRONIC PAIN SYNDROME: ICD-10-CM

## 2021-01-01 DIAGNOSIS — N18.31 STAGE 3A CHRONIC KIDNEY DISEASE (HCC): ICD-10-CM

## 2021-01-01 DIAGNOSIS — I10 ESSENTIAL HYPERTENSION: Primary | Chronic | ICD-10-CM

## 2021-01-01 DIAGNOSIS — I25.10 CORONARY ARTERY DISEASE INVOLVING NATIVE CORONARY ARTERY OF NATIVE HEART WITHOUT ANGINA PECTORIS: ICD-10-CM

## 2021-01-01 DIAGNOSIS — F41.8 MIXED ANXIETY DEPRESSIVE DISORDER: Chronic | ICD-10-CM

## 2021-01-01 DIAGNOSIS — Z23 NEED FOR INFLUENZA VACCINATION: ICD-10-CM

## 2021-01-01 DIAGNOSIS — M62.838 MUSCLE SPASMS OF NECK: ICD-10-CM

## 2021-01-01 DIAGNOSIS — I10 ESSENTIAL HYPERTENSION: ICD-10-CM

## 2021-01-01 DIAGNOSIS — E78.2 MIXED HYPERLIPIDEMIA: ICD-10-CM

## 2021-01-01 DIAGNOSIS — Z12.31 ENCOUNTER FOR SCREENING MAMMOGRAM FOR MALIGNANT NEOPLASM OF BREAST: ICD-10-CM

## 2021-01-01 DIAGNOSIS — N64.4 BREAST PAIN IN FEMALE: ICD-10-CM

## 2021-01-01 DIAGNOSIS — I50.22 CHRONIC SYSTOLIC HEART FAILURE (HCC): ICD-10-CM

## 2021-01-01 DIAGNOSIS — I48.92 ATRIAL FLUTTER WITH RAPID VENTRICULAR RESPONSE (HCC): ICD-10-CM

## 2021-01-01 DIAGNOSIS — I73.9 PERIPHERAL VASCULAR DISEASE (HCC): ICD-10-CM

## 2021-01-01 DIAGNOSIS — E03.8 OTHER SPECIFIED HYPOTHYROIDISM: Chronic | ICD-10-CM

## 2021-01-01 DIAGNOSIS — E11.10 TYPE 2 DIABETES MELLITUS WITH KETOACIDOSIS WITHOUT COMA, WITHOUT LONG-TERM CURRENT USE OF INSULIN (HCC): ICD-10-CM

## 2021-01-01 DIAGNOSIS — J44.9 CHRONIC OBSTRUCTIVE PULMONARY DISEASE, UNSPECIFIED COPD TYPE (HCC): ICD-10-CM

## 2021-01-01 DIAGNOSIS — K21.9 GASTROESOPHAGEAL REFLUX DISEASE WITHOUT ESOPHAGITIS: Chronic | ICD-10-CM

## 2021-01-01 DIAGNOSIS — J32.8 OTHER CHRONIC SINUSITIS: Primary | ICD-10-CM

## 2021-01-01 DIAGNOSIS — I35.0 NONRHEUMATIC AORTIC VALVE STENOSIS: Primary | ICD-10-CM

## 2021-01-01 LAB
ALBUMIN SERPL-MCNC: 4.1 G/DL (ref 3.5–5.2)
ALBUMIN/GLOB SERPL: 1.4 G/DL
ALP SERPL-CCNC: 69 U/L (ref 39–117)
ALT SERPL W P-5'-P-CCNC: 12 U/L (ref 1–33)
ANION GAP SERPL CALCULATED.3IONS-SCNC: 9.2 MMOL/L (ref 5–15)
AST SERPL-CCNC: 19 U/L (ref 1–32)
BILIRUB SERPL-MCNC: <0.2 MG/DL (ref 0–1.2)
BUN SERPL-MCNC: 25 MG/DL (ref 8–23)
BUN/CREAT SERPL: 23.4 (ref 7–25)
CALCIUM SPEC-SCNC: 9.5 MG/DL (ref 8.6–10.5)
CHLORIDE SERPL-SCNC: 104 MMOL/L (ref 98–107)
CO2 SERPL-SCNC: 27.8 MMOL/L (ref 22–29)
CREAT BLDA-MCNC: 1.2 MG/DL (ref 0.6–1.3)
CREAT SERPL-MCNC: 1.07 MG/DL (ref 0.57–1)
DEPRECATED RDW RBC AUTO: 45.5 FL (ref 37–54)
ERYTHROCYTE [DISTWIDTH] IN BLOOD BY AUTOMATED COUNT: 13 % (ref 12.3–15.4)
GFR SERPL CREATININE-BSD FRML MDRD: 51 ML/MIN/1.73
GLOBULIN UR ELPH-MCNC: 3 GM/DL
GLUCOSE SERPL-MCNC: 147 MG/DL (ref 65–99)
HBA1C MFR BLD: 5.6 % (ref 4.8–5.6)
HCT VFR BLD AUTO: 39.4 % (ref 34–46.6)
HGB BLD-MCNC: 13.2 G/DL (ref 12–15.9)
MCH RBC QN AUTO: 32.1 PG (ref 26.6–33)
MCHC RBC AUTO-ENTMCNC: 33.5 G/DL (ref 31.5–35.7)
MCV RBC AUTO: 95.9 FL (ref 79–97)
PLATELET # BLD AUTO: 118 10*3/MM3 (ref 140–450)
PMV BLD AUTO: 12 FL (ref 6–12)
POTASSIUM SERPL-SCNC: 3.8 MMOL/L (ref 3.5–5.2)
PROT SERPL-MCNC: 7.1 G/DL (ref 6–8.5)
RBC # BLD AUTO: 4.11 10*6/MM3 (ref 3.77–5.28)
SODIUM SERPL-SCNC: 141 MMOL/L (ref 136–145)
TSH SERPL DL<=0.05 MIU/L-ACNC: 0.65 UIU/ML (ref 0.27–4.2)
VIT B12 BLD-MCNC: 783 PG/ML (ref 211–946)
WBC # BLD AUTO: 8.28 10*3/MM3 (ref 3.4–10.8)

## 2021-01-01 PROCEDURE — 77066 DX MAMMO INCL CAD BI: CPT | Performed by: RADIOLOGY

## 2021-01-01 PROCEDURE — A9577 INJ MULTIHANCE: HCPCS | Performed by: INTERNAL MEDICINE

## 2021-01-01 PROCEDURE — G0279 TOMOSYNTHESIS, MAMMO: HCPCS

## 2021-01-01 PROCEDURE — 82565 ASSAY OF CREATININE: CPT

## 2021-01-01 PROCEDURE — 0 GADOBENATE DIMEGLUMINE 529 MG/ML SOLUTION: Performed by: INTERNAL MEDICINE

## 2021-01-01 PROCEDURE — 76642 ULTRASOUND BREAST LIMITED: CPT | Performed by: RADIOLOGY

## 2021-01-01 PROCEDURE — 99214 OFFICE O/P EST MOD 30 MIN: CPT | Performed by: INTERNAL MEDICINE

## 2021-01-01 PROCEDURE — 84443 ASSAY THYROID STIM HORMONE: CPT | Performed by: INTERNAL MEDICINE

## 2021-01-01 PROCEDURE — 77066 DX MAMMO INCL CAD BI: CPT

## 2021-01-01 PROCEDURE — G0279 TOMOSYNTHESIS, MAMMO: HCPCS | Performed by: RADIOLOGY

## 2021-01-01 PROCEDURE — 85027 COMPLETE CBC AUTOMATED: CPT | Performed by: INTERNAL MEDICINE

## 2021-01-01 PROCEDURE — 80053 COMPREHEN METABOLIC PANEL: CPT | Performed by: INTERNAL MEDICINE

## 2021-01-01 PROCEDURE — G0008 ADMIN INFLUENZA VIRUS VAC: HCPCS | Performed by: INTERNAL MEDICINE

## 2021-01-01 PROCEDURE — 70486 CT MAXILLOFACIAL W/O DYE: CPT

## 2021-01-01 PROCEDURE — 70553 MRI BRAIN STEM W/O & W/DYE: CPT

## 2021-01-01 PROCEDURE — 83036 HEMOGLOBIN GLYCOSYLATED A1C: CPT | Performed by: INTERNAL MEDICINE

## 2021-01-01 PROCEDURE — 90662 IIV NO PRSV INCREASED AG IM: CPT | Performed by: INTERNAL MEDICINE

## 2021-01-01 PROCEDURE — 76642 ULTRASOUND BREAST LIMITED: CPT

## 2021-01-01 PROCEDURE — 82607 VITAMIN B-12: CPT | Performed by: INTERNAL MEDICINE

## 2021-01-01 RX ORDER — TIZANIDINE 4 MG/1
TABLET ORAL
Qty: 60 TABLET | Refills: 5 | Status: SHIPPED | OUTPATIENT
Start: 2021-01-01 | End: 2022-01-01

## 2021-01-01 RX ORDER — PREGABALIN 100 MG/1
CAPSULE ORAL
Qty: 270 CAPSULE | Refills: 0 | Status: SHIPPED | OUTPATIENT
Start: 2021-01-01 | End: 2022-01-01

## 2021-01-01 RX ORDER — CLINDAMYCIN HYDROCHLORIDE 300 MG/1
300 CAPSULE ORAL 3 TIMES DAILY
Qty: 30 CAPSULE | Refills: 0 | Status: SHIPPED | OUTPATIENT
Start: 2021-01-01 | End: 2021-01-01

## 2021-01-01 RX ORDER — TOPIRAMATE 50 MG/1
TABLET, FILM COATED ORAL
Qty: 270 TABLET | Refills: 1 | Status: SHIPPED | OUTPATIENT
Start: 2021-01-01 | End: 2022-01-01

## 2021-01-01 RX ORDER — FENTANYL 75 UG/H
1 PATCH TRANSDERMAL
Qty: 15 PATCH | Refills: 0 | Status: SHIPPED | OUTPATIENT
Start: 2021-01-01 | End: 2022-01-01 | Stop reason: SDUPTHER

## 2021-01-01 RX ORDER — PROMETHAZINE HYDROCHLORIDE 25 MG/1
TABLET ORAL
Qty: 20 TABLET | Refills: 3 | Status: SHIPPED | OUTPATIENT
Start: 2021-01-01

## 2021-01-01 RX ORDER — CLONAZEPAM 0.5 MG/1
TABLET ORAL
Qty: 30 TABLET | Refills: 2 | Status: SHIPPED | OUTPATIENT
Start: 2021-01-01 | End: 2022-01-01

## 2021-01-01 RX ORDER — CEFDINIR 300 MG/1
300 CAPSULE ORAL 2 TIMES DAILY
Qty: 20 CAPSULE | Refills: 0 | Status: SHIPPED | OUTPATIENT
Start: 2021-01-01 | End: 2022-01-01

## 2021-01-01 RX ORDER — CLOPIDOGREL BISULFATE 75 MG/1
TABLET ORAL
Qty: 90 TABLET | Refills: 3 | Status: SHIPPED | OUTPATIENT
Start: 2021-01-01

## 2021-01-01 RX ORDER — CLONAZEPAM 0.5 MG/1
TABLET ORAL
Qty: 30 TABLET | Refills: 2 | Status: SHIPPED | OUTPATIENT
Start: 2021-01-01 | End: 2021-01-01

## 2021-01-01 RX ORDER — FENTANYL 75 UG/H
1 PATCH TRANSDERMAL
Qty: 15 PATCH | Refills: 0 | Status: SHIPPED | OUTPATIENT
Start: 2021-01-01 | End: 2021-01-01 | Stop reason: SDUPTHER

## 2021-01-01 RX ORDER — OXYCODONE HYDROCHLORIDE AND ACETAMINOPHEN 5; 325 MG/1; MG/1
1 TABLET ORAL EVERY 8 HOURS PRN
Qty: 90 TABLET | Refills: 0 | Status: SHIPPED | OUTPATIENT
Start: 2021-01-01 | End: 2021-01-01 | Stop reason: SDUPTHER

## 2021-01-01 RX ORDER — TIOTROPIUM BROMIDE INHALATION SPRAY 3.12 UG/1
2 SPRAY, METERED RESPIRATORY (INHALATION)
Qty: 8 G | Refills: 0
Start: 2021-01-01

## 2021-01-01 RX ORDER — OXYCODONE HYDROCHLORIDE AND ACETAMINOPHEN 5; 325 MG/1; MG/1
1 TABLET ORAL EVERY 8 HOURS PRN
Qty: 90 TABLET | Refills: 0 | Status: SHIPPED | OUTPATIENT
Start: 2021-01-01 | End: 2022-01-01 | Stop reason: SDUPTHER

## 2021-01-01 RX ORDER — DILTIAZEM HYDROCHLORIDE 240 MG/1
CAPSULE, COATED, EXTENDED RELEASE ORAL
Qty: 90 CAPSULE | Refills: 3 | Status: SHIPPED | OUTPATIENT
Start: 2021-01-01

## 2021-01-01 RX ORDER — PREGABALIN 100 MG/1
CAPSULE ORAL
Qty: 270 CAPSULE | Refills: 0 | Status: SHIPPED | OUTPATIENT
Start: 2021-01-01 | End: 2021-01-01

## 2021-01-01 RX ADMIN — GADOBENATE DIMEGLUMINE 15 ML: 529 INJECTION, SOLUTION INTRAVENOUS at 11:48

## 2021-01-05 DIAGNOSIS — F41.8 MIXED ANXIETY DEPRESSIVE DISORDER: ICD-10-CM

## 2021-01-14 RX ORDER — OMEPRAZOLE 20 MG/1
CAPSULE, DELAYED RELEASE ORAL
Qty: 90 CAPSULE | Refills: 3 | Status: SHIPPED | OUTPATIENT
Start: 2021-01-14 | End: 2022-01-01

## 2021-01-18 ENCOUNTER — TELEPHONE (OUTPATIENT)
Dept: INTERNAL MEDICINE | Facility: CLINIC | Age: 66
End: 2021-01-18

## 2021-01-18 NOTE — TELEPHONE ENCOUNTER
April with dr brandon john's office called and is needing a release to put to sleep order for the patient. She stated that patient said dr saeed said she is ready for her procedure. Please give April a call back.

## 2021-01-22 ENCOUNTER — TELEPHONE (OUTPATIENT)
Dept: CARDIOLOGY | Facility: HOSPITAL | Age: 66
End: 2021-01-22

## 2021-01-22 NOTE — TELEPHONE ENCOUNTER
TAVR JEOVANY    One year post TAVR follow up.  EMMANUEL was done by Dr. Zamorano 11/17/20.  Collected KCCQ12 by phone.  NYHA class II.  Score 51/70.  Continue to follow up with Dr. Zamorano.  Next visit scheduled for March.        No further follow up required in the TAVR clinic.        Soheila THAYER

## 2021-01-23 DIAGNOSIS — E78.49 OTHER HYPERLIPIDEMIA: ICD-10-CM

## 2021-01-23 DIAGNOSIS — E78.5 DYSLIPIDEMIA: ICD-10-CM

## 2021-01-25 RX ORDER — EZETIMIBE 10 MG/1
TABLET ORAL
Qty: 90 TABLET | Refills: 3 | Status: SHIPPED | OUTPATIENT
Start: 2021-01-25 | End: 2022-01-01

## 2021-01-29 DIAGNOSIS — G89.4 CHRONIC PAIN SYNDROME: ICD-10-CM

## 2021-01-29 DIAGNOSIS — F11.90 CHRONIC, CONTINUOUS USE OF OPIOIDS: Chronic | ICD-10-CM

## 2021-01-29 RX ORDER — OXYCODONE HYDROCHLORIDE AND ACETAMINOPHEN 5; 325 MG/1; MG/1
1 TABLET ORAL EVERY 6 HOURS PRN
Qty: 120 TABLET | Refills: 0 | Status: SHIPPED | OUTPATIENT
Start: 2021-01-29 | End: 2021-02-15

## 2021-01-29 RX ORDER — FENTANYL 75 UG/H
1 PATCH TRANSDERMAL
Qty: 15 PATCH | Refills: 0 | Status: SHIPPED | OUTPATIENT
Start: 2021-01-29 | End: 2021-03-04 | Stop reason: SDUPTHER

## 2021-01-29 NOTE — TELEPHONE ENCOUNTER
Caller: Tamara Langston    Relationship: Self    Best call back number: [phone]    Medication needed:   Requested Prescriptions     Pending Prescriptions Disp Refills   • oxyCODONE-acetaminophen (Percocet) 5-325 MG per tablet 120 tablet 0     Sig: Take 1 tablet by mouth Every 6 (Six) Hours As Needed for Severe Pain .   • fentaNYL (DURAGESIC) 75 MCG/HR patch 15 patch 0     Sig: Place 1 patch on the skin as directed by provider Every Other Day.       When do you need the refill by: TODAY    What details did the patient provide when requesting the medication: PATIENT WILL RUN OUT OF MEDICATION OVER THE WEEKEND.    Does the patient have less than a 3 day supply:  [x] Yes  [] No    What is the patient's preferred pharmacy:      [facility] [address] [facility] [phone] [facility] [phone] FX

## 2021-02-07 ENCOUNTER — APPOINTMENT (OUTPATIENT)
Dept: GENERAL RADIOLOGY | Facility: HOSPITAL | Age: 66
End: 2021-02-07

## 2021-02-07 ENCOUNTER — HOSPITAL ENCOUNTER (INPATIENT)
Facility: HOSPITAL | Age: 66
LOS: 5 days | Discharge: HOME HEALTH | End: 2021-02-12
Attending: EMERGENCY MEDICINE | Admitting: INTERNAL MEDICINE

## 2021-02-07 ENCOUNTER — APPOINTMENT (OUTPATIENT)
Dept: CARDIOLOGY | Facility: HOSPITAL | Age: 66
End: 2021-02-07

## 2021-02-07 ENCOUNTER — APPOINTMENT (OUTPATIENT)
Dept: CT IMAGING | Facility: HOSPITAL | Age: 66
End: 2021-02-07

## 2021-02-07 DIAGNOSIS — J43.9 PULMONARY EMPHYSEMA, UNSPECIFIED EMPHYSEMA TYPE (HCC): Chronic | ICD-10-CM

## 2021-02-07 DIAGNOSIS — J96.02 SEPSIS WITH ACUTE HYPERCAPNIC RESPIRATORY FAILURE AND SEPTIC SHOCK, DUE TO UNSPECIFIED ORGANISM (HCC): ICD-10-CM

## 2021-02-07 DIAGNOSIS — A41.9 SEPSIS WITH ACUTE HYPERCAPNIC RESPIRATORY FAILURE AND SEPTIC SHOCK, DUE TO UNSPECIFIED ORGANISM (HCC): ICD-10-CM

## 2021-02-07 DIAGNOSIS — J96.22 ACUTE ON CHRONIC RESPIRATORY FAILURE WITH HYPOXIA AND HYPERCAPNIA (HCC): ICD-10-CM

## 2021-02-07 DIAGNOSIS — J96.02 ACUTE RESPIRATORY FAILURE WITH HYPOXIA AND HYPERCAPNIA (HCC): ICD-10-CM

## 2021-02-07 DIAGNOSIS — I35.0 NONRHEUMATIC AORTIC VALVE STENOSIS: ICD-10-CM

## 2021-02-07 DIAGNOSIS — R40.2431 GLASGOW COMA SCALE TOTAL SCORE 3-8, IN THE FIELD (EMT OR AMBULANCE) (HCC): Primary | ICD-10-CM

## 2021-02-07 DIAGNOSIS — J96.01 ACUTE RESPIRATORY FAILURE WITH HYPOXIA AND HYPERCAPNIA (HCC): ICD-10-CM

## 2021-02-07 DIAGNOSIS — R65.21 SEPSIS WITH ACUTE HYPERCAPNIC RESPIRATORY FAILURE AND SEPTIC SHOCK, DUE TO UNSPECIFIED ORGANISM (HCC): ICD-10-CM

## 2021-02-07 DIAGNOSIS — J96.21 ACUTE ON CHRONIC RESPIRATORY FAILURE WITH HYPOXIA AND HYPERCAPNIA (HCC): ICD-10-CM

## 2021-02-07 DIAGNOSIS — I50.30 HEART FAILURE WITH PRESERVED EJECTION FRACTION, UNSPECIFIED HF CHRONICITY (HCC): Chronic | ICD-10-CM

## 2021-02-07 DIAGNOSIS — E87.20 LACTIC ACIDOSIS: ICD-10-CM

## 2021-02-07 PROBLEM — R40.2430 GLASGOW COMA SCALE TOTAL SCORE 3-8: Status: ACTIVE | Noted: 2021-02-07

## 2021-02-07 PROBLEM — I27.21 PAH (PULMONARY ARTERY HYPERTENSION) (HCC): Status: ACTIVE | Noted: 2021-02-07

## 2021-02-07 PROBLEM — Z99.89 OSA ON CPAP: Chronic | Status: ACTIVE | Noted: 2021-02-07

## 2021-02-07 PROBLEM — I38 VHD (VALVULAR HEART DISEASE): Status: ACTIVE | Noted: 2021-02-07

## 2021-02-07 PROBLEM — G47.33 OSA ON CPAP: Chronic | Status: ACTIVE | Noted: 2021-02-07

## 2021-02-07 PROBLEM — I82.441 ACUTE DEEP VEIN THROMBOSIS (DVT) OF TIBIAL VEIN OF RIGHT LOWER EXTREMITY (HCC): Status: ACTIVE | Noted: 2021-02-07

## 2021-02-07 PROBLEM — I38 VHD (VALVULAR HEART DISEASE): Chronic | Status: ACTIVE | Noted: 2021-02-07

## 2021-02-07 PROBLEM — I27.21 PAH (PULMONARY ARTERY HYPERTENSION) (HCC): Chronic | Status: ACTIVE | Noted: 2021-02-07

## 2021-02-07 PROBLEM — G47.33 OSA ON CPAP: Status: ACTIVE | Noted: 2021-02-07

## 2021-02-07 PROBLEM — N17.9 AKI (ACUTE KIDNEY INJURY) (HCC): Status: ACTIVE | Noted: 2021-02-07

## 2021-02-07 PROBLEM — Z99.89 OSA ON CPAP: Status: ACTIVE | Noted: 2021-02-07

## 2021-02-07 LAB
ALBUMIN SERPL-MCNC: 3.6 G/DL (ref 3.5–5.2)
ALBUMIN/GLOB SERPL: 1 G/DL
ALP SERPL-CCNC: 88 U/L (ref 39–117)
ALT SERPL W P-5'-P-CCNC: 17 U/L (ref 1–33)
AMPHET+METHAMPHET UR QL: NEGATIVE
AMPHETAMINES UR QL: NEGATIVE
ANION GAP SERPL CALCULATED.3IONS-SCNC: 16 MMOL/L (ref 5–15)
APAP SERPL-MCNC: <5 MCG/ML (ref 0–30)
APTT PPP: 27 SECONDS (ref 50–95)
ARTERIAL PATENCY WRIST A: ABNORMAL
ARTERIAL PATENCY WRIST A: ABNORMAL
AST SERPL-CCNC: 28 U/L (ref 1–32)
ATMOSPHERIC PRESS: ABNORMAL MM[HG]
ATMOSPHERIC PRESS: ABNORMAL MM[HG]
BACTERIA UR QL AUTO: NORMAL /HPF
BARBITURATES UR QL SCN: NEGATIVE
BASE EXCESS BLDA CALC-SCNC: -4.7 MMOL/L (ref 0–2)
BASE EXCESS BLDA CALC-SCNC: -5.5 MMOL/L (ref 0–2)
BASOPHILS # BLD MANUAL: 0 10*3/MM3 (ref 0–0.2)
BASOPHILS NFR BLD AUTO: 0 % (ref 0–1.5)
BDY SITE: ABNORMAL
BDY SITE: ABNORMAL
BENZODIAZ UR QL SCN: NEGATIVE
BILIRUB SERPL-MCNC: <0.2 MG/DL (ref 0–1.2)
BILIRUB UR QL STRIP: NEGATIVE
BODY TEMPERATURE: 37 C
BODY TEMPERATURE: 37 C
BUN SERPL-MCNC: 25 MG/DL (ref 8–23)
BUN/CREAT SERPL: 12.1 (ref 7–25)
BUPRENORPHINE SERPL-MCNC: NEGATIVE NG/ML
CALCIUM SPEC-SCNC: 9.5 MG/DL (ref 8.6–10.5)
CANNABINOIDS SERPL QL: NEGATIVE
CHLORIDE SERPL-SCNC: 105 MMOL/L (ref 98–107)
CK SERPL-CCNC: 53 U/L (ref 20–180)
CLARITY UR: ABNORMAL
CO2 BLDA-SCNC: 24.8 MMOL/L (ref 22–33)
CO2 BLDA-SCNC: 25.6 MMOL/L (ref 22–33)
CO2 SERPL-SCNC: 25 MMOL/L (ref 22–29)
COCAINE UR QL: NEGATIVE
COHGB MFR BLD: 2.1 % (ref 0–2)
COHGB MFR BLD: 6.9 % (ref 0–2)
COLOR UR: YELLOW
CORTIS SERPL-MCNC: 42.86 MCG/DL
CREAT SERPL-MCNC: 2.07 MG/DL (ref 0.57–1)
CREAT UR-MCNC: 69.8 MG/DL
D-LACTATE SERPL-SCNC: 4.1 MMOL/L (ref 0.5–2)
D-LACTATE SERPL-SCNC: 7 MMOL/L (ref 0.5–2)
DEPRECATED RDW RBC AUTO: 65.8 FL (ref 37–54)
EOSINOPHIL # BLD MANUAL: 0 10*3/MM3 (ref 0–0.4)
EOSINOPHIL NFR BLD MANUAL: 0 % (ref 0.3–6.2)
EPAP: 0
EPAP: 0
ERYTHROCYTE [DISTWIDTH] IN BLOOD BY AUTOMATED COUNT: 16.6 % (ref 12.3–15.4)
ETHANOL BLD-MCNC: <10 MG/DL (ref 0–10)
FLUAV RNA RESP QL NAA+PROBE: NOT DETECTED
FLUBV RNA RESP QL NAA+PROBE: NOT DETECTED
GFR SERPL CREATININE-BSD FRML MDRD: 24 ML/MIN/1.73
GLOBULIN UR ELPH-MCNC: 3.6 GM/DL
GLUCOSE BLDC GLUCOMTR-MCNC: 125 MG/DL (ref 70–130)
GLUCOSE BLDC GLUCOMTR-MCNC: 154 MG/DL (ref 70–130)
GLUCOSE BLDC GLUCOMTR-MCNC: 167 MG/DL (ref 70–130)
GLUCOSE SERPL-MCNC: 159 MG/DL (ref 65–99)
GLUCOSE UR STRIP-MCNC: NEGATIVE MG/DL
HCO3 BLDA-SCNC: 23.2 MMOL/L (ref 20–26)
HCO3 BLDA-SCNC: 23.7 MMOL/L (ref 20–26)
HCT VFR BLD AUTO: 49.7 % (ref 34–46.6)
HCT VFR BLD CALC: 44.1 %
HCT VFR BLD CALC: 44.2 %
HGB BLD-MCNC: 14.5 G/DL (ref 12–15.9)
HGB BLDA-MCNC: 14.4 G/DL (ref 14–18)
HGB BLDA-MCNC: 14.4 G/DL (ref 14–18)
HGB UR QL STRIP.AUTO: NEGATIVE
HOLD SPECIMEN: NORMAL
HYALINE CASTS UR QL AUTO: NORMAL /LPF
INHALED O2 CONCENTRATION: 100 %
INHALED O2 CONCENTRATION: 70 %
INR PPP: 1.07 (ref 0.85–1.16)
IPAP: 0
IPAP: 0
KETONES UR QL STRIP: NEGATIVE
LARGE PLATELETS: ABNORMAL
LEUKOCYTE ESTERASE UR QL STRIP.AUTO: NEGATIVE
LYMPHOCYTES # BLD MANUAL: 0.69 10*3/MM3 (ref 0.7–3.1)
LYMPHOCYTES NFR BLD MANUAL: 2 % (ref 5–12)
LYMPHOCYTES NFR BLD MANUAL: 5 % (ref 19.6–45.3)
Lab: ABNORMAL
MACROCYTES BLD QL SMEAR: ABNORMAL
MAGNESIUM SERPL-MCNC: 2 MG/DL (ref 1.6–2.4)
MCH RBC QN AUTO: 30.8 PG (ref 26.6–33)
MCHC RBC AUTO-ENTMCNC: 29.2 G/DL (ref 31.5–35.7)
MCV RBC AUTO: 105.5 FL (ref 79–97)
METHADONE UR QL SCN: NEGATIVE
METHGB BLD QL: 0.4 % (ref 0–1.5)
METHGB BLD QL: 0.6 % (ref 0–1.5)
MODALITY: ABNORMAL
MODALITY: ABNORMAL
MONOCYTES # BLD AUTO: 0.28 10*3/MM3 (ref 0.1–0.9)
NEUTROPHILS # BLD AUTO: 12.58 10*3/MM3 (ref 1.7–7)
NEUTROPHILS NFR BLD MANUAL: 86 % (ref 42.7–76)
NEUTS BAND NFR BLD MANUAL: 5 % (ref 0–5)
NITRITE UR QL STRIP: NEGATIVE
NOTE: ABNORMAL
NOTE: ABNORMAL
NOTIFIED BY: ABNORMAL
NOTIFIED WHO: ABNORMAL
NT-PROBNP SERPL-MCNC: 1142 PG/ML (ref 0–900)
OPIATES UR QL: NEGATIVE
OSMOLALITY SERPL: 320 MOSM/KG (ref 275–295)
OSMOLALITY UR: 328 MOSM/KG (ref 300–1100)
OXYCODONE UR QL SCN: POSITIVE
OXYHGB MFR BLDV: 85.4 % (ref 94–99)
OXYHGB MFR BLDV: 96.5 % (ref 94–99)
PAW @ PEAK INSP FLOW SETTING VENT: 0 CMH2O
PAW @ PEAK INSP FLOW SETTING VENT: 0 CMH2O
PCO2 BLDA: 52.9 MM HG (ref 35–45)
PCO2 BLDA: 61.2 MM HG (ref 35–45)
PCO2 TEMP ADJ BLD: 52.9 MM HG (ref 35–45)
PCO2 TEMP ADJ BLD: 61.2 MM HG (ref 35–45)
PCP UR QL SCN: NEGATIVE
PH BLDA: 7.2 PH UNITS (ref 7.35–7.45)
PH BLDA: 7.25 PH UNITS (ref 7.35–7.45)
PH UR STRIP.AUTO: 5.5 [PH] (ref 5–8)
PH, TEMP CORRECTED: 7.2 PH UNITS
PH, TEMP CORRECTED: 7.25 PH UNITS
PLATELET # BLD AUTO: 154 10*3/MM3 (ref 140–450)
PMV BLD AUTO: 10.5 FL (ref 6–12)
PO2 BLDA: 150 MM HG (ref 83–108)
PO2 BLDA: 60.5 MM HG (ref 83–108)
PO2 TEMP ADJ BLD: 150 MM HG (ref 83–108)
PO2 TEMP ADJ BLD: 60.5 MM HG (ref 83–108)
POTASSIUM SERPL-SCNC: 4.5 MMOL/L (ref 3.5–5.2)
PROCALCITONIN SERPL-MCNC: 0.19 NG/ML (ref 0–0.25)
PROPOXYPH UR QL: NEGATIVE
PROT SERPL-MCNC: 7.2 G/DL (ref 6–8.5)
PROT UR QL STRIP: ABNORMAL
PROTHROMBIN TIME: 13.6 SECONDS (ref 11.5–14)
RBC # BLD AUTO: 4.71 10*6/MM3 (ref 3.77–5.28)
RBC # UR: NORMAL /HPF
REF LAB TEST METHOD: NORMAL
SALICYLATES SERPL-MCNC: <0.3 MG/DL
SARS-COV-2 RNA RESP QL NAA+PROBE: NOT DETECTED
SODIUM SERPL-SCNC: 146 MMOL/L (ref 136–145)
SODIUM UR-SCNC: 48 MMOL/L
SP GR UR STRIP: 1.01 (ref 1–1.03)
SQUAMOUS #/AREA URNS HPF: NORMAL /HPF
T4 FREE SERPL-MCNC: 0.79 NG/DL (ref 0.93–1.7)
TOTAL RATE: 0 BREATHS/MINUTE
TOTAL RATE: 0 BREATHS/MINUTE
TRICYCLICS UR QL SCN: NEGATIVE
TROPONIN T SERPL-MCNC: 0.07 NG/ML (ref 0–0.03)
TROPONIN T SERPL-MCNC: 0.16 NG/ML (ref 0–0.03)
TSH SERPL DL<=0.05 MIU/L-ACNC: 1.76 UIU/ML (ref 0.27–4.2)
UFH PPP CHRO-ACNC: 0.1 IU/ML (ref 0.3–0.7)
UROBILINOGEN UR QL STRIP: ABNORMAL
UUN 24H UR-MCNC: 385 MG/DL
VARIANT LYMPHS NFR BLD MANUAL: 2 % (ref 0–5)
WBC # BLD AUTO: 13.82 10*3/MM3 (ref 3.4–10.8)
WBC MORPH BLD: NORMAL
WBC UR QL AUTO: NORMAL /HPF
WHOLE BLOOD HOLD SPECIMEN: NORMAL
WHOLE BLOOD HOLD SPECIMEN: NORMAL

## 2021-02-07 PROCEDURE — 82375 ASSAY CARBOXYHB QUANT: CPT

## 2021-02-07 PROCEDURE — 25010000002 MIDAZOLAM PER 1 MG: Performed by: EMERGENCY MEDICINE

## 2021-02-07 PROCEDURE — 25010000002 HEPARIN (PORCINE) PER 1000 UNITS: Performed by: NURSE PRACTITIONER

## 2021-02-07 PROCEDURE — 83050 HGB METHEMOGLOBIN QUAN: CPT

## 2021-02-07 PROCEDURE — 82550 ASSAY OF CK (CPK): CPT | Performed by: NURSE PRACTITIONER

## 2021-02-07 PROCEDURE — 02HV33Z INSERTION OF INFUSION DEVICE INTO SUPERIOR VENA CAVA, PERCUTANEOUS APPROACH: ICD-10-PCS | Performed by: NURSE PRACTITIONER

## 2021-02-07 PROCEDURE — 0BH18EZ INSERTION OF ENDOTRACHEAL AIRWAY INTO TRACHEA, VIA NATURAL OR ARTIFICIAL OPENING ENDOSCOPIC: ICD-10-PCS | Performed by: EMERGENCY MEDICINE

## 2021-02-07 PROCEDURE — 82077 ASSAY SPEC XCP UR&BREATH IA: CPT | Performed by: EMERGENCY MEDICINE

## 2021-02-07 PROCEDURE — 94002 VENT MGMT INPAT INIT DAY: CPT

## 2021-02-07 PROCEDURE — 82962 GLUCOSE BLOOD TEST: CPT

## 2021-02-07 PROCEDURE — 80179 DRUG ASSAY SALICYLATE: CPT | Performed by: EMERGENCY MEDICINE

## 2021-02-07 PROCEDURE — 83735 ASSAY OF MAGNESIUM: CPT | Performed by: EMERGENCY MEDICINE

## 2021-02-07 PROCEDURE — 25010000002 MEROPENEM PER 100 MG: Performed by: EMERGENCY MEDICINE

## 2021-02-07 PROCEDURE — 93970 EXTREMITY STUDY: CPT | Performed by: INTERNAL MEDICINE

## 2021-02-07 PROCEDURE — 80053 COMPREHEN METABOLIC PANEL: CPT | Performed by: EMERGENCY MEDICINE

## 2021-02-07 PROCEDURE — 93005 ELECTROCARDIOGRAM TRACING: CPT | Performed by: EMERGENCY MEDICINE

## 2021-02-07 PROCEDURE — 94799 UNLISTED PULMONARY SVC/PX: CPT

## 2021-02-07 PROCEDURE — 87636 SARSCOV2 & INF A&B AMP PRB: CPT | Performed by: EMERGENCY MEDICINE

## 2021-02-07 PROCEDURE — 84484 ASSAY OF TROPONIN QUANT: CPT | Performed by: NURSE PRACTITIONER

## 2021-02-07 PROCEDURE — 84145 PROCALCITONIN (PCT): CPT | Performed by: NURSE PRACTITIONER

## 2021-02-07 PROCEDURE — 94640 AIRWAY INHALATION TREATMENT: CPT

## 2021-02-07 PROCEDURE — 99291 CRITICAL CARE FIRST HOUR: CPT

## 2021-02-07 PROCEDURE — 84443 ASSAY THYROID STIM HORMONE: CPT | Performed by: NURSE PRACTITIONER

## 2021-02-07 PROCEDURE — 83935 ASSAY OF URINE OSMOLALITY: CPT | Performed by: NURSE PRACTITIONER

## 2021-02-07 PROCEDURE — 85520 HEPARIN ASSAY: CPT | Performed by: NURSE PRACTITIONER

## 2021-02-07 PROCEDURE — 31500 INSERT EMERGENCY AIRWAY: CPT

## 2021-02-07 PROCEDURE — 80306 DRUG TEST PRSMV INSTRMNT: CPT | Performed by: EMERGENCY MEDICINE

## 2021-02-07 PROCEDURE — 84300 ASSAY OF URINE SODIUM: CPT | Performed by: NURSE PRACTITIONER

## 2021-02-07 PROCEDURE — 82805 BLOOD GASES W/O2 SATURATION: CPT

## 2021-02-07 PROCEDURE — 36600 WITHDRAWAL OF ARTERIAL BLOOD: CPT

## 2021-02-07 PROCEDURE — 83605 ASSAY OF LACTIC ACID: CPT | Performed by: EMERGENCY MEDICINE

## 2021-02-07 PROCEDURE — 85025 COMPLETE CBC W/AUTO DIFF WBC: CPT | Performed by: EMERGENCY MEDICINE

## 2021-02-07 PROCEDURE — 84484 ASSAY OF TROPONIN QUANT: CPT | Performed by: EMERGENCY MEDICINE

## 2021-02-07 PROCEDURE — 82533 TOTAL CORTISOL: CPT | Performed by: NURSE PRACTITIONER

## 2021-02-07 PROCEDURE — 84540 ASSAY OF URINE/UREA-N: CPT | Performed by: NURSE PRACTITIONER

## 2021-02-07 PROCEDURE — 82570 ASSAY OF URINE CREATININE: CPT | Performed by: NURSE PRACTITIONER

## 2021-02-07 PROCEDURE — 83880 ASSAY OF NATRIURETIC PEPTIDE: CPT | Performed by: NURSE PRACTITIONER

## 2021-02-07 PROCEDURE — 70450 CT HEAD/BRAIN W/O DYE: CPT

## 2021-02-07 PROCEDURE — 71045 X-RAY EXAM CHEST 1 VIEW: CPT

## 2021-02-07 PROCEDURE — 93970 EXTREMITY STUDY: CPT

## 2021-02-07 PROCEDURE — 25010000002 PROPOFOL 10 MG/ML EMULSION: Performed by: EMERGENCY MEDICINE

## 2021-02-07 PROCEDURE — 84439 ASSAY OF FREE THYROXINE: CPT | Performed by: NURSE PRACTITIONER

## 2021-02-07 PROCEDURE — 81001 URINALYSIS AUTO W/SCOPE: CPT | Performed by: EMERGENCY MEDICINE

## 2021-02-07 PROCEDURE — 94660 CPAP INITIATION&MGMT: CPT

## 2021-02-07 PROCEDURE — 25010000002 LEVOFLOXACIN PER 250 MG: Performed by: INTERNAL MEDICINE

## 2021-02-07 PROCEDURE — 94003 VENT MGMT INPAT SUBQ DAY: CPT

## 2021-02-07 PROCEDURE — 5A1945Z RESPIRATORY VENTILATION, 24-96 CONSECUTIVE HOURS: ICD-10-PCS | Performed by: EMERGENCY MEDICINE

## 2021-02-07 PROCEDURE — 83930 ASSAY OF BLOOD OSMOLALITY: CPT | Performed by: NURSE PRACTITIONER

## 2021-02-07 PROCEDURE — 25010000002 HYDROMORPHONE 1 MG/ML SOLUTION: Performed by: EMERGENCY MEDICINE

## 2021-02-07 PROCEDURE — 76937 US GUIDE VASCULAR ACCESS: CPT | Performed by: NURSE PRACTITIONER

## 2021-02-07 PROCEDURE — 36556 INSERT NON-TUNNEL CV CATH: CPT | Performed by: NURSE PRACTITIONER

## 2021-02-07 PROCEDURE — 85610 PROTHROMBIN TIME: CPT | Performed by: NURSE PRACTITIONER

## 2021-02-07 PROCEDURE — 99291 CRITICAL CARE FIRST HOUR: CPT | Performed by: INTERNAL MEDICINE

## 2021-02-07 PROCEDURE — 87040 BLOOD CULTURE FOR BACTERIA: CPT | Performed by: EMERGENCY MEDICINE

## 2021-02-07 PROCEDURE — 85730 THROMBOPLASTIN TIME PARTIAL: CPT | Performed by: NURSE PRACTITIONER

## 2021-02-07 PROCEDURE — 25010000002 HEPARIN (PORCINE) 25000-0.45 UT/250ML-% SOLUTION: Performed by: NURSE PRACTITIONER

## 2021-02-07 PROCEDURE — 25010000002 METHYLPREDNISOLONE PER 40 MG: Performed by: INTERNAL MEDICINE

## 2021-02-07 PROCEDURE — 85007 BL SMEAR W/DIFF WBC COUNT: CPT | Performed by: EMERGENCY MEDICINE

## 2021-02-07 PROCEDURE — 80143 DRUG ASSAY ACETAMINOPHEN: CPT | Performed by: EMERGENCY MEDICINE

## 2021-02-07 RX ORDER — NOREPINEPHRINE BIT/0.9 % NACL 8 MG/250ML
.02-.3 INFUSION BOTTLE (ML) INTRAVENOUS
Status: DISCONTINUED | OUTPATIENT
Start: 2021-02-07 | End: 2021-02-09

## 2021-02-07 RX ORDER — TOPIRAMATE 25 MG/1
50 TABLET ORAL EVERY 12 HOURS SCHEDULED
Status: DISCONTINUED | OUTPATIENT
Start: 2021-02-07 | End: 2021-02-10

## 2021-02-07 RX ORDER — ASPIRIN 325 MG
162 TABLET ORAL DAILY
Status: DISCONTINUED | OUTPATIENT
Start: 2021-02-07 | End: 2021-02-12 | Stop reason: HOSPADM

## 2021-02-07 RX ORDER — HEPARIN SODIUM 1000 [USP'U]/ML
40 INJECTION, SOLUTION INTRAVENOUS; SUBCUTANEOUS AS NEEDED
Status: DISCONTINUED | OUTPATIENT
Start: 2021-02-07 | End: 2021-02-07

## 2021-02-07 RX ORDER — PREGABALIN 100 MG/1
100 CAPSULE ORAL EVERY 12 HOURS SCHEDULED
Status: DISCONTINUED | OUTPATIENT
Start: 2021-02-07 | End: 2021-02-10 | Stop reason: DRUGHIGH

## 2021-02-07 RX ORDER — LEVOFLOXACIN 5 MG/ML
500 INJECTION, SOLUTION INTRAVENOUS
Status: DISCONTINUED | OUTPATIENT
Start: 2021-02-09 | End: 2021-02-12

## 2021-02-07 RX ORDER — NALOXONE HYDROCHLORIDE 0.4 MG/ML
INJECTION, SOLUTION INTRAMUSCULAR; INTRAVENOUS; SUBCUTANEOUS
Status: DISPENSED
Start: 2021-02-07 | End: 2021-02-07

## 2021-02-07 RX ORDER — HEPARIN SODIUM 1000 [USP'U]/ML
80 INJECTION, SOLUTION INTRAVENOUS; SUBCUTANEOUS AS NEEDED
Status: DISCONTINUED | OUTPATIENT
Start: 2021-02-07 | End: 2021-02-07

## 2021-02-07 RX ORDER — VECURONIUM BROMIDE 1 MG/ML
INJECTION, POWDER, LYOPHILIZED, FOR SOLUTION INTRAVENOUS
Status: COMPLETED | OUTPATIENT
Start: 2021-02-07 | End: 2021-02-07

## 2021-02-07 RX ORDER — HEPARIN SODIUM 10000 [USP'U]/100ML
13.5 INJECTION, SOLUTION INTRAVENOUS
Status: DISCONTINUED | OUTPATIENT
Start: 2021-02-07 | End: 2021-02-09

## 2021-02-07 RX ORDER — ACETAMINOPHEN 325 MG/1
650 TABLET ORAL EVERY 4 HOURS PRN
Status: DISCONTINUED | OUTPATIENT
Start: 2021-02-07 | End: 2021-02-12 | Stop reason: HOSPADM

## 2021-02-07 RX ORDER — SODIUM CHLORIDE 0.9 % (FLUSH) 0.9 %
10 SYRINGE (ML) INJECTION EVERY 12 HOURS SCHEDULED
Status: DISCONTINUED | OUTPATIENT
Start: 2021-02-07 | End: 2021-02-12 | Stop reason: HOSPADM

## 2021-02-07 RX ORDER — ATORVASTATIN CALCIUM 40 MG/1
40 TABLET, FILM COATED ORAL NIGHTLY
Status: DISCONTINUED | OUTPATIENT
Start: 2021-02-07 | End: 2021-02-10

## 2021-02-07 RX ORDER — DEXTROSE MONOHYDRATE 25 G/50ML
25 INJECTION, SOLUTION INTRAVENOUS
Status: DISCONTINUED | OUTPATIENT
Start: 2021-02-07 | End: 2021-02-12 | Stop reason: HOSPADM

## 2021-02-07 RX ORDER — BUSPIRONE HYDROCHLORIDE 5 MG/1
5 TABLET ORAL EVERY 8 HOURS SCHEDULED
Status: DISCONTINUED | OUTPATIENT
Start: 2021-02-07 | End: 2021-02-08

## 2021-02-07 RX ORDER — ONDANSETRON 2 MG/ML
4 INJECTION INTRAMUSCULAR; INTRAVENOUS EVERY 6 HOURS PRN
Status: DISCONTINUED | OUTPATIENT
Start: 2021-02-07 | End: 2021-02-12 | Stop reason: HOSPADM

## 2021-02-07 RX ORDER — SODIUM CHLORIDE 0.9 % (FLUSH) 0.9 %
10 SYRINGE (ML) INJECTION AS NEEDED
Status: DISCONTINUED | OUTPATIENT
Start: 2021-02-07 | End: 2021-02-08

## 2021-02-07 RX ORDER — SODIUM CHLORIDE, SODIUM LACTATE, POTASSIUM CHLORIDE, CALCIUM CHLORIDE 600; 310; 30; 20 MG/100ML; MG/100ML; MG/100ML; MG/100ML
75 INJECTION, SOLUTION INTRAVENOUS CONTINUOUS
Status: DISCONTINUED | OUTPATIENT
Start: 2021-02-07 | End: 2021-02-09

## 2021-02-07 RX ORDER — MIDAZOLAM HYDROCHLORIDE 1 MG/ML
INJECTION INTRAMUSCULAR; INTRAVENOUS
Status: COMPLETED | OUTPATIENT
Start: 2021-02-07 | End: 2021-02-07

## 2021-02-07 RX ORDER — PANTOPRAZOLE SODIUM 40 MG/10ML
40 INJECTION, POWDER, LYOPHILIZED, FOR SOLUTION INTRAVENOUS
Status: DISCONTINUED | OUTPATIENT
Start: 2021-02-08 | End: 2021-02-07

## 2021-02-07 RX ORDER — NICOTINE POLACRILEX 4 MG
15 LOZENGE BUCCAL
Status: DISCONTINUED | OUTPATIENT
Start: 2021-02-07 | End: 2021-02-12 | Stop reason: HOSPADM

## 2021-02-07 RX ORDER — IPRATROPIUM BROMIDE AND ALBUTEROL SULFATE 2.5; .5 MG/3ML; MG/3ML
3 SOLUTION RESPIRATORY (INHALATION)
Status: DISCONTINUED | OUTPATIENT
Start: 2021-02-07 | End: 2021-02-12 | Stop reason: HOSPADM

## 2021-02-07 RX ORDER — LEVOTHYROXINE SODIUM 112 UG/1
112 TABLET ORAL
Status: DISCONTINUED | OUTPATIENT
Start: 2021-02-08 | End: 2021-02-10

## 2021-02-07 RX ORDER — SODIUM CHLORIDE 0.9 % (FLUSH) 0.9 %
10 SYRINGE (ML) INJECTION AS NEEDED
Status: DISCONTINUED | OUTPATIENT
Start: 2021-02-07 | End: 2021-02-12 | Stop reason: HOSPADM

## 2021-02-07 RX ORDER — NALOXONE HYDROCHLORIDE 0.4 MG/ML
INJECTION, SOLUTION INTRAMUSCULAR; INTRAVENOUS; SUBCUTANEOUS
Status: DISPENSED
Start: 2021-02-07 | End: 2021-02-08

## 2021-02-07 RX ORDER — ACETAMINOPHEN 650 MG/1
650 SUPPOSITORY RECTAL EVERY 4 HOURS PRN
Status: DISCONTINUED | OUTPATIENT
Start: 2021-02-07 | End: 2021-02-12 | Stop reason: HOSPADM

## 2021-02-07 RX ORDER — ALBUTEROL SULFATE 2.5 MG/3ML
2.5 SOLUTION RESPIRATORY (INHALATION) ONCE
Status: COMPLETED | OUTPATIENT
Start: 2021-02-07 | End: 2021-02-07

## 2021-02-07 RX ORDER — DOCUSATE SODIUM 100 MG/1
100 CAPSULE, LIQUID FILLED ORAL 2 TIMES DAILY
Status: DISCONTINUED | OUTPATIENT
Start: 2021-02-07 | End: 2021-02-08

## 2021-02-07 RX ORDER — CHLORHEXIDINE GLUCONATE 0.12 MG/ML
15 RINSE ORAL EVERY 12 HOURS SCHEDULED
Status: DISCONTINUED | OUTPATIENT
Start: 2021-02-07 | End: 2021-02-09

## 2021-02-07 RX ORDER — ETOMIDATE 2 MG/ML
INJECTION INTRAVENOUS
Status: COMPLETED | OUTPATIENT
Start: 2021-02-07 | End: 2021-02-07

## 2021-02-07 RX ORDER — LISINOPRIL 10 MG/1
10 TABLET ORAL
Status: DISCONTINUED | OUTPATIENT
Start: 2021-02-07 | End: 2021-02-07

## 2021-02-07 RX ORDER — HEPARIN SODIUM 1000 [USP'U]/ML
7000 INJECTION, SOLUTION INTRAVENOUS; SUBCUTANEOUS ONCE
Status: COMPLETED | OUTPATIENT
Start: 2021-02-07 | End: 2021-02-07

## 2021-02-07 RX ORDER — FUROSEMIDE 10 MG/ML
60 INJECTION INTRAMUSCULAR; INTRAVENOUS ONCE
Status: COMPLETED | OUTPATIENT
Start: 2021-02-08 | End: 2021-02-08

## 2021-02-07 RX ORDER — PANTOPRAZOLE SODIUM 40 MG/10ML
40 INJECTION, POWDER, LYOPHILIZED, FOR SOLUTION INTRAVENOUS
Status: DISCONTINUED | OUTPATIENT
Start: 2021-02-07 | End: 2021-02-10

## 2021-02-07 RX ORDER — DILTIAZEM HYDROCHLORIDE 60 MG/1
60 TABLET, FILM COATED ORAL EVERY 6 HOURS SCHEDULED
Status: DISCONTINUED | OUTPATIENT
Start: 2021-02-07 | End: 2021-02-07

## 2021-02-07 RX ORDER — LEVOFLOXACIN 5 MG/ML
750 INJECTION, SOLUTION INTRAVENOUS
Status: DISCONTINUED | OUTPATIENT
Start: 2021-02-07 | End: 2021-02-07

## 2021-02-07 RX ORDER — METHYLPREDNISOLONE SODIUM SUCCINATE 40 MG/ML
20 INJECTION, POWDER, LYOPHILIZED, FOR SOLUTION INTRAMUSCULAR; INTRAVENOUS EVERY 6 HOURS
Status: DISCONTINUED | OUTPATIENT
Start: 2021-02-07 | End: 2021-02-09

## 2021-02-07 RX ORDER — HEPARIN SODIUM 5000 [USP'U]/ML
5000 INJECTION, SOLUTION INTRAVENOUS; SUBCUTANEOUS EVERY 8 HOURS SCHEDULED
Status: DISCONTINUED | OUTPATIENT
Start: 2021-02-07 | End: 2021-02-07

## 2021-02-07 RX ORDER — PROPOFOL 10 MG/ML
VIAL (ML) INTRAVENOUS
Status: COMPLETED | OUTPATIENT
Start: 2021-02-07 | End: 2021-02-07

## 2021-02-07 RX ADMIN — CHLORHEXIDINE GLUCONATE 15 ML: 1.2 SOLUTION ORAL at 20:55

## 2021-02-07 RX ADMIN — VECURONIUM BROMIDE 8 MG: 1 INJECTION, POWDER, LYOPHILIZED, FOR SOLUTION INTRAVENOUS at 13:20

## 2021-02-07 RX ADMIN — PROPOFOL 50 MG: 10 INJECTION, EMULSION INTRAVENOUS at 14:49

## 2021-02-07 RX ADMIN — PROPOFOL 30 MCG/KG/MIN: 10 INJECTION, EMULSION INTRAVENOUS at 20:48

## 2021-02-07 RX ADMIN — MEROPENEM 1 G: 1 INJECTION, POWDER, FOR SOLUTION INTRAVENOUS at 15:07

## 2021-02-07 RX ADMIN — ALBUTEROL SULFATE 2.5 MG: 2.5 SOLUTION RESPIRATORY (INHALATION) at 12:34

## 2021-02-07 RX ADMIN — LEVOFLOXACIN 750 MG: 750 INJECTION, SOLUTION INTRAVENOUS at 17:49

## 2021-02-07 RX ADMIN — BUSPIRONE HYDROCHLORIDE 5 MG: 5 TABLET ORAL at 20:56

## 2021-02-07 RX ADMIN — SERTRALINE 50 MG: 50 TABLET, FILM COATED ORAL at 17:48

## 2021-02-07 RX ADMIN — TOPIRAMATE 50 MG: 25 TABLET, FILM COATED ORAL at 20:56

## 2021-02-07 RX ADMIN — METHYLPREDNISOLONE SODIUM SUCCINATE 20 MG: 40 INJECTION, POWDER, FOR SOLUTION INTRAMUSCULAR; INTRAVENOUS at 17:49

## 2021-02-07 RX ADMIN — HYDROMORPHONE HYDROCHLORIDE 1 MG: 1 INJECTION, SOLUTION INTRAMUSCULAR; INTRAVENOUS; SUBCUTANEOUS at 13:28

## 2021-02-07 RX ADMIN — PANTOPRAZOLE SODIUM 40 MG: 40 INJECTION, POWDER, FOR SOLUTION INTRAVENOUS at 17:48

## 2021-02-07 RX ADMIN — SODIUM CHLORIDE, POTASSIUM CHLORIDE, SODIUM LACTATE AND CALCIUM CHLORIDE 75 ML/HR: 600; 310; 30; 20 INJECTION, SOLUTION INTRAVENOUS at 15:19

## 2021-02-07 RX ADMIN — SODIUM CHLORIDE, PRESERVATIVE FREE 10 ML: 5 INJECTION INTRAVENOUS at 20:57

## 2021-02-07 RX ADMIN — ATORVASTATIN CALCIUM 40 MG: 40 TABLET, FILM COATED ORAL at 20:57

## 2021-02-07 RX ADMIN — IPRATROPIUM BROMIDE AND ALBUTEROL SULFATE 3 ML: 2.5; .5 SOLUTION RESPIRATORY (INHALATION) at 19:33

## 2021-02-07 RX ADMIN — ASPIRIN 325 MG ORAL TABLET 162 MG: 325 PILL ORAL at 17:48

## 2021-02-07 RX ADMIN — PROPOFOL 5 MCG/KG/MIN: 10 INJECTION, EMULSION INTRAVENOUS at 13:33

## 2021-02-07 RX ADMIN — ETOMIDATE 25 MG: 2 INJECTION, SOLUTION INTRAVENOUS at 13:10

## 2021-02-07 RX ADMIN — HEPARIN SODIUM 7000 UNITS: 1000 INJECTION, SOLUTION INTRAVENOUS; SUBCUTANEOUS at 20:59

## 2021-02-07 RX ADMIN — PREGABALIN 100 MG: 100 CAPSULE ORAL at 20:56

## 2021-02-07 RX ADMIN — ACETAMINOPHEN 650 MG: 325 TABLET, FILM COATED ORAL at 18:59

## 2021-02-07 RX ADMIN — SODIUM CHLORIDE 1000 ML: 9 INJECTION, SOLUTION INTRAVENOUS at 12:38

## 2021-02-07 RX ADMIN — HEPARIN SODIUM 5000 UNITS: 5000 INJECTION INTRAVENOUS; SUBCUTANEOUS at 19:03

## 2021-02-07 RX ADMIN — MIDAZOLAM 4 MG: 1 INJECTION INTRAMUSCULAR; INTRAVENOUS at 13:09

## 2021-02-07 RX ADMIN — MIDAZOLAM 1 MG: 1 INJECTION INTRAMUSCULAR; INTRAVENOUS at 13:12

## 2021-02-07 RX ADMIN — HEPARIN SODIUM 16.5 UNITS/KG/HR: 10000 INJECTION, SOLUTION INTRAVENOUS at 20:58

## 2021-02-08 ENCOUNTER — APPOINTMENT (OUTPATIENT)
Dept: GENERAL RADIOLOGY | Facility: HOSPITAL | Age: 66
End: 2021-02-08

## 2021-02-08 LAB
ANION GAP SERPL CALCULATED.3IONS-SCNC: 11 MMOL/L (ref 5–15)
ARTERIAL PATENCY WRIST A: ABNORMAL
ATMOSPHERIC PRESS: ABNORMAL MM[HG]
BASE EXCESS BLDA CALC-SCNC: -1.7 MMOL/L (ref 0–2)
BASOPHILS # BLD AUTO: 0.03 10*3/MM3 (ref 0–0.2)
BASOPHILS NFR BLD AUTO: 0.2 % (ref 0–1.5)
BDY SITE: ABNORMAL
BH CV ECHO MEAS - BSA(HAYCOCK): 2.1 M^2
BH CV ECHO MEAS - BSA: 2 M^2
BH CV ECHO MEAS - BZI_BMI: 32.3 KILOGRAMS/M^2
BH CV ECHO MEAS - BZI_METRIC_HEIGHT: 167.6 CM
BH CV ECHO MEAS - BZI_METRIC_WEIGHT: 90.7 KG
BH CV LOW VAS RIGHT POSTERIOR TIBIAL VESSEL: 1
BH CV LOWER VASCULAR LEFT COMMON FEMORAL AUGMENT: NORMAL
BH CV LOWER VASCULAR LEFT COMMON FEMORAL COMPETENT: NORMAL
BH CV LOWER VASCULAR LEFT COMMON FEMORAL COMPRESS: NORMAL
BH CV LOWER VASCULAR LEFT COMMON FEMORAL PHASIC: NORMAL
BH CV LOWER VASCULAR LEFT COMMON FEMORAL SPONT: NORMAL
BH CV LOWER VASCULAR LEFT DISTAL FEMORAL AUGMENT: NORMAL
BH CV LOWER VASCULAR LEFT DISTAL FEMORAL COMPETENT: NORMAL
BH CV LOWER VASCULAR LEFT DISTAL FEMORAL COMPRESS: NORMAL
BH CV LOWER VASCULAR LEFT DISTAL FEMORAL PHASIC: NORMAL
BH CV LOWER VASCULAR LEFT DISTAL FEMORAL SPONT: NORMAL
BH CV LOWER VASCULAR LEFT GASTRONEMIUS COMPRESS: NORMAL
BH CV LOWER VASCULAR LEFT GREATER SAPH AK COMPRESS: NORMAL
BH CV LOWER VASCULAR LEFT GREATER SAPH BK COMPRESS: NORMAL
BH CV LOWER VASCULAR LEFT LESSER SAPH COMPRESS: NORMAL
BH CV LOWER VASCULAR LEFT MID FEMORAL AUGMENT: NORMAL
BH CV LOWER VASCULAR LEFT MID FEMORAL COMPETENT: NORMAL
BH CV LOWER VASCULAR LEFT MID FEMORAL COMPRESS: NORMAL
BH CV LOWER VASCULAR LEFT MID FEMORAL PHASIC: NORMAL
BH CV LOWER VASCULAR LEFT MID FEMORAL SPONT: NORMAL
BH CV LOWER VASCULAR LEFT PERONEAL AUGMENT: NORMAL
BH CV LOWER VASCULAR LEFT PERONEAL COMPRESS: NORMAL
BH CV LOWER VASCULAR LEFT POPLITEAL AUGMENT: NORMAL
BH CV LOWER VASCULAR LEFT POPLITEAL COMPETENT: NORMAL
BH CV LOWER VASCULAR LEFT POPLITEAL COMPRESS: NORMAL
BH CV LOWER VASCULAR LEFT POPLITEAL PHASIC: NORMAL
BH CV LOWER VASCULAR LEFT POPLITEAL SPONT: NORMAL
BH CV LOWER VASCULAR LEFT POSTERIOR TIBIAL AUGMENT: NORMAL
BH CV LOWER VASCULAR LEFT POSTERIOR TIBIAL COMPRESS: NORMAL
BH CV LOWER VASCULAR LEFT PROFUNDA FEMORAL AUGMENT: NORMAL
BH CV LOWER VASCULAR LEFT PROFUNDA FEMORAL COMPETENT: NORMAL
BH CV LOWER VASCULAR LEFT PROFUNDA FEMORAL COMPRESS: NORMAL
BH CV LOWER VASCULAR LEFT PROFUNDA FEMORAL PHASIC: NORMAL
BH CV LOWER VASCULAR LEFT PROFUNDA FEMORAL SPONT: NORMAL
BH CV LOWER VASCULAR LEFT PROXIMAL FEMORAL AUGMENT: NORMAL
BH CV LOWER VASCULAR LEFT PROXIMAL FEMORAL COMPETENT: NORMAL
BH CV LOWER VASCULAR LEFT PROXIMAL FEMORAL COMPRESS: NORMAL
BH CV LOWER VASCULAR LEFT PROXIMAL FEMORAL PHASIC: NORMAL
BH CV LOWER VASCULAR LEFT PROXIMAL FEMORAL SPONT: NORMAL
BH CV LOWER VASCULAR LEFT SAPHENOFEMORAL JUNCTION AUGMENT: NORMAL
BH CV LOWER VASCULAR LEFT SAPHENOFEMORAL JUNCTION COMPETENT: NORMAL
BH CV LOWER VASCULAR LEFT SAPHENOFEMORAL JUNCTION COMPRESS: NORMAL
BH CV LOWER VASCULAR LEFT SAPHENOFEMORAL JUNCTION PHASIC: NORMAL
BH CV LOWER VASCULAR LEFT SAPHENOFEMORAL JUNCTION SPONT: NORMAL
BH CV LOWER VASCULAR RIGHT COMMON FEMORAL AUGMENT: NORMAL
BH CV LOWER VASCULAR RIGHT COMMON FEMORAL COMPETENT: NORMAL
BH CV LOWER VASCULAR RIGHT COMMON FEMORAL COMPRESS: NORMAL
BH CV LOWER VASCULAR RIGHT COMMON FEMORAL PHASIC: NORMAL
BH CV LOWER VASCULAR RIGHT COMMON FEMORAL SPONT: NORMAL
BH CV LOWER VASCULAR RIGHT DISTAL FEMORAL AUGMENT: NORMAL
BH CV LOWER VASCULAR RIGHT DISTAL FEMORAL COMPETENT: NORMAL
BH CV LOWER VASCULAR RIGHT DISTAL FEMORAL COMPRESS: NORMAL
BH CV LOWER VASCULAR RIGHT DISTAL FEMORAL PHASIC: NORMAL
BH CV LOWER VASCULAR RIGHT DISTAL FEMORAL SPONT: NORMAL
BH CV LOWER VASCULAR RIGHT GREATER SAPH AK COMPRESS: NORMAL
BH CV LOWER VASCULAR RIGHT GREATER SAPH BK COMPRESS: NORMAL
BH CV LOWER VASCULAR RIGHT LESSER SAPH COMPRESS: NORMAL
BH CV LOWER VASCULAR RIGHT MID FEMORAL AUGMENT: NORMAL
BH CV LOWER VASCULAR RIGHT MID FEMORAL COMPETENT: NORMAL
BH CV LOWER VASCULAR RIGHT MID FEMORAL COMPRESS: NORMAL
BH CV LOWER VASCULAR RIGHT MID FEMORAL PHASIC: NORMAL
BH CV LOWER VASCULAR RIGHT MID FEMORAL SPONT: NORMAL
BH CV LOWER VASCULAR RIGHT PERONEAL AUGMENT: NORMAL
BH CV LOWER VASCULAR RIGHT PERONEAL COMPRESS: NORMAL
BH CV LOWER VASCULAR RIGHT POPLITEAL AUGMENT: NORMAL
BH CV LOWER VASCULAR RIGHT POPLITEAL COMPETENT: NORMAL
BH CV LOWER VASCULAR RIGHT POPLITEAL COMPRESS: NORMAL
BH CV LOWER VASCULAR RIGHT POPLITEAL PHASIC: NORMAL
BH CV LOWER VASCULAR RIGHT POPLITEAL SPONT: NORMAL
BH CV LOWER VASCULAR RIGHT POSTERIOR TIBIAL AUGMENT: NORMAL
BH CV LOWER VASCULAR RIGHT POSTERIOR TIBIAL COMPRESS: NORMAL
BH CV LOWER VASCULAR RIGHT PROFUNDA FEMORAL AUGMENT: NORMAL
BH CV LOWER VASCULAR RIGHT PROFUNDA FEMORAL COMPETENT: NORMAL
BH CV LOWER VASCULAR RIGHT PROFUNDA FEMORAL COMPRESS: NORMAL
BH CV LOWER VASCULAR RIGHT PROFUNDA FEMORAL PHASIC: NORMAL
BH CV LOWER VASCULAR RIGHT PROFUNDA FEMORAL SPONT: NORMAL
BH CV LOWER VASCULAR RIGHT PROXIMAL FEMORAL AUGMENT: NORMAL
BH CV LOWER VASCULAR RIGHT PROXIMAL FEMORAL COMPETENT: NORMAL
BH CV LOWER VASCULAR RIGHT PROXIMAL FEMORAL COMPRESS: NORMAL
BH CV LOWER VASCULAR RIGHT PROXIMAL FEMORAL PHASIC: NORMAL
BH CV LOWER VASCULAR RIGHT PROXIMAL FEMORAL SPONT: NORMAL
BH CV LOWER VASCULAR RIGHT SAPHENOFEMORAL JUNCTION AUGMENT: NORMAL
BH CV LOWER VASCULAR RIGHT SAPHENOFEMORAL JUNCTION COMPETENT: NORMAL
BH CV LOWER VASCULAR RIGHT SAPHENOFEMORAL JUNCTION COMPRESS: NORMAL
BH CV LOWER VASCULAR RIGHT SAPHENOFEMORAL JUNCTION PHASIC: NORMAL
BH CV LOWER VASCULAR RIGHT SAPHENOFEMORAL JUNCTION SPONT: NORMAL
BODY TEMPERATURE: 37 C
BUN SERPL-MCNC: 27 MG/DL (ref 8–23)
BUN/CREAT SERPL: 18.6 (ref 7–25)
CALCIUM SPEC-SCNC: 9.3 MG/DL (ref 8.6–10.5)
CHLORIDE SERPL-SCNC: 108 MMOL/L (ref 98–107)
CO2 BLDA-SCNC: 22.8 MMOL/L (ref 22–33)
CO2 SERPL-SCNC: 22 MMOL/L (ref 22–29)
COHGB MFR BLD: 0.6 % (ref 0–2)
CREAT SERPL-MCNC: 1.45 MG/DL (ref 0.57–1)
D-LACTATE SERPL-SCNC: 2.4 MMOL/L (ref 0.5–2)
DEPRECATED RDW RBC AUTO: 60.5 FL (ref 37–54)
EOSINOPHIL # BLD AUTO: 0 10*3/MM3 (ref 0–0.4)
EOSINOPHIL NFR BLD AUTO: 0 % (ref 0.3–6.2)
EPAP: 0
ERYTHROCYTE [DISTWIDTH] IN BLOOD BY AUTOMATED COUNT: 16.8 % (ref 12.3–15.4)
GFR SERPL CREATININE-BSD FRML MDRD: 36 ML/MIN/1.73
GLUCOSE BLDC GLUCOMTR-MCNC: 175 MG/DL (ref 70–130)
GLUCOSE BLDC GLUCOMTR-MCNC: 180 MG/DL (ref 70–130)
GLUCOSE BLDC GLUCOMTR-MCNC: 204 MG/DL (ref 70–130)
GLUCOSE BLDC GLUCOMTR-MCNC: 232 MG/DL (ref 70–130)
GLUCOSE SERPL-MCNC: 194 MG/DL (ref 65–99)
HCO3 BLDA-SCNC: 21.8 MMOL/L (ref 20–26)
HCT VFR BLD AUTO: 46.9 % (ref 34–46.6)
HCT VFR BLD CALC: 44.2 %
HGB BLD-MCNC: 14.7 G/DL (ref 12–15.9)
HGB BLDA-MCNC: 14.4 G/DL (ref 14–18)
IMM GRANULOCYTES # BLD AUTO: 0.11 10*3/MM3 (ref 0–0.05)
IMM GRANULOCYTES NFR BLD AUTO: 0.7 % (ref 0–0.5)
INHALED O2 CONCENTRATION: 45 %
IPAP: 0
LYMPHOCYTES # BLD AUTO: 1.14 10*3/MM3 (ref 0.7–3.1)
LYMPHOCYTES NFR BLD AUTO: 6.8 % (ref 19.6–45.3)
MAGNESIUM SERPL-MCNC: 1.6 MG/DL (ref 1.6–2.4)
MCH RBC QN AUTO: 30.5 PG (ref 26.6–33)
MCHC RBC AUTO-ENTMCNC: 31.3 G/DL (ref 31.5–35.7)
MCV RBC AUTO: 97.3 FL (ref 79–97)
METHGB BLD QL: 0.4 % (ref 0–1.5)
MODALITY: ABNORMAL
MONOCYTES # BLD AUTO: 0.66 10*3/MM3 (ref 0.1–0.9)
MONOCYTES NFR BLD AUTO: 3.9 % (ref 5–12)
NEUTROPHILS NFR BLD AUTO: 14.89 10*3/MM3 (ref 1.7–7)
NEUTROPHILS NFR BLD AUTO: 88.4 % (ref 42.7–76)
NOTE: ABNORMAL
NRBC BLD AUTO-RTO: 0 /100 WBC (ref 0–0.2)
OXYHGB MFR BLDV: 95.6 % (ref 94–99)
PAW @ PEAK INSP FLOW SETTING VENT: 0 CMH2O
PCO2 BLDA: 32.8 MM HG (ref 35–45)
PCO2 TEMP ADJ BLD: 32.8 MM HG (ref 35–45)
PH BLDA: 7.43 PH UNITS (ref 7.35–7.45)
PH, TEMP CORRECTED: 7.43 PH UNITS
PHOSPHATE SERPL-MCNC: 2 MG/DL (ref 2.5–4.5)
PLATELET # BLD AUTO: 130 10*3/MM3 (ref 140–450)
PMV BLD AUTO: 12.3 FL (ref 6–12)
PO2 BLDA: 78 MM HG (ref 83–108)
PO2 TEMP ADJ BLD: 78 MM HG (ref 83–108)
POTASSIUM SERPL-SCNC: 3.7 MMOL/L (ref 3.5–5.2)
QT INTERVAL: 402 MS
QTC INTERVAL: 472 MS
RBC # BLD AUTO: 4.82 10*6/MM3 (ref 3.77–5.28)
SODIUM SERPL-SCNC: 141 MMOL/L (ref 136–145)
TOTAL RATE: 0 BREATHS/MINUTE
TROPONIN T SERPL-MCNC: 0.2 NG/ML (ref 0–0.03)
UFH PPP CHRO-ACNC: 0.64 IU/ML (ref 0.3–0.7)
UFH PPP CHRO-ACNC: 0.67 IU/ML (ref 0.3–0.7)
UFH PPP CHRO-ACNC: >1.1 IU/ML (ref 0.3–0.7)
WBC # BLD AUTO: 16.83 10*3/MM3 (ref 3.4–10.8)

## 2021-02-08 PROCEDURE — 25010000002 METHYLPREDNISOLONE PER 40 MG: Performed by: INTERNAL MEDICINE

## 2021-02-08 PROCEDURE — 94003 VENT MGMT INPAT SUBQ DAY: CPT

## 2021-02-08 PROCEDURE — 82962 GLUCOSE BLOOD TEST: CPT

## 2021-02-08 PROCEDURE — 25010000002 HEPARIN (PORCINE) 25000-0.45 UT/250ML-% SOLUTION

## 2021-02-08 PROCEDURE — 93005 ELECTROCARDIOGRAM TRACING: CPT | Performed by: INTERNAL MEDICINE

## 2021-02-08 PROCEDURE — 74018 RADEX ABDOMEN 1 VIEW: CPT

## 2021-02-08 PROCEDURE — 25010000002 LINEZOLID 600 MG/300ML SOLUTION: Performed by: INTERNAL MEDICINE

## 2021-02-08 PROCEDURE — 25010000002 PROPOFOL 10 MG/ML EMULSION: Performed by: EMERGENCY MEDICINE

## 2021-02-08 PROCEDURE — 82375 ASSAY CARBOXYHB QUANT: CPT

## 2021-02-08 PROCEDURE — 80048 BASIC METABOLIC PNL TOTAL CA: CPT | Performed by: NURSE PRACTITIONER

## 2021-02-08 PROCEDURE — 85520 HEPARIN ASSAY: CPT

## 2021-02-08 PROCEDURE — 63710000001 INSULIN REGULAR HUMAN PER 5 UNITS: Performed by: NURSE PRACTITIONER

## 2021-02-08 PROCEDURE — 82805 BLOOD GASES W/O2 SATURATION: CPT

## 2021-02-08 PROCEDURE — 93010 ELECTROCARDIOGRAM REPORT: CPT | Performed by: INTERNAL MEDICINE

## 2021-02-08 PROCEDURE — 25010000002 FUROSEMIDE PER 20 MG: Performed by: INTERNAL MEDICINE

## 2021-02-08 PROCEDURE — 83735 ASSAY OF MAGNESIUM: CPT | Performed by: INTERNAL MEDICINE

## 2021-02-08 PROCEDURE — 87205 SMEAR GRAM STAIN: CPT | Performed by: INTERNAL MEDICINE

## 2021-02-08 PROCEDURE — 94799 UNLISTED PULMONARY SVC/PX: CPT

## 2021-02-08 PROCEDURE — 25010000002 HYDROMORPHONE PER 4 MG: Performed by: INTERNAL MEDICINE

## 2021-02-08 PROCEDURE — 25010000002 MEROPENEM PER 100 MG: Performed by: INTERNAL MEDICINE

## 2021-02-08 PROCEDURE — 71045 X-RAY EXAM CHEST 1 VIEW: CPT

## 2021-02-08 PROCEDURE — 87070 CULTURE OTHR SPECIMN AEROBIC: CPT | Performed by: INTERNAL MEDICINE

## 2021-02-08 PROCEDURE — 83050 HGB METHEMOGLOBIN QUAN: CPT

## 2021-02-08 PROCEDURE — 84100 ASSAY OF PHOSPHORUS: CPT | Performed by: INTERNAL MEDICINE

## 2021-02-08 PROCEDURE — 25010000002 MAGNESIUM SULFATE 2 GM/50ML SOLUTION: Performed by: NURSE PRACTITIONER

## 2021-02-08 PROCEDURE — 83605 ASSAY OF LACTIC ACID: CPT | Performed by: NURSE PRACTITIONER

## 2021-02-08 PROCEDURE — 99291 CRITICAL CARE FIRST HOUR: CPT | Performed by: INTERNAL MEDICINE

## 2021-02-08 PROCEDURE — 85025 COMPLETE CBC W/AUTO DIFF WBC: CPT | Performed by: NURSE PRACTITIONER

## 2021-02-08 PROCEDURE — 36600 WITHDRAWAL OF ARTERIAL BLOOD: CPT

## 2021-02-08 PROCEDURE — 84484 ASSAY OF TROPONIN QUANT: CPT | Performed by: NURSE PRACTITIONER

## 2021-02-08 RX ORDER — DOCUSATE SODIUM 50 MG/5 ML
100 LIQUID (ML) ORAL 2 TIMES DAILY
Status: DISCONTINUED | OUTPATIENT
Start: 2021-02-08 | End: 2021-02-10

## 2021-02-08 RX ORDER — HYDRALAZINE HYDROCHLORIDE 25 MG/1
25 TABLET, FILM COATED ORAL ONCE
Status: COMPLETED | OUTPATIENT
Start: 2021-02-08 | End: 2021-02-08

## 2021-02-08 RX ORDER — LINEZOLID 2 MG/ML
600 INJECTION, SOLUTION INTRAVENOUS EVERY 12 HOURS
Status: DISCONTINUED | OUTPATIENT
Start: 2021-02-08 | End: 2021-02-09

## 2021-02-08 RX ORDER — MAGNESIUM SULFATE HEPTAHYDRATE 40 MG/ML
2 INJECTION, SOLUTION INTRAVENOUS ONCE
Status: COMPLETED | OUTPATIENT
Start: 2021-02-08 | End: 2021-02-08

## 2021-02-08 RX ORDER — BUSPIRONE HYDROCHLORIDE 5 MG/1
5 TABLET ORAL EVERY 8 HOURS SCHEDULED
Status: DISCONTINUED | OUTPATIENT
Start: 2021-02-08 | End: 2021-02-10

## 2021-02-08 RX ORDER — HYDROMORPHONE HYDROCHLORIDE 1 MG/ML
0.5 INJECTION, SOLUTION INTRAMUSCULAR; INTRAVENOUS; SUBCUTANEOUS ONCE AS NEEDED
Status: COMPLETED | OUTPATIENT
Start: 2021-02-08 | End: 2021-02-08

## 2021-02-08 RX ORDER — LINEZOLID 2 MG/ML
600 INJECTION, SOLUTION INTRAVENOUS ONCE
Status: COMPLETED | OUTPATIENT
Start: 2021-02-08 | End: 2021-02-08

## 2021-02-08 RX ADMIN — IPRATROPIUM BROMIDE AND ALBUTEROL SULFATE 3 ML: 2.5; .5 SOLUTION RESPIRATORY (INHALATION) at 13:15

## 2021-02-08 RX ADMIN — PROPOFOL 45 MCG/KG/MIN: 10 INJECTION, EMULSION INTRAVENOUS at 17:05

## 2021-02-08 RX ADMIN — LINEZOLID 600 MG: 600 INJECTION, SOLUTION INTRAVENOUS at 11:02

## 2021-02-08 RX ADMIN — METHYLPREDNISOLONE SODIUM SUCCINATE 20 MG: 40 INJECTION, POWDER, FOR SOLUTION INTRAMUSCULAR; INTRAVENOUS at 00:08

## 2021-02-08 RX ADMIN — PREGABALIN 100 MG: 100 CAPSULE ORAL at 08:57

## 2021-02-08 RX ADMIN — LEVOTHYROXINE SODIUM 112 MCG: 112 TABLET ORAL at 06:45

## 2021-02-08 RX ADMIN — HYDRALAZINE HYDROCHLORIDE 25 MG: 25 TABLET, FILM COATED ORAL at 01:14

## 2021-02-08 RX ADMIN — MEROPENEM 1 G: 1 INJECTION, POWDER, FOR SOLUTION INTRAVENOUS at 14:47

## 2021-02-08 RX ADMIN — METHYLPREDNISOLONE SODIUM SUCCINATE 20 MG: 40 INJECTION, POWDER, FOR SOLUTION INTRAMUSCULAR; INTRAVENOUS at 19:04

## 2021-02-08 RX ADMIN — TOPIRAMATE 50 MG: 25 TABLET, FILM COATED ORAL at 08:57

## 2021-02-08 RX ADMIN — DOCUSATE SODIUM 100 MG: 50 LIQUID ORAL at 21:23

## 2021-02-08 RX ADMIN — ATORVASTATIN CALCIUM 40 MG: 40 TABLET, FILM COATED ORAL at 21:24

## 2021-02-08 RX ADMIN — CHLORHEXIDINE GLUCONATE 15 ML: 1.2 SOLUTION ORAL at 21:24

## 2021-02-08 RX ADMIN — BUSPIRONE HYDROCHLORIDE 5 MG: 5 TABLET ORAL at 21:24

## 2021-02-08 RX ADMIN — HEPARIN SODIUM 13.5 UNITS/KG/HR: 10000 INJECTION, SOLUTION INTRAVENOUS at 17:07

## 2021-02-08 RX ADMIN — PANTOPRAZOLE SODIUM 40 MG: 40 INJECTION, POWDER, FOR SOLUTION INTRAVENOUS at 08:59

## 2021-02-08 RX ADMIN — CHLORHEXIDINE GLUCONATE 15 ML: 1.2 SOLUTION ORAL at 08:58

## 2021-02-08 RX ADMIN — PROPOFOL 45 MCG/KG/MIN: 10 INJECTION, EMULSION INTRAVENOUS at 13:03

## 2021-02-08 RX ADMIN — ASPIRIN 325 MG ORAL TABLET 162 MG: 325 PILL ORAL at 08:57

## 2021-02-08 RX ADMIN — SODIUM CHLORIDE, PRESERVATIVE FREE 10 ML: 5 INJECTION INTRAVENOUS at 09:00

## 2021-02-08 RX ADMIN — DOCUSATE SODIUM 100 MG: 50 LIQUID ORAL at 08:59

## 2021-02-08 RX ADMIN — INSULIN HUMAN 3 UNITS: 100 INJECTION, SOLUTION PARENTERAL at 13:01

## 2021-02-08 RX ADMIN — PREGABALIN 100 MG: 100 CAPSULE ORAL at 21:24

## 2021-02-08 RX ADMIN — METHYLPREDNISOLONE SODIUM SUCCINATE 20 MG: 40 INJECTION, POWDER, FOR SOLUTION INTRAMUSCULAR; INTRAVENOUS at 13:01

## 2021-02-08 RX ADMIN — BUSPIRONE HYDROCHLORIDE 5 MG: 5 TABLET ORAL at 06:45

## 2021-02-08 RX ADMIN — HYDROMORPHONE HYDROCHLORIDE 0.5 MG: 1 INJECTION, SOLUTION INTRAMUSCULAR; INTRAVENOUS; SUBCUTANEOUS at 11:03

## 2021-02-08 RX ADMIN — PROPOFOL 35 MCG/KG/MIN: 10 INJECTION, EMULSION INTRAVENOUS at 21:24

## 2021-02-08 RX ADMIN — ACETAMINOPHEN 650 MG: 325 TABLET, FILM COATED ORAL at 00:07

## 2021-02-08 RX ADMIN — MEROPENEM 1 G: 1 INJECTION, POWDER, FOR SOLUTION INTRAVENOUS at 01:14

## 2021-02-08 RX ADMIN — IPRATROPIUM BROMIDE AND ALBUTEROL SULFATE 3 ML: 2.5; .5 SOLUTION RESPIRATORY (INHALATION) at 00:50

## 2021-02-08 RX ADMIN — PROPOFOL 45 MCG/KG/MIN: 10 INJECTION, EMULSION INTRAVENOUS at 08:55

## 2021-02-08 RX ADMIN — INSULIN HUMAN 2 UNITS: 100 INJECTION, SOLUTION PARENTERAL at 00:07

## 2021-02-08 RX ADMIN — PROPOFOL 45 MCG/KG/MIN: 10 INJECTION, EMULSION INTRAVENOUS at 00:29

## 2021-02-08 RX ADMIN — ACETAMINOPHEN 650 MG: 325 TABLET, FILM COATED ORAL at 06:45

## 2021-02-08 RX ADMIN — ACETAMINOPHEN 650 MG: 325 TABLET, FILM COATED ORAL at 21:30

## 2021-02-08 RX ADMIN — LINEZOLID 600 MG: 600 INJECTION, SOLUTION INTRAVENOUS at 21:24

## 2021-02-08 RX ADMIN — INSULIN HUMAN 2 UNITS: 100 INJECTION, SOLUTION PARENTERAL at 06:45

## 2021-02-08 RX ADMIN — SODIUM CHLORIDE, POTASSIUM CHLORIDE, SODIUM LACTATE AND CALCIUM CHLORIDE 75 ML/HR: 600; 310; 30; 20 INJECTION, SOLUTION INTRAVENOUS at 10:45

## 2021-02-08 RX ADMIN — BUSPIRONE HYDROCHLORIDE 5 MG: 5 TABLET ORAL at 14:47

## 2021-02-08 RX ADMIN — METHYLPREDNISOLONE SODIUM SUCCINATE 20 MG: 40 INJECTION, POWDER, FOR SOLUTION INTRAMUSCULAR; INTRAVENOUS at 06:45

## 2021-02-08 RX ADMIN — IPRATROPIUM BROMIDE AND ALBUTEROL SULFATE 3 ML: 2.5; .5 SOLUTION RESPIRATORY (INHALATION) at 07:59

## 2021-02-08 RX ADMIN — INSULIN HUMAN 2 UNITS: 100 INJECTION, SOLUTION PARENTERAL at 19:03

## 2021-02-08 RX ADMIN — SERTRALINE 50 MG: 50 TABLET, FILM COATED ORAL at 08:58

## 2021-02-08 RX ADMIN — FUROSEMIDE 60 MG: 10 INJECTION, SOLUTION INTRAMUSCULAR; INTRAVENOUS at 08:59

## 2021-02-08 RX ADMIN — PROPOFOL 45 MCG/KG/MIN: 10 INJECTION, EMULSION INTRAVENOUS at 04:55

## 2021-02-08 RX ADMIN — SODIUM CHLORIDE, PRESERVATIVE FREE 10 ML: 5 INJECTION INTRAVENOUS at 21:51

## 2021-02-08 RX ADMIN — MAGNESIUM SULFATE HEPTAHYDRATE 2 G: 40 INJECTION, SOLUTION INTRAVENOUS at 19:05

## 2021-02-08 RX ADMIN — TOPIRAMATE 50 MG: 25 TABLET, FILM COATED ORAL at 21:24

## 2021-02-08 RX ADMIN — IPRATROPIUM BROMIDE AND ALBUTEROL SULFATE 3 ML: 2.5; .5 SOLUTION RESPIRATORY (INHALATION) at 19:38

## 2021-02-09 ENCOUNTER — APPOINTMENT (OUTPATIENT)
Dept: GENERAL RADIOLOGY | Facility: HOSPITAL | Age: 66
End: 2021-02-09

## 2021-02-09 LAB
ALBUMIN SERPL-MCNC: 2.9 G/DL (ref 3.5–5.2)
ALP SERPL-CCNC: 68 U/L (ref 39–117)
ALT SERPL W P-5'-P-CCNC: 16 U/L (ref 1–33)
ANION GAP SERPL CALCULATED.3IONS-SCNC: 10 MMOL/L (ref 5–15)
ARTERIAL PATENCY WRIST A: ABNORMAL
ARTERIAL PATENCY WRIST A: ABNORMAL
AST SERPL-CCNC: 41 U/L (ref 1–32)
ATMOSPHERIC PRESS: ABNORMAL MM[HG]
ATMOSPHERIC PRESS: ABNORMAL MM[HG]
BASE EXCESS BLDA CALC-SCNC: -0.8 MMOL/L (ref 0–2)
BASE EXCESS BLDA CALC-SCNC: 0.2 MMOL/L (ref 0–2)
BDY SITE: ABNORMAL
BDY SITE: ABNORMAL
BILIRUB SERPL-MCNC: 0.3 MG/DL (ref 0–1.2)
BODY TEMPERATURE: 37 C
BODY TEMPERATURE: 37 C
BUN SERPL-MCNC: 30 MG/DL (ref 8–23)
CALCIUM SPEC-SCNC: 9.4 MG/DL (ref 8.6–10.5)
CHLORIDE SERPL-SCNC: 107 MMOL/L (ref 98–107)
CHOLEST SERPL-MCNC: 165 MG/DL (ref 0–200)
CO2 BLDA-SCNC: 23.2 MMOL/L (ref 22–33)
CO2 BLDA-SCNC: 24.8 MMOL/L (ref 22–33)
CO2 SERPL-SCNC: 23 MMOL/L (ref 22–29)
COHGB MFR BLD: 0.3 % (ref 0–2)
COHGB MFR BLD: 0.4 % (ref 0–2)
CREAT SERPL-MCNC: 1.36 MG/DL (ref 0.57–1)
DEPRECATED RDW RBC AUTO: 57.3 FL (ref 37–54)
EPAP: 0
EPAP: 0
ERYTHROCYTE [DISTWIDTH] IN BLOOD BY AUTOMATED COUNT: 16.9 % (ref 12.3–15.4)
GLUCOSE BLDC GLUCOMTR-MCNC: 172 MG/DL (ref 70–130)
GLUCOSE BLDC GLUCOMTR-MCNC: 182 MG/DL (ref 70–130)
GLUCOSE BLDC GLUCOMTR-MCNC: 190 MG/DL (ref 70–130)
GLUCOSE SERPL-MCNC: 213 MG/DL (ref 65–99)
HCO3 BLDA-SCNC: 22.2 MMOL/L (ref 20–26)
HCO3 BLDA-SCNC: 23.7 MMOL/L (ref 20–26)
HCT VFR BLD AUTO: 42.1 % (ref 34–46.6)
HCT VFR BLD CALC: 43.1 %
HCT VFR BLD CALC: 43.4 %
HGB BLD-MCNC: 13.8 G/DL (ref 12–15.9)
HGB BLDA-MCNC: 14.1 G/DL (ref 14–18)
HGB BLDA-MCNC: 14.2 G/DL (ref 14–18)
INHALED O2 CONCENTRATION: 35 %
INHALED O2 CONCENTRATION: 40 %
IPAP: 0
IPAP: 0
MAGNESIUM SERPL-MCNC: 2 MG/DL (ref 1.6–2.4)
MCH RBC QN AUTO: 30.5 PG (ref 26.6–33)
MCHC RBC AUTO-ENTMCNC: 32.8 G/DL (ref 31.5–35.7)
MCV RBC AUTO: 92.9 FL (ref 79–97)
METHGB BLD QL: 0.2 % (ref 0–1.5)
METHGB BLD QL: 0.8 % (ref 0–1.5)
MODALITY: ABNORMAL
MODALITY: ABNORMAL
NOTE: ABNORMAL
NOTE: ABNORMAL
OXYHGB MFR BLDV: 97.1 % (ref 94–99)
OXYHGB MFR BLDV: 97.5 % (ref 94–99)
PAW @ PEAK INSP FLOW SETTING VENT: 0 CMH2O
PAW @ PEAK INSP FLOW SETTING VENT: 0 CMH2O
PCO2 BLDA: 31.5 MM HG (ref 35–45)
PCO2 BLDA: 34.2 MM HG (ref 35–45)
PCO2 TEMP ADJ BLD: 31.5 MM HG (ref 35–45)
PCO2 TEMP ADJ BLD: 34.2 MM HG (ref 35–45)
PH BLDA: 7.45 PH UNITS (ref 7.35–7.45)
PH BLDA: 7.46 PH UNITS (ref 7.35–7.45)
PH, TEMP CORRECTED: 7.45 PH UNITS
PH, TEMP CORRECTED: 7.46 PH UNITS
PHOSPHATE SERPL-MCNC: 2.2 MG/DL (ref 2.5–4.5)
PHOSPHATE SERPL-MCNC: 2.2 MG/DL (ref 2.5–4.5)
PLATELET # BLD AUTO: 160 10*3/MM3 (ref 140–450)
PMV BLD AUTO: 12.4 FL (ref 6–12)
PO2 BLDA: 115 MM HG (ref 83–108)
PO2 BLDA: 93 MM HG (ref 83–108)
PO2 TEMP ADJ BLD: 115 MM HG (ref 83–108)
PO2 TEMP ADJ BLD: 93 MM HG (ref 83–108)
POTASSIUM SERPL-SCNC: 3.5 MMOL/L (ref 3.5–5.2)
POTASSIUM SERPL-SCNC: 4.4 MMOL/L (ref 3.5–5.2)
PROT SERPL-MCNC: 6.5 G/DL (ref 6–8.5)
QT INTERVAL: 352 MS
QTC INTERVAL: 442 MS
RBC # BLD AUTO: 4.53 10*6/MM3 (ref 3.77–5.28)
SODIUM SERPL-SCNC: 140 MMOL/L (ref 136–145)
TOTAL RATE: 0 BREATHS/MINUTE
TOTAL RATE: 0 BREATHS/MINUTE
TRIGL SERPL-MCNC: 271 MG/DL (ref 0–150)
UFH PPP CHRO-ACNC: 0.46 IU/ML (ref 0.3–0.7)
VENTILATOR MODE: ABNORMAL
WBC # BLD AUTO: 16.82 10*3/MM3 (ref 3.4–10.8)

## 2021-02-09 PROCEDURE — 94799 UNLISTED PULMONARY SVC/PX: CPT

## 2021-02-09 PROCEDURE — 25010000002 METHYLPREDNISOLONE PER 40 MG: Performed by: INTERNAL MEDICINE

## 2021-02-09 PROCEDURE — 99233 SBSQ HOSP IP/OBS HIGH 50: CPT | Performed by: INTERNAL MEDICINE

## 2021-02-09 PROCEDURE — 83735 ASSAY OF MAGNESIUM: CPT | Performed by: INTERNAL MEDICINE

## 2021-02-09 PROCEDURE — 82962 GLUCOSE BLOOD TEST: CPT

## 2021-02-09 PROCEDURE — 84132 ASSAY OF SERUM POTASSIUM: CPT | Performed by: INTERNAL MEDICINE

## 2021-02-09 PROCEDURE — 63710000001 INSULIN REGULAR HUMAN PER 5 UNITS: Performed by: NURSE PRACTITIONER

## 2021-02-09 PROCEDURE — 82465 ASSAY BLD/SERUM CHOLESTEROL: CPT | Performed by: INTERNAL MEDICINE

## 2021-02-09 PROCEDURE — 84478 ASSAY OF TRIGLYCERIDES: CPT | Performed by: INTERNAL MEDICINE

## 2021-02-09 PROCEDURE — 71045 X-RAY EXAM CHEST 1 VIEW: CPT

## 2021-02-09 PROCEDURE — 25010000003 POTASSIUM CHLORIDE PER 2 MEQ: Performed by: NURSE PRACTITIONER

## 2021-02-09 PROCEDURE — 94003 VENT MGMT INPAT SUBQ DAY: CPT

## 2021-02-09 PROCEDURE — 25010000002 MEROPENEM PER 100 MG: Performed by: INTERNAL MEDICINE

## 2021-02-09 PROCEDURE — 63710000001 PREDNISONE PER 1 MG: Performed by: INTERNAL MEDICINE

## 2021-02-09 PROCEDURE — 85520 HEPARIN ASSAY: CPT

## 2021-02-09 PROCEDURE — 25010000002 PROPOFOL 10 MG/ML EMULSION: Performed by: EMERGENCY MEDICINE

## 2021-02-09 PROCEDURE — 84100 ASSAY OF PHOSPHORUS: CPT | Performed by: INTERNAL MEDICINE

## 2021-02-09 PROCEDURE — 25010000002 ONDANSETRON PER 1 MG: Performed by: INTERNAL MEDICINE

## 2021-02-09 PROCEDURE — 85027 COMPLETE CBC AUTOMATED: CPT | Performed by: INTERNAL MEDICINE

## 2021-02-09 PROCEDURE — 93005 ELECTROCARDIOGRAM TRACING: CPT | Performed by: NURSE PRACTITIONER

## 2021-02-09 PROCEDURE — 83050 HGB METHEMOGLOBIN QUAN: CPT

## 2021-02-09 PROCEDURE — 25010000002 LINEZOLID 600 MG/300ML SOLUTION: Performed by: INTERNAL MEDICINE

## 2021-02-09 PROCEDURE — 82805 BLOOD GASES W/O2 SATURATION: CPT

## 2021-02-09 PROCEDURE — 93010 ELECTROCARDIOGRAM REPORT: CPT | Performed by: INTERNAL MEDICINE

## 2021-02-09 PROCEDURE — 36600 WITHDRAWAL OF ARTERIAL BLOOD: CPT

## 2021-02-09 PROCEDURE — 80053 COMPREHEN METABOLIC PANEL: CPT | Performed by: INTERNAL MEDICINE

## 2021-02-09 PROCEDURE — 82375 ASSAY CARBOXYHB QUANT: CPT

## 2021-02-09 PROCEDURE — 25010000002 LEVOFLOXACIN PER 250 MG: Performed by: INTERNAL MEDICINE

## 2021-02-09 RX ORDER — OXYCODONE HCL 5 MG/5 ML
5 SOLUTION, ORAL ORAL EVERY 6 HOURS
Status: DISCONTINUED | OUTPATIENT
Start: 2021-02-09 | End: 2021-02-10

## 2021-02-09 RX ORDER — PREDNISONE 20 MG/1
40 TABLET ORAL DAILY
Status: DISCONTINUED | OUTPATIENT
Start: 2021-02-09 | End: 2021-02-10

## 2021-02-09 RX ORDER — POTASSIUM CHLORIDE 7.45 MG/ML
10 INJECTION INTRAVENOUS
Status: DISCONTINUED | OUTPATIENT
Start: 2021-02-09 | End: 2021-02-12 | Stop reason: HOSPADM

## 2021-02-09 RX ORDER — DEXMEDETOMIDINE HYDROCHLORIDE 4 UG/ML
.2-1.5 INJECTION, SOLUTION INTRAVENOUS
Status: DISCONTINUED | OUTPATIENT
Start: 2021-02-09 | End: 2021-02-10

## 2021-02-09 RX ORDER — POTASSIUM CHLORIDE 29.8 MG/ML
20 INJECTION INTRAVENOUS
Status: DISCONTINUED | OUTPATIENT
Start: 2021-02-09 | End: 2021-02-12 | Stop reason: HOSPADM

## 2021-02-09 RX ADMIN — TOPIRAMATE 50 MG: 25 TABLET, FILM COATED ORAL at 21:53

## 2021-02-09 RX ADMIN — SODIUM CHLORIDE, PRESERVATIVE FREE 10 ML: 5 INJECTION INTRAVENOUS at 08:33

## 2021-02-09 RX ADMIN — ONDANSETRON 4 MG: 2 INJECTION INTRAMUSCULAR; INTRAVENOUS at 22:25

## 2021-02-09 RX ADMIN — MEROPENEM 1 G: 1 INJECTION, POWDER, FOR SOLUTION INTRAVENOUS at 00:45

## 2021-02-09 RX ADMIN — TOPIRAMATE 50 MG: 25 TABLET, FILM COATED ORAL at 09:14

## 2021-02-09 RX ADMIN — LINEZOLID 600 MG: 600 INJECTION, SOLUTION INTRAVENOUS at 09:09

## 2021-02-09 RX ADMIN — METHYLPREDNISOLONE SODIUM SUCCINATE 20 MG: 40 INJECTION, POWDER, FOR SOLUTION INTRAMUSCULAR; INTRAVENOUS at 00:21

## 2021-02-09 RX ADMIN — ONDANSETRON 4 MG: 2 INJECTION INTRAMUSCULAR; INTRAVENOUS at 16:07

## 2021-02-09 RX ADMIN — INSULIN HUMAN 2 UNITS: 100 INJECTION, SOLUTION PARENTERAL at 18:34

## 2021-02-09 RX ADMIN — IPRATROPIUM BROMIDE AND ALBUTEROL SULFATE 3 ML: 2.5; .5 SOLUTION RESPIRATORY (INHALATION) at 19:47

## 2021-02-09 RX ADMIN — PANTOPRAZOLE SODIUM 40 MG: 40 INJECTION, POWDER, FOR SOLUTION INTRAVENOUS at 09:13

## 2021-02-09 RX ADMIN — INSULIN HUMAN 3 UNITS: 100 INJECTION, SOLUTION PARENTERAL at 00:21

## 2021-02-09 RX ADMIN — LEVOFLOXACIN 500 MG: 5 INJECTION, SOLUTION INTRAVENOUS at 18:14

## 2021-02-09 RX ADMIN — PREGABALIN 100 MG: 100 CAPSULE ORAL at 09:11

## 2021-02-09 RX ADMIN — ACETAMINOPHEN 650 MG: 325 TABLET, FILM COATED ORAL at 22:25

## 2021-02-09 RX ADMIN — IPRATROPIUM BROMIDE AND ALBUTEROL SULFATE 3 ML: 2.5; .5 SOLUTION RESPIRATORY (INHALATION) at 07:11

## 2021-02-09 RX ADMIN — SODIUM CHLORIDE, PRESERVATIVE FREE 10 ML: 5 INJECTION INTRAVENOUS at 21:54

## 2021-02-09 RX ADMIN — INSULIN HUMAN 2 UNITS: 100 INJECTION, SOLUTION PARENTERAL at 06:33

## 2021-02-09 RX ADMIN — DOCUSATE SODIUM 100 MG: 50 LIQUID ORAL at 21:53

## 2021-02-09 RX ADMIN — PROPOFOL 45 MCG/KG/MIN: 10 INJECTION, EMULSION INTRAVENOUS at 01:05

## 2021-02-09 RX ADMIN — Medication: at 21:55

## 2021-02-09 RX ADMIN — ASPIRIN 325 MG ORAL TABLET 162 MG: 325 PILL ORAL at 09:10

## 2021-02-09 RX ADMIN — PROPOFOL 45 MCG/KG/MIN: 10 INJECTION, EMULSION INTRAVENOUS at 05:24

## 2021-02-09 RX ADMIN — CHLORHEXIDINE GLUCONATE 15 ML: 1.2 SOLUTION ORAL at 09:13

## 2021-02-09 RX ADMIN — POTASSIUM CHLORIDE 20 MEQ: 29.8 INJECTION, SOLUTION INTRAVENOUS at 14:34

## 2021-02-09 RX ADMIN — SERTRALINE 50 MG: 50 TABLET, FILM COATED ORAL at 09:15

## 2021-02-09 RX ADMIN — DOCUSATE SODIUM 100 MG: 50 LIQUID ORAL at 09:13

## 2021-02-09 RX ADMIN — LEVOTHYROXINE SODIUM 112 MCG: 112 TABLET ORAL at 05:24

## 2021-02-09 RX ADMIN — PREDNISONE 40 MG: 20 TABLET ORAL at 15:58

## 2021-02-09 RX ADMIN — POTASSIUM PHOSPHATE, MONOBASIC AND POTASSIUM PHOSPHATE, DIBASIC 15 MMOL: 224; 236 INJECTION, SOLUTION, CONCENTRATE INTRAVENOUS at 18:13

## 2021-02-09 RX ADMIN — OXYCODONE HYDROCHLORIDE 5 MG: 5 SOLUTION ORAL at 18:10

## 2021-02-09 RX ADMIN — ACETAMINOPHEN 650 MG: 325 TABLET, FILM COATED ORAL at 18:24

## 2021-02-09 RX ADMIN — BUSPIRONE HYDROCHLORIDE 5 MG: 5 TABLET ORAL at 05:24

## 2021-02-09 RX ADMIN — BUSPIRONE HYDROCHLORIDE 5 MG: 5 TABLET ORAL at 15:58

## 2021-02-09 RX ADMIN — ATORVASTATIN CALCIUM 40 MG: 40 TABLET, FILM COATED ORAL at 21:53

## 2021-02-09 RX ADMIN — Medication: at 12:14

## 2021-02-09 RX ADMIN — PREGABALIN 100 MG: 100 CAPSULE ORAL at 21:53

## 2021-02-09 RX ADMIN — POTASSIUM CHLORIDE 20 MEQ: 29.8 INJECTION, SOLUTION INTRAVENOUS at 15:53

## 2021-02-09 RX ADMIN — METHYLPREDNISOLONE SODIUM SUCCINATE 20 MG: 40 INJECTION, POWDER, FOR SOLUTION INTRAMUSCULAR; INTRAVENOUS at 05:24

## 2021-02-09 RX ADMIN — BUSPIRONE HYDROCHLORIDE 5 MG: 5 TABLET ORAL at 21:53

## 2021-02-09 RX ADMIN — IPRATROPIUM BROMIDE AND ALBUTEROL SULFATE 3 ML: 2.5; .5 SOLUTION RESPIRATORY (INHALATION) at 01:49

## 2021-02-09 RX ADMIN — SODIUM CHLORIDE, POTASSIUM CHLORIDE, SODIUM LACTATE AND CALCIUM CHLORIDE 75 ML/HR: 600; 310; 30; 20 INJECTION, SOLUTION INTRAVENOUS at 02:44

## 2021-02-10 LAB
ANION GAP SERPL CALCULATED.3IONS-SCNC: 7 MMOL/L (ref 5–15)
ARTERIAL PATENCY WRIST A: ABNORMAL
ATMOSPHERIC PRESS: ABNORMAL MM[HG]
BACTERIA SPEC RESP CULT: NORMAL
BASE EXCESS BLDA CALC-SCNC: 0.8 MMOL/L (ref 0–2)
BDY SITE: ABNORMAL
BODY TEMPERATURE: 37 C
BUN SERPL-MCNC: 30 MG/DL (ref 8–23)
BUN/CREAT SERPL: 25 (ref 7–25)
CALCIUM SPEC-SCNC: 9.5 MG/DL (ref 8.6–10.5)
CHLORIDE SERPL-SCNC: 111 MMOL/L (ref 98–107)
CO2 BLDA-SCNC: 26.2 MMOL/L (ref 22–33)
CO2 SERPL-SCNC: 26 MMOL/L (ref 22–29)
COHGB MFR BLD: 1 % (ref 0–2)
CREAT SERPL-MCNC: 1.2 MG/DL (ref 0.57–1)
DEPRECATED RDW RBC AUTO: 59.2 FL (ref 37–54)
EPAP: 0
ERYTHROCYTE [DISTWIDTH] IN BLOOD BY AUTOMATED COUNT: 17.1 % (ref 12.3–15.4)
GFR SERPL CREATININE-BSD FRML MDRD: 45 ML/MIN/1.73
GLUCOSE BLDC GLUCOMTR-MCNC: 132 MG/DL (ref 70–130)
GLUCOSE BLDC GLUCOMTR-MCNC: 137 MG/DL (ref 70–130)
GLUCOSE BLDC GLUCOMTR-MCNC: 163 MG/DL (ref 70–130)
GLUCOSE BLDC GLUCOMTR-MCNC: 176 MG/DL (ref 70–130)
GLUCOSE SERPL-MCNC: 136 MG/DL (ref 65–99)
GRAM STN SPEC: NORMAL
HCO3 BLDA-SCNC: 25.1 MMOL/L (ref 20–26)
HCT VFR BLD AUTO: 38.2 % (ref 34–46.6)
HCT VFR BLD CALC: 39.2 %
HGB BLD-MCNC: 12.5 G/DL (ref 12–15.9)
HGB BLDA-MCNC: 12.8 G/DL (ref 14–18)
INHALED O2 CONCENTRATION: 25 %
IPAP: 0
MAGNESIUM SERPL-MCNC: 2 MG/DL (ref 1.6–2.4)
MCH RBC QN AUTO: 30.7 PG (ref 26.6–33)
MCHC RBC AUTO-ENTMCNC: 32.7 G/DL (ref 31.5–35.7)
MCV RBC AUTO: 93.9 FL (ref 79–97)
METHGB BLD QL: 0.3 % (ref 0–1.5)
MODALITY: ABNORMAL
NOTE: ABNORMAL
OXYHGB MFR BLDV: 94.3 % (ref 94–99)
PAW @ PEAK INSP FLOW SETTING VENT: 0 CMH2O
PCO2 BLDA: 38 MM HG (ref 35–45)
PCO2 TEMP ADJ BLD: 38 MM HG (ref 35–45)
PH BLDA: 7.43 PH UNITS (ref 7.35–7.45)
PH, TEMP CORRECTED: 7.43 PH UNITS
PHOSPHATE SERPL-MCNC: 3.6 MG/DL (ref 2.5–4.5)
PLATELET # BLD AUTO: 152 10*3/MM3 (ref 140–450)
PMV BLD AUTO: 11.9 FL (ref 6–12)
PO2 BLDA: 73.1 MM HG (ref 83–108)
PO2 TEMP ADJ BLD: 73.1 MM HG (ref 83–108)
POTASSIUM SERPL-SCNC: 4.2 MMOL/L (ref 3.5–5.2)
QT INTERVAL: 316 MS
QT INTERVAL: 320 MS
QT INTERVAL: 366 MS
QTC INTERVAL: 474 MS
QTC INTERVAL: 509 MS
QTC INTERVAL: 512 MS
RBC # BLD AUTO: 4.07 10*6/MM3 (ref 3.77–5.28)
SODIUM SERPL-SCNC: 144 MMOL/L (ref 136–145)
TOTAL RATE: 0 BREATHS/MINUTE
WBC # BLD AUTO: 18.1 10*3/MM3 (ref 3.4–10.8)

## 2021-02-10 PROCEDURE — 93005 ELECTROCARDIOGRAM TRACING: CPT | Performed by: INTERNAL MEDICINE

## 2021-02-10 PROCEDURE — 94660 CPAP INITIATION&MGMT: CPT

## 2021-02-10 PROCEDURE — 25010000002 FUROSEMIDE PER 20 MG: Performed by: INTERNAL MEDICINE

## 2021-02-10 PROCEDURE — 25010000002 ADENOSINE PER 6 MG: Performed by: NURSE PRACTITIONER

## 2021-02-10 PROCEDURE — 94799 UNLISTED PULMONARY SVC/PX: CPT

## 2021-02-10 PROCEDURE — 93005 ELECTROCARDIOGRAM TRACING: CPT | Performed by: NURSE PRACTITIONER

## 2021-02-10 PROCEDURE — 82962 GLUCOSE BLOOD TEST: CPT

## 2021-02-10 PROCEDURE — 82805 BLOOD GASES W/O2 SATURATION: CPT

## 2021-02-10 PROCEDURE — 93010 ELECTROCARDIOGRAM REPORT: CPT | Performed by: INTERNAL MEDICINE

## 2021-02-10 PROCEDURE — 36600 WITHDRAWAL OF ARTERIAL BLOOD: CPT

## 2021-02-10 PROCEDURE — 80048 BASIC METABOLIC PNL TOTAL CA: CPT | Performed by: INTERNAL MEDICINE

## 2021-02-10 PROCEDURE — 82375 ASSAY CARBOXYHB QUANT: CPT

## 2021-02-10 PROCEDURE — 99233 SBSQ HOSP IP/OBS HIGH 50: CPT | Performed by: INTERNAL MEDICINE

## 2021-02-10 PROCEDURE — 63710000001 INSULIN LISPRO (HUMAN) PER 5 UNITS

## 2021-02-10 PROCEDURE — 83735 ASSAY OF MAGNESIUM: CPT | Performed by: INTERNAL MEDICINE

## 2021-02-10 PROCEDURE — 85027 COMPLETE CBC AUTOMATED: CPT | Performed by: INTERNAL MEDICINE

## 2021-02-10 PROCEDURE — 63710000001 PREDNISONE PER 1 MG: Performed by: INTERNAL MEDICINE

## 2021-02-10 PROCEDURE — 84100 ASSAY OF PHOSPHORUS: CPT | Performed by: INTERNAL MEDICINE

## 2021-02-10 PROCEDURE — 83050 HGB METHEMOGLOBIN QUAN: CPT

## 2021-02-10 RX ORDER — TOPIRAMATE 25 MG/1
50 TABLET ORAL EVERY 12 HOURS SCHEDULED
Status: DISCONTINUED | OUTPATIENT
Start: 2021-02-10 | End: 2021-02-12 | Stop reason: HOSPADM

## 2021-02-10 RX ORDER — DILTIAZEM HCL IN NACL,ISO-OSM 125 MG/125
5-15 PLASTIC BAG, INJECTION (ML) INTRAVENOUS
Status: DISCONTINUED | OUTPATIENT
Start: 2021-02-10 | End: 2021-02-11

## 2021-02-10 RX ORDER — LEVOTHYROXINE SODIUM 112 UG/1
112 TABLET ORAL
Status: DISCONTINUED | OUTPATIENT
Start: 2021-02-11 | End: 2021-02-12 | Stop reason: HOSPADM

## 2021-02-10 RX ORDER — PREDNISONE 20 MG/1
40 TABLET ORAL DAILY
Status: DISCONTINUED | OUTPATIENT
Start: 2021-02-11 | End: 2021-02-12 | Stop reason: HOSPADM

## 2021-02-10 RX ORDER — ECHINACEA PURPUREA EXTRACT 125 MG
1 TABLET ORAL AS NEEDED
Status: DISCONTINUED | OUTPATIENT
Start: 2021-02-10 | End: 2021-02-12 | Stop reason: HOSPADM

## 2021-02-10 RX ORDER — CLONAZEPAM 0.5 MG/1
0.5 TABLET ORAL 2 TIMES DAILY PRN
Status: DISCONTINUED | OUTPATIENT
Start: 2021-02-10 | End: 2021-02-12 | Stop reason: HOSPADM

## 2021-02-10 RX ORDER — DILTIAZEM HYDROCHLORIDE 5 MG/ML
10 INJECTION INTRAVENOUS ONCE
Status: COMPLETED | OUTPATIENT
Start: 2021-02-10 | End: 2021-02-10

## 2021-02-10 RX ORDER — FUROSEMIDE 10 MG/ML
40 INJECTION INTRAMUSCULAR; INTRAVENOUS ONCE
Status: COMPLETED | OUTPATIENT
Start: 2021-02-10 | End: 2021-02-10

## 2021-02-10 RX ORDER — PANTOPRAZOLE SODIUM 40 MG/1
40 TABLET, DELAYED RELEASE ORAL DAILY
Status: DISCONTINUED | OUTPATIENT
Start: 2021-02-11 | End: 2021-02-12 | Stop reason: HOSPADM

## 2021-02-10 RX ORDER — DOCUSATE SODIUM 50 MG/5 ML
100 LIQUID (ML) ORAL 2 TIMES DAILY
Status: DISCONTINUED | OUTPATIENT
Start: 2021-02-10 | End: 2021-02-12

## 2021-02-10 RX ORDER — ATORVASTATIN CALCIUM 40 MG/1
40 TABLET, FILM COATED ORAL NIGHTLY
Status: DISCONTINUED | OUTPATIENT
Start: 2021-02-10 | End: 2021-02-12 | Stop reason: HOSPADM

## 2021-02-10 RX ORDER — ALBUTEROL SULFATE 2.5 MG/3ML
2.5 SOLUTION RESPIRATORY (INHALATION) EVERY 6 HOURS PRN
Status: DISCONTINUED | OUTPATIENT
Start: 2021-02-10 | End: 2021-02-12 | Stop reason: HOSPADM

## 2021-02-10 RX ORDER — PREGABALIN 100 MG/1
100 CAPSULE ORAL EVERY 8 HOURS SCHEDULED
Status: DISCONTINUED | OUTPATIENT
Start: 2021-02-10 | End: 2021-02-12 | Stop reason: HOSPADM

## 2021-02-10 RX ORDER — BUSPIRONE HYDROCHLORIDE 10 MG/1
5 TABLET ORAL EVERY 8 HOURS SCHEDULED
Status: DISCONTINUED | OUTPATIENT
Start: 2021-02-10 | End: 2021-02-12 | Stop reason: HOSPADM

## 2021-02-10 RX ORDER — ADENOSINE 3 MG/ML
6 INJECTION, SOLUTION INTRAVENOUS ONCE
Status: COMPLETED | OUTPATIENT
Start: 2021-02-10 | End: 2021-02-10

## 2021-02-10 RX ORDER — OXYCODONE HYDROCHLORIDE 5 MG/1
5 TABLET ORAL EVERY 6 HOURS SCHEDULED
Status: DISCONTINUED | OUTPATIENT
Start: 2021-02-10 | End: 2021-02-12 | Stop reason: HOSPADM

## 2021-02-10 RX ORDER — FUROSEMIDE 40 MG/1
40 TABLET ORAL DAILY
Status: DISCONTINUED | OUTPATIENT
Start: 2021-02-11 | End: 2021-02-12 | Stop reason: HOSPADM

## 2021-02-10 RX ORDER — PREGABALIN 100 MG/1
100 CAPSULE ORAL EVERY 8 HOURS
Status: DISCONTINUED | OUTPATIENT
Start: 2021-02-10 | End: 2021-02-10

## 2021-02-10 RX ORDER — DILTIAZEM HYDROCHLORIDE 120 MG/1
240 CAPSULE, COATED, EXTENDED RELEASE ORAL
Status: DISCONTINUED | OUTPATIENT
Start: 2021-02-10 | End: 2021-02-12 | Stop reason: HOSPADM

## 2021-02-10 RX ADMIN — ACETAMINOPHEN 650 MG: 325 TABLET, FILM COATED ORAL at 02:32

## 2021-02-10 RX ADMIN — PANTOPRAZOLE SODIUM 40 MG: 40 INJECTION, POWDER, FOR SOLUTION INTRAVENOUS at 08:23

## 2021-02-10 RX ADMIN — OXYCODONE 5 MG: 5 TABLET ORAL at 18:06

## 2021-02-10 RX ADMIN — DOCUSATE SODIUM 100 MG: 50 LIQUID ORAL at 20:15

## 2021-02-10 RX ADMIN — PREGABALIN 100 MG: 100 CAPSULE ORAL at 16:19

## 2021-02-10 RX ADMIN — ADENOSINE 6 MG: 3 INJECTION, SOLUTION INTRAVENOUS at 12:30

## 2021-02-10 RX ADMIN — BUSPIRONE HYDROCHLORIDE 5 MG: 5 TABLET ORAL at 04:54

## 2021-02-10 RX ADMIN — BUSPIRONE HYDROCHLORIDE 5 MG: 5 TABLET ORAL at 13:30

## 2021-02-10 RX ADMIN — PREGABALIN 100 MG: 100 CAPSULE ORAL at 22:23

## 2021-02-10 RX ADMIN — PREDNISONE 40 MG: 20 TABLET ORAL at 08:23

## 2021-02-10 RX ADMIN — SERTRALINE 50 MG: 50 TABLET, FILM COATED ORAL at 08:22

## 2021-02-10 RX ADMIN — TOPIRAMATE 50 MG: 25 TABLET, FILM COATED ORAL at 08:22

## 2021-02-10 RX ADMIN — GUAIFENESIN 200 MG: 100 SOLUTION ORAL at 10:50

## 2021-02-10 RX ADMIN — INSULIN LISPRO 2 UNITS: 100 INJECTION, SOLUTION INTRAVENOUS; SUBCUTANEOUS at 18:05

## 2021-02-10 RX ADMIN — PREGABALIN 100 MG: 100 CAPSULE ORAL at 08:22

## 2021-02-10 RX ADMIN — DILTIAZEM HYDROCHLORIDE 240 MG: 240 CAPSULE, EXTENDED RELEASE ORAL at 11:39

## 2021-02-10 RX ADMIN — LEVOTHYROXINE SODIUM 112 MCG: 112 TABLET ORAL at 04:54

## 2021-02-10 RX ADMIN — ALBUTEROL SULFATE 2.5 MG: 2.5 SOLUTION RESPIRATORY (INHALATION) at 16:10

## 2021-02-10 RX ADMIN — TOPIRAMATE 50 MG: 25 TABLET, FILM COATED ORAL at 20:15

## 2021-02-10 RX ADMIN — BUSPIRONE HYDROCHLORIDE 5 MG: 10 TABLET ORAL at 22:23

## 2021-02-10 RX ADMIN — Medication 5 MG/HR: at 13:24

## 2021-02-10 RX ADMIN — OXYCODONE HYDROCHLORIDE 5 MG: 5 SOLUTION ORAL at 04:54

## 2021-02-10 RX ADMIN — OXYCODONE HYDROCHLORIDE 5 MG: 5 SOLUTION ORAL at 00:12

## 2021-02-10 RX ADMIN — ATORVASTATIN CALCIUM 40 MG: 40 TABLET, FILM COATED ORAL at 20:15

## 2021-02-10 RX ADMIN — DILTIAZEM HYDROCHLORIDE 10 MG: 5 INJECTION INTRAVENOUS at 11:46

## 2021-02-10 RX ADMIN — IPRATROPIUM BROMIDE AND ALBUTEROL SULFATE 3 ML: 2.5; .5 SOLUTION RESPIRATORY (INHALATION) at 00:45

## 2021-02-10 RX ADMIN — SODIUM CHLORIDE, PRESERVATIVE FREE 10 ML: 5 INJECTION INTRAVENOUS at 08:23

## 2021-02-10 RX ADMIN — SODIUM CHLORIDE, PRESERVATIVE FREE 10 ML: 5 INJECTION INTRAVENOUS at 20:16

## 2021-02-10 RX ADMIN — OXYCODONE 5 MG: 5 TABLET ORAL at 23:33

## 2021-02-10 RX ADMIN — INSULIN LISPRO 2 UNITS: 100 INJECTION, SOLUTION INTRAVENOUS; SUBCUTANEOUS at 13:27

## 2021-02-10 RX ADMIN — OXYCODONE 5 MG: 5 TABLET ORAL at 13:30

## 2021-02-10 RX ADMIN — FUROSEMIDE 40 MG: 10 INJECTION INTRAMUSCULAR; INTRAVENOUS at 11:11

## 2021-02-10 RX ADMIN — IPRATROPIUM BROMIDE AND ALBUTEROL SULFATE 3 ML: 2.5; .5 SOLUTION RESPIRATORY (INHALATION) at 07:20

## 2021-02-10 RX ADMIN — ASPIRIN 325 MG ORAL TABLET 162 MG: 325 PILL ORAL at 08:22

## 2021-02-10 RX ADMIN — DOCUSATE SODIUM 100 MG: 50 LIQUID ORAL at 08:23

## 2021-02-11 LAB
ANION GAP SERPL CALCULATED.3IONS-SCNC: 8 MMOL/L (ref 5–15)
BUN SERPL-MCNC: 33 MG/DL (ref 8–23)
BUN/CREAT SERPL: 29.7 (ref 7–25)
CALCIUM SPEC-SCNC: 9.4 MG/DL (ref 8.6–10.5)
CHLORIDE SERPL-SCNC: 109 MMOL/L (ref 98–107)
CO2 SERPL-SCNC: 28 MMOL/L (ref 22–29)
CREAT SERPL-MCNC: 1.11 MG/DL (ref 0.57–1)
DEPRECATED RDW RBC AUTO: 60.1 FL (ref 37–54)
ERYTHROCYTE [DISTWIDTH] IN BLOOD BY AUTOMATED COUNT: 16.9 % (ref 12.3–15.4)
GFR SERPL CREATININE-BSD FRML MDRD: 49 ML/MIN/1.73
GLUCOSE BLDC GLUCOMTR-MCNC: 124 MG/DL (ref 70–130)
GLUCOSE BLDC GLUCOMTR-MCNC: 133 MG/DL (ref 70–130)
GLUCOSE BLDC GLUCOMTR-MCNC: 140 MG/DL (ref 70–130)
GLUCOSE BLDC GLUCOMTR-MCNC: 162 MG/DL (ref 70–130)
GLUCOSE SERPL-MCNC: 107 MG/DL (ref 65–99)
HCT VFR BLD AUTO: 37.7 % (ref 34–46.6)
HGB BLD-MCNC: 11.9 G/DL (ref 12–15.9)
MAGNESIUM SERPL-MCNC: 2.1 MG/DL (ref 1.6–2.4)
MCH RBC QN AUTO: 30.5 PG (ref 26.6–33)
MCHC RBC AUTO-ENTMCNC: 31.6 G/DL (ref 31.5–35.7)
MCV RBC AUTO: 96.7 FL (ref 79–97)
PHOSPHATE SERPL-MCNC: 3.2 MG/DL (ref 2.5–4.5)
PLATELET # BLD AUTO: 125 10*3/MM3 (ref 140–450)
PMV BLD AUTO: 11.7 FL (ref 6–12)
POTASSIUM SERPL-SCNC: 3.8 MMOL/L (ref 3.5–5.2)
RBC # BLD AUTO: 3.9 10*6/MM3 (ref 3.77–5.28)
SODIUM SERPL-SCNC: 145 MMOL/L (ref 136–145)
WBC # BLD AUTO: 12.47 10*3/MM3 (ref 3.4–10.8)

## 2021-02-11 PROCEDURE — 97165 OT EVAL LOW COMPLEX 30 MIN: CPT

## 2021-02-11 PROCEDURE — 84100 ASSAY OF PHOSPHORUS: CPT | Performed by: INTERNAL MEDICINE

## 2021-02-11 PROCEDURE — 85027 COMPLETE CBC AUTOMATED: CPT | Performed by: INTERNAL MEDICINE

## 2021-02-11 PROCEDURE — 83735 ASSAY OF MAGNESIUM: CPT | Performed by: INTERNAL MEDICINE

## 2021-02-11 PROCEDURE — 99233 SBSQ HOSP IP/OBS HIGH 50: CPT | Performed by: INTERNAL MEDICINE

## 2021-02-11 PROCEDURE — 94799 UNLISTED PULMONARY SVC/PX: CPT

## 2021-02-11 PROCEDURE — 97162 PT EVAL MOD COMPLEX 30 MIN: CPT

## 2021-02-11 PROCEDURE — 63710000001 PREDNISONE PER 1 MG

## 2021-02-11 PROCEDURE — 94660 CPAP INITIATION&MGMT: CPT

## 2021-02-11 PROCEDURE — 80048 BASIC METABOLIC PNL TOTAL CA: CPT | Performed by: INTERNAL MEDICINE

## 2021-02-11 PROCEDURE — 25010000002 LEVOFLOXACIN PER 250 MG: Performed by: INTERNAL MEDICINE

## 2021-02-11 PROCEDURE — 82962 GLUCOSE BLOOD TEST: CPT

## 2021-02-11 PROCEDURE — 63710000001 INSULIN LISPRO (HUMAN) PER 5 UNITS: Performed by: INTERNAL MEDICINE

## 2021-02-11 RX ADMIN — SALINE NASAL SPRAY 1 SPRAY: 1.5 SOLUTION NASAL at 08:45

## 2021-02-11 RX ADMIN — DILTIAZEM HYDROCHLORIDE 240 MG: 240 CAPSULE, EXTENDED RELEASE ORAL at 08:45

## 2021-02-11 RX ADMIN — BUSPIRONE HYDROCHLORIDE 5 MG: 10 TABLET ORAL at 05:23

## 2021-02-11 RX ADMIN — LEVOFLOXACIN 500 MG: 5 INJECTION, SOLUTION INTRAVENOUS at 17:28

## 2021-02-11 RX ADMIN — APIXABAN 10 MG: 5 TABLET, FILM COATED ORAL at 08:44

## 2021-02-11 RX ADMIN — BUSPIRONE HYDROCHLORIDE 5 MG: 10 TABLET ORAL at 21:24

## 2021-02-11 RX ADMIN — OXYCODONE 5 MG: 5 TABLET ORAL at 17:27

## 2021-02-11 RX ADMIN — ATORVASTATIN CALCIUM 40 MG: 40 TABLET, FILM COATED ORAL at 21:23

## 2021-02-11 RX ADMIN — PREGABALIN 100 MG: 100 CAPSULE ORAL at 05:23

## 2021-02-11 RX ADMIN — OXYCODONE 5 MG: 5 TABLET ORAL at 05:24

## 2021-02-11 RX ADMIN — IPRATROPIUM BROMIDE AND ALBUTEROL SULFATE 3 ML: 2.5; .5 SOLUTION RESPIRATORY (INHALATION) at 14:17

## 2021-02-11 RX ADMIN — INSULIN LISPRO 2 UNITS: 100 INJECTION, SOLUTION INTRAVENOUS; SUBCUTANEOUS at 11:57

## 2021-02-11 RX ADMIN — ASPIRIN 325 MG ORAL TABLET 162 MG: 325 PILL ORAL at 08:45

## 2021-02-11 RX ADMIN — TOPIRAMATE 50 MG: 25 TABLET, FILM COATED ORAL at 21:23

## 2021-02-11 RX ADMIN — PREGABALIN 100 MG: 100 CAPSULE ORAL at 15:39

## 2021-02-11 RX ADMIN — IPRATROPIUM BROMIDE AND ALBUTEROL SULFATE 3 ML: 2.5; .5 SOLUTION RESPIRATORY (INHALATION) at 09:11

## 2021-02-11 RX ADMIN — FUROSEMIDE 40 MG: 40 TABLET ORAL at 08:45

## 2021-02-11 RX ADMIN — PANTOPRAZOLE SODIUM 40 MG: 40 TABLET, DELAYED RELEASE ORAL at 08:44

## 2021-02-11 RX ADMIN — DOCUSATE SODIUM 100 MG: 50 LIQUID ORAL at 21:22

## 2021-02-11 RX ADMIN — LEVOTHYROXINE SODIUM 112 MCG: 112 TABLET ORAL at 05:23

## 2021-02-11 RX ADMIN — TOPIRAMATE 50 MG: 25 TABLET, FILM COATED ORAL at 08:45

## 2021-02-11 RX ADMIN — PREGABALIN 100 MG: 100 CAPSULE ORAL at 21:23

## 2021-02-11 RX ADMIN — PREDNISONE 40 MG: 20 TABLET ORAL at 08:44

## 2021-02-11 RX ADMIN — APIXABAN 10 MG: 5 TABLET, FILM COATED ORAL at 21:23

## 2021-02-11 RX ADMIN — BUSPIRONE HYDROCHLORIDE 5 MG: 10 TABLET ORAL at 15:39

## 2021-02-11 RX ADMIN — OXYCODONE 5 MG: 5 TABLET ORAL at 11:57

## 2021-02-11 RX ADMIN — ALBUTEROL SULFATE 2.5 MG: 2.5 SOLUTION RESPIRATORY (INHALATION) at 23:41

## 2021-02-11 RX ADMIN — SERTRALINE HYDROCHLORIDE 50 MG: 50 TABLET ORAL at 08:45

## 2021-02-12 ENCOUNTER — READMISSION MANAGEMENT (OUTPATIENT)
Dept: CALL CENTER | Facility: HOSPITAL | Age: 66
End: 2021-02-12

## 2021-02-12 ENCOUNTER — TELEPHONE (OUTPATIENT)
Dept: INTERNAL MEDICINE | Facility: CLINIC | Age: 66
End: 2021-02-12

## 2021-02-12 VITALS
WEIGHT: 198 LBS | HEART RATE: 70 BPM | OXYGEN SATURATION: 95 % | BODY MASS INDEX: 31.82 KG/M2 | TEMPERATURE: 98.8 F | DIASTOLIC BLOOD PRESSURE: 61 MMHG | RESPIRATION RATE: 18 BRPM | HEIGHT: 66 IN | SYSTOLIC BLOOD PRESSURE: 122 MMHG

## 2021-02-12 LAB
ANION GAP SERPL CALCULATED.3IONS-SCNC: 10 MMOL/L (ref 5–15)
BACTERIA SPEC AEROBE CULT: NORMAL
BACTERIA SPEC AEROBE CULT: NORMAL
BUN SERPL-MCNC: 32 MG/DL (ref 8–23)
BUN/CREAT SERPL: 28.3 (ref 7–25)
CALCIUM SPEC-SCNC: 9.4 MG/DL (ref 8.6–10.5)
CHLORIDE SERPL-SCNC: 103 MMOL/L (ref 98–107)
CO2 SERPL-SCNC: 28 MMOL/L (ref 22–29)
CREAT SERPL-MCNC: 1.13 MG/DL (ref 0.57–1)
DEPRECATED RDW RBC AUTO: 56.1 FL (ref 37–54)
ERYTHROCYTE [DISTWIDTH] IN BLOOD BY AUTOMATED COUNT: 16.1 % (ref 12.3–15.4)
GFR SERPL CREATININE-BSD FRML MDRD: 48 ML/MIN/1.73
GLUCOSE BLDC GLUCOMTR-MCNC: 110 MG/DL (ref 70–130)
GLUCOSE BLDC GLUCOMTR-MCNC: 236 MG/DL (ref 70–130)
GLUCOSE SERPL-MCNC: 95 MG/DL (ref 65–99)
HCT VFR BLD AUTO: 36.4 % (ref 34–46.6)
HGB BLD-MCNC: 11.7 G/DL (ref 12–15.9)
MCH RBC QN AUTO: 30.4 PG (ref 26.6–33)
MCHC RBC AUTO-ENTMCNC: 32.1 G/DL (ref 31.5–35.7)
MCV RBC AUTO: 94.5 FL (ref 79–97)
PLATELET # BLD AUTO: 130 10*3/MM3 (ref 140–450)
PMV BLD AUTO: 12.1 FL (ref 6–12)
POTASSIUM SERPL-SCNC: 3.4 MMOL/L (ref 3.5–5.2)
RBC # BLD AUTO: 3.85 10*6/MM3 (ref 3.77–5.28)
SODIUM SERPL-SCNC: 141 MMOL/L (ref 136–145)
WBC # BLD AUTO: 11.57 10*3/MM3 (ref 3.4–10.8)

## 2021-02-12 PROCEDURE — 63710000001 PREDNISONE PER 1 MG

## 2021-02-12 PROCEDURE — 94799 UNLISTED PULMONARY SVC/PX: CPT

## 2021-02-12 PROCEDURE — 99239 HOSP IP/OBS DSCHRG MGMT >30: CPT | Performed by: INTERNAL MEDICINE

## 2021-02-12 PROCEDURE — 63710000001 INSULIN LISPRO (HUMAN) PER 5 UNITS: Performed by: INTERNAL MEDICINE

## 2021-02-12 PROCEDURE — 80048 BASIC METABOLIC PNL TOTAL CA: CPT | Performed by: INTERNAL MEDICINE

## 2021-02-12 PROCEDURE — 85027 COMPLETE CBC AUTOMATED: CPT | Performed by: INTERNAL MEDICINE

## 2021-02-12 PROCEDURE — 82962 GLUCOSE BLOOD TEST: CPT

## 2021-02-12 RX ORDER — LEVOFLOXACIN 5 MG/ML
500 INJECTION, SOLUTION INTRAVENOUS EVERY 24 HOURS
Status: DISCONTINUED | OUTPATIENT
Start: 2021-02-12 | End: 2021-02-12 | Stop reason: HOSPADM

## 2021-02-12 RX ORDER — POTASSIUM CHLORIDE 1.5 G/1.77G
40 POWDER, FOR SOLUTION ORAL AS NEEDED
Status: DISCONTINUED | OUTPATIENT
Start: 2021-02-12 | End: 2021-02-12 | Stop reason: HOSPADM

## 2021-02-12 RX ORDER — LEVOFLOXACIN 750 MG/1
750 TABLET ORAL DAILY
Qty: 1 TABLET | Refills: 0 | Status: SHIPPED | OUTPATIENT
Start: 2021-02-12 | End: 2021-02-15

## 2021-02-12 RX ORDER — DOCUSATE SODIUM 100 MG/1
100 CAPSULE, LIQUID FILLED ORAL 2 TIMES DAILY
Status: DISCONTINUED | OUTPATIENT
Start: 2021-02-12 | End: 2021-02-12 | Stop reason: HOSPADM

## 2021-02-12 RX ORDER — PREDNISONE 20 MG/1
40 TABLET ORAL DAILY
Qty: 2 TABLET | Refills: 0 | Status: SHIPPED | OUTPATIENT
Start: 2021-02-13 | End: 2021-02-15

## 2021-02-12 RX ORDER — POTASSIUM CHLORIDE 750 MG/1
40 CAPSULE, EXTENDED RELEASE ORAL AS NEEDED
Status: DISCONTINUED | OUTPATIENT
Start: 2021-02-12 | End: 2021-02-12 | Stop reason: HOSPADM

## 2021-02-12 RX ADMIN — LEVOTHYROXINE SODIUM 112 MCG: 112 TABLET ORAL at 06:16

## 2021-02-12 RX ADMIN — SODIUM CHLORIDE, PRESERVATIVE FREE 10 ML: 5 INJECTION INTRAVENOUS at 00:26

## 2021-02-12 RX ADMIN — OXYCODONE 5 MG: 5 TABLET ORAL at 06:16

## 2021-02-12 RX ADMIN — POTASSIUM CHLORIDE 40 MEQ: 10 CAPSULE, COATED, EXTENDED RELEASE ORAL at 11:09

## 2021-02-12 RX ADMIN — BUSPIRONE HYDROCHLORIDE 5 MG: 10 TABLET ORAL at 06:17

## 2021-02-12 RX ADMIN — PANTOPRAZOLE SODIUM 40 MG: 40 TABLET, DELAYED RELEASE ORAL at 08:43

## 2021-02-12 RX ADMIN — IPRATROPIUM BROMIDE AND ALBUTEROL SULFATE 3 ML: 2.5; .5 SOLUTION RESPIRATORY (INHALATION) at 08:13

## 2021-02-12 RX ADMIN — PREGABALIN 100 MG: 100 CAPSULE ORAL at 06:17

## 2021-02-12 RX ADMIN — SERTRALINE HYDROCHLORIDE 50 MG: 50 TABLET ORAL at 08:45

## 2021-02-12 RX ADMIN — TOPIRAMATE 50 MG: 25 TABLET, FILM COATED ORAL at 08:44

## 2021-02-12 RX ADMIN — PREDNISONE 40 MG: 20 TABLET ORAL at 08:44

## 2021-02-12 RX ADMIN — ASPIRIN 325 MG ORAL TABLET 162 MG: 325 PILL ORAL at 08:43

## 2021-02-12 RX ADMIN — IPRATROPIUM BROMIDE AND ALBUTEROL SULFATE 3 ML: 2.5; .5 SOLUTION RESPIRATORY (INHALATION) at 14:00

## 2021-02-12 RX ADMIN — DILTIAZEM HYDROCHLORIDE 240 MG: 240 CAPSULE, EXTENDED RELEASE ORAL at 08:43

## 2021-02-12 RX ADMIN — DOCUSATE SODIUM 100 MG: 100 CAPSULE, LIQUID FILLED ORAL at 11:10

## 2021-02-12 RX ADMIN — BUSPIRONE HYDROCHLORIDE 5 MG: 10 TABLET ORAL at 13:19

## 2021-02-12 RX ADMIN — OXYCODONE 5 MG: 5 TABLET ORAL at 00:25

## 2021-02-12 RX ADMIN — APIXABAN 10 MG: 5 TABLET, FILM COATED ORAL at 08:44

## 2021-02-12 RX ADMIN — POTASSIUM CHLORIDE 40 MEQ: 10 CAPSULE, COATED, EXTENDED RELEASE ORAL at 14:39

## 2021-02-12 RX ADMIN — FUROSEMIDE 40 MG: 40 TABLET ORAL at 08:42

## 2021-02-12 RX ADMIN — INSULIN LISPRO 3 UNITS: 100 INJECTION, SOLUTION INTRAVENOUS; SUBCUTANEOUS at 13:19

## 2021-02-12 RX ADMIN — PREGABALIN 100 MG: 100 CAPSULE ORAL at 13:19

## 2021-02-12 RX ADMIN — OXYCODONE 5 MG: 5 TABLET ORAL at 11:10

## 2021-02-12 NOTE — TELEPHONE ENCOUNTER
KAELYN WITH [facility] CALLED TO SET UP HOSPITAL FOLLOW UP FOR PATIENT. THERE ARE NO AVAILABLE APPOINTMENTS FOR DOCTOR DE PAZ WITH IN THE 7 DAY TIME FRAME. I SCHEDULED APPOINTMENT WITH JEOVANY FARIA FOR 02/15/2021

## 2021-02-12 NOTE — OUTREACH NOTE
Prep Survey      Responses   [facility] facility patient discharged from?  [facility]   Is LACE score < 7 ?  No   Emergency Room discharge w/ pulse ox?  No   Eligibility  TCM   [facility]   Date of Admission  02/07/21   Date of Discharge  02/12/21   Discharge Disposition  Home or Self Care   Discharge diagnosis     A/C mixed respiratory failure due to underlying COPD and narcotics, Acute RLE DVT, A-fib                 Does the patient have one of the following disease processes/diagnoses(primary or secondary)?  Other   Does the patient have Home health ordered?  Yes   What is the Home health agency?   [facility]   Is there a DME ordered?  Yes   What DME was ordered?  O2 from [facility]   Prep survey completed?  Yes          Ina Phillips RN

## 2021-02-15 ENCOUNTER — TRANSITIONAL CARE MANAGEMENT TELEPHONE ENCOUNTER (OUTPATIENT)
Dept: CALL CENTER | Facility: HOSPITAL | Age: 66
End: 2021-02-15

## 2021-02-15 ENCOUNTER — TELEPHONE (OUTPATIENT)
Dept: INTERNAL MEDICINE | Facility: CLINIC | Age: 66
End: 2021-02-15

## 2021-02-15 ENCOUNTER — OFFICE VISIT (OUTPATIENT)
Dept: INTERNAL MEDICINE | Facility: CLINIC | Age: 66
End: 2021-02-15

## 2021-02-15 DIAGNOSIS — J43.9 PULMONARY EMPHYSEMA, UNSPECIFIED EMPHYSEMA TYPE (HCC): Chronic | ICD-10-CM

## 2021-02-15 DIAGNOSIS — I10 ESSENTIAL HYPERTENSION: Chronic | ICD-10-CM

## 2021-02-15 DIAGNOSIS — G89.4 CHRONIC PAIN SYNDROME: ICD-10-CM

## 2021-02-15 DIAGNOSIS — F41.8 MIXED ANXIETY DEPRESSIVE DISORDER: Chronic | ICD-10-CM

## 2021-02-15 DIAGNOSIS — I50.30 HEART FAILURE WITH PRESERVED EJECTION FRACTION, UNSPECIFIED HF CHRONICITY (HCC): Primary | Chronic | ICD-10-CM

## 2021-02-15 DIAGNOSIS — K21.9 GASTROESOPHAGEAL REFLUX DISEASE WITHOUT ESOPHAGITIS: Chronic | ICD-10-CM

## 2021-02-15 DIAGNOSIS — F11.90 CHRONIC, CONTINUOUS USE OF OPIOIDS: Chronic | ICD-10-CM

## 2021-02-15 DIAGNOSIS — I82.441 ACUTE DEEP VEIN THROMBOSIS (DVT) OF TIBIAL VEIN OF RIGHT LOWER EXTREMITY (HCC): ICD-10-CM

## 2021-02-15 DIAGNOSIS — E03.8 OTHER SPECIFIED HYPOTHYROIDISM: Chronic | ICD-10-CM

## 2021-02-15 PROCEDURE — 99442 PR PHYS/QHP TELEPHONE EVALUATION 11-20 MIN: CPT | Performed by: INTERNAL MEDICINE

## 2021-02-15 RX ORDER — OXYCODONE HYDROCHLORIDE AND ACETAMINOPHEN 5; 325 MG/1; MG/1
1 TABLET ORAL EVERY 8 HOURS PRN
Qty: 120 TABLET | Refills: 0
Start: 2021-02-15 | End: 2021-03-04 | Stop reason: SDUPTHER

## 2021-02-15 RX ORDER — ATORVASTATIN CALCIUM 40 MG/1
TABLET, FILM COATED ORAL
Qty: 90 TABLET | Refills: 3 | Status: SHIPPED | OUTPATIENT
Start: 2021-02-15 | End: 2021-05-14 | Stop reason: SDUPTHER

## 2021-02-15 NOTE — TELEPHONE ENCOUNTER
Pt would like an update on Rx request Atorvastatin Calcium 40 MG on 2/13. Pt stated to please make it for 90 days.  confirmed pharmacy pickup location at [place] [address] [address]

## 2021-02-15 NOTE — OUTREACH NOTE
Call Center TCM Note      Responses   [facility] patient discharged from?  [facility]   Does the patient have one of the following disease processes/diagnoses(primary or secondary)?  Other   TCM attempt successful?  No   Revoked Reason  Other [Patient having telephone appt with PCP Dr Aranda this am. No call needed today. ]          Marbella Grissom RN    2/15/2021, 10:35 EST

## 2021-02-15 NOTE — PROGRESS NOTES
Patient is a 65 y.o. female who is here for a follow up of No chief complaint on file.      You have chosen to receive care through a telephone visit. Do you consent to use a telephone visit for your medical care today? Yes    HPI:    Patient called for evaluation after recent hospitalization A/C respiratory failure.  Felt to be secondary to having 2 patches on.  Breathing is still an issue.  Back to smoking, a little bit.  Was sent home on Levaquin.  Found to have DVT on right LE.  Sleeping is not the best.      History:     Patient Active Problem List   Diagnosis   • COPD in active smoker on home O2   • Mixed anxiety depressive disorder   • GERD   • HTN and diastolic dysfunction   • Hypothyroidism   • Morbidly obese   • Tobacco abuse   • PVD   • Chronic, continuous use of opioids   • CAD s/p multiple stents   • Hx takotsubo cardiomyopathy   • Severe AS s/p TAVR 1/23/20   • Seasonal allergic rhinitis due to pollen   • Chronic left shoulder pain   • Neck pain   • Encounter for Medicare annual wellness exam   • A/C mixed respiratory failure due to underlying COPD and narcotics   • DINA on BiPAP   • VHD; mild ND, mod TR   • PAH.  Multifactorial related to diastolic dysfunction, obesity, DINA, and COPD   • HFpEF   • Ashton coma scale total score 3-8 (CMS/HCC)   • ELIF. Creatinine 2.09 this admission, baseline 1.19   • Acute DVT of tibial vein of right lower extremity (CMS/HCC)       Past Medical History:   Diagnosis Date   • Altered mental status 2/13/2018   • Bronchitis    • Cellulitis of sacral region 2/13/2018   • Cervical cancer (CMS/HCC)    • Chronic anxiety 2/27/2017   • Chronic bronchitis (CMS/HCC)    • Chronic respiratory failure with hypoxia (CMS/HCC) 3/8/2018   • Chronic systolic heart failure (CMS/HCC)    • Chronically on benzodiazepine therapy 2/27/2017   • Coronary artery disease    • Degenerative arthritis    • Diabetes mellitus (CMS/HCC)    • Dyslipidemia 5/11/2016   • Dyspnea    • Fatty liver disease,  nonalcoholic 11/21/2016   • Fibromyalgia    • GERD (gastroesophageal reflux disease)    • H/O echocardiogram    • History of pneumonia    • Hypertension 5/11/2016    16. H/O echocardiogram (V15.89) (Z92.89)  · A.  Echocardiogram of 02/03/2015 reports an ejection fraction of 60-65%, mild concentric     LVH, trace mitral regurgitation, mild tricuspid and pulmonic regurgitation and calculated     RVSP of 35 mmHg, the main pulmonary artery is also mildly dilated.   • Hypothyroidism 5/11/2016    Description: A.  On replacement therapy.   • Infected wound 3/8/2018   • Lung nodule     per pt report   • Macular degeneration    • Meningioma (CMS/HCC)     behind left eye per pt report   • Obesity    • DINA (obstructive sleep apnea)     intolerant of CPAP therapy, uses bipap   • Osteoporosis    • Polycythemia vera (CMS/HCC) 5/11/2016   • Pulmonary emphysema (CMS/HCC)    • Restrictive ventilatory defect    • Sepsis (CMS/HCC) 2/13/2018   • TIA (transient ischemic attack) 2015    blurred vision    • Uterine cancer (CMS/HCC)    • Vitamin D deficiency 8/1/2016   • Weakness 3/8/2018       Past Surgical History:   Procedure Laterality Date   • AMPUTATION DIGIT Left 11/23/2016    Procedure: AMPUTATION TRANS METATARSAL - ray amutation of the left great toe;  Surgeon: Arsalan Feliciano MD;  Location:  [facility] OR;  Service:    • AMPUTATION DIGIT Left 3/2/2017    Procedure: LEFT FOOT TRANSMETATARSAL AMPUTATION TOES 2,3,4,5;  Surgeon: Joey Guaman MD;  Location:  [facility] OR;  Service:    • AORTIC VALVE REPAIR/REPLACEMENT N/A 1/23/2020    Procedure: TRANSCATHETER AORTIC VALVE REPLACEMENT;  Surgeon: Joey Guaman MD;  Location:  [facility] HYBRID OR 15;  Service: Cardiothoracic   • AORTIC VALVE REPAIR/REPLACEMENT N/A 1/23/2020    Procedure: Transfemoral Transcatheter Aortic Valve Replacement;  Surgeon: Qi Valdez MD;  Location:  [facility] HYBRID OR 15;  Service: Cardiovascular   • BACK SURGERY      lumbar fusions x5--multiple times; 1995, 1997, 1998,  1999 and 2008   • BELOW KNEE AMPUTATION Left 2/25/2017    Procedure: EXTENDED LEFT TOE AMPUTATION;  Surgeon: Arsalan Feliciano MD;  Location:  [facility] OR;  Service:    • CARDIAC CATHETERIZATION N/A 11/26/2016    Procedure: Left Heart Cath;  Surgeon: Ash Nuñez MD;  Location:  [facility];  Service:    • CARDIAC CATHETERIZATION N/A 2/20/2018    Procedure: Left Heart Cath;  Surgeon: Lane Zamorano MD;  Location:  [facility];  Service:    • CARDIAC CATHETERIZATION N/A 2/20/2018    Procedure: Right Heart Cath;  Surgeon: Lane Zamorano MD;  Location:  [facility];  Service:    • CARDIAC CATHETERIZATION Left 1/10/2020    Procedure: Cardiac Catheterization/Vascular Study;  Surgeon: Lane Zamorano MD;  Location:  [facility];  Service: Cardiology   • COLONOSCOPY     • HAND SURGERY Bilateral     x3    • HYSTERECTOMY      status post uterine and cervical cancer   • LUMBAR SPINE SURGERY      arthrodesis by anterior approach addit interspace   • TONSILLECTOMY         Current Outpatient Medications on File Prior to Visit   Medication Sig   • acetaminophen (TYLENOL) 325 MG tablet Take 2 tablets by mouth Every 4 (Four) Hours As Needed for mild pain (1-3) or fever (temperature greater than 101F).   • albuterol (PROVENTIL) (2.5 MG/3ML) 0.083% nebulizer solution Take 2.5 mg by nebulization Every 6 (Six) Hours As Needed for Wheezing or Shortness of Air.   • apixaban (ELIQUIS) 5 MG tablet tablet Bottle 1: Take 2 tablets by mouth Every 12 (Twelve) Hours for 11 doses forDVT/PE (active thrombosis)   • atorvastatin (LIPITOR) 40 MG tablet TAKE ONE TABLET BY MOUTH ONCE NIGHTLY   • busPIRone (BUSPAR) 7.5 MG tablet TAKE TWO TABLETS BY MOUTH TWICE A DAY   • clonazePAM (KlonoPIN) 0.5 MG tablet TAKE 1/2 TABLET BY MOUTH TWICE A DAY AS NEEDED FOR ANXIETY   • clopidogrel (PLAVIX) 75 MG tablet TAKE ONE TABLET BY MOUTH DAILY   • dilTIAZem CD (CARDIZEM CD) 240 MG 24 hr capsule Take 1  capsule by mouth Daily.   • doxazosin (CARDURA) 1 MG tablet Take 1 tablet by mouth Every Night.   • ezetimibe (ZETIA) 10 MG tablet TAKE ONE TABLET BY MOUTH DAILY   • fentaNYL (DURAGESIC) 75 MCG/HR patch Place 1 patch on the skin as directed by provider Every Other Day.   • fexofenadine (ALLEGRA) 180 MG tablet Take 180 mg by mouth Daily As Needed.   • fluticasone (Flonase) 50 MCG/ACT nasal spray 1 spray into the nostril(s) as directed by provider 2 (Two) Times a Day. (Patient taking differently: 1 spray into the nostril(s) as directed by provider As Needed.)   • furosemide (LASIX) 40 MG tablet TAKE ONE TABLET BY MOUTH TWICE A DAY   • levothyroxine (SYNTHROID, LEVOTHROID) 112 MCG tablet TAKE ONE TABLET BY MOUTH DAILY   • lisinopril (PRINIVIL,ZESTRIL) 10 MG tablet TAKE ONE TABLET BY MOUTH DAILY   • melatonin 5 MG sublingual tablet sublingual tablet Place 1 tablet under the tongue At Night As Needed (insomnia).   • omeprazole (priLOSEC) 20 MG capsule TAKE ONE CAPSULE BY MOUTH DAILY   • pregabalin (LYRICA) 100 MG capsule TAKE ONE CAPSULE BY MOUTH EVERY 8 HOURS   • promethazine (PHENERGAN) 25 MG tablet TAKE ONE TABLET BY MOUTH EVERY 6 HOURS AS NEEDED FOR NAUSEA AND VOMITING   • sertraline (ZOLOFT) 50 MG tablet TAKE ONE TABLET BY MOUTH DAILY   • tiZANidine (ZANAFLEX) 4 MG tablet TAKE ONE TABLET BY MOUTH TWICE A DAY AS NEEDED FOR MUSCLE SPASMS   • topiramate (TOPAMAX) 50 MG tablet TAKE ONE TABLET BY MOUTH TWICE A DAY   • [DISCONTINUED] oxyCODONE-acetaminophen (Percocet) 5-325 MG per tablet Take 1 tablet by mouth Every 6 (Six) Hours As Needed for Severe Pain .   • [DISCONTINUED] apixaban (ELIQUIS) 5 MG tablet tablet Start 2/18: Take 1 tablet by mouth Every 12 (Twelve) Hours for DVT/PE (active thrombosis) Bottle 2.   • [DISCONTINUED] atorvastatin (LIPITOR) 40 MG tablet TAKE ONE TABLET BY MOUTH ONCE NIGHTLY   • [DISCONTINUED] levoFLOXacin (Levaquin) 750 MG tablet Take 1 tablet by mouth Daily.   • [DISCONTINUED] predniSONE  (DELTASONE) 20 MG tablet Take 2 tablets by mouth Daily.   • [DISCONTINUED] probiotic (CULTURELLE) capsule capsule Take 1 capsule by mouth Daily.     No current facility-administered medications on file prior to visit.        Family History   Problem Relation Age of Onset   • Arthritis Mother    • Diabetes Mother    • Colon polyps Mother    • Diverticulitis Mother    • Heart failure Mother    • Arthritis Father    • Bleeding Disorder Father    • Diabetes Father    • Kidney disease Father    • Heart disease Father    • COPD Sister         currently smokes   • Arthritis Brother    • Diabetes Brother    • Alcohol abuse Brother    • Heart murmur Daughter    • Arthritis Other    • Diabetes Other        Social History     Socioeconomic History   • Marital status:      Spouse name: Wali   • Number of children: 1   • Years of education: Not on file   • Highest education level: Not on file   Occupational History   • 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 Langston is a 62 year old white  female. She lives with her spouse in [facility]. She states she does her own ADLs but appears disabled. They have one child and two grandchildren. She has a living will.    Caffeine: 2 serving per day. Pt lives at home with .         Review of Systems   Constitutional: Positive for fatigue. Negative for chills, diaphoresis, fever and unexpected weight change.   HENT: Negative for congestion, hearing loss, nosebleeds, postnasal drip, sinus pressure and sore throat.    Eyes: Negative for pain, discharge and itching.   Respiratory: Positive for shortness of breath. Negative for cough, chest  tightness and wheezing.    Cardiovascular: Negative for chest pain and leg swelling.   Gastrointestinal: Positive for constipation. Negative for abdominal distention, blood in stool, diarrhea, nausea and vomiting.   Endocrine: Negative for heat intolerance, polydipsia and polyuria.   Genitourinary: Negative for dysuria, frequency and hematuria.   Musculoskeletal: Positive for arthralgias, back pain, gait problem, neck pain and neck stiffness. Negative for joint swelling and myalgias.   Skin:        Hair loss   Neurological: Positive for dizziness, tremors, weakness and light-headedness. Negative for syncope and headaches.   Psychiatric/Behavioral: Positive for dysphoric mood and sleep disturbance. The patient is nervous/anxious.        LMP  (LMP Unknown)       Physical Exam  Constitutional:       General: She is not in acute distress.  Pulmonary:      Effort: No respiratory distress.   Neurological:      General: No focal deficit present.      Mental Status: She is alert and oriented to person, place, and time. Mental status is at baseline.   Psychiatric:         Mood and Affect: Mood normal.         Behavior: Behavior normal.         Thought Content: Thought content normal.         Judgment: Judgment normal.         Procedure:      Discussion/Summary:    chronic back pain- refill patch as needed, counseled on LT risk, advised to reduce percocet to tid prn  htn/HFpEF-controlled on ACE/CCB   hyperlipidemia-cont lipitor and zetia, recheck on rtc, counseled on low chol diet  hypothroid-cont replacement, recheck on rtc  depression with anxiety-cont buspar and klonopine and zoloft ,advised her of risk of addiction, stable  polycythemia-cbc on rtc, advised tob cessation  retinal vein occlusion- cont rf modification, counseled on tob cessation, no change  b12 def-recheck 7/29 stable  DM-labs on rtc  Elevated lft/?autoimmune-f/u gastro recs , labs on rtc  GERD-controlled PPI  Tremor-cont BB and topamax, controlled  Fe def  anemia- recheck on rtc  RLE DVT-2/7/21-on [facility]      hospital  labs noted and dw patient     I spent 14 minutes in total including but not limited to the 13 minutes spent in direct conversation with this patient.       Current Outpatient Medications:   •  acetaminophen (TYLENOL) 325 MG tablet, Take 2 tablets by mouth Every 4 (Four) Hours As Needed for mild pain (1-3) or fever (temperature greater than 101F)., Disp: 100 tablet, Rfl: 0  •  albuterol (PROVENTIL) (2.5 MG/3ML) 0.083% nebulizer solution, Take 2.5 mg by nebulization Every 6 (Six) Hours As Needed for Wheezing or Shortness of Air., Disp: 120 vial, Rfl: 11  •  apixaban (ELIQUIS) 5 MG tablet tablet, Bottle 1: Take 2 tablets by mouth Every 12 (Twelve) Hours for 11 doses forDVT/PE (active thrombosis), Disp: 22 tablet, Rfl: 0  •  atorvastatin (LIPITOR) 40 MG tablet, TAKE ONE TABLET BY MOUTH ONCE NIGHTLY, Disp: 90 tablet, Rfl: 3  •  busPIRone (BUSPAR) 7.5 MG tablet, TAKE TWO TABLETS BY MOUTH TWICE A DAY, Disp: 360 tablet, Rfl: 3  •  clonazePAM (KlonoPIN) 0.5 MG tablet, TAKE 1/2 TABLET BY MOUTH TWICE A DAY AS NEEDED FOR ANXIETY, Disp: 30 tablet, Rfl: 2  •  clopidogrel (PLAVIX) 75 MG tablet, TAKE ONE TABLET BY MOUTH DAILY, Disp: 90 tablet, Rfl: 3  •  dilTIAZem CD (CARDIZEM CD) 240 MG 24 hr capsule, Take 1 capsule by mouth Daily., Disp: 90 capsule, Rfl: 3  •  doxazosin (CARDURA) 1 MG tablet, Take 1 tablet by mouth Every Night., Disp: 90 tablet, Rfl: 3  •  ezetimibe (ZETIA) 10 MG tablet, TAKE ONE TABLET BY MOUTH DAILY, Disp: 90 tablet, Rfl: 3  •  fentaNYL (DURAGESIC) 75 MCG/HR patch, Place 1 patch on the skin as directed by provider Every Other Day., Disp: 15 patch, Rfl: 0  •  fexofenadine (ALLEGRA) 180 MG tablet, Take 180 mg by mouth Daily As Needed., Disp: , Rfl:   •  fluticasone (Flonase) 50 MCG/ACT nasal spray, 1 spray into the nostril(s) as directed by provider 2 (Two) Times a Day. (Patient taking differently: 1 spray into the nostril(s) as directed by provider As  Needed.), Disp: 1 bottle, Rfl: 5  •  furosemide (LASIX) 40 MG tablet, TAKE ONE TABLET BY MOUTH TWICE A DAY, Disp: 180 tablet, Rfl: 3  •  levothyroxine (SYNTHROID, LEVOTHROID) 112 MCG tablet, TAKE ONE TABLET BY MOUTH DAILY, Disp: 90 tablet, Rfl: 1  •  lisinopril (PRINIVIL,ZESTRIL) 10 MG tablet, TAKE ONE TABLET BY MOUTH DAILY, Disp: 90 tablet, Rfl: 3  •  melatonin 5 MG sublingual tablet sublingual tablet, Place 1 tablet under the tongue At Night As Needed (insomnia)., Disp: 30 tablet, Rfl: 0  •  omeprazole (priLOSEC) 20 MG capsule, TAKE ONE CAPSULE BY MOUTH DAILY, Disp: 90 capsule, Rfl: 3  •  oxyCODONE-acetaminophen (Percocet) 5-325 MG per tablet, Take 1 tablet by mouth Every 8 (Eight) Hours As Needed for Severe Pain ., Disp: 120 tablet, Rfl: 0  •  pregabalin (LYRICA) 100 MG capsule, TAKE ONE CAPSULE BY MOUTH EVERY 8 HOURS, Disp: 270 capsule, Rfl: 0  •  promethazine (PHENERGAN) 25 MG tablet, TAKE ONE TABLET BY MOUTH EVERY 6 HOURS AS NEEDED FOR NAUSEA AND VOMITING, Disp: 20 tablet, Rfl: 3  •  sertraline (ZOLOFT) 50 MG tablet, TAKE ONE TABLET BY MOUTH DAILY, Disp: 90 tablet, Rfl: 3  •  tiZANidine (ZANAFLEX) 4 MG tablet, TAKE ONE TABLET BY MOUTH TWICE A DAY AS NEEDED FOR MUSCLE SPASMS, Disp: 60 tablet, Rfl: 6  •  topiramate (TOPAMAX) 50 MG tablet, TAKE ONE TABLET BY MOUTH TWICE A DAY, Disp: 180 tablet, Rfl: 3        Diagnoses and all orders for this visit:    1. Heart failure with preserved ejection fraction, unspecified HF chronicity (CMS/HCC) (Primary)    2. HTN and diastolic dysfunction    3. Acute DVT of tibial vein of right lower extremity (CMS/HCC)    4. Other specified hypothyroidism    5. GERD    6. Mixed anxiety depressive disorder    7. Chronic, continuous use of opioids    8. Pulmonary emphysema, unspecified emphysema type (CMS/HCC)    9. Chronic pain syndrome  -     oxyCODONE-acetaminophen (Percocet) 5-325 MG per tablet; Take 1 tablet by mouth Every 8 (Eight) Hours As Needed for Severe Pain .  Dispense: 120  tablet; Refill: 0

## 2021-02-18 ENCOUNTER — TELEPHONE (OUTPATIENT)
Dept: INTERNAL MEDICINE | Facility: CLINIC | Age: 66
End: 2021-02-18

## 2021-02-18 NOTE — TELEPHONE ENCOUNTER
THE PATIENT IS REQUESTING A PRESCRIPTION FOR IN A NEW WHEEL CHAIR FOR HER.  THE PATIENT REPORTS THAT THE OLD WHEEL CHAIR IS TOO HEAVY FOR HER  TO PICKUP.  SHE REPORTS THAT HER  HAS A BAD HEART AND HAS DIFFICULTY LIFTING THE WHEELCHAIR.  THE PATIENT STATES THAT BECAUSE OF HER SPINE FUSION, [employer] MENTIONED TO HER THAT SHE PERHAPS NEEDS A SPECIAL ONE MADE, THAT WOULD BE LIGHTWEIGHT AND SUPPORTIVE TO THE PATIENT'S SPINE. THE PATIENT WANTS DR DE PAZ TO SUGGEST A PLACE TO GET THE WHEELCHAIR OTHER THAN [facility], BECAUSE SHE REPORTS SHE DOES NOT WANT IT FROM [facility].     SHE ALSO STATES THAT AT HER LAST APPOINTMENT WITH DR DE PAZ, SHE FORGOT TO MENTION THAT DR VEGA WANTED THE PATIENT TO GET A REFERRAL TO THE PULMONOLOGIST.    PLEASE CALL THE PATIENT AND ADVISE [phone].

## 2021-02-19 ENCOUNTER — PRIOR AUTHORIZATION (OUTPATIENT)
Dept: INTERNAL MEDICINE | Facility: CLINIC | Age: 66
End: 2021-02-19

## 2021-02-19 ENCOUNTER — READMISSION MANAGEMENT (OUTPATIENT)
Dept: CALL CENTER | Facility: HOSPITAL | Age: 66
End: 2021-02-19

## 2021-02-19 NOTE — OUTREACH NOTE
Medical Week 2 Survey      Responses   [facility] patient discharged from?  [place]   Does the patient have one of the following disease processes/diagnoses(primary or secondary)?  Other   Week 2 attempt successful?  No   Unsuccessful attempts  Attempt 1          Kylah Lindsay RN

## 2021-02-22 ENCOUNTER — READMISSION MANAGEMENT (OUTPATIENT)
Dept: CALL CENTER | Facility: HOSPITAL | Age: 66
End: 2021-02-22

## 2021-02-22 NOTE — TELEPHONE ENCOUNTER
Prior Authorization for Anoro Elipta 62.5-25mcg inhaler completed and attempted to send to cover my meds however received question from plan asking if pt has had previous tx, contradiction, or intolerance to the following; Bevespi Aerosphere or Stiolto Respimat? Did not see these on current or non current med list, Please advise, Thanks  KEY: [id number]   PA Case ID: [id number]

## 2021-02-22 NOTE — OUTREACH NOTE
Medical Week 2 Survey      Responses   [facility] [place] patient discharged from?  [place]   Does the patient have one of the following disease processes/diagnoses(primary or secondary)?  Other   Week 2 attempt successful?  Yes   Call start time  0741   Discharge diagnosis     A/C mixed respiratory failure due to underlying COPD and narcotics, Acute RLE DVT, A-fib                 Rescheduled  Rescheduled-pt requested [Not a good time she is in the Bathroom, call back. ]   Call end time  0742   Is patient permission given to speak with other caregiver?  Yes   List who call center can speak with  Sukhdev and patient.          Gypsy Jimenez RN

## 2021-02-23 ENCOUNTER — READMISSION MANAGEMENT (OUTPATIENT)
Dept: CALL CENTER | Facility: HOSPITAL | Age: 66
End: 2021-02-23

## 2021-02-23 RX ORDER — GLYCOPYRROLATE AND FORMOTEROL FUMARATE 9; 4.8 UG/1; UG/1
2 AEROSOL, METERED RESPIRATORY (INHALATION) 2 TIMES DAILY
Qty: 1 EACH | Refills: 11 | Status: SHIPPED | OUTPATIENT
Start: 2021-02-23 | End: 2021-06-14

## 2021-02-23 NOTE — OUTREACH NOTE
Medical Week 2 Survey      Responses   [facility] [place] patient discharged from?  [place]   Does the patient have one of the following disease processes/diagnoses(primary or secondary)?  Other   Week 2 attempt successful?  Yes   Call start time  1219   Discharge diagnosis     A/C mixed respiratory failure due to underlying COPD and narcotics, Acute RLE DVT, A-fib                 Call end time  1226   Meds reviewed with patient/caregiver?  Yes   Is the patient having any side effects they believe may be caused by any medication additions or changes?  Yes   Side effects comments   Feels groggy. Advised to call PCP.   Does the patient have all medications ordered at discharge?  Yes   Is the patient taking all medications as directed (includes completed medication regime)?  Yes   Does the patient have a primary care provider?   Yes   Does the patient have an appointment with their PCP within 7 days of discharge?  Yes   Has the patient kept scheduled appointments due by today?  Yes   Psychosocial issues?  No   Did the patient receive a copy of their discharge instructions?  Yes   Nursing interventions  Reviewed instructions with patient   What is the patient's perception of their health status since discharge?  Improving   Is the patient/caregiver able to teach back signs and symptoms related to disease process for when to call PCP?  Yes   Is the patient/caregiver able to teach back signs and symptoms related to disease process for when to call 911?  Yes   Is the patient/caregiver able to teach back the hierarchy of who to call/visit for symptoms/problems? PCP, Specialist, Home health nurse, Urgent Care, ED, 911  Yes   Week 2 Call Completed?  Yes          Amarjit Deleon RN

## 2021-03-01 RX ORDER — PREGABALIN 100 MG/1
CAPSULE ORAL
Qty: 270 CAPSULE | Refills: 0 | Status: SHIPPED | OUTPATIENT
Start: 2021-03-01 | End: 2021-06-01

## 2021-03-02 ENCOUNTER — OUTSIDE FACILITY SERVICE (OUTPATIENT)
Dept: INTERNAL MEDICINE | Facility: CLINIC | Age: 66
End: 2021-03-02

## 2021-03-02 PROCEDURE — G0180 MD CERTIFICATION HHA PATIENT: HCPCS | Performed by: INTERNAL MEDICINE

## 2021-03-03 DIAGNOSIS — F41.8 MIXED ANXIETY DEPRESSIVE DISORDER: Chronic | ICD-10-CM

## 2021-03-03 RX ORDER — CLONAZEPAM 0.5 MG/1
TABLET ORAL
Qty: 30 TABLET | Refills: 2 | Status: SHIPPED | OUTPATIENT
Start: 2021-03-03 | End: 2021-06-03

## 2021-03-03 NOTE — TELEPHONE ENCOUNTER
Last Office Visit: 02/15/2021  Next Office Visit: 03/12/2021    Labs completed in past 6 months? yes  Labs completed in past year? yes    Last Refill Date: 11/24/2020  Quantity: 30  Refills: 2     Pharmacy:   [facility] [address] [place] [address] - [address] [place] [address] [address] - [phone]  - [phone] FX   Phone:  [phone]   Fax:  [phone]   Address:  [address] [place] [address]

## 2021-03-04 DIAGNOSIS — F11.90 CHRONIC, CONTINUOUS USE OF OPIOIDS: Chronic | ICD-10-CM

## 2021-03-04 DIAGNOSIS — G89.4 CHRONIC PAIN SYNDROME: ICD-10-CM

## 2021-03-04 RX ORDER — FENTANYL 75 UG/H
1 PATCH TRANSDERMAL
Qty: 15 PATCH | Refills: 0 | Status: SHIPPED | OUTPATIENT
Start: 2021-03-04 | End: 2021-04-02 | Stop reason: SDUPTHER

## 2021-03-04 RX ORDER — OXYCODONE HYDROCHLORIDE AND ACETAMINOPHEN 5; 325 MG/1; MG/1
1 TABLET ORAL EVERY 8 HOURS PRN
Qty: 120 TABLET | Refills: 0
Start: 2021-03-04 | End: 2021-03-05 | Stop reason: SDUPTHER

## 2021-03-04 NOTE — TELEPHONE ENCOUNTER
Caller: Tamara Langston    Relationship: Self    Best call back number: [phone]    Medication needed:   Requested Prescriptions     Pending Prescriptions Disp Refills   • oxyCODONE-acetaminophen (Percocet) 5-325 MG per tablet 120 tablet 0     Sig: Take 1 tablet by mouth Every 8 (Eight) Hours As Needed for Severe Pain .   • fentaNYL (DURAGESIC) 75 MCG/HR patch 15 patch 0     Sig: Place 1 patch on the skin as directed by provider Every Other Day.       When do you need the refill by: ASAP    What details did the patient provide when requesting the medication: WILL BE OUT THIS WEEKEND    Does the patient have less than a 3 day supply:  [x] Yes  [] No    What is the patient's preferred pharmacy:    [facility]

## 2021-03-05 DIAGNOSIS — G89.4 CHRONIC PAIN SYNDROME: ICD-10-CM

## 2021-03-05 RX ORDER — OXYCODONE HYDROCHLORIDE AND ACETAMINOPHEN 5; 325 MG/1; MG/1
1 TABLET ORAL EVERY 8 HOURS PRN
Qty: 90 TABLET | Refills: 0
Start: 2021-03-05 | End: 2021-03-05 | Stop reason: SDUPTHER

## 2021-03-05 RX ORDER — OXYCODONE HYDROCHLORIDE AND ACETAMINOPHEN 5; 325 MG/1; MG/1
1 TABLET ORAL EVERY 8 HOURS PRN
Qty: 90 TABLET | Refills: 0 | Status: SHIPPED | OUTPATIENT
Start: 2021-03-05 | End: 2021-04-02 | Stop reason: SDUPTHER

## 2021-03-05 NOTE — TELEPHONE ENCOUNTER
Caller: Tamara Langston    Relationship: Self    Best call back number: [phone]    Medication needed:   Requested Prescriptions     Pending Prescriptions Disp Refills   • oxyCODONE-acetaminophen (Percocet) 5-325 MG per tablet 120 tablet 0     Sig: Take 1 tablet by mouth Every 8 (Eight) Hours As Needed for Severe Pain .     When do you need the refill by: 03-06-21    What details did the patient provide when requesting the medication: PATIENT STATED THAT SHE SENT A REQUEST ON THE 03-04-21 FOR THIS MEDICATION AND THE FENTANYL PATCHES     PATIENT STATED THAT THIS MEDICATION WAS NOT SENT TO PHARMACY BUT THE FENTANYL PATCHES WAS SO SHE WAS ASKING TO GET THEM REFILLED    PATIENT ALSO THOUGHT THAT DOCTOR DE PAZ MIGHT HAVE BEEN DECREASING THE DOSAGE BUT WAS NOT FOR SURE IF THAT IS WHY IT WAS NOT READY FOR     PLEASE ADVISE      Does the patient have less than a 3 day supply:  [x] Yes  [] No    What is the patient's preferred pharmacy: [facility] [address] [facility] [phone] [facility] [phone] FX

## 2021-03-12 ENCOUNTER — LAB (OUTPATIENT)
Dept: LAB | Facility: HOSPITAL | Age: 66
End: 2021-03-12

## 2021-03-12 DIAGNOSIS — E61.1 IRON DEFICIENCY: ICD-10-CM

## 2021-03-12 DIAGNOSIS — E78.5 DYSLIPIDEMIA: Primary | ICD-10-CM

## 2021-03-12 DIAGNOSIS — E11.10 TYPE 2 DIABETES MELLITUS WITH KETOACIDOSIS WITHOUT COMA, WITHOUT LONG-TERM CURRENT USE OF INSULIN (HCC): ICD-10-CM

## 2021-03-13 LAB
ALBUMIN SERPL-MCNC: 3.5 G/DL (ref 3.8–4.8)
ALBUMIN/GLOB SERPL: 1.2 {RATIO} (ref 1.2–2.2)
ALP SERPL-CCNC: 91 IU/L (ref 39–117)
ALT SERPL-CCNC: 11 IU/L (ref 0–32)
AST SERPL-CCNC: 25 IU/L (ref 0–40)
BILIRUB SERPL-MCNC: 0.2 MG/DL (ref 0–1.2)
BUN SERPL-MCNC: 17 MG/DL (ref 8–27)
BUN/CREAT SERPL: 15 (ref 12–28)
CALCIUM SERPL-MCNC: 9.2 MG/DL (ref 8.7–10.3)
CHLORIDE SERPL-SCNC: 105 MMOL/L (ref 96–106)
CHOLEST SERPL-MCNC: 168 MG/DL (ref 100–199)
CO2 SERPL-SCNC: 25 MMOL/L (ref 20–29)
CREAT SERPL-MCNC: 1.13 MG/DL (ref 0.57–1)
ERYTHROCYTE [DISTWIDTH] IN BLOOD BY AUTOMATED COUNT: 14.5 % (ref 11.7–15.4)
GLOBULIN SER CALC-MCNC: 2.9 G/DL (ref 1.5–4.5)
GLUCOSE SERPL-MCNC: 99 MG/DL (ref 65–99)
HBA1C MFR BLD: 5.7 % (ref 4.8–5.6)
HCT VFR BLD AUTO: 38.4 % (ref 34–46.6)
HDLC SERPL-MCNC: 28 MG/DL
HGB BLD-MCNC: 12.4 G/DL (ref 11.1–15.9)
IRON SERPL-MCNC: 42 UG/DL (ref 27–139)
LDLC SERPL CALC-MCNC: 82 MG/DL (ref 0–99)
MCH RBC QN AUTO: 31.2 PG (ref 26.6–33)
MCHC RBC AUTO-ENTMCNC: 32.3 G/DL (ref 31.5–35.7)
MCV RBC AUTO: 97 FL (ref 79–97)
PLATELET # BLD AUTO: 150 X10E3/UL (ref 150–450)
POTASSIUM SERPL-SCNC: 4 MMOL/L (ref 3.5–5.2)
PROT SERPL-MCNC: 6.4 G/DL (ref 6–8.5)
RBC # BLD AUTO: 3.97 X10E6/UL (ref 3.77–5.28)
SODIUM SERPL-SCNC: 144 MMOL/L (ref 134–144)
TRIGL SERPL-MCNC: 358 MG/DL (ref 0–149)
VIT B12 SERPL-MCNC: 765 PG/ML (ref 232–1245)
VLDLC SERPL CALC-MCNC: 58 MG/DL (ref 5–40)
WBC # BLD AUTO: 7 X10E3/UL (ref 3.4–10.8)

## 2021-03-17 NOTE — PROGRESS NOTES
88 year old female, dementia, MR, shingles and Glaucoma, brought in by ambulance from her nursing home after episode of vaginal bleeding at 8am. CT scan showed possible uterine and rectal Ca. DHIRAJ without blood. Colorectal surgery consulted for management of possible rectal malignancy.     - No acute surgical intervention indicated at this time as further workup is required. Please follow up tumor markers and consult GI for colonoscopy for tissue diagnosis. The mass is currently nonobstructive so no diversion necessary at this point.   - Please obtain MRI of the pelvis for further characterization of the mass  - flex sigmoidoscopy w/ GI today for tissue diagnosis   - Ongoing discussion regarding GOC and f/u palliative care recs.  Patient still full code per family at this time.   - Pending tissue diagnosis, heme/onc consult   - team 1c will follow  - Discussed with Dr. Walton Patient is a 63 y.o. female who is here for a follow up of   Chief Complaint   Patient presents with   • Hypertension   • Diabetes   • Pain         HPI:    Here for f/u.  Having ups and downs.  Least little things will upset her.  Sleeping ok.  Could not tolerate Effexor. Feels down.  [place] easily.    Feels her stomach is easily upset and nausea.  Something she drinks sets it off.  Her gait is not the best.      History:    Patient Active Problem List   Diagnosis   • Atopic rhinitis   • Restrictive ventilatory defect   • COPD (chronic obstructive pulmonary disease) (CMS/HCC)   • Osteoporosis   • Obstructive sleep apnea syndrome   • Abnormal liver enzymes   • Cholelithiasis   • Chronic back pain   • Mixed anxiety depressive disorder   • Type 2 diabetes mellitus (CMS/HCC)   • Dyslipidemia   • Essential tremor   • Fibromyalgia   • Gastroesophageal reflux disease without esophagitis   • Essential hypertension   • Hypothyroidism   • Insomnia   • Osteoarthritis   • Psoriasis   • Branch retinal vein occlusion   • Cobalamin deficiency   • Obesity   • Vitamin D deficiency   • Fatty liver disease, nonalcoholic   • Sinus bradycardia   • Tobacco abuse   • Peripheral vascular disease (CMS/HCC)   • Diabetic polyneuropathy associated with type 2 diabetes mellitus (CMS/HCC)   • Chronic pain   • Chronic, continuous use of opioids   • Chronic anxiety   • Chronically on benzodiazepine therapy   • Tubular adenoma   • Cellulitis of sacral region   • Acute on chronic respiratory failure with hypoxia (CMS/HCC)   • Coronary artery disease involving native coronary artery of native heart without angina pectoris   • Takotsubo cardiomyopathy   • Chronic systolic heart failure (CMS/HCC)   • Chronic respiratory failure with hypoxia (CMS/HCC)   • Weakness   • Nonrheumatic aortic valve stenosis       Past Medical History:   Diagnosis Date   • Acute on chronic respiratory failure with hypoxia (CMS/HCC) 2/13/2018   • ELIF (acute kidney injury)  (CMS/HCC) 2/13/2018   • ELIF (acute kidney injury) (CMS/HCC) 2/13/2018   • Altered mental status 2/13/2018   • Bronchitis    • Cellulitis of sacral region 2/13/2018   • Cervical cancer (CMS/[facility])    • Cholelithiasis 5/11/2016   • Chronic anxiety 2/27/2017   • Chronic bronchitis (CMS/[facility])    • Chronic respiratory failure with hypoxia (CMS/[facility]) 3/8/2018   • Chronic systolic heart failure (CMS/HCC) 3/8/2018   • Chronically on benzodiazepine therapy 2/27/2017   • Degenerative arthritis    • Diabetes mellitus (CMS/[facility])    • Dyslipidemia 5/11/2016   • Dyspnea    • Elevated troponin level 2/13/2018   • Fatty liver disease, nonalcoholic 11/21/2016   • Fever 2/13/2018   • Fibromyalgia    • GERD (gastroesophageal reflux disease)    • H/O echocardiogram    • History of pneumonia    • Hypertension 5/11/2016    16. H/O echocardiogram (V15.89) (Z92.89)  · A.  Echocardiogram of 02/03/2015 reports an ejection fraction of 60-65%, mild concentric     LVH, trace mitral regurgitation, mild tricuspid and pulmonic regurgitation and calculated     RVSP of 35 mmHg, the main pulmonary artery is also mildly dilated.   • Hypothyroidism 5/11/2016    Description: A.  On replacement therapy.   • Infected wound 3/8/2018   • Nausea    • Obesity    • DINA (obstructive sleep apnea)     intolerant of CPAP therapy   • Osteoporosis    • Osteoporosis    • Polycythemia vera (CMS/[facility]) 5/11/2016   • Pulmonary emphysema (CMS/[facility])    • Restrictive ventilatory defect    • Rhinitis    • Sepsis (CMS/[facility]) 2/13/2018   • Uncontrolled diabetes mellitus (CMS/[facility]) 5/11/2016   • Urine abnormality 2/13/2018   • Uterine cancer (CMS/[facility])    • Vitamin D deficiency 8/1/2016   • Weakness 3/8/2018       Past Surgical History:   Procedure Laterality Date   • AMPUTATION DIGIT Left 11/23/2016    Procedure: AMPUTATION TRANS METATARSAL - ray amutation of the left great toe;  Surgeon: Arsalan Feliciano MD;  Location: [facility];  Service:    • AMPUTATION DIGIT Left 3/2/2017    Procedure:  LEFT FOOT TRANSMETATARSAL AMPUTATION TOES 2,3,4,5;  Surgeon: Joey Guaman MD;  Location:  [facility];  Service:    • BACK SURGERY      lumbar fusions x5--multiple times; 1995, 1997, 1998, 1999 and 2008   • BELOW KNEE AMPUTATION Left 2/25/2017    Procedure: EXTENDED LEFT TOE AMPUTATION;  Surgeon: Arsalan Feliciano MD;  Location:  [facility];  Service:    • CARDIAC CATHETERIZATION N/A 11/26/2016    Procedure: Left Heart Cath;  Surgeon: Ash Nuñez MD;  Location:  [facility];  Service:    • CARDIAC CATHETERIZATION N/A 2/20/2018    Procedure: Left Heart Cath;  Surgeon: Lane Zamorano MD;  Location:  [facility];  Service:    • CARDIAC CATHETERIZATION N/A 2/20/2018    Procedure: Right Heart Cath;  Surgeon: Lane Zamorano MD;  Location:  [facility];  Service:    • HAND SURGERY Bilateral     x3   • HYSTERECTOMY      status post uterine and cervical cancer   • LUMBAR SPINE SURGERY      arthrodesis by anterior approach addit interspace   • TONSILLECTOMY         Current Outpatient Medications on File Prior to Visit   Medication Sig   • acetaminophen (TYLENOL) 325 MG tablet Take 2 tablets by mouth Every 4 (Four) Hours As Needed for mild pain (1-3) or fever (temperature greater than 101F).   • albuterol (PROVENTIL) (2.5 MG/3ML) 0.083% nebulizer solution Take 2.5 mg by nebulization Every 6 (Six) Hours As Needed for Wheezing or Shortness of Air.   • aspirin 325 MG tablet Take 325 mg by mouth Daily.   • atorvastatin (LIPITOR) 40 MG tablet TAKE ONE TABLET BY MOUTH EVERY NIGHT   • atorvastatin (LIPITOR) 80 MG tablet Take 1 tablet by mouth Every Night.   • busPIRone (BUSPAR) 7.5 MG tablet TAKE TWO TABLETS BY MOUTH TWICE A DAY   • cetirizine (ZyrTEC) 10 MG tablet Take 10 mg by mouth Every Night.   • clonazePAM (KlonoPIN) 0.5 MG tablet Take 0.5 tablets by mouth 2 (Two) Times a Day As Needed for Seizures.   • clopidogrel (PLAVIX) 75 MG tablet TAKE ONE TABLET BY MOUTH DAILY   •  Cyanocobalamin 1000 MCG/ML kit Inject 1,000 mcg as directed Every 30 (Thirty) Days. (Patient taking differently: Inject 1,000 mcg as directed Every 3 (Three) Months.)   • ezetimibe (ZETIA) 10 MG tablet Take 1 tablet by mouth Daily.   • fentaNYL (DURAGESIC) 75 MCG/HR patch Place 1 patch on the skin as directed by provider Every Other Day.   • furosemide (LASIX) 40 MG tablet TAKE ONE TABLET BY MOUTH TWICE A DAY   • levothyroxine (SYNTHROID, LEVOTHROID) 112 MCG tablet TAKE ONE TABLET BY MOUTH DAILY   • lisinopril (PRINIVIL,ZESTRIL) 10 MG tablet Take 1 tablet by mouth Daily.   • LYRICA 100 MG capsule TAKE ONE CAPSULE BY MOUTH EVERY 8 HOURS   • melatonin 5 MG sublingual tablet sublingual tablet Place 1 tablet under the tongue At Night As Needed (insomnia).   • metoprolol tartrate (LOPRESSOR) 25 MG tablet TAKE ONE TABLET BY MOUTH TWICE A DAY   • omeprazole (priLOSEC) 20 MG capsule TAKE ONE CAPSULE BY MOUTH DAILY   • oxyCODONE-acetaminophen (PERCOCET) 7.5-325 MG per tablet Take 1 tablet by mouth Every 6 (Six) Hours As Needed for Moderate Pain .   • probiotic (CULTURELLE) capsule capsule Take 1 capsule by mouth Daily.   • promethazine (PHENERGAN) 25 MG tablet TAKE 1 TABLET BY MOUTH EVERY 6 HOURS AS NEEDED FOR NAUSEA OR VOMITING   • promethazine (PHENERGAN) 25 MG tablet TAKE 1 TABLET BY MOUTH EVERY 6 HOURS AS NEEDED FOR NAUSEA OR VOMITING   • SITagliptin (JANUVIA) 100 MG tablet Take 50 mg by mouth Daily.   • [DISCONTINUED] baclofen (LIORESAL) 10 MG tablet Take 1 tablet by mouth 2 (Two) Times a Day As Needed for Muscle Spasms.   • [DISCONTINUED] promethazine (PHENERGAN) 25 MG tablet Take 1 tablet by mouth Every 6 (Six) Hours As Needed for Nausea or Vomiting.   • [DISCONTINUED] promethazine (PHENERGAN) 25 MG tablet TAKE ONE TABLET BY MOUTH EVERY 6 HOURS AS NEEDED FOR NAUSEA OR VOMITING   • [DISCONTINUED] venlafaxine XR (EFFEXOR-XR) 75 MG 24 hr capsule Take 1 capsule by mouth Daily.   • metoprolol tartrate (LOPRESSOR) 25 MG  tablet Take 25 mg by mouth Daily.   • [DISCONTINUED] cefdinir (OMNICEF) 300 MG capsule Take 1 capsule by mouth 2 (Two) Times a Day.     Current Facility-Administered Medications on File Prior to Visit   Medication   • cyanocobalamin injection 1,000 mcg       Family History   Problem Relation Age of Onset   • Arthritis Mother    • Diabetes Mother    • Colon polyps Mother    • Diverticulitis Mother    • Heart failure Mother    • Arthritis Father    • Bleeding Disorder Father    • Diabetes Father    • Kidney disease Father    • Heart disease Father    • COPD Sister         currently smokes   • Arthritis Brother    • Diabetes Brother    • Alcohol abuse Brother    • Heart murmur Daughter    • Arthritis Other    • Diabetes Other        Social History     Socioeconomic History   • Marital status:      Spouse name: Not on file   • Number of children: 1   • Years of education: Not on file   • Highest education level: Not on file   Occupational History     Employer: DISABLED   Tobacco Use   • Smoking status: Former Smoker     Packs/day: 1.50     Years: 48.00     Pack years: 72.00     Types: Cigarettes     Last attempt to quit: 2017     Years since quittin.1   • Smokeless tobacco: Never Used   Substance and Sexual Activity   • Alcohol use: No   • Drug use: No   • Sexual activity: Defer   Social History Narrative    Mrs. Langston is a 62 year old white  female. She lives with her spouse in [facility]. She states she does her own ADLs but appears disabled. They have one child and two grandchildren. She has a living will.    Caffeine: 2 serving per day. Pt lives at home with .         Review of Systems   Constitutional: Positive for fatigue. Negative for chills, diaphoresis, fever and unexpected weight change.   HENT: Negative for congestion, ear pain, hearing loss, nosebleeds, postnasal drip, sinus pressure and sore throat.    Eyes: Negative for pain, discharge and itching.   Respiratory: Positive  "for shortness of breath. Negative for cough, chest tightness and wheezing.    Cardiovascular: Positive for palpitations. Negative for chest pain and leg swelling.   Gastrointestinal: Negative for abdominal distention, blood in stool, constipation, diarrhea, nausea and vomiting.   Endocrine: Negative for heat intolerance, polydipsia and polyuria.   Genitourinary: Negative for difficulty urinating, dysuria, frequency and hematuria.   Musculoskeletal: Positive for arthralgias, back pain and gait problem. Negative for joint swelling and myalgias.   Neurological: Positive for tremors, weakness and light-headedness. Negative for dizziness, syncope and headaches.   Psychiatric/Behavioral: Positive for decreased concentration, dysphoric mood and sleep disturbance. The patient is nervous/anxious.        /60 (BP Location: Left arm, Patient Position: Sitting)   Pulse 64   Ht 167.6 cm (65.98\")   LMP  (LMP Unknown)   BMI 37.14 kg/m²       Physical Exam   Constitutional: She is oriented to person, place, and time. She appears well-developed and well-nourished.   HENT:   Head: Normocephalic and atraumatic.   Right Ear: External ear normal.   Left Ear: External ear normal.   Mouth/Throat: Oropharynx is clear and moist.   Eyes: Conjunctivae and EOM are normal.   Neck: Normal range of motion. Neck supple.   Cardiovascular: Normal rate and regular rhythm.   2/6 СЕРГЕЙ   Pulmonary/Chest: Effort normal. She has rales (mild).   Mildly diminished   Abdominal: Soft. Bowel sounds are normal.   Musculoskeletal: She exhibits tenderness (pain on back flexion past 30  ).   Using wheelchair   Lymphadenopathy:     She has no cervical adenopathy.   Neurological: She is alert and oriented to person, place, and time.   Skin: Skin is warm and dry. No rash noted.   Psychiatric: She has a normal mood and affect. Her behavior is normal. Thought content normal.       Procedure:      Discussion/Summary:    chronic back pain- refill patch and " percocet as needed, advised need to reduce the dose as we are doing, will arrange PT  htn-stable  fibromylagia- cont current tx  hyperlipidemia-cont lipitor and zetia, recheck on rtc  hypothroid-cont replacement, recheck today  depression with anxiety-cont buspar and klonopine ,advised her of risk of addiction  polycythemia-cbc today  retinal vein occlusion- cont rf modification  b12 def-admin today and check labs  nausea-phenergan prn  DM-labs today, counseled on low carb, cont januvia  diaphoresis-better   Elevated lft/?autoimmune-f/u gastro recs , labs today  Eczema-cont prn steroids  FE def anemia-cont FE, recheck today  A/D-rx zoloft  Tremor-cont BB  high risk meds-labs today      labs noted and dw patient, counseled on adequate hydration and diet/wt loss        Current Outpatient Medications:   •  acetaminophen (TYLENOL) 325 MG tablet, Take 2 tablets by mouth Every 4 (Four) Hours As Needed for mild pain (1-3) or fever (temperature greater than 101F)., Disp: 100 tablet, Rfl: 0  •  albuterol (PROVENTIL) (2.5 MG/3ML) 0.083% nebulizer solution, Take 2.5 mg by nebulization Every 6 (Six) Hours As Needed for Wheezing or Shortness of Air., Disp: 120 vial, Rfl: 5  •  aspirin 325 MG tablet, Take 325 mg by mouth Daily., Disp: , Rfl:   •  atorvastatin (LIPITOR) 40 MG tablet, TAKE ONE TABLET BY MOUTH EVERY NIGHT, Disp: 90 tablet, Rfl: 1  •  atorvastatin (LIPITOR) 80 MG tablet, Take 1 tablet by mouth Every Night., Disp: , Rfl:   •  baclofen (LIORESAL) 10 MG tablet, Take 1 tablet by mouth 2 (Two) Times a Day As Needed for Muscle Spasms., Disp: 180 tablet, Rfl: 1  •  busPIRone (BUSPAR) 7.5 MG tablet, TAKE TWO TABLETS BY MOUTH TWICE A DAY, Disp: 360 tablet, Rfl: 0  •  cetirizine (ZyrTEC) 10 MG tablet, Take 10 mg by mouth Every Night., Disp: , Rfl:   •  clonazePAM (KlonoPIN) 0.5 MG tablet, Take 0.5 tablets by mouth 2 (Two) Times a Day As Needed for Seizures., Disp: 90 tablet, Rfl: 2  •  clopidogrel (PLAVIX) 75 MG tablet, TAKE  ONE TABLET BY MOUTH DAILY, Disp: 90 tablet, Rfl: 1  •  Cyanocobalamin 1000 MCG/ML kit, Inject 1,000 mcg as directed Every 30 (Thirty) Days. (Patient taking differently: Inject 1,000 mcg as directed Every 3 (Three) Months.), Disp: 1 kit, Rfl: 3  •  ezetimibe (ZETIA) 10 MG tablet, Take 1 tablet by mouth Daily., Disp: 90 tablet, Rfl: 3  •  fentaNYL (DURAGESIC) 75 MCG/HR patch, Place 1 patch on the skin as directed by provider Every Other Day., Disp: 15 patch, Rfl: 0  •  furosemide (LASIX) 40 MG tablet, TAKE ONE TABLET BY MOUTH TWICE A DAY, Disp: 180 tablet, Rfl: 0  •  levothyroxine (SYNTHROID, LEVOTHROID) 112 MCG tablet, TAKE ONE TABLET BY MOUTH DAILY, Disp: 90 tablet, Rfl: 0  •  lisinopril (PRINIVIL,ZESTRIL) 10 MG tablet, Take 1 tablet by mouth Daily., Disp: 90 tablet, Rfl: 1  •  LYRICA 100 MG capsule, TAKE ONE CAPSULE BY MOUTH EVERY 8 HOURS, Disp: 270 capsule, Rfl: 0  •  melatonin 5 MG sublingual tablet sublingual tablet, Place 1 tablet under the tongue At Night As Needed (insomnia)., Disp: 30 tablet, Rfl: 0  •  metoprolol tartrate (LOPRESSOR) 25 MG tablet, TAKE ONE TABLET BY MOUTH TWICE A DAY, Disp: 180 tablet, Rfl: 2  •  omeprazole (priLOSEC) 20 MG capsule, TAKE ONE CAPSULE BY MOUTH DAILY, Disp: 90 capsule, Rfl: 0  •  oxyCODONE-acetaminophen (PERCOCET) 7.5-325 MG per tablet, Take 1 tablet by mouth Every 6 (Six) Hours As Needed for Moderate Pain ., Disp: 120 tablet, Rfl: 0  •  probiotic (CULTURELLE) capsule capsule, Take 1 capsule by mouth Daily., Disp: 60 capsule, Rfl: 0  •  promethazine (PHENERGAN) 25 MG tablet, TAKE 1 TABLET BY MOUTH EVERY 6 HOURS AS NEEDED FOR NAUSEA OR VOMITING, Disp: 15 tablet, Rfl: 0  •  promethazine (PHENERGAN) 25 MG tablet, TAKE 1 TABLET BY MOUTH EVERY 6 HOURS AS NEEDED FOR NAUSEA OR VOMITING, Disp: 15 tablet, Rfl: 0  •  promethazine (PHENERGAN) 25 MG tablet, Take 1 tablet by mouth Every 6 (Six) Hours As Needed for Nausea or Vomiting., Disp: 20 tablet, Rfl: 2  •  SITagliptin (JANUVIA) 100  MG tablet, Take 50 mg by mouth Daily., Disp: , Rfl:   •  metoprolol tartrate (LOPRESSOR) 25 MG tablet, Take 25 mg by mouth Daily., Disp: , Rfl:   •  sertraline (ZOLOFT) 50 MG tablet, Take 1 tablet by mouth Daily., Disp: 90 tablet, Rfl: 1    Current Facility-Administered Medications:   •  cyanocobalamin injection 1,000 mcg, 1,000 mcg, Intramuscular, Q28 Days, Harinder Aranda MD, 1,000 mcg at 01/21/19 1515        Tamara was seen today for hypertension, diabetes and pain.    Diagnoses and all orders for this visit:    Takotsubo cardiomyopathy    Sinus bradycardia    Peripheral vascular disease (CMS/HCC)    Essential hypertension    Coronary artery disease involving native coronary artery of native heart without angina pectoris    Chronic systolic heart failure (CMS/HCC)    Obstructive sleep apnea syndrome    Other emphysema (CMS/HCC)    Seasonal allergic rhinitis due to pollen    Vitamin D deficiency    Gastroesophageal reflux disease without esophagitis    Fatty liver disease, nonalcoholic    Cobalamin deficiency  -     Cancel: CBC (No Diff)    Type 2 diabetes mellitus with ketoacidosis without coma, without long-term current use of insulin (CMS/HCC)  -     Cancel: Hemoglobin A1c    Other specified hypothyroidism  -     Cancel: TSH    Diabetic polyneuropathy associated with type 2 diabetes mellitus (CMS/HCC)    Fibromyalgia    Essential tremor    Chronic pain syndrome    Localized osteoporosis without current pathological fracture    Primary osteoarthritis involving multiple joints    Mixed anxiety depressive disorder  -     sertraline (ZOLOFT) 50 MG tablet; Take 1 tablet by mouth Daily.    Insomnia, unspecified type    Dyslipidemia  -     Cancel: Comprehensive Metabolic Panel    Chronically on benzodiazepine therapy    Chronic, continuous use of opioids    Chronic anxiety    Abnormal liver enzymes    Other orders  -     promethazine (PHENERGAN) 25 MG tablet; Take 1 tablet by mouth Every 6 (Six) Hours As Needed for  Nausea or Vomiting.  -     baclofen (LIORESAL) 10 MG tablet; Take 1 tablet by mouth 2 (Two) Times a Day As Needed for Muscle Spasms.

## 2021-03-29 ENCOUNTER — OFFICE VISIT (OUTPATIENT)
Dept: CARDIOLOGY | Facility: CLINIC | Age: 66
End: 2021-03-29

## 2021-03-29 VITALS
HEIGHT: 66 IN | DIASTOLIC BLOOD PRESSURE: 54 MMHG | OXYGEN SATURATION: 92 % | SYSTOLIC BLOOD PRESSURE: 124 MMHG | BODY MASS INDEX: 28.93 KG/M2 | WEIGHT: 180 LBS | HEART RATE: 71 BPM

## 2021-03-29 DIAGNOSIS — I25.10 CORONARY ARTERY DISEASE INVOLVING NATIVE CORONARY ARTERY OF NATIVE HEART WITHOUT ANGINA PECTORIS: Primary | ICD-10-CM

## 2021-03-29 DIAGNOSIS — I82.441 ACUTE DEEP VEIN THROMBOSIS (DVT) OF TIBIAL VEIN OF RIGHT LOWER EXTREMITY (HCC): ICD-10-CM

## 2021-03-29 DIAGNOSIS — I10 ESSENTIAL HYPERTENSION: ICD-10-CM

## 2021-03-29 DIAGNOSIS — I51.81 TAKOTSUBO CARDIOMYOPATHY: ICD-10-CM

## 2021-03-29 DIAGNOSIS — I50.22 CHRONIC SYSTOLIC HEART FAILURE (HCC): ICD-10-CM

## 2021-03-29 DIAGNOSIS — I48.92 ATRIAL FLUTTER WITH RAPID VENTRICULAR RESPONSE (HCC): ICD-10-CM

## 2021-03-29 DIAGNOSIS — E78.2 MIXED HYPERLIPIDEMIA: ICD-10-CM

## 2021-03-29 DIAGNOSIS — I35.0 NONRHEUMATIC AORTIC VALVE STENOSIS: ICD-10-CM

## 2021-03-29 DIAGNOSIS — M79.604 PAIN OF RIGHT LOWER EXTREMITY: ICD-10-CM

## 2021-03-29 PROCEDURE — 93000 ELECTROCARDIOGRAM COMPLETE: CPT | Performed by: INTERNAL MEDICINE

## 2021-03-29 PROCEDURE — 99214 OFFICE O/P EST MOD 30 MIN: CPT | Performed by: INTERNAL MEDICINE

## 2021-03-29 RX ORDER — ALBUTEROL SULFATE 2.5 MG/3ML
2.5 SOLUTION RESPIRATORY (INHALATION) EVERY 6 HOURS PRN
Qty: 120 EACH | Refills: 11 | Status: SHIPPED | OUTPATIENT
Start: 2021-03-29

## 2021-03-29 NOTE — PROGRESS NOTES
[facility] AT [address], [address]  [place], [place], [address] [phone]  WWW.[facility]           OUTPATIENT CLINIC FOLLOW UP NOTE    Patient Care Team:  Patient Care Team:  Harinder Aranda MD as PCP - General  Harinder Aranda MD as PCP - Family Medicine  Arsalan Feliciano MD as Surgeon (Cardiothoracic Surgery)  Lane Zamorano MD as Consulting Physician (Cardiology)    Subjective:      Chief Complaint   Patient presents with   • Severe AS s/p TAVR 1/23/20       HPI:    Tamara Langston is a 65 y.o. female.  Cardiac problem list:  1. CAD   1. s/p multiple prior PCI  2. Severe aortic stenosis status post TAVR (23 mm CPN 3 tissue valve) on 1/23/2020  1. Increased velocities noted on TTE, started on Eliquis per Dr Valdez  3. Atrial flutter  1. Diagnosed during hospitalization for acute respiratory failure 2/2021.  4. History of systolic heart failure/Takotsubo  5. Right posterior tibial DVT 2/2021  6. Hypertension  7. Hyperlipidemia  8. Diabetes  9. Sleep apnea, arthritis, uterine and cervical cancer, fatty liver, hypothyroidism, polycythemia vera, fibromyalgia, and osteoporosis.      The patient presents today for follow-up.      Since the patient was last seen she reports that she was admitted in February to [facility].  During admission for respiratory failure she was also noted to be in atrial flutter intermittently.  She was found to have a right posterior tibial DVT during that admission.  Since being discharged home she reports that she has been back to her baseline.  She took Eliquis until her prescription ran out and has been not taking it over the last couple of weeks.  Denies chest pain, or shortness of breath.    Review of Systems:  Negative for exertional chest pain, shortness of breath, lower extremity edema, orthopnea, PND, syncope.    PFSH:  Patient Active Problem List   Diagnosis   • COPD in active smoker on home O2   • Mixed anxiety depressive  disorder   • GERD   • HTN and diastolic dysfunction   • Hypothyroidism   • Morbidly obese   • Tobacco abuse   • PVD   • Chronic, continuous use of opioids   • CAD s/p multiple stents   • Hx takotsubo cardiomyopathy   • Severe AS s/p TAVR 1/23/20   • Seasonal allergic rhinitis due to pollen   • Chronic left shoulder pain   • Neck pain   • Encounter for Medicare annual wellness exam   • A/C mixed respiratory failure due to underlying COPD and narcotics   • DINA on BiPAP   • VHD; mild IL, mod TR   • PAH.  Multifactorial related to diastolic dysfunction, obesity, DINA, and COPD   • HFpEF   • Torrington coma scale total score 3-8 (CMS/HCC)   • ELIF. Creatinine 2.09 this admission, baseline 1.19   • Acute DVT of tibial vein of right lower extremity (CMS/[facility])         Current Outpatient Medications:   •  acetaminophen (TYLENOL) 325 MG tablet, Take 2 tablets by mouth Every 4 (Four) Hours As Needed for mild pain (1-3) or fever (temperature greater than 101F)., Disp: 100 tablet, Rfl: 0  •  albuterol (PROVENTIL) (2.5 MG/3ML) 0.083% nebulizer solution, Take 2.5 mg by nebulization Every 6 (Six) Hours As Needed for Wheezing or Shortness of Air., Disp: 120 vial, Rfl: 11  •  atorvastatin (LIPITOR) 40 MG tablet, TAKE ONE TABLET BY MOUTH ONCE NIGHTLY, Disp: 90 tablet, Rfl: 3  •  busPIRone (BUSPAR) 7.5 MG tablet, TAKE TWO TABLETS BY MOUTH TWICE A DAY, Disp: 360 tablet, Rfl: 3  •  clonazePAM (KlonoPIN) 0.5 MG tablet, TAKE 1/2 TABLET BY MOUTH TWICE A DAY AS NEEDED FOR ANXIETY, Disp: 30 tablet, Rfl: 2  •  clopidogrel (PLAVIX) 75 MG tablet, TAKE ONE TABLET BY MOUTH DAILY, Disp: 90 tablet, Rfl: 3  •  dilTIAZem CD (CARDIZEM CD) 240 MG 24 hr capsule, Take 1 capsule by mouth Daily., Disp: 90 capsule, Rfl: 3  •  doxazosin (CARDURA) 1 MG tablet, Take 1 tablet by mouth Every Night., Disp: 90 tablet, Rfl: 3  •  ezetimibe (ZETIA) 10 MG tablet, TAKE ONE TABLET BY MOUTH DAILY, Disp: 90 tablet, Rfl: 3  •  fentaNYL (DURAGESIC) 75 MCG/HR patch, Place 1 patch  on the skin as directed by provider Every Other Day., Disp: 15 patch, Rfl: 0  •  fexofenadine (ALLEGRA) 180 MG tablet, Take 180 mg by mouth 2 (Two) Times a Day., Disp: , Rfl:   •  furosemide (LASIX) 40 MG tablet, TAKE ONE TABLET BY MOUTH TWICE A DAY, Disp: 180 tablet, Rfl: 3  •  levothyroxine (SYNTHROID, LEVOTHROID) 112 MCG tablet, TAKE ONE TABLET BY MOUTH DAILY, Disp: 90 tablet, Rfl: 1  •  melatonin 5 MG sublingual tablet sublingual tablet, Place 1 tablet under the tongue At Night As Needed (insomnia)., Disp: 30 tablet, Rfl: 0  •  omeprazole (priLOSEC) 20 MG capsule, TAKE ONE CAPSULE BY MOUTH DAILY, Disp: 90 capsule, Rfl: 3  •  oxyCODONE-acetaminophen (Percocet) 5-325 MG per tablet, Take 1 tablet by mouth Every 8 (Eight) Hours As Needed for Severe Pain ., Disp: 90 tablet, Rfl: 0  •  pregabalin (LYRICA) 100 MG capsule, TAKE ONE CAPSULE BY MOUTH EVERY 8 HOURS, Disp: 270 capsule, Rfl: 0  •  promethazine (PHENERGAN) 25 MG tablet, TAKE ONE TABLET BY MOUTH EVERY 6 HOURS AS NEEDED FOR NAUSEA AND VOMITING, Disp: 20 tablet, Rfl: 3  •  sertraline (ZOLOFT) 50 MG tablet, TAKE ONE TABLET BY MOUTH DAILY, Disp: 90 tablet, Rfl: 3  •  tiZANidine (ZANAFLEX) 4 MG tablet, TAKE ONE TABLET BY MOUTH TWICE A DAY AS NEEDED FOR MUSCLE SPASMS, Disp: 60 tablet, Rfl: 6  •  topiramate (TOPAMAX) 50 MG tablet, TAKE ONE TABLET BY MOUTH TWICE A DAY, Disp: 180 tablet, Rfl: 3  •  Glycopyrrolate-Formoterol (Bevespi Aerosphere) 9-4.8 MCG/ACT aerosol, Inhale 2 sprays 2 (two) times a day., Disp: 1 each, Rfl: 11    Allergies   Allergen Reactions   • Augmentin [Amoxicillin-Pot Clavulanate] Swelling     Mouth raw, tongue swelling - patient has tolerated cefepime, ceftriaxone   • Cortisone Other (See Comments)     Hotness all over body and hyper   • Oxycontin [Oxycodone] Other (See Comments)     Psoriasis  Tolerates Percocet   • Coreg [Carvedilol] Rash   • Doxycycline Rash   • Metoprolol Tartrate Other (See Comments)     Hair loss    • Tolmetin Rash      "Tolectin-rash and itching   • Vancomycin Rash     Despite rash, pt continued to stay on it 2017        reports that she has been smoking cigarettes. She has a 48.00 pack-year smoking history. She has never used smokeless tobacco.    Family History   Problem Relation Age of Onset   • Arthritis Mother    • Diabetes Mother    • Colon polyps Mother    • Diverticulitis Mother    • Heart failure Mother    • Arthritis Father    • Bleeding Disorder Father    • Diabetes Father    • Kidney disease Father    • Heart disease Father    • COPD Sister         currently smokes   • Arthritis Brother    • Diabetes Brother    • Alcohol abuse Brother    • Heart murmur Daughter    • Arthritis Other    • Diabetes Other          Objective:   Blood pressure 124/54, pulse 71, height 167.6 cm (66\"), weight 81.6 kg (180 lb), SpO2 92 %, not currently breastfeeding.  CONSTITUTIONAL: No acute distress, normal affect  RESPIRATORY: Normal effort. Clear to auscultation bilaterally without wheezing or rales.  CARDIOVASCULAR:  Regular rate and rhythm with normal S1 and S2. Without gallop or rub. СЕРГЕЙ RUSB with radiation to the carotids  PERIPHERAL VASCULAR: Normal radial pulse on the left.  There is no lower extremity edema bilaterally    Labs:      Lab Results   Component Value Date    CHOL 165 02/09/2021    CHOL 112 02/14/2018    CHOL 123 02/25/2017     Lab Results   Component Value Date    TRIG 358 (H) 03/12/2021    TRIG 271 (H) 02/09/2021    TRIG 260 (H) 04/29/2020     Lab Results   Component Value Date    HDL 28 (L) 03/12/2021    HDL 31 (L) 04/29/2020    HDL 31 (L) 12/05/2019     Lab Results   Component Value Date    LDL 82 03/12/2021    LDL 49 04/29/2020    LDL 44 12/05/2019     Lab Results   Component Value Date    VLDL 58 (H) 03/12/2021    VLDL 52 (H) 04/29/2020    VLDL 44 (H) 12/05/2019     No results found for: LDLHDL      Diagnostic Data:      ECG 12 Lead    Date/Time: 3/29/2021 12:10 PM  Performed by: Lane Zamorano MD  Authorized by: " Lane Zamorano MD   Comparison: compared with previous ECG from 2/10/2021  Comparison to previous ECG: No longer in atrial flutter  Rhythm: sinus rhythm  Rate: normal  BPM: 71  Conduction: 1st degree AV block  Comments:  ms excellent  ms  Left anterior fascicular block            EMMANUEL 11/2020  · Left ventricular systolic function is normal. Estimated left ventricular EF = 60%  · Left ventricular wall thickness is consistent with concentric hypertrophy.  · There is a 23 mm Limon Vik III TAVR valve present and functioning within acceptable limits. The peak velocity across aortic valve prosthesis was 2.9 m/s with a mean pressure gradient of 15.4 mmHg. There is a mild perivalvular leak present.  · Moderate tricuspid valve regurgitation is present.  · There is mild pulmonic valve regurgitation present.  · Estimated right ventricular systolic pressure from tricuspid regurgitation is markedly elevated (>55 mmHg).  · Borderline dilation of the ascending aorta is present.  · There is (grade 1) plaque in the descending aorta present.  · The pulmonary artery was noted to be dilated.    TTE 10/2020  · Left ventricular systolic function is normal. Left ventricular ejection fraction appears to be 61 - 65%.  · Left ventricular wall thickness is consistent with concentric hypertrophy.  · Left ventricular diastolic function is consistent with (grade II w/high LAP) pseudonormalization.  · The right atrial cavity is borderline dilated.  · There is a 23 mm Limon Vik III TAVR valve present. The peak velocity through the valve is elevated at 4.3 m/s with a mean pressure gradient of 41 mmHg. Of note, intraop EMMANUEL from January 2020 revealed a post-TAVR implant peak velocity of 3.2 m/s, mean pressure gradient 21 mmHg.  · Mild mitral annular calcification is present.  · Mild pulmonic valve regurgitation is present.  · Mild tricuspid valve regurgitation is present.  · Estimated right ventricular systolic pressure from  tricuspid regurgitation is mildly elevated (35-45 mmHg).       TTE 12/2018  · Left ventricular systolic function is normal. Estimated EF = 65%.  · Left ventricular wall thickness is consistent with mild-to-moderate concentric hypertrophy.  · Left atrial cavity size is mildly dilated.  · There is severe calcification of the aortic valve.  · Moderate aortic valve stenosis is present.  · Estimated right ventricular systolic pressure from tricuspid regurgitation is mildly elevated (35-45 mmHg).    [facility] 02/20/2018  · There was severe coronary artery disease involving the mid LAD and first diagonal branch that is now status post intervention with a Tryton 3.5mm proximal, 3.0mm distal bifurcation stent extending from the LAD into the diagonal branch and a Synergy 3.5 x 38 mm drug-eluting stent extending from the proximal to mid LAD.  · Normal left ventricular ejection fraction but with hypokinesis of the apical segments  · Normal right heart filling pressures.  Normal pulmonate capillary wedge pressure  · Normal cardiac index    [facility] 11/2016  1.  50% mid RCA stenosis.  2.  40% proximal LAD stenosis   3.  Normal left ventricular function  4.  Hypertension    Assessment and Plan:   Tamara was seen today for coronary artery disease and hypertension.    Diagnoses and all orders for this visit:    Coronary artery disease involving native coronary artery of native heart without angina pectoris  Mixed hyperlipidemia  Polycythemia vera  History of stroke  -Continue clopidogrel. On clopidogrel also due to her polycythemia vera and prior stroke  -Not on an aspirin to avoid triple therapy while on apixaban  -Continue Plavix, atorvastatin    Severe AS status post TAVR  -Status post TAVR 1/23/2020  -Continue to monitor clinically.    Atrial flutter  Palpitations  -Noted during admission for respiratory failure 2/2021.  -Continue diltiazem.  -Resume Eliquis 5 mg p.o. twice daily.    Takotsubo cardiomyopathy  Chronic systolic heart failure  (CMS/[facility])  -Most recent EF 61-65% on 5/26/2020  -Continue current related medications.    Essential hypertension  -Stable, continue current medications.    Right lower extremity DVT  -Repeat duplex ultrasound.  We will also complete soft tissue ultrasound at that time due to right lower extremity pain.  -Reinitiate Eliquis     Upcoming oral surgery  Preoperative cardiac risk assessment  -Okay to proceed from a cardiac standpoint with holding Eliquis 2 days prior to the procedure and receiving as soon as possible postoperatively no sooner than May.    - Return in about 3 months (around 6/29/2021).    Scribed for Lane Zamorano MD by JEOVANY Farooq 3/29/2021  12:21 EDT      I, Lane Zamorano MD, personally performed the services as scribed by the above named individual. I have made any necessary edits and it is both accurate and complete.     Lane Zamorano MD, MSc, FACC, [facility]  Interventional Cardiology  [facility]

## 2021-04-02 DIAGNOSIS — G89.4 CHRONIC PAIN SYNDROME: ICD-10-CM

## 2021-04-02 DIAGNOSIS — F11.90 CHRONIC, CONTINUOUS USE OF OPIOIDS: Chronic | ICD-10-CM

## 2021-04-02 RX ORDER — FENTANYL 75 UG/H
1 PATCH TRANSDERMAL
Qty: 15 PATCH | Refills: 0 | Status: SHIPPED | OUTPATIENT
Start: 2021-04-02 | End: 2021-04-29 | Stop reason: SDUPTHER

## 2021-04-02 RX ORDER — OXYCODONE HYDROCHLORIDE AND ACETAMINOPHEN 5; 325 MG/1; MG/1
1 TABLET ORAL EVERY 8 HOURS PRN
Qty: 90 TABLET | Refills: 0 | Status: SHIPPED | OUTPATIENT
Start: 2021-04-02 | End: 2021-04-29 | Stop reason: SDUPTHER

## 2021-04-02 NOTE — TELEPHONE ENCOUNTER
Caller: Tamara Langston    Relationship: Self    Best call back number: [phone]    Medication needed:   Requested Prescriptions     Pending Prescriptions Disp Refills   • oxyCODONE-acetaminophen (Percocet) 5-325 MG per tablet 90 tablet 0     Sig: Take 1 tablet by mouth Every 8 (Eight) Hours As Needed for Severe Pain .   • fentaNYL (DURAGESIC) 75 MCG/HR patch 15 patch 0     Sig: Place 1 patch on the skin as directed by provider Every Other Day.       When do you need the refill by: ASAP    What additional details did the patient provide when requesting the medication: PATIENT WOULD NEED THESE REFILLED AND SHE WILL BE OUT COMPLETELY ON April 3, 21    Does the patient have less than a 3 day supply:  [x] Yes  [] No    What is the patient's preferred pharmacy: [facility] [address] [place] [address] [address] [facility] [phone] [facility] [phone] FX

## 2021-04-22 ENCOUNTER — TELEPHONE (OUTPATIENT)
Dept: INTERNAL MEDICINE | Facility: CLINIC | Age: 66
End: 2021-04-22

## 2021-04-22 NOTE — TELEPHONE ENCOUNTER
Caller: Tamara Langston    Relationship: Self    Best call back number: [phone]    What is the best time to reach you: ANYTIME    Who are you requesting to speak with (clinical staff, provider,  specific staff member): KYRA     Do you know the name of the person who called: SELF    What was the call regarding: PATIENT STATES THAT SHE WANTS A CALL FROM KYRA.    Do you require a callback: YES

## 2021-04-23 DIAGNOSIS — J43.9 PULMONARY EMPHYSEMA, UNSPECIFIED EMPHYSEMA TYPE (HCC): Primary | ICD-10-CM

## 2021-04-27 NOTE — TELEPHONE ENCOUNTER
PT CALLED AND WOULD LIKE TO KNOW WHY SHE NEEDS TWO COVID TESTS FOR PULMONARY    PLEASE ADVISE.  CALL BACK:[phone]

## 2021-04-27 NOTE — TELEPHONE ENCOUNTER
Called and spoke with patient and advised we are not sure why Pulmonary needs 2 Covid test that she would need tto ask them she said she thought if Ann Marie scheduled the appointment she may know but she will call them

## 2021-04-29 DIAGNOSIS — F11.90 CHRONIC, CONTINUOUS USE OF OPIOIDS: Chronic | ICD-10-CM

## 2021-04-29 DIAGNOSIS — G89.4 CHRONIC PAIN SYNDROME: ICD-10-CM

## 2021-04-29 RX ORDER — FENTANYL 75 UG/H
1 PATCH TRANSDERMAL
Qty: 15 PATCH | Refills: 0 | Status: SHIPPED | OUTPATIENT
Start: 2021-04-29 | End: 2021-05-26 | Stop reason: SDUPTHER

## 2021-04-29 RX ORDER — OXYCODONE HYDROCHLORIDE AND ACETAMINOPHEN 5; 325 MG/1; MG/1
1 TABLET ORAL EVERY 8 HOURS PRN
Qty: 90 TABLET | Refills: 0 | Status: SHIPPED | OUTPATIENT
Start: 2021-04-29 | End: 2021-05-26 | Stop reason: SDUPTHER

## 2021-04-29 NOTE — TELEPHONE ENCOUNTER
Caller: Tamara Langston    Relationship: Self    Best call back number:[phone]  Medication needed:   Requested Prescriptions     Pending Prescriptions Disp Refills   • oxyCODONE-acetaminophen (Percocet) 5-325 MG per tablet 90 tablet 0     Sig: Take 1 tablet by mouth Every 8 (Eight) Hours As Needed for Severe Pain .   • fentaNYL (DURAGESIC) 75 MCG/HR patch 15 patch 0     Sig: Place 1 patch on the skin as directed by provider Every Other Day.       When do you need the refill by: TODAY    What additional details did the patient provide when requesting the medication:     Does the patient have less than a 3 day supply:  [x] Yes  [] No    What is the patient's preferred pharmacy:      [facility] [address] [facility] [phone] [facility] [phone] FX

## 2021-04-30 ENCOUNTER — HOSPITAL ENCOUNTER (OUTPATIENT)
Dept: ULTRASOUND IMAGING | Facility: HOSPITAL | Age: 66
Discharge: HOME | End: 2021-04-30

## 2021-04-30 ENCOUNTER — HOSPITAL ENCOUNTER (OUTPATIENT)
Dept: CARDIOLOGY | Facility: HOSPITAL | Age: 66
Discharge: HOME | End: 2021-04-30

## 2021-04-30 VITALS — WEIGHT: 180 LBS | BODY MASS INDEX: 28.25 KG/M2 | HEIGHT: 67 IN

## 2021-04-30 DIAGNOSIS — I82.441 ACUTE DEEP VEIN THROMBOSIS (DVT) OF TIBIAL VEIN OF RIGHT LOWER EXTREMITY (HCC): ICD-10-CM

## 2021-04-30 DIAGNOSIS — M79.604 PAIN OF RIGHT LOWER EXTREMITY: ICD-10-CM

## 2021-04-30 PROCEDURE — 76882 US LMTD JT/FCL EVL NVASC XTR: CPT

## 2021-04-30 PROCEDURE — 93971 EXTREMITY STUDY: CPT | Performed by: INTERNAL MEDICINE

## 2021-04-30 PROCEDURE — 93971 EXTREMITY STUDY: CPT

## 2021-05-03 LAB
BH CV ECHO MEAS - BSA(HAYCOCK): 2 M^2
BH CV ECHO MEAS - BSA: 1.9 M^2
BH CV ECHO MEAS - BZI_BMI: 29.1 KILOGRAMS/M^2
BH CV ECHO MEAS - BZI_METRIC_HEIGHT: 167.6 CM
BH CV ECHO MEAS - BZI_METRIC_WEIGHT: 81.6 KG
BH CV LOWER VASCULAR LEFT COMMON FEMORAL AUGMENT: NORMAL
BH CV LOWER VASCULAR LEFT COMMON FEMORAL COMPRESS: NORMAL
BH CV LOWER VASCULAR LEFT COMMON FEMORAL PHASIC: NORMAL
BH CV LOWER VASCULAR LEFT COMMON FEMORAL SPONT: NORMAL
BH CV LOWER VASCULAR RIGHT COMMON FEMORAL AUGMENT: NORMAL
BH CV LOWER VASCULAR RIGHT COMMON FEMORAL COMPRESS: NORMAL
BH CV LOWER VASCULAR RIGHT COMMON FEMORAL PHASIC: NORMAL
BH CV LOWER VASCULAR RIGHT COMMON FEMORAL SPONT: NORMAL
BH CV LOWER VASCULAR RIGHT DISTAL FEMORAL AUGMENT: NORMAL
BH CV LOWER VASCULAR RIGHT DISTAL FEMORAL COMPRESS: NORMAL
BH CV LOWER VASCULAR RIGHT DISTAL FEMORAL PHASIC: NORMAL
BH CV LOWER VASCULAR RIGHT DISTAL FEMORAL SPONT: NORMAL
BH CV LOWER VASCULAR RIGHT GASTRONEMIUS COMPRESS: NORMAL
BH CV LOWER VASCULAR RIGHT GREATER SAPH AK COMPRESS: NORMAL
BH CV LOWER VASCULAR RIGHT GREATER SAPH BK COMPRESS: NORMAL
BH CV LOWER VASCULAR RIGHT LESSER SAPH COMPRESS: NORMAL
BH CV LOWER VASCULAR RIGHT MID FEMORAL AUGMENT: NORMAL
BH CV LOWER VASCULAR RIGHT MID FEMORAL COMPRESS: NORMAL
BH CV LOWER VASCULAR RIGHT MID FEMORAL PHASIC: NORMAL
BH CV LOWER VASCULAR RIGHT MID FEMORAL SPONT: NORMAL
BH CV LOWER VASCULAR RIGHT PERONEAL COMPRESS: NORMAL
BH CV LOWER VASCULAR RIGHT POPLITEAL AUGMENT: NORMAL
BH CV LOWER VASCULAR RIGHT POPLITEAL COMPRESS: NORMAL
BH CV LOWER VASCULAR RIGHT POPLITEAL PHASIC: NORMAL
BH CV LOWER VASCULAR RIGHT POPLITEAL SPONT: NORMAL
BH CV LOWER VASCULAR RIGHT POSTERIOR TIBIAL COMPRESS: NORMAL
BH CV LOWER VASCULAR RIGHT PROFUNDA FEMORAL AUGMENT: NORMAL
BH CV LOWER VASCULAR RIGHT PROFUNDA FEMORAL COMPRESS: NORMAL
BH CV LOWER VASCULAR RIGHT PROFUNDA FEMORAL PHASIC: NORMAL
BH CV LOWER VASCULAR RIGHT PROFUNDA FEMORAL SPONT: NORMAL
BH CV LOWER VASCULAR RIGHT PROXIMAL FEMORAL AUGMENT: NORMAL
BH CV LOWER VASCULAR RIGHT PROXIMAL FEMORAL COMPRESS: NORMAL
BH CV LOWER VASCULAR RIGHT PROXIMAL FEMORAL PHASIC: NORMAL
BH CV LOWER VASCULAR RIGHT PROXIMAL FEMORAL SPONT: NORMAL
BH CV LOWER VASCULAR RIGHT SAPHENOFEMORAL JUNCTION AUGMENT: NORMAL
BH CV LOWER VASCULAR RIGHT SAPHENOFEMORAL JUNCTION COMPRESS: NORMAL
BH CV LOWER VASCULAR RIGHT SAPHENOFEMORAL JUNCTION PHASIC: NORMAL
BH CV LOWER VASCULAR RIGHT SAPHENOFEMORAL JUNCTION SPONT: NORMAL
BH CV LOWER VASCULAR RIGHT SOLEAL COMPRESS: NORMAL

## 2021-05-04 ENCOUNTER — TELEPHONE (OUTPATIENT)
Dept: CARDIOLOGY | Facility: CLINIC | Age: 66
End: 2021-05-04

## 2021-05-04 NOTE — TELEPHONE ENCOUNTER
----- Message from JEOVANY Vidales sent at 5/3/2021  8:26 AM EDT -----  Can you let her know that she did not have any evidence of SVT/DVT in her right leg.  Her ultrasound was also unremarkable for any abnormal masses or fluid collection where she has been experiencing pain on the right leg.

## 2021-05-06 NOTE — TELEPHONE ENCOUNTER
Called pt and gave JEOVANY Farooq recommendations above. Pt verbalizes understanding and agreeable to plan.

## 2021-05-14 ENCOUNTER — OFFICE VISIT (OUTPATIENT)
Dept: INTERNAL MEDICINE | Facility: CLINIC | Age: 66
End: 2021-05-14

## 2021-05-14 VITALS
HEIGHT: 66 IN | SYSTOLIC BLOOD PRESSURE: 120 MMHG | DIASTOLIC BLOOD PRESSURE: 70 MMHG | BODY MASS INDEX: 29.07 KG/M2 | TEMPERATURE: 97.3 F | HEART RATE: 68 BPM

## 2021-05-14 DIAGNOSIS — K21.9 GASTROESOPHAGEAL REFLUX DISEASE WITHOUT ESOPHAGITIS: Chronic | ICD-10-CM

## 2021-05-14 DIAGNOSIS — E78.5 DYSLIPIDEMIA: ICD-10-CM

## 2021-05-14 DIAGNOSIS — J43.9 PULMONARY EMPHYSEMA, UNSPECIFIED EMPHYSEMA TYPE (HCC): Chronic | ICD-10-CM

## 2021-05-14 DIAGNOSIS — I25.10 CORONARY ARTERY DISEASE INVOLVING NATIVE CORONARY ARTERY OF NATIVE HEART WITHOUT ANGINA PECTORIS: Chronic | ICD-10-CM

## 2021-05-14 DIAGNOSIS — I10 ESSENTIAL HYPERTENSION: Primary | Chronic | ICD-10-CM

## 2021-05-14 DIAGNOSIS — E03.8 OTHER SPECIFIED HYPOTHYROIDISM: Chronic | ICD-10-CM

## 2021-05-14 PROCEDURE — 99214 OFFICE O/P EST MOD 30 MIN: CPT | Performed by: INTERNAL MEDICINE

## 2021-05-14 RX ORDER — ATORVASTATIN CALCIUM 80 MG/1
80 TABLET, FILM COATED ORAL NIGHTLY
Qty: 90 TABLET | Refills: 3 | Status: SHIPPED | OUTPATIENT
Start: 2021-05-14 | End: 2022-01-01

## 2021-05-14 NOTE — PROGRESS NOTES
Patient is a 65 y.o. female who is here for a follow up of hypertension and hyperlipidemia.  Chief Complaint   Patient presents with   • Hypertension   • Hypothyroidism         HPI:    Here for mgmt of HTN and hyperlipidemia and chronic pain.  Feels run down.  In more pain with the colder weather.  Energy level is not the best.  Breathing is not the best but still smoking.  Not using Bipap on daily basis.  No fever or chills.  Some nasal congestion.     History:     Patient Active Problem List   Diagnosis   • COPD in active smoker on home O2   • Mixed anxiety depressive disorder   • GERD   • HTN and diastolic dysfunction   • Hypothyroidism   • Morbidly obese   • Tobacco abuse   • PVD   • Chronic, continuous use of opioids   • CAD s/p multiple stents   • Hx takotsubo cardiomyopathy   • Severe AS s/p TAVR 1/23/20   • Seasonal allergic rhinitis due to pollen   • Chronic left shoulder pain   • Neck pain   • Encounter for Medicare annual wellness exam   • A/C mixed respiratory failure due to underlying COPD and narcotics   • DINA on BiPAP   • VHD; mild UT, mod TR   • PAH.  Multifactorial related to diastolic dysfunction, obesity, DINA, and COPD   • HFpEF   • [place] coma scale total score 3-8 (CMS/HCC)   • ELIF. Creatinine 2.09 this admission, baseline 1.19   • Acute DVT of tibial vein of right lower extremity (CMS/HCC)       Past Medical History:   Diagnosis Date   • Altered mental status 2/13/2018   • Bronchitis    • Cellulitis of sacral region 2/13/2018   • Cervical cancer (CMS/HCC)    • Chronic anxiety 2/27/2017   • Chronic bronchitis (CMS/HCC)    • Chronic respiratory failure with hypoxia (CMS/HCC) 3/8/2018   • Chronic systolic heart failure (CMS/HCC)    • Chronically on benzodiazepine therapy 2/27/2017   • Coronary artery disease    • Degenerative arthritis    • Diabetes mellitus (CMS/HCC)    • Dyslipidemia 5/11/2016   • Dyspnea    • Fatty liver disease, nonalcoholic 11/21/2016   • Fibromyalgia    • GERD  (gastroesophageal reflux disease)    • H/O echocardiogram    • History of pneumonia    • Hypertension 5/11/2016    16. H/O echocardiogram (V15.89) (Z92.89)  · A.  Echocardiogram of 02/03/2015 reports an ejection fraction of 60-65%, mild concentric     LVH, trace mitral regurgitation, mild tricuspid and pulmonic regurgitation and calculated     RVSP of 35 mmHg, the main pulmonary artery is also mildly dilated.   • Hypothyroidism 5/11/2016    Description: A.  On replacement therapy.   • Infected wound 3/8/2018   • Lung nodule     per pt report   • Macular degeneration    • Meningioma (CMS/HCC)     behind left eye per pt report   • Obesity    • DINA (obstructive sleep apnea)     intolerant of CPAP therapy, uses bipap   • Osteoporosis    • Polycythemia vera (CMS/HCC) 5/11/2016   • Pulmonary emphysema (CMS/HCC)    • Restrictive ventilatory defect    • Sepsis (CMS/HCC) 2/13/2018   • TIA (transient ischemic attack) 2015    blurred vision    • Uterine cancer (CMS/HCC)    • Vitamin D deficiency 8/1/2016   • Weakness 3/8/2018       Past Surgical History:   Procedure Laterality Date   • AMPUTATION DIGIT Left 11/23/2016    Procedure: AMPUTATION TRANS METATARSAL - ray amutation of the left great toe;  Surgeon: Arsalan Feliciano MD;  Location:  [facility];  Service:    • AMPUTATION DIGIT Left 3/2/2017    Procedure: LEFT FOOT TRANSMETATARSAL AMPUTATION TOES 2,3,4,5;  Surgeon: Joey Guaman MD;  Location:  [facility];  Service:    • AORTIC VALVE REPAIR/REPLACEMENT N/A 1/23/2020    Procedure: TRANSCATHETER AORTIC VALVE REPLACEMENT;  Surgeon: Joey Guaman MD;  Location:  [facility];  Service: Cardiothoracic   • AORTIC VALVE REPAIR/REPLACEMENT N/A 1/23/2020    Procedure: Transfemoral Transcatheter Aortic Valve Replacement;  Surgeon: Qi Valdez MD;  Location:  [facility];  Service: Cardiovascular   • BACK SURGERY      lumbar fusions x5--multiple times; 1995, 1997, 1998, 1999 and 2008   • BELOW KNEE AMPUTATION Left  2/25/2017    Procedure: EXTENDED LEFT TOE AMPUTATION;  Surgeon: Arsalan Feliciano MD;  Location:  [facility];  Service:    • CARDIAC CATHETERIZATION N/A 11/26/2016    Procedure: Left Heart Cath;  Surgeon: Ash Nuñez MD;  Location:  [facility];  Service:    • CARDIAC CATHETERIZATION N/A 2/20/2018    Procedure: Left Heart Cath;  Surgeon: Lane Zamorano MD;  Location:  [facility];  Service:    • CARDIAC CATHETERIZATION N/A 2/20/2018    Procedure: Right Heart Cath;  Surgeon: Lane Zamorano MD;  Location:  [facility];  Service:    • CARDIAC CATHETERIZATION Left 1/10/2020    Procedure: Cardiac Catheterization/Vascular Study;  Surgeon: Lane Zamorano MD;  Location:  [facility];  Service: Cardiology   • COLONOSCOPY     • HAND SURGERY Bilateral     x3    • HYSTERECTOMY      status post uterine and cervical cancer   • LUMBAR SPINE SURGERY      arthrodesis by anterior approach addit interspace   • TONSILLECTOMY         Current Outpatient Medications on File Prior to Visit   Medication Sig   • acetaminophen (TYLENOL) 325 MG tablet Take 2 tablets by mouth Every 4 (Four) Hours As Needed for mild pain (1-3) or fever (temperature greater than 101F).   • albuterol (PROVENTIL) (2.5 MG/3ML) 0.083% nebulizer solution Take 2.5 mg by nebulization Every 6 (Six) Hours As Needed for Wheezing or Shortness of Air.   • apixaban (ELIQUIS) 5 MG tablet tablet Take 1 tablet by mouth Every 12 (Twelve) Hours.   • busPIRone (BUSPAR) 7.5 MG tablet TAKE TWO TABLETS BY MOUTH TWICE A DAY   • clonazePAM (KlonoPIN) 0.5 MG tablet TAKE 1/2 TABLET BY MOUTH TWICE A DAY AS NEEDED FOR ANXIETY   • clopidogrel (PLAVIX) 75 MG tablet TAKE ONE TABLET BY MOUTH DAILY   • dilTIAZem CD (CARDIZEM CD) 240 MG 24 hr capsule Take 1 capsule by mouth Daily.   • doxazosin (CARDURA) 1 MG tablet Take 1 tablet by mouth Every Night.   • ezetimibe (ZETIA) 10 MG tablet TAKE ONE TABLET BY MOUTH DAILY   • fentaNYL  (DURAGESIC) 75 MCG/HR patch Place 1 patch on the skin as directed by provider Every Other Day.   • fexofenadine (ALLEGRA) 180 MG tablet Take 180 mg by mouth 2 (Two) Times a Day.   • furosemide (LASIX) 40 MG tablet TAKE ONE TABLET BY MOUTH TWICE A DAY   • levothyroxine (SYNTHROID, LEVOTHROID) 112 MCG tablet TAKE ONE TABLET BY MOUTH DAILY   • melatonin 5 MG sublingual tablet sublingual tablet Place 1 tablet under the tongue At Night As Needed (insomnia).   • omeprazole (priLOSEC) 20 MG capsule TAKE ONE CAPSULE BY MOUTH DAILY   • oxyCODONE-acetaminophen (Percocet) 5-325 MG per tablet Take 1 tablet by mouth Every 8 (Eight) Hours As Needed for Severe Pain .   • pregabalin (LYRICA) 100 MG capsule TAKE ONE CAPSULE BY MOUTH EVERY 8 HOURS   • promethazine (PHENERGAN) 25 MG tablet TAKE ONE TABLET BY MOUTH EVERY 6 HOURS AS NEEDED FOR NAUSEA AND VOMITING   • sertraline (ZOLOFT) 50 MG tablet TAKE ONE TABLET BY MOUTH DAILY   • tiZANidine (ZANAFLEX) 4 MG tablet TAKE ONE TABLET BY MOUTH TWICE A DAY AS NEEDED FOR MUSCLE SPASMS   • topiramate (TOPAMAX) 50 MG tablet TAKE ONE TABLET BY MOUTH TWICE A DAY   • [DISCONTINUED] atorvastatin (LIPITOR) 40 MG tablet TAKE ONE TABLET BY MOUTH ONCE NIGHTLY   • Glycopyrrolate-Formoterol (Bevespi Aerosphere) 9-4.8 MCG/ACT aerosol Inhale 2 sprays 2 (two) times a day.     No current facility-administered medications on file prior to visit.       Family History   Problem Relation Age of Onset   • Arthritis Mother    • Diabetes Mother    • Colon polyps Mother    • Diverticulitis Mother    • Heart failure Mother    • Arthritis Father    • Bleeding Disorder Father    • Diabetes Father    • Kidney disease Father    • Heart disease Father    • COPD Sister         currently smokes   • Arthritis Brother    • Diabetes Brother    • Alcohol abuse Brother    • Heart murmur Daughter    • Arthritis Other    • Diabetes Other        Social History     Socioeconomic History   • Marital status:      Spouse name:  "Wali   • Number of children: 1   • Years of education: Not on file   • Highest education level: Not on file   Tobacco Use   • Smoking status: Current Every Day Smoker     Packs/day: 1.00     Years: 48.00     Pack years: 48.00     Types: Cigarettes   • Smokeless tobacco: Never Used   Substance and Sexual Activity   • Alcohol use: No   • Drug use: No   • Sexual activity: Defer         Review of Systems   Constitutional: Positive for fatigue. Negative for chills, diaphoresis, fever and unexpected weight change.   HENT: Negative for congestion, hearing loss, nosebleeds, postnasal drip, sinus pressure and sore throat.    Eyes: Negative for pain, discharge and itching.   Respiratory: Positive for shortness of breath. Negative for cough, chest tightness and wheezing.    Cardiovascular: Negative for chest pain and leg swelling.   Gastrointestinal: Positive for constipation. Negative for abdominal distention, blood in stool, diarrhea, nausea and vomiting.   Endocrine: Negative for heat intolerance, polydipsia and polyuria.   Genitourinary: Negative for dysuria, frequency and hematuria.   Musculoskeletal: Positive for arthralgias, back pain, gait problem, neck pain and neck stiffness. Negative for joint swelling and myalgias.   Skin:        Hair loss   Neurological: Positive for dizziness, tremors, weakness and light-headedness. Negative for syncope and headaches.   Psychiatric/Behavioral: Positive for dysphoric mood and sleep disturbance. The patient is nervous/anxious.        /70   Pulse 68   Temp 97.3 °F (36.3 °C) (Infrared)   Ht 167.6 cm (65.98\")   LMP  (LMP Unknown)   BMI 29.07 kg/m²       Physical Exam  Constitutional:       General: She is not in acute distress.     Appearance: Normal appearance. She is well-developed. She is obese.   HENT:      Head: Normocephalic and atraumatic.      Right Ear: External ear normal.      Left Ear: External ear normal.      Nose: Nose normal.      Mouth/Throat:      Mouth: " Mucous membranes are moist.      Pharynx: Oropharynx is clear.   Eyes:      Extraocular Movements: Extraocular movements intact.      Conjunctiva/sclera: Conjunctivae normal.      Pupils: Pupils are equal, round, and reactive to light.   Cardiovascular:      Rate and Rhythm: Normal rate and regular rhythm.      Heart sounds: Murmur (1/6) heard.     Pulmonary:      Effort: Pulmonary effort is normal. No respiratory distress.      Comments: Diiminished BS  Abdominal:      General: Bowel sounds are normal.      Palpations: Abdomen is soft.   Musculoskeletal:      Cervical back: Normal range of motion and neck supple.      Comments: In a wheelchair   Lymphadenopathy:      Cervical: No cervical adenopathy.   Skin:     General: Skin is warm and dry.   Neurological:      General: No focal deficit present.      Mental Status: She is alert and oriented to person, place, and time. Mental status is at baseline.   Psychiatric:         Mood and Affect: Mood normal.         Behavior: Behavior normal.         Thought Content: Thought content normal.         Judgment: Judgment normal.         Procedure:      Discussion/Summary:    chronic back pain- refill patch as needed, counseled on LT risk, advised to reduce percocet to tid prn  htn/HFpEF-controlled on ACE/CCB   hyperlipidemia-cont lipitor and zetia, recheck not at goal and will increase the lipitor 80 mg, counseled on low chol diet  hypothroid-cont replacement, recheck on rtc  depression with anxiety-cont buspar and klonopine and zoloft ,advised her of risk of addiction, stable  polycythemia-cbc on rtc, advised tob cessation  retinal vein occlusion- cont rf modification, counseled on tob cessation, no change  b12 def-recheck 7/29 stable  DM-labs on rtc  Elevated lft/?autoimmune-f/u gastro recs , labs on rtc  GERD-controlled PPI  Tremor-cont BB and topamax, controlled  Fe def anemia- recheck on rtc  RLE DVT-2/7/21-on NOAC      3/21 labs noted and dw patient       Current  Outpatient Medications:   •  acetaminophen (TYLENOL) 325 MG tablet, Take 2 tablets by mouth Every 4 (Four) Hours As Needed for mild pain (1-3) or fever (temperature greater than 101F)., Disp: 100 tablet, Rfl: 0  •  albuterol (PROVENTIL) (2.5 MG/3ML) 0.083% nebulizer solution, Take 2.5 mg by nebulization Every 6 (Six) Hours As Needed for Wheezing or Shortness of Air., Disp: 120 each, Rfl: 11  •  apixaban (ELIQUIS) 5 MG tablet tablet, Take 1 tablet by mouth Every 12 (Twelve) Hours., Disp: 90 tablet, Rfl: 3  •  atorvastatin (LIPITOR) 80 MG tablet, Take 1 tablet by mouth Every Night., Disp: 90 tablet, Rfl: 3  •  busPIRone (BUSPAR) 7.5 MG tablet, TAKE TWO TABLETS BY MOUTH TWICE A DAY, Disp: 360 tablet, Rfl: 3  •  clonazePAM (KlonoPIN) 0.5 MG tablet, TAKE 1/2 TABLET BY MOUTH TWICE A DAY AS NEEDED FOR ANXIETY, Disp: 30 tablet, Rfl: 2  •  clopidogrel (PLAVIX) 75 MG tablet, TAKE ONE TABLET BY MOUTH DAILY, Disp: 90 tablet, Rfl: 3  •  dilTIAZem CD (CARDIZEM CD) 240 MG 24 hr capsule, Take 1 capsule by mouth Daily., Disp: 90 capsule, Rfl: 3  •  doxazosin (CARDURA) 1 MG tablet, Take 1 tablet by mouth Every Night., Disp: 90 tablet, Rfl: 3  •  ezetimibe (ZETIA) 10 MG tablet, TAKE ONE TABLET BY MOUTH DAILY, Disp: 90 tablet, Rfl: 3  •  fentaNYL (DURAGESIC) 75 MCG/HR patch, Place 1 patch on the skin as directed by provider Every Other Day., Disp: 15 patch, Rfl: 0  •  fexofenadine (ALLEGRA) 180 MG tablet, Take 180 mg by mouth 2 (Two) Times a Day., Disp: , Rfl:   •  furosemide (LASIX) 40 MG tablet, TAKE ONE TABLET BY MOUTH TWICE A DAY, Disp: 180 tablet, Rfl: 3  •  levothyroxine (SYNTHROID, LEVOTHROID) 112 MCG tablet, TAKE ONE TABLET BY MOUTH DAILY, Disp: 90 tablet, Rfl: 1  •  melatonin 5 MG sublingual tablet sublingual tablet, Place 1 tablet under the tongue At Night As Needed (insomnia)., Disp: 30 tablet, Rfl: 0  •  omeprazole (priLOSEC) 20 MG capsule, TAKE ONE CAPSULE BY MOUTH DAILY, Disp: 90 capsule, Rfl: 3  •   oxyCODONE-acetaminophen (Percocet) 5-325 MG per tablet, Take 1 tablet by mouth Every 8 (Eight) Hours As Needed for Severe Pain ., Disp: 90 tablet, Rfl: 0  •  pregabalin (LYRICA) 100 MG capsule, TAKE ONE CAPSULE BY MOUTH EVERY 8 HOURS, Disp: 270 capsule, Rfl: 0  •  promethazine (PHENERGAN) 25 MG tablet, TAKE ONE TABLET BY MOUTH EVERY 6 HOURS AS NEEDED FOR NAUSEA AND VOMITING, Disp: 20 tablet, Rfl: 3  •  sertraline (ZOLOFT) 50 MG tablet, TAKE ONE TABLET BY MOUTH DAILY, Disp: 90 tablet, Rfl: 3  •  tiZANidine (ZANAFLEX) 4 MG tablet, TAKE ONE TABLET BY MOUTH TWICE A DAY AS NEEDED FOR MUSCLE SPASMS, Disp: 60 tablet, Rfl: 6  •  topiramate (TOPAMAX) 50 MG tablet, TAKE ONE TABLET BY MOUTH TWICE A DAY, Disp: 180 tablet, Rfl: 3  •  Glycopyrrolate-Formoterol (Bevespi Aerosphere) 9-4.8 MCG/ACT aerosol, Inhale 2 sprays 2 (two) times a day., Disp: 1 each, Rfl: 11        Diagnoses and all orders for this visit:    1. HTN and diastolic dysfunction (Primary)    2. CAD s/p multiple stents    3. Other specified hypothyroidism    4. GERD    5. Pulmonary emphysema, unspecified emphysema type (CMS/HCC)    6. Dyslipidemia  -     atorvastatin (LIPITOR) 80 MG tablet; Take 1 tablet by mouth Every Night.  Dispense: 90 tablet; Refill: 3

## 2021-05-24 RX ORDER — LEVOTHYROXINE SODIUM 112 UG/1
TABLET ORAL
Qty: 90 TABLET | Refills: 3 | Status: SHIPPED | OUTPATIENT
Start: 2021-05-24 | End: 2022-01-01

## 2021-05-26 DIAGNOSIS — F11.90 CHRONIC, CONTINUOUS USE OF OPIOIDS: Chronic | ICD-10-CM

## 2021-05-26 DIAGNOSIS — G89.4 CHRONIC PAIN SYNDROME: ICD-10-CM

## 2021-05-26 RX ORDER — OXYCODONE HYDROCHLORIDE AND ACETAMINOPHEN 5; 325 MG/1; MG/1
1 TABLET ORAL EVERY 8 HOURS PRN
Qty: 90 TABLET | Refills: 0 | Status: SHIPPED | OUTPATIENT
Start: 2021-05-26 | End: 2021-06-25 | Stop reason: SDUPTHER

## 2021-05-26 RX ORDER — FENTANYL 75 UG/H
1 PATCH TRANSDERMAL
Qty: 15 PATCH | Refills: 0 | Status: SHIPPED | OUTPATIENT
Start: 2021-05-26 | End: 2021-06-25 | Stop reason: SDUPTHER

## 2021-05-26 NOTE — TELEPHONE ENCOUNTER
Caller: Tamara Langston    Relationship: Self    Best call back number: [phone]  Medication needed:   Requested Prescriptions     Pending Prescriptions Disp Refills   • oxyCODONE-acetaminophen (Percocet) 5-325 MG per tablet 90 tablet 0     Sig: Take 1 tablet by mouth Every 8 (Eight) Hours As Needed for Severe Pain .   • fentaNYL (DURAGESIC) 75 MCG/HR patch 15 patch 0     Sig: Place 1 patch on the skin as directed by provider Every Other Day.       When do you need the refill by: TODAY   What additional details did the patient provide when requesting the medication: PATIENT HAS 1 DAY LEFT OF MEDICATION      Does the patient have less than a 3 day supply:  [x] Yes  [] No    What is the patient's preferred pharmacy: [facility] [facility] [phone] [facility] [phone] FX

## 2021-06-01 RX ORDER — PREGABALIN 100 MG/1
CAPSULE ORAL
Qty: 270 CAPSULE | Refills: 0 | Status: SHIPPED | OUTPATIENT
Start: 2021-06-01 | End: 2021-01-01

## 2021-06-03 DIAGNOSIS — M62.838 MUSCLE SPASMS OF NECK: ICD-10-CM

## 2021-06-03 DIAGNOSIS — F41.8 MIXED ANXIETY DEPRESSIVE DISORDER: Chronic | ICD-10-CM

## 2021-06-03 RX ORDER — BUSPIRONE HYDROCHLORIDE 7.5 MG/1
TABLET ORAL
Qty: 360 TABLET | Refills: 3 | Status: SHIPPED | OUTPATIENT
Start: 2021-06-03 | End: 2022-01-01

## 2021-06-03 RX ORDER — CLONAZEPAM 0.5 MG/1
TABLET ORAL
Qty: 30 TABLET | Refills: 2 | Status: SHIPPED | OUTPATIENT
Start: 2021-06-03 | End: 2021-01-01

## 2021-06-03 RX ORDER — TIZANIDINE 4 MG/1
TABLET ORAL
Qty: 60 TABLET | Refills: 5 | Status: SHIPPED | OUTPATIENT
Start: 2021-06-03 | End: 2021-01-01

## 2021-06-10 DIAGNOSIS — Z01.812 BLOOD TESTS PRIOR TO TREATMENT OR PROCEDURE: Primary | ICD-10-CM

## 2021-06-11 ENCOUNTER — CLINICAL SUPPORT NO REQUIREMENTS (OUTPATIENT)
Dept: PULMONOLOGY | Facility: CLINIC | Age: 66
End: 2021-06-11

## 2021-06-11 DIAGNOSIS — Z01.812 BLOOD TESTS PRIOR TO TREATMENT OR PROCEDURE: ICD-10-CM

## 2021-06-11 PROCEDURE — U0004 COV-19 TEST NON-CDC HGH THRU: HCPCS | Performed by: INTERNAL MEDICINE

## 2021-06-11 PROCEDURE — 99211 OFF/OP EST MAY X REQ PHY/QHP: CPT | Performed by: INTERNAL MEDICINE

## 2021-06-12 LAB — SARS-COV-2 RNA NOSE QL NAA+PROBE: NOT DETECTED

## 2021-06-14 ENCOUNTER — OFFICE VISIT (OUTPATIENT)
Dept: PULMONOLOGY | Facility: CLINIC | Age: 66
End: 2021-06-14

## 2021-06-14 VITALS
TEMPERATURE: 98.4 F | WEIGHT: 183.4 LBS | OXYGEN SATURATION: 96 % | HEIGHT: 66 IN | BODY MASS INDEX: 29.47 KG/M2 | HEART RATE: 80 BPM | SYSTOLIC BLOOD PRESSURE: 124 MMHG | DIASTOLIC BLOOD PRESSURE: 84 MMHG

## 2021-06-14 DIAGNOSIS — Z72.0 TOBACCO ABUSE: Chronic | ICD-10-CM

## 2021-06-14 DIAGNOSIS — J96.12 CHRONIC RESPIRATORY FAILURE WITH HYPOXIA AND HYPERCAPNIA (HCC): ICD-10-CM

## 2021-06-14 DIAGNOSIS — J41.1 MUCOPURULENT CHRONIC BRONCHITIS (HCC): Primary | ICD-10-CM

## 2021-06-14 DIAGNOSIS — G47.33 OSA ON CPAP: Chronic | ICD-10-CM

## 2021-06-14 DIAGNOSIS — I27.21 PAH (PULMONARY ARTERY HYPERTENSION) (HCC): Chronic | ICD-10-CM

## 2021-06-14 DIAGNOSIS — Z99.89 OSA ON CPAP: Chronic | ICD-10-CM

## 2021-06-14 DIAGNOSIS — J96.11 CHRONIC RESPIRATORY FAILURE WITH HYPOXIA AND HYPERCAPNIA (HCC): ICD-10-CM

## 2021-06-14 PROBLEM — N18.31 STAGE 3A CHRONIC KIDNEY DISEASE (HCC): Status: ACTIVE | Noted: 2021-02-07

## 2021-06-14 PROCEDURE — 99214 OFFICE O/P EST MOD 30 MIN: CPT | Performed by: INTERNAL MEDICINE

## 2021-06-14 PROCEDURE — 94729 DIFFUSING CAPACITY: CPT | Performed by: INTERNAL MEDICINE

## 2021-06-14 PROCEDURE — 94726 PLETHYSMOGRAPHY LUNG VOLUMES: CPT | Performed by: INTERNAL MEDICINE

## 2021-06-14 PROCEDURE — 94010 BREATHING CAPACITY TEST: CPT | Performed by: INTERNAL MEDICINE

## 2021-06-14 RX ORDER — REVEFENACIN 175 UG/3ML
175 SOLUTION RESPIRATORY (INHALATION)
Qty: 90 ML | Refills: 3
Start: 2021-06-14 | End: 2021-01-01

## 2021-06-14 NOTE — PROGRESS NOTES
New Patient Pulmonary Office Visit      Patient Name: Tamara Langston    Referring Physician: Harinder Aranda MD    Chief Complaint:    Chief Complaint   Patient presents with   • Emphysema       History of Present Illness: Tamara Langston is a 65 y.o. female who is here today to establish care with Pulmonary.  Patient has a past medical history significant for coronary artery disease status post multiple stents, heart failure preserved ejection fraction, hypertension, history of Takotsubo's cardiomyopathy, peripheral vascular disease, severe aortic stenosis status post TAVR January 23, 2020, hypothyroidism, morbid obesity, GERD, history of DVT, CKD stage III, GERD, tobacco abuse and chronic narcotic use.  Who presents to pulmonary for evaluation of shortness of breath and COPD.  States that she has shortness of breath at baseline has been present for many years now.  She is significant underlying heart disease and has been utilizing 2 L nasal cannula 24 hours a day for many years now.  Used to be only on her home at night now utilizing a 24 hours a day.  Currently she only has the large tanks to get around with she has never been given portable oxygen tanks.  She states that she is currently utilizing albuterol for COPD did find the Spiriva worked but unfortunately had difficulty getting a consistent inhalation.  She thinks that the nebulizer forms are much more efficient for her that she gets better treatment with them.  She denies any nausea or vomiting.  She has no other acute complaints.    Review of Systems:   Review of Systems   Constitutional: Positive for activity change and fatigue. Negative for appetite change, chills and diaphoresis.   HENT: Negative for congestion, postnasal drip, sinus pressure and voice change.    Eyes: Negative for blurred vision.   Respiratory: Positive for shortness of breath and wheezing. Negative for cough.    Cardiovascular: Positive for leg swelling. Negative for  chest pain.   Gastrointestinal: Negative for abdominal pain.   Musculoskeletal: Negative for myalgias.   Skin: Negative for color change and dry skin.   Allergic/Immunologic: Negative for environmental allergies.   Neurological: Negative for weakness and confusion.   Hematological: Negative for adenopathy.   Psychiatric/Behavioral: Negative for sleep disturbance and depressed mood.       Past Medical History:   Past Medical History:   Diagnosis Date   • Altered mental status 2/13/2018   • Bronchitis    • Cellulitis of sacral region 2/13/2018   • Cervical cancer (CMS/HCC)    • Chronic anxiety 2/27/2017   • Chronic bronchitis (CMS/HCC)    • Chronic respiratory failure with hypoxia (CMS/HCC) 3/8/2018   • Chronic systolic heart failure (CMS/HCC)    • Chronically on benzodiazepine therapy 2/27/2017   • Coronary artery disease    • Degenerative arthritis    • Diabetes mellitus (CMS/[facility])    • Dyslipidemia 5/11/2016   • Dyspnea    • Fatty liver disease, nonalcoholic 11/21/2016   • Fibromyalgia    • GERD (gastroesophageal reflux disease)    • H/O echocardiogram    • History of pneumonia    • Hypertension 5/11/2016    16. H/O echocardiogram (V15.89) (Z92.89)  · A.  Echocardiogram of 02/03/2015 reports an ejection fraction of 60-65%, mild concentric     LVH, trace mitral regurgitation, mild tricuspid and pulmonic regurgitation and calculated     RVSP of 35 mmHg, the main pulmonary artery is also mildly dilated.   • Hypothyroidism 5/11/2016    Description: A.  On replacement therapy.   • Infected wound 3/8/2018   • Lung nodule     per pt report   • Macular degeneration    • Meningioma (CMS/[facility])     behind left eye per pt report   • Obesity    • DINA (obstructive sleep apnea)     intolerant of CPAP therapy, uses bipap   • Osteoporosis    • Polycythemia vera (CMS/HCC) 5/11/2016   • Pulmonary emphysema (CMS/HCC)    • Restrictive ventilatory defect    • Sepsis (CMS/HCC) 2/13/2018   • TIA (transient ischemic attack) 2015    blurred  vision    • Uterine cancer (CMS/HCC)    • Vitamin D deficiency 8/1/2016   • Weakness 3/8/2018       Past Surgical History:   Past Surgical History:   Procedure Laterality Date   • AMPUTATION DIGIT Left 11/23/2016    Procedure: AMPUTATION TRANS METATARSAL - ray amutation of the left great toe;  Surgeon: Arsalan Feliciano MD;  Location:  [facility];  Service:    • AMPUTATION DIGIT Left 3/2/2017    Procedure: LEFT FOOT TRANSMETATARSAL AMPUTATION TOES 2,3,4,5;  Surgeon: Joey Guaman MD;  Location: [facility];  Service:    • AORTIC VALVE REPAIR/REPLACEMENT N/A 1/23/2020    Procedure: TRANSCATHETER AORTIC VALVE REPLACEMENT;  Surgeon: Joey Guaman MD;  Location: [facility] OR [facility];  Service: Cardiothoracic   • AORTIC VALVE REPAIR/REPLACEMENT N/A 1/23/2020    Procedure: Transfemoral Transcatheter Aortic Valve Replacement;  Surgeon: Qi Valdez MD;  Location: [facility];  Service: Cardiovascular   • BACK SURGERY      lumbar fusions x5--multiple times; 1995, 1997, 1998, 1999 and 2008   • BELOW KNEE AMPUTATION Left 2/25/2017    Procedure: EXTENDED LEFT TOE AMPUTATION;  Surgeon: Arsalan Feliciano MD;  Location:  [facility];  Service:    • CARDIAC CATHETERIZATION N/A 11/26/2016    Procedure: Left Heart Cath;  Surgeon: Ash Nuñez MD;  Location: [facility] CATH INVASIVE LOCATION;  Service:    • CARDIAC CATHETERIZATION N/A 2/20/2018    Procedure: Left Heart Cath;  Surgeon: Lane Zamorano MD;  Location: [facility] CATH INVASIVE LOCATION;  Service:    • CARDIAC CATHETERIZATION N/A 2/20/2018    Procedure: Right Heart Cath;  Surgeon: Lane Zamorano MD;  Location: [facility] CATH INVASIVE LOCATION;  Service:    • CARDIAC CATHETERIZATION Left 1/10/2020    Procedure: Cardiac Catheterization/Vascular Study;  Surgeon: Lane Zamorano MD;  Location: [facility] CATH INVASIVE LOCATION;  Service: Cardiology   • COLONOSCOPY     • HAND SURGERY Bilateral     x3    • HYSTERECTOMY      status post uterine and cervical cancer   • LUMBAR SPINE SURGERY       arthrodesis by anterior approach addit interspace   • TONSILLECTOMY         Family History:   Family History   Problem Relation Age of Onset   • Arthritis Mother    • Diabetes Mother    • Colon polyps Mother    • Diverticulitis Mother    • Heart failure Mother    • Arthritis Father    • Bleeding Disorder Father    • Diabetes Father    • Kidney disease Father    • Heart disease Father    • COPD Sister         currently smokes   • Arthritis Brother    • Diabetes Brother    • Alcohol abuse Brother    • Heart murmur Daughter    • Arthritis Other    • Diabetes Other        Social History:   Social History     Socioeconomic History   • Marital status:      Spouse name: Wali   • Number of children: 1   • Years of education: Not on file   • Highest education level: Not on file   Tobacco Use   • Smoking status: Current Every Day Smoker     Packs/day: 1.00     Years: 50.00     Pack years: 50.00     Types: Cigarettes   • Smokeless tobacco: Never Used   Substance and Sexual Activity   • Alcohol use: No   • Drug use: No   • Sexual activity: Defer       Medications:     Current Outpatient Medications:   •  acetaminophen (TYLENOL) 325 MG tablet, Take 2 tablets by mouth Every 4 (Four) Hours As Needed for mild pain (1-3) or fever (temperature greater than 101F)., Disp: 100 tablet, Rfl: 0  •  albuterol (PROVENTIL) (2.5 MG/3ML) 0.083% nebulizer solution, Take 2.5 mg by nebulization Every 6 (Six) Hours As Needed for Wheezing or Shortness of Air., Disp: 120 each, Rfl: 11  •  apixaban (ELIQUIS) 5 MG tablet tablet, Take 1 tablet by mouth Every 12 (Twelve) Hours., Disp: 90 tablet, Rfl: 3  •  atorvastatin (LIPITOR) 80 MG tablet, Take 1 tablet by mouth Every Night., Disp: 90 tablet, Rfl: 3  •  busPIRone (BUSPAR) 7.5 MG tablet, TAKE TWO TABLETS BY MOUTH TWICE A DAY, Disp: 360 tablet, Rfl: 3  •  clonazePAM (KlonoPIN) 0.5 MG tablet, TAKE 1/2 TABLET BY MOUTH TWICE A DAY AS NEEDED FOR ANXIETY, Disp: 30 tablet, Rfl: 2  •  clopidogrel  (PLAVIX) 75 MG tablet, TAKE ONE TABLET BY MOUTH DAILY, Disp: 90 tablet, Rfl: 3  •  dilTIAZem CD (CARDIZEM CD) 240 MG 24 hr capsule, Take 1 capsule by mouth Daily., Disp: 90 capsule, Rfl: 3  •  doxazosin (CARDURA) 1 MG tablet, Take 1 tablet by mouth Every Night., Disp: 90 tablet, Rfl: 3  •  ezetimibe (ZETIA) 10 MG tablet, TAKE ONE TABLET BY MOUTH DAILY, Disp: 90 tablet, Rfl: 3  •  fentaNYL (DURAGESIC) 75 MCG/HR patch, Place 1 patch on the skin as directed by provider Every Other Day., Disp: 15 patch, Rfl: 0  •  fexofenadine (ALLEGRA) 180 MG tablet, Take 180 mg by mouth 2 (Two) Times a Day., Disp: , Rfl:   •  furosemide (LASIX) 40 MG tablet, TAKE ONE TABLET BY MOUTH TWICE A DAY, Disp: 180 tablet, Rfl: 3  •  levothyroxine (SYNTHROID, LEVOTHROID) 112 MCG tablet, TAKE ONE TABLET BY MOUTH DAILY, Disp: 90 tablet, Rfl: 3  •  omeprazole (priLOSEC) 20 MG capsule, TAKE ONE CAPSULE BY MOUTH DAILY, Disp: 90 capsule, Rfl: 3  •  oxyCODONE-acetaminophen (Percocet) 5-325 MG per tablet, Take 1 tablet by mouth Every 8 (Eight) Hours As Needed for Severe Pain ., Disp: 90 tablet, Rfl: 0  •  pregabalin (LYRICA) 100 MG capsule, TAKE ONE CAPSULE BY MOUTH EVERY 8 HOURS, Disp: 270 capsule, Rfl: 0  •  promethazine (PHENERGAN) 25 MG tablet, TAKE ONE TABLET BY MOUTH EVERY 6 HOURS AS NEEDED FOR NAUSEA AND VOMITING, Disp: 20 tablet, Rfl: 3  •  sertraline (ZOLOFT) 50 MG tablet, TAKE ONE TABLET BY MOUTH DAILY, Disp: 90 tablet, Rfl: 3  •  tiZANidine (ZANAFLEX) 4 MG tablet, TAKE ONE TABLET BY MOUTH TWICE A DAY AS NEEDED FOR MUSCLE SPASMS, Disp: 60 tablet, Rfl: 5  •  topiramate (TOPAMAX) 50 MG tablet, TAKE ONE TABLET BY MOUTH TWICE A DAY, Disp: 180 tablet, Rfl: 3  •  revefenacin (Yupelri) 175 MCG/3ML nebulizer solution, Take 3 mL by nebulization Daily for 120 days., Disp: 90 mL, Rfl: 3    Allergies:   Allergies   Allergen Reactions   • Augmentin [Amoxicillin-Pot Clavulanate] Swelling     Mouth raw, tongue swelling - patient has tolerated cefepime,  "ceftriaxone   • Cortisone Other (See Comments)     Hotness all over body and hyper   • Oxycontin [Oxycodone] Other (See Comments)     Psoriasis  Tolerates Percocet   • Coreg [Carvedilol] Rash   • Doxycycline Rash   • Metoprolol Tartrate Other (See Comments)     Hair loss    • Tolmetin Rash     Tolectin-rash and itching   • Vancomycin Rash     Despite rash, pt continued to stay on it 2017       Physical Exam:  Vital Signs:   Vitals:    06/14/21 1307   BP: 124/84   Pulse: 80   Temp: 98.4 °F (36.9 °C)   SpO2: 96%  Comment: resting, 2 liters cont   Weight: 83.2 kg (183 lb 6.4 oz)   Height: 167.6 cm (65.98\")       Physical Exam  Vitals and nursing note reviewed.   Constitutional:       General: She is not in acute distress.     Appearance: She is well-developed. She is obese. She is ill-appearing. She is not toxic-appearing.   HENT:      Head: Normocephalic and atraumatic.   Cardiovascular:      Rate and Rhythm: Normal rate and regular rhythm.      Pulses: Normal pulses.      Heart sounds: Normal heart sounds. No murmur heard.   No friction rub. No gallop.    Pulmonary:      Effort: Pulmonary effort is normal. No respiratory distress.      Breath sounds: Normal breath sounds. No wheezing, rhonchi or rales.   Musculoskeletal:      Right lower leg: No edema.      Left lower leg: No edema.   Skin:     General: Skin is warm and dry.   Neurological:      Mental Status: She is alert and oriented to person, place, and time.         Immunization History   Administered Date(s) Administered   • COVID-19 (PFIZER) 04/29/2021, 05/20/2021   • FLUAD TRI 65YR+ 11/07/2019   • Flu Mist 10/20/2015   • Fluad Quad 65+ 10/28/2020   • Fluzone High Dose =>65 Years ([employer] ONLY) 10/04/2016, 10/03/2017, 10/10/2018, 11/07/2019   • Hepatitis A 09/15/2016   • Hepatitis B 09/15/2016   • Pneumococcal Conjugate 13-Valent (PCV13) 11/01/2016, 11/01/2017       Results Review:   - I personally reviewed the pts imaging from chest x-ray 6/14/2021 shows no " acute cardiopulmonary process.  - I personally reviewed the pts PFT from 6/14/2021 shows mild restriction without obstruction and a moderately reduced DLCO.  - I personally reviewed the pts Echo from November 2020 showed a EF of 60%, with left ventricular wall thickness and concentric hypertrophy, with a TAVR valve in place in the aortic position, and RVSP greater than 55 mmHg  - I personally reviewed the pts chart.    Assessment / Plan:   1. Mucopurulent chronic bronchitis (CMS/HCC) (Primary)  2. PAH.  Multifactorial related to diastolic dysfunction, obesity, DINA, and COPD  3. Chronic respiratory failure with hypoxia and hypercapnia (CMS/HCC)  -The patient shortness of breath is multifactorial.  For the COPD we are going to go ahead and start her on Yupelri which I have prescribed through Medicare part B and we will fax to her.  Explained that this is a mail-in order she should receive within few days.  In addition to that with regards to her oxygen I think it would be helpful for her to have some portable oxygen not only to get to her appointments but also to help with daily activities and to increase her exercise regimen.  Therefore I went ahead and ordered her a portable concentrator to be sent on 2 L nasal cannula.  With regards to the pulmonary arterial hypertension this is likely multifactorial secondary to COPD, obesity, shoulder sleep apnea, and diastolic heart failure.  We will treat the DINA as noted above, I am trying to optimize out her COPD with the addition of Yupelri, and then addition of that she should continue on her current heart regimen as prescribed by cardiology.    4. Tobacco abuse  -Continue to recommend tobacco cessation.    5. DINA on BiPAP  -Recommend continue on the BiPAP nightly to help with obstructive sleep apnea.      Follow Up:   Return in about 3 months (around 9/14/2021).     MELBA Bird, DO  Pulmonary and Critical Care Medicine  Note Electronically Signed    Please note that  portions of this note may have been completed with a voice recognition program. Efforts were made to edit the dictations, but occasionally words are mistranscribed.

## 2021-06-14 NOTE — PLAN OF CARE
Problem: Patient Care Overview  Goal: Plan of Care Review  Outcome: Ongoing (interventions implemented as appropriate)   03/13/18 1114   Coping/Psychosocial   Plan of Care Reviewed With patient   Plan of Care Review   Progress no change   OTHER   Outcome Summary pt still sore all over.needs minim. assist for transfers and gait.ambulat 100 ft with walker.did not want to sit up          36.7

## 2021-06-25 DIAGNOSIS — F11.90 CHRONIC, CONTINUOUS USE OF OPIOIDS: Chronic | ICD-10-CM

## 2021-06-25 DIAGNOSIS — G89.4 CHRONIC PAIN SYNDROME: ICD-10-CM

## 2021-06-25 RX ORDER — OXYCODONE HYDROCHLORIDE AND ACETAMINOPHEN 5; 325 MG/1; MG/1
1 TABLET ORAL EVERY 8 HOURS PRN
Qty: 90 TABLET | Refills: 0 | Status: SHIPPED | OUTPATIENT
Start: 2021-06-25 | End: 2021-07-22 | Stop reason: SDUPTHER

## 2021-06-25 RX ORDER — FENTANYL 75 UG/H
1 PATCH TRANSDERMAL
Qty: 15 PATCH | Refills: 0 | Status: SHIPPED | OUTPATIENT
Start: 2021-06-25 | End: 2021-07-22 | Stop reason: SDUPTHER

## 2021-06-25 NOTE — TELEPHONE ENCOUNTER
Caller: Tamara Langston    Relationship: Self    Best call back number:    [phone]     Medication needed:   Requested Prescriptions     Pending Prescriptions Disp Refills   • fentaNYL (DURAGESIC) 75 MCG/HR patch 15 patch 0     Sig: Place 1 patch on the skin as directed by provider Every Other Day.   • oxyCODONE-acetaminophen (Percocet) 5-325 MG per tablet 90 tablet 0     Sig: Take 1 tablet by mouth Every 8 (Eight) Hours As Needed for Severe Pain .     When do you need the refill by:     ASAP    What additional details did the patient provide when requesting the medication:     Does the patient have less than a 3 day supply:  [x] Yes  [] No    What is the patient's preferred pharmacy:      [facility] [place]    TELEPHONE CONTACT:    [phone]    CRYSTAL    PATIENT REQUESTED THAT I WISH [facility] TO HAVE A GREAT WEEKEND!

## 2021-06-29 ENCOUNTER — TELEPHONE (OUTPATIENT)
Dept: INTERNAL MEDICINE | Facility: CLINIC | Age: 66
End: 2021-06-29

## 2021-06-29 RX ORDER — CEFDINIR 300 MG/1
300 CAPSULE ORAL 2 TIMES DAILY
Qty: 20 CAPSULE | Refills: 0 | Status: SHIPPED | OUTPATIENT
Start: 2021-06-29 | End: 2021-01-01

## 2021-06-29 NOTE — TELEPHONE ENCOUNTER
PT CALLED STATED THAT SHE HAS A TERRIBLE INUS INFECTION AND WOULD LIKE TO KNOW IF RX CAN BE CALLED IN, STATED THAT [facility] DOES NOT WORK.    PLEASE ADVISE.  CALL BACK:[phone]    [facility] [address] AT [facility] & [address]

## 2021-07-14 ENCOUNTER — TRANSCRIBE ORDERS (OUTPATIENT)
Dept: ADMINISTRATIVE | Facility: HOSPITAL | Age: 66
End: 2021-07-14

## 2021-07-14 DIAGNOSIS — D32.0 MENINGIOMA OF OPTIC NERVE SHEATH (HCC): Primary | ICD-10-CM

## 2021-07-19 ENCOUNTER — TELEPHONE (OUTPATIENT)
Dept: CARDIOLOGY | Facility: CLINIC | Age: 66
End: 2021-07-19

## 2021-07-22 DIAGNOSIS — F11.90 CHRONIC, CONTINUOUS USE OF OPIOIDS: Chronic | ICD-10-CM

## 2021-07-22 DIAGNOSIS — G89.4 CHRONIC PAIN SYNDROME: ICD-10-CM

## 2021-07-22 RX ORDER — FENTANYL 75 UG/H
1 PATCH TRANSDERMAL
Qty: 15 PATCH | Refills: 0 | Status: SHIPPED | OUTPATIENT
Start: 2021-07-22 | End: 2021-01-01 | Stop reason: SDUPTHER

## 2021-07-22 RX ORDER — OXYCODONE HYDROCHLORIDE AND ACETAMINOPHEN 5; 325 MG/1; MG/1
1 TABLET ORAL EVERY 8 HOURS PRN
Qty: 90 TABLET | Refills: 0 | Status: SHIPPED | OUTPATIENT
Start: 2021-07-22 | End: 2021-01-01 | Stop reason: SDUPTHER

## 2021-07-22 NOTE — TELEPHONE ENCOUNTER
Caller: Tamara Langston    Relationship: Self    Best call back number: [phone]    Medication needed:   Requested Prescriptions     Pending Prescriptions Disp Refills   • fentaNYL (DURAGESIC) 75 MCG/HR patch 15 patch 0     Sig: Place 1 patch on the skin as directed by provider Every Other Day.   • oxyCODONE-acetaminophen (Percocet) 5-325 MG per tablet 90 tablet 0     Sig: Take 1 tablet by mouth Every 8 (Eight) Hours As Needed for Severe Pain .       When do you need the refill by: TODAY    What additional details did the patient provide when requesting the medication: SHE WILL BE OUT OF MEDICATION ON SATURDAY    Does the patient have less than a 3 day supply:  [x] Yes  [] No    What is the patient's preferred pharmacy: [facility] [address] & [address] [facility] [phone] [facility] [phone] FX

## 2021-07-23 ENCOUNTER — HOSPITAL ENCOUNTER (OUTPATIENT)
Dept: CT IMAGING | Facility: HOSPITAL | Age: 66
Discharge: HOME | End: 2021-07-23
Admitting: OPHTHALMOLOGY

## 2021-07-23 DIAGNOSIS — D32.0 MENINGIOMA OF OPTIC NERVE SHEATH (HCC): ICD-10-CM

## 2021-07-23 LAB — CREAT BLDA-MCNC: 1.3 MG/DL (ref 0.6–1.3)

## 2021-07-23 PROCEDURE — 70470 CT HEAD/BRAIN W/O & W/DYE: CPT

## 2021-07-23 PROCEDURE — 0 IOPAMIDOL PER 1 ML: Performed by: OPHTHALMOLOGY

## 2021-07-23 PROCEDURE — 82565 ASSAY OF CREATININE: CPT

## 2021-07-23 PROCEDURE — 70482 CT ORBIT/EAR/FOSSA W/O&W/DYE: CPT

## 2021-07-23 RX ADMIN — IOPAMIDOL 45 ML: 755 INJECTION, SOLUTION INTRAVENOUS at 11:03

## 2021-07-28 ENCOUNTER — APPOINTMENT (OUTPATIENT)
Dept: CT IMAGING | Facility: HOSPITAL | Age: 66
End: 2021-07-28

## 2021-08-19 NOTE — TELEPHONE ENCOUNTER
Caller: Tamara Langston    Relationship: Self    Best call back number: [phone] (H)    Medication needed:   fentaNYL (DURAGESIC) 75 MCG/HR patch  1 patch, Every 48 Hours 0 ordered         Summary: Place 1 patch on the skin as directed by provider Every Other Day., Starting Thu 7/22/2021, Normal   Dose, Route, Frequency: 1 patch, Transdermal, Every 48 Hours        oxyCODONE-acetaminophen (Percocet) 5-325 MG per tablet  1 tablet, Every 8 Hours PRN 0 ordered         Summary: Take 1 tablet by mouth Every 8 (Eight) Hours As Needed for Severe Pain ., Starting Thu 7/22/2021, Normal   Dose, Route, Frequency: 1 tablet, Oral, Every 8 Hours PRN          When do you need the refill by: TODAY    What additional details did the patient provide when requesting the medication: PATIENT STATES THAT SHE HAS LESS THAN 3 DAY SUPPLY    Does the patient have less than a 3 day supply:  [x] Yes  [] No    What is the patient's preferred pharmacy:    [facility] [address] [facility] [phone] [facility] [phone] FX      PATIENT HAS BEEN ADVISED THAT THIS REQUEST HAS BEEN MARKED AS A HIGH PRIORITY TO ALLOW 48 HOURS FOR THE CLINICAL TEAM TO FOLLOW UP ON THIS REQUEST,  IF SYMPTOMS WORSENS TO SEEK OUT EMERGENT CARE. PATIENT  FULLY UNDERSTANDS.

## 2021-09-16 NOTE — TELEPHONE ENCOUNTER
Caller: Tamara Langston    Relationship: Self    Best call back number: [phone]    Medication needed:   Requested Prescriptions     Pending Prescriptions Disp Refills   • fentaNYL (DURAGESIC) 75 MCG/HR patch 15 patch 0     Sig: Place 1 patch on the skin as directed by provider Every Other Day.   • oxyCODONE-acetaminophen (Percocet) 5-325 MG per tablet 90 tablet 0     Sig: Take 1 tablet by mouth Every 8 (Eight) Hours As Needed for Severe Pain .       When do you need the refill by: 09/16/2021    What additional details did the patient provide when requesting the medication: AFTER TOMORROW PATIENT WILL BE OUT OF MEDICATIONS    Does the patient have less than a 3 day supply:  [x] Yes  [] No    What is the patient's preferred pharmacy:     [facility] [phone]

## 2021-09-29 NOTE — PROGRESS NOTES
[facility] AT [address], [address]  [place], [place], [id number] [phone]  [email or web address]           OUTPATIENT CLINIC FOLLOW UP NOTE    Patient Care Team:  Patient Care Team:  Harinder Aranda MD as PCP - General  Harinder Aranda MD as PCP - Family Medicine  Arsalan Feliciano MD as Surgeon (Cardiothoracic Surgery)  Lane Zamorano MD as Consulting Physician (Cardiology)    Subjective:      Chief Complaint   Patient presents with   • CAD s/p multiple stents   • Dizziness       HPI:    Tamara Langston is a 65 y.o. female.    Cardiac problem list:  1. CAD   1. s/p multiple prior PCI  2. Severe aortic stenosis status post TAVR (23 mm CPN 3 tissue valve) on 1/23/2020  1. Increased velocities noted on TTE, started on Eliquis per Dr Valdez  3. Atrial flutter  1. Diagnosed during hospitalization for acute respiratory failure 2/2021.  4. History of systolic heart failure/Takotsubo  5. Right posterior tibial DVT 2/2021  6. Hypertension  7. Hyperlipidemia  8. Diabetes  9. Sleep apnea, arthritis, uterine and cervical cancer, fatty liver, hypothyroidism, polycythemia vera, fibromyalgia, and osteoporosis.      The patient presents today for follow-up.      Has some mild palpitations in the afternoon but otherwise denies chest pain, unusual dyspnea, significant lower extremity swelling.  Stable on nasal cannula oxygen    Review of Systems:  Positive for exertional dyspnea, fatigue, ankle swelling, palpitations    PFSH:  Patient Active Problem List   Diagnosis   • COPD in active smoker on home O2   • Mixed anxiety depressive disorder   • GERD   • HTN and diastolic dysfunction   • Hypothyroidism   • Morbidly obese   • Tobacco abuse   • PVD   • Chronic, continuous use of opioids   • CAD s/p multiple stents   • Hx takotsubo cardiomyopathy   • Severe AS s/p TAVR 1/23/20   • Seasonal allergic rhinitis due to pollen   • Chronic left shoulder pain   • Neck pain   • Encounter for  Medicare annual wellness exam   • Chronic respiratory failure with hypoxia and hypercapnia (CMS/HCC)   • DINA on BiPAP   • VHD; mild MT, mod TR   • PAH.  Multifactorial related to diastolic dysfunction, obesity, DINA, and COPD   • HFpEF   • Refugio coma scale total score 3-8 (CMS/[facility])   • Stage 3a chronic kidney disease (CMS/HCC)   • Acute DVT of tibial vein of right lower extremity (CMS/[facility])         Current Outpatient Medications:   •  acetaminophen (TYLENOL) 325 MG tablet, Take 2 tablets by mouth Every 4 (Four) Hours As Needed for mild pain (1-3) or fever (temperature greater than 101F)., Disp: 100 tablet, Rfl: 0  •  albuterol (PROVENTIL) (2.5 MG/3ML) 0.083% nebulizer solution, Take 2.5 mg by nebulization Every 6 (Six) Hours As Needed for Wheezing or Shortness of Air., Disp: 120 each, Rfl: 11  •  apixaban (ELIQUIS) 5 MG tablet tablet, Take 1 tablet by mouth Every 12 (Twelve) Hours., Disp: 90 tablet, Rfl: 3  •  atorvastatin (LIPITOR) 80 MG tablet, Take 1 tablet by mouth Every Night., Disp: 90 tablet, Rfl: 3  •  busPIRone (BUSPAR) 7.5 MG tablet, TAKE TWO TABLETS BY MOUTH TWICE A DAY, Disp: 360 tablet, Rfl: 3  •  cefdinir (OMNICEF) 300 MG capsule, Take 1 capsule by mouth 2 (Two) Times a Day., Disp: 20 capsule, Rfl: 0  •  clonazePAM (KlonoPIN) 0.5 MG tablet, TAKE ONE-HALF TABLET BY MOUTH TWICE A DAY AS NEEDED FOR ANXIETY, Disp: 30 tablet, Rfl: 2  •  clopidogrel (PLAVIX) 75 MG tablet, TAKE ONE TABLET BY MOUTH DAILY, Disp: 90 tablet, Rfl: 3  •  dilTIAZem CD (CARDIZEM CD) 240 MG 24 hr capsule, Take 1 capsule by mouth Daily., Disp: 90 capsule, Rfl: 3  •  doxazosin (CARDURA) 1 MG tablet, Take 1 tablet by mouth Every Night., Disp: 90 tablet, Rfl: 3  •  ezetimibe (ZETIA) 10 MG tablet, TAKE ONE TABLET BY MOUTH DAILY, Disp: 90 tablet, Rfl: 3  •  fentaNYL (DURAGESIC) 75 MCG/HR patch, Place 1 patch on the skin as directed by provider Every Other Day., Disp: 15 patch, Rfl: 0  •  fexofenadine (ALLEGRA) 180 MG tablet, Take 180 mg by  mouth 2 (Two) Times a Day., Disp: , Rfl:   •  furosemide (LASIX) 40 MG tablet, TAKE ONE TABLET BY MOUTH TWICE A DAY, Disp: 180 tablet, Rfl: 3  •  levothyroxine (SYNTHROID, LEVOTHROID) 112 MCG tablet, TAKE ONE TABLET BY MOUTH DAILY, Disp: 90 tablet, Rfl: 3  •  omeprazole (priLOSEC) 20 MG capsule, TAKE ONE CAPSULE BY MOUTH DAILY, Disp: 90 capsule, Rfl: 3  •  oxyCODONE-acetaminophen (Percocet) 5-325 MG per tablet, Take 1 tablet by mouth Every 8 (Eight) Hours As Needed for Severe Pain ., Disp: 90 tablet, Rfl: 0  •  pregabalin (LYRICA) 100 MG capsule, TAKE ONE CAPSULE BY MOUTH EVERY 8 HOURS, Disp: 270 capsule, Rfl: 0  •  promethazine (PHENERGAN) 25 MG tablet, TAKE ONE TABLET BY MOUTH EVERY 6 HOURS AS NEEDED FOR NAUSEA AND VOMITING, Disp: 20 tablet, Rfl: 3  •  revefenacin (Yupelri) 175 MCG/3ML nebulizer solution, Take 3 mL by nebulization Daily for 120 days., Disp: 90 mL, Rfl: 3  •  sertraline (ZOLOFT) 50 MG tablet, TAKE ONE TABLET BY MOUTH DAILY, Disp: 90 tablet, Rfl: 3  •  tiZANidine (ZANAFLEX) 4 MG tablet, TAKE ONE TABLET BY MOUTH TWICE A DAY AS NEEDED FOR MUSCLE SPASMS, Disp: 60 tablet, Rfl: 5  •  topiramate (TOPAMAX) 50 MG tablet, TAKE ONE TABLET BY MOUTH TWICE A DAY, Disp: 180 tablet, Rfl: 3    Allergies   Allergen Reactions   • Augmentin [Amoxicillin-Pot Clavulanate] Swelling     Mouth raw, tongue swelling - patient has tolerated cefepime, ceftriaxone   • Cortisone Other (See Comments)     Hotness all over body and hyper   • Oxycontin [Oxycodone] Other (See Comments)     Psoriasis  Tolerates Percocet   • Coreg [Carvedilol] Rash   • Doxycycline Rash   • Metoprolol Tartrate Other (See Comments)     Hair loss    • Tolmetin Rash     Tolectin-rash and itching   • Vancomycin Rash     Despite rash, pt continued to stay on it 2017        reports that she has been smoking cigarettes. She has a 50.00 pack-year smoking history. She has never used smokeless tobacco.    Family History   Problem Relation Age of Onset   •  "Arthritis Mother    • Diabetes Mother    • Colon polyps Mother    • Diverticulitis Mother    • Heart failure Mother    • Arthritis Father    • Bleeding Disorder Father    • Diabetes Father    • Kidney disease Father    • Heart disease Father    • COPD Sister         currently smokes   • Arthritis Brother    • Diabetes Brother    • Alcohol abuse Brother    • Heart murmur Daughter    • Arthritis Other    • Diabetes Other          Objective:   Blood pressure 116/58, pulse 60, height 167.6 cm (66\"), weight 84.8 kg (187 lb), SpO2 96 %, not currently breastfeeding.  CONSTITUTIONAL: No acute distress, normal affect  RESPIRATORY: Normal effort. Clear to auscultation bilaterally without wheezing or rales.  CARDIOVASCULAR:  Regular rate and rhythm with normal S1 and S2. Without gallop or rub. СЕРГЕЙ RUSB with radiation to the carotids  PERIPHERAL VASCULAR: Normal radial pulse on the left.  There is no significant lower extremity edema bilaterally    Labs:      Lab Results   Component Value Date    CHOL 165 02/09/2021    CHOL 112 02/14/2018    CHOL 123 02/25/2017     Lab Results   Component Value Date    TRIG 358 (H) 03/12/2021    TRIG 271 (H) 02/09/2021    TRIG 260 (H) 04/29/2020     Lab Results   Component Value Date    HDL 28 (L) 03/12/2021    HDL 31 (L) 04/29/2020    HDL 31 (L) 12/05/2019     Lab Results   Component Value Date    LDL 82 03/12/2021    LDL 49 04/29/2020    LDL 44 12/05/2019     Lab Results   Component Value Date    VLDL 58 (H) 03/12/2021    VLDL 52 (H) 04/29/2020    VLDL 44 (H) 12/05/2019     No results found for: LDLHDL      Diagnostic Data:    Procedures    EMMANUEL 11/2020  · Left ventricular systolic function is normal. Estimated left ventricular EF = 60%  · Left ventricular wall thickness is consistent with concentric hypertrophy.  · There is a 23 mm Limon Vik III TAVR valve present and functioning within acceptable limits. The peak velocity across aortic valve prosthesis was 2.9 m/s with a mean pressure " gradient of 15.4 mmHg. There is a mild perivalvular leak present.  · Moderate tricuspid valve regurgitation is present.  · There is mild pulmonic valve regurgitation present.  · Estimated right ventricular systolic pressure from tricuspid regurgitation is markedly elevated (>55 mmHg).  · Borderline dilation of the ascending aorta is present.  · There is (grade 1) plaque in the descending aorta present.  · The pulmonary artery was noted to be dilated.    TTE 10/2020  · Left ventricular systolic function is normal. Left ventricular ejection fraction appears to be 61 - 65%.  · Left ventricular wall thickness is consistent with concentric hypertrophy.  · Left ventricular diastolic function is consistent with (grade II w/high LAP) pseudonormalization.  · The right atrial cavity is borderline dilated.  · There is a 23 mm Limon Vik III TAVR valve present. The peak velocity through the valve is elevated at 4.3 m/s with a mean pressure gradient of 41 mmHg. Of note, intraop EMMANUEL from January 2020 revealed a post-TAVR implant peak velocity of 3.2 m/s, mean pressure gradient 21 mmHg.  · Mild mitral annular calcification is present.  · Mild pulmonic valve regurgitation is present.  · Mild tricuspid valve regurgitation is present.  · Estimated right ventricular systolic pressure from tricuspid regurgitation is mildly elevated (35-45 mmHg).     TTE 12/2018  · Left ventricular systolic function is normal. Estimated EF = 65%.  · Left ventricular wall thickness is consistent with mild-to-moderate concentric hypertrophy.  · Left atrial cavity size is mildly dilated.  · There is severe calcification of the aortic valve.  · Moderate aortic valve stenosis is present.  · Estimated right ventricular systolic pressure from tricuspid regurgitation is mildly elevated (35-45 mmHg).    [facility] 02/20/2018  · There was severe coronary artery disease involving the mid LAD and first diagonal branch that is now status post intervention with a Tryton  3.5mm proximal, 3.0mm distal bifurcation stent extending from the LAD into the diagonal branch and a Synergy 3.5 x 38 mm drug-eluting stent extending from the proximal to mid LAD.  · Normal left ventricular ejection fraction but with hypokinesis of the apical segments  · Normal right heart filling pressures.  Normal pulmonate capillary wedge pressure  · Normal cardiac index    [facility] 11/2016  1.  50% mid RCA stenosis.  2.  40% proximal LAD stenosis   3.  Normal left ventricular function  4.  Hypertension    Assessment and Plan:     Coronary artery disease involving native coronary artery of native heart without angina pectoris  Mixed hyperlipidemia  Polycythemia vera  History of stroke  -Continue clopidogrel. On clopidogrel also due to her polycythemia vera and prior stroke  -Not on an aspirin to avoid triple therapy while on apixaban  -Not on a beta-blocker due to borderline blood pressure  -Continue clopidogrel, diltiazem, atorvastatin    Severe AS status post TAVR  -Status post TAVR 1/23/2020  -Continue current medications    Atrial flutter  Palpitations  -Noted during admission for respiratory failure 2/2021.  -Continue diltiazem, apixaban.    History of Takotsubo cardiomyopathy, systolic heart failure   -Continue current related medications.    Essential hypertension  -Continue current medications.    Right lower extremity DVT  -Resolved. On apixaban for AFlutter    Preoperative cardiac risk assessment  -Okay to proceed with oral surgery from a cardiac standpoint.  Overall, remains at an intermediate cardiac risk for periprocedural cardiac events due to extensive cardiac history.  Currently clinically stable from a cardiac standpoint.  May warrant pulmonary evaluation prior.  -Okay to hold clopidogrel 5 days prior to surgery and apixaban 48 hours prior to surgery.  Once clopidogrel is held, start aspirin 81 mg daily and continue perioperatively.    - Return in about 6 months (around 3/29/2022) for Next scheduled  follow-up with an ECG.      Lane Zamorano MD, MSc, FACC, [facility]  Interventional Cardiology  [facility]

## 2021-09-30 NOTE — TELEPHONE ENCOUNTER
----- Message from Lane Zamorano MD sent at 9/29/2021 12:19 PM EDT -----  Please call the patient this afternoon to obtain the name of her oral surgeon.  Please send their team her preoperative cardiac evaluation..  Okay to proceed with surgery from a cardiac standpoint.  Okay to hold Eliquis for 48 hours prior to surgery and clopidogrel 5 days prior to surgery.  Once she starts holding clopidogrel, will need to start aspirin 81 mg daily and continue it perioperatively.

## 2021-10-13 NOTE — PROGRESS NOTES
Patient is a 65 y.o. female who is here for a follow up of hypertension,hypothyroidism and pain.  Chief Complaint   Patient presents with   • Hypertension   • Hypothyroidism   • Pain         HPI:    Here for mgmt of HTN and DM and hypothyroid.  Recently seen by cardiology.   Occasional dizziness.  Does not check FSBS.  Energy level is not the best.    Today c/o sinus congestion.  Her nasal passages are congested.  No fever or chills.  Occasional HAs.     History:     Patient Active Problem List   Diagnosis   • COPD in active smoker on home O2   • Mixed anxiety depressive disorder   • GERD   • HTN and diastolic dysfunction   • Hypothyroidism   • Morbidly obese   • Tobacco abuse   • PVD   • Chronic, continuous use of opioids   • CAD s/p multiple stents   • Hx takotsubo cardiomyopathy   • Severe AS s/p TAVR 1/23/20   • Seasonal allergic rhinitis due to pollen   • Chronic left shoulder pain   • Neck pain   • Encounter for Medicare annual wellness exam   • Chronic respiratory failure with hypoxia and hypercapnia (HCC)   • DINA on BiPAP   • VHD; mild OH, mod TR   • PAH.  Multifactorial related to diastolic dysfunction, obesity, DINA, and COPD   • HFpEF   • [place] coma scale total score 3-8 (HCC)   • Stage 3a chronic kidney disease (HCC)   • Acute DVT of tibial vein of right lower extremity (CMS/HCC)       Past Medical History:   Diagnosis Date   • Altered mental status 2/13/2018   • Bronchitis    • Cellulitis of sacral region 2/13/2018   • Cervical cancer (HCC)    • Chronic anxiety 2/27/2017   • Chronic bronchitis (HCC)    • Chronic respiratory failure with hypoxia (HCC) 3/8/2018   • Chronic systolic heart failure (HCC)    • Chronically on benzodiazepine therapy 2/27/2017   • Coronary artery disease    • Degenerative arthritis    • Diabetes mellitus (HCC)    • Dyslipidemia 5/11/2016   • Dyspnea    • Fatty liver disease, nonalcoholic 11/21/2016   • Fibromyalgia    • GERD (gastroesophageal reflux disease)    • H/O  echocardiogram    • History of pneumonia    • Hypertension 5/11/2016    16. H/O echocardiogram (V15.89) (Z92.89)  · A.  Echocardiogram of 02/03/2015 reports an ejection fraction of 60-65%, mild concentric     LVH, trace mitral regurgitation, mild tricuspid and pulmonic regurgitation and calculated     RVSP of 35 mmHg, the main pulmonary artery is also mildly dilated.   • Hypothyroidism 5/11/2016    Description: A.  On replacement therapy.   • Infected wound 3/8/2018   • Lung nodule     per pt report   • Macular degeneration    • Meningioma (HCC)     behind left eye per pt report   • Obesity    • DINA (obstructive sleep apnea)     intolerant of CPAP therapy, uses bipap   • Osteoporosis    • Polycythemia vera (HCC) 5/11/2016   • Pulmonary emphysema (HCC)    • Restrictive ventilatory defect    • Sepsis (HCC) 2/13/2018   • TIA (transient ischemic attack) 2015    blurred vision    • Uterine cancer (HCC)    • Vitamin D deficiency 8/1/2016   • Weakness 3/8/2018       Past Surgical History:   Procedure Laterality Date   • AMPUTATION DIGIT Left 11/23/2016    Procedure: AMPUTATION TRANS METATARSAL - ray amutation of the left great toe;  Surgeon: Arsalan Feliciano MD;  Location:  [facility];  Service:    • AMPUTATION DIGIT Left 3/2/2017    Procedure: LEFT FOOT TRANSMETATARSAL AMPUTATION TOES 2,3,4,5;  Surgeon: Joey Guaman MD;  Location:  [facility];  Service:    • AORTIC VALVE REPAIR/REPLACEMENT N/A 1/23/2020    Procedure: TRANSCATHETER AORTIC VALVE REPLACEMENT;  Surgeon: Joey Guaman MD;  Location:  [facility];  Service: Cardiothoracic   • AORTIC VALVE REPAIR/REPLACEMENT N/A 1/23/2020    Procedure: Transfemoral Transcatheter Aortic Valve Replacement;  Surgeon: Qi Valdez MD;  Location:  [facility];  Service: Cardiovascular   • BACK SURGERY      lumbar fusions x5--multiple times; 1995, 1997, 1998, 1999 and 2008   • BELOW KNEE AMPUTATION Left 2/25/2017    Procedure: EXTENDED LEFT TOE AMPUTATION;  Surgeon: Arsalan MCCLELLAN  MD Braden;  Location:  [facility];  Service:    • CARDIAC CATHETERIZATION N/A 11/26/2016    Procedure: Left Heart Cath;  Surgeon: Ash Nuñez MD;  Location:  [facility];  Service:    • CARDIAC CATHETERIZATION N/A 2/20/2018    Procedure: Left Heart Cath;  Surgeon: Lane Zamorano MD;  Location:  [facility];  Service:    • CARDIAC CATHETERIZATION N/A 2/20/2018    Procedure: Right Heart Cath;  Surgeon: Lane Zamorano MD;  Location:  [facility];  Service:    • CARDIAC CATHETERIZATION Left 1/10/2020    Procedure: Cardiac Catheterization/Vascular Study;  Surgeon: Lane Zamorano MD;  Location:  [facility];  Service: Cardiology   • COLONOSCOPY     • HAND SURGERY Bilateral     x3    • HYSTERECTOMY      status post uterine and cervical cancer   • LUMBAR SPINE SURGERY      arthrodesis by anterior approach addit interspace   • TONSILLECTOMY         Current Outpatient Medications on File Prior to Visit   Medication Sig   • acetaminophen (TYLENOL) 325 MG tablet Take 2 tablets by mouth Every 4 (Four) Hours As Needed for mild pain (1-3) or fever (temperature greater than 101F).   • albuterol (PROVENTIL) (2.5 MG/3ML) 0.083% nebulizer solution Take 2.5 mg by nebulization Every 6 (Six) Hours As Needed for Wheezing or Shortness of Air.   • apixaban (ELIQUIS) 5 MG tablet tablet Take 1 tablet by mouth Every 12 (Twelve) Hours.   • atorvastatin (LIPITOR) 80 MG tablet Take 1 tablet by mouth Every Night.   • busPIRone (BUSPAR) 7.5 MG tablet TAKE TWO TABLETS BY MOUTH TWICE A DAY   • clonazePAM (KlonoPIN) 0.5 MG tablet TAKE ONE-HALF TABLET BY MOUTH TWICE A DAY AS NEEDED FOR ANXIETY   • clopidogrel (PLAVIX) 75 MG tablet TAKE ONE TABLET BY MOUTH DAILY   • dilTIAZem CD (CARDIZEM CD) 240 MG 24 hr capsule Take 1 capsule by mouth Daily.   • doxazosin (CARDURA) 1 MG tablet Take 1 tablet by mouth Every Night.   • ezetimibe (ZETIA) 10 MG tablet TAKE ONE TABLET BY MOUTH DAILY   •  fentaNYL (DURAGESIC) 75 MCG/HR patch Place 1 patch on the skin as directed by provider Every Other Day.   • fexofenadine (ALLEGRA) 180 MG tablet Take 180 mg by mouth 2 (Two) Times a Day.   • furosemide (LASIX) 40 MG tablet TAKE ONE TABLET BY MOUTH TWICE A DAY   • levothyroxine (SYNTHROID, LEVOTHROID) 112 MCG tablet TAKE ONE TABLET BY MOUTH DAILY   • omeprazole (priLOSEC) 20 MG capsule TAKE ONE CAPSULE BY MOUTH DAILY   • oxyCODONE-acetaminophen (Percocet) 5-325 MG per tablet Take 1 tablet by mouth Every 8 (Eight) Hours As Needed for Severe Pain .   • pregabalin (LYRICA) 100 MG capsule TAKE ONE CAPSULE BY MOUTH EVERY 8 HOURS   • promethazine (PHENERGAN) 25 MG tablet TAKE ONE TABLET BY MOUTH EVERY 6 HOURS AS NEEDED FOR NAUSEA AND VOMITING   • sertraline (ZOLOFT) 50 MG tablet TAKE ONE TABLET BY MOUTH DAILY   • tiZANidine (ZANAFLEX) 4 MG tablet TAKE ONE TABLET BY MOUTH TWICE A DAY AS NEEDED FOR MUSCLE SPASMS   • topiramate (TOPAMAX) 50 MG tablet TAKE ONE TABLET BY MOUTH TWICE A DAY   • [] revefenacin (Yupelri) 175 MCG/3ML nebulizer solution Take 3 mL by nebulization Daily for 120 days.   • [DISCONTINUED] cefdinir (OMNICEF) 300 MG capsule Take 1 capsule by mouth 2 (Two) Times a Day.     No current facility-administered medications on file prior to visit.       Family History   Problem Relation Age of Onset   • Arthritis Mother    • Diabetes Mother    • Colon polyps Mother    • Diverticulitis Mother    • Heart failure Mother    • Arthritis Father    • Bleeding Disorder Father    • Diabetes Father    • Kidney disease Father    • Heart disease Father    • COPD Sister         currently smokes   • Arthritis Brother    • Diabetes Brother    • Alcohol abuse Brother    • Heart murmur Daughter    • Arthritis Other    • Diabetes Other        Social History     Socioeconomic History   • Marital status:      Spouse name: Wali   • Number of children: 1   Tobacco Use   • Smoking status: Current Every Day Smoker      "Packs/day: 1.00     Years: 50.00     Pack years: 50.00     Types: Cigarettes   • Smokeless tobacco: Never Used   Substance and Sexual Activity   • Alcohol use: No   • Drug use: No   • Sexual activity: Defer         Review of Systems   Constitutional: Positive for fatigue. Negative for chills, diaphoresis, fever and unexpected weight change.   HENT: Positive for congestion, nosebleeds, sinus pressure and sinus pain. Negative for hearing loss, postnasal drip and sore throat.    Eyes: Negative for pain, discharge and itching.   Respiratory: Positive for shortness of breath. Negative for cough, chest tightness and wheezing.    Cardiovascular: Negative for chest pain and leg swelling.   Gastrointestinal: Positive for constipation. Negative for abdominal distention, blood in stool, diarrhea, nausea and vomiting.   Endocrine: Negative for heat intolerance, polydipsia and polyuria.   Genitourinary: Negative for dysuria, frequency and hematuria.   Musculoskeletal: Positive for arthralgias, back pain, gait problem, neck pain and neck stiffness. Negative for joint swelling and myalgias.   Skin:        Hair loss   Neurological: Positive for dizziness, tremors, weakness and light-headedness. Negative for syncope and headaches.   Psychiatric/Behavioral: Positive for dysphoric mood and sleep disturbance. The patient is nervous/anxious.        /60   Pulse 63   Temp 96.9 °F (36.1 °C) (Infrared)   Ht 167.6 cm (65.98\")   LMP  (LMP Unknown)   SpO2 98%   BMI 30.20 kg/m²       Physical Exam  Constitutional:       General: She is not in acute distress.     Appearance: Normal appearance. She is well-developed. She is obese.   HENT:      Head: Normocephalic and atraumatic.      Comments: Frontal tenderness     Right Ear: External ear normal.      Left Ear: External ear normal.      Nose: Nose normal.      Mouth/Throat:      Mouth: Mucous membranes are moist.      Pharynx: Oropharynx is clear.   Eyes:      Extraocular Movements: " Extraocular movements intact.      Conjunctiva/sclera: Conjunctivae normal.      Pupils: Pupils are equal, round, and reactive to light.   Cardiovascular:      Rate and Rhythm: Normal rate and regular rhythm.      Heart sounds: Murmur (1/6) heard.       Pulmonary:      Effort: Pulmonary effort is normal. No respiratory distress.      Comments: Diiminished BS  Abdominal:      General: Bowel sounds are normal.      Palpations: Abdomen is soft.   Musculoskeletal:      Cervical back: Normal range of motion and neck supple.      Comments: In a wheelchair   Lymphadenopathy:      Cervical: No cervical adenopathy.   Skin:     General: Skin is warm and dry.   Neurological:      General: No focal deficit present.      Mental Status: She is alert and oriented to person, place, and time. Mental status is at baseline.   Psychiatric:         Mood and Affect: Mood normal.         Behavior: Behavior normal.         Thought Content: Thought content normal.         Judgment: Judgment normal.         Procedure:      Discussion/Summary:    chronic back pain- refill patch as needed, counseled on LT risk, advised to reduce percocet to tid prn  htn/HFpEF-controlled on diuretic/CCB   hyperlipidemia-cont lipitor and zetia, recheck soon, counseled on low chol diet  hypothroid-cont replacement, recheck today at goal  depression with anxiety-cont buspar and klonopine and zoloft ,advised her of risk of addiction, stable  polycythemia-cbc today, advised tob cessation  retinal vein occlusion- cont rf modification, counseled on tob cessation, no change  b12 def-recheck 7/29 stable  DM-labs today on target  Elevated lft/?autoimmune-f/u gastro recs , labs today  GERD-controlled PPI  Tremor-cont BB and topamax, controlled  Fe def anemia- recheck   RLE DVT-2/7/21-on NOAC  Hx of Meningioma-rescan      10/13 labs noted and dw patient    Current Outpatient Medications:   •  acetaminophen (TYLENOL) 325 MG tablet, Take 2 tablets by mouth Every 4 (Four)  Hours As Needed for mild pain (1-3) or fever (temperature greater than 101F)., Disp: 100 tablet, Rfl: 0  •  albuterol (PROVENTIL) (2.5 MG/3ML) 0.083% nebulizer solution, Take 2.5 mg by nebulization Every 6 (Six) Hours As Needed for Wheezing or Shortness of Air., Disp: 120 each, Rfl: 11  •  apixaban (ELIQUIS) 5 MG tablet tablet, Take 1 tablet by mouth Every 12 (Twelve) Hours., Disp: 90 tablet, Rfl: 3  •  atorvastatin (LIPITOR) 80 MG tablet, Take 1 tablet by mouth Every Night., Disp: 90 tablet, Rfl: 3  •  busPIRone (BUSPAR) 7.5 MG tablet, TAKE TWO TABLETS BY MOUTH TWICE A DAY, Disp: 360 tablet, Rfl: 3  •  clonazePAM (KlonoPIN) 0.5 MG tablet, TAKE ONE-HALF TABLET BY MOUTH TWICE A DAY AS NEEDED FOR ANXIETY, Disp: 30 tablet, Rfl: 2  •  clopidogrel (PLAVIX) 75 MG tablet, TAKE ONE TABLET BY MOUTH DAILY, Disp: 90 tablet, Rfl: 3  •  dilTIAZem CD (CARDIZEM CD) 240 MG 24 hr capsule, Take 1 capsule by mouth Daily., Disp: 90 capsule, Rfl: 3  •  doxazosin (CARDURA) 1 MG tablet, Take 1 tablet by mouth Every Night., Disp: 90 tablet, Rfl: 3  •  ezetimibe (ZETIA) 10 MG tablet, TAKE ONE TABLET BY MOUTH DAILY, Disp: 90 tablet, Rfl: 3  •  fentaNYL (DURAGESIC) 75 MCG/HR patch, Place 1 patch on the skin as directed by provider Every Other Day., Disp: 15 patch, Rfl: 0  •  fexofenadine (ALLEGRA) 180 MG tablet, Take 180 mg by mouth 2 (Two) Times a Day., Disp: , Rfl:   •  furosemide (LASIX) 40 MG tablet, TAKE ONE TABLET BY MOUTH TWICE A DAY, Disp: 180 tablet, Rfl: 3  •  levothyroxine (SYNTHROID, LEVOTHROID) 112 MCG tablet, TAKE ONE TABLET BY MOUTH DAILY, Disp: 90 tablet, Rfl: 3  •  omeprazole (priLOSEC) 20 MG capsule, TAKE ONE CAPSULE BY MOUTH DAILY, Disp: 90 capsule, Rfl: 3  •  oxyCODONE-acetaminophen (Percocet) 5-325 MG per tablet, Take 1 tablet by mouth Every 8 (Eight) Hours As Needed for Severe Pain ., Disp: 90 tablet, Rfl: 0  •  pregabalin (LYRICA) 100 MG capsule, TAKE ONE CAPSULE BY MOUTH EVERY 8 HOURS, Disp: 270 capsule, Rfl: 0  •   promethazine (PHENERGAN) 25 MG tablet, TAKE ONE TABLET BY MOUTH EVERY 6 HOURS AS NEEDED FOR NAUSEA AND VOMITING, Disp: 20 tablet, Rfl: 3  •  sertraline (ZOLOFT) 50 MG tablet, TAKE ONE TABLET BY MOUTH DAILY, Disp: 90 tablet, Rfl: 3  •  tiZANidine (ZANAFLEX) 4 MG tablet, TAKE ONE TABLET BY MOUTH TWICE A DAY AS NEEDED FOR MUSCLE SPASMS, Disp: 60 tablet, Rfl: 5  •  topiramate (TOPAMAX) 50 MG tablet, TAKE ONE TABLET BY MOUTH TWICE A DAY, Disp: 180 tablet, Rfl: 3        Diagnoses and all orders for this visit:    1. HTN and diastolic dysfunction (Primary)  -     CBC (No Diff)  -     Comprehensive Metabolic Panel    2. Other specified hypothyroidism  -     TSH    3. GERD    4. Stage 3a chronic kidney disease (HCC)    5. Type 2 diabetes mellitus with ketoacidosis without coma, without long-term current use of insulin (HCC)  -     Hemoglobin A1c  -     Vitamin B12    6. Encounter for screening mammogram for malignant neoplasm of breast  -     Mammo Screening Digital Tomosynthesis Bilateral With CAD    7. Need for influenza vaccination  -     Fluzone High-Dose 65+yrs ([id number])

## 2021-10-14 PROBLEM — Z86.018 HISTORY OF MENINGIOMA: Status: ACTIVE | Noted: 2021-01-01

## 2021-10-15 NOTE — TELEPHONE ENCOUNTER
Caller: Tamara Langston    Relationship: Self      Medication requested (name and dosage):   fentaNYL (DURAGESIC) 75 MCG/HR patch  oxyCODONE-acetaminophen (Percocet) 5-325 MG per tablet    Pharmacy where request should be sent:   [facility] [address] [place] [address] [address] [place] - [phone]  - [phone]   [phone]    Additional details provided by patient:PATIENT WILL BE OUT OF THE ABOVE LISTED MEDICATION BY 10/16/21 PATIENT IS ALSO REQUESTING THAT THE MRI AND MAMMOGRAM GET SCHEDULED AT THE SAME TIME PREFERABLY BY 11 AM    Best call back number: [phone]     Does the patient have less than a 3 day supply:  [x] Yes  [] No    Matias Johnson Rep   10/15/21 08:37 EDT

## 2021-10-22 NOTE — TELEPHONE ENCOUNTER
Caller: Tamara Langston    Relationship: Self    Best call back number: [phone]    What is the best time to reach you: ANYTIME    Who are you requesting to speak with (clinical staff, provider,  specific staff member): KYRA    What was the call regarding: PATIENT STATES THAT SHE SPOKE WITH DR DE PAZ HE WAS SUPPOSED TO BEEN CALLING IN A MEDICATION FOR HER SINUS INFECTION.    Do you require a callback: YES

## 2021-10-29 NOTE — TELEPHONE ENCOUNTER
Caller: Tamara Langston    Relationship: Self    Best call back number:     What is the best time to reach you: ANYTIME    Who are you requesting to speak with (clinical staff, provider,  specific staff member): KYRA OR DR DE PAZ    Do you know the name of the person who called: TAMARA     What was the call regarding: PATIENT WAS GIVEN TOPIRMATE 50 MG FOR HER TREMORS AND THIS SEEMS TO BE WORKING; SHE WOULD LIKE TO KNOW IF THIS CAN BE CALLED IN  MG (3X DAILY)    [facility]       Do you require a callback:YES

## 2021-11-05 NOTE — TELEPHONE ENCOUNTER
Caller: Tamara Langston    Relationship: Self    Best call back number: [phone]    What medication are you requesting: SOMETHING FOR CONTINUING SINUS INFECTION     What are your current symptoms: COUGHING UP THICK MUCUS, EYE PAIN,NASAL CONGESTION     Have you had these symptoms before:    [x] Yes  [] No    Have you been treated for these symptoms before:   [x] Yes  [] No    If a prescription is needed, what is your preferred pharmacy and phone number: [facility] [address] [facility] [phone] [facility] [phone]      Additional notes: PATIENT STATED THAT SHE WOULD LIKE STRONGER ANTIBIOTICS LIKE THE ONES PRESCRIBED SEVERAL MONTHS AGO.  PATIENT STATED THAT SHE JUST FINISHED ANTIBIOTICS AND IS STILL HAVING SYMPTOMS

## 2021-11-11 NOTE — TELEPHONE ENCOUNTER
Caller: Tamara Langston    Relationship: Self    Best call back number: [phone]    Requested Prescriptions:   Requested Prescriptions     Pending Prescriptions Disp Refills   • oxyCODONE-acetaminophen (Percocet) 5-325 MG per tablet 90 tablet 0     Sig: Take 1 tablet by mouth Every 8 (Eight) Hours As Needed for Severe Pain .        Pharmacy where request should be sent: [facility] [address] & [address] [facility] [phone] [facility] [phone] FX     Additional details provided by patient: PATIENT IS NOT CALLING IN EARLY, SHE WILL BE OUT ON 11/12/2021    Does the patient have less than a 3 day supply:  [x] Yes  [] No    Matias Webster Rep   11/11/21 13:23 EST

## 2021-11-12 NOTE — TELEPHONE ENCOUNTER
Caller: Tamara Langston    Relationship: Self    Best call back number: [phone]    Requested Prescriptions:   Requested Prescriptions     Pending Prescriptions Disp Refills   • fentaNYL (DURAGESIC) 75 MCG/HR patch 15 patch 0     Sig: Place 1 patch on the skin as directed by provider Every Other Day.        Pharmacy where request should be sent: [facility] [address] [place] - [phone] [facility] [phone] FX     Additional details provided by patient: PATIENT IS USING HER LAST PATCH    Does the patient have less than a 3 day supply:  [x] Yes  [] No    Matias Tristan Rep   11/12/21 09:19 EST

## 2021-11-18 NOTE — TELEPHONE ENCOUNTER
Caller: Tamara Langston    Relationship: Self    Best call back number: [phone]     What is the best time to reach you: ANYTIME     Who are you requesting to speak with (clinical staff, provider,  specific staff member): CLINICAL STAFF     What was the call regarding: PATIENT STATES THAT SHE WAS CONTACTED ABOUT GETTING HER CT SCHEDULED AND THAT PERSON WAS ALSO GOING TO SCHEDULE HER MAMMOGRAM AS WELL. WHEN SHE MENTIONED THE SORE SPOT IN HER BREAST SHE WAS THEN TOLD THAT SHE COULDN'T BE SCHEDULED FOR HER REFERRAL AND WOULD NEED TO HAVE A DIAGNOSTIC REFERRAL INSTEAD.     Do you require a callback: YES

## 2021-11-29 NOTE — TELEPHONE ENCOUNTER
Patient called stating Yupelri was too expensive and is wanting to know if there was an alternative that wasn't as cost prohibitive.

## 2021-12-01 NOTE — TELEPHONE ENCOUNTER
Caller: Tamara Langston    Relationship: Self    Best call back number:     What is the best time to reach you: ANYTIME    Who are you requesting to speak with (clinical staff, provider,  specific staff member): KYRA    Do you know the name of the person who called: TAMARA    What was the call regarding: QUESTIONS ABOUT HER RECENT MRI, WHICH EYE DID THEY XRAY?     Do you require a callback: YES    dw patient results of MRI

## 2021-12-06 NOTE — TELEPHONE ENCOUNTER
Pt called the pharmacy and they told her they had her pregabalin 100mg ready for pick, well she went to the pharmacy about an hour ago and they stated they never received an order for the pregabalin. Pt is completely out, took her last pill this morning, please advise

## 2021-12-06 NOTE — TELEPHONE ENCOUNTER
Called pharmacy and they have received the scripit but they do not have enough to fill script and they had to order more

## 2021-12-08 NOTE — TELEPHONE ENCOUNTER
Caller: Tamara Langston    Relationship: Self    Best call back number: [phone]     Requested Prescriptions:   Requested Prescriptions     Pending Prescriptions Disp Refills   • fentaNYL (DURAGESIC) 75 MCG/HR patch 15 patch 0     Sig: Place 1 patch on the skin as directed by provider Every Other Day.   • oxyCODONE-acetaminophen (Percocet) 5-325 MG per tablet 90 tablet 0     Sig: Take 1 tablet by mouth Every 8 (Eight) Hours As Needed for Severe Pain .        Pharmacy where request should be sent: [facility] [address] [facility] [phone] [facility] [phone] FX     Additional details provided by patient:     Does the patient have less than a 3 day supply:  [x] Yes  [] No    Matias Rodríguez Rep   12/08/21 15:14 EST            Pt needs refill on oxycodone,  Stating that is due today  pls call pt when done

## 2022-01-01 ENCOUNTER — OFFICE VISIT (OUTPATIENT)
Dept: INTERNAL MEDICINE | Facility: CLINIC | Age: 67
End: 2022-01-01

## 2022-01-01 ENCOUNTER — TELEPHONE (OUTPATIENT)
Dept: INTERNAL MEDICINE | Facility: CLINIC | Age: 67
End: 2022-01-01

## 2022-01-01 ENCOUNTER — LAB (OUTPATIENT)
Dept: LAB | Facility: HOSPITAL | Age: 67
End: 2022-01-01

## 2022-01-01 ENCOUNTER — HOSPITAL ENCOUNTER (OUTPATIENT)
Dept: CT IMAGING | Facility: HOSPITAL | Age: 67
Discharge: HOME | End: 2022-06-07
Admitting: INTERNAL MEDICINE

## 2022-01-01 VITALS
SYSTOLIC BLOOD PRESSURE: 124 MMHG | DIASTOLIC BLOOD PRESSURE: 74 MMHG | HEART RATE: 60 BPM | OXYGEN SATURATION: 99 % | BODY MASS INDEX: 30.2 KG/M2 | HEIGHT: 66 IN | TEMPERATURE: 96.8 F

## 2022-01-01 VITALS
HEIGHT: 66 IN | HEART RATE: 89 BPM | OXYGEN SATURATION: 98 % | TEMPERATURE: 96.9 F | DIASTOLIC BLOOD PRESSURE: 70 MMHG | SYSTOLIC BLOOD PRESSURE: 120 MMHG | BODY MASS INDEX: 30.2 KG/M2

## 2022-01-01 DIAGNOSIS — E78.5 DYSLIPIDEMIA: ICD-10-CM

## 2022-01-01 DIAGNOSIS — F41.8 MIXED ANXIETY DEPRESSIVE DISORDER: ICD-10-CM

## 2022-01-01 DIAGNOSIS — I25.10 CORONARY ARTERY DISEASE INVOLVING NATIVE CORONARY ARTERY OF NATIVE HEART WITHOUT ANGINA PECTORIS: Chronic | ICD-10-CM

## 2022-01-01 DIAGNOSIS — E03.8 OTHER SPECIFIED HYPOTHYROIDISM: Chronic | ICD-10-CM

## 2022-01-01 DIAGNOSIS — F11.90 CHRONIC, CONTINUOUS USE OF OPIOIDS: Chronic | ICD-10-CM

## 2022-01-01 DIAGNOSIS — F41.8 MIXED ANXIETY DEPRESSIVE DISORDER: Chronic | ICD-10-CM

## 2022-01-01 DIAGNOSIS — I10 ESSENTIAL HYPERTENSION: Primary | Chronic | ICD-10-CM

## 2022-01-01 DIAGNOSIS — E11.65 TYPE 2 DIABETES MELLITUS WITH HYPERGLYCEMIA, WITHOUT LONG-TERM CURRENT USE OF INSULIN: ICD-10-CM

## 2022-01-01 DIAGNOSIS — E78.49 OTHER HYPERLIPIDEMIA: ICD-10-CM

## 2022-01-01 DIAGNOSIS — G89.4 CHRONIC PAIN SYNDROME: ICD-10-CM

## 2022-01-01 DIAGNOSIS — E11.10 TYPE 2 DIABETES MELLITUS WITH KETOACIDOSIS WITHOUT COMA, WITHOUT LONG-TERM CURRENT USE OF INSULIN: ICD-10-CM

## 2022-01-01 DIAGNOSIS — J41.1 MUCOPURULENT CHRONIC BRONCHITIS: Chronic | ICD-10-CM

## 2022-01-01 DIAGNOSIS — K21.9 GASTROESOPHAGEAL REFLUX DISEASE WITHOUT ESOPHAGITIS: Chronic | ICD-10-CM

## 2022-01-01 DIAGNOSIS — R25.1 TREMOR: ICD-10-CM

## 2022-01-01 DIAGNOSIS — I50.30 HEART FAILURE WITH PRESERVED EJECTION FRACTION, UNSPECIFIED HF CHRONICITY: Chronic | ICD-10-CM

## 2022-01-01 DIAGNOSIS — Z20.822 CLOSE EXPOSURE TO COVID-19 VIRUS: ICD-10-CM

## 2022-01-01 DIAGNOSIS — R10.30 LOWER ABDOMINAL PAIN: ICD-10-CM

## 2022-01-01 DIAGNOSIS — M62.838 MUSCLE SPASMS OF NECK: ICD-10-CM

## 2022-01-01 DIAGNOSIS — Z72.0 TOBACCO ABUSE: Chronic | ICD-10-CM

## 2022-01-01 DIAGNOSIS — N18.31 STAGE 3A CHRONIC KIDNEY DISEASE: ICD-10-CM

## 2022-01-01 DIAGNOSIS — K80.20 CALCULUS OF GALLBLADDER WITHOUT CHOLECYSTITIS WITHOUT OBSTRUCTION: Primary | ICD-10-CM

## 2022-01-01 LAB
ALBUMIN SERPL-MCNC: 3.5 G/DL (ref 3.5–5.2)
ALBUMIN/GLOB SERPL: 1.2 G/DL
ALP SERPL-CCNC: 70 U/L (ref 39–117)
ALT SERPL-CCNC: 16 U/L (ref 1–33)
AST SERPL-CCNC: 19 U/L (ref 1–32)
BILIRUB SERPL-MCNC: 0.2 MG/DL (ref 0–1.2)
BUN SERPL-MCNC: 26 MG/DL (ref 8–23)
BUN/CREAT SERPL: 23 (ref 7–25)
CALCIUM SERPL-MCNC: 9.3 MG/DL (ref 8.6–10.5)
CHLORIDE SERPL-SCNC: 109 MMOL/L (ref 98–107)
CHOLEST SERPL-MCNC: 171 MG/DL (ref 0–200)
CO2 SERPL-SCNC: 25.6 MMOL/L (ref 22–29)
CREAT SERPL-MCNC: 1.13 MG/DL (ref 0.57–1)
DEPRECATED RDW RBC AUTO: 44 FL (ref 37–54)
EGFRCR SERPLBLD CKD-EPI 2021: 53.1 ML/MIN/1.73
ERYTHROCYTE [DISTWIDTH] IN BLOOD BY AUTOMATED COUNT: 12.6 % (ref 12.3–15.4)
ERYTHROCYTE [DISTWIDTH] IN BLOOD BY AUTOMATED COUNT: 13.6 % (ref 12.3–15.4)
GLOBULIN SER CALC-MCNC: 3 GM/DL
GLUCOSE SERPL-MCNC: 146 MG/DL (ref 65–99)
HBA1C MFR BLD: 5.8 % (ref 4.8–5.6)
HBA1C MFR BLD: 5.9 % (ref 4.8–5.6)
HCT VFR BLD AUTO: 37.1 % (ref 34–46.6)
HCT VFR BLD AUTO: 41.5 % (ref 34–46.6)
HDLC SERPL-MCNC: 31 MG/DL (ref 40–60)
HGB BLD-MCNC: 12.5 G/DL (ref 12–15.9)
HGB BLD-MCNC: 13.8 G/DL (ref 12–15.9)
LDLC SERPL CALC-MCNC: 98 MG/DL (ref 0–100)
LDLC/HDLC SERPL: 2.94 {RATIO}
MCH RBC QN AUTO: 32.1 PG (ref 26.6–33)
MCH RBC QN AUTO: 32.1 PG (ref 26.6–33)
MCHC RBC AUTO-ENTMCNC: 33.3 G/DL (ref 31.5–35.7)
MCHC RBC AUTO-ENTMCNC: 33.7 G/DL (ref 31.5–35.7)
MCV RBC AUTO: 95.1 FL (ref 79–97)
MCV RBC AUTO: 96.5 FL (ref 79–97)
PLATELET # BLD AUTO: 139 10*3/MM3 (ref 140–450)
PLATELET # BLD AUTO: 157 10*3/MM3 (ref 140–450)
PMV BLD AUTO: 11.4 FL (ref 6–12)
POTASSIUM SERPL-SCNC: 4.2 MMOL/L (ref 3.5–5.2)
PROT SERPL-MCNC: 6.5 G/DL (ref 6–8.5)
RBC # BLD AUTO: 3.9 10*6/MM3 (ref 3.77–5.28)
RBC # BLD AUTO: 4.3 10*6/MM3 (ref 3.77–5.28)
SARS-COV-2 RNA NOSE QL NAA+PROBE: NOT DETECTED
SODIUM SERPL-SCNC: 146 MMOL/L (ref 136–145)
TRIGL SERPL-MCNC: 244 MG/DL (ref 0–150)
TSH SERPL DL<=0.005 MIU/L-ACNC: 1.27 UIU/ML (ref 0.27–4.2)
TSH SERPL DL<=0.05 MIU/L-ACNC: 1.06 UIU/ML (ref 0.27–4.2)
VLDLC SERPL-MCNC: 42 MG/DL (ref 5–40)
WBC # BLD AUTO: 8.04 10*3/MM3 (ref 3.4–10.8)
WBC NRBC COR # BLD: 9.29 10*3/MM3 (ref 3.4–10.8)

## 2022-01-01 PROCEDURE — 84443 ASSAY THYROID STIM HORMONE: CPT | Performed by: INTERNAL MEDICINE

## 2022-01-01 PROCEDURE — 83036 HEMOGLOBIN GLYCOSYLATED A1C: CPT | Performed by: INTERNAL MEDICINE

## 2022-01-01 PROCEDURE — 25010000002 IOPAMIDOL 61 % SOLUTION: Performed by: INTERNAL MEDICINE

## 2022-01-01 PROCEDURE — 99214 OFFICE O/P EST MOD 30 MIN: CPT | Performed by: INTERNAL MEDICINE

## 2022-01-01 PROCEDURE — 85027 COMPLETE CBC AUTOMATED: CPT | Performed by: INTERNAL MEDICINE

## 2022-01-01 PROCEDURE — U0004 COV-19 TEST NON-CDC HGH THRU: HCPCS | Performed by: INTERNAL MEDICINE

## 2022-01-01 PROCEDURE — 80061 LIPID PANEL: CPT | Performed by: INTERNAL MEDICINE

## 2022-01-01 PROCEDURE — 74177 CT ABD & PELVIS W/CONTRAST: CPT

## 2022-01-01 RX ORDER — OXYCODONE HYDROCHLORIDE AND ACETAMINOPHEN 5; 325 MG/1; MG/1
1 TABLET ORAL EVERY 8 HOURS PRN
Qty: 90 TABLET | Refills: 0 | Status: SHIPPED | OUTPATIENT
Start: 2022-01-01

## 2022-01-01 RX ORDER — SULFAMETHOXAZOLE AND TRIMETHOPRIM 400; 80 MG/1; MG/1
1 TABLET ORAL 2 TIMES DAILY
Qty: 10 TABLET | Refills: 0 | Status: SHIPPED | OUTPATIENT
Start: 2022-01-01 | End: 2022-01-01

## 2022-01-01 RX ORDER — FENTANYL 75 UG/H
1 PATCH TRANSDERMAL
Qty: 15 PATCH | Refills: 0 | Status: SHIPPED | OUTPATIENT
Start: 2022-01-01 | End: 2022-01-01 | Stop reason: SDUPTHER

## 2022-01-01 RX ORDER — FENTANYL 75 UG/H
1 PATCH TRANSDERMAL
Qty: 15 PATCH | Refills: 0 | Status: SHIPPED | OUTPATIENT
Start: 2022-01-01

## 2022-01-01 RX ORDER — OXYCODONE HYDROCHLORIDE AND ACETAMINOPHEN 5; 325 MG/1; MG/1
1 TABLET ORAL EVERY 8 HOURS PRN
Qty: 90 TABLET | Refills: 0 | Status: SHIPPED | OUTPATIENT
Start: 2022-01-01 | End: 2022-01-01 | Stop reason: SDUPTHER

## 2022-01-01 RX ORDER — PREGABALIN 100 MG/1
CAPSULE ORAL
Qty: 270 CAPSULE | Refills: 0 | Status: SHIPPED | OUTPATIENT
Start: 2022-01-01 | End: 2022-01-01

## 2022-01-01 RX ORDER — TIZANIDINE 4 MG/1
TABLET ORAL
Qty: 60 TABLET | Refills: 5 | Status: SHIPPED | OUTPATIENT
Start: 2022-01-01

## 2022-01-01 RX ORDER — PREGABALIN 100 MG/1
CAPSULE ORAL
Qty: 270 CAPSULE | Refills: 1 | Status: SHIPPED | OUTPATIENT
Start: 2022-01-01

## 2022-01-01 RX ORDER — PRIMIDONE 50 MG/1
50 TABLET ORAL NIGHTLY
Qty: 30 TABLET | Refills: 5 | Status: SHIPPED | OUTPATIENT
Start: 2022-01-01

## 2022-01-01 RX ORDER — EZETIMIBE 10 MG/1
TABLET ORAL
Qty: 90 TABLET | Refills: 3 | Status: SHIPPED | OUTPATIENT
Start: 2022-01-01

## 2022-01-01 RX ORDER — ATORVASTATIN CALCIUM 80 MG/1
TABLET, FILM COATED ORAL
Qty: 90 TABLET | Refills: 3 | Status: SHIPPED | OUTPATIENT
Start: 2022-01-01

## 2022-01-01 RX ORDER — BUSPIRONE HYDROCHLORIDE 7.5 MG/1
TABLET ORAL
Qty: 360 TABLET | Refills: 3 | Status: SHIPPED | OUTPATIENT
Start: 2022-01-01

## 2022-01-01 RX ORDER — OMEPRAZOLE 20 MG/1
CAPSULE, DELAYED RELEASE ORAL
Qty: 90 CAPSULE | Refills: 3 | Status: SHIPPED | OUTPATIENT
Start: 2022-01-01

## 2022-01-01 RX ORDER — TOPIRAMATE 50 MG/1
TABLET, FILM COATED ORAL
Qty: 270 TABLET | Refills: 1 | Status: SHIPPED | OUTPATIENT
Start: 2022-01-01

## 2022-01-01 RX ORDER — CLONAZEPAM 0.5 MG/1
TABLET ORAL
Qty: 30 TABLET | Refills: 2 | Status: SHIPPED | OUTPATIENT
Start: 2022-01-01 | End: 2022-01-01

## 2022-01-01 RX ORDER — DOXYCYCLINE HYCLATE 100 MG/1
100 CAPSULE ORAL 2 TIMES DAILY
Qty: 10 CAPSULE | Refills: 0 | Status: SHIPPED | OUTPATIENT
Start: 2022-01-01 | End: 2022-01-01

## 2022-01-01 RX ORDER — FUROSEMIDE 40 MG/1
TABLET ORAL
Qty: 180 TABLET | Refills: 3 | Status: SHIPPED | OUTPATIENT
Start: 2022-01-01

## 2022-01-01 RX ORDER — LEVOTHYROXINE SODIUM 112 UG/1
TABLET ORAL
Qty: 90 TABLET | Refills: 3 | Status: SHIPPED | OUTPATIENT
Start: 2022-01-01

## 2022-01-01 RX ORDER — CLONAZEPAM 0.5 MG/1
TABLET ORAL
Qty: 30 TABLET | Refills: 2 | Status: SHIPPED | OUTPATIENT
Start: 2022-01-01 | End: 2022-01-01 | Stop reason: SDUPTHER

## 2022-01-01 RX ORDER — CLONAZEPAM 0.5 MG/1
0.5 TABLET ORAL 2 TIMES DAILY PRN
Qty: 30 TABLET | Refills: 2 | Status: SHIPPED | OUTPATIENT
Start: 2022-01-01

## 2022-01-01 RX ADMIN — IOPAMIDOL 95 ML: 612 INJECTION, SOLUTION INTRAVENOUS at 10:50

## 2022-01-05 NOTE — TELEPHONE ENCOUNTER
Caller: Tamara Langston    Relationship: Self    Best call back number: [phone]    Requested Prescriptions:   Requested Prescriptions     Pending Prescriptions Disp Refills   • oxyCODONE-acetaminophen (Percocet) 5-325 MG per tablet 90 tablet 0     Sig: Take 1 tablet by mouth Every 8 (Eight) Hours As Needed for Severe Pain .   • fentaNYL (DURAGESIC) 75 MCG/HR patch 15 patch 0     Sig: Place 1 patch on the skin as directed by provider Every Other Day.        Pharmacy where request should be sent: [facility] [address] [facility] [phone] [facility] [phone] FX     Additional details provided by patient:   PATIENT HAS 3 DAYS LEFT OF MEDICATION     Does the patient have less than a 3 day supply:  [x] Yes  [] No    Matias Nevarez Rep   01/05/22 08:47 EST

## 2022-01-06 NOTE — TELEPHONE ENCOUNTER
Caller: Tamara Langston    Relationship: Self    Best call back number: [phone]    What is the best time to reach you: ANYTIME    Who are you requesting to speak with (clinical staff, provider,  specific staff member): CRYSTAL    What was the call regarding: POSSIBLE UTI    Do you require a callback: YES

## 2022-01-10 NOTE — TELEPHONE ENCOUNTER
Rx Refill Note  Requested Prescriptions     Pending Prescriptions Disp Refills   • sertraline (ZOLOFT) 50 MG tablet [Pharmacy Med Name: SERTRALINE HCL 50 MG TABLET] 90 tablet 3     Sig: TAKE ONE TABLET BY MOUTH DAILY      Last office visit with prescribing clinician: 10/13/2021      Next office visit with prescribing clinician: 2/15/2022            Magdalena Collins MA  01/10/22, 09:34 EST

## 2022-02-03 NOTE — TELEPHONE ENCOUNTER
Caller: Tamara Langston    Relationship: Self    Best call back number    [phone]     Requested Prescriptions:   Requested Prescriptions     Pending Prescriptions Disp Refills   • fentaNYL (DURAGESIC) 75 MCG/HR patch 15 patch 0     Sig: Place 1 patch on the skin as directed by provider Every Other Day.   • oxyCODONE-acetaminophen (Percocet) 5-325 MG per tablet 90 tablet 0     Sig: Take 1 tablet by mouth Every 8 (Eight) Hours As Needed for Severe Pain .      Pharmacy where request should be sent:      [facility] - [place], [place]    TELEPHONE CONTACT:    [phone]    Additional details provided by patient:     PATIENT STATED SHE HAS A (1) DAY SUPPLY LEFT OF MEDICATIONS    Does the patient have less than a 3 day supply:  [x] Yes  [] No    Matias Parks Rep   02/03/22 11:19 EST     DR DE PAZ

## 2022-02-15 PROBLEM — R25.1 TREMOR: Status: ACTIVE | Noted: 2022-01-01

## 2022-02-15 NOTE — PROGRESS NOTES
Patient is a 66 y.o. female who is here for a follow up of hypertension and hypothyroidism.  Chief Complaint   Patient presents with   • Hypertension   • Hypothyroidism         HPI:    Here for mgmt of HTN and chronic pain and DM.  Was exposed to her daughter last week who is Covid positive.  Patient reports without symptoms.  Patient is vaccinated.  Feels her tremor is worst.  BP has been good.  Does not check her FSBS.  Still in a lot of pain.     History:     Patient Active Problem List   Diagnosis   • COPD in active smoker on home O2   • Mixed anxiety depressive disorder   • GERD   • HTN and diastolic dysfunction   • Hypothyroidism   • Morbidly obese   • Tobacco abuse   • PVD   • Chronic, continuous use of opioids   • CAD s/p multiple stents   • Hx takotsubo cardiomyopathy   • Severe AS s/p TAVR 1/23/20   • Seasonal allergic rhinitis due to pollen   • Chronic left shoulder pain   • Neck pain   • Encounter for Medicare annual wellness exam   • Chronic respiratory failure with hypoxia and hypercapnia (HCC)   • DINA on BiPAP   • VHD; mild NC, mod TR   • PAH.  Multifactorial related to diastolic dysfunction, obesity, DINA, and COPD   • HFpEF   • Refugio coma scale total score 3-8 (HCC)   • Stage 3a chronic kidney disease (HCC)   • Acute DVT of tibial vein of right lower extremity (CMS/HCC)   • History of meningioma   • Tremor       Past Medical History:   Diagnosis Date   • Altered mental status 2/13/2018   • Bronchitis    • Cellulitis of sacral region 2/13/2018   • Cervical cancer (HCC)    • Chronic anxiety 2/27/2017   • Chronic bronchitis (HCC)    • Chronic respiratory failure with hypoxia (HCC) 3/8/2018   • Chronic systolic heart failure (HCC)    • Chronically on benzodiazepine therapy 2/27/2017   • Coronary artery disease    • Degenerative arthritis    • Diabetes mellitus (HCC)    • Dyslipidemia 5/11/2016   • Dyspnea    • Fatty liver disease, nonalcoholic 11/21/2016   • Fibromyalgia    • GERD (gastroesophageal  reflux disease)    • H/O echocardiogram    • History of pneumonia    • Hypertension 5/11/2016    16. H/O echocardiogram (V15.89) (Z92.89)  · A.  Echocardiogram of 02/03/2015 reports an ejection fraction of 60-65%, mild concentric     LVH, trace mitral regurgitation, mild tricuspid and pulmonic regurgitation and calculated     RVSP of 35 mmHg, the main pulmonary artery is also mildly dilated.   • Hypothyroidism 5/11/2016    Description: A.  On replacement therapy.   • Infected wound 3/8/2018   • Lung nodule     per pt report   • Macular degeneration    • Meningioma (HCC)     behind left eye per pt report   • Obesity    • DINA (obstructive sleep apnea)     intolerant of CPAP therapy, uses bipap   • Osteoporosis    • Polycythemia vera (HCC) 5/11/2016   • Pulmonary emphysema (HCC)    • Restrictive ventilatory defect    • Sepsis (HCC) 2/13/2018   • TIA (transient ischemic attack) 2015    blurred vision    • Uterine cancer (HCC)    • Vitamin D deficiency 8/1/2016   • Weakness 3/8/2018       Past Surgical History:   Procedure Laterality Date   • AMPUTATION DIGIT Left 11/23/2016    Procedure: AMPUTATION TRANS METATARSAL - ray amutation of the left great toe;  Surgeon: Arsalan Feliciano MD;  Location:  [facility];  Service:    • AMPUTATION DIGIT Left 3/2/2017    Procedure: LEFT FOOT TRANSMETATARSAL AMPUTATION TOES 2,3,4,5;  Surgeon: Joey Guaman MD;  Location:  [facility];  Service:    • AORTIC VALVE REPAIR/REPLACEMENT N/A 1/23/2020    Procedure: TRANSCATHETER AORTIC VALVE REPLACEMENT;  Surgeon: Joey Guaman MD;  Location:  [facility];  Service: Cardiothoracic   • AORTIC VALVE REPAIR/REPLACEMENT N/A 1/23/2020    Procedure: Transfemoral Transcatheter Aortic Valve Replacement;  Surgeon: Qi Valdez MD;  Location:  [facility];  Service: Cardiovascular   • BACK SURGERY      lumbar fusions x5--multiple times; 1995, 1997, 1998, 1999 and 2008   • BELOW KNEE AMPUTATION Left 2/25/2017    Procedure: EXTENDED LEFT TOE  AMPUTATION;  Surgeon: Arsalan Feliciano MD;  Location:  [facility];  Service:    • CARDIAC CATHETERIZATION N/A 11/26/2016    Procedure: Left Heart Cath;  Surgeon: Ash Nuñez MD;  Location:  [facility];  Service:    • CARDIAC CATHETERIZATION N/A 2/20/2018    Procedure: Left Heart Cath;  Surgeon: Lane Zamorano MD;  Location:  [facility];  Service:    • CARDIAC CATHETERIZATION N/A 2/20/2018    Procedure: Right Heart Cath;  Surgeon: Lane Zamorano MD;  Location:  [facility];  Service:    • CARDIAC CATHETERIZATION Left 1/10/2020    Procedure: Cardiac Catheterization/Vascular Study;  Surgeon: Lane Zamorano MD;  Location:  [facility];  Service: Cardiology   • COLONOSCOPY     • HAND SURGERY Bilateral     x3    • HYSTERECTOMY      status post uterine and cervical cancer   • LUMBAR SPINE SURGERY      arthrodesis by anterior approach addit interspace   • TONSILLECTOMY         Current Outpatient Medications on File Prior to Visit   Medication Sig   • acetaminophen (TYLENOL) 325 MG tablet Take 2 tablets by mouth Every 4 (Four) Hours As Needed for mild pain (1-3) or fever (temperature greater than 101F).   • albuterol (PROVENTIL) (2.5 MG/3ML) 0.083% nebulizer solution Take 2.5 mg by nebulization Every 6 (Six) Hours As Needed for Wheezing or Shortness of Air.   • apixaban (ELIQUIS) 5 MG tablet tablet Take 1 tablet by mouth Every 12 (Twelve) Hours.   • atorvastatin (LIPITOR) 80 MG tablet Take 1 tablet by mouth Every Night.   • busPIRone (BUSPAR) 7.5 MG tablet TAKE TWO TABLETS BY MOUTH TWICE A DAY   • clonazePAM (KlonoPIN) 0.5 MG tablet TAKE 1/2 TABLET BY MOUTH TWICE A DAY AS NEEDED FOR ANXIETY   • clopidogrel (PLAVIX) 75 MG tablet TAKE ONE TABLET BY MOUTH DAILY   • dilTIAZem CD (CARDIZEM CD) 240 MG 24 hr capsule TAKE ONE CAPSULE BY MOUTH DAILY   • doxazosin (CARDURA) 1 MG tablet Take 1 tablet by mouth Every Night.   • ezetimibe (ZETIA) 10 MG tablet TAKE ONE  TABLET BY MOUTH DAILY   • fentaNYL (DURAGESIC) 75 MCG/HR patch Place 1 patch on the skin as directed by provider Every Other Day.   • fexofenadine (ALLEGRA) 180 MG tablet Take 180 mg by mouth 2 (Two) Times a Day.   • furosemide (LASIX) 40 MG tablet TAKE ONE TABLET BY MOUTH TWICE A DAY   • levothyroxine (SYNTHROID, LEVOTHROID) 112 MCG tablet TAKE ONE TABLET BY MOUTH DAILY   • omeprazole (priLOSEC) 20 MG capsule TAKE ONE CAPSULE BY MOUTH DAILY   • oxyCODONE-acetaminophen (Percocet) 5-325 MG per tablet Take 1 tablet by mouth Every 8 (Eight) Hours As Needed for Severe Pain .   • pregabalin (LYRICA) 100 MG capsule TAKE ONE CAPSULE BY MOUTH EVERY 8 HOURS   • promethazine (PHENERGAN) 25 MG tablet TAKE ONE TABLET BY MOUTH EVERY 6 HOURS AS NEEDED FOR NAUSEA AND VOMITING   • sertraline (ZOLOFT) 50 MG tablet TAKE ONE TABLET BY MOUTH DAILY   • tiotropium bromide monohydrate (Spiriva Respimat) 2.5 MCG/ACT aerosol solution inhaler Inhale 2 puffs Daily.   • tiZANidine (ZANAFLEX) 4 MG tablet TAKE ONE TABLET BY MOUTH TWICE A DAY AS NEEDED FOR MUSCLE SPASMS   • topiramate (TOPAMAX) 50 MG tablet Take 1 po qam and 2 po qhs   • sulfamethoxazole-trimethoprim (Bactrim) 400-80 MG tablet Take 1 tablet by mouth 2 (Two) Times a Day.     No current facility-administered medications on file prior to visit.       Family History   Problem Relation Age of Onset   • Arthritis Mother    • Diabetes Mother    • Colon polyps Mother    • Diverticulitis Mother    • Heart failure Mother    • Arthritis Father    • Bleeding Disorder Father    • Diabetes Father    • Kidney disease Father    • Heart disease Father    • COPD Sister         currently smokes   • Arthritis Brother    • Diabetes Brother    • Alcohol abuse Brother    • Heart murmur Daughter    • Arthritis Other    • Diabetes Other    • Breast cancer Neg Hx    • Ovarian cancer Neg Hx        Social History     Socioeconomic History   • Marital status:      Spouse name: Wali   • Number of  "children: 1   Tobacco Use   • Smoking status: Current Every Day Smoker     Packs/day: 1.00     Years: 50.00     Pack years: 50.00     Types: Cigarettes   • Smokeless tobacco: Never Used   Substance and Sexual Activity   • Alcohol use: No   • Drug use: No   • Sexual activity: Defer         Review of Systems   Constitutional: Positive for fatigue. Negative for chills, diaphoresis, fever and unexpected weight change.   HENT: Positive for congestion. Negative for hearing loss, postnasal drip and sore throat.    Eyes: Negative for pain, discharge and itching.   Respiratory: Positive for shortness of breath. Negative for cough, chest tightness and wheezing.    Cardiovascular: Negative for chest pain and leg swelling.   Gastrointestinal: Positive for constipation. Negative for abdominal distention, blood in stool, diarrhea, nausea and vomiting.   Endocrine: Negative for heat intolerance, polydipsia and polyuria.   Genitourinary: Negative for dysuria, frequency and hematuria.   Musculoskeletal: Positive for arthralgias, back pain, gait problem, neck pain and neck stiffness. Negative for joint swelling and myalgias.   Skin:        Hair loss   Neurological: Positive for dizziness, tremors, weakness and light-headedness. Negative for syncope and headaches.   Psychiatric/Behavioral: Positive for dysphoric mood and sleep disturbance. The patient is nervous/anxious.        /74 (BP Location: Left arm, Patient Position: Sitting)   Pulse 60   Temp 96.8 °F (36 °C) (Infrared)   Ht 167.6 cm (65.98\")   LMP  (LMP Unknown)   SpO2 99%   BMI 30.20 kg/m²       Physical Exam  Constitutional:       General: She is not in acute distress.     Appearance: Normal appearance. She is well-developed. She is obese.   HENT:      Head: Normocephalic and atraumatic.      Right Ear: External ear normal.      Left Ear: External ear normal.      Nose: Nose normal.      Mouth/Throat:      Mouth: Mucous membranes are moist.      Pharynx: " Oropharynx is clear.   Eyes:      Extraocular Movements: Extraocular movements intact.      Conjunctiva/sclera: Conjunctivae normal.      Pupils: Pupils are equal, round, and reactive to light.   Cardiovascular:      Rate and Rhythm: Normal rate and regular rhythm.      Heart sounds: Murmur (1/6) heard.       Pulmonary:      Effort: Pulmonary effort is normal. No respiratory distress.      Comments: Diiminished BS  Abdominal:      General: Bowel sounds are normal.      Palpations: Abdomen is soft.   Musculoskeletal:      Cervical back: Normal range of motion and neck supple.      Comments: In a wheelchair   Lymphadenopathy:      Cervical: No cervical adenopathy.   Skin:     General: Skin is warm and dry.   Neurological:      General: No focal deficit present.      Mental Status: She is alert and oriented to person, place, and time. Mental status is at baseline.   Psychiatric:         Mood and Affect: Mood normal.         Behavior: Behavior normal.         Thought Content: Thought content normal.         Judgment: Judgment normal.         Procedure:      Discussion/Summary:    chronic back pain- refill patch as needed, counseled on LT risk, advised to reduce percocet to tid prn  htn/HFpEF-controlled on diuretic/CCB   hyperlipidemia-cont lipitor and zetia, recheck today at goal, counseled on low chol diet  hypothroid-cont replacement, recheck at goal  depression with anxiety-cont buspar and klonopine and zoloft ,advised her of risk of addiction, stable  polycythemia-cbc today at goal, advised tob cessation  retinal vein occlusion- cont rf modification, counseled on tob cessation, no change  b12 def-recheck at goal  DM-labs today on target  Elevated lft/?autoimmune-f/u gastro recs , labs soon  GERD-controlled PPI  Tremor-cont BB and topamax, will add primidone  Fe def anemia- recheck stable  RLE DVT-2/7/21-on NOAC  Hx of Meningioma-rescan      2/15 labs noted and dw patient    Current Outpatient Medications:   •   acetaminophen (TYLENOL) 325 MG tablet, Take 2 tablets by mouth Every 4 (Four) Hours As Needed for mild pain (1-3) or fever (temperature greater than 101F)., Disp: 100 tablet, Rfl: 0  •  albuterol (PROVENTIL) (2.5 MG/3ML) 0.083% nebulizer solution, Take 2.5 mg by nebulization Every 6 (Six) Hours As Needed for Wheezing or Shortness of Air., Disp: 120 each, Rfl: 11  •  apixaban (ELIQUIS) 5 MG tablet tablet, Take 1 tablet by mouth Every 12 (Twelve) Hours., Disp: 90 tablet, Rfl: 3  •  atorvastatin (LIPITOR) 80 MG tablet, Take 1 tablet by mouth Every Night., Disp: 90 tablet, Rfl: 3  •  busPIRone (BUSPAR) 7.5 MG tablet, TAKE TWO TABLETS BY MOUTH TWICE A DAY, Disp: 360 tablet, Rfl: 3  •  clonazePAM (KlonoPIN) 0.5 MG tablet, TAKE 1/2 TABLET BY MOUTH TWICE A DAY AS NEEDED FOR ANXIETY, Disp: 30 tablet, Rfl: 2  •  clopidogrel (PLAVIX) 75 MG tablet, TAKE ONE TABLET BY MOUTH DAILY, Disp: 90 tablet, Rfl: 3  •  dilTIAZem CD (CARDIZEM CD) 240 MG 24 hr capsule, TAKE ONE CAPSULE BY MOUTH DAILY, Disp: 90 capsule, Rfl: 3  •  doxazosin (CARDURA) 1 MG tablet, Take 1 tablet by mouth Every Night., Disp: 90 tablet, Rfl: 3  •  ezetimibe (ZETIA) 10 MG tablet, TAKE ONE TABLET BY MOUTH DAILY, Disp: 90 tablet, Rfl: 3  •  fentaNYL (DURAGESIC) 75 MCG/HR patch, Place 1 patch on the skin as directed by provider Every Other Day., Disp: 15 patch, Rfl: 0  •  fexofenadine (ALLEGRA) 180 MG tablet, Take 180 mg by mouth 2 (Two) Times a Day., Disp: , Rfl:   •  furosemide (LASIX) 40 MG tablet, TAKE ONE TABLET BY MOUTH TWICE A DAY, Disp: 180 tablet, Rfl: 3  •  levothyroxine (SYNTHROID, LEVOTHROID) 112 MCG tablet, TAKE ONE TABLET BY MOUTH DAILY, Disp: 90 tablet, Rfl: 3  •  omeprazole (priLOSEC) 20 MG capsule, TAKE ONE CAPSULE BY MOUTH DAILY, Disp: 90 capsule, Rfl: 3  •  oxyCODONE-acetaminophen (Percocet) 5-325 MG per tablet, Take 1 tablet by mouth Every 8 (Eight) Hours As Needed for Severe Pain ., Disp: 90 tablet, Rfl: 0  •  pregabalin (LYRICA) 100 MG capsule,  TAKE ONE CAPSULE BY MOUTH EVERY 8 HOURS, Disp: 270 capsule, Rfl: 0  •  promethazine (PHENERGAN) 25 MG tablet, TAKE ONE TABLET BY MOUTH EVERY 6 HOURS AS NEEDED FOR NAUSEA AND VOMITING, Disp: 20 tablet, Rfl: 3  •  sertraline (ZOLOFT) 50 MG tablet, TAKE ONE TABLET BY MOUTH DAILY, Disp: 90 tablet, Rfl: 3  •  tiotropium bromide monohydrate (Spiriva Respimat) 2.5 MCG/ACT aerosol solution inhaler, Inhale 2 puffs Daily., Disp: 8 g, Rfl: 0  •  tiZANidine (ZANAFLEX) 4 MG tablet, TAKE ONE TABLET BY MOUTH TWICE A DAY AS NEEDED FOR MUSCLE SPASMS, Disp: 60 tablet, Rfl: 5  •  topiramate (TOPAMAX) 50 MG tablet, Take 1 po qam and 2 po qhs, Disp: 270 tablet, Rfl: 1  •  primidone (MYSOLINE) 50 MG tablet, Take 1 tablet by mouth Every Night., Disp: 30 tablet, Rfl: 5  •  sulfamethoxazole-trimethoprim (Bactrim) 400-80 MG tablet, Take 1 tablet by mouth 2 (Two) Times a Day., Disp: 10 tablet, Rfl: 0        Diagnoses and all orders for this visit:    1. HTN and diastolic dysfunction (Primary)  -     CBC (No Diff)  -     Comprehensive Metabolic Panel  -     Lipid Panel    2. CAD s/p multiple stents    3. Other specified hypothyroidism  -     TSH    4. GERD    5. Stage 3a chronic kidney disease (HCC)    6. Tobacco abuse    7. Tremor  -     primidone (MYSOLINE) 50 MG tablet; Take 1 tablet by mouth Every Night.  Dispense: 30 tablet; Refill: 5    8. Type 2 diabetes mellitus with ketoacidosis without coma, without long-term current use of insulin (HCC)  -     Hemoglobin A1c

## 2022-03-02 NOTE — TELEPHONE ENCOUNTER
Caller: Tamara Langston    Relationship: Self    Best call back number: [phone]    Requested Prescriptions:   Requested Prescriptions     Pending Prescriptions Disp Refills   • fentaNYL (DURAGESIC) 75 MCG/HR patch 15 patch 0     Sig: Place 1 patch on the skin as directed by provider Every Other Day.   • oxyCODONE-acetaminophen (Percocet) 5-325 MG per tablet 90 tablet 0     Sig: Take 1 tablet by mouth Every 8 (Eight) Hours As Needed for Severe Pain .        Pharmacy where request should be sent: [facility] [address] [facility] [phone] [facility] [phone]      Additional details provided by patient: HAS A FEW AND WILL BE OUT ON Friday. WANTS CRYSTAL TO CALL [facility].  Does the patient have less than a 3 day supply:  [x] Yes  [] No    Mirna Madison MA   03/02/22 12:52 EST

## 2022-03-30 NOTE — TELEPHONE ENCOUNTER
Caller: Tamara Langston    Relationship: Self    Best call back number: [phone]    Requested Prescriptions:   Requested Prescriptions     Pending Prescriptions Disp Refills   • fentaNYL (DURAGESIC) 75 MCG/HR patch 15 patch 0     Sig: Place 1 patch on the skin as directed by provider Every Other Day.   • oxyCODONE-acetaminophen (Percocet) 5-325 MG per tablet 90 tablet 0     Sig: Take 1 tablet by mouth Every 8 (Eight) Hours As Needed for Severe Pain .        Pharmacy where request should be sent: [facility] [address] [facility] [phone] [facility] [phone]      Additional details provided by patient: PATIENT STATES SHE WILL BE OUT OF MEDICATION ON Friday.     Does the patient have less than a 3 day supply:  [x] Yes  [] No    Kaylynn Bear   03/30/22 11:01 EDT

## 2022-04-07 NOTE — TELEPHONE ENCOUNTER
Caller: AMY    Relationship:     Best Call Back Number:    [phone]    What form or medical record are you requesting:    CALLER MADE CONTACT TO CHECK THE STATUS OF THE OFFICE RECEIVING A FAXED LETTER FROM [email or web address]    THE LETTER INCLUDED AN OPIOID UTILIZATION REPORT     CALLER REQUESTED DR DE PAZ TO COMPLETE THE REPORT AND RETURN FAX TO:    [phone]    Who is requesting this form or medical record from you:     [email or web address]    How would you like to receive the form or medical records (pick-up, mail, fax):     FAX    Timeframe paperwork needed:     ASAP    Additional notes:     N/A    DR DE PAZ

## 2022-04-08 NOTE — TELEPHONE ENCOUNTER
AMY RASMUSSEN IS CALLING TO FIND OUT IF THE OFFICE RECEIVED A LETTER THAT WAS FAXED 03/29/2022 REGARDING AN OPIATE UTILIZATION REPORT AND A MEDICATION LIST. VALERY WOULD LIKE TO KNOW IF THAT WAS RECEIVED     PLEASE CALL [phone] REFERENCE NUMBER [id number]

## 2022-04-12 NOTE — TELEPHONE ENCOUNTER
Caller: EAN HERNANDEZ    Relationship:     Best call back number:    [phone]    What form or medical record are you requesting    CALLER REQUESTED THE PRESCRIBER OPTION LETTER FOR OPIOD MEDICINE LIMITS FAXED TO THE OFFICE TO THE ATTENTION OF DR DE PAZ BE SIGNED INCLUDING CHOSEN OPTION AND RETURN FAXED TO [facility] CLINICAL PROGRAMS    PLEASE INCLUDE CASE # [id number] IF NECESSARY TO CONTACT CALLER WITH QUESTIONS    Who is requesting this form or medical record from you:     SEE ABOVE    How would you like to receive the form or medical records (pick-up, mail, fax):    RETURN FAX INFORMATION IS INCLUDED IN FAXED LETTER TO OFFICE    Timeframe paperwork needed:     ASAP    Additional notes:     N/A    DR DE PAZ

## 2022-04-15 NOTE — TELEPHONE ENCOUNTER
PHONE CALL FROM [facility].  PATIENT ON [facility] AND OPIATES, HAS SHE EVER BEEN PRESCRIBED [facility]?    CALL IF NEEDED

## 2022-04-27 NOTE — TELEPHONE ENCOUNTER
Caller: Tamara Langston    Relationship: Self    Best call back number: [phone]    Requested Prescriptions:   Requested Prescriptions     Pending Prescriptions Disp Refills   • oxyCODONE-acetaminophen (Percocet) 5-325 MG per tablet 90 tablet 0     Sig: Take 1 tablet by mouth Every 8 (Eight) Hours As Needed for Severe Pain .   • fentaNYL (DURAGESIC) 75 MCG/HR patch 15 patch 0     Sig: Place 1 patch on the skin as directed by provider Every Other Day.        Pharmacy where request should be sent: [facility] [address] [place] - [phone] [facility] [phone]      Additional details provided by patient: PATIENT STATES USUALLY THERE IS AN ISSUE WITH [facility] AND SHE IS CALLING EARLY AS SHE WILL NEED THESE MEDICATIONS ON Friday.     Does the patient have less than a 3 day supply:  [x] Yes  [] No    Matias Oro Rep   04/27/22 08:49 EDT

## 2022-05-17 NOTE — PROGRESS NOTES
Patient is a 66 y.o. female who is here for a follow up of hypertension and pain.  Chief Complaint   Patient presents with   • Hypertension   • Pain         HPI:    Here for mgmt of HTN and hypothyroid and DM.  Having issues with constipation.  Has associated lower abdominal pain.  No recent colonoscopy.  No fever or chills.  No reported BRBPR.  No nausea or emesis.    BP has been good.  No dizziness or lightheadedness.  Does not check FSBS.      History:     Patient Active Problem List   Diagnosis   • COPD in active smoker on home O2   • Mixed anxiety depressive disorder   • GERD   • HTN and diastolic dysfunction   • Hypothyroidism   • Morbidly obese   • Tobacco abuse   • PVD   • Chronic, continuous use of opioids   • CAD s/p multiple stents   • Hx takotsubo cardiomyopathy   • Severe AS s/p TAVR 1/23/20   • Seasonal allergic rhinitis due to pollen   • Chronic left shoulder pain   • Neck pain   • Encounter for Medicare annual wellness exam   • Chronic respiratory failure with hypoxia and hypercapnia (HCC)   • DINA on BiPAP   • VHD; mild WI, mod TR   • PAH.  Multifactorial related to diastolic dysfunction, obesity, DINA, and COPD   • HFpEF   • Refugio coma scale total score 3-8 (HCC)   • Stage 3a chronic kidney disease (HCC)   • Acute DVT of tibial vein of right lower extremity (CMS/HCC)   • History of meningioma   • Tremor       Past Medical History:   Diagnosis Date   • Altered mental status 2/13/2018   • Bronchitis    • Cellulitis of sacral region 2/13/2018   • Cervical cancer (HCC)    • Chronic anxiety 2/27/2017   • Chronic bronchitis (HCC)    • Chronic respiratory failure with hypoxia (HCC) 3/8/2018   • Chronic systolic heart failure (HCC)    • Chronically on benzodiazepine therapy 2/27/2017   • Coronary artery disease    • Degenerative arthritis    • Diabetes mellitus (HCC)    • Dyslipidemia 5/11/2016   • Dyspnea    • Fatty liver disease, nonalcoholic 11/21/2016   • Fibromyalgia    • GERD (gastroesophageal reflux  disease)    • H/O echocardiogram    • History of pneumonia    • Hypertension 5/11/2016    16. H/O echocardiogram (V15.89) (Z92.89)  · A.  Echocardiogram of 02/03/2015 reports an ejection fraction of 60-65%, mild concentric     LVH, trace mitral regurgitation, mild tricuspid and pulmonic regurgitation and calculated     RVSP of 35 mmHg, the main pulmonary artery is also mildly dilated.   • Hypothyroidism 5/11/2016    Description: A.  On replacement therapy.   • Infected wound 3/8/2018   • Lung nodule     per pt report   • Macular degeneration    • Meningioma (HCC)     behind left eye per pt report   • Obesity    • DINA (obstructive sleep apnea)     intolerant of CPAP therapy, uses bipap   • Osteoporosis    • Polycythemia vera (HCC) 5/11/2016   • Pulmonary emphysema (HCC)    • Restrictive ventilatory defect    • Sepsis (HCC) 2/13/2018   • TIA (transient ischemic attack) 2015    blurred vision    • Uterine cancer (HCC)    • Vitamin D deficiency 8/1/2016   • Weakness 3/8/2018       Past Surgical History:   Procedure Laterality Date   • AMPUTATION DIGIT Left 11/23/2016    Procedure: AMPUTATION TRANS METATARSAL - ray amutation of the left great toe;  Surgeon: Arsalan Feliciano MD;  Location:  [facility];  Service:    • AMPUTATION DIGIT Left 3/2/2017    Procedure: LEFT FOOT TRANSMETATARSAL AMPUTATION TOES 2,3,4,5;  Surgeon: Joey Guaman MD;  Location:  [facility];  Service:    • AORTIC VALVE REPAIR/REPLACEMENT N/A 1/23/2020    Procedure: TRANSCATHETER AORTIC VALVE REPLACEMENT;  Surgeon: Joey Guaman MD;  Location:  [facility];  Service: Cardiothoracic   • AORTIC VALVE REPAIR/REPLACEMENT N/A 1/23/2020    Procedure: Transfemoral Transcatheter Aortic Valve Replacement;  Surgeon: Qi Valdez MD;  Location:  [facility];  Service: Cardiovascular   • BACK SURGERY      lumbar fusions x5--multiple times; 1995, 1997, 1998, 1999 and 2008   • BELOW KNEE AMPUTATION Left 2/25/2017    Procedure: EXTENDED LEFT TOE  AMPUTATION;  Surgeon: Arsalan Feliciano MD;  Location:  [facility];  Service:    • CARDIAC CATHETERIZATION N/A 11/26/2016    Procedure: Left Heart Cath;  Surgeon: Ash Nuñez MD;  Location:  [facility];  Service:    • CARDIAC CATHETERIZATION N/A 2/20/2018    Procedure: Left Heart Cath;  Surgeon: Lane Zamorano MD;  Location:  [facility];  Service:    • CARDIAC CATHETERIZATION N/A 2/20/2018    Procedure: Right Heart Cath;  Surgeon: Lane Zamorano MD;  Location:  [facility];  Service:    • CARDIAC CATHETERIZATION Left 1/10/2020    Procedure: Cardiac Catheterization/Vascular Study;  Surgeon: Lane Zamorano MD;  Location:  [facility];  Service: Cardiology   • COLONOSCOPY     • HAND SURGERY Bilateral     x3    • HYSTERECTOMY      status post uterine and cervical cancer   • LUMBAR SPINE SURGERY      arthrodesis by anterior approach addit interspace   • TONSILLECTOMY         Current Outpatient Medications on File Prior to Visit   Medication Sig   • acetaminophen (TYLENOL) 325 MG tablet Take 2 tablets by mouth Every 4 (Four) Hours As Needed for mild pain (1-3) or fever (temperature greater than 101F).   • albuterol (PROVENTIL) (2.5 MG/3ML) 0.083% nebulizer solution Take 2.5 mg by nebulization Every 6 (Six) Hours As Needed for Wheezing or Shortness of Air.   • apixaban (ELIQUIS) 5 MG tablet tablet Take 1 tablet by mouth Every 12 (Twelve) Hours.   • atorvastatin (LIPITOR) 80 MG tablet TAKE ONE TABLET BY MOUTH ONCE NIGHTLY   • busPIRone (BUSPAR) 7.5 MG tablet TAKE TWO TABLETS BY MOUTH TWICE A DAY   • clonazePAM (KlonoPIN) 0.5 MG tablet TAKE 1/2 TABLET BY MOUTH TWICE A DAY AS NEEDED FOR ANXIETY   • clopidogrel (PLAVIX) 75 MG tablet TAKE ONE TABLET BY MOUTH DAILY   • dilTIAZem CD (CARDIZEM CD) 240 MG 24 hr capsule TAKE ONE CAPSULE BY MOUTH DAILY   • doxazosin (CARDURA) 1 MG tablet Take 1 tablet by mouth Every Night.   • ezetimibe (ZETIA) 10 MG tablet TAKE ONE  TABLET BY MOUTH DAILY   • fentaNYL (DURAGESIC) 75 MCG/HR patch Place 1 patch on the skin as directed by provider Every Other Day.   • fexofenadine (ALLEGRA) 180 MG tablet Take 180 mg by mouth 2 (Two) Times a Day.   • furosemide (LASIX) 40 MG tablet TAKE ONE TABLET BY MOUTH TWICE A DAY   • levothyroxine (SYNTHROID, LEVOTHROID) 112 MCG tablet TAKE ONE TABLET BY MOUTH DAILY   • omeprazole (priLOSEC) 20 MG capsule TAKE ONE CAPSULE BY MOUTH DAILY   • oxyCODONE-acetaminophen (Percocet) 5-325 MG per tablet Take 1 tablet by mouth Every 8 (Eight) Hours As Needed for Severe Pain .   • pregabalin (LYRICA) 100 MG capsule TAKE ONE CAPSULE BY MOUTH EVERY 8 HOURS   • primidone (MYSOLINE) 50 MG tablet Take 1 tablet by mouth Every Night.   • promethazine (PHENERGAN) 25 MG tablet TAKE ONE TABLET BY MOUTH EVERY 6 HOURS AS NEEDED FOR NAUSEA AND VOMITING   • sertraline (ZOLOFT) 50 MG tablet TAKE ONE TABLET BY MOUTH DAILY   • tiotropium bromide monohydrate (Spiriva Respimat) 2.5 MCG/ACT aerosol solution inhaler Inhale 2 puffs Daily.   • tiZANidine (ZANAFLEX) 4 MG tablet TAKE ONE TABLET BY MOUTH TWICE A DAY AS NEEDED FOR MUSCLE SPASMS   • topiramate (TOPAMAX) 50 MG tablet TAKE ONE TABLET BY MOUTH IN THE MORNING THEN TAKE TWO TABLETS BY MOUTH EVERY NIGHT AT BEDTIME     No current facility-administered medications on file prior to visit.       Family History   Problem Relation Age of Onset   • Arthritis Mother    • Diabetes Mother    • Colon polyps Mother    • Diverticulitis Mother    • Heart failure Mother    • Arthritis Father    • Bleeding Disorder Father    • Diabetes Father    • Kidney disease Father    • Heart disease Father    • COPD Sister         currently smokes   • Arthritis Brother    • Diabetes Brother    • Alcohol abuse Brother    • Heart murmur Daughter    • Arthritis Other    • Diabetes Other    • Breast cancer Neg Hx    • Ovarian cancer Neg Hx        Social History     Socioeconomic History   • Marital status:       "Spouse name: Wali   • Number of children: 1   Tobacco Use   • Smoking status: Current Every Day Smoker     Packs/day: 1.00     Years: 50.00     Pack years: 50.00     Types: Cigarettes   • Smokeless tobacco: Never Used   Substance and Sexual Activity   • Alcohol use: No   • Drug use: No   • Sexual activity: Defer         Review of Systems   Constitutional: Positive for fatigue. Negative for chills, diaphoresis, fever and unexpected weight change.   HENT: Negative for hearing loss, postnasal drip and sore throat.    Eyes: Negative for pain, discharge and itching.   Respiratory: Positive for shortness of breath. Negative for cough, chest tightness and wheezing.    Cardiovascular: Negative for chest pain and leg swelling.   Gastrointestinal: Positive for abdominal pain and constipation. Negative for abdominal distention, blood in stool, diarrhea, nausea and vomiting.   Endocrine: Negative for heat intolerance, polydipsia and polyuria.   Genitourinary: Negative for dysuria, frequency and hematuria.   Musculoskeletal: Positive for arthralgias, back pain, gait problem, neck pain and neck stiffness. Negative for joint swelling and myalgias.   Skin:        Hair loss   Neurological: Positive for dizziness, tremors, weakness and light-headedness. Negative for syncope and headaches.   Psychiatric/Behavioral: Positive for dysphoric mood and sleep disturbance. The patient is nervous/anxious.        /70   Pulse 89   Temp 96.9 °F (36.1 °C) (Infrared)   Ht 167.6 cm (65.98\")   LMP  (LMP Unknown)   SpO2 98%   BMI 30.20 kg/m²       Physical Exam  Constitutional:       General: She is not in acute distress.     Appearance: Normal appearance. She is well-developed. She is obese.   HENT:      Head: Normocephalic and atraumatic.      Right Ear: External ear normal.      Left Ear: External ear normal.      Nose: Nose normal.      Mouth/Throat:      Mouth: Mucous membranes are moist.      Pharynx: Oropharynx is clear.   Eyes:      " Extraocular Movements: Extraocular movements intact.      Conjunctiva/sclera: Conjunctivae normal.      Pupils: Pupils are equal, round, and reactive to light.   Cardiovascular:      Rate and Rhythm: Normal rate and regular rhythm.      Heart sounds: Murmur (1/6) heard.   Pulmonary:      Effort: Pulmonary effort is normal. No respiratory distress.      Comments: Diiminished BS  Abdominal:      General: Bowel sounds are normal.      Palpations: Abdomen is soft.      Tenderness: There is abdominal tenderness (lower abdominal pains).   Musculoskeletal:      Cervical back: Normal range of motion and neck supple.      Comments: In a wheelchair   Lymphadenopathy:      Cervical: No cervical adenopathy.   Skin:     General: Skin is warm and dry.   Neurological:      General: No focal deficit present.      Mental Status: She is alert and oriented to person, place, and time. Mental status is at baseline.   Psychiatric:         Mood and Affect: Mood normal.         Behavior: Behavior normal.         Thought Content: Thought content normal.         Judgment: Judgment normal.         Procedure:      Discussion/Summary:    chronic back pain- refill patch as needed, counseled on LT risk, advised to reduce percocet to tid prn  htn/HFpEF-controlled on diuretic/CCB   hyperlipidemia-cont lipitor and zetia, recheck at goal, counseled on low chol diet  hypothroid-cont replacement, recheck  depression with anxiety-cont buspar and klonopine and zoloft ,advised her of risk of addiction, stable  polycythemia-cbc today noted, advised tob cessation  retinal vein occlusion- cont rf modification, counseled on tob cessation, no change  b12 def-recheck at goal  DM-labs today at goal   Elevated lft/?autoimmune-f/u gastro recs , labs today normal  GERD-controlled PPI  Tremor-cont BB and topamax, will add primidone  Fe def anemia- recheck normal  RLE DVT-2/7/21-on NOAC  Hx of Meningioma-rescan noted  Abdominal pain-check CT      5/17 labs noted and  dw patient    Current Outpatient Medications:   •  acetaminophen (TYLENOL) 325 MG tablet, Take 2 tablets by mouth Every 4 (Four) Hours As Needed for mild pain (1-3) or fever (temperature greater than 101F)., Disp: 100 tablet, Rfl: 0  •  albuterol (PROVENTIL) (2.5 MG/3ML) 0.083% nebulizer solution, Take 2.5 mg by nebulization Every 6 (Six) Hours As Needed for Wheezing or Shortness of Air., Disp: 120 each, Rfl: 11  •  apixaban (ELIQUIS) 5 MG tablet tablet, Take 1 tablet by mouth Every 12 (Twelve) Hours., Disp: 90 tablet, Rfl: 3  •  atorvastatin (LIPITOR) 80 MG tablet, TAKE ONE TABLET BY MOUTH ONCE NIGHTLY, Disp: 90 tablet, Rfl: 3  •  busPIRone (BUSPAR) 7.5 MG tablet, TAKE TWO TABLETS BY MOUTH TWICE A DAY, Disp: 360 tablet, Rfl: 3  •  clonazePAM (KlonoPIN) 0.5 MG tablet, TAKE 1/2 TABLET BY MOUTH TWICE A DAY AS NEEDED FOR ANXIETY, Disp: 30 tablet, Rfl: 2  •  clopidogrel (PLAVIX) 75 MG tablet, TAKE ONE TABLET BY MOUTH DAILY, Disp: 90 tablet, Rfl: 3  •  dilTIAZem CD (CARDIZEM CD) 240 MG 24 hr capsule, TAKE ONE CAPSULE BY MOUTH DAILY, Disp: 90 capsule, Rfl: 3  •  doxazosin (CARDURA) 1 MG tablet, Take 1 tablet by mouth Every Night., Disp: 90 tablet, Rfl: 3  •  ezetimibe (ZETIA) 10 MG tablet, TAKE ONE TABLET BY MOUTH DAILY, Disp: 90 tablet, Rfl: 3  •  fentaNYL (DURAGESIC) 75 MCG/HR patch, Place 1 patch on the skin as directed by provider Every Other Day., Disp: 15 patch, Rfl: 0  •  fexofenadine (ALLEGRA) 180 MG tablet, Take 180 mg by mouth 2 (Two) Times a Day., Disp: , Rfl:   •  furosemide (LASIX) 40 MG tablet, TAKE ONE TABLET BY MOUTH TWICE A DAY, Disp: 180 tablet, Rfl: 3  •  levothyroxine (SYNTHROID, LEVOTHROID) 112 MCG tablet, TAKE ONE TABLET BY MOUTH DAILY, Disp: 90 tablet, Rfl: 3  •  omeprazole (priLOSEC) 20 MG capsule, TAKE ONE CAPSULE BY MOUTH DAILY, Disp: 90 capsule, Rfl: 3  •  oxyCODONE-acetaminophen (Percocet) 5-325 MG per tablet, Take 1 tablet by mouth Every 8 (Eight) Hours As Needed for Severe Pain ., Disp: 90  tablet, Rfl: 0  •  pregabalin (LYRICA) 100 MG capsule, TAKE ONE CAPSULE BY MOUTH EVERY 8 HOURS, Disp: 270 capsule, Rfl: 0  •  primidone (MYSOLINE) 50 MG tablet, Take 1 tablet by mouth Every Night., Disp: 30 tablet, Rfl: 5  •  promethazine (PHENERGAN) 25 MG tablet, TAKE ONE TABLET BY MOUTH EVERY 6 HOURS AS NEEDED FOR NAUSEA AND VOMITING, Disp: 20 tablet, Rfl: 3  •  sertraline (ZOLOFT) 50 MG tablet, TAKE ONE TABLET BY MOUTH DAILY, Disp: 90 tablet, Rfl: 3  •  tiotropium bromide monohydrate (Spiriva Respimat) 2.5 MCG/ACT aerosol solution inhaler, Inhale 2 puffs Daily., Disp: 8 g, Rfl: 0  •  tiZANidine (ZANAFLEX) 4 MG tablet, TAKE ONE TABLET BY MOUTH TWICE A DAY AS NEEDED FOR MUSCLE SPASMS, Disp: 60 tablet, Rfl: 5  •  topiramate (TOPAMAX) 50 MG tablet, TAKE ONE TABLET BY MOUTH IN THE MORNING THEN TAKE TWO TABLETS BY MOUTH EVERY NIGHT AT BEDTIME, Disp: 270 tablet, Rfl: 1        Diagnoses and all orders for this visit:    1. HTN and diastolic dysfunction (Primary)    2. Heart failure with preserved ejection fraction, unspecified HF chronicity (HCC)    3. Other specified hypothyroidism  -     TSH    4. GERD    5. Chronic, continuous use of opioids    6. Mucopurulent chronic bronchitis (HCC)    7. Lower abdominal pain  -     CBC (No Diff)  -     Comprehensive Metabolic Panel  -     CT Abdomen Pelvis With Contrast    8. Type 2 diabetes mellitus with hyperglycemia, without long-term current use of insulin (HCC)  -     Hemoglobin A1c

## 2022-05-25 NOTE — TELEPHONE ENCOUNTER
Caller: Tamara Langston    Relationship: Self    Best call back number: [phone]     Who are you requesting to speak with (clinical staff, provider,  specific staff member):   CLINICAL     What was the call regarding:  PATIENT SAID SHE WANTS TO LET DR DE PAZ KNOW THAT SHE DOES  GET NAUSEATED  AND HAS STOMACH ACHES QUITE OFTEN   SHE SAID NO WHEN SHE WAS IN THE OFFICE BUT HAS HAD TIME TO THINK ABOUT IT NOW   IT WAS WHEN HE WAS QUESTIONING HER ABDOMINAL PAIN

## 2022-05-25 NOTE — TELEPHONE ENCOUNTER
Caller: Tamara Langston    Relationship: Self    Best call back number:  [phone]    Requested Prescriptions:   Requested Prescriptions     Pending Prescriptions Disp Refills   • oxyCODONE-acetaminophen (Percocet) 5-325 MG per tablet 90 tablet 0     Sig: Take 1 tablet by mouth Every 8 (Eight) Hours As Needed for Severe Pain .   • fentaNYL (DURAGESIC) 75 MCG/HR patch 15 patch 0     Sig: Place 1 patch on the skin as directed by provider Every Other Day.        Pharmacy where request should be sent: [facility] [address] [facility] [phone] [facility] [phone] [facility]     Additional details provided by patient:     Does the patient have less than a 3 day supply:  [x] Yes  [] No

## 2022-05-27 NOTE — TELEPHONE ENCOUNTER
Pt called wanting to let Dr. Aranda and Ann Marie know that she felt nauseated when she was here for her last appointment. She is still feeling nauseous now. Pt phone number is [phone]

## 2022-06-06 NOTE — PROGRESS NOTES
[facility] Medicine Services  PROGRESS NOTE    Patient Name: Tamara Langston  : 1955  MRN: [id number]    Date of Admission: 3/8/2018  Length of Stay: 5  Primary Care Physician: Harinder Aranda MD    Subjective   Subjective     CC:AMS    HPI:  Sleeping today on arrival, but not on BiPAP.  Poor mobility and poor energy.  No family today.  No chest pain.  No shortness of breath.      Review of Systems  Gen- No fevers, chills  CV- No chest pain, palpitations  Resp- No cough, dyspnea  GI- No N/V/D, abd pain    Otherwise ROS is negative except as mentioned in the HPI.    Objective   Objective     Vital Signs:   Temp:  [97.5 °F (36.4 °C)-99 °F (37.2 °C)] 99 °F (37.2 °C)  Heart Rate:  [62-77] 68  Resp:  [18] 18  BP: (115-143)/(56-75) 142/75  FiO2 (%):  [30 %] 30 %        Physical Exam:  Constitutional: chronically ill appearing lady, in no acute distress, awake, alert  HENT: NCAT, mucous membranes moist  Respiratory: Clear to auscultation bilaterally, respiratory effort normal   Cardiovascular: RRR, no murmurs, rubs, or gallops, palpable pedal pulses bilaterally  Gastrointestinal: obese, positive bowel sounds, soft, nontender, nondistended  Musculoskeletal: bilateral LE edema  Psychiatric: Appropriate affect, cooperative  Neurologic: Oriented x 3, strength symmetric in all extremities, Cranial Nerves grossly intact to confrontation, speech clear  Skin: stage 4 decub ulcer,see PT WOC pics    Results Reviewed:  I have personally reviewed current lab, radiology, and data and agree.      Results from last 7 days  Lab Units 18  1842   WBC 10*3/mm3 15.94*   HEMOGLOBIN g/dL 10.2*   HEMATOCRIT % 33.4*   PLATELETS 10*3/mm3 192       Results from last 7 days  Lab Units 18  0445 03/10/18  2031 03/08/18  1842   SODIUM mmol/L 140 140 139   POTASSIUM mmol/L 4.0 5.1 5.2   CHLORIDE mmol/L 101 103 105   CO2 mmol/L 32.0* 30.0 28.0   BUN mg/dL 20 28* 32*   CREATININE mg/dL 1.20 1.30  Spoke with pt  History of colitis & C diff  Pt c/o 1 episode of watery diarrhea every morning with fecal incontinence, cramping and gas bloating  Denies bloody/black stool, nausea & vomiting, dehydration  Vanco course complete  Started colestipol 1G twice daily without relief  5/15 c diff negative,   5/31 + Covid  6/29 follow up appt with Dr Kenny Wells  Would you like to increase the Colestipol? Any suggestions? 1.50*   GLUCOSE mg/dL 148* 181* 176*   CALCIUM mg/dL 9.6 9.5 9.3   ALT (SGPT) U/L  --   --  26   AST (SGOT) U/L  --   --  32     Estimated Creatinine Clearance: 58.3 mL/min (by C-G formula based on SCr of 1.2 mg/dL).  No results found for: BNP  No results found for: PHART    Microbiology Results Abnormal     Procedure Component Value - Date/Time    Blood Culture - Blood, [[id number]]  (Normal) Collected:  03/08/18 1930    Lab Status:  Preliminary result Specimen:  Blood from Arm, Right Updated:  03/12/18 2101     Blood Culture No growth at 4 days    Blood Culture - Blood, [[id number]]  (Normal) Collected:  03/08/18 1935    Lab Status:  Preliminary result Specimen:  Blood from Arm, Right Updated:  03/12/18 2101     Blood Culture No growth at 4 days     Gram Stain Result Few (2+) WBCs seen    Urine Culture - Urine, Urine, Clean Catch [[id number]] Collected:  03/08/18 2002    Lab Status:  Final result Specimen:  Urine from Urine, Clean Catch Updated:  03/10/18 1110     Urine Culture --      10,000-20,000 CFU/mL Normal Urogenital Patience    Clostridium Difficile Toxin - Stool, Per Rectum [[id number]] Collected:  03/09/18 1027    Lab Status:  Final result Specimen:  Stool from Per Rectum Updated:  03/09/18 1203    Narrative:       The following orders were created for panel order Clostridium Difficile Toxin - Stool, Per Rectum.  Procedure                               Abnormality         Status                     ---------                               -----------         ------                     Clostridium Difficile To...[[id number]]  Normal              Final result                 Please view results for these tests on the individual orders.    Clostridium Difficile Toxin, PCR - Stool, Per Rectum [[id number]]  (Normal) Collected:  03/09/18 1027    Lab Status:  Final result Specimen:  Stool from Per Rectum Updated:  03/09/18 1203     C. Difficile Toxins by PCR Not Detected    Narrative:         Performance characteristics  of test not established for patients <2 years of age.  Negative for Toxigenic C. Difficile        Results for orders placed during the hospital encounter of 02/13/18   Adult Transthoracic Echo Limited W/ Cont if Necessary Per Protocol    Narrative · This was a limited echocardiogram performed to evaluate the left   ventricular ejection fraction  · Left ventricular systolic function is normal. Estimated EF appears to be   in the range of 51 - 55%.  · The following left ventricular wall segments are hypokinetic: mid   anterior, apical anterior, apical lateral, apical inferior, apical septal   and apex hypokinetic. The basal segments are hyperdynamic.  · When compared to the prior echocardiogram performed on February 14, 2018, the hypokinetic apical segments have mildly improved. The overall   ejection fraction has improved.          I have reviewed the medications.    Assessment/Plan   Assessment / Plan     Hospital Problem List     * (Principal)Altered mental status    Obstructive sleep apnea syndrome (Chronic)    Overview Addendum 8/1/2016  9:56 AM by Puja Clarke     intolerant of CPAP therapy  6. 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  Prior PSG reports complex sleep apnea with frequent central apneas and intermittent      Ramona Menard respirations, on auto CPAP 4-16 with supplemental oxygen.         Type 2 diabetes mellitus (Chronic)    Hypertension (Chronic)    Overview Signed 8/1/2016 10:00 AM by Puja Clarke     16. H/O echocardiogram (V15.89) (Z92.89)   · A.  Echocardiogram of 02/03/2015 reports an ejection fraction of 60-65%, mild concentric      LVH, trace mitral regurgitation, mild tricuspid and pulmonic regurgitation and calculated      RVSP of 35 mmHg, the main pulmonary artery is also mildly dilated.         Peripheral vascular disease (Chronic)    Coronary artery disease involving native heart (Chronic)    Overview Signed 2/15/2018 12:52 PM by CINDI Fierro     1. [facility]  11-26-16: Non occlusive disease  ·  50% mid RCA stenosis.  · 40% proximal LAD stenosis   · Normal left ventricular function         Takotsubo cardiomyopathy    Overview Signed 2/15/2018  1:42 PM by JEOVANY Greenfield     EF 35%, diastolic dysfunction, left ventricular wall segments are hypokinetic ECHO 2/14/18         Chronic systolic heart failure    Chronic respiratory failure with hypoxia (Chronic)    Infected sacral decubitus ulcers (Chronic)    Weakness         Brief Hospital Course to date:  Tamara Langston is a 62 y.o. female with hx of decubitus ulcer, systolic HF, DINA, T2DM presented with AMS and fever with concern for persistent decub infection.     Assessment & Plan:  - Acute encephalopathy, improving, suspected to be sec to persistent decub infection ID consulted and are aware, rec IV zosyn and vanc, f/u blood cultures  - Hx of DINA with chronic respiratory failure on 2L O2 and CPAP qhs.  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 Acute worsening of kidney function could cause Fentanyl and Percocet to not be cleared well    Poor mobility and poor energy.  Sleeping during the daytime.  Suggested using her machine when she is sleeping because of fentanyl and percocet in her system.    DVT Prophylaxis: [facility]     CODE STATUS: Conditional Code     Disposition:TBD, [facility] vs [facility]    Electronically signed by Omari Ross MD, 03/13/18, 5:41 PM.

## 2022-06-13 NOTE — TELEPHONE ENCOUNTER
PATIENT WOULD LIKE RESULTS OF CT SCAN CONCERNING COLON.     PLEASE CALL [phone]    dw patient results

## 2022-06-20 NOTE — TELEPHONE ENCOUNTER
Caller: Tamara Langston    Relationship to patient: Self    Best call back number: [phone]    What is the call regarding:  PATIENT STATES THAT SHE REFERRED TO DR FARMER, BUT HE CANNOT GET HER IN UNTIL July, BUT WANTED TO SEE IF SHE COULD GET IN SOONER DUE TO HER PAIN GETTING WORSE.  SHE IS HAVING TROUBLE WALKING WITH HER RIGHT WALKING.

## 2022-06-22 NOTE — TELEPHONE ENCOUNTER
Caller: Tamara Langston    Relationship: Self    Best call back number: [phone]    Requested Prescriptions:   Requested Prescriptions     Pending Prescriptions Disp Refills   • oxyCODONE-acetaminophen (Percocet) 5-325 MG per tablet 90 tablet 0     Sig: Take 1 tablet by mouth Every 8 (Eight) Hours As Needed for Severe Pain .   • fentaNYL (DURAGESIC) 75 MCG/HR patch 15 patch 0     Sig: Place 1 patch on the skin as directed by provider Every Other Day.        Pharmacy where request should be sent: [facility] [address] [facility] [phone] [facility] [phone] FX     Additional details provided by patient:     Does the patient have less than a 3 day supply:  [x] Yes  [] No    Matias Yarbrough   06/22/22 15:30 EDT

## 2022-06-22 NOTE — TELEPHONE ENCOUNTER
I called pt and she said that she spoke to [facility] office and they said we would have to call to get her in sooner.  She is in severe pain having trouble walking.  She said its extremely painful with BMs    I called and told her if that bad then to come to the office or go to ER, for now continue to use the fiber

## 2022-07-21 NOTE — TELEPHONE ENCOUNTER
Caller: Tamara Langston    Relationship: Self    Best call back number: [phone]    Requested Prescriptions:   Requested Prescriptions     Pending Prescriptions Disp Refills   • fentaNYL (DURAGESIC) 75 MCG/HR patch 15 patch 0     Sig: Place 1 patch on the skin as directed by provider Every Other Day.   • clonazePAM (KlonoPIN) 0.5 MG tablet 30 tablet 2   • oxyCODONE-acetaminophen (Percocet) 5-325 MG per tablet 90 tablet 0     Sig: Take 1 tablet by mouth Every 8 (Eight) Hours As Needed for Severe Pain .        Pharmacy where request should be sent: [address] [facility] [phone] [facility] [phone] FX     Additional details provided by patient: PATIENT HAS CALLED REQUESTING REFILLS ON ABOVE MEDICATIONS    Does the patient have less than a 3 day supply:  [x] Yes  [] No    Scarlet Mann   07/21/22 15:11 EDT

## 2022-08-08 ENCOUNTER — TELEPHONE (OUTPATIENT)
Dept: INTERNAL MEDICINE | Facility: CLINIC | Age: 67
End: 2022-08-08

## 2022-08-08 NOTE — TELEPHONE ENCOUNTER
“Please be informed that patient has passed. Patient has been marked  in the system. The date of death is: 2022    Caller: abraham keene    Relationship: sister    Best call back number: [phone]

## 2024-12-12 NOTE — TELEPHONE ENCOUNTER
Caller: Tamara Langston    Relationship: Self    Best call back number: [phone]    Medication needed:   Requested Prescriptions     Pending Prescriptions Disp Refills   • oxyCODONE-acetaminophen (Percocet) 5-325 MG per tablet 120 tablet 0     Sig: Take 1 tablet by mouth Every 6 (Six) Hours As Needed for Severe Pain .   • fentaNYL (DURAGESIC) 75 MCG/HR patch 15 patch 0     Sig: Place 1 patch on the skin as directed by provider Every Other Day.       When do you need the refill by: ASAP    What details did the patient provide when requesting the medication: WILL RUN OUT BEFORE WEEKEND    Does the patient have less than a 3 day supply:  [x] Yes  [] No    What is the patient's preferred pharmacy: [facility] [address] [facility] [phone] [facility] [phone] FX              
Ambulatory

## (undated) DEVICE — ANTIBACTERIAL UNDYED BRAIDED (POLYGLACTIN 910), SYNTHETIC ABSORBABLE SUTURE: Brand: COATED VICRYL

## (undated) DEVICE — MEDI-VAC YANKAUER SUCTION HANDLE: Brand: CARDINAL HEALTH

## (undated) DEVICE — SHEET, DRAPE, SPLIT, STERILE: Brand: MEDLINE

## (undated) DEVICE — CATH PACE PACEL BIPOL 5F110CM

## (undated) DEVICE — SUT PROLN 4/0 BB D/A 36IN 8581H

## (undated) DEVICE — TREK CORONARY DILATATION CATHETER 2.50 MM X 15 MM / RAPID-EXCHANGE: Brand: TREK

## (undated) DEVICE — GW PRESS VERRATA STR 185CM

## (undated) DEVICE — DRSNG ELAS NET STRCH SZ5 12IN 25YD LF

## (undated) DEVICE — PK MINOR SPLT 10

## (undated) DEVICE — GUIDE CATHETER: Brand: MACH1™

## (undated) DEVICE — NC TREK CORONARY DILATATION CATHETER 2.5 MM X 15 MM / RAPID-EXCHANGE: Brand: NC TREK

## (undated) DEVICE — GW AMPLTZ SUPERSTIFF STR .035IN 180CM

## (undated) DEVICE — GLIDESHEATH SLENDER STAINLESS STEEL KIT: Brand: GLIDESHEATH SLENDER

## (undated) DEVICE — SUT SILK 3/0 TIES 18IN A184H

## (undated) DEVICE — FLUFF BANDAGE ROLL 4.5" X 147" 6-PLY, STERILE: Brand: DUKAL

## (undated) DEVICE — SYR LUERLOK 20CC

## (undated) DEVICE — PINNACLE INTRODUCER SHEATH: Brand: PINNACLE

## (undated) DEVICE — 3M™ STERI-DRAPE™ FLUOROSCOPE DRAPE, 10 PER CARTON / 4 CARTONS PER CASE, 1012: Brand: STERI-DRAPE™

## (undated) DEVICE — GAUZE FLUFF 1 PLY: Brand: DEROYAL

## (undated) DEVICE — CATH IMG IVUS EAGLE EYE PLATIN RX DIGITAL .014IN 5FR

## (undated) DEVICE — PAD E/S GRND SGL/FOIL 9FT/CORD DISP

## (undated) DEVICE — LEX NEURO ANGIOGRAPHY: Brand: MEDLINE INDUSTRIES, INC.

## (undated) DEVICE — MEDI-VAC YANKAUER SUCTION HANDLE W/BULBOUS TIP: Brand: CARDINAL HEALTH

## (undated) DEVICE — SUT PROLN 7/0 BV1 24IN 4PK M8702

## (undated) DEVICE — TRY SKINPREP DRYPREP

## (undated) DEVICE — GW PERIPH GUIDERIGHT STD/EXCHNG/J/TIP SS 0.035IN 5X260CM

## (undated) DEVICE — Device

## (undated) DEVICE — CATH DIAG EXPO M/ PK 5F FL4/FR4 PIG

## (undated) DEVICE — Device: Brand: ANGIOSCULPT® PTCA SCORING BALLOON CATHETER

## (undated) DEVICE — PROXIMATE RH ROTATING HEAD SKIN STAPLERS (35 WIDE) CONTAINS 35 STAINLESS STEEL STAPLES: Brand: PROXIMATE

## (undated) DEVICE — KT MANIFOLD CATHLAB CUST

## (undated) DEVICE — ELECTRD DEFIB M/FUNC PROPADZ STRL 2PK

## (undated) DEVICE — CLTH CLENS READYCLEANSE PERI CARE PK/5

## (undated) DEVICE — NC TREK CORONARY DILATATION CATHETER 3.0 MM X 15 MM / RAPID-EXCHANGE: Brand: NC TREK

## (undated) DEVICE — STCKNT IMPERV 12IN STRL

## (undated) DEVICE — DEV COMP RAD PRELUDESYNC 24CM

## (undated) DEVICE — SKIN AFFIX SURG ADHESIVE 72/CS 0.55ML: Brand: MEDLINE

## (undated) DEVICE — ELECTRD BLD 1P STD SS 3/32X2.44IN

## (undated) DEVICE — NDL PERC 1PRT THNWALL W/BASEPLT 18G 7CM

## (undated) DEVICE — SUT SILK 2/0 TIES 18IN A185H

## (undated) DEVICE — SPNG GZ WOVN 4X4IN 12PLY 10/BX STRL

## (undated) DEVICE — GW CERBRL FC PTFE STD/STR .035 260CM

## (undated) DEVICE — COVER,TABLE,HVY DUTY,60"X90",STRL: Brand: MEDLINE

## (undated) DEVICE — BOWL UTIL STRL 32OZ

## (undated) DEVICE — PK CATH CARD 10

## (undated) DEVICE — SUCTION CANISTER, 2500CC, RIGID: Brand: DEROYAL

## (undated) DEVICE — GW PRESSUREWIRE X WIRELESS FFR 175CM

## (undated) DEVICE — NC TREK CORONARY DILATATION CATHETER 3.5 MM X 20 MM / RAPID-EXCHANGE: Brand: NC TREK

## (undated) DEVICE — DRSNG WND GZ CURAD OIL EMULSION 3X3IN STRL

## (undated) DEVICE — CANNULA,ADULT,SOFT-TOUCH,7TUBE,SC: Brand: MEDLINE

## (undated) DEVICE — DRAPE,REIN 53X77,STERILE: Brand: MEDLINE

## (undated) DEVICE — GLV SURG SENSICARE W/ALOE PF LF 7 STRL

## (undated) DEVICE — ENCORE® LATEX MICRO SIZE 7, STERILE LATEX POWDER-FREE SURGICAL GLOVE: Brand: ENCORE

## (undated) DEVICE — GLV SURG SENSICARE MICRO PF LF 7 STRL

## (undated) DEVICE — SWAN-GANZ THERMODILUTION CATHETER: Brand: SWAN-GANZ

## (undated) DEVICE — MODEL AT P65, P/N 701554-001KIT CONTENTS: HAND CONTROLLER, 3-WAY HIGH-PRESSURE STOPCOCK WITH ROTATING END AND PREMIUM HIGH-PRESSURE TUBING: Brand: ANGIOTOUCH® KIT

## (undated) DEVICE — CABL PACE ATRIAL PT BLU

## (undated) DEVICE — GLIDEX™ COATED HYDROPHILIC GUIDEWIRE: Brand: MAGIC TORQUE™

## (undated) DEVICE — SLV REPOSTNG CATH STRL 60CM

## (undated) DEVICE — MODEL BT2000 P/N 700287-012KIT CONTENTS: MANIFOLD WITH SALINE AND CONTRAST PORTS, SALINE TUBING WITH SPIKE AND HAND SYRINGE, TRANSDUCER: Brand: BT2000 AUTOMATED MANIFOLD KIT

## (undated) DEVICE — PK EXTREM LOWR 10

## (undated) DEVICE — DRSNG SURESITE123 4X4.8IN

## (undated) DEVICE — Device: Brand: ASAHI SION BLUE

## (undated) DEVICE — SOL NACL 0.9PCT 1000ML

## (undated) DEVICE — 2000CC GUARDIAN II: Brand: GUARDIAN

## (undated) DEVICE — 3M™ TEGADERM™ CHG DRESSING 25/CARTON 4 CARTONS/CASE 1658: Brand: TEGADERM™

## (undated) DEVICE — DEV INFL MONARCH 25W

## (undated) DEVICE — GW J TP FIX CORE .035 150

## (undated) DEVICE — CATH DIAG EXPO .056 AL1 6F 100CM

## (undated) DEVICE — APPL CHLORAPREP W/TINT 26ML BLU

## (undated) DEVICE — GW SAFARI2 PRESH XSM CRV .035IN 3.2X2.9X275CM

## (undated) DEVICE — SPNG GZ STRL 2S 4X4 12PLY

## (undated) DEVICE — RADIFOCUS GLIDEWIRE: Brand: GLIDEWIRE

## (undated) DEVICE — GW EXCHG TSCF .035 260CM 3MM

## (undated) DEVICE — NDL HYPO ECLPS SFTY 22G 1 1/2IN

## (undated) DEVICE — CATH GUIDE BERN 5F 65CM

## (undated) DEVICE — 3M™ WARMING BLANKET, UPPER BODY, 10 PER CASE, 42268: Brand: BAIR HUGGER™

## (undated) DEVICE — NC TREK CORONARY DILATATION CATHETER 4.0 MM X 15 MM / RAPID-EXCHANGE: Brand: NC TREK

## (undated) DEVICE — PK HEART OPN 10

## (undated) DEVICE — BANDAGE,GAUZE,BULKEE II,2.25"X3YD,STRL: Brand: MEDLINE

## (undated) DEVICE — PERCLOSE PROGLIDE™ SUTURE-MEDIATED CLOSURE SYSTEM: Brand: PERCLOSE PROGLIDE™

## (undated) DEVICE — CATH DIAG EXPO .045 FL3.5 5F 100CM

## (undated) DEVICE — SUT SILK 4/0 TIES 18IN A183H

## (undated) DEVICE — INTRAOPERATIVE COVER KIT, 10 PACK: Brand: SITE-RITE

## (undated) DEVICE — ANGIOGRAPHIC CATHETER: Brand: EXPO™

## (undated) DEVICE — SI AVANTI+ 7F STD W/GW  NO OBT: Brand: AVANTI

## (undated) DEVICE — CVR HNDL LT SURG ACCSSRY BLU STRL

## (undated) DEVICE — SUT MNCRYL PLS ANTIB UD 4/0 PS2 18IN

## (undated) DEVICE — MEDI-VAC NON-CONDUCTIVE SUCTION TUBING: Brand: CARDINAL HEALTH

## (undated) DEVICE — BANDAGE,GAUZE,BULKEE II,4.5"X4.1YD,STRL: Brand: MEDLINE

## (undated) DEVICE — AIRWY 90MM NO9

## (undated) DEVICE — INTENDED FOR TISSUE SEPARATION, AND OTHER PROCEDURES THAT REQUIRE A SHARP SURGICAL BLADE TO PUNCTURE OR CUT.: Brand: BARD-PARKER ® STAINLESS STEEL BLADES

## (undated) DEVICE — SUT PROLN 6/0 C1 D/A 30IN 8706H

## (undated) DEVICE — PAD ARMBRD SURG CONVOL 7.5X20X2IN

## (undated) DEVICE — SAFESECURE,SECUREMENT,FOLEY CATH,STERILE: Brand: MEDLINE

## (undated) DEVICE — ST NERV BLCK CONT CONTIPLEX ECHO CLSD 18G 4IN